# Patient Record
Sex: MALE | Race: WHITE | HISPANIC OR LATINO | Employment: OTHER | URBAN - METROPOLITAN AREA
[De-identification: names, ages, dates, MRNs, and addresses within clinical notes are randomized per-mention and may not be internally consistent; named-entity substitution may affect disease eponyms.]

---

## 2017-01-09 ENCOUNTER — APPOINTMENT (OUTPATIENT)
Dept: PHYSICAL THERAPY | Facility: CLINIC | Age: 72
End: 2017-01-09
Payer: COMMERCIAL

## 2017-01-09 ENCOUNTER — GENERIC CONVERSION - ENCOUNTER (OUTPATIENT)
Dept: OTHER | Facility: OTHER | Age: 72
End: 2017-01-09

## 2017-01-09 DIAGNOSIS — M54.9 DORSALGIA: ICD-10-CM

## 2017-01-09 PROCEDURE — 97110 THERAPEUTIC EXERCISES: CPT

## 2017-01-09 PROCEDURE — 97162 PT EVAL MOD COMPLEX 30 MIN: CPT

## 2017-01-09 PROCEDURE — G8991 OTHER PT/OT GOAL STATUS: HCPCS

## 2017-01-09 PROCEDURE — G8990 OTHER PT/OT CURRENT STATUS: HCPCS

## 2017-01-18 ENCOUNTER — APPOINTMENT (OUTPATIENT)
Dept: PHYSICAL THERAPY | Facility: CLINIC | Age: 72
End: 2017-01-18
Payer: COMMERCIAL

## 2017-01-25 ENCOUNTER — APPOINTMENT (OUTPATIENT)
Dept: PHYSICAL THERAPY | Facility: CLINIC | Age: 72
End: 2017-01-25
Payer: COMMERCIAL

## 2017-01-25 PROCEDURE — 97110 THERAPEUTIC EXERCISES: CPT

## 2017-01-25 PROCEDURE — 97140 MANUAL THERAPY 1/> REGIONS: CPT

## 2017-01-30 ENCOUNTER — GENERIC CONVERSION - ENCOUNTER (OUTPATIENT)
Dept: OTHER | Facility: OTHER | Age: 72
End: 2017-01-30

## 2017-01-30 ENCOUNTER — ALLSCRIPTS OFFICE VISIT (OUTPATIENT)
Dept: OTHER | Facility: OTHER | Age: 72
End: 2017-01-30

## 2017-01-30 DIAGNOSIS — E11.49 TYPE 2 DIABETES MELLITUS WITH OTHER DIABETIC NEUROLOGICAL COMPLICATION (HCC): ICD-10-CM

## 2017-01-30 DIAGNOSIS — Z11.4 ENCOUNTER FOR SCREENING FOR HIV: ICD-10-CM

## 2017-01-30 DIAGNOSIS — Z11.59 ENCOUNTER FOR SCREENING FOR OTHER VIRAL DISEASES: ICD-10-CM

## 2017-01-30 DIAGNOSIS — Z12.5 ENCOUNTER FOR SCREENING FOR MALIGNANT NEOPLASM OF PROSTATE: ICD-10-CM

## 2017-01-30 DIAGNOSIS — H91.93 HEARING LOSS OF BOTH EARS: ICD-10-CM

## 2017-01-30 DIAGNOSIS — Z00.00 ENCOUNTER FOR GENERAL ADULT MEDICAL EXAMINATION WITHOUT ABNORMAL FINDINGS: ICD-10-CM

## 2017-02-24 ENCOUNTER — GENERIC CONVERSION - ENCOUNTER (OUTPATIENT)
Dept: OTHER | Facility: OTHER | Age: 72
End: 2017-02-24

## 2017-03-20 ENCOUNTER — APPOINTMENT (OUTPATIENT)
Dept: LAB | Facility: CLINIC | Age: 72
End: 2017-03-20
Payer: COMMERCIAL

## 2017-03-20 DIAGNOSIS — Z11.4 ENCOUNTER FOR SCREENING FOR HIV: ICD-10-CM

## 2017-03-20 DIAGNOSIS — Z00.00 ENCOUNTER FOR GENERAL ADULT MEDICAL EXAMINATION WITHOUT ABNORMAL FINDINGS: ICD-10-CM

## 2017-03-20 DIAGNOSIS — Z11.59 ENCOUNTER FOR SCREENING FOR OTHER VIRAL DISEASES: ICD-10-CM

## 2017-03-20 DIAGNOSIS — Z12.5 ENCOUNTER FOR SCREENING FOR MALIGNANT NEOPLASM OF PROSTATE: ICD-10-CM

## 2017-03-20 LAB
ANION GAP SERPL CALCULATED.3IONS-SCNC: 7 MMOL/L (ref 4–13)
BUN SERPL-MCNC: 15 MG/DL (ref 5–25)
CALCIUM SERPL-MCNC: 9.3 MG/DL (ref 8.3–10.1)
CHLORIDE SERPL-SCNC: 100 MMOL/L (ref 100–108)
CO2 SERPL-SCNC: 31 MMOL/L (ref 21–32)
CREAT SERPL-MCNC: 0.92 MG/DL (ref 0.6–1.3)
GFR SERPL CREATININE-BSD FRML MDRD: >60 ML/MIN/1.73SQ M
GLUCOSE P FAST SERPL-MCNC: 202 MG/DL (ref 65–99)
MAGNESIUM SERPL-MCNC: 2 MG/DL (ref 1.6–2.6)
POTASSIUM SERPL-SCNC: 3.6 MMOL/L (ref 3.5–5.3)
SODIUM SERPL-SCNC: 138 MMOL/L (ref 136–145)

## 2017-03-20 PROCEDURE — 80048 BASIC METABOLIC PNL TOTAL CA: CPT

## 2017-03-20 PROCEDURE — 87389 HIV-1 AG W/HIV-1&-2 AB AG IA: CPT

## 2017-03-20 PROCEDURE — 84154 ASSAY OF PSA FREE: CPT

## 2017-03-20 PROCEDURE — 36415 COLL VENOUS BLD VENIPUNCTURE: CPT

## 2017-03-20 PROCEDURE — 87340 HEPATITIS B SURFACE AG IA: CPT

## 2017-03-20 PROCEDURE — 86803 HEPATITIS C AB TEST: CPT

## 2017-03-20 PROCEDURE — G0103 PSA SCREENING: HCPCS

## 2017-03-20 PROCEDURE — 86705 HEP B CORE ANTIBODY IGM: CPT

## 2017-03-20 PROCEDURE — 86704 HEP B CORE ANTIBODY TOTAL: CPT

## 2017-03-20 PROCEDURE — 83735 ASSAY OF MAGNESIUM: CPT

## 2017-03-21 LAB
HBV CORE AB SER QL: REACTIVE
HBV CORE IGM SER QL: ABNORMAL
HBV SURFACE AG SER QL: ABNORMAL
HCV AB SER QL: ABNORMAL
HIV 1+2 AB+HIV1 P24 AG SERPL QL IA: NORMAL
PSA FREE MFR SERPL: 18.8 %
PSA FREE SERPL-MCNC: 0.15 NG/ML
PSA SERPL-MCNC: 0.8 NG/ML (ref 0–4)

## 2017-04-05 ENCOUNTER — APPOINTMENT (OUTPATIENT)
Dept: LAB | Facility: HOSPITAL | Age: 72
End: 2017-04-05
Payer: COMMERCIAL

## 2017-04-05 ENCOUNTER — TRANSCRIBE ORDERS (OUTPATIENT)
Dept: RADIOLOGY | Facility: HOSPITAL | Age: 72
End: 2017-04-05

## 2017-04-05 ENCOUNTER — HOSPITAL ENCOUNTER (OUTPATIENT)
Dept: RADIOLOGY | Facility: HOSPITAL | Age: 72
Discharge: HOME | End: 2017-04-05
Payer: COMMERCIAL

## 2017-04-05 ENCOUNTER — TRANSCRIBE ORDERS (OUTPATIENT)
Dept: LAB | Facility: HOSPITAL | Age: 72
End: 2017-04-05

## 2017-04-05 DIAGNOSIS — E11.49 TYPE 2 DIABETES MELLITUS WITH OTHER DIABETIC NEUROLOGICAL COMPLICATION (HCC): ICD-10-CM

## 2017-04-05 DIAGNOSIS — M54.9 DORSALGIA: ICD-10-CM

## 2017-04-05 DIAGNOSIS — B18.2 CHRONIC VIRAL HEPATITIS C (HCC): ICD-10-CM

## 2017-04-05 DIAGNOSIS — M17.10 PRIMARY OSTEOARTHRITIS OF ONE KNEE: ICD-10-CM

## 2017-04-05 DIAGNOSIS — M25.561 PAIN IN RIGHT KNEE: ICD-10-CM

## 2017-04-05 DIAGNOSIS — F17.200 NICOTINE DEPENDENCE, UNCOMPLICATED: ICD-10-CM

## 2017-04-05 LAB
CHOLEST SERPL-MCNC: 179 MG/DL (ref 50–200)
CREAT UR-MCNC: 384 MG/DL
HDLC SERPL-MCNC: 59 MG/DL (ref 40–60)
LDLC SERPL CALC-MCNC: 101 MG/DL (ref 0–100)
MICROALBUMIN UR-MCNC: 107 MG/L (ref 0–20)
MICROALBUMIN/CREAT 24H UR: 28 MG/G CREATININE (ref 0–30)
TRIGL SERPL-MCNC: 96 MG/DL

## 2017-04-05 PROCEDURE — 36415 COLL VENOUS BLD VENIPUNCTURE: CPT

## 2017-04-05 PROCEDURE — 82570 ASSAY OF URINE CREATININE: CPT

## 2017-04-05 PROCEDURE — 80061 LIPID PANEL: CPT

## 2017-04-05 PROCEDURE — 82043 UR ALBUMIN QUANTITATIVE: CPT

## 2017-04-05 PROCEDURE — 72110 X-RAY EXAM L-2 SPINE 4/>VWS: CPT

## 2017-04-13 ENCOUNTER — ALLSCRIPTS OFFICE VISIT (OUTPATIENT)
Dept: OTHER | Facility: OTHER | Age: 72
End: 2017-04-13

## 2017-04-13 ENCOUNTER — GENERIC CONVERSION - ENCOUNTER (OUTPATIENT)
Dept: OTHER | Facility: OTHER | Age: 72
End: 2017-04-13

## 2017-04-13 LAB — HBA1C MFR BLD HPLC: 7.6 %

## 2017-07-20 ENCOUNTER — ALLSCRIPTS OFFICE VISIT (OUTPATIENT)
Dept: OTHER | Facility: OTHER | Age: 72
End: 2017-07-20

## 2017-10-31 ENCOUNTER — APPOINTMENT (OUTPATIENT)
Dept: LAB | Facility: CLINIC | Age: 72
End: 2017-10-31
Payer: COMMERCIAL

## 2017-10-31 ENCOUNTER — ALLSCRIPTS OFFICE VISIT (OUTPATIENT)
Dept: OTHER | Facility: OTHER | Age: 72
End: 2017-10-31

## 2017-10-31 DIAGNOSIS — B18.2 CHRONIC VIRAL HEPATITIS C (HCC): ICD-10-CM

## 2017-10-31 LAB
ALBUMIN SERPL BCP-MCNC: 3.6 G/DL (ref 3.5–5)
ALP SERPL-CCNC: 59 U/L (ref 46–116)
ALT SERPL W P-5'-P-CCNC: 38 U/L (ref 12–78)
ANION GAP SERPL CALCULATED.3IONS-SCNC: 6 MMOL/L (ref 4–13)
AST SERPL W P-5'-P-CCNC: 28 U/L (ref 5–45)
BILIRUB SERPL-MCNC: 0.61 MG/DL (ref 0.2–1)
BUN SERPL-MCNC: 15 MG/DL (ref 5–25)
CALCIUM SERPL-MCNC: 9.7 MG/DL (ref 8.3–10.1)
CHLORIDE SERPL-SCNC: 98 MMOL/L (ref 100–108)
CO2 SERPL-SCNC: 29 MMOL/L (ref 21–32)
CREAT SERPL-MCNC: 1.05 MG/DL (ref 0.6–1.3)
GFR SERPL CREATININE-BSD FRML MDRD: 72 ML/MIN/1.73SQ M
GLUCOSE P FAST SERPL-MCNC: 213 MG/DL (ref 65–99)
POTASSIUM SERPL-SCNC: 4.1 MMOL/L (ref 3.5–5.3)
PROT SERPL-MCNC: 7.7 G/DL (ref 6.4–8.2)
SODIUM SERPL-SCNC: 133 MMOL/L (ref 136–145)

## 2017-10-31 PROCEDURE — 82105 ALPHA-FETOPROTEIN SERUM: CPT

## 2017-10-31 PROCEDURE — 87522 HEPATITIS C REVRS TRNSCRPJ: CPT

## 2017-10-31 PROCEDURE — 87902 NFCT AGT GNTYP ALYS HEP C: CPT

## 2017-10-31 PROCEDURE — 80053 COMPREHEN METABOLIC PANEL: CPT

## 2017-10-31 PROCEDURE — 36415 COLL VENOUS BLD VENIPUNCTURE: CPT

## 2017-11-01 LAB — AFP-TM SERPL-MCNC: 2.7 NG/ML (ref 0–8.3)

## 2017-11-02 LAB
HCV RNA SERPL NAA+PROBE-ACNC: NORMAL IU/ML
HCV RNA SERPL NAA+PROBE-LOG IU: 5.88 LOG10 IU/ML
TEST INFORMATION: NORMAL

## 2017-11-03 ENCOUNTER — HOSPITAL ENCOUNTER (OUTPATIENT)
Dept: RADIOLOGY | Facility: HOSPITAL | Age: 72
Discharge: HOME | End: 2017-11-03
Payer: COMMERCIAL

## 2017-11-03 DIAGNOSIS — F17.200 NICOTINE DEPENDENCE, UNCOMPLICATED: ICD-10-CM

## 2017-11-03 PROCEDURE — 76775 US EXAM ABDO BACK WALL LIM: CPT

## 2017-11-05 LAB
HCV GENTYP SERPL NAA+PROBE: NORMAL
HCV PLEASE NOTE: NORMAL

## 2018-01-09 NOTE — PROGRESS NOTES
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)   10  Diabetic Nephropathy (583 81)   11  Encounter for screening for HIV (V73 89) (Z11 4)   12  Flu vaccine need (V04 81) (Z23)   13  Hyperlipidemia (272 4) (E78 5)   14  Hypertension (401 9) (I10)   15  Hypothyroidism (244 9) (E03 9)   16  Medicare annual wellness visit, initial (V70 0) (Z00 00)   17  Microalbuminuria (791 0) (R80 9)   18  Need for hepatitis C screening test (V73 89) (Z11 59)   19  Need for pneumococcal vaccination (V03 82) (Z23)   20  Need for prophylactic vaccination and inoculation against influenza (V04 81) (Z23)   21  Need for Tdap vaccination (V06 1) (Z23)   22  Nicotine dependence (305 1) (F17 200)   23  Onychomycosis (110 1) (B35 1)   24  Osteoarthritis of knee (715 36) (M17 10)   25  Peripheral neuropathy (356 9) (G62 9)   26  Right knee pain (719 46) (M25 561)   27  Right shoulder pain (719 41) (M25 511)   28  Screening for abdominal aortic aneurysm (V81 2) (Z13 6)   29  Screening PSA (prostate specific antigen) (V76 44) (Z12 5)   30  Thoracic vertebral fracture (805 2) (S22 009A)   31  Tinea pedis (110 4) (B35 3)   32  Type 2 diabetes mellitus (250 00) (E11 9)   33  Vitamin B12 deficiency (266 2) (E53 8)    Current Meds   1  Advair -21 MCG/ACT Inhalation Aerosol; USE 2 PUFFS TWICE A DAY; Therapy: 80NYB6230 to (Evaluate:16Jan2018)  Requested for: 21Yqj8632; Last   Rx:92Bbd5472 Ordered   2   Aspirin 81 MG Oral Tablet Delayed Release; take 1 tablet by mouth once daily; Therapy: 60Pjw4096 to (Evaluate:16Jan2018)  Requested for: 20Def0653; Last   Rx:04Kzq3561 Ordered   3  Clotrimazole 1 % External Cream; APPLY SPARINGLY TO AFFECTED AREA(S) TWICE   DAILY; Therapy: 64GMT9629 to (Last Rx:24Nov2015) Ordered   4  [facility] Vitamin B-12 1000 MCG Oral Tablet; take 1 tablet by mouth daily; Therapy: 38FWV7895 to (Evaluate:57Rce2810)  Requested for: 22Ezc3787; Last   Rx:25Cac6322 Ordered   5  DilTIAZem HCl ER Coated Beads 300 MG Oral Capsule Extended Release 24 Hour;   take 1 capsule by mouth daily; Therapy: 95EUP1704 to (Evaluate:20Jul2018)  Requested for: 57Ksp5442; Last   Rx:12Hhm0086 Ordered   6  Dulcolax 5 MG Oral Tablet Delayed Release; take 4 tabs at once with 8 ounces of water   at 5:00 pm;   Therapy: 95RCU5030 to (Last YT:67QBX1988) Ordered   7  Econazole Nitrate 1 % External Cream; APPLY SPARINGLY AND RUB IN WELL TO   AFFECTED AREA(S) ONCE DAILY; Therapy: 51QAQ4248 to (Last Rx:25Nov2014) Ordered   8  Fluticasone Propionate 50 MCG/ACT Nasal Suspension; instill 2 sprays into each nostril   once daily; Therapy: 19OKG1974 to (Evaluate:18Oct2017)  Requested for: 98Wtc5525; Last   Rx:08Bor4717 Ordered   9  FreeStyle Lancets Miscellaneous; check blood sugar once in morning and once in   evening before meals; Therapy: 20EXL0688 to (Last Rx:91Fis4253) Ordered   10  FreeStyle Lite Device; check blood sugar once in the morning and once inthe evening    before meals; Therapy: 66LCD3999 to (Last Rx:09Isf0349) Ordered   11  FreeStyle Lite Test In Vitro Strip; check blood glucose fasting every morning, may check    twice daily if needed; Therapy: 20VWY0728 to (Last Rx:27Nlr6843) Ordered   12  Glimepiride 1 MG Oral Tablet; take 1 tablet by mouth daily; Therapy: 12CLK4560 to (Evaluate:37Xjw0182)  Requested for: 49Ebp1155; Last    Rx:17Wms0570 Ordered   13  HydroCHLOROthiazide 25 MG Oral Tablet; take 1 tablet by mouth daily;     Therapy: 03VKJ8058 to (Evaluate:78Fni1624)  Requested for: 14Vvx3348; Last    Rx:77Hrm1562 Ordered   14  Levothyroxine Sodium 137 MCG Oral Tablet; TAKE 1 TABLET DAILY; Therapy: 25JYH0791 to (Evaluate:17Jan2018)  Requested for: 05Zoo3064; Last    Rx:48Mdf4075 Ordered   15  Lisinopril 10 MG Oral Tablet; TAKE 1 TABLET DAILY; Therapy: 12IWL3094 to (Evaluate:17Nov2017)  Requested for: 39Vgb9802; Last    Rx:67Tvt4248 Ordered   16  MetFORMIN HCl - 850 MG Oral Tablet; TAKE 1 TABLET BY MOUTH TWICE A DAY WITH    MEALS; Therapy: 53BUS5468 to (Evaluate:13Nlh4451)  Requested for: 43Svv3689; Last    Rx:64Mvf2524 Ordered   17  Montelukast Sodium 10 MG Oral Tablet; take 1 tablet by mouth once daily; Therapy: 45BVF0037 to (Arlean Hunger)  Requested for: 49XYR5617; Last    Rx:30Jan2017 Ordered   18  Polyethylene Glycol 3350 Oral Powder; mix 238 gm in 64 ounces of gatorade; Therapy: 52MJV7316 to (Last AY:54KBQ5740) Ordered   19  Simvastatin 80 MG Oral Tablet; take 1 tablet at bedtime; Therapy: 84KRO7230 to (Evaluate:53Aqm6785)  Requested for: 52DRU4139; Last    Rx:20Wky2256 Ordered   20  Tylenol 325 MG Oral Tablet; take 1 tablet every twelve hours; Therapy: 70Aqj9196 to (Evaluate:61Bok3594)  Requested for: 91Qhq5650; Last    Rx:20Rth2282 Ordered   21  Ventolin  (90 Base) MCG/ACT Inhalation Aerosol Solution; INHALE 2 PUFFS    EVERY 4-6 HOURS AS NEEDED; Therapy: 96EIW4256 to (Evaluate:26Dba8404)  Requested for: 35Agq7980; Last    Rx:02Ytp4668 Ordered    Allergies    1   No Known Drug Allergies    Plan  Need for prophylactic vaccination and inoculation against influenza    · Flulaval Quadrivalent Intramuscular Suspension    Signatures   Electronically signed by : Jaquelin Vazquez, ; Oct 31 2017 12:03PM EST                       (Author)    Electronically signed by : Azul Moulton DO; Oct 31 2017 12:38PM EST                       (Author)    Electronically signed by : Brandie Alfred DO; Oct 31 2017 12:52PM EST (Review)

## 2018-01-12 VITALS
SYSTOLIC BLOOD PRESSURE: 130 MMHG | HEART RATE: 82 BPM | WEIGHT: 197.09 LBS | DIASTOLIC BLOOD PRESSURE: 62 MMHG | TEMPERATURE: 98.4 F | BODY MASS INDEX: 30.93 KG/M2 | HEIGHT: 67 IN

## 2018-01-12 VITALS
BODY MASS INDEX: 30.45 KG/M2 | TEMPERATURE: 98.2 F | WEIGHT: 194 LBS | SYSTOLIC BLOOD PRESSURE: 148 MMHG | HEART RATE: 76 BPM | HEIGHT: 67 IN | DIASTOLIC BLOOD PRESSURE: 80 MMHG

## 2018-01-13 NOTE — PROGRESS NOTES
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)   7  Diabetic Nephropathy (583 81)   8  Flu vaccine need (V04 81) (Z23)   9  Hyperlipidemia (272 4) (E78 5)   10  Hypertension (401 9) (I10)   11  Hypothyroidism (244 9) (E03 9)   12  Microalbuminuria (791 0) (R80 9)   13  Need for prophylactic vaccination and inoculation against influenza (V04 81) (Z23)   14  Onychomycosis (110 1) (B35 1)   15  Peripheral neuropathy (356 9) (G62 9)   16  Right shoulder pain (719 41) (M25 511)   17  Thoracic vertebral fracture (805 2) (S22 009A)   18  Tinea pedis (110 4) (B35 3)   19  Type 2 diabetes mellitus (250 00) (E11 9)   20  Vitamin B12 deficiency (266 2) (E53 8)    Current Meds   1  Advair Diskus 500-50 MCG/DOSE Inhalation Aerosol Powder Breath Activated; Inhale 1   puff twice daily; Therapy: 65QMG7491 to (Evaluate:01Jan2017)  Requested for: 01VAG1862; Last   Rx:07Jan2016 Ordered   2  Advair -21 MCG/ACT Inhalation Aerosol; USE 2 PUFFS TWICE A DAY; Therapy: 45YZF2031 to (Evaluate:66Xgd8751)  Requested for: 00TGW7450; Last   Rx:22Ttn0743 Ordered   3  Aspirin 81 MG TABS; TAKE 1 TABLET DAILY; Therapy: 22CKR7545 to (Evaluate:01Jan2017)  Requested for: 20CSZ3276; Last   Rx:07Jan2016 Ordered   4  Clotrimazole 1 % External Cream; APPLY SPARINGLY TO AFFECTED AREA(S) TWICE   DAILY; Therapy: 21MFT5442 to (Last Rx:24Nov2015) Ordered   5  [facility] Vitamin B-12 1000 MCG Oral Tablet; take 1 tablet by mouth daily; Therapy: 80NZI7903 to (Evaluate:86Kif5829)  Requested for: 51Obz6216; Last   Rx:19Sep2016 Ordered   6   DiltiaZEM HCl ER Coated Beads 300 MG Oral Capsule Extended Release 24 Hour; take   1 capsule by mouth daily; Therapy: 37JTC2588 to (Evaluate:70Mmp7333)  Requested for: 65Nrf8518; Last   Rx:15Bzb5368 Ordered   7  Dulcolax 5 MG Oral Tablet Delayed Release; take 4 tabs at once with 8 ounces of water   at 5:00 pm;   Therapy: 10XJO9398 to (Last KR:52GOX9743) Ordered   8  Econazole Nitrate 1 % External Cream; APPLY SPARINGLY AND RUB IN WELL TO   AFFECTED AREA(S) ONCE DAILY; Therapy: 18MJP4006 to (Last Rx:25Nov2014) Ordered   9  Fluticasone Propionate 50 MCG/ACT Nasal Suspension; instill 2 sprays into each nostril   once daily; Therapy: 21JWB5916 to (Evaluate:35Ing2545)  Requested for: 64Gqp2992; Last   Rx:58Qli4180 Ordered   10  FreeStyle Lancets Miscellaneous; check blood sugar once in morning and once in    evening before meals; Therapy: 34VLR8732 to (Last Rx:30Eai4125) Ordered   11  FreeStyle Lite Device; check blood sugar once in the morning and once inthe evening    before meals; Therapy: 16LRK8832 to (Last Rx:41Zfa2871) Ordered   12  FreeStyle Lite Test In Vitro Strip; check blood glucose fasting every morning, may check    twice daily if needed; Therapy: 04HVC2482 to (Last Rx:18Eun0098) Ordered   13  Gabapentin 400 MG Oral Capsule; TAKE 1 CAPSULE 3 TIMES DAILY; Therapy: 49JYH2298 to (Evaluate:01Jan2017)  Requested for: 92TQK4160; Last    Rx:07Jan2016 Ordered   14  Glimepiride 1 MG Oral Tablet; take 1 tablet by mouth daily; Therapy: 88HRH9404 to (Evaluate:66Tfw2782)  Requested for: 32Yyw8106; Last    Rx:31Kfn9400 Ordered   15  HydroCHLOROthiazide 25 MG Oral Tablet; take 1 tablet by mouth daily; Therapy: 74FER1103 to (Evaluate:85Nxm5513)  Requested for: 24Skm6956; Last    Rx:33Biq6592 Ordered   16  Levothyroxine Sodium 137 MCG Oral Tablet; TAKE 1 TABLET DAILY; Therapy: 39WEN3330 to (Evaluate:14Jan2017)  Requested for: 43NFW4188; Last    Rx:86Ddp0508 Ordered   17   Lisinopril 5 MG Oral Tablet; take 1 tablet by mouth daily as directed; Therapy: 22ILA3520 to (Evaluate:02Jan2017)  Requested for: 13CLC2699; Last    Rx:08Jan2016 Ordered   25  MetFORMIN HCl - 850 MG Oral Tablet; TAKE 1 TABLET BY MOUTH TWICE A DAY WITH    MEALS; Therapy: 82TMA1569 to (Evaluate:16Jan2017)  Requested for: 55Iis4515; Last    Rx:05Jvc7247 Ordered   19  Montelukast Sodium 10 MG Oral Tablet; take 1 tablet by mouth once daily; Therapy: 77SMK5166 to (Evaluate:16Jan2017)  Requested for: 72Fam7085; Last    Rx:98Aet6887 Ordered   20  Polyethylene Glycol 3350 Oral Powder; mix 238 gm in 64 ounces of gatorade; Therapy: 79ZGJ7149 to (Last EM:94MBD5492) Ordered   21  Simvastatin 80 MG Oral Tablet; take 1 tablet at bedtime; Therapy: 89RJL9344 to (Glory Hazard)  Requested for: 82UAV6327; Last    Rx:56Xpn7234 Ordered   22  TraMADol HCl - 50 MG Oral Tablet; TAKE 1 TABLET After meals PRN; Therapy: 26IBP4753 to (Evaluate:14Nov2016); Last Rx:01Xan5915 Ordered   23  Ventolin  (90 Base) MCG/ACT Inhalation Aerosol Solution; INHALE 2 PUFFS    EVERY 4-6 HOURS AS NEEDED; Therapy: 73GAI2393 to (Evaluate:01Jan2017)  Requested for: 04DTR7550; Last    Rx:07Jan2016 Ordered    Allergies    1   No Known Drug Allergies    Plan  Need for prophylactic vaccination and inoculation against influenza    · Flulaval Quadrivalent Intramuscular Suspension    Future Appointments    Date/Time Provider Specialty Site   12/20/2016 09:45 AM Jose Antonio Caba DO Internal Medicine [address] [facility] PCP     Signatures   Electronically signed by : Flora Gillette, ; Oct 27 2016  1:44PM EST                       (Author)    Electronically signed by : Christina Poon DO; Nov  3 2016 10:50AM EST                       (Review)    Electronically signed by : Kari Pedroza DO; Nov  3 2016  6:53PM EST                       (Review)

## 2018-01-18 NOTE — MISCELLANEOUS
Message  Call the patient to speak about Tramadol  No one picked up the phone  Will try again Monday  Melanie Santizo    Update 4/18/17: Spoke to patient's son who help interpret in [place]  Patient needs to go for the Xray and PT at this time  Also, I may send him to Ortho for possible joint injections/more pain meds  Pt understood the plan  [address] [facility] By: Piyush Márquez; Apr 18 2017 8:05 PM EST    Signatures   Electronically signed by : Melanie Santizo DO;  Apr 18 2017  8:06PM EST                       (Author)

## 2018-01-22 VITALS
DIASTOLIC BLOOD PRESSURE: 72 MMHG | SYSTOLIC BLOOD PRESSURE: 144 MMHG | HEIGHT: 66 IN | HEART RATE: 82 BPM | BODY MASS INDEX: 31.25 KG/M2 | WEIGHT: 194.44 LBS

## 2018-01-23 NOTE — PROGRESS NOTES
Assessment    1  Screening PSA (prostate specific antigen) (V76 44) (Z12 5)   2  Encounter for screening for HIV (V73 89) (Z11 4)   3  Need for Tdap vaccination (V06 1) (Z23)   4  Need for hepatitis C screening test (V73 89) (Z11 59)   5  Bilateral hearing loss (389 9) (H91 93)   6  Encounter for preventive health examination (V70 0) (Z00 00)   7  Medicare annual wellness visit, initial (V70 0) (Z00 00)   8  Need for pneumococcal vaccination (V03 82) (Z23)    Plan  Asthma    · Montelukast Sodium 10 MG Oral Tablet (Singulair); take 1 tablet by mouth once  daily  Bilateral hearing loss    · *[facility] - [facility] Physician Referral  Consult  Status: Active  Requested for:  32AZK2372  Care Summary provided  : Yes  Diabetes mellitus with neurological manifestation    · (1) LIPID PANEL FASTING W DIRECT LDL REFLEX; Status:Active; Requested  IRR:[id number];    · (1) MICROALBUMIN CREATININE RATIO, RANDOM URINE; Status:Active; Requested  for:46Wwk3260;   Encounter for screening for HIV    · (1) HIV AG/AB COMBO, 4TH GEN; [Do Not Release]; Status:Active; Requested  ZRK:02MHF0850; Health Maintenance    · (1) BASIC METABOLIC PROFILE; Status:Active; Requested PFY:48IYE5146;    · (1) MAGNESIUM; Status:Active; Requested SHERON:59EGA9231;   Need for hepatitis C screening test    · (1) CHRONIC HEPATITIS PANEL; Status:Active; Requested BHI:42RUC0165;   Need for pneumococcal vaccination    · Prevnar 13 Intramuscular Suspension  Need for Tdap vaccination    · Tdap  Screening PSA (prostate specific antigen)    · (1) PSA FREE & TOTAL; Status:Active; Requested WCS:00MTF6193;     Discussion/Summary    1) Wellness visit: Patient states that he does not want to take gabapentin anymore and wishes to DC it  Please see other discussion below  Already done with Hep B series  Low risk anyways   I will do the following test for screening and current problems: Audiology for hearing issues, fasting lipids, Microalb by the end of Feb 2017, HIV, Chronic Hep panel, BMP, Mg, PSA  Administered Tdap and Pravnar 13 today  Next C-scope due 2019  AAA screening done with CT abd and pelvis in 2014  DXA few years ago was negative  Will get self Optho appt  soon  Impression: Initial Annual Wellness Visit, with preventive exam as well as age and risk appropriate counseling completed  Cardiovascular screening and counseling: the risks and benefits of screening were discussed, screening is current, the patient declines screening, counseling was given on maintaining a healthy diet, counseling was given on maintaining a healthy weight, counseling was given on tobacco cessation, due for a lipid panel, due for cholesterol, due for triglyceride and smokes 1pk for 2 days  Diabetes screening and counseling: the risks and benefits of screening were discussed, screening is current, counseling was given on maintaining a healthy diet, counseling was given on maintaining a healthy weight, counseling was given on ways to improve physical activity and WIll schedule DM retinal exam this week  Colorectal cancer screening and counseling: the risks and benefits of screening were discussed, screening is current, counseling was given on ways to eat a high fiber diet, due for a colonoscopy (low risk), colorectal cancer screening due every 5 year(s) and Next C-scope in 2019  Prostate cancer screening and counseling: the risks and benefits of screening were discussed, due for PSA and Dx - V76 44 Screen PSA  Osteoporosis screening and counseling: the risks and benefits of screening were discussed, screening is current, screening not indicated, counseling was given on obtaining adequate amounts of calcium and vitamin D on a daily basis and counseling was given on the importance of regular weightbearing exercise     Abdominal aortic aneurysm screening and counseling: the risks and benefits of screening were discussed, screening is current and CT scan of the abd and pelvic done in 10/2014 that did not show any AAA  Glaucoma screening and counseling: the risks and benefits of screening were discussed, screening is current, Pt states he has Ophthalmologist of his own and will take the appointment soon and soon get Optometrist appointment as well , ophthalmologist referral and optometrist referral    HIV screening and counseling: the risks and benefits of screening were discussed, counseling was given on ways to prevent HIV infection and counseling was given on using a condom during sexual intercourse  Immunizations: the risks and benefits of influenza vaccination were discussed with the patient, influenza vaccine is up to date this year, influenza vaccination is recommended annually, the risks and benefits of pneumococcal vaccination were discussed with the patient, pneumococcal vaccine due today, Due for PCV 13, hepatitis B prevention counseling was provided, hepatitis B vaccination series is not indicated at this time due to the patient's low risk of alessia the disease, the risks and benefits of the hepatitis vaccination series were discussed with the patient, hepatitis B vaccination series is up to date, the risks and benefits of the Zostavax vaccine were discussed with the patient, Zostavax vaccination status is unknown, the risks and benefits of the Tdap vaccine were discussed with the patient and Tdap vaccine needed today  Tobacco Cessation: an intermediate session is done today  Advance Directive Planning: paperwork and instructions were given to the patient  Advice and education were given regarding alcohol use, fall risk reduction, increasing physical activity, seat belt use, skin self-exam, sunscreen use, weight gain and weight loss  He was referred to audiology  Patient Discussion: plan discussed with the patient, plan discussed with the patient's family, follow-up visit needed in 2  History of Present Illness  Radha Guthrie was in the room for translation     The patient is being seen for the initial annual wellness visit  Medicare Screening and Risk Factors   Hospitalizations: no previous hospitalizations and no hospitalizations in past year  Once per lifetime medicare screening tests: ECG and AAA screening US has not yet been done  Medicare Screening Tests Risk Questions   Abdominal aortic aneurysm risk assessment: tobacco use and over 72years of age  Osteoporosis risk assessment: over 48years of age and tobacco use  HIV risk assessment: none indicated  Drug and Alcohol Use: The patient is a current cigarette smoker and Per pt  1 pack lasts 2 days  He is not ready to quit using tobacco, wants resources to help with quitting and Pt  not ready to stop but would like resources or know what options he has  The patient reports never drinking alcohol  Alcohol concern:   The patient has no concerns about alcohol abuse  He has never used illicit drugs  Diet and Physical Activity: Current diet includes well balanced meals, limited junk food, 5 cups of coffee per day and 0 cans of regular soda per day  He exercises 1-2 times per week  Exercise: walking, Pt  reports doing physical [facility] at [facility] 2 hours per week  Mood Disorder and Cognitive Impairment Screening: Anxiety screening was done using GAD7 and score was 3  He denies feeling down, depressed, or hopeless over the past two weeks  He denies feeling little interest or pleasure in doing things over the past two weeks  Cognitive impairment screening: denies difficulty learning/retaining new information, denies difficulty handling complex tasks, difficulty with reasoning, difficulty with spatial ability and orientation, difficulty with language, difficulty with behavior and Pt  reports forgetting how to get to places and difficulty with having conversations and understanding     Functional Ability/Level of Safety: Hearing is slightly decreased, Pt  reports is talks to soft cannot hear, puts TV on loud so he can hear it and a hearing aid is not used  He reports hearing difficulties  The patient is currently able to do activities of daily living without limitations, able to participate in social activities without limitations and able to drive without limitations  Activities of daily living details: needs help shopping, meal preparation help needed, needs help doing housework, needs help doing laundry, needs help managing medications, needs help managing money and Pt's wife helps him, but does not need help using the phone and no transportation help needed  Fall risk factors:  polypharmacy, mobility impairment, visual impairment, antihypertensive use and previous fall, but The patient fell times in the past 12 months , no alcohol use, no antidepressant use, no deconditioning, no postural hypotension, no sedative use, no urinary incontinence, no cognitive impairment and up and go test was normal   0  Home safety risk factors:  no unfamiliar surroundings, no loose rugs, no poor household lighting, no uneven floors, no household clutter, grab bars in the bathroom and handrails on the stairs  Advance Directives: Advance directives: no living will, no durable power of  for health care directives and no advance directives  Co-Managers and Medical Equipment/Suppliers: See Patient Care Team      Patient Care Team    Care Team Member Role Specialty Office Number   Specialty Clinic, Podiatry Specialist  [phone]   [facility], [address] [place] [address] Resident Internal Medicine [phone]   Holli Jolley   [phone]     Active Problems    1  Adenomatous colon polyp (211 3) (D12 6)   2  Allergic rhinitis (477 9) (J30 9)   3  Asthma (493 90) (J45 909)   4  Back pain (724 5) (M54 9)   5  Collapsed arches (734) (M21 40)   6  Diabetes mellitus with neurological manifestation (250 60) (E11 49)   7  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 (362 04)   8  Diabetic Nephropathy (583 81)   9   Flu vaccine need (V04 81) (Z23)   10  Hyperlipidemia (272 4) (E78 5)   11  Hypertension (401 9) (I10)   12  Hypothyroidism (244 9) (E03 9)   13  Microalbuminuria (791 0) (R80 9)   14  Need for prophylactic vaccination and inoculation against influenza (V04 81) (Z23)   15  Onychomycosis (110 1) (B35 1)   16  Peripheral neuropathy (356 9) (G62 9)   17  Right shoulder pain (719 41) (M25 511)   18  Screening for abdominal aortic aneurysm (V81 2) (Z13 6)   19  Thoracic vertebral fracture (805 2) (S22 009A)   20  Tinea pedis (110 4) (B35 3)   21  Type 2 diabetes mellitus (250 00) (E11 9)   22  Vitamin B12 deficiency (266 2) (E53 8)    Past Medical History    1  History of Community acquired pneumonia (5) (J18 9)    Surgical History    1  History of Complete Colonoscopy For Polyp Removal    Family History  Family History    1  Family history of Acute Myocardial Infarction (V17 3)    Social History    · Current Every Day Smoker (305 1)    Current Meds   1  Advair Diskus 500-50 MCG/DOSE Inhalation Aerosol Powder Breath Activated; Inhale 1   puff twice daily; Therapy: 66IBN8795 to (Evaluate:01Jan2017)  Requested for: 17THF3474; Last   Rx:07Jan2016 Ordered   2  Advair -21 MCG/ACT Inhalation Aerosol; USE 2 PUFFS TWICE A DAY; Therapy: 32DYZ0721 to (Evaluate:18Jun2017)  Requested for: 97Flo7139; Last   Rx:51Mjl5509 Ordered   3  Aspirin 81 MG Oral Tablet Delayed Release; take 1 tablet by mouth once daily; Therapy: 29Vlc6483 to (Evaluate:18Jun2017)  Requested for: 45Npf0812; Last   Rx:36Tam8655 Ordered   4  Clotrimazole 1 % External Cream; APPLY SPARINGLY TO AFFECTED AREA(S) TWICE   DAILY; Therapy: 13HXL3344 to (Last Rx:24Nov2015) Ordered   5  [facility]S Vitamin B-12 1000 MCG Oral Tablet; take 1 tablet by mouth daily; Therapy: 96WSI7161 to (Evaluate:80Uwl3245)  Requested for: 70Xbo4948; Last   Rx:83Hjp6066 Ordered   6  DiltiaZEM HCl ER Coated Beads 300 MG Oral Capsule Extended Release 24 Hour; take   1 capsule by mouth daily;    Therapy: 88SXN7966 to (Evaluate:41Faf5429)  Requested for: 41Huo3719; Last   Rx:42Sow7473 Ordered   7  Dulcolax 5 MG Oral Tablet Delayed Release; take 4 tabs at once with 8 ounces of water   at 5:00 pm;   Therapy: 06VIT7057 to (Last GT:00ODU5463) Ordered   8  Econazole Nitrate 1 % External Cream; APPLY SPARINGLY AND RUB IN WELL TO   AFFECTED AREA(S) ONCE DAILY; Therapy: 38IBK4474 to (Last Rx:25Nov2014) Ordered   9  Fluticasone Propionate 50 MCG/ACT Nasal Suspension; instill 2 sprays into each nostril   once daily; Therapy: 40SRH9442 to (Evaluate:20Mar2017)  Requested for: 56All1833; Last   Rx:60Cap0640 Ordered   10  FreeStyle Lancets Miscellaneous; check blood sugar once in morning and once in    evening before meals; Therapy: 99QMG3373 to (Last Rx:90Knh1992) Ordered   11  FreeStyle Lite Device; check blood sugar once in the morning and once inthe evening    before meals; Therapy: 86ISQ6501 to (Last Rx:80Bsd3841) Ordered   12  FreeStyle Lite Test In Vitro Strip; check blood glucose fasting every morning, may check    twice daily if needed; Therapy: 24LPC7867 to (Last Rx:40Qaq4588) Ordered   13  Glimepiride 1 MG Oral Tablet; take 1 tablet by mouth daily; Therapy: 20CSB4055 to (Evaluate:27Mpa9020)  Requested for: 19Ngn3900; Last    Rx:74Ail4014 Ordered   14  HydroCHLOROthiazide 25 MG Oral Tablet; take 1 tablet by mouth daily; Therapy: 26AYS5835 to (Evaluate:83Par2699)  Requested for: 88Evl8361; Last    Rx:20Akp8050 Ordered   15  Levothyroxine Sodium 137 MCG Oral Tablet; TAKE 1 TABLET DAILY; Therapy: 19UEL2167 to (Evaluate:19Apr2017)  Requested for: 38Pai5119; Last    Rx:94Zog7725 Ordered   16  Lisinopril 10 MG Oral Tablet; TAKE 1 TABLET DAILY; Therapy: 03GJK9283 to (Evaluate:19Apr2017)  Requested for: 97Cvq5789; Last    Rx:82Jmd3131 Ordered   17  MetFORMIN HCl - 850 MG Oral Tablet; TAKE 1 TABLET BY MOUTH TWICE A DAY WITH    MEALS;     Therapy: 21WLL2814 to (Evaluate:47Xrb9638)  Requested for: 06Nhc5626; Last    Rx:26Efv9288 Ordered   18  Montelukast Sodium 10 MG Oral Tablet; take 1 tablet by mouth once daily; Therapy: 77FAM3260 to (Evaluate:2017)  Requested for: 11Cjf1425; Last    Rx:74Qel2130 Ordered   19  Polyethylene Glycol 3350 Oral Powder; mix 238 gm in 64 ounces of gatorade; Therapy: 85FLS9101 to (Last RW:00EXS4187) Ordered   20  Simvastatin 80 MG Oral Tablet; take 1 tablet at bedtime; Therapy: 01VTB1654 to (Evaluate:2017)  Requested for: 36QAD5712; Last    Rx:49Ovx2704 Ordered   21  TraMADol HCl - 50 MG Oral Tablet; take 1 tablet daily for severe pain; Therapy: 01FNY0648 to (Evaluate:2017); Last Rx:25Uye4475 Ordered   22  Tylenol 325 MG Oral Tablet; take 1 tablet every twelve hours; Therapy: 12Vuq9240 to (Evaluate:2017)  Requested for: 26Ztq0954; Last    Rx:25Bgb4476 Ordered   23  Ventolin  (90 Base) MCG/ACT Inhalation Aerosol Solution; INHALE 2 PUFFS    EVERY 4-6 HOURS AS NEEDED; Therapy: 84ZLT4043 to (Evaluate:2017)  Requested for: 95NYM6357; Last    Rx:2016 Ordered    Allergies    1  No Known Drug Allergies    Immunizations  Influenza --- Hakan Carrillo: 2013; Nalini Lim: 31-Oct-2014; Demetra Pierre-Oct-2015; Series4:  27-Oct-2016   PPSV --- Hakan Carrillo: 05-Dec-2014     Vitals  Signs   Recorded: 82BMX5183 01:12PM   Heart Rate: 82  Systolic: 681, RUE, Sitting  Diastolic: 72, RUE, Sitting  Height: 5 ft 6 14 in  Weight: 194 lb 7 04 oz  BMI Calculated: 31 25  BSA Calculated: 1 98    Health Management  Type 2 Diabetes With Ophthalmic Manifestations   *VB - Eye Exam; every 1 year; Last 19TTQ8314; Next Due: 88YWQ7138; Overdue  *VB - Foot Exam; every 1 year; Last 53Hnt7343; Next Due: 98Bll5379; Active    Attending Note  Attending Note [employer]: Attending Note: I discussed the case with the Resident and reviewed the Resident's note, I supervised the Resident and I agree with the Resident management plan as it was presented to me  I agree with the Resident's note  Future Appointments    Date/Time Provider Specialty Site   04/13/2017 12:50 PM Noah Herrera DO Internal Medicine  [address] PCP     Signatures   Electronically signed by : South Uriarte DO; Jan 30 2017  3:15PM EST                       (Author)    Electronically signed by : Yecenia Sierra DO; Jan 30 2017  3:52PM EST                       (Co-author)

## 2018-02-23 ENCOUNTER — TELEPHONE (OUTPATIENT)
Dept: INTERNAL MEDICINE CLINIC | Facility: CLINIC | Age: 73
End: 2018-02-23

## 2018-02-26 DIAGNOSIS — E03.9 HYPOTHYROIDISM: Primary | ICD-10-CM

## 2018-02-26 DIAGNOSIS — E78.5 HLD (HYPERLIPIDEMIA): ICD-10-CM

## 2018-02-26 RX ORDER — SIMVASTATIN 80 MG
80 TABLET ORAL DAILY
Qty: 90 TABLET | Refills: 0 | Status: SHIPPED | OUTPATIENT
Start: 2018-02-26 | End: 2018-06-25 | Stop reason: SDUPTHER

## 2018-02-26 RX ORDER — DILTIAZEM HYDROCHLORIDE 300 MG/1
1 CAPSULE, EXTENDED RELEASE ORAL DAILY
COMMUNITY
Start: 2011-09-22 | End: 2018-04-17 | Stop reason: SDUPTHER

## 2018-02-26 RX ORDER — LEVOTHYROXINE SODIUM 137 UG/1
137 TABLET ORAL DAILY
Qty: 90 TABLET | Refills: 0 | Status: SHIPPED | OUTPATIENT
Start: 2018-02-26 | End: 2018-06-07 | Stop reason: SDUPTHER

## 2018-02-26 RX ORDER — SIMVASTATIN 80 MG
1 TABLET ORAL DAILY
COMMUNITY
Start: 2011-12-15 | End: 2018-02-26 | Stop reason: SDUPTHER

## 2018-02-26 RX ORDER — LISINOPRIL 10 MG/1
1 TABLET ORAL DAILY
COMMUNITY
Start: 2011-12-15 | End: 2018-04-17 | Stop reason: SDUPTHER

## 2018-02-26 RX ORDER — LEVOTHYROXINE SODIUM 137 UG/1
1 TABLET ORAL DAILY
COMMUNITY
Start: 2012-06-07 | End: 2018-02-26 | Stop reason: SDUPTHER

## 2018-02-26 RX ORDER — HYDROCHLOROTHIAZIDE 25 MG/1
1 TABLET ORAL DAILY
COMMUNITY
Start: 2011-12-15 | End: 2018-04-17 | Stop reason: SDUPTHER

## 2018-02-26 NOTE — TELEPHONE ENCOUNTER
I'm refilling the patient's medications and will give him a 90-day supply  However, I see he hasn't been seen by our clinic since last July  Please ask patient if he is willing to come in April for an annual checkup  Thank you!

## 2018-04-16 RX ORDER — ACETAMINOPHEN 325 MG/1
1 TABLET ORAL
COMMUNITY
Start: 2016-12-20 | End: 2020-01-13 | Stop reason: ALTCHOICE

## 2018-04-16 RX ORDER — FLUTICASONE PROPIONATE 50 MCG
2 SPRAY, SUSPENSION (ML) NASAL DAILY
COMMUNITY
Start: 2016-02-24 | End: 2020-08-11 | Stop reason: SDUPTHER

## 2018-04-16 RX ORDER — GLIMEPIRIDE 1 MG/1
1 TABLET ORAL DAILY
COMMUNITY
Start: 2011-12-15 | End: 2018-04-17 | Stop reason: SDUPTHER

## 2018-04-16 RX ORDER — DILTIAZEM HYDROCHLORIDE 300 MG/1
300 CAPSULE, COATED, EXTENDED RELEASE ORAL DAILY
Refills: 3 | COMMUNITY
Start: 2018-01-28 | End: 2018-04-17 | Stop reason: SDUPTHER

## 2018-04-16 RX ORDER — MONTELUKAST SODIUM 10 MG/1
1 TABLET ORAL DAILY
COMMUNITY
Start: 2011-12-15 | End: 2018-06-07 | Stop reason: SDUPTHER

## 2018-04-16 RX ORDER — OMEGA-3/DHA/EPA/FISH OIL 35-113.5MG
1000 TABLET,CHEWABLE ORAL DAILY
Refills: 6 | COMMUNITY
Start: 2018-02-06 | End: 2018-04-17 | Stop reason: SDUPTHER

## 2018-04-16 RX ORDER — LANCETS 28 GAUGE
EACH MISCELLANEOUS
COMMUNITY
Start: 2014-02-14 | End: 2019-03-21 | Stop reason: SDUPTHER

## 2018-04-16 RX ORDER — MEDICAL SUPPLY, MISCELLANEOUS
81 EACH MISCELLANEOUS DAILY
Refills: 5 | COMMUNITY
Start: 2018-01-25 | End: 2018-04-17 | Stop reason: SDUPTHER

## 2018-04-16 RX ORDER — ALBUTEROL SULFATE 90 UG/1
2 AEROSOL, METERED RESPIRATORY (INHALATION)
COMMUNITY
Start: 2014-07-30 | End: 2018-09-21 | Stop reason: SDUPTHER

## 2018-04-16 RX ORDER — CLOTRIMAZOLE 1 %
CREAM (GRAM) TOPICAL 2 TIMES DAILY
COMMUNITY
Start: 2015-11-24 | End: 2020-01-13 | Stop reason: ALTCHOICE

## 2018-04-16 RX ORDER — BLOOD-GLUCOSE METER
KIT MISCELLANEOUS
COMMUNITY
Start: 2014-02-14 | End: 2020-08-20 | Stop reason: SDUPTHER

## 2018-04-17 ENCOUNTER — OFFICE VISIT (OUTPATIENT)
Dept: INTERNAL MEDICINE CLINIC | Facility: CLINIC | Age: 73
End: 2018-04-17
Payer: COMMERCIAL

## 2018-04-17 VITALS
DIASTOLIC BLOOD PRESSURE: 90 MMHG | HEART RATE: 92 BPM | HEIGHT: 68 IN | TEMPERATURE: 97.9 F | SYSTOLIC BLOOD PRESSURE: 164 MMHG | WEIGHT: 192.24 LBS | BODY MASS INDEX: 29.14 KG/M2

## 2018-04-17 DIAGNOSIS — J45.909 ASTHMA: Primary | ICD-10-CM

## 2018-04-17 DIAGNOSIS — E11.49: ICD-10-CM

## 2018-04-17 DIAGNOSIS — I10 HYPERTENSION: ICD-10-CM

## 2018-04-17 DIAGNOSIS — E03.9 HYPOTHYROIDISM: ICD-10-CM

## 2018-04-17 DIAGNOSIS — Z12.11 COLON CANCER SCREENING: ICD-10-CM

## 2018-04-17 DIAGNOSIS — B18.2 CHRONIC HEPATITIS C (HCC): ICD-10-CM

## 2018-04-17 DIAGNOSIS — E78.5 HYPERLIPIDEMIA: ICD-10-CM

## 2018-04-17 PROBLEM — F17.200 NICOTINE DEPENDENCE: Status: ACTIVE | Noted: 2017-04-13

## 2018-04-17 PROBLEM — H91.93 BILATERAL HEARING LOSS: Status: ACTIVE | Noted: 2017-01-30

## 2018-04-17 PROBLEM — R59.0 LYMPHADENOPATHY, ANTERIOR CERVICAL: Status: ACTIVE | Noted: 2018-04-17

## 2018-04-17 PROBLEM — R59.0 LYMPHADENOPATHY, ANTERIOR CERVICAL: Chronic | Status: ACTIVE | Noted: 2018-04-17

## 2018-04-17 PROBLEM — M17.10 OSTEOARTHRITIS OF KNEE: Status: ACTIVE | Noted: 2017-04-13

## 2018-04-17 PROBLEM — M17.9 OSTEOARTHRITIS OF KNEE: Status: ACTIVE | Noted: 2017-04-13

## 2018-04-17 PROCEDURE — 99214 OFFICE O/P EST MOD 30 MIN: CPT | Performed by: INTERNAL MEDICINE

## 2018-04-17 RX ORDER — LISINOPRIL 10 MG/1
10 TABLET ORAL DAILY
Qty: 90 TABLET | Refills: 1 | Status: SHIPPED | OUTPATIENT
Start: 2018-04-17 | End: 2018-09-21 | Stop reason: SDUPTHER

## 2018-04-17 RX ORDER — GLIMEPIRIDE 1 MG/1
1 TABLET ORAL DAILY
Qty: 90 TABLET | Refills: 1 | Status: SHIPPED | OUTPATIENT
Start: 2018-04-17 | End: 2019-03-21

## 2018-04-17 RX ORDER — MEDICAL SUPPLY, MISCELLANEOUS
81 EACH MISCELLANEOUS DAILY
Qty: 90 TABLET | Refills: 2 | Status: SHIPPED | OUTPATIENT
Start: 2018-04-17 | End: 2019-03-02 | Stop reason: SDUPTHER

## 2018-04-17 RX ORDER — HYDROCHLOROTHIAZIDE 25 MG/1
25 TABLET ORAL DAILY
Qty: 90 TABLET | Refills: 1 | Status: SHIPPED | OUTPATIENT
Start: 2018-04-17 | End: 2019-03-02 | Stop reason: SDUPTHER

## 2018-04-17 RX ORDER — DILTIAZEM HYDROCHLORIDE 300 MG/1
300 CAPSULE, COATED, EXTENDED RELEASE ORAL DAILY
Qty: 90 CAPSULE | Refills: 2 | Status: SHIPPED | OUTPATIENT
Start: 2018-04-17 | End: 2019-01-22 | Stop reason: SDUPTHER

## 2018-04-17 RX ORDER — GUAIFENESIN 600 MG
600 TABLET, EXTENDED RELEASE 12 HR ORAL EVERY 12 HOURS SCHEDULED
Qty: 60 TABLET | Refills: 1 | Status: SHIPPED | OUTPATIENT
Start: 2018-04-17 | End: 2020-01-13 | Stop reason: ALTCHOICE

## 2018-04-17 NOTE — ASSESSMENT & PLAN NOTE
· Patient says this is chronic and has been present since he was a child  · Will hold off on imaging for now  · Follow-up CBC  · If changes, will order imaging and possible biopsy

## 2018-04-17 NOTE — ASSESSMENT & PLAN NOTE
· Follow-up in 6 months for A1c  · Continue medications at current regimen  · Follow-up metabolic panel

## 2018-04-17 NOTE — ASSESSMENT & PLAN NOTE
· Start Mucinex b i d   · Continue Ventolin, Advair, and Singulair  · If still needing to use rescue inhaler frequently, will need to likely add additional inhaler therapy

## 2018-04-17 NOTE — PROGRESS NOTES
Assessment/Plan:         Problem List Items Addressed This Visit        Digestive    Chronic hepatitis C ([facility] [address] )     · GI referral ordered  · Follow-up  Recommend establishing close outpatient follow-up for treatment  Relevant Orders    Comprehensive metabolic panel    CBC and differential       Endocrine    Hypothyroidism    Relevant Orders    Comprehensive metabolic panel    CBC and differential    TSH, 3rd generation with T4 reflex    Diabetes mellitus with neurological manifestation (HCC)     · Follow-up in 6 months for A1c  · Continue medications at current regimen  · Follow-up metabolic panel         Relevant Medications    glimepiride (AMARYL) 1 mg tablet       Respiratory    Asthma - Primary     · Start Mucinex b i d   · Continue Ventolin, Advair, and Singulair  · If still needing to use rescue inhaler frequently, will need to likely add additional inhaler therapy         Relevant Medications    montelukast (SINGULAIR) 10 mg tablet    albuterol (VENTOLIN HFA) 90 mcg/act inhaler    guaiFENesin (MUCINEX) 600 mg 12 hr tablet       Cardiovascular and Mediastinum    Hypertension    Relevant Medications    CVS ASPIRIN EC 81 MG EC tablet    diltiazem (CARDIZEM CD) 300 mg 24 hr capsule    hydrochlorothiazide (HYDRODIURIL) 25 mg tablet    lisinopril (ZESTRIL) 10 mg tablet    Other Relevant Orders    Comprehensive metabolic panel       Other    Hyperlipidemia    Relevant Medications    CVS ASPIRIN EC 81 MG EC tablet      Other Visit Diagnoses     Colon cancer screening        Relevant Orders    Ambulatory Referral to GI Endoscopy              Subjective:      Patient ID: Kwame Clark is a 72 y o  male  Pt has complaint of chronic cough with sputum production this winter  This happens sometimes but not all the time  Denies chest pain, sometimes has difficulty breathing but does use his inhaler for it    He is on advair and albuterol, and says he uses his albuterol about 3 times a week to clear his congestion  He believes that his albuterol helps to clear his congestion and this helps him breathe better  Denies fevers and chills, weight loss  He says he has a history of hepatitis C and used to get injections for it, but has not been on treatment recently for it  He also has a history of the lymph node in his neck that he says has been present since he was a child and has never needed treatment for it  Patient says he has cholesterol, DM , thyroid meds and his inhalers  Needs bp meds and aspirin  The following portions of the patient's history were reviewed and updated as appropriate: allergies, current medications, past family history, past medical history, past social history, past surgical history and problem list     Review of Systems   Constitutional: Negative for chills and fever  HENT: Positive for congestion  Eyes: Negative for visual disturbance  Respiratory: Positive for cough and shortness of breath  Cardiovascular: Negative for chest pain  Gastrointestinal: Negative for abdominal pain  Objective:    /90   Pulse 92   Temp 97 9 °F (36 6 °C)   Ht 5' 8" (1 727 m)   Wt 87 2 kg (192 lb 3 9 oz)   BMI 29 23 kg/m²      Physical Exam   Constitutional: He is oriented to person, place, and time  He appears well-developed and well-nourished  No distress  HENT:   Head: Normocephalic and atraumatic  Eyes: EOM are normal  Pupils are equal, round, and reactive to light  Scleral icterus is present  Neck: Neck supple  No thyromegaly present  Cardiovascular: Normal rate, regular rhythm, normal heart sounds and intact distal pulses  Pulmonary/Chest: Effort normal and breath sounds normal  No respiratory distress  He has no wheezes  Abdominal: Soft  Bowel sounds are normal  There is no tenderness  Musculoskeletal: Normal range of motion  Lymphadenopathy:        Head (right side): No submental and no submandibular adenopathy present  Head (left side):  No submental and no submandibular adenopathy present  He has cervical adenopathy  Right cervical: Superficial cervical adenopathy present  Neurological: He is alert and oriented to person, place, and time  Skin: Skin is warm and dry  He is not diaphoretic  Psychiatric: He has a normal mood and affect  Vitals reviewed

## 2018-04-17 NOTE — ASSESSMENT & PLAN NOTE
· GI referral ordered  · Follow-up  Recommend establishing close outpatient follow-up for treatment

## 2018-04-27 PROBLEM — Z12.11 COLON CANCER SCREENING: Status: ACTIVE | Noted: 2018-04-27

## 2018-05-01 ENCOUNTER — TELEPHONE (OUTPATIENT)
Dept: INTERNAL MEDICINE CLINIC | Facility: CLINIC | Age: 73
End: 2018-05-01

## 2018-05-01 DIAGNOSIS — Z12.11 ENCOUNTER FOR SCREENING COLONOSCOPY FOR NON-HIGH-RISK PATIENT: Primary | ICD-10-CM

## 2018-05-01 RX ORDER — POLYETHYLENE GLYCOL 3350 17 G/17G
POWDER, FOR SOLUTION ORAL
Qty: 238 G | Refills: 0 | Status: SHIPPED | OUTPATIENT
Start: 2018-05-01 | End: 2022-05-09

## 2018-05-16 ENCOUNTER — ANESTHESIA EVENT (OUTPATIENT)
Dept: GASTROENTEROLOGY | Facility: HOSPITAL | Age: 73
End: 2018-05-16
Payer: COMMERCIAL

## 2018-05-16 NOTE — ANESTHESIA PREPROCEDURE EVALUATION
Review of Systems/Medical History  Patient summary reviewed  Chart reviewed  No history of anesthetic complications     Cardiovascular  Hyperlipidemia, Hypertension ,    Pulmonary  Smoker (1/2 ppd, smoked this am) cigarette smoker  , Asthma ,        GI/Hepatic    Hepatitis Chronic and C,             Endo/Other  Diabetes (with neuropathy and retinopathy   preop) , History of thyroid disease , hypothyroidism,      GYN  Negative gynecology ROS          Hematology   Musculoskeletal  Osteoarthritis,        Neurology   Psychology   Negative psychology ROS              Physical Exam    Airway    Mallampati score: II  TM Distance: >3 FB       Dental   upper dentures,     Cardiovascular  Rhythm: regular,     Pulmonary  Breath sounds clear to auscultation,     Other Findings        Anesthesia Plan  ASA Score- 3     Anesthesia Type- IV sedation with anesthesia with ASA Monitors  Additional Monitors:   Airway Plan:     Comment: Trinidadian speaker only, Anesthesia translated via nurse,  Plan Factors-    Induction- intravenous  Postoperative Plan-     Informed Consent- Anesthetic plan and risks discussed with patient  I personally reviewed this patient with the CRNA  Discussed and agreed on the Anesthesia Plan with the CRNA  Herson Swain

## 2018-05-17 ENCOUNTER — HOSPITAL ENCOUNTER (OUTPATIENT)
Facility: HOSPITAL | Age: 73
Setting detail: OUTPATIENT SURGERY
Discharge: HOME | End: 2018-05-17
Attending: INTERNAL MEDICINE | Admitting: INTERNAL MEDICINE
Payer: COMMERCIAL

## 2018-05-17 ENCOUNTER — ANESTHESIA (OUTPATIENT)
Dept: GASTROENTEROLOGY | Facility: HOSPITAL | Age: 73
End: 2018-05-17
Payer: COMMERCIAL

## 2018-05-17 VITALS
HEART RATE: 84 BPM | BODY MASS INDEX: 27.4 KG/M2 | HEIGHT: 69 IN | OXYGEN SATURATION: 97 % | RESPIRATION RATE: 16 BRPM | DIASTOLIC BLOOD PRESSURE: 64 MMHG | SYSTOLIC BLOOD PRESSURE: 122 MMHG | WEIGHT: 185 LBS | TEMPERATURE: 98.4 F

## 2018-05-17 DIAGNOSIS — Z12.11 COLON CANCER SCREENING: ICD-10-CM

## 2018-05-17 LAB — GLUCOSE SERPL-MCNC: 169 MG/DL (ref 65–140)

## 2018-05-17 PROCEDURE — 88305 TISSUE EXAM BY PATHOLOGIST: CPT | Performed by: PATHOLOGY

## 2018-05-17 PROCEDURE — 82948 REAGENT STRIP/BLOOD GLUCOSE: CPT

## 2018-05-17 PROCEDURE — 45380 COLONOSCOPY AND BIOPSY: CPT | Performed by: INTERNAL MEDICINE

## 2018-05-17 RX ORDER — PROPOFOL 10 MG/ML
INJECTION, EMULSION INTRAVENOUS AS NEEDED
Status: DISCONTINUED | OUTPATIENT
Start: 2018-05-17 | End: 2018-05-17 | Stop reason: SURG

## 2018-05-17 RX ORDER — PROPOFOL 10 MG/ML
INJECTION, EMULSION INTRAVENOUS CONTINUOUS PRN
Status: DISCONTINUED | OUTPATIENT
Start: 2018-05-17 | End: 2018-05-17 | Stop reason: SURG

## 2018-05-17 RX ORDER — SODIUM CHLORIDE 9 MG/ML
50 INJECTION, SOLUTION INTRAVENOUS CONTINUOUS
Status: DISCONTINUED | OUTPATIENT
Start: 2018-05-17 | End: 2018-05-17 | Stop reason: HOSPADM

## 2018-05-17 RX ORDER — LIDOCAINE HYDROCHLORIDE 10 MG/ML
INJECTION, SOLUTION INFILTRATION; PERINEURAL AS NEEDED
Status: DISCONTINUED | OUTPATIENT
Start: 2018-05-17 | End: 2018-05-17 | Stop reason: SURG

## 2018-05-17 RX ADMIN — PROPOFOL 120 MCG/KG/MIN: 10 INJECTION, EMULSION INTRAVENOUS at 13:16

## 2018-05-17 RX ADMIN — SODIUM CHLORIDE 50 ML/HR: 0.9 INJECTION, SOLUTION INTRAVENOUS at 12:15

## 2018-05-17 RX ADMIN — PROPOFOL 150 MG: 10 INJECTION, EMULSION INTRAVENOUS at 13:16

## 2018-05-17 RX ADMIN — LIDOCAINE HYDROCHLORIDE 50 MG: 10 INJECTION, SOLUTION INFILTRATION; PERINEURAL at 13:16

## 2018-05-17 NOTE — H&P
History and Physical - [facility]  Theodore Medel 72 y o  male MRN: [id number]                  HPI: Theodore Medel is a 72y o  year old male who presents for history of colon polyps  REVIEW OF SYSTEMS: Per the HPI, and otherwise unremarkable      Historical Information   Past Medical History:   Diagnosis Date    Arthritis     Asthma     Colon polyps     Community acquired pneumonia     last assessed: 5/1/2014    Diabetes mellitus ([address] )     Hepatitis C     High cholesterol     Hypertension      Past Surgical History:   Procedure Laterality Date    COLONOSCOPY W/ POLYPECTOMY      LITHOTRIPSY      MULTIPLE TOOTH EXTRACTIONS       Social History   History   Alcohol Use No     History   Drug Use No     History   Smoking Status    Current Every Day Smoker    Packs/day: 0 50    Types: Cigarettes   Smokeless Tobacco    Never Used     Comment: started when was about 24 yrs old     Family History   Problem Relation Age of Onset    Heart attack Family      at age 80       Meds/Allergies     Prescriptions Prior to Admission   Medication    acetaminophen (TYLENOL) 325 mg tablet    albuterol (VENTOLIN HFA) 90 mcg/act inhaler    bisacodyl (DULCOLAX) 5 mg EC tablet    bisacodyl (DULCOLAX) 5 mg EC tablet    clotrimazole (LOTRIMIN) 1 % cream    cyanocobalamin (CVS VITAMIN B-12) 1000 MCG tablet    diltiazem (CARDIZEM CD) 300 mg 24 hr capsule    econazole nitrate 1 % cream    fluticasone (FLONASE) 50 mcg/act nasal spray    fluticasone-salmeterol (ADVAIR HFA) 230-21 MCG/ACT inhaler    glimepiride (AMARYL) 1 mg tablet    hydrochlorothiazide (HYDRODIURIL) 25 mg tablet    levothyroxine 137 mcg tablet    lisinopril (ZESTRIL) 10 mg tablet    metFORMIN (GLUCOPHAGE) 850 mg tablet    polyethylene glycol (GLYCOLAX) powder    simvastatin (ZOCOR) 80 mg tablet    Blood Glucose Monitoring Suppl (FREESTYLE FREEDOM LITE) w/Device KIT    CVS ASPIRIN EC 81 MG EC tablet    glucose blood (FREESTYLE LITE) test strip    guaiFENesin (MUCINEX) 600 mg 12 hr tablet    Lancets (FREESTYLE) lancets    montelukast (SINGULAIR) 10 mg tablet       No Known Allergies    Objective     Blood pressure 133/63, pulse 83, temperature 98 4 °F (36 9 °C), temperature source Oral, resp  rate 18, height 5' 9" (1 753 m), weight 83 9 kg (185 lb), SpO2 98 %  PHYSICAL EXAM    Gen: NAD  CV: RRR  CHEST: Clear  ABD: soft, NT/ND  EXT: no edema      ASSESSMENT/PLAN:  This is a 72y o  year old male here for history of colon polyps  PLAN:   Procedure:  Colonoscopy

## 2018-05-17 NOTE — OP NOTE
**** GI/ENDOSCOPY REPORT ****     PATIENT NAME: Jaime Oglesby ------ VISIT ID:  Patient ID:   [id number] YOB: 1945     INTRODUCTION: Colonoscopy - A 72 male patient presents for an outpatient   Colonoscopy at [facility]  PREVIOUS COLONOSCOPY: Patient's last colonoscopy was 6 years ago  INDICATIONS: Screening for personal history of polyps  CONSENT:  The benefits, risks, and alternatives to the procedure were   discussed and informed consent was obtained from the patient  PREPARATION: EKG, pulse, pulse oximetry and blood pressure were monitored   throughout the procedure  The patient was identified by myself both   verbally and by visual inspection of ID band  Airway Assessment   Classification: Airway class 2 - Visualization of the soft palate, fauces   and uvula  ASA Classification: Class 3 - Patient has severe systemic   disturbance that may or may not be related to the disorder requiring   surgery  MEDICATIONS: Anesthesia-check records     PROCEDURE:  The endoscope was passed without difficulty through the anus   under direct visualization and advanced to the cecum, confirmed by   appendiceal orifice and ileocecal valve  The scope was withdrawn and the   mucosa was carefully examined  The quality of the preparation was good  Retroflexion was performed  Cecal Intubation Time: 1 minute(s) Scope   Withdrawal Time: 8 minutes(s)     RECTAL EXAM: Normal rectal exam      FINDINGS:  A single sessile polyp, measuring less than 5 mm in size, was   found in the rectum  The polyp was removed by cold biopsy polypectomy  There was evidence of mild diverticulosis in the sigmoid colon  Internal   hemorrhoids were found  COMPLICATIONS: There were no complications  IMPRESSIONS: A single sessile polyp found in the rectum; removed by cold   biopsy polypectomy  Mild diverticulosis found in the sigmoid colon  Internal hemorrhoids  RECOMMENDATIONS: Colonoscopy recommended in 5 years or sooner if   clinically indicated  ESTIMATED BLOOD LOSS:     PATHOLOGY SPECIMENS: Removed by cold biopsy polypectomy  PROCEDURE CODES:     ICD-9 Codes: V12 72 Personal history of colonic polyps 211 4 Benign   neoplasm of rectum and anal canal 562 10 Diverticulosis of colon (without   mention of hemorrhage)     ICD-10 Codes: Z86 010 Personal history of colonic polyps K63 5 Polyp of   colon K57 Diverticular disease of intestine K64 9 Unspecified hemorrhoids     PERFORMED BY: KAMILLE Hancock  on 05/17/2018  Version 1, electronically signed by KAMILLE Smith  on 05/17/2018   at 13:45

## 2018-05-17 NOTE — ANESTHESIA POSTPROCEDURE EVALUATION
Post-Op Assessment Note      CV Status:  Stable    Mental Status:  Alert and awake    Hydration Status:  Euvolemic    PONV Controlled:  Controlled    Airway Patency:  Patent    Post Op Vitals Reviewed: Yes          Staff: CRNA           /59 (05/17/18 1330)    Temp      Pulse 91 (05/17/18 1330)   Resp 16 (05/17/18 1330)    SpO2 96 % (05/17/18 1330)

## 2018-06-07 DIAGNOSIS — J45.909 ASTHMA: Primary | ICD-10-CM

## 2018-06-07 DIAGNOSIS — E03.9 HYPOTHYROIDISM: ICD-10-CM

## 2018-06-07 DIAGNOSIS — J44.9 COPD (CHRONIC OBSTRUCTIVE PULMONARY DISEASE) (HCC): ICD-10-CM

## 2018-06-07 RX ORDER — LEVOTHYROXINE SODIUM 137 UG/1
137 TABLET ORAL DAILY
Qty: 90 TABLET | Refills: 1 | Status: SHIPPED | OUTPATIENT
Start: 2018-06-07 | End: 2019-01-07 | Stop reason: SDUPTHER

## 2018-06-07 RX ORDER — MONTELUKAST SODIUM 10 MG/1
10 TABLET ORAL DAILY
Qty: 90 TABLET | Refills: 1 | Status: SHIPPED | OUTPATIENT
Start: 2018-06-07 | End: 2018-09-21 | Stop reason: SDUPTHER

## 2018-06-25 DIAGNOSIS — I10 HYPERTENSION: ICD-10-CM

## 2018-06-25 DIAGNOSIS — E78.5 HYPERLIPIDEMIA: ICD-10-CM

## 2018-06-25 DIAGNOSIS — E11.49: Primary | ICD-10-CM

## 2018-06-25 RX ORDER — SIMVASTATIN 80 MG
80 TABLET ORAL DAILY
Qty: 90 TABLET | Refills: 3 | Status: SHIPPED | OUTPATIENT
Start: 2018-06-25 | End: 2019-06-03

## 2018-07-23 DIAGNOSIS — E11.9 DIABETES MELLITUS (HCC): Primary | ICD-10-CM

## 2018-08-29 DIAGNOSIS — E53.8 VITAMIN B12 DEFICIENCY: Primary | ICD-10-CM

## 2018-08-29 RX ORDER — OMEGA-3/DHA/EPA/FISH OIL 35-113.5MG
TABLET,CHEWABLE ORAL DAILY
Qty: 90 TABLET | Refills: 2 | Status: SHIPPED | OUTPATIENT
Start: 2018-08-29 | End: 2019-07-23 | Stop reason: SDUPTHER

## 2018-09-20 ENCOUNTER — APPOINTMENT (OUTPATIENT)
Dept: LAB | Facility: CLINIC | Age: 73
End: 2018-09-20
Payer: COMMERCIAL

## 2018-09-20 DIAGNOSIS — E03.9 HYPOTHYROIDISM: ICD-10-CM

## 2018-09-20 DIAGNOSIS — I10 HYPERTENSION: ICD-10-CM

## 2018-09-20 DIAGNOSIS — B18.2 CHRONIC HEPATITIS C (HCC): ICD-10-CM

## 2018-09-20 PROBLEM — K57.30 DIVERTICULOSIS OF LARGE INTESTINE: Chronic | Status: ACTIVE | Noted: 2018-09-20

## 2018-09-20 PROBLEM — K64.8 INTERNAL HEMORRHOIDS: Status: ACTIVE | Noted: 2018-09-20

## 2018-09-20 LAB
ALBUMIN SERPL BCP-MCNC: 3.4 G/DL (ref 3.5–5)
ALP SERPL-CCNC: 55 U/L (ref 46–116)
ALT SERPL W P-5'-P-CCNC: 26 U/L (ref 12–78)
ANION GAP SERPL CALCULATED.3IONS-SCNC: 4 MMOL/L (ref 4–13)
AST SERPL W P-5'-P-CCNC: 15 U/L (ref 5–45)
BASOPHILS # BLD AUTO: 0.02 THOUSANDS/ΜL (ref 0–0.1)
BASOPHILS NFR BLD AUTO: 0 % (ref 0–1)
BILIRUB SERPL-MCNC: 0.42 MG/DL (ref 0.2–1)
BUN SERPL-MCNC: 16 MG/DL (ref 5–25)
CALCIUM SERPL-MCNC: 9 MG/DL (ref 8.3–10.1)
CHLORIDE SERPL-SCNC: 104 MMOL/L (ref 100–108)
CO2 SERPL-SCNC: 29 MMOL/L (ref 21–32)
CREAT SERPL-MCNC: 0.83 MG/DL (ref 0.6–1.3)
EOSINOPHIL # BLD AUTO: 0.2 THOUSAND/ΜL (ref 0–0.61)
EOSINOPHIL NFR BLD AUTO: 3 % (ref 0–6)
ERYTHROCYTE [DISTWIDTH] IN BLOOD BY AUTOMATED COUNT: 12.5 % (ref 11.6–15.1)
GFR SERPL CREATININE-BSD FRML MDRD: 89 ML/MIN/1.73SQ M
GLUCOSE P FAST SERPL-MCNC: 161 MG/DL (ref 65–99)
HCT VFR BLD AUTO: 44.1 % (ref 36.5–49.3)
HGB BLD-MCNC: 14.1 G/DL (ref 12–17)
IMM GRANULOCYTES # BLD AUTO: 0.02 THOUSAND/UL (ref 0–0.2)
IMM GRANULOCYTES NFR BLD AUTO: 0 % (ref 0–2)
LYMPHOCYTES # BLD AUTO: 2.04 THOUSANDS/ΜL (ref 0.6–4.47)
LYMPHOCYTES NFR BLD AUTO: 28 % (ref 14–44)
MCH RBC QN AUTO: 31.2 PG (ref 26.8–34.3)
MCHC RBC AUTO-ENTMCNC: 32 G/DL (ref 31.4–37.4)
MCV RBC AUTO: 98 FL (ref 82–98)
MONOCYTES # BLD AUTO: 0.7 THOUSAND/ΜL (ref 0.17–1.22)
MONOCYTES NFR BLD AUTO: 10 % (ref 4–12)
NEUTROPHILS # BLD AUTO: 4.42 THOUSANDS/ΜL (ref 1.85–7.62)
NEUTS SEG NFR BLD AUTO: 59 % (ref 43–75)
NRBC BLD AUTO-RTO: 0 /100 WBCS
PLATELET # BLD AUTO: 258 THOUSANDS/UL (ref 149–390)
PMV BLD AUTO: 10.9 FL (ref 8.9–12.7)
POTASSIUM SERPL-SCNC: 3.8 MMOL/L (ref 3.5–5.3)
PROT SERPL-MCNC: 7.3 G/DL (ref 6.4–8.2)
RBC # BLD AUTO: 4.52 MILLION/UL (ref 3.88–5.62)
SODIUM SERPL-SCNC: 137 MMOL/L (ref 136–145)
TSH SERPL DL<=0.05 MIU/L-ACNC: 2.33 UIU/ML (ref 0.36–3.74)
WBC # BLD AUTO: 7.4 THOUSAND/UL (ref 4.31–10.16)

## 2018-09-20 PROCEDURE — 36415 COLL VENOUS BLD VENIPUNCTURE: CPT

## 2018-09-20 PROCEDURE — 85025 COMPLETE CBC W/AUTO DIFF WBC: CPT

## 2018-09-20 PROCEDURE — 84443 ASSAY THYROID STIM HORMONE: CPT

## 2018-09-20 PROCEDURE — 80053 COMPREHEN METABOLIC PANEL: CPT

## 2018-09-21 ENCOUNTER — OFFICE VISIT (OUTPATIENT)
Dept: INTERNAL MEDICINE CLINIC | Facility: CLINIC | Age: 73
End: 2018-09-21
Payer: COMMERCIAL

## 2018-09-21 VITALS
HEIGHT: 67 IN | SYSTOLIC BLOOD PRESSURE: 170 MMHG | WEIGHT: 190.04 LBS | BODY MASS INDEX: 29.83 KG/M2 | DIASTOLIC BLOOD PRESSURE: 82 MMHG | TEMPERATURE: 98.1 F | HEART RATE: 84 BPM

## 2018-09-21 DIAGNOSIS — B18.2 CHRONIC HEPATITIS C WITHOUT HEPATIC COMA (HCC): ICD-10-CM

## 2018-09-21 DIAGNOSIS — J45.20 MILD INTERMITTENT ASTHMA, UNSPECIFIED WHETHER COMPLICATED: ICD-10-CM

## 2018-09-21 DIAGNOSIS — Z23 NEED FOR INFLUENZA VACCINATION: ICD-10-CM

## 2018-09-21 DIAGNOSIS — I10 ESSENTIAL HYPERTENSION: ICD-10-CM

## 2018-09-21 DIAGNOSIS — E11.40 TYPE 2 DIABETES MELLITUS WITH DIABETIC NEUROPATHY, WITHOUT LONG-TERM CURRENT USE OF INSULIN (HCC): Primary | ICD-10-CM

## 2018-09-21 LAB
LEFT EYE DIABETIC RETINOPATHY: NORMAL
LEFT EYE DIABETIC RETINOPATHY: NORMAL
LEFT EYE IMAGE QUALITY: NORMAL
RIGHT EYE DIABETIC RETINOPATHY: NORMAL
RIGHT EYE DIABETIC RETINOPATHY: NORMAL
RIGHT EYE IMAGE QUALITY: NORMAL
SEVERITY (EYE EXAM): NORMAL
SL AMB POCT HEMOGLOBIN AIC: 7.2

## 2018-09-21 PROCEDURE — 90662 IIV NO PRSV INCREASED AG IM: CPT | Performed by: INTERNAL MEDICINE

## 2018-09-21 PROCEDURE — 3072F LOW RISK FOR RETINOPATHY: CPT | Performed by: INTERNAL MEDICINE

## 2018-09-21 PROCEDURE — 3045F PR MOST RECENT HEMOGLOBIN A1C LEVEL 7.0-9.0%: CPT | Performed by: INTERNAL MEDICINE

## 2018-09-21 PROCEDURE — 92250 FUNDUS PHOTOGRAPHY W/I&R: CPT | Performed by: INTERNAL MEDICINE

## 2018-09-21 PROCEDURE — 99213 OFFICE O/P EST LOW 20 MIN: CPT | Performed by: INTERNAL MEDICINE

## 2018-09-21 PROCEDURE — 3008F BODY MASS INDEX DOCD: CPT | Performed by: INTERNAL MEDICINE

## 2018-09-21 PROCEDURE — 83036 HEMOGLOBIN GLYCOSYLATED A1C: CPT | Performed by: INTERNAL MEDICINE

## 2018-09-21 PROCEDURE — 3725F SCREEN DEPRESSION PERFORMED: CPT | Performed by: INTERNAL MEDICINE

## 2018-09-21 PROCEDURE — G0008 ADMIN INFLUENZA VIRUS VAC: HCPCS | Performed by: INTERNAL MEDICINE

## 2018-09-21 RX ORDER — MONTELUKAST SODIUM 10 MG/1
10 TABLET ORAL DAILY
Qty: 90 TABLET | Refills: 1 | Status: SHIPPED | OUTPATIENT
Start: 2018-09-21 | End: 2019-06-20 | Stop reason: SDUPTHER

## 2018-09-21 RX ORDER — FLUTICASONE PROPIONATE AND SALMETEROL XINAFOATE 230; 21 UG/1; UG/1
2 AEROSOL, METERED RESPIRATORY (INHALATION) 2 TIMES DAILY
Qty: 1 INHALER | Refills: 4 | Status: SHIPPED | OUTPATIENT
Start: 2018-09-21 | End: 2019-10-21 | Stop reason: SDUPTHER

## 2018-09-21 RX ORDER — ALBUTEROL SULFATE 90 UG/1
2 AEROSOL, METERED RESPIRATORY (INHALATION) EVERY 4 HOURS PRN
Qty: 18 G | Refills: 2 | Status: SHIPPED | OUTPATIENT
Start: 2018-09-21 | End: 2022-08-05 | Stop reason: ALTCHOICE

## 2018-09-21 RX ORDER — AMLODIPINE BESYLATE 5 MG/1
5 TABLET ORAL DAILY
Qty: 40 TABLET | Refills: 0 | Status: SHIPPED | OUTPATIENT
Start: 2018-09-21 | End: 2018-10-26 | Stop reason: SDUPTHER

## 2018-09-21 RX ORDER — LISINOPRIL 20 MG/1
20 TABLET ORAL DAILY
Qty: 40 TABLET | Refills: 0 | Status: SHIPPED | OUTPATIENT
Start: 2018-09-21 | End: 2018-10-26 | Stop reason: SDUPTHER

## 2018-09-21 NOTE — ASSESSMENT & PLAN NOTE
· Viral load 751,160 in October 2017  · Recommend follow-up with GI, referral was given  · Obtain ultrasound of the liver  · Will hold off on obtaining viral load for now as patient has already had one last year and is still not on treatment

## 2018-09-21 NOTE — PROGRESS NOTES
Assessment/Plan:       Problem List Items Addressed This Visit        Digestive    Chronic hepatitis C ([facility] [address] ) (Chronic)     · Viral load 751,160 in October 2017  · Recommend follow-up with GI, referral was given  · Obtain ultrasound of the liver  · Will hold off on obtaining viral load for now as patient has already had one last year and is still not on treatment  Relevant Orders    Ambulatory referral to Gastroenterology    US right upper quadrant       Endocrine    Diabetes mellitus with neurological manifestation [facility] ) - Primary     Lab Results   Component Value Date    HGBA1C 7 2 09/21/2018       · Hemoglobin A1c within acceptable limits  · If not taking glimepiride at home, can discontinue this  Patient is up unsure if he is still taking this    · If still taking, can continue glimepiride at current dose  · Continue metformin at current dose  · Repeat A1c in 6 months         Relevant Orders    POCT hemoglobin A1c (Completed)    POCT diabetic eye exam (Completed)       Respiratory    Asthma     · Will continue patient's Advair, albuterol, and Singulair at current dose  · Patient used albuterol roughly 3 times a week  · No change in medication currently  · Recommend calling clinic if experiencing more frequent exacerbations         Relevant Medications    albuterol (VENTOLIN HFA) 90 mcg/act inhaler    ADVAIR -21 MCG/ACT inhaler    montelukast (SINGULAIR) 10 mg tablet       Cardiovascular and Mediastinum    Hypertension     · Blood pressure in office today is 170/82 and has been consistently elevated on past office visits  · Increase lisinopril to 20 mg a day  · Continue Cardizem 300 mg daily  · Start amlodipine 5 mg a day  · Follow-up in 1 month for repeat blood pressure check  · Call clinic if experiencing dizziness or lightheadedness after starting this medication         Relevant Medications    amLODIPine (NORVASC) 5 mg tablet    lisinopril (ZESTRIL) 20 mg tablet      Other Visit Diagnoses Need for influenza vaccination        Relevant Orders    influenza vaccine, 7205-8004, high-dose, PF 0 5 mL, for patients 65 yr+ (FLUZONE HIGH-DOSE) (Completed)            Subjective:      Patient ID: Yonis Francois is a 72 y o  male  Patient is here for a routine follow-up  No sick complaints today  Pt reports taking his metformin, however he is not sure if he is taking glimepiride  Reports comliance with his metformin  Does not check his sugar every day  He does however say that his last value was 82  No problems or issues with his medication  Pt denies shortness of breath and wheezing currently but does report it on occasion, notably when it gets cold  Uses his albuterol about 3 times a week, and uses singulair and advair every day  He reports that his asthma is at his usual baseline  He did however have questions regarding his hepatitis-C  He says yesterday that he had a blood draw for hepatitis, although there is no record of it in the computer at the time of this visit  His last CMP was normal     Had colonoscopy this year which demonstrated a hyperplastic polyp and was told he should have a repeat colonoscopy in 5 years  The following portions of the patient's history were reviewed and updated as appropriate: current medications and problem list     Review of Systems   Constitutional: Negative for fever  Respiratory: Negative for shortness of breath and wheezing  Gastrointestinal: Negative for abdominal pain, nausea and vomiting  Skin: Negative for color change  Neurological: Negative for dizziness  Objective:    /82 (BP Location: Right arm, Patient Position: Sitting, Cuff Size: Adult)   Pulse 84   Temp 98 1 °F (36 7 °C) (Oral)   Ht 5' 6 75" (1 695 m)   Wt 86 2 kg (190 lb 0 6 oz)   BMI 29 99 kg/m²        Physical Exam   Constitutional: He appears well-developed and well-nourished  No distress     Cardiovascular: Normal rate, regular rhythm, normal heart sounds and intact distal pulses  Pulses are no weak pulses  Pulses:       Dorsalis pedis pulses are 2+ on the right side, and 2+ on the left side  Pulmonary/Chest: Effort normal and breath sounds normal    Abdominal: Soft  Normal appearance  He exhibits no distension  There is no tenderness  Feet:   Right Foot:   Skin Integrity: Positive for callus and dry skin  Negative for ulcer, skin breakdown, erythema or warmth  Left Foot:   Skin Integrity: Positive for callus and dry skin  Negative for ulcer, skin breakdown, erythema or warmth  Skin: Skin is warm and dry  He is not diaphoretic  Patient's shoes and socks removed  Right Foot/Ankle   Right Foot Inspection  Skin Exam: skin intact, dry skin, callus and callus no warmth, no erythema, no maceration, no abnormal color, no pre-ulcer and no ulcer                            Sensory       Monofilament testing: intact  Vascular    The right DP pulse is 2+  Left Foot/Ankle  Left Foot Inspection  Skin Exam: skin intact, dry skin and callusno warmth, no erythema, no maceration, normal color, no pre-ulcer and no ulcer                                         Sensory       Monofilament: intact  Vascular    The left DP pulse is 2+  Assign Risk Category:  No deformity present; No loss of protective sensation;  No weak pulses       Risk: 0

## 2018-09-21 NOTE — ASSESSMENT & PLAN NOTE
Lab Results   Component Value Date    HGBA1C 7 2 09/21/2018       · Hemoglobin A1c within acceptable limits  · If not taking glimepiride at home, can discontinue this  Patient is up unsure if he is still taking this    · If still taking, can continue glimepiride at current dose  · Continue metformin at current dose  · Repeat A1c in 6 months

## 2018-09-21 NOTE — PATIENT INSTRUCTIONS
· Increase lisinopril to 20 mg a day  · Start amlodipine once a day  · Follow-up in our clinic in 1 month  · Obtain an ultrasound of your liver and follow up with the gastroenterologist

## 2018-09-21 NOTE — ASSESSMENT & PLAN NOTE
· Will continue patient's Advair, albuterol, and Singulair at current dose  · Patient used albuterol roughly 3 times a week  · No change in medication currently  · Recommend calling clinic if experiencing more frequent exacerbations

## 2018-09-21 NOTE — ASSESSMENT & PLAN NOTE
· Blood pressure in office today is 170/82 and has been consistently elevated on past office visits  · Increase lisinopril to 20 mg a day  · Continue Cardizem 300 mg daily  · Start amlodipine 5 mg a day  · Follow-up in 1 month for repeat blood pressure check  · Call clinic if experiencing dizziness or lightheadedness after starting this medication

## 2018-10-25 RX ORDER — MEDICAL SUPPLY, MISCELLANEOUS
EACH MISCELLANEOUS
Refills: 2 | COMMUNITY
Start: 2018-08-30 | End: 2018-10-26 | Stop reason: SDUPTHER

## 2018-10-26 ENCOUNTER — OFFICE VISIT (OUTPATIENT)
Dept: INTERNAL MEDICINE CLINIC | Facility: CLINIC | Age: 73
End: 2018-10-26
Payer: COMMERCIAL

## 2018-10-26 VITALS
WEIGHT: 191.36 LBS | DIASTOLIC BLOOD PRESSURE: 66 MMHG | BODY MASS INDEX: 30.03 KG/M2 | HEART RATE: 80 BPM | HEIGHT: 67 IN | TEMPERATURE: 98 F | SYSTOLIC BLOOD PRESSURE: 130 MMHG

## 2018-10-26 DIAGNOSIS — I10 ESSENTIAL HYPERTENSION: Primary | ICD-10-CM

## 2018-10-26 PROCEDURE — 3075F SYST BP GE 130 - 139MM HG: CPT | Performed by: INTERNAL MEDICINE

## 2018-10-26 PROCEDURE — 4010F ACE/ARB THERAPY RXD/TAKEN: CPT | Performed by: INTERNAL MEDICINE

## 2018-10-26 PROCEDURE — 3008F BODY MASS INDEX DOCD: CPT | Performed by: INTERNAL MEDICINE

## 2018-10-26 PROCEDURE — 4040F PNEUMOC VAC/ADMIN/RCVD: CPT | Performed by: INTERNAL MEDICINE

## 2018-10-26 PROCEDURE — 99213 OFFICE O/P EST LOW 20 MIN: CPT | Performed by: INTERNAL MEDICINE

## 2018-10-26 PROCEDURE — 3078F DIAST BP <80 MM HG: CPT | Performed by: INTERNAL MEDICINE

## 2018-10-26 PROCEDURE — 1160F RVW MEDS BY RX/DR IN RCRD: CPT | Performed by: INTERNAL MEDICINE

## 2018-10-26 RX ORDER — AMLODIPINE BESYLATE 5 MG/1
5 TABLET ORAL DAILY
Qty: 90 TABLET | Refills: 1 | Status: SHIPPED | OUTPATIENT
Start: 2018-10-26 | End: 2019-06-20 | Stop reason: SDUPTHER

## 2018-10-26 RX ORDER — LISINOPRIL 20 MG/1
20 TABLET ORAL DAILY
Qty: 90 TABLET | Refills: 1 | Status: SHIPPED | OUTPATIENT
Start: 2018-10-26 | End: 2019-07-23 | Stop reason: SDUPTHER

## 2018-10-26 NOTE — PATIENT INSTRUCTIONS
· Continue taking your amlodipine and lisinopril every day  · Call to schedule an appointment with Gastroenterology to discuss your hepatitis-C

## 2019-01-07 DIAGNOSIS — E03.9 HYPOTHYROIDISM: ICD-10-CM

## 2019-01-07 RX ORDER — LEVOTHYROXINE SODIUM 137 UG/1
137 TABLET ORAL DAILY
Qty: 90 TABLET | Refills: 1 | Status: SHIPPED | OUTPATIENT
Start: 2019-01-07 | End: 2019-05-02 | Stop reason: SDUPTHER

## 2019-01-19 DIAGNOSIS — E11.9 DIABETES MELLITUS (HCC): ICD-10-CM

## 2019-01-22 DIAGNOSIS — I10 HYPERTENSION: ICD-10-CM

## 2019-01-23 RX ORDER — DILTIAZEM HYDROCHLORIDE 300 MG/1
CAPSULE, COATED, EXTENDED RELEASE ORAL
Qty: 90 CAPSULE | Refills: 2 | Status: SHIPPED | OUTPATIENT
Start: 2019-01-23 | End: 2019-07-23 | Stop reason: ALTCHOICE

## 2019-03-02 DIAGNOSIS — E78.5 HYPERLIPIDEMIA: ICD-10-CM

## 2019-03-02 DIAGNOSIS — I10 HYPERTENSION: ICD-10-CM

## 2019-03-03 RX ORDER — HYDROCHLOROTHIAZIDE 25 MG/1
TABLET ORAL
Qty: 90 TABLET | Refills: 0 | Status: SHIPPED | OUTPATIENT
Start: 2019-03-03 | End: 2019-06-05 | Stop reason: SDUPTHER

## 2019-03-05 RX ORDER — ASPIRIN 81 MG/1
TABLET ORAL
Qty: 90 TABLET | Refills: 2 | Status: SHIPPED | OUTPATIENT
Start: 2019-03-05 | End: 2020-01-13 | Stop reason: ALTCHOICE

## 2019-03-15 ENCOUNTER — APPOINTMENT (OUTPATIENT)
Dept: LAB | Facility: CLINIC | Age: 74
End: 2019-03-15
Payer: COMMERCIAL

## 2019-03-15 ENCOUNTER — OFFICE VISIT (OUTPATIENT)
Dept: INTERNAL MEDICINE CLINIC | Facility: CLINIC | Age: 74
End: 2019-03-15

## 2019-03-15 VITALS
HEART RATE: 100 BPM | TEMPERATURE: 97.6 F | DIASTOLIC BLOOD PRESSURE: 70 MMHG | BODY MASS INDEX: 28.44 KG/M2 | WEIGHT: 181.22 LBS | HEIGHT: 67 IN | SYSTOLIC BLOOD PRESSURE: 134 MMHG

## 2019-03-15 DIAGNOSIS — E08.40 DIABETES MELLITUS DUE TO UNDERLYING CONDITION WITH DIABETIC NEUROPATHY, WITHOUT LONG-TERM CURRENT USE OF INSULIN (HCC): Primary | ICD-10-CM

## 2019-03-15 DIAGNOSIS — E08.40 DIABETES MELLITUS DUE TO UNDERLYING CONDITION WITH DIABETIC NEUROPATHY, WITHOUT LONG-TERM CURRENT USE OF INSULIN (HCC): ICD-10-CM

## 2019-03-15 DIAGNOSIS — I10 ESSENTIAL HYPERTENSION: ICD-10-CM

## 2019-03-15 LAB
ANION GAP SERPL CALCULATED.3IONS-SCNC: 6 MMOL/L (ref 4–13)
BUN SERPL-MCNC: 18 MG/DL (ref 5–25)
CALCIUM SERPL-MCNC: 9.5 MG/DL (ref 8.3–10.1)
CHLORIDE SERPL-SCNC: 96 MMOL/L (ref 100–108)
CO2 SERPL-SCNC: 30 MMOL/L (ref 21–32)
CREAT SERPL-MCNC: 0.98 MG/DL (ref 0.6–1.3)
GFR SERPL CREATININE-BSD FRML MDRD: 77 ML/MIN/1.73SQ M
GLUCOSE P FAST SERPL-MCNC: 401 MG/DL (ref 65–99)
POTASSIUM SERPL-SCNC: 4.3 MMOL/L (ref 3.5–5.3)
SL AMB POCT GLUCOSE BLD: 421
SODIUM SERPL-SCNC: 132 MMOL/L (ref 136–145)

## 2019-03-15 PROCEDURE — 83036 HEMOGLOBIN GLYCOSYLATED A1C: CPT | Performed by: INTERNAL MEDICINE

## 2019-03-15 PROCEDURE — 36415 COLL VENOUS BLD VENIPUNCTURE: CPT

## 2019-03-15 PROCEDURE — 80048 BASIC METABOLIC PNL TOTAL CA: CPT

## 2019-03-15 PROCEDURE — 99213 OFFICE O/P EST LOW 20 MIN: CPT | Performed by: INTERNAL MEDICINE

## 2019-03-15 PROCEDURE — 82948 REAGENT STRIP/BLOOD GLUCOSE: CPT | Performed by: INTERNAL MEDICINE

## 2019-03-15 RX ORDER — PEN NEEDLE, DIABETIC 30 GX3/16"
NEEDLE, DISPOSABLE MISCELLANEOUS DAILY
Qty: 100 EACH | Refills: 0 | Status: SHIPPED | OUTPATIENT
Start: 2019-03-15 | End: 2019-03-21 | Stop reason: SDUPTHER

## 2019-03-15 NOTE — PATIENT INSTRUCTIONS
· Start taking insulin 20 units in the evening  · Check sugar once a day in the morning  · Return to our clinic in 1 week for checkup  · Please call us if there is a question or concern regarding medication  · Go to emergency room if feeling sick (dizzy, lightheaded, vomiting)    Insulina Glargina (Por inyección)   Se [place] para tratar la diabetes  Marely(s) : Basaglar, Lantus, Lantus SoloStar, Toujeo   Existen muchas otras marcas de [employer]  Christy medicamento no debe ser usado cuando:   Christy medicamento no es adecuado para todas las personas  No lo use si ha sufrido marli reacción alérgica a la insulina glargina  Forma de usar christy medicamento:   Inyectable  · Talley médico colaborará con usted para personalizar talley dosis y tratamiento basándose en cuánta insulina necesita y talley estilo de manuela  A usted le enseñarán cómo aplicarse usted mismo nithin inyecciones  Asegúrese de entender todas las instrucciones  Consulte al médico, enfermera o farmacéutico si tiene alguna pregunta  · Si usted está usando insulina marli vez al día, es mejor usarla a la misma hora cada día  · Siempre revise dos veces la concentración (fuerza) de talley insulina y talley dosis  La concentración y la dosis no son la mismo  La dosis es la cantidad de unidades de insulina que usted va a usar  La concentración le indica cuantas unidades de insulina hay en cada milímetro (ml), judith 100 unidades/ml (U-100), tony esto no significa que usted va a usar 100 unidades al [employer]  · Kari y siga las instrucciones para el paciente que vienen con el medicamento  Hable con talley médico o farmacéutico si tiene alguna pregunta  · La apariencia de christy medicamento debe ser transparente antes de usarlo  No agite el frasco  No mezcle christy medicamento con ninguna otra insulina ni con agua  · [address] de talley cuerpo donde puede aplicarse la inyección  Use un sitio distinto del cuerpo cada vez que se ponga la inyección   Lleve un registro del sitio donde usted se aplica cada inyección, para asegurarse de rotar las áreas de talley cuerpo  · Use marli aguja y [place] nuevas cada vez que inyecte el medicamento  Si [place] marli [place], sólo use el tipo de [place] para las inyecciones de [place]  Algunas insulinas se debe administrar con un tipo específico de [place]  Consulte a talley farmacéutico si no está seguro de cuál usar  · Revise siempre la etiqueta antes de usarla para así asegurarse de tener el tipo correcto de insulina  No cambie la [place], tipo o concentración a menos de que talley médico se lo indique  Si se [place] marli bomba u otro dispositivo, asegúrese de que la insulina está hecho para keven dispositivo  · Medicamento sin abrir: Guarde los frascos, cartuchos y plumas SoloStar® en el refrigerador  Proteja home  No lo congele  · Medicamento abierto:   ¨ Viales: Guarde los frascos o viales en el refrigerador o a temperatura ambiente en un lugar fresco, alejado de los aviva del sol y el calor  Úselo dentro de 28 días  ¨ Cartucho o pluma SoloStar®: Almacene a temperatura ambiente, lejos del calor directo y home  No lo refrigere  Bote cualquier cartucho o pluma de la brianda Lantus® SoloStar® pen después de 28 días  Bote cualquier pluma sin abrir de brianda Toujeo® SoloStar® después que hayan pasado 43 días  · [address] en un recipiente cerrado de material cami, que las agujas no puedan perforar  Guarde beth recipiente fuera del alcance de los niños y de [place]  Medicamentos y [employer] que debe evitar:   Consulte con talely médico o farmacéutico antes de usar cualquier medicamento, incluyendo los que compra sin receta médica, las vitaminas y los productos herbales  · Algunos medicamentos pueden cambiar la cantidad de insulina que usted necesita usar y [employer] dificulta a usted más controlar talley diabetes  Informe a talley médico acerca de todos los demás medicamentos que usted esté [place]    · No consuma alcohol mientras esté [employer] medicamento  Precauciones daksha el uso de christy medicamento:   · Informe a talley médico si usted está embarazada, dando de lactar o tiene enfermedad de riñón, enfermedad del hígado, enfermedad cardíaca o insuficiencia cardíaca  · Christy medicamento puede causar los siguientes problemas:  ¨ Niveles bajos de azúcar en la fernando o niveles bajos de potasio en la fernando  ¨ Retención de líquido o insuficiencia cardíaca (cuando se [place] junto a un medicamento con tiazolidinediona [TZD, por nithin siglas en \[facility] [place] [facility]\""])  · Nunca comparta las plumas, agujas o cartuchos de insulina con otras personas  Compartirlas pueden transmitir el virus de la hepatitis, VIH u otras enfermedades de [facility] persona a otra  · Guarde todos los medicamentos fuera del alcance de los niños  Nunca comparta nithin medicamentos con [employer]  Efectos secundarios que pueden presentarse daksha el uso de christy medicamento:   Consulte inmediatamente con el médico si nota cualquiera de estos efectos secundarios:  · Reacción alérgica: Comezón o ronchas, hinchazón del gino o las aisha, hinchazón u hormigueo en la boca o garganta, opresión en el pecho, dificultad para respirar  · [employer], sed en aumento, calambres musculares, náusea o vómito, latidos cardíacos irregulares  · Aumento de peso rápido, [employer], tobillos o pies, dificultad para respirar, cansancio  · Temblores, agitación, sudor, ritmo cardíaco acelerado o saltado, desmayo, hambre, confusión  Consulte con el médico si nota los siguientes efectos secundarios menos graves:   · Enrojecimiento, dolor, comezón, inflamación, o cualquier cambio en la piel donde se aplicó la inyección  Consulte con el médico si nota otros efectos secundarios que lolis son causados por christy medicamento  Llame a talley médico para consultarle Marylu Gomes puede notificar nithin efectos secundarios al FDA al [id number] © 2017 [address] Information is for End User's use only and may not be sold, redistributed or otherwise used for commercial purposes  Esta información es sólo para uso en educación  Talley intención no es darle un consejo médico sobre enfermedades o tratamientos  Colsulte con talley Kena Ridley farmacéutico antes de seguir cualquier régimen médico para saber si es seguro y efectivo para usted

## 2019-03-15 NOTE — PROGRESS NOTES
Assessment/Plan:       Problem List Items Addressed This Visit        Endocrine    Diabetes mellitus with neurological manifestation [facility]) - Primary     Lab Results   Component Value Date    HGBA1C  03/15/2019      Comment:      tested twice  first time: information code #106 (result is greater than 15 0%)  second time: information code 106     · Glucose in office 421 today  · As patient was in no acute distress and vitals were stable, held off on referring to the emergency department  · Had extensive conversation with the patient today about the need for insulin therapy and the need to obtain follow-up BMP  · Also inform patient that the hyperglycemia is likely the cause of his polyuria and polydipsia  · Patient educated on the proper use of insulin Pen, injection method, glucose log tracking  · Instructed patient to check his blood sugar feels like he is getting sick, (nausea, vomiting, diaphoresis, lightheadedness)  · Patient can snack to bring a blood sugar, such as juice, crackers  · Referral for diabetic education placed  · Start Lantus 20 units subcutaneous daily at bedtime  · Check glucose in the morning  · Can continue metformin 850 b i d   · Follow-up in 1 week           Relevant Medications    insulin glargine (LANTUS SOLOSTAR) 100 units/mL injection pen    Insulin Pen Needle (PEN NEEDLES) 31G X 8 MM MISC    Other Relevant Orders    POCT hemoglobin A1c (Completed)    POCT blood glucose (Completed)    Basic metabolic panel    Ambulatory referral to Diabetic Education       Cardiovascular and Mediastinum    Essential hypertension     · Blood pressure is under better control  Within acceptable limits  · Continue amlodipine, hydrochlorothiazide, lisinopril  · Adjust as needed  In the future, will consider switching hydrochlorothiazide to chlorthalidone                 Subjective:      Patient ID: Rosy Olivo is a 72 y o  male  HPI    Patient is here for follow-up today    Last appointment was in October of 2018  He has a history of chronic hepatitis C with last viral load in October 2017 showing a load of 751 1160  However, never followed up with GI and never had liver ultrasound done  He also has history of type 2 diabetes mellitus  Last A1c in September of 2018 was 7 2  He is on metformin  Glucose 421 today, A1C undetectable in office  Drinks juice, milk, but no soda, but not today  Does also eat carbs such as bread  Says he did not eating this morning, so did not take his medicine  Has been urinating very frequently  However at times if he does not run to the bathroom, he urinates on himself  This has been a problem for the past year, but has been worse lately  No saddle anesthesia or bowel incontinence, but does report difficulty maintaining an erection  No dizziness/lightheadedness  Blood pressure improved today  134/70  He is on amlodipine 5 mg daily and lisinopril 20 mg daily and HCTZ 25 mg daily  The following portions of the patient's history were reviewed and updated as appropriate: allergies, current medications, past family history, past medical history, past social history, past surgical history and problem list     Review of Systems   Constitutional: Negative for chills and fever  Respiratory: Negative for shortness of breath  Endocrine: Positive for polydipsia and polyuria  Genitourinary: Positive for frequency  Neurological: Negative for dizziness, light-headedness and numbness  Objective:    /70   Pulse 100   Temp 97 6 °F (36 4 °C)   Ht 5' 7" (1 702 m)   Wt 82 2 kg (181 lb 3 5 oz)   BMI 28 38 kg/m²        Physical Exam   Constitutional: He is oriented to person, place, and time  He appears well-developed and well-nourished  No distress  Eyes: Pupils are equal, round, and reactive to light  EOM are normal    Cardiovascular: Regular rhythm, normal heart sounds and intact distal pulses  Tachycardia present     Pulmonary/Chest: Effort normal and breath sounds normal    Neurological: He is alert and oriented to person, place, and time  He has normal strength  Skin: He is not diaphoretic  Vitals reviewed

## 2019-03-15 NOTE — ASSESSMENT & PLAN NOTE
· Blood pressure is under better control  Within acceptable limits  · Continue amlodipine, hydrochlorothiazide, lisinopril  · Adjust as needed    In the future, will consider switching hydrochlorothiazide to chlorthalidone

## 2019-03-15 NOTE — ASSESSMENT & PLAN NOTE
Lab Results   Component Value Date    HGBA1C  03/15/2019      Comment:      tested twice  first time: information code #106 (result is greater than 15 0%)  second time: information code 106     · Glucose in office 421 today  · As patient was in no acute distress and vitals were stable, held off on referring to the emergency department  · Had extensive conversation with the patient today about the need for insulin therapy and the need to obtain follow-up BMP    · Also inform patient that the hyperglycemia is likely the cause of his polyuria and polydipsia  · Patient educated on the proper use of insulin Pen, injection method, glucose log tracking  · Instructed patient to check his blood sugar feels like he is getting sick, (nausea, vomiting, diaphoresis, lightheadedness)  · Patient can snack to bring a blood sugar, such as juice, crackers  · Referral for diabetic education placed  · Start Lantus 20 units subcutaneous daily at bedtime  · Check glucose in the morning  · Can continue metformin 850 b i d   · Follow-up in 1 week

## 2019-03-17 ENCOUNTER — HOSPITAL ENCOUNTER (EMERGENCY)
Facility: HOSPITAL | Age: 74
Discharge: HOME | End: 2019-03-17
Attending: EMERGENCY MEDICINE | Admitting: EMERGENCY MEDICINE
Payer: COMMERCIAL

## 2019-03-17 VITALS
OXYGEN SATURATION: 99 % | RESPIRATION RATE: 24 BRPM | SYSTOLIC BLOOD PRESSURE: 124 MMHG | BODY MASS INDEX: 28.44 KG/M2 | TEMPERATURE: 98 F | HEIGHT: 67 IN | WEIGHT: 181.22 LBS | DIASTOLIC BLOOD PRESSURE: 62 MMHG | HEART RATE: 76 BPM

## 2019-03-17 DIAGNOSIS — R73.9 HYPERGLYCEMIA: Primary | ICD-10-CM

## 2019-03-17 LAB
ANION GAP SERPL CALCULATED.3IONS-SCNC: 5 MMOL/L (ref 4–13)
BACTERIA UR QL AUTO: ABNORMAL /HPF
BASOPHILS # BLD AUTO: 0.02 THOUSANDS/ΜL (ref 0–0.1)
BASOPHILS NFR BLD AUTO: 0 % (ref 0–1)
BILIRUB UR QL STRIP: ABNORMAL
BUN SERPL-MCNC: 28 MG/DL (ref 5–25)
CALCIUM SERPL-MCNC: 9.2 MG/DL (ref 8.3–10.1)
CHLORIDE SERPL-SCNC: 100 MMOL/L (ref 100–108)
CLARITY UR: CLEAR
CO2 SERPL-SCNC: 28 MMOL/L (ref 21–32)
COLOR UR: ABNORMAL
COLOR, POC: YELLOW
CREAT SERPL-MCNC: 1.22 MG/DL (ref 0.6–1.3)
EOSINOPHIL # BLD AUTO: 0.14 THOUSAND/ΜL (ref 0–0.61)
EOSINOPHIL NFR BLD AUTO: 2 % (ref 0–6)
ERYTHROCYTE [DISTWIDTH] IN BLOOD BY AUTOMATED COUNT: 12.3 % (ref 11.6–15.1)
GFR SERPL CREATININE-BSD FRML MDRD: 59 ML/MIN/1.73SQ M
GLUCOSE SERPL-MCNC: 296 MG/DL (ref 65–140)
GLUCOSE SERPL-MCNC: 340 MG/DL (ref 65–140)
GLUCOSE UR STRIP-MCNC: NEGATIVE MG/DL
HCT VFR BLD AUTO: 44.3 % (ref 36.5–49.3)
HGB BLD-MCNC: 14.9 G/DL (ref 12–17)
HGB UR QL STRIP.AUTO: NEGATIVE
HYALINE CASTS #/AREA URNS LPF: ABNORMAL /LPF
IMM GRANULOCYTES # BLD AUTO: 0.03 THOUSAND/UL (ref 0–0.2)
IMM GRANULOCYTES NFR BLD AUTO: 0 % (ref 0–2)
KETONES UR STRIP-MCNC: ABNORMAL MG/DL
LEUKOCYTE ESTERASE UR QL STRIP: NEGATIVE
LYMPHOCYTES # BLD AUTO: 2.74 THOUSANDS/ΜL (ref 0.6–4.47)
LYMPHOCYTES NFR BLD AUTO: 30 % (ref 14–44)
MCH RBC QN AUTO: 31.2 PG (ref 26.8–34.3)
MCHC RBC AUTO-ENTMCNC: 33.6 G/DL (ref 31.4–37.4)
MCV RBC AUTO: 93 FL (ref 82–98)
MONOCYTES # BLD AUTO: 0.69 THOUSAND/ΜL (ref 0.17–1.22)
MONOCYTES NFR BLD AUTO: 8 % (ref 4–12)
NEUTROPHILS # BLD AUTO: 5.4 THOUSANDS/ΜL (ref 1.85–7.62)
NEUTS SEG NFR BLD AUTO: 60 % (ref 43–75)
NITRITE UR QL STRIP: NEGATIVE
NON-SQ EPI CELLS URNS QL MICRO: ABNORMAL /HPF
NRBC BLD AUTO-RTO: 0 /100 WBCS
PH UR STRIP.AUTO: 5 [PH] (ref 4.5–8)
PLATELET # BLD AUTO: 235 THOUSANDS/UL (ref 149–390)
PMV BLD AUTO: 11.4 FL (ref 8.9–12.7)
POTASSIUM SERPL-SCNC: 4 MMOL/L (ref 3.5–5.3)
PROT UR STRIP-MCNC: ABNORMAL MG/DL
RBC # BLD AUTO: 4.77 MILLION/UL (ref 3.88–5.62)
RBC #/AREA URNS AUTO: ABNORMAL /HPF
SODIUM SERPL-SCNC: 133 MMOL/L (ref 136–145)
SP GR UR STRIP.AUTO: >=1.03 (ref 1–1.03)
UROBILINOGEN UR QL STRIP.AUTO: 1 E.U./DL
WBC # BLD AUTO: 9.02 THOUSAND/UL (ref 4.31–10.16)
WBC #/AREA URNS AUTO: ABNORMAL /HPF

## 2019-03-17 PROCEDURE — 36415 COLL VENOUS BLD VENIPUNCTURE: CPT | Performed by: EMERGENCY MEDICINE

## 2019-03-17 PROCEDURE — 81001 URINALYSIS AUTO W/SCOPE: CPT

## 2019-03-17 PROCEDURE — 82948 REAGENT STRIP/BLOOD GLUCOSE: CPT

## 2019-03-17 PROCEDURE — 81003 URINALYSIS AUTO W/O SCOPE: CPT

## 2019-03-17 PROCEDURE — 96360 HYDRATION IV INFUSION INIT: CPT

## 2019-03-17 PROCEDURE — 80048 BASIC METABOLIC PNL TOTAL CA: CPT | Performed by: EMERGENCY MEDICINE

## 2019-03-17 PROCEDURE — 87086 URINE CULTURE/COLONY COUNT: CPT

## 2019-03-17 PROCEDURE — 85025 COMPLETE CBC W/AUTO DIFF WBC: CPT | Performed by: EMERGENCY MEDICINE

## 2019-03-17 PROCEDURE — 93005 ELECTROCARDIOGRAM TRACING: CPT

## 2019-03-17 PROCEDURE — 99285 EMERGENCY DEPT VISIT HI MDM: CPT

## 2019-03-17 RX ADMIN — SODIUM CHLORIDE 1000 ML: 0.9 INJECTION, SOLUTION INTRAVENOUS at 18:45

## 2019-03-17 NOTE — ED PROVIDER NOTES
History  Chief Complaint   Patient presents with    Hyperglycemia - Symptomatic     family reports that patient started insulin on Friday  states his blood sugar was 300's today  pt c/o dizziness     71 yo M presents to ED for episode of lightheadedness and concern for hyperglycemia  Pt states that he was newly started on insulin on Friday by his PCP  Pt says his blood sugar at that time was in the 400s  Pt taking 20 units nightly of long acting insulin  Saturday blood sugar was 190  Pt says this morning he checked his blood sugar at 10 AM, and it was read as high  Pt says today he ate cereal, a peanut butter cookie, and apple juice  About an hour ago, pt had sudden onset of fatigue and lightheadedness  He checked his blood sugar at that time, and it was 300  Symptoms are now completely resolved  He says he feels back to normal   Denies chest pain, shortness of breath, heart palpitations, headache, neck pain, fever/chills, abdominal pain, nausea/vomiting  Prior to Admission Medications   Prescriptions Last Dose Informant Patient Reported? Taking?    ADVAIR -21 MCG/ACT inhaler  Self No Yes   Sig: Inhale 2 puffs 2 (two) times a day   Blood Glucose Monitoring Suppl (FREESTYLE FREEDOM LITE) w/Device KIT  Self Yes Yes   Sig: by Does not apply route   [employer] VITAMIN B-12 1000 MCG tablet  Self No Yes   Sig: TAKE 1 TABLET BY MOUTH DAILY   Insulin Pen Needle (PEN NEEDLES) 31G X 8 MM MISC   No Yes   Sig: by Does not apply route daily   Lancets (FREESTYLE) lancets  Self Yes Yes   Sig: by Does not apply route   acetaminophen (TYLENOL) 325 mg tablet  Self Yes Yes   Sig: Take 1 tablet by mouth   albuterol (VENTOLIN HFA) 90 mcg/act inhaler  Self No Yes   Sig: Inhale 2 puffs every 4 (four) hours as needed for wheezing or shortness of breath   amLODIPine (NORVASC) 5 mg tablet   No Yes   Sig: Take 1 tablet (5 mg total) by mouth daily   aspirin (ECOTRIN LOW STRENGTH) 81 mg EC tablet   No Yes   Sig: TAKE 1 TABLET BY MOUTH ONCE DAILY   bisacodyl (DULCOLAX) 5 mg EC tablet  Self Yes Yes   Sig: Take by mouth   clotrimazole (LOTRIMIN) 1 % cream  Self Yes Yes   Sig: Apply topically 2 (two) times a day   diltiazem (CARDIZEM CD) 300 mg 24 hr capsule Not Taking at Unknown time  No No   Sig: TAKE 1 CAPSULE BY MOUTH ONCE DAILY   Patient not taking: Reported on 3/17/2019   econazole nitrate 1 % cream  Self Yes Yes   Sig: Apply topically daily   fluticasone (FLONASE) 50 mcg/act nasal spray  Self Yes Yes   Si sprays into each nostril daily   glimepiride (AMARYL) 1 mg tablet  Self No Yes   Sig: Take 1 tablet (1 mg total) by mouth daily   glucose blood (FREESTYLE LITE) test strip  Self Yes Yes   Sig: by In Vitro route   guaiFENesin (MUCINEX) 600 mg 12 hr tablet  Self No Yes   Sig: Take 1 tablet (600 mg total) by mouth every 12 (twelve) hours   hydrochlorothiazide (HYDRODIURIL) 25 mg tablet   No Yes   Sig: TAKE 1 TABLET BY MOUTH DAILY   insulin glargine (LANTUS SOLOSTAR) 100 units/mL injection pen   No Yes   Sig: Inject 20 Units under the skin daily at bedtime   levothyroxine 137 mcg tablet   No Yes   Sig: Take 1 tablet (137 mcg total) by mouth daily   lisinopril (ZESTRIL) 20 mg tablet   No Yes   Sig: Take 1 tablet (20 mg total) by mouth daily   metFORMIN (GLUCOPHAGE) 850 mg tablet   No Yes   Sig: TAKE 1 TABLET BY MOUTH TWICE A DAY WITH MEALS   montelukast (SINGULAIR) 10 mg tablet  Self No Yes   Sig: Take 1 tablet (10 mg total) by mouth daily   polyethylene glycol (GLYCOLAX) powder  Self No Yes   Sig: Mix entire tub in 64 oz of Gatorade (no red, orange, or purple)   simvastatin (ZOCOR) 80 mg tablet  Self No Yes   Sig: Take 1 tablet (80 mg total) by mouth daily      Facility-Administered Medications: None       Past Medical History:   Diagnosis Date    Arthritis     Asthma     Colon polyps     Community acquired pneumonia     last assessed: 2014    Diabetes mellitus (HCC)     Hepatitis C     High cholesterol     Hypertension Past Surgical History:   Procedure Laterality Date    COLONOSCOPY W/ POLYPECTOMY      LITHOTRIPSY      MULTIPLE TOOTH EXTRACTIONS      [place] COLONOSCOPY FLX DX W/COLLJ SPEC WHEN PFRMD N/A 5/17/2018    Procedure: COLONOSCOPY;  Surgeon: Randell Miller MD;  Location: [facility]; Service: Gastroenterology       Family History   Problem Relation Age of Onset    Heart attack Family         at age 80     I have reviewed and agree with the history as documented  Social History     Tobacco Use    Smoking status: Current Every Day Smoker     Packs/day: 0 50     Types: Cigarettes    Smokeless tobacco: Never Used    Tobacco comment: started when he was about 24 yrs old   Substance Use Topics    Alcohol use: No    Drug use: No        Review of Systems   Constitutional: Positive for fatigue  Negative for chills and fever  HENT: Negative for congestion, rhinorrhea, sore throat and trouble swallowing  Eyes: Negative for pain, discharge and visual disturbance  Respiratory: Negative for cough, chest tightness and shortness of breath  Cardiovascular: Negative for chest pain, palpitations and leg swelling  Gastrointestinal: Negative for abdominal pain, nausea and vomiting  Genitourinary: Negative for decreased urine volume, dysuria, frequency and urgency  Musculoskeletal: Negative for gait problem, neck pain and neck stiffness  Skin: Negative for color change, rash and wound  Neurological: Positive for light-headedness  Negative for dizziness, syncope and headaches         Physical Exam  ED Triage Vitals [03/17/19 1649]   Temperature Pulse Respirations Blood Pressure SpO2   98 °F (36 7 °C) (!) 111 18 158/71 97 %      Temp Source Heart Rate Source Patient Position - Orthostatic VS BP Location FiO2 (%)   Oral Monitor Sitting Left arm --      Pain Score       No Pain           qSOFA     Row Name 03/17/19 2000 03/17/19 1930 03/17/19 1900 03/17/19 1734 03/17/19 1649    Altered mental status GCS < 15  --  -- --  0  --    Respiratory Rate > / =22  1  1  1  --  0    Systolic BP < / =945  0  0  0  --  0    Q Sofa Score  1  1  1  0  0          Orthostatic Vital Signs  Vitals:    03/17/19 1649 03/17/19 1900 03/17/19 1930 03/17/19 2000   BP: 158/71 123/60 113/57 124/62   Pulse: (!) 111 80 78 76   Patient Position - Orthostatic VS: Sitting Lying Lying Lying       Physical Exam   Constitutional: He is oriented to person, place, and time  He appears well-developed and well-nourished  No distress  HENT:   Head: Normocephalic and atraumatic  Mouth/Throat: Oropharynx is clear and moist    Eyes: Conjunctivae and EOM are normal  Right eye exhibits no discharge  Left eye exhibits no discharge  Neck: Normal range of motion  Neck supple  nontender   Cardiovascular: Regular rhythm and intact distal pulses  Mild tachycardia   Pulmonary/Chest: Effort normal and breath sounds normal  No stridor  No respiratory distress  Abdominal: Soft  There is no tenderness  There is no rebound and no guarding  Musculoskeletal: Normal range of motion  He exhibits no edema or tenderness  Neurological: He is alert and oriented to person, place, and time  No sensory deficit  He exhibits normal muscle tone  Skin: Skin is warm and dry  Capillary refill takes less than 2 seconds  Nursing note and vitals reviewed        ED Medications  Medications   sodium chloride 0 9 % bolus 1,000 mL (0 mL Intravenous Stopped 3/17/19 2007)       Diagnostic Studies  Results Reviewed     Procedure Component Value Units Date/Time    Urine culture [[id number]] Collected:  03/17/19 1820    Lab Status:  Final result Specimen:  Urine, Clean Catch Updated:  03/18/19 1712     Urine Culture <10,000 cfu/ml     Urine Microscopic [[id number]]  (Abnormal) Collected:  03/17/19 1820    Lab Status:  Final result Specimen:  Urine, Clean Catch Updated:  03/17/19 1916     RBC, UA 4-10 /hpf      WBC, UA 10-20 /hpf      Epithelial Cells Occasional /hpf      Bacteria, UA Moderate /hpf      Hyaline Casts, UA 10-25 /lpf     Basic metabolic panel [[id number]]  (Abnormal) Collected:  03/17/19 1809    Lab Status:  Final result Specimen:  Blood from Arm, Left Updated:  03/17/19 1858     Sodium 133 mmol/L      Potassium 4 0 mmol/L      Chloride 100 mmol/L      CO2 28 mmol/L      ANION GAP 5 mmol/L      BUN 28 mg/dL      Creatinine 1 22 mg/dL      Glucose 340 mg/dL      Calcium 9 2 mg/dL      eGFR 59 ml/min/1 73sq m     Narrative:       National Kidney Disease Education Program recommendations are as follows:  GFR calculation is accurate only with a steady state creatinine  Chronic Kidney disease less than 60 ml/min/1 73 sq  meters  Kidney failure less than 15 ml/min/1 73 sq  meters      CBC and differential [[id number]] Collected:  03/17/19 1809    Lab Status:  Final result Specimen:  Blood from Arm, Left Updated:  03/17/19 1830     WBC 9 02 Thousand/uL      RBC 4 77 Million/uL      Hemoglobin 14 9 g/dL      Hematocrit 44 3 %      MCV 93 fL      MCH 31 2 pg      MCHC 33 6 g/dL      RDW 12 3 %      MPV 11 4 fL      Platelets 713 Thousands/uL      nRBC 0 /100 WBCs      Neutrophils Relative 60 %      Immat GRANS % 0 %      Lymphocytes Relative 30 %      Monocytes Relative 8 %      Eosinophils Relative 2 %      Basophils Relative 0 %      Neutrophils Absolute 5 40 Thousands/µL      Immature Grans Absolute 0 03 Thousand/uL      Lymphocytes Absolute 2 74 Thousands/µL      Monocytes Absolute 0 69 Thousand/µL      Eosinophils Absolute 0 14 Thousand/µL      Basophils Absolute 0 02 Thousands/µL     POCT urinalysis dipstick [[id number]]  (Normal) Resulted:  03/17/19 1821    Lab Status:  Final result Specimen:  Urine Updated:  03/17/19 1821     Color, UA yellow    ED Urine Macroscopic [[id number]]  (Abnormal) Collected:  03/17/19 1820    Lab Status:  Final result Specimen:  Urine Updated:  03/17/19 1820     Color, UA Leticia     Clarity, UA Clear     pH, UA 5 0     Leukocytes, UA Negative     Nitrite, UA Negative Protein, UA 30 (1+) mg/dl      Glucose, UA Negative mg/dl      Ketones, UA 15 (1+) mg/dl      Urobilinogen, UA 1 0 E U /dl      Bilirubin, UA Interference- unable to analyze     Blood, UA Negative     Specific Gravity, UA >=1 030    Narrative:       CLINITEK RESULT    Fingerstick Glucose (POCT) [[id number]]  (Abnormal) Collected:  03/17/19 1652    Lab Status:  Final result Updated:  03/17/19 1653     POC Glucose 296 mg/dl                  No orders to display         Procedures  Procedures      Phone Consults  ED Phone Contact    ED Course           Identification of Seniors at Risk      Most Recent Value   (ISAR) Identification of Seniors at Risk   Before the illness or injury that brought you to the Emergency, did you need someone to help you on a regular basis? 0 Filed at: 03/17/2019 1651   In the last 24 hours, have you needed more help than usual?  0 Filed at: 03/17/2019 1651   Have you been hospitalized for one or more nights during the past 6 months? 0 Filed at: 03/17/2019 1651   In general, do you see well?  0 Filed at: 03/17/2019 1651   In general, do you have serious problems with your memory? 0 Filed at: 03/17/2019 1651   Do you take more than three different medications every day? 1 Filed at: 03/17/2019 1651   ISAR Score  1 Filed at: 03/17/2019 1651                          MDM  Number of Diagnoses or Management Options  Hyperglycemia:   Diagnosis management comments: Pt now asymptomatic  Benign exam  Normal vital signs  Hyperglycemic without signs of DKA  Labs unremarkable  Continue insulin as prescribed by PCP and call PCP tomorrow for follow up         Disposition  Final diagnoses:   Hyperglycemia     Time reflects when diagnosis was documented in both MDM as applicable and the Disposition within this note     Time User Action Codes Description Comment    3/17/2019  8:08 PM Rebecca Crowe Add [R73 9] Hyperglycemia       ED Disposition     ED Disposition Condition Date/Time Comment    Discharge Stable Nickolas Dash Mar 17, 2019  8:08 PM Susi Henning discharge to home/self care              Follow-up Information     Follow up With Specialties Details Why Contact Info Additional [address] Emergency Department Emergency Medicine  As needed, If symptoms worsen [address]  [phone]  ED, [address] [facility], [place], [place], [address]    your primary care doctor  Schedule an appointment as soon as possible for a visit              Discharge Medication List as of 3/17/2019  8:09 PM      CONTINUE these medications which have NOT CHANGED    Details   acetaminophen (TYLENOL) 325 mg tablet Take 1 tablet by mouth, Starting Tue 12/20/2016, Historical Med      ADVAIR -21 MCG/ACT inhaler Inhale 2 puffs 2 (two) times a day, Starting Fri 9/21/2018, Normal      albuterol (VENTOLIN HFA) 90 mcg/act inhaler Inhale 2 puffs every 4 (four) hours as needed for wheezing or shortness of breath, Starting Fri 9/21/2018, Normal      amLODIPine (NORVASC) 5 mg tablet Take 1 tablet (5 mg total) by mouth daily, Starting Fri 10/26/2018, Normal      aspirin (ECOTRIN LOW STRENGTH) 81 mg EC tablet TAKE 1 TABLET BY MOUTH ONCE DAILY, Normal      bisacodyl (DULCOLAX) 5 mg EC tablet Take by mouth, Starting Thu 5/1/2014, Historical Med      Blood Glucose Monitoring Suppl (FREESTYLE FREEDOM LITE) w/Device KIT by Does not apply route, Starting Fri 2/14/2014, Historical Med      clotrimazole (LOTRIMIN) 1 % cream Apply topically 2 (two) times a day, Starting Tue 11/24/2015, Historical Med      [employer] VITAMIN B-12 1000 MCG tablet TAKE 1 TABLET BY MOUTH DAILY, Starting Wed 8/29/2018, Normal      econazole nitrate 1 % cream Apply topically daily, Starting Tue 11/25/2014, Historical Med      fluticasone (FLONASE) 50 mcg/act nasal spray 2 sprays into each nostril daily, Starting Wed 2/24/2016, Historical Med      glimepiride (AMARYL) 1 mg tablet Take 1 tablet (1 mg total) by mouth daily, Starting Tue 4/17/2018, Normal      glucose blood (FREESTYLE LITE) test strip by In Vitro route, Starting Fri 2/14/2014, Historical Med      guaiFENesin (MUCINEX) 600 mg 12 hr tablet Take 1 tablet (600 mg total) by mouth every 12 (twelve) hours, Starting Tue 4/17/2018, Normal      hydrochlorothiazide (HYDRODIURIL) 25 mg tablet TAKE 1 TABLET BY MOUTH DAILY, Normal      insulin glargine (LANTUS SOLOSTAR) 100 units/mL injection pen Inject 20 Units under the skin daily at bedtime, Starting Fri 3/15/2019, Normal      Insulin Pen Needle (PEN NEEDLES) 31G X 8 MM MISC by Does not apply route daily, Starting Fri 3/15/2019, Normal      Lancets (FREESTYLE) lancets by Does not apply route, Starting Fri 2/14/2014, Historical Med      levothyroxine 137 mcg tablet Take 1 tablet (137 mcg total) by mouth daily, Starting Mon 1/7/2019, Normal      lisinopril (ZESTRIL) 20 mg tablet Take 1 tablet (20 mg total) by mouth daily, Starting Fri 10/26/2018, Normal      metFORMIN (GLUCOPHAGE) 850 mg tablet TAKE 1 TABLET BY MOUTH TWICE A DAY WITH MEALS, Normal      montelukast (SINGULAIR) 10 mg tablet Take 1 tablet (10 mg total) by mouth daily, Starting Fri 9/21/2018, Normal      polyethylene glycol (GLYCOLAX) powder Mix entire tub in 64 oz of Gatorade (no red, orange, or purple), Normal      simvastatin (ZOCOR) 80 mg tablet Take 1 tablet (80 mg total) by mouth daily, Starting Mon 6/25/2018, Normal      diltiazem (CARDIZEM CD) 300 mg 24 hr capsule TAKE 1 CAPSULE BY MOUTH ONCE DAILY, Normal           No discharge procedures on file  ED Provider  Attending physically available and evaluated Xuan Greta NOE managed the patient along with the ED Attending      Electronically Signed by         Sofi Aquino MD  03/21/19 2060

## 2019-03-17 NOTE — ED ATTENDING ATTESTATION
Blessing Jiménez DO, saw and evaluated the patient  I have discussed the patient with the resident/non-physician practitioner and agree with the resident's/non-physician practitioner's findings, Plan of Care, and MDM as documented in the resident's/non-physician practitioner's note, except where noted  All available labs and Radiology studies were reviewed  I was present for key portions of any procedure(s) performed by the resident/non-physician practitioner and I was immediately available to provide assistance  At this point I agree with the current assessment done in the Emergency Department  I have conducted an independent evaluation of this patient a history and physical is as follows:    73 yo male recently dx DM presents for evaluation of hyperglycemia  He was initially c/o dizziness but this has resolved since being in the ED  No other c/o at this time  Denies f/c/n/v, abd pain, CP/SOB, HA, change in vision  Will check BMP for evidence of DKA, reassess        Critical Care Time  Procedures

## 2019-03-18 LAB — BACTERIA UR CULT: NORMAL

## 2019-03-19 LAB
ATRIAL RATE: 90 BPM
P AXIS: 83 DEGREES
PR INTERVAL: 176 MS
QRS AXIS: 79 DEGREES
QRSD INTERVAL: 88 MS
QT INTERVAL: 336 MS
QTC INTERVAL: 411 MS
T WAVE AXIS: 76 DEGREES
VENTRICULAR RATE: 90 BPM

## 2019-03-19 PROCEDURE — 93010 ELECTROCARDIOGRAM REPORT: CPT | Performed by: INTERNAL MEDICINE

## 2019-03-21 ENCOUNTER — OFFICE VISIT (OUTPATIENT)
Dept: INTERNAL MEDICINE CLINIC | Facility: CLINIC | Age: 74
End: 2019-03-21

## 2019-03-21 ENCOUNTER — TELEPHONE (OUTPATIENT)
Dept: GASTROENTEROLOGY | Facility: CLINIC | Age: 74
End: 2019-03-21

## 2019-03-21 VITALS
DIASTOLIC BLOOD PRESSURE: 78 MMHG | HEIGHT: 67 IN | TEMPERATURE: 98 F | SYSTOLIC BLOOD PRESSURE: 124 MMHG | HEART RATE: 88 BPM | BODY MASS INDEX: 28.58 KG/M2 | WEIGHT: 182.1 LBS

## 2019-03-21 DIAGNOSIS — E08.40 DIABETES MELLITUS DUE TO UNDERLYING CONDITION WITH DIABETIC NEUROPATHY, WITHOUT LONG-TERM CURRENT USE OF INSULIN (HCC): Primary | ICD-10-CM

## 2019-03-21 PROCEDURE — 99213 OFFICE O/P EST LOW 20 MIN: CPT | Performed by: HOSPITALIST

## 2019-03-21 RX ORDER — PEN NEEDLE, DIABETIC 30 GX3/16"
NEEDLE, DISPOSABLE MISCELLANEOUS DAILY
Qty: 100 EACH | Refills: 0 | Status: SHIPPED | OUTPATIENT
Start: 2019-03-21 | End: 2020-08-11 | Stop reason: SDUPTHER

## 2019-03-21 RX ORDER — LANCETS 28 GAUGE
EACH MISCELLANEOUS AS NEEDED
Qty: 100 EACH | Refills: 3 | Status: SHIPPED | OUTPATIENT
Start: 2019-03-21

## 2019-03-21 NOTE — PROGRESS NOTES
INTERNAL MEDICINE FOLLOW-UP OFFICE VISIT  [facility]  [address] [facility], [address]    NAME: Saad Sanchez  AGE: 72 y o  SEX: male    DATE OF ENCOUNTER: 3/21/2019    Assessment and Plan       DM2:   -Will continue long acting insulin 20 HS  -Will start meal time insulin 5U with meals  -Will order lab A1C  -Refilled diabetic testing supplies    Health Maintenance:  -Next Colonoscopy 2023  -Counseled good diet and exercise    Hep C:   -Will refer to GI for consideration of treatment  No orders of the defined types were placed in this encounter  Chief Complaint     Chief Complaint   Patient presents with    Follow-up     one week - diabetes       History of Present Illness     HPI     72year old male presents for one week follow up of DM  Patient had his A1C checked 1 wk ago in clinic and the  Results were 15+  Patient initiated on 20U HS Lantus at that time and asked to record his BG  His Post prandial BG have ranged from 140-300  Reports good compliance with his insulin  He was seen in the ED on 3/17 with complaint of fatigue, dizziness, blurry vision  BG at that time was 300-350  Patient initially reports that he had taken his insulin the night before, then changes his story and reports that he did  Denies symptoms of hypoglycemia throughout the past week  Patient has a history of Hep C without known cirrhosis  Does not follow with GI  Reports willingness to see GI to consider treatment options  The following portions of the patient's history were reviewed and updated as appropriate: allergies, current medications, past family history, past medical history, past social history, past surgical history and problem list     Review of Systems     Review of Systems   Constitutional: Negative for activity change, appetite change, chills, diaphoresis, fatigue, fever and unexpected weight change     Respiratory: Negative for apnea, cough, choking, chest tightness, shortness of breath, wheezing and stridor  Cardiovascular: Negative for chest pain, palpitations and leg swelling  Gastrointestinal: Negative for abdominal distention, abdominal pain, anal bleeding, blood in stool, constipation, diarrhea, nausea, rectal pain and vomiting  Skin: Negative for color change, pallor, rash and wound  Active Problem List     Patient Active Problem List   Diagnosis    Asthma    Allergic rhinitis    Bilateral hearing loss    Chronic hepatitis C ([address] )    Diabetes mellitus with neurological manifestation (HCC)    Collapsed arches    Mild nonproliferative diabetic retinopathy (HCC)    Nephritis and nephropathy, not specified as acute or chronic, with other specified pathological lesion in kidney, in diseases classified elsewhere    Hyperlipidemia    Essential hypertension    Hypothyroidism    Nicotine dependence    Osteoarthritis of knee    Vitamin B12 deficiency    Thoracic vertebral fracture (HCC)    Lymphadenopathy, anterior cervical    Adenomatous colon polyp    Diverticulosis of large intestine    Internal hemorrhoids       Objective     /78   Pulse 88   Temp 98 °F (36 7 °C)   Ht 5' 7" (1 702 m)   Wt 82 6 kg (182 lb 1 6 oz)   BMI 28 52 kg/m²     Physical Exam   Constitutional: He appears well-developed and well-nourished  No distress  Cardiovascular: Normal rate, regular rhythm, normal heart sounds and intact distal pulses  Exam reveals no friction rub  No murmur heard  Pulmonary/Chest: Effort normal and breath sounds normal  No stridor  No respiratory distress  He has no wheezes  He has no rales  He exhibits no tenderness  Abdominal: Soft  Bowel sounds are normal  He exhibits no distension and no mass  There is no tenderness  There is no rebound and no guarding  No hernia  Skin: He is not diaphoretic  Vitals reviewed        Pertinent Laboratory/Diagnostic Studies:  CBC:   Lab Results   Component Value Date/Time    WBC 9 02 03/17/2019 06:09 PM    WBC 7 95 10/14/2014 12:00 AM    RBC 4 77 03/17/2019 06:09 PM    RBC 4 53 10/14/2014 12:00 AM    HGB 14 9 03/17/2019 06:09 PM    HGB 14 1 10/14/2014 12:00 AM    HCT 44 3 03/17/2019 06:09 PM    HCT 42 5 10/14/2014 12:00 AM    MCV 93 03/17/2019 06:09 PM    MCV 94 10/14/2014 12:00 AM    MCH 31 2 03/17/2019 06:09 PM    MCH 31 1 10/14/2014 12:00 AM    MCHC 33 6 03/17/2019 06:09 PM    MCHC 33 2 10/14/2014 12:00 AM    RDW 12 3 03/17/2019 06:09 PM    RDW 13 5 10/14/2014 12:00 AM    MPV 11 4 03/17/2019 06:09 PM    MPV 10 5 10/14/2014 12:00 AM     03/17/2019 06:09 PM     10/14/2014 12:00 AM    NRBC 0 03/17/2019 06:09 PM    NEUTOPHILPCT 60 03/17/2019 06:09 PM    NEUTOPHILPCT 55 10/14/2014 12:00 AM    LYMPHOPCT 30 03/17/2019 06:09 PM    LYMPHOPCT 29 10/14/2014 12:00 AM    MONOPCT 8 03/17/2019 06:09 PM    MONOPCT 11 10/14/2014 12:00 AM    EOSPCT 2 03/17/2019 06:09 PM    EOSPCT 5 10/14/2014 12:00 AM    BASOPCT 0 03/17/2019 06:09 PM    NEUTROABS 5 40 03/17/2019 06:09 PM    NEUTROABS 4 37 10/14/2014 12:00 AM    LYMPHSABS 2 74 03/17/2019 06:09 PM    LYMPHSABS 2 31 10/14/2014 12:00 AM    MONOSABS 0 69 03/17/2019 06:09 PM    MONOSABS 0 87 10/14/2014 12:00 AM    EOSABS 0 14 03/17/2019 06:09 PM    EOSABS 0 40 10/14/2014 12:00 AM     Chemistry Profile:   Lab Results   Component Value Date/Time     10/14/2014 12:00 AM    K 4 0 03/17/2019 06:09 PM    K 3 8 10/14/2014 12:00 AM     03/17/2019 06:09 PM     10/14/2014 12:00 AM    CO2 28 03/17/2019 06:09 PM    CO2 30 10/14/2014 12:00 AM    ANIONGAP 6 10/14/2014 12:00 AM    BUN 28 (H) 03/17/2019 06:09 PM    BUN 20 10/14/2014 12:00 AM    CREATININE 1 22 03/17/2019 06:09 PM    CREATININE 1 01 10/14/2014 12:00 AM    GLUC 340 (H) 03/17/2019 06:09 PM    GLUF 401 (H) 03/15/2019 12:09 PM    GLUCOSE 114 10/14/2014 12:00 AM    CALCIUM 9 2 03/17/2019 06:09 PM    CALCIUM 9 6 10/14/2014 12:00 AM    MG 2 0 03/20/2017 11:24 AM    AST 15 09/20/2018 09:40 AM    AST 25 10/14/2014 12:00 AM    ALT 26 09/20/2018 09:40 AM    ALT 37 10/14/2014 12:00 AM    ALKPHOS 55 09/20/2018 09:40 AM    ALKPHOS 77 10/14/2014 12:00 AM    PROT 7 4 10/14/2014 12:00 AM    BILITOT 0 36 10/14/2014 12:00 AM    EGFR 59 03/17/2019 06:09 PM     CBC:   Results from last 6 Months   Lab Units 03/17/19  1809   WBC Thousand/uL 9 02   RBC Million/uL 4 77   HEMOGLOBIN g/dL 14 9   HEMATOCRIT % 44 3   MCV fL 93   MCH pg 31 2   MCHC g/dL 33 6   RDW % 12 3   MPV fL 11 4   PLATELETS Thousands/uL 235   NRBC AUTO /100 WBCs 0   NEUTROS PCT % 60   LYMPHS PCT % 30   MONOS PCT % 8   EOS PCT % 2   BASOS PCT % 0   NEUTROS ABS Thousands/µL 5 40   LYMPHS ABS Thousands/µL 2 74   MONOS ABS Thousand/µL 0 69   EOS ABS Thousand/µL 0 14     Chemistry Profile:   Results from last 6 Months   Lab Units 03/17/19  1809 03/15/19  1209   POTASSIUM mmol/L 4 0 4 3   CHLORIDE mmol/L 100 96*   CO2 mmol/L 28 30   BUN mg/dL 28* 18   CREATININE mg/dL 1 22 0 98   GLUCOSE FASTING mg/dL  --  401*   GLUCOSE RANDOM mg/dL 340*  --    CALCIUM mg/dL 9 2 9 5   EGFR ml/min/1 73sq m 59 77       Current Medications     Current Outpatient Medications:     amLODIPine (NORVASC) 5 mg tablet, Take 1 tablet (5 mg total) by mouth daily, Disp: 90 tablet, Rfl: 1    aspirin (ECOTRIN LOW STRENGTH) 81 mg EC tablet, TAKE 1 TABLET BY MOUTH ONCE DAILY, Disp: 90 tablet, Rfl: 2    Blood Glucose Monitoring Suppl (FREESTYLE FREEDOM LITE) w/Device KIT, by Does not apply route, Disp: , Rfl:     glucose blood (FREESTYLE LITE) test strip, by In Vitro route, Disp: , Rfl:     insulin glargine (LANTUS SOLOSTAR) 100 units/mL injection pen, Inject 20 Units under the skin daily at bedtime, Disp: 5 pen, Rfl: 0    Insulin Pen Needle (PEN NEEDLES) 31G X 8 MM MISC, by Does not apply route daily, Disp: 100 each, Rfl: 0    Lancets (FREESTYLE) lancets, by Does not apply route, Disp: , Rfl:     metFORMIN (GLUCOPHAGE) 850 mg tablet, TAKE 1 TABLET BY MOUTH TWICE A DAY WITH MEALS, Disp: 180 tablet, Rfl: 2    simvastatin (ZOCOR) 80 mg tablet, Take 1 tablet (80 mg total) by mouth daily, Disp: 90 tablet, Rfl: 3    acetaminophen (TYLENOL) 325 mg tablet, Take 1 tablet by mouth, Disp: , Rfl:     ADVAIR -21 MCG/ACT inhaler, Inhale 2 puffs 2 (two) times a day, Disp: 1 Inhaler, Rfl: 4    albuterol (VENTOLIN HFA) 90 mcg/act inhaler, Inhale 2 puffs every 4 (four) hours as needed for wheezing or shortness of breath, Disp: 18 g, Rfl: 2    bisacodyl (DULCOLAX) 5 mg EC tablet, Take by mouth, Disp: , Rfl:     clotrimazole (LOTRIMIN) 1 % cream, Apply topically 2 (two) times a day, Disp: , Rfl:     CVS VITAMIN B-12 1000 MCG tablet, TAKE 1 TABLET BY MOUTH DAILY, Disp: 90 tablet, Rfl: 2    diltiazem (CARDIZEM CD) 300 mg 24 hr capsule, TAKE 1 CAPSULE BY MOUTH ONCE DAILY (Patient not taking: Reported on 3/17/2019), Disp: 90 capsule, Rfl: 2    econazole nitrate 1 % cream, Apply topically daily, Disp: , Rfl:     fluticasone (FLONASE) 50 mcg/act nasal spray, 2 sprays into each nostril daily, Disp: , Rfl:     glimepiride (AMARYL) 1 mg tablet, Take 1 tablet (1 mg total) by mouth daily, Disp: 90 tablet, Rfl: 1    guaiFENesin (MUCINEX) 600 mg 12 hr tablet, Take 1 tablet (600 mg total) by mouth every 12 (twelve) hours, Disp: 60 tablet, Rfl: 1    hydrochlorothiazide (HYDRODIURIL) 25 mg tablet, TAKE 1 TABLET BY MOUTH DAILY, Disp: 90 tablet, Rfl: 0    levothyroxine 137 mcg tablet, Take 1 tablet (137 mcg total) by mouth daily, Disp: 90 tablet, Rfl: 1    lisinopril (ZESTRIL) 20 mg tablet, Take 1 tablet (20 mg total) by mouth daily, Disp: 90 tablet, Rfl: 1    montelukast (SINGULAIR) 10 mg tablet, Take 1 tablet (10 mg total) by mouth daily, Disp: 90 tablet, Rfl: 1    polyethylene glycol (GLYCOLAX) powder, Mix entire tub in 64 oz of Gatorade (no red, orange, or purple), Disp: 238 g, Rfl: 0    Health Maintenance     Health Maintenance   Topic Date Due    Medicare Annual Wellness Visit (AWV) 1945    BMI: Followup Plan  03/31/1965    HEPATITIS B VACCINES (1 of 3 - Risk 3-dose series) 03/31/1966    Fall Risk  03/31/2012    URINE MICROALBUMIN  04/05/2018    HEMOGLOBIN A1C  09/15/2019    Depression Screening PHQ  09/21/2019    Diabetic Foot Exam  09/21/2019    DM Eye Exam  09/21/2019    BMI: Adult  03/17/2020    DTaP,Tdap,and Td Vaccines (2 - Td) 01/30/2027    CRC Screening: Colonoscopy  05/17/2028    INFLUENZA VACCINE  Completed    Pneumococcal PPSV23/PCV13 65+ Years / Low and Medium Risk  Completed    Hepatitis C Screening  Discontinued     Immunization History   Administered Date(s) Administered    H1N1, All Formulations 01/20/2010    INFLUENZA 09/21/2018    Influenza Quadrivalent, 6-35 Months IM 10/27/2016, 10/31/2017    Influenza TIV (IM) 11/08/2013, 10/31/2014, 10/27/2015    Influenza, high dose seasonal 0 5 mL 09/21/2018    Pneumococcal Conjugate 13-Valent 01/30/2017    Pneumococcal Polysaccharide PPV23 12/05/2014    Tdap 01/30/2017       Prerna End  3/21/2019 8:04 AM

## 2019-03-21 NOTE — PATIENT INSTRUCTIONS
La diabetes y talley piel   LO QUE NECESITA SABER:   La diabetes puede afectar cualquier parte de talley cuerpo, incluyendo talley piel  Cuando la diabetes no está bajo control, los vasos sanguíneos y los nervios pueden dañarse  Si se dañan los vasos sanguíneos, es posible que la fernando no circule kimberly a los tejidos y [place]  Cuando la fernando no circula kimberly a la piel, pueden formarse llagas que no se curan con facilidad  Las [facility] se benigno en la piel también se conocen judith Evans  Las personas diabéticas tienden asimismo a tener más infecciones bacteriales que los demás  Si el nivel de azúcar en la fernando está bajo control, es posible prevenir la mayor parte de las condiciones de la piel  Las llagas de la piel pueden tardar en sanar o pueden poner en peligro talley manuela o nithin extremidades si no recibe tratamiento de forma oportuna  INSTRUCCIONES SOBRE [facility]:   Pregúntele a talley [place] Parkers vitaminas y minerales son adecuados para usted  · Usted sufre marli quemadura o herida grave  · Usted tiene la piel enrojecida alrededor de marli llaga, la llaga le duele o está caliente al tacto  · Talley llaga no mejora o parece empeorar  · Usted tiene fiebre  · Talley nivel de azúcar en la fernando continúa siendo más alto que lo recomendado  Evite la formación de llagas en la piel:   · Mantenga el azúcar en la fernando dentro del nivel recomendado  Talley médico le indicará cuál debe ser talley nivel de azúcar en la fernando  Usted corre mayor riesgo de contraer infecciones en la piel y que nithin heridas no sanen si el nivel de azúcar en talley fernando es alto  · Mantenga talley piel limpia  No tome velma ni duchas de [place]  Talley piel podría resecarse  No tome velma de espuma si tiene la piel seca  Use un jabón humectante y póngase crema en la piel después del baño o [place]  No se ponga crema humectante entre los dedos de [employer]       · Evite que talley piel se reseque demasiado,  especialmente cuando hace frío y en climas secos  La piel seca puede abrirse y danna lugar a marli infección cuando usted se rasca para aliviar la comezón  [place]ese con brandon frecuencia cuando timothy frío y póngase marli crema humectante  Use un humidificador de ambiente en pérez hogar  · Mantenga secas las áreas donde roza piel con piel  Use polvo talco en las partes de pérez cuerpo judith las axilas o la mike  También podría necesitarlo debajo de los senos y [place] dedos de los pies  Usted podría contraer marli infección fúngica en estas partes de pérez cuerpo si permanecen húmedas  · Trate las heridas de inmediato  [address] [place] [address] heridas pequeñas con agua y [place]  Cúbralas con marli gasa esterilizada  © 2017 [address] [employer] Information is for End User's use only and may not be sold, redistributed or otherwise used for commercial purposes  All illustrations and images included in CareNotes® are the copyrighted property of A D A M , Inc  or Roge Rausch  Esta información es sólo para uso en educación  Pérez intención no es darle un consejo médico sobre enfermedades o tratamientos  Colsulte con pérez [employer] farm[employer] antes de seguir cualquier régimen médico para saber si es seguro y efectivo para usted

## 2019-04-19 ENCOUNTER — TELEPHONE (OUTPATIENT)
Dept: FAMILY MEDICINE CLINIC | Facility: CLINIC | Age: 74
End: 2019-04-19

## 2019-04-24 ENCOUNTER — TELEPHONE (OUTPATIENT)
Dept: FAMILY MEDICINE CLINIC | Facility: CLINIC | Age: 74
End: 2019-04-24

## 2019-04-24 ENCOUNTER — TELEPHONE (OUTPATIENT)
Dept: MULTI SPECIALTY CLINIC | Facility: CLINIC | Age: 74
End: 2019-04-24

## 2019-04-29 ENCOUNTER — OFFICE VISIT (OUTPATIENT)
Dept: FAMILY MEDICINE CLINIC | Facility: CLINIC | Age: 74
End: 2019-04-29
Payer: COMMERCIAL

## 2019-04-29 DIAGNOSIS — E08.40 DIABETES MELLITUS DUE TO UNDERLYING CONDITION WITH DIABETIC NEUROPATHY, WITHOUT LONG-TERM CURRENT USE OF INSULIN (HCC): ICD-10-CM

## 2019-04-29 DIAGNOSIS — E11.8 TYPE 2 DIABETES MELLITUS WITH COMPLICATION, UNSPECIFIED WHETHER LONG TERM INSULIN USE: Primary | ICD-10-CM

## 2019-04-29 PROCEDURE — G0109 DIAB MANAGE TRN IND/GROUP: HCPCS | Performed by: DIETITIAN, REGISTERED

## 2019-04-30 ENCOUNTER — TELEPHONE (OUTPATIENT)
Dept: INTERNAL MEDICINE CLINIC | Facility: CLINIC | Age: 74
End: 2019-04-30

## 2019-05-01 ENCOUNTER — CONSULT (OUTPATIENT)
Dept: GASTROENTEROLOGY | Facility: CLINIC | Age: 74
End: 2019-05-01
Payer: COMMERCIAL

## 2019-05-01 VITALS
SYSTOLIC BLOOD PRESSURE: 120 MMHG | WEIGHT: 177 LBS | TEMPERATURE: 96.6 F | DIASTOLIC BLOOD PRESSURE: 69 MMHG | HEART RATE: 92 BPM | HEIGHT: 67 IN | BODY MASS INDEX: 27.78 KG/M2

## 2019-05-01 DIAGNOSIS — Z12.11 SCREENING FOR COLON CANCER: ICD-10-CM

## 2019-05-01 DIAGNOSIS — B18.2 CHRONIC HEPATITIS C WITHOUT HEPATIC COMA (HCC): Primary | ICD-10-CM

## 2019-05-01 DIAGNOSIS — E08.40 DIABETES MELLITUS DUE TO UNDERLYING CONDITION WITH DIABETIC NEUROPATHY, WITHOUT LONG-TERM CURRENT USE OF INSULIN (HCC): ICD-10-CM

## 2019-05-01 PROCEDURE — 99214 OFFICE O/P EST MOD 30 MIN: CPT | Performed by: INTERNAL MEDICINE

## 2019-05-02 DIAGNOSIS — E03.9 HYPOTHYROIDISM: ICD-10-CM

## 2019-05-02 RX ORDER — LEVOTHYROXINE SODIUM 137 UG/1
TABLET ORAL
Qty: 90 TABLET | Refills: 1 | Status: SHIPPED | OUTPATIENT
Start: 2019-05-02 | End: 2019-07-23 | Stop reason: SDUPTHER

## 2019-05-03 ENCOUNTER — TELEPHONE (OUTPATIENT)
Dept: SURGERY | Facility: CLINIC | Age: 74
End: 2019-05-03

## 2019-05-06 ENCOUNTER — OFFICE VISIT (OUTPATIENT)
Dept: FAMILY MEDICINE CLINIC | Facility: CLINIC | Age: 74
End: 2019-05-06
Payer: COMMERCIAL

## 2019-05-06 DIAGNOSIS — E11.8 TYPE 2 DIABETES MELLITUS WITH COMPLICATION, UNSPECIFIED WHETHER LONG TERM INSULIN USE: Primary | ICD-10-CM

## 2019-05-06 PROCEDURE — G0109 DIAB MANAGE TRN IND/GROUP: HCPCS | Performed by: DIETITIAN, REGISTERED

## 2019-05-08 ENCOUNTER — APPOINTMENT (OUTPATIENT)
Dept: LAB | Facility: HOSPITAL | Age: 74
End: 2019-05-08
Payer: COMMERCIAL

## 2019-05-08 ENCOUNTER — HOSPITAL ENCOUNTER (OUTPATIENT)
Dept: RADIOLOGY | Facility: HOSPITAL | Age: 74
Discharge: HOME | End: 2019-05-08
Payer: COMMERCIAL

## 2019-05-08 DIAGNOSIS — E08.40 DIABETES MELLITUS DUE TO UNDERLYING CONDITION WITH DIABETIC NEUROPATHY, WITHOUT LONG-TERM CURRENT USE OF INSULIN (HCC): ICD-10-CM

## 2019-05-08 DIAGNOSIS — B18.2 CHRONIC HEPATITIS C WITHOUT HEPATIC COMA (HCC): ICD-10-CM

## 2019-05-08 LAB
ALBUMIN SERPL BCP-MCNC: 3.5 G/DL (ref 3.5–5)
ALP SERPL-CCNC: 51 U/L (ref 46–116)
ALT SERPL W P-5'-P-CCNC: 23 U/L (ref 12–78)
ANION GAP SERPL CALCULATED.3IONS-SCNC: 3 MMOL/L (ref 4–13)
AST SERPL W P-5'-P-CCNC: 13 U/L (ref 5–45)
BILIRUB SERPL-MCNC: 0.42 MG/DL (ref 0.2–1)
BUN SERPL-MCNC: 20 MG/DL (ref 5–25)
CALCIUM SERPL-MCNC: 8.8 MG/DL (ref 8.3–10.1)
CHLORIDE SERPL-SCNC: 106 MMOL/L (ref 100–108)
CO2 SERPL-SCNC: 32 MMOL/L (ref 21–32)
CREAT SERPL-MCNC: 0.76 MG/DL (ref 0.6–1.3)
ERYTHROCYTE [DISTWIDTH] IN BLOOD BY AUTOMATED COUNT: 12.5 % (ref 11.6–15.1)
EST. AVERAGE GLUCOSE BLD GHB EST-MCNC: 189 MG/DL
GFR SERPL CREATININE-BSD FRML MDRD: 91 ML/MIN/1.73SQ M
GLUCOSE P FAST SERPL-MCNC: 91 MG/DL (ref 65–99)
HAV AB SER QL IA: REACTIVE
HAV IGM SER QL: ABNORMAL
HBA1C MFR BLD: 8.2 % (ref 4.2–6.3)
HBV CORE IGM SER QL: ABNORMAL
HBV SURFACE AB SER-ACNC: <3.1 MIU/ML
HBV SURFACE AG SER QL: ABNORMAL
HCT VFR BLD AUTO: 40.3 % (ref 36.5–49.3)
HCV AB SER QL: ABNORMAL
HGB BLD-MCNC: 12.8 G/DL (ref 12–17)
INR PPP: 0.91 (ref 0.86–1.17)
MCH RBC QN AUTO: 30.8 PG (ref 26.8–34.3)
MCHC RBC AUTO-ENTMCNC: 31.8 G/DL (ref 31.4–37.4)
MCV RBC AUTO: 97 FL (ref 82–98)
PLATELET # BLD AUTO: 257 THOUSANDS/UL (ref 149–390)
PMV BLD AUTO: 10.5 FL (ref 8.9–12.7)
POTASSIUM SERPL-SCNC: 3.7 MMOL/L (ref 3.5–5.3)
PROT SERPL-MCNC: 7.2 G/DL (ref 6.4–8.2)
PROTHROMBIN TIME: 12.4 SECONDS (ref 11.8–14.2)
RBC # BLD AUTO: 4.16 MILLION/UL (ref 3.88–5.62)
SODIUM SERPL-SCNC: 141 MMOL/L (ref 136–145)
WBC # BLD AUTO: 6.33 THOUSAND/UL (ref 4.31–10.16)

## 2019-05-08 PROCEDURE — 80053 COMPREHEN METABOLIC PANEL: CPT

## 2019-05-08 PROCEDURE — 82172 ASSAY OF APOLIPOPROTEIN: CPT

## 2019-05-08 PROCEDURE — 83883 ASSAY NEPHELOMETRY NOT SPEC: CPT

## 2019-05-08 PROCEDURE — 80074 ACUTE HEPATITIS PANEL: CPT

## 2019-05-08 PROCEDURE — 82977 ASSAY OF GGT: CPT

## 2019-05-08 PROCEDURE — 76981 USE PARENCHYMA: CPT

## 2019-05-08 PROCEDURE — 83010 ASSAY OF HAPTOGLOBIN QUANT: CPT

## 2019-05-08 PROCEDURE — 87389 HIV-1 AG W/HIV-1&-2 AB AG IA: CPT

## 2019-05-08 PROCEDURE — 36415 COLL VENOUS BLD VENIPUNCTURE: CPT

## 2019-05-08 PROCEDURE — 86708 HEPATITIS A ANTIBODY: CPT

## 2019-05-08 PROCEDURE — 87522 HEPATITIS C REVRS TRNSCRPJ: CPT

## 2019-05-08 PROCEDURE — 80307 DRUG TEST PRSMV CHEM ANLYZR: CPT

## 2019-05-08 PROCEDURE — 82595 ASSAY OF CRYOGLOBULIN: CPT

## 2019-05-08 PROCEDURE — 86706 HEP B SURFACE ANTIBODY: CPT

## 2019-05-08 PROCEDURE — 84460 ALANINE AMINO (ALT) (SGPT): CPT

## 2019-05-08 PROCEDURE — 82247 BILIRUBIN TOTAL: CPT

## 2019-05-08 PROCEDURE — 87902 NFCT AGT GNTYP ALYS HEP C: CPT

## 2019-05-08 PROCEDURE — 83036 HEMOGLOBIN GLYCOSYLATED A1C: CPT

## 2019-05-08 PROCEDURE — 85610 PROTHROMBIN TIME: CPT

## 2019-05-08 PROCEDURE — 85027 COMPLETE CBC AUTOMATED: CPT

## 2019-05-09 LAB — ETHANOL UR-MCNC: NEGATIVE %

## 2019-05-10 LAB
A2 MACROGLOB SERPL-MCNC: 198 MG/DL (ref 110–276)
ALT SERPL W P-5'-P-CCNC: 19 IU/L (ref 0–55)
APO A-I SERPL-MCNC: 128 MG/DL (ref 101–178)
BILIRUB SERPL-MCNC: 0.3 MG/DL (ref 0–1.2)
COMMENT: ABNORMAL
FIBROSIS SCORING:: ABNORMAL
FIBROSIS STAGE SERPL QL: ABNORMAL
GGT SERPL-CCNC: 22 IU/L (ref 0–65)
HAPTOGLOB SERPL-MCNC: 196 MG/DL (ref 34–200)
HCV RNA SERPL NAA+PROBE-ACNC: NORMAL IU/ML
HCV RNA SERPL NAA+PROBE-LOG IU: 5.32 LOG10 IU/ML
HIV 1+2 AB+HIV1 P24 AG SERPL QL IA: NORMAL
INTERPRETATIONS: ABNORMAL
LIVER FIBR SCORE SERPL CALC.FIBROSURE: 0.23 (ref 0–0.21)
NECROINFLAMM ACTIVITY SCORING:: ABNORMAL
NECROINFLAMMATORY ACT GRADE SERPL QL: ABNORMAL
NECROINFLAMMATORY ACT SCORE SERPL: 0.07 (ref 0–0.17)
SERVICE CMNT-IMP: ABNORMAL
TEST INFORMATION: NORMAL

## 2019-05-12 LAB
HCV GENTYP SERPL NAA+PROBE: NORMAL
HCV PLEASE NOTE: NORMAL

## 2019-05-13 LAB
CRYOGLOB SER QL 1D COLD INC: POSITIVE
MISCELLANEOUS LAB TEST RESULT: NORMAL

## 2019-05-15 ENCOUNTER — TELEPHONE (OUTPATIENT)
Dept: GASTROENTEROLOGY | Facility: AMBULARY SURGERY CENTER | Age: 74
End: 2019-05-15

## 2019-05-15 DIAGNOSIS — B18.2 HEP C W/O COMA, CHRONIC (HCC): Primary | ICD-10-CM

## 2019-05-16 DIAGNOSIS — E08.40 DIABETES MELLITUS DUE TO UNDERLYING CONDITION WITH DIABETIC NEUROPATHY, WITHOUT LONG-TERM CURRENT USE OF INSULIN (HCC): ICD-10-CM

## 2019-05-16 RX ORDER — INSULIN GLARGINE 100 [IU]/ML
INJECTION, SOLUTION SUBCUTANEOUS
Qty: 15 PEN | Refills: 0 | Status: SHIPPED | OUTPATIENT
Start: 2019-05-16 | End: 2019-05-16 | Stop reason: SDUPTHER

## 2019-05-20 ENCOUNTER — CLINICAL SUPPORT (OUTPATIENT)
Dept: INTERNAL MEDICINE CLINIC | Facility: CLINIC | Age: 74
End: 2019-05-20

## 2019-05-20 DIAGNOSIS — Z23 NEED FOR HEPATITIS B VACCINATION: Primary | ICD-10-CM

## 2019-05-20 PROCEDURE — G0010 ADMIN HEPATITIS B VACCINE: HCPCS | Performed by: INTERNAL MEDICINE

## 2019-05-20 PROCEDURE — 90746 HEPB VACCINE 3 DOSE ADULT IM: CPT | Performed by: INTERNAL MEDICINE

## 2019-06-03 ENCOUNTER — TELEPHONE (OUTPATIENT)
Dept: GASTROENTEROLOGY | Facility: AMBULARY SURGERY CENTER | Age: 74
End: 2019-06-03

## 2019-06-05 DIAGNOSIS — I10 HYPERTENSION: ICD-10-CM

## 2019-06-07 RX ORDER — HYDROCHLOROTHIAZIDE 25 MG/1
TABLET ORAL
Qty: 90 TABLET | Refills: 0 | Status: SHIPPED | OUTPATIENT
Start: 2019-06-07 | End: 2019-09-12 | Stop reason: SDUPTHER

## 2019-06-20 ENCOUNTER — CLINICAL SUPPORT (OUTPATIENT)
Dept: INTERNAL MEDICINE CLINIC | Facility: CLINIC | Age: 74
End: 2019-06-20

## 2019-06-20 DIAGNOSIS — J45.20 MILD INTERMITTENT ASTHMA, UNSPECIFIED WHETHER COMPLICATED: ICD-10-CM

## 2019-06-20 DIAGNOSIS — I10 ESSENTIAL HYPERTENSION: ICD-10-CM

## 2019-06-20 DIAGNOSIS — Z23 NEED FOR HEPATITIS B VACCINATION: Primary | ICD-10-CM

## 2019-06-20 PROCEDURE — 90746 HEPB VACCINE 3 DOSE ADULT IM: CPT | Performed by: INTERNAL MEDICINE

## 2019-06-20 PROCEDURE — G0010 ADMIN HEPATITIS B VACCINE: HCPCS | Performed by: INTERNAL MEDICINE

## 2019-06-20 RX ORDER — AMLODIPINE BESYLATE 5 MG/1
5 TABLET ORAL DAILY
Qty: 90 TABLET | Refills: 1 | Status: SHIPPED | OUTPATIENT
Start: 2019-06-20 | End: 2019-07-23 | Stop reason: SDUPTHER

## 2019-06-20 RX ORDER — MONTELUKAST SODIUM 10 MG/1
10 TABLET ORAL DAILY
Qty: 90 TABLET | Refills: 1 | Status: SHIPPED | OUTPATIENT
Start: 2019-06-20 | End: 2019-07-23 | Stop reason: SDUPTHER

## 2019-06-25 DIAGNOSIS — B18.2 HEP C W/O COMA, CHRONIC (HCC): Primary | ICD-10-CM

## 2019-06-27 DIAGNOSIS — J45.20 MILD INTERMITTENT ASTHMA, UNSPECIFIED WHETHER COMPLICATED: ICD-10-CM

## 2019-06-27 RX ORDER — MONTELUKAST SODIUM 10 MG/1
10 TABLET ORAL DAILY
Qty: 90 TABLET | Refills: 1 | Status: CANCELLED | OUTPATIENT
Start: 2019-06-27

## 2019-07-01 ENCOUNTER — TELEPHONE (OUTPATIENT)
Dept: GASTROENTEROLOGY | Facility: AMBULARY SURGERY CENTER | Age: 74
End: 2019-07-01

## 2019-07-01 DIAGNOSIS — B18.2 HEP C W/O COMA, CHRONIC (HCC): Primary | ICD-10-CM

## 2019-07-01 NOTE — TELEPHONE ENCOUNTER
Left message for Mitch Soto from [facility] to call me back concerning Nadir Antunez yesterday  7/1      Left message for Mitch Soto to call back   9:00 AM 67/2

## 2019-07-01 NOTE — TELEPHONE ENCOUNTER
Dr Parrish Bunn patient        Frank Benites from [facility] called to follow up on [facility] medication      Call back # [phone]

## 2019-07-08 NOTE — TELEPHONE ENCOUNTER
Returned call to Mitch Soto from [facility] [employer] -left message on voice mail to return call

## 2019-07-09 NOTE — TELEPHONE ENCOUNTER
Via translation line    Patient's wife Jean vazquez states he has lab slips and will complete tomorrow  He is feeling well on medication

## 2019-07-10 ENCOUNTER — APPOINTMENT (OUTPATIENT)
Dept: LAB | Facility: CLINIC | Age: 74
End: 2019-07-10
Payer: COMMERCIAL

## 2019-07-10 DIAGNOSIS — B18.2 HEP C W/O COMA, CHRONIC (HCC): ICD-10-CM

## 2019-07-10 LAB
ALBUMIN SERPL BCP-MCNC: 3.2 G/DL (ref 3.5–5)
ALP SERPL-CCNC: 69 U/L (ref 46–116)
ALT SERPL W P-5'-P-CCNC: 11 U/L (ref 12–78)
ANION GAP SERPL CALCULATED.3IONS-SCNC: 3 MMOL/L (ref 4–13)
AST SERPL W P-5'-P-CCNC: 5 U/L (ref 5–45)
BASOPHILS # BLD AUTO: 0.03 THOUSANDS/ΜL (ref 0–0.1)
BASOPHILS NFR BLD AUTO: 1 % (ref 0–1)
BILIRUB SERPL-MCNC: 0.29 MG/DL (ref 0.2–1)
BUN SERPL-MCNC: 15 MG/DL (ref 5–25)
CALCIUM SERPL-MCNC: 9.1 MG/DL (ref 8.3–10.1)
CHLORIDE SERPL-SCNC: 104 MMOL/L (ref 100–108)
CO2 SERPL-SCNC: 31 MMOL/L (ref 21–32)
CREAT SERPL-MCNC: 0.64 MG/DL (ref 0.6–1.3)
EOSINOPHIL # BLD AUTO: 0.19 THOUSAND/ΜL (ref 0–0.61)
EOSINOPHIL NFR BLD AUTO: 3 % (ref 0–6)
ERYTHROCYTE [DISTWIDTH] IN BLOOD BY AUTOMATED COUNT: 12.6 % (ref 11.6–15.1)
GFR SERPL CREATININE-BSD FRML MDRD: 98 ML/MIN/1.73SQ M
GLUCOSE P FAST SERPL-MCNC: 110 MG/DL (ref 65–99)
HCT VFR BLD AUTO: 42.4 % (ref 36.5–49.3)
HGB BLD-MCNC: 13.4 G/DL (ref 12–17)
IMM GRANULOCYTES # BLD AUTO: 0.02 THOUSAND/UL (ref 0–0.2)
IMM GRANULOCYTES NFR BLD AUTO: 0 % (ref 0–2)
LYMPHOCYTES # BLD AUTO: 2.03 THOUSANDS/ΜL (ref 0.6–4.47)
LYMPHOCYTES NFR BLD AUTO: 32 % (ref 14–44)
MCH RBC QN AUTO: 29.5 PG (ref 26.8–34.3)
MCHC RBC AUTO-ENTMCNC: 31.6 G/DL (ref 31.4–37.4)
MCV RBC AUTO: 93 FL (ref 82–98)
MONOCYTES # BLD AUTO: 0.56 THOUSAND/ΜL (ref 0.17–1.22)
MONOCYTES NFR BLD AUTO: 9 % (ref 4–12)
NEUTROPHILS # BLD AUTO: 3.48 THOUSANDS/ΜL (ref 1.85–7.62)
NEUTS SEG NFR BLD AUTO: 55 % (ref 43–75)
NRBC BLD AUTO-RTO: 0 /100 WBCS
PLATELET # BLD AUTO: 293 THOUSANDS/UL (ref 149–390)
PMV BLD AUTO: 10.5 FL (ref 8.9–12.7)
POTASSIUM SERPL-SCNC: 3.6 MMOL/L (ref 3.5–5.3)
PROT SERPL-MCNC: 7.1 G/DL (ref 6.4–8.2)
RBC # BLD AUTO: 4.55 MILLION/UL (ref 3.88–5.62)
SODIUM SERPL-SCNC: 138 MMOL/L (ref 136–145)
WBC # BLD AUTO: 6.31 THOUSAND/UL (ref 4.31–10.16)

## 2019-07-10 PROCEDURE — 85025 COMPLETE CBC W/AUTO DIFF WBC: CPT

## 2019-07-10 PROCEDURE — 87522 HEPATITIS C REVRS TRNSCRPJ: CPT

## 2019-07-10 PROCEDURE — 36415 COLL VENOUS BLD VENIPUNCTURE: CPT

## 2019-07-10 PROCEDURE — 80053 COMPREHEN METABOLIC PANEL: CPT

## 2019-07-14 LAB
HCV RNA SERPL NAA+PROBE-ACNC: NORMAL IU/ML
TEST INFORMATION: NORMAL

## 2019-07-15 NOTE — TELEPHONE ENCOUNTER
Spoke to [employer]  She is aware patient completed 4 week blood work on 7/2/19 and will complete treatment on 8/5/19  He will schedule office visit when treatment is completed

## 2019-07-15 NOTE — TELEPHONE ENCOUNTER
Via translation line    Patient is aware of 7/10/19 lab results HCV PCR- not detected  He reports feeling better and taking Mavyret everyday  He will continue taking medication daily is aware of 8/5/19 required blood work  Lab slips mailed to patient

## 2019-07-18 ENCOUNTER — HOSPITAL ENCOUNTER (EMERGENCY)
Facility: HOSPITAL | Age: 74
Discharge: HOME | End: 2019-07-19
Attending: EMERGENCY MEDICINE | Admitting: EMERGENCY MEDICINE
Payer: COMMERCIAL

## 2019-07-18 ENCOUNTER — TELEPHONE (OUTPATIENT)
Dept: OTHER | Facility: OTHER | Age: 74
End: 2019-07-18

## 2019-07-18 VITALS
WEIGHT: 180 LBS | SYSTOLIC BLOOD PRESSURE: 151 MMHG | OXYGEN SATURATION: 99 % | TEMPERATURE: 98 F | BODY MASS INDEX: 28.19 KG/M2 | DIASTOLIC BLOOD PRESSURE: 70 MMHG | RESPIRATION RATE: 18 BRPM | HEART RATE: 93 BPM

## 2019-07-18 DIAGNOSIS — T38.3X1A OVERDOSE OF INSULIN, ACCIDENTAL OR UNINTENTIONAL, INITIAL ENCOUNTER: Primary | ICD-10-CM

## 2019-07-18 LAB — GLUCOSE SERPL-MCNC: 92 MG/DL (ref 65–140)

## 2019-07-18 PROCEDURE — 99284 EMERGENCY DEPT VISIT MOD MDM: CPT

## 2019-07-18 PROCEDURE — 82948 REAGENT STRIP/BLOOD GLUCOSE: CPT

## 2019-07-18 PROCEDURE — 99283 EMERGENCY DEPT VISIT LOW MDM: CPT | Performed by: EMERGENCY MEDICINE

## 2019-07-19 LAB
GLUCOSE SERPL-MCNC: 87 MG/DL (ref 65–140)
GLUCOSE SERPL-MCNC: 88 MG/DL (ref 65–140)

## 2019-07-19 PROCEDURE — 82948 REAGENT STRIP/BLOOD GLUCOSE: CPT

## 2019-07-19 NOTE — ED ATTENDING ATTESTATION
Rene Mcleod DO, saw and evaluated the patient  I have discussed the patient with the resident/non-physician practitioner and agree with the resident's/non-physician practitioner's findings, Plan of Care, and MDM as documented in the resident's/non-physician practitioner's note, except where noted  All available labs and Radiology studies were reviewed  At this point I agree with the current assessment done in the Emergency Department  I have conducted an independent evaluation of this patient including a focused history and a physical exam     ED Note - Everardo Anne 69 y o  male MRN: [id number]  Unit/Bed#: ED 03 Encounter: [id number]    History of Present Illness   HPI  Everardo Anne is a 69 y o  male who presents for evaluation of Novolog insulin overdose at approx  2130 hours- supposed to be 4, used 20 Units  Asymptomatic  No complaints  REVIEW OF SYSTEMS  See HPI for further details  12 systems reviewed and otherwise negative except as noted     Historical Information     PAST MEDICAL HISTORY  Past Medical History:   Diagnosis Date    Arthritis     Asthma     Colon polyps     Community acquired pneumonia     last assessed: 5/1/2014    Diabetes mellitus ([facility] [address] )     Hepatitis C     High cholesterol     Hypertension        FAMILY HISTORY  Family History   Problem Relation Age of Onset    Heart attack Family         at age 80       SOCIAL HISTORY  Social History     Socioeconomic History    Marital status: /Civil Union     Spouse name: None    Number of children: None    Years of education: None    Highest education level: None   Occupational History    None   Social Needs    Financial resource strain: None    Food insecurity:     Worry: None     Inability: None    Transportation needs:     Medical: None     Non-medical: None   Tobacco Use    Smoking status: Current Every Day Smoker     Packs/day: 0 50     Types: Cigarettes    Smokeless tobacco: Never Used    Tobacco comment: started when he was about 24 yrs old   Substance and Sexual Activity    Alcohol use: No    Drug use: No    Sexual activity: Never   Lifestyle    Physical activity:     Days per week: None     Minutes per session: None    Stress: None   Relationships    Social connections:     Talks on phone: None     Gets together: None     Attends [employer]ats[employer] service: None     Active member of club or organization: None     Attends meetings of clubs or organizations: None     Relationship status: None    Intimate partner violence:     Fear of current or ex partner: None     Emotionally abused: None     Physically abused: None     Forced sexual activity: None   Other Topics Concern    None   Social History Narrative    None       SURGICAL HISTORY  Past Surgical History:   Procedure Laterality Date    COLONOSCOPY      COLONOSCOPY W/ POLYPECTOMY      LITHOTRIPSY      MULTIPLE TOOTH EXTRACTIONS      VA COLONOSCOPY FLX DX W/COLLJ SPEC WHEN PFRMD N/A 5/17/2018    Procedure: COLONOSCOPY;  Surgeon: Lc Mcrae MD;  Location: [facility]; Service: Gastroenterology     Meds/Allergies     CURRENT MEDICATIONS  No current facility-administered medications for this encounter       Current Outpatient Medications:     acetaminophen (TYLENOL) 325 mg tablet, Take 1 tablet by mouth, Disp: , Rfl:     ADVAIR -21 MCG/ACT inhaler, Inhale 2 puffs 2 (two) times a day, Disp: 1 Inhaler, Rfl: 4    albuterol (VENTOLIN HFA) 90 mcg/act inhaler, Inhale 2 puffs every 4 (four) hours as needed for wheezing or shortness of breath, Disp: 18 g, Rfl: 2    amLODIPine (NORVASC) 5 mg tablet, Take 1 tablet (5 mg total) by mouth daily, Disp: 90 tablet, Rfl: 1    aspirin (ECOTRIN LOW STRENGTH) 81 mg EC tablet, TAKE 1 TABLET BY MOUTH ONCE DAILY, Disp: 90 tablet, Rfl: 2    bisacodyl (DULCOLAX) 5 mg EC tablet, Take by mouth, Disp: , Rfl:     Blood Glucose Monitoring Suppl (FREESTYLE FREEDOM LITE) w/Device KIT, by Does not apply route, Disp: , Rfl:     clotrimazole (LOTRIMIN) 1 % cream, Apply topically 2 (two) times a day, Disp: , Rfl:     CVS VITAMIN B-12 1000 MCG tablet, TAKE 1 TABLET BY MOUTH DAILY, Disp: 90 tablet, Rfl: 2    diltiazem (CARDIZEM CD) 300 mg 24 hr capsule, TAKE 1 CAPSULE BY MOUTH ONCE DAILY, Disp: 90 capsule, Rfl: 2    econazole nitrate 1 % cream, Apply topically daily, Disp: , Rfl:     fluticasone (FLONASE) 50 mcg/act nasal spray, 2 sprays into each nostril daily, Disp: , Rfl:     Glecaprevir-Pibrentasvir 100-40 MG TABS, Take 3 tablets by mouth daily for 56 days, Disp: 168 tablet, Rfl: 0    glucose blood (FREESTYLE LITE) test strip, 1 each by Other route as needed (as needed), Disp: 100 each, Rfl: 3    guaiFENesin (MUCINEX) 600 mg 12 hr tablet, Take 1 tablet (600 mg total) by mouth every 12 (twelve) hours, Disp: 60 tablet, Rfl: 1    hydrochlorothiazide (HYDRODIURIL) 25 mg tablet, TAKE 1 TABLET BY MOUTH DAILY, Disp: 90 tablet, Rfl: 0    insulin aspart (NovoLOG) 100 units/mL injection, Inject 5 Units under the skin 3 (three) times a day before meals, Disp: 10 mL, Rfl: 3    insulin glargine (LANTUS SOLOSTAR) 100 units/mL injection pen, Inject 20 Units under the skin daily at bedtime, Disp: 15 pen, Rfl: 3    Insulin Pen Needle (PEN NEEDLES) 31G X 8 MM MISC, by Does not apply route daily, Disp: 100 each, Rfl: 0    Lancets (FREESTYLE) lancets, by Other route as needed (As needed), Disp: 100 each, Rfl: 3    levothyroxine 137 mcg tablet, TAKE 1 TABLET BY MOUTH EVERY DAY, Disp: 90 tablet, Rfl: 1    lisinopril (ZESTRIL) 20 mg tablet, Take 1 tablet (20 mg total) by mouth daily, Disp: 90 tablet, Rfl: 1    metFORMIN (GLUCOPHAGE) 850 mg tablet, TAKE 1 TABLET BY MOUTH TWICE A DAY WITH MEALS, Disp: 180 tablet, Rfl: 2    montelukast (SINGULAIR) 10 mg tablet, Take 1 tablet (10 mg total) by mouth daily, Disp: 90 tablet, Rfl: 1    polyethylene glycol (GLYCOLAX) powder, Mix entire tub in 64 oz of Gatorade (no red, orange, or purple), Disp: 238 g, Rfl: 0    (Not in a hospital admission)    ALLERGIES  No Known Allergies  Objective     PHYSICAL EXAM    VITAL SIGNS: Blood pressure 151/70, pulse 93, temperature 98 °F (36 7 °C), temperature source Oral, resp  rate 18, weight 81 6 kg (180 lb), SpO2 99 %  Constitutional:  Appears well developed and well nourished, no acute distress, non-toxic appearance   Eyes:  PERRL, EOMI, conjunctivae pink, sclerae non-icteric    HENT:  Normocephalic/Atraumatic, no rhinorrhea, mucous membranes moist   Neck: normal range of motion, no tenderness, supple   Respiratory:  No respiratory distress, normal breath sounds   Cardiovascular:  Normal rate, normal rhythm   GI:  Soft, non-tender, non-distended  :  No CVAT, no flank ecchymosis  Musculoskeletal:  No swelling or edema, no tenderness, no deformities  Integument:  Pink, warm, dry, Well hydrated, no rash, no erythema, no bullae   Lymphatic:  No cervical/ tonsillar/ submandibular lymphadenopathy noted   Neurologic:  Awake, Alert & oriented x 3, CN 2-12 intact, no focal neurological deficits  Psychiatric:  Speech and behavior appropriate       ED COURSE and MDM:    Assessment/Plan   Assessment:  Nadir Antunez is a 69 y o  male presents for evaluation of insulin overdose  Plan:   blood sugar check, symptom management, disposition as appropriate  CRITICAL CARE TIME: 0 minutes      Portions of the record may have been created with voice recognition software  Occasional wrong word or "sound a like" substitutions may have occurred due to the inherent limitations of voice recognition software       ED Provider  Electronically Signed by

## 2019-07-19 NOTE — TELEPHONE ENCOUNTER
Madai Whitaker 1945  CONFIDENTIALTY NOTICE: This fax transmission is intended only for the addressee  It contains information that is legally privileged,  confidential or otherwise protected from use or disclosure  If you are not the intended recipient, you are strictly prohibited from reviewing,  disclosing, copying using or disseminating any of this information or taking any action in reliance on or regarding this information  If you have  received this fax in error, please notify us immediately by telephone so that we can arrange for its return to us  Page:  2  Call Id: [id number]  Health Call  Standard Call Report  Health Call  Patient Name: Madai Whitaker  Gender: Male  : 1945  Age: 69 Y 3 M 25 D  Return Phone  Number: [phone] (Current)  Address: [facility]/Zip: [address] [place] [address]  Practice Name: Thu Browne  Practice Charged:  Physician:  0 [facility] Name:  Relationship To  Patient: Self  Return Phone Number: [phone] (Current)  Presenting Problem: " I took the wrong kind of insulin  and more then I was supposed  to " ( connected to poision control)  Service Type: Triage  Charged Service 1: N/A  Pharmacy Name and  Number:  Nurse Assessment  Nurse: Edvin Ríos Date/Time: 2019 10:22:39 PM  Type of assessment required:  ---General (Adult or Child)  Duration of Current S/S  ---Today  Temperature (F) and route:  ---Denies  Symptom Specific Meds (Dose/Time):  ---None  Other S/S  ---Pt took 20 units of Novolog insulin @ 2030 instead of 4 units  Current BS 91  BS  was 158 thirty minutes ago  Pain Scale on scale of 1-10, 10 being the worst:  ---none  Symptom progression:  ---same  Intake and Output  ---WNl  Last Exam/Treatment:  Madai [facility] 1945  CONFIDENTIALTY NOTICE: This fax transmission is intended only for the addressee   It contains information that is legally privileged,  confidential or otherwise protected from use or disclosure  If you are not the intended recipient, you are strictly prohibited from reviewing,  disclosing, copying using or disseminating any of this information or taking any action in reliance on or regarding this information  If you have  received this fax in error, please notify us immediately by telephone so that we can arrange for its return to us  Page: 2 of 2  Call Id: [id number]  Nurse Assessment  ---Was last seen 3/ 2019  Protocols  Protocol Title Nurse Date/Time  No Guideline Available - Sick Adult Call Johnathan Dixons 7/18/2019 10:48:44 PM  Poisoning Jose Alfredo Fajardo RN, Oscar Smith 7/18/2019 10:49:23 PM  Question Caller Affirmed  Disp  Time Disposition Final User  7/18/2019 10:07:30 PM Send to Follow Up Kandace Chambers RN, Oscar Luke  7/18/2019 10:49:10 PM Go to ED Now (or PCP triage) Jose Alfredo Fajardo RN, Oscar Smith  7/18/2019 10:49:54 PM Call Poison Control Now Yes Jose Alfredo Fajardo RN, [facility] Advice Given Per Protocol  GO TO ED NOW (OR PCP TRIAGE): * IF NO PCP TRIAGE: You need to be seen  Go to the [facility] at _[facility]  within the next hour  Leave as soon as you can  CARE ADVICE per nursing judgment or reference (No Guideline Available - Sick Adult  Call; Adult)  [facility] NOW: You need to call the [facility] now  The phone number is (___) - ___-____  Kelvin Avila [address]45 NUMBER: * Phone number: [phone] * You will be connected automatically to your local poison  center  CARE ADVICE given per Poisoning (Adult) guideline  Caller Understands: Yes  Caller Disagree/Comply: Comply  PreDisposition: Unsure  Comments  User: Zackery Linn RN Date/Time: 7/18/2019 10:06:42 PM  No answer  I left a message that I will call back in 15 minutes  User: Zackery Linn RN Date/Time: 7/18/2019 10:47:37 PM  Pt was connected with poison control  Via  I spoke with the patient and he says that poison control told him to eat  something, recheck his blood sugar @ 2250   Call them back in two hours and do not go to sleep for 5 hours  I spoke with Dr Tamra Fajardo and he said to have him be seen in the ER due to frequent need for monitoring tonight   used to speak  with and he verbalized understanding

## 2019-07-19 NOTE — ED PROVIDER NOTES
History  Chief Complaint   Patient presents with    Overdose - Accidental     pt took 20 units of Novolog insteaD OF 4 UNITS 30 MINUTES AGO, no symptoms at this time, blood sugar is 92 in room     44-year-old male with past medical history of diabetes mellitus on insulin with most recent hemoglobin A1c of 8 2% in May 2019, hypertension, and hypothyroidism who is presenting after unintentional overdose on insulin  Patient reports that his blood sugar was 158 earlier this evening  He took his NovoLog insulin at around 2130  He meant to inject a total 4 units but accidentally injected 20  He called his physician who advised him to come to the ER  Patient had no symptoms of hypoglycemia  His blood glucose was in the low 90s at his home  He was able to tolerate PO without difficulty  At this time, patient states that he feels fine  He states that this was an error and that this has never happened previously  He has no symptoms at this time  In particular, he denies any confusion, lightheadedness, change in mental status, diaphoresis, anxiety, tremors, or any other signs or symptoms of hypoglycemia  He also has no other concerns on review of systems  Prior to Admission Medications   Prescriptions Last Dose Informant Patient Reported? Taking?    ADVAIR -21 MCG/ACT inhaler  Self No No   Sig: Inhale 2 puffs 2 (two) times a day   Blood Glucose Monitoring Suppl (FREESTYLE FREEDOM LITE) w/Device KIT  Self Yes No   Sig: by Does not apply route   [employer] VITAMIN B-12 1000 MCG tablet  Self No No   Sig: TAKE 1 TABLET BY MOUTH DAILY   Glecaprevir-Pibrentasvir 100-40 MG TABS   No No   Sig: Take 3 tablets by mouth daily for 56 days   Insulin Pen Needle (PEN NEEDLES) 31G X 8 MM MISC  Self No No   Sig: by Does not apply route daily   Lancets (FREESTYLE) lancets  Self No No   Sig: by Other route as needed (As needed)   acetaminophen (TYLENOL) 325 mg tablet  Self Yes No   Sig: Take 1 tablet by mouth   albuterol (VENTOLIN HFA) 90 mcg/act inhaler  Self No No   Sig: Inhale 2 puffs every 4 (four) hours as needed for wheezing or shortness of breath   amLODIPine (NORVASC) 5 mg tablet   No No   Sig: Take 1 tablet (5 mg total) by mouth daily   aspirin (ECOTRIN LOW STRENGTH) 81 mg EC tablet  Self No No   Sig: TAKE 1 TABLET BY MOUTH ONCE DAILY   bisacodyl (DULCOLAX) 5 mg EC tablet  Self Yes No   Sig: Take by mouth   clotrimazole (LOTRIMIN) 1 % cream  Self Yes No   Sig: Apply topically 2 (two) times a day   diltiazem (CARDIZEM CD) 300 mg 24 hr capsule  Self No No   Sig: TAKE 1 CAPSULE BY MOUTH ONCE DAILY   econazole nitrate 1 % cream  Self Yes No   Sig: Apply topically daily   fluticasone (FLONASE) 50 mcg/act nasal spray  Self Yes No   Si sprays into each nostril daily   glucose blood (FREESTYLE LITE) test strip  Self No No   Si each by Other route as needed (as needed)   guaiFENesin (MUCINEX) 600 mg 12 hr tablet  Self No No   Sig: Take 1 tablet (600 mg total) by mouth every 12 (twelve) hours   hydrochlorothiazide (HYDRODIURIL) 25 mg tablet   No No   Sig: TAKE 1 TABLET BY MOUTH DAILY   insulin aspart (NovoLOG) 100 units/mL injection  Self No No   Sig: Inject 5 Units under the skin 3 (three) times a day before meals   insulin glargine (LANTUS SOLOSTAR) 100 units/mL injection pen   No No   Sig: Inject 20 Units under the skin daily at bedtime   levothyroxine 137 mcg tablet   No No   Sig: TAKE 1 TABLET BY MOUTH EVERY DAY   lisinopril (ZESTRIL) 20 mg tablet  Self No No   Sig: Take 1 tablet (20 mg total) by mouth daily   metFORMIN (GLUCOPHAGE) 850 mg tablet  Self No No   Sig: TAKE 1 TABLET BY MOUTH TWICE A DAY WITH MEALS   montelukast (SINGULAIR) 10 mg tablet   No No   Sig: Take 1 tablet (10 mg total) by mouth daily   polyethylene glycol (GLYCOLAX) powder  Self No No   Sig: Mix entire tub in 64 oz of Gatorade (no red, orange, or purple)      Facility-Administered Medications: None       Past Medical History:   Diagnosis Date    Arthritis     Asthma     Colon polyps     Community acquired pneumonia     last assessed: 5/1/2014    Diabetes mellitus ([facility] [address] )     Hepatitis C     High cholesterol     Hypertension        Past Surgical History:   Procedure Laterality Date    COLONOSCOPY      COLONOSCOPY W/ POLYPECTOMY      LITHOTRIPSY      MULTIPLE TOOTH EXTRACTIONS      CA COLONOSCOPY FLX DX W/COLLJ SPEC WHEN PFRMD N/A 5/17/2018    Procedure: COLONOSCOPY;  Surgeon: Lc Mcrae MD;  Location: [facility]; Service: Gastroenterology       Family History   Problem Relation Age of Onset    Heart attack Family         at age 80     I have reviewed and agree with the history as documented  Social History     Tobacco Use    Smoking status: Current Every Day Smoker     Packs/day: 0 50     Types: Cigarettes    Smokeless tobacco: Never Used    Tobacco comment: started when he was about 24 yrs old   Substance Use Topics    Alcohol use: No    Drug use: No        Review of Systems   Constitutional: Negative for diaphoresis, fever and unexpected weight change  HENT: Negative for congestion, rhinorrhea and sore throat  Eyes: Negative for pain, discharge and visual disturbance  Respiratory: Negative for cough, shortness of breath and wheezing  Cardiovascular: Negative for chest pain, palpitations and leg swelling  Gastrointestinal: Negative for abdominal pain, blood in stool, constipation, diarrhea, nausea and vomiting  Genitourinary: Negative for dysuria, flank pain and hematuria  Musculoskeletal: Negative for arthralgias and myalgias  Skin: Negative for rash and wound  Allergic/Immunologic: Negative for environmental allergies and food allergies  Neurological: Negative for dizziness, seizures, weakness and numbness  Hematological: Negative for adenopathy  Psychiatric/Behavioral: Negative for confusion and hallucinations         Physical Exam  ED Triage Vitals [07/18/19 2315]   Temperature Pulse Respirations Blood Pressure SpO2   98 °F (36 7 °C) 93 18 151/70 99 %      Temp Source Heart Rate Source Patient Position - Orthostatic VS BP Location FiO2 (%)   Oral Monitor Sitting Left arm --      Pain Score       No Pain             Orthostatic Vital Signs  Vitals:    07/18/19 2315   BP: 151/70   Pulse: 93   Patient Position - Orthostatic VS: Sitting       Physical Exam   Constitutional: He is oriented to person, place, and time  He appears well-developed and well-nourished  HENT:   Head: Normocephalic and atraumatic  Right Ear: External ear normal    Left Ear: External ear normal    Nose: Nose normal    Eyes: Pupils are equal, round, and reactive to light  EOM are normal    Neck: Normal range of motion  Neck supple  Cardiovascular: Normal rate, regular rhythm and normal heart sounds  No murmur heard  Pulmonary/Chest: Effort normal and breath sounds normal  No respiratory distress  He has no wheezes  He has no rales  Abdominal: Soft  Bowel sounds are normal  He exhibits no distension  There is no tenderness  There is no guarding  Musculoskeletal: Normal range of motion  He exhibits no deformity  Neurological: He is alert and oriented to person, place, and time  No gross motor deficits noted  Cranial nerves II-XII are intact  Speech is fluent without dysarthria or aphasia  Skin: Skin is warm and dry  Capillary refill takes less than 2 seconds  He is not diaphoretic  Psychiatric: He has a normal mood and affect  His behavior is normal    Nursing note and vitals reviewed        ED Medications  Medications - No data to display    Diagnostic Studies  Results Reviewed     Procedure Component Value Units Date/Time    Fingerstick Glucose (POCT) [[id number]]  (Normal) Collected:  07/19/19 0110    Lab Status:  Final result Updated:  07/19/19 0111     POC Glucose 88 mg/dl     Fingerstick Glucose (POCT) [[id number]]  (Normal) Collected:  07/19/19 0028    Lab Status:  Final result Updated:  07/19/19 0029     POC Glucose 87 mg/dl     Fingerstick Glucose (POCT) [[id number]]  (Normal) Collected:  07/18/19 2315    Lab Status:  Final result Updated:  07/18/19 2316     POC Glucose 92 mg/dl                  No orders to display         Procedures  Procedures        ED Course  ED Course as of Jul 19 0136   Thu Jul 18, 2019 2337 POC Glucose: 92   Fri Jul 19, 2019   0031 POC Glucose: 87   0031 Will encourage PO intake, recheck in 1 hour  0115 POC Glucose: 88           Identification of Seniors at Risk      Most Recent Value   (ISAR) Identification of Seniors at Risk   Before the illness or injury that brought you to the Emergency, did you need someone to help you on a regular basis? 0 Filed at: 07/18/2019 2317   In the last 24 hours, have you needed more help than usual?  0 Filed at: 07/18/2019 2317   Have you been hospitalized for one or more nights during the past 6 months? 0 Filed at: 07/18/2019 2317   In general, do you see well?  0 Filed at: 07/18/2019 2317   In general, do you have serious problems with your memory? 0 Filed at: 07/18/2019 2317   Do you take more than three different medications every day? 1 Filed at: 07/18/2019 2317   ISAR Score  1 Filed at: 07/18/2019 2317                          MDM  Number of Diagnoses or Management Options  Overdose of insulin, accidental or unintentional, initial encounter: new and does not require workup  Diagnosis management comments:     As above, patient presented after an unintentional overdose of insulin  Patient intended to take 4 units of NovoLog but accidentally took 20 units at 2130 for a blood glucose of 158  On arrival, patient was asymptomatic  He was able to tolerate PO and was given juice and a sandwich  Patient was observed for nearly 3 hours and 2 additional blood glucose checks were done, demonstrating stable blood glucose  Advised patient to continue to closely watch for symptoms of hypoglycemia    Discussed return precautions extensively with the patient and his wife at bedside  Both verbalized understanding  Amount and/or Complexity of Data Reviewed  Clinical lab tests: ordered and reviewed  Decide to obtain previous medical records or to obtain history from someone other than the patient: yes  Review and summarize past medical records: yes    Risk of Complications, Morbidity, and/or Mortality  Presenting problems: moderate  Diagnostic procedures: minimal  Management options: minimal    Patient Progress  Patient progress: improved      Disposition  Final diagnoses:   Overdose of insulin, accidental or unintentional, initial encounter     Time reflects when diagnosis was documented in both MDM as applicable and the Disposition within this note     Time User Action Codes Description Comment    7/19/2019  1:24 AM Yuval Sun Add [[id number]] Overdose of insulin, accidental or unintentional, initial encounter       ED Disposition     ED Disposition Condition Date/Time Comment    Discharge Good Fri Jul 19, 2019  1:23 AM Lucio Wallace discharge to home/self care  Follow-up Information     Follow up With Specialties Details Why [address], DO Internal Medicine Call  [facility] para hacer marli cosme en [place]  [address]  [phone]            Patient's Medications   Discharge Prescriptions    No medications on file     No discharge procedures on file  ED Provider  Attending physically available and evaluated Lucio Wallace I managed the patient along with the ED Attending      Electronically Signed by         Eusebio Ruiz MD  07/19/19 4480

## 2019-07-22 RX ORDER — SIMVASTATIN 80 MG
80 TABLET ORAL
COMMUNITY
End: 2019-09-13 | Stop reason: SDUPTHER

## 2019-07-22 RX ORDER — NAPROXEN 500 MG/1
TABLET ORAL
Refills: 0 | COMMUNITY
Start: 2019-06-19 | End: 2022-05-09

## 2019-07-22 RX ORDER — CEPHALEXIN 500 MG/1
CAPSULE ORAL
Refills: 0 | COMMUNITY
Start: 2019-06-19 | End: 2019-10-04 | Stop reason: ALTCHOICE

## 2019-07-22 RX ORDER — PEN NEEDLE, DIABETIC 31 GX5/16"
NEEDLE, DISPOSABLE MISCELLANEOUS
COMMUNITY
Start: 2019-05-09 | End: 2020-08-20 | Stop reason: ALTCHOICE

## 2019-07-22 RX ORDER — BLOOD SUGAR DIAGNOSTIC
STRIP MISCELLANEOUS
COMMUNITY
Start: 2019-05-09

## 2019-07-23 ENCOUNTER — OFFICE VISIT (OUTPATIENT)
Dept: INTERNAL MEDICINE CLINIC | Facility: CLINIC | Age: 74
End: 2019-07-23

## 2019-07-23 VITALS
HEART RATE: 92 BPM | DIASTOLIC BLOOD PRESSURE: 70 MMHG | BODY MASS INDEX: 27.34 KG/M2 | TEMPERATURE: 98.2 F | SYSTOLIC BLOOD PRESSURE: 126 MMHG | WEIGHT: 174.16 LBS | HEIGHT: 67 IN

## 2019-07-23 DIAGNOSIS — E11.9 DIABETES MELLITUS (HCC): ICD-10-CM

## 2019-07-23 DIAGNOSIS — I10 ESSENTIAL HYPERTENSION: ICD-10-CM

## 2019-07-23 DIAGNOSIS — J45.20 MILD INTERMITTENT ASTHMA, UNSPECIFIED WHETHER COMPLICATED: ICD-10-CM

## 2019-07-23 DIAGNOSIS — E53.8 VITAMIN B12 DEFICIENCY: ICD-10-CM

## 2019-07-23 DIAGNOSIS — E08.40 DIABETES MELLITUS DUE TO UNDERLYING CONDITION WITH DIABETIC NEUROPATHY, WITHOUT LONG-TERM CURRENT USE OF INSULIN (HCC): ICD-10-CM

## 2019-07-23 DIAGNOSIS — E03.9 HYPOTHYROIDISM: ICD-10-CM

## 2019-07-23 PROCEDURE — 99213 OFFICE O/P EST LOW 20 MIN: CPT | Performed by: INTERNAL MEDICINE

## 2019-07-23 PROCEDURE — 3078F DIAST BP <80 MM HG: CPT | Performed by: INTERNAL MEDICINE

## 2019-07-23 PROCEDURE — 3074F SYST BP LT 130 MM HG: CPT | Performed by: INTERNAL MEDICINE

## 2019-07-23 PROCEDURE — 4010F ACE/ARB THERAPY RXD/TAKEN: CPT | Performed by: INTERNAL MEDICINE

## 2019-07-23 RX ORDER — AMLODIPINE BESYLATE 5 MG/1
5 TABLET ORAL DAILY
Qty: 90 TABLET | Refills: 1 | Status: SHIPPED | OUTPATIENT
Start: 2019-07-23

## 2019-07-23 RX ORDER — MONTELUKAST SODIUM 10 MG/1
10 TABLET ORAL DAILY
Qty: 90 TABLET | Refills: 1 | Status: SHIPPED | OUTPATIENT
Start: 2019-07-23 | End: 2020-04-02

## 2019-07-23 RX ORDER — LISINOPRIL 20 MG/1
20 TABLET ORAL DAILY
Qty: 90 TABLET | Refills: 1 | Status: SHIPPED | OUTPATIENT
Start: 2019-07-23 | End: 2020-02-07

## 2019-07-23 RX ORDER — LEVOTHYROXINE SODIUM 137 UG/1
137 TABLET ORAL DAILY
Qty: 90 TABLET | Refills: 1 | Status: SHIPPED | OUTPATIENT
Start: 2019-07-23 | End: 2020-07-15 | Stop reason: SDUPTHER

## 2019-07-23 NOTE — PATIENT INSTRUCTIONS
Diabetes Mellitus Type 2 in Adults, Ambulatory Care   GENERAL INFORMATION:   Diabetes mellitus type 2  is a disease that affects how your body uses glucose (sugar)  Insulin helps move sugar out of the blood so it can be used for energy  Normally, when the blood sugar level increases, the pancreas makes more insulin  Type 2 diabetes develops because either the body cannot make enough insulin, or it cannot use the insulin correctly  After many years, your pancreas may stop making insulin  Common symptoms include the following:   · More hunger or thirst than usual     · Frequent urination     · Weight loss without trying     · Blurred vision  Seek immediate care for the following symptoms:   · Severe abdominal pain, or pain that spreads to your back  You may also be vomiting  · Trouble staying awake or focusing    · Shaking or sweating    · Blurred or double vision    · Breath has a fruity, sweet smell    · Breathing is deep and labored, or rapid and shallow    · Heartbeat is fast and weak  Treatment for diabetes mellitus type 2  includes keeping your blood sugar at a normal level  You must eat the right foods, and exercise regularly  You may also need medicine if you cannot control your blood sugar level with nutrition and exercise  Manage diabetes mellitus type 2:   · Check your blood sugar level  You will be taught how to check a small drop of blood in a glucose monitor  Ask your healthcare provider when and how often to check during the day  Ask your healthcare provider what your blood sugar levels should be when you check them  · Keep track of carbohydrates (sugar and starchy foods)  Your blood sugar level can get too high if you eat too many carbohydrates  Your dietitian will help you plan meals and snacks that have the right amount of carbohydrates  · Eat low-fat foods  Some examples are skinless chicken and low-fat milk  · Eat less sodium (salt)    Some examples of high-sodium foods to limit are soy sauce, potato chips, and soup  Do not add salt to food you cook  Limit your use of table salt  · Eat high-fiber foods  Foods that are a good source of fiber include vegetables, whole grain bread, and beans  · Limit alcohol  Alcohol affects your blood sugar level and can make it harder to manage your diabetes  Women should limit alcohol to 1 drink a day  Men should limit alcohol to 2 drinks a day  A drink of alcohol is 12 ounces of beer, 5 ounces of wine, or 1½ ounces of liquor  · Get regular exercise  Exercise can help keep your blood sugar level steady, decrease your risk of heart disease, and help you lose weight  Exercise for at least 30 minutes, 5 days a week  Include muscle strengthening activities 2 days each week  Work with your healthcare provider to create an exercise plan  · Check your feet each day  for injuries or open sores  Ask your healthcare provider for activities you can do if you have an open sore  · Quit smoking  If you smoke, it is never too late to quit  Smoking can worsen the problems that may occur with diabetes  Ask your healthcare provider for information about how to stop smoking if you are having trouble quitting  · Ask about your weight:  Ask healthcare providers if you need to lose weight, and how much to lose  Ask them to help you with a weight loss program  Even a 10 to 15 pound weight loss can help you manage your blood sugar level  · Carry medical alert identification  Wear medical alert jewelry or carry a card that says you have diabetes  Ask your healthcare provider where to get these items  · Ask about vaccines  Diabetes puts you at risk of serious illness if you get the flu, pneumonia, or hepatitis  Ask your healthcare provider if you should get a flu, pneumonia, or hepatitis B vaccine, and when to get the vaccine    Follow up with your healthcare provider as directed:  Write down your questions so you remember to ask them during your visits  CARE AGREEMENT:   You have the right to help plan your care  Learn about your health condition and how it may be treated  Discuss treatment options with your caregivers to decide what care you want to receive  You always have the right to refuse treatment  The above information is an  only  It is not intended as medical advice for individual conditions or treatments  Talk to your doctor, nurse or pharmacist before following any medical regimen to see if it is safe and effective for you  © 2014 [address] is for End User's use only and may not be sold, redistributed or otherwise used for commercial purposes  All illustrations and images included in CareNotes® are the copyrighted property of [employer]  or [employer]aus[employer]

## 2019-07-23 NOTE — PROGRESS NOTES
ASSESSMENT/PLAN:  Problem List Items Addressed This Visit        Endocrine    Diabetes mellitus with neurological manifestation (HCC)    Relevant Medications    insulin glargine (LANTUS SOLOSTAR) 100 units/mL injection pen    insulin aspart (NovoLOG) 100 units/mL injection    metFORMIN (GLUCOPHAGE) 850 mg tablet    Hypothyroidism    Relevant Medications    levothyroxine 137 mcg tablet       Respiratory    Asthma    Relevant Medications    montelukast (SINGULAIR) 10 mg tablet       Cardiovascular and Mediastinum    Essential hypertension    Relevant Medications    amLODIPine (NORVASC) 5 mg tablet    lisinopril (ZESTRIL) 20 mg tablet       Other    Vitamin B12 deficiency    Relevant Medications    cyanocobalamin ([employer] VITAMIN B-12) 1000 MCG tablet      Other Visit Diagnoses     Diabetes mellitus (HCC)        Relevant Medications    insulin glargine (LANTUS SOLOSTAR) 100 units/mL injection pen    insulin aspart (NovoLOG) 100 units/mL injection    metFORMIN (GLUCOPHAGE) 850 mg tablet        Plan:  Diabetes Mellitus type 2  Last A1c 8 6 in May   Pt has been taking 20 units lantus at night and 4 units with meals  Pt recently went to the ED after accidentally injecting 20 units of mealtime insulin instead of 4 units  Pt reports blood sugar went to 158 to 70 and he was advised to go to the emergency department  Refilled metformin, lisinopril, insulin  Will follow up in 3 months and repeat A1c then  Recommended pt bring blood sugar log to next visit  Pt stated that he keeps it at home  Patient reported that he is holding off on taking his atorvastatin because of his hepatitis-B anti viral medication  HTN  Well controlled  Refilled amlodipine, lisinopril    Hypothyroidism  Refilled Levothyroxine    Health Maintenance:  Next colonoscopy in 2023  Up to date on depression screening, diabetic foot exam, diabetic eye exam  Last A1c was 8 6, will recheck at next visit  Follow-up:   In 3 months for diabetes follow-up    CHIEF COMPLAINT:   Diabetes follow-up    HISTORY OF PRESENT ILLNESS:  81-year-old male with history of hypertension, asthma, diabetes presenting for diabetes follow-up and to establish care with a new resident  Last A1c in May was 8 6, patient reports that his sugars are between 110 and 143 before meals  and that he checks 3 times a day and keeps a blood sugar log at home  Blood sugars were 140, 123 the last two days before eating  Patient reports no symptoms of hyper or hypoglycemia  Patient was sent to the ED on 07/18 because he had accidentally taking mealtime insulin with 20 units instead of 4 units  Patient reported that his blood glucose went from 158 to 70, was reported to be in the 90s in ED  Pt did not have any hypoglycemic symptoms  Pt requesting refills on most of his medications  Patient reports that Cardizem was discontinued by another doctor several months ago  History via  line, Pt's wife present in room  Review of Systems   Constitutional: Negative for chills, fatigue and fever  HENT: Negative for congestion  Eyes: Negative for visual disturbance  Respiratory: Negative for cough, chest tightness, shortness of breath and wheezing  Cardiovascular: Negative for chest pain, palpitations and leg swelling  Gastrointestinal: Negative for constipation, diarrhea, nausea and vomiting  Genitourinary: Negative for hematuria  Musculoskeletal: Negative for back pain  Skin: Negative for color change and wound  Neurological: Negative for dizziness, weakness and light-headedness  Psychiatric/Behavioral: Negative for agitation and behavioral problems         OBJECTIVE:  Vitals:    07/23/19 0817   BP: 126/70   BP Location: Left arm   Patient Position: Sitting   Cuff Size: Adult   Pulse: 92   Temp: 98 2 °F (36 8 °C)   TempSrc: Oral   Weight: 79 kg (174 lb 2 6 oz)   Height: 5' 7" (1 702 m)       Physical Exam   Constitutional: He is oriented to person, place, and time  He appears well-developed and well-nourished  HENT:   Head: Normocephalic and atraumatic  Nose: Nose normal    Mouth/Throat: Oropharynx is clear and moist    Eyes: Right eye exhibits no discharge  Left eye exhibits no discharge  Cardiovascular: Normal rate, regular rhythm and normal heart sounds  Exam reveals no gallop and no friction rub  No murmur heard  Pulmonary/Chest: Effort normal and breath sounds normal  No respiratory distress  He has no wheezes  He has no rales  Abdominal: Soft  Bowel sounds are normal  He exhibits no distension  There is no tenderness  There is no guarding  Neurological: He is alert and oriented to person, place, and time  Skin: Skin is warm and dry     Psychiatric: His speech is normal          Current Outpatient Medications:     ADVAIR -21 MCG/ACT inhaler, Inhale 2 puffs 2 (two) times a day, Disp: 1 Inhaler, Rfl: 4    albuterol (VENTOLIN HFA) 90 mcg/act inhaler, Inhale 2 puffs every 4 (four) hours as needed for wheezing or shortness of breath, Disp: 18 g, Rfl: 2    amLODIPine (NORVASC) 5 mg tablet, Take 1 tablet (5 mg total) by mouth daily, Disp: 90 tablet, Rfl: 1    aspirin (ECOTRIN LOW STRENGTH) 81 mg EC tablet, TAKE 1 TABLET BY MOUTH ONCE DAILY, Disp: 90 tablet, Rfl: 2    Blood Glucose Monitoring Suppl (FREESTYLE FREEDOM LITE) w/Device KIT, by Does not apply route, Disp: , Rfl:     cephalexin (KEFLEX) 500 mg capsule, TAKE 1 CAPSULE BY MOUTH 2 TIMES A DAY FOR 5 DAYS, Disp: , Rfl: 0    clotrimazole (LOTRIMIN) 1 % cream, Apply topically 2 (two) times a day, Disp: , Rfl:     COMFORT EZ INSULIN SYRINGE 31G X 5/16" 1 ML MISC, , Disp: , Rfl:     CVS VITAMIN B-12 1000 MCG tablet, TAKE 1 TABLET BY MOUTH DAILY, Disp: 90 tablet, Rfl: 2    econazole nitrate 1 % cream, Apply topically daily, Disp: , Rfl:     fluticasone (FLONASE) 50 mcg/act nasal spray, 2 sprays into each nostril daily, Disp: , Rfl:     Glecaprevir-Pibrentasvir 100-40 MG TABS, Take 3 tablets by mouth daily for 56 days, Disp: 168 tablet, Rfl: 0    glucose blood (FREESTYLE LITE) test strip, 1 each by Other route as needed (as needed), Disp: 100 each, Rfl: 3    hydrochlorothiazide (HYDRODIURIL) 25 mg tablet, TAKE 1 TABLET BY MOUTH DAILY, Disp: 90 tablet, Rfl: 0    insulin aspart (NovoLOG) 100 units/mL injection, Inject 5 Units under the skin 3 (three) times a day before meals, Disp: 10 mL, Rfl: 3    insulin glargine (LANTUS SOLOSTAR) 100 units/mL injection pen, Inject 20 Units under the skin daily at bedtime, Disp: 15 pen, Rfl: 3    Insulin Pen Needle (PEN NEEDLES) 31G X 8 MM MISC, by Does not apply route daily, Disp: 100 each, Rfl: 0    Lancets (FREESTYLE) lancets, by Other route as needed (As needed), Disp: 100 each, Rfl: 3    levothyroxine 137 mcg tablet, TAKE 1 TABLET BY MOUTH EVERY DAY, Disp: 90 tablet, Rfl: 1    lisinopril (ZESTRIL) 20 mg tablet, Take 1 tablet (20 mg total) by mouth daily, Disp: 90 tablet, Rfl: 1    metFORMIN (GLUCOPHAGE) 850 mg tablet, TAKE 1 TABLET BY MOUTH TWICE A DAY WITH MEALS, Disp: 180 tablet, Rfl: 2    naproxen (NAPROSYN) 500 mg tablet, TAKE 1 TABLET BY MOUTH EVERY 12 HOURS AS NEEDED FOR MILD PAIN   TAKE WITH MEALS , Disp: , Rfl: 0    simvastatin (ZOCOR) 80 mg tablet, Take 80 mg by mouth, Disp: , Rfl:     acetaminophen (TYLENOL) 325 mg tablet, Take 1 tablet by mouth, Disp: , Rfl:     Alcohol Swabs (ALCOHOL PADS) 70 % PADS, , Disp: , Rfl:     bisacodyl (DULCOLAX) 5 mg EC tablet, Take by mouth, Disp: , Rfl:     diltiazem (CARDIZEM CD) 300 mg 24 hr capsule, TAKE 1 CAPSULE BY MOUTH ONCE DAILY (Patient not taking: Reported on 7/23/2019), Disp: 90 capsule, Rfl: 2    guaiFENesin (MUCINEX) 600 mg 12 hr tablet, Take 1 tablet (600 mg total) by mouth every 12 (twelve) hours (Patient not taking: Reported on 7/23/2019), Disp: 60 tablet, Rfl: 1    montelukast (SINGULAIR) 10 mg tablet, Take 1 tablet (10 mg total) by mouth daily (Patient not taking: Reported on 7/23/2019), Disp: 90 tablet, Rfl: 1    polyethylene glycol (GLYCOLAX) powder, Mix entire tub in 64 oz of Gatorade (no red, orange, or purple) (Patient not taking: Reported on 7/23/2019), Disp: 238 g, Rfl: 0    Past Medical History:   Diagnosis Date    Arthritis     Asthma     Colon polyps     Community acquired pneumonia     last assessed: 5/1/2014    Diabetes mellitus ([facility] [address] )     Hepatitis C     High cholesterol     Hypertension      Past Surgical History:   Procedure Laterality Date    COLONOSCOPY      COLONOSCOPY W/ POLYPECTOMY      LITHOTRIPSY      MULTIPLE TOOTH EXTRACTIONS      NC COLONOSCOPY FLX DX W/COLLJ SPEC WHEN PFRMD N/A 5/17/2018    Procedure: COLONOSCOPY;  Surgeon: Karie Mendes MD;  Location: [facility];   Service: Gastroenterology     Social History     Socioeconomic History    Marital status: /Civil Union     Spouse name: Not on file    Number of children: Not on file    Years of education: Not on file    Highest education level: Not on file   Occupational History    Not on file   Social Needs    Financial resource strain: Not on file    Food insecurity:     Worry: Not on file     Inability: Not on file    Transportation needs:     Medical: Not on file     Non-medical: Not on file   Tobacco Use    Smoking status: Current Every Day Smoker     Packs/day: 0 50     Types: Cigarettes    Smokeless tobacco: Never Used    Tobacco comment: started when he was about 24 yrs old   Substance and Sexual Activity    Alcohol use: No    Drug use: No    Sexual activity: Never   Lifestyle    Physical activity:     Days per week: Not on file     Minutes per session: Not on file    Stress: Not on file   Relationships    Social connections:     Talks on phone: Not on file     Gets together: Not on file     Attends Sikh service: Not on file     Active member of club or organization: Not on file     Attends meetings of clubs or organizations: Not on file     Relationship status: Not on file   Trey Jones Intimate partner violence:     Fear of current or ex partner: Not on file     Emotionally abused: Not on file     Physically abused: Not on file     Forced sexual activity: Not on file   Other Topics Concern    Not on file   Social History Narrative    Not on file     Family History   Problem Relation Age of Onset    Heart attack Family         at age 80       [address], [address] Internal Medicine PGY-2    [facility]  [address] [facility], [address] [place] [address]  [phone]

## 2019-08-02 ENCOUNTER — APPOINTMENT (OUTPATIENT)
Dept: LAB | Facility: CLINIC | Age: 74
End: 2019-08-02
Payer: COMMERCIAL

## 2019-08-02 DIAGNOSIS — B18.2 HEP C W/O COMA, CHRONIC (HCC): ICD-10-CM

## 2019-08-02 LAB
ALBUMIN SERPL BCP-MCNC: 2.9 G/DL (ref 3.5–5)
ALP SERPL-CCNC: 77 U/L (ref 46–116)
ALT SERPL W P-5'-P-CCNC: 9 U/L (ref 12–78)
ANION GAP SERPL CALCULATED.3IONS-SCNC: 9 MMOL/L (ref 4–13)
AST SERPL W P-5'-P-CCNC: 6 U/L (ref 5–45)
BASOPHILS # BLD AUTO: 0.04 THOUSANDS/ΜL (ref 0–0.1)
BASOPHILS NFR BLD AUTO: 1 % (ref 0–1)
BILIRUB SERPL-MCNC: 0.37 MG/DL (ref 0.2–1)
BUN SERPL-MCNC: 17 MG/DL (ref 5–25)
CALCIUM SERPL-MCNC: 9.3 MG/DL (ref 8.3–10.1)
CHLORIDE SERPL-SCNC: 105 MMOL/L (ref 100–108)
CO2 SERPL-SCNC: 28 MMOL/L (ref 21–32)
CREAT SERPL-MCNC: 0.7 MG/DL (ref 0.6–1.3)
EOSINOPHIL # BLD AUTO: 0.23 THOUSAND/ΜL (ref 0–0.61)
EOSINOPHIL NFR BLD AUTO: 3 % (ref 0–6)
ERYTHROCYTE [DISTWIDTH] IN BLOOD BY AUTOMATED COUNT: 12.9 % (ref 11.6–15.1)
GFR SERPL CREATININE-BSD FRML MDRD: 94 ML/MIN/1.73SQ M
GLUCOSE P FAST SERPL-MCNC: 94 MG/DL (ref 65–99)
HCT VFR BLD AUTO: 40.8 % (ref 36.5–49.3)
HGB BLD-MCNC: 13 G/DL (ref 12–17)
IMM GRANULOCYTES # BLD AUTO: 0.01 THOUSAND/UL (ref 0–0.2)
IMM GRANULOCYTES NFR BLD AUTO: 0 % (ref 0–2)
LYMPHOCYTES # BLD AUTO: 2.22 THOUSANDS/ΜL (ref 0.6–4.47)
LYMPHOCYTES NFR BLD AUTO: 30 % (ref 14–44)
MCH RBC QN AUTO: 29.3 PG (ref 26.8–34.3)
MCHC RBC AUTO-ENTMCNC: 31.9 G/DL (ref 31.4–37.4)
MCV RBC AUTO: 92 FL (ref 82–98)
MONOCYTES # BLD AUTO: 0.63 THOUSAND/ΜL (ref 0.17–1.22)
MONOCYTES NFR BLD AUTO: 8 % (ref 4–12)
NEUTROPHILS # BLD AUTO: 4.36 THOUSANDS/ΜL (ref 1.85–7.62)
NEUTS SEG NFR BLD AUTO: 58 % (ref 43–75)
NRBC BLD AUTO-RTO: 0 /100 WBCS
PLATELET # BLD AUTO: 312 THOUSANDS/UL (ref 149–390)
PMV BLD AUTO: 10.2 FL (ref 8.9–12.7)
POTASSIUM SERPL-SCNC: 3.6 MMOL/L (ref 3.5–5.3)
PROT SERPL-MCNC: 7 G/DL (ref 6.4–8.2)
RBC # BLD AUTO: 4.44 MILLION/UL (ref 3.88–5.62)
SODIUM SERPL-SCNC: 142 MMOL/L (ref 136–145)
WBC # BLD AUTO: 7.49 THOUSAND/UL (ref 4.31–10.16)

## 2019-08-02 PROCEDURE — 80053 COMPREHEN METABOLIC PANEL: CPT

## 2019-08-02 PROCEDURE — 85025 COMPLETE CBC W/AUTO DIFF WBC: CPT

## 2019-08-02 PROCEDURE — 36415 COLL VENOUS BLD VENIPUNCTURE: CPT

## 2019-08-02 PROCEDURE — 87522 HEPATITIS C REVRS TRNSCRPJ: CPT

## 2019-08-06 LAB
HCV RNA SERPL NAA+PROBE-ACNC: NORMAL IU/ML
TEST INFORMATION: NORMAL

## 2019-08-12 NOTE — TELEPHONE ENCOUNTER
Patient was informed of end of treatment blood work results on 8/8/19 -Jane Mena MA  He was instructed to resume Simvastatin that was on hold during treatment  Final SVR due 10/28/19

## 2019-09-12 DIAGNOSIS — I10 HYPERTENSION: ICD-10-CM

## 2019-09-12 RX ORDER — HYDROCHLOROTHIAZIDE 25 MG/1
TABLET ORAL
Qty: 90 TABLET | Refills: 1 | Status: SHIPPED | OUTPATIENT
Start: 2019-09-12 | End: 2020-04-06

## 2019-09-13 DIAGNOSIS — I10 HYPERTENSION: ICD-10-CM

## 2019-09-13 DIAGNOSIS — E78.5 HYPERLIPIDEMIA: ICD-10-CM

## 2019-09-13 DIAGNOSIS — E11.49: ICD-10-CM

## 2019-09-13 RX ORDER — SIMVASTATIN 80 MG
80 TABLET ORAL
Qty: 90 TABLET | Refills: 3 | Status: SHIPPED | OUTPATIENT
Start: 2019-09-13 | End: 2020-07-20 | Stop reason: SDUPTHER

## 2019-09-13 RX ORDER — SIMVASTATIN 80 MG
TABLET ORAL
Qty: 90 TABLET | Refills: 3 | OUTPATIENT
Start: 2019-09-13

## 2019-09-13 NOTE — TELEPHONE ENCOUNTER
Please review refill request  It appears as though Dr Diamond Dos Santos of GI had discontinued this medication in June 2019

## 2019-09-13 NOTE — TELEPHONE ENCOUNTER
Received refill request for simvastatin, which was to be resumed after Hep C treatment was complete approximately 1 month ago  Refill sent to pharmacy

## 2019-09-28 ENCOUNTER — HOSPITAL ENCOUNTER (EMERGENCY)
Facility: HOSPITAL | Age: 74
Discharge: HOME | End: 2019-09-28
Attending: EMERGENCY MEDICINE
Payer: COMMERCIAL

## 2019-09-28 ENCOUNTER — APPOINTMENT (EMERGENCY)
Dept: RADIOLOGY | Facility: HOSPITAL | Age: 74
End: 2019-09-28
Payer: COMMERCIAL

## 2019-09-28 VITALS
WEIGHT: 178 LBS | OXYGEN SATURATION: 98 % | HEART RATE: 91 BPM | BODY MASS INDEX: 27.88 KG/M2 | SYSTOLIC BLOOD PRESSURE: 162 MMHG | RESPIRATION RATE: 18 BRPM | TEMPERATURE: 97.9 F | DIASTOLIC BLOOD PRESSURE: 73 MMHG

## 2019-09-28 DIAGNOSIS — M70.42 PREPATELLAR BURSITIS OF LEFT KNEE: Primary | ICD-10-CM

## 2019-09-28 PROCEDURE — 99283 EMERGENCY DEPT VISIT LOW MDM: CPT

## 2019-09-28 PROCEDURE — 73560 X-RAY EXAM OF KNEE 1 OR 2: CPT

## 2019-09-28 PROCEDURE — 99284 EMERGENCY DEPT VISIT MOD MDM: CPT | Performed by: EMERGENCY MEDICINE

## 2019-09-28 RX ORDER — IBUPROFEN 600 MG/1
600 TABLET ORAL ONCE
Status: COMPLETED | OUTPATIENT
Start: 2019-09-28 | End: 2019-09-28

## 2019-09-28 RX ORDER — IBUPROFEN 600 MG/1
600 TABLET ORAL EVERY 6 HOURS PRN
Qty: 30 TABLET | Refills: 0 | Status: SHIPPED | OUTPATIENT
Start: 2019-09-28 | End: 2022-05-09

## 2019-09-28 RX ADMIN — IBUPROFEN 600 MG: 600 TABLET, FILM COATED ORAL at 13:30

## 2019-09-28 NOTE — ED PROVIDER NOTES
History  Chief Complaint   Patient presents with    Knee Swelling     Left knee pain and swelling for 1 week     Tristen Rizo is a 69y o  year old Male with PMH of arthritis presenting to the [facility] ED for knee pain  Patient states one week of worsening pain and swelling in left knee  Patient notes he fell 3 days ago, landing on left knee  Patient is able to ambulate but limited secondary to pain  Denies fevers/chills, erythema over left knee  Denies calf pain/swelling  No Hx DVT/PE  Has not taken anything for pain  History provided by:  Patient   used: Yes    Knee Pain   Location:  Knee  Time since incident:  1 week  Knee location:  L knee  Pain details:     Quality:  Throbbing    Radiates to:  Does not radiate    Severity:  Mild    Onset quality:  Gradual    Duration:  1 week  Chronicity:  Recurrent  Dislocation: no    Ineffective treatments:  None tried  Associated symptoms: no back pain, no decreased ROM and no fever        Prior to Admission Medications   Prescriptions Last Dose Informant Patient Reported? Taking?    ADVAIR -21 MCG/ACT inhaler  Self No No   Sig: Inhale 2 puffs 2 (two) times a day   Alcohol Swabs (ALCOHOL PADS) 70 % PADS   Yes No   Blood Glucose Monitoring Suppl (FREESTYLE FREEDOM LITE) w/Device KIT  Self Yes No   Sig: by Does not apply route   COMFORT EZ INSULIN SYRINGE 31G X 5/16" 1 ML MISC   Yes No   Insulin Pen Needle (PEN NEEDLES) 31G X 8 MM MISC  Self No No   Sig: by Does not apply route daily   Lancets (FREESTYLE) lancets  Self No No   Sig: by Other route as needed (As needed)   acetaminophen (TYLENOL) 325 mg tablet  Self Yes No   Sig: Take 1 tablet by mouth   albuterol (VENTOLIN HFA) 90 mcg/act inhaler  Self No No   Sig: Inhale 2 puffs every 4 (four) hours as needed for wheezing or shortness of breath   amLODIPine (NORVASC) 5 mg tablet   No No   Sig: Take 1 tablet (5 mg total) by mouth daily   aspirin (ECOTRIN LOW STRENGTH) 81 mg EC tablet  Self No No   Sig: TAKE 1 TABLET BY MOUTH ONCE DAILY   bisacodyl (DULCOLAX) 5 mg EC tablet  Self Yes No   Sig: Take by mouth   cephalexin (KEFLEX) 500 mg capsule   Yes No   Sig: TAKE 1 CAPSULE BY MOUTH 2 TIMES A DAY FOR 5 DAYS   clotrimazole (LOTRIMIN) 1 % cream  Self Yes No   Sig: Apply topically 2 (two) times a day   cyanocobalamin ([employer] VITAMIN B-12) 1000 MCG tablet   No No   Sig: Take 1 tablet (1,000 mcg total) by mouth daily   econazole nitrate 1 % cream  Self Yes No   Sig: Apply topically daily   fluticasone (FLONASE) 50 mcg/act nasal spray  Self Yes No   Si sprays into each nostril daily   glucose blood (FREESTYLE LITE) test strip  Self No No   Si each by Other route as needed (as needed)   guaiFENesin (MUCINEX) 600 mg 12 hr tablet  Self No No   Sig: Take 1 tablet (600 mg total) by mouth every 12 (twelve) hours   Patient not taking: Reported on 2019   hydrochlorothiazide (HYDRODIURIL) 25 mg tablet   No No   Sig: TAKE 1 TABLET BY MOUTH EVERY DAY   insulin aspart (NovoLOG) 100 units/mL injection   No No   Sig: Inject 5 Units under the skin 3 (three) times a day before meals   insulin glargine (LANTUS SOLOSTAR) 100 units/mL injection pen   No No   Sig: Inject 20 Units under the skin daily at bedtime   levothyroxine 137 mcg tablet   No No   Sig: Take 1 tablet (137 mcg total) by mouth daily   lisinopril (ZESTRIL) 20 mg tablet   No No   Sig: Take 1 tablet (20 mg total) by mouth daily   metFORMIN (GLUCOPHAGE) 850 mg tablet   No No   Sig: Take 1 tablet (850 mg total) by mouth 2 (two) times a day with meals   montelukast (SINGULAIR) 10 mg tablet   No No   Sig: Take 1 tablet (10 mg total) by mouth daily   naproxen (NAPROSYN) 500 mg tablet   Yes No   Sig: TAKE 1 TABLET BY MOUTH EVERY 12 HOURS AS NEEDED FOR MILD PAIN   TAKE WITH MEALS    polyethylene glycol (GLYCOLAX) powder  Self No No   Sig: Mix entire tub in 64 oz of Gatorade (no red, orange, or purple)   Patient not taking: Reported on 2019 simvastatin (ZOCOR) 80 mg tablet   No No   Sig: Take 1 tablet (80 mg total) by mouth daily at bedtime      Facility-Administered Medications: None       Past Medical History:   Diagnosis Date    Arthritis     Asthma     Colon polyps     Community acquired pneumonia     last assessed: 5/1/2014    Diabetes mellitus ([facility] [address] )     Hepatitis C     High cholesterol     Hypertension        Past Surgical History:   Procedure Laterality Date    COLONOSCOPY      COLONOSCOPY W/ POLYPECTOMY      LITHOTRIPSY      MULTIPLE TOOTH EXTRACTIONS      [place] COLONOSCOPY FLX DX W/COLLJ SPEC WHEN PFRMD N/A 5/17/2018    Procedure: COLONOSCOPY;  Surgeon: Sally Severe, MD;  Location: [facility]; Service: Gastroenterology       Family History   Problem Relation Age of Onset    Heart attack Family         at age 80     I have reviewed and agree with the history as documented  Social History     Tobacco Use    Smoking status: Current Every Day Smoker     Packs/day: 0 50     Types: Cigarettes    Smokeless tobacco: Never Used    Tobacco comment: started when he was about 24 yrs old   Substance Use Topics    Alcohol use: No    Drug use: No        Review of Systems   Constitutional: Negative for chills, diaphoresis and fever  HENT: Negative for congestion and rhinorrhea  Eyes: Negative for visual disturbance  Respiratory: Negative for chest tightness, shortness of breath and wheezing  Cardiovascular: Negative for chest pain and leg swelling  Gastrointestinal: Negative for abdominal distention, abdominal pain, blood in stool, diarrhea, nausea and vomiting  Endocrine: Negative for polyuria  Genitourinary: Negative for difficulty urinating, dysuria, flank pain and hematuria  Musculoskeletal: Positive for arthralgias, gait problem and joint swelling  Negative for back pain  Skin: Negative for rash  Neurological: Negative for syncope, light-headedness and headaches     Psychiatric/Behavioral: Negative for behavioral problems and confusion  All other systems reviewed and are negative  Physical Exam  ED Triage Vitals [09/28/19 1243]   Temperature Pulse Respirations Blood Pressure SpO2   97 9 °F (36 6 °C) 91 18 162/73 98 %      Temp Source Heart Rate Source Patient Position - Orthostatic VS BP Location FiO2 (%)   Oral Monitor Sitting Right arm --      Pain Score       7             Orthostatic Vital Signs  Vitals:    09/28/19 1243   BP: 162/73   Pulse: 91   Patient Position - Orthostatic VS: Sitting       Physical Exam   Constitutional: He is oriented to person, place, and time  He appears well-developed and well-nourished  No distress  HENT:   Head: Normocephalic and atraumatic  Neck: Neck supple  Cardiovascular: Normal rate and regular rhythm  No murmur heard  Pulmonary/Chest: Effort normal and breath sounds normal  No respiratory distress  He has no wheezes  He has no rales  Abdominal: Soft  Bowel sounds are normal  There is no tenderness  There is no rebound and no guarding  Musculoskeletal:        Left hip: He exhibits normal range of motion, no tenderness and no bony tenderness  Right knee: He exhibits normal range of motion and no bony tenderness  No tenderness found  Left knee: He exhibits effusion (mild effusion) and bony tenderness (over patella)  He exhibits no deformity, no laceration, no erythema, normal alignment, no LCL laxity and no MCL laxity  No medial joint line and no lateral joint line tenderness noted  Left knee not warm to palpation   Neurological: He is alert and oriented to person, place, and time  Skin: Skin is warm  Capillary refill takes less than 2 seconds  He is not diaphoretic  Psychiatric: He has a normal mood and affect  His behavior is normal    Nursing note and vitals reviewed        ED Medications  Medications   ibuprofen (MOTRIN) tablet 600 mg (600 mg Oral Given 9/28/19 1330)       Diagnostic Studies  Results Reviewed     None                 XR knee 1 or 2 views LEFT   Final Result by Veronika Dhaliwal MD (09/28 1066)      Chondrocalcinosis            Workstation performed: [id number]               Procedures  Procedures        ED Course                               MDM  Number of Diagnoses or Management Options  Prepatellar bursitis of left knee:   Diagnosis management comments: 69 y o  Male presenting for knee pain  Tenderness over left patella consistent with prepatellar bursitis  Xray left knee without acute abnormality  POCUS demonstrates right knee prepatellar bursitis  Instructed to take short course of ibuprofen for symptomatic relief  RTED precautions given including fevers/chills, warm, tender knee  I have discussed with the patient our plan to discharge them from the ED and the patient is in agreement with this plan  I have educated the patient on pertinent lab/imaging results and answered their questions  Return to the ED precautions given  I have also discussed with the patient plans for follow up with PCP  Amount and/or Complexity of Data Reviewed  Tests in the radiology section of CPT®: ordered and reviewed    Patient Progress  Patient progress: stable      Disposition  Final diagnoses:   Prepatellar bursitis of left knee     Time reflects when diagnosis was documented in both MDM as applicable and the Disposition within this note     Time User Action Codes Description Comment    9/28/2019  1:12 PM Rodo Amanda Add [M25 562] Acute pain of left knee     9/28/2019  1:27 PM Rodo Amanda Remove [M25 562] Acute pain of left knee     9/28/2019  1:27 PM Rodo Amanda Add [M70 42] Prepatellar bursitis of left knee       ED Disposition     ED Disposition Condition Date/Time Comment    Discharge Stable Sat Sep 28, 2019  1:12 PM Mikael Klein discharge to home/self care              Follow-up Information     Follow up With Specialties Details Why Contact Info Additional [address] [facility] Schedule an appointment as soon as possible for a visit  To make appointment for reevaluation in 3-5 days   [id number] [facility] [id number] [address] [facility], [address] [place] [address], [id number] [phone]          Discharge Medication List as of 9/28/2019  1:47 PM      START taking these medications    Details   ibuprofen (MOTRIN) 600 mg tablet Take 1 tablet (600 mg total) by mouth every 6 (six) hours as needed for mild pain, Starting Sat 9/28/2019, Print         CONTINUE these medications which have NOT CHANGED    Details   acetaminophen (TYLENOL) 325 mg tablet Take 1 tablet by mouth, Starting Tue 12/20/2016, Historical Med      ADVAIR -21 MCG/ACT inhaler Inhale 2 puffs 2 (two) times a day, Starting Fri 9/21/2018, Normal      albuterol (VENTOLIN HFA) 90 mcg/act inhaler Inhale 2 puffs every 4 (four) hours as needed for wheezing or shortness of breath, Starting Fri 9/21/2018, Normal      Alcohol Swabs (ALCOHOL PADS) 70 % PADS Starting Thu 5/9/2019, Historical Med      amLODIPine (NORVASC) 5 mg tablet Take 1 tablet (5 mg total) by mouth daily, Starting Tue 7/23/2019, Normal      aspirin (ECOTRIN LOW STRENGTH) 81 mg EC tablet TAKE 1 TABLET BY MOUTH ONCE DAILY, Normal      bisacodyl (DULCOLAX) 5 mg EC tablet Take by mouth, Starting Thu 5/1/2014, Historical Med      Blood Glucose Monitoring Suppl (FREESTYLE FREEDOM LITE) w/Device KIT by Does not apply route, Starting Fri 2/14/2014, Historical Med      cephalexin (KEFLEX) 500 mg capsule TAKE 1 CAPSULE BY MOUTH 2 TIMES A DAY FOR 5 DAYS, Historical Med      clotrimazole (LOTRIMIN) 1 % cream Apply topically 2 (two) times a day, Starting Tue 11/24/2015, Historical Med      COMFORT EZ INSULIN SYRINGE 31G X 5/16" 1 ML MISC Starting Thu 5/9/2019, Historical Med      cyanocobalamin ([employer] VITAMIN B-12) 1000 MCG tablet Take 1 tablet (1,000 mcg total) by mouth daily, Starting Tue 7/23/2019, Normal      econazole nitrate 1 % cream Apply topically daily, Starting Tue 11/25/2014, Historical Med      fluticasone (FLONASE) 50 mcg/act nasal spray 2 sprays into each nostril daily, Starting Wed 2/24/2016, Historical Med      glucose blood (FREESTYLE LITE) test strip 1 each by Other route as needed (as needed), Starting Thu 3/21/2019, Normal      guaiFENesin (MUCINEX) 600 mg 12 hr tablet Take 1 tablet (600 mg total) by mouth every 12 (twelve) hours, Starting Tue 4/17/2018, Normal      hydrochlorothiazide (HYDRODIURIL) 25 mg tablet TAKE 1 TABLET BY MOUTH EVERY DAY, Normal      insulin aspart (NovoLOG) 100 units/mL injection Inject 5 Units under the skin 3 (three) times a day before meals, Starting Tue 7/23/2019, Normal      insulin glargine (LANTUS SOLOSTAR) 100 units/mL injection pen Inject 20 Units under the skin daily at bedtime, Starting Tue 7/23/2019, Normal      Insulin Pen Needle (PEN NEEDLES) 31G X 8 MM MISC by Does not apply route daily, Starting Thu 3/21/2019, Normal      Lancets (FREESTYLE) lancets by Other route as needed (As needed), Starting Thu 3/21/2019, Normal      levothyroxine 137 mcg tablet Take 1 tablet (137 mcg total) by mouth daily, Starting Tue 7/23/2019, Normal      lisinopril (ZESTRIL) 20 mg tablet Take 1 tablet (20 mg total) by mouth daily, Starting Tue 7/23/2019, Normal      metFORMIN (GLUCOPHAGE) 850 mg tablet Take 1 tablet (850 mg total) by mouth 2 (two) times a day with meals, Starting Tue 7/23/2019, Normal      montelukast (SINGULAIR) 10 mg tablet Take 1 tablet (10 mg total) by mouth daily, Starting Tue 7/23/2019, Normal      naproxen (NAPROSYN) 500 mg tablet TAKE 1 TABLET BY MOUTH EVERY 12 HOURS AS NEEDED FOR MILD PAIN   TAKE WITH MEALS , Historical Med      polyethylene glycol (GLYCOLAX) powder Mix entire tub in 64 oz of Gatorade (no red, orange, or purple), Normal      simvastatin (ZOCOR) 80 mg tablet Take 1 tablet (80 mg total) by mouth daily at bedtime, Starting Fri 9/13/2019, Normal           No discharge procedures on file  ED Provider  Attending physically available and evaluated Julienne November  I managed the patient along with the ED Attending      Electronically Signed by Perico Kelley, [address] [facility]  09/30/19 0352

## 2019-09-28 NOTE — DISCHARGE INSTRUCTIONS
You have been seen for left knee pain  Please take ibuprofen for pain  Please follow up with your PCP for further management  Return to the ED for fevers/chills, warmth, worsening swelling in left knee

## 2019-09-28 NOTE — ED ATTENDING ATTESTATION
9/28/2019  Yaima Jiménez DO, saw and evaluated the patient  I have discussed the patient with the resident/non-physician practitioner and agree with the resident's/non-physician practitioner's findings, Plan of Care, and MDM as documented in the resident's/non-physician practitioner's note, except where noted  All available labs and Radiology studies were reviewed  I was present for key portions of any procedure(s) performed by the resident/non-physician practitioner and I was immediately available to provide assistance  At this point I agree with the current assessment done in the Emergency Department  I have conducted an independent evaluation of this patient a history and physical is as follows:    70 yo male c/o 1wk hx worsening L knee pain, mild swelling  Pain rated 7/10, able to ambulate  Worse with weight bearing but able to do so  Denies radiation of pain no other a/e factors  Constant since onset  Of note, pt did fall 3 days ago and pain worsened at that point  L knee small effusion  TTP over patella  There is swelling and tenderness of the prepatellar bursa  Knee stable in all directions  NVI distally  Imp: L knee pain likely prepatellar bursitis vs inflammatory arthritis  Consider fx as well plan: L knee films, NSAIDs  ED Course     Bedside ultrasound shows thickened, fluid filled prepatellar bursa  No knee effusion present      Critical Care Time  Procedures

## 2019-10-04 ENCOUNTER — OFFICE VISIT (OUTPATIENT)
Dept: INTERNAL MEDICINE CLINIC | Facility: CLINIC | Age: 74
End: 2019-10-04

## 2019-10-04 VITALS
BODY MASS INDEX: 28.24 KG/M2 | HEART RATE: 88 BPM | WEIGHT: 179.9 LBS | TEMPERATURE: 97.8 F | DIASTOLIC BLOOD PRESSURE: 90 MMHG | SYSTOLIC BLOOD PRESSURE: 146 MMHG | HEIGHT: 67 IN

## 2019-10-04 DIAGNOSIS — E03.9 HYPOTHYROIDISM, UNSPECIFIED TYPE: ICD-10-CM

## 2019-10-04 DIAGNOSIS — E08.40 DIABETES MELLITUS DUE TO UNDERLYING CONDITION WITH DIABETIC NEUROPATHY, WITHOUT LONG-TERM CURRENT USE OF INSULIN (HCC): Primary | ICD-10-CM

## 2019-10-04 DIAGNOSIS — M25.562 LEFT KNEE PAIN, UNSPECIFIED CHRONICITY: ICD-10-CM

## 2019-10-04 DIAGNOSIS — M25.562 PAIN IN LATERAL PORTION OF LEFT KNEE: ICD-10-CM

## 2019-10-04 DIAGNOSIS — Z23 NEED FOR PROPHYLACTIC VACCINATION AND INOCULATION AGAINST INFLUENZA: ICD-10-CM

## 2019-10-04 DIAGNOSIS — M25.519 SHOULDER PAIN, UNSPECIFIED CHRONICITY, UNSPECIFIED LATERALITY: ICD-10-CM

## 2019-10-04 DIAGNOSIS — E11.40 TYPE 2 DIABETES MELLITUS WITH DIABETIC NEUROPATHY, WITHOUT LONG-TERM CURRENT USE OF INSULIN (HCC): ICD-10-CM

## 2019-10-04 LAB — SL AMB POCT HEMOGLOBIN AIC: 6.5 (ref ?–6.5)

## 2019-10-04 PROCEDURE — 3044F HG A1C LEVEL LT 7.0%: CPT | Performed by: INTERNAL MEDICINE

## 2019-10-04 PROCEDURE — G0008 ADMIN INFLUENZA VIRUS VAC: HCPCS | Performed by: INTERNAL MEDICINE

## 2019-10-04 PROCEDURE — 83036 HEMOGLOBIN GLYCOSYLATED A1C: CPT | Performed by: INTERNAL MEDICINE

## 2019-10-04 PROCEDURE — 3008F BODY MASS INDEX DOCD: CPT | Performed by: INTERNAL MEDICINE

## 2019-10-04 PROCEDURE — 90662 IIV NO PRSV INCREASED AG IM: CPT | Performed by: INTERNAL MEDICINE

## 2019-10-04 PROCEDURE — 99214 OFFICE O/P EST MOD 30 MIN: CPT | Performed by: INTERNAL MEDICINE
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
## 2019-10-04 NOTE — PATIENT INSTRUCTIONS
Diabetes en el adulto mayor   LO QUE NECESITA SABER:   Los adultos mayores con diabetes están en riesgo de tener enfermedad cardíaca, derrame cerebral, enfermedad renal, ceguera y daño a los nervios  También podría estar en riesgo de alguna de las siguientes condiciones:  · Nutrición deficiente o bajos niveles de azúcar en la fernando    · Confusión o problemas con la memoria, la atención o para aprender cosas nuevas    · Problemas para controlar la orina o infecciones urinarias frecuentes    · Problemas con la coordinación o el equilibrio    · Caídas y lesiones    · Dolor    · Depresión    · Llagas abiertas en las piernas o los pies  [facility]BRE [facility]:   Llame al 911 en tatyana de presentar lo siguiente:   · Usted tiene alguno de los siguientes signos de derrame cerebral:      ¨ Adormecimiento o caída de un lado de talley huber     ¨ Debilidad en un brazo o marli pierna    ¨ Confusión o debilidad para hablar    ¨ Mareos o dolor de samuel intenso, o pérdida de la visión  · Usted tiene alguno de los siguientes signos de un ataque cardíaco:      ¨ Estornudos, presión, o dolor en talley pecho que dura mas de 5 minutos o regresa  ¨ Malestar o dolor en talley espalda, harpreet, mandíbula, abdomen, o brazo     ¨ Dificultad para respirar    ¨ Náuseas o vómito    ¨ Siente un desvanecimiento o tiene sudores fríos especialmente en el pecho o dificultad para respirar  Regrese a la rogelio de emergencias si:   · Usted tiene dolor abdominal intenso, o el dolor se extiende a talley espalda  Es posible que también esté vomitando  · Usted tiene dificultad para permanecer despierto o concentrarse  · Usted está temblando o sudando  · Usted tiene visión borrosa o doble  · Talley aliento huele a fruta o adelia  · Talley respiración es profunda y [place], o rápida y superficial      · Talley ritmo cardíaco es acelerado y débil  · Sufre marli caída y se lastima    Pregúntele a talley Marvin  vitaminas y minerales son adecuados para usted  · Tiene vómitos o diarrea  · Tiene malestar estomacal y no puede ingerir los alimentos de talley plan de comidas  · Usted se siente débil o más cansado de lo habitual      · Usted tiene [place], salvador de [place] o se irrita con facilidad  · Talley piel está tennille, tibia, seca o inflamada  · Usted tiene marli herida que no cicatriza  · Usted tiene entumecimiento en los brazos o piernas  · Usted tiene problemas para sobrellevar talley enfermedad, o se siente ansioso o deprimido  · Usted tiene problemas con la memoria  · Usted presenta cambios en talley visión  · Usted tiene preguntas o inquietudes acerca de talley condición o cuidado  Medicamentos,  se puede administrar para disminuir la cantidad de azúcar en talley fernando  También puede necesitar medicamentos para bajar talley presión arterial o colesterol, o para prevenir coágulos de fernando  Recuerde controlar los factores de la sigla AP[facility] y prevenga los problemas causados por la diabetes:   · Revise talley nivel de azúcar en la fernando judith se le haya indicado  Talley médico le dirá cuándo y con qué frecuencia lo debe revisar  Talley médico también le indicará cuáles deben ser nithin niveles de azúcar en la fernando antes y después de Zorita Slack comida  Usted puede necesitar revisar talley orina o fernando por la presencia de cetonas, en tatyana que talley nivel esté más alto de lo recomendado  Anote nithin resultados y muéstreselos a talley médico  Puede que éste utilice los resultados para realizar modificaciones en talley medicación y talley alimentación o en los horarios en que usted hace ejercicio  Pídale a talley médico más información acerca de cómo tratar un nivel de azúcar en la fernando alto o bajo  · Siga talley plan de comidas según indicaciones  Un dietista lo ayudará a hacer un plan de comidas para mantener talley nivel de azúcar en la fernando estable y asegurarse marli nutrición adecuada  No se salte ninguna comida   Talley nivel de azúcar en la fernando puede bajar demasiado si usted ha tomado medicamento para la diabetes y no ha comido  Consulte con talley médico acerca de los programas en talley comunidad que pueden ofrecerle la entrega de comidas a domicilio  · Intente estar activo de 30 a 60 minutos jacqueline todos los días de la [place]  La actividad física puede ayudarlo a mantener el nivel de glucosa en la fernando estable, disminuir talley riesgo de insuficiencia cardíaca y [place] a bajar de [place]  También puede ayudarlo a mejorar talley equilibrio y [place] riesgo de caídas  Colabore con talley médico para elaborar un plan de ejercicios  Pídale a un familiar o amigo que timothy ejercicio con usted  Comience lentamente y ejercite de 5 a 10 minutos a la vez  Ejemplos de actividades incluyen caminar o nadar  Incluya ejercicios para fortalecer los músculos de 2 a 3 días a la semana  Incluya entrenamiento del equilibrio de 2 a 3 veces a la semana  Actividades que ayudan a aumentar el equilibrio incluyen yoga y ingrid chi      · Mantenga un peso saludable  Consulte con talley médico cuánto debería pesar  El peso saludable puede ayudarle a controlar talley diabetes y a evitar marli enfermedad cardíaca  Solicite a talley médico que le ayude a crear un plan para perder peso de marli forma [place] si usted tiene sobrepeso  Juntos podrán fijar metas de pérdida de peso alcanzables  · No fume  Pida información a talley médico si usted actualmente fuma y necesita ayuda para dejar de fumar  No use cigarrillos electrónicos o tabaco sin humo en vez de cigarrillos o para tratar de dejar de fumar  Todos estos aún contienen nicotina  · Controle el estrés  El estrés puede aumentar talley nivel de azúcar en la fernando  La respiración profunda, la relajación muscular y la música pueden ayudarlo a relajarse  Solicite a talley médico más información sobre estas prácticas  Otras maneras de controlar talley diabetes:   · Revise nithin pies todos los días para greta si tienen llagas  Observe todo el pie, incluyendo la planta del pie, entre y [employer] dedos  Revise si hay heridas y callos  Use un melody para verse la planta de los pies  La piel de los pies podría estar brillante, tirante, seca o más oscura de lo normal  Deisi pies también podrían estar fríos y pálidos  Pase deisi aisha por encima, por debajo, por los lados y [employer] dedos del pie para sentir la piel  El enrojecimiento, inflamación y calor son signos de problemas con el flujo sanguíneo que pueden conllevar a marli úlcera en el pie  No trate de quitarse los callos usted mismo  · Use identificación de alerta médica  Use un brazalete o collar de alerta médica o lleve consigo marli tarjeta que indique que tiene diabetes  Pregúntele a talley médico dónde conseguir estos artículos  · [place] vacunas  Usted corre un mayor riesgo de presentar enfermedades graves si usted contrae la gripe, neumonía o hepatitis  Pregúntele a talley médico si usted debe ponerse la vacuna contra la gripe, la neumonía o la hepatitis B, y cuándo debe hacerlo  · Asista a todas deisi citas  Necesitará regresar para que le realicen el examen de hemoglobina A1c cada 3 meses  Tendrá que regresar por lo menos marli vez al año para que le revisen los pies  Necesitará de un examen de la vista marli vez al año para revisar si tiene retinopatía  También necesitará exámenes de orina anuales para revisar si hay problemas renales  Es posible que deba realizarse exámenes para detectar enfermedad cardíaca  Anote deisi preguntas para que se acuerde de hacerlas daksha deisi visitas  · Pídale ayuda a deisi familiares y amigos  Puede que necesite ayuda para comprobar talley nivel de azúcar en la fernando, inyectarse la insulina o preparar las comidas  Pídale a deisi familiareas y amigos que lo ayuden con estas tareas  Hable con talley médico si no tiene a nadie que le ayude en talley hogar  Un médico puede venir a talley casa para ayudarlo con estas tareas  Acuda a deisi consultas de control con talley médico según le indicaron    Necesitará regresar para que le realicen el examen de hemoglobina A1c cada 3 meses  Tendrá que regresar por lo menos marli vez al año para que le revisen los pies  Necesitará de un examen de la vista marli vez al año para revisar si tiene retinopatía  También necesitará exámenes de orina anuales para revisar si hay problemas renales  Es posible que deba realizarse exámenes para detectar enfermedad cardíaca  Anote nithin preguntas para que se acuerde de hacerlas daksha nithin visitas  © 2017 [address] Information is for End User's use only and may not be sold, redistributed or otherwise used for commercial purposes  All illustrations and images included in CareNotes® are the copyrighted property of A D A M , Inc  or Roge Rausch  Esta información es sólo para uso en educación  Pérez intención no es darle un consejo médico sobre enfermedades o tratamientos  Colsulte con pérez Triny Fadi farmacéutico antes de seguir cualquier régimen médico para saber si es seguro y efectivo para usted

## 2019-10-09 ENCOUNTER — EVALUATION (OUTPATIENT)
Dept: PHYSICAL THERAPY | Facility: CLINIC | Age: 74
End: 2019-10-09
Payer: COMMERCIAL

## 2019-10-09 DIAGNOSIS — M25.519 SHOULDER PAIN, UNSPECIFIED CHRONICITY, UNSPECIFIED LATERALITY: Primary | ICD-10-CM

## 2019-10-09 PROCEDURE — 97162 PT EVAL MOD COMPLEX 30 MIN: CPT | Performed by: PHYSICAL THERAPIST
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
## 2019-10-15 ENCOUNTER — OFFICE VISIT (OUTPATIENT)
Dept: OBGYN CLINIC | Facility: OTHER | Age: 74
End: 2019-10-15
Payer: COMMERCIAL

## 2019-10-15 VITALS
HEART RATE: 90 BPM | DIASTOLIC BLOOD PRESSURE: 77 MMHG | SYSTOLIC BLOOD PRESSURE: 144 MMHG | BODY MASS INDEX: 27.35 KG/M2 | HEIGHT: 68 IN

## 2019-10-15 DIAGNOSIS — M25.562 LEFT KNEE PAIN, UNSPECIFIED CHRONICITY: ICD-10-CM

## 2019-10-15 DIAGNOSIS — M25.512 LEFT SHOULDER PAIN, UNSPECIFIED CHRONICITY: Primary | ICD-10-CM

## 2019-10-15 DIAGNOSIS — M70.42 HEMORRHAGIC PREPATELLAR BURSITIS, LEFT: ICD-10-CM

## 2019-10-15 DIAGNOSIS — M25.519 SHOULDER PAIN, UNSPECIFIED CHRONICITY, UNSPECIFIED LATERALITY: ICD-10-CM

## 2019-10-15 PROCEDURE — 99204 OFFICE O/P NEW MOD 45 MIN: CPT | Performed by: ORTHOPAEDIC SURGERY

## 2019-10-15 PROCEDURE — 20610 DRAIN/INJ JOINT/BURSA W/O US: CPT | Performed by: ORTHOPAEDIC SURGERY

## 2019-10-15 RX ADMIN — LIDOCAINE HYDROCHLORIDE 2 ML: 10 INJECTION, SOLUTION INFILTRATION; PERINEURAL at 10:05

## 2019-10-15 RX ADMIN — TRIAMCINOLONE ACETONIDE 40 MG: 40 INJECTION, SUSPENSION INTRA-ARTICULAR; INTRAMUSCULAR at 10:05
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
## 2019-10-16 ENCOUNTER — OFFICE VISIT (OUTPATIENT)
Dept: PHYSICAL THERAPY | Facility: CLINIC | Age: 74
End: 2019-10-16
Payer: COMMERCIAL

## 2019-10-16 DIAGNOSIS — M25.519 SHOULDER PAIN, UNSPECIFIED CHRONICITY, UNSPECIFIED LATERALITY: Primary | ICD-10-CM

## 2019-10-16 PROCEDURE — 97140 MANUAL THERAPY 1/> REGIONS: CPT

## 2019-10-16 PROCEDURE — 97112 NEUROMUSCULAR REEDUCATION: CPT

## 2019-10-16 PROCEDURE — 97110 THERAPEUTIC EXERCISES: CPT

## 2019-10-16 NOTE — PROGRESS NOTES
Daily Note     Today's date: 10/16/2019  Patient name: Dionne Isaac  : 1945  MRN: [id number]  Referring provider: Shahzad Peres DO  Dx:   Encounter Diagnosis     ICD-10-CM    1  Shoulder pain, unspecified chronicity, unspecified laterality M25 519                   Subjective: Pt denied pain in shoulder today  Objective: See treatment diary below      Assessment: Tolerated treatment well  Patient demonstrated fatigue post treatment and would benefit from continued PT  He complained of intermittent pain in superior shoulder with table slides due to shoulder elevation causing impingement  Improved tolerance w/ cues to avoid UT activation  No complaints of pain with PROM, Most tightness noted w/ ER and IR  He also reported pain in R LS with scap squeezes - continue to work on correcting form NV  Plan: Continue per plan of care        Precautions: hypothyroidism, DM, asthma, HTn, hyperlipidemia    Dx: L Rotator Cuff Tear      Manual  10/16            L Sh  PROM 10 min                                                                    Exercise Diary  10/16            Pulleys: flex/scap 5"x10 ea            Scap Retraction 5"x10            UT St   20"x3            AAROM Flex/abd c cane 5"x10            Rows c TB RTB 2x10            ER c TB RTB 2x10            IR c TB RTB 2x10            Table Slides flexion/45 deg 2x10                                                                                                                                                                            Modalities

## 2019-10-18 ENCOUNTER — APPOINTMENT (OUTPATIENT)
Dept: PHYSICAL THERAPY | Facility: CLINIC | Age: 74
End: 2019-10-18
Payer: COMMERCIAL

## 2019-10-21 DIAGNOSIS — B18.2 HEP C W/O COMA, CHRONIC (HCC): Primary | ICD-10-CM

## 2019-10-21 DIAGNOSIS — J45.20 MILD INTERMITTENT ASTHMA, UNSPECIFIED WHETHER COMPLICATED: ICD-10-CM

## 2019-10-21 PROBLEM — M70.42: Status: ACTIVE | Noted: 2019-10-21

## 2019-10-21 RX ORDER — FLUTICASONE PROPIONATE AND SALMETEROL XINAFOATE 230; 21 UG/1; UG/1
2 AEROSOL, METERED RESPIRATORY (INHALATION) 2 TIMES DAILY
Qty: 1 INHALER | Refills: 4 | Status: SHIPPED | OUTPATIENT
Start: 2019-10-21 | End: 2020-11-13

## 2019-10-22 ENCOUNTER — OFFICE VISIT (OUTPATIENT)
Dept: PHYSICAL THERAPY | Facility: CLINIC | Age: 74
End: 2019-10-22
Payer: COMMERCIAL

## 2019-10-22 DIAGNOSIS — M25.519 SHOULDER PAIN, UNSPECIFIED CHRONICITY, UNSPECIFIED LATERALITY: Primary | ICD-10-CM

## 2019-10-22 PROCEDURE — 97110 THERAPEUTIC EXERCISES: CPT | Performed by: PHYSICAL THERAPIST

## 2019-10-22 PROCEDURE — 97140 MANUAL THERAPY 1/> REGIONS: CPT | Performed by: PHYSICAL THERAPIST

## 2019-10-22 PROCEDURE — 97112 NEUROMUSCULAR REEDUCATION: CPT | Performed by: PHYSICAL THERAPIST

## 2019-10-22 NOTE — PROGRESS NOTES
Daily Note     Today's date: 10/22/2019  Patient name: Annalee Hernández  : 1945  MRN: [id number]  Referring provider: Joshua Euceda DO  Dx:   No diagnosis found  Subjective: Pt denied any shoulder pain again today  Objective: See treatment diary below      Assessment: Patient still required cueing on limiting UT compensation during exercises  The patient demonstrated pain-free tolerance to exercises and progressions  Fair limitation during IR stretch with manuals  Plan: Continue per plan of care  Add sleeper stretch NV  Focus on correcting posture during exercises        Precautions: hypothyroidism, DM, asthma, HTn, hyperlipidemia    Dx: L Rotator Cuff Tear      Manual  10/16 10/22           L Sh  PROM 10 min 10 min                                                                   Exercise Diary  10/16 10/22           Pulleys: flex/scap 5"x10 ea 5"x10 ea           Scap Retraction 5"x10 5"x15           UT St   20"x3 20"x3           AAROM Flex/abd c cane 5"x10 5"x10           Rows c TB RTB 2x10 RTB 3x10 GTB          ER c TB RTB 2x10 RTB 3x10           IR c TB RTB 2x10 RTB 3x10           Table Slides: 45 deg 2x10 60 deg 2x10           Sleeper St    NV                                                                                                                                                              Modalities

## 2019-10-29 ENCOUNTER — APPOINTMENT (OUTPATIENT)
Dept: PHYSICAL THERAPY | Facility: CLINIC | Age: 74
End: 2019-10-29
Payer: COMMERCIAL

## 2019-11-01 RX ORDER — INSULIN ASPART 100 [IU]/ML
5 INJECTION, SOLUTION INTRAVENOUS; SUBCUTANEOUS
Refills: 3 | COMMUNITY
Start: 2019-10-19 | End: 2020-08-11 | Stop reason: SDUPTHER

## 2019-11-04 ENCOUNTER — OFFICE VISIT (OUTPATIENT)
Dept: OBGYN CLINIC | Facility: OTHER | Age: 74
End: 2019-11-04
Payer: COMMERCIAL

## 2019-11-04 ENCOUNTER — APPOINTMENT (OUTPATIENT)
Dept: RADIOLOGY | Facility: OTHER | Age: 74
End: 2019-11-04
Payer: COMMERCIAL

## 2019-11-04 VITALS
HEART RATE: 79 BPM | HEIGHT: 68 IN | BODY MASS INDEX: 27.35 KG/M2 | SYSTOLIC BLOOD PRESSURE: 144 MMHG | DIASTOLIC BLOOD PRESSURE: 77 MMHG

## 2019-11-04 DIAGNOSIS — M75.102 ROTATOR CUFF SYNDROME OF LEFT SHOULDER: ICD-10-CM

## 2019-11-04 DIAGNOSIS — M25.512 LEFT SHOULDER PAIN, UNSPECIFIED CHRONICITY: Primary | ICD-10-CM

## 2019-11-04 DIAGNOSIS — M25.512 LEFT SHOULDER PAIN, UNSPECIFIED CHRONICITY: ICD-10-CM

## 2019-11-04 PROCEDURE — 73030 X-RAY EXAM OF SHOULDER: CPT

## 2019-11-04 PROCEDURE — 20610 DRAIN/INJ JOINT/BURSA W/O US: CPT | Performed by: ORTHOPAEDIC SURGERY

## 2019-11-04 PROCEDURE — 99214 OFFICE O/P EST MOD 30 MIN: CPT | Performed by: ORTHOPAEDIC SURGERY

## 2019-11-04 RX ADMIN — LIDOCAINE HYDROCHLORIDE 4 ML: 10 INJECTION, SOLUTION INFILTRATION; PERINEURAL at 13:20

## 2019-11-04 RX ADMIN — TRIAMCINOLONE ACETONIDE 40 MG: 40 INJECTION, SUSPENSION INTRA-ARTICULAR; INTRAMUSCULAR at 13:20
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
## 2019-11-05 ENCOUNTER — OFFICE VISIT (OUTPATIENT)
Dept: PHYSICAL THERAPY | Facility: CLINIC | Age: 74
End: 2019-11-05
Payer: COMMERCIAL

## 2019-11-05 DIAGNOSIS — M25.519 SHOULDER PAIN, UNSPECIFIED CHRONICITY, UNSPECIFIED LATERALITY: Primary | ICD-10-CM

## 2019-11-05 PROCEDURE — 97140 MANUAL THERAPY 1/> REGIONS: CPT

## 2019-11-05 PROCEDURE — 97110 THERAPEUTIC EXERCISES: CPT

## 2019-11-05 PROCEDURE — 97112 NEUROMUSCULAR REEDUCATION: CPT

## 2019-11-05 NOTE — PROGRESS NOTES
Daily Note     Today's date: 2019  Patient name: Brenda Blackburn  : 1945  MRN: [id number]  Referring provider: Terra Green DO  Dx:   Encounter Diagnosis     ICD-10-CM    1  Shoulder pain, unspecified chronicity, unspecified laterality M25 519                   Subjective: Pt reports mild left shoulder pain  Objective: See treatment diary below      Assessment: Patient demonstrated moderate-severe UT compensation with AAROM shoulder elevation and required vcs to correct  Pt required supervision to perform correct number of repetitions with TE program       Plan: Continue poc as per PT  Resume rows nv       Precautions: hypothyroidism, DM, asthma, HTn, hyperlipidemia    Dx: L Rotator Cuff Tear      Manual  10/16 10/22 11/5          L Sh  PROM 10 min 10 min 10 min                                                                  Exercise Diary  10/16 10/22 11/5          Pulleys: flex/scap 5"x10 ea 5"x10 ea 5"x10 ea          Scap Retraction 5"x10 5"x15 5"x15          UT St   20"x3 20"x3 20"x3          AAROM Flex/abd c cane 5"x10 5"x10 5"x10          Rows c TB RTB 2x10 RTB 3x10 np GTB         ER c TB RTB 2x10 RTB 3x10 RTB  3x10          IR c TB RTB 2x10 RTB 3x10 RTB  3x10          Table Slides: 45 deg 2x10 60 deg 2x10 60 deg  2x10          Sleeper St    NV 10"x5  standing                                                                                                                                                             Modalities

## 2019-11-07 ENCOUNTER — APPOINTMENT (OUTPATIENT)
Dept: LAB | Facility: CLINIC | Age: 74
End: 2019-11-07
Payer: COMMERCIAL

## 2019-11-07 DIAGNOSIS — B18.2 HEP C W/O COMA, CHRONIC (HCC): ICD-10-CM

## 2019-11-07 DIAGNOSIS — E03.9 HYPOTHYROIDISM, UNSPECIFIED TYPE: ICD-10-CM

## 2019-11-07 LAB — TSH SERPL DL<=0.05 MIU/L-ACNC: 2.09 UIU/ML (ref 0.36–3.74)

## 2019-11-07 PROCEDURE — 36415 COLL VENOUS BLD VENIPUNCTURE: CPT

## 2019-11-07 PROCEDURE — 87522 HEPATITIS C REVRS TRNSCRPJ: CPT

## 2019-11-07 PROCEDURE — 84443 ASSAY THYROID STIM HORMONE: CPT

## 2019-11-11 LAB
HCV RNA SERPL NAA+PROBE-ACNC: NORMAL IU/ML
TEST INFORMATION: NORMAL

## 2019-11-12 ENCOUNTER — OFFICE VISIT (OUTPATIENT)
Dept: PHYSICAL THERAPY | Facility: CLINIC | Age: 74
End: 2019-11-12
Payer: COMMERCIAL

## 2019-11-12 DIAGNOSIS — M25.519 SHOULDER PAIN, UNSPECIFIED CHRONICITY, UNSPECIFIED LATERALITY: Primary | ICD-10-CM

## 2019-11-12 PROCEDURE — 97110 THERAPEUTIC EXERCISES: CPT

## 2019-11-12 PROCEDURE — 97112 NEUROMUSCULAR REEDUCATION: CPT

## 2019-11-12 PROCEDURE — 97140 MANUAL THERAPY 1/> REGIONS: CPT

## 2019-11-12 RX ORDER — TRIAMCINOLONE ACETONIDE 40 MG/ML
40 INJECTION, SUSPENSION INTRA-ARTICULAR; INTRAMUSCULAR
Status: COMPLETED | OUTPATIENT
Start: 2019-11-04 | End: 2019-11-04

## 2019-11-12 RX ORDER — LIDOCAINE HYDROCHLORIDE 10 MG/ML
4 INJECTION, SOLUTION INFILTRATION; PERINEURAL
Status: COMPLETED | OUTPATIENT
Start: 2019-11-04 | End: 2019-11-04

## 2019-11-12 NOTE — PROGRESS NOTES
Daily Note     Today's date: 2019  Patient name: Kendra Howe  : 1945  MRN: [id number]  Referring provider: Rabia Beltran DO  Dx:   Encounter Diagnosis     ICD-10-CM    1  Shoulder pain, unspecified chronicity, unspecified laterality M25 519                   Subjective: Pt offered no complaints of pain  Objective: See treatment diary below      Assessment: Improvement noted today w/ avoiding UT compensation  PROM WFL, except IR remains mod tight  Good tolerance to progression to program        Plan: Continue poc as per PT        Precautions: hypothyroidism, DM, asthma, HTn, hyperlipidemia    Dx: L Rotator Cuff Tear      Manual  10/16 10/22 11/5 11/12         L Sh  PROM 10 min 10 min 10 min 10 min                                                                 Exercise Diary  10/16 10/22 11/5 11/12         Pulleys: flex/scap 5"x10 ea 5"x10 ea 5"x10 ea 5"x10 ea         Scap Retraction 5"x10 5"x15 5"x15 5"x15         UT St   20"x3 20"x3 20"x3 20"x3         AAROM Flex/abd c cane 5"x10 5"x10 5"x10 5"x10         Rows c TB RTB 2x10 RTB 3x10 np GTB 2x10         ER c TB RTB 2x10 RTB 3x10 RTB  3x10 RTB 3x10         IR c TB RTB 2x10 RTB 3x10 RTB  3x10 RTB 3x10         Table Slides: 45 deg 2x10 60 deg 2x10 60 deg  2x10 60 deg 2x10         Sleeper St    NV 10"x5  standing 10"x5 standing                                                                                                                                                            Modalities

## 2019-11-19 ENCOUNTER — OFFICE VISIT (OUTPATIENT)
Dept: PHYSICAL THERAPY | Facility: CLINIC | Age: 74
End: 2019-11-19
Payer: COMMERCIAL

## 2019-11-19 DIAGNOSIS — M25.519 SHOULDER PAIN, UNSPECIFIED CHRONICITY, UNSPECIFIED LATERALITY: Primary | ICD-10-CM

## 2019-11-19 PROCEDURE — 97110 THERAPEUTIC EXERCISES: CPT

## 2019-11-19 PROCEDURE — 97112 NEUROMUSCULAR REEDUCATION: CPT

## 2019-11-19 PROCEDURE — 97140 MANUAL THERAPY 1/> REGIONS: CPT

## 2019-11-19 NOTE — PROGRESS NOTES
Daily Note     Today's date: 2019  Patient name: Aleyda Espinoza  : 1945  MRN: [id number]  Referring provider: Reginald Velazquez DO  Dx:   Encounter Diagnosis     ICD-10-CM    1  Shoulder pain, unspecified chronicity, unspecified laterality M25 519                   Subjective: Pt offered no complaints of pain  Objective: See treatment diary below      Assessment: PROM WFL  No complaints of pain at end range  Mild tightness noted w/ IR  Good shoulder kinematics noted w/ AAROM, no UT compensation noted today  Consider progressing w/ AROM NV  Plan: Continue poc as per PT        Precautions: hypothyroidism, DM, asthma, HTn, hyperlipidemia    Dx: L Rotator Cuff Tear      Manual  10/16 10/22 11/5 11/12 11/19        L Sh  PROM 10 min 10 min 10 min 10 min 10 min                     5"x10                                           Exercise Diary  10/16 10/22 11/5 11/12 11/19        Pulleys: flex/scap 5"x10 ea 5"x10 ea 5"x10 ea 5"x10 ea 5"x10 ea        Scap Retraction 5"x10 5"x15 5"x15 5"x15 5"x15        UT St   20"x3 20"x3 20"x3 20"x3 20"x3        AAROM Flex/abd c cane 5"x10 5"x10 5"x10 5"x10 5"x10        Rows c TB RTB 2x10 RTB 3x10 np GTB 2x10 GTB 2x10        ER c TB RTB 2x10 RTB 3x10 RTB  3x10 RTB 3x10 RTB 3x10        IR c TB RTB 2x10 RTB 3x10 RTB  3x10 RTB 3x10 RTB 3x10        Table Slides: 45 deg 2x10 60 deg 2x10 60 deg  2x10 60 deg 2x10 75 deg 2x10        Sleeper St    NV 10"x5  standing 10"x5 standing 10"x5 standing                                                                                                                                                           Modalities

## 2019-11-25 ENCOUNTER — TELEPHONE (OUTPATIENT)
Dept: INTERNAL MEDICINE CLINIC | Facility: CLINIC | Age: 74
End: 2019-11-25

## 2019-12-24 RX ORDER — TRIAMCINOLONE ACETONIDE 40 MG/ML
40 INJECTION, SUSPENSION INTRA-ARTICULAR; INTRAMUSCULAR
Status: COMPLETED | OUTPATIENT
Start: 2019-10-15 | End: 2019-10-15

## 2019-12-24 RX ORDER — LIDOCAINE HYDROCHLORIDE 10 MG/ML
2 INJECTION, SOLUTION INFILTRATION; PERINEURAL
Status: COMPLETED | OUTPATIENT
Start: 2019-10-15 | End: 2019-10-15

## 2020-01-06 RX ORDER — ALCOHOL ANTISEPTIC PADS
PADS, MEDICATED (EA) TOPICAL
COMMUNITY
Start: 2019-11-14 | End: 2020-08-20 | Stop reason: SDUPTHER

## 2020-01-07 ENCOUNTER — APPOINTMENT (OUTPATIENT)
Dept: RADIOLOGY | Facility: OTHER | Age: 75
End: 2020-01-07
Payer: COMMERCIAL

## 2020-01-07 ENCOUNTER — OFFICE VISIT (OUTPATIENT)
Dept: OBGYN CLINIC | Facility: OTHER | Age: 75
End: 2020-01-07
Payer: COMMERCIAL

## 2020-01-07 VITALS
HEART RATE: 101 BPM | WEIGHT: 183 LBS | BODY MASS INDEX: 27.83 KG/M2 | SYSTOLIC BLOOD PRESSURE: 120 MMHG | DIASTOLIC BLOOD PRESSURE: 67 MMHG

## 2020-01-07 DIAGNOSIS — M43.00 PARS DEFECT: ICD-10-CM

## 2020-01-07 DIAGNOSIS — G89.29 CHRONIC MIDLINE LOW BACK PAIN WITHOUT SCIATICA: ICD-10-CM

## 2020-01-07 DIAGNOSIS — M54.50 CHRONIC MIDLINE LOW BACK PAIN WITHOUT SCIATICA: ICD-10-CM

## 2020-01-07 DIAGNOSIS — M70.42 HEMORRHAGIC PREPATELLAR BURSITIS, LEFT: ICD-10-CM

## 2020-01-07 DIAGNOSIS — M43.00 PARS DEFECT: Primary | ICD-10-CM

## 2020-01-07 DIAGNOSIS — M75.102 ROTATOR CUFF SYNDROME OF LEFT SHOULDER: ICD-10-CM

## 2020-01-07 PROCEDURE — 72114 X-RAY EXAM L-S SPINE BENDING: CPT

## 2020-01-07 PROCEDURE — 1160F RVW MEDS BY RX/DR IN RCRD: CPT | Performed by: FAMILY MEDICINE

## 2020-01-07 PROCEDURE — 99214 OFFICE O/P EST MOD 30 MIN: CPT | Performed by: FAMILY MEDICINE

## 2020-01-07 NOTE — PATIENT INSTRUCTIONS
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  Follow-up to us    Back pain can often be caused by a spectrum of issues ranging from back strain which is a muscle tear in the back and heals with time to spasm associated with chronic back arthritis (degenerative disc disease) to Sciatica which is radiation of pain down the leg with or without numbness and tingling that is caused by a pinch of a nerve in the spine from bony arthritis or herniated disc or pinching of a nerve due to buttock muscle spasm known as piriformis syndrome  Mainstay of treatment is physical therapy coupled with pharmacologic management of pain  Bed rest is no longer recommended as it results in stiffness and delayed recovery  Physical therapy to toleration at first is recommended and proceeded by progression to strengthening  Drugs often used to treat back pain are anti-inflammatories (NSAIDs), Tylenol (acetaminophen), muscle relaxers  In recent years, study have shown that Tylenol does not offer much relief however it is used along with nsaids for increased relief especially when patients cannot take other medicines such as muscle relaxers  Occassionally, for refractory pain, steroid packs such as Medrol are used but studies show that these medications do not offer long term relief  Opiods/pain killers (Percocet, oxycodone, hydrocodone) carry a significant addictive and dependence risk and as such are used only in cases of severe pain and per the CDC limited to a few days of use to avoid dependence      Mris are reserved for patients with red flags including fevers, chills, unintentional weight loss, dropped foot or severe weakness, bowel or bladder incontinence which are signs of medical emergencies including cancer, infection, severely pinched nerve, and a medical complication known as cauda equina syndrome  If you ever have any of these symptoms then please notify physician immediately and present to the ER  If you continue to have symptoms then we will pursue MRI of your back after failing 4 weeks of physical therapy  Unless there are red flags as above, most insurances do not cover MRIs to be performed until after a trial of physical therapy  This is because most back pain resolves within 1-3 months and rarely requires invasive intervention  If back pain fails to improve with conservative measures (therapy, medicine, time) then I will order MRI and if appropriate refer to pain management to consider injections and other invasive intervention  Surgery is rarely warranted early on in back pain unless there is a significant red flag

## 2020-01-11 ENCOUNTER — TELEPHONE (OUTPATIENT)
Dept: OBGYN CLINIC | Facility: MEDICAL CENTER | Age: 75
End: 2020-01-11

## 2020-01-11 NOTE — TELEPHONE ENCOUNTER
Please instruct patient to follow-up with PCP for kidney stones incidentally found on his back xray  This is not an emergency  Usually recommendations are given to stay hydrated with water and sometimes urine tests

## 2020-01-13 ENCOUNTER — OFFICE VISIT (OUTPATIENT)
Dept: INTERNAL MEDICINE CLINIC | Facility: CLINIC | Age: 75
End: 2020-01-13

## 2020-01-13 VITALS
HEIGHT: 68 IN | HEART RATE: 96 BPM | WEIGHT: 181.66 LBS | OXYGEN SATURATION: 97 % | DIASTOLIC BLOOD PRESSURE: 78 MMHG | SYSTOLIC BLOOD PRESSURE: 126 MMHG | TEMPERATURE: 98.1 F | BODY MASS INDEX: 27.53 KG/M2

## 2020-01-13 DIAGNOSIS — M54.2 NECK PAIN: Primary | ICD-10-CM

## 2020-01-13 DIAGNOSIS — N52.9 ERECTILE DYSFUNCTION, UNSPECIFIED ERECTILE DYSFUNCTION TYPE: ICD-10-CM

## 2020-01-13 PROCEDURE — 1160F RVW MEDS BY RX/DR IN RCRD: CPT | Performed by: INTERNAL MEDICINE

## 2020-01-13 PROCEDURE — 3008F BODY MASS INDEX DOCD: CPT | Performed by: INTERNAL MEDICINE

## 2020-01-13 PROCEDURE — 99213 OFFICE O/P EST LOW 20 MIN: CPT | Performed by: INTERNAL MEDICINE

## 2020-01-13 RX ORDER — METHOCARBAMOL 500 MG/1
500 TABLET, FILM COATED ORAL 4 TIMES DAILY
Qty: 30 TABLET | Refills: 0 | Status: SHIPPED | OUTPATIENT
Start: 2020-01-13 | End: 2020-04-28 | Stop reason: SDUPTHER

## 2020-01-13 RX ORDER — SILDENAFIL 25 MG/1
25 TABLET, FILM COATED ORAL DAILY PRN
Qty: 30 TABLET | Refills: 0 | Status: SHIPPED | OUTPATIENT
Start: 2020-01-13 | End: 2022-05-09

## 2020-01-13 NOTE — PATIENT INSTRUCTIONS
Schedule Patient for AWV  Disfunción eréctil   LO QUE USTED DEBE SABER:   La disfunción eréctil o impotencia es la incapacidad de conseguir o mantener marli erección para llevar a cabo marli relación sexual    INSTRUCCIONES:   Medicamentos:   · Le podrían recetar medicamentos para la disfunción eréctil  que ayudan a que usted tenga marli erección  Estos medicamentos se jonnathan antes de [facility] relación sexual  [place] indicaciones de talley proveedor de linh sobre cuándo y cómo se debe ajith estos medicamentos  Usted podría sufrir marli reacción de peligro mortal en tatyana que mezcle estos medicamentos con medicamentos que contengan nitratos  Los medicamentos a base de nitratos incluyen nitroglicerina y otros medicamentos para el corazón  · Le podrían recetar testosterona  para aumentar los niveles de testosterona en talley fernando y mejorar talley disfunción eréctil  Es posible que necesite aplicar marli crema para la piel o usar un parche  · [place] nithin medicamentos judith se le haya indicado  Llame a talley proveedor de linh si usted piensa que talley medicamento no le está ayudando o si tiene efectos secundarios  Infórmele si es alérgico a cualquier medicamento  Mantenga marli lista vigente de los medicamentos, vitaminas, y hierbas que fidel  [address] cantidades, la frecuencia con que los fidel y por qué los fidel  Traiga la lista o los envases de nithin medicamentos a las citas de seguimiento  Mantenga la lista consigo en tatyana de marli emergencia  Bote las listas [place]  Programe talley consulta de control con talley proveedor de linh según le indicaron:  Escriba las preguntas que tenga para que recuerde hacerlas daksha talley citas médicas  Controle nithin factores de riesgo de la disfunción eréctil:   · No fume  Si usted fuma, nunca es tarde para dejar de fumar  Solicite información [employer] formas que existen para dejar de fumar en tatyana que necesite [facility]  · Limite el consumo de alcohol    Los hombres deberían limitar talley consumo de alcohol a 2 tragos por Angelica Calderon trago de alcohol equivale a 12 onzas (355 ml) de cerveza, 5 onzas (150 ml) de vino o 1 onza ½ (45 ml) de licor  · Cuide nithin afecciones médicas  Consuma alimentos saludables y [place] puede ayudar a controlar la presión arterial lisandra, diabetes y obesidad  · Reduzca el estrés  Aprenda técnicas de relajación, judith respiración profunda, meditación y escuchar música  Consulte con pérez proveedor de linh sí:   · Usted tiene cambios en pérez agudeza visual, salvador de samuel o dolor de espalda después de ajith el medicamento para la disfunción eréctil  · Usted tiene marli erección dolorosa  · Usted tiene preguntas o inquietudes relacionadas con pérez condición o tratamiento  Regrese a la rogelio de emergencias sí:   · Usted tiene dolor de pecho, mareos o náuseas después de ajith los medicamentos para tratar la disfunción eréctil o daksha o después de tener relaciones sexuales  · Usted tiene marli erección por más de 4 horas después de utilizar el medicamento para la disfunción eréctil  · Usted nota fernando en pérez orina  © 2014 [address] is for End User's use only and may not be sold, redistributed or otherwise used for commercial purposes  All illustrations and images included in CareNotes® are the copyrighted property of A D A M , Inc  or Roge Rausch  Esta información es sólo para uso en educación  Pérez intención no es darle un consejo médico sobre enfermedades o tratamientos  Colsulte con pérez [employer] antes de seguir cualquier régimen médico para saber si es seguro y efectivo para usted

## 2020-01-13 NOTE — PROGRESS NOTES
ASSESSMENT/PLAN:  Diagnoses and all orders for this visit:    Neck pain  -     XR spine cervical complete 4 or 5 vw non injury; Future  -     Ambulatory referral to Physical Therapy; Future  -     methocarbamol (ROBAXIN) 500 mg tablet; Take 1 tablet (500 mg total) by mouth 4 (four) times a day    Erectile dysfunction, unspecified erectile dysfunction type  -     Testosterone, free, total; Future  -     sildenafil (VIAGRA) 25 MG tablet; Take 1 tablet (25 mg total) by mouth daily as needed for erectile dysfunction    Plan:  Neck pain  Will check XR cervical spine given length of symptoms and pt age  Short course of methacarbamol and referral to physical therapy, suspect musculoskeletal in etiology, hypertonicity of cervical paraspinal muscles  Advised supportive care with heat    DM2  Insulin glargine 20 units bedtime 4 units breakfast and lunch  Metformin 850 BID  last a1c 6 5  Most am values 10am 120-150 in blood sugar log brought to appt  Recommended pt bring log of sugars to all appointments with all values measured  Will discontinue aspirin now, no hx of stroke or MI  HTN   continue amlodipine and lisinopril at current doses  Well controlled    Erectile dysfunction  Present for 4 years, pt feels it is secondary to diabetes  Requesting viagra, has used viagra in the past   Pt reports he was told he could not use sildenafil during hepatitis C treatment, which he states is now complete  Denies any spontaneous or night time erections  Will check testosterone, advised pt to obtain labs in AM between 7-9am     Health Maintenance:  Next colonoscopy should be in 2023 per chart review  DM eye exam due, will need to be addressed at next visit  Td last in 2017  Last a1c 6 4 on 10/4/2019  Flu vacc administered 10/4  Diabetes foot exam 10/4 wnl    Follow-up:   In 3 months    CHIEF COMPLAINT: Chronic medical conditions with complaint of neck pain and requesting viagra    HISTORY OF PRESENT ILLNESS:  70 yo M with PMHx of DM, hypothyroidism, htn, presenting for followup for chronic medical conditions  Reporting pain in neck on posterior right side, worse at night, causing him to keep changing positions present for last 1-1 5 months  Hurts with motion more to the right or left, does not hurt with looking up or down  Has tried ice and motrin with mild relief  Heat from hot shower does not seem to help  Pt describes pain as cramping  Pt takes 20 units insulin glargine at night, 4 units with breakfast and with lunch mealtime insulin  Checks sugars usually around 10am once per day  Pt also requesting viagra, reports using it in the past, states he was told he could be prescribed it after completing hepatitis C treatment  Denies any spontaneous erections or night time erections  Reports problem has been present for 4 years  Review of Systems   Constitutional: Negative for chills, fatigue and fever  HENT: Negative for rhinorrhea and sore throat  Respiratory: Negative for choking, chest tightness, shortness of breath, wheezing and stridor  Cardiovascular: Negative for chest pain, palpitations and leg swelling  Gastrointestinal: Negative for abdominal pain, blood in stool, constipation, diarrhea, nausea and vomiting  Genitourinary: Negative for hematuria  Neurological: Negative for dizziness and light-headedness  OBJECTIVE:  Vitals:    01/13/20 0843   BP: 126/78   BP Location: Right arm   Patient Position: Sitting   Cuff Size: Adult   Pulse: 96   Temp: 98 1 °F (36 7 °C)   TempSrc: Oral   SpO2: 97%   Weight: 82 4 kg (181 lb 10 5 oz)   Height: 5' 8" (1 727 m)       Physical Exam   Constitutional: He is oriented to person, place, and time  He appears well-developed and well-nourished  HENT:   Head: Normocephalic and atraumatic  Nose: Nose normal    Mouth/Throat: Oropharynx is clear and moist    Eyes: Right eye exhibits no discharge  Left eye exhibits no discharge     Neck:       Cardiovascular: Normal rate, regular rhythm and normal heart sounds  Exam reveals no gallop and no friction rub  No murmur heard  Pulmonary/Chest: Effort normal and breath sounds normal  No respiratory distress  He has no wheezes  He has no rales  Abdominal: Soft  Bowel sounds are normal  He exhibits no distension  There is no tenderness  There is no guarding  Neurological: He is alert and oriented to person, place, and time  Skin: Skin is warm and dry     Psychiatric: His speech is normal          Current Outpatient Medications:     ADVAIR -21 MCG/ACT inhaler, Inhale 2 puffs 2 (two) times a day, Disp: 1 Inhaler, Rfl: 4    albuterol (VENTOLIN HFA) 90 mcg/act inhaler, Inhale 2 puffs every 4 (four) hours as needed for wheezing or shortness of breath, Disp: 18 g, Rfl: 2    Alcohol Swabs (ALCOHOL PADS) 70 % PADS, , Disp: , Rfl:     amLODIPine (NORVASC) 5 mg tablet, Take 1 tablet (5 mg total) by mouth daily, Disp: 90 tablet, Rfl: 1    bisacodyl (DULCOLAX) 5 mg EC tablet, Take by mouth, Disp: , Rfl:     Blood Glucose Monitoring Suppl (FREESTYLE FREEDOM LITE) w/Device KIT, by Does not apply route, Disp: , Rfl:     COMFORT EZ INSULIN SYRINGE 31G X 5/16" 1 ML MISC, , Disp: , Rfl:     COMFORT EZ PEN NEEDLES 33G X 6 MM MISC, , Disp: , Rfl:     cyanocobalamin ([employer] VITAMIN B-12) 1000 MCG tablet, Take 1 tablet (1,000 mcg total) by mouth daily, Disp: 90 tablet, Rfl: 2    fluticasone (FLONASE) 50 mcg/act nasal spray, 2 sprays into each nostril daily, Disp: , Rfl:     glucose blood (FREESTYLE LITE) test strip, 1 each by Other route as needed (as needed), Disp: 100 each, Rfl: 3    hydrochlorothiazide (HYDRODIURIL) 25 mg tablet, TAKE 1 TABLET BY MOUTH EVERY DAY, Disp: 90 tablet, Rfl: 1    ibuprofen (MOTRIN) 600 mg tablet, Take 1 tablet (600 mg total) by mouth every 6 (six) hours as needed for mild pain, Disp: 30 tablet, Rfl: 0    insulin aspart (NovoLOG) 100 units/mL injection, Inject 4 Units under the skin 2 (two) times a day before breakfast and lunch, Disp: 10 mL, Rfl: 3    insulin glargine (LANTUS SOLOSTAR) 100 units/mL injection pen, Inject 20 Units under the skin daily at bedtime, Disp: 15 pen, Rfl: 3    Insulin Pen Needle (PEN NEEDLES) 31G X 8 MM MISC, by Does not apply route daily, Disp: 100 each, Rfl: 0    Lancets (FREESTYLE) lancets, by Other route as needed (As needed), Disp: 100 each, Rfl: 3    levothyroxine 137 mcg tablet, Take 1 tablet (137 mcg total) by mouth daily, Disp: 90 tablet, Rfl: 1    lisinopril (ZESTRIL) 20 mg tablet, Take 1 tablet (20 mg total) by mouth daily, Disp: 90 tablet, Rfl: 1    metFORMIN (GLUCOPHAGE) 850 mg tablet, Take 1 tablet (850 mg total) by mouth 2 (two) times a day with meals, Disp: 180 tablet, Rfl: 2    montelukast (SINGULAIR) 10 mg tablet, Take 1 tablet (10 mg total) by mouth daily, Disp: 90 tablet, Rfl: 1    polyethylene glycol (GLYCOLAX) powder, Mix entire tub in 64 oz of Gatorade (no red, orange, or purple), Disp: 238 g, Rfl: 0    simvastatin (ZOCOR) 80 mg tablet, Take 1 tablet (80 mg total) by mouth daily at bedtime, Disp: 90 tablet, Rfl: 3    econazole nitrate 1 % cream, Apply topically daily, Disp: , Rfl:     methocarbamol (ROBAXIN) 500 mg tablet, Take 1 tablet (500 mg total) by mouth 4 (four) times a day, Disp: 30 tablet, Rfl: 0    naproxen (NAPROSYN) 500 mg tablet, TAKE 1 TABLET BY MOUTH EVERY 12 HOURS AS NEEDED FOR MILD PAIN   TAKE WITH MEALS , Disp: , Rfl: 0    NOVOLOG FLEXPEN 100 units/mL injection pen, 5 Units 3 (three) times a day before meals, Disp: , Rfl: 3    sildenafil (VIAGRA) 25 MG tablet, Take 1 tablet (25 mg total) by mouth daily as needed for erectile dysfunction, Disp: 30 tablet, Rfl: 0    Past Medical History:   Diagnosis Date    Arthritis     Asthma     Colon polyps     Community acquired pneumonia     last assessed: 5/1/2014    Diabetes mellitus ([facility] [address] )     Hepatitis C     High cholesterol     Hypertension      Past Surgical History:   Procedure Laterality Date    COLONOSCOPY      COLONOSCOPY W/ POLYPECTOMY      LITHOTRIPSY      MULTIPLE TOOTH EXTRACTIONS      OR COLONOSCOPY FLX DX W/COLLJ SPEC WHEN PFRMD N/A 5/17/2018    Procedure: COLONOSCOPY;  Surgeon: Krystle Tejada MD;  Location: [facility]; Service: Gastroenterology     Social History     Socioeconomic History    Marital status: /Civil Union     Spouse name: Not on file    Number of children: Not on file    Years of education: Not on file    Highest education level: Not on file   Occupational History    Not on file   Social Needs    Financial resource strain: Not on file    Food insecurity:     Worry: Not on file     Inability: Not on file    Transportation needs:     Medical: Not on file     Non-medical: Not on file   Tobacco Use    Smoking status: Current Every Day Smoker     Packs/day: 0 50     Types: Cigarettes    Smokeless tobacco: Never Used    Tobacco comment: started when he was about 24 yrs old   Substance and Sexual Activity    Alcohol use: No    Drug use: No    Sexual activity: Never   Lifestyle    Physical activity:     Days per week: Not on file     Minutes per session: Not on file    Stress: Not on file   Relationships    Social connections:     Talks on phone: Not on file     Gets together: Not on file     Attends Pentec[employer]alism service: Not on file     Active member of club or organization: Not on file     Attends meetings of clubs or organizations: Not on file     Relationship status: Not on file    Intimate partner violence:     Fear of current or ex partner: Not on file     Emotionally abused: Not on file     Physically abused: Not on file     Forced sexual activity: Not on file   Other Topics Concern    Not on file   Social History Narrative    Not on file     Family History   Problem Relation Age of Onset    Heart attack Family         at age 80       [address] [facility] [address] Internal Medicine PGY-2    [facility]  [address] [facility] [address] [id number] [facility] [address] [facility]  [phone]

## 2020-01-15 ENCOUNTER — EVALUATION (OUTPATIENT)
Dept: PHYSICAL THERAPY | Facility: REHABILITATION | Age: 75
End: 2020-01-15
Payer: COMMERCIAL

## 2020-01-15 DIAGNOSIS — M43.00 PARS DEFECT: ICD-10-CM

## 2020-01-15 DIAGNOSIS — G89.29 CHRONIC MIDLINE LOW BACK PAIN WITHOUT SCIATICA: ICD-10-CM

## 2020-01-15 DIAGNOSIS — M54.50 CHRONIC MIDLINE LOW BACK PAIN WITHOUT SCIATICA: ICD-10-CM

## 2020-01-15 DIAGNOSIS — M54.2 NECK PAIN: Primary | ICD-10-CM

## 2020-01-15 PROCEDURE — 97162 PT EVAL MOD COMPLEX 30 MIN: CPT | Performed by: PHYSICAL THERAPIST

## 2020-01-15 PROCEDURE — 97110 THERAPEUTIC EXERCISES: CPT | Performed by: PHYSICAL THERAPIST

## 2020-01-15 NOTE — PROGRESS NOTES
PT Evaluation     Today's date: 1/15/2020  Patient name: Karen Degroot  : 1945  MRN: [id number]  Referring provider: Raz Padilla MD  Dx:   Encounter Diagnosis     ICD-10-CM    1  Neck pain M54 2    2  Pars defect M43 00 Ambulatory referral to Physical Therapy   3  Chronic midline low back pain without sciatica M54 5 Ambulatory referral to Physical Therapy    G89 [id number]                   Assessment  Assessment details: Patient presents complaining of neck pain which has been ongoing for 3 months, he reports he woke up with pain on the right side of his neck  He reports no pain down his arm currently, apart from his recent shoulder pain which is from a previous injury  He is a [place] speaker and a translation service was used to communicate with patient, an inconsistent medical history was provided  He reports he has pain while looking to both sides, as well as when he is driving  He reports no numbness and tingling into his hand, no radicular signs present  He had an x-ray performed which was negative  He also reports no increase in activity or trauma prior to the pain beginning  He is currently not working and is on disability  Patient demonstrates excessive limitations in cervical range of motion secondary to increased pain sensitivity  Patient made aware of condition as well as the proposed treatment plan, including risks, benefits and alternatives  Impairments: abnormal or restricted ROM, activity intolerance, impaired physical strength, lacks appropriate home exercise program and pain with function     Prognosis: good    Goals  ST- demonstrate compliancy with HEP in 1 week     Decrease reported pain to 4/10 at worst and with activity, to improve functional capacity, within 3 weeks   Improve cervical strength to 4+/5 in all motions with decreased complaints of pain, within 3 weeks     LT- Improve FOTO score to specified value to improve patients perceived benefit of therapy, in 8 weeks   Improve cervical range of motion to only minimal limitations with no complaints of pain, in 10 weeks       Plan  Plan details: Physical therapy with focus on there ex and manual therapy to improve ability to complete tasks around the house and complete functional activities, use of modalities as needed     Patient would benefit from: skilled physical therapy  Referral necessary: No  Planned modality interventions: cryotherapy, TENS and thermotherapy: hydrocollator packs  Planned therapy interventions: ADL training, balance, balance/weight bearing training, gait training, manual therapy, joint mobilization, neuromuscular re-education, strengthening, stretching, therapeutic activities and therapeutic exercise  Frequency: 2x week  Duration in weeks: 12  Plan of Care beginning date: 1/15/2020  Plan of Care expiration date: 2020  Treatment plan discussed with: patient        Subjective Evaluation    History of Present Illness  Date of onset: 10/1/2019  Mechanism of injury: Chronic onset   Pain  Current pain ratin  At best pain ratin  At worst pain ratin  Location: R sided neck   Quality: sharp  Relieving factors: ice, relaxation and rest    Treatments  No previous or current treatments  Patient Goals  Patient goals for therapy: decreased pain and independence with ADLs/IADLs          Objective     General Comments:      Lumbar Comments  Ttp along R side of neck at TP's, increased muscle tone noted on R compared to L     rom  Cervical flexion normal  Extension minimally limited secondary to pain  B/L side bend and rotation both moderately limited secondary to pain     mmt  Cervical flexion 4/5*   Cervical extension 4/5   Cervical side bend L=4/5 R=4-/5*   Shoulder shrug 4+/5   Shoulder flexion 3+/5 B/L  Shoulder abduction 3+/5 B/L    Special tests   (-) sharp pursers, alar ligament     Joint play not assessed 2ndary to pain sensitivity              Precautions: DM, asthma, HTN       Manual              Cervical ROM IASTM R sided cervical paraspinals, UT/ LS                                                        Exercise Diary              Pulleys gentle              Self snags              3 finger rotation                                                                                                                                                                                                                                               Modalities

## 2020-01-20 ENCOUNTER — APPOINTMENT (OUTPATIENT)
Dept: PHYSICAL THERAPY | Facility: REHABILITATION | Age: 75
End: 2020-01-20
Payer: COMMERCIAL

## 2020-01-27 ENCOUNTER — APPOINTMENT (OUTPATIENT)
Dept: PHYSICAL THERAPY | Facility: REHABILITATION | Age: 75
End: 2020-01-27
Payer: COMMERCIAL

## 2020-01-28 DIAGNOSIS — I10 ESSENTIAL HYPERTENSION: ICD-10-CM

## 2020-01-28 DIAGNOSIS — I10 HYPERTENSION: ICD-10-CM

## 2020-01-29 ENCOUNTER — APPOINTMENT (OUTPATIENT)
Dept: PHYSICAL THERAPY | Facility: REHABILITATION | Age: 75
End: 2020-01-29
Payer: COMMERCIAL

## 2020-02-06 NOTE — TELEPHONE ENCOUNTER
Patient's wife called requesting medication refill for lisinopril (ZESTRIL) 20 mg tablet  Called pharmacy and confirm, new script needed

## 2020-02-07 RX ORDER — LISINOPRIL 20 MG/1
TABLET ORAL
Qty: 90 TABLET | Refills: 0 | Status: SHIPPED | OUTPATIENT
Start: 2020-02-07 | End: 2020-04-30

## 2020-03-27 DIAGNOSIS — E03.9 HYPOTHYROIDISM: ICD-10-CM

## 2020-03-27 DIAGNOSIS — E11.9 DIABETES MELLITUS (HCC): ICD-10-CM

## 2020-03-27 DIAGNOSIS — I10 HYPERTENSION: ICD-10-CM

## 2020-03-31 DIAGNOSIS — J45.20 MILD INTERMITTENT ASTHMA, UNSPECIFIED WHETHER COMPLICATED: ICD-10-CM

## 2020-03-31 DIAGNOSIS — E53.8 VITAMIN B12 DEFICIENCY: ICD-10-CM

## 2020-04-01 ENCOUNTER — TELEPHONE (OUTPATIENT)
Dept: INTERNAL MEDICINE CLINIC | Facility: CLINIC | Age: 75
End: 2020-04-01

## 2020-04-02 RX ORDER — OMEGA-3/DHA/EPA/FISH OIL 35-113.5MG
TABLET,CHEWABLE ORAL
Qty: 90 TABLET | Refills: 2 | Status: SHIPPED | OUTPATIENT
Start: 2020-04-02 | End: 2020-11-24 | Stop reason: SDUPTHER

## 2020-04-02 RX ORDER — MONTELUKAST SODIUM 10 MG/1
TABLET ORAL
Qty: 90 TABLET | Refills: 1 | Status: SHIPPED | OUTPATIENT
Start: 2020-04-02 | End: 2020-07-20 | Stop reason: SDUPTHER

## 2020-04-03 DIAGNOSIS — I10 HYPERTENSION: ICD-10-CM

## 2020-04-06 RX ORDER — HYDROCHLOROTHIAZIDE 25 MG/1
TABLET ORAL
Qty: 90 TABLET | Refills: 1 | Status: SHIPPED | OUTPATIENT
Start: 2020-04-06 | End: 2020-10-23

## 2020-04-08 ENCOUNTER — TELEPHONE (OUTPATIENT)
Dept: INTERNAL MEDICINE CLINIC | Facility: CLINIC | Age: 75
End: 2020-04-08

## 2020-04-28 ENCOUNTER — TELEMEDICINE (OUTPATIENT)
Dept: INTERNAL MEDICINE CLINIC | Facility: CLINIC | Age: 75
End: 2020-04-28

## 2020-04-28 DIAGNOSIS — G89.29 CHRONIC NECK PAIN: ICD-10-CM

## 2020-04-28 DIAGNOSIS — M54.2 NECK PAIN: ICD-10-CM

## 2020-04-28 DIAGNOSIS — M54.2 CHRONIC NECK PAIN: ICD-10-CM

## 2020-04-28 DIAGNOSIS — I10 ESSENTIAL HYPERTENSION: ICD-10-CM

## 2020-04-28 DIAGNOSIS — E11.40 TYPE 2 DIABETES MELLITUS WITH DIABETIC NEUROPATHY, WITHOUT LONG-TERM CURRENT USE OF INSULIN (HCC): Primary | ICD-10-CM

## 2020-04-28 PROCEDURE — G2012 BRIEF CHECK IN BY MD/QHP: HCPCS | Performed by: INTERNAL MEDICINE

## 2020-04-28 RX ORDER — METHOCARBAMOL 500 MG/1
500 TABLET, FILM COATED ORAL 4 TIMES DAILY PRN
Qty: 30 TABLET | Refills: 1 | Status: SHIPPED | OUTPATIENT
Start: 2020-04-28 | End: 2022-05-09

## 2020-04-30 ENCOUNTER — TELEPHONE (OUTPATIENT)
Dept: FAMILY MEDICINE CLINIC | Facility: CLINIC | Age: 75
End: 2020-04-30

## 2020-04-30 DIAGNOSIS — I10 ESSENTIAL HYPERTENSION: ICD-10-CM

## 2020-04-30 PROCEDURE — 4010F ACE/ARB THERAPY RXD/TAKEN: CPT | Performed by: ORTHOPAEDIC SURGERY

## 2020-04-30 PROCEDURE — 4010F ACE/ARB THERAPY RXD/TAKEN: CPT | Performed by: INTERNAL MEDICINE

## 2020-04-30 RX ORDER — LISINOPRIL 20 MG/1
TABLET ORAL
Qty: 90 TABLET | Refills: 3 | Status: SHIPPED | OUTPATIENT
Start: 2020-04-30 | End: 2021-04-07

## 2020-07-14 DIAGNOSIS — E03.9 HYPOTHYROIDISM: ICD-10-CM

## 2020-07-15 DIAGNOSIS — E03.9 HYPOTHYROIDISM: ICD-10-CM

## 2020-07-15 RX ORDER — LEVOTHYROXINE SODIUM 137 UG/1
137 TABLET ORAL DAILY
Qty: 90 TABLET | Refills: 1 | Status: SHIPPED | OUTPATIENT
Start: 2020-07-15 | End: 2021-01-12

## 2020-07-15 NOTE — TELEPHONE ENCOUNTER
Requested Prescriptions     Pending Prescriptions Disp Refills    levothyroxine 137 mcg tablet 90 tablet 1     Sig: Take 1 tablet (137 mcg total) by mouth daily

## 2020-07-20 DIAGNOSIS — J45.20 MILD INTERMITTENT ASTHMA, UNSPECIFIED WHETHER COMPLICATED: ICD-10-CM

## 2020-07-20 DIAGNOSIS — E78.5 HYPERLIPIDEMIA: ICD-10-CM

## 2020-07-20 DIAGNOSIS — E11.49: ICD-10-CM

## 2020-07-20 RX ORDER — SIMVASTATIN 80 MG
80 TABLET ORAL
Qty: 90 TABLET | Refills: 3 | Status: SHIPPED | OUTPATIENT
Start: 2020-07-20 | End: 2021-09-13

## 2020-07-20 RX ORDER — LEVOTHYROXINE SODIUM 137 UG/1
TABLET ORAL
Qty: 90 TABLET | Refills: 1 | OUTPATIENT
Start: 2020-07-20

## 2020-07-20 RX ORDER — MONTELUKAST SODIUM 10 MG/1
10 TABLET ORAL DAILY
Qty: 90 TABLET | Refills: 3 | Status: SHIPPED | OUTPATIENT
Start: 2020-07-20 | End: 2021-09-13

## 2020-08-11 ENCOUNTER — OFFICE VISIT (OUTPATIENT)
Dept: INTERNAL MEDICINE CLINIC | Facility: CLINIC | Age: 75
End: 2020-08-11

## 2020-08-11 VITALS
DIASTOLIC BLOOD PRESSURE: 64 MMHG | WEIGHT: 186.95 LBS | TEMPERATURE: 97.2 F | HEART RATE: 91 BPM | OXYGEN SATURATION: 97 % | BODY MASS INDEX: 28.43 KG/M2 | SYSTOLIC BLOOD PRESSURE: 138 MMHG

## 2020-08-11 DIAGNOSIS — E11.40 TYPE 2 DIABETES MELLITUS WITH DIABETIC NEUROPATHY, WITHOUT LONG-TERM CURRENT USE OF INSULIN (HCC): Primary | ICD-10-CM

## 2020-08-11 DIAGNOSIS — E08.40 DIABETES MELLITUS DUE TO UNDERLYING CONDITION WITH DIABETIC NEUROPATHY, WITHOUT LONG-TERM CURRENT USE OF INSULIN (HCC): ICD-10-CM

## 2020-08-11 DIAGNOSIS — M25.562 LEFT ANTERIOR KNEE PAIN: ICD-10-CM

## 2020-08-11 DIAGNOSIS — J30.9 ALLERGIC RHINITIS, UNSPECIFIED SEASONALITY, UNSPECIFIED TRIGGER: ICD-10-CM

## 2020-08-11 DIAGNOSIS — E03.9 HYPOTHYROIDISM, UNSPECIFIED TYPE: ICD-10-CM

## 2020-08-11 LAB — SL AMB POCT HEMOGLOBIN AIC: 8.2 (ref ?–6.5)

## 2020-08-11 PROCEDURE — 83036 HEMOGLOBIN GLYCOSYLATED A1C: CPT | Performed by: HOSPITALIST

## 2020-08-11 PROCEDURE — 99213 OFFICE O/P EST LOW 20 MIN: CPT | Performed by: HOSPITALIST

## 2020-08-11 PROCEDURE — 3052F HG A1C>EQUAL 8.0%<EQUAL 9.0%: CPT | Performed by: ORTHOPAEDIC SURGERY

## 2020-08-11 PROCEDURE — 4040F PNEUMOC VAC/ADMIN/RCVD: CPT | Performed by: HOSPITALIST

## 2020-08-11 PROCEDURE — 3052F HG A1C>EQUAL 8.0%<EQUAL 9.0%: CPT | Performed by: INTERNAL MEDICINE

## 2020-08-11 RX ORDER — INSULIN ASPART 100 [IU]/ML
INJECTION, SOLUTION INTRAVENOUS; SUBCUTANEOUS
Qty: 4 PEN | Refills: 3 | Status: SHIPPED | OUTPATIENT
Start: 2020-08-11 | End: 2021-09-24 | Stop reason: SDUPTHER

## 2020-08-11 RX ORDER — PEN NEEDLE, DIABETIC 30 GX3/16"
NEEDLE, DISPOSABLE MISCELLANEOUS DAILY
Qty: 300 EACH | Refills: 3 | Status: SHIPPED | OUTPATIENT
Start: 2020-08-11 | End: 2021-03-03 | Stop reason: SDUPTHER

## 2020-08-11 RX ORDER — FLUTICASONE PROPIONATE 50 MCG
2 SPRAY, SUSPENSION (ML) NASAL DAILY
Qty: 2 BOTTLE | Refills: 2 | Status: SHIPPED | OUTPATIENT
Start: 2020-08-11

## 2020-08-11 RX ORDER — INSULIN GLARGINE 100 [IU]/ML
22 INJECTION, SOLUTION SUBCUTANEOUS
Qty: 15 PEN | Refills: 3 | Status: SHIPPED | OUTPATIENT
Start: 2020-08-11 | End: 2021-05-17

## 2020-08-11 NOTE — PATIENT INSTRUCTIONS
Diabetes en el adulto mayor   LO QUE NECESITA SABER:   Los adultos mayores con diabetes están en riesgo de tener enfermedad cardíaca, derrame cerebral, enfermedad renal, ceguera y daño a los nervios  También podría estar en riesgo de alguna de las siguientes condiciones:  · Nutrición deficiente o bajos niveles de azúcar en la fernando    · Confusión o problemas con la memoria, la atención o para aprender cosas nuevas    · Problemas para controlar la orina o infecciones urinarias frecuentes    · Problemas con la coordinación o el equilibrio    · Caídas y lesiones    · Dolor    · Depresión    · Llagas abiertas en las piernas o los pies  INSTRUCCIONES SOBRE [facility]:   Llame al 911 en tatyana de presentar lo siguiente:   · Usted tiene alguno de los siguientes signos de derrame cerebral:      ¨ Adormecimiento o caída de un lado de talley huber     ¨ Debilidad en un brazo o marli pierna    ¨ Confusión o debilidad para hablar    ¨ Mareos o dolor de samuel intenso, o pérdida de la visión  · Usted tiene alguno de los siguientes signos de un ataque cardíaco:      ¨ Estornudos, presión, o dolor en talley pecho que dura mas de 5 minutos o regresa  ¨ Malestar o dolor en talley espalda, harpreet, mandíbula, abdomen, o brazo     ¨ Dificultad para respirar    ¨ Náuseas o vómito    ¨ Siente un desvanecimiento o tiene sudores fríos especialmente en el pecho o dificultad para respirar  Regrese a la rogelio de emergencias si:   · Usted tiene dolor abdominal intenso, o el dolor se extiende a talley espalda  Es posible que también esté vomitando  · Usted tiene dificultad para permanecer despierto o concentrarse  · Usted está temblando o sudando  · Usted tiene visión borrosa o doble  · Talley aliento huele a fruta o adelia  · Talley respiración es profunda y [place], o rápida y superficial      · Talley ritmo cardíaco es acelerado y débil  · Sufre marli caída y se lastima    Pregúntele a talley Reyne Rave vitaminas y minerales son adecuados para usted  · Tiene vómitos o diarrea  · Tiene malestar estomacal y no puede ingerir los alimentos de talley plan de comidas  · Usted se siente débil o más cansado de lo habitual      · Usted tiene [place], salvador de [place] o se irrita con facilidad  · Talley piel está tennille, tibia, seca o inflamada  · Usted tiene marli herida que no cicatriza  · Usted tiene entumecimiento en los brazos o piernas  · Usted tiene problemas para sobrellevar talley enfermedad, o se siente ansioso o deprimido  · Usted tiene problemas con la memoria  · Usted presenta cambios en talley visión  · Usted tiene preguntas o inquietudes acerca de talley condición o cuidado  Medicamentos,  se puede administrar para disminuir la cantidad de azúcar en talley fernando  También puede necesitar medicamentos para bajar talley presión arterial o colesterol, o para prevenir coágulos de fernando  Recuerde controlar los factores de la sigla [facility] y prevenga los problemas causados por la diabetes:   · Revise talley nivel de azúcar en la fernando judith se le haya indicado  Talley médico le dirá cuándo y con qué frecuencia lo debe revisar  Talley médico también le indicará cuáles deben ser nithin niveles de azúcar en la fernando antes y después de [facility] comida  Usted puede necesitar revisar talley orina o fernando por la presencia de cetonas, en tatyana que talley nivel esté más alto de lo recomendado  Anote nithin resultados y muéstreselos a talley médico  Puede que éste utilice los resultados para realizar modificaciones en talley medicación y talley alimentación o en los horarios en que usted hace ejercicio  Pídale a talley médico más información acerca de cómo tratar un nivel de azúcar en la fernando alto o bajo  · Siga talley plan de comidas según indicaciones  Un dietista lo ayudará a hacer un plan de comidas para mantener talley nivel de azúcar en la fernando estable y asegurarse marli nutrición adecuada  No se salte ninguna comida   Talley nivel de azúcar en la fernando puede bajar demasiado si usted ha tomado medicamento para la diabetes y no ha comido  Consulte con talley médico acerca de los programas en talley comunidad que pueden ofrecerle la entrega de comidas a domicilio  · Intente estar activo de 30 a 60 minutos jacqueline todos los días de la [place]  La actividad física puede ayudarlo a mantener el nivel de glucosa en la fernando estable, disminuir talley riesgo de insuficiencia cardíaca y Sombrillo Dianne a bajar de [place]  También puede ayudarlo a mejorar talley equilibrio y [place] riesgo de caídas  Colabore con talley médico para elaborar un plan de ejercicios  Pídale a un familiar o amigo que timothy ejercicio con usted  Comience lentamente y ejercite de 5 a 10 minutos a la vez  Ejemplos de actividades incluyen caminar o nadar  Incluya ejercicios para fortalecer los músculos de 2 a 3 días a la semana  Incluya entrenamiento del equilibrio de 2 a 3 veces a la semana  Actividades que ayudan a aumentar el equilibrio incluyen yoga y ingrid chi      · Mantenga un peso saludable  Consulte con talley médico cuánto debería pesar  El peso saludable puede ayudarle a controlar talley diabetes y a evitar marli enfermedad cardíaca  Solicite a talley médico que le ayude a crear un plan para perder peso de marli forma mcdowell si usted tiene sobrepeso  Juntos podrán fijar metas de pérdida de peso alcanzables  · No fume  Pida información a talley médico si usted actualmente fuma y necesita ayuda para dejar de fumar  No use cigarrillos electrónicos o tabaco sin humo en vez de cigarrillos o para tratar de dejar de fumar  Todos estos aún contienen nicotina  · Controle el estrés  El estrés puede aumentar talley nivel de azúcar en la fernando  La respiración profunda, la relajación muscular y la música pueden ayudarlo a relajarse  Solicite a talley médico más información sobre estas prácticas  Otras maneras de controlar talley diabetes:   · Revise nithin pies todos los días para greta si tienen llagas  Observe todo el pie, incluyendo la planta del pie, entre y [employer] dedos  Revise si hay heridas y callos  Use un melody para verse la planta de los pies  La piel de los pies podría estar brillante, tirante, seca o más oscura de lo normal  Deisi pies también podrían estar fríos y pálidos  Pase deisi aisha por encima, por debajo, por los lados y [place] dedos del pie para sentir la piel  El enrojecimiento, inflamación y calor son signos de problemas con el flujo sanguíneo que pueden conllevar a marli úlcera en el pie  No trate de quitarse los callos usted mismo  · Use identificación de alerta médica  Use un brazalete o collar de alerta médica o lleve consigo marli tarjeta que indique que tiene diabetes  Pregúntele a talley médico dónde conseguir estos artículos  · [place] vacunas  Usted corre un mayor riesgo de presentar enfermedades graves si usted contrae la gripe, neumonía o hepatitis  Pregúntele a talley médico si usted debe ponerse la vacuna contra la gripe, la neumonía o la hepatitis B, y cuándo debe hacerlo  · Asista a todas deisi citas  Necesitará regresar para que le realicen el examen de hemoglobina A1c cada 3 meses  Tendrá que regresar por lo menos marli vez al año para que le revisen los pies  Necesitará de un examen de la vista marli vez al año para revisar si tiene retinopatía  También necesitará exámenes de orina anuales para revisar si hay problemas renales  Es posible que deba realizarse exámenes para detectar enfermedad cardíaca  Anote deisi preguntas para que se acuerde de hacerlas daksha deisi visitas  · Pídale ayuda a deisi familiares y amigos  Puede que necesite ayuda para comprobar talley nivel de azúcar en la fernando, inyectarse la insulina o preparar las comidas  Pídale a deisi familiareas y amigos que lo ayuden con estas tareas  Hable con talley médico si no tiene a nadie que le ayude en talley hogar  Un médico puede venir a talley casa para ayudarlo con estas tareas  Acuda a deisi consultas de control con talley médico según le indicaron    Necesitará regresar para que le realicen el examen de hemoglobina A1c cada 3 meses  Tendrá que regresar por lo menos marli vez al año para que le revisen los pies  Necesitará de un examen de la vista marli vez al año para revisar si tiene retinopatía  También necesitará exámenes de orina anuales para revisar si hay problemas renales  Es posible que deba realizarse exámenes para detectar enfermedad cardíaca  Anote nithin preguntas para que se acuerde de hacerlas daksha nithin visitas  © 2017 [address] Information is for End User's use only and may not be sold, redistributed or otherwise used for commercial purposes  All illustrations and images included in CareNotes® are the copyrighted property of A D A M , Inc  or Roge Rausch  Esta información es sólo para uso en educación  Pérez intención no es darle un consejo médico sobre enfermedades o tratamientos  Colsulte con pérez Charna Heckler farmacéutico antes de seguir cualquier régimen médico para saber si es seguro y efectivo para usted

## 2020-08-11 NOTE — PROGRESS NOTES
Patient's shoes and socks removed  Right Foot/Ankle   Right Foot Inspection  Skin Exam: skin intact and dry skin                            Sensory       Monofilament testing: absent      Left Foot/Ankle  Left Foot Inspection  Skin Exam: skin intact and dry skin                                         Sensory       Monofilament: intact    Assign Risk Category:  No deformity present; Loss of protective sensation; No weak pulses       Risk: 0      ASSESSMENT/PLAN:    Diabetes mellitus  a1c today 8 2  Last a1c 6 5  Will check lab A1c instead of point-of-care a1c  Check urine microalbumin/Cr ratio, can increase lisinopril if elevated  Increase lantus from 20U at bedtime to 22 U at bedtime  Continue mealtime insulin at 5 units with breakfast and dinner  Pt advised of risks of eating between meals and was advised to have lunch that he can then take insulin for  Pt recommended to bring in blood sugar log with checking 3x per day including fasting in AM to next visit, was recommended 5 week followup for insulin titration and that blood glucoses more recently have not been controlled  Hypothyroidism  On levothyroxine supplementation  Check TSH    L anterior Knee pain  Suspect prepatellar bursitis  Pt requested referral to orthopedic surgery  Referral provided  Allergic rhinitis  Pt requested refill of flonase nasal spray    Health Maintenance: Foot exam performed today    Follow-up: In 5 weeks    CHIEF COMPLAINT: diabetes followup    HISTORY OF PRESENT ILLNESS:  71 yo M with Hx DM with neuropathy presenting for diabetes followup  Reports checking BG today at 9am 173  He has been taking 20 units Lantus every evening and 5 units short acting insulin with breakfast and with dinner  Was checking more frequently 3x per day but stopped in May  Pt does not eat lunch but eats snacks during lunchtime between breakfast and dinner   Pt reports this is what he is accustomed to from [facility]     Pt did bring log of sugars with prior values, however values are from May 2020 and one value this  fasting in the AM   In last two months, denies any episodes of sweating/diaphoresis, dizziness, palpitations, confusion that would have been associated with hypo or hyperglycemia  Pt reports mild anterior L knee pain for 3 weeks that improved previously with orthopedic procedure  Initially translation was provided with  line, when this proved to be extremely cumbersome/time consuming due to  requiring frequent repetition of both [place] and [place]ian even with repositioning of both parties closer to telephone and clearer enunciation, decision was made to use office interpretor for translation instead  Review of Systems   Constitutional: Negative for chills, fatigue and fever  HENT: Negative for rhinorrhea and sore throat  Respiratory: Negative for choking, chest tightness, shortness of breath, wheezing and stridor  Cardiovascular: Negative for chest pain, palpitations and leg swelling  Gastrointestinal: Negative for abdominal pain, blood in stool, constipation, diarrhea, nausea and vomiting  Genitourinary: Negative for hematuria  Neurological: Negative for dizziness and light-headedness  OBJECTIVE:  Vitals:    08/11/20 1103   BP: 138/64   BP Location: Right arm   Patient Position: Sitting   Cuff Size: Standard   Pulse: 91   Temp: (!) 97 2 °F (36 2 °C)   TempSrc: Temporal   SpO2: 97%   Weight: 84 8 kg (186 lb 15 2 oz)       Physical Exam  Constitutional:       Appearance: He is well-developed  HENT:      Head: Normocephalic and atraumatic  Nose: Nose normal    Eyes:      General:         Right eye: No discharge  Left eye: No discharge  Cardiovascular:      Rate and Rhythm: Normal rate and regular rhythm  Pulses: no weak pulses     Heart sounds: Normal heart sounds  No murmur  No friction rub  No gallop      Pulmonary:      Effort: Pulmonary effort is normal  No respiratory distress  Breath sounds: Normal breath sounds  No wheezing or rales  Abdominal:      General: Bowel sounds are normal  There is no distension  Palpations: Abdomen is soft  Tenderness: There is no abdominal tenderness  There is no guarding  Musculoskeletal:        Feet:    Feet:      Right foot:      Skin integrity: Dry skin present  Left foot:      Skin integrity: Dry skin present  Skin:     General: Skin is warm and dry  Neurological:      Mental Status: He is alert and oriented to person, place, and time  Psychiatric:         Speech: Speech normal            Current Outpatient Medications:     ADVAIR -21 MCG/ACT inhaler, Inhale 2 puffs 2 (two) times a day, Disp: 1 Inhaler, Rfl: 4    albuterol (VENTOLIN HFA) 90 mcg/act inhaler, Inhale 2 puffs every 4 (four) hours as needed for wheezing or shortness of breath, Disp: 18 g, Rfl: 2    Alcohol Swabs (ALCOHOL PADS) 70 % PADS, , Disp: , Rfl:     Blood Pressure Monitoring (BLOOD PRESSURE MONITOR/M CUFF) MISC, by Does not apply route daily Check blood pressure once daily and keep a log to bring to appointment  , Disp: 1 each, Rfl: 0    COMFORT EZ INSULIN SYRINGE 31G X 5/16" 1 ML MISC, , Disp: , Rfl:     COMFORT EZ PEN NEEDLES 33G X 6 MM MISC, , Disp: , Rfl:     glucose blood (FREESTYLE LITE) test strip, 1 each by Other route as needed (as needed), Disp: 100 each, Rfl: 3    insulin glargine (Lantus SoloStar) 100 units/mL injection pen, Inject 22 Units under the skin daily at bedtime, Disp: 15 pen, Rfl: 3    Insulin Pen Needle (Pen Needles) 31G X 8 MM MISC, by Does not apply route daily, Disp: 300 each, Rfl: 3    Lancets (FREESTYLE) lancets, by Other route as needed (As needed), Disp: 100 each, Rfl: 3    amLODIPine (NORVASC) 5 mg tablet, Take 1 tablet (5 mg total) by mouth daily, Disp: 90 tablet, Rfl: 1    bisacodyl (DULCOLAX) 5 mg EC tablet, Take by mouth, Disp: , Rfl:     Blood Glucose Monitoring Suppl (FREESTYLE FREEDOM LITE) w/Device KIT, by Does not apply route, Disp: , Rfl:     [employer]S VITAMIN B-12 1000 MCG tablet, TAKE 1 TABLET BY MOUTH EVERY DAY, Disp: 90 tablet, Rfl: 2    econazole nitrate 1 % cream, Apply topically daily, Disp: , Rfl:     fluticasone (FLONASE) 50 mcg/act nasal spray, 2 sprays into each nostril daily, Disp: 2 Bottle, Rfl: 2    hydrochlorothiazide (HYDRODIURIL) 25 mg tablet, TAKE 1 TABLET BY MOUTH EVERY DAY, Disp: 90 tablet, Rfl: 1    ibuprofen (MOTRIN) 600 mg tablet, Take 1 tablet (600 mg total) by mouth every 6 (six) hours as needed for mild pain, Disp: 30 tablet, Rfl: 0    insulin aspart (NovoLOG) 100 units/mL injection, Inject 4 Units under the skin 2 (two) times a day before breakfast and lunch, Disp: 10 mL, Rfl: 3    levothyroxine 137 mcg tablet, Take 1 tablet (137 mcg total) by mouth daily, Disp: 90 tablet, Rfl: 1    lisinopril (ZESTRIL) 20 mg tablet, TAKE 1 TABLET BY MOUTH EVERY DAY, Disp: 90 tablet, Rfl: 3    metFORMIN (GLUCOPHAGE) 850 mg tablet, Take 1 tablet (850 mg total) by mouth 2 (two) times a day with meals, Disp: 180 tablet, Rfl: 2    methocarbamol (ROBAXIN) 500 mg tablet, Take 1 tablet (500 mg total) by mouth 4 (four) times a day as needed for muscle spasms, Disp: 30 tablet, Rfl: 1    montelukast (SINGULAIR) 10 mg tablet, Take 1 tablet (10 mg total) by mouth daily, Disp: 90 tablet, Rfl: 3    naproxen (NAPROSYN) 500 mg tablet, TAKE 1 TABLET BY MOUTH EVERY 12 HOURS AS NEEDED FOR MILD PAIN   TAKE WITH MEALS , Disp: , Rfl: 0    NovoLOG FlexPen 100 units/mL SOPN, Inject 5 units with breakfast and with dinner, Disp: 4 pen, Rfl: 3    polyethylene glycol (GLYCOLAX) powder, Mix entire tub in 64 oz of Gatorade (no red, orange, or purple), Disp: 238 g, Rfl: 0    sildenafil (VIAGRA) 25 MG tablet, Take 1 tablet (25 mg total) by mouth daily as needed for erectile dysfunction, Disp: 30 tablet, Rfl: 0    simvastatin (ZOCOR) 80 mg tablet, Take 1 tablet (80 mg total) by mouth daily at bedtime, Disp: 90 tablet, Rfl: 3    Past Medical History:   Diagnosis Date    Arthritis     Asthma     Colon polyps     Community acquired pneumonia     last assessed: 5/1/2014    Diabetes mellitus ([facility] [address] )     Hepatitis C     High cholesterol     Hypertension      Past Surgical History:   Procedure Laterality Date    COLONOSCOPY      COLONOSCOPY W/ POLYPECTOMY      LITHOTRIPSY      MULTIPLE TOOTH EXTRACTIONS      OH COLONOSCOPY FLX DX W/COLLJ SPEC WHEN PFRMD N/A 5/17/2018    Procedure: COLONOSCOPY;  Surgeon: Ines Couch MD;  Location: [facility];   Service: Gastroenterology     Social History     Socioeconomic History    Marital status: /Civil Union     Spouse name: Not on file    Number of children: Not on file    Years of education: Not on file    Highest education level: Not on file   Occupational History    Not on file   Social Needs    Financial resource strain: Not on file    Food insecurity     Worry: Not on file     Inability: Not on file    Transportation needs     Medical: Not on file     Non-medical: Not on file   Tobacco Use    Smoking status: Current Every Day Smoker     Packs/day: 0 50     Types: Cigarettes    Smokeless tobacco: Never Used    Tobacco comment: started when he was about 24 yrs old   Substance and Sexual Activity    Alcohol use: No    Drug use: No    Sexual activity: Never   Lifestyle    Physical activity     Days per week: Not on file     Minutes per session: Not on file    Stress: Not on file   Relationships    Social connections     Talks on phone: Not on file     Gets together: Not on file     Attends Faith service: Not on file     Active member of club or organization: Not on file     Attends meetings of clubs or organizations: Not on file     Relationship status: Not on file    Intimate partner violence     Fear of current or ex partner: Not on file     Emotionally abused: Not on file     Physically abused: Not on file     Forced sexual activity: Not on file Other Topics Concern    Not on file   Social History Narrative    Not on file     Family History   Problem Relation Age of Onset    Heart attack Family         at age 80       [address] [facility], [address]avier [address] Internal Medicine PGY-3    [facility]  [address] [facility]  [place], [facility]  [phone]

## 2020-08-18 DIAGNOSIS — E11.40 TYPE 2 DIABETES MELLITUS WITH DIABETIC NEUROPATHY, WITHOUT LONG-TERM CURRENT USE OF INSULIN (HCC): Primary | ICD-10-CM

## 2020-08-18 RX ORDER — BLOOD SUGAR DIAGNOSTIC
STRIP MISCELLANEOUS
Qty: 300 EACH | Refills: 7 | Status: SHIPPED | OUTPATIENT
Start: 2020-08-18 | End: 2020-08-20 | Stop reason: SDUPTHER

## 2020-08-18 RX ORDER — BLOOD-GLUCOSE METER
EACH MISCELLANEOUS
Qty: 1 KIT | Refills: 0 | Status: SHIPPED | OUTPATIENT
Start: 2020-08-18 | End: 2020-08-20 | Stop reason: SDUPTHER

## 2020-08-19 ENCOUNTER — TELEPHONE (OUTPATIENT)
Dept: INTERNAL MEDICINE CLINIC | Facility: CLINIC | Age: 75
End: 2020-08-19

## 2020-08-20 DIAGNOSIS — E11.40 TYPE 2 DIABETES MELLITUS WITH DIABETIC NEUROPATHY, WITHOUT LONG-TERM CURRENT USE OF INSULIN (HCC): ICD-10-CM

## 2020-08-20 RX ORDER — BLOOD-GLUCOSE METER
EACH MISCELLANEOUS
Qty: 1 KIT | Refills: 0 | Status: SHIPPED | OUTPATIENT
Start: 2020-08-20

## 2020-08-20 RX ORDER — BLOOD SUGAR DIAGNOSTIC
STRIP MISCELLANEOUS
Qty: 300 EACH | Refills: 7 | Status: SHIPPED | OUTPATIENT
Start: 2020-08-20 | End: 2022-06-23

## 2020-08-26 ENCOUNTER — TELEPHONE (OUTPATIENT)
Dept: OBGYN CLINIC | Facility: HOSPITAL | Age: 75
End: 2020-08-26

## 2020-08-26 ENCOUNTER — OFFICE VISIT (OUTPATIENT)
Dept: OBGYN CLINIC | Facility: HOSPITAL | Age: 75
End: 2020-08-26
Payer: COMMERCIAL

## 2020-08-26 ENCOUNTER — HOSPITAL ENCOUNTER (OUTPATIENT)
Dept: RADIOLOGY | Facility: HOSPITAL | Age: 75
Discharge: HOME | End: 2020-08-26
Attending: ORTHOPAEDIC SURGERY
Payer: COMMERCIAL

## 2020-08-26 VITALS
WEIGHT: 185.8 LBS | SYSTOLIC BLOOD PRESSURE: 123 MMHG | DIASTOLIC BLOOD PRESSURE: 61 MMHG | BODY MASS INDEX: 28.16 KG/M2 | HEART RATE: 94 BPM | HEIGHT: 68 IN

## 2020-08-26 DIAGNOSIS — M79.671 PAIN IN RIGHT FOOT: Primary | ICD-10-CM

## 2020-08-26 DIAGNOSIS — M79.671 PAIN IN RIGHT FOOT: ICD-10-CM

## 2020-08-26 DIAGNOSIS — M70.42 HEMORRHAGIC PREPATELLAR BURSITIS, LEFT: ICD-10-CM

## 2020-08-26 DIAGNOSIS — M25.562 LEFT ANTERIOR KNEE PAIN: ICD-10-CM

## 2020-08-26 PROCEDURE — 73630 X-RAY EXAM OF FOOT: CPT

## 2020-08-26 PROCEDURE — 3008F BODY MASS INDEX DOCD: CPT | Performed by: INTERNAL MEDICINE

## 2020-08-26 PROCEDURE — 4040F PNEUMOC VAC/ADMIN/RCVD: CPT | Performed by: ORTHOPAEDIC SURGERY

## 2020-08-26 PROCEDURE — 1160F RVW MEDS BY RX/DR IN RCRD: CPT | Performed by: ORTHOPAEDIC SURGERY

## 2020-08-26 PROCEDURE — 99213 OFFICE O/P EST LOW 20 MIN: CPT | Performed by: ORTHOPAEDIC SURGERY

## 2020-08-26 PROCEDURE — 3078F DIAST BP <80 MM HG: CPT | Performed by: ORTHOPAEDIC SURGERY

## 2020-08-26 PROCEDURE — 3008F BODY MASS INDEX DOCD: CPT | Performed by: ORTHOPAEDIC SURGERY

## 2020-08-26 PROCEDURE — 3074F SYST BP LT 130 MM HG: CPT | Performed by: ORTHOPAEDIC SURGERY

## 2020-08-26 NOTE — PROGRESS NOTES
Orthopaedics Office Visit - New (to me) Patient Visit    ASSESSMENT/PLAN:    Assessment:   Left prepatellar bursitis, asymptomatic  Right foot pain, consistent with contusion    Plan:   Weightbear as tolerated bilateral lower extremities  Ace wrap provided for left knee  Explained to patient in Hebrew that if he has no pain and no symptoms from his left knee swelling he does not require any aspiration of the fluid from the bursa  Ace wrapping and avoiding kneeling activities may help with this fluid  Follow-up p r n  To Do Next Visit:  PRN    _____________________________________________________  CHIEF COMPLAINT:  Chief Complaint   Patient presents with    Left Knee - Pain    Right Foot - Pain         SUBJECTIVE:  Saad Sanchez is a 70 y o  male who presents with a known history of left knee prepatellar bursitis  Treatment in the past has consisted of aspiration of hemorrhagic fluid from the left knee by Dr Tom Cantor and injection of corticosteroid in the past   Is documented in January of this year that he had resolution of his symptoms  Today he presents with reaccumulation of some of the fluid but denies any pain at the knee  He denies any symptoms of infection or erythema or warmth to the knee  He is wondering if it requires repeat aspiration  Of note, the patient presented an additional problem during the visit where he dropped a heavy weight onto his right foot yesterday and has pain over an area of redness with abrasion to the dorsum of the right midfoot  He denies any motor sensory deficits bilateral lower extremities  Pain is well localized to the right foot and does not radiate proximally or distally  Pain is exacerbated by weight-bearing relieved by rest   He denies any pain in the left knee      PAST MEDICAL HISTORY:  Past Medical History:   Diagnosis Date    Arthritis     Asthma     Colon polyps     Community acquired pneumonia     last assessed: 5/1/2014    Diabetes mellitus ([facility] [address] )     Hepatitis C     High cholesterol     Hypertension        PAST SURGICAL HISTORY:  Past Surgical History:   Procedure Laterality Date    COLONOSCOPY      COLONOSCOPY W/ POLYPECTOMY      LITHOTRIPSY      MULTIPLE TOOTH EXTRACTIONS      GA COLONOSCOPY FLX DX W/COLLJ SPEC WHEN PFRMD N/A 5/17/2018    Procedure: COLONOSCOPY;  Surgeon: Ines Couch MD;  Location: [facility]; Service: Gastroenterology       FAMILY HISTORY:  Family History   Problem Relation Age of Onset    Heart attack Family         at age 80       SOCIAL HISTORY:  Social History     Tobacco Use    Smoking status: Current Every Day Smoker     Packs/day: 0 50     Types: Cigarettes    Smokeless tobacco: Never Used    Tobacco comment: started when he was about 24 yrs old   Substance Use Topics    Alcohol use: No    Drug use: No       MEDICATIONS:    Current Outpatient Medications:     ADVAIR -21 MCG/ACT inhaler, Inhale 2 puffs 2 (two) times a day, Disp: 1 Inhaler, Rfl: 4    albuterol (VENTOLIN HFA) 90 mcg/act inhaler, Inhale 2 puffs every 4 (four) hours as needed for wheezing or shortness of breath, Disp: 18 g, Rfl: 2    Alcohol Swabs (ALCOHOL PADS) 70 % PADS, , Disp: , Rfl:     amLODIPine (NORVASC) 5 mg tablet, Take 1 tablet (5 mg total) by mouth daily, Disp: 90 tablet, Rfl: 1    bisacodyl (DULCOLAX) 5 mg EC tablet, Take by mouth, Disp: , Rfl:     Blood Glucose Monitoring Suppl (OneTouch Verio) w/Device KIT, Check blood glucose three times daily before each meal, Disp: 1 kit, Rfl: 0    Blood Pressure Monitoring (BLOOD PRESSURE MONITOR/M CUFF) MISC, by Does not apply route daily Check blood pressure once daily and keep a log to bring to appointment  , Disp: 1 each, Rfl: 0    CVS VITAMIN B-12 1000 MCG tablet, TAKE 1 TABLET BY MOUTH EVERY DAY, Disp: 90 tablet, Rfl: 2    econazole nitrate 1 % cream, Apply topically daily, Disp: , Rfl:     fluticasone (FLONASE) 50 mcg/act nasal spray, 2 sprays into each nostril daily, Disp: 2 Bottle, Rfl: 2    glucose blood (OneTouch Verio) test strip, Check blood glucose three times daily before meals and bring a log of your measurements to your next appointment  , Disp: 300 each, Rfl: 7    hydrochlorothiazide (HYDRODIURIL) 25 mg tablet, TAKE 1 TABLET BY MOUTH EVERY DAY, Disp: 90 tablet, Rfl: 1    ibuprofen (MOTRIN) 600 mg tablet, Take 1 tablet (600 mg total) by mouth every 6 (six) hours as needed for mild pain, Disp: 30 tablet, Rfl: 0    insulin glargine (Lantus SoloStar) 100 units/mL injection pen, Inject 22 Units under the skin daily at bedtime, Disp: 15 pen, Rfl: 3    Insulin Pen Needle (Pen Needles) 31G X 8 MM MISC, by Does not apply route daily, Disp: 300 each, Rfl: 3    Lancets (FREESTYLE) lancets, by Other route as needed (As needed), Disp: 100 each, Rfl: 3    levothyroxine 137 mcg tablet, Take 1 tablet (137 mcg total) by mouth daily, Disp: 90 tablet, Rfl: 1    lisinopril (ZESTRIL) 20 mg tablet, TAKE 1 TABLET BY MOUTH EVERY DAY, Disp: 90 tablet, Rfl: 3    metFORMIN (GLUCOPHAGE) 850 mg tablet, Take 1 tablet (850 mg total) by mouth 2 (two) times a day with meals, Disp: 180 tablet, Rfl: 2    methocarbamol (ROBAXIN) 500 mg tablet, Take 1 tablet (500 mg total) by mouth 4 (four) times a day as needed for muscle spasms, Disp: 30 tablet, Rfl: 1    montelukast (SINGULAIR) 10 mg tablet, Take 1 tablet (10 mg total) by mouth daily, Disp: 90 tablet, Rfl: 3    naproxen (NAPROSYN) 500 mg tablet, TAKE 1 TABLET BY MOUTH EVERY 12 HOURS AS NEEDED FOR MILD PAIN   TAKE WITH MEALS , Disp: , Rfl: 0    NovoLOG FlexPen 100 units/mL SOPN, Inject 5 units with breakfast and with dinner, Disp: 4 pen, Rfl: 3    polyethylene glycol (GLYCOLAX) powder, Mix entire tub in 64 oz of Gatorade (no red, orange, or purple), Disp: 238 g, Rfl: 0    sildenafil (VIAGRA) 25 MG tablet, Take 1 tablet (25 mg total) by mouth daily as needed for erectile dysfunction, Disp: 30 tablet, Rfl: 0    simvastatin (ZOCOR) 80 mg tablet, Take 1 tablet (80 mg total) by mouth daily at bedtime, Disp: 90 tablet, Rfl: 3    ALLERGIES:  No Known Allergies    REVIEW OF SYSTEMS:  MSK:  Right foot pain, left knee prepatellar swelling  Neuro:  Negative  Pertinent items are otherwise noted in HPI  A comprehensive review of systems was otherwise negative  LABS:  HgA1c:   Lab Results   Component Value Date    HGBA1C 8 2 (A) 08/11/2020     BMP:   Lab Results   Component Value Date    GLUCOSE 114 10/14/2014    CALCIUM 9 3 08/02/2019     10/14/2014    K 3 6 08/02/2019    CO2 28 08/02/2019     08/02/2019    BUN 17 08/02/2019    CREATININE 0 70 08/02/2019     CBC: No components found for: CBC    _____________________________________________________  PHYSICAL EXAMINATION:  Vital signs: /61   Pulse 94   Ht 5' 8" (1 727 m)   Wt 84 3 kg (185 lb 12 8 oz)   BMI 28 25 kg/m²   General: No acute distress, awake and alert  Psychiatric: Mood and affect appear appropriate  HEENT: Trachea Midline, No torticollis, no apparent facial trauma  Cardiovascular: No audible murmurs;  Extremities appear perfused  Pulmonary: No audible wheezing or stridor  Skin: No open lesions; see further details (if any) below    MUSCULOSKELETAL EXAMINATION:  Extremities:   LLE:  Mild prepatellar fluid palpated  No erythema, no evidence of infection  No skin lesions  Minimal medial or lateral joint line tenderness  Active knee range of motion 0-110 degrees without pain    RLE:  Small abrasion to dorsum of foot  Mildly tender in a generalized area about the midfoot with no one area of point tenderness  Extremity warm well perfused  Motor and sensory functions intact      _____________________________________________________  STUDIES REVIEWED:  Plain films of left knee reveal mild degenerative changes  Plain films of right foot reveal no fracture dislocation      PROCEDURES PERFORMED:  None    Henrik Lockhart MD

## 2020-09-14 ENCOUNTER — OFFICE VISIT (OUTPATIENT)
Dept: INTERNAL MEDICINE CLINIC | Facility: CLINIC | Age: 75
End: 2020-09-14

## 2020-09-14 VITALS
WEIGHT: 182.54 LBS | SYSTOLIC BLOOD PRESSURE: 158 MMHG | HEIGHT: 68 IN | BODY MASS INDEX: 27.67 KG/M2 | TEMPERATURE: 97 F | HEART RATE: 97 BPM | DIASTOLIC BLOOD PRESSURE: 78 MMHG | OXYGEN SATURATION: 97 %

## 2020-09-14 DIAGNOSIS — I10 ESSENTIAL HYPERTENSION: ICD-10-CM

## 2020-09-14 DIAGNOSIS — E11.40 TYPE 2 DIABETES MELLITUS WITH DIABETIC NEUROPATHY, WITHOUT LONG-TERM CURRENT USE OF INSULIN (HCC): Primary | ICD-10-CM

## 2020-09-14 DIAGNOSIS — E03.9 HYPOTHYROIDISM, UNSPECIFIED TYPE: ICD-10-CM

## 2020-09-14 LAB
LEFT EYE DIABETIC RETINOPATHY: NORMAL
LEFT EYE IMAGE QUALITY: NORMAL
LEFT EYE MACULAR EDEMA: NORMAL
LEFT EYE OTHER RETINOPATHY: NORMAL
RIGHT EYE DIABETIC RETINOPATHY: NORMAL
RIGHT EYE IMAGE QUALITY: NORMAL
RIGHT EYE MACULAR EDEMA: NORMAL
RIGHT EYE OTHER RETINOPATHY: NORMAL
SEVERITY (EYE EXAM): NORMAL

## 2020-09-14 PROCEDURE — 1160F RVW MEDS BY RX/DR IN RCRD: CPT | Performed by: INTERNAL MEDICINE

## 2020-09-14 PROCEDURE — 99213 OFFICE O/P EST LOW 20 MIN: CPT | Performed by: INTERNAL MEDICINE

## 2020-09-14 PROCEDURE — 2025F 7 FLD RTA PHOTO W/O RTNOPTHY: CPT | Performed by: INTERNAL MEDICINE

## 2020-09-14 PROCEDURE — 3008F BODY MASS INDEX DOCD: CPT | Performed by: INTERNAL MEDICINE

## 2020-09-14 NOTE — PROGRESS NOTES
ASSESSMENT/PLAN:    Plan:  Type 2 Diabetes  On 22 units lantus bedtime and 5 units mealtime insulin with breakfast and dinne, on metformin  Continue current regimen  Continue to keep log but check before each meal even if it is 2x daily  Recommended urine microalbumin/creatinine ratio prior to next visit  Follow up in 2 months  Hypertension  Elevated today  Continue with HCTZ and lisinopril 20mg  If continues to be elevated would consider increase at next visit  Hypothyroidism  TSH ordered at last visit, recommend having it done prior to next appt in 2 mos with lab HA1c at that time as well  On levothyroxine 137 daily  Health Maintenance:  Iris exam performed today with no retinopathy, repeat in 1 year  Pt is due for Shingrix vaccination and flu vaccination, per his insurance requirements he will need to have this done at his pharmacy, this was explained to patient by ALFRED Manzanares up to date in 2017    Follow-up: In 2 months for DM and chronic medical conditions    CHIEF COMPLAINT: diabetes followup    HISTORY OF PRESENT ILLNESS:  69 yo M with PMhx of hypothyroidism, DM, presenting for followup for DM  Pt was last seen about 5 weeks ago  He reports taking 22U Lantus at10pm and 5 units short acting with breakfast and dinner  He denies any symptoms of hypoglycemia and has brought blood sugar log to this visit  However pt reports breakfast around 10am and dinner around 3-4pm with blood sugars checks written in around 8pm at times  [facility] He checks blood sugars 2-3x daily  He is unsure of the timing of his meals on the days he has checked his BG, thinking that he does it prior to eating his meal but is unable to explain 8pm timing  Denies missing insulin doses  He does not recall any episodes of hypoglycemia/symptoms or times his blood glucose was below 70  Reviewing glucose log, pt occasionally runs in 80s in the early morning  Later in the day most values are 120-170 but occasionally in 200s   Pt does not know the circumstances behind these numbers  Regarding pt's patellar knee pain discussed at last visit, pt and wife report it has significantly improved and is no longer a major issue  Pt reports he went to his orthopedic doctor but there was no fluid to be removed at that time  Review of Systems   Constitutional: Negative for chills, fatigue and fever  HENT: Negative for rhinorrhea and sore throat  Respiratory: Negative for choking, chest tightness, shortness of breath, wheezing and stridor  Cardiovascular: Negative for chest pain, palpitations and leg swelling  Gastrointestinal: Negative for abdominal pain, blood in stool, constipation, diarrhea, nausea and vomiting  Genitourinary: Negative for hematuria  Neurological: Negative for dizziness and light-headedness  OBJECTIVE:  Vitals:    09/14/20 1044   BP: 158/78   BP Location: Right arm   Patient Position: Sitting   Cuff Size: Adult   Pulse: 97   Temp: (!) 97 °F (36 1 °C)   TempSrc: Temporal   SpO2: 97%   Weight: 82 8 kg (182 lb 8 7 oz)   Height: 5' 8" (1 727 m)       Physical Exam  Constitutional:       Appearance: He is well-developed  HENT:      Head: Normocephalic and atraumatic  Nose: Nose normal    Eyes:      General:         Right eye: No discharge  Left eye: No discharge  Cardiovascular:      Rate and Rhythm: Normal rate and regular rhythm  Heart sounds: Normal heart sounds  No murmur  No friction rub  No gallop  Pulmonary:      Effort: Pulmonary effort is normal  No respiratory distress  Breath sounds: Normal breath sounds  No wheezing or rales  Abdominal:      General: Bowel sounds are normal  There is no distension  Palpations: Abdomen is soft  Tenderness: There is no abdominal tenderness  There is no guarding  Skin:     General: Skin is warm and dry  Neurological:      Mental Status: He is alert and oriented to person, place, and time     Psychiatric:         Speech: Speech normal  Current Outpatient Medications:     Alcohol Swabs (ALCOHOL PADS) 70 % PADS, , Disp: , Rfl:     CVS VITAMIN B-12 1000 MCG tablet, TAKE 1 TABLET BY MOUTH EVERY DAY, Disp: 90 tablet, Rfl: 2    glucose blood (OneTouch Verio) test strip, Check blood glucose three times daily before meals and bring a log of your measurements to your next appointment  , Disp: 300 each, Rfl: 7    hydrochlorothiazide (HYDRODIURIL) 25 mg tablet, TAKE 1 TABLET BY MOUTH EVERY DAY, Disp: 90 tablet, Rfl: 1    insulin glargine (Lantus SoloStar) 100 units/mL injection pen, Inject 22 Units under the skin daily at bedtime, Disp: 15 pen, Rfl: 3    Insulin Pen Needle (Pen Needles) 31G X 8 MM MISC, by Does not apply route daily, Disp: 300 each, Rfl: 3    Lancets (FREESTYLE) lancets, by Other route as needed (As needed), Disp: 100 each, Rfl: 3    levothyroxine 137 mcg tablet, Take 1 tablet (137 mcg total) by mouth daily, Disp: 90 tablet, Rfl: 1    lisinopril (ZESTRIL) 20 mg tablet, TAKE 1 TABLET BY MOUTH EVERY DAY, Disp: 90 tablet, Rfl: 3    metFORMIN (GLUCOPHAGE) 850 mg tablet, Take 1 tablet (850 mg total) by mouth 2 (two) times a day with meals, Disp: 180 tablet, Rfl: 2    montelukast (SINGULAIR) 10 mg tablet, Take 1 tablet (10 mg total) by mouth daily, Disp: 90 tablet, Rfl: 3    NovoLOG FlexPen 100 units/mL SOPN, Inject 5 units with breakfast and with dinner, Disp: 4 pen, Rfl: 3    simvastatin (ZOCOR) 80 mg tablet, Take 1 tablet (80 mg total) by mouth daily at bedtime, Disp: 90 tablet, Rfl: 3    ADVAIR -21 MCG/ACT inhaler, Inhale 2 puffs 2 (two) times a day (Patient not taking: Reported on 9/14/2020), Disp: 1 Inhaler, Rfl: 4    albuterol (VENTOLIN HFA) 90 mcg/act inhaler, Inhale 2 puffs every 4 (four) hours as needed for wheezing or shortness of breath (Patient not taking: Reported on 9/14/2020), Disp: 18 g, Rfl: 2    amLODIPine (NORVASC) 5 mg tablet, Take 1 tablet (5 mg total) by mouth daily, Disp: 90 tablet, Rfl: 1   bisacodyl (DULCOLAX) 5 mg EC tablet, Take by mouth, Disp: , Rfl:     Blood Glucose Monitoring Suppl (OneTouch Verio) w/Device KIT, Check blood glucose three times daily before each meal, Disp: 1 kit, Rfl: 0    Blood Pressure Monitoring (BLOOD PRESSURE MONITOR/M CUFF) MISC, by Does not apply route daily Check blood pressure once daily and keep a log to bring to appointment  , Disp: 1 each, Rfl: 0    econazole nitrate 1 % cream, Apply topically daily, Disp: , Rfl:     fluticasone (FLONASE) 50 mcg/act nasal spray, 2 sprays into each nostril daily, Disp: 2 Bottle, Rfl: 2    ibuprofen (MOTRIN) 600 mg tablet, Take 1 tablet (600 mg total) by mouth every 6 (six) hours as needed for mild pain, Disp: 30 tablet, Rfl: 0    methocarbamol (ROBAXIN) 500 mg tablet, Take 1 tablet (500 mg total) by mouth 4 (four) times a day as needed for muscle spasms, Disp: 30 tablet, Rfl: 1    naproxen (NAPROSYN) 500 mg tablet, TAKE 1 TABLET BY MOUTH EVERY 12 HOURS AS NEEDED FOR MILD PAIN  TAKE WITH MEALS , Disp: , Rfl: 0    polyethylene glycol (GLYCOLAX) powder, Mix entire tub in 64 oz of Gatorade (no red, orange, or purple), Disp: 238 g, Rfl: 0    sildenafil (VIAGRA) 25 MG tablet, Take 1 tablet (25 mg total) by mouth daily as needed for erectile dysfunction, Disp: 30 tablet, Rfl: 0    Past Medical History:   Diagnosis Date    Arthritis     Asthma     Colon polyps     Community acquired pneumonia     last assessed: 5/1/2014    Diabetes mellitus ([facility] [address]     Hepatitis C     High cholesterol     Hypertension      Past Surgical History:   Procedure Laterality Date    COLONOSCOPY      COLONOSCOPY W/ POLYPECTOMY      LITHOTRIPSY      MULTIPLE TOOTH EXTRACTIONS      MS COLONOSCOPY FLX DX W/COLLJ SPEC WHEN PFRMD N/A 5/17/2018    Procedure: COLONOSCOPY;  Surgeon: Aidan Faria MD;  Location: [facility];   Service: Gastroenterology     Social History     Socioeconomic History    Marital status: /Civil Union     Spouse name: Not on file    Number of children: Not on file    Years of education: Not on file    Highest education level: Not on file   Occupational History    Not on file   Social Needs    Financial resource strain: Not on file    Food insecurity     Worry: Not on file     Inability: Not on file    Transportation needs     Medical: Not on file     Non-medical: Not on file   Tobacco Use    Smoking status: Current Every Day Smoker     Packs/day: 0 50     Types: Cigarettes    Smokeless tobacco: Never Used    Tobacco comment: started when he was about 24 yrs old   Substance and Sexual Activity    Alcohol use: No    Drug use: No    Sexual activity: Never   Lifestyle    Physical activity     Days per week: Not on file     Minutes per session: Not on file    Stress: Not on file   Relationships    Social connections     Talks on phone: Not on file     Gets together: Not on file     Attends Alevism service: Not on file     Active member of club or organization: Not on file     Attends meetings of clubs or organizations: Not on file     Relationship status: Not on file    Intimate partner violence     Fear of current or ex partner: Not on file     Emotionally abused: Not on file     Physically abused: Not on file     Forced sexual activity: Not on file   Other Topics Concern    Not on file   Social History Narrative    Not on file     Family History   Problem Relation Age of Onset    Heart attack Family         at age 80       [address], [address] Internal Medicine PGY-3    [facility]  [address] [facility]  [facility]  [phone]

## 2020-10-22 DIAGNOSIS — I10 HYPERTENSION: ICD-10-CM

## 2020-10-23 RX ORDER — HYDROCHLOROTHIAZIDE 25 MG/1
TABLET ORAL
Qty: 90 TABLET | Refills: 2 | Status: SHIPPED | OUTPATIENT
Start: 2020-10-23 | End: 2021-07-15

## 2020-11-04 DIAGNOSIS — E11.9 DIABETES MELLITUS (HCC): ICD-10-CM

## 2020-11-12 DIAGNOSIS — J45.20 MILD INTERMITTENT ASTHMA, UNSPECIFIED WHETHER COMPLICATED: ICD-10-CM

## 2020-11-13 RX ORDER — FLUTICASONE PROPIONATE AND SALMETEROL XINAFOATE 230; 21 UG/1; UG/1
AEROSOL, METERED RESPIRATORY (INHALATION)
Qty: 12 INHALER | Refills: 4 | Status: SHIPPED | OUTPATIENT
Start: 2020-11-13 | End: 2021-05-05

## 2020-11-24 ENCOUNTER — OFFICE VISIT (OUTPATIENT)
Dept: INTERNAL MEDICINE CLINIC | Facility: CLINIC | Age: 75
End: 2020-11-24

## 2020-11-24 VITALS
BODY MASS INDEX: 28.19 KG/M2 | DIASTOLIC BLOOD PRESSURE: 80 MMHG | HEIGHT: 68 IN | SYSTOLIC BLOOD PRESSURE: 146 MMHG | HEART RATE: 76 BPM | TEMPERATURE: 97.1 F | WEIGHT: 186 LBS | OXYGEN SATURATION: 99 %

## 2020-11-24 DIAGNOSIS — E11.40 TYPE 2 DIABETES MELLITUS WITH DIABETIC NEUROPATHY, WITHOUT LONG-TERM CURRENT USE OF INSULIN (HCC): Primary | ICD-10-CM

## 2020-11-24 DIAGNOSIS — I10 ESSENTIAL HYPERTENSION: ICD-10-CM

## 2020-11-24 DIAGNOSIS — E53.8 VITAMIN B12 DEFICIENCY: ICD-10-CM

## 2020-11-24 DIAGNOSIS — E03.9 HYPOTHYROIDISM, UNSPECIFIED TYPE: ICD-10-CM

## 2020-11-24 LAB — SL AMB POCT HEMOGLOBIN AIC: 6.4 (ref ?–6.5)

## 2020-11-24 PROCEDURE — 99213 OFFICE O/P EST LOW 20 MIN: CPT | Performed by: INTERNAL MEDICINE

## 2020-11-24 PROCEDURE — 83036 HEMOGLOBIN GLYCOSYLATED A1C: CPT | Performed by: INTERNAL MEDICINE

## 2021-01-12 DIAGNOSIS — E03.9 HYPOTHYROIDISM: ICD-10-CM

## 2021-01-12 RX ORDER — LEVOTHYROXINE SODIUM 137 UG/1
TABLET ORAL
Qty: 90 TABLET | Refills: 1 | Status: SHIPPED | OUTPATIENT
Start: 2021-01-12 | End: 2021-07-19

## 2021-02-19 ENCOUNTER — IMMUNIZATIONS (OUTPATIENT)
Dept: FAMILY MEDICINE CLINIC | Facility: HOSPITAL | Age: 76
End: 2021-02-19

## 2021-02-19 DIAGNOSIS — Z23 ENCOUNTER FOR IMMUNIZATION: Primary | ICD-10-CM

## 2021-02-19 PROCEDURE — 91301 SARS-COV-2 / COVID-19 MRNA VACCINE (MODERNA) 100 MCG: CPT

## 2021-02-19 PROCEDURE — 0011A SARS-COV-2 / COVID-19 MRNA VACCINE (MODERNA) 100 MCG: CPT

## 2021-03-03 DIAGNOSIS — E08.40 DIABETES MELLITUS DUE TO UNDERLYING CONDITION WITH DIABETIC NEUROPATHY, WITHOUT LONG-TERM CURRENT USE OF INSULIN (HCC): ICD-10-CM

## 2021-03-03 RX ORDER — PEN NEEDLE, DIABETIC 30 GX3/16"
NEEDLE, DISPOSABLE MISCELLANEOUS DAILY
Qty: 300 EACH | Refills: 3 | Status: SHIPPED | OUTPATIENT
Start: 2021-03-03

## 2021-03-03 NOTE — TELEPHONE ENCOUNTER
Name of medication, dose, quantity and frequency  Requested Prescriptions     Pending Prescriptions Disp Refills    Insulin Pen Needle (Pen Needles) 31G X 8 MM MISC 300 each 3     Sig: Use daily       Number of refills left:    Amount of medication left:    Pharmacy verified and updated-Yes    Additional information: Patient is completely out

## 2021-03-08 ENCOUNTER — OFFICE VISIT (OUTPATIENT)
Dept: INTERNAL MEDICINE CLINIC | Facility: CLINIC | Age: 76
End: 2021-03-08

## 2021-03-08 VITALS
DIASTOLIC BLOOD PRESSURE: 80 MMHG | HEART RATE: 92 BPM | TEMPERATURE: 98.1 F | SYSTOLIC BLOOD PRESSURE: 167 MMHG | WEIGHT: 186 LBS | BODY MASS INDEX: 28.28 KG/M2

## 2021-03-08 DIAGNOSIS — N52.9 ERECTILE DYSFUNCTION, UNSPECIFIED ERECTILE DYSFUNCTION TYPE: ICD-10-CM

## 2021-03-08 DIAGNOSIS — I10 ESSENTIAL HYPERTENSION: ICD-10-CM

## 2021-03-08 DIAGNOSIS — E03.9 HYPOTHYROIDISM, UNSPECIFIED TYPE: ICD-10-CM

## 2021-03-08 DIAGNOSIS — Z79.4 TYPE 2 DIABETES MELLITUS WITH DIABETIC NEUROPATHY, WITH LONG-TERM CURRENT USE OF INSULIN (HCC): Primary | ICD-10-CM

## 2021-03-08 DIAGNOSIS — E11.40 TYPE 2 DIABETES MELLITUS WITH DIABETIC NEUROPATHY, WITH LONG-TERM CURRENT USE OF INSULIN (HCC): Primary | ICD-10-CM

## 2021-03-08 LAB — SL AMB POCT HEMOGLOBIN AIC: 6.6 (ref ?–6.5)

## 2021-03-08 PROCEDURE — 1160F RVW MEDS BY RX/DR IN RCRD: CPT | Performed by: INTERNAL MEDICINE

## 2021-03-08 PROCEDURE — 99213 OFFICE O/P EST LOW 20 MIN: CPT | Performed by: INTERNAL MEDICINE

## 2021-03-08 PROCEDURE — 3077F SYST BP >= 140 MM HG: CPT | Performed by: INTERNAL MEDICINE

## 2021-03-08 PROCEDURE — 3725F SCREEN DEPRESSION PERFORMED: CPT | Performed by: INTERNAL MEDICINE

## 2021-03-08 PROCEDURE — 3044F HG A1C LEVEL LT 7.0%: CPT | Performed by: INTERNAL MEDICINE

## 2021-03-08 PROCEDURE — 3079F DIAST BP 80-89 MM HG: CPT | Performed by: INTERNAL MEDICINE

## 2021-03-08 PROCEDURE — 83036 HEMOGLOBIN GLYCOSYLATED A1C: CPT | Performed by: INTERNAL MEDICINE

## 2021-03-08 RX ORDER — SILDENAFIL 25 MG/1
25 TABLET, FILM COATED ORAL DAILY PRN
Qty: 30 TABLET | Refills: 0 | Status: SHIPPED | OUTPATIENT
Start: 2021-03-08 | End: 2022-01-19

## 2021-03-08 NOTE — PROGRESS NOTES
[facility] Internal Medicine [facility]    ASSESSMENT/PLAN:  Plan:  DM2  a1c today 6 6  On lantus 22U daily and 5U shortacting with dinner and breakfast, continue  Continue metformin 850mg BID  Patient was requested to bring a log of his blood sugars to his next visit  a1c is well controlled but then patient reports some elevated fasting glucoses in the morning  Hypothyroidism  TSH remains pending  TSH 2 090 in 2019  On levothyroxine 137  Pt was reminded today to have labwork done at any [facility] facility    HTN  Continue amlodipine 5mg, lisinopril 20mg, hctz 25  BP elevated today at 167/80, pt forgot to take lisinopril this AM which he normally takes in the morning  Requested pt keep a log of blood pressure measurements and bring to visit tomorrow  Erectile dysfunction  Sildenafil script printed and provided to patient to try at other pharmacies as he reports high cost at [facility]     Health Maintenance:  Patient reports receiving flu vaccine at the start of flu season at [facility]  Patient is due for Medicare annual wellness visit, discuss at next visit  Depression screening due  Follow-up:  Five weeks for diabetes and for hypertension - patient has requested to bring log of his blood glucoses and of his blood pressures to this visit    CHIEF COMPLAINT: chronic conditions followup    HISTORY OF PRESENT ILLNESS:  69 yo M with PMHx of DM2 and hypothyroidism presenting for 3 month followup for DM, HTN  Pt is overall doing well  Pt reports one episode of hyperglycemia recently because patient had eaten cake and then his blood sugar was over 200  Patient reports that he previously had blurry vision during COVID infection but it is now completely resolved for the last few months  Patient reports that his morning glucoses are sometimes 180-200 but then improved during the day  Pt reported that his sildenafil at [facility] is very expensive and asked for cheaper alternative   Pt is [place]ian speaking and HPI and plan discussed with  line  Patient's wife joined exam room half way through visit  Patient reports that he forgot to take his lisinopril this morning because he and his wife for taking care of their young granddaughter and they were in a rush to leave the house  Review of Systems   Constitutional: Negative for chills, fatigue and fever  HENT: Negative for rhinorrhea and sore throat  Eyes: Positive for visual disturbance (now completely resolved)  Respiratory: Negative for choking, chest tightness, shortness of breath, wheezing and stridor  Cardiovascular: Negative for chest pain, palpitations and leg swelling  Gastrointestinal: Negative for abdominal pain, blood in stool, constipation, diarrhea, nausea and vomiting  Genitourinary: Negative for hematuria  Neurological: Negative for dizziness and light-headedness  OBJECTIVE:  Vitals:    03/08/21 1021   BP: 167/80   BP Location: Left arm   Patient Position: Sitting   Cuff Size: Standard   Pulse: 92   Temp: 98 1 °F (36 7 °C)   TempSrc: Temporal   Weight: 84 4 kg (186 lb)       Physical Exam  Constitutional:       Appearance: He is well-developed  HENT:      Head: Normocephalic and atraumatic  Nose: Nose normal    Eyes:      General:         Right eye: No discharge  Left eye: No discharge  Cardiovascular:      Rate and Rhythm: Normal rate and regular rhythm  Heart sounds: Normal heart sounds  No murmur  No friction rub  No gallop  Pulmonary:      Effort: Pulmonary effort is normal  No respiratory distress  Breath sounds: Normal breath sounds  No wheezing or rales  Abdominal:      General: Bowel sounds are normal  There is no distension  Palpations: Abdomen is soft  Tenderness: There is no abdominal tenderness  There is no guarding  Skin:     General: Skin is warm and dry  Neurological:      Mental Status: He is alert and oriented to person, place, and time     Psychiatric:         Speech: Speech normal          Current Outpatient Medications:     Advair -21 MCG/ACT inhaler, TAKE 2 PUFFS BY MOUTH TWICE A DAY, Disp: 12 Inhaler, Rfl: 4    albuterol (VENTOLIN HFA) 90 mcg/act inhaler, Inhale 2 puffs every 4 (four) hours as needed for wheezing or shortness of breath, Disp: 18 g, Rfl: 2    Alcohol Swabs (ALCOHOL PADS) 70 % PADS, , Disp: , Rfl:     amLODIPine (NORVASC) 5 mg tablet, Take 1 tablet (5 mg total) by mouth daily, Disp: 90 tablet, Rfl: 1    bisacodyl (DULCOLAX) 5 mg EC tablet, Take by mouth, Disp: , Rfl:     Blood Glucose Monitoring Suppl (OneTouch Verio) w/Device KIT, Check blood glucose three times daily before each meal, Disp: 1 kit, Rfl: 0    cyanocobalamin ([employer] Vitamin B-12) 1000 MCG tablet, Take 1 tablet (1,000 mcg total) by mouth daily, Disp: 90 tablet, Rfl: 2    econazole nitrate 1 % cream, Apply topically daily, Disp: , Rfl:     fluticasone (FLONASE) 50 mcg/act nasal spray, 2 sprays into each nostril daily, Disp: 2 Bottle, Rfl: 2    glucose blood (OneTouch Verio) test strip, Check blood glucose three times daily before meals and bring a log of your measurements to your next appointment  , Disp: 300 each, Rfl: 7    hydrochlorothiazide (HYDRODIURIL) 25 mg tablet, TAKE 1 TABLET BY MOUTH EVERY DAY, Disp: 90 tablet, Rfl: 2    ibuprofen (MOTRIN) 600 mg tablet, Take 1 tablet (600 mg total) by mouth every 6 (six) hours as needed for mild pain, Disp: 30 tablet, Rfl: 0    insulin glargine (Lantus SoloStar) 100 units/mL injection pen, Inject 22 Units under the skin daily at bedtime, Disp: 15 pen, Rfl: 3    Insulin Pen Needle (Pen Needles) 31G X 8 MM MISC, Use daily, Disp: 300 each, Rfl: 3    Lancets (FREESTYLE) lancets, by Other route as needed (As needed), Disp: 100 each, Rfl: 3    levothyroxine 137 mcg tablet, TAKE 1 TABLET BY MOUTH EVERY DAY, Disp: 90 tablet, Rfl: 1    lisinopril (ZESTRIL) 20 mg tablet, TAKE 1 TABLET BY MOUTH EVERY DAY, Disp: 90 tablet, Rfl: 3    metFORMIN (GLUCOPHAGE) 850 mg tablet, TAKE 1 TABLET (850 MG TOTAL) BY MOUTH 2 (TWO) TIMES A DAY WITH MEALS, Disp: 180 tablet, Rfl: 2    methocarbamol (ROBAXIN) 500 mg tablet, Take 1 tablet (500 mg total) by mouth 4 (four) times a day as needed for muscle spasms, Disp: 30 tablet, Rfl: 1    montelukast (SINGULAIR) 10 mg tablet, Take 1 tablet (10 mg total) by mouth daily, Disp: 90 tablet, Rfl: 3    naproxen (NAPROSYN) 500 mg tablet, TAKE 1 TABLET BY MOUTH EVERY 12 HOURS AS NEEDED FOR MILD PAIN  TAKE WITH MEALS , Disp: , Rfl: 0    NovoLOG FlexPen 100 units/mL SOPN, Inject 5 units with breakfast and with dinner, Disp: 4 pen, Rfl: 3    polyethylene glycol (GLYCOLAX) powder, Mix entire tub in 64 oz of Gatorade (no red, orange, or purple), Disp: 238 g, Rfl: 0    sildenafil (VIAGRA) 25 MG tablet, Take 1 tablet (25 mg total) by mouth daily as needed for erectile dysfunction, Disp: 30 tablet, Rfl: 0    simvastatin (ZOCOR) 80 mg tablet, Take 1 tablet (80 mg total) by mouth daily at bedtime, Disp: 90 tablet, Rfl: 3    Blood Pressure Monitoring (BLOOD PRESSURE MONITOR/M CUFF) MISC, by Does not apply route daily Check blood pressure once daily and keep a log to bring to appointment  (Patient not taking: Reported on 11/24/2020), Disp: 1 each, Rfl: 0    sildenafil (VIAGRA) 25 MG tablet, Take 1 tablet (25 mg total) by mouth daily as needed for erectile dysfunction, Disp: 30 tablet, Rfl: 0    Past Medical History:   Diagnosis Date    Arthritis     Asthma     Colon polyps     Community acquired pneumonia     last assessed: 5/1/2014    Diabetes mellitus ([facility] [address]     Hepatitis C     High cholesterol     Hypertension      Past Surgical History:   Procedure Laterality Date    COLONOSCOPY      COLONOSCOPY W/ POLYPECTOMY      LITHOTRIPSY      MULTIPLE TOOTH EXTRACTIONS      CT COLONOSCOPY FLX DX W/COLLJ SPEC WHEN PFRMD N/A 5/17/2018    Procedure: COLONOSCOPY;  Surgeon: Estrellita Sotelo MD;  Location: [facility];   Service: Gastroenterology     Social History     Socioeconomic History    Marital status: /Civil Union     Spouse name: Not on file    Number of children: Not on file    Years of education: Not on file    Highest education level: Not on file   Occupational History    Occupation: retired    Social Needs    Financial resource strain: Not hard at all   [place] insecurity     Worry: Never true     Inability: Never true   AdmitSee needs     Medical: No     Non-medical: No   Tobacco Use    Smoking status: Current Every Day Smoker     Packs/day: 0 50     Types: Cigarettes    Smokeless tobacco: Never Used    Tobacco comment: started when he was about 24 yrs old   Substance and Sexual Activity    Alcohol use: No    Drug use: No    Sexual activity: Not Currently   Lifestyle    Physical activity     Days per week: Not on file     Minutes per session: Not on file    Stress: Not on file   Relationships    Social connections     Talks on phone: Not on file     Gets together: Not on file     Attends Voodoo service: Not on file     Active member of club or organization: Not on file     Attends meetings of clubs or organizations: Not on file     Relationship status: Not on file    Intimate partner violence     Fear of current or ex partner: Not on file     Emotionally abused: Not on file     Physically abused: Not on file     Forced sexual activity: Not on file   Other Topics Concern    Not on file   Social History Narrative    Not on file     Family History   Problem Relation Age of Onset    Heart attack Family         at age 80   [employer] No Known Problems Mother     No Known Problems Father     Diabetes Sister     Diabetes Brother        SYSCO, DO  [address] Internal Medicine PGY-3    [facility]  [address] [facility] [address]  [place], [address] [facility]  [phone]

## 2021-03-08 NOTE — PATIENT INSTRUCTIONS
10% - bad control"> 10% - bad control,Hemoglobin A1c (HbA1c) greater than 10% indicating poor diabetic control,Haemoglobin A1c greater than 10% indicating poor diabetic control">   Diabetes mellitus tipo 2 en adultos, cuidados ambulatorios   INFORMACIÓN GENERAL:   La diabetes mellitus tipo 2 en adultos  es marli enfermedad que afecta la forma en que el cuerpo utiliza la glucosa (azúcar)  La insulina ayuda a extraer el azúcar de la fernando para que pueda usarse en la producción de energía  Generalmente, cuando el nivel de azúcar Russ, el páncreas produce más insulina  La diabetes tipo 2 se desarrolla ya sea porque el cuerpo no puede producir suficiente insulina, o no la puede usar correctamente  Después de [employer], talley páncreas podría dejar de producir insulina  Síntomas comunes incluyen los siguientes:   · Más hambre o sed de la usual    · Necesidad frecuente de orinar     · Pérdida de peso sin tratar     · Visión borrosa  Busque cuidados inmediatos para los siguientes síntomas:   · Dolor abdominal severo o dolor que se propaga hacia talley espalda  Es probable que usted también vomite  · Dificultad para permanecer despierto o concentrado    · Temblores o sudoración    · Visión borrosa o doble    · Aliento con olor a frutas o adelia    · Respiración profunda y dificultosa o rápida y superficial    · Ritmo cardíaco rápido y débil  El tratamiento para la diabetes mellitus tipo 2  incluye mantener talley nivel de azúcar en el fernando a un rango normal  Usted debe comer los alimentos correctos y ejercitarse con regularidad  Además es probable que necesite medicamentos si no puede controlar talley nivel de azúcar en la fernando con nutrición y ejercicio  Maneje la diabetes mellitus tipo 2:   · Revise talley nivel de azúcar en la fernando  A usted le enseñarán cómo revisar marli pequeña gota de John black en un medidor de glucosa  Pregúntele a talley proveedor de linh cuándo y con cuánta frecuencia es necesario revisar daksha el día   Ravindra Rushing pregúntele a talley proveedor de linh cuáles deberían ser nithin niveles de azúcar en la fernando cuando usted se los revisa  · Mantenga un registro de los carbohidratos (azúcares y almidones)  Talley nivel de azúcar en la fernando puede elevarse demasiado si usted come demasiados carbohidratos  Talley dietista le ayudará a planear comidas y meriendas que tengan la cantidad correcta de carbohidratos  · Consuma alimentos bajos en grasas  Marely Sunny son el briana sin piel y la leche descremada  · Consuma menos sodio (sal)  Algunos ejemplos de alimentos altos en sodio que hay que limitar son la salsa de soya, las mello tostadas y la sopa  No le agregue sal a la comida que usted cocina  Limite talley uso de sal de epps  · Coma alimentos altos en fibra  Alimentos que son buena anderson de fibra incluyen los vegetales, el pan integral y los frijoles  · Limite el alcohol  El alcohol afecta talley nivel de azúcar en la fernando y puede dificultar el [place] de talley diabetes  Las mujeres deben limitar el consumo de alcohol a 1 bebida al día  Los hombres deben limitarlo a 2 debidas al día  Marli bebida de alcohol equivale a 12 onzas de cerveza, 5 onzas de vino o 1½ onzas de licor  · Ejercítese regularmente  El ejercicio puede ayudar a mantener estable talley nivel de azúcar en la fernando, al mismo tiempo que disminuye talley riesgo de enfermedad cardíaca y le ayuda a perder peso  Ejercítese por al menos 30 minutos, 5 días a la semana  Loreatha Merry de fortalecimiento muscular 2 días a la semana  Colabore con talley proveedor de linh para crear un plan de ejercicios  · Revise nithin pies a diario  para greta si tienen heridas o llagas abiertas  Pregúntele a talley proveedor de [employer] que usted puede hacer si tiene marli llaga abierta  · Deje de fumar  Si usted fuma, nunca es tarde para dejar de hacerlo  El fumar puede [employer] problemas que puedan ocurrir con la diabetes   Pregúntele a talley proveedor de [employer] información para aprender a dejar de fumar si usted tiene dificultad para hacerlo  · Pregunte sobre castaneda peso:  Pregúntele a nithin proveedores de linh si usted necesita perder peso y cuánto debe perder  Pídales que le ayuden con un programa de perdida de [place]  Incluso perder unas 10 a 15 libras puede ayudarle a manejar castaneda nivel de azúcar en la fernando  · Tenga a mano castaneda identificación de [employer]  Use un brazalete de alerta médica o lleve consigo marli tarjeta que diga que usted tiene diabetes  Pregúntele a castaneda proveedor de linh dónde conseguir estos artículos  · Pregunte sobre vacunas  La diabetes lo pone a usted en riesgo de enfermedad seria si usted se resfría, tiene neumonía o hepatitis  Pregúntele a castaneda proveedor de linh si usted debe ponerse las vacunas contra la gripe, neumonía o hepatitis B, y cuándo ponérselas  Programe marli cosme con castaneda proveedor de [employer] se le haya indicado: Anote nithin preguntas para que se acuerde de hacerlas daksha nithin visitas  ACUERDOS SOBRE CASTANEDA CUIDADO:   Usted tiene el derecho de participar en la planificación de castaneda cuidado  Aprenda todo lo que pueda sobre castaneda condición y judith darle tratamiento  Discuta con nithin médicos nithin opciones de tratamiento para juntos decidir el cuidado que usted quiere recibir  Usted siempre tiene el derecho a rechazar castaneda tratamiento  Esta información es sólo para uso en educación  Castaneda intención no es darle un consejo médico sobre enfermedades o tratamientos  Colsulte con castaneda Isaac Mad farmacéutico antes de seguir cualquier régimen médico para saber si es seguro y efectivo para usted  © 2014 [address] is for End User's use only and may not be sold, redistributed or otherwise used for commercial purposes  All illustrations and images included in CareNotes® are the copyrighted property of A D A M , Inc  or Roge Rausch

## 2021-03-19 ENCOUNTER — IMMUNIZATIONS (OUTPATIENT)
Dept: FAMILY MEDICINE CLINIC | Facility: HOSPITAL | Age: 76
End: 2021-03-19

## 2021-03-19 DIAGNOSIS — Z23 ENCOUNTER FOR IMMUNIZATION: Primary | ICD-10-CM

## 2021-03-19 PROCEDURE — 0012A SARS-COV-2 / COVID-19 MRNA VACCINE (MODERNA) 100 MCG: CPT

## 2021-03-19 PROCEDURE — 91301 SARS-COV-2 / COVID-19 MRNA VACCINE (MODERNA) 100 MCG: CPT

## 2021-03-24 NOTE — TELEPHONE ENCOUNTER
Attempted to call report on patient to HAI Hsieh  03/24/21 2222 This patient is scheduled on 05/17/2018 for their Colonoscopy procedure  Please send prep to the pharmacy  I spoke with the patient and made him/her aware of their appointment date, I mailed out the instructions      According to the referral order form, this patient needs to hold the following meds: Aspirin 5 days prior

## 2021-04-06 DIAGNOSIS — I10 ESSENTIAL HYPERTENSION: ICD-10-CM

## 2021-04-07 PROCEDURE — 4010F ACE/ARB THERAPY RXD/TAKEN: CPT | Performed by: INTERNAL MEDICINE

## 2021-04-07 RX ORDER — LISINOPRIL 20 MG/1
TABLET ORAL
Qty: 90 TABLET | Refills: 5 | Status: SHIPPED | OUTPATIENT
Start: 2021-04-07 | End: 2022-06-15 | Stop reason: SDUPTHER

## 2021-05-01 DIAGNOSIS — J45.20 MILD INTERMITTENT ASTHMA, UNSPECIFIED WHETHER COMPLICATED: ICD-10-CM

## 2021-05-05 RX ORDER — FLUTICASONE PROPIONATE AND SALMETEROL XINAFOATE 230; 21 UG/1; UG/1
AEROSOL, METERED RESPIRATORY (INHALATION)
Qty: 36 G | Refills: 6 | Status: SHIPPED | OUTPATIENT
Start: 2021-05-05

## 2021-05-12 ENCOUNTER — TELEPHONE (OUTPATIENT)
Dept: INTERNAL MEDICINE CLINIC | Facility: CLINIC | Age: 76
End: 2021-05-12

## 2021-05-17 DIAGNOSIS — E08.40 DIABETES MELLITUS DUE TO UNDERLYING CONDITION WITH DIABETIC NEUROPATHY, WITHOUT LONG-TERM CURRENT USE OF INSULIN (HCC): ICD-10-CM

## 2021-05-17 RX ORDER — INSULIN GLARGINE 100 [IU]/ML
22 INJECTION, SOLUTION SUBCUTANEOUS
Qty: 15 ML | Refills: 3 | Status: SHIPPED | OUTPATIENT
Start: 2021-05-17 | End: 2022-02-21 | Stop reason: SDUPTHER

## 2021-05-26 ENCOUNTER — VBI (OUTPATIENT)
Dept: ADMINISTRATIVE | Facility: OTHER | Age: 76
End: 2021-05-26

## 2021-06-23 ENCOUNTER — VBI (OUTPATIENT)
Dept: ADMINISTRATIVE | Facility: OTHER | Age: 76
End: 2021-06-23

## 2021-07-14 DIAGNOSIS — I10 HYPERTENSION: ICD-10-CM

## 2021-07-15 RX ORDER — HYDROCHLOROTHIAZIDE 25 MG/1
TABLET ORAL
Qty: 90 TABLET | Refills: 2 | Status: SHIPPED | OUTPATIENT
Start: 2021-07-15 | End: 2022-01-19 | Stop reason: SDUPTHER

## 2021-08-03 DIAGNOSIS — E11.9 DIABETES MELLITUS (HCC): ICD-10-CM

## 2021-08-06 DIAGNOSIS — E53.8 VITAMIN B12 DEFICIENCY: ICD-10-CM

## 2021-08-06 RX ORDER — OMEGA-3/DHA/EPA/FISH OIL 35-113.5MG
TABLET,CHEWABLE ORAL
Qty: 90 TABLET | Refills: 2 | Status: SHIPPED | OUTPATIENT
Start: 2021-08-06 | End: 2022-05-16

## 2021-09-24 DIAGNOSIS — E11.40 TYPE 2 DIABETES MELLITUS WITH DIABETIC NEUROPATHY, WITHOUT LONG-TERM CURRENT USE OF INSULIN (HCC): ICD-10-CM

## 2021-09-24 NOTE — TELEPHONE ENCOUNTER
After reviewing last visit notes from last visit to one year ago I couldn't find a statement where instruct patient should be continued with his NovoLOG FlexPen 100 units/mL SOPN, just said to continue using his Lantus  Last A1c was done in March/2021 and was 6 6  There no upcoming appt for follow up and no labs ordered  Please advise  Thank You

## 2021-09-27 RX ORDER — INSULIN ASPART 100 [IU]/ML
INJECTION, SOLUTION INTRAVENOUS; SUBCUTANEOUS
Qty: 12 ML | Refills: 3 | Status: SHIPPED | OUTPATIENT
Start: 2021-09-27 | End: 2022-03-03 | Stop reason: SDUPTHER

## 2021-11-05 ENCOUNTER — VBI (OUTPATIENT)
Dept: ADMINISTRATIVE | Facility: OTHER | Age: 76
End: 2021-11-05

## 2021-11-16 ENCOUNTER — VBI (OUTPATIENT)
Dept: ADMINISTRATIVE | Facility: OTHER | Age: 76
End: 2021-11-16

## 2022-01-19 ENCOUNTER — OFFICE VISIT (OUTPATIENT)
Dept: INTERNAL MEDICINE CLINIC | Facility: CLINIC | Age: 77
End: 2022-01-19

## 2022-01-19 VITALS
TEMPERATURE: 97.8 F | WEIGHT: 185.4 LBS | SYSTOLIC BLOOD PRESSURE: 152 MMHG | DIASTOLIC BLOOD PRESSURE: 68 MMHG | HEART RATE: 96 BPM | BODY MASS INDEX: 28.1 KG/M2 | OXYGEN SATURATION: 98 % | HEIGHT: 68 IN

## 2022-01-19 DIAGNOSIS — M17.10 PRIMARY OSTEOARTHRITIS OF KNEE, UNSPECIFIED LATERALITY: ICD-10-CM

## 2022-01-19 DIAGNOSIS — E03.9 HYPOTHYROIDISM: ICD-10-CM

## 2022-01-19 DIAGNOSIS — E11.40 TYPE 2 DIABETES MELLITUS WITH DIABETIC NEUROPATHY, WITH LONG-TERM CURRENT USE OF INSULIN (HCC): Primary | ICD-10-CM

## 2022-01-19 DIAGNOSIS — I10 ESSENTIAL HYPERTENSION: ICD-10-CM

## 2022-01-19 DIAGNOSIS — I10 HYPERTENSION: ICD-10-CM

## 2022-01-19 DIAGNOSIS — E03.9 HYPOTHYROIDISM, UNSPECIFIED TYPE: ICD-10-CM

## 2022-01-19 DIAGNOSIS — R01.1 SYSTOLIC MURMUR: ICD-10-CM

## 2022-01-19 DIAGNOSIS — B18.2 CHRONIC HEPATITIS C WITHOUT HEPATIC COMA (HCC): Chronic | ICD-10-CM

## 2022-01-19 DIAGNOSIS — J45.20 MILD INTERMITTENT ASTHMA, UNSPECIFIED WHETHER COMPLICATED: ICD-10-CM

## 2022-01-19 DIAGNOSIS — E78.5 HYPERLIPIDEMIA, UNSPECIFIED HYPERLIPIDEMIA TYPE: ICD-10-CM

## 2022-01-19 DIAGNOSIS — E53.8 VITAMIN B12 DEFICIENCY: ICD-10-CM

## 2022-01-19 DIAGNOSIS — Z79.4 TYPE 2 DIABETES MELLITUS WITH DIABETIC NEUROPATHY, WITH LONG-TERM CURRENT USE OF INSULIN (HCC): Primary | ICD-10-CM

## 2022-01-19 PROBLEM — R59.0 LYMPHADENOPATHY, ANTERIOR CERVICAL: Chronic | Status: RESOLVED | Noted: 2018-04-17 | Resolved: 2022-01-19

## 2022-01-19 PROBLEM — M70.42: Status: RESOLVED | Noted: 2019-10-21 | Resolved: 2022-01-19

## 2022-01-19 PROBLEM — Z12.11 SCREENING FOR COLON CANCER: Status: RESOLVED | Noted: 2019-05-01 | Resolved: 2022-01-19

## 2022-01-19 LAB
LEFT EYE DIABETIC RETINOPATHY: NORMAL
LEFT EYE IMAGE QUALITY: NORMAL
LEFT EYE MACULAR EDEMA: NORMAL
LEFT EYE OTHER RETINOPATHY: NORMAL
RIGHT EYE DIABETIC RETINOPATHY: NORMAL
RIGHT EYE IMAGE QUALITY: NORMAL
RIGHT EYE MACULAR EDEMA: NORMAL
RIGHT EYE OTHER RETINOPATHY: NORMAL
SEVERITY (EYE EXAM): NORMAL
SL AMB POCT HEMOGLOBIN AIC: 6.3 (ref ?–6.5)

## 2022-01-19 PROCEDURE — 99213 OFFICE O/P EST LOW 20 MIN: CPT | Performed by: INTERNAL MEDICINE

## 2022-01-19 PROCEDURE — 3077F SYST BP >= 140 MM HG: CPT | Performed by: INTERNAL MEDICINE

## 2022-01-19 PROCEDURE — 3078F DIAST BP <80 MM HG: CPT | Performed by: INTERNAL MEDICINE

## 2022-01-19 PROCEDURE — 83036 HEMOGLOBIN GLYCOSYLATED A1C: CPT | Performed by: INTERNAL MEDICINE

## 2022-01-19 RX ORDER — LEVOTHYROXINE SODIUM 137 UG/1
137 TABLET ORAL DAILY
Qty: 90 TABLET | Refills: 1 | Status: SHIPPED | OUTPATIENT
Start: 2022-01-19 | End: 2022-07-13

## 2022-01-19 RX ORDER — HYDROCHLOROTHIAZIDE 25 MG/1
25 TABLET ORAL DAILY
Qty: 90 TABLET | Refills: 2 | Status: SHIPPED | OUTPATIENT
Start: 2022-01-19

## 2022-01-19 NOTE — PROGRESS NOTES
[address] [facility]  INTERNAL MEDICINE OFFICE VISIT     PATIENT INFORMATION     Mukund Barton   78 y o  male   MRN: [id number]    ASSESSMENT/PLAN     Diagnoses and all orders for this visit:    Type 2 diabetes mellitus with diabetic neuropathy, with long-term current use of insulin (HCC)  A1c 6 3% today in clinic  Reports taking Lantus 22u QHS and Novolog 6u with breakfast and lunch  Also takes Metformin 850mg BID  Does not regularly take blood sugars at home  · Continue above regimen  · Recommend checking blood glucose (fastin/post prandial) once/week  · Diabetic foot and IRIS exam completed in clinic today  · Continue with diet and lifestyle modifications  · Call for refills of medications when needed  -     POCT hemoglobin A1c  -     CBC and differential; Future  -     Comprehensive metabolic panel; Future  -     IRIS Diabetic eye exam    Chronic hepatitis C without hepatic coma ([facility] [address] )  Patient with history of HepC Ab positive - not treated, but infection cleared with negative HCV RNA on 8/2 and 11/7/19  · Will check Hep A and Hep B immunity as patient unsure whether he completed vaccination series for either  · Discussed importance of receiving these vaccines  · Check CMP  -     Chronic Hepatitis Panel; Future    Hypothyroidism  Will refill levothyroxine 137mcg daily  Advised patient to have repeat TSH/T4 done - will adjust dose if needed  -     CBC and differential; Future  -     Comprehensive metabolic panel; Future  -     TSH, 3rd generation with Free T4 reflex; Future  -     levothyroxine 137 mcg tablet; Take 1 tablet (137 mcg total) by mouth daily    Mild intermittent asthma, unspecified whether complicated  Continue advair, albuterol, and singulair    Essential hypertension  Patient hypertensive on evaluation today with original /75 and repeat 152/68  Reports compliance with 2 medications, however, unsure what the names are   Patient advised to call clinic with names of meds and will adjust from there  · Patient called clinic once he returned home and advised he is currently taking Amlodipine 5mg daily and Lisinopril 20mg daily  · He is not currently taking HCTZ  · Will restart HCTZ 25mg daily - recommend patient monitor BP at home and repeat labs a few weeks after starting this medication  · Will ask clinic staff to call and update him on this medication adjustment  · If BP persistently high, will increase amlodipine dosing  -     hydrochlorothiazide (HYDRODIURIL) 25 mg tablet; Take 1 tablet (25 mg total) by mouth daily    Vitamin B12 deficiency  Continue Vitamin B12 supplementation - will recheck level with routine labwork  -     Vitamin B12; Future    Hyperlipidemia, unspecified hyperlipidemia type  Continue pravastatin - repeat lipid panel with routine lab work  -     Lipid Panel with Direct LDL reflex; Future    Systolic murmur  New systolic murmur noted on physical exam today  [place] best at lower left sternal border  · Patient currently asymptomatic  · Will order echocardiogram for evaluation of valvular function  -     Echo complete w/ contrast if indicated; Future    BMI Counseling: Body mass index is 28 19 kg/m²  The BMI is above normal  Nutrition recommendations include reducing portion sizes, decreasing overall calorie intake and 3-5 servings of fruits/vegetables daily  Exercise recommendations include exercising 3-5 times per week  Schedule a follow-up appointment in 3 months      HEALTH MAINTENANCE     Immunization History   Administered Date(s) Administered    COVID-19 MODERNA VACC 0 5 ML IM 02/19/2021, 03/19/2021    H1N1, All Formulations 01/20/2010    Hep B, adult 05/20/2019, 06/20/2019    INFLUENZA 09/21/2018    Influenza Quadrivalent, 6-35 Months IM 10/27/2016, 10/31/2017    Influenza, high dose seasonal 0 7 mL 09/21/2018, 10/04/2019    Influenza, recombinant, quadrivalent,injectable, preservative free 10/05/2020    Influenza, seasonal, injectable 11/08/2013, 10/31/2014, 10/27/2015    Pneumococcal Conjugate 13-Valent 01/30/2017    Pneumococcal Polysaccharide PPV23 12/05/2014    Tdap 01/30/2017     CHIEF COMPLAINT     Chief Complaint   Patient presents with    Follow-up      HISTORY OF PRESENT ILLNESS     79yo male with PMH significant for T2DM, hypothyroidism, hypertension, and B12 deficiency who presents to clinic for annual follow up  Patient currently without acute complaints, and only reports he needs refills on his thyroid medication  Endorses compliance with medications, although he does state he currently only takes 2 medicines for his blood pressure and cannot recall their names at this time  Reports had COVID and Flu vaccines done outside [facility]  Continues to smoke 1/2 PPD  REVIEW OF SYSTEMS     Review of Systems   Constitutional: Negative for chills, fatigue and fever  HENT: Negative for ear pain, rhinorrhea, sneezing, sore throat, tinnitus and trouble swallowing  Eyes: Negative for pain and visual disturbance  Respiratory: Negative for cough and shortness of breath  Cardiovascular: Negative for chest pain, palpitations and leg swelling  Gastrointestinal: Negative for abdominal pain, constipation, diarrhea, nausea and vomiting  Endocrine: Negative for polydipsia and polyuria  Genitourinary: Negative for difficulty urinating and dysuria  Musculoskeletal: Negative for arthralgias and back pain  Skin: Negative for color change and rash  Neurological: Negative for dizziness, weakness, light-headedness and headaches  Psychiatric/Behavioral: Negative for confusion  The patient is not nervous/anxious  OBJECTIVE     Vitals:    01/19/22 1409   BP: 162/75   BP Location: Right arm   Patient Position: Sitting   Cuff Size: Standard   Pulse: 96   Temp: 97 8 °F (36 6 °C)   TempSrc: Temporal   SpO2: 98%   Weight: 84 1 kg (185 lb 6 4 oz)   Height: 5' 8" (1 727 m)     Physical Exam  Vitals reviewed     Constitutional: General: He is awake  He is not in acute distress  Appearance: Normal appearance  He is well-developed  He is not ill-appearing, toxic-appearing or diaphoretic  HENT:      Head: Normocephalic and atraumatic  Right Ear: External ear normal       Left Ear: External ear normal       Nose: Nose normal       Mouth/Throat:      Mouth: Mucous membranes are moist       Pharynx: Oropharynx is clear  Eyes:      General:         Right eye: No discharge  Left eye: No discharge  Conjunctiva/sclera: Conjunctivae normal       Pupils: Pupils are equal, round, and reactive to light  Cardiovascular:      Rate and Rhythm: Normal rate and regular rhythm  Pulses: no weak pulses          Dorsalis pedis pulses are 2+ on the right side and 2+ on the left side  Posterior tibial pulses are 2+ on the right side and 2+ on the left side  Heart sounds: Murmur heard  Pulmonary:      Effort: Pulmonary effort is normal  No respiratory distress  Breath sounds: Normal breath sounds  Abdominal:      General: Bowel sounds are normal  There is no distension  Palpations: Abdomen is soft  Tenderness: There is no abdominal tenderness  Musculoskeletal:         General: No swelling or tenderness  Normal range of motion  Cervical back: Normal range of motion  Right lower leg: No edema  Left lower leg: No edema  Feet:      Right foot:      Skin integrity: No ulcer, skin breakdown, erythema, warmth, callus or dry skin  Left foot:      Skin integrity: No ulcer, skin breakdown, erythema, warmth, callus or dry skin  Skin:     General: Skin is warm and dry  Coloration: Skin is not jaundiced  Findings: No bruising  Neurological:      General: No focal deficit present  Mental Status: He is alert and oriented to person, place, and time  Motor: No weakness     Psychiatric:         Mood and Affect: Mood normal          Behavior: Behavior normal  Behavior is cooperative  Thought Content: Thought content normal      Patient's shoes and socks removed  Right Foot/Ankle   Right Foot Inspection  Skin Exam: skin normal and skin intact  No dry skin, no warmth, no callus, no erythema, no maceration, no abnormal color, no pre-ulcer, no ulcer and no callus  Toe Exam: ROM and strength within normal limits  Sensory   Vibration: intact  Proprioception: intact  Monofilament testing: intact    Vascular  Capillary refills: < 3 seconds  The right DP pulse is 2+  The right PT pulse is 2+  Left Foot/Ankle  Left Foot Inspection  Skin Exam: skin normal and skin intact  No dry skin, no warmth, no erythema, no maceration, normal color, no pre-ulcer, no ulcer and no callus  Toe Exam: ROM and strength within normal limits  Sensory   Vibration: intact  Proprioception: intact  Monofilament testing: intact    Vascular  Capillary refills: < 3 seconds  The left DP pulse is 2+  The left PT pulse is 2+  Assign Risk Category  No deformity present  No loss of protective sensation  No weak pulses  Risk: 0    CURRENT MEDICATIONS     Current Outpatient Medications:     Alcohol Swabs (ALCOHOL PADS) 70 % PADS, , Disp: , Rfl:     Blood Glucose Monitoring Suppl (OneTouch Verio) w/Device KIT, Check blood glucose three times daily before each meal, Disp: 1 kit, Rfl: 0    Blood Pressure Monitoring (BLOOD PRESSURE MONITOR/M CUFF) MISC, by Does not apply route daily Check blood pressure once daily and keep a log to bring to appointment  , Disp: 1 each, Rfl: 0    econazole nitrate 1 % cream, Apply topically daily, Disp: , Rfl:     glucose blood (OneTouch Verio) test strip, Check blood glucose three times daily before meals and bring a log of your measurements to your next appointment  , Disp: 300 each, Rfl: 7    Insulin Pen Needle (Pen Needles) 31G X 8 MM MISC, Use daily, Disp: 300 each, Rfl: 3    Lancets (FREESTYLE) lancets, by Other route as needed (As needed), Disp: 100 each, Rfl: 3    sildenafil (VIAGRA) 25 MG tablet, Take 1 tablet (25 mg total) by mouth daily as needed for erectile dysfunction, Disp: 30 tablet, Rfl: 0    Advair -21 MCG/ACT inhaler, TAKE 2 PUFFS BY MOUTH TWICE A DAY, Disp: 36 g, Rfl: 6    albuterol (VENTOLIN HFA) 90 mcg/act inhaler, Inhale 2 puffs every 4 (four) hours as needed for wheezing or shortness of breath, Disp: 18 g, Rfl: 2    amLODIPine (NORVASC) 5 mg tablet, Take 1 tablet (5 mg total) by mouth daily, Disp: 90 tablet, Rfl: 1    bisacodyl (DULCOLAX) 5 mg EC tablet, Take by mouth, Disp: , Rfl:     [employer] Vitamin B-12 1000 MCG tablet, TAKE 1 TABLET BY MOUTH EVERY DAY, Disp: 90 tablet, Rfl: 2    fluticasone (FLONASE) 50 mcg/act nasal spray, 2 sprays into each nostril daily, Disp: 2 Bottle, Rfl: 2    hydrochlorothiazide (HYDRODIURIL) 25 mg tablet, TAKE 1 TABLET BY MOUTH EVERY DAY, Disp: 90 tablet, Rfl: 2    ibuprofen (MOTRIN) 600 mg tablet, Take 1 tablet (600 mg total) by mouth every 6 (six) hours as needed for mild pain, Disp: 30 tablet, Rfl: 0    Lantus SoloStar 100 units/mL injection pen, INJECT 22 UNITS UNDER THE SKIN DAILY AT BEDTIME, Disp: 15 mL, Rfl: 3    levothyroxine 137 mcg tablet, TAKE 1 TABLET BY MOUTH EVERY DAY, Disp: 90 tablet, Rfl: 1    lisinopril (ZESTRIL) 20 mg tablet, TAKE 1 TABLET BY MOUTH EVERY DAY, Disp: 90 tablet, Rfl: 5    metFORMIN (GLUCOPHAGE) 850 mg tablet, TAKE 1 TABLET (850 MG TOTAL) BY MOUTH 2 (TWO) TIMES A DAY WITH MEALS, Disp: 180 tablet, Rfl: 2    methocarbamol (ROBAXIN) 500 mg tablet, Take 1 tablet (500 mg total) by mouth 4 (four) times a day as needed for muscle spasms, Disp: 30 tablet, Rfl: 1    montelukast (SINGULAIR) 10 mg tablet, TAKE 1 TABLET BY MOUTH EVERY DAY, Disp: 90 tablet, Rfl: 1    naproxen (NAPROSYN) 500 mg tablet, TAKE 1 TABLET BY MOUTH EVERY 12 HOURS AS NEEDED FOR MILD PAIN   TAKE WITH MEALS , Disp: , Rfl: 0    NovoLOG FlexPen 100 units/mL injection pen, Inject 5 units with breakfast and with dinner, Disp: 12 mL, Rfl: 3    polyethylene glycol (GLYCOLAX) powder, Mix entire tub in 64 oz of Gatorade (no red, orange, or purple), Disp: 238 g, Rfl: 0    sildenafil (VIAGRA) 25 MG tablet, Take 1 tablet (25 mg total) by mouth daily as needed for erectile dysfunction (Patient not taking: Reported on 1/19/2022 ), Disp: 30 tablet, Rfl: 0    simvastatin (ZOCOR) 80 mg tablet, TAKE 1 TABLET BY MOUTH DAILY AT BEDTIME, Disp: 90 tablet, Rfl: 3    PAST MEDICAL & SURGICAL HISTORY     Past Medical History:   Diagnosis Date    Arthritis     Asthma     Colon polyps     Community acquired pneumonia     last assessed: 5/1/2014    Diabetes mellitus ([facility] [address] )     Hepatitis C     High cholesterol     Hypertension      Past Surgical History:   Procedure Laterality Date    COLONOSCOPY      COLONOSCOPY W/ POLYPECTOMY      LITHOTRIPSY      MULTIPLE TOOTH EXTRACTIONS      FL COLONOSCOPY FLX DX W/COLLJ SPEC WHEN PFRMD N/A 5/17/2018    Procedure: COLONOSCOPY;  Surgeon: Caleb Millan MD;  Location: [facility];   Service: Gastroenterology     SOCIAL & FAMILY HISTORY     Social History     Socioeconomic History    Marital status: /Civil Union     Spouse name: Not on file    Number of children: Not on file    Years of education: Not on file    Highest education level: Not on file   Occupational History    Occupation: retired    Tobacco Use    Smoking status: Current Every Day Smoker     Packs/day: 0 50     Types: Cigarettes    Smokeless tobacco: Never Used    Tobacco comment: started when he was about 24 yrs old   Vaping Use    Vaping Use: Never used   Substance and Sexual Activity    Alcohol use: No    Drug use: No    Sexual activity: Not Currently   Other Topics Concern    Not on file   Social History Narrative    Not on file     Social Determinants of Health     Financial Resource Strain: Low Risk     Difficulty of Paying Living Expenses: Not hard at all   Food Insecurity: No Food Insecurity    Worried About Running Out of Food in the Last Year: Never true    Ran Out of Food in the Last Year: Never true   Transportation Needs: No Transportation Needs    Lack of Transportation (Medical): No    Lack of Transportation (Non-Medical): No   Physical Activity: Unknown    Days of Exercise per Week: Not on file    Minutes of Exercise per Session: 60 min   Stress: No Stress Concern Present    Feeling of Stress : Not at all   Social Connections: Moderately Isolated    Frequency of Communication with Friends and Family: More than three times a week    Frequency of Social Gatherings with Friends and Family: More than three times a week    Attends Advent Services: Never    Active Member of Clubs or Organizations: No    Attends Club or Organization Meetings: Never    Marital Status:    Intimate Partner Violence: Not on file   Housing Stability: [address] Unable to Pay for Housing in the Last Year: No    Number of [place] in the Last Year: 1    Unstable Housing in the Last Year: No     Social History     Substance and Sexual Activity   Alcohol Use No     Social History     Substance and Sexual Activity   Drug Use No     Social History     Tobacco Use   Smoking Status Current Every Day Smoker    Packs/day: 0 50    Types: Cigarettes   Smokeless Tobacco Never Used   Tobacco Comment    started when he was about 24 yrs old     Family History   Problem Relation Age of Onset    Heart attack Family         at age 80    No Known Problems Mother     No Known Problems Father     Diabetes Sister     Diabetes Brother      ==  [employer], DO  Internal Medicine Resident, PGY-2  [facility] [address] [place] , [address] [address] [facility], [address] [facility]  Office: [phone]  Fax: [phone]

## 2022-01-25 ENCOUNTER — APPOINTMENT (OUTPATIENT)
Dept: LAB | Facility: CLINIC | Age: 77
End: 2022-01-25
Payer: MEDICARE

## 2022-01-25 DIAGNOSIS — M17.10 PRIMARY OSTEOARTHRITIS OF KNEE, UNSPECIFIED LATERALITY: ICD-10-CM

## 2022-01-25 DIAGNOSIS — E53.8 VITAMIN B12 DEFICIENCY: ICD-10-CM

## 2022-01-25 DIAGNOSIS — E78.5 HYPERLIPIDEMIA, UNSPECIFIED HYPERLIPIDEMIA TYPE: ICD-10-CM

## 2022-01-25 DIAGNOSIS — Z79.4 TYPE 2 DIABETES MELLITUS WITH DIABETIC NEUROPATHY, WITH LONG-TERM CURRENT USE OF INSULIN (HCC): ICD-10-CM

## 2022-01-25 DIAGNOSIS — B18.2 CHRONIC HEPATITIS C WITHOUT HEPATIC COMA (HCC): Chronic | ICD-10-CM

## 2022-01-25 DIAGNOSIS — E11.40 TYPE 2 DIABETES MELLITUS WITH DIABETIC NEUROPATHY, WITH LONG-TERM CURRENT USE OF INSULIN (HCC): ICD-10-CM

## 2022-01-25 DIAGNOSIS — E03.9 HYPOTHYROIDISM, UNSPECIFIED TYPE: ICD-10-CM

## 2022-01-25 LAB
25(OH)D3 SERPL-MCNC: 12.7 NG/ML (ref 30–100)
ALBUMIN SERPL BCP-MCNC: 3.8 G/DL (ref 3.5–5)
ALP SERPL-CCNC: 59 U/L (ref 46–116)
ALT SERPL W P-5'-P-CCNC: 15 U/L (ref 12–78)
ANION GAP SERPL CALCULATED.3IONS-SCNC: 2 MMOL/L (ref 4–13)
AST SERPL W P-5'-P-CCNC: 7 U/L (ref 5–45)
BASOPHILS # BLD AUTO: 0.02 THOUSANDS/ΜL (ref 0–0.1)
BASOPHILS NFR BLD AUTO: 0 % (ref 0–1)
BILIRUB SERPL-MCNC: 0.93 MG/DL (ref 0.2–1)
BUN SERPL-MCNC: 17 MG/DL (ref 5–25)
CALCIUM SERPL-MCNC: 9.1 MG/DL (ref 8.3–10.1)
CHLORIDE SERPL-SCNC: 106 MMOL/L (ref 100–108)
CHOLEST SERPL-MCNC: 191 MG/DL
CO2 SERPL-SCNC: 32 MMOL/L (ref 21–32)
CREAT SERPL-MCNC: 0.86 MG/DL (ref 0.6–1.3)
EOSINOPHIL # BLD AUTO: 0.26 THOUSAND/ΜL (ref 0–0.61)
EOSINOPHIL NFR BLD AUTO: 3 % (ref 0–6)
ERYTHROCYTE [DISTWIDTH] IN BLOOD BY AUTOMATED COUNT: 13 % (ref 11.6–15.1)
GFR SERPL CREATININE-BSD FRML MDRD: 85 ML/MIN/1.73SQ M
GLUCOSE P FAST SERPL-MCNC: 45 MG/DL (ref 65–99)
HCT VFR BLD AUTO: 44.3 % (ref 36.5–49.3)
HDLC SERPL-MCNC: 49 MG/DL
HGB BLD-MCNC: 14.4 G/DL (ref 12–17)
IMM GRANULOCYTES # BLD AUTO: 0.02 THOUSAND/UL (ref 0–0.2)
IMM GRANULOCYTES NFR BLD AUTO: 0 % (ref 0–2)
LDLC SERPL CALC-MCNC: 118 MG/DL (ref 0–100)
LYMPHOCYTES # BLD AUTO: 2.38 THOUSANDS/ΜL (ref 0.6–4.47)
LYMPHOCYTES NFR BLD AUTO: 30 % (ref 14–44)
MCH RBC QN AUTO: 29.6 PG (ref 26.8–34.3)
MCHC RBC AUTO-ENTMCNC: 32.5 G/DL (ref 31.4–37.4)
MCV RBC AUTO: 91 FL (ref 82–98)
MONOCYTES # BLD AUTO: 0.71 THOUSAND/ΜL (ref 0.17–1.22)
MONOCYTES NFR BLD AUTO: 9 % (ref 4–12)
NEUTROPHILS # BLD AUTO: 4.68 THOUSANDS/ΜL (ref 1.85–7.62)
NEUTS SEG NFR BLD AUTO: 58 % (ref 43–75)
NRBC BLD AUTO-RTO: 0 /100 WBCS
PLATELET # BLD AUTO: 276 THOUSANDS/UL (ref 149–390)
PMV BLD AUTO: 10.6 FL (ref 8.9–12.7)
POTASSIUM SERPL-SCNC: 3.7 MMOL/L (ref 3.5–5.3)
PROT SERPL-MCNC: 7.8 G/DL (ref 6.4–8.2)
RBC # BLD AUTO: 4.87 MILLION/UL (ref 3.88–5.62)
SODIUM SERPL-SCNC: 140 MMOL/L (ref 136–145)
TRIGL SERPL-MCNC: 119 MG/DL
TSH SERPL DL<=0.05 MIU/L-ACNC: 2.41 UIU/ML (ref 0.36–3.74)
VIT B12 SERPL-MCNC: 636 PG/ML (ref 100–900)
WBC # BLD AUTO: 8.07 THOUSAND/UL (ref 4.31–10.16)

## 2022-01-25 PROCEDURE — 86705 HEP B CORE ANTIBODY IGM: CPT

## 2022-01-25 PROCEDURE — 84443 ASSAY THYROID STIM HORMONE: CPT

## 2022-01-25 PROCEDURE — 36415 COLL VENOUS BLD VENIPUNCTURE: CPT

## 2022-01-25 PROCEDURE — 80053 COMPREHEN METABOLIC PANEL: CPT

## 2022-01-25 PROCEDURE — 86704 HEP B CORE ANTIBODY TOTAL: CPT

## 2022-01-25 PROCEDURE — 87340 HEPATITIS B SURFACE AG IA: CPT

## 2022-01-25 PROCEDURE — 82607 VITAMIN B-12: CPT

## 2022-01-25 PROCEDURE — 86803 HEPATITIS C AB TEST: CPT

## 2022-01-25 PROCEDURE — 80061 LIPID PANEL: CPT

## 2022-01-25 PROCEDURE — 85025 COMPLETE CBC W/AUTO DIFF WBC: CPT

## 2022-01-25 PROCEDURE — 82306 VITAMIN D 25 HYDROXY: CPT

## 2022-01-26 LAB
HBV CORE AB SER QL: REACTIVE
HBV CORE IGM SER QL: ABNORMAL
HBV SURFACE AG SER QL: ABNORMAL
HCV AB SER QL: ABNORMAL

## 2022-02-11 ENCOUNTER — HOSPITAL ENCOUNTER (OUTPATIENT)
Dept: NON INVASIVE DIAGNOSTICS | Facility: HOSPITAL | Age: 77
Discharge: HOME | End: 2022-02-11
Payer: MEDICARE

## 2022-02-11 VITALS
WEIGHT: 185 LBS | BODY MASS INDEX: 28.04 KG/M2 | DIASTOLIC BLOOD PRESSURE: 68 MMHG | HEART RATE: 95 BPM | HEIGHT: 68 IN | SYSTOLIC BLOOD PRESSURE: 152 MMHG

## 2022-02-11 DIAGNOSIS — R01.1 SYSTOLIC MURMUR: ICD-10-CM

## 2022-02-11 LAB
AORTIC ROOT: 3 CM
AORTIC VALVE MEAN VELOCITY: 20.2 M/S
APICAL FOUR CHAMBER EJECTION FRACTION: 53 %
ASCENDING AORTA: 3.2 CM (ref 2.04–3.06)
AV AREA BY CONTINUOUS VTI: 0.7 CM2
AV AREA PEAK VELOCITY: 0.7 CM2
AV LVOT MEAN GRADIENT: 2 MMHG
AV LVOT PEAK GRADIENT: 1 MMHG
AV MEAN GRADIENT: 19 MMHG
AV PEAK GRADIENT: 35 MMHG
AV VALVE AREA: 0.7 CM2
AV VELOCITY RATIO: 0.24
DOP CALC AO PEAK VEL: 2.94 M/S
DOP CALC AO VTI: 65.2 CM
DOP CALC LVOT AREA: 2.83 CM2
DOP CALC LVOT DIAMETER: 1.9 CM
DOP CALC LVOT PEAK VEL VTI: 16.1 CM
DOP CALC LVOT PEAK VEL: 0.7 M/S
DOP CALC LVOT STROKE INDEX: 23.7 ML/M2
DOP CALC LVOT STROKE VOLUME: 45.62 CM3
E WAVE DECELERATION TIME: 135 MS
FRACTIONAL SHORTENING: 26 % (ref 28–44)
INTERVENTRICULAR SEPTUM IN DIASTOLE (PARASTERNAL SHORT AXIS VIEW): 1.3 CM (ref 0.54–1)
INTERVENTRICULAR SEPTUM: 1.3 CM (ref 0.6–1.1)
LEFT ATRIUM AREA SYSTOLE SINGLE PLANE A4C: 23.3 CM2
LEFT ATRIUM SIZE: 4.7 CM
LEFT INTERNAL DIMENSION IN SYSTOLE: 3.7 CM (ref 2.1–4)
LEFT VENTRICULAR INTERNAL DIMENSION IN DIASTOLE: 5 CM (ref 4.93–7.34)
LEFT VENTRICULAR POSTERIOR WALL IN END DIASTOLE: 1 CM (ref 0.52–0.99)
LEFT VENTRICULAR STROKE VOLUME: 58 ML
LVOT STOKE VOLUME INDEX: 23 ML/M2
MITRAL REGURGITATION PEAK VELOCITY: 6.21 M/S
MITRAL VALVE MEAN INFLOW VELOCITY: 4.97 M/S
MITRAL VALVE REGURGITANT PEAK GRADIENT: 154 MMHG
MV E'TISSUE VEL-SEP: 7 CM/S
MV PEAK A VEL: 0.68 M/S
MV PEAK E VEL: 88 CM/S
MV STENOSIS PRESSURE HALF TIME: 0 MS
RA PRESSURE ESTIMATED: 5 MMHG
RIGHT ATRIUM AREA SYSTOLE A4C: 15.8 CM2
RIGHT VENTRICLE ID DIMENSION: 3.2 CM
RV PSP: 27 MMHG
SL CV DOP CALC MV VTI RETROGRADE: 204.2 CM
SL CV MV MEAN GRADIENT RETROGRADE: 106 MMHG
SL CV PED ECHO LEFT VENTRICLE DIASTOLIC VOLUME (MOD BIPLANE) 2D: 116 ML
SL CV PED ECHO LEFT VENTRICLE SYSTOLIC VOLUME (MOD BIPLANE) 2D: 58 ML
TR MAX PG: 22 MMHG
TR PEAK VELOCITY: 2.8 M/S
TRICUSPID VALVE PEAK REGURGITATION VELOCITY: 2.82 M/S
Z-SCORE OF ASCENDING AORTA: 2.53
Z-SCORE OF INTERVENTRICULAR SEPTUM IN END DIASTOLE: 4.44
Z-SCORE OF LEFT VENTRICULAR DIMENSION IN END SYSTOLE: -1.85
Z-SCORE OF LEFT VENTRICULAR POSTERIOR WALL IN END DIASTOLE: 2.04

## 2022-02-11 PROCEDURE — 93306 TTE W/DOPPLER COMPLETE: CPT | Performed by: INTERNAL MEDICINE

## 2022-02-11 PROCEDURE — 93306 TTE W/DOPPLER COMPLETE: CPT

## 2022-02-17 ENCOUNTER — OFFICE VISIT (OUTPATIENT)
Dept: INTERNAL MEDICINE CLINIC | Facility: CLINIC | Age: 77
End: 2022-02-17

## 2022-02-17 VITALS
HEIGHT: 68 IN | SYSTOLIC BLOOD PRESSURE: 127 MMHG | WEIGHT: 187 LBS | TEMPERATURE: 97.9 F | HEART RATE: 80 BPM | BODY MASS INDEX: 28.34 KG/M2 | DIASTOLIC BLOOD PRESSURE: 65 MMHG

## 2022-02-17 DIAGNOSIS — E11.40 TYPE 2 DIABETES MELLITUS WITH DIABETIC NEUROPATHY, WITH LONG-TERM CURRENT USE OF INSULIN (HCC): Primary | ICD-10-CM

## 2022-02-17 DIAGNOSIS — Z79.4 TYPE 2 DIABETES MELLITUS WITH DIABETIC NEUROPATHY, WITH LONG-TERM CURRENT USE OF INSULIN (HCC): Primary | ICD-10-CM

## 2022-02-17 PROCEDURE — 3078F DIAST BP <80 MM HG: CPT | Performed by: INTERNAL MEDICINE

## 2022-02-17 PROCEDURE — 99213 OFFICE O/P EST LOW 20 MIN: CPT | Performed by: INTERNAL MEDICINE

## 2022-02-17 PROCEDURE — 3074F SYST BP LT 130 MM HG: CPT | Performed by: INTERNAL MEDICINE

## 2022-02-17 NOTE — PROGRESS NOTES
Clinic Visit Note  Janet Caban 78 y o  male   MRN: [id number]    Assessment and Plan      Diagnoses and all orders for this visit:    Type 2 diabetes mellitus with diabetic neuropathy, with long-term current use of insulin ([address] )  Patient's most recent A1c of 6 3% in January 2022  Patient's current regimen includes Lantus 22 units every night along with lispro 6 units with his 2 meals during the day  He also takes metformin 850 mg twice daily  The patient is weight for reporting 1 episode of fasting hypoglycemia in the 50s  Patient had some mild somnolence associated, but no other symptoms  The patient brought in blood glucose logs; however, they were extremely disorganized and it was difficult to understand exactly on what day 1 time his readings were taken  Given that this patient had 1 isolated hypoglycemic reading with minimal side effects and his diabetes has been controlled on this regimen, will continue the current regimen and give the patient and his wife glucose locks to keep at home  They were instructed to take his glucose once per day in 4 days cycles:  1 morning before his meal in the morning, 1 afternoon before each meal in the afternoon, 1 afternoon 2 hours after his meal in the afternoon, and 1 morning when he wakes up  The patient and his wife verbalizes understanding  They are also aware if the patient develops signs and symptoms of hypoglycemia, he should be taken directly to the emergency department  He is to follow-up in 2 weeks to make sure his insulin regiment is appropriately titrated        Health Maintenance:  PHQ-2/9 Depression Screening        The 10-year ASCVD risk score (Abdullahi James et al , 2013) is: 48 1%    Values used to calculate the score:      Age: 78 years      Sex: Male      Is Non- : No      Diabetic: Yes      Tobacco smoker: Yes      Systolic Blood Pressure: 655 mmHg      Is BP treated: Yes      HDL Cholesterol: 49 mg/dL      Total Cholesterol: 191 mg/dL  Lab Results   Component Value Date    HGBA1C 6 3 01/19/2022      Lab Results   Component Value Date    HEPCAB High Reactive (A) 01/25/2022      Most Recent Immunizations   Administered Date(s) Administered    COVID-19 MODERNA VACC 0 5 ML IM 03/19/2021    H1N1, All Formulations 01/20/2010    Hep B, adult 06/20/2019    INFLUENZA 09/21/2018    Influenza Quadrivalent, 6-35 Months IM 10/31/2017    Influenza, high dose seasonal 0 7 mL 10/04/2019    Influenza, recombinant, quadrivalent,injectable, preservative free 10/05/2020    Influenza, seasonal, injectable 10/27/2015    Pneumococcal Conjugate 13-Valent 01/30/2017    Pneumococcal Polysaccharide PPV23 12/05/2014    Tdap 01/30/2017 05/17/2018 01/25/2022 No components found for: HIV1X2     Further health maintenance deferred to the acuity of the patient's issue  Schedule a follow-up appointment in 2 weeks  Chief Complaint:  Hypoglycemia  Subjective     History of Present Illness:  Patient is a 68-year-old male with past medical history of hepatitis-C, hypothyroidism, asthma, vitamin B12 deficiency, hyperlipidemia, hypertension, and newly diagnosed aortic stenosis that presents today for 1 episode of hypoglycemia as reported by his wife  The patient currently takes Lantus 22 units at night and lispro 6 units with meals (patient only eats 2 meals per day in late morning and late afternoon)  He and his wife were reporting that 1 morning he had a glucose reading of somewhere in the 50s; however, the patient 8 early lunch that day and did not eat the rested day and took his long-acting insulin  The patient had very poor book keeping of his glucose logs and was very hard to determine exactly when, white days, and what times he was taking his blood glucose  During that episode, the patient had somewhat mild somnolence, but no other issues  On exam today, the patient is on reporting some mild neck pain      Review of Systems Constitutional: Negative for chills and fatigue  HENT: Negative for congestion, nosebleeds, postnasal drip and rhinorrhea  Eyes: Negative for discharge and itching  Respiratory: Negative for cough  Cardiovascular: Negative for chest pain  Gastrointestinal: Negative for abdominal distention and abdominal pain  Genitourinary: Negative for difficulty urinating and dysuria  Musculoskeletal: Positive for neck pain  Skin: Negative for rash  Neurological: Negative for dizziness and headaches  Psychiatric/Behavioral: Negative for agitation and confusion  Current Outpatient Medications:     Advair -21 MCG/ACT inhaler, TAKE 2 PUFFS BY MOUTH TWICE A DAY, Disp: 36 g, Rfl: 6    albuterol (VENTOLIN HFA) 90 mcg/act inhaler, Inhale 2 puffs every 4 (four) hours as needed for wheezing or shortness of breath, Disp: 18 g, Rfl: 2    Alcohol Swabs (ALCOHOL PADS) 70 % PADS, , Disp: , Rfl:     amLODIPine (NORVASC) 5 mg tablet, Take 1 tablet (5 mg total) by mouth daily, Disp: 90 tablet, Rfl: 1    bisacodyl (DULCOLAX) 5 mg EC tablet, Take by mouth, Disp: , Rfl:     Blood Glucose Monitoring Suppl (OneTouch Verio) w/Device KIT, Check blood glucose three times daily before each meal, Disp: 1 kit, Rfl: 0    Blood Pressure Monitoring (BLOOD PRESSURE MONITOR/M CUFF) MISC, by Does not apply route daily Check blood pressure once daily and keep a log to bring to appointment  , Disp: 1 each, Rfl: 0    cholecalciferol (VITAMIN D3) 1,000 units tablet, Take 2 tablets (2,000 Units total) by mouth daily, Disp: 90 tablet, Rfl: 1    CVS Vitamin B-12 1000 MCG tablet, TAKE 1 TABLET BY MOUTH EVERY DAY, Disp: 90 tablet, Rfl: 2    fluticasone (FLONASE) 50 mcg/act nasal spray, 2 sprays into each nostril daily, Disp: 2 Bottle, Rfl: 2    glucose blood (OneTouch Verio) test strip, Check blood glucose three times daily before meals and bring a log of your measurements to your next appointment  , Disp: 300 each, Rfl: 7   hydrochlorothiazide (HYDRODIURIL) 25 mg tablet, Take 1 tablet (25 mg total) by mouth daily, Disp: 90 tablet, Rfl: 2    ibuprofen (MOTRIN) 600 mg tablet, Take 1 tablet (600 mg total) by mouth every 6 (six) hours as needed for mild pain, Disp: 30 tablet, Rfl: 0    Insulin Pen Needle (Pen Needles) 31G X 8 MM MISC, Use daily, Disp: 300 each, Rfl: 3    Lancets (FREESTYLE) lancets, by Other route as needed (As needed), Disp: 100 each, Rfl: 3    Lantus SoloStar 100 units/mL injection pen, INJECT 22 UNITS UNDER THE SKIN DAILY AT BEDTIME, Disp: 15 mL, Rfl: 3    levothyroxine 137 mcg tablet, Take 1 tablet (137 mcg total) by mouth daily, Disp: 90 tablet, Rfl: 1    lisinopril (ZESTRIL) 20 mg tablet, TAKE 1 TABLET BY MOUTH EVERY DAY, Disp: 90 tablet, Rfl: 5    metFORMIN (GLUCOPHAGE) 850 mg tablet, TAKE 1 TABLET (850 MG TOTAL) BY MOUTH 2 (TWO) TIMES A DAY WITH MEALS, Disp: 180 tablet, Rfl: 2    methocarbamol (ROBAXIN) 500 mg tablet, Take 1 tablet (500 mg total) by mouth 4 (four) times a day as needed for muscle spasms, Disp: 30 tablet, Rfl: 1    montelukast (SINGULAIR) 10 mg tablet, TAKE 1 TABLET BY MOUTH EVERY DAY, Disp: 90 tablet, Rfl: 1    naproxen (NAPROSYN) 500 mg tablet, TAKE 1 TABLET BY MOUTH EVERY 12 HOURS AS NEEDED FOR MILD PAIN   TAKE WITH MEALS , Disp: , Rfl: 0    NovoLOG FlexPen 100 units/mL injection pen, Inject 5 units with breakfast and with dinner, Disp: 12 mL, Rfl: 3    polyethylene glycol (GLYCOLAX) powder, Mix entire tub in 64 oz of Gatorade (no red, orange, or purple), Disp: 238 g, Rfl: 0    sildenafil (VIAGRA) 25 MG tablet, Take 1 tablet (25 mg total) by mouth daily as needed for erectile dysfunction, Disp: 30 tablet, Rfl: 0    simvastatin (ZOCOR) 80 mg tablet, TAKE 1 TABLET BY MOUTH DAILY AT BEDTIME, Disp: 90 tablet, Rfl: 3    econazole nitrate 1 % cream, Apply topically daily (Patient not taking: Reported on 2/17/2022 ), Disp: , Rfl:   Past Medical History:   Diagnosis Date    Arthritis     Asthma     Colon polyps     Community acquired pneumonia     last assessed: 5/1/2014    Diabetes mellitus ([facility] )     Hemorrhagic prepatellar bursitis, left 10/21/2019    Hepatitis C     High cholesterol     Hypertension     Lymphadenopathy, anterior cervical 4/17/2018    Screening for colon cancer 5/1/2019    Thoracic vertebral fracture ([facility] ) 6/11/2014     Past Surgical History:   Procedure Laterality Date    COLONOSCOPY      COLONOSCOPY W/ POLYPECTOMY      LITHOTRIPSY      MULTIPLE TOOTH EXTRACTIONS      MN COLONOSCOPY FLX DX W/COLLJ SPEC WHEN PFRMD N/A 5/17/2018    Procedure: COLONOSCOPY;  Surgeon: Chad Rogers MD;  Location: [facility]; Service: Gastroenterology     Social History     Socioeconomic History    Marital status: /Civil Union     Spouse name: Not on file    Number of children: Not on file    Years of education: Not on file    Highest education level: Not on file   Occupational History    Occupation: retired    Tobacco Use    Smoking status: Current Every Day Smoker     Packs/day: 0 50     Types: Cigarettes    Smokeless tobacco: Never Used    Tobacco comment: started when he was about 24 yrs old   Vaping Use    Vaping Use: Never used   Substance and Sexual Activity    Alcohol use: No    Drug use: No    Sexual activity: Not Currently   Other Topics Concern    Not on file   Social History Narrative    Not on file     Social Determinants of Health     Financial Resource Strain: Low Risk     Difficulty of Paying Living Expenses: Not hard at all   Food Insecurity: No Food Insecurity    Worried About [address] in the Last Year: Never true    [address] in the Last Year: Never true   Transportation Needs: No Transportation Needs    Lack of Transportation (Medical): No    Lack of Transportation (Non-Medical):  No   Physical Activity: Unknown    Days of Exercise per Week: Not on file    Minutes of Exercise per Session: 60 min   Stress: No Stress Concern Present    Feeling of Stress : Not at all   Social Connections: Moderately Isolated    Frequency of Communication with Friends and Family: More than three times a week    Frequency of Social Gatherings with Friends and Family: More than three times a week    Attends [employer] Services: Never    Active Member of Clubs or Organizations: No    Attends Club or Organization Meetings: Never    Marital Status:    Intimate Partner Violence: Not on file   Housing Stability: [address] Unable to Pay for Housing in the Last Year: No    Number of [place] in the Last Year: 1    Unstable Housing in the Last Year: No     Family History   Problem Relation Age of Onset    Heart attack Family         at age 80    No Known Problems Mother     No Known Problems Father     Diabetes Sister     Diabetes Brother      No Known Allergies    Objective     Vitals:    02/17/22 1623   BP: 127/65   BP Location: Right arm   Patient Position: Sitting   Cuff Size: Large   Pulse: 80   Temp: 97 9 °F (36 6 °C)   TempSrc: Temporal   Weight: 84 8 kg (187 lb)   Height: 5' 8" (1 727 m)       Physical exam:     GENERAL: NAD  HEENT:  NC/AT, PERRL, EOMI, no scleral icterus  CARDIAC:  RRR, +T2/G1, systolic ejection murmur at the right upper sternal border  PULMONARY:  Decreased breath sounds at the bases  ABDOMEN:  Soft, NT/ND,no rebound/guarding/rigidity  Extremities: No edema, cyanosis, or clubbing  NEUROLOGIC: Grossly intact  SKIN:  No rashes or erythema noted on exposed skin  Psych: Normal affect        ==  PLEASE NOTE:  This encounter was completed utilizing the [employer] Direct Speech Voice Recognition Software  Grammatical errors, random word insertions, pronoun errors and incomplete sentences are occasional consequences of the system due to software limitations, ambient noise and hardware issues  These may be missed by proof reading prior to affixing electronic signature   Any questions or concerns about the content, text or information contained within the body of this dictation should be directly addressed to the physician for clarification  Please do not hesitate to call me directly if you have any any questions or concerns      Yordy Azevedo DO, [address]  PGY-2, Internal Medicine  [facility]

## 2022-02-21 DIAGNOSIS — E08.40 DIABETES MELLITUS DUE TO UNDERLYING CONDITION WITH DIABETIC NEUROPATHY, WITHOUT LONG-TERM CURRENT USE OF INSULIN (HCC): ICD-10-CM

## 2022-02-21 RX ORDER — INSULIN GLARGINE 100 [IU]/ML
22 INJECTION, SOLUTION SUBCUTANEOUS
Qty: 15 ML | Refills: 5 | Status: SHIPPED | OUTPATIENT
Start: 2022-02-21 | End: 2022-03-03

## 2022-03-03 ENCOUNTER — OFFICE VISIT (OUTPATIENT)
Dept: INTERNAL MEDICINE CLINIC | Facility: CLINIC | Age: 77
End: 2022-03-03

## 2022-03-03 VITALS
HEART RATE: 85 BPM | TEMPERATURE: 97.9 F | BODY MASS INDEX: 28.37 KG/M2 | WEIGHT: 186.6 LBS | OXYGEN SATURATION: 98 % | SYSTOLIC BLOOD PRESSURE: 128 MMHG | DIASTOLIC BLOOD PRESSURE: 68 MMHG

## 2022-03-03 DIAGNOSIS — E08.40 DIABETES MELLITUS DUE TO UNDERLYING CONDITION WITH DIABETIC NEUROPATHY, WITHOUT LONG-TERM CURRENT USE OF INSULIN (HCC): ICD-10-CM

## 2022-03-03 DIAGNOSIS — E11.40 TYPE 2 DIABETES MELLITUS WITH DIABETIC NEUROPATHY, WITHOUT LONG-TERM CURRENT USE OF INSULIN (HCC): ICD-10-CM

## 2022-03-03 PROCEDURE — 99213 OFFICE O/P EST LOW 20 MIN: CPT | Performed by: INTERNAL MEDICINE

## 2022-03-03 RX ORDER — INSULIN ASPART 100 [IU]/ML
INJECTION, SOLUTION INTRAVENOUS; SUBCUTANEOUS
Qty: 12 ML | Refills: 3 | Status: SHIPPED | OUTPATIENT
Start: 2022-03-03

## 2022-03-03 RX ORDER — INSULIN GLARGINE 100 [IU]/ML
18 INJECTION, SOLUTION SUBCUTANEOUS
Qty: 15 ML | Refills: 0 | Status: SHIPPED | OUTPATIENT
Start: 2022-03-03

## 2022-03-03 NOTE — PROGRESS NOTES
ASSESSMENT/PLAN:    Case and plan discussed with Dr Gracy Jimenez     Diagnoses and all orders for this visit:    Type 2 diabetes mellitus with diabetic neuropathy, without long-term current use of insulin ([address] )  · Isolated hypoglycemic episode, BS 45  Patient was asymptomatic  · BS range 80-130s  · Last A1c 6 3  · Will decrease Lantus to 18U HS and continue Novolog 5U BID with meals  -     NovoLOG FlexPen 100 units/mL injection pen; Inject 5 units with breakfast and with dinner   -     insulin glargine (Lantus SoloStar) 100 units/mL injection pen; Inject 18 Units under the skin daily at bedtime          Immunization History   Administered Date(s) Administered    COVID-19 MODERNA VACC 0 5 ML IM 02/19/2021, 03/19/2021    H1N1, All Formulations 01/20/2010    Hep B, adult 05/20/2019, 06/20/2019    INFLUENZA 09/21/2018    Influenza Quadrivalent, 6-35 Months IM 10/27/2016, 10/31/2017    Influenza, high dose seasonal 0 7 mL 09/21/2018, 10/04/2019    Influenza, recombinant, quadrivalent,injectable, preservative free 10/05/2020    Influenza, seasonal, injectable 11/08/2013, 10/31/2014, 10/27/2015    Pneumococcal Conjugate 13-Valent 01/30/2017    Pneumococcal Polysaccharide PPV23 12/05/2014    Tdap 01/30/2017         HISTORY OF PRESENT ILLNESS:  31-year-old male with medical history of HTN, type 2 DM, hypothyroid, hyperlipidemia, and tobacco abuse presenting for a diabetes follow up  Patient was last seen 2 weeks ago  At that time he reported one episode of hypoglycemia  Patient was instructed to record his BS levels and come back in 2 weeks  Today patient reports 1 episode of BS reading of 45 with no symptoms  Patient reports eating 2-3 times a day  Sometimes he skips lunch and eats late dinner and a snack later in the night  His last A1c on 1/19/22 was 6 3  Patient has no other complaints today  He is a smoker, currently smokes half a pack a day  Advised him on quitting  Patient will like to think about it      Review of Systems   Constitutional: Negative for chills and fever  HENT: Negative for ear pain and sore throat  Eyes: Negative for pain and visual disturbance  Respiratory: Negative for cough and shortness of breath  Cardiovascular: Negative for chest pain and palpitations  Gastrointestinal: Negative for abdominal pain and vomiting  Genitourinary: Negative for dysuria and hematuria  Musculoskeletal: Negative for arthralgias and back pain  Skin: Negative for color change and rash  Neurological: Negative for seizures and syncope  All other systems reviewed and are negative  OBJECTIVE:  Vitals:    03/03/22 1136   BP: 128/68   BP Location: Left arm   Patient Position: Sitting   Cuff Size: Large   Pulse: 85   Temp: 97 9 °F (36 6 °C)   TempSrc: Temporal   SpO2: 98%   Weight: 84 6 kg (186 lb 9 6 oz)       Physical Exam  Vitals and nursing note reviewed  Constitutional:       Appearance: He is well-developed  HENT:      Head: Normocephalic and atraumatic  Eyes:      Conjunctiva/sclera: Conjunctivae normal    Cardiovascular:      Rate and Rhythm: Normal rate and regular rhythm  Heart sounds: No murmur heard  Pulmonary:      Effort: Pulmonary effort is normal  No respiratory distress  Breath sounds: Normal breath sounds  Abdominal:      Palpations: Abdomen is soft  Tenderness: There is no abdominal tenderness  Musculoskeletal:      Cervical back: Neck supple  Skin:     General: Skin is warm and dry  Neurological:      Mental Status: He is alert     Psychiatric:         Mood and Affect: Mood normal          Behavior: Behavior normal           Current Outpatient Medications:     Advair -21 MCG/ACT inhaler, TAKE 2 PUFFS BY MOUTH TWICE A DAY, Disp: 36 g, Rfl: 6    albuterol (VENTOLIN HFA) 90 mcg/act inhaler, Inhale 2 puffs every 4 (four) hours as needed for wheezing or shortness of breath, Disp: 18 g, Rfl: 2    Alcohol Swabs (ALCOHOL PADS) 70 % PADS, , Disp: , Rfl:    amLODIPine (NORVASC) 5 mg tablet, Take 1 tablet (5 mg total) by mouth daily, Disp: 90 tablet, Rfl: 1    Blood Glucose Monitoring Suppl (OneTouch Verio) w/Device KIT, Check blood glucose three times daily before each meal, Disp: 1 kit, Rfl: 0    cholecalciferol (VITAMIN D3) 1,000 units tablet, Take 2 tablets (2,000 Units total) by mouth daily, Disp: 90 tablet, Rfl: 1    CVS Vitamin B-12 1000 MCG tablet, TAKE 1 TABLET BY MOUTH EVERY DAY, Disp: 90 tablet, Rfl: 2    fluticasone (FLONASE) 50 mcg/act nasal spray, 2 sprays into each nostril daily, Disp: 2 Bottle, Rfl: 2    glucose blood (OneTouch Verio) test strip, Check blood glucose three times daily before meals and bring a log of your measurements to your next appointment  , Disp: 300 each, Rfl: 7    hydrochlorothiazide (HYDRODIURIL) 25 mg tablet, Take 1 tablet (25 mg total) by mouth daily, Disp: 90 tablet, Rfl: 2    ibuprofen (MOTRIN) 600 mg tablet, Take 1 tablet (600 mg total) by mouth every 6 (six) hours as needed for mild pain, Disp: 30 tablet, Rfl: 0    insulin glargine (Lantus SoloStar) 100 units/mL injection pen, Inject 18 Units under the skin daily at bedtime, Disp: 15 mL, Rfl: 0    Insulin Pen Needle (Pen Needles) 31G X 8 MM MISC, Use daily, Disp: 300 each, Rfl: 3    Lancets (FREESTYLE) lancets, by Other route as needed (As needed), Disp: 100 each, Rfl: 3    levothyroxine 137 mcg tablet, Take 1 tablet (137 mcg total) by mouth daily, Disp: 90 tablet, Rfl: 1    lisinopril (ZESTRIL) 20 mg tablet, TAKE 1 TABLET BY MOUTH EVERY DAY, Disp: 90 tablet, Rfl: 5    metFORMIN (GLUCOPHAGE) 850 mg tablet, TAKE 1 TABLET (850 MG TOTAL) BY MOUTH 2 (TWO) TIMES A DAY WITH MEALS, Disp: 180 tablet, Rfl: 2    methocarbamol (ROBAXIN) 500 mg tablet, Take 1 tablet (500 mg total) by mouth 4 (four) times a day as needed for muscle spasms, Disp: 30 tablet, Rfl: 1    montelukast (SINGULAIR) 10 mg tablet, TAKE 1 TABLET BY MOUTH EVERY DAY, Disp: 90 tablet, Rfl: 1    naproxen (NAPROSYN) 500 mg tablet, TAKE 1 TABLET BY MOUTH EVERY 12 HOURS AS NEEDED FOR MILD PAIN  TAKE WITH MEALS , Disp: , Rfl: 0    NovoLOG FlexPen 100 units/mL injection pen, Inject 5 units with breakfast and with dinner, Disp: 12 mL, Rfl: 3    sildenafil (VIAGRA) 25 MG tablet, Take 1 tablet (25 mg total) by mouth daily as needed for erectile dysfunction, Disp: 30 tablet, Rfl: 0    simvastatin (ZOCOR) 80 mg tablet, TAKE 1 TABLET BY MOUTH DAILY AT BEDTIME, Disp: 90 tablet, Rfl: 3    bisacodyl (DULCOLAX) 5 mg EC tablet, Take by mouth (Patient not taking: Reported on 3/3/2022 ), Disp: , Rfl:     Blood Pressure Monitoring (BLOOD PRESSURE MONITOR/M CUFF) MISC, by Does not apply route daily Check blood pressure once daily and keep a log to bring to appointment  (Patient not taking: Reported on 3/3/2022 ), Disp: 1 each, Rfl: 0    econazole nitrate 1 % cream, Apply topically daily (Patient not taking: Reported on 2/17/2022 ), Disp: , Rfl:     polyethylene glycol (GLYCOLAX) powder, Mix entire tub in 64 oz of Gatorade (no red, orange, or purple) (Patient not taking: Reported on 3/3/2022 ), Disp: 238 g, Rfl: 0    Past Medical History:   Diagnosis Date    Arthritis     Asthma     Colon polyps     Community acquired pneumonia     last assessed: 5/1/2014    [facility]     Hemorrhagic prepatellar bursitis, left 10/21/2019    Hepatitis C     High cholesterol     Hypertension     Lymphadenopathy, anterior cervical 4/17/2018    Screening for colon cancer 5/1/2019    Thoracic vertebral fracture ([facility] ) 6/11/2014     Past Surgical History:   Procedure Laterality Date    COLONOSCOPY      COLONOSCOPY W/ POLYPECTOMY      LITHOTRIPSY      MULTIPLE TOOTH EXTRACTIONS      DE COLONOSCOPY FLX DX W/COLLJ SPEC WHEN PFRMD N/A 5/17/2018    Procedure: COLONOSCOPY;  Surgeon: Avtar Willoughby MD;  Location: [facility];   Service: Gastroenterology     Family History   Problem Relation Age of Onset    Heart attack Family at age 80    No Known Problems Mother     No Known Problems Father     Diabetes Sister     Diabetes Brother      Social History     Socioeconomic History    Marital status: /Civil Union     Spouse name: Not on file    Number of children: Not on file    Years of education: Not on file    Highest education level: Not on file   Occupational History    Occupation: retired    Tobacco Use    Smoking status: Current Every Day Smoker     Packs/day: 0 50     Types: Cigarettes    Smokeless tobacco: Never Used    Tobacco comment: started when he was about 24 yrs old   Vaping Use    Vaping Use: Never used   Substance and Sexual Activity    Alcohol use: No    Drug use: No    Sexual activity: Not Currently   Other Topics Concern    Not on file   Social History Narrative    Not on file     Social Determinants of Health     Financial Resource Strain: Low Risk     Difficulty of Paying Living Expenses: Not hard at all   Food Insecurity: No Food Insecurity    Worried About [address] [employer] in the Last Year: Never true    [address]  [email or web address] in the Last Year: Never true   Transportation Needs: No Transportation Needs    Lack of Transportation (Medical): No    Lack of Transportation (Non-Medical): No   Physical Activity: Unknown    Days of Exercise per Week: Not on file    Minutes of Exercise per Session: 60 min   Stress: No Stress Concern Present    Feeling of Stress : Not at all   Social Connections:  Moderately Isolated    Frequency of Communication with Friends and Family: More than three times a week    Frequency of Social Gatherings with Friends and Family: More than three times a week    Attends [employer] Services: Never    Active Member of Clubs or Organizations: No    Attends Club or Organization Meetings: Never    Marital Status:    Intimate Partner Violence: Not on file   Housing Stability: [address] Unable to Pay for Housing in the Last Year: No    Number of [place] in the Last Year: 1    Unstable Housing in the Last Year: No     Social History     Tobacco Use   Smoking Status Current Every Day Smoker    Packs/day: 0 50    Types: Cigarettes   Smokeless Tobacco Never Used   Tobacco Comment    started when he was about 24 yrs old     Social History     Substance and Sexual Activity   Alcohol Use No     Social History     Substance and Sexual Activity   Drug Use No         Raina Kaur MD  Internal Medicine Residency, PGY-1  [address]   [address] [facility]  [address] [facility],[address]  [place], [address] [facility]

## 2022-03-16 ENCOUNTER — OFFICE VISIT (OUTPATIENT)
Dept: CARDIOLOGY CLINIC | Facility: CLINIC | Age: 77
End: 2022-03-16
Payer: MEDICARE

## 2022-03-16 VITALS
BODY MASS INDEX: 27.58 KG/M2 | WEIGHT: 181.4 LBS | HEART RATE: 82 BPM | SYSTOLIC BLOOD PRESSURE: 130 MMHG | DIASTOLIC BLOOD PRESSURE: 62 MMHG

## 2022-03-16 DIAGNOSIS — Z72.0 TOBACCO ABUSE: ICD-10-CM

## 2022-03-16 DIAGNOSIS — I10 ESSENTIAL HYPERTENSION: Primary | ICD-10-CM

## 2022-03-16 DIAGNOSIS — I35.0 SEVERE AORTIC STENOSIS: ICD-10-CM

## 2022-03-16 DIAGNOSIS — E78.2 MIXED HYPERLIPIDEMIA: ICD-10-CM

## 2022-03-16 PROCEDURE — 99204 OFFICE O/P NEW MOD 45 MIN: CPT | Performed by: INTERNAL MEDICINE

## 2022-03-16 PROCEDURE — 93000 ELECTROCARDIOGRAM COMPLETE: CPT | Performed by: INTERNAL MEDICINE

## 2022-03-16 RX ORDER — NICOTINE 21 MG/24HR
1 PATCH, TRANSDERMAL 24 HOURS TRANSDERMAL EVERY 24 HOURS
Qty: 28 PATCH | Refills: 0 | Status: SHIPPED | OUTPATIENT
Start: 2022-03-16 | End: 2022-06-23

## 2022-03-16 RX ORDER — ROSUVASTATIN CALCIUM 40 MG/1
40 TABLET, COATED ORAL DAILY
Qty: 30 TABLET | Refills: 2 | Status: SHIPPED | OUTPATIENT
Start: 2022-03-16 | End: 2022-04-29

## 2022-03-16 NOTE — PROGRESS NOTES
Outpatient Consultation - General Cardiology   Stef Aquino 78 y o  male   MRN: [id number]  Encounter: [id number]      PCP: Sourav Alexander DO    History of Present Illness   Physician Requesting Consult: Consults   Reason for Consult / Principal Problem: aortic stenosis     HPI: Stef Aquino is a 78y o  year old male, Persian speaking, who was referred for evaluation by his primary physician after a systolic murmur was noted on recent exam and subsequent transthoracic echo showed severe aortic stenosis  He has a history of HTN, insulin-dependent diabetes, hyperlipidemia, and hypothyroidism  He reports that he has no concerning symptoms at present and is overall feeling in good health  He continues to smoke 1/2 pack of cigarettes daily  He takes his medications consistently without any concerning side effects  While he does not regularly exercise, his family report that he is active around the house, and is able to climb at least 1 flight of stairs without any shortness of breath or chest pain  He has not experienced lightheadedness, dizziness, or passed out  Review of Systems  Review of system was conducted and was negative except for as stated in the HPI        Historical Information   Past Medical History:   Diagnosis Date    Arthritis     Asthma     Colon polyps     Community acquired pneumonia     last assessed: 5/1/2014    Diabetes mellitus ([facility] [address] )     Hemorrhagic prepatellar bursitis, left 10/21/2019    Hepatitis C     High cholesterol     Hypertension     Lymphadenopathy, anterior cervical 4/17/2018    Screening for colon cancer 5/1/2019    Thoracic vertebral fracture ([facility] [address] ) 6/11/2014     Past Surgical History:   Procedure Laterality Date    COLONOSCOPY      COLONOSCOPY W/ POLYPECTOMY      LITHOTRIPSY      MULTIPLE TOOTH EXTRACTIONS      MS COLONOSCOPY FLX DX W/COLLJ SPEC WHEN PFRMD N/A 5/17/2018    Procedure: COLONOSCOPY;  Surgeon: Herson Wall MD;  Location: [facility]; Service: Gastroenterology     Social History     Substance and Sexual Activity   Alcohol Use No     Social History     Substance and Sexual Activity   Drug Use No     Social History     Tobacco Use   Smoking Status Current Every Day Smoker    Packs/day: 0 50    Types: Cigarettes   Smokeless Tobacco Never Used   Tobacco Comment    started when he was about 24 yrs old     Family History: non-contributory    Meds/Allergies   Home Medications:   Current Outpatient Medications:     Advair -21 MCG/ACT inhaler, TAKE 2 PUFFS BY MOUTH TWICE A DAY, Disp: 36 g, Rfl: 6    albuterol (VENTOLIN HFA) 90 mcg/act inhaler, Inhale 2 puffs every 4 (four) hours as needed for wheezing or shortness of breath, Disp: 18 g, Rfl: 2    Alcohol Swabs (ALCOHOL PADS) 70 % PADS, , Disp: , Rfl:     amLODIPine (NORVASC) 5 mg tablet, Take 1 tablet (5 mg total) by mouth daily, Disp: 90 tablet, Rfl: 1    Blood Glucose Monitoring Suppl (OneTouch Verio) w/Device KIT, Check blood glucose three times daily before each meal, Disp: 1 kit, Rfl: 0    cholecalciferol (VITAMIN D3) 1,000 units tablet, Take 2 tablets (2,000 Units total) by mouth daily, Disp: 90 tablet, Rfl: 1    [employer] Vitamin B-12 1000 MCG tablet, TAKE 1 TABLET BY MOUTH EVERY DAY, Disp: 90 tablet, Rfl: 2    fluticasone (FLONASE) 50 mcg/act nasal spray, 2 sprays into each nostril daily, Disp: 2 Bottle, Rfl: 2    glucose blood (OneTouch Verio) test strip, Check blood glucose three times daily before meals and bring a log of your measurements to your next appointment  , Disp: 300 each, Rfl: 7    hydrochlorothiazide (HYDRODIURIL) 25 mg tablet, Take 1 tablet (25 mg total) by mouth daily, Disp: 90 tablet, Rfl: 2    ibuprofen (MOTRIN) 600 mg tablet, Take 1 tablet (600 mg total) by mouth every 6 (six) hours as needed for mild pain, Disp: 30 tablet, Rfl: 0    insulin glargine (Lantus SoloStar) 100 units/mL injection pen, Inject 18 Units under the skin daily at bedtime, Disp: 15 mL, Rfl: 0    Insulin Pen Needle (Pen Needles) 31G X 8 MM MISC, Use daily, Disp: 300 each, Rfl: 3    Lancets (FREESTYLE) lancets, by Other route as needed (As needed), Disp: 100 each, Rfl: 3    levothyroxine 137 mcg tablet, Take 1 tablet (137 mcg total) by mouth daily, Disp: 90 tablet, Rfl: 1    lisinopril (ZESTRIL) 20 mg tablet, TAKE 1 TABLET BY MOUTH EVERY DAY, Disp: 90 tablet, Rfl: 5    metFORMIN (GLUCOPHAGE) 850 mg tablet, TAKE 1 TABLET (850 MG TOTAL) BY MOUTH 2 (TWO) TIMES A DAY WITH MEALS, Disp: 180 tablet, Rfl: 2    methocarbamol (ROBAXIN) 500 mg tablet, Take 1 tablet (500 mg total) by mouth 4 (four) times a day as needed for muscle spasms, Disp: 30 tablet, Rfl: 1    montelukast (SINGULAIR) 10 mg tablet, TAKE 1 TABLET BY MOUTH EVERY DAY, Disp: 90 tablet, Rfl: 1    naproxen (NAPROSYN) 500 mg tablet, TAKE 1 TABLET BY MOUTH EVERY 12 HOURS AS NEEDED FOR MILD PAIN  TAKE WITH MEALS , Disp: , Rfl: 0    NovoLOG FlexPen 100 units/mL injection pen, Inject 5 units with breakfast and with dinner, Disp: 12 mL, Rfl: 3    sildenafil (VIAGRA) 25 MG tablet, Take 1 tablet (25 mg total) by mouth daily as needed for erectile dysfunction, Disp: 30 tablet, Rfl: 0    simvastatin (ZOCOR) 80 mg tablet, TAKE 1 TABLET BY MOUTH DAILY AT BEDTIME, Disp: 90 tablet, Rfl: 3    bisacodyl (DULCOLAX) 5 mg EC tablet, Take by mouth (Patient not taking: Reported on 3/3/2022 ), Disp: , Rfl:     Blood Pressure Monitoring (BLOOD PRESSURE MONITOR/M CUFF) MISC, by Does not apply route daily Check blood pressure once daily and keep a log to bring to appointment   (Patient not taking: Reported on 3/3/2022 ), Disp: 1 each, Rfl: 0    econazole nitrate 1 % cream, Apply topically daily (Patient not taking: Reported on 2/17/2022 ), Disp: , Rfl:     polyethylene glycol (GLYCOLAX) powder, Mix entire tub in 64 oz of Gatorade (no red, orange, or purple) (Patient not taking: Reported on 3/3/2022 ), Disp: 238 g, Rfl: 0    No Known Allergies      Objective   Vitals: Blood pressure 130/62, weight 82 3 kg (181 lb 6 4 oz)  Physical Exam  GEN: Karey Boo appears well, pleasant and cooperative   HEENT:  Normocephalic, atraumatic  NECK: No JVD  HEART: Regular rhythm, normal rate, grade III crescendo-decrescendo systolic murmur best heard at the right upper sternal border with radiation to the neck  S2 heart sound audible  Pulsus parvus et tardus present    LUNGS: Clear to auscultation bilaterally; no wheezes, rales, or rhonchi; respiration nonlabored   ABDOMEN:  Normoactive bowel sounds, soft, no tenderness, no distention  EXTREMITIES: peripheral pulses palpable, no edema    Lab Results: I have personally reviewed pertinent lab results  Lab Results   Component Value Date    CHOL 171 02/12/2014    TRIG 119 01/25/2022    HDL 49 01/25/2022    LDLCALC 118 (H) 01/25/2022     Lab Results   Component Value Date     10/14/2014    K 3 7 01/25/2022    CO2 32 01/25/2022     01/25/2022    BUN 17 01/25/2022    CREATININE 0 86 01/25/2022    ALT 15 01/25/2022    AST 7 01/25/2022     Lab Results   Component Value Date    WBC 8 07 01/25/2022    HGB 14 4 01/25/2022    HCT 44 3 01/25/2022    MCV 91 01/25/2022     01/25/2022     Lab Results   Component Value Date    INR 0 91 05/08/2019         Imaging: I have personally reviewed pertinent reports  EKG:   Date: 3/16  Interpretation: Normal sinus rhythm, normal ECG    ECHO:  No results found for this or any previous visit  Results for orders placed during the hospital encounter of 02/11/22    Echo complete w/ contrast if indicated    Interpretation Summary    Left Ventricle: Left ventricular cavity size is normal  Wall thickness is mildly increased  Systolic function is normal  Wall motion is normal  Diastolic function is normal for age  There is mild concentric hypertrophy      Right Ventricle: Right ventricular cavity size is normal  Systolic function is normal    Left Atrium: The atrium is mildly dilated    Aortic Valve: The aortic valve is trileaflet  The leaflets are severely thickened  The leaflets are severely calcified  There is severely reduced mobility  There is severe stenosis  The aortic valve peak velocity is 2 94 m/s  The aortic valve mean gradient is 19 mmHg  The aortic valve/velocity ratio is 0 24  The aortic valve area is 0 70 cm2    Mitral Valve: There is mild annular calcification  There is mild to moderate regurgitation  Assessment/Plan     Severe Asymptomatic Aortic Stenosis (Stage C1)     Hypertension    Hyperlipidemia    Active tobacco use    Insulin-dependent Type 2 Diabetes    Hypothyroidism    76M presented for evaluation after an abnormal echo showing severe aortic stenosis, ordered for an audible murmur on routine office visit  The patient reports no symptoms concerning for aortic stenosis at present and his LV ejection fraction is normal    ECG does not show any concerning findings  I personally reviewed his TTE images  Visually, his aortic valve does appear severely stenotic, with calculated valve areas and DVI in the severe range, but with lower-than-expected transvalvular gradients in the setting of a low-flow state (stroke volume index 26mL/min/m2)  As this was his first echocardiogram, the course of his disease is not clear  However he has significant risk factors for atherosclerosis including poorly controlled hyperlipidemia and current smoking  I counseled him and his family on symptoms of AS to watch for which may include dizziness/syncope, exertional chest pain, and exertional dyspnea  They understand to reach out to me if these develop  For now, he has no indication for intervention on his valve given lack of symptoms and normal LVEF   However, given his ambiguous exertional tolerance, I will refer him for an exercise stress echo to assess for hypotensive response or severe exercise intolerance, which would be indications for early replacement  I counseled him on options for transcatheter valve replacement and surgery if he does require intervention in the future, and that he will need coronary angiography in this case  He is willing to quit smoking and I will start him on nicotine replacement therapy  I will also recommend switching to a high intensity statin (Rosuvastatin 40mg) given his very high 10-year ASCVD risk (50%)  His other risk factors of HTN and DM2 appear adequately controlled      Eugene Manning MD  Cardiology Fellow

## 2022-04-13 ENCOUNTER — HOSPITAL ENCOUNTER (OUTPATIENT)
Dept: NON INVASIVE DIAGNOSTICS | Facility: CLINIC | Age: 77
Discharge: HOME | End: 2022-04-13
Payer: MEDICARE

## 2022-04-13 VITALS
SYSTOLIC BLOOD PRESSURE: 140 MMHG | RESPIRATION RATE: 12 BRPM | DIASTOLIC BLOOD PRESSURE: 70 MMHG | HEART RATE: 91 BPM | HEIGHT: 68 IN | WEIGHT: 181 LBS | BODY MASS INDEX: 27.43 KG/M2

## 2022-04-13 DIAGNOSIS — I35.0 SEVERE AORTIC STENOSIS: ICD-10-CM

## 2022-04-13 LAB
AORTIC VALVE MEAN VELOCITY: 22.2 M/S
AV AREA BY CONTINUOUS VTI: 0.6 CM2
AV AREA PEAK VELOCITY: 0.7 CM2
AV LVOT MEAN GRADIENT: 2 MMHG
AV LVOT PEAK GRADIENT: 3 MMHG
AV MEAN GRADIENT: 22 MMHG
AV PEAK GRADIENT: 36 MMHG
AV VALVE AREA: 0.62 CM2
AV VELOCITY RATIO: 0.29
BASELINE ST DEPRESSION: 0 MM
CHEST PAIN STATEMENT: NORMAL
DOP CALC AO PEAK VEL: 3.02 M/S
DOP CALC AO VTI: 65.17 CM
DOP CALC LVOT AREA: 2.27 CM2
DOP CALC LVOT DIAMETER: 1.7 CM
DOP CALC LVOT PEAK VEL VTI: 17.88 CM
DOP CALC LVOT PEAK VEL: 0.87 M/S
DOP CALC LVOT STROKE INDEX: 21.4 ML/M2
DOP CALC LVOT STROKE VOLUME: 40.56 CM3
MAX DIASTOLIC BP: 70 MMHG
MAX HEART RATE: 134 BPM
MAX HR PERCENT: 92 %
MAX HR: 123 BPM
MAX PREDICTED HEART RATE: 145 BPM
MAX. SYSTOLIC BP: 140 MMHG
PROTOCOL NAME: NORMAL
RATE PRESSURE PRODUCT: NORMAL
REASON FOR TERMINATION: NORMAL
SL CV LV EF: 65
SL CV STRESS RECOVERY BP: NORMAL MMHG
SL CV STRESS RECOVERY HR: 98 BPM
SL CV STRESS RECOVERY O2 SAT: 98 %
SL CV STRESS STAGE REACHED: 2
STRESS ANGINA INDEX: 0
STRESS BASELINE BP: NORMAL MMHG
STRESS BASELINE HR: 91 BPM
STRESS DUKE TREADMILL SCORE: 4
STRESS O2 SAT REST: 99 %
STRESS PEAK HR: 129 BPM
STRESS PERCENT HR: 92 %
STRESS POST ESTIMATED WORKLOAD: 7 METS
STRESS POST EXERCISE DUR MIN: 4 MIN
STRESS POST EXERCISE DUR SEC: 0 SEC
STRESS POST O2 SAT PEAK: 97 %
STRESS POST PEAK BP: 138 MMHG
STRESS ST DEPRESSION: 0 MM
STRESS TARGET HR: 134 BPM
TARGET HR FORMULA: NORMAL
TEST INDICATION: NORMAL
TIME IN EXERCISE PHASE: NORMAL

## 2022-04-13 PROCEDURE — 93351 STRESS TTE COMPLETE: CPT | Performed by: INTERNAL MEDICINE

## 2022-04-13 PROCEDURE — 93350 STRESS TTE ONLY: CPT

## 2022-04-14 ENCOUNTER — VBI (OUTPATIENT)
Dept: ADMINISTRATIVE | Facility: OTHER | Age: 77
End: 2022-04-14

## 2022-04-16 DIAGNOSIS — J45.20 MILD INTERMITTENT ASTHMA, UNSPECIFIED WHETHER COMPLICATED: ICD-10-CM

## 2022-04-18 RX ORDER — MONTELUKAST SODIUM 10 MG/1
TABLET ORAL
Qty: 90 TABLET | Refills: 1 | Status: SHIPPED | OUTPATIENT
Start: 2022-04-18

## 2022-04-27 ENCOUNTER — OFFICE VISIT (OUTPATIENT)
Dept: CARDIOLOGY CLINIC | Facility: CLINIC | Age: 77
End: 2022-04-27
Payer: MEDICARE

## 2022-04-27 VITALS
DIASTOLIC BLOOD PRESSURE: 80 MMHG | WEIGHT: 185 LBS | SYSTOLIC BLOOD PRESSURE: 140 MMHG | BODY MASS INDEX: 28.04 KG/M2 | OXYGEN SATURATION: 98 % | HEIGHT: 68 IN | HEART RATE: 90 BPM

## 2022-04-27 DIAGNOSIS — I35.0 SEVERE AORTIC STENOSIS: Primary | ICD-10-CM

## 2022-04-27 PROCEDURE — 99214 OFFICE O/P EST MOD 30 MIN: CPT | Performed by: INTERNAL MEDICINE

## 2022-04-27 NOTE — PROGRESS NOTES
General Cardiology - Outpatient Progress Note   Julienne Austin 78 y o  male   MRN: [id number]  @ Encounter: [id number]    Dennehotso Fearing, DO  Consults      Interval History:   Since last encounter, patient reports no changes in symptoms  Still walking around and able to use stairs without problems  Denies lightheadedness, dizziness, syncope, headache, vision changes, diaphoresis, chest pain, palpitations, shortness of breath, PND, orthopnea, nausea, vomiting, abdominal pain or lower extremity edema  He did undergo exercise echo testing where he was able to exercise for 4 minutes, achieved his target HR without any cardiac symptoms, limited only by knee pain  AV Gradients increased moderately with exercise  Cardiac History Summary:   Julienne Austin is a 78y o  year old male with a history of HTN, HLD, DM2, hypothyroidism, and arthritis after an echocardiographic diagnosis of severe aortic stenosis when his primary provider noted a murmur on exam      Objective:  Review of Systems  Review of system was conducted and was negative except for as stated in the interval history      Physical Exam:  Vitals: Blood pressure 140/80, pulse 90, height 5' 8" (1 727 m), weight 83 9 kg (185 lb), SpO2 98 %  , Body mass index is 28 13 kg/m²  GEN: Julienne Austin appears well, alert and oriented x 3, pleasant and cooperative   HEENT:  Normocephalic, atraumatic, anicteric, moist mucous membranes  Neck:  No JVD  HEART: regular rhythm, normal rate  Grade III early systolic murmur best heard at the right upper sternal border with radiation to carotids   Absent S2    LUNGS: Clear to auscultation bilaterally; no wheezes, rales, or rhonchi; respiration nonlabored   ABDOMEN:  Normoactive bowel sounds, soft, no tenderness, no distention  EXTREMITIES: pulses palpable with pulsus parvus et tardus  SKIN:  Dry, intact, warm to touch    Home Medications: (Not in a hospital admission)      Current Outpatient Medications:     Advair -21 MCG/ACT inhaler, TAKE 2 PUFFS BY MOUTH TWICE A DAY, Disp: 36 g, Rfl: 6    albuterol (VENTOLIN HFA) 90 mcg/act inhaler, Inhale 2 puffs every 4 (four) hours as needed for wheezing or shortness of breath, Disp: 18 g, Rfl: 2    Alcohol Swabs (ALCOHOL PADS) 70 % PADS, , Disp: , Rfl:     amLODIPine (NORVASC) 5 mg tablet, Take 1 tablet (5 mg total) by mouth daily, Disp: 90 tablet, Rfl: 1    Blood Glucose Monitoring Suppl (OneTouch Verio) w/Device KIT, Check blood glucose three times daily before each meal, Disp: 1 kit, Rfl: 0    cholecalciferol (VITAMIN D3) 1,000 units tablet, TAKE 2 TABLETS (2,000 UNITS TOTAL) BY MOUTH DAILY, Disp: 180 tablet, Rfl: 5    [employer] Vitamin B-12 1000 MCG tablet, TAKE 1 TABLET BY MOUTH EVERY DAY, Disp: 90 tablet, Rfl: 2    fluticasone (FLONASE) 50 mcg/act nasal spray, 2 sprays into each nostril daily, Disp: 2 Bottle, Rfl: 2    glucose blood (OneTouch Verio) test strip, Check blood glucose three times daily before meals and bring a log of your measurements to your next appointment  , Disp: 300 each, Rfl: 7    hydrochlorothiazide (HYDRODIURIL) 25 mg tablet, Take 1 tablet (25 mg total) by mouth daily, Disp: 90 tablet, Rfl: 2    ibuprofen (MOTRIN) 600 mg tablet, Take 1 tablet (600 mg total) by mouth every 6 (six) hours as needed for mild pain, Disp: 30 tablet, Rfl: 0    insulin glargine (Lantus SoloStar) 100 units/mL injection pen, Inject 18 Units under the skin daily at bedtime, Disp: 15 mL, Rfl: 0    Insulin Pen Needle (Pen Needles) 31G X 8 MM MISC, Use daily, Disp: 300 each, Rfl: 3    Lancets (FREESTYLE) lancets, by Other route as needed (As needed), Disp: 100 each, Rfl: 3    levothyroxine 137 mcg tablet, Take 1 tablet (137 mcg total) by mouth daily, Disp: 90 tablet, Rfl: 1    lisinopril (ZESTRIL) 20 mg tablet, TAKE 1 TABLET BY MOUTH EVERY DAY, Disp: 90 tablet, Rfl: 5    metFORMIN (GLUCOPHAGE) 850 mg tablet, TAKE 1 TABLET (850 MG TOTAL) BY MOUTH 2 (TWO) TIMES A DAY WITH MEALS, Disp: 180 tablet, Rfl: 2    methocarbamol (ROBAXIN) 500 mg tablet, Take 1 tablet (500 mg total) by mouth 4 (four) times a day as needed for muscle spasms, Disp: 30 tablet, Rfl: 1    montelukast (SINGULAIR) 10 mg tablet, TAKE 1 TABLET BY MOUTH EVERY DAY, Disp: 90 tablet, Rfl: 1    naproxen (NAPROSYN) 500 mg tablet, TAKE 1 TABLET BY MOUTH EVERY 12 HOURS AS NEEDED FOR MILD PAIN  TAKE WITH MEALS , Disp: , Rfl: 0    NovoLOG FlexPen 100 units/mL injection pen, Inject 5 units with breakfast and with dinner, Disp: 12 mL, Rfl: 3    rosuvastatin (CRESTOR) 40 MG tablet, Take 1 tablet (40 mg total) by mouth daily, Disp: 30 tablet, Rfl: 2    sildenafil (VIAGRA) 25 MG tablet, Take 1 tablet (25 mg total) by mouth daily as needed for erectile dysfunction, Disp: 30 tablet, Rfl: 0    bisacodyl (DULCOLAX) 5 mg EC tablet, Take by mouth (Patient not taking: Reported on 3/3/2022 ), Disp: , Rfl:     Blood Pressure Monitoring (BLOOD PRESSURE MONITOR/M CUFF) MISC, by Does not apply route daily Check blood pressure once daily and keep a log to bring to appointment   (Patient not taking: Reported on 3/3/2022 ), Disp: 1 each, Rfl: 0    econazole nitrate 1 % cream, Apply topically daily (Patient not taking: Reported on 2/17/2022 ), Disp: , Rfl:     nicotine (NICODERM CQ) 14 mg/24hr TD 24 hr patch, Place 1 patch on the skin every 24 hours (Patient not taking: Reported on 4/27/2022 ), Disp: 28 patch, Rfl: 0    polyethylene glycol (GLYCOLAX) powder, Mix entire tub in 64 oz of Gatorade (no red, orange, or purple) (Patient not taking: Reported on 3/3/2022 ), Disp: 238 g, Rfl: 0    Labs & Results:  No results found for: TROPONINI  Lab Results   Component Value Date    CHOL 171 02/12/2014    TRIG 119 01/25/2022    TRIG 96 04/05/2017    HDL 49 01/25/2022    HDL 59 04/05/2017    LDLCALC 118 (H) 01/25/2022    LDLCALC 101 (H) 04/05/2017    HGBA1C 6 3 01/19/2022    HGBA1C 6 6 (A) 03/08/2021     Lab Results   Component Value Date     10/14/2014    K 3 7 01/25/2022    CO2 32 01/25/2022     01/25/2022    BUN 17 01/25/2022    CREATININE 0 86 01/25/2022    ALT 15 01/25/2022    AST 7 01/25/2022     Lab Results   Component Value Date    WBC 8 07 01/25/2022    HGB 14 4 01/25/2022    HCT 44 3 01/25/2022    MCV 91 01/25/2022     01/25/2022     Lab Results   Component Value Date    INR 0 91 05/08/2019     ECHO:  No results found for this or any previous visit  Results for orders placed during the hospital encounter of 02/11/22    Echo complete w/ contrast if indicated    Interpretation Summary    Left Ventricle: Left ventricular cavity size is normal  Wall thickness is mildly increased  Systolic function is normal  Wall motion is normal  Diastolic function is normal for age  There is mild concentric hypertrophy    Right Ventricle: Right ventricular cavity size is normal  Systolic function is normal     Left Atrium: The atrium is mildly dilated    Aortic Valve: The aortic valve is trileaflet  The leaflets are severely thickened  The leaflets are severely calcified  There is severely reduced mobility  There is severe stenosis  The aortic valve peak velocity is 2 94 m/s  The aortic valve mean gradient is 19 mmHg  The aortic valve/velocity ratio is 0 24  The aortic valve area is 0 70 cm2    Mitral Valve: There is mild annular calcification  There is mild to moderate regurgitation  Assessment/Plan     Severe Asymptomatic Aortic Stenosis (Stage C1)      Hypertension     Hyperlipidemia     Active tobacco use     Insulin-dependent Type 2 Diabetes     Hypothyroidism    76M with recently diagnosed severe AS on echo after auscultation of a systolic murmur on exam     TTE demonstrated preserved LVEF (65%) with AV area 0 62cm2, peak gradient 36, and mean gradient of 22 with DVI of 0 25  This is consistent with low-flow low-gradient AS       Although reporting no symptoms of chest pain, shortness of breath, or dizziness/syncope, I referred him for exercise testing, which did not provoke symptoms  However, he had a slight drop in BP from  to 138 at peak exercise, which is concerning  At this point, I will refer him to Cardiac surgery to discuss valve replacement  I did discuss basic options for surgical valve replacement and transcatheter replacement today in the office and answered basic questions about the procedure       Lior Naidu MD  Cardiology Fellow

## 2022-04-29 DIAGNOSIS — E78.2 MIXED HYPERLIPIDEMIA: ICD-10-CM

## 2022-04-29 RX ORDER — ROSUVASTATIN CALCIUM 40 MG/1
TABLET, COATED ORAL
Qty: 90 TABLET | Refills: 3 | Status: SHIPPED | OUTPATIENT
Start: 2022-04-29

## 2022-05-05 ENCOUNTER — OFFICE VISIT (OUTPATIENT)
Dept: CARDIAC SURGERY | Facility: CLINIC | Age: 77
End: 2022-05-05
Payer: MEDICARE

## 2022-05-05 VITALS
SYSTOLIC BLOOD PRESSURE: 144 MMHG | OXYGEN SATURATION: 99 % | HEIGHT: 68 IN | HEART RATE: 92 BPM | DIASTOLIC BLOOD PRESSURE: 69 MMHG | RESPIRATION RATE: 18 BRPM | BODY MASS INDEX: 27.89 KG/M2 | WEIGHT: 184 LBS

## 2022-05-05 DIAGNOSIS — I35.0 SEVERE AORTIC STENOSIS: Primary | ICD-10-CM

## 2022-05-05 DIAGNOSIS — Z79.4 TYPE 2 DIABETES MELLITUS WITH DIABETIC NEUROPATHY, WITH LONG-TERM CURRENT USE OF INSULIN (HCC): ICD-10-CM

## 2022-05-05 DIAGNOSIS — Z01.810 PREOP CARDIOVASCULAR EXAM: ICD-10-CM

## 2022-05-05 DIAGNOSIS — E11.40 TYPE 2 DIABETES MELLITUS WITH DIABETIC NEUROPATHY, WITH LONG-TERM CURRENT USE OF INSULIN (HCC): ICD-10-CM

## 2022-05-05 DIAGNOSIS — I10 ESSENTIAL HYPERTENSION: ICD-10-CM

## 2022-05-05 DIAGNOSIS — E78.5 HYPERLIPIDEMIA, UNSPECIFIED HYPERLIPIDEMIA TYPE: ICD-10-CM

## 2022-05-05 PROCEDURE — 99205 OFFICE O/P NEW HI 60 MIN: CPT | Performed by: PHYSICIAN ASSISTANT

## 2022-05-05 NOTE — H&P (VIEW-ONLY)
Consultation - Cardiothoracic Surgery   Catherine Gonzalez 78 y o  male MRN: [id number]    Physician Requesting Consult: Hemanth Cm MD    Reason for Consult / Principal Problem: Aortic stenosis, Non-Rheumatic    History of Present Illness: Catherine Gonzalez is a 78y o  year old male with PMH of AS, HTN, HLD, asthma, IDDM, Hep C, tobacco use, lymphadenopathy, OA (b/l knees), hypothyroid, PNA, adenomatous colon polyps, internal hemorrhoids, b/l hearing loss, diverticulosis, vit  B12 ef  who presents for initial outpatient surgical consultation for severe symptomatic aortic stenosis  Patient underwent echo in February of 2022 after PCP noted a murmur on exam which revealed severe AS  Patient then established care with cardiologist, Dr Gabriella Ybarra, who recommended further evaluation by our service  Upon interview, patient reports BUSTILLOS and fatigue  Denies CP, lightheaded/dizziness, syncope, palpitations, LE edema, orthopnea, or PND  However, patient lives a relatively sedentary lifestyle secondary to arthritis issues in b/l knees, ambulates without assistance  Patient lives an independent lifestyle with his wife who takes care of cooking and household work  Pertinent FH for father who passed from MI at age 76  +tobacco use (1/2 ppd, 27 5 pk/yr hx), denies alcohol, and tobacco use       Dental: Full dentures  Covid: 3/19/2021, 2/19/2021    Past Medical History:  Past Medical History:   Diagnosis Date    Arthritis     Asthma     Colon polyps     Community acquired pneumonia     last assessed: 5/1/2014    Diabetes mellitus [facility] )     Hemorrhagic prepatellar bursitis, left 10/21/2019    Hepatitis C     High cholesterol     Hypertension     Lymphadenopathy, anterior cervical 4/17/2018    Screening for colon cancer 5/1/2019    Thoracic vertebral fracture ([facility] ) 6/11/2014       Past Surgical History:   Past Surgical History:   Procedure Laterality Date    COLONOSCOPY      COLONOSCOPY W/ POLYPECTOMY      LITHOTRIPSY  MULTIPLE TOOTH EXTRACTIONS      MA COLONOSCOPY FLX DX W/COLLJ SPEC WHEN PFRMD N/A 5/17/2018    Procedure: COLONOSCOPY;  Surgeon: Romeo Porter MD;  Location: [facility]; Service: Gastroenterology         Family History:  Family History   Problem Relation Age of Onset    Heart attack Family         at age 80    No Known Problems Mother     No Known Problems Father     Diabetes Sister     Diabetes Brother          Social History:    Social History     Substance and Sexual Activity   Alcohol Use No     Social History     Substance and Sexual Activity   Drug Use No     Social History     Tobacco Use   Smoking Status Current Every Day Smoker    Packs/day: 0 50    Types: Cigarettes   Smokeless Tobacco Never Used   Tobacco Comment    started when he was about 24 yrs old       Home Medications:   Prior to Admission medications    Medication Sig Start Date End Date Taking?  Authorizing Provider   Advair -21 MCG/ACT inhaler TAKE 2 PUFFS BY MOUTH TWICE A DAY 5/5/21  Yes Laurie Blackburn DO   albuterol (VENTOLIN HFA) 90 mcg/act inhaler Inhale 2 puffs every 4 (four) hours as needed for wheezing or shortness of breath 9/21/18  Yes Zakiya Cole,    Alcohol Swabs (ALCOHOL PADS) 70 % PADS  5/9/19  Yes Historical Provider, MD   amLODIPine (NORVASC) 5 mg tablet Take 1 tablet (5 mg total) by mouth daily 7/23/19  Yes Laurie Blackburn DO   bisacodyl (DULCOLAX) 5 mg EC tablet Take by mouth   5/1/14  Yes Historical Provider, MD   Blood Glucose Monitoring Suppl (OneTouch Verio) w/Device KIT Check blood glucose three times daily before each meal 8/20/20  Yes Elise Cm, DO   cholecalciferol (VITAMIN D3) 1,000 units tablet TAKE 2 TABLETS (2,000 UNITS TOTAL) BY MOUTH DAILY 4/25/22  Yes Lavchad Elberta, DO   [facility] Vitamin B-12 1000 MCG tablet TAKE 1 TABLET BY MOUTH EVERY DAY 8/6/21  Yes Lavchad Elberta, DO   fluticasone (FLONASE) 50 mcg/act nasal spray 2 sprays into each nostril daily 8/11/20  Yes Laurie Blackburn DO glucose blood (OneTouch Verio) test strip Check blood glucose three times daily before meals and bring a log of your measurements to your next appointment  8/20/20  Yes Wendy Colon, DO   hydrochlorothiazide (HYDRODIURIL) 25 mg tablet Take 1 tablet (25 mg total) by mouth daily 1/19/22  Yes Margret Aponte DO   ibuprofen (MOTRIN) 600 mg tablet Take 1 tablet (600 mg total) by mouth every 6 (six) hours as needed for mild pain 9/28/19  Yes Amanda Tan, DO   insulin glargine (Lantus SoloStar) 100 units/mL injection pen Inject 18 Units under the skin daily at bedtime 3/3/22  Yes Dontae Kaur MD   Insulin Pen Needle (Pen Needles) 31G X 8 MM MISC Use daily 3/3/21  Yes Laurie Blackburn DO   Lancets (FREESTYLE) lancets by Other route as needed (As needed) 3/21/19  Yes Kosta Perez MD   levothyroxine 137 mcg tablet Take 1 tablet (137 mcg total) by mouth daily 1/19/22  Yes Margret Aponte DO   lisinopril (ZESTRIL) 20 mg tablet TAKE 1 TABLET BY MOUTH EVERY DAY 4/7/21  Yes Laurie Blackburn DO   metFORMIN (GLUCOPHAGE) 850 mg tablet TAKE 1 TABLET (850 MG TOTAL) BY MOUTH 2 (TWO) TIMES A DAY WITH MEALS 8/3/21  Yes Margret Aponte DO   montelukast (SINGULAIR) 10 mg tablet TAKE 1 TABLET BY MOUTH EVERY DAY 4/18/22  Yes Margret Aponte DO   naproxen (NAPROSYN) 500 mg tablet TAKE 1 TABLET BY MOUTH EVERY 12 HOURS AS NEEDED FOR MILD PAIN  TAKE WITH MEALS   6/19/19  Yes Historical Provider, MD   nicotine (NICODERM CQ) 14 mg/24hr TD 24 hr patch Place 1 patch on the skin every 24 hours 3/16/22  Yes Brett Khalil MD   NovoLOG FlexPen 100 units/mL injection pen Inject 5 units with breakfast and with dinner 3/3/22  Yes Dontae Kaur MD   rosuvastatin (CRESTOR) 40 MG tablet TAKE 1 TABLET BY MOUTH EVERY DAY 4/29/22  Yes Xuan Iraheta MD   Blood Pressure Monitoring (BLOOD PRESSURE MONITOR/M CUFF) MISC by Does not apply route daily Check blood pressure once daily and keep a log to bring to appointment  Patient not taking: Reported on 5/5/2022 4/28/20   Laurie Blackburn DO   econazole nitrate 1 % cream Apply topically daily  Patient not taking: Reported on 2/17/2022 11/25/14   Historical Provider, MD   methocarbamol (ROBAXIN) 500 mg tablet Take 1 tablet (500 mg total) by mouth 4 (four) times a day as needed for muscle spasms  Patient not taking: Reported on 5/5/2022 4/28/20   Laurie Blackburn DO   polyethylene glycol (GLYCOLAX) powder Mix entire tub in 64 oz of Gatorade (no red, orange, or purple)  Patient not taking: Reported on 3/3/2022  5/1/18   Ines HemDO   sildenafil (VIAGRA) 25 MG tablet Take 1 tablet (25 mg total) by mouth daily as needed for erectile dysfunction  Patient not taking: Reported on 5/5/2022 1/13/20   Laurie Blackburn DO       Allergies:  No Known Allergies    Review of Systems:     Review of Systems   Constitutional: Positive for activity change and fatigue  Negative for chills and diaphoresis  HENT: Negative for dental problem, nosebleeds and rhinorrhea  Eyes: Negative for pain and visual disturbance  Respiratory: Positive for shortness of breath  Negative for cough and chest tightness  Cardiovascular: Negative for chest pain, palpitations and leg swelling  Gastrointestinal: Negative for blood in stool, nausea and vomiting  Genitourinary: Negative for dysuria, flank pain and hematuria  Musculoskeletal: Positive for arthralgias, gait problem and neck pain  Negative for joint swelling  Skin: Negative for color change, pallor and rash  Neurological: Negative for dizziness, seizures, syncope, weakness, light-headedness and numbness  Psychiatric/Behavioral: Negative for confusion and hallucinations  The patient is not nervous/anxious          Vital Signs:     Vitals:    05/05/22 1437 05/05/22 1439   BP: 146/66 144/69   BP Location: Left arm Right arm   Patient Position: Sitting Sitting   Cuff Size: Standard    Pulse: 92    Resp: 18    SpO2: 99% Weight: 83 5 kg (184 lb)    Height: 5' 8" (1 727 m)        Physical Exam:     Physical Exam  Constitutional:       General: He is not in acute distress  Appearance: Normal appearance  He is not diaphoretic  HENT:      Head: Normocephalic and atraumatic  Right Ear: External ear normal       Left Ear: External ear normal       Nose: Nose normal    Eyes:      General:         Right eye: No discharge  Left eye: No discharge  Neck:      Vascular: No carotid bruit  Cardiovascular:      Rate and Rhythm: Normal rate and regular rhythm  Heart sounds: Murmur heard  No friction rub  Pulmonary:      Effort: Pulmonary effort is normal       Breath sounds: Normal breath sounds  No wheezing, rhonchi or rales  Abdominal:      General: Abdomen is flat  There is no distension  Palpations: Abdomen is soft  Tenderness: There is no abdominal tenderness  Musculoskeletal:         General: No deformity or signs of injury  Cervical back: No rigidity  No muscular tenderness  Right lower leg: No edema  Left lower leg: No edema  Skin:     General: Skin is warm and dry  Capillary Refill: Capillary refill takes less than 2 seconds  Findings: No rash  Neurological:      General: No focal deficit present  Mental Status: He is alert and oriented to person, place, and time  Psychiatric:         Mood and Affect: Mood normal          Behavior: Behavior normal          Thought Content: Thought content normal          Judgment: Judgment normal          Lab Results:               Invalid input(s): LABGLOM      Lab Results   Component Value Date    HGBA1C 6 3 01/19/2022     No results found for: CKTOTAL, CKMB, CKMBINDEX, TROPONINI    Imaging Studies:     Echocardiogram: 2/11/22    Left Ventricle: Left ventricular cavity size is normal  Wall thickness is mildly increased  Systolic function is normal  Wall motion is normal  Diastolic function is normal for age   There is mild concentric hypertrophy    Right Ventricle: Right ventricular cavity size is normal  Systolic function is normal     Left Atrium: The atrium is mildly dilated    Aortic Valve: The aortic valve is trileaflet  The leaflets are severely thickened  The leaflets are severely calcified  There is severely reduced mobility  There is severe stenosis  The aortic valve peak velocity is 2 94 m/s  The aortic valve mean gradient is 19 mmHg  The aortic valve/velocity ratio is 0 24  The aortic valve area is 0 70 cm2    Mitral Valve: There is mild annular calcification  There is mild to moderate regurgitation  I have personally reviewed pertinent films in PACS    TAVR evaluation Assessment:     Ling Mccullough 122: III    STS Risk Score: 2 1 %, mortality risk    5 Meter Walk Test:      Attempt 1: 6   Attempt 2: 7   Attempt 3: 6    KCCQ-12 completed        Assessment:  Patient Active Problem List    Diagnosis Date Noted    Systolic murmur 23/43/4681    Diverticulosis of large intestine 09/20/2018    Internal hemorrhoids 09/20/2018    Chronic hepatitis C ([facility] [address] ) 04/13/2017    Nicotine dependence 04/13/2017    Osteoarthritis of knee 04/13/2017    Bilateral hearing loss 01/30/2017    Allergic rhinitis 02/24/2016    Diabetes mellitus with neurological manifestation ([facility] [facility] ) 11/24/2015    Adenomatous colon polyp 06/11/2014    Collapsed arches 05/27/2014    Hyperlipidemia 09/13/2013    Nephritis and nephropathy, not specified as acute or chronic, with other specified pathological lesion in kidney, in diseases classified elsewhere 03/26/2013    Vitamin B12 deficiency 07/24/2012    Asthma 06/08/2012    Essential hypertension 06/08/2012    Hypothyroidism 06/07/2012     Severe aortic stenosis; Ongoing TAVR workup    Plan:    Degroot Thierry has severe symptomatic aortic stenosis   Based on their STS risk assessment, they will undergo the following testing for transcatheter aortic valve replacement: gated CTA of the chest, abdomen, and pelvis, cardiac catheterization, dental clearance, carotid ultrasound and pulmonary function tests  Once these studies have been completed, Leonel Bryant will follow up in our office to review the results and confirm the suitability of proceeding with transcatheter aortic valve replacment  Shared decision-making encounter occurred during this visit  Additionally, heart team evaluation of suitability for surgical replacement has been completed  Leonel Bryant was comfortable with our recommendations, and their questions were answered to their satisfaction  We will continue to evaluate the patient, with a final surgical recommendation pending the above work up  Thank you for allowing us to participate in the care of this patient  The patient recently had a screening colonoscopy in 2018  Therefore GI referral is not indicated at this time  SIGNATURE: Minnie Vega PA-C  DATE: May 5, 2022  TIME: 3:29 PM    * This note was completed in part utilizing [employer] direct voice recognition software  Grammatical errors, random word insertion, spelling mistakes, and incomplete sentences may be an occasional consequence of the system secondary to software limitations, ambient noise and hardware issues  At the time of dictation, efforts were made to edit, clarify and /or correct errors  Please read the chart carefully and recognize, using context, where substitutions have occurred  If you have any questions or concerns about the context, text or information contained within the body of this dictation, please contact myself, the provider, for further clarification

## 2022-05-05 NOTE — PROGRESS NOTES
Consultation - Cardiothoracic Surgery   Ly Merritt 78 y o  male MRN: [id number]    Physician Requesting Consult: Alma Calles MD    Reason for Consult / Principal Problem: Aortic stenosis, Non-Rheumatic    History of Present Illness: Ly Merritt is a 78y o  year old male with PMH of AS, HTN, HLD, asthma, IDDM, Hep C, tobacco use, lymphadenopathy, OA (b/l knees), hypothyroid, PNA, adenomatous colon polyps, internal hemorrhoids, b/l hearing loss, diverticulosis, vit  B12 ef  who presents for initial outpatient surgical consultation for severe symptomatic aortic stenosis  Patient underwent echo in February of 2022 after PCP noted a murmur on exam which revealed severe AS  Patient then established care with cardiologist, Dr Penelope Gage, who recommended further evaluation by our service  Upon interview, patient reports BUSTILLOS and fatigue  Denies CP, lightheaded/dizziness, syncope, palpitations, LE edema, orthopnea, or PND  However, patient lives a relatively sedentary lifestyle secondary to arthritis issues in b/l knees, ambulates without assistance  Patient lives an independent lifestyle with his wife who takes care of cooking and household work  Pertinent FH for father who passed from MI at age 76  +tobacco use (1/2 ppd, 27 5 pk/yr hx), denies alcohol, and tobacco use       Dental: Full dentures  Covid: 3/19/2021, 2/19/2021    Past Medical History:  Past Medical History:   Diagnosis Date    Arthritis     Asthma     Colon polyps     Community acquired pneumonia     last assessed: 5/1/2014    Diabetes mellitus ([facility] [address] [facility]     Hemorrhagic prepatellar bursitis, left 10/21/2019    Hepatitis C     High cholesterol     Hypertension     Lymphadenopathy, anterior cervical 4/17/2018    Screening for colon cancer 5/1/2019    Thoracic vertebral fracture ([facility] [address] ) 6/11/2014       Past Surgical History:   Past Surgical History:   Procedure Laterality Date    COLONOSCOPY      COLONOSCOPY W/ POLYPECTOMY      LITHOTRIPSY  MULTIPLE TOOTH EXTRACTIONS      MA COLONOSCOPY FLX DX W/COLLJ SPEC WHEN PFRMD N/A 5/17/2018    Procedure: COLONOSCOPY;  Surgeon: Harley Chun MD;  Location: [facility]; Service: Gastroenterology         Family History:  Family History   Problem Relation Age of Onset    Heart attack Family         at age 80    No Known Problems Mother     No Known Problems Father     Diabetes Sister     Diabetes Brother          Social History:    Social History     Substance and Sexual Activity   Alcohol Use No     Social History     Substance and Sexual Activity   Drug Use No     Social History     Tobacco Use   Smoking Status Current Every Day Smoker    Packs/day: 0 50    Types: Cigarettes   Smokeless Tobacco Never Used   Tobacco Comment    started when he was about 24 yrs old       Home Medications:   Prior to Admission medications    Medication Sig Start Date End Date Taking?  Authorizing Provider   Advair -21 MCG/ACT inhaler TAKE 2 PUFFS BY MOUTH TWICE A DAY 5/5/21  Yes Laurie Blackburn DO   albuterol (VENTOLIN HFA) 90 mcg/act inhaler Inhale 2 puffs every 4 (four) hours as needed for wheezing or shortness of breath 9/21/18  Yes Tl Fischer,    Alcohol Swabs (ALCOHOL PADS) 70 % PADS  5/9/19  Yes Historical Provider, MD   amLODIPine (NORVASC) 5 mg tablet Take 1 tablet (5 mg total) by mouth daily 7/23/19  Yes Laurie Blackburn DO   bisacodyl (DULCOLAX) 5 mg EC tablet Take by mouth   5/1/14  Yes Historical Provider, MD   Blood Glucose Monitoring Suppl (OneTouch Verio) w/Device KIT Check blood glucose three times daily before each meal 8/20/20  Yes Andrea Lima DO   cholecalciferol (VITAMIN D3) 1,000 units tablet TAKE 2 TABLETS (2,000 UNITS TOTAL) BY MOUTH DAILY 4/25/22  Yes Shaw Mcfadden DO   [facility] Vitamin B-12 1000 MCG tablet TAKE 1 TABLET BY MOUTH EVERY DAY 8/6/21  Yes Shaw Mcfadden DO   fluticasone (FLONASE) 50 mcg/act nasal spray 2 sprays into each nostril daily 8/11/20  Yes Laurie Blackburn DO glucose blood (OneTouch Verio) test strip Check blood glucose three times daily before meals and bring a log of your measurements to your next appointment  8/20/20  Yes Kailash Cabrera, DO   hydrochlorothiazide (HYDRODIURIL) 25 mg tablet Take 1 tablet (25 mg total) by mouth daily 1/19/22  Yes Stanley Stoner DO   ibuprofen (MOTRIN) 600 mg tablet Take 1 tablet (600 mg total) by mouth every 6 (six) hours as needed for mild pain 9/28/19  Yes Latoya Gutierrez, DO   insulin glargine (Lantus SoloStar) 100 units/mL injection pen Inject 18 Units under the skin daily at bedtime 3/3/22  Yes Julio Cesar Kaur MD   Insulin Pen Needle (Pen Needles) 31G X 8 MM MISC Use daily 3/3/21  Yes Laurie Blackburn DO   Lancets (FREESTYLE) lancets by Other route as needed (As needed) 3/21/19  Yes Rosa Cui MD   levothyroxine 137 mcg tablet Take 1 tablet (137 mcg total) by mouth daily 1/19/22  Yes Stanley Stoner DO   lisinopril (ZESTRIL) 20 mg tablet TAKE 1 TABLET BY MOUTH EVERY DAY 4/7/21  Yes Laurie Blackburn DO   metFORMIN (GLUCOPHAGE) 850 mg tablet TAKE 1 TABLET (850 MG TOTAL) BY MOUTH 2 (TWO) TIMES A DAY WITH MEALS 8/3/21  Yes Stanley Stoner DO   montelukast (SINGULAIR) 10 mg tablet TAKE 1 TABLET BY MOUTH EVERY DAY 4/18/22  Yes Stanley Stoner DO   naproxen (NAPROSYN) 500 mg tablet TAKE 1 TABLET BY MOUTH EVERY 12 HOURS AS NEEDED FOR MILD PAIN  TAKE WITH MEALS   6/19/19  Yes Historical Provider, MD   nicotine (NICODERM CQ) 14 mg/24hr TD 24 hr patch Place 1 patch on the skin every 24 hours 3/16/22  Yes Brett Khalil MD   NovoLOG FlexPen 100 units/mL injection pen Inject 5 units with breakfast and with dinner 3/3/22  Yes Julio Cesar Kaur MD   rosuvastatin (CRESTOR) 40 MG tablet TAKE 1 TABLET BY MOUTH EVERY DAY 4/29/22  Yes Vidya Valenzuela MD   Blood Pressure Monitoring (BLOOD PRESSURE MONITOR/M CUFF) MISC by Does not apply route daily Check blood pressure once daily and keep a log to bring to appointment  Patient not taking: Reported on 5/5/2022 4/28/20   Laurie Blackburn DO   econazole nitrate 1 % cream Apply topically daily  Patient not taking: Reported on 2/17/2022 11/25/14   Historical Provider, MD   methocarbamol (ROBAXIN) 500 mg tablet Take 1 tablet (500 mg total) by mouth 4 (four) times a day as needed for muscle spasms  Patient not taking: Reported on 5/5/2022 4/28/20   Laurie Blackburn DO   polyethylene glycol (GLYCOLAX) powder Mix entire tub in 64 oz of Gatorade (no red, orange, or purple)  Patient not taking: Reported on 3/3/2022  5/1/18   Silvia Divers,    sildenafil (VIAGRA) 25 MG tablet Take 1 tablet (25 mg total) by mouth daily as needed for erectile dysfunction  Patient not taking: Reported on 5/5/2022 1/13/20   Laurie Blackburn DO       Allergies:  No Known Allergies    Review of Systems:     Review of Systems   Constitutional: Positive for activity change and fatigue  Negative for chills and diaphoresis  HENT: Negative for dental problem, nosebleeds and rhinorrhea  Eyes: Negative for pain and visual disturbance  Respiratory: Positive for shortness of breath  Negative for cough and chest tightness  Cardiovascular: Negative for chest pain, palpitations and leg swelling  Gastrointestinal: Negative for blood in stool, nausea and vomiting  Genitourinary: Negative for dysuria, flank pain and hematuria  Musculoskeletal: Positive for arthralgias, gait problem and neck pain  Negative for joint swelling  Skin: Negative for color change, pallor and rash  Neurological: Negative for dizziness, seizures, syncope, weakness, light-headedness and numbness  Psychiatric/Behavioral: Negative for confusion and hallucinations  The patient is not nervous/anxious          Vital Signs:     Vitals:    05/05/22 1437 05/05/22 1439   BP: 146/66 144/69   BP Location: Left arm Right arm   Patient Position: Sitting Sitting   Cuff Size: Standard    Pulse: 92    Resp: 18    SpO2: 99% Weight: 83 5 kg (184 lb)    Height: 5' 8" (1 727 m)        Physical Exam:     Physical Exam  Constitutional:       General: He is not in acute distress  Appearance: Normal appearance  He is not diaphoretic  HENT:      Head: Normocephalic and atraumatic  Right Ear: External ear normal       Left Ear: External ear normal       Nose: Nose normal    Eyes:      General:         Right eye: No discharge  Left eye: No discharge  Neck:      Vascular: No carotid bruit  Cardiovascular:      Rate and Rhythm: Normal rate and regular rhythm  Heart sounds: Murmur heard  No friction rub  Pulmonary:      Effort: Pulmonary effort is normal       Breath sounds: Normal breath sounds  No wheezing, rhonchi or rales  Abdominal:      General: Abdomen is flat  There is no distension  Palpations: Abdomen is soft  Tenderness: There is no abdominal tenderness  Musculoskeletal:         General: No deformity or signs of injury  Cervical back: No rigidity  No muscular tenderness  Right lower leg: No edema  Left lower leg: No edema  Skin:     General: Skin is warm and dry  Capillary Refill: Capillary refill takes less than 2 seconds  Findings: No rash  Neurological:      General: No focal deficit present  Mental Status: He is alert and oriented to person, place, and time  Psychiatric:         Mood and Affect: Mood normal          Behavior: Behavior normal          Thought Content: Thought content normal          Judgment: Judgment normal          Lab Results:               Invalid input(s): LABGLOM      Lab Results   Component Value Date    HGBA1C 6 3 01/19/2022     No results found for: CKTOTAL, CKMB, CKMBINDEX, TROPONINI    Imaging Studies:     Echocardiogram: 2/11/22    Left Ventricle: Left ventricular cavity size is normal  Wall thickness is mildly increased  Systolic function is normal  Wall motion is normal  Diastolic function is normal for age   There is mild concentric hypertrophy    Right Ventricle: Right ventricular cavity size is normal  Systolic function is normal     Left Atrium: The atrium is mildly dilated    Aortic Valve: The aortic valve is trileaflet  The leaflets are severely thickened  The leaflets are severely calcified  There is severely reduced mobility  There is severe stenosis  The aortic valve peak velocity is 2 94 m/s  The aortic valve mean gradient is 19 mmHg  The aortic valve/velocity ratio is 0 24  The aortic valve area is 0 70 cm2    Mitral Valve: There is mild annular calcification  There is mild to moderate regurgitation  I have personally reviewed pertinent films in PACS    TAVR evaluation Assessment:     Ling Mccullough 122: III    STS Risk Score: 2 1 %, mortality risk    5 Meter Walk Test:      Attempt 1: 6   Attempt 2: 7   Attempt 3: 6    KCCQ-12 completed        Assessment:  Patient Active Problem List    Diagnosis Date Noted    Systolic murmur 20/72/7443    Diverticulosis of large intestine 09/20/2018    Internal hemorrhoids 09/20/2018    [facility] hepatitis C ([facility] [address] [facility] 04/13/2017    Nicotine dependence 04/13/2017    Osteoarthritis of knee 04/13/2017    Bilateral hearing loss 01/30/2017    Allergic rhinitis 02/24/2016    Diabetes mellitus with neurological manifestation ([facility] [address] ) 11/24/2015    Adenomatous colon polyp 06/11/2014    Collapsed arches 05/27/2014    Hyperlipidemia 09/13/2013    Nephritis and nephropathy, not specified as acute or chronic, with other specified pathological lesion in kidney, in diseases classified elsewhere 03/26/2013    Vitamin B12 deficiency 07/24/2012    Asthma 06/08/2012    Essential hypertension 06/08/2012    Hypothyroidism 06/07/2012     Severe aortic stenosis; Ongoing TAVR workup    Plan:    Sagar Bose has severe symptomatic aortic stenosis   Based on their STS risk assessment, they will undergo the following testing for transcatheter aortic valve replacement: gated CTA of the chest, abdomen, and pelvis, cardiac catheterization, dental clearance, carotid ultrasound and pulmonary function tests  Once these studies have been completed, Dionne Isaac will follow up in our office to review the results and confirm the suitability of proceeding with transcatheter aortic valve replacment  Shared decision-making encounter occurred during this visit  Additionally, heart team evaluation of suitability for surgical replacement has been completed  Dionne Isaac was comfortable with our recommendations, and their questions were answered to their satisfaction  We will continue to evaluate the patient, with a final surgical recommendation pending the above work up  Thank you for allowing us to participate in the care of this patient  The patient recently had a screening colonoscopy in 2018  Therefore GI referral is not indicated at this time  SIGNATURE: Kai Hodge PA-C  DATE: May 5, 2022  TIME: 3:29 PM    * This note was completed in part utilizing [employer] direct voice recognition software  Grammatical errors, random word insertion, spelling mistakes, and incomplete sentences may be an occasional consequence of the system secondary to software limitations, ambient noise and hardware issues  At the time of dictation, efforts were made to edit, clarify and /or correct errors  Please read the chart carefully and recognize, using context, where substitutions have occurred  If you have any questions or concerns about the context, text or information contained within the body of this dictation, please contact myself, the provider, for further clarification

## 2022-05-05 NOTE — LETTER
May 5, 2022     [facility] [id number]    Patient: Olivia Cruz   YOB: 1945   Date of Visit: 5/5/2022       Dear Dr Qeuntin Ruiz:    Thank you for referring Olivia Cruz to me for evaluation  Below are my notes for this consultation  If you have questions, please do not hesitate to call me  I look forward to following your patient along with you  Sincerely,        Amadou Cloud MD        CC: Paty Dill DO  [place], [place]  5/5/2022  3:33 PM  Attested  Consultation - Cardiothoracic Surgery   Olivia Cruz 78 y o  male MRN: [id number]    Physician Requesting Consult: Mariela Luong MD    Reason for Consult / Principal Problem: Aortic stenosis, Non-Rheumatic    History of Present Illness: Olivia Cruz is a 78y o  year old male with PMH of AS, HTN, HLD, asthma, IDDM, Hep C, tobacco use, lymphadenopathy, OA (b/l knees), hypothyroid, PNA, adenomatous colon polyps, internal hemorrhoids, b/l hearing loss, diverticulosis, vit  B12 ef  who presents for initial outpatient surgical consultation for severe symptomatic aortic stenosis  Patient underwent echo in February of 2022 after PCP noted a murmur on exam which revealed severe AS  Patient then established care with cardiologist, Dr Quentin Ruiz, who recommended further evaluation by our service  Upon interview, patient reports BUSTILLOS and fatigue  Denies CP, lightheaded/dizziness, syncope, palpitations, LE edema, orthopnea, or PND  However, patient lives a relatively sedentary lifestyle secondary to arthritis issues in b/l knees, ambulates without assistance  Patient lives an independent lifestyle with his wife who takes care of cooking and household work  Pertinent FH for father who passed from MI at age 76  +tobacco use (1/2 ppd, 27 5 pk/yr hx), denies alcohol, and tobacco use       Dental: Full dentures  Covid: 3/19/2021, 2/19/2021    Past Medical History:  Past Medical History:   Diagnosis Date    Arthritis     Asthma     Colon polyps     Community acquired pneumonia     last assessed: 5/1/2014    Diabetes mellitus ([facility] )     Hemorrhagic prepatellar bursitis, left 10/21/2019    Hepatitis C     High cholesterol     Hypertension     Lymphadenopathy, anterior cervical 4/17/2018    Screening for colon cancer 5/1/2019    Thoracic vertebral fracture ([facility] ) 6/11/2014       Past Surgical History:   Past Surgical History:   Procedure Laterality Date    COLONOSCOPY      COLONOSCOPY W/ POLYPECTOMY      LITHOTRIPSY      MULTIPLE TOOTH EXTRACTIONS      [place] COLONOSCOPY FLX DX W/COLLJ SPEC WHEN PFRMD N/A 5/17/2018    Procedure: COLONOSCOPY;  Surgeon: Latasha Gillespie MD;  Location:  GI LAB; Service: Gastroenterology         Family History:  Family History   Problem Relation Age of Onset    Heart attack Family         at age 80    No Known Problems Mother     No Known Problems Father     Diabetes Sister     Diabetes Brother          Social History:    Social History     Substance and Sexual Activity   Alcohol Use No     Social History     Substance and Sexual Activity   Drug Use No     Social History     Tobacco Use   Smoking Status Current Every Day Smoker    Packs/day: 0 50    Types: Cigarettes   Smokeless Tobacco Never Used   Tobacco Comment    started when he was about 24 yrs old       Home Medications:   Prior to Admission medications    Medication Sig Start Date End Date Taking?  Authorizing Provider   Advair -21 MCG/ACT inhaler TAKE 2 PUFFS BY MOUTH TWICE A DAY 5/5/21  Yes Laurie Blackburn,    albuterol (VENTOLIN HFA) 90 mcg/act inhaler Inhale 2 puffs every 4 (four) hours as needed for wheezing or shortness of breath 9/21/18  Yes Cheyenne Vidal,    Alcohol Swabs (ALCOHOL PADS) 70 % PADS  5/9/19  Yes Historical Provider, MD   amLODIPine (NORVASC) 5 mg tablet Take 1 tablet (5 mg total) by mouth daily 7/23/19  Yes Laurie Blackburn DO   bisacodyl (DULCOLAX) 5 mg EC tablet Take by mouth 5/1/14  Yes Selwyn Almazan MD   Blood Glucose Monitoring Suppl (OneTouch Verio) w/Device KIT Check blood glucose three times daily before each meal 8/20/20  Yes Macy Latham DO   cholecalciferol (VITAMIN D3) 1,000 units tablet TAKE 2 TABLETS (2,000 UNITS TOTAL) BY MOUTH DAILY 4/25/22  Yes Jay Moreno DO   [facility] Vitamin B-12 1000 MCG tablet TAKE 1 TABLET BY MOUTH EVERY DAY 8/6/21  Yes Jay Moreno DO   fluticasone (FLONASE) 50 mcg/act nasal spray 2 sprays into each nostril daily 8/11/20  Yes Laurie Blackburn DO   glucose blood (OneTouch Verio) test strip Check blood glucose three times daily before meals and bring a log of your measurements to your next appointment  8/20/20  Yes Macy Latham DO   hydrochlorothiazide (HYDRODIURIL) 25 mg tablet Take 1 tablet (25 mg total) by mouth daily 1/19/22  Yes Jay Moreno DO   ibuprofen (MOTRIN) 600 mg tablet Take 1 tablet (600 mg total) by mouth every 6 (six) hours as needed for mild pain 9/28/19  Yes Zak Murillo DO   insulin glargine (Lantus SoloStar) 100 units/mL injection pen Inject 18 Units under the skin daily at bedtime 3/3/22  Yes Julio Kaur MD   Insulin Pen Needle (Pen Needles) 31G X 8 MM MISC Use daily 3/3/21  Yes Laurie Blackburn DO   Lancets (FREESTYLE) lancets by Other route as needed (As needed) 3/21/19  Yes Juanjose Morris MD   levothyroxine 137 mcg tablet Take 1 tablet (137 mcg total) by mouth daily 1/19/22  Yes Jay Moreno DO   lisinopril (ZESTRIL) 20 mg tablet TAKE 1 TABLET BY MOUTH EVERY DAY 4/7/21  Yes Laurie Blackburn DO   metFORMIN (GLUCOPHAGE) 850 mg tablet TAKE 1 TABLET (850 MG TOTAL) BY MOUTH 2 (TWO) TIMES A DAY WITH MEALS 8/3/21  Yes Jay Moreno DO   montelukast (SINGULAIR) 10 mg tablet TAKE 1 TABLET BY MOUTH EVERY DAY 4/18/22  Yes Jay Moreon DO   naproxen (NAPROSYN) 500 mg tablet TAKE 1 TABLET BY MOUTH EVERY 12 HOURS AS NEEDED FOR MILD PAIN  TAKE WITH MEALS   6/19/19  Yes Historical Provider, MD   nicotine (NICODERM CQ) 14 mg/24hr TD 24 hr patch Place 1 patch on the skin every 24 hours 3/16/22  Yes Brett Khalil MD   NovoLOG FlexPen 100 units/mL injection pen Inject 5 units with breakfast and with dinner 3/3/22  Yes Jose Elias Kaur MD   rosuvastatin (CRESTOR) 40 MG tablet TAKE 1 TABLET BY MOUTH EVERY DAY 4/29/22  Yes Brittani Elena MD   Blood Pressure Monitoring (BLOOD PRESSURE MONITOR/M CUFF) MISC by Does not apply route daily Check blood pressure once daily and keep a log to bring to appointment  Patient not taking: Reported on 5/5/2022 4/28/20   Laurie Blackburn DO   econazole nitrate 1 % cream Apply topically daily  Patient not taking: Reported on 2/17/2022 11/25/14   Historical Provider, MD   methocarbamol (ROBAXIN) 500 mg tablet Take 1 tablet (500 mg total) by mouth 4 (four) times a day as needed for muscle spasms  Patient not taking: Reported on 5/5/2022 4/28/20   Laurie Blackburn DO   polyethylene glycol (GLYCOLAX) powder Mix entire tub in 64 oz of Gatorade (no red, orange, or purple)  Patient not taking: Reported on 3/3/2022  5/1/18   Lucio Finley DO   sildenafil (VIAGRA) 25 MG tablet Take 1 tablet (25 mg total) by mouth daily as needed for erectile dysfunction  Patient not taking: Reported on 5/5/2022 1/13/20   Laurie Blackburn DO       Allergies:  No Known Allergies    Review of Systems:     Review of Systems   Constitutional: Positive for activity change and fatigue  Negative for chills and diaphoresis  HENT: Negative for dental problem, nosebleeds and rhinorrhea  Eyes: Negative for pain and visual disturbance  Respiratory: Positive for shortness of breath  Negative for cough and chest tightness  Cardiovascular: Negative for chest pain, palpitations and leg swelling  Gastrointestinal: Negative for blood in stool, nausea and vomiting  Genitourinary: Negative for dysuria, flank pain and hematuria     Musculoskeletal: Positive for arthralgias, gait problem and neck pain  Negative for joint swelling  Skin: Negative for color change, pallor and rash  Neurological: Negative for dizziness, seizures, syncope, weakness, light-headedness and numbness  Psychiatric/Behavioral: Negative for confusion and hallucinations  The patient is not nervous/anxious  Vital Signs:     Vitals:    05/05/22 1437 05/05/22 1439   BP: 146/66 144/69   BP Location: Left arm Right arm   Patient Position: Sitting Sitting   Cuff Size: Standard    Pulse: 92    Resp: 18    SpO2: 99%    Weight: 83 5 kg (184 lb)    Height: 5' 8" (1 727 m)        Physical Exam:     Physical Exam  Constitutional:       General: He is not in acute distress  Appearance: Normal appearance  He is not diaphoretic  HENT:      Head: Normocephalic and atraumatic  Right Ear: External ear normal       Left Ear: External ear normal       Nose: Nose normal    Eyes:      General:         Right eye: No discharge  Left eye: No discharge  Neck:      Vascular: No carotid bruit  Cardiovascular:      Rate and Rhythm: Normal rate and regular rhythm  Heart sounds: Murmur heard  No friction rub  Pulmonary:      Effort: Pulmonary effort is normal       Breath sounds: Normal breath sounds  No wheezing, rhonchi or rales  Abdominal:      General: Abdomen is flat  There is no distension  Palpations: Abdomen is soft  Tenderness: There is no abdominal tenderness  Musculoskeletal:         General: No deformity or signs of injury  Cervical back: No rigidity  No muscular tenderness  Right lower leg: No edema  Left lower leg: No edema  Skin:     General: Skin is warm and dry  Capillary Refill: Capillary refill takes less than 2 seconds  Findings: No rash  Neurological:      General: No focal deficit present  Mental Status: He is alert and oriented to person, place, and time     Psychiatric:         Mood and Affect: Mood normal          Behavior: Behavior normal          Thought Content: Thought content normal          Judgment: Judgment normal          Lab Results:               Invalid input(s): LABGLOM      Lab Results   Component Value Date    HGBA1C 6 3 01/19/2022     No results found for: CKTOTAL, CKMB, CKMBINDEX, TROPONINI    Imaging Studies:     Echocardiogram: 2/11/22    Left Ventricle: Left ventricular cavity size is normal  Wall thickness is mildly increased  Systolic function is normal  Wall motion is normal  Diastolic function is normal for age  There is mild concentric hypertrophy    Right Ventricle: Right ventricular cavity size is normal  Systolic function is normal     Left Atrium: The atrium is mildly dilated    Aortic Valve: The aortic valve is trileaflet  The leaflets are severely thickened  The leaflets are severely calcified  There is severely reduced mobility  There is severe stenosis  The aortic valve peak velocity is 2 94 m/s  The aortic valve mean gradient is 19 mmHg  The aortic valve/velocity ratio is 0 24  The aortic valve area is 0 70 cm2    Mitral Valve: There is mild annular calcification  There is mild to moderate regurgitation        I have personally reviewed pertinent films in PACS    TAVR evaluation Assessment:     Ling Mccullough 122: III    STS Risk Score: 2 1 %, mortality risk    5 Meter Walk Test:      Attempt 1: 6   Attempt 2: 7   Attempt 3: 6    KCCQ-12 completed        Assessment:  Patient Active Problem List    Diagnosis Date Noted    Systolic murmur 18/86/7437    Diverticulosis of large intestine 09/20/2018    Internal hemorrhoids 09/20/2018    Chronic hepatitis C ([address] ) 04/13/2017    Nicotine dependence 04/13/2017    Osteoarthritis of knee 04/13/2017    Bilateral hearing loss 01/30/2017    Allergic rhinitis 02/24/2016    Diabetes mellitus with neurological manifestation ([address] ) 11/24/2015    Adenomatous colon polyp 06/11/2014    Collapsed arches 05/27/2014    Hyperlipidemia 09/13/2013    Nephritis and nephropathy, not specified as acute or chronic, with other specified pathological lesion in kidney, in diseases classified elsewhere 03/26/2013    Vitamin B12 deficiency 07/24/2012    Asthma 06/08/2012    Essential hypertension 06/08/2012    Hypothyroidism 06/07/2012     Severe aortic stenosis; Ongoing TAVR workup    Plan:    Leonel Bryant has severe symptomatic aortic stenosis  Based on their STS risk assessment, they will undergo the following testing for transcatheter aortic valve replacement: gated CTA of the chest, abdomen, and pelvis, cardiac catheterization, dental clearance, carotid ultrasound and pulmonary function tests  Once these studies have been completed, Leonel Bryant will follow up in our office to review the results and confirm the suitability of proceeding with transcatheter aortic valve replacment  Shared decision-making encounter occurred during this visit  Additionally, heart team evaluation of suitability for surgical replacement has been completed  Leonel Bryant was comfortable with our recommendations, and their questions were answered to their satisfaction  We will continue to evaluate the patient, with a final surgical recommendation pending the above work up  Thank you for allowing us to participate in the care of this patient  The patient recently had a screening colonoscopy in 2018  Therefore GI referral is not indicated at this time  SIGNATURE: Minnie Vega PA-C  DATE: May 5, 2022  TIME: 3:29 PM    * This note was completed in part utilizing [employer] direct voice recognition software  Grammatical errors, random word insertion, spelling mistakes, and incomplete sentences may be an occasional consequence of the system secondary to software limitations, ambient noise and hardware issues  At the time of dictation, efforts were made to edit, clarify and /or correct errors   Please read the chart carefully and recognize, using context, where substitutions have occurred  If you have any questions or concerns about the context, text or information contained within the body of this dictation, please contact myself, the provider, for further clarification  Attestation signed by Oralia Telles MD at 5/5/2022  3:47 PM:  Patient seen and evaluated with 10 Mayte Murray / AUGUST  I agree with the above assessment and plan with the following additions  The patient is a 29-year-old man with symptomatic severe aortic stenosis, I explained the diagnosis to the patient and his family and treatment options  I recommend TAVR  The patient understand and agrees to proceed with surgery  He will undergo preoperative testing and will return to schedule an elective operation    This note was completed in part utilizing [employer] direct voice recognition software  Grammatical errors, random word insertion, spelling mistakes, and incomplete sentences may be an occasional consequence of the system secondary to software limitations, ambient noise and hardware issues  At the time of dictation, efforts were made to edit, clarify and /or correct errors  Please read the chart carefully and recognize, using context, where substitutions have occurred  If you have any questions or concerns about the context, text or information contained within the body of this dictation, please contact myself, the provider, for further clarification

## 2022-05-09 ENCOUNTER — OFFICE VISIT (OUTPATIENT)
Dept: INTERNAL MEDICINE CLINIC | Facility: CLINIC | Age: 77
End: 2022-05-09

## 2022-05-09 VITALS
WEIGHT: 184.8 LBS | BODY MASS INDEX: 28.1 KG/M2 | TEMPERATURE: 97.6 F | DIASTOLIC BLOOD PRESSURE: 82 MMHG | OXYGEN SATURATION: 98 % | HEART RATE: 98 BPM | SYSTOLIC BLOOD PRESSURE: 133 MMHG

## 2022-05-09 DIAGNOSIS — I10 ESSENTIAL HYPERTENSION: ICD-10-CM

## 2022-05-09 DIAGNOSIS — I35.0 NONRHEUMATIC AORTIC VALVE STENOSIS: ICD-10-CM

## 2022-05-09 DIAGNOSIS — Z79.4 TYPE 2 DIABETES MELLITUS WITH DIABETIC NEUROPATHY, WITH LONG-TERM CURRENT USE OF INSULIN (HCC): Primary | ICD-10-CM

## 2022-05-09 DIAGNOSIS — E11.40 TYPE 2 DIABETES MELLITUS WITH DIABETIC NEUROPATHY, WITH LONG-TERM CURRENT USE OF INSULIN (HCC): Primary | ICD-10-CM

## 2022-05-09 DIAGNOSIS — Z72.0 TOBACCO USE: ICD-10-CM

## 2022-05-09 DIAGNOSIS — B18.2 CHRONIC HEPATITIS C WITHOUT HEPATIC COMA (HCC): Chronic | ICD-10-CM

## 2022-05-09 DIAGNOSIS — N52.9 ERECTILE DYSFUNCTION, UNSPECIFIED ERECTILE DYSFUNCTION TYPE: ICD-10-CM

## 2022-05-09 DIAGNOSIS — E03.9 HYPOTHYROIDISM, UNSPECIFIED TYPE: ICD-10-CM

## 2022-05-09 LAB — SL AMB POCT HEMOGLOBIN AIC: 6.5 (ref ?–6.5)

## 2022-05-09 PROCEDURE — 83036 HEMOGLOBIN GLYCOSYLATED A1C: CPT | Performed by: INTERNAL MEDICINE

## 2022-05-09 PROCEDURE — 3075F SYST BP GE 130 - 139MM HG: CPT | Performed by: INTERNAL MEDICINE

## 2022-05-09 PROCEDURE — 3079F DIAST BP 80-89 MM HG: CPT | Performed by: INTERNAL MEDICINE

## 2022-05-09 PROCEDURE — 99213 OFFICE O/P EST LOW 20 MIN: CPT | Performed by: INTERNAL MEDICINE

## 2022-05-09 RX ORDER — FLASH GLUCOSE SCANNING READER
1 EACH MISCELLANEOUS CONTINUOUS
Qty: 1 EACH | Refills: 0 | Status: SHIPPED | OUTPATIENT
Start: 2022-05-09 | End: 2022-05-10

## 2022-05-09 RX ORDER — FLASH GLUCOSE SENSOR
1 KIT MISCELLANEOUS
Qty: 6 EACH | Refills: 1 | Status: SHIPPED | OUTPATIENT
Start: 2022-05-09 | End: 2022-05-10

## 2022-05-09 NOTE — PROGRESS NOTES
[facility]  INTERNAL MEDICINE OFFICE VISIT     PATIENT INFORMATION     Tori Wong   78 y o  male   MRN: [id number]    ASSESSMENT/PLAN     Diagnoses and all orders for this visit:    Type 2 diabetes mellitus with diabetic neuropathy, with long-term current use of insulin (HCC)  A1c 6 5 today  Reports no further episodes of hypoglycemia  Currently using Lantus 20 units at night with Novolog 5 units BID  · Continue current insulin regimen and metformin 850mg BID  · Will attempt to get patient continuous blood glucose monitor - prefers monitor that does not need to pair with phone  · Discussed following diabetic diet  -     POCT hemoglobin A1c  -     Continuous Blood Gluc  (L[employer]tera 14 Day Blanca) POWER; Use 1 each continuous  -     Continuous Blood Gluc Sensor (Fluxion BiosciencesStyle Cristofer 14 Day Sensor) MISC; Use 1 each every 14 (fourteen) days    Hypothyroidism, unspecified type  TSH within normal limits on lab work completed 1/2022  · Continue levothyroxine 137mcg daily    Essential hypertension  /82 in clinic today on repeat manual blood pressure  · Continue amlodipine, lisinopril, and hctz   · Discussed may need to D/C hctz in future in attempt to improve ED, but would recommend patient address critical AS first    Chronic hepatitis C without hepatic coma (HCC)  Hep C Ab positive with negative hep C RNA in 2019 x3    Aortic valve stenosis  Diagnosed on Echo 2/2022  Saw cardiology, and although asymptomatic, patient had strss echo with paradoxical blood pressure drop with exertion  · Referred to CT surgery  · Patient following with CT surgery for future TAVR - has preop workup ordered per Dr Deidre Baker office    Erectile dysfunction, unspecified erectile dysfunction type  Patient with less frequent spontaneous erection  Questioning whether treating AS would help   Has trialed Viagra, but requires 75mg to obtain erection  · Discussed that may be 2/2 to underlying arterial/neurvous system dysfunction 2/2 atherosclerosis/diabetes  · Will check testosterone and prolactin, although do not anticipate treatment initiation prior to AS surgery  -     Testosterone, free, total; Future  -     Prolactin; Future    Tobacco use  Currently smoking 5-6 cigarettes daily  · Tobacco cessation education provided  · Advised this is risk factor for worsened vascular disease which could contribute to ED as discussed above    Schedule a follow-up appointment in 3-4 months  HEALTH MAINTENANCE     Immunization History   Administered Date(s) Administered    COVID-19 MODERNA VACC 0 5 ML IM 02/19/2021, 03/19/2021    H1N1, All Formulations 01/20/2010    Hep B, adult 05/20/2019, 06/20/2019    INFLUENZA 09/21/2018    Influenza Quadrivalent, 6-35 Months IM 10/27/2016, 10/31/2017    Influenza, high dose seasonal 0 7 mL 09/21/2018, 10/04/2019    Influenza, recombinant, quadrivalent,injectable, preservative free 10/05/2020    Influenza, seasonal, injectable 11/08/2013, 10/31/2014, 10/27/2015    Pneumococcal Conjugate 13-Valent 01/30/2017    Pneumococcal Polysaccharide PPV23 12/05/2014    Tdap 01/30/2017     CHIEF COMPLAINT     Chief Complaint   Patient presents with    Follow-up      HISTORY OF PRESENT ILLNESS     79yo male with PMH significant for T2DM, hypothyroidism, hypertension, and B12 deficiency who presents to clinic for 3 month follow up  Patient reports he is having surgery done for valve problem  Aware he has multiple tests ordered to have completed prior to surgery  Concerned in regards to erectile dysfunction  Reports spontaneous erections are infrequent  Trialed viagra which worked at 75mg only  Continues to smoke 5-6 cigarettes daily    REVIEW OF SYSTEMS     Review of Systems   Constitutional: Negative for chills, fatigue and fever  HENT: Negative for ear pain, rhinorrhea, sneezing, sore throat, tinnitus and trouble swallowing      Eyes: Negative for pain and visual disturbance  Respiratory: Negative for cough and shortness of breath  Cardiovascular: Negative for chest pain, palpitations and leg swelling  Gastrointestinal: Negative for abdominal pain, constipation, diarrhea, nausea and vomiting  Endocrine: Negative for polydipsia and polyuria  Genitourinary: Negative for difficulty urinating and dysuria  Musculoskeletal: Positive for back pain  Negative for arthralgias  Skin: Negative for color change and rash  Neurological: Negative for dizziness, weakness, light-headedness and headaches  Psychiatric/Behavioral: Negative for confusion  The patient is not nervous/anxious  OBJECTIVE     Vitals:    05/09/22 1019 05/09/22 1045   BP: 154/67 133/82   BP Location: Right arm Right arm   Patient Position: Sitting    Cuff Size: Standard    Pulse: 98    Temp: 97 6 °F (36 4 °C)    TempSrc: Temporal    SpO2: 98%    Weight: 83 8 kg (184 lb 12 8 oz)      Physical Exam  Vitals reviewed  Constitutional:       General: He is awake  He is not in acute distress  Appearance: Normal appearance  He is well-developed  He is not ill-appearing, toxic-appearing or diaphoretic  HENT:      Head: Normocephalic and atraumatic  Right Ear: External ear normal       Left Ear: External ear normal       Nose: Nose normal       Mouth/Throat:      Mouth: Mucous membranes are moist       Pharynx: Oropharynx is clear  Eyes:      General:         Right eye: No discharge  Left eye: No discharge  Conjunctiva/sclera: Conjunctivae normal    Cardiovascular:      Rate and Rhythm: Normal rate and regular rhythm  Heart sounds: Murmur heard  Pulmonary:      Effort: Pulmonary effort is normal  No respiratory distress  Breath sounds: Normal breath sounds  Abdominal:      General: Bowel sounds are normal  There is no distension  Palpations: Abdomen is soft  Tenderness: There is no abdominal tenderness     Musculoskeletal:         General: No swelling or tenderness  Normal range of motion  Cervical back: Normal range of motion  Right lower leg: No edema  Left lower leg: No edema  Skin:     General: Skin is warm and dry  Coloration: Skin is not jaundiced  Findings: No bruising  Neurological:      General: No focal deficit present  Mental Status: He is alert and oriented to person, place, and time  Motor: No weakness  Psychiatric:         Mood and Affect: Mood normal          Behavior: Behavior normal  Behavior is cooperative  Thought Content: Thought content normal        CURRENT MEDICATIONS     Current Outpatient Medications:     Advair -21 MCG/ACT inhaler, TAKE 2 PUFFS BY MOUTH TWICE A DAY, Disp: 36 g, Rfl: 6    albuterol (VENTOLIN HFA) 90 mcg/act inhaler, Inhale 2 puffs every 4 (four) hours as needed for wheezing or shortness of breath, Disp: 18 g, Rfl: 2    Alcohol Swabs (ALCOHOL PADS) 70 % PADS, , Disp: , Rfl:     amLODIPine (NORVASC) 5 mg tablet, Take 1 tablet (5 mg total) by mouth daily, Disp: 90 tablet, Rfl: 1    Blood Glucose Monitoring Suppl (OneTouch Verio) w/Device KIT, Check blood glucose three times daily before each meal, Disp: 1 kit, Rfl: 0    cholecalciferol (VITAMIN D3) 1,000 units tablet, TAKE 2 TABLETS (2,000 UNITS TOTAL) BY MOUTH DAILY, Disp: 180 tablet, Rfl: 5    [employer] Vitamin B-12 1000 MCG tablet, TAKE 1 TABLET BY MOUTH EVERY DAY, Disp: 90 tablet, Rfl: 2    fluticasone (FLONASE) 50 mcg/act nasal spray, 2 sprays into each nostril daily, Disp: 2 Bottle, Rfl: 2    glucose blood (OneTouch Verio) test strip, Check blood glucose three times daily before meals and bring a log of your measurements to your next appointment  , Disp: 300 each, Rfl: 7    hydrochlorothiazide (HYDRODIURIL) 25 mg tablet, Take 1 tablet (25 mg total) by mouth daily, Disp: 90 tablet, Rfl: 2    Insulin Pen Needle (Pen Needles) 31G X 8 MM MISC, Use daily, Disp: 300 each, Rfl: 3    Lancets (FREESTYLE) lancets, by Other route as needed (As needed), Disp: 100 each, Rfl: 3    levothyroxine 137 mcg tablet, Take 1 tablet (137 mcg total) by mouth daily, Disp: 90 tablet, Rfl: 1    lisinopril (ZESTRIL) 20 mg tablet, TAKE 1 TABLET BY MOUTH EVERY DAY, Disp: 90 tablet, Rfl: 5    metFORMIN (GLUCOPHAGE) 850 mg tablet, TAKE 1 TABLET (850 MG TOTAL) BY MOUTH 2 (TWO) TIMES A DAY WITH MEALS, Disp: 180 tablet, Rfl: 2    montelukast (SINGULAIR) 10 mg tablet, TAKE 1 TABLET BY MOUTH EVERY DAY, Disp: 90 tablet, Rfl: 1    nicotine (NICODERM CQ) 14 mg/24hr TD 24 hr patch, Place 1 patch on the skin every 24 hours, Disp: 28 patch, Rfl: 0    rosuvastatin (CRESTOR) 40 MG tablet, TAKE 1 TABLET BY MOUTH EVERY DAY, Disp: 90 tablet, Rfl: 3    insulin glargine (Lantus SoloStar) 100 units/mL injection pen, Inject 18 Units under the skin daily at bedtime, Disp: 15 mL, Rfl: 0    NovoLOG FlexPen 100 units/mL injection pen, Inject 5 units with breakfast and with dinner, Disp: 12 mL, Rfl: 3    PAST MEDICAL & SURGICAL HISTORY     Past Medical History:   Diagnosis Date    Arthritis     Asthma     Colon polyps     Community acquired pneumonia     last assessed: 5/1/2014    Diabetes mellitus (HCC)     Hemorrhagic prepatellar bursitis, left 10/21/2019    Hepatitis C     High cholesterol     Hypertension     Lymphadenopathy, anterior cervical 4/17/2018    Screening for colon cancer 5/1/2019    Thoracic vertebral fracture ([facility] [address] ) 6/11/2014     Past Surgical History:   Procedure Laterality Date    COLONOSCOPY      COLONOSCOPY W/ POLYPECTOMY      LITHOTRIPSY      MULTIPLE TOOTH EXTRACTIONS      OH COLONOSCOPY FLX DX W/COLLJ SPEC WHEN PFRMD N/A 5/17/2018    Procedure: COLONOSCOPY;  Surgeon: Sally Severe, MD;  Location: [facility];   Service: Gastroenterology     SOCIAL & FAMILY HISTORY     Social History     Socioeconomic History    Marital status: /Civil Union     Spouse name: Not on file    Number of children: Not on file    Years of education: Not on file    Highest education level: Not on file   Occupational History    Occupation: retired    Tobacco Use    Smoking status: Current Every Day Smoker     Packs/day: 0 50     Types: Cigarettes    Smokeless tobacco: Never Used    Tobacco comment: started when he was about 24 yrs old   Vaping Use    Vaping Use: Never used   Substance and Sexual Activity    Alcohol use: No    Drug use: No    Sexual activity: Not Currently   Other Topics Concern    Not on file   Social History Narrative    Not on file     Social Determinants of Health     Financial Resource Strain: Low Risk     Difficulty of Paying Living Expenses: Not hard at all   Food Insecurity: No Food Insecurity    Worried About [address] in the Last Year: Never true    [email or web address] in the Last Year: Never true   Transportation Needs: No Transportation Needs    Lack of Transportation (Medical): No    Lack of Transportation (Non-Medical): No   Physical Activity: Unknown    Days of Exercise per Week: Not on file    Minutes of Exercise per Session: 60 min   Stress: No Stress Concern Present    Feeling of Stress : Not at all   Social Connections:  Moderately Isolated    Frequency of Communication with Friends and Family: More than three times a week    Frequency of Social Gatherings with Friends and Family: More than three times a week    Attends Scientologist Services: Never    Active Member of Clubs or Organizations: No    Attends Club or Organization Meetings: Never    Marital Status:    Intimate Partner Violence: Not on file   Housing Stability: [address] Unable to Pay for Housing in the Last Year: No    Number of [place] in the Last Year: 1    Unstable Housing in the Last Year: No     Social History     Substance and Sexual Activity   Alcohol Use No     Social History     Substance and Sexual Activity   Drug Use No     Social History     Tobacco Use   Smoking Status Current Every Day Smoker    Packs/day: 0 50    Types: Cigarettes   Smokeless Tobacco Never Used   Tobacco Comment    started when he was about 24 yrs old     Family History   Problem Relation Age of Onset    Heart attack Family         at age 80    No Known Problems Mother     No Known Problems Father     Diabetes Sister     Diabetes Brother      ==  Joselyn Braxton DO  Internal Medicine Resident, PGY-2  [facility] [address] , [address] [address] [facility] [address] [facility]  Office: [phone]  Fax: [phone]

## 2022-05-10 DIAGNOSIS — Z79.4 TYPE 2 DIABETES MELLITUS WITH DIABETIC NEUROPATHY, WITH LONG-TERM CURRENT USE OF INSULIN (HCC): Primary | ICD-10-CM

## 2022-05-10 DIAGNOSIS — E11.40 TYPE 2 DIABETES MELLITUS WITH DIABETIC NEUROPATHY, WITH LONG-TERM CURRENT USE OF INSULIN (HCC): Primary | ICD-10-CM

## 2022-05-10 RX ORDER — FLASH GLUCOSE SENSOR
1 KIT MISCELLANEOUS
Qty: 6 EACH | Refills: 3 | Status: SHIPPED | OUTPATIENT
Start: 2022-05-10

## 2022-05-10 RX ORDER — FLASH GLUCOSE SCANNING READER
1 EACH MISCELLANEOUS CONTINUOUS
Qty: 1 EACH | Refills: 0 | Status: SHIPPED | OUTPATIENT
Start: 2022-05-10

## 2022-05-11 ENCOUNTER — TELEPHONE (OUTPATIENT)
Dept: CARDIOLOGY CLINIC | Facility: CLINIC | Age: 77
End: 2022-05-11

## 2022-05-11 ENCOUNTER — PREP FOR PROCEDURE (OUTPATIENT)
Dept: CARDIOLOGY CLINIC | Facility: CLINIC | Age: 77
End: 2022-05-11

## 2022-05-11 DIAGNOSIS — I35.0 NONRHEUMATIC AORTIC VALVE STENOSIS: Primary | ICD-10-CM

## 2022-05-11 NOTE — TELEPHONE ENCOUNTER
Patient scheduled for [facility] on 6/3/22 in [facility] with Dr Emory De Leon  Mailing patient instructions  Medication hold Lantus, NovoLog, Metformin, and HCTZ  Can I have auth?

## 2022-05-17 ENCOUNTER — APPOINTMENT (OUTPATIENT)
Dept: LAB | Facility: CLINIC | Age: 77
End: 2022-05-17
Payer: MEDICARE

## 2022-05-17 DIAGNOSIS — Z01.810 PREOP CARDIOVASCULAR EXAM: ICD-10-CM

## 2022-05-17 DIAGNOSIS — I35.0 SEVERE AORTIC STENOSIS: ICD-10-CM

## 2022-05-17 DIAGNOSIS — I10 ESSENTIAL HYPERTENSION: ICD-10-CM

## 2022-05-17 DIAGNOSIS — Z79.4 TYPE 2 DIABETES MELLITUS WITH DIABETIC NEUROPATHY, WITH LONG-TERM CURRENT USE OF INSULIN (HCC): ICD-10-CM

## 2022-05-17 DIAGNOSIS — E11.40 TYPE 2 DIABETES MELLITUS WITH DIABETIC NEUROPATHY, WITH LONG-TERM CURRENT USE OF INSULIN (HCC): ICD-10-CM

## 2022-05-17 DIAGNOSIS — I35.0 NONRHEUMATIC AORTIC VALVE STENOSIS: ICD-10-CM

## 2022-05-17 DIAGNOSIS — N52.9 ERECTILE DYSFUNCTION, UNSPECIFIED ERECTILE DYSFUNCTION TYPE: ICD-10-CM

## 2022-05-17 DIAGNOSIS — E78.5 HYPERLIPIDEMIA, UNSPECIFIED HYPERLIPIDEMIA TYPE: ICD-10-CM

## 2022-05-17 LAB
ALBUMIN SERPL BCP-MCNC: 3.3 G/DL (ref 3.5–5)
ALP SERPL-CCNC: 49 U/L (ref 46–116)
ALT SERPL W P-5'-P-CCNC: 22 U/L (ref 12–78)
ANION GAP SERPL CALCULATED.3IONS-SCNC: 6 MMOL/L (ref 4–13)
AST SERPL W P-5'-P-CCNC: 13 U/L (ref 5–45)
BASOPHILS # BLD AUTO: 0.02 THOUSANDS/ΜL (ref 0–0.1)
BASOPHILS NFR BLD AUTO: 0 % (ref 0–1)
BILIRUB SERPL-MCNC: 0.48 MG/DL (ref 0.2–1)
BUN SERPL-MCNC: 21 MG/DL (ref 5–25)
CALCIUM ALBUM COR SERPL-MCNC: 9.8 MG/DL (ref 8.3–10.1)
CALCIUM SERPL-MCNC: 9.2 MG/DL (ref 8.3–10.1)
CHLORIDE SERPL-SCNC: 106 MMOL/L (ref 100–108)
CO2 SERPL-SCNC: 29 MMOL/L (ref 21–32)
CREAT SERPL-MCNC: 0.89 MG/DL (ref 0.6–1.3)
EOSINOPHIL # BLD AUTO: 0.25 THOUSAND/ΜL (ref 0–0.61)
EOSINOPHIL NFR BLD AUTO: 3 % (ref 0–6)
ERYTHROCYTE [DISTWIDTH] IN BLOOD BY AUTOMATED COUNT: 13.6 % (ref 11.6–15.1)
GFR SERPL CREATININE-BSD FRML MDRD: 83 ML/MIN/1.73SQ M
GLUCOSE P FAST SERPL-MCNC: 108 MG/DL (ref 65–99)
HCT VFR BLD AUTO: 41.6 % (ref 36.5–49.3)
HGB BLD-MCNC: 13.5 G/DL (ref 12–17)
IMM GRANULOCYTES # BLD AUTO: 0.01 THOUSAND/UL (ref 0–0.2)
IMM GRANULOCYTES NFR BLD AUTO: 0 % (ref 0–2)
LYMPHOCYTES # BLD AUTO: 1.91 THOUSANDS/ΜL (ref 0.6–4.47)
LYMPHOCYTES NFR BLD AUTO: 23 % (ref 14–44)
MCH RBC QN AUTO: 30.1 PG (ref 26.8–34.3)
MCHC RBC AUTO-ENTMCNC: 32.5 G/DL (ref 31.4–37.4)
MCV RBC AUTO: 93 FL (ref 82–98)
MONOCYTES # BLD AUTO: 0.64 THOUSAND/ΜL (ref 0.17–1.22)
MONOCYTES NFR BLD AUTO: 8 % (ref 4–12)
NEUTROPHILS # BLD AUTO: 5.45 THOUSANDS/ΜL (ref 1.85–7.62)
NEUTS SEG NFR BLD AUTO: 66 % (ref 43–75)
NRBC BLD AUTO-RTO: 0 /100 WBCS
PLATELET # BLD AUTO: 254 THOUSANDS/UL (ref 149–390)
PMV BLD AUTO: 10.6 FL (ref 8.9–12.7)
POTASSIUM SERPL-SCNC: 3.9 MMOL/L (ref 3.5–5.3)
PROLACTIN SERPL-MCNC: 6.2 NG/ML (ref 2.5–17.4)
PROT SERPL-MCNC: 6.8 G/DL (ref 6.4–8.2)
RBC # BLD AUTO: 4.49 MILLION/UL (ref 3.88–5.62)
SODIUM SERPL-SCNC: 141 MMOL/L (ref 136–145)
WBC # BLD AUTO: 8.28 THOUSAND/UL (ref 4.31–10.16)

## 2022-05-17 PROCEDURE — 84146 ASSAY OF PROLACTIN: CPT

## 2022-05-17 PROCEDURE — 85025 COMPLETE CBC W/AUTO DIFF WBC: CPT

## 2022-05-17 PROCEDURE — 80053 COMPREHEN METABOLIC PANEL: CPT

## 2022-05-17 PROCEDURE — 84402 ASSAY OF FREE TESTOSTERONE: CPT

## 2022-05-17 PROCEDURE — 36415 COLL VENOUS BLD VENIPUNCTURE: CPT

## 2022-05-17 PROCEDURE — 84403 ASSAY OF TOTAL TESTOSTERONE: CPT

## 2022-05-18 LAB
TESTOST FREE SERPL-MCNC: 4.3 PG/ML (ref 6.6–18.1)
TESTOST SERPL-MCNC: 425 NG/DL (ref 264–916)

## 2022-05-19 ENCOUNTER — TELEPHONE (OUTPATIENT)
Dept: RADIOLOGY | Facility: HOSPITAL | Age: 77
End: 2022-05-19

## 2022-05-19 ENCOUNTER — HOSPITAL ENCOUNTER (OUTPATIENT)
Dept: NON INVASIVE DIAGNOSTICS | Facility: HOSPITAL | Age: 77
Discharge: HOME | End: 2022-05-19
Payer: MEDICARE

## 2022-05-19 ENCOUNTER — HOSPITAL ENCOUNTER (OUTPATIENT)
Dept: RADIOLOGY | Facility: HOSPITAL | Age: 77
Discharge: HOME | End: 2022-05-19

## 2022-05-19 ENCOUNTER — HOSPITAL ENCOUNTER (OUTPATIENT)
Dept: PULMONOLOGY | Facility: HOSPITAL | Age: 77
Discharge: HOME | End: 2022-05-19
Payer: MEDICARE

## 2022-05-19 DIAGNOSIS — Z79.4 TYPE 2 DIABETES MELLITUS WITH DIABETIC NEUROPATHY, WITH LONG-TERM CURRENT USE OF INSULIN (HCC): ICD-10-CM

## 2022-05-19 DIAGNOSIS — E78.5 HYPERLIPIDEMIA, UNSPECIFIED HYPERLIPIDEMIA TYPE: ICD-10-CM

## 2022-05-19 DIAGNOSIS — I10 ESSENTIAL HYPERTENSION: ICD-10-CM

## 2022-05-19 DIAGNOSIS — Z01.810 PREOP CARDIOVASCULAR EXAM: ICD-10-CM

## 2022-05-19 DIAGNOSIS — I35.0 SEVERE AORTIC STENOSIS: ICD-10-CM

## 2022-05-19 DIAGNOSIS — E11.40 TYPE 2 DIABETES MELLITUS WITH DIABETIC NEUROPATHY, WITH LONG-TERM CURRENT USE OF INSULIN (HCC): ICD-10-CM

## 2022-05-19 LAB
BASE EXCESS BLDA CALC-SCNC: 5 MMOL/L (ref -2–3)
CA-I BLD-SCNC: 1.33 MMOL/L (ref 1.12–1.32)
FIO2 GAS DIL.REBREATH: 21 L
GLUCOSE SERPL-MCNC: 134 MG/DL (ref 65–140)
HCO3 BLDA-SCNC: 31.1 MMOL/L (ref 22–28)
HCT VFR BLD CALC: 39 % (ref 36.5–49.3)
HGB BLDA-MCNC: 13.3 G/DL (ref 12–17)
PCO2 BLD: 33 MMOL/L (ref 21–32)
PCO2 BLD: 50 MM HG (ref 36–44)
PH BLD: 7.4 [PH] (ref 7.35–7.45)
PO2 BLD: 78 MM HG (ref 75–129)
POTASSIUM BLD-SCNC: 4 MMOL/L (ref 3.5–5.3)
SAO2 % BLD FROM PO2: 95 % (ref 60–85)
SODIUM BLD-SCNC: 138 MMOL/L (ref 136–145)
SPECIMEN SOURCE: ABNORMAL

## 2022-05-19 PROCEDURE — 82947 ASSAY GLUCOSE BLOOD QUANT: CPT

## 2022-05-19 PROCEDURE — 93880 EXTRACRANIAL BILAT STUDY: CPT

## 2022-05-19 PROCEDURE — 85014 HEMATOCRIT: CPT

## 2022-05-19 PROCEDURE — 84295 ASSAY OF SERUM SODIUM: CPT

## 2022-05-19 PROCEDURE — 94729 DIFFUSING CAPACITY: CPT | Performed by: INTERNAL MEDICINE

## 2022-05-19 PROCEDURE — 84132 ASSAY OF SERUM POTASSIUM: CPT

## 2022-05-19 PROCEDURE — 94060 EVALUATION OF WHEEZING: CPT

## 2022-05-19 PROCEDURE — 94729 DIFFUSING CAPACITY: CPT

## 2022-05-19 PROCEDURE — 82330 ASSAY OF CALCIUM: CPT

## 2022-05-19 PROCEDURE — 93880 EXTRACRANIAL BILAT STUDY: CPT | Performed by: SURGERY

## 2022-05-19 PROCEDURE — 94060 EVALUATION OF WHEEZING: CPT | Performed by: INTERNAL MEDICINE

## 2022-05-19 PROCEDURE — 36600 WITHDRAWAL OF ARTERIAL BLOOD: CPT

## 2022-05-19 PROCEDURE — 82803 BLOOD GASES ANY COMBINATION: CPT

## 2022-05-19 RX ORDER — ALBUTEROL SULFATE 2.5 MG/3ML
2.5 SOLUTION RESPIRATORY (INHALATION) ONCE
Status: DISCONTINUED | OUTPATIENT
Start: 2022-05-19 | End: 2022-05-23 | Stop reason: HOSPADM

## 2022-05-19 NOTE — TELEPHONE ENCOUNTER
Spoke with julianna at office about patient stating he had a reaction to IV contrast approximately 10 years ago, Patient will need to be premedicated and rescheduled    Office was made aware if patient needed to be done sooner than appointment scheduling gives them to call department directly

## 2022-05-31 ENCOUNTER — TELEPHONE (OUTPATIENT)
Dept: RADIOLOGY | Facility: HOSPITAL | Age: 77
End: 2022-05-31

## 2022-05-31 DIAGNOSIS — Z91.041 CONTRAST MEDIA ALLERGY: Primary | ICD-10-CM

## 2022-05-31 RX ORDER — DIPHENHYDRAMINE HCL 50 MG
CAPSULE ORAL
Qty: 2 CAPSULE | Refills: 0 | Status: SHIPPED | OUTPATIENT
Start: 2022-05-31 | End: 2022-06-03

## 2022-05-31 RX ORDER — FAMOTIDINE 20 MG/1
TABLET, FILM COATED ORAL
Qty: 2 TABLET | Refills: 0 | Status: SHIPPED | OUTPATIENT
Start: 2022-05-31 | End: 2022-06-03

## 2022-05-31 RX ORDER — PREDNISONE 20 MG/1
TABLET ORAL
Qty: 6 TABLET | Refills: 0 | Status: SHIPPED | OUTPATIENT
Start: 2022-05-31 | End: 2022-06-03

## 2022-05-31 RX ORDER — METHYLPREDNISOLONE 32 MG/1
TABLET ORAL
Qty: 2 TABLET | Refills: 0 | Status: SHIPPED | OUTPATIENT
Start: 2022-05-31 | End: 2022-06-03

## 2022-05-31 RX ORDER — DIPHENHYDRAMINE HCL 50 MG
CAPSULE ORAL
Qty: 1 CAPSULE | Refills: 0 | Status: SHIPPED | OUTPATIENT
Start: 2022-05-31 | End: 2022-06-03

## 2022-05-31 NOTE — TELEPHONE ENCOUNTER
Per Maureen joyce, just wanted to let you know this patient has a contrast allergy  Our team is putting in the prep medications for the cath and will call him to let him know the directions  Just wanted to let you maria del carmen know in case you need to tell anyone  Thanks!

## 2022-06-02 NOTE — NURSING NOTE
Patient is scheduled on  for a CTA  TAVR with IV contrast  Per patients Epic records it states they have an allergy to the IV contrast  Called patient to verify allergy to contrast, in the past he had an episode of shortness of breath post contrast administration  Ordering provider did order the allergy prep and patient was given the time of the scan 10 am and the times to take Medrol (10 pm 6/8/22 and 8 am 6/9/22) and Benadryl (9 am) per policy  Patient was also reminded that he will require a  for the potential sedative effects of benadryl  Patient and wife Bronson Jennings verbalized understanding of all instructions and expressed appreciation for call

## 2022-06-03 ENCOUNTER — HOSPITAL ENCOUNTER (OUTPATIENT)
Facility: HOSPITAL | Age: 77
Setting detail: OUTPATIENT SURGERY
Discharge: HOME | End: 2022-06-03
Attending: INTERNAL MEDICINE | Admitting: INTERNAL MEDICINE
Payer: MEDICARE

## 2022-06-03 VITALS
SYSTOLIC BLOOD PRESSURE: 136 MMHG | RESPIRATION RATE: 18 BRPM | WEIGHT: 185 LBS | DIASTOLIC BLOOD PRESSURE: 67 MMHG | BODY MASS INDEX: 27.4 KG/M2 | HEART RATE: 78 BPM | OXYGEN SATURATION: 98 % | HEIGHT: 69 IN | TEMPERATURE: 97.8 F

## 2022-06-03 DIAGNOSIS — I35.0 NONRHEUMATIC AORTIC VALVE STENOSIS: ICD-10-CM

## 2022-06-03 LAB
ATRIAL RATE: 84 BPM
P AXIS: 82 DEGREES
PR INTERVAL: 162 MS
QRS AXIS: 59 DEGREES
QRSD INTERVAL: 86 MS
QT INTERVAL: 356 MS
QTC INTERVAL: 420 MS
T WAVE AXIS: 42 DEGREES
VENTRICULAR RATE: 84 BPM

## 2022-06-03 PROCEDURE — C1769 GUIDE WIRE: HCPCS | Performed by: INTERNAL MEDICINE

## 2022-06-03 PROCEDURE — 99152 MOD SED SAME PHYS/QHP 5/>YRS: CPT | Performed by: INTERNAL MEDICINE

## 2022-06-03 PROCEDURE — 93454 CORONARY ARTERY ANGIO S&I: CPT | Performed by: INTERNAL MEDICINE

## 2022-06-03 PROCEDURE — 93010 ELECTROCARDIOGRAM REPORT: CPT | Performed by: INTERNAL MEDICINE

## 2022-06-03 PROCEDURE — C1894 INTRO/SHEATH, NON-LASER: HCPCS | Performed by: INTERNAL MEDICINE

## 2022-06-03 PROCEDURE — 93005 ELECTROCARDIOGRAM TRACING: CPT

## 2022-06-03 PROCEDURE — C1887 CATHETER, GUIDING: HCPCS | Performed by: INTERNAL MEDICINE

## 2022-06-03 PROCEDURE — 99153 MOD SED SAME PHYS/QHP EA: CPT | Performed by: INTERNAL MEDICINE

## 2022-06-03 RX ORDER — ACETAMINOPHEN 325 MG/1
650 TABLET ORAL EVERY 4 HOURS PRN
Status: DISCONTINUED | OUTPATIENT
Start: 2022-06-03 | End: 2022-06-03 | Stop reason: HOSPADM

## 2022-06-03 RX ORDER — VERAPAMIL HCL 2.5 MG/ML
AMPUL (ML) INTRAVENOUS AS NEEDED
Status: DISCONTINUED | OUTPATIENT
Start: 2022-06-03 | End: 2022-06-03 | Stop reason: HOSPADM

## 2022-06-03 RX ORDER — HEPARIN SODIUM 1000 [USP'U]/ML
INJECTION, SOLUTION INTRAVENOUS; SUBCUTANEOUS AS NEEDED
Status: DISCONTINUED | OUTPATIENT
Start: 2022-06-03 | End: 2022-06-03 | Stop reason: HOSPADM

## 2022-06-03 RX ORDER — ASPIRIN 81 MG/1
324 TABLET, CHEWABLE ORAL ONCE
Status: COMPLETED | OUTPATIENT
Start: 2022-06-03 | End: 2022-06-03

## 2022-06-03 RX ORDER — MIDAZOLAM HYDROCHLORIDE 2 MG/2ML
INJECTION, SOLUTION INTRAMUSCULAR; INTRAVENOUS AS NEEDED
Status: DISCONTINUED | OUTPATIENT
Start: 2022-06-03 | End: 2022-06-03 | Stop reason: HOSPADM

## 2022-06-03 RX ORDER — SODIUM CHLORIDE 9 MG/ML
125 INJECTION, SOLUTION INTRAVENOUS CONTINUOUS
Status: DISCONTINUED | OUTPATIENT
Start: 2022-06-03 | End: 2022-06-03

## 2022-06-03 RX ORDER — ONDANSETRON 2 MG/ML
4 INJECTION INTRAMUSCULAR; INTRAVENOUS EVERY 6 HOURS PRN
Status: DISCONTINUED | OUTPATIENT
Start: 2022-06-03 | End: 2022-06-03 | Stop reason: HOSPADM

## 2022-06-03 RX ORDER — SODIUM CHLORIDE 9 MG/ML
150 INJECTION, SOLUTION INTRAVENOUS CONTINUOUS
Status: DISCONTINUED | OUTPATIENT
Start: 2022-06-03 | End: 2022-06-03 | Stop reason: HOSPADM

## 2022-06-03 RX ORDER — LIDOCAINE HYDROCHLORIDE 10 MG/ML
INJECTION, SOLUTION EPIDURAL; INFILTRATION; INTRACAUDAL; PERINEURAL AS NEEDED
Status: DISCONTINUED | OUTPATIENT
Start: 2022-06-03 | End: 2022-06-03 | Stop reason: HOSPADM

## 2022-06-03 RX ORDER — NITROGLYCERIN 20 MG/100ML
INJECTION INTRAVENOUS AS NEEDED
Status: DISCONTINUED | OUTPATIENT
Start: 2022-06-03 | End: 2022-06-03 | Stop reason: HOSPADM

## 2022-06-03 RX ORDER — FENTANYL CITRATE 50 UG/ML
INJECTION, SOLUTION INTRAMUSCULAR; INTRAVENOUS AS NEEDED
Status: DISCONTINUED | OUTPATIENT
Start: 2022-06-03 | End: 2022-06-03 | Stop reason: HOSPADM

## 2022-06-03 RX ADMIN — ASPIRIN 81 MG CHEWABLE TABLET 324 MG: 81 TABLET CHEWABLE at 07:13

## 2022-06-03 RX ADMIN — SODIUM CHLORIDE 150 ML/HR: 0.9 INJECTION, SOLUTION INTRAVENOUS at 09:24

## 2022-06-03 RX ADMIN — SODIUM CHLORIDE 125 ML/HR: 0.9 INJECTION, SOLUTION INTRAVENOUS at 07:13

## 2022-06-03 NOTE — INTERVAL H&P NOTE
Prior H&P reviewed  Patient seen and examined  /82 (BP Location: Right arm)   Pulse 98   Temp 97 8 °F (36 6 °C) (Oral)   Resp 18   Ht 5' 9" (1 753 m)   Wt 83 9 kg (185 lb)   SpO2 98%   BMI 27 32 kg/m²      After examining the patient, I find no changes to the H&P since it has been written       Accept for today's history and physical

## 2022-06-08 NOTE — PROGRESS NOTES
My medical assistant will call him with his results  Polyp was hyperplastic  Repeat colonoscopy in 5 years because of prior history of colon polyps 
1

## 2022-06-09 ENCOUNTER — HOSPITAL ENCOUNTER (OUTPATIENT)
Dept: RADIOLOGY | Facility: HOSPITAL | Age: 77
Discharge: HOME | End: 2022-06-09
Payer: MEDICARE

## 2022-06-09 PROCEDURE — G1004 CDSM NDSC: HCPCS

## 2022-06-09 PROCEDURE — 74174 CTA ABD&PLVS W/CONTRAST: CPT

## 2022-06-09 PROCEDURE — 75572 CT HRT W/3D IMAGE: CPT

## 2022-06-09 RX ADMIN — IODIXANOL 120 ML: 320 INJECTION, SOLUTION INTRAVASCULAR at 09:59

## 2022-06-13 ENCOUNTER — OFFICE VISIT (OUTPATIENT)
Dept: CARDIAC SURGERY | Facility: CLINIC | Age: 77
End: 2022-06-13
Payer: MEDICARE

## 2022-06-13 VITALS
BODY MASS INDEX: 26.96 KG/M2 | RESPIRATION RATE: 18 BRPM | HEART RATE: 78 BPM | DIASTOLIC BLOOD PRESSURE: 67 MMHG | OXYGEN SATURATION: 100 % | HEIGHT: 69 IN | WEIGHT: 182 LBS | SYSTOLIC BLOOD PRESSURE: 128 MMHG

## 2022-06-13 DIAGNOSIS — I35.0 NONRHEUMATIC AORTIC VALVE STENOSIS: Primary | ICD-10-CM

## 2022-06-13 DIAGNOSIS — Z91.041 CONTRAST MEDIA ALLERGY: ICD-10-CM

## 2022-06-13 PROCEDURE — 99214 OFFICE O/P EST MOD 30 MIN: CPT | Performed by: PHYSICIAN ASSISTANT

## 2022-06-13 RX ORDER — FAMOTIDINE 20 MG/1
TABLET, FILM COATED ORAL
Qty: 2 TABLET | Refills: 0 | Status: SHIPPED | OUTPATIENT
Start: 2022-06-13 | End: 2022-06-23

## 2022-06-13 RX ORDER — DIPHENHYDRAMINE HCL 50 MG
CAPSULE ORAL
Qty: 2 CAPSULE | Refills: 0 | Status: SHIPPED | OUTPATIENT
Start: 2022-06-13 | End: 2022-06-23

## 2022-06-13 RX ORDER — CHLORHEXIDINE GLUCONATE 0.12 MG/ML
15 RINSE ORAL ONCE
Status: CANCELLED | OUTPATIENT
Start: 2022-06-13 | End: 2022-06-13

## 2022-06-13 RX ORDER — PREDNISONE 20 MG/1
TABLET ORAL
Qty: 6 TABLET | Refills: 0 | Status: SHIPPED | OUTPATIENT
Start: 2022-06-13 | End: 2022-06-23

## 2022-06-13 RX ORDER — CEFAZOLIN SODIUM 2 G/50ML
2000 SOLUTION INTRAVENOUS ONCE
Status: CANCELLED | OUTPATIENT
Start: 2022-06-13 | End: 2022-06-13

## 2022-06-13 NOTE — PROGRESS NOTES
Preop History and Physical - Cardiothoracic Surgery   Mikael Klein 78 y o  male MRN: [id number]    Physician Requesting Consult: No att  providers found    Reason for Consult / Principal Problem: Aortic stenosis, Non-Rheumatic    History of Present Illness: Mikael Klein is a 78y o  year old male who was previously evaluated in our office by KAMILLE Woodall  for transcatheter aortic valve replacement  During this initial consultation, arrangements were made for the following studies to be completed: Gated CTA of the chest/abdomen/pelvis, 3D SAULO, cardiac catheterization, dental clearance, pulmonary function tests with ABG, and carotid artery ultrasound  Mikael Klein now presents to review the results of these tests and obtain a second surgeon to confirm the suitability of proceeding with transcatheter aortic valve replacment  Past Medical History:  Past Medical History:   Diagnosis Date    Arthritis     Asthma     Colon polyps     Community acquired pneumonia     last assessed: 5/1/2014    Diabetes mellitus ([facility] [address] [facility]     Hemorrhagic prepatellar bursitis, left 10/21/2019    Hepatitis C     High cholesterol     Hypertension     Lymphadenopathy, anterior cervical 4/17/2018    Screening for colon cancer 5/1/2019    Thoracic vertebral fracture ([facility] [address] ) 6/11/2014       Past Surgical History:   Past Surgical History:   Procedure Laterality Date    CARDIAC CATHETERIZATION N/A 6/3/2022    Procedure: Cardiac RHC/LHC; Surgeon: Sheridan Medellin MD;  Location: [facility]; Service: Cardiology    COLONOSCOPY      COLONOSCOPY W/ POLYPECTOMY      LITHOTRIPSY      MULTIPLE TOOTH EXTRACTIONS      PA COLONOSCOPY FLX DX W/COLLJ SPEC WHEN PFRMD N/A 5/17/2018    Procedure: COLONOSCOPY;  Surgeon: Krystle Tejada MD;  Location: [facility];   Service: Gastroenterology       Family History:  Family History   Problem Relation Age of Onset    Heart attack Family         at age 80    No Known Problems Mother     No Known Problems Father     Diabetes Sister     Diabetes Brother        Social History:  Social History     Substance and Sexual Activity   Alcohol Use No     Social History     Substance and Sexual Activity   Drug Use No     Social History     Tobacco Use   Smoking Status Former Smoker    Packs/day: 0 50    Types: Cigarettes    Quit date: 2022    Years since quittin 0   Smokeless Tobacco Never Used   Tobacco Comment    started when he was about 24 yrs old       Home Medications:   Prior to Admission medications    Medication Sig Start Date End Date Taking? Authorizing Provider   Advair -21 MCG/ACT inhaler TAKE 2 PUFFS BY MOUTH TWICE A DAY 21   Laurie Blackburn DO   albuterol (VENTOLIN HFA) 90 mcg/act inhaler Inhale 2 puffs every 4 (four) hours as needed for wheezing or shortness of breath 18   Gregoria Ren DO   Alcohol Swabs (ALCOHOL PADS) 70 % PADS  19   Historical Provider, MD   amLODIPine (NORVASC) 5 mg tablet Take 1 tablet (5 mg total) by mouth daily 19   Laurie Blackburn DO   Blood Glucose Monitoring Suppl (OneTouch Verio) w/Device KIT Check blood glucose three times daily before each meal 20   Macy Latham DO   cholecalciferol (VITAMIN D3) 1,000 units tablet TAKE 2 TABLETS (2,000 UNITS TOTAL) BY MOUTH DAILY 22   Jay Moreno DO   Continuous Blood Gluc  (FreeStyle Robles 2 Slater) POWER Use 1 each continuous 5/10/22   Jay Moreno DO   Continuous Blood Gluc Sensor (FreeStyle Cristofer 2 Sensor) MISC Use 1 each every 14 (fourteen) days 5/10/22   Jay Moreno DO   fluticasone (FLONASE) 50 mcg/act nasal spray 2 sprays into each nostril daily 20   Laurie Blackburn DO   glucose blood (OneTouch Verio) test strip Check blood glucose three times daily before meals and bring a log of your measurements to your next appointment   20   Macy Latham DO   hydrochlorothiazide (HYDRODIURIL) 25 mg tablet Take 1 tablet (25 mg total) by mouth daily 1/19/22   Stanley Stoner,    insulin glargine (Lantus SoloStar) 100 units/mL injection pen Inject 18 Units under the skin daily at bedtime 3/3/22   Julio Cesar Kaur MD   Insulin Pen Needle (Pen Needles) 31G X 8 MM MISC Use daily 3/3/21   Laurie Blackburn, DO   Lancets (FREESTYLE) lancets by Other route as needed (As needed) 3/21/19   Rosa Cui MD   levothyroxine 137 mcg tablet Take 1 tablet (137 mcg total) by mouth daily 1/19/22   Stanley Stoner, DO   lisinopril (ZESTRIL) 20 mg tablet TAKE 1 TABLET BY MOUTH EVERY DAY 4/7/21   Laurie Blackburn, DO   metFORMIN (GLUCOPHAGE) 850 mg tablet TAKE 1 TABLET (850 MG TOTAL) BY MOUTH 2 (TWO) TIMES A DAY WITH MEALS 5/16/22   Stanley Stoner, DO   montelukast (SINGULAIR) 10 mg tablet TAKE 1 TABLET BY MOUTH EVERY DAY 4/18/22   Stanley Stoner, DO   nicotine (NICODERM CQ) 14 mg/24hr TD 24 hr patch Place 1 patch on the skin every 24 hours 3/16/22   Brett Khalil MD   NovoLOG FlexPen 100 units/mL injection pen Inject 5 units with breakfast and with dinner 3/3/22   Julio Cesar Kaur MD   rosuvastatin (CRESTOR) 40 MG tablet TAKE 1 TABLET BY MOUTH EVERY DAY 4/29/22   Brett Khalil MD   vitamin B-12 (VITAMIN B-12) 1,000 mcg tablet TAKE 1 TABLET BY MOUTH EVERY DAY 5/16/22   Stanley Stoner, DO       Allergies: Allergies   Allergen Reactions    Iv Contrast [Iodinated Diagnostic Agents] Rash     Patient states he had a severe reaction approximately 10 years ago , states he had a rash, itchy and he remembers a bunch of people pounding on chest and being surrounded by multiple doctors         Review of Systems:  Review of Systems - General ROS: negative  Psychological ROS: negative  Ophthalmic ROS: negative  ENT ROS: negative  Allergy and Immunology ROS: negative  Hematological and Lymphatic ROS: negative  Endocrine ROS: negative  Respiratory ROS: negative  Cardiovascular ROS: negative  Gastrointestinal ROS: negative  Genito-Urinary ROS: negative  Musculoskeletal ROS: negative  Neurological ROS: negative  Dermatological ROS: negative    Vital Signs:     Vitals:    06/13/22 1322 06/13/22 1326   BP: 133/58 128/67   BP Location: Left arm Right arm   Patient Position: Sitting Sitting   Cuff Size: Standard Standard   Pulse: 78    Resp: 18    SpO2: 100%    Weight: 82 6 kg (182 lb)    Height: 5' 9" (1 753 m)        Physical Exam:    General: alert, NAD  HEENT/NECK:  PERRLA  No jugular venous distention  Cardiac:Regular rate and rhythm  Carotids: no bruits   Pulmonary:  Breath sounds clear bilaterally  Abdomen:  Non-tender, Non-distended  Positive bowel sounds  Upper extremities: 2+ radial pulses; brisk capillary refill  Lower extremities: Extremities warm/dry  2+ femoral pulses; PT/DP pulses 2+ bilaterally  No edema B/L  Neuro: Alert and oriented X 3  Sensation is grossly intact  No focal deficits  Musculoskeletal: ROM intact   Skin: Warm/Dry, without rashes or lesions  Lab Results:               Invalid input(s): LABGLOM      Lab Results   Component Value Date    HGBA1C 6 5 05/09/2022     No results found for: CKTOTAL, CKMB, CKMBINDEX, TROPONINI    Imaging Studies:       Gated CTA of the chest/abdomen/pelvis: 6/9/22  FINDINGS:     VASCULAR STRUCTURES:       Annulus: diameter 26 9 x 21 9 mm      area: 475 6 sq mm    Annulus to LCA: 14 3 mm    Annulus to RCA: 12 7 mm    Minimal diameter right iliofemoral segment: 3 7 mm    Minimal diameter left iliofemoral segment: 4 7 mm     The ascending aorta is nonaneurysmal measuring 33 mm with mild atherosclerosis  There is a type I aortic arch with bovine branching anatomy and no stenosis in the visualized great vessels  The aortic arch is nonaneurysmal with mild atherosclerosis  The   descending thoracic aorta is nonaneurysmal with mild atherosclerosis      The abdominal aorta is nonaneurysmal with mild atherosclerosis  Celiac, superior mesenteric, and inferior mesenteric arteries are patent  Atherosclerotic calcifications at the origins of bilateral renal arteries  Moderate stenosis at the right common   iliac artery with mild ectasia distal to the stenosis  Left common iliac, bilateral external iliac and common femoral arteries are nonaneurysmal with mild atherosclerosis      Cardiac findings: There is mild calcification of the aortic valve  The left ventricle is normal   The ventricular septum is normal   No prior valvular surgery  No prior bypass surgery  No pericardial effusion  No cardiac mass or thrombus  Coronary artery calcifications      OTHER FINDINGS:      CHEST     LUNGS:  Calcified granuloma of left lower lobe  Remainder of lungs are clear  There is no tracheal or endobronchial lesion      PLEURA:  Unremarkable      MEDIASTINUM AND SUN:  Calcified left hilar lymph node      CHEST WALL:   Unremarkable      ABDOMEN     LIVER/BILIARY TREE:  Unremarkable      GALLBLADDER:  No calcified gallstones  No pericholecystic inflammatory change      SPLEEN:  Calcified granuloma      PANCREAS:  Unremarkable      ADRENAL GLANDS:  Unremarkable      KIDNEYS/URETERS:  5 mm and 4 mm nonobstructing stones at the inferior pole of left kidney  Subcentimeter hypodense renal lesions likely represent cysts  No hydronephrosis      STOMACH AND BOWEL:  Colonic diverticulosis      APPENDIX:  No findings to suggest appendicitis      ABDOMINOPELVIC CAVITY:  No ascites or free intraperitoneal air  No lymphadenopathy       PELVIS     REPRODUCTIVE ORGANS:  Unremarkable for patient's age      URINARY BLADDER:  Unremarkable      ABDOMINAL WALL/INGUINAL REGIONS:  Unremarkable      OSSEOUS STRUCTURES:  No acute fracture or destructive osseous lesion  8 mm sclerotic lesion in the right bone, previously 7 mm  L5 pars defect      IMPRESSION:  1  Nonobstructing subcentimeters stones in the inferior pole of left kidney  2  TAVR measurements as above      3D SAULO: 2/11/22  Findings    Left Ventricle Left ventricular cavity size is normal  Wall thickness is mildly increased  Systolic function is normal   Wall motion is normal  There is mild concentric hypertrophy  Diastolic function is normal for age  Right Ventricle Right ventricular cavity size is normal  Systolic function is normal    Left Atrium The atrium is mildly dilated  Right Atrium The atrium is normal in size  Aortic Valve The aortic valve is trileaflet  The leaflets are severely thickened  The leaflets are severely calcified  There is severely reduced mobility  There is trace regurgitation  There is severe stenosis  The aortic valve peak velocity is 2 94 m/s  The aortic valve mean gradient is 19 mmHg  The aortic valve/velocity ratio is 0 24  The aortic valve area is 0 70 cm2  Mitral Valve The mitral valve has normal function  There is mild annular calcification  There is mild to moderate regurgitation  There is no evidence of stenosis  The valve has normal function  Tricuspid Valve Tricuspid valve structure is normal  There is trace regurgitation  There is no evidence of stenosis  The right ventricular systolic pressure is normal  The estimated right ventricular systolic pressure is 14 23 mmHg  Pulmonic Valve The pulmonic valve was not well visualized  There is trace regurgitation  There is no evidence of stenosis  Ascending Aorta The aortic root is normal in size  The ascending aorta is normal in size  IVC/SVC The right atrial pressure is estimated at 5 0 mmHg  The inferior vena cava is normal in size  Respirophasic changes were normal    Pericardium There is no pericardial effusion  The pericardium is normal in appearance                 Left Ventricle Measurements    Function/Volumes   A4C EF 53 %         LVOT stroke volume 45 62 cm3         LVOT SI 23 7 ml/m2         Dimensions   LVIDd 5 cm         LVIDS 3 7 cm         IVSd 1 3 cm         LVPWd 1 cm         LVOT area 2 83 cm2         FS 26 %         Diastolic Filling   MV E' Tissue Velocity Septal 7 cm/s E wave deceleration time 135 ms         MV Peak E Adriano 88 cm/s         MV Peak A Adriano 0 68 m/s          Report Measurements   AV LVOT peak gradient 1 mmHg              Interventricular Septum Measurements    Shunt Ratio   LVOT peak VTI 16 1 cm         LVOT peak adriano 0 7 m/s              Right Ventricle Measurements    Dimensions   RVID d 3 2 cm               Left Atrium Measurements    Dimensions   LA size 4 7 cm         SWATI A4C 23 3 cm2               Right Atrium Measurements    Dimensions   RAA A4C 15 8 cm2               Atrial Septum Measurements    Shunt Ratio   LVOT peak VTI 16 1 cm         LVOT peak adriano 0 7 m/s               Aortic Valve Measurements    Stenosis   Ao peak adriano retrograde 2 94 m/s         LVOT peak adriano 0 7 m/s         Ao VTI 65 2 cm         LVOT peak VTI 16 1 cm         AV mean gradient 19 mmHg         LVOT mn grad 2 mmHg         AV peak gradient 35 mmHg         AV LVOT peak gradient 1 mmHg         AV Velocity Ratio 0 24          Area/Dimensions   AV valve area 0 7 cm2         AV area by cont VTI 0 7 cm2         AV area peak adriano 0 7 cm2         SVI 23 mL/m2         LVOT diameter 1 9 cm         LVOT area 2 83 cm2               Mitral Valve Measurements    Stenosis   MV VTI RETROGRADE 204 2 cm         MV stenosis pressure 1/2 time 0 ms         MV mean gradient retrograde 106 mmHg         MR  mmHg               Tricuspid Valve Measurements    RVSP Parameters   TR Peak Adriano 2 8 m/s         Est  RA pres 5 mmHg         Triscuspid Valve Regurgitation Peak Gradient 22 mmHg         Right Ventricular Peak Systolic Pressure 27 mmHg               Aorta Measurements    Aortic Dimensions   Ao root 3 cm         Asc Ao 3 2 cm               IVC/SVC Measurements    IVC/SVC   Est  RA pres 5 mmHg               Cardiac catheterization: 6/3/22  Left Main   The vessel is large  There is mild diffuse disease throughout the vessel  The vessel is mildly calcified     Left Anterior Descending   The vessel is moderate in size  There is mild diffuse disease throughout the vessel  Mid LAD lesion is 50% stenosed  First Diagonal Branch   1st Diag lesion is 50% stenosed  Left Circumflex   The vessel is large and non-dominant  There is mild diffuse disease throughout the vessel  First Obtuse Marginal Branch   1st Mrg lesion is 50% stenosed  Right Coronary Artery   The vessel is moderate in size and dominant  There is mild diffuse disease throughout the vessel  Right Posterior Descending Artery   RPDA-1 lesion is 50% stenosed  RPDA-2 lesion is 50% stenosed  Pulmonary function tests:   Date test performed: 5/19/2022     Date of test interpretation: 5/20/2022     Requesting Physician: Juana Bernardo     Reason for Testing: Severe aortic stenosis     Reference set for interpretation: [id number]     Procedure: The patient was taken to pulmonary function testing laboratory  The patient demonstrated good effort and cooperation  Poorly performed PFT lacking reproducibility  VC fpr DLCO measurements was < 90% of SVC  He was not able to follow the instructions for the TGV/RAW manuver  DLCO was corrected for patient's Hgb      Results:  FVC: 2 70 L       71 % predicted  FEV1: 1 97 L     69 % predicted  FEV1/FVC Ratio: 73 %      After administration of bronchodilator   FVC: 3 06 L, 80 % predicted, +13 % change  FEV1: 2 01 L, 70 % predicted, +2 % change     DLCO corrected for patients hemoglobin level: 54 %     Interpretation:     · Spirometry appears restricted  Recommend checking lung volumes to evaluate further      · Significant response to the administration of bronchodilator per ATS standards      · Moderate decrease in diffusion capacity     · Normal flow-volume loop post bronchodilator   Pre-bronchodilator flow volume loop is abnormal likely due to difficulty with testing          Maged Paredes DO   Pulmonary & Critical Care Medicine Fellow  [facility]    Carotid artery ultrasound: 5/19/22  Impression  RIGHT:  There is 50-69% stenosis noted in the internal carotid artery  Plaque is  heterogenous and irregular  Vertebral artery flow is antegrade  There is no significant subclavian artery  disease  LEFT:  There is 50-69% stenosis noted in the internal carotid artery  Plaque is  heterogenous and irregular  There are elevated velocities and plaque noted throughout the common carotid  artery which may cause ICA/CCA ratio to be falsely underestimated  Vertebral artery flow is antegrade  There is no significant subclavian artery  disease  Compared to previous study on 10/8/2007, there is is now a 50-69% stenosis  bilaterally  Recommend repeat testing in 6 months as per protocol unless otherwise  indicated  I have personally reviewed pertinent reports  TAVR evaluation Assessment:     5 Meter Walk Test:      Attempt 1: 6   Attempt 2: 7   Attempt 3: 6    STS Risk Score: 2 1    Ling Mccullough 122: III    KCCQ-12 completed    Assessment:  Patient Active Problem List    Diagnosis Date Noted    Contrast media allergy 05/31/2022    Aortic stenosis 01/19/2022    Diverticulosis of large intestine 09/20/2018    Internal hemorrhoids 09/20/2018    Chronic hepatitis C ([facility] [address] ) 04/13/2017    Nicotine dependence 04/13/2017    Osteoarthritis of knee 04/13/2017    Bilateral hearing loss 01/30/2017    Allergic rhinitis 02/24/2016    Type 2 diabetes mellitus with neurologic complication, with long-term current use of insulin ([facility] [address] ) 11/24/2015    Adenomatous colon polyp 06/11/2014    Collapsed arches 05/27/2014    Hyperlipidemia 09/13/2013    Nephritis and nephropathy, not specified as acute or chronic, with other specified pathological lesion in kidney, in diseases classified elsewhere 03/26/2013    Vitamin B12 deficiency 07/24/2012    Asthma 06/08/2012    Essential hypertension 06/08/2012    Hypothyroidism 06/07/2012     Severe aortic stenosis;  Ongoing TAVR workup    Plan:    Ronnell Coffey has symptomatic severe aortic stenosis  They have undergone testing for transcatheter aortic valve replacement  The results of these studies have been interpreted by the multidisciplinary TAVR team who have determined the patient to be Low risk for open aortic valve replacement based on other pre-existing comobidities not reflected in the STS risk score  The risks, benefits, and alternatives to TAVR were discussed in detail with the patient today  They understand and wish to proceed with transfemoral transcatheter aortic valve replacement  Informed consent was obtained and a date for the intervention has been set  Julienne Austin was comfortable with our recommendations, and their questions were answered to their satisfaction  The following preoperative instructions were provided at the conclusion of their consultation:     1  You will receive a phone call from the hospital between 2:00 PM and 8:00 PM the day prior to surgery to confirm arrival time and location  For surgery on Mondays, you will receive a call on Friday  2  Do not drink or eat anything after midnight the night before surgery  That includes no water, candy, gum, lozenges, Lifesavers, etc  We recommend you not eat any salty or fatty snack food, consume alcohol or smoke the night before surgery  3  Continue taking aspirin but only 81 mg daily  4  If you take Coumadin and/or Plavix, discontinue it 5 days before surgery  5  If you are diabetic, do not take any of your diabetic pills the morning of surgery  If you take Lantus insulin, you may take it at your regularly scheduled time the day before surgery  Do not take any other insulins the morning of surgery  6  The 2 nights before surgery, take a shower each night using the special antiseptic soap or soap sponges you received from the office or [facility] Stake your hair with regular shampoo and rinse completely before using the antiseptic sponges   Use the sponge to wash from your neck down, with special attention to the armpits and groin area  Do not use any other soap or cleanser on your skin  Do not use lotions, powder, deodorant or perfume of any kind on your skin after you shower  Use clean bed linens and wear clean pajamas after your shower  7  You will be prescribed Mupirocin nasal ointment  Apply to both nostrils twice a day for 5 days prior to surgery  8  Do not take a shower the morning of her surgery; you'll be given a special" bath" at the hospital   9  Notify the CT surgery office if you develop a cold, sore throat, cough, fever or other health issues before your surgery  10  Other medication changes included the following: take 1/2 of long acting insulin prior to surgery, STOP lisinopril and metformin 3 days prior to surgery        SIGNATURE: Jose Wilder PA-C  DATE: June 13, 2022  TIME: 1:53 PM

## 2022-06-13 NOTE — LETTER
June 13, 2022     Kwame Knight DO  [address] [address]    Patient: Kourtney Dickson   YOB: 1945   Date of Visit: 6/13/2022       Dear Dr Leim Ormond: Thank you for referring Kourtney Dickson to me for evaluation  Below are my notes for this consultation  If you have questions, please do not hesitate to call me  I look forward to following your patient along with you  Sincerely,        Emma Ferguson DO        CC: MD Quyen Braga PA-C  6/13/2022  2:13 PM  Attested  Preop History and Physical - Cardiothoracic Surgery   Kourtney Dickson 78 y o  male MRN: [id number]    Physician Requesting Consult: No att  providers found    Reason for Consult / Principal Problem: Aortic stenosis, Non-Rheumatic    History of Present Illness: Kourtney Dickson is a 78y o  year old male who was previously evaluated in our office by KAMILLE Berger  for transcatheter aortic valve replacement  During this initial consultation, arrangements were made for the following studies to be completed: Gated CTA of the chest/abdomen/pelvis, 3D SAULO, cardiac catheterization, dental clearance, pulmonary function tests with ABG, and carotid artery ultrasound  Kourtney Dickson now presents to review the results of these tests and obtain a second surgeon to confirm the suitability of proceeding with transcatheter aortic valve replacment        Past Medical History:  Past Medical History:   Diagnosis Date    Arthritis     Asthma     Colon polyps     Community acquired pneumonia     last assessed: 5/1/2014    Diabetes mellitus ([facility] [address] )     Hemorrhagic prepatellar bursitis, left 10/21/2019    Hepatitis C     High cholesterol     Hypertension     Lymphadenopathy, anterior cervical 4/17/2018    Screening for colon cancer 5/1/2019    Thoracic vertebral fracture ([facility] [address] ) 6/11/2014       Past Surgical History:   Past Surgical History:   Procedure Laterality Date    CARDIAC CATHETERIZATION N/A 6/3/2022    Procedure: Cardiac RHC/LHC; Surgeon: Pratik Zabala MD;  [facility]; Service: Cardiology    COLONOSCOPY      COLONOSCOPY W/ POLYPECTOMY      LITHOTRIPSY      MULTIPLE TOOTH EXTRACTIONS      VA COLONOSCOPY FLX DX W/COLLJ SPEC WHEN PFRMD N/A 2018    Procedure: COLONOSCOPY;  Surgeon: Genoveva Chao MD;  Location: [facility] GI [facility]; Service: Gastroenterology       Family History:  Family History   Problem Relation Age of Onset    Heart attack Family         at age 80    No Known Problems Mother     No Known Problems Father     Diabetes Sister     Diabetes Brother        Social History:  Social History     Substance and Sexual Activity   Alcohol Use No     Social History     Substance and Sexual Activity   Drug Use No     Social History     Tobacco Use   Smoking Status Former Smoker    Packs/day: 0 50    Types: Cigarettes    Quit date: 2022    Years since quittin 0   Smokeless Tobacco Never Used   Tobacco Comment    started when he was about 24 yrs old       Home Medications:   Prior to Admission medications    Medication Sig Start Date End Date Taking?  Authorizing Provider   Advair -21 MCG/ACT inhaler TAKE 2 PUFFS BY MOUTH TWICE A DAY 21   Laurie Blackburn, DO   albuterol (VENTOLIN HFA) 90 mcg/act inhaler Inhale 2 puffs every 4 (four) hours as needed for wheezing or shortness of breath 18   Mary Bar, DO   Alcohol Swabs (ALCOHOL PADS) 70 % PADS  19   Historical Provider, MD   amLODIPine (NORVASC) 5 mg tablet Take 1 tablet (5 mg total) by mouth daily 19   Laurie Blackburn, DO   Blood Glucose Monitoring Suppl (OneTouch Verio) w/Device KIT Check blood glucose three times daily before each meal 20   Primo Snow, DO   cholecalciferol (VITAMIN D3) 1,000 units tablet TAKE 2 TABLETS (2,000 UNITS TOTAL) BY MOUTH DAILY 22   Jose A Lott, DO   Continuous Blood Gluc  (Trupti Robles 2 [place]) POWER Use 1 each continuous 5/10/22   Janneth Goodwin DO   Continuous Blood Gluc Sensor (FreeStyle Cristofer 2 Sensor) MISC Use 1 each every 14 (fourteen) days 5/10/22   Janneth Goodwin DO   fluticasone [facility]) 50 mcg/act nasal spray 2 sprays into each nostril daily 8/11/20   Laurie Blackburn DO   glucose blood (OneTouch Verio) test strip Check blood glucose three times daily before meals and bring a log of your measurements to your next appointment  8/20/20   Elise Cm,    hydrochlorothiazide (HYDRODIURIL) 25 mg tablet Take 1 tablet (25 mg total) by mouth daily 1/19/22   Janneth Goodwin DO   insulin glargine (Lantus SoloStar) 100 units/mL injection pen Inject 18 Units under the skin daily at bedtime 3/3/22   Kit Kanner Ramos-Feliciano, MD   Insulin Pen Needle (Pen Needles) 31G X 8 MM MISC Use daily 3/3/21   Laurie Blackburn DO   Lancets (FREESTYLE) lancets by Other route as needed (As needed) 3/21/19   Myla Valadez MD   levothyroxine 137 mcg tablet Take 1 tablet (137 mcg total) by mouth daily 1/19/22   Janneth Goodwin DO   lisinopril (ZESTRIL) 20 mg tablet TAKE 1 TABLET BY MOUTH EVERY DAY 4/7/21   Laurie Blackburn DO   metFORMIN (GLUCOPHAGE) 850 mg tablet TAKE 1 TABLET (850 MG TOTAL) BY MOUTH 2 (TWO) TIMES A DAY WITH MEALS 5/16/22   Janneth Goodwin DO   montelukast (SINGULAIR) 10 mg tablet TAKE 1 TABLET BY MOUTH EVERY DAY 4/18/22   Janneth Goodwin DO   nicotine (NICODERM CQ) 14 mg/24hr TD 24 hr patch Place 1 patch on the skin every 24 hours 3/16/22   Brett Khalil MD   NovoLOG FlexPen 100 units/mL injection pen Inject 5 units with breakfast and with dinner 3/3/22   Kit Kanner Ramos-Feliciano, MD   rosuvastatin (CRESTOR) 40 MG tablet TAKE 1 TABLET BY MOUTH EVERY DAY 4/29/22   Brett Khalil MD   vitamin B-12 (VITAMIN B-12) 1,000 mcg tablet TAKE 1 TABLET BY MOUTH EVERY DAY 5/16/22   Janneth Goodwin DO       Allergies:   Allergies   Allergen Reactions    Iv Contrast [Iodinated Diagnostic Agents] Rash     Patient states he had a severe reaction approximately 10 years ago , states he had a rash, itchy and he remembers a bunch of people pounding on chest and being surrounded by multiple doctors  Review of Systems:  Review of Systems - General ROS: negative  Psychological ROS: negative  Ophthalmic ROS: negative  ENT ROS: negative  Allergy and Immunology ROS: negative  Hematological and Lymphatic ROS: negative  Endocrine ROS: negative  Respiratory ROS: negative  Cardiovascular ROS: negative  Gastrointestinal ROS: negative  Genito-Urinary ROS: negative  Musculoskeletal ROS: negative  Neurological ROS: negative  Dermatological ROS: negative    Vital Signs:     Vitals:    06/13/22 1322 06/13/22 1326   BP: 133/58 128/67   BP Location: Left arm Right arm   Patient Position: Sitting Sitting   Cuff Size: Standard Standard   Pulse: 78    Resp: 18    SpO2: 100%    Weight: 82 6 kg (182 lb)    Height: 5' 9" (1 753 m)        Physical Exam:    General: alert, NAD  HEENT/NECK:  PERRLA  No jugular venous distention  Cardiac:Regular rate and rhythm  Carotids: no bruits   Pulmonary:  Breath sounds clear bilaterally  Abdomen:  Non-tender, Non-distended  Positive bowel sounds  Upper extremities: 2+ radial pulses; brisk capillary refill  Lower extremities: Extremities warm/dry  2+ femoral pulses; PT/DP pulses 2+ bilaterally  No edema B/L  Neuro: Alert and oriented X 3  Sensation is grossly intact  No focal deficits  Musculoskeletal: ROM intact   Skin: Warm/Dry, without rashes or lesions      Lab Results:               Invalid input(s): LABGLOM      Lab Results   Component Value Date    HGBA1C 6 5 05/09/2022     No results found for: CKTOTAL, CKMB, CKMBINDEX, TROPONINI    Imaging Studies:       Gated CTA of the chest/abdomen/pelvis: 6/9/22  FINDINGS:     VASCULAR STRUCTURES:       Annulus: diameter 26 9 x 21 9 mm      area: 475 6 sq mm    Annulus to LCA: 14 3 mm    Annulus to RCA: 12 7 mm    Minimal diameter right iliofemoral segment: 3 7 mm    Minimal diameter left iliofemoral segment: 4 7 mm     The ascending aorta is nonaneurysmal measuring 33 mm with mild atherosclerosis  There is a type I aortic arch with bovine branching anatomy and no stenosis in the visualized great vessels  The aortic arch is nonaneurysmal with mild atherosclerosis  The   descending thoracic aorta is nonaneurysmal with mild atherosclerosis      The abdominal aorta is nonaneurysmal with mild atherosclerosis  Celiac, superior mesenteric, and inferior mesenteric arteries are patent  Atherosclerotic calcifications at the origins of bilateral renal arteries  Moderate stenosis at the right common   iliac artery with mild ectasia distal to the stenosis  Left common iliac, bilateral external iliac and common femoral arteries are nonaneurysmal with mild atherosclerosis      Cardiac findings: There is mild calcification of the aortic valve  The left ventricle is normal   The ventricular septum is normal   No prior valvular surgery  No prior bypass surgery  No pericardial effusion  No cardiac mass or thrombus  Coronary artery calcifications      OTHER FINDINGS:      CHEST     LUNGS:  Calcified granuloma of left lower lobe  Remainder of lungs are clear  There is no tracheal or endobronchial lesion      PLEURA:  Unremarkable      MEDIASTINUM AND SUN:  Calcified left hilar lymph node      CHEST WALL:   Unremarkable      ABDOMEN     LIVER/BILIARY TREE:  Unremarkable      GALLBLADDER:  No calcified gallstones  No pericholecystic inflammatory change      SPLEEN:  Calcified granuloma      PANCREAS:  Unremarkable      ADRENAL GLANDS:  Unremarkable      KIDNEYS/URETERS:  5 mm and 4 mm nonobstructing stones at the inferior pole of left kidney  Subcentimeter hypodense renal lesions likely represent cysts  No hydronephrosis      STOMACH AND BOWEL:  Colonic diverticulosis      APPENDIX:  No findings to suggest appendicitis      ABDOMINOPELVIC CAVITY:  No ascites or free intraperitoneal air  No lymphadenopathy       PELVIS     REPRODUCTIVE ORGANS:  Unremarkable for patient's age      URINARY BLADDER:  Unremarkable      ABDOMINAL WALL/INGUINAL REGIONS:  Unremarkable      OSSEOUS STRUCTURES:  No acute fracture or destructive osseous lesion  8 mm sclerotic lesion in the right bone, previously 7 mm  L5 pars defect      IMPRESSION:  1  Nonobstructing subcentimeters stones in the inferior pole of left kidney  2  TAVR measurements as above  3D SAULO: 2/11/22  Findings    Left Ventricle Left ventricular cavity size is normal  Wall thickness is mildly increased  Systolic function is normal   Wall motion is normal  There is mild concentric hypertrophy  Diastolic function is normal for age  Right Ventricle Right ventricular cavity size is normal  Systolic function is normal    Left Atrium The atrium is mildly dilated  Right Atrium The atrium is normal in size  Aortic Valve The aortic valve is trileaflet  The leaflets are severely thickened  The leaflets are severely calcified  There is severely reduced mobility  There is trace regurgitation  There is severe stenosis  The aortic valve peak velocity is 2 94 m/s  The aortic valve mean gradient is 19 mmHg  The aortic valve/velocity ratio is 0 24  The aortic valve area is 0 70 cm2  Mitral Valve The mitral valve has normal function  There is mild annular calcification  There is mild to moderate regurgitation  There is no evidence of stenosis  The valve has normal function  Tricuspid Valve Tricuspid valve structure is normal  There is trace regurgitation  There is no evidence of stenosis  The right ventricular systolic pressure is normal  The estimated right ventricular systolic pressure is 43 80 mmHg  Pulmonic Valve The pulmonic valve was not well visualized  There is trace regurgitation  There is no evidence of stenosis  Ascending Aorta The aortic root is normal in size  The ascending aorta is normal in size     IVC/SVC The right atrial pressure is estimated at 5 0 mmHg  The inferior vena cava is normal in size  Respirophasic changes were normal    Pericardium There is no pericardial effusion  The pericardium is normal in appearance                 Left Ventricle Measurements    Function/Volumes   A4C EF 53 %         LVOT stroke volume 45 62 cm3         LVOT SI 23 7 ml/m2         Dimensions   LVIDd 5 cm         LVIDS 3 7 cm         IVSd 1 3 cm         LVPWd 1 cm         LVOT area 2 83 cm2         FS 26 %         Diastolic Filling   MV E' Tissue Velocity Septal 7 cm/s         E wave deceleration time 135 ms         MV Peak E Adriano 88 cm/s         MV Peak A Adriano 0 68 m/s          Report Measurements   AV LVOT peak gradient 1 mmHg              Interventricular Septum Measurements    Shunt Ratio   LVOT peak VTI 16 1 cm         LVOT peak adriano 0 7 m/s              Right Ventricle Measurements    Dimensions   RVID d 3 2 cm               Left Atrium Measurements    Dimensions   LA size 4 7 cm         SWATI A4C 23 3 cm2               Right Atrium Measurements    Dimensions   RAA A4C 15 8 cm2               Atrial Septum Measurements    Shunt Ratio   LVOT peak VTI 16 1 cm         LVOT peak adriano 0 7 m/s               Aortic Valve Measurements    Stenosis   Ao peak adriano retrograde 2 94 m/s         LVOT peak adriano 0 7 m/s         Ao VTI 65 2 cm         LVOT peak VTI 16 1 cm         AV mean gradient 19 mmHg         LVOT mn grad 2 mmHg         AV peak gradient 35 mmHg         AV LVOT peak gradient 1 mmHg         AV Velocity Ratio 0 24          Area/Dimensions   AV valve area 0 7 cm2         AV area by cont VTI 0 7 cm2         AV area peak adriano 0 7 cm2         SVI 23 mL/m2         LVOT diameter 1 9 cm         LVOT area 2 83 cm2               Mitral Valve Measurements    Stenosis   MV VTI RETROGRADE 204 2 cm         MV stenosis pressure 1/2 time 0 ms         MV mean gradient retrograde 106 mmHg         MR  mmHg               Tricuspid Valve Measurements    RVSP Parameters   TR Peak Adriano 2 8 m/s         Est  RA pres 5 mmHg         Triscuspid Valve Regurgitation Peak Gradient 22 mmHg         Right Ventricular Peak Systolic Pressure 27 mmHg               Aorta Measurements    Aortic Dimensions   Ao root 3 cm         Asc Ao 3 2 cm               IVC/SVC Measurements    IVC/SVC   Est  RA pres 5 mmHg               Cardiac catheterization: 6/3/22  Left Main   The vessel is large  There is mild diffuse disease throughout the vessel  The vessel is mildly calcified  Left Anterior Descending   The vessel is moderate in size  There is mild diffuse disease throughout the vessel  Mid LAD lesion is 50% stenosed  First Diagonal Branch   1st Diag lesion is 50% stenosed  Left Circumflex   The vessel is large and non-dominant  There is mild diffuse disease throughout the vessel  First Obtuse Marginal Branch   1st Mrg lesion is 50% stenosed  Right Coronary Artery   The vessel is moderate in size and dominant  There is mild diffuse disease throughout the vessel  Right Posterior Descending Artery   RPDA-1 lesion is 50% stenosed  RPDA-2 lesion is 50% stenosed  Pulmonary function tests:   Date test performed: 5/19/2022     Date of test interpretation: 5/20/2022     Requesting Physician: Dennise Levy     Reason for Testing: Severe aortic stenosis     Reference set for interpretation: [id number]     Procedure: The patient was taken to pulmonary function testing laboratory  The patient demonstrated good effort and cooperation  Poorly performed PFT lacking reproducibility  VC fpr DLCO measurements was < 90% of SVC  He was not able to follow the instructions for the TGV/RAW manuver   DLCO was corrected for patient's Hgb      Results:  FVC: 2 70 L       71 % predicted  FEV1: 1 97 L     69 % predicted  FEV1/FVC Ratio: 73 %      After administration of bronchodilator   FVC: 3 06 L, 80 % predicted, +13 % change  FEV1: 2 01 L, 70 % predicted, +2 % change     DLCO corrected for patients hemoglobin level: 54 %     Interpretation:     · Spirometry appears restricted  Recommend checking lung volumes to evaluate further      · Significant response to the administration of bronchodilator per ATS standards      · Moderate decrease in diffusion capacity     · Normal flow-volume loop post bronchodilator  Pre-bronchodilator flow volume loop is abnormal likely due to difficulty with testing          Odalys Baker DO   Pulmonary & Critical Care Medicine Fellow  [facility]    Carotid artery ultrasound: 5/19/22  Impression  RIGHT:  There is 50-69% stenosis noted in the internal carotid artery  Plaque is  heterogenous and irregular  Vertebral artery flow is antegrade  There is no significant subclavian artery  disease  LEFT:  There is 50-69% stenosis noted in the internal carotid artery  Plaque is  heterogenous and irregular  There are elevated velocities and plaque noted throughout the common carotid  artery which may cause ICA/CCA ratio to be falsely underestimated  Vertebral artery flow is antegrade  There is no significant subclavian artery  disease  Compared to previous study on 10/8/2007, there is is now a 50-69% stenosis  bilaterally  Recommend repeat testing in 6 months as per protocol unless otherwise  indicated  I have personally reviewed pertinent reports        TAVR evaluation Assessment:     5 Meter Walk Test:      Attempt 1: 6   Attempt 2: 7   Attempt 3: 6    STS Risk Score: 2 1    [facility]: III    KCCQ-12 completed    Assessment:  Patient Active Problem List    Diagnosis Date Noted    Contrast media allergy 05/31/2022    Aortic stenosis 01/19/2022    Diverticulosis of large intestine 09/20/2018    Internal hemorrhoids 09/20/2018    Chronic hepatitis C ([facility] [address] ) 04/13/2017    Nicotine dependence 04/13/2017    Osteoarthritis of knee 04/13/2017    Bilateral hearing loss 01/30/2017    Allergic rhinitis 02/24/2016    Type 2 diabetes mellitus with neurologic complication, with long-term current use of insulin ([facility] [address] ) 11/24/2015    Adenomatous colon polyp 06/11/2014    Collapsed arches 05/27/2014    Hyperlipidemia 09/13/2013    Nephritis and nephropathy, not specified as acute or chronic, with other specified pathological lesion in kidney, in diseases classified elsewhere 03/26/2013    Vitamin B12 deficiency 07/24/2012    Asthma 06/08/2012    Essential hypertension 06/08/2012    Hypothyroidism 06/07/2012     Severe aortic stenosis; Ongoing TAVR workup    Plan:    Mikael Klein has symptomatic severe aortic stenosis  They have undergone testing for transcatheter aortic valve replacement  The results of these studies have been interpreted by the multidisciplinary TAVR team who have determined the patient to be Low risk for open aortic valve replacement based on other pre-existing comobidities not reflected in the STS risk score  The risks, benefits, and alternatives to TAVR were discussed in detail with the patient today  They understand and wish to proceed with transfemoral transcatheter aortic valve replacement  Informed consent was obtained and a date for the intervention has been set  Mikael Klein was comfortable with our recommendations, and their questions were answered to their satisfaction  The following preoperative instructions were provided at the conclusion of their consultation:     1  You will receive a phone call from the hospital between 2:00 PM and 8:00 PM the day prior to surgery to confirm arrival time and location  For surgery on Mondays, you will receive a call on Friday  2  Do not drink or eat anything after midnight the night before surgery  That includes no water, candy, gum, lozenges, Lifesavers, etc  We recommend you not eat any salty or fatty snack food, consume alcohol or smoke the night before surgery  3  Continue taking aspirin but only 81 mg daily    4  If you take Coumadin and/or Plavix, discontinue it 5 days before surgery  5  If you are diabetic, do not take any of your diabetic pills the morning of surgery  If you take Lantus insulin, you may take it at your regularly scheduled time the day before surgery  Do not take any other insulins the morning of surgery  6  The 2 nights before surgery, take a shower each night using the special antiseptic soap or soap sponges you received from the office or [facility] your hair with regular shampoo and rinse completely before using the antiseptic sponges  Use the sponge to wash from your neck down, with special attention to the armpits and groin area  Do not use any other soap or cleanser on your skin  Do not use lotions, powder, deodorant or perfume of any kind on your skin after you shower  Use clean bed linens and wear clean pajamas after your shower  7  You will be prescribed Mupirocin nasal ointment  Apply to both nostrils twice a day for 5 days prior to surgery  8  Do not take a shower the morning of her surgery; you'll be given a special" bath" at the hospital   9  Notify the CT surgery office if you develop a cold, sore throat, cough, fever or other health issues before your surgery  10  Other medication changes included the following: take 1/2 of long acting insulin prior to surgery, STOP lisinopril and metformin 3 days prior to surgery  SIGNATURE: Eusebia Dexter PA-C  DATE: June 13, 2022  TIME: 1:53 PM  Attestation signed by Trini Will DO at 6/13/2022  2:18 PM:  Mr Flaquito Beltran is here for follow-up of his TAVR testing  All pre-operative testing was reviewed by me personally and discussed in detail with the patient  He has bilateral 50-69% carotid stenosis that will be followed  He has multipled 50% blockages in his LAD, PDA x2, OM and diagonal that can be managed medically  The risks, benefits and alternatives to TAVR were discussed with the patient in detail  He agrees to proceed and consent was obtained    Plan for a left TF approach TAVR on 6/21/22

## 2022-06-13 NOTE — H&P
Preop History and Physical - Cardiothoracic Surgery   Olivia Cruz 78 y o  male MRN: [id number]    Physician Requesting Consult: No att  providers found    Reason for Consult / Principal Problem: Aortic stenosis, Non-Rheumatic    History of Present Illness: Olivia Cruz is a 78y o  year old male who was previously evaluated in our office by KAMILLE Cummings  for transcatheter aortic valve replacement  During this initial consultation, arrangements were made for the following studies to be completed: Gated CTA of the chest/abdomen/pelvis, 3D SAULO, cardiac catheterization, dental clearance, pulmonary function tests with ABG, and carotid artery ultrasound  Olivia Cruz now presents to review the results of these tests and obtain a second surgeon to confirm the suitability of proceeding with transcatheter aortic valve replacment  Past Medical History:  Past Medical History:   Diagnosis Date    Arthritis     Asthma     Colon polyps     Community acquired pneumonia     last assessed: 5/1/2014    Diabetes mellitus ([facility] [address] )     Hemorrhagic prepatellar bursitis, left 10/21/2019    Hepatitis C     High cholesterol     Hypertension     Lymphadenopathy, anterior cervical 4/17/2018    Screening for colon cancer 5/1/2019    Thoracic vertebral fracture ([facility] [address] ) 6/11/2014       Past Surgical History:   Past Surgical History:   Procedure Laterality Date    CARDIAC CATHETERIZATION N/A 6/3/2022    Procedure: Cardiac RHC/LHC; Surgeon: Parker Duane, MD;  [facility]; Service: Cardiology    COLONOSCOPY      COLONOSCOPY W/ POLYPECTOMY      LITHOTRIPSY      MULTIPLE TOOTH EXTRACTIONS      NV COLONOSCOPY FLX DX W/COLLJ SPEC WHEN PFRMD N/A 5/17/2018    Procedure: COLONOSCOPY;  Surgeon: Isabella Schrader MD;  Location: [facility];   Service: Gastroenterology       Family History:  Family History   Problem Relation Age of Onset    Heart attack Family         at age 80    No Known Problems Mother     No Known Problems Father     Diabetes Sister     Diabetes Brother        Social History:  Social History     Substance and Sexual Activity   Alcohol Use No     Social History     Substance and Sexual Activity   Drug Use No     Social History     Tobacco Use   Smoking Status Former Smoker    Packs/day: 0 50    Types: Cigarettes    Quit date: 2022    Years since quittin 0   Smokeless Tobacco Never Used   Tobacco Comment    started when he was about 24 yrs old       Home Medications:   Prior to Admission medications    Medication Sig Start Date End Date Taking? Authorizing Provider   Advair -21 MCG/ACT inhaler TAKE 2 PUFFS BY MOUTH TWICE A DAY 21   Laurie Blackburn DO   albuterol (VENTOLIN HFA) 90 mcg/act inhaler Inhale 2 puffs every 4 (four) hours as needed for wheezing or shortness of breath 18   Niko Carter, DO   Alcohol Swabs (ALCOHOL PADS) 70 % PADS  19   Historical Provider, MD   amLODIPine (NORVASC) 5 mg tablet Take 1 tablet (5 mg total) by mouth daily 19   Laurie Blackburn DO   Blood Glucose Monitoring Suppl (OneTouch Verio) w/Device KIT Check blood glucose three times daily before each meal 20   Wendy Colon DO   cholecalciferol (VITAMIN D3) 1,000 units tablet TAKE 2 TABLETS (2,000 UNITS TOTAL) BY MOUTH DAILY 22   Margret Aponte DO   Continuous Blood Gluc  (FreeStyle Robles 2 Harrison) POWER Use 1 each continuous 5/10/22   Margret Aponte DO   Continuous Blood Gluc Sensor (FreeStyle Cristofer 2 Sensor) MISC Use 1 each every 14 (fourteen) days 5/10/22   Margret Aponte DO   fluticasone (FLONASE) 50 mcg/act nasal spray 2 sprays into each nostril daily 20   Laurie Blackburn DO   glucose blood (OneTouch Verio) test strip Check blood glucose three times daily before meals and bring a log of your measurements to your next appointment   20   Wendy Colon DO   hydrochlorothiazide (HYDRODIURIL) 25 mg tablet Take 1 tablet (25 mg total) by mouth daily 1/19/22   Rosalino Cardoza, DO   insulin glargine (Lantus SoloStar) 100 units/mL injection pen Inject 18 Units under the skin daily at bedtime 3/3/22   Marlena Kaur MD   Insulin Pen Needle (Pen Needles) 31G X 8 MM MISC Use daily 3/3/21   Laurie Blackburn,    Lancets (FREESTYLE) lancets by Other route as needed (As needed) 3/21/19   Kristal Taveras MD   levothyroxine 137 mcg tablet Take 1 tablet (137 mcg total) by mouth daily 1/19/22   Rosalino Cardoza, DO   lisinopril (ZESTRIL) 20 mg tablet TAKE 1 TABLET BY MOUTH EVERY DAY 4/7/21   Laurie Blackburn DO   metFORMIN (GLUCOPHAGE) 850 mg tablet TAKE 1 TABLET (850 MG TOTAL) BY MOUTH 2 (TWO) TIMES A DAY WITH MEALS 5/16/22   Rosalino Cardoza, DO   montelukast (SINGULAIR) 10 mg tablet TAKE 1 TABLET BY MOUTH EVERY DAY 4/18/22   Rosalino Cardoza, DO   nicotine (NICODERM CQ) 14 mg/24hr TD 24 hr patch Place 1 patch on the skin every 24 hours 3/16/22   Brett Khalil MD   NovoLOG FlexPen 100 units/mL injection pen Inject 5 units with breakfast and with dinner 3/3/22   Marlena Kaur MD   rosuvastatin (CRESTOR) 40 MG tablet TAKE 1 TABLET BY MOUTH EVERY DAY 4/29/22   Brett Khalil MD   vitamin B-12 (VITAMIN B-12) 1,000 mcg tablet TAKE 1 TABLET BY MOUTH EVERY DAY 5/16/22   Rosalino Cardoza, DO       Allergies: Allergies   Allergen Reactions    Iv Contrast [Iodinated Diagnostic Agents] Rash     Patient states he had a severe reaction approximately 10 years ago , states he had a rash, itchy and he remembers a bunch of people pounding on chest and being surrounded by multiple doctors         Review of Systems:  Review of Systems - General ROS: negative  Psychological ROS: negative  Ophthalmic ROS: negative  ENT ROS: negative  Allergy and Immunology ROS: negative  Hematological and Lymphatic ROS: negative  Endocrine ROS: negative  Respiratory ROS: negative  Cardiovascular ROS: negative  Gastrointestinal ROS: negative  Genito-Urinary ROS: negative  Musculoskeletal ROS: negative  Neurological ROS: negative  Dermatological ROS: negative    Vital Signs:     Vitals:    06/13/22 1322 06/13/22 1326   BP: 133/58 128/67   BP Location: Left arm Right arm   Patient Position: Sitting Sitting   Cuff Size: Standard Standard   Pulse: 78    Resp: 18    SpO2: 100%    Weight: 82 6 kg (182 lb)    Height: 5' 9" (1 753 m)        Physical Exam:    General: alert, NAD  HEENT/NECK:  PERRLA  No jugular venous distention  Cardiac:Regular rate and rhythm  Carotids: no bruits   Pulmonary:  Breath sounds clear bilaterally  Abdomen:  Non-tender, Non-distended  Positive bowel sounds  Upper extremities: 2+ radial pulses; brisk capillary refill  Lower extremities: Extremities warm/dry  2+ femoral pulses; PT/DP pulses 2+ bilaterally  No edema B/L  Neuro: Alert and oriented X 3  Sensation is grossly intact  No focal deficits  Musculoskeletal: ROM intact   Skin: Warm/Dry, without rashes or lesions  Lab Results:               Invalid input(s): LABGLOM      Lab Results   Component Value Date    HGBA1C 6 5 05/09/2022     No results found for: CKTOTAL, CKMB, CKMBINDEX, TROPONINI    Imaging Studies:       Gated CTA of the chest/abdomen/pelvis: 6/9/22  FINDINGS:     VASCULAR STRUCTURES:       Annulus: diameter 26 9 x 21 9 mm      area: 475 6 sq mm    Annulus to LCA: 14 3 mm    Annulus to RCA: 12 7 mm    Minimal diameter right iliofemoral segment: 3 7 mm    Minimal diameter left iliofemoral segment: 4 7 mm     The ascending aorta is nonaneurysmal measuring 33 mm with mild atherosclerosis  There is a type I aortic arch with bovine branching anatomy and no stenosis in the visualized great vessels  The aortic arch is nonaneurysmal with mild atherosclerosis  The   descending thoracic aorta is nonaneurysmal with mild atherosclerosis      The abdominal aorta is nonaneurysmal with mild atherosclerosis  Celiac, superior mesenteric, and inferior mesenteric arteries are patent  Atherosclerotic calcifications at the origins of bilateral renal arteries  Moderate stenosis at the right common   iliac artery with mild ectasia distal to the stenosis  Left common iliac, bilateral external iliac and common femoral arteries are nonaneurysmal with mild atherosclerosis      Cardiac findings: There is mild calcification of the aortic valve  The left ventricle is normal   The ventricular septum is normal   No prior valvular surgery  No prior bypass surgery  No pericardial effusion  No cardiac mass or thrombus  Coronary artery calcifications      OTHER FINDINGS:      CHEST     LUNGS:  Calcified granuloma of left lower lobe  Remainder of lungs are clear  There is no tracheal or endobronchial lesion      PLEURA:  Unremarkable      MEDIASTINUM AND SUN:  Calcified left hilar lymph node      CHEST WALL:   Unremarkable      ABDOMEN     LIVER/BILIARY TREE:  Unremarkable      GALLBLADDER:  No calcified gallstones  No pericholecystic inflammatory change      SPLEEN:  Calcified granuloma      PANCREAS:  Unremarkable      ADRENAL GLANDS:  Unremarkable      KIDNEYS/URETERS:  5 mm and 4 mm nonobstructing stones at the inferior pole of left kidney  Subcentimeter hypodense renal lesions likely represent cysts  No hydronephrosis      STOMACH AND BOWEL:  Colonic diverticulosis      APPENDIX:  No findings to suggest appendicitis      ABDOMINOPELVIC CAVITY:  No ascites or free intraperitoneal air  No lymphadenopathy       PELVIS     REPRODUCTIVE ORGANS:  Unremarkable for patient's age      URINARY BLADDER:  Unremarkable      ABDOMINAL WALL/INGUINAL REGIONS:  Unremarkable      OSSEOUS STRUCTURES:  No acute fracture or destructive osseous lesion  8 mm sclerotic lesion in the right bone, previously 7 mm  L5 pars defect      IMPRESSION:  1  Nonobstructing subcentimeters stones in the inferior pole of left kidney  2  TAVR measurements as above      3D SAULO: 2/11/22  Findings    Left Ventricle Left ventricular cavity size is normal  Wall thickness is mildly increased  Systolic function is normal   Wall motion is normal  There is mild concentric hypertrophy  Diastolic function is normal for age  Right Ventricle Right ventricular cavity size is normal  Systolic function is normal    Left Atrium The atrium is mildly dilated  Right Atrium The atrium is normal in size  Aortic Valve The aortic valve is trileaflet  The leaflets are severely thickened  The leaflets are severely calcified  There is severely reduced mobility  There is trace regurgitation  There is severe stenosis  The aortic valve peak velocity is 2 94 m/s  The aortic valve mean gradient is 19 mmHg  The aortic valve/velocity ratio is 0 24  The aortic valve area is 0 70 cm2  Mitral Valve The mitral valve has normal function  There is mild annular calcification  There is mild to moderate regurgitation  There is no evidence of stenosis  The valve has normal function  Tricuspid Valve Tricuspid valve structure is normal  There is trace regurgitation  There is no evidence of stenosis  The right ventricular systolic pressure is normal  The estimated right ventricular systolic pressure is 86 04 mmHg  Pulmonic Valve The pulmonic valve was not well visualized  There is trace regurgitation  There is no evidence of stenosis  Ascending Aorta The aortic root is normal in size  The ascending aorta is normal in size  IVC/SVC The right atrial pressure is estimated at 5 0 mmHg  The inferior vena cava is normal in size  Respirophasic changes were normal    Pericardium There is no pericardial effusion  The pericardium is normal in appearance                 Left Ventricle Measurements    Function/Volumes   A4C EF 53 %         LVOT stroke volume 45 62 cm3         LVOT SI 23 7 ml/m2         Dimensions   LVIDd 5 cm         LVIDS 3 7 cm         IVSd 1 3 cm         LVPWd 1 cm         LVOT area 2 83 cm2         FS 26 %         Diastolic Filling   MV E' Tissue Velocity Septal 7 cm/s E wave deceleration time 135 ms         MV Peak E Adriano 88 cm/s         MV Peak A Adriano 0 68 m/s          Report Measurements   AV LVOT peak gradient 1 mmHg              Interventricular Septum Measurements    Shunt Ratio   LVOT peak VTI 16 1 cm         LVOT peak adriano 0 7 m/s              Right Ventricle Measurements    Dimensions   RVID d 3 2 cm               Left Atrium Measurements    Dimensions   LA size 4 7 cm         SWATI A4C 23 3 cm2               Right Atrium Measurements    Dimensions   RAA A4C 15 8 cm2               Atrial Septum Measurements    Shunt Ratio   LVOT peak VTI 16 1 cm         LVOT peak adriano 0 7 m/s               Aortic Valve Measurements    Stenosis   Ao peak adriano retrograde 2 94 m/s         LVOT peak adriano 0 7 m/s         Ao VTI 65 2 cm         LVOT peak VTI 16 1 cm         AV mean gradient 19 mmHg         LVOT mn grad 2 mmHg         AV peak gradient 35 mmHg         AV LVOT peak gradient 1 mmHg         AV Velocity Ratio 0 24          Area/Dimensions   AV valve area 0 7 cm2         AV area by cont VTI 0 7 cm2         AV area peak adriano 0 7 cm2         SVI 23 mL/m2         LVOT diameter 1 9 cm         LVOT area 2 83 cm2               Mitral Valve Measurements    Stenosis   MV VTI RETROGRADE 204 2 cm         MV stenosis pressure 1/2 time 0 ms         MV mean gradient retrograde 106 mmHg         MR  mmHg               Tricuspid Valve Measurements    RVSP Parameters   TR Peak Adriano 2 8 m/s         Est  RA pres 5 mmHg         Triscuspid Valve Regurgitation Peak Gradient 22 mmHg         Right Ventricular Peak Systolic Pressure 27 mmHg               Aorta Measurements    Aortic Dimensions   Ao root 3 cm         Asc Ao 3 2 cm               IVC/SVC Measurements    IVC/SVC   Est  RA pres 5 mmHg               Cardiac catheterization: 6/3/22  Left Main   The vessel is large  There is mild diffuse disease throughout the vessel  The vessel is mildly calcified     Left Anterior Descending   The vessel is moderate in size  There is mild diffuse disease throughout the vessel  Mid LAD lesion is 50% stenosed  First Diagonal Branch   1st Diag lesion is 50% stenosed  Left Circumflex   The vessel is large and non-dominant  There is mild diffuse disease throughout the vessel  First Obtuse Marginal Branch   1st Mrg lesion is 50% stenosed  Right Coronary Artery   The vessel is moderate in size and dominant  There is mild diffuse disease throughout the vessel  Right Posterior Descending Artery   RPDA-1 lesion is 50% stenosed  RPDA-2 lesion is 50% stenosed  Pulmonary function tests:   Date test performed: 5/19/2022     Date of test interpretation: 5/20/2022     Requesting Physician: Yvrose Goetz     Reason for Testing: Severe aortic stenosis     Reference set for interpretation: [id number]     Procedure: The patient was taken to pulmonary function testing laboratory  The patient demonstrated good effort and cooperation  Poorly performed PFT lacking reproducibility  VC fpr DLCO measurements was < 90% of SVC  He was not able to follow the instructions for the TGV/RAW manuver  DLCO was corrected for patient's Hgb      Results:  FVC: 2 70 L       71 % predicted  FEV1: 1 97 L     69 % predicted  FEV1/FVC Ratio: 73 %      After administration of bronchodilator   FVC: 3 06 L, 80 % predicted, +13 % change  FEV1: 2 01 L, 70 % predicted, +2 % change     DLCO corrected for patients hemoglobin level: 54 %     Interpretation:     · Spirometry appears restricted  Recommend checking lung volumes to evaluate further      · Significant response to the administration of bronchodilator per ATS standards      · Moderate decrease in diffusion capacity     · Normal flow-volume loop post bronchodilator   Pre-bronchodilator flow volume loop is abnormal likely due to difficulty with testing          Cecily Anguiano DO   Pulmonary & Critical Care Medicine Fellow  [facility]    Carotid artery ultrasound: 5/19/22  Impression  RIGHT:  There is 50-69% stenosis noted in the internal carotid artery  Plaque is  heterogenous and irregular  Vertebral artery flow is antegrade  There is no significant subclavian artery  disease  LEFT:  There is 50-69% stenosis noted in the internal carotid artery  Plaque is  heterogenous and irregular  There are elevated velocities and plaque noted throughout the common carotid  artery which may cause ICA/CCA ratio to be falsely underestimated  Vertebral artery flow is antegrade  There is no significant subclavian artery  disease  Compared to previous study on 10/8/2007, there is is now a 50-69% stenosis  bilaterally  Recommend repeat testing in 6 months as per protocol unless otherwise  indicated  I have personally reviewed pertinent reports  TAVR evaluation Assessment:     5 Meter Walk Test:      Attempt 1: 6   Attempt 2: 7   Attempt 3: 6    STS Risk Score: 2 1    [address]: III    KCCQ-12 completed    Assessment:  Patient Active Problem List    Diagnosis Date Noted    Contrast media allergy 05/31/2022    Aortic stenosis 01/19/2022    Diverticulosis of large intestine 09/20/2018    Internal hemorrhoids 09/20/2018    Chronic hepatitis C ([facility] [address] ) 04/13/2017    Nicotine dependence 04/13/2017    Osteoarthritis of knee 04/13/2017    Bilateral hearing loss 01/30/2017    Allergic rhinitis 02/24/2016    Type 2 diabetes mellitus with neurologic complication, with long-term current use of insulin ([facility] [address] ) 11/24/2015    Adenomatous colon polyp 06/11/2014    Collapsed arches 05/27/2014    Hyperlipidemia 09/13/2013    Nephritis and nephropathy, not specified as acute or chronic, with other specified pathological lesion in kidney, in diseases classified elsewhere 03/26/2013    Vitamin B12 deficiency 07/24/2012    Asthma 06/08/2012    Essential hypertension 06/08/2012    Hypothyroidism 06/07/2012     Severe aortic stenosis;  Ongoing TAVR workup    Plan:    Mikayla Guadarrama has symptomatic severe aortic stenosis  They have undergone testing for transcatheter aortic valve replacement  The results of these studies have been interpreted by the multidisciplinary TAVR team who have determined the patient to be Low risk for open aortic valve replacement based on other pre-existing comobidities not reflected in the STS risk score  The risks, benefits, and alternatives to TAVR were discussed in detail with the patient today  They understand and wish to proceed with transfemoral transcatheter aortic valve replacement  Informed consent was obtained and a date for the intervention has been set  Michael Lacey was comfortable with our recommendations, and their questions were answered to their satisfaction  The following preoperative instructions were provided at the conclusion of their consultation:     1  You will receive a phone call from the hospital between 2:00 PM and 8:00 PM the day prior to surgery to confirm arrival time and location  For surgery on Mondays, you will receive a call on Friday  2  Do not drink or eat anything after midnight the night before surgery  That includes no water, candy, gum, lozenges, Lifesavers, etc  We recommend you not eat any salty or fatty snack food, consume alcohol or smoke the night before surgery  3  Continue taking aspirin but only 81 mg daily  4  If you take Coumadin and/or Plavix, discontinue it 5 days before surgery  5  If you are diabetic, do not take any of your diabetic pills the morning of surgery  If you take Lantus insulin, you may take it at your regularly scheduled time the day before surgery  Do not take any other insulins the morning of surgery  6  The 2 nights before surgery, take a shower each night using the special antiseptic soap or soap sponges you received from the office or [facility]  [place] your hair with regular shampoo and rinse completely before using the antiseptic sponges   Use the sponge to wash from your neck down, with special attention to the armpits and groin area  Do not use any other soap or cleanser on your skin  Do not use lotions, powder, deodorant or perfume of any kind on your skin after you shower  Use clean bed linens and wear clean pajamas after your shower  7  You will be prescribed Mupirocin nasal ointment  Apply to both nostrils twice a day for 5 days prior to surgery  8  Do not take a shower the morning of her surgery; you'll be given a special" bath" at the hospital   9  Notify the CT surgery office if you develop a cold, sore throat, cough, fever or other health issues before your surgery  10  Other medication changes included the following: take 1/2 of long acting insulin prior to surgery, STOP lisinopril and metformin 3 days prior to surgery        SIGNATURE: Triny Arreguin PA-C  DATE: June 13, 2022  TIME: 1:53 PM

## 2022-06-13 NOTE — H&P (VIEW-ONLY)
Preop History and Physical - Cardiothoracic Surgery   Ly Merritt 78 y o  male MRN: [id number]    Physician Requesting Consult: No att  providers found    Reason for Consult / Principal Problem: Aortic stenosis, Non-Rheumatic    History of Present Illness: Ly Merritt is a 78y o  year old male who was previously evaluated in our office by KAMILLE Whitley  for transcatheter aortic valve replacement  During this initial consultation, arrangements were made for the following studies to be completed: Gated CTA of the chest/abdomen/pelvis, 3D SAULO, cardiac catheterization, dental clearance, pulmonary function tests with ABG, and carotid artery ultrasound  Ly Merritt now presents to review the results of these tests and obtain a second surgeon to confirm the suitability of proceeding with transcatheter aortic valve replacment  Past Medical History:  Past Medical History:   Diagnosis Date    Arthritis     Asthma     Colon polyps     Community acquired pneumonia     last assessed: 5/1/2014    Diabetes mellitus ([facility] [address] )     Hemorrhagic prepatellar bursitis, left 10/21/2019    Hepatitis C     High cholesterol     Hypertension     Lymphadenopathy, anterior cervical 4/17/2018    Screening for colon cancer 5/1/2019    Thoracic vertebral fracture ([facility] [address] ) 6/11/2014       Past Surgical History:   Past Surgical History:   Procedure Laterality Date    CARDIAC CATHETERIZATION N/A 6/3/2022    Procedure: Cardiac RHC/LHC; Surgeon: James Deal MD;  Location: [facility]; Service: Cardiology    COLONOSCOPY      COLONOSCOPY W/ POLYPECTOMY      LITHOTRIPSY      MULTIPLE TOOTH EXTRACTIONS      DE COLONOSCOPY FLX DX W/COLLJ SPEC WHEN PFRMD N/A 5/17/2018    Procedure: COLONOSCOPY;  Surgeon: Ema Jane MD;  Location: [facility];   Service: Gastroenterology       Family History:  Family History   Problem Relation Age of Onset    Heart attack Family         at age 80    No Known Problems Mother     No Known Problems Father     Diabetes Sister     Diabetes Brother        Social History:  Social History     Substance and Sexual Activity   Alcohol Use No     Social History     Substance and Sexual Activity   Drug Use No     Social History     Tobacco Use   Smoking Status Former Smoker    Packs/day: 0 50    Types: Cigarettes    Quit date: 2022    Years since quittin 0   Smokeless Tobacco Never Used   Tobacco Comment    started when he was about 24 yrs old       Home Medications:   Prior to Admission medications    Medication Sig Start Date End Date Taking? Authorizing Provider   Advair -21 MCG/ACT inhaler TAKE 2 PUFFS BY MOUTH TWICE A DAY 21   Laurie Blackburn DO   albuterol (VENTOLIN HFA) 90 mcg/act inhaler Inhale 2 puffs every 4 (four) hours as needed for wheezing or shortness of breath 18   Swathi Whitney, DO   Alcohol Swabs (ALCOHOL PADS) 70 % PADS  19   Historical Provider, MD   amLODIPine (NORVASC) 5 mg tablet Take 1 tablet (5 mg total) by mouth daily 19   Laurie Blackburn DO   Blood Glucose Monitoring Suppl (OneTouch Verio) w/Device KIT Check blood glucose three times daily before each meal 20   Mirtha Spring, DO   cholecalciferol (VITAMIN D3) 1,000 units tablet TAKE 2 TABLETS (2,000 UNITS TOTAL) BY MOUTH DAILY 22   Torie Schwab DO   Continuous Blood Gluc  (FreeStyle Robles 2 Drake) POWER Use 1 each continuous 5/10/22   Torie Schwab DO   Continuous Blood Gluc Sensor (FreeStyle Cristofer 2 Sensor) MISC Use 1 each every 14 (fourteen) days 5/10/22   Torie Schwab DO   fluticasone (FLONASE) 50 mcg/act nasal spray 2 sprays into each nostril daily 20   Laurie Blackburn DO   glucose blood (OneTouch Verio) test strip Check blood glucose three times daily before meals and bring a log of your measurements to your next appointment   20marie Spring, DO   hydrochlorothiazide (HYDRODIURIL) 25 mg tablet Take 1 tablet (25 mg total) by mouth daily 1/19/22   Stanley Stoner,    insulin glargine (Lantus SoloStar) 100 units/mL injection pen Inject 18 Units under the skin daily at bedtime 3/3/22   Julio Cesar Kaur MD   Insulin Pen Needle (Pen Needles) 31G X 8 MM MISC Use daily 3/3/21   Laurie Blackburn, DO   Lancets (FREESTYLE) lancets by Other route as needed (As needed) 3/21/19   Rosa Cui MD   levothyroxine 137 mcg tablet Take 1 tablet (137 mcg total) by mouth daily 1/19/22   Stanley Stoner, DO   lisinopril (ZESTRIL) 20 mg tablet TAKE 1 TABLET BY MOUTH EVERY DAY 4/7/21   Laurie Blackburn, DO   metFORMIN (GLUCOPHAGE) 850 mg tablet TAKE 1 TABLET (850 MG TOTAL) BY MOUTH 2 (TWO) TIMES A DAY WITH MEALS 5/16/22   Stanley Stoner, DO   montelukast (SINGULAIR) 10 mg tablet TAKE 1 TABLET BY MOUTH EVERY DAY 4/18/22   Stanley Stoner, DO   nicotine (NICODERM CQ) 14 mg/24hr TD 24 hr patch Place 1 patch on the skin every 24 hours 3/16/22   Brett Khalil MD   NovoLOG FlexPen 100 units/mL injection pen Inject 5 units with breakfast and with dinner 3/3/22   Julio Cesar Kaur MD   rosuvastatin (CRESTOR) 40 MG tablet TAKE 1 TABLET BY MOUTH EVERY DAY 4/29/22   Brett Khalil MD   vitamin B-12 (VITAMIN B-12) 1,000 mcg tablet TAKE 1 TABLET BY MOUTH EVERY DAY 5/16/22   Stanley Stoner, DO       Allergies: Allergies   Allergen Reactions    Iv Contrast [Iodinated Diagnostic Agents] Rash     Patient states he had a severe reaction approximately 10 years ago , states he had a rash, itchy and he remembers a bunch of people pounding on chest and being surrounded by multiple doctors         Review of Systems:  Review of Systems - General ROS: negative  Psychological ROS: negative  Ophthalmic ROS: negative  ENT ROS: negative  Allergy and Immunology ROS: negative  Hematological and Lymphatic ROS: negative  Endocrine ROS: negative  Respiratory ROS: negative  Cardiovascular ROS: negative  Gastrointestinal ROS: negative  Genito-Urinary ROS: negative  Musculoskeletal ROS: negative  Neurological ROS: negative  Dermatological ROS: negative    Vital Signs:     Vitals:    06/13/22 1322 06/13/22 1326   BP: 133/58 128/67   BP Location: Left arm Right arm   Patient Position: Sitting Sitting   Cuff Size: Standard Standard   Pulse: 78    Resp: 18    SpO2: 100%    Weight: 82 6 kg (182 lb)    Height: 5' 9" (1 753 m)        Physical Exam:    General: alert, NAD  HEENT/NECK:  PERRLA  No jugular venous distention  Cardiac:Regular rate and rhythm  Carotids: no bruits   Pulmonary:  Breath sounds clear bilaterally  Abdomen:  Non-tender, Non-distended  Positive bowel sounds  Upper extremities: 2+ radial pulses; brisk capillary refill  Lower extremities: Extremities warm/dry  2+ femoral pulses; PT/DP pulses 2+ bilaterally  No edema B/L  Neuro: Alert and oriented X 3  Sensation is grossly intact  No focal deficits  Musculoskeletal: ROM intact   Skin: Warm/Dry, without rashes or lesions  Lab Results:               Invalid input(s): LABGLOM      Lab Results   Component Value Date    HGBA1C 6 5 05/09/2022     No results found for: CKTOTAL, CKMB, CKMBINDEX, TROPONINI    Imaging Studies:       Gated CTA of the chest/abdomen/pelvis: 6/9/22  FINDINGS:     VASCULAR STRUCTURES:       Annulus: diameter 26 9 x 21 9 mm      area: 475 6 sq mm    Annulus to LCA: 14 3 mm    Annulus to RCA: 12 7 mm    Minimal diameter right iliofemoral segment: 3 7 mm    Minimal diameter left iliofemoral segment: 4 7 mm     The ascending aorta is nonaneurysmal measuring 33 mm with mild atherosclerosis  There is a type I aortic arch with bovine branching anatomy and no stenosis in the visualized great vessels  The aortic arch is nonaneurysmal with mild atherosclerosis  The   descending thoracic aorta is nonaneurysmal with mild atherosclerosis      The abdominal aorta is nonaneurysmal with mild atherosclerosis  Celiac, superior mesenteric, and inferior mesenteric arteries are patent  Atherosclerotic calcifications at the origins of bilateral renal arteries  Moderate stenosis at the right common   iliac artery with mild ectasia distal to the stenosis  Left common iliac, bilateral external iliac and common femoral arteries are nonaneurysmal with mild atherosclerosis      Cardiac findings: There is mild calcification of the aortic valve  The left ventricle is normal   The ventricular septum is normal   No prior valvular surgery  No prior bypass surgery  No pericardial effusion  No cardiac mass or thrombus  Coronary artery calcifications      OTHER FINDINGS:      CHEST     LUNGS:  Calcified granuloma of left lower lobe  Remainder of lungs are clear  There is no tracheal or endobronchial lesion      PLEURA:  Unremarkable      MEDIASTINUM AND SUN:  Calcified left hilar lymph node      CHEST WALL:   Unremarkable      ABDOMEN     LIVER/BILIARY TREE:  Unremarkable      GALLBLADDER:  No calcified gallstones  No pericholecystic inflammatory change      SPLEEN:  Calcified granuloma      PANCREAS:  Unremarkable      ADRENAL GLANDS:  Unremarkable      KIDNEYS/URETERS:  5 mm and 4 mm nonobstructing stones at the inferior pole of left kidney  Subcentimeter hypodense renal lesions likely represent cysts  No hydronephrosis      STOMACH AND BOWEL:  Colonic diverticulosis      APPENDIX:  No findings to suggest appendicitis      ABDOMINOPELVIC CAVITY:  No ascites or free intraperitoneal air  No lymphadenopathy       PELVIS     REPRODUCTIVE ORGANS:  Unremarkable for patient's age      URINARY BLADDER:  Unremarkable      ABDOMINAL WALL/INGUINAL REGIONS:  Unremarkable      OSSEOUS STRUCTURES:  No acute fracture or destructive osseous lesion  8 mm sclerotic lesion in the right bone, previously 7 mm  L5 pars defect      IMPRESSION:  1  Nonobstructing subcentimeters stones in the inferior pole of left kidney  2  TAVR measurements as above      3D SAULO: 2/11/22  Findings    Left Ventricle Left ventricular cavity size is normal  Wall thickness is mildly increased  Systolic function is normal   Wall motion is normal  There is mild concentric hypertrophy  Diastolic function is normal for age  Right Ventricle Right ventricular cavity size is normal  Systolic function is normal    Left Atrium The atrium is mildly dilated  Right Atrium The atrium is normal in size  Aortic Valve The aortic valve is trileaflet  The leaflets are severely thickened  The leaflets are severely calcified  There is severely reduced mobility  There is trace regurgitation  There is severe stenosis  The aortic valve peak velocity is 2 94 m/s  The aortic valve mean gradient is 19 mmHg  The aortic valve/velocity ratio is 0 24  The aortic valve area is 0 70 cm2  Mitral Valve The mitral valve has normal function  There is mild annular calcification  There is mild to moderate regurgitation  There is no evidence of stenosis  The valve has normal function  Tricuspid Valve Tricuspid valve structure is normal  There is trace regurgitation  There is no evidence of stenosis  The right ventricular systolic pressure is normal  The estimated right ventricular systolic pressure is 06 23 mmHg  Pulmonic Valve The pulmonic valve was not well visualized  There is trace regurgitation  There is no evidence of stenosis  Ascending Aorta The aortic root is normal in size  The ascending aorta is normal in size  IVC/SVC The right atrial pressure is estimated at 5 0 mmHg  The inferior vena cava is normal in size  Respirophasic changes were normal    Pericardium There is no pericardial effusion  The pericardium is normal in appearance                 Left Ventricle Measurements    Function/Volumes   A4C EF 53 %         LVOT stroke volume 45 62 cm3         LVOT SI 23 7 ml/m2         Dimensions   LVIDd 5 cm         LVIDS 3 7 cm         IVSd 1 3 cm         LVPWd 1 cm         LVOT area 2 83 cm2         FS 26 %         Diastolic Filling   MV E' Tissue Velocity Septal 7 cm/s E wave deceleration time 135 ms         MV Peak E Adriano 88 cm/s         MV Peak A Adriano 0 68 m/s          Report Measurements   AV LVOT peak gradient 1 mmHg              Interventricular Septum Measurements    Shunt Ratio   LVOT peak VTI 16 1 cm         LVOT peak adriano 0 7 m/s              Right Ventricle Measurements    Dimensions   RVID d 3 2 cm               Left Atrium Measurements    Dimensions   LA size 4 7 cm         SWATI A4C 23 3 cm2               Right Atrium Measurements    Dimensions   RAA A4C 15 8 cm2               Atrial Septum Measurements    Shunt Ratio   LVOT peak VTI 16 1 cm         LVOT peak adriano 0 7 m/s               Aortic Valve Measurements    Stenosis   Ao peak adriano retrograde 2 94 m/s         LVOT peak adriano 0 7 m/s         Ao VTI 65 2 cm         LVOT peak VTI 16 1 cm         AV mean gradient 19 mmHg         LVOT mn grad 2 mmHg         AV peak gradient 35 mmHg         AV LVOT peak gradient 1 mmHg         AV Velocity Ratio 0 24          Area/Dimensions   AV valve area 0 7 cm2         AV area by cont VTI 0 7 cm2         AV area peak adriano 0 7 cm2         SVI 23 mL/m2         LVOT diameter 1 9 cm         LVOT area 2 83 cm2               Mitral Valve Measurements    Stenosis   MV VTI RETROGRADE 204 2 cm         MV stenosis pressure 1/2 time 0 ms         MV mean gradient retrograde 106 mmHg         MR  mmHg               Tricuspid Valve Measurements    RVSP Parameters   TR Peak Adriano 2 8 m/s         Est  RA pres 5 mmHg         Triscuspid Valve Regurgitation Peak Gradient 22 mmHg         Right Ventricular Peak Systolic Pressure 27 mmHg               Aorta Measurements    Aortic Dimensions   Ao root 3 cm         Asc Ao 3 2 cm               IVC/SVC Measurements    IVC/SVC   Est  RA pres 5 mmHg               Cardiac catheterization: 6/3/22  Left Main   The vessel is large  There is mild diffuse disease throughout the vessel  The vessel is mildly calcified     Left Anterior Descending   The vessel is moderate in size  There is mild diffuse disease throughout the vessel  Mid LAD lesion is 50% stenosed  First Diagonal Branch   1st Diag lesion is 50% stenosed  Left Circumflex   The vessel is large and non-dominant  There is mild diffuse disease throughout the vessel  First Obtuse Marginal Branch   1st Mrg lesion is 50% stenosed  Right Coronary Artery   The vessel is moderate in size and dominant  There is mild diffuse disease throughout the vessel  Right Posterior Descending Artery   RPDA-1 lesion is 50% stenosed  RPDA-2 lesion is 50% stenosed  Pulmonary function tests:   Date test performed: 5/19/2022     Date of test interpretation: 5/20/2022     Requesting Physician: Alton Ochoa     Reason for Testing: Severe aortic stenosis     Reference set for interpretation: [id number]     Procedure: The patient was taken to pulmonary function testing laboratory  The patient demonstrated good effort and cooperation  Poorly performed PFT lacking reproducibility  VC fpr DLCO measurements was < 90% of SVC  He was not able to follow the instructions for the TGV/RAW manuver  DLCO was corrected for patient's Hgb      Results:  FVC: 2 70 L       71 % predicted  FEV1: 1 97 L     69 % predicted  FEV1/FVC Ratio: 73 %      After administration of bronchodilator   FVC: 3 06 L, 80 % predicted, +13 % change  FEV1: 2 01 L, 70 % predicted, +2 % change     DLCO corrected for patients hemoglobin level: 54 %     Interpretation:     · Spirometry appears restricted  Recommend checking lung volumes to evaluate further      · Significant response to the administration of bronchodilator per ATS standards      · Moderate decrease in diffusion capacity     · Normal flow-volume loop post bronchodilator   Pre-bronchodilator flow volume loop is abnormal likely due to difficulty with testing          Teodoro Gomez DO   Pulmonary & Critical Care Medicine Fellow  [facility]    Carotid artery ultrasound: 5/19/22  Impression  RIGHT:  There is 50-69% stenosis noted in the internal carotid artery  Plaque is  heterogenous and irregular  Vertebral artery flow is antegrade  There is no significant subclavian artery  disease  LEFT:  There is 50-69% stenosis noted in the internal carotid artery  Plaque is  heterogenous and irregular  There are elevated velocities and plaque noted throughout the common carotid  artery which may cause ICA/CCA ratio to be falsely underestimated  Vertebral artery flow is antegrade  There is no significant subclavian artery  disease  Compared to previous study on 10/8/2007, there is is now a 50-69% stenosis  bilaterally  Recommend repeat testing in 6 months as per protocol unless otherwise  indicated  I have personally reviewed pertinent reports  TAVR evaluation Assessment:     5 Meter Walk Test:      Attempt 1: 6   Attempt 2: 7   Attempt 3: 6    STS Risk Score: 2 1    [address]: III    KCCQ-12 completed    Assessment:  Patient Active Problem List    Diagnosis Date Noted    Contrast media allergy 05/31/2022    Aortic stenosis 01/19/2022    Diverticulosis of large intestine 09/20/2018    Internal hemorrhoids 09/20/2018    Chronic hepatitis C ([facility] [address] ) 04/13/2017    Nicotine dependence 04/13/2017    Osteoarthritis of knee 04/13/2017    Bilateral hearing loss 01/30/2017    Allergic rhinitis 02/24/2016    Type 2 diabetes mellitus with neurologic complication, with long-term current use of insulin ([facility] [address] ) 11/24/2015    Adenomatous colon polyp 06/11/2014    Collapsed arches 05/27/2014    Hyperlipidemia 09/13/2013    Nephritis and nephropathy, not specified as acute or chronic, with other specified pathological lesion in kidney, in diseases classified elsewhere 03/26/2013    Vitamin B12 deficiency 07/24/2012    Asthma 06/08/2012    Essential hypertension 06/08/2012    Hypothyroidism 06/07/2012     Severe aortic stenosis;  Ongoing TAVR workup    Plan:    Bhavna Gilliam has symptomatic severe aortic stenosis  They have undergone testing for transcatheter aortic valve replacement  The results of these studies have been interpreted by the multidisciplinary TAVR team who have determined the patient to be Low risk for open aortic valve replacement based on other pre-existing comobidities not reflected in the STS risk score  The risks, benefits, and alternatives to TAVR were discussed in detail with the patient today  They understand and wish to proceed with transfemoral transcatheter aortic valve replacement  Informed consent was obtained and a date for the intervention has been set  Nima Upton was comfortable with our recommendations, and their questions were answered to their satisfaction  The following preoperative instructions were provided at the conclusion of their consultation:     1  You will receive a phone call from the hospital between 2:00 PM and 8:00 PM the day prior to surgery to confirm arrival time and location  For surgery on Mondays, you will receive a call on Friday  2  Do not drink or eat anything after midnight the night before surgery  That includes no water, candy, gum, lozenges, Lifesavers, etc  We recommend you not eat any salty or fatty snack food, consume alcohol or smoke the night before surgery  3  Continue taking aspirin but only 81 mg daily  4  If you take Coumadin and/or Plavix, discontinue it 5 days before surgery  5  If you are diabetic, do not take any of your diabetic pills the morning of surgery  If you take Lantus insulin, you may take it at your regularly scheduled time the day before surgery  Do not take any other insulins the morning of surgery  6  The 2 nights before surgery, take a shower each night using the special antiseptic soap or soap sponges you received from the office or hospital  Milena Moose your hair with regular shampoo and rinse completely before using the antiseptic sponges   Use the sponge to wash from your neck down, with special attention to the armpits and groin area  Do not use any other soap or cleanser on your skin  Do not use lotions, powder, deodorant or perfume of any kind on your skin after you shower  Use clean bed linens and wear clean pajamas after your shower  7  You will be prescribed Mupirocin nasal ointment  Apply to both nostrils twice a day for 5 days prior to surgery  8  Do not take a shower the morning of her surgery; you'll be given a special" bath" at the hospital   9  Notify the CT surgery office if you develop a cold, sore throat, cough, fever or other health issues before your surgery  10  Other medication changes included the following: take 1/2 of long acting insulin prior to surgery, STOP lisinopril and metformin 3 days prior to surgery        SIGNATURE: Beckie Fontanez PA-C  DATE: June 13, 2022  TIME: 1:53 PM

## 2022-06-14 ENCOUNTER — APPOINTMENT (OUTPATIENT)
Dept: LAB | Facility: HOSPITAL | Age: 77
End: 2022-06-14
Payer: MEDICARE

## 2022-06-14 DIAGNOSIS — I35.0 NONRHEUMATIC AORTIC VALVE STENOSIS: ICD-10-CM

## 2022-06-14 LAB
ABO GROUP BLD: NORMAL
BLD GP AB SCN SERPL QL: NEGATIVE
RH BLD: NEGATIVE
SPECIMEN EXPIRATION DATE: NORMAL

## 2022-06-14 PROCEDURE — 86900 BLOOD TYPING SEROLOGIC ABO: CPT

## 2022-06-14 PROCEDURE — 36415 COLL VENOUS BLD VENIPUNCTURE: CPT

## 2022-06-14 PROCEDURE — 86850 RBC ANTIBODY SCREEN: CPT

## 2022-06-14 PROCEDURE — 87081 CULTURE SCREEN ONLY: CPT

## 2022-06-14 PROCEDURE — 86901 BLOOD TYPING SEROLOGIC RH(D): CPT

## 2022-06-15 DIAGNOSIS — I10 ESSENTIAL HYPERTENSION: ICD-10-CM

## 2022-06-15 LAB — MRSA NOSE QL CULT: NORMAL

## 2022-06-15 RX ORDER — LISINOPRIL 20 MG/1
20 TABLET ORAL DAILY
Qty: 90 TABLET | Refills: 3 | Status: ON HOLD | OUTPATIENT
Start: 2022-06-15 | End: 2022-06-23 | Stop reason: SDUPTHER

## 2022-06-17 ENCOUNTER — ANESTHESIA EVENT (OUTPATIENT)
Dept: PERIOP | Facility: HOSPITAL | Age: 77
DRG: 267 | End: 2022-06-17
Payer: MEDICARE

## 2022-06-20 RX ORDER — HEPARIN SODIUM 1000 [USP'U]/ML
400 INJECTION, SOLUTION INTRAVENOUS; SUBCUTANEOUS ONCE
Status: CANCELLED | OUTPATIENT
Start: 2022-06-21

## 2022-06-21 ENCOUNTER — ANESTHESIA (OUTPATIENT)
Dept: PERIOP | Facility: HOSPITAL | Age: 77
DRG: 267 | End: 2022-06-21
Payer: MEDICARE

## 2022-06-21 ENCOUNTER — APPOINTMENT (OUTPATIENT)
Dept: NON INVASIVE DIAGNOSTICS | Facility: HOSPITAL | Age: 77
DRG: 267 | End: 2022-06-21
Attending: THORACIC SURGERY (CARDIOTHORACIC VASCULAR SURGERY)
Payer: MEDICARE

## 2022-06-21 ENCOUNTER — APPOINTMENT (INPATIENT)
Dept: NON INVASIVE DIAGNOSTICS | Facility: HOSPITAL | Age: 77
DRG: 267 | End: 2022-06-21
Payer: MEDICARE

## 2022-06-21 ENCOUNTER — HOSPITAL ENCOUNTER (INPATIENT)
Facility: HOSPITAL | Age: 77
LOS: 2 days | Discharge: HOME | DRG: 267 | End: 2022-06-23
Attending: THORACIC SURGERY (CARDIOTHORACIC VASCULAR SURGERY) | Admitting: THORACIC SURGERY (CARDIOTHORACIC VASCULAR SURGERY)
Payer: MEDICARE

## 2022-06-21 ENCOUNTER — APPOINTMENT (INPATIENT)
Dept: RADIOLOGY | Facility: HOSPITAL | Age: 77
DRG: 267 | End: 2022-06-21
Payer: MEDICARE

## 2022-06-21 DIAGNOSIS — I10 ESSENTIAL HYPERTENSION: ICD-10-CM

## 2022-06-21 DIAGNOSIS — Z95.2 S/P TAVR (TRANSCATHETER AORTIC VALVE REPLACEMENT): Primary | ICD-10-CM

## 2022-06-21 DIAGNOSIS — I35.9 NONRHEUMATIC AORTIC VALVE DISORDER: ICD-10-CM

## 2022-06-21 DIAGNOSIS — I35.0 NONRHEUMATIC AORTIC VALVE STENOSIS: ICD-10-CM

## 2022-06-21 DIAGNOSIS — I44.7 LBBB (LEFT BUNDLE BRANCH BLOCK): ICD-10-CM

## 2022-06-21 PROBLEM — I25.10 CORONARY ARTERY DISEASE: Status: ACTIVE | Noted: 2022-06-21

## 2022-06-21 LAB
ABO GROUP BLD: NORMAL
ANION GAP SERPL CALCULATED.3IONS-SCNC: 4 MMOL/L (ref 4–13)
AORTIC VALVE ANNULUS: 2.2 CM
AORTIC VALVE MEAN VELOCITY: 7 M/S
ATRIAL RATE: 95 BPM
AV AREA BY CONTINUOUS VTI: 2.3 CM2
AV AREA PEAK VELOCITY: 2.6 CM2
AV LVOT MEAN GRADIENT: 1 MMHG
AV LVOT PEAK GRADIENT: 2 MMHG
AV MEAN GRADIENT: 2 MMHG
AV PEAK GRADIENT: 4 MMHG
BASE EXCESS BLDA CALC-SCNC: 0 MMOL/L (ref -2–3)
BASE EXCESS BLDA CALC-SCNC: 1 MMOL/L (ref -2–3)
BASE EXCESS BLDA CALC-SCNC: 3 MMOL/L (ref -2–3)
BUN SERPL-MCNC: 17 MG/DL (ref 5–25)
CA-I BLD-SCNC: 1.2 MMOL/L (ref 1.12–1.32)
CA-I BLD-SCNC: 1.22 MMOL/L (ref 1.12–1.32)
CA-I BLD-SCNC: 1.27 MMOL/L (ref 1.12–1.32)
CALCIUM SERPL-MCNC: 8.6 MG/DL (ref 8.3–10.1)
CHLORIDE SERPL-SCNC: 108 MMOL/L (ref 100–108)
CO2 SERPL-SCNC: 28 MMOL/L (ref 21–32)
CREAT SERPL-MCNC: 0.86 MG/DL (ref 0.6–1.3)
DOP CALC AO VTI: 24.9 CM
DOP CALC LVOT AREA: 3.5 CM2
DOP CALC LVOT DIAMETER: 2.1 CM
DOP CALC LVOT PEAK VEL VTI: 16.6 CM
DOP CALC LVOT PEAK VEL: 0.75 M/S
DOP CALC LVOT STROKE INDEX: 29.1 ML/M2
DOP CALC LVOT STROKE VOLUME: 57 CM3
GFR SERPL CREATININE-BSD FRML MDRD: 84 ML/MIN/1.73SQ M
GLUCOSE SERPL-MCNC: 162 MG/DL (ref 65–140)
GLUCOSE SERPL-MCNC: 196 MG/DL (ref 65–140)
GLUCOSE SERPL-MCNC: 200 MG/DL (ref 65–140)
GLUCOSE SERPL-MCNC: 203 MG/DL (ref 65–140)
GLUCOSE SERPL-MCNC: 204 MG/DL (ref 65–140)
GLUCOSE SERPL-MCNC: 214 MG/DL (ref 65–140)
GLUCOSE SERPL-MCNC: 226 MG/DL (ref 65–140)
GLUCOSE SERPL-MCNC: 249 MG/DL (ref 65–140)
HCO3 BLDA-SCNC: 25.8 MMOL/L (ref 22–28)
HCO3 BLDA-SCNC: 27.8 MMOL/L (ref 22–28)
HCO3 BLDA-SCNC: 29.6 MMOL/L (ref 22–28)
HCT VFR BLD AUTO: 36.5 % (ref 36.5–49.3)
HCT VFR BLD CALC: 30 % (ref 36.5–49.3)
HCT VFR BLD CALC: 32 % (ref 36.5–49.3)
HCT VFR BLD CALC: 33 % (ref 36.5–49.3)
HGB BLD-MCNC: 11.7 G/DL (ref 12–17)
HGB BLDA-MCNC: 10.2 G/DL (ref 12–17)
HGB BLDA-MCNC: 10.9 G/DL (ref 12–17)
HGB BLDA-MCNC: 11.2 G/DL (ref 12–17)
KCT BLD-ACNC: 116 SEC (ref 89–137)
KCT BLD-ACNC: 134 SEC (ref 89–137)
KCT BLD-ACNC: 263 SEC (ref 89–137)
P AXIS: 91 DEGREES
PCO2 BLD: 27 MMOL/L (ref 21–32)
PCO2 BLD: 29 MMOL/L (ref 21–32)
PCO2 BLD: 31 MMOL/L (ref 21–32)
PCO2 BLD: 48.5 MM HG (ref 36–44)
PCO2 BLD: 52.7 MM HG (ref 36–44)
PCO2 BLD: 55.3 MM HG (ref 36–44)
PH BLD: 7.33 [PH] (ref 7.35–7.45)
PH BLD: 7.33 [PH] (ref 7.35–7.45)
PH BLD: 7.34 [PH] (ref 7.35–7.45)
PLATELET # BLD AUTO: 186 THOUSANDS/UL (ref 149–390)
PMV BLD AUTO: 10.6 FL (ref 8.9–12.7)
PO2 BLD: 253 MM HG (ref 75–129)
PO2 BLD: 273 MM HG (ref 75–129)
PO2 BLD: 341 MM HG (ref 75–129)
POTASSIUM BLD-SCNC: 3.1 MMOL/L (ref 3.5–5.3)
POTASSIUM BLD-SCNC: 3.4 MMOL/L (ref 3.5–5.3)
POTASSIUM BLD-SCNC: 3.8 MMOL/L (ref 3.5–5.3)
POTASSIUM SERPL-SCNC: 3.7 MMOL/L (ref 3.5–5.3)
PR INTERVAL: 188 MS
QRS AXIS: -45 DEGREES
QRSD INTERVAL: 133 MS
QT INTERVAL: 442 MS
QTC INTERVAL: 556 MS
RH BLD: NEGATIVE
SAO2 % BLD FROM PO2: 100 % (ref 60–85)
SODIUM BLD-SCNC: 140 MMOL/L (ref 136–145)
SODIUM BLD-SCNC: 141 MMOL/L (ref 136–145)
SODIUM BLD-SCNC: 142 MMOL/L (ref 136–145)
SODIUM SERPL-SCNC: 140 MMOL/L (ref 136–145)
SPECIMEN SOURCE: ABNORMAL
SPECIMEN SOURCE: NORMAL
SPECIMEN SOURCE: NORMAL
T WAVE AXIS: 96 DEGREES
VENTRICULAR RATE: 95 BPM

## 2022-06-21 PROCEDURE — 02RF38Z REPLACEMENT OF AORTIC VALVE WITH ZOOPLASTIC TISSUE, PERCUTANEOUS APPROACH: ICD-10-PCS | Performed by: THORACIC SURGERY (CARDIOTHORACIC VASCULAR SURGERY)

## 2022-06-21 PROCEDURE — 82330 ASSAY OF CALCIUM: CPT

## 2022-06-21 PROCEDURE — 02HV33Z INSERTION OF INFUSION DEVICE INTO SUPERIOR VENA CAVA, PERCUTANEOUS APPROACH: ICD-10-PCS | Performed by: STUDENT IN AN ORGANIZED HEALTH CARE EDUCATION/TRAINING PROGRAM

## 2022-06-21 PROCEDURE — 33361 REPLACE AORTIC VALVE PERQ: CPT | Performed by: THORACIC SURGERY (CARDIOTHORACIC VASCULAR SURGERY)

## 2022-06-21 PROCEDURE — 82803 BLOOD GASES ANY COMBINATION: CPT

## 2022-06-21 PROCEDURE — 86901 BLOOD TYPING SEROLOGIC RH(D): CPT | Performed by: THORACIC SURGERY (CARDIOTHORACIC VASCULAR SURGERY)

## 2022-06-21 PROCEDURE — 80048 BASIC METABOLIC PNL TOTAL CA: CPT | Performed by: PHYSICIAN ASSISTANT

## 2022-06-21 PROCEDURE — 93306 TTE W/DOPPLER COMPLETE: CPT

## 2022-06-21 PROCEDURE — 71045 X-RAY EXAM CHEST 1 VIEW: CPT

## 2022-06-21 PROCEDURE — 85018 HEMOGLOBIN: CPT | Performed by: PHYSICIAN ASSISTANT

## 2022-06-21 PROCEDURE — C1769 GUIDE WIRE: HCPCS | Performed by: THORACIC SURGERY (CARDIOTHORACIC VASCULAR SURGERY)

## 2022-06-21 PROCEDURE — 76377 3D RENDER W/INTRP POSTPROCES: CPT

## 2022-06-21 PROCEDURE — C1751 CATH, INF, PER/CENT/MIDLINE: HCPCS | Performed by: THORACIC SURGERY (CARDIOTHORACIC VASCULAR SURGERY)

## 2022-06-21 PROCEDURE — C1760 CLOSURE DEV, VASC: HCPCS | Performed by: THORACIC SURGERY (CARDIOTHORACIC VASCULAR SURGERY)

## 2022-06-21 PROCEDURE — 84295 ASSAY OF SERUM SODIUM: CPT

## 2022-06-21 PROCEDURE — 85014 HEMATOCRIT: CPT | Performed by: PHYSICIAN ASSISTANT

## 2022-06-21 PROCEDURE — 93005 ELECTROCARDIOGRAM TRACING: CPT

## 2022-06-21 PROCEDURE — NC001 PR NO CHARGE: Performed by: PHYSICIAN ASSISTANT

## 2022-06-21 PROCEDURE — C1894 INTRO/SHEATH, NON-LASER: HCPCS | Performed by: THORACIC SURGERY (CARDIOTHORACIC VASCULAR SURGERY)

## 2022-06-21 PROCEDURE — 86900 BLOOD TYPING SEROLOGIC ABO: CPT | Performed by: THORACIC SURGERY (CARDIOTHORACIC VASCULAR SURGERY)

## 2022-06-21 PROCEDURE — 85049 AUTOMATED PLATELET COUNT: CPT | Performed by: PHYSICIAN ASSISTANT

## 2022-06-21 PROCEDURE — 85014 HEMATOCRIT: CPT

## 2022-06-21 PROCEDURE — 33361 REPLACE AORTIC VALVE PERQ: CPT | Performed by: INTERNAL MEDICINE

## 2022-06-21 PROCEDURE — 93355 ECHO TRANSESOPHAGEAL (TEE): CPT

## 2022-06-21 PROCEDURE — 84132 ASSAY OF SERUM POTASSIUM: CPT

## 2022-06-21 PROCEDURE — 93010 ELECTROCARDIOGRAM REPORT: CPT | Performed by: INTERNAL MEDICINE

## 2022-06-21 PROCEDURE — 82948 REAGENT STRIP/BLOOD GLUCOSE: CPT

## 2022-06-21 PROCEDURE — 85347 COAGULATION TIME ACTIVATED: CPT

## 2022-06-21 PROCEDURE — 82947 ASSAY GLUCOSE BLOOD QUANT: CPT

## 2022-06-21 PROCEDURE — 86920 COMPATIBILITY TEST SPIN: CPT

## 2022-06-21 DEVICE — ANGIO-SEAL VIP VASCULAR CLOSURE DEVICE
Type: IMPLANTABLE DEVICE | Site: GROIN | Status: FUNCTIONAL
Brand: ANGIO-SEAL

## 2022-06-21 DEVICE — VALVE TAVR SAPIEN 3 ULTRA W/ CMNDR DLV SYS 26MM: Type: IMPLANTABLE DEVICE | Site: HEART | Status: FUNCTIONAL

## 2022-06-21 DEVICE — PERCLOSE™ PROSTYLE™ SUTURE-MEDIATED CLOSURE AND REPAIR SYSTEM
Type: IMPLANTABLE DEVICE | Site: GROIN | Status: FUNCTIONAL
Brand: PERCLOSE™ PROSTYLE™

## 2022-06-21 RX ORDER — FLUTICASONE PROPIONATE 50 MCG
2 SPRAY, SUSPENSION (ML) NASAL DAILY
Status: DISCONTINUED | OUTPATIENT
Start: 2022-06-21 | End: 2022-06-23 | Stop reason: HOSPADM

## 2022-06-21 RX ORDER — PANTOPRAZOLE SODIUM 40 MG/1
40 TABLET, DELAYED RELEASE ORAL DAILY
Status: DISCONTINUED | OUTPATIENT
Start: 2022-06-21 | End: 2022-06-23 | Stop reason: HOSPADM

## 2022-06-21 RX ORDER — PROPOFOL 10 MG/ML
INJECTION, EMULSION INTRAVENOUS AS NEEDED
Status: DISCONTINUED | OUTPATIENT
Start: 2022-06-21 | End: 2022-06-21

## 2022-06-21 RX ORDER — ESMOLOL HYDROCHLORIDE 10 MG/ML
INJECTION INTRAVENOUS AS NEEDED
Status: DISCONTINUED | OUTPATIENT
Start: 2022-06-21 | End: 2022-06-21

## 2022-06-21 RX ORDER — ALBUTEROL SULFATE 90 UG/1
2 AEROSOL, METERED RESPIRATORY (INHALATION) EVERY 4 HOURS PRN
Status: DISCONTINUED | OUTPATIENT
Start: 2022-06-21 | End: 2022-06-23 | Stop reason: HOSPADM

## 2022-06-21 RX ORDER — INSULIN LISPRO 100 [IU]/ML
1-6 INJECTION, SOLUTION INTRAVENOUS; SUBCUTANEOUS
Status: DISCONTINUED | OUTPATIENT
Start: 2022-06-21 | End: 2022-06-23 | Stop reason: HOSPADM

## 2022-06-21 RX ORDER — HYDRALAZINE HYDROCHLORIDE 20 MG/ML
5 INJECTION INTRAMUSCULAR; INTRAVENOUS EVERY 6 HOURS PRN
Status: DISCONTINUED | OUTPATIENT
Start: 2022-06-21 | End: 2022-06-23 | Stop reason: HOSPADM

## 2022-06-21 RX ORDER — CHLORHEXIDINE GLUCONATE 0.12 MG/ML
15 RINSE ORAL ONCE
Status: COMPLETED | OUTPATIENT
Start: 2022-06-21 | End: 2022-06-21

## 2022-06-21 RX ORDER — MONTELUKAST SODIUM 10 MG/1
10 TABLET ORAL DAILY
Status: DISCONTINUED | OUTPATIENT
Start: 2022-06-21 | End: 2022-06-23 | Stop reason: HOSPADM

## 2022-06-21 RX ORDER — ONDANSETRON 2 MG/ML
4 INJECTION INTRAMUSCULAR; INTRAVENOUS ONCE AS NEEDED
Status: DISCONTINUED | OUTPATIENT
Start: 2022-06-21 | End: 2022-06-21 | Stop reason: HOSPADM

## 2022-06-21 RX ORDER — HYDROCHLOROTHIAZIDE 25 MG/1
25 TABLET ORAL DAILY
Status: DISCONTINUED | OUTPATIENT
Start: 2022-06-21 | End: 2022-06-23 | Stop reason: HOSPADM

## 2022-06-21 RX ORDER — BISACODYL 10 MG
10 SUPPOSITORY, RECTAL RECTAL DAILY PRN
Status: DISCONTINUED | OUTPATIENT
Start: 2022-06-21 | End: 2022-06-23 | Stop reason: HOSPADM

## 2022-06-21 RX ORDER — ROCURONIUM BROMIDE 10 MG/ML
INJECTION, SOLUTION INTRAVENOUS AS NEEDED
Status: DISCONTINUED | OUTPATIENT
Start: 2022-06-21 | End: 2022-06-21

## 2022-06-21 RX ORDER — LISINOPRIL 20 MG/1
20 TABLET ORAL DAILY
Status: DISCONTINUED | OUTPATIENT
Start: 2022-06-21 | End: 2022-06-21

## 2022-06-21 RX ORDER — FENTANYL CITRATE 50 UG/ML
INJECTION, SOLUTION INTRAMUSCULAR; INTRAVENOUS AS NEEDED
Status: DISCONTINUED | OUTPATIENT
Start: 2022-06-21 | End: 2022-06-21

## 2022-06-21 RX ORDER — LANOLIN ALCOHOL/MO/W.PET/CERES
6 CREAM (GRAM) TOPICAL
Status: DISCONTINUED | OUTPATIENT
Start: 2022-06-21 | End: 2022-06-23 | Stop reason: HOSPADM

## 2022-06-21 RX ORDER — MELATONIN
2000 DAILY
Status: DISCONTINUED | OUTPATIENT
Start: 2022-06-21 | End: 2022-06-23 | Stop reason: HOSPADM

## 2022-06-21 RX ORDER — INSULIN LISPRO 100 [IU]/ML
5 INJECTION, SOLUTION INTRAVENOUS; SUBCUTANEOUS
Status: DISCONTINUED | OUTPATIENT
Start: 2022-06-21 | End: 2022-06-23 | Stop reason: HOSPADM

## 2022-06-21 RX ORDER — ONDANSETRON 2 MG/ML
INJECTION INTRAMUSCULAR; INTRAVENOUS AS NEEDED
Status: DISCONTINUED | OUTPATIENT
Start: 2022-06-21 | End: 2022-06-21

## 2022-06-21 RX ORDER — FLUTICASONE PROPIONATE AND SALMETEROL 250; 50 UG/1; UG/1
2 POWDER RESPIRATORY (INHALATION) EVERY 12 HOURS SCHEDULED
Status: DISCONTINUED | OUTPATIENT
Start: 2022-06-21 | End: 2022-06-21

## 2022-06-21 RX ORDER — HEPARIN SODIUM 5000 [USP'U]/ML
5000 INJECTION, SOLUTION INTRAVENOUS; SUBCUTANEOUS EVERY 8 HOURS SCHEDULED
Status: DISCONTINUED | OUTPATIENT
Start: 2022-06-22 | End: 2022-06-23 | Stop reason: HOSPADM

## 2022-06-21 RX ORDER — AMLODIPINE BESYLATE 5 MG/1
5 TABLET ORAL DAILY
Status: DISCONTINUED | OUTPATIENT
Start: 2022-06-21 | End: 2022-06-23

## 2022-06-21 RX ORDER — POTASSIUM CHLORIDE 20 MEQ/1
40 TABLET, EXTENDED RELEASE ORAL ONCE
Status: COMPLETED | OUTPATIENT
Start: 2022-06-21 | End: 2022-06-21

## 2022-06-21 RX ORDER — POLYETHYLENE GLYCOL 3350 17 G/17G
17 POWDER, FOR SOLUTION ORAL DAILY
Status: DISCONTINUED | OUTPATIENT
Start: 2022-06-21 | End: 2022-06-23 | Stop reason: HOSPADM

## 2022-06-21 RX ORDER — LISINOPRIL 20 MG/1
20 TABLET ORAL DAILY
Status: DISCONTINUED | OUTPATIENT
Start: 2022-06-21 | End: 2022-06-23

## 2022-06-21 RX ORDER — SODIUM CHLORIDE, SODIUM LACTATE, POTASSIUM CHLORIDE, CALCIUM CHLORIDE 600; 310; 30; 20 MG/100ML; MG/100ML; MG/100ML; MG/100ML
125 INJECTION, SOLUTION INTRAVENOUS CONTINUOUS
Status: DISCONTINUED | OUTPATIENT
Start: 2022-06-21 | End: 2022-06-21

## 2022-06-21 RX ORDER — ACETAMINOPHEN 325 MG/1
650 TABLET ORAL EVERY 4 HOURS PRN
Status: DISCONTINUED | OUTPATIENT
Start: 2022-06-21 | End: 2022-06-23 | Stop reason: HOSPADM

## 2022-06-21 RX ORDER — HEPARIN SODIUM 1000 [USP'U]/ML
INJECTION, SOLUTION INTRAVENOUS; SUBCUTANEOUS AS NEEDED
Status: DISCONTINUED | OUTPATIENT
Start: 2022-06-21 | End: 2022-06-21

## 2022-06-21 RX ORDER — CEFAZOLIN SODIUM 2 G/50ML
2000 SOLUTION INTRAVENOUS ONCE
Status: COMPLETED | OUTPATIENT
Start: 2022-06-21 | End: 2022-06-21

## 2022-06-21 RX ORDER — PROTAMINE SULFATE 10 MG/ML
INJECTION, SOLUTION INTRAVENOUS AS NEEDED
Status: DISCONTINUED | OUTPATIENT
Start: 2022-06-21 | End: 2022-06-21

## 2022-06-21 RX ORDER — FLUTICASONE FUROATE AND VILANTEROL 200; 25 UG/1; UG/1
1 POWDER RESPIRATORY (INHALATION)
Status: DISCONTINUED | OUTPATIENT
Start: 2022-06-21 | End: 2022-06-23 | Stop reason: HOSPADM

## 2022-06-21 RX ORDER — INSULIN LISPRO 100 [IU]/ML
5 INJECTION, SOLUTION INTRAVENOUS; SUBCUTANEOUS
Status: DISCONTINUED | OUTPATIENT
Start: 2022-06-22 | End: 2022-06-23 | Stop reason: HOSPADM

## 2022-06-21 RX ORDER — INSULIN GLARGINE 100 [IU]/ML
18 INJECTION, SOLUTION SUBCUTANEOUS
Status: DISCONTINUED | OUTPATIENT
Start: 2022-06-21 | End: 2022-06-23 | Stop reason: HOSPADM

## 2022-06-21 RX ORDER — ATORVASTATIN CALCIUM 40 MG/1
40 TABLET, FILM COATED ORAL
Status: DISCONTINUED | OUTPATIENT
Start: 2022-06-21 | End: 2022-06-23 | Stop reason: HOSPADM

## 2022-06-21 RX ORDER — CLOPIDOGREL BISULFATE 75 MG/1
75 TABLET ORAL DAILY
Status: DISCONTINUED | OUTPATIENT
Start: 2022-06-21 | End: 2022-06-23 | Stop reason: HOSPADM

## 2022-06-21 RX ORDER — ONDANSETRON 2 MG/ML
4 INJECTION INTRAMUSCULAR; INTRAVENOUS EVERY 6 HOURS PRN
Status: DISCONTINUED | OUTPATIENT
Start: 2022-06-21 | End: 2022-06-23 | Stop reason: HOSPADM

## 2022-06-21 RX ORDER — SODIUM CHLORIDE, SODIUM GLUCONATE, SODIUM ACETATE, POTASSIUM CHLORIDE, MAGNESIUM CHLORIDE, SODIUM PHOSPHATE, DIBASIC, AND POTASSIUM PHOSPHATE .53; .5; .37; .037; .03; .012; .00082 G/100ML; G/100ML; G/100ML; G/100ML; G/100ML; G/100ML; G/100ML
50 INJECTION, SOLUTION INTRAVENOUS CONTINUOUS
Status: DISCONTINUED | OUTPATIENT
Start: 2022-06-21 | End: 2022-06-22

## 2022-06-21 RX ORDER — CHLORHEXIDINE GLUCONATE 0.12 MG/ML
15 RINSE ORAL 2 TIMES DAILY
Status: DISCONTINUED | OUTPATIENT
Start: 2022-06-21 | End: 2022-06-23 | Stop reason: HOSPADM

## 2022-06-21 RX ORDER — FENTANYL CITRATE/PF 50 MCG/ML
50 SYRINGE (ML) INJECTION
Status: DISCONTINUED | OUTPATIENT
Start: 2022-06-21 | End: 2022-06-21 | Stop reason: HOSPADM

## 2022-06-21 RX ORDER — LIDOCAINE HYDROCHLORIDE 10 MG/ML
INJECTION, SOLUTION EPIDURAL; INFILTRATION; INTRACAUDAL; PERINEURAL AS NEEDED
Status: DISCONTINUED | OUTPATIENT
Start: 2022-06-21 | End: 2022-06-21

## 2022-06-21 RX ORDER — ASPIRIN 81 MG/1
81 TABLET, CHEWABLE ORAL DAILY
Status: DISCONTINUED | OUTPATIENT
Start: 2022-06-21 | End: 2022-06-23 | Stop reason: HOSPADM

## 2022-06-21 RX ADMIN — CLOPIDOGREL BISULFATE 75 MG: 75 TABLET ORAL at 15:43

## 2022-06-21 RX ADMIN — MUPIROCIN 1 APPLICATION: 20 OINTMENT TOPICAL at 22:13

## 2022-06-21 RX ADMIN — INSULIN GLARGINE 18 UNITS: 100 INJECTION, SOLUTION SUBCUTANEOUS at 22:20

## 2022-06-21 RX ADMIN — LIDOCAINE HYDROCHLORIDE 50 MG: 10 INJECTION, SOLUTION EPIDURAL; INFILTRATION; INTRACAUDAL; PERINEURAL at 12:26

## 2022-06-21 RX ADMIN — ATORVASTATIN CALCIUM 40 MG: 40 TABLET, FILM COATED ORAL at 17:54

## 2022-06-21 RX ADMIN — NICARDIPINE HYDROCHLORIDE 10 MG/HR: 25 INJECTION, SOLUTION INTRAVENOUS at 16:12

## 2022-06-21 RX ADMIN — SODIUM CHLORIDE, POTASSIUM CHLORIDE, SODIUM LACTATE AND CALCIUM CHLORIDE 125 ML/HR: 600; 310; 30; 20 INJECTION, SOLUTION INTRAVENOUS at 11:04

## 2022-06-21 RX ADMIN — Medication 50 MCG: at 14:05

## 2022-06-21 RX ADMIN — MUPIROCIN 1 APPLICATION: 20 OINTMENT TOPICAL at 15:48

## 2022-06-21 RX ADMIN — ACETAMINOPHEN 650 MG: 325 TABLET, FILM COATED ORAL at 15:44

## 2022-06-21 RX ADMIN — ONDANSETRON 2 MG: 2 INJECTION INTRAMUSCULAR; INTRAVENOUS at 13:29

## 2022-06-21 RX ADMIN — METFORMIN HYDROCHLORIDE 850 MG: 850 TABLET, FILM COATED ORAL at 17:55

## 2022-06-21 RX ADMIN — ESMOLOL HYDROCHLORIDE 30 MG: 100 INJECTION, SOLUTION INTRAVENOUS at 12:31

## 2022-06-21 RX ADMIN — INSULIN LISPRO 2 UNITS: 100 INJECTION, SOLUTION INTRAVENOUS; SUBCUTANEOUS at 18:00

## 2022-06-21 RX ADMIN — ROCURONIUM BROMIDE 50 MG: 10 INJECTION INTRAVENOUS at 12:27

## 2022-06-21 RX ADMIN — HYDROCHLOROTHIAZIDE 25 MG: 25 TABLET ORAL at 15:45

## 2022-06-21 RX ADMIN — POTASSIUM CHLORIDE 40 MEQ: 1500 TABLET, EXTENDED RELEASE ORAL at 17:54

## 2022-06-21 RX ADMIN — Medication 6 MG: at 22:14

## 2022-06-21 RX ADMIN — LEVOTHYROXINE SODIUM 137 MCG: 25 TABLET ORAL at 15:44

## 2022-06-21 RX ADMIN — Medication 2000 UNITS: at 15:45

## 2022-06-21 RX ADMIN — CHLORHEXIDINE GLUCONATE 15 ML: 1.2 SOLUTION ORAL at 07:43

## 2022-06-21 RX ADMIN — NICARDIPINE HYDROCHLORIDE 5 MG/HR: 2.5 INJECTION, SOLUTION INTRAVENOUS at 13:21

## 2022-06-21 RX ADMIN — INSULIN LISPRO 5 UNITS: 100 INJECTION, SOLUTION INTRAVENOUS; SUBCUTANEOUS at 18:00

## 2022-06-21 RX ADMIN — PROPOFOL 20 MG: 10 INJECTION, EMULSION INTRAVENOUS at 12:27

## 2022-06-21 RX ADMIN — ASPIRIN 81 MG CHEWABLE TABLET 81 MG: 81 TABLET CHEWABLE at 15:44

## 2022-06-21 RX ADMIN — AMLODIPINE BESYLATE 5 MG: 5 TABLET ORAL at 15:45

## 2022-06-21 RX ADMIN — FENTANYL CITRATE 50 MCG: 50 INJECTION INTRAMUSCULAR; INTRAVENOUS at 13:09

## 2022-06-21 RX ADMIN — CHLORHEXIDINE GLUCONATE 15 ML: 1.2 SOLUTION ORAL at 17:59

## 2022-06-21 RX ADMIN — FLUTICASONE PROPIONATE 2 SPRAY: 50 SPRAY, METERED NASAL at 17:58

## 2022-06-21 RX ADMIN — MUPIROCIN 1 APPLICATION: 20 OINTMENT TOPICAL at 07:43

## 2022-06-21 RX ADMIN — PANTOPRAZOLE SODIUM 40 MG: 40 TABLET, DELAYED RELEASE ORAL at 15:42

## 2022-06-21 RX ADMIN — PROPOFOL 50 MG: 10 INJECTION, EMULSION INTRAVENOUS at 12:26

## 2022-06-21 RX ADMIN — MONTELUKAST 10 MG: 10 TABLET, FILM COATED ORAL at 17:54

## 2022-06-21 RX ADMIN — PROTAMINE SULFATE 50 MG: 10 INJECTION, SOLUTION INTRAVENOUS at 13:24

## 2022-06-21 RX ADMIN — LISINOPRIL 20 MG: 20 TABLET ORAL at 17:55

## 2022-06-21 RX ADMIN — FENTANYL CITRATE 50 MCG: 50 INJECTION INTRAMUSCULAR; INTRAVENOUS at 12:26

## 2022-06-21 RX ADMIN — SODIUM CHLORIDE, SODIUM GLUCONATE, SODIUM ACETATE, POTASSIUM CHLORIDE, MAGNESIUM CHLORIDE, SODIUM PHOSPHATE, DIBASIC, AND POTASSIUM PHOSPHATE 50 ML/HR: .53; .5; .37; .037; .03; .012; .00082 INJECTION, SOLUTION INTRAVENOUS at 14:04

## 2022-06-21 RX ADMIN — INSULIN LISPRO 1 UNITS: 100 INJECTION, SOLUTION INTRAVENOUS; SUBCUTANEOUS at 22:19

## 2022-06-21 RX ADMIN — CEFAZOLIN SODIUM 2000 MG: 2 SOLUTION INTRAVENOUS at 12:34

## 2022-06-21 RX ADMIN — SUGAMMADEX 161 MG: 100 INJECTION, SOLUTION INTRAVENOUS at 13:35

## 2022-06-21 RX ADMIN — CYANOCOBALAMIN TAB 500 MCG 1000 MCG: 500 TAB at 15:43

## 2022-06-21 RX ADMIN — HEPARIN SODIUM 11000 UNITS: 1000 INJECTION INTRAVENOUS; SUBCUTANEOUS at 13:13

## 2022-06-21 NOTE — QUICK NOTE
Electrophysiology was consulted given post have our with new left bundle branch block  3 hour EKG afterwards still shows wide QRS  At this time, would just continue to monitor overnight  Does not appear to need to be NPO after midnight  Formal EP consult to follow tomorrow

## 2022-06-21 NOTE — OP NOTE
OPERATIVE REPORT  PATIENT NAME: Mikayla Guadarrama    :  1945  MRN: [id number]  Pt Location: [facility] OR ROOM 02    SURGERY DATE: 2022    SURGEON: [facility] MICHAEL Geller MD    CO-SURGEON: Ana Luisa Flaherty MD    ASSISTANT: Haley Krishnamurthy PA-C    ADDITIONAL ASSISTANT: N/A    PREOPERATIVE DIAGNOSIS: Symptomatic severe aortic stenosis  POSTOPERATIVE DIAGNOSIS: Symptomatic severe aortic stenosis  NYHA Class: 3  CCS Class: 1    PROCEDURE:   Transcatheter aortic valve replacement with a 26 mm Emery RONNI 3 bioprosthetic valve via left transfemoral approach  CARDIOPULMONARY BYPASS TIME: 0 minutes  ANESTHESIA Dr Conrad Almnote, general endotracheal anesthesia with transesophageal echocardiogram guidance  INDICATIONS:  The patient is a 78y o  year-old male with clinical and echocardiographic findings consistent with severe aortic stenosis  The patient was evaluated in our heart valve center, we recommended the procedure described previously  FINDINGS:  1  Intraoperative transesophageal echocardiogram revealed severe aortic stenosis  2  Final transesophageal echocardiogram demonstrated prosthetic valve with normal function no perivalvular leak  OPERATIVE TECHNIQUE:    The patient was taken to the operating room and placed supine on the operating table  Following the satisfactory induction of general anesthesia and placement of monitoring lines, the patient was prepped and draped in the usual sterile fashion  A time-out procedure was performed  The left common femoral vein was accessed percutaneously using Seldinger technique and fluoroscopy, a balloon-tip temporary pacing catheter was inserted and advanced into the right ventricle and its capture was confirmed  The left common femoral artery was accessed percutaneously using Seldinger technique and fluoroscopy  Two (2) Perclose sutures were deployed in the standard fashion  The patient was systemically heparinized   A pigtail catheter was advanced to the right coronary cusp, an aortogram was performed to determine proper angle and orientation for valve deployment  A Lunderquist extra-stiff wire was positioned in the ascending aorta over an exchange catheter  The sheath for the delivery system was inserted  The stiff wire was removed over a catheter, the aortic valve was crossed with a 0 035 straight-tip wire, this was then exchanged for a curved tip extra stiff wire  A 26 mm RONNI 3 valve delivery system was advanced through the delivery sheath into the aorta, the delivery system was configured for deployment  The valve on the delivery system was then advanced and the aortic valve was crossed  At this point, the catheter was desheathed in the standard fashion  The valve was positioned appropriately using a combination of fluoroscopy and transesophageal echocardiogram guidance  During an episode of rapid pacing, balloon deployment of the valve was performed  Following deployment, the position of the valve was confirmed by fluoroscopy and echocardiography and its position appeared appropriate with no perivalvular leak  The valve delivery system was subsequently removed followed by removal of the sheath while the Perclose sutures were secured and direct pressure was held  Protamine was administered with normalization of the ACT  Pressure was released, without evidence of active bleeding  The left common femoral vein sheath was removed and pressure was held  COMPLICATIONS: None    PACKS/TUBES/DRAINS: None  EBL: 50 cc  TRANSFUSION: None  SPECIMENS: None      As the attending surgeon, I was present and scrubbed for all critical portions of this procedure  There was no qualified surgical resident available  Sponge, needle and instrument counts were reported as correct by the nursing staff   A cardiology co-surgeon was required as is a CMS requirement      SIGNATURE: Delores Miller MD  DATE: June 21, 2022  TIME: 1:37 PM

## 2022-06-21 NOTE — ANESTHESIA PROCEDURE NOTES
Arterial Line Insertion  Performed by: Hermelinda Webster CRNA  Authorized by: Medardo Sheffield MD   Consent: Verbal consent obtained  Written consent obtained  Risks and benefits: risks, benefits and alternatives were discussed  Consent given by: patient  Patient understanding: patient states understanding of the procedure being performed  Patient consent: the patient's understanding of the procedure matches consent given  Procedure consent: procedure consent matches procedure scheduled  Required items: required blood products, implants, devices, and special equipment available  Patient identity confirmed: verbally with patient, arm band and provided demographic data  Preparation: Patient was prepped and draped in the usual sterile fashion    Indications: hemodynamic monitoring  Orientation:  Left  Location: radial artery  Procedure Details:  Needle gauge: 20  Seldinger technique: Seldinger technique used  Number of attempts: 1    Post-procedure:  Post-procedure: dressing applied  Waveform: good waveform  Post-procedure CNS: normal and unchanged  Patient tolerance: Patient tolerated the procedure well with no immediate complications

## 2022-06-21 NOTE — ANESTHESIA PREPROCEDURE EVALUATION
Procedure:  REPLACEMENT AORTIC VALVE TRANSCATHETER (TAVR) TRANSFEMORAL W/ 26MM QUINN RONNI S3 ULTRA VALVE(ACCESS ON LEFT) SAULO (N/A Chest)  CARDIAC TAVR (N/A Chest)    Relevant Problems   CARDIO   (+) Aortic stenosis   (+) Coronary artery disease   (+) Essential hypertension   (+) Hyperlipidemia   (+) S/P TAVR (transcatheter aortic valve replacement)      ENDO   (+) Hypothyroidism   (+) Type 2 diabetes mellitus with neurologic complication, with long-term current use of insulin (HCC)      GI/HEPATIC   (+) Chronic hepatitis C (HCC)      /RENAL   (+) Nephritis and nephropathy, not specified as acute or chronic, with other specified pathological lesion in kidney, in diseases classified elsewhere      MUSCULOSKELETAL   (+) Osteoarthritis of knee      PULMONARY   (+) Asthma        Physical Exam    Airway    Mallampati score: II  TM Distance: >3 FB  Neck ROM: full     Dental   upper dentures and lower dentures,     Cardiovascular      Pulmonary      Other Findings        Anesthesia Plan  ASA Score- 4     Anesthesia Type- general with ASA Monitors  Additional Monitors: arterial line and central venous line  Airway Plan: ETT  Comment: SAULO - daughter translated anesthesia consent          Plan Factors-Exercise tolerance (METS): <4 METS  Chart reviewed  EKG reviewed  Imaging results reviewed  Existing labs reviewed  Patient summary reviewed  Patient is not a current smoker  Obstructive sleep apnea risk education given perioperatively  Induction- intravenous  Postoperative Plan- Plan for postoperative opioid use  Planned trial extubation    Informed Consent- Anesthetic plan and risks discussed with patient  I personally reviewed this patient with the CRNA  Discussed and agreed on the Anesthesia Plan with the ANGELO Landon         Risks/benefits and alternatives discussed with Mely Hernandez including possible PONV, sore throat, damage to teeth/lips/gums, and possibility of rare anesthetic and surgical emergencies including but not limited to heart attack, stroke, and/or death  Patient has no history of dysphagia, diverticula, esophageal dysmotility, strictures, stents, or varices  Additional risks associated with SAULO probe placement discussed including but not limited to soft tissue injury to oropharynx, dental injury, thermal injury to esophagus, and/or perforation of pharynx or esophagus  Additional risks of central line placement discussed including but not limited to infection, bleeding, inadvertent arterial cannulation, and/or pneumothorax  Preinduction ABP; post-induction TLC and PAC; SAULO and ICU post-operatively

## 2022-06-21 NOTE — ANESTHESIA PROCEDURE NOTES
Procedure Performed: SAULO Anesthesia  Start Time:  6/21/2022 12:40 PM        Preanesthesia Checklist    Patient identified, IV assessed, risks and benefits discussed, monitors and equipment assessed, procedure being performed at surgeon's request and anesthesia consent obtained  Procedure    Diagnostic Indications for SAULO:  hemodynamic monitoring  Type of SAULO: interventional SAULO with real time guidance of intracardiac procedure  Images Saved: ultrasound permanent image saved  Physician Requesting Echo: Alla Hamilton MD   Location performed: OR  Intubated  Bite block not placed  Heart visualized  Insertion of SAULO Probe:  Atraumatic  Probe Type:  Multiplane  Modalities:  2D only, color flow mapping, continuous wave Doppler, pulse wave Doppler and 3D  Echocardiographic and Doppler Measurements    PREPROCEDURE    LEFT VENTRICLE:  Systolic Function: normal  Ejection Fraction: 55%  Cavity size: normal        RIGHT VENTRICLE:  Systolic Function: normal    Hypertrophy present  AORTIC VALVE:  Leaflets: trileaflet  Leaflet motions restricted  Stenosis: moderate and Under GA mean of 8mmHg  Discussion with surgeon regarding necessity for TAVR  Surgeon and cardiologist decided to proceed    Mean Gradient: 8 mmHg  Area: 1 1 cm²  Regurgitation: trace  MITRAL VALVE:  Leaflets: normal  Leaflet Motions: normal  Regurgitation: mild  Stenosis: none  TRICUSPID VALVE:  Leaflets: normal  Leaflet Motions: normal   Regurgitation: trace  PULMONIC VALVE:  Leaflets: normal           ASCENDING AORTA:  Size:  normal   Dissection not present  AORTIC ARCH:  Size:  normal   dissection not present  Grade 3: atheroma protruding < 0 5 cm into lumen  DESCENDING AORTA:  Size: normal   Dissection not present  Grade 3: atheroma protruding < 0 5 cm into lumen          RIGHT ATRIUM:  Size:  normal  No spontaneous echo contrast     LEFT ATRIUM:  Size: normal  No spontaneous echo contrast     LEFT ATRIAL APPENDAGE:  Size: normal  No spontaneous echo contrast         ATRIAL SEPTUM:  Intra-atrial septal morphology: normal           VENTRICULAR SEPTUM:  Intra-ventricular septum morphology: normal              OTHER FINDINGS:  Pericardium:  normal  Pleural Effusion:  none  POSTPROCEDURE    LEFT VENTRICLE: Unchanged   Ejection Fraction: 55 %  RIGHT VENTRICLE: Unchanged   AORTIC VALVE:   Leaflets: bioprosthetic  Stenosis: none  Mean Gradient: 2 mmHg  Regurgitation: none  Valve Size: 26 mm  MITRAL VALVE: Unchanged   TRICUSPID VALVE: Unchanged   PULMONIC VALVE: Unchanged             ATRIA: Unchanged   AORTA: Unchanged   Dissection: Dissection not present  REMOVAL:  Probe Removal: atraumatic  ECHOCARDIOGRAM COMMENTS:  Post-TAVR: KYRA (VTI) 2 5 cm^2, mean gradient 2 mmHg, no PVL  Lajean Cassette

## 2022-06-21 NOTE — QUICK NOTE
Quick Note - Cardiothoracic Surgery   Shen Yepez 78 y o  male MRN: [id number]  Unit/Bed#: OR [place] Encounter: [id number]      Shen Yepez underwent transcatheter aortic valve replacement at [place] [address]  today  Following surgery, 12 lead ECG was completed and LBBB was identified  Electrophysiology was notified via tiger text to confirm this finding  A repeat ECG was ordered, to be completed in three hours  The EP PA on call will follow up the results and formally complete a consultation of this finding has not resolved      Laura Stephen PA-C  06/21/22  1:54 PM

## 2022-06-21 NOTE — ANESTHESIA POSTPROCEDURE EVALUATION
Post-Op Assessment Note    CV Status:  Stable  Pain Score: 0    Pain management: adequate     Mental Status:  Alert and awake   Hydration Status:  Euvolemic   PONV Controlled:  Controlled   Airway Patency:  Patent      Post Op Vitals Reviewed: Yes      Staff: CRNA         No complications documented      BP   118/64   Temp  96 8   Pulse  96   Resp   16   SpO2   100

## 2022-06-21 NOTE — INTERVAL H&P NOTE
H&P reviewed  After examining the patient I find no changes in the patients condition since the H&P had been written  Vitals:    06/21/22 0724   BP: 161/78   Pulse: 80   Resp: 19   Temp: (!) 97 2 °F (36 2 °C)   SpO2: 99%     Anticipated Length of Stay:  Patient will be admitted on an Inpatient basis with an anticipated length of stay of  greater than 2 midnights  Justification for Hospital Stay:  Post surgical recovery following open heart surgery

## 2022-06-21 NOTE — ANESTHESIA PROCEDURE NOTES
Central Line Insertion  Performed by: Jayro Stafford CRNA  Authorized by: Anup Amato MD     Date/Time: 6/21/2022 12:37 PM  Catheter Type:  triple lumen  Consent: Verbal consent obtained  Written consent obtained    Risks and benefits: risks, benefits and alternatives were discussed  Consent given by: patient  Patient understanding: patient states understanding of the procedure being performed  Patient consent: the patient's understanding of the procedure matches consent given  Procedure consent: procedure consent matches procedure scheduled  Required items: required blood products, implants, devices, and special equipment available  Patient identity confirmed: verbally with patient, arm band and provided demographic data  Indications: vascular access  Location details: right internal jugular  Catheter size: 7 Fr  Patient position: Trendelenburg  Assessment: blood return through all ports and free fluid flow  Preparation: skin prepped with 2% chlorhexidine  Skin prep agent dried: skin prep agent completely dried prior to procedure  Sterile barriers: all five maximum sterile barriers used - cap, mask, sterile gown, sterile gloves, and large sterile sheet  Hand hygiene: hand hygiene performed prior to central venous catheter insertion  Ultrasound guidance: yes  sterile gel and probe cover used in ultrasound-guided central venous catheter insertionultrasound permanent image saved  Pre-procedure: landmarks identified  Number of attempts: 1  Successful placement: yes  Post-procedure: line sutured and dressing applied  Patient tolerance: Patient tolerated the procedure well with no immediate complications

## 2022-06-21 NOTE — RESPIRATORY THERAPY NOTE
RT Protocol Note  Ly Merritt 78 y o  male MRN: [id number]  Unit/Bed#: [facility] 421-01 Encounter: [id number]    Assessment    Principal Problem: Aortic stenosis  Active Problems:    Asthma    Chronic hepatitis C (HCC)    Type 2 diabetes mellitus with neurologic complication, with long-term current use of insulin (HCC)    Hyperlipidemia    Essential hypertension    Hypothyroidism    Contrast media allergy    S/P TAVR (transcatheter aortic valve replacement)    Coronary artery disease      Home Pulmonary Medications:  Albuterol MDI q4 PRN  Breo Ellipta daily         Past Medical History:   Diagnosis Date    Arthritis     Asthma     Colon polyps     Community acquired pneumonia     last assessed: 2014    Diabetes mellitus ([facility] )     Hemorrhagic prepatellar bursitis, left 10/21/2019    Hepatitis C     High cholesterol     Hypertension     Lymphadenopathy, anterior cervical 2018    Screening for colon cancer 2019    Thoracic vertebral fracture ([facility] ) 2014     Social History     Socioeconomic History    Marital status: /Civil Union     Spouse name: None    Number of children: None    Years of education: None    Highest education level: None   Occupational History    Occupation: retired    Tobacco Use    Smoking status: Former Smoker     Packs/day: 0 50     Types: Cigarettes     Quit date: 2022     Years since quittin 0    Smokeless tobacco: Never Used    Tobacco comment: started when he was about 24 yrs old; stopped smoking 3 wks ago   Vaping Use    Vaping Use: Never used   Substance and Sexual Activity    Alcohol use: No    Drug use: No    Sexual activity: Not Currently   Other Topics Concern    None   Social History Narrative    None     Social Determinants of Health     Financial Resource Strain: Low Risk     Difficulty of Paying Living Expenses: Not hard at all   Food Insecurity: No Food Insecurity    Worried About Running Out of Food in the Last Year: Never true    Ran Out of Food in the Last Year: Never true   Transportation Needs: No Transportation Needs    Lack of Transportation (Medical): No    Lack of Transportation (Non-Medical): No   Physical Activity: Unknown    Days of Exercise per Week: Not on file    Minutes of Exercise per Session: 60 min   Stress: No Stress Concern Present    Feeling of Stress : Not at all   Social Connections: Moderately Isolated    Frequency of Communication with Friends and Family: More than three times a week    Frequency of Social Gatherings with Friends and Family: More than three times a week    Attends Oriental orthodox Services: Never    Active Member of Clubs or Organizations: No    Attends Club or Organization Meetings: Never    Marital Status:    Intimate Partner Violence: Not on file   Housing Stability: [address] to Pay for Housing in the Last Year: No    Number of [place] in the Last Year: 1    Unstable Housing in the Last Year: No       Subjective         Objective    Physical Exam:   Assessment Type: (P) Assess only  General Appearance: (P) Alert, Awake  Respiratory Pattern: (P) Normal  Bilateral Breath Sounds: (P) Clear  Cough: (P) None    Vitals:  Blood pressure 129/62, pulse 91, temperature (!) 96 4 °F (35 8 °C), resp  rate 16, height 5' 9" (1 753 m), weight 80 3 kg (177 lb), SpO2 99 %  Imaging and other studies: I have personally reviewed pertinent reports  Plan    Respiratory Plan: (P) Home Bronchodilator Patient pathway  Airway Clearance Plan: (P) Incentive Spirometer     Resp Comments: (P) Pt admitted with Aortic stenosis  Pt has hx Asthma  Pt assessed per resp  and airway clearance protocol  Will continue with home inhalers  Pt instructed and given IS  Done well  Will continue to monitor pt

## 2022-06-22 ENCOUNTER — APPOINTMENT (INPATIENT)
Dept: RADIOLOGY | Facility: HOSPITAL | Age: 77
DRG: 267 | End: 2022-06-22
Payer: MEDICARE

## 2022-06-22 ENCOUNTER — TELEPHONE (OUTPATIENT)
Dept: CARDIOLOGY CLINIC | Facility: CLINIC | Age: 77
End: 2022-06-22

## 2022-06-22 DIAGNOSIS — Z95.2 S/P TAVR (TRANSCATHETER AORTIC VALVE REPLACEMENT): ICD-10-CM

## 2022-06-22 DIAGNOSIS — I35.0 NONRHEUMATIC AORTIC VALVE STENOSIS: Primary | ICD-10-CM

## 2022-06-22 DIAGNOSIS — I44.7 NEW ONSET LEFT BUNDLE BRANCH BLOCK (LBBB): Primary | ICD-10-CM

## 2022-06-22 LAB
ABO GROUP BLD BPU: NORMAL
ANION GAP SERPL CALCULATED.3IONS-SCNC: 4 MMOL/L (ref 4–13)
ATRIAL RATE: 68 BPM
ATRIAL RATE: 76 BPM
ATRIAL RATE: 89 BPM
BPU ID: NORMAL
BUN SERPL-MCNC: 19 MG/DL (ref 5–25)
CALCIUM SERPL-MCNC: 8.5 MG/DL (ref 8.3–10.1)
CHLORIDE SERPL-SCNC: 108 MMOL/L (ref 100–108)
CO2 SERPL-SCNC: 29 MMOL/L (ref 21–32)
CREAT SERPL-MCNC: 0.8 MG/DL (ref 0.6–1.3)
CROSSMATCH: NORMAL
ERYTHROCYTE [DISTWIDTH] IN BLOOD BY AUTOMATED COUNT: 13.6 % (ref 11.6–15.1)
GFR SERPL CREATININE-BSD FRML MDRD: 87 ML/MIN/1.73SQ M
GLUCOSE SERPL-MCNC: 106 MG/DL (ref 65–140)
GLUCOSE SERPL-MCNC: 109 MG/DL (ref 65–140)
GLUCOSE SERPL-MCNC: 111 MG/DL (ref 65–140)
GLUCOSE SERPL-MCNC: 160 MG/DL (ref 65–140)
GLUCOSE SERPL-MCNC: 178 MG/DL (ref 65–140)
HCT VFR BLD AUTO: 33.8 % (ref 36.5–49.3)
HGB BLD-MCNC: 10.9 G/DL (ref 12–17)
MAGNESIUM SERPL-MCNC: 2.1 MG/DL (ref 1.6–2.6)
MCH RBC QN AUTO: 30.4 PG (ref 26.8–34.3)
MCHC RBC AUTO-ENTMCNC: 32.2 G/DL (ref 31.4–37.4)
MCV RBC AUTO: 94 FL (ref 82–98)
P AXIS: 79 DEGREES
P AXIS: 82 DEGREES
P AXIS: 93 DEGREES
PLATELET # BLD AUTO: 191 THOUSANDS/UL (ref 149–390)
PMV BLD AUTO: 11.1 FL (ref 8.9–12.7)
POTASSIUM SERPL-SCNC: 3.6 MMOL/L (ref 3.5–5.3)
PR INTERVAL: 166 MS
PR INTERVAL: 186 MS
PR INTERVAL: 200 MS
QRS AXIS: -2 DEGREES
QRS AXIS: 87 DEGREES
QRS AXIS: 87 DEGREES
QRSD INTERVAL: 132 MS
QRSD INTERVAL: 138 MS
QRSD INTERVAL: 92 MS
QT INTERVAL: 420 MS
QT INTERVAL: 428 MS
QT INTERVAL: 444 MS
QTC INTERVAL: 446 MS
QTC INTERVAL: 499 MS
QTC INTERVAL: 520 MS
RBC # BLD AUTO: 3.59 MILLION/UL (ref 3.88–5.62)
SODIUM SERPL-SCNC: 141 MMOL/L (ref 136–145)
T WAVE AXIS: 50 DEGREES
T WAVE AXIS: 55 DEGREES
T WAVE AXIS: 90 DEGREES
UNIT DISPENSE STATUS: NORMAL
UNIT PRODUCT CODE: NORMAL
UNIT PRODUCT VOLUME: 250 ML
UNIT PRODUCT VOLUME: 350 ML
UNIT RH: NORMAL
VENTRICULAR RATE: 68 BPM
VENTRICULAR RATE: 76 BPM
VENTRICULAR RATE: 89 BPM
WBC # BLD AUTO: 11.68 THOUSAND/UL (ref 4.31–10.16)

## 2022-06-22 PROCEDURE — 93005 ELECTROCARDIOGRAM TRACING: CPT

## 2022-06-22 PROCEDURE — 97162 PT EVAL MOD COMPLEX 30 MIN: CPT

## 2022-06-22 PROCEDURE — 99233 SBSQ HOSP IP/OBS HIGH 50: CPT | Performed by: PHYSICIAN ASSISTANT

## 2022-06-22 PROCEDURE — NC001 PR NO CHARGE: Performed by: PHYSICIAN ASSISTANT

## 2022-06-22 PROCEDURE — 97166 OT EVAL MOD COMPLEX 45 MIN: CPT

## 2022-06-22 PROCEDURE — 85027 COMPLETE CBC AUTOMATED: CPT | Performed by: THORACIC SURGERY (CARDIOTHORACIC VASCULAR SURGERY)

## 2022-06-22 PROCEDURE — 71045 X-RAY EXAM CHEST 1 VIEW: CPT

## 2022-06-22 PROCEDURE — 93010 ELECTROCARDIOGRAM REPORT: CPT | Performed by: INTERNAL MEDICINE

## 2022-06-22 PROCEDURE — 80048 BASIC METABOLIC PNL TOTAL CA: CPT | Performed by: THORACIC SURGERY (CARDIOTHORACIC VASCULAR SURGERY)

## 2022-06-22 PROCEDURE — 99223 1ST HOSP IP/OBS HIGH 75: CPT | Performed by: INTERNAL MEDICINE

## 2022-06-22 PROCEDURE — 82948 REAGENT STRIP/BLOOD GLUCOSE: CPT

## 2022-06-22 PROCEDURE — 83735 ASSAY OF MAGNESIUM: CPT | Performed by: THORACIC SURGERY (CARDIOTHORACIC VASCULAR SURGERY)

## 2022-06-22 PROCEDURE — 99222 1ST HOSP IP/OBS MODERATE 55: CPT | Performed by: NURSE PRACTITIONER

## 2022-06-22 RX ORDER — CLOPIDOGREL BISULFATE 75 MG/1
75 TABLET ORAL DAILY
Qty: 90 TABLET | Refills: 0 | Status: SHIPPED | OUTPATIENT
Start: 2022-06-23 | End: 2022-09-21

## 2022-06-22 RX ORDER — ASPIRIN 81 MG/1
81 TABLET, CHEWABLE ORAL DAILY
Qty: 30 TABLET | Refills: 2 | Status: SHIPPED | OUTPATIENT
Start: 2022-06-23

## 2022-06-22 RX ORDER — TORSEMIDE 10 MG/1
10 TABLET ORAL DAILY
Qty: 5 TABLET | Refills: 0 | Status: SHIPPED | OUTPATIENT
Start: 2022-06-22 | End: 2022-07-01 | Stop reason: ALTCHOICE

## 2022-06-22 RX ORDER — DOCUSATE SODIUM 100 MG/1
100 CAPSULE, LIQUID FILLED ORAL 2 TIMES DAILY
Status: DISCONTINUED | OUTPATIENT
Start: 2022-06-22 | End: 2022-06-23 | Stop reason: HOSPADM

## 2022-06-22 RX ORDER — POTASSIUM CHLORIDE 20 MEQ/1
40 TABLET, EXTENDED RELEASE ORAL ONCE
Status: COMPLETED | OUTPATIENT
Start: 2022-06-22 | End: 2022-06-22

## 2022-06-22 RX ORDER — HEPARIN SODIUM 5000 [USP'U]/ML
5000 INJECTION, SOLUTION INTRAVENOUS; SUBCUTANEOUS EVERY 8 HOURS SCHEDULED
Status: DISCONTINUED | OUTPATIENT
Start: 2022-06-22 | End: 2022-06-22 | Stop reason: SDUPTHER

## 2022-06-22 RX ORDER — POTASSIUM CHLORIDE 750 MG/1
10 TABLET, EXTENDED RELEASE ORAL DAILY
Qty: 5 TABLET | Refills: 0 | Status: SHIPPED | OUTPATIENT
Start: 2022-06-22 | End: 2022-07-01 | Stop reason: ALTCHOICE

## 2022-06-22 RX ORDER — FUROSEMIDE 10 MG/ML
40 INJECTION INTRAMUSCULAR; INTRAVENOUS ONCE
Status: COMPLETED | OUTPATIENT
Start: 2022-06-22 | End: 2022-06-22

## 2022-06-22 RX ADMIN — PANTOPRAZOLE SODIUM 40 MG: 40 TABLET, DELAYED RELEASE ORAL at 08:53

## 2022-06-22 RX ADMIN — DOCUSATE SODIUM 100 MG: 100 CAPSULE, LIQUID FILLED ORAL at 17:17

## 2022-06-22 RX ADMIN — Medication 6 MG: at 21:17

## 2022-06-22 RX ADMIN — FLUTICASONE PROPIONATE 2 SPRAY: 50 SPRAY, METERED NASAL at 08:55

## 2022-06-22 RX ADMIN — POLYETHYLENE GLYCOL 3350 17 G: 17 POWDER, FOR SOLUTION ORAL at 08:55

## 2022-06-22 RX ADMIN — METFORMIN HYDROCHLORIDE 850 MG: 850 TABLET, FILM COATED ORAL at 08:54

## 2022-06-22 RX ADMIN — MUPIROCIN 1 APPLICATION: 20 OINTMENT TOPICAL at 21:21

## 2022-06-22 RX ADMIN — ATORVASTATIN CALCIUM 40 MG: 40 TABLET, FILM COATED ORAL at 17:17

## 2022-06-22 RX ADMIN — MUPIROCIN 1 APPLICATION: 20 OINTMENT TOPICAL at 08:55

## 2022-06-22 RX ADMIN — LEVOTHYROXINE SODIUM 137 MCG: 25 TABLET ORAL at 08:52

## 2022-06-22 RX ADMIN — CYANOCOBALAMIN TAB 500 MCG 1000 MCG: 500 TAB at 08:54

## 2022-06-22 RX ADMIN — INSULIN GLARGINE 18 UNITS: 100 INJECTION, SOLUTION SUBCUTANEOUS at 21:18

## 2022-06-22 RX ADMIN — FUROSEMIDE 40 MG: 10 INJECTION, SOLUTION INTRAVENOUS at 08:55

## 2022-06-22 RX ADMIN — HEPARIN SODIUM 5000 UNITS: 5000 INJECTION INTRAVENOUS; SUBCUTANEOUS at 06:44

## 2022-06-22 RX ADMIN — HYDROCHLOROTHIAZIDE 25 MG: 25 TABLET ORAL at 08:53

## 2022-06-22 RX ADMIN — FLUTICASONE FUROATE AND VILANTEROL TRIFENATATE 1 PUFF: 200; 25 POWDER RESPIRATORY (INHALATION) at 08:55

## 2022-06-22 RX ADMIN — CLOPIDOGREL BISULFATE 75 MG: 75 TABLET ORAL at 08:53

## 2022-06-22 RX ADMIN — ASPIRIN 81 MG CHEWABLE TABLET 81 MG: 81 TABLET CHEWABLE at 08:50

## 2022-06-22 RX ADMIN — INSULIN LISPRO 5 UNITS: 100 INJECTION, SOLUTION INTRAVENOUS; SUBCUTANEOUS at 08:54

## 2022-06-22 RX ADMIN — CHLORHEXIDINE GLUCONATE 15 ML: 1.2 SOLUTION ORAL at 08:55

## 2022-06-22 RX ADMIN — HEPARIN SODIUM 5000 UNITS: 5000 INJECTION INTRAVENOUS; SUBCUTANEOUS at 15:31

## 2022-06-22 RX ADMIN — POTASSIUM CHLORIDE 40 MEQ: 1500 TABLET, EXTENDED RELEASE ORAL at 08:54

## 2022-06-22 RX ADMIN — INSULIN LISPRO 5 UNITS: 100 INJECTION, SOLUTION INTRAVENOUS; SUBCUTANEOUS at 17:17

## 2022-06-22 RX ADMIN — HEPARIN SODIUM 5000 UNITS: 5000 INJECTION INTRAVENOUS; SUBCUTANEOUS at 21:18

## 2022-06-22 RX ADMIN — AMLODIPINE BESYLATE 5 MG: 5 TABLET ORAL at 08:51

## 2022-06-22 RX ADMIN — INSULIN LISPRO 1 UNITS: 100 INJECTION, SOLUTION INTRAVENOUS; SUBCUTANEOUS at 11:56

## 2022-06-22 RX ADMIN — MONTELUKAST 10 MG: 10 TABLET, FILM COATED ORAL at 08:52

## 2022-06-22 RX ADMIN — LISINOPRIL 20 MG: 20 TABLET ORAL at 08:53

## 2022-06-22 RX ADMIN — Medication 2000 UNITS: at 08:54

## 2022-06-22 RX ADMIN — METFORMIN HYDROCHLORIDE 850 MG: 850 TABLET, FILM COATED ORAL at 17:17

## 2022-06-22 RX ADMIN — ACETAMINOPHEN 650 MG: 325 TABLET, FILM COATED ORAL at 21:24

## 2022-06-22 RX ADMIN — INSULIN LISPRO 1 UNITS: 100 INJECTION, SOLUTION INTRAVENOUS; SUBCUTANEOUS at 17:18

## 2022-06-22 RX ADMIN — CHLORHEXIDINE GLUCONATE 15 ML: 1.2 SOLUTION ORAL at 17:17

## 2022-06-22 NOTE — UTILIZATION REVIEW
Initial Clinical Review    Elective IP surgical procedure  Age/Sex: 78 y o  male  Surgery Date: 6/21/2022  Procedure:  Transcatheter aortic valve replacement with a 26 mm Emery RONNI 3 bioprosthetic valve via left transfemoral approach  Anesthesia:  general endotracheal anesthesia with transesophageal echocardiogram guidance     Operative Findings:   1  Intraoperative transesophageal echocardiogram revealed severe aortic stenosis  2  Final transesophageal echocardiogram demonstrated prosthetic valve with normal function no perivalvular leak  POD#1 Progress Note:  Doing well no complaints  NSR on tele  Continue po meds  Good room air sat, continue incentive spirometry/Coughing/Deep breathing exercises  Tylenol prn  TLC 2 3 gm sodium diet, 1800 ml ackerman fluid restriction  Bowel regimen, GI ppx  Continue pre-op regimen with additional sliding scale coverage  Tx to tele bed today    Admission Orders: Date/Time/Statement:   Admission Orders (From admission, onward)     Ordered        06/21/22 1148  Inpatient Admission  Once                      Orders Placed This Encounter   Procedures    Inpatient Admission     Standing Status:   Standing     Number of Occurrences:   1     Order Specific Question:   Level of Care     Answer:   Level 1 Stepdown [13]     Order Specific Question:   Estimated length of stay     Answer:   More than 2 Midnights     Order Specific Question:   Certification     Answer:   I certify that inpatient services are medically necessary for this patient for a duration of greater than two midnights  See H&P and MD Progress Notes for additional information about the patient's course of treatment       Vital Signs:   Date/Time Temp Pulse Resp BP MAP (mmHg) Arterial Line BP MAP SpO2 Calculated FIO2 (%) - Nasal Cannula O2 Flow Rate (L/min) Nasal Cannula O2 Flow Rate (L/min) O2 Device Patient Position - Orthostatic VS   06/22/22 0629 98 5 °F (36 9 °C) 79 20 147/68 98 -- -- 98 % -- -- -- None (Room air) --   06/22/22 0000 -- 75 18 117/60 83 112/39 63 mmHg Abnormal  99 % 28 -- 2 L/min Nasal cannula --   06/21/22 2300 97 6 °F (36 4 °C) 77 17 123/58 83 -- -- 96 % -- -- -- None (Room air) Lying   06/21/22 1600 -- -- -- 128/63 90 154/60 86 mmHg -- 28 -- 2 L/min Nasal cannula Lying   06/21/22 1400 -- 88 16 113/56 75 114/44 68 mmHg 91 % -- -- -- None (Room air) --   06/21/22 1344 96 4 °F (35 8 °C) Abnormal  98 18 131/64 -- 132/51 -- 98 % -- 6 L/min -- Simple mask --   06/21/22 0724 97 2 °F (36 2 °C) Abnormal  80 19 161/78 -- -- -- 99 % -- -- -- None (Room air) --       Pertinent Labs/Diagnostic Test Results:   XR chest portable ICU   Final Result by Angie Frazier MD (06/21 5964)      No acute cardiopulmonary disease  Post TAVR        XR chest portable    (Results Pending)         Results from last 7 days   Lab Units 06/22/22  0446 06/21/22  1358 06/21/22  1332 06/21/22  1321 06/21/22  1246   WBC Thousand/uL 11 68*  --   --   --   --    HEMOGLOBIN g/dL 10 9* 11 7*  --   --   --    I STAT HEMOGLOBIN g/dl  --   --  10 2* 11 2* 10 9*   HEMATOCRIT % 33 8* 36 5  --   --   --    HEMATOCRIT, ISTAT %  --   --  30* 33* 32*   PLATELETS Thousands/uL 191 186  --   --   --      Results from last 7 days   Lab Units 06/22/22  0446 06/21/22  1358 06/21/22  1332 06/21/22  1321 06/21/22  1246   SODIUM mmol/L 141 140  --   --   --    POTASSIUM mmol/L 3 6 3 7  --   --   --    CHLORIDE mmol/L 108 108  --   --   --    CO2 mmol/L 29 28  --   --   --    CO2, I-STAT mmol/L  --   --  29 31 27   ANION GAP mmol/L 4 4  --   --   --    BUN mg/dL 19 17  --   --   --    CREATININE mg/dL 0 80 0 86  --   --   --    EGFR ml/min/1 73sq m 87 84  --   --   --    CALCIUM mg/dL 8 5 8 6  --   --   --    CALCIUM, IONIZED, ISTAT mmol/L  --   --  1 22 1 27 1 20   MAGNESIUM mg/dL 2 1  --   --   --   --      Results from last 7 days   Lab Units 06/22/22  0547 06/21/22  2109 06/21/22  1549 06/21/22  1346 06/21/22  0738   POC GLUCOSE mg/dl 111 162* 226* 203* 249*     Results from last 7 days   Lab Units 06/22/22  0446 06/21/22  1358   GLUCOSE RANDOM mg/dL 106 200*     Results from last 7 days   Lab Units 06/21/22  1332 06/21/22  1321 06/21/22  1246   I STAT BASE EXC mmol/L 1 3 0   I STAT O2 SAT % 100* 100* 100*   ISTAT PH ART  7 330* 7 337* 7 333*   I STAT ART PCO2 mm HG 52 7* 55 3* 48 5*   I STAT ART PO2 mm  0* 341 0* 253 0*   I STAT ART HCO3 mmol/L 27 8 29 6* 25 8     Scheduled Medications:  amLODIPine, 5 mg, Oral, Daily  aspirin, 81 mg, Oral, Daily  atorvastatin, 40 mg, Oral, Daily With Dinner  chlorhexidine, 15 mL, Mouth/Throat, BID  cholecalciferol, 2,000 Units, Oral, Daily  clopidogrel, 75 mg, Oral, Daily  vitamin B-12, 1,000 mcg, Oral, Daily  fluticasone, 2 spray, Nasal, Daily  fluticasone-vilanterol, 1 puff, Inhalation, Daily  furosemide, 40 mg, Intravenous, Once  heparin (porcine), 5,000 Units, Subcutaneous, Q8H DESTINEE  hydrochlorothiazide, 25 mg, Oral, Daily  insulin glargine, 18 Units, Subcutaneous, HS  insulin lispro, 1-6 Units, Subcutaneous, TID AC  insulin lispro, 1-6 Units, Subcutaneous, HS  insulin lispro, 5 Units, Subcutaneous, Daily With Breakfast  insulin lispro, 5 Units, Subcutaneous, Daily With Dinner  levothyroxine, 137 mcg, Oral, Daily  lisinopril, 20 mg, Oral, Daily  melatonin, 6 mg, Oral, HS  metFORMIN, 850 mg, Oral, BID With Meals  montelukast, 10 mg, Oral, Daily  mupirocin, 1 application, Nasal, A35Z DESTINEE  pantoprazole, 40 mg, Oral, Daily  polyethylene glycol, 17 g, Oral, Daily  potassium chloride, 40 mEq, Oral, Once    Continuous IV Infusions:  niCARdipine, 1-15 mg/hr, Intravenous, Titrated    PRN Meds:  acetaminophen, 650 mg, Rectal, Q4H PRN  acetaminophen, 650 mg, Oral, Q4H PRN 6/21 x1  albuterol, 2 puff, Inhalation, Q4H PRN  bisacodyl, 10 mg, Rectal, Daily PRN  hydrALAZINE, 5 mg, Intravenous, Q6H PRN  ondansetron, 4 mg, Intravenous, Q6H PRN        Network Utilization Review Department  ATTENTION: Please call with any questions or concerns to [phone] and carefully listen to the prompts so that you are directed to the right person  All voicemails are confidential   Nanette Castillo all requests for admission clinical reviews, approved or denied determinations and any other requests to dedicated fax number below belonging to the campus where the patient is receiving treatment   List of dedicated fax numbers for the Facilities:  [address] DENIALS (Administrative/Medical Necessity) [phone]   [address] (Maternity/NICU/Pediatrics) [phone]   [address] [place] [address]  [address] [facility] [place] [address] [id number] [address] Ma[address] [place] [address] [phone]

## 2022-06-22 NOTE — PHYSICAL THERAPY NOTE
Physical Therapy Evaluation    Patient's Name: Julienne Austin    Admitting Diagnosis  Nonrheumatic aortic valve stenosis [I35 0]    Problem List  Patient Active Problem List   Diagnosis    Asthma    Allergic rhinitis    Bilateral hearing loss    Chronic hepatitis C ([facility] )    Type 2 diabetes mellitus with neurologic complication, with long-term current use of insulin ([facility] )    Collapsed arches    Nephritis and nephropathy, not specified as acute or chronic, with other specified pathological lesion in kidney, in diseases classified elsewhere    Hyperlipidemia    Essential hypertension    Hypothyroidism    Nicotine dependence    Osteoarthritis of knee    Vitamin B12 deficiency    Adenomatous colon polyp    Diverticulosis of large intestine    Internal hemorrhoids    Aortic stenosis    Contrast media allergy    S/P TAVR (transcatheter aortic valve replacement)    Coronary artery disease       Past Medical History  Past Medical History:   Diagnosis Date    Arthritis     Asthma     Colon polyps     Community acquired pneumonia     last assessed: 5/1/2014    Diabetes mellitus ([facility] )     Hemorrhagic prepatellar bursitis, left 10/21/2019    Hepatitis C     High cholesterol     Hypertension     Lymphadenopathy, anterior cervical 4/17/2018    Screening for colon cancer 5/1/2019    Thoracic vertebral fracture ([facility] ) 6/11/2014       Past Surgical History  Past Surgical History:   Procedure Laterality Date    CARDIAC CATHETERIZATION N/A 6/3/2022    Procedure: Cardiac RHC/LHC; Surgeon: Paige Garg MD;  [facility];   Service: Cardiology    CARDIAC CATHETERIZATION N/A 6/21/2022    Procedure: CARDIAC TAVR;  Surgeon: Mundo Wilson MD;  Location: [facility] MAIN OR;  Service: Cardiology    COLONOSCOPY      COLONOSCOPY W/ POLYPECTOMY      LITHOTRIPSY      MULTIPLE TOOTH EXTRACTIONS      AK COLONOSCOPY FLX DX W/COLLJ SPEC WHEN PFRMD N/A 5/17/2018    Procedure: COLONOSCOPY;  Surgeon: Cony Lehman MD;  Location: [facility] GI LAB; Service: Gastroenterology    SC REPLACE AORTIC VALVE OPENFEMORAL ARTERY APPROACH N/A 6/21/2022    Procedure: REPLACEMENT AORTIC VALVE TRANSCATHETER (TAVR) TRANSFEMORAL W/ 26MM QUINN RONNI S3 ULTRA VALVE(ACCESS ON LEFT) SAULO;  Surgeon: Coralee Ganser, MD;  Location: [facility] MAIN OR;  Service: Cardiac Surgery        06/22/22 0942   PT Last Visit   PT Visit Date 06/22/22   Note Type   Note type Evaluation   Pain Assessment   Pain Assessment Tool 0-10   Pain Score No Pain   Restrictions/Precautions   Other Precautions Multiple lines;Telemetry   Home Living   Type of [address] Two level   Home Equipment   (none)   Additional Comments Pt lives in a [facility]  Ambulates without any AD at baseline   Prior Function   Level of [place] Independent with ADLs and functional mobility   Lives With Spouse   Receives Help From Family   ADL Assistance Independent   IADLs Independent   Cognition   Overall Cognitive Status WFL   Attention Within functional limits   Orientation Level Oriented X4   Memory Within functional limits   Following Commands Follows one step commands without difficulty   Comments pt is primarily Egyptian speaking, but able to communicate in simple english   RLE Assessment   RLE Assessment WFL   LLE Assessment   LLE Assessment WFL   Bed Mobility   Additional Comments pt seated OOB upon arrival   Transfers   Sit to Stand 5  Supervision   Stand to Sit 5  Supervision   Additional Comments transfers without any AD   Ambulation/Elevation   Gait pattern Excessively slow; Step through pattern   Gait Assistance 5  Supervision   Assistive Device None   Distance 200ft   Stair Management Assistance 5  Supervision   Stair Management Technique One rail R;Alternating pattern; Foreward   Number of Stairs 7   Balance   Static Sitting Good   Dynamic Sitting Fair +   Static Standing Fair +   Dynamic Standing Fair   Ambulatory Fair   Activity Tolerance   Activity Tolerance Patient tolerated treatment well   Medical Staff Made Aware Seen with OT 2* pt's medical complexity and multiple comorbidities  PT focus on functional transfers, gait, and stairs   Nurse Made Aware ok to see per RN   Assessment   Prognosis Good   Assessment Pt is a 78 y o  male seen for PT evaluation s/p admit to [facility] on 6/21/2022  Pt was admitted with a primary dx of: aortic stenosis  Pt is POD#1 s/p TAVR  PT now consulted for assessment of mobility and d/c needs  Pt with Up with assistance, Ambulate, PT evaluation orders  Pts current comorbidities effecting treatment include: arthritis, asthma, DM, hepatitis C, HTN, thoracic vertebral fracture (2014), hemorrhagic prepatellar bursitis  Pts current clinical presentation is Evolving (medium complexity) due to Ongoing medical management for primary dx, Ongoing telemetry monitoring, Trending lab values, s/p surgical intervention  Prior to admission, pt was independent with ADLs and ambulating without any AD  Pt lives with his wife and son in a [facility]  Upon evaluation, pt currently is requiring supervision for transfers; supervision for ambulation 200 ft w/ no AD; and supervision for navigating 7 steps up/down with R HR  Pt presents at PT eval functioning at/near baseline mobility level  No further acute PT needs identified, PT signing off  Pt would benefit from continued ambulation with nursing and restorative staff as able  At conclusion of PT session pt returned back in chair with phone and call bell within reach  Pt denies any further questions at this time  The patient's AM-PAC Basic Mobility Inpatient Short Form Raw Score is 22  A Raw score of greater than 16 suggests the patient may benefit from discharge to home  Please also refer to the recommendation of the Physical Therapist for safe discharge planning  Recommend home with no PT needs upon hospital D/C     Goals   Patient Goals to go home   Plan   PT Frequency Other (Comment)  (eval only, D/C PT)   Recommendation   PT Discharge Recommendation No rehabilitation needs   AM-PAC Basic Mobility Inpatient   Turning in Bed Without Bedrails 4   Lying on Back to Sitting on Edge of Flat Bed 4   Moving Bed to Chair 4   Standing Up From Chair 4   Walk in Room 3   Climb 3-5 Stairs 3   Basic Mobility Inpatient Raw Score 22   Basic Mobility Standardized Score 47 4   Highest Level Of Mobility   JH-HLM Goal 7: Walk 25 feet or more   JH-HLM Achieved 7: Walk 25 feet or more   Modified Amairani Scale   Modified Amairani Scale 3   End of Consult   Patient Position at End of Consult Bedside chair; All needs within reach       Berl Distance, PT, DPT

## 2022-06-22 NOTE — OCCUPATIONAL THERAPY NOTE
Occupational Therapy Evaluation     Patient Name: Shreyas AVALOS Date: 6/22/2022  Problem List  Principal Problem: Aortic stenosis  Active Problems:    Asthma    [facility] ([facility] )    Type 2 diabetes mellitus with neurologic complication, with long-term current use of insulin (HCC)    Hyperlipidemia    Essential hypertension    Hypothyroidism    Contrast media allergy    S/P TAVR (transcatheter aortic valve replacement)    Coronary artery disease    Past Medical History  Past Medical History:   Diagnosis Date    Arthritis     Asthma     Colon polyps     Community acquired pneumonia     last assessed: 5/1/2014    Diabetes mellitus ([facility] )     Hemorrhagic prepatellar bursitis, left 10/21/2019    Hepatitis C     High cholesterol     Hypertension     Lymphadenopathy, anterior cervical 4/17/2018    Screening for colon cancer 5/1/2019    Thoracic vertebral fracture ([facility] ) 6/11/2014     Past Surgical History  Past Surgical History:   Procedure Laterality Date    CARDIAC CATHETERIZATION N/A 6/3/2022    Procedure: Cardiac RHC/LHC; Surgeon: Hayley Archer MD;  [facility]: [facility] CARDI[facility]; Service: Cardiology    CARDIAC CATHETERIZATION N/A 6/21/2022    Procedure: CARDIAC TAVR;  Surgeon: Porsche Sparks MD;  Location: [facility] MAIN OR;  Service: Cardiology    COLONOSCOPY      COLONOSCOPY W/ POLYPECTOMY      LITHOTRIPSY      MULTIPLE TOOTH EXTRACTIONS      DE COLONOSCOPY FLX DX W/COLLJ SPEC WHEN PFRMD N/A 5/17/2018    Procedure: COLONOSCOPY;  Surgeon: Robin Antony MD;  Location: [facility] GI LAB;   Service: Gastroenterology    DE REPLACE AORTIC VALVE OPENFEMORAL ARTERY APPROACH N/A 6/21/2022    Procedure: REPLACEMENT AORTIC VALVE TRANSCATHETER (TAVR) TRANSFEMORAL W/ 26MM QUINN RONNI S3 ULTRA VALVE(ACCESS ON LEFT) SAULO;  Surgeon: Nano Moore MD;  Location: [facility];  Service: Cardiac Surgery           06/22/22 0943   OT Last Visit   OT Visit Date 06/22/22   Note Type   Note type Evaluation Restrictions/Precautions   Other Precautions Telemetry;Multiple lines   Pain Assessment   Pain Assessment Tool 0-10   Pain Score No Pain   Home Living   Type of Home House   Home Layout Two level   Prior Function   Level of [place] Independent with ADLs and functional mobility   Lives With Spouse   Receives Help From Family   ADL Assistance Independent   IADLs Independent   Lifestyle   Autonomy Pt reports being I with ADLS, IADLS and mobility without device PTA  Reciprocal Relationships Pt lives with his wife who is able to assist upon d/c   Service to Others Retired   Intrinsic Gratification Active PTA   Subjective   Subjective Pt is primarily [address] speaking but able to communicate in simple english  ADL   Where Assessed Chair   Eating Assistance 7  Independent   Grooming Assistance 5  Supervision/Setup   UB Bathing Assistance 5  Supervision/Setup   LB Bathing Assistance 5  Supervision/Setup   UB Dressing Assistance 5  Supervision/Setup   LB Dressing Assistance 5  Supervision/Setup   Toileting Assistance  5  Supervision/Setup   Transfers   Sit to Stand 5  Supervision   Stand to Sit 5  Supervision   Functional Mobility   Functional Mobility 5  Supervision   Additional Comments Pt demonstrated household mobility with RW progressing to no device  Balance   Static Sitting Good   Dynamic Sitting Fair +   Static Standing Fair +   Dynamic Standing Fair   Ambulatory Fair   Activity Tolerance   Activity Tolerance Patient tolerated treatment well   Medical Staff Made Aware Seen with PT 2* medical complexity/ instability   Nurse Made Aware RN confirmed okay to see pt   Cognition   Overall Cognitive Status [facility]   Arousal/Participation Alert; Cooperative   Attention Within functional limits   Orientation Level Oriented X4   Memory Decreased recall of precautions   Following Commands Follows one step commands without difficulty   Comments Pt is Macanese speaking but able to communicate in simple english   Pt was given [place] cardiac packet  Assessment   Assessment Pt is a 78 y o  male admitted to [facility] on 6/21/2022 w/ aortic stenosis s/p TAVR on 6/21/22  Pt  has a past medical history of Arthritis, Asthma, Colon polyps, Community acquired pneumonia, Diabetes mellitus ([facility] ), Hemorrhagic prepatellar bursitis, left (10/21/2019), Hepatitis C, High cholesterol, Hypertension, Lymphadenopathy, anterior cervical (4/17/2018), Screening for colon cancer (5/1/2019), and Thoracic vertebral fracture ([facility] ) (6/11/2014)  Pt with active OT orders and ambulate  orders  Pt resides in a 2 story home with his wife who is able to assist as needed upon d/c  Pt was I w/  ADLS and IADLS, & required no use of DME PTA  Currently pt is supervision for functional transfers and functional mobility and supervision for ADLS overall  Based on the aforementioned OT evaluation, functional performance deficits, and assessments, pt has been identified as a moderate complexity evaluation  From OT standpoint, anticipate d/c home with family support  Recommend continued participation in [facility] and functional mobility with staff  No further acute OT needs, d/c OT     Goals   Patient Goals To return home   Recommendation   OT Discharge Recommendation No rehabilitation needs  (Home with increased social support)   OT - OK to Discharge Yes  (When medically appropriate)   AM-PAC Daily Activity Inpatient   Lower Body Dressing 4   Bathing 4   Toileting 4   Upper Body Dressing 4   Grooming 4   Eating 4   Daily Activity Raw Score 24   Daily Activity Standardized Score (Calc for Raw Score >=11) 57 54   AM-PAC Applied Cognition Inpatient   Following a Speech/Presentation 3   Understanding Ordinary Conversation 4   Taking Medications 4   Remembering Where Things Are Placed or Put Away 4   Remembering List of 4-5 Errands 4   Taking Care of Complicated Tasks 3   Applied Cognition Raw Score 22   Applied Cognition Standardized Score 47 83   Modified Amairani Scale   Modified Amairani Scale 3       Elo Valdez, MOT, OTR/L

## 2022-06-22 NOTE — CONSULTS
Consultation - Electrophysiology - Cardiology  Julienne Austni 78 y o  male MRN: [id number]  Unit/Bed#: [facility] 421-01 Encounter: [id number]      Inpatient consult to Electrophysiology  Consult performed by: Shama Houser PA-C  Consult ordered by: Shila Land PA-C          History of Present Illness   Physician Requesting Consult: Aníbal Yanez MD  Reason for Consult / Principal Problem: New LBBB s/p TAVR    Assessment/Plan   1  New LBBB   a  Pre TAVR EKG - QRS 86 ms,  ms   b  POD #0 TAVR EKG -  ms,  ms  c  POD #1 TAVR EKG -  ms,  ms  2  Severe aortic stenosis s/p TF TAVR   a  26 mm Emery Hernán 3   b  Maintained on ASA/Plavix  3  CAD  a  Cardiac cath 6/2022 shows 50% stenosis in LAD, OM1, Diag, RPDA  4  HTN  a  Maintained on Norvasc 5 mg QD, HCTZ 25 mg QD, lisinopril 20 mg QD  5  HLD  a  Maintained on Lipitor 40 mg QD  6  DM type 2  7  Chronic hepatitis C  8  Hypothyroid      Patient has new LBBB s/p TAVR  Continue to monitor on telemetry for another 24 hours  If conduction remains the same or improves, patient will likely be discharged with Live Zio at discharge  If conduction worsens, patient will likely need pacemaker prior to discharge  Will make NPO at midnight tonight in case patient requires pacemaker placement tomorrow  Repeat EKG tomorrow morning  HPI: Julienne Austin is a 78y o  year old male with PMH as stated above, who presented to [facility] to undergo elective TF TAVR for severe aortic stenosis  Procedure was completed without complication  See procedure note from Dr Clarisa Dao for further details  EKG performed immediately postop in the PACU revealed new left bundle branch block  On POD#1, the morning EKG showed LBBB had resolved and patient was prepped for discharge  By noon, the LBBB had returned and discharge was cancelled  EP consulted for further heart rhythm management  Upon evaluation, patient is resting comfortably in his chair  He offers no complaints  Patient denies chest pain/heaviness/tightness/pressure, palpitations, shortness of breath, orthopnea, lightheadedness, presyncope, syncope or N/V  Telemetry review shows NSR with LBBB  Review of Systems   All other systems reviewed and are negative  Historical Information   Past Medical History:   Diagnosis Date    Arthritis     Asthma     Colon polyps     Community acquired pneumonia     last assessed: 2014    Diabetes mellitus ([facility] )     Hemorrhagic prepatellar bursitis, left 10/21/2019    Hepatitis C     High cholesterol     Hypertension     Lymphadenopathy, anterior cervical 2018    Screening for colon cancer 2019    Thoracic vertebral fracture ([facility] ) 2014       Past Surgical History:   Procedure Laterality Date    CARDIAC CATHETERIZATION N/A 6/3/2022    Procedure: Cardiac RHC/LHC; Surgeon: Raymon Rondon MD;  [facility]: [facility] CARD[facility]; Service: Cardiology    CARDIAC CATHETERIZATION N/A 2022    Procedure: CARDIAC TAVR;  Surgeon: Kelsey Kramer MD;  Location: [facility] MAIN OR;  Service: Cardiology    COLONOSCOPY      COLONOSCOPY W/ POLYPECTOMY      LITHOTRIPSY      MULTIPLE TOOTH EXTRACTIONS      ID COLONOSCOPY FLX DX W/COLLJ [address] PFRMD N/A 2018    Procedure: COLONOSCOPY;  Surgeon: Jessee Mo MD;  Location: [facility] GI LAB;   Service: Gastroenterology    ID REPLACE AORTIC VALVE OPENFEMORAL ARTERY APPROACH N/A 2022    Procedure: REPLACEMENT AORTIC VALVE TRANSCATHETER (TAVR) TRANSFEMORAL W/ 26MM QUINN RONNI S3 ULTRA VALVE(ACCESS ON LEFT) SAULO;  Surgeon: Sidney Aguiar MD;  Location: [facility];  Service: Cardiac Surgery       Social History     Substance and Sexual Activity   Alcohol Use No     Social History     Substance and Sexual Activity   Drug Use No     Social History     Tobacco Use   Smoking Status Former Smoker    Packs/day: 0 50    Types: Cigarettes    Quit date: 2022    Years since quittin 0 Smokeless Tobacco Never Used   Tobacco Comment    started when he was about 24 yrs old; stopped smoking 3 wks ago       Family History   Problem Relation Age of Onset    Heart attack Family         at age 80    No Known Problems Mother     No Known Problems Father     Diabetes Sister     Diabetes Brother          Meds/Allergies   Hospital Medications:   Current Facility-Administered Medications   Medication Dose Route Frequency    acetaminophen (TYLENOL) rectal suppository 650 mg  650 mg Rectal Q4H PRN    acetaminophen (TYLENOL) tablet 650 mg  650 mg Oral Q4H PRN    albuterol (PROVENTIL HFA,VENTOLIN HFA) inhaler 2 puff  2 puff Inhalation Q4H PRN    amLODIPine (NORVASC) tablet 5 mg  5 mg Oral Daily    aspirin chewable tablet 81 mg  81 mg Oral Daily    atorvastatin (LIPITOR) tablet 40 mg  40 mg Oral Daily With Dinner    bisacodyl (DULCOLAX) rectal suppository 10 mg  10 mg Rectal Daily PRN    chlorhexidine (PERIDEX) 0 12 % oral rinse 15 mL  15 mL Mouth/Throat BID    cholecalciferol (VITAMIN D3) tablet 2,000 Units  2,000 Units Oral Daily    clopidogrel (PLAVIX) tablet 75 mg  75 mg Oral Daily    cyanocobalamin (VITAMIN B-12) tablet 1,000 mcg  1,000 mcg Oral Daily    fluticasone (FLONASE) 50 mcg/act nasal spray 2 spray  2 spray Nasal Daily    fluticasone-vilanterol (BREO ELLIPTA) 200-25 MCG/INH inhaler 1 puff  1 puff Inhalation Daily    heparin (porcine) subcutaneous injection 5,000 Units  5,000 Units Subcutaneous Q8H [facility]    hydrALAZINE (APRESOLINE) injection 5 mg  5 mg Intravenous Q6H PRN    hydrochlorothiazide (HYDRODIURIL) tablet 25 mg  25 mg Oral Daily    insulin glargine (LANTUS) subcutaneous injection 18 Units 0 18 mL  18 Units Subcutaneous HS    insulin lispro (HumaLOG) 100 units/mL subcutaneous injection 1-6 Units  1-6 Units Subcutaneous TID AC    insulin lispro (HumaLOG) 100 units/mL subcutaneous injection 1-6 Units  1-6 Units Subcutaneous HS    insulin lispro (HumaLOG) 100 units/mL subcutaneous injection 5 Units  5 Units Subcutaneous Daily With Breakfast    insulin lispro (HumaLOG) 100 units/mL subcutaneous injection 5 Units  5 Units Subcutaneous Daily With Dinner    levothyroxine tablet 137 mcg  137 mcg Oral Daily    lisinopril (ZESTRIL) tablet 20 mg  20 mg Oral Daily    melatonin tablet 6 mg  6 mg Oral HS    metFORMIN (GLUCOPHAGE) tablet 850 mg  850 mg Oral BID With Meals    montelukast (SINGULAIR) tablet 10 mg  10 mg Oral Daily    mupirocin (BACTROBAN) 2 % nasal ointment 1 application  1 application Nasal K62Q [facility]    niCARdipine (CARDENE) 25 mg (STANDARD CONCENTRATION) in sodium chloride 0 9% 250 mL  1-15 mg/hr Intravenous Titrated    ondansetron (ZOFRAN) injection 4 mg  4 mg Intravenous Q6H PRN    pantoprazole (PROTONIX) EC tablet 40 mg  40 mg Oral Daily    polyethylene glycol (MIRALAX) packet 17 g  17 g Oral Daily       Home Medications:   Medications Prior to Admission   Medication    Alcohol Swabs (ALCOHOL PADS) 70 % PADS    Blood Glucose Monitoring Suppl (OneTouch Verio) w/Device KIT    diphenhydrAMINE (BENADRYL) 50 mg capsule    famotidine (PEPCID) 20 mg tablet    fluticasone (FLONASE) 50 mcg/act nasal spray    glucose blood (OneTouch Verio) test strip    insulin glargine (Lantus SoloStar) 100 units/mL injection pen    Insulin Pen Needle (Pen Needles) 31G X 8 MM MISC    Lancets (FREESTYLE) lancets    levothyroxine 137 mcg tablet    montelukast (SINGULAIR) 10 mg tablet    mupirocin (BACTROBAN) 2 % nasal ointment    nicotine (NICODERM CQ) 14 mg/24hr TD 24 hr patch    NovoLOG FlexPen 100 units/mL injection pen    predniSONE 20 mg tablet    rosuvastatin (CRESTOR) 40 MG tablet    vitamin B-12 (VITAMIN B-12) 1,000 mcg tablet    Advair -21 MCG/ACT inhaler    albuterol (VENTOLIN HFA) 90 mcg/act inhaler    amLODIPine (NORVASC) 5 mg tablet    cholecalciferol (VITAMIN D3) 1,000 units tablet    Continuous Blood Gluc  (FreeStyle Cristofer 2 Leo) POWER  Continuous Blood Gluc Sensor (FreeStyle Cristofer 2 Sensor) MISC    hydrochlorothiazide (HYDRODIURIL) 25 mg tablet    lisinopril (ZESTRIL) 20 mg tablet    metFORMIN (GLUCOPHAGE) 850 mg tablet         Allergies   Allergen Reactions    Iv Contrast [Iodinated Diagnostic Agents] Rash     Patient states he had a severe reaction approximately 10 years ago , states he had a rash, itchy and he remembers a bunch of people pounding on chest and being surrounded by multiple doctors  Objective   Vitals: Blood pressure 105/55, pulse 65, temperature 98 5 °F (36 9 °C), temperature source Oral, resp  rate 20, height 5' 9" (1 753 m), weight 81 7 kg (180 lb 1 9 oz), SpO2 98 %  Orthostatic Blood Pressures    Flowsheet Row Most Recent Value   Blood Pressure 105/55 filed at 06/22/2022 1111   Patient Position - Orthostatic VS Lying filed at 06/21/2022 2300            Intake/Output Summary (Last 24 hours) at 6/22/2022 1228  Last data filed at 6/22/2022 1157  Gross per 24 hour   Intake 2288 42 ml   Output 1815 ml   Net 473 42 ml       Invasive Devices  Report    Central Venous Catheter Line  Duration           CVC Central Lines 06/21/22 Triple <1 day          Peripheral Intravenous Line  Duration           Peripheral IV 06/21/22 Dorsal (posterior); Right Forearm 1 day          Arterial Line  Duration           Arterial Line 06/21/22 Left Radial 1 day                  Physical Exam  Vitals reviewed  Constitutional:       General: He is not in acute distress  Appearance: He is not ill-appearing or diaphoretic  HENT:      Head: Normocephalic and atraumatic  Right Ear: External ear normal       Left Ear: External ear normal       Nose: Nose normal    Eyes:      General:         Right eye: No discharge  Left eye: No discharge  Cardiovascular:      Rate and Rhythm: Normal rate and regular rhythm  Heart sounds: No murmur heard  No friction rub     Pulmonary:      Effort: Pulmonary effort is normal  Breath sounds: Normal breath sounds  No wheezing, rhonchi or rales  Abdominal:      General: There is no distension  Palpations: Abdomen is soft  Tenderness: There is no abdominal tenderness  Musculoskeletal:         General: No deformity or signs of injury  Cervical back: No rigidity  No muscular tenderness  Right lower leg: No edema  Left lower leg: No edema  Skin:     General: Skin is warm and dry  Capillary Refill: Capillary refill takes less than 2 seconds  Coloration: Skin is not jaundiced or pale  Neurological:      General: No focal deficit present  Mental Status: He is alert and oriented to person, place, and time  Mental status is at baseline  Psychiatric:         Mood and Affect: Mood normal          Behavior: Behavior normal          Thought Content: Thought content normal                Lab Results: I have personally reviewed pertinent lab results  Results from last 7 days   Lab Units 06/22/22  0446 06/21/22  1358 06/21/22  1332   WBC Thousand/uL 11 68*  --   --    HEMOGLOBIN g/dL 10 9* 11 7*  --    I STAT HEMOGLOBIN g/dl  --   --  10 2*   HEMATOCRIT % 33 8* 36 5  --    HEMATOCRIT, ISTAT %  --   --  30*   PLATELETS Thousands/uL 191 186  --      Results from last 7 days   Lab Units 06/22/22 0446 06/21/22  1358 06/21/22  1332   POTASSIUM mmol/L 3 6 3 7  --    CHLORIDE mmol/L 108 108  --    CO2 mmol/L 29 28  --    CO2, I-STAT mmol/L  --   --  29   BUN mg/dL 19 17  --    CREATININE mg/dL 0 80 0 86  --    GLUCOSE, ISTAT mg/dl  --   --  196*   CALCIUM mg/dL 8 5 8 6  --          Results from last 7 days   Lab Units 06/22/22  0446   MAGNESIUM mg/dL 2 1         Imaging: I have personally reviewed pertinent films in PACS    Cardiac testing:  ECHO:  4/13/22    Left Ventricle: Left ventricular cavity size is normal  Wall thickness is mildly increased  There is mild concentric hypertrophy  The left ventricular ejection fraction is 65%   Systolic function is normal  Wall motion is normal     Aortic Valve: The aortic valve is trileaflet  The leaflets are moderately thickened  The leaflets are moderately calcified  There is severely reduced mobility  There is severe stenosis  The aortic valve peak velocity is 3 02 m/s  The aortic valve peak gradient is 36 mmHg  The aortic valve mean gradient is 22 mmHg  The aortic valve area is 0 62 cm2  The aortic valve velocity is increased due to stenosis    Stress ECG: No ST deviation is noted  The ECG was negative for ischemia  The stress ECG is negative for ischemia after maximal exercise, without reproduction of symptoms    Peak Stress Echo: Left ventricle cavity has normal reduction in size at peak stress  The left ventricle systolic function is hyperdynamic at peak stress  The peak stress echo showed normal wall motion  No aortic valve regurgitation during peak stress  Severe aortic valve stenosis during peak stress  Systolic flow velocity across the Aortic valve is increased with exercise      No evidence of ischemia by ECG or Echocardiographic criteria  Systolic flow velocity across the Aortic valve is increased with exercise          CATH: 6/3/22  · 1st Mrg lesion is 50% stenosed  · Mid LAD lesion is 50% stenosed  · RPDA-1 lesion is 50% stenosed  · RPDA-2 lesion is 50% stenosed  · 1st Diag lesion is 50% stenosed          VTE Prophylaxis: Heparin

## 2022-06-22 NOTE — QUICK NOTE
Telemetry monitored showed return of LBBB  EKG 12 lead completed with LBBB rate of 70  EP consulted for review  Discharge Order removed

## 2022-06-22 NOTE — RESTORATIVE TECHNICIAN NOTE
Restorative Technician Note      Patient Name: Karen Degroot     Restorative Tech Visit Date: 6/22/2022  Note Type: Mobility  Patient Position Upon Consult: Bedside chair  Activity Performed: Ambulated  Assistive Device: Roller walker  Patient Position at End of Consult: All needs within reach;  Bedside chair

## 2022-06-22 NOTE — DISCHARGE INSTRUCTIONS
Please test blood sugars three times daily before meals & once daily before bedtime  Record in a log & bring to follow-up appointment with endocrinologist or primary care physician  Use [employer] for coupon

## 2022-06-22 NOTE — UTILIZATION REVIEW
Inpatient Admission Authorization Request   NOTIFICATION OF INPATIENT ADMISSION/INPATIENT AUTHORIZATION REQUEST   SERVICING FACILITY:   [facility]  Address: [address] [place] [address], [address] [place] [address]  Tax ID: [id number]  NPI: [id number]  Place of Service: Inpatient [address] [facility] Code: 24     ATTENDING PROVIDER:  Attending Name and NPI#: Amadou Cloud Md [[id number]]  Address: [address] [place] [address], [address] [place] [address]  Phone: [phone]     UTILIZATION REVIEW CONTACT:  [employer] Utilization   Network Utilization Review Department  Phone: [phone]  Fax: [phone]  Email: [email or web address]     PHYSICIAN ADVISORY SERVICES:  FOR [id number] REVIEW - MEDICAL NECESSITY DENIAL  Phone: [phone]  Fax: [phone]  Email: [email or web address]     TYPE OF REQUEST:  Inpatient Status     ADMISSION INFORMATION:  ADMISSION DATE/TIME: 6/21/22 11:49 AM  PATIENT DIAGNOSIS CODE/DESCRIPTION:  Nonrheumatic aortic valve stenosis [I35 0]  DISCHARGE DATE/TIME: No discharge date for patient encounter  IMPORTANT INFORMATION:  Please contact the [employer] directly with any questions or concerns regarding this request  Department voicemails are confidential     Send requests for admission clinical reviews, concurrent reviews, approvals, and administrative denials due to lack of clinical to fax [phone]

## 2022-06-22 NOTE — CONSULTS
Consultation - Geriatrics   Ly Merritt 78 y o  male MRN: [id number]  Unit/Bed#: [facility] 421-01 Encounter: [id number]      Assessment/Plan  1  Ambulatory dysfunction   Baseline ambulation no use of assistive device   Fall type none   PT OT following  Rec home with increased supports   Fall precautions  2  Deconditioning/frailty  · Baseline function independent with adl/iadls, lives with wife  ·  Now increased risk for deconditioning/frailty d/t hospitalization, recent surgery  · Optimize diet, hydration, mobility for healing  · GFR 87- keep hydrated  · Alb 3 3 - consider nutrition eval, monitor po intake and supplement as needed  · Monitor ss infection, dehydration, dvt, skin breakdown  · Encourage cough and deep breathing exercises, IS   3   Forgetfulness  · Baseline mentation:  A&Ox3, mild ST memory loss  · CTH:  No cth noted in epic  · Gerilabs:  Tsh, b12 wnl, consider folate check if needed  · Recommend OT check MOCa when medically clear and f/u with outpt geriatrics if concerns  · Engage in regular social, physical, cognitive activity  Optimize acute and chronic conditions  · Frequent reminders and close monitoring for safety  4  Delirium risk  · At risk for delirium d/t hospitalization, recent surgergy  · [place] delirium precautions  First line treatment is reorient, redirect, reassure  Include family as able  Avoid restraints and sedating medications  · Identify and treat reversible causes of confusion including infection, dehydration, electrolyte imbalance, anemia, hypoxia, urinary retention, constipation, pain, sleep disturbance  · Avoid deliriogenic medications such as tramadol, high dose narcotics, benzodiazepines, anticholinergics  · Engage in regular activity  Monitor sleep wake cycle  Monitor depression  · Ensure adequate hydration and nutrition  Mobilize early and often according to poc  · Qtc [phone] - avoid antipsychotics    5   Pain  · Chronic back pain - uses motrin  · Recommend care with motrin, would f/u with pcp to discuss alternatives  · Cont prn tylenol, cont nonpharm methods of pain control  6  Anxiety/depression   Baseline mood:  Stable    Encourage relaxation techniques, emotional support  7  Vision/hearing impairment   Baseline vision/hearing:  No hearing or vision impairment   Cont supportive environment:  adequate lighting, quiet/calm space  Speak clearly and toward patient when communicating  Avoid outside noise distractions  Assist as needed for adls  8   Elimination issues  · Bladder:  No concerns  Monitor for retention  Avoid constipation  · Bowel:  No concerns  Encourage dietary fiber  Miralax conts  9   Severe AS  · Sx include asymptomatic, noted on echo:  bp drop with exertion  · Now s/p TAVR 6/21/2022  Cardiac surgery following  · Reviewed postop complications with patient to monitor for  10  Home medication review   PDMP review:  Error response from [facility] medications reviewed with [phone]  Vit d 2000iu qd  Vit b12 1000 mcg qd  hctz 25mg qd  lantus 18 units qhs  novolog 5 units w/breakfast and supper  Levothyroxine 137mcg qd  Lisinopril 20mg qd  Metformin 850mg bid  singulair 10mg qd  Rosuvastatin 40mg qd  No recent fills for:  Albuterol/advair, amlodipine    History of Present Illness   Physician Requesting Consult: Shanda Saravia MD  Reason for Consult / Principal Problem: s/p tavr  Hx and PE limited by: n/a  HPI: Eleuterio Hartman is a 78y o  year old male who presents with severe AS, s/p TAVR 6/21/2022  Comorbidities include dm2, htn, CAD, hep c  Patient seen for geriatrics eval   He is A&Ox3, oob in chair  He currently denies pain  There is some language barrier, but pt able to communication effectively  Pt denies cp/sob /cough  He denies gi/gu gi distress  He reports fatigue and denson prior to surgery which is somewhat improved at this time  Patient resides at home with his wife    He is independent with adl/iadls  He does not use ambulatory device, no recent falls  Pt reports good appetite, no elimination issues  He denies vision/hearing/dentition issues  No swallow issues  He reports chronic back pain that he uses motrin for  He reports a little short term memory loss, no confusions/hallucinations  HE denies anxiety/depression  He denies sleep disturbance  Inpatient consult to Gerontology  Consult performed by: KAREN Gutierrez  Consult ordered by: Taqueria Dove PA-C          Review of Systems   Constitutional: Negative for activity change, appetite change, chills and fatigue  HENT: Negative for congestion, hearing loss and trouble swallowing  Eyes: Positive for visual disturbance (not here)  Respiratory: Negative for cough and shortness of breath  Cardiovascular: Negative for chest pain  Gastrointestinal: Negative for abdominal pain, constipation, diarrhea, nausea and vomiting  Genitourinary: Negative for difficulty urinating  Musculoskeletal: Positive for back pain (chronic, uses motrin) and gait problem  Negative for arthralgias  Neurological: Negative for dizziness and light-headedness  Psychiatric/Behavioral: Positive for decreased concentration (mild memory loss)  Negative for dysphoric mood, hallucinations and sleep disturbance  The patient is not nervous/anxious  Historical Information   Past Medical History:   Diagnosis Date    Arthritis     Asthma     Colon polyps     Community acquired pneumonia     last assessed: 5/1/2014    Diabetes mellitus ([facility] )     Hemorrhagic prepatellar bursitis, left 10/21/2019    Hepatitis C     High cholesterol     Hypertension     Lymphadenopathy, anterior cervical 4/17/2018    Screening for colon cancer 5/1/2019    Thoracic vertebral fracture ([facility] [address] ) 6/11/2014     Past Surgical History:   Procedure Laterality Date    CARDIAC CATHETERIZATION N/A 6/3/2022    Procedure: Cardiac RHC/LHC;   Surgeon: Geoff Figueroa MD;  [facility]; Service: Cardiology    CARDIAC CATHETERIZATION N/A 2022    Procedure: CARDIAC TAVR;  Surgeon: Aracelis Woodard MD;  Location: [facility];  Service: Cardiology    COLONOSCOPY      COLONOSCOPY W/ POLYPECTOMY      LITHOTRIPSY      MULTIPLE TOOTH EXTRACTIONS      MI COLONOSCOPY FLX DX W/COLLJ [id number] PFRMD N/A 2018    Procedure: COLONOSCOPY;  Surgeon: Margaret Monique MD;  Location: [facility] GI [facility];   Service: Gastroenterology    MI REPLACE AORTIC VALVE OPENFEMORAL ARTERY APPROACH N/A 2022    Procedure: REPLACEMENT AORTIC VALVE TRANSCATHETER (TAVR) TRANSFEMORAL W/ 26MM QUINN RONNI S3 ULTRA VALVE(ACCESS ON LEFT) SAULO;  Surgeon: Staci Nolasco MD;  Location: [facility];  Service: Cardiac Surgery     Social History   Social History     Substance and Sexual Activity   Alcohol Use No     Social History     Substance and Sexual Activity   Drug Use No     Social History     Tobacco Use   Smoking Status Former Smoker    Packs/day: 0 50    Types: Cigarettes    Quit date: 2022    Years since quittin 0   Smokeless Tobacco Never Used   Tobacco Comment    started when he was about 24 yrs old; stopped smoking 3 wks ago         Family History:   Family History   Problem Relation Age of Onset    Heart attack Family         at age 80   Yolanda Phipps No Known Problems Mother     No Known Problems Father     Diabetes Sister     Diabetes Brother        Meds/Allergies   Current meds:   Current Facility-Administered Medications   Medication Dose Route Frequency    acetaminophen (TYLENOL) rectal suppository 650 mg  650 mg Rectal Q4H PRN    acetaminophen (TYLENOL) tablet 650 mg  650 mg Oral Q4H PRN    albuterol (PROVENTIL HFA,VENTOLIN HFA) inhaler 2 puff  2 puff Inhalation Q4H PRN    amLODIPine (NORVASC) tablet 5 mg  5 mg Oral Daily    aspirin chewable tablet 81 mg  81 mg Oral Daily    atorvastatin (LIPITOR) tablet 40 mg  40 mg Oral Daily With Dinner    bisacodyl (DULCOLAX) rectal suppository 10 mg  10 mg Rectal Daily PRN    chlorhexidine (PERIDEX) 0 12 % oral rinse 15 mL  15 mL Mouth/Throat BID    cholecalciferol (VITAMIN D3) tablet 2,000 Units  2,000 Units Oral Daily    clopidogrel (PLAVIX) tablet 75 mg  75 mg Oral Daily    cyanocobalamin (VITAMIN B-12) tablet 1,000 mcg  1,000 mcg Oral Daily    fluticasone (FLONASE) 50 mcg/act nasal spray 2 spray  2 spray Nasal Daily    fluticasone-vilanterol (BREO ELLIPTA) 200-25 MCG/INH inhaler 1 puff  1 puff Inhalation Daily    heparin (porcine) subcutaneous injection 5,000 Units  5,000 Units Subcutaneous Q8H [address]    hydrALAZINE (APRESOLINE) injection 5 mg  5 mg Intravenous Q6H PRN    hydrochlorothiazide (HYDRODIURIL) tablet 25 mg  25 mg Oral Daily    insulin glargine (LANTUS) subcutaneous injection 18 Units 0 18 mL  18 Units Subcutaneous HS    insulin lispro (HumaLOG) 100 units/mL subcutaneous injection 1-6 Units  1-6 Units Subcutaneous TID AC    insulin lispro (HumaLOG) 100 units/mL subcutaneous injection 1-6 Units  1-6 Units Subcutaneous HS    insulin lispro (HumaLOG) 100 units/mL subcutaneous injection 5 Units  5 Units Subcutaneous Daily With Breakfast    insulin lispro (HumaLOG) 100 units/mL subcutaneous injection 5 Units  5 Units Subcutaneous Daily With Dinner    levothyroxine tablet 137 mcg  137 mcg Oral Daily    lisinopril (ZESTRIL) tablet 20 mg  20 mg Oral Daily    melatonin tablet 6 mg  6 mg Oral HS    metFORMIN (GLUCOPHAGE) tablet 850 mg  850 mg Oral BID With Meals    montelukast (SINGULAIR) tablet 10 mg  10 mg Oral Daily    mupirocin (BACTROBAN) 2 % nasal ointment 1 application  1 application Nasal [id number] DESTINEE    niCARdipine (CARDENE) 25 mg (STANDARD CONCENTRATION) in sodium chloride 0 9% 250 mL  1-15 mg/hr Intravenous Titrated    ondansetron (ZOFRAN) injection 4 mg  4 mg Intravenous Q6H PRN    pantoprazole (PROTONIX) EC tablet 40 mg  40 mg Oral Daily    polyethylene glycol (MIRALAX) packet 17 g  17 g Oral Daily      Current PTA meds:  Medications Prior to Admission   Medication    Alcohol Swabs (ALCOHOL PADS) 70 % PADS    Blood Glucose Monitoring Suppl (OneTouch Verio) w/Device KIT    diphenhydrAMINE (BENADRYL) 50 mg capsule    famotidine (PEPCID) 20 mg tablet    fluticasone (FLONASE) 50 mcg/act nasal spray    glucose blood (OneTouch Verio) test strip    insulin glargine (Lantus SoloStar) 100 units/mL injection pen    Insulin Pen Needle (Pen Needles) 31G X 8 MM MISC    Lancets (FREESTYLE) lancets    levothyroxine 137 mcg tablet    montelukast (SINGULAIR) 10 mg tablet    mupirocin (BACTROBAN) 2 % nasal ointment    nicotine (NICODERM CQ) 14 mg/24hr TD 24 hr patch    NovoLOG FlexPen 100 units/mL injection pen    predniSONE 20 mg tablet    rosuvastatin (CRESTOR) 40 MG tablet    vitamin B-12 (VITAMIN B-12) 1,000 mcg tablet    Advair -21 MCG/ACT inhaler    albuterol (VENTOLIN HFA) 90 mcg/act inhaler    amLODIPine (NORVASC) 5 mg tablet    cholecalciferol (VITAMIN D3) 1,000 units tablet    Continuous Blood Gluc  (FreeStyle Cristofer 2 [place]) POWER    Continuous Blood Gluc Sensor (FreeStyle Cristofer 2 Sensor) MISC    hydrochlorothiazide (HYDRODIURIL) 25 mg tablet    lisinopril (ZESTRIL) 20 mg tablet    metFORMIN (GLUCOPHAGE) 850 mg tablet        Allergies   Allergen Reactions    Iv Contrast [Iodinated Diagnostic Agents] Rash     Patient states he had a severe reaction approximately 10 years ago , states he had a rash, itchy and he remembers a bunch of people pounding on chest and being surrounded by multiple doctors  Objective   Vitals: Blood pressure 105/55, pulse 65, temperature 98 5 °F (36 9 °C), temperature source Oral, resp  rate 20, height 5' 9" (1 753 m), weight 81 7 kg (180 lb 1 9 oz), SpO2 98 %  ,Body mass index is 26 6 kg/m²  Physical Exam  Vitals and nursing note reviewed  Constitutional:       General: He is not in acute distress  Appearance: Normal appearance  He is well-developed   He is not diaphoretic  HENT:      Head: Normocephalic  Cardiovascular:      Rate and Rhythm: Normal rate  Heart sounds: No friction rub  No gallop  Pulmonary:      Effort: Pulmonary effort is normal  No respiratory distress  Breath sounds: Normal breath sounds  No wheezing or rales  Abdominal:      General: Bowel sounds are normal  There is no distension  Palpations: Abdomen is soft  Tenderness: There is no abdominal tenderness  Musculoskeletal:         General: Normal range of motion  Cervical back: Normal range of motion  Skin:     General: Skin is warm and dry  Comments: Left groin - guaze -cd&i   Neurological:      General: No focal deficit present  Mental Status: He is alert and oriented to person, place, and time  Mental status is at baseline  Psychiatric:         Mood and Affect: Mood normal          Behavior: Behavior normal          Lab Results:   Results from last 7 days   Lab Units 06/22/22  0446   WBC Thousand/uL 11 68*   HEMOGLOBIN g/dL 10 9*   HEMATOCRIT % 33 8*   PLATELETS Thousands/uL 191        Results from last 7 days   Lab Units 06/22/22  0446 06/21/22  1358 06/21/22  1332   POTASSIUM mmol/L 3 6   < >  --    CHLORIDE mmol/L 108   < >  --    CO2 mmol/L 29   < >  --    CO2, I-STAT mmol/L  --   --  29   BUN mg/dL 19   < >  --    CREATININE mg/dL 0 80   < >  --    CALCIUM mg/dL 8 5   < >  --    GLUCOSE, ISTAT mg/dl  --   --  196*    < > = values in this interval not displayed  Imaging Studies: I have personally reviewed pertinent reports  EKG, Pathology, and Other Studies: I have personally reviewed pertinent reports      VTE Prophylaxis: Heparin    Code Status: Level 1 - Full Code

## 2022-06-22 NOTE — PROGRESS NOTES
Progress Note - Cardiothoracic Surgery   Catherine Gonzalez 78 y o  male MRN: [id number]  Unit/Bed#: [facility] 421-01 Encounter: [id number]    Aortic stenosis, Non-Rheumatic   S/P transfemoral transcatheter aortic valve replacement; POD # 1    24 Hour Events: Doing well no complaints    Medications:   Scheduled Meds:  Current Facility-Administered Medications   Medication Dose Route Frequency Provider Last Rate    acetaminophen  650 mg Rectal Q4H PRN [facility], PA-VASQUEZ      acetaminophen  650 mg Oral Q4H PRN [facility], PA-VASQUEZ      albuterol  2 puff Inhalation Q4H PRN [facility], PASHILPI      amLODIPine  5 mg Oral Daily [facility], PA-VASQUEZ      aspirin  81 mg Oral Daily [facility], [place]      atorvastatin  40 mg Oral Daily With [employer], AUGUST      bisacodyl  10 mg Rectal Daily PRN [facility], PA-VASQUEZ      chlorhexidine  15 mL Mouth/Throat BID [facility], AUGUST      cholecalciferol  2,000 Units Oral Daily [facility], [place]      clopidogrel  75 mg Oral Daily [facility], [place]      vitamin B-12  1,000 mcg Oral Daily [facility], AUGUST      fluticasone  2 spray Nasal Daily [facility], AUGUST      fluticasone-vilanterol  1 puff Inhalation Daily Manda Rodriguez MD      heparin (porcine)  5,000 Units Subcutaneous [place], [place]      hydrALAZINE  5 mg Intravenous Q6H PRN Skyla Gatica PA-C      hydrochlorothiazide  25 mg Oral Daily [facility], AUGUST      insulin glargine  18 Units Subcutaneous HS [facility], AUGUST      insulin lispro  1-6 Units Subcutaneous TID AC [facility], A[place]UST      insulin lispro  1-6 Units Subcutaneous HS [facility], AUGUST      insulin lispro  5 Units Subcutaneous Daily With Breakfast [facility], AUGUST      insulin lispro  5 Units Subcutaneous Daily With [facility]  [address] [address] [place] [address]      levothyroxine  137 mcg Oral Daily Deloris Lino, AUGUST      lisinopril  20 mg Oral Daily Skyla Gatica, AUGUST      melatonin  6 mg Oral HS Skyla Gatica PA-C      metFORMIN  850 mg Oral BID With Meals Yan RosenbergAUGUST      montelukast  10 mg Oral Daily Yan RosenbergAUGUST      mupirocin  1 application Nasal [id number] [place], [place]      niCARdipine  1-15 mg/hr Intravenous Titrated Yan Rosenberg PA-C Stopped (06/22/22 0000)    ondansetron  4 mg Intravenous Q6H PRN Yan RosenbergAUGUST      pantoprazole  40 mg Oral Daily Yan RosenbergAUGUST      polyethylene glycol  17 g Oral Daily Yan RosenbergAUGUST       Continuous Infusions:niCARdipine, 1-15 mg/hr, Last Rate: Stopped (06/22/22 0000)      PRN Meds:   acetaminophen    acetaminophen    albuterol    bisacodyl    hydrALAZINE    ondansetron    Vitals:   Vitals:    06/22/22 0300 06/22/22 0400 06/22/22 0500 06/22/22 0629   BP: 137/63 124/60 131/61 147/68   Pulse: 74 70 68 79   Resp: 15 18 18 20   Temp:    98 5 °F (36 9 °C)   TempSrc:    Oral   SpO2: 98% 98% 97% 98%   Weight:    81 7 kg (180 lb 1 9 oz)   Height:           Telemetry: NSR; Heart Rate: 75 LBBB    Hemodynamics:       Respiratory:   SpO2: SpO2: 98 %;  Room Air    Intake/Output:     Intake/Output Summary (Last 24 hours) at 6/22/2022 0801  Last data filed at 6/22/2022 0601  Gross per 24 hour   Intake 1988 42 ml   Output 1255 ml   Net 733 42 ml        Weights:   Weight (last 2 days)     Date/Time Weight    06/22/22 0629 81 7 (180 12)    06/21/22 1640 80 3 (177)    06/21/22 0734 80 3 (177)        Admit weight: 80 3    Results:   Results from last 7 days   Lab Units 06/22/22  0446 06/21/22  1358 06/21/22  1332   WBC Thousand/uL 11 68*  --   --    HEMOGLOBIN g/dL 10 9* 11 7*  --    I STAT HEMOGLOBIN g/dl  --   --  10 2*   HEMATOCRIT % 33 8* 36 5  --    HEMATOCRIT, ISTAT %  --   --  30*   PLATELETS Thousands/uL 191 186  --      Results from last 7 days   Lab Units 06/22/22  0446 06/21/22  1358 06/21/22  1332   POTASSIUM mmol/L 3 6 3 7  --    CHLORIDE mmol/L 108 108  --    CO2 mmol/L 29 28  --    CO2, I-STAT mmol/L  --   --  29   BUN mg/dL 19 17  --    CREATININE mg/dL 0 80 0 86  --    GLUCOSE, ISTAT mg/dl  --   --  196*   CALCIUM mg/dL 8 5 8 6  --          Point of care glucose: 11 - 226    Studies:    CXR:       EKG: NSR 75 LBBB    Echo 6/22:  Report pending     I have personally reviewed pertinent reports  and I have personally reviewed pertinent films in PACS    Invasive Lines/Tubes:  Invasive Devices  Report    Central Venous Catheter Line  Duration           CVC Central Lines 06/21/22 Triple <1 day          Peripheral Intravenous Line  Duration           Peripheral IV 06/21/22 Dorsal (posterior); Right Forearm <1 day          Arterial Line  Duration           Arterial Line 06/21/22 Left Radial <1 day                Physical Exam:    HEENT/NECK:  Normocephalic  Atraumatic  No jugular venous distention  Cardiac: Regular rate and rhythm and No murmurs/rubs/gallops  Pulmonary:  Breath sounds clear bilaterally and No rales/rhonchi/wheezes  Abdomen:  Non-tender, Non-distended and Normal bowel sounds  Incisions: Groin puncture sites clean, dry, and intact without hematoma  Extremities: Extremities warm/dry and Trace edema B/L  Neuro: Alert and oriented X 3, Sensation is grossly intact and No focal deficits  Skin: Warm/Dry, without rashes or lesions  Assessment:  Principal Problem: Aortic stenosis  Active Problems:    Asthma    Chronic hepatitis C (HCC)    Type 2 diabetes mellitus with neurologic complication, with long-term current use of insulin (HCC)    Hyperlipidemia    Essential hypertension    Hypothyroidism    Contrast media allergy    S/P TAVR (transcatheter aortic valve replacement)    Coronary artery disease       Aortic stenosis, Non-Rheumatic  S/P transfemoral transcatheter aortic valve replacement; POD # 1    Plan:    1   Cardiac:   NSR LBBB resolved, EP c/s cancelled; HR/BP well-controlled  Continue  Norvasc 5mg po QD, HCTZ 25mg po QD, Lisinopril 20mg po QD, PRN hydralazine  Central IV access no longer required; Remove central venous catheter today  ASA/Plavix  Consult cardiology for postoperative medical management  Follow up transthoracic echocardiogram today  Continue Statin therapy  Continue DVT prophylaxis    2  Pulmonary:   Extubated  CXR findings: No effusion or PTX, lungs clear, lines in place  Good Room air oxygen saturation; Continue incentive spirometry/Coughing/Deep breathing exercises   H/o COPD continue home inahlers    3  Renal:   Intake/Output net: +733 mL/24 hours  Post op Creatinine stable; Follow up labs prn    4  Neuro:  Neurologically intact; No active issues  Incisional pain well-controlled; Continue prn Tylenol    5  GI:  Tolerating TLC 2 3 gm sodium diet  Maintain 1800 mL daily fluid restriction   Continue daily stool softeners and prn suppository  Continue GI prophylaxis    6  Endo:   History of diabetes; Continue pre-op regimen with additional sliding scale coverage    7  Hematology:    Post-operative acute blood loss anemia; Hemoglobin 10 9; trend prn    8     Disposition:   Gerontology consultation ordered for routine assessment of TAVR patient over 72years old  Transfer from ICU to telemetry level of care today - likely d/c home today    VTE Pharmacologic Prophylaxis: Heparin  VTE Mechanical Prophylaxis: sequential compression device    Bedside rounds completed with supervising physician  Plan of care discussed with bedside nurse    SIGNATURE: Peter Sweet PA-C  DATE: June 22, 2022  TIME: 8:01 AM

## 2022-06-22 NOTE — DISCHARGE SUMMARY
Discharge Summary - Cardiothoracic Surgery   Shen Yepez 78 y o  male MRN: [id number]  Unit/Bed#: [facility] 421-01 Encounter: [id number]    Admission Date: 6/21/2022     Discharge Date: 06/22/22    Admitting Diagnosis: Nonrheumatic aortic valve stenosis [I35 0]    Primary Discharge Diagnosis:   Aortic stenosis, Non-Rheumatic  S/P transfemoral transcatheter aortic valve replacement;    Secondary Discharge Diagnosis:   CAD, HTN, HLD, asthma, IDDM, Hep C, tobacco use, lymphadenopathy, OA (b/l knees), hypothyroid, PNA, adenomatous colon polyps, internal hemorrhoids, b/l hearing loss, diverticulosis, vit  B12 def  , lymphadenopathy        Attending: KAMILLE Grullon  Consulting Physician(s):   Cardiology  Medical/Critical Care    Procedures Performed:   Procedure(s):  REPLACEMENT AORTIC VALVE TRANSCATHETER (TAVR) TRANSFEMORAL W/ 26MM QUINN RONNI S3 ULTRA VALVE(ACCESS ON LEFT) SAULO  CARDIAC TAVR     Hospital Course: The patient was seen in consultation prior to this admission for evaluation of Aortic stenosis, Non-Rheumatic  Risks and benefits of transfemoral transcatheter aortic valve replacement were discussed in detail, and patient was agreeable  Routine preoperative evaluation was completed and informed consent was obtained prior to admission  6/21: Elective admission for TF TAVR # 26mm via L approach  Extubated and transferred to PACU supported with cardene at 8  DP pulses 2+ b/l  Restarted on home lisinopril 20 mg QD, norvasc 5 mg QD, hydrochlorothiazide 25 mg QD, insulin, metformin, levothyroxine, inhalers  ECG shows NSR with new LBBB, EP consulted  Gerontology and cardiology consulted  PM: Melatonin 6mg scheduled QHS for insomnia concerns from patient  Persistent LBBB on repeat EKG, EP to see tomorrow, no need for NPO at midnight per EP though  On cardene 8, did not get home Lisinopril yet - give now, given home Hydralazine/Norvasc, PRN Hydralazine ordered      6/22: No events  Remains in NSR   ECG without conduction delay  Fit for discharge to home            Condition at Discharge:   good     Discharge Physical Exam:    Please see the documented physical exam from this morning's progress note for details  Discharge Data:  Results from last 7 days   Lab Units 06/22/22  0446 06/21/22  1358 06/21/22  1332   WBC Thousand/uL 11 68*  --   --    HEMOGLOBIN g/dL 10 9* 11 7*  --    I STAT HEMOGLOBIN g/dl  --   --  10 2*   HEMATOCRIT % 33 8* 36 5  --    HEMATOCRIT, ISTAT %  --   --  30*   PLATELETS Thousands/uL 191 186  --      Results from last 7 days   Lab Units 06/22/22  0446 06/21/22  1358 06/21/22  1332   POTASSIUM mmol/L 3 6 3 7  --    CHLORIDE mmol/L 108 108  --    CO2 mmol/L 29 28  --    CO2, I-STAT mmol/L  --   --  29   BUN mg/dL 19 17  --    CREATININE mg/dL 0 80 0 86  --    GLUCOSE, ISTAT mg/dl  --   --  196*   CALCIUM mg/dL 8 5 8 6  --            Discharge instructions/Information to patient and family:   See after visit summary for information provided to patient and family  Bhavna Gilliam was educated on restrictions regarding driving and lifting, and techniques of proper incisional care  They were specifically counselled on signs and symptoms of an incisional infection, and advised to contact our service immediately should they develop fevers, sweats, chill, redness or drainage at the site of any incisions  Provisions for Follow-Up Care:  See after visit summary for information related to follow-up care and any pertinent home health orders  Disposition:  Home    Planned Readmission:   No    Discharge Medications:  See after visit summary for reconciled discharge medications provided to patient and family  Bhavna Gilliam was provided contact information and scheduled a follow up appointment with KAMILLE Chaves  Additionally, follow up appointments have been scheduled for their primary care physician and primary cardiologist   Contact information was provided      Kendra King Dillon Guadarrama was counseled on the importance of avoiding tobacco products  As with all patients whom have undergone open heart surgery, tobacco cessation medication was contraindicated at the time of discharge  ACE/ARB was Prescribed at discharge    Beta Blocker was Contrainidcated secondary to transient bundle branch block, following tAVR deployment  Aspirin was Prescribed at discharge    Statin was Prescribed at discharge      The patient was discharged on ongoing diuretic therapy with Torsemide 10 mg, PO QD and Potassium Chloride 10 mEq, PO QD  They were advised to continue these medications for 5 days, unless otherwise directed  The patient was informed that following their postoperative surgical evaluation, they will be referred to outpatient cardiac rehabilitation  They were counseled that this program is run by specialists who will help them safely strengthen their heart and prevent more heart disease  Cardiac rehabilitation will include exercise, relaxation, stress management, and heart-healthy nutrition  Caregivers will also check to make sure their medication regimen is working  During this admission, the patient was questioned on their use of tobacco, alcohol, and illicit/non-prescription drug use in the  previous 24 months  During this time frame they admit to using tobacco  As such they have been counseled on the importance of cessation and abstinence  I spent 20 minutes discharging the patient  This time was spent on the day of discharge  I had direct contact with the patient on the day of discharge  Additional documentation is required if more than 30 minutes were spent on discharge       SIGNATURE: Leila Jett PA-C  DATE: June 22, 2022  TIME: 11:41 AM

## 2022-06-23 ENCOUNTER — CLINICAL SUPPORT (OUTPATIENT)
Dept: CARDIOLOGY CLINIC | Facility: CLINIC | Age: 77
End: 2022-06-23
Payer: MEDICARE

## 2022-06-23 VITALS
SYSTOLIC BLOOD PRESSURE: 143 MMHG | HEART RATE: 68 BPM | RESPIRATION RATE: 20 BRPM | WEIGHT: 177 LBS | OXYGEN SATURATION: 97 % | HEIGHT: 69 IN | BODY MASS INDEX: 26.22 KG/M2 | DIASTOLIC BLOOD PRESSURE: 63 MMHG | TEMPERATURE: 98 F

## 2022-06-23 DIAGNOSIS — Z95.2 S/P TAVR (TRANSCATHETER AORTIC VALVE REPLACEMENT): Primary | ICD-10-CM

## 2022-06-23 LAB
ANION GAP SERPL CALCULATED.3IONS-SCNC: 6 MMOL/L (ref 4–13)
AORTIC ROOT: 2.9 CM
AORTIC VALVE MEAN VELOCITY: 14 M/S
APICAL FOUR CHAMBER EJECTION FRACTION: 56 %
ASCENDING AORTA: 3 CM
ATRIAL RATE: 71 BPM
AV AREA BY CONTINUOUS VTI: 1.3 CM2
AV AREA PEAK VELOCITY: 1.3 CM2
AV LVOT MEAN GRADIENT: 2 MMHG
AV LVOT PEAK GRADIENT: 3 MMHG
AV MEAN GRADIENT: 9 MMHG
AV PEAK GRADIENT: 17 MMHG
AV VALVE AREA: 1.27 CM2
AV VELOCITY RATIO: 0.46
BUN SERPL-MCNC: 24 MG/DL (ref 5–25)
CALCIUM SERPL-MCNC: 9.3 MG/DL (ref 8.3–10.1)
CHLORIDE SERPL-SCNC: 105 MMOL/L (ref 100–108)
CO2 SERPL-SCNC: 30 MMOL/L (ref 21–32)
CREAT SERPL-MCNC: 0.85 MG/DL (ref 0.6–1.3)
DOP CALC AO PEAK VEL: 2.02 M/S
DOP CALC AO VTI: 39.66 CM
DOP CALC LVOT AREA: 2.83 CM2
DOP CALC LVOT DIAMETER: 1.9 CM
DOP CALC LVOT PEAK VEL VTI: 17.82 CM
DOP CALC LVOT PEAK VEL: 0.93 M/S
DOP CALC LVOT STROKE INDEX: 26.5 ML/M2
DOP CALC LVOT STROKE VOLUME: 50.5 CM3
E WAVE DECELERATION TIME: 181 MS
ERYTHROCYTE [DISTWIDTH] IN BLOOD BY AUTOMATED COUNT: 13.5 % (ref 11.6–15.1)
FRACTIONAL SHORTENING: 28 % (ref 28–44)
GFR SERPL CREATININE-BSD FRML MDRD: 85 ML/MIN/1.73SQ M
GLUCOSE SERPL-MCNC: 82 MG/DL (ref 65–140)
GLUCOSE SERPL-MCNC: 87 MG/DL (ref 65–140)
GLUCOSE SERPL-MCNC: 90 MG/DL (ref 65–140)
HCT VFR BLD AUTO: 36.4 % (ref 36.5–49.3)
HGB BLD-MCNC: 11.6 G/DL (ref 12–17)
INTERVENTRICULAR SEPTUM IN DIASTOLE (PARASTERNAL SHORT AXIS VIEW): 1.3 CM
INTERVENTRICULAR SEPTUM: 1.3 CM (ref 0.6–1.1)
LAAS-AP2: 18.7 CM2
LAAS-AP4: 22.6 CM2
LEFT ATRIUM SIZE: 4.3 CM
LEFT INTERNAL DIMENSION IN SYSTOLE: 3.3 CM (ref 2.1–4)
LEFT VENTRICULAR INTERNAL DIMENSION IN DIASTOLE: 4.6 CM (ref 3.5–6)
LEFT VENTRICULAR POSTERIOR WALL IN END DIASTOLE: 1.2 CM
LEFT VENTRICULAR STROKE VOLUME: 54 ML
LVSV (TEICH): 54 ML
MCH RBC QN AUTO: 30.3 PG (ref 26.8–34.3)
MCHC RBC AUTO-ENTMCNC: 31.9 G/DL (ref 31.4–37.4)
MCV RBC AUTO: 95 FL (ref 82–98)
MV E'TISSUE VEL-SEP: 6 CM/S
MV PEAK A VEL: 0.38 M/S
MV PEAK E VEL: 97 CM/S
MV STENOSIS PRESSURE HALF TIME: 52 MS
MV VALVE AREA P 1/2 METHOD: 4.23 CM2
P AXIS: 89 DEGREES
PLATELET # BLD AUTO: 178 THOUSANDS/UL (ref 149–390)
PMV BLD AUTO: 11 FL (ref 8.9–12.7)
POTASSIUM SERPL-SCNC: 3.7 MMOL/L (ref 3.5–5.3)
PR INTERVAL: 186 MS
QRS AXIS: -10 DEGREES
QRSD INTERVAL: 140 MS
QT INTERVAL: 442 MS
QTC INTERVAL: 480 MS
RA PRESSURE ESTIMATED: 10 MMHG
RBC # BLD AUTO: 3.83 MILLION/UL (ref 3.88–5.62)
RIGHT ATRIUM AREA SYSTOLE A4C: 13 CM2
RIGHT VENTRICLE ID DIMENSION: 3.3 CM
RV PSP: 52 MMHG
SL CV LEFT ATRIUM LENGTH A2C: 5.5 CM
SL CV LV EF: 60
SL CV PED ECHO LEFT VENTRICLE DIASTOLIC VOLUME (MOD BIPLANE) 2D: 98 ML
SL CV PED ECHO LEFT VENTRICLE SYSTOLIC VOLUME (MOD BIPLANE) 2D: 44 ML
SODIUM SERPL-SCNC: 141 MMOL/L (ref 136–145)
T WAVE AXIS: 81 DEGREES
TR MAX PG: 42 MMHG
TR PEAK VELOCITY: 3.2 M/S
TRICUSPID VALVE PEAK REGURGITATION VELOCITY: 3.24 M/S
VENTRICULAR RATE: 71 BPM
WBC # BLD AUTO: 8.98 THOUSAND/UL (ref 4.31–10.16)

## 2022-06-23 PROCEDURE — 93306 TTE W/DOPPLER COMPLETE: CPT | Performed by: INTERNAL MEDICINE

## 2022-06-23 PROCEDURE — 99233 SBSQ HOSP IP/OBS HIGH 50: CPT | Performed by: INTERNAL MEDICINE

## 2022-06-23 PROCEDURE — 93010 ELECTROCARDIOGRAM REPORT: CPT | Performed by: INTERNAL MEDICINE

## 2022-06-23 PROCEDURE — 80048 BASIC METABOLIC PNL TOTAL CA: CPT | Performed by: PHYSICIAN ASSISTANT

## 2022-06-23 PROCEDURE — 82948 REAGENT STRIP/BLOOD GLUCOSE: CPT

## 2022-06-23 PROCEDURE — 93005 ELECTROCARDIOGRAM TRACING: CPT

## 2022-06-23 PROCEDURE — 99211 OFF/OP EST MAY X REQ PHY/QHP: CPT | Performed by: INTERNAL MEDICINE

## 2022-06-23 PROCEDURE — 85027 COMPLETE CBC AUTOMATED: CPT | Performed by: PHYSICIAN ASSISTANT

## 2022-06-23 PROCEDURE — NC001 PR NO CHARGE: Performed by: PHYSICIAN ASSISTANT

## 2022-06-23 PROCEDURE — 99232 SBSQ HOSP IP/OBS MODERATE 35: CPT | Performed by: INTERNAL MEDICINE

## 2022-06-23 PROCEDURE — 99238 HOSP IP/OBS DSCHRG MGMT 30/<: CPT | Performed by: THORACIC SURGERY (CARDIOTHORACIC VASCULAR SURGERY)

## 2022-06-23 RX ORDER — LISINOPRIL 20 MG/1
40 TABLET ORAL DAILY
Qty: 60 TABLET | Refills: 2 | Status: SHIPPED | OUTPATIENT
Start: 2022-06-23

## 2022-06-23 RX ORDER — DOCUSATE SODIUM 100 MG/1
100 CAPSULE, LIQUID FILLED ORAL 2 TIMES DAILY PRN
Qty: 60 CAPSULE | Refills: 0 | COMMUNITY
Start: 2022-06-23 | End: 2022-07-01 | Stop reason: ALTCHOICE

## 2022-06-23 RX ORDER — AMLODIPINE BESYLATE 5 MG/1
5 TABLET ORAL DAILY
Status: DISCONTINUED | OUTPATIENT
Start: 2022-06-23 | End: 2022-06-23

## 2022-06-23 RX ORDER — AMLODIPINE BESYLATE 10 MG/1
10 TABLET ORAL DAILY
Status: DISCONTINUED | OUTPATIENT
Start: 2022-06-24 | End: 2022-06-23

## 2022-06-23 RX ORDER — LISINOPRIL 20 MG/1
40 TABLET ORAL DAILY
Status: DISCONTINUED | OUTPATIENT
Start: 2022-06-23 | End: 2022-06-23 | Stop reason: HOSPADM

## 2022-06-23 RX ORDER — AMLODIPINE BESYLATE 5 MG/1
5 TABLET ORAL DAILY
Status: DISCONTINUED | OUTPATIENT
Start: 2022-06-23 | End: 2022-06-23 | Stop reason: HOSPADM

## 2022-06-23 RX ORDER — ACETAMINOPHEN 325 MG/1
TABLET ORAL
Refills: 0 | COMMUNITY
Start: 2022-06-23 | End: 2022-08-05 | Stop reason: ALTCHOICE

## 2022-06-23 RX ORDER — POLYETHYLENE GLYCOL 3350 17 G/17G
17 POWDER, FOR SOLUTION ORAL DAILY PRN
Qty: 30 EACH | Refills: 0 | COMMUNITY
Start: 2022-06-23 | End: 2022-07-01 | Stop reason: ALTCHOICE

## 2022-06-23 RX ADMIN — PANTOPRAZOLE SODIUM 40 MG: 40 TABLET, DELAYED RELEASE ORAL at 09:05

## 2022-06-23 RX ADMIN — HYDROCHLOROTHIAZIDE 25 MG: 25 TABLET ORAL at 09:05

## 2022-06-23 RX ADMIN — FLUTICASONE PROPIONATE 2 SPRAY: 50 SPRAY, METERED NASAL at 09:13

## 2022-06-23 RX ADMIN — CLOPIDOGREL BISULFATE 75 MG: 75 TABLET ORAL at 09:04

## 2022-06-23 RX ADMIN — HEPARIN SODIUM 5000 UNITS: 5000 INJECTION INTRAVENOUS; SUBCUTANEOUS at 06:12

## 2022-06-23 RX ADMIN — LEVOTHYROXINE SODIUM 137 MCG: 25 TABLET ORAL at 09:04

## 2022-06-23 RX ADMIN — AMLODIPINE BESYLATE 5 MG: 5 TABLET ORAL at 09:08

## 2022-06-23 RX ADMIN — ASPIRIN 81 MG CHEWABLE TABLET 81 MG: 81 TABLET CHEWABLE at 09:04

## 2022-06-23 RX ADMIN — MONTELUKAST 10 MG: 10 TABLET, FILM COATED ORAL at 09:04

## 2022-06-23 RX ADMIN — CYANOCOBALAMIN TAB 500 MCG 1000 MCG: 500 TAB at 09:11

## 2022-06-23 RX ADMIN — CHLORHEXIDINE GLUCONATE 15 ML: 1.2 SOLUTION ORAL at 09:05

## 2022-06-23 RX ADMIN — Medication 2000 UNITS: at 09:11

## 2022-06-23 RX ADMIN — LISINOPRIL 40 MG: 20 TABLET ORAL at 09:11

## 2022-06-23 RX ADMIN — FLUTICASONE FUROATE AND VILANTEROL TRIFENATATE 1 PUFF: 200; 25 POWDER RESPIRATORY (INHALATION) at 09:13

## 2022-06-23 RX ADMIN — MUPIROCIN 1 APPLICATION: 20 OINTMENT TOPICAL at 09:05

## 2022-06-23 NOTE — PROGRESS NOTES
Progress Note - Electrophysiology-Cardiology (EP)   Olivia Cruz 78 y o  male MRN: [id number]  Unit/Bed#: [facility] 421-01 Encounter: [id number]      Assessment/Plan:  1  New LBBB   a  Pre TAVR EKG - QRS 86 ms,  ms   b  POD #0 TAVR EKG -  ms,  ms  c  POD #1 TAVR EKG -  ms,  ms  2  Severe aortic stenosis s/p TF TAVR   a  26 mm Emery Hernán 3   b  Maintained on ASA/Plavix  3  CAD  a  Cardiac cath 6/2022 shows 50% stenosis in LAD, OM1, Diag, RPDA  4  HTN  a  Maintained on Norvasc 5 mg QD, HCTZ 25 mg QD, lisinopril 20 mg QD  5  HLD  a  Maintained on Lipitor 40 mg QD  6  DM type 2  7  Chronic hepatitis C  8  Hypothyroid      Patient maintaining NSR with episodes of frequent PACs  No episodes of bradycardia or pauses overnight  LBBB remains stable  Recommend 2 week live heart monitor to be applied directly after discharge  Hold beta blockers  Instructions provided to the patient in [place]e  Patient states he understands and is agreeable  Subjective/Objective     Subjective: Upon evaluation, patient is resting comfortably in bed  He offers no complaints  Patient denies chest pain/heaviness/tightness/pressure, palpitations, shortness of breath, orthopnea, lightheadedness, presyncope, syncope or N/V  Telemetry review shows NSR with episodes of frequent PACs  This is noted to be present before LBBB occurred         Objective:     Vitals: /72 (BP Location: Right arm)   Pulse 73   Temp (!) 97 4 °F (36 3 °C) (Oral)   Resp 16   Ht 5' 9" (1 753 m)   Wt 80 3 kg (177 lb)   SpO2 95%   BMI 26 14 kg/m²   Vitals:    06/22/22 0629 06/23/22 0600   Weight: 81 7 kg (180 lb 1 9 oz) 80 3 kg (177 lb)     Orthostatic Blood Pressures    Flowsheet Row Most Recent Value   Blood Pressure 162/72 filed at 06/23/2022 3069   Patient Position - Orthostatic VS Lying filed at 06/23/2022 1339            Intake/Output Summary (Last 24 hours) at 6/23/2022 0851  Last data filed at 6/23/2022 0600  Gross per 24 hour   Intake 120 ml   Output 1335 ml   Net -1215 ml       Invasive Devices  Report    Central Venous Catheter Line  Duration           CVC Central Lines 06/21/22 Triple 1 day          Peripheral Intravenous Line  Duration           Peripheral IV 06/21/22 Dorsal (posterior); Right Forearm 1 day          Arterial Line  Duration           Arterial Line 06/21/22 Left Radial 1 day                            Scheduled Meds:  Current Facility-Administered Medications   Medication Dose Route Frequency Provider Last Rate    acetaminophen  650 mg Rectal Q4H PRN Erven Crispin Canada PA-C      acetaminophen  650 mg Oral Q4H PRN Erven Crispin Nguyen PA-C      albuterol  2 puff Inhalation Q4H PRN Erven Crispin [place] PA-C      amLODIPine  5 mg Oral Daily Erven Crispin Nguyen PA-C      aspirin  81 mg Oral Daily Erven Crispin Nguyen PA-C      atorvastatin  40 mg Oral Daily With Brenda Canada PA-C      bisacodyl  10 mg Rectal Daily PRN Erven Crispin Canada PA-C      chlorhexidine  15 mL Mouth/Throat BID Erven Bygianni Nguyen, [place]      cholecalciferol  2,000 Units Oral Daily Erven Crispin Nguyen PA-C      clopidogrel  75 mg Oral Daily Erven Crispin Nguyen PA-C      vitamin B-12  1,000 mcg Oral Daily Erven Crispin [place] PA-C      docusate sodium  100 mg Oral BID Erven Crispin Nguyen PA-C      fluticasone  2 spray Nasal Daily Erven Crispin Nguyen PA-C      fluticasone-vilanterol  1 puff Inhalation Daily Erven Crispin Nguyen PA-C      heparin (porcine)  5,000 Units Subcutaneous [facility] Erven Crispin Nguyen PA-C      hydrALAZINE  5 mg Intravenous Q6H PRN Erven Crispin Canada PA-C      hydrochlorothiazide  25 mg Oral Daily Erven Crispin Nguyen PA-C      insulin glargine  18 Units Subcutaneous HS Erven Crispin Nguyen PA-C      insulin lispro  1-6 Units Subcutaneous TID KYM Nguyen PA-C      insulin lispro  1-6 Units Subcutaneous HS Erven Crispin [place] PA-C      insulin lispro  5 Units Subcutaneous Daily With Breakfast Genna Butt PA-C      insulin lispro  5 Units Subcutaneous Daily With [employer] [place], [place]      levothyroxine  137 mcg Oral Daily [facility]      lisinopril  20 mg Oral Daily [place], [place]      melatonin  6 mg Oral HS [place], [place]      metFORMIN  850 mg Oral BID With Meals Genna Butt PA-C      montelukast  10 mg Oral Daily Genna Butt PA-C      mupirocin  1 application Nasal L12F [address] [facility] AUGUST Nguyen      ondansetron  4 mg Intravenous Q6H PRN Genna Khan PA-C      pantoprazole  40 mg Oral Daily Genna Khan PA-C      polyethylene glycol  17 g Oral Daily Genna Khan PA-C       Continuous Infusions:   PRN Meds:   acetaminophen    acetaminophen    albuterol    bisacodyl    hydrALAZINE    ondansetron    Review of Systems:  ROS as noted above, otherwise 12 point review of systems was performed and is negative  Physical Exam:   Physical Exam  Vitals reviewed  Constitutional:       General: He is not in acute distress  Appearance: He is not ill-appearing or diaphoretic  HENT:      Head: Normocephalic and atraumatic  Right Ear: External ear normal       Left Ear: External ear normal       Nose: Nose normal    Eyes:      General:         Right eye: No discharge  Left eye: No discharge  Cardiovascular:      Rate and Rhythm: Normal rate and regular rhythm  Heart sounds: No murmur heard  No friction rub  Pulmonary:      Effort: Pulmonary effort is normal       Breath sounds: Normal breath sounds  No wheezing, rhonchi or rales  Abdominal:      General: There is no distension  Palpations: Abdomen is soft  Tenderness: There is no abdominal tenderness  Musculoskeletal:         General: No deformity or signs of injury  Cervical back: No rigidity  No muscular tenderness  Right lower leg: No edema  Left lower leg: No edema     Skin: General: Skin is warm and dry  Capillary Refill: Capillary refill takes less than 2 seconds  Coloration: Skin is not jaundiced or pale  Neurological:      General: No focal deficit present  Mental Status: He is alert and oriented to person, place, and time  Mental status is at baseline  Psychiatric:         Mood and Affect: Mood normal          Behavior: Behavior normal          Thought Content: Thought content normal                      Lab Results: I have personally reviewed pertinent lab results  Results from last 7 days   Lab Units 06/23/22  0525 06/22/22  0446 06/21/22  1358   WBC Thousand/uL 8 98 11 68*  --    HEMOGLOBIN g/dL 11 6* 10 9* 11 7*   HEMATOCRIT % 36 4* 33 8* 36 5   PLATELETS Thousands/uL 178 191 186     Results from last 7 days   Lab Units 06/23/22  0525 06/22/22 0446 06/21/22  1358 06/21/22  1332   POTASSIUM mmol/L 3 7 3 6 3 7  --    CHLORIDE mmol/L 105 108 108  --    CO2 mmol/L 30 29 28  --    CO2, I-STAT mmol/L  --   --   --  29   BUN mg/dL 24 19 17  --    CREATININE mg/dL 0 85 0 80 0 86  --    GLUCOSE, ISTAT mg/dl  --   --   --  196*   CALCIUM mg/dL 9 3 8 5 8 6  --          Results from last 7 days   Lab Units 06/22/22  0446   MAGNESIUM mg/dL 2 1         Imaging: I have personally reviewed pertinent films in PACS  ECHO:  4/13/22    Left Ventricle: Left ventricular cavity size is normal  Wall thickness is mildly increased  There is mild concentric hypertrophy  The left ventricular ejection fraction is 65%  Systolic function is normal  Wall motion is normal     Aortic Valve: The aortic valve is trileaflet  The leaflets are moderately thickened  The leaflets are moderately calcified  There is severely reduced mobility  There is severe stenosis  The aortic valve peak velocity is 3 02 m/s  The aortic valve peak gradient is 36 mmHg  The aortic valve mean gradient is 22 mmHg  The aortic valve area is 0 62 cm2  The aortic valve velocity is increased due to stenosis      Stress ECG: No ST deviation is noted  The ECG was negative for ischemia  The stress ECG is negative for ischemia after maximal exercise, without reproduction of symptoms    Peak Stress Echo: Left ventricle cavity has normal reduction in size at peak stress  The left ventricle systolic function is hyperdynamic at peak stress  The peak stress echo showed normal wall motion  No aortic valve regurgitation during peak stress  Severe aortic valve stenosis during peak stress   Systolic flow velocity across the Aortic valve is increased with exercise      No evidence of ischemia by ECG or Echocardiographic criteria   Systolic flow velocity across the Aortic valve is increased with exercise            CATH: 6/3/22  · 1st Mrg lesion is 50% stenosed  · Mid LAD lesion is 50% stenosed  · RPDA-1 lesion is 50% stenosed  · RPDA-2 lesion is 50% stenosed    · 1st Diag lesion is 50% stenosed        VTE Pharmacologic Prophylaxis: Heparin  VTE Mechanical Prophylaxis: sequential compression device

## 2022-06-23 NOTE — PROGRESS NOTES
Progress Note - Cardiothoracic Surgery   Tez Brito 78 y o  male MRN: [id number]  Unit/Bed#: [facility] 421-01 Encounter: [id number]    Aortic stenosis, Non-Rheumatic  S/P transfemoral transcatheter aortic valve replacement; POD # 2    24 Hour Events: was for d/c home yesterday but LBBB which had resolved came back   Likely home today with Zio patch    Medications:   Scheduled Meds:  Current Facility-Administered Medications   Medication Dose Route Frequency Provider Last Rate    acetaminophen  650 mg Rectal Q4H PRN Barbara [facility] Nancie, AUGUST      acetaminophen  650 mg Oral Q4H PRN [facility], PA-VASQUEZ      albuterol  2 puff Inhalation Q4H PRN Barbara [facility] AUGUST [place]      amLODIPine  5 mg Oral Daily [facility], PA-VASQUEZ      aspirin  81 mg Oral Daily [facility], PA-VASQUEZ      atorvastatin  40 mg Oral Daily With Sun Dayami Canada PA-C      bisacodyl  10 mg Rectal Daily PRN [facility] AUGUST [place]      chlorhexidine  15 mL Mouth/Throat BID [place], [place]      cholecalciferol  2,000 Units Oral Daily [facility], PA-VASQUEZ      clopidogrel  75 mg Oral Daily [facility]      vitamin B-12  1,000 mcg Oral Daily [facility] stewart-VAS[facility]Z      docusate sodium  100 mg Oral BID [facility]      fluticasone  2 spray Nasal Daily [facility], PA-VASQUEZ      fluticasone-vilanterol  1 puff Inhalation Daily [facility] adrian      heparin (porcine)  5,000 Units Subcutaneous [facility] adrian      hydrALAZINE  5 mg Intravenous Q6H PRN Barbara Alonso AUGUST [place]      hydrochlorothiazide  25 mg Oral Daily [facility] AUGUST Nguyen      insulin glargine  18 Units Subcutaneous HS [facility] HARDY Nguyen-VASQUEZ      insulin lispro  1-6 Units Subcutaneous TID AC HARDY Jerome-VASQUEZ      insulin lispro  1-6 Units Subcutaneous HS [facility] AUGUST Nguyen      insulin lispro  5 Units Subcutaneous Daily With Breakfast Barbara Nguyen PA-C      insulin lispro  5 Units Subcutaneous Daily With [employer] [place]      levothyroxine  137 mcg Oral Daily Giuseppe Nguyen PA-C      lisinopril  20 mg Oral Daily Giuseppe Bill [place]      melatonin  6 mg Oral HS Giuseppe Nguyen PA-C      metFORMIN  850 mg Oral BID With Meals Giuseppe Nguyen PA-C      montelukast  10 mg Oral Daily Giuseppe Nguyen PA-C      mupirocin  1 application Nasal N17P [address] [place], PA-C      ondansetron  4 mg Intravenous Q6H PRN Giuseppe Canada PA-C      pantoprazole  40 mg Oral Daily Giuseppe Nguyen PA-C      polyethylene glycol  17 g Oral Daily Giuseppe Canada PA-C       Continuous Infusions:   PRN Meds:   acetaminophen    acetaminophen    albuterol    bisacodyl    hydrALAZINE    ondansetron    Vitals:   Vitals:    06/22/22 2300 06/23/22 0300 06/23/22 0600 06/23/22 0641   BP: 146/71 145/63  162/72   BP Location:    Right arm   Pulse: 81 82  73   Resp:  18  16   Temp: 98 1 °F (36 7 °C) 98 3 °F (36 8 °C)  (!) 97 4 °F (36 3 °C)   TempSrc:  Oral  Oral   SpO2: 97% 98%  95%   Weight:   80 3 kg (177 lb)    Height:           Telemetry: NSR; Heart Rate: 77 LBBB, PVCs    Hemodynamics:       Respiratory:   SpO2: SpO2: 95 %;  Room Air    Intake/Output:     Intake/Output Summary (Last 24 hours) at 6/23/2022 0855  Last data filed at 6/23/2022 0600  Gross per 24 hour   Intake 120 ml   Output 1335 ml   Net -1215 ml        Weights:   Weight (last 2 days)     Date/Time Weight    06/23/22 0600 80 3 (177)    06/22/22 0629 81 7 (180 12)    06/21/22 1640 80 3 (177)    06/21/22 0734 80 3 (177)        Admit weight: 80 3    Results:   Results from last 7 days   Lab Units 06/23/22  0525 06/22/22  0446 06/21/22  1358   WBC Thousand/uL 8 98 11 68*  --    HEMOGLOBIN g/dL 11 6* 10 9* 11 7*   HEMATOCRIT % 36 4* 33 8* 36 5   PLATELETS Thousands/uL 178 191 186     Results from last 7 days   Lab Units 06/23/22  0525 06/22/22  0446 06/21/22  1101 06/21/22  1332   POTASSIUM mmol/L 3 7 3 6 3 7  --    CHLORIDE mmol/L 105 108 108  --    CO2 mmol/L 30 29 28  --    CO2, I-STAT mmol/L  --   --   --  29   BUN mg/dL 24 19 17  --    CREATININE mg/dL 0 85 0 80 0 86  --    GLUCOSE, ISTAT mg/dl  --   --   --  196*   CALCIUM mg/dL 9 3 8 5 8 6  --          Point of care glucose: 11 - 226    Studies:    EKG: NSR 77 LBBB    Echo 6/22:  Report pending     I have personally reviewed pertinent reports  and I have personally reviewed pertinent films in PACS    Invasive Lines/Tubes:  Invasive Devices  Report    Central Venous Catheter Line  Duration           CVC Central Lines 06/21/22 Triple 1 day          Peripheral Intravenous Line  Duration           Peripheral IV 06/21/22 Dorsal (posterior); Right Forearm 1 day          Arterial Line  Duration           Arterial Line 06/21/22 Left Radial 1 day                Physical Exam:    HEENT/NECK:  Normocephalic  Atraumatic  No jugular venous distention  Cardiac: Regular rate and rhythm and No murmurs/rubs/gallops  Pulmonary:  Breath sounds clear bilaterally and No rales/rhonchi/wheezes  Abdomen:  Non-tender, Non-distended and Normal bowel sounds  Incisions: Groin puncture sites clean, dry, and intact without hematoma  Extremities: Extremities warm/dry and Trace edema B/L  Neuro: Alert and oriented X 3, Sensation is grossly intact and No focal deficits  Skin: Warm/Dry, without rashes or lesions       Assessment:  Principal Problem: Aortic stenosis  Active Problems:    Asthma    Chronic hepatitis C (HCC)    Type 2 diabetes mellitus with neurologic complication, with long-term current use of insulin (HCC)    Hyperlipidemia    Essential hypertension    Hypothyroidism    Contrast media allergy    S/P TAVR (transcatheter aortic valve replacement)    Coronary artery disease       Aortic stenosis, Non-Rheumatic  S/P transfemoral transcatheter aortic valve replacement; POD # 2    Plan:    1   Cardiac:   NSR LBBB reoccurred yesterday, EP c/s, likely home with Zio patch today; HR/BP well-controlled  Continue  Norvasc 5mg po QD, HCTZ 25mg po QD, increase Lisinopril to 40mg po QD, PRN hydralazine  Central IV access no longer required; Remove central venous catheter today  ASA/Plavix  Follow up transthoracic echocardiogram today  Continue Statin therapy  Continue DVT prophylaxis    2  Pulmonary:   Good Room air oxygen saturation; Continue incentive spirometry/Coughing/Deep breathing exercises   H/o COPD continue home inahlers    3  Renal:   Intake/Output net: -1035 mL/24 hours  Cont HCTZ  Post op Creatinine stable; Follow up labs prn    4  Neuro:  Neurologically intact; No active issues  Incisional pain well-controlled; Continue prn Tylenol    5  GI:  Tolerating TLC 2 3 gm sodium diet  Maintain 1800 mL daily fluid restriction   Continue daily stool softeners and prn suppository  Continue GI prophylaxis    6  Endo:   History of diabetes; Continue pre-op regimen with additional sliding scale coverage    7  Hematology:    Post-operative acute blood loss anemia; Hemoglobin 10 9; trend prn    8     Disposition:   Gerontology consultation already completed for routine assessment of TAVR patient over 72years old  D/c home today with Zio patch per EP    VTE Pharmacologic Prophylaxis: Heparin  VTE Mechanical Prophylaxis: sequential compression device    Bedside rounds completed with supervising physician  Plan of care discussed with bedside nurse    SIGNATURE: Garima Cruz PA-C  DATE: June 23, 2022  TIME: 8:55 AM

## 2022-06-23 NOTE — PROGRESS NOTES
Dionne Isaac is here today under the direction of Dr Rae Patch applied and all instructions for use given to patient in office   Patient will come in on 7/7/22 for his second patchl

## 2022-06-23 NOTE — UTILIZATION REVIEW
Continued Stay Review    Date: 6/23/22                        Current Patient Class: Inpatient  Current Level of Care: Med Surg    HPI:77 y o  male initially admitted on 6/21    Assessment/Plan:     Vital Signs:     Date/Time Temp Pulse Resp BP MAP (mmHg) Arterial Line BP MAP SpO2 Calculated FIO2 (%) - Nasal Cannula O2 Flow Rate (L/min) Nasal Cannula O2 Flow Rate (L/min) O2 Device   06/23/22 0800 -- -- -- -- -- -- -- -- -- -- -- None (Room air)   06/23/22 0641 97 4 °F (36 3 °C) Abnormal  73 16 162/72 103 -- -- 95 % -- -- -- None (Room air)   06/23/22 0300 98 3 °F (36 8 °C) 82 18 145/63 91 -- -- 98 % 28 -- 2 L/min Nasal cannula   06/22/22 2300 98 1 °F (36 7 °C) 81 -- 146/71 99 -- -- 97 % -- -- -- --   06/22/22 1900 98 3 °F (36 8 °C) 68 18 128/58 83 -- -- 96 % -- -- -- None (Room air)   06/22/22 1536 97 9 °F (36 6 °C) 68 20 122/58 98 -- -- -- -- -- -- --         Pertinent Labs/Diagnostic Results:       Results from last 7 days   Lab Units 06/23/22  0525 06/22/22  0446 06/21/22  1358 06/21/22  1332 06/21/22  1321   WBC Thousand/uL 8 98 11 68*  --   --   --    HEMOGLOBIN g/dL 11 6* 10 9* 11 7*  --   --    I STAT HEMOGLOBIN g/dl  --   --   --  10 2* 11 2*   HEMATOCRIT % 36 4* 33 8* 36 5  --   --    HEMATOCRIT, ISTAT %  --   --   --  30* 33*   PLATELETS Thousands/uL 178 191 186  --   --          Results from last 7 days   Lab Units 06/23/22  0525 06/22/22  0446 06/21/22  1358 06/21/22  1332 06/21/22  1321 06/21/22  1246   SODIUM mmol/L 141 141 140  --   --   --    POTASSIUM mmol/L 3 7 3 6 3 7  --   --   --    CHLORIDE mmol/L 105 108 108  --   --   --    CO2 mmol/L 30 29 28  --   --   --    CO2, I-STAT mmol/L  --   --   --  29 31 27   ANION GAP mmol/L 6 4 4  --   --   --    BUN mg/dL 24 19 17  --   --   --    CREATININE mg/dL 0 85 0 80 0 86  --   --   --    EGFR ml/min/1 73sq m 85 87 84  --   --   --    CALCIUM mg/dL 9 3 8 5 8 6  --   --   --    CALCIUM, IONIZED, ISTAT mmol/L  --   --   --  1 22 1 27 1 20   MAGNESIUM mg/dL --  2 1  --   --   --   --          Results from last 7 days   Lab Units 06/23/22  0605 06/22/22  2030 06/22/22  1635 06/22/22  1046 06/22/22  0547 06/21/22  2109 06/21/22  1549 06/21/22  1346 06/21/22  0738   POC GLUCOSE mg/dl 90 109 160* 178* 111 162* 226* 203* 249*     Results from last 7 days   Lab Units 06/23/22  0525 06/22/22  0446 06/21/22  1358   GLUCOSE RANDOM mg/dL 82 106 200*         Results from last 7 days   Lab Units 06/21/22  1332 06/21/22  1321 06/21/22  1246   I STAT BASE EXC mmol/L 1 3 0   I STAT O2 SAT % 100* 100* 100*   ISTAT PH ART  7 330* 7 337* 7 333*   I STAT ART PCO2 mm HG 52 7* 55 3* 48 5*   I STAT ART PO2 mm  0* 341 0* 253 0*   I STAT ART HCO3 mmol/L 27 8 29 6* 25 8           Medications:   Scheduled Medications:  amLODIPine, 5 mg, Oral, Daily  aspirin, 81 mg, Oral, Daily  atorvastatin, 40 mg, Oral, Daily With Dinner  chlorhexidine, 15 mL, Mouth/Throat, BID  cholecalciferol, 2,000 Units, Oral, Daily  clopidogrel, 75 mg, Oral, Daily  vitamin B-12, 1,000 mcg, Oral, Daily  docusate sodium, 100 mg, Oral, BID  fluticasone, 2 spray, Nasal, Daily  fluticasone-vilanterol, 1 puff, Inhalation, Daily  heparin (porcine), 5,000 Units, Subcutaneous, Q8H DESTINEE  hydrochlorothiazide, 25 mg, Oral, Daily  insulin glargine, 18 Units, Subcutaneous, HS  insulin lispro, 1-6 Units, Subcutaneous, TID AC  insulin lispro, 1-6 Units, Subcutaneous, HS  insulin lispro, 5 Units, Subcutaneous, Daily With Breakfast  insulin lispro, 5 Units, Subcutaneous, Daily With Dinner  levothyroxine, 137 mcg, Oral, Daily  lisinopril, 40 mg, Oral, Daily  melatonin, 6 mg, Oral, HS  metFORMIN, 850 mg, Oral, BID With Meals  montelukast, 10 mg, Oral, Daily  mupirocin, 1 application, Nasal, U95G DESTINEE  pantoprazole, 40 mg, Oral, Daily  polyethylene glycol, 17 g, Oral, Daily      Continuous IV Infusions:     PRN Meds:  acetaminophen, 650 mg, Rectal, Q4H PRN  acetaminophen, 650 mg, Oral, Q4H PRN  albuterol, 2 puff, Inhalation, Q4H PRN  bisacodyl, 10 mg, Rectal, Daily PRN  hydrALAZINE, 5 mg, Intravenous, Q6H PRN  ondansetron, 4 mg, Intravenous, Q6H PRN        Discharge Plan: [facility]    Network Utilization Review Department  ATTENTION: Please call with any questions or concerns to [phone] and carefully listen to the prompts so that you are directed to the right person  All voicemails are confidential   Giovana Muss all requests for admission clinical reviews, approved or denied determinations and any other requests to dedicated fax number below belonging to the campus where the patient is receiving treatment   List of dedicated fax numbers for the Facilities:  [address] DENIALS (Administrative/Medical Necessity) [phone]   [address] (Maternity/NICU/Pediatrics) [phone]   [address]  [id number] [place] [address] Yasmeen [address] [facility] [place] [address] [phone]

## 2022-06-23 NOTE — PROGRESS NOTES
Progress Note - Nima Upton 78 y o  male MRN: [id number]    Unit/Bed#: [facility] 421-01 Encounter: [id number]      Assessment/Plan:  1  Ambulatory dysfunction  · Stable  Cont monitoring  Cont fall precautions  2  Deconditioning/frailty  · Cont to optimize diet, hydration, mobility for healing  · GFR 85- keep hydrated  · Monitor po intake, supplements as needed  · Monitor ss infection, dehydration, dvt, skin breakdown  · Encourage cough and deep breathing exercises, IS  3  Forgetfulness/delirium risk  · Stable at present  Mild forgetfulness today  · Continue frequent reminders, close monitor for safety, delirium precautions as previously written  · Would f/u with pcp/OT for MOCA screening when stable if any concerns  4  Pain  ·  stable at present  Continue p r n  Tylenol  Avoid ibuprofen / Motrin  Continue nonpharmacologic methods of pain control  5  Severe AS  · Stable without complaint CP/fatigue/BUSTILLOS  ·  Cardiac surgery, Cardiology, and EP cardiology following  Patient for probable heart monitor at discharge    Subjective:   Patient seen for geriatrics follow up  He is resting comfortably oob  He is oriented to pps, mostly time (states Tuesday, not Thursday)  He denies pain  Denies cp/sob/cough  Denies gi/gu distress  Ambulating ok, no dizziness  Appetite and sleep good  Objective:     Vitals: Blood pressure 143/63, pulse 68, temperature 98 °F (36 7 °C), temperature source Oral, resp  rate 20, height 5' 9" (1 753 m), weight 80 3 kg (177 lb), SpO2 97 %  ,Body mass index is 26 14 kg/m²  Intake/Output Summary (Last 24 hours) at 6/23/2022 1551  Last data filed at 6/23/2022 1200  Gross per 24 hour   Intake 0 ml   Output 1100 ml   Net -1100 ml       Physical Exam:   General:  Alert, oriented x pps, mostly t, no distress  Cards:  RRR, no murmur/gallop/rub noted  Pulm:  Norm effort/no distress, cta, no w/r/r      Abd:  Soft, nt, nd, +bs  MS:  Norm ROM, no focal weakness  Ext:  W/d, no edema Invasive Devices  Report    None                 Lab, Imaging and other studies: I have personally reviewed pertinent reports      VTE Pharmacologic Prophylaxis: Heparin

## 2022-06-23 NOTE — DISCHARGE SUMMARY
Discharge Summary - Cardiothoracic Surgery   Alonzo Michel 78 y o  male MRN: [id number]  Unit/Bed#: [facility] 421-01 Encounter: [id number]    Admission Date: 6/21/2022     Discharge Date: 06/23/22    Admitting Diagnosis: Nonrheumatic aortic valve stenosis [I35 0]    Primary Discharge Diagnosis:   Aortic stenosis, Non-Rheumatic  S/P transfemoral transcatheter aortic valve replacement;    Secondary Discharge Diagnosis:   1  CAD  2  HTN  3  HLD  4  Asthma  5  IDDM2  6  Hep C  7  Tobacco use  8  Lymphadenopathy  9  OA (b/l knees)  10  Hypothyroid  11  H/o PNA  12  Adenomatous colon polyps  13  Internal hemorrhoids  14  B/l hearing loss  15  Diverticulosis  16  Vit  B12 def  17  Lymphadenopathy    Attending: KAMILLE Calloway  Consulting Physician(s):   Cardiology  Occupational Therapy  Physical Therapy  Electrophysiology  Gerontology    Procedures Performed:   Procedure(s):  REPLACEMENT AORTIC VALVE TRANSCATHETER (TAVR) TRANSFEMORAL W/ 26MM QUINN RONNI S3 ULTRA VALVE(ACCESS ON LEFT) SAULO  CARDIAC TAVR     Hospital Course: The patient was seen in consultation prior to this admission for evaluation of Aortic stenosis, Non-Rheumatic  Risks and benefits of transfemoral transcatheter aortic valve replacement were discussed in detail, and patient was agreeable  Routine preoperative evaluation was completed and informed consent was obtained prior to admission  6/21: Elective admission for TF TAVR # 26mm via L approach  Extubated and transferred to PACU supported with cardene at 8  DP pulses 2+ b/l  Restarted on home lisinopril 20 mg QD, norvasc 5 mg QD, hydrochlorothiazide 25 mg QD, insulin, metformin, levothyroxine, inhalers  ECG shows NSR with new LBBB, EP consulted  Gerontology and cardiology consulted  PM: Melatonin 6mg scheduled QHS for insomnia concerns from patient  Persistent LBBB on repeat EKG, EP to see tomorrow, no need for NPO at midnight per EP though   On cardene 8, did not get home Lisinopril yet - give now, given home Hydralazine/Norvasc, PRN Hydralazine ordered  6/22: No events  Remains in NSR  ECG initially without conduction delay  Later in the day he had recurrent bundle branch block  EP consulted  6/23: NSR with LBBB  VSS  Labs stable  Per EP, d/c home with Zio patch     Condition at Discharge:   good     Discharge Physical Exam:    Please see the documented physical exam from this morning's progress note for details  Discharge Data:  Results from last 7 days   Lab Units 06/23/22  0525 06/22/22  0446 06/21/22  1358   WBC Thousand/uL 8 98 11 68*  --    HEMOGLOBIN g/dL 11 6* 10 9* 11 7*   HEMATOCRIT % 36 4* 33 8* 36 5   PLATELETS Thousands/uL 178 191 186     Results from last 7 days   Lab Units 06/23/22  0525 06/22/22  0446 06/21/22  1358 06/21/22  1332   POTASSIUM mmol/L 3 7 3 6 3 7  --    CHLORIDE mmol/L 105 108 108  --    CO2 mmol/L 30 29 28  --    CO2, I-STAT mmol/L  --   --   --  29   BUN mg/dL 24 19 17  --    CREATININE mg/dL 0 85 0 80 0 86  --    GLUCOSE, ISTAT mg/dl  --   --   --  196*   CALCIUM mg/dL 9 3 8 5 8 6  --            Discharge instructions/Information to patient and family:   See after visit summary for information provided to patient and family  Michael Lacey was educated on restrictions regarding driving and lifting, and techniques of proper incisional care  They were specifically counselled on signs and symptoms of an incisional infection, and advised to contact our service immediately should they develop fevers, sweats, chill, redness or drainage at the site of any incisions  Provisions for Follow-Up Care:  See after visit summary for information related to follow-up care and any pertinent home health orders  Disposition:  Home    Planned Readmission:   No    Discharge Medications:  See after visit summary for reconciled discharge medications provided to patient and family        Brendonlilliana Lacey was provided contact information and scheduled a follow up appointment with KAMILLE Morgan  Additionally, follow up appointments have been scheduled for their primary care physician and primary cardiologist   Contact information was provided  Shreyas Ambrose was counseled on the importance of avoiding tobacco products  As with all patients whom have undergone open heart surgery, tobacco cessation medication was contraindicated at the time of discharge  ACE/ARB was Prescribed at discharge  Beta Blocker was Contraindicated secondary to bradycardia/bundle branch block    Aspirin was Prescribed at discharge    Statin was Prescribed at discharge    ASA/Plavix for antiplatelet w/ TAVR  Zio patch per EP  No refills on PTA medications per patient  The patient was discharged on ongoing diuretic therapy with Torsemide 10 mg, PO QD and Potassium Chloride 10 mEq, PO QD  They were advised to continue these medications for 5 days, unless otherwise directed  Tylenol PRN for pain control  The patient was informed that following their postoperative surgical evaluation, they will be referred to outpatient cardiac rehabilitation  They were counseled that this program is run by specialists who will help them safely strengthen their heart and prevent more heart disease  Cardiac rehabilitation will include exercise, relaxation, stress management, and heart-healthy nutrition  Caregivers will also check to make sure their medication regimen is working  During this admission, the patient was questioned on their use of tobacco, alcohol, and illicit/non-prescription drug use in the  previous 24 months  During this time frame they admit to using tobacco  As such they have been counseled on the importance of cessation and abstinence  I spent 30 minutes discharging the patient  This time was spent on the day of discharge  I had direct contact with the patient on the day of discharge   Additional documentation is required if more than 30 minutes were spent on discharge       SIGNATUREChaz Burns PA-C  DATE: June 23, 2022  TIME: 12:00 PM

## 2022-06-24 NOTE — UTILIZATION REVIEW
Notification of Discharge   This is a Notification of Discharge from our facility [address]  Please be advised that this patient has been discharge from our facility  Below you will find the admission and discharge date and time including the patients disposition  UTILIZATION REVIEW CONTACT:  Madi Lopez  Utilization   Network Utilization Review Department  Phone: [phone] x carefully listen to the prompts  All voicemails are confidential   Email: [email or web address]     PHYSICIAN ADVISORY SERVICES:  FOR [id number] REVIEW - MEDICAL NECESSITY DENIAL  Phone: [phone]  Fax: [phone]  Email: [email or web address]     PRESENTATION DATE: 6/21/2022  7:05 AM  OBERVATION ADMISSION DATE:   INPATIENT ADMISSION DATE: 6/21/22 11:49 AM   DISCHARGE DATE: 6/23/2022  2:36 PM  DISPOSITION: Home/Self Care Home/Self Care      IMPORTANT INFORMATION:  Send all requests for admission clinical reviews, approved or denied determinations and any other requests to dedicated fax number below belonging to the campus where the patient is receiving treatment   List of dedicated fax numbers:  [address] DENIALS (Administrative/Medical Necessity) [phone]   [address] (Maternity/NICU/Pediatrics) [phone]   Terence Nam [phone]   Deanna Promise [phone]   [address] [place] [address] [phone]   [address] [phone]   [facility]  [phone]   [address] [place], [place] [address] [facility] 1000 [facility] [phone]

## 2022-06-27 ENCOUNTER — TELEPHONE (OUTPATIENT)
Dept: CARDIAC SURGERY | Facility: CLINIC | Age: 77
End: 2022-06-27

## 2022-06-27 ENCOUNTER — TRANSITIONAL CARE MANAGEMENT (OUTPATIENT)
Dept: INTERNAL MEDICINE CLINIC | Facility: CLINIC | Age: 77
End: 2022-06-27

## 2022-06-27 NOTE — TELEPHONE ENCOUNTER
Called and spoke with patient and his son  King Mohamud had TAVR on 6/21 and was discharged home on 6/23  Overall he is doing well, ambulating at home  No c/o fevers/chills/chest pain/SOB  Does have some lower extremity swelling, but reports weight is down from discharge (177-->174 lbs)  No issues with groin site  Reviewed upcoming appointments  Will see Dr Yaya Coon on 7/28  Encouraged to call our office with any questions or concerns

## 2022-07-01 ENCOUNTER — OFFICE VISIT (OUTPATIENT)
Dept: INTERNAL MEDICINE CLINIC | Facility: CLINIC | Age: 77
End: 2022-07-01

## 2022-07-01 VITALS
TEMPERATURE: 97.4 F | SYSTOLIC BLOOD PRESSURE: 146 MMHG | BODY MASS INDEX: 26.66 KG/M2 | HEART RATE: 80 BPM | HEIGHT: 69 IN | WEIGHT: 180 LBS | DIASTOLIC BLOOD PRESSURE: 69 MMHG

## 2022-07-01 DIAGNOSIS — I35.0 AORTIC VALVE STENOSIS, ETIOLOGY OF CARDIAC VALVE DISEASE UNSPECIFIED: ICD-10-CM

## 2022-07-01 DIAGNOSIS — E11.40 TYPE 2 DIABETES MELLITUS WITH DIABETIC NEUROPATHY, WITH LONG-TERM CURRENT USE OF INSULIN (HCC): ICD-10-CM

## 2022-07-01 DIAGNOSIS — I10 ESSENTIAL HYPERTENSION: ICD-10-CM

## 2022-07-01 DIAGNOSIS — Z95.2 S/P TAVR (TRANSCATHETER AORTIC VALVE REPLACEMENT): Primary | ICD-10-CM

## 2022-07-01 DIAGNOSIS — Z79.4 TYPE 2 DIABETES MELLITUS WITH DIABETIC NEUROPATHY, WITH LONG-TERM CURRENT USE OF INSULIN (HCC): ICD-10-CM

## 2022-07-01 DIAGNOSIS — B18.2 CHRONIC HEPATITIS C WITHOUT HEPATIC COMA (HCC): Chronic | ICD-10-CM

## 2022-07-01 PROCEDURE — 99495 TRANSJ CARE MGMT MOD F2F 14D: CPT | Performed by: INTERNAL MEDICINE

## 2022-07-01 NOTE — PROGRESS NOTES
[facility]  INTERNAL MEDICINE TCM VISIT     PATIENT INFORMATION     Saad Sanchez   78 y o  male   MRN: [id number]    ASSESSMENT/PLAN     Diagnoses and all orders for this visit:    S/P TAVR (transcatheter aortic valve replacement)  Discharged 6/23 s/p elective TAVR for severe AS  Hospital course c/b new onset LBBB s/p procedure  Patient sent home with 2 week ZIO patch and DAPT therapy  Patient unsure how long he should be on DAPT  No symptomatic complaints since discharge  Continue aspirin, Plavix  Follow-up with cardiology scheduled on 07/07 and follow-up with CT surgery on 07/28  Patient should discuss with specialist providers about duration of DAPT therapy and findings of ZIO patch  Encouraged exercise    Essential hypertension  /74, 146/69  Current regimen amlodipine 5 mg daily, HCTZ 25 mg daily, lisinopril 40 mg daily  Suspect that patient's blood pressure will improve in the coming weeks s/p TAVR  Continue current regimen as above  Asked patient to check BP every few days and return with a log in 1 month  Encourage exercise      Chronic hepatitis C without hepatic coma ([facility] [address] )  Per chart review, untreated  HCV PCR not detected in 2019  Offered Hep A vaccine at this visit  Patient reports he has gotten all of his needed Hepatitis vaccines  Patient will look for documentation and bring in next time so we have documentation in the chart    Type 2 diabetes mellitus with diabetic neuropathy, with long-term current use of insulin (HCC)  Last A1c in May 6 5  Current regimen Lantus 18 units qhs and Novolog 5 units with breakfast and dinner, and metformin 850 mg BID  Patient does not check his sugar at home  Will continue current antidiabetic regimen  Asked patient to check his sugars daily and bring log to his next appointment in 1 month    Aortic valve stenosis, etiology of cardiac valve disease unspecified  As above in "s/p TAVR"      HISTORY OF PRESENT ILLNESS Reason for recent hospitalization: AS s/p TAVR    TCM Call (since 5/31/2022)     Hospital care reviewed  Records reviewed    Patient was hospitial[facility] at  [facility]    Date of Admission  06/21/22    Date of discharge  06/23/22    Diagnosis  AORTIC STENOSIS - R01 1    Disposition  Home      TCM Call (since 5/31/2022)     Post hospital issues  Reduced activity; Poor ADL (Activities of Daily Living) performance    Scheduled for follow up? Yes  7/1/22 WITH DR Fabiola Solorzano    Patients specialists  Other (comment)    Other specialists names  [address]  [facility]    Other specialists contcat #  [phone]    I have advised the patient to call PCP with any new or worsening symptoms  Xu De La Cruz LPN    Are you recieving any outpatient services  Yes    What type of services  CARDIAC REHAB          Janet Caban is a 77 yo M with PMH of CAD, AS s/p TAVR, insulin-dependent type 2 diabetes, HTN, hypothyroidism, asthma, untreated hep C who presents today for TCM  Patient discharged on 06/23 after elective TAVR for aortic stenosis  Hospital course c/b new LBBB  Patient discharged with ZIO patch for 2 weeks and started on DAPT s/p TAVR  Patient denies any chest pain, shortness of breath, palpitations, headaches, lightheadedness, dizziness since his discharge  He does report a 3 lb weight loss since discharge which he was surprised about  He also endorses some soreness in his left groin from catheterization  He does not check his BP or BG at home  He does have the devices to do so  He reports history of Hep C but that he was "negative" a few years ago and was told he did not need treatment  Hep A vaccine recommended at this time but patient said he has already received it  He will look for documentation of this and bring it to his next appointment  REVIEW OF SYSTEMS     Review of Systems   Constitutional: Positive for unexpected weight change  Negative for chills, fatigue and fever     HENT: Negative for sore throat  Eyes: Negative for visual disturbance  Respiratory: Negative for cough and shortness of breath  Cardiovascular: Negative for chest pain, palpitations and leg swelling  Gastrointestinal: Negative for abdominal pain and nausea  Genitourinary: Negative for dysuria  Skin: Negative for rash  Neurological: Negative for dizziness, weakness, light-headedness and headaches  OBJECTIVE     Vitals:    07/01/22 0839   BP: 159/74   BP Location: Left arm   Patient Position: Sitting   Cuff Size: Large   Pulse: 83   Temp: (!) 97 4 °F (36 3 °C)   TempSrc: Temporal   Weight: 81 6 kg (180 lb)   Height: 5' 9" (1 753 m)     Physical Exam  Constitutional:       General: He is not in acute distress  HENT:      Mouth/Throat:      Mouth: Mucous membranes are moist    Eyes:      Extraocular Movements: Extraocular movements intact  Conjunctiva/sclera: Conjunctivae normal    Cardiovascular:      Rate and Rhythm: Normal rate and regular rhythm  Comments: Flopping systolic murmur  Pulmonary:      Effort: Pulmonary effort is normal       Breath sounds: Examination of the left-lower field reveals wheezing  Wheezing present  Abdominal:      General: Bowel sounds are normal  There is no distension  Palpations: Abdomen is soft  Tenderness: There is no abdominal tenderness  Musculoskeletal:      Cervical back: Neck supple  Right lower leg: No edema  Left lower leg: No edema  Skin:     General: Skin is warm and dry  Neurological:      General: No focal deficit present  Mental Status: He is alert and oriented to person, place, and time  Cranial Nerves: Cranial nerves are intact  Psychiatric:         Mood and Affect: Mood normal          Behavior: Behavior normal  Behavior is cooperative  CURRENT MEDICATIONS     Current Outpatient Medications:     acetaminophen (TYLENOL) 325 mg tablet, Take 1-2 tablets Q4-6 hours PRN pain   Do not take more than 4 grams in 24 hours  , Disp: , Rfl: 0    Advair -21 MCG/ACT inhaler, TAKE 2 PUFFS BY MOUTH TWICE A DAY, Disp: 36 g, Rfl: 6    albuterol (VENTOLIN HFA) 90 mcg/act inhaler, Inhale 2 puffs every 4 (four) hours as needed for wheezing or shortness of breath, Disp: 18 g, Rfl: 2    Alcohol Swabs (ALCOHOL PADS) 70 % PADS, , Disp: , Rfl:     amLODIPine (NORVASC) 5 mg tablet, Take 1 tablet (5 mg total) by mouth daily, Disp: 90 tablet, Rfl: 1    aspirin 81 mg chewable tablet, Chew 1 tablet (81 mg total) daily, Disp: 30 tablet, Rfl: 2    Blood Glucose Monitoring Suppl (OneTouch Verio) w/Device KIT, Check blood glucose three times daily before each meal, Disp: 1 kit, Rfl: 0    cholecalciferol (VITAMIN D3) 1,000 units tablet, TAKE 2 TABLETS (2,000 UNITS TOTAL) BY MOUTH DAILY, Disp: 180 tablet, Rfl: 5    clopidogrel (PLAVIX) 75 mg tablet, Take 1 tablet (75 mg total) by mouth daily, Disp: 90 tablet, Rfl: 0    Continuous Blood Gluc  (FreeStyle Cristofer 2 [place]) POWER, Use 1 each continuous, Disp: 1 each, Rfl: 0    Continuous Blood Gluc Sensor (FreeStyle Cristofer 2 Sensor) MISC, Use 1 each every 14 (fourteen) days, Disp: 6 each, Rfl: 3    docusate sodium (COLACE) 100 mg capsule, Take 1 capsule (100 mg total) by mouth 2 (two) times a day as needed for constipation Hold for soft stools  , Disp: 60 capsule, Rfl: 0    fluticasone (FLONASE) 50 mcg/act nasal spray, 2 sprays into each nostril daily, Disp: 2 Bottle, Rfl: 2    hydrochlorothiazide (HYDRODIURIL) 25 mg tablet, Take 1 tablet (25 mg total) by mouth daily, Disp: 90 tablet, Rfl: 2    insulin glargine (Lantus SoloStar) 100 units/mL injection pen, Inject 18 Units under the skin daily at bedtime, Disp: 15 mL, Rfl: 0    Insulin Pen Needle (Pen Needles) 31G X 8 MM MISC, Use daily, Disp: 300 each, Rfl: 3    Lancets (FREESTYLE) lancets, by Other route as needed (As needed), Disp: 100 each, Rfl: 3    levothyroxine 137 mcg tablet, Take 1 tablet (137 mcg total) by mouth daily, Disp: 90 tablet, Rfl: 1    lisinopril (ZESTRIL) 20 mg tablet, Take 2 tablets (40 mg total) by mouth daily, Disp: 60 tablet, Rfl: 2    metFORMIN (GLUCOPHAGE) 850 mg tablet, TAKE 1 TABLET (850 MG TOTAL) BY MOUTH 2 (TWO) TIMES A DAY WITH MEALS, Disp: 180 tablet, Rfl: 3    montelukast (SINGULAIR) 10 mg tablet, TAKE 1 TABLET BY MOUTH EVERY DAY, Disp: 90 tablet, Rfl: 1    NovoLOG FlexPen 100 units/mL injection pen, Inject 5 units with breakfast and with dinner, Disp: 12 mL, Rfl: 3    polyethylene glycol (MIRALAX) 17 g packet, Take 17 g by mouth daily as needed (constipation) Hold for soft stools  , Disp: 30 each, Rfl: 0    potassium chloride (K-DUR,KLOR-CON) 10 mEq tablet, Take 1 tablet (10 mEq total) by mouth daily for 5 days, Disp: 5 tablet, Rfl: 0    rosuvastatin (CRESTOR) 40 MG tablet, TAKE 1 TABLET BY MOUTH EVERY DAY, Disp: 90 tablet, Rfl: 3    torsemide (DEMADEX) 10 mg tablet, Take 1 tablet (10 mg total) by mouth daily for 5 days, Disp: 5 tablet, Rfl: 0    vitamin B-12 (VITAMIN B-12) 1,000 mcg tablet, TAKE 1 TABLET BY MOUTH EVERY DAY, Disp: 90 tablet, Rfl: 3    HISTORICAL INFORMATION     Past Medical History:   Diagnosis Date    Arthritis     Asthma     Colon polyps     Community acquired pneumonia     last assessed: 5/1/2014    [facility]     Hemorrhagic prepatellar bursitis, left 10/21/2019    Hepatitis C     High cholesterol     Hypertension     Lymphadenopathy, anterior cervical 4/17/2018    Screening for colon cancer 5/1/2019    Thoracic vertebral fracture ([facility] ) 6/11/2014     Past Surgical History:   Procedure Laterality Date    CARDIAC CATHETERIZATION N/A 6/3/2022    Procedure: Cardiac RHC/LHC; Surgeon: Geovani Zayas MD;  [facility];   Service: Cardiology    CARDIAC CATHETERIZATION N/A 6/21/2022    Procedure: CARDIAC TAVR;  Surgeon: Maryla Bence, MD;  Location: [facility] MAIN [facility];  Service: Cardiology    COLONOSCOPY      COLONOSCOPY W/ POLYPECTOMY      LITHOTRIPSY      MULTIPLE TOOTH EXTRACTIONS      MO COLONOSCOPY FLX DX W/COLLJ SPEC WHEN PFRMD N/A 2018    Procedure: COLONOSCOPY;  Surgeon: Olga Lidia Finley MD;  Location: [facility] GI LAB; Service: Gastroenterology    MO REPLACE AORTIC VALVE OPENFEMORAL ARTERY APPROACH N/A 2022    Procedure: REPLACEMENT AORTIC VALVE TRANSCATHETER (TAVR) TRANSFEMORAL W/ 26MM QUINN RONNI S3 ULTRA VALVE(ACCESS ON LEFT) SAULO;  Surgeon: John Ardon MD;  Location[facility];  Service: Cardiac Surgery     Social History     Socioeconomic History    Marital status: /Civil Union     Spouse name: Not on file    Number of children: Not on file    Years of education: Not on file    Highest education level: Not on file   Occupational History    Occupation: retired    Tobacco Use    Smoking status: Former Smoker     Packs/day: 0 50     Types: Cigarettes     Quit date: 2022     Years since quittin 0    Smokeless tobacco: Never Used    Tobacco comment: started when he was about 24 yrs old; stopped smoking 3 wks ago   Vaping Use    Vaping Use: Never used   Substance and Sexual Activity    Alcohol use: No    Drug use: No    Sexual activity: Not Currently   Other Topics Concern    Not on file   Social History Narrative    Not on file     Social Determinants of Health     Financial Resource Strain: Low Risk     Difficulty of Paying Living Expenses: Not hard at all   Food Insecurity: No Food Insecurity    Worried About [address] in the Last Year: Never true    [facility] in the Last Year: Never true   Transportation Needs: No Transportation Needs    Lack of Transportation (Medical): No    Lack of Transportation (Non-Medical): No   Physical Activity: Unknown    Days of Exercise per Week: Not on file    Minutes of Exercise per Session: 60 min   Stress: No Stress Concern Present    Feeling of Stress : Not at all   Social Connections:  Moderately Isolated    Frequency of Communication with Friends and Family: More than three times a week    Frequency of Social Gatherings with Friends and Family: More than three times a week    Attends Mandaen Services: Never    Active Member of Clubs or Organizations: No    Attends Club or Organization Meetings: Never    Marital Status:    Intimate Partner Violence: Not on file   Housing Stability: [address] Unable to Pay for Housing in the Last Year: No    Number of [place] in the Last Year: 1    Unstable Housing in the Last Year: No     Family History   Problem Relation Age of Onset    Heart attack Family         at age 80    No Known Problems Mother     No Known Problems Father     Diabetes Sister     Diabetes Brother              ==  Stanislav Starr MD PGY2  [address] , [id number] [place] [address] [address] [facility]  Office: [phone]  Fax: [phone]

## 2022-07-01 NOTE — PATIENT INSTRUCTIONS
Please check your blood pressure every few days and your blood sugar every day  Please bring these numbers to your next appointment in 1 month

## 2022-07-03 ENCOUNTER — TELEPHONE (OUTPATIENT)
Dept: OTHER | Facility: OTHER | Age: 77
End: 2022-07-03

## 2022-07-06 NOTE — PROGRESS NOTES
Cardiology Follow Up    Aditya Ybarra  1945  [id number]  [facility] [place]  [address]  [address]  [phone]  [phone]    1  S/P TAVR (transcatheter aortic valve replacement)  VAS lower limb venous duplex study, complete bilateral    CANCELED: VAS lower limb arterial duplex, complete bilateral   2  LBBB (left bundle branch block)     3  Bilateral leg pain     4  Coronary artery disease involving native coronary artery of native heart without angina pectoris     5  Primary hypertension     6  Mixed hyperlipidemia     7  Type 2 diabetes mellitus with diabetic neuropathy, with long-term current use of insulin ([address] )     8  Tobacco abuse         Interval History:   Mr Aditya Ybarra was admitted to [facility] on 6/21 - 6/23/22 with   Nonrheumatic aortic valve stenosis  On 6/21/22 Soheila Decker was electively admitted and underwent a TF TAVR #26mm Emery Hernán S3 Ultra valve via left femoral approach by Dr Zheng Proper  He was extubated and transferred to the PACU  DP pulses 2+ bilaterally  Home medications restarted  EKG showed new left bundle branch block  EP consulted  Gerontology and Cardiology consulted  On postop day 1  He remained in normal sinus rhythm later in the day had recurrent left bundle branch block  Per EP DC home with ZIO patch  He was discharged on postop day 2  Mr Aditya Ybarra presents to our office for a recent hospitalization follow up visit  He is accompanied by his wife who is interpreting during the office visit  Soheila Decker admits to dyspnea with exertion which is his baseline and relieved with inhalers  His chief complaint is yumiko LE pain with ambulating a block  He does not have his own dentin        Medical History   Severe AS KYRA 0 70cm2  CAD  Hypertension  Hyperlipidemia 1/25/22 , , HDL 49,    DM2 HgbA1C 6 5 on 5/09/22  Asthma  Hepatitis C  Lymphadenopathy  Tobacco abuse       22 RHC/ LHC: First marginal lesion 50% stenosis, mid LAD 50% stenosis, RPDA 50% stenosis, RPDA to 50% stenosis, 1st diagonal 50% stenosis    Patient Active Problem List   Diagnosis    Asthma    Allergic rhinitis    Bilateral hearing loss    Chronic hepatitis C ([facility] )    Type 2 diabetes mellitus with neurologic complication, with long-term current use of insulin (HCC)    Collapsed arches    Nephritis and nephropathy, not specified as acute or chronic, with other specified pathological lesion in kidney, in diseases classified elsewhere    Hyperlipidemia    Essential hypertension    Hypothyroidism    Nicotine dependence    Osteoarthritis of knee    Vitamin B12 deficiency    Adenomatous colon polyp    Diverticulosis of large intestine    Internal hemorrhoids    Aortic stenosis    Contrast media allergy    S/P TAVR (transcatheter aortic valve replacement)    Coronary artery disease     Past Medical History:   Diagnosis Date    Arthritis     Asthma     Colon polyps     Community acquired pneumonia     last assessed: 2014    Diabetes mellitus ([facility] )     Hemorrhagic prepatellar bursitis, left 10/21/2019    Hepatitis C     High cholesterol     Hypertension     Lymphadenopathy, anterior cervical 2018    Screening for colon cancer 2019    Thoracic vertebral fracture ([facility] ) 2014     Social History     Socioeconomic History    Marital status: /Civil Union     Spouse name: Not on file    Number of children: Not on file    Years of education: Not on file    Highest education level: Not on file   Occupational History    Occupation: retired    Tobacco Use    Smoking status: Former Smoker     Packs/day: 0 50     Types: Cigarettes     Quit date: 2022     Years since quittin 1    Smokeless tobacco: Never Used    Tobacco comment: started when he was about 24 yrs old; stopped smoking 3 wks ago   Vaping Use    Vaping Use: Never used   Substance and Sexual Activity    Alcohol use: No    Drug use: No    Sexual activity: Not Currently   Other Topics Concern    Not on file   Social History Narrative    Not on file     Social Determinants of Health     Financial Resource Strain: Low Risk     Difficulty of Paying Living Expenses: Not hard at all   Food Insecurity: No Food Insecurity    Worried About Running Out of Food in the Last Year: Never true    Kim of Food in the Last Year: Never true   Transportation Needs: No Transportation Needs    Lack of Transportation (Medical): No    Lack of Transportation (Non-Medical): No   Physical Activity: Unknown    Days of Exercise per Week: Not on file    Minutes of Exercise per Session: 60 min   Stress: No Stress Concern Present    Feeling of Stress : Not at all   Social Connections: Moderately Isolated    Frequency of Communication with Friends and Family: More than three times a week    Frequency of Social Gatherings with Friends and Family: More than three times a week    Attends Jain Services: Never    Active Member of Clubs or Organizations: No    Attends Club or Organization Meetings: Never    Marital Status:    Intimate Partner Violence: Not on file   Housing Stability: [address] Unable to Pay for Housing in the Last Year: No    Number of [place] in the Last Year: 1    Unstable Housing in the Last Year: No      Family History   Problem Relation Age of Onset    Heart attack Family         at age 80    No Known Problems Mother     No Known Problems Father     Diabetes Sister     Diabetes Brother      Past Surgical History:   Procedure Laterality Date    CARDIAC CATHETERIZATION N/A 6/3/2022    Procedure: Cardiac RHC/LHC; Surgeon: Thony Donaldson MD;  [facility];   Service: Cardiology    CARDIAC CATHETERIZATION N/A 6/21/2022    Procedure: CARDIAC TAVR;  Surgeon: Ulysses Shaggy, MD;  Location: [facility] MAIN [facility];  Service: Cardiology    COLONOSCOPY      COLONOSCOPY W/ POLYPECTOMY      LITHOTRIPSY      MULTIPLE TOOTH EXTRACTIONS      MT COLONOSCOPY FLX DX W/COLLJ SPEC WHEN PFRMD N/A 5/17/2018    Procedure: COLONOSCOPY;  Surgeon: Yayo Manning MD;  Location: [facility] GI LAB; Service: Gastroenterology    MT REPLACE AORTIC VALVE OPENFEMORAL ARTERY APPROACH N/A 6/21/2022    Procedure: REPLACEMENT AORTIC VALVE TRANSCATHETER (TAVR) TRANSFEMORAL W/ 26MM QUINN RONNI S3 ULTRA VALVE(ACCESS ON LEFT) SAULO;  Surgeon: France Ramirez MD;  Location: [facility] MAIN [facility];  Service: Cardiac Surgery       Current Outpatient Medications:     acetaminophen (TYLENOL) 325 mg tablet, Take 1-2 tablets Q4-6 hours PRN pain  Do not take more than 4 grams in 24 hours  , Disp: , Rfl: 0    Advair -21 MCG/ACT inhaler, TAKE 2 PUFFS BY MOUTH TWICE A DAY, Disp: 36 g, Rfl: 6    albuterol (VENTOLIN HFA) 90 mcg/act inhaler, Inhale 2 puffs every 4 (four) hours as needed for wheezing or shortness of breath, Disp: 18 g, Rfl: 2    Alcohol Swabs (ALCOHOL PADS) 70 % PADS, , Disp: , Rfl:     amLODIPine (NORVASC) 5 mg tablet, Take 1 tablet (5 mg total) by mouth daily, Disp: 90 tablet, Rfl: 1    aspirin 81 mg chewable tablet, Chew 1 tablet (81 mg total) daily, Disp: 30 tablet, Rfl: 2    Blood Glucose Monitoring Suppl (OneTouch Verio) w/Device KIT, Check blood glucose three times daily before each meal, Disp: 1 kit, Rfl: 0    cholecalciferol (VITAMIN D3) 1,000 units tablet, TAKE 2 TABLETS (2,000 UNITS TOTAL) BY MOUTH DAILY, Disp: 180 tablet, Rfl: 5    clopidogrel (PLAVIX) 75 mg tablet, Take 1 tablet (75 mg total) by mouth daily, Disp: 90 tablet, Rfl: 0    Continuous Blood Gluc  (FreeStyle Cristofer 2 [place]) POWER, Use 1 each continuous, Disp: 1 each, Rfl: 0    Continuous Blood Gluc Sensor (FreeStyle Cristofer 2 Sensor) MISC, Use 1 each every 14 (fourteen) days, Disp: 6 each, Rfl: 3    fluticasone (FLONASE) 50 mcg/act nasal spray, 2 sprays into each nostril daily, Disp: 2 Bottle, Rfl: 2    hydrochlorothiazide (HYDRODIURIL) 25 mg tablet, Take 1 tablet (25 mg total) by mouth daily, Disp: 90 tablet, Rfl: 2    insulin glargine (Lantus SoloStar) 100 units/mL injection pen, Inject 18 Units under the skin daily at bedtime, Disp: 15 mL, Rfl: 0    Insulin Pen Needle (Pen Needles) 31G X 8 MM MISC, Use daily, Disp: 300 each, Rfl: 3    Lancets (FREESTYLE) lancets, by Other route as needed (As needed), Disp: 100 each, Rfl: 3    levothyroxine 137 mcg tablet, Take 1 tablet (137 mcg total) by mouth daily, Disp: 90 tablet, Rfl: 1    lisinopril (ZESTRIL) 20 mg tablet, Take 2 tablets (40 mg total) by mouth daily, Disp: 60 tablet, Rfl: 2    metFORMIN (GLUCOPHAGE) 850 mg tablet, TAKE 1 TABLET (850 MG TOTAL) BY MOUTH 2 (TWO) TIMES A DAY WITH MEALS, Disp: 180 tablet, Rfl: 3    montelukast (SINGULAIR) 10 mg tablet, TAKE 1 TABLET BY MOUTH EVERY DAY, Disp: 90 tablet, Rfl: 1    NovoLOG FlexPen 100 units/mL injection pen, Inject 5 units with breakfast and with dinner, Disp: 12 mL, Rfl: 3    rosuvastatin (CRESTOR) 40 MG tablet, TAKE 1 TABLET BY MOUTH EVERY DAY, Disp: 90 tablet, Rfl: 3    vitamin B-12 (VITAMIN B-12) 1,000 mcg tablet, TAKE 1 TABLET BY MOUTH EVERY DAY, Disp: 90 tablet, Rfl: 3  Allergies   Allergen Reactions    Iv Contrast [Iodinated Diagnostic Agents] Rash     Patient states he had a severe reaction approximately 10 years ago , states he had a rash, itchy and he remembers a bunch of people pounding on chest and being surrounded by multiple doctors  Labs:  No results displayed because visit has over 200 results        Appointment on 06/14/2022   Component Date Value    MRSA Culture Only 06/14/2022 No Methicillin Resistant Staphlyococcus aureus (MRSA) isolated     ABO Grouping 06/14/2022 O     Rh Factor 06/14/2022 Negative     Antibody Screen 06/14/2022 Negative     Specimen Expiration Date 06/14/2022 [id number]    Admission on 06/03/2022, Discharged on 06/03/2022   Component Date Value    Ventricular Rate 06/03/2022 84     Atrial Rate 06/03/2022 84     MT Interval 06/03/2022 162     QRSD Interval 06/03/2022 86     QT Interval 06/03/2022 356     QTC Interval 06/03/2022 420     P Axis 06/03/2022 82     QRS Axis 06/03/2022 59     T Wave Barnum 06/03/2022 42    Hospital Outpatient Visit on 05/19/2022   Component Date Value    pH, Art i-STAT 05/19/2022 7  402     pCO2, Art i-STAT 05/19/2022 50 0 (A)    pO2, ART i-STAT 05/19/2022 78 0     BE, i-STAT 05/19/2022 5 (A)    HCO3, Art i-STAT 05/19/2022 31 1 (A)    CO2, i-STAT 05/19/2022 33 (A)    O2 Sat, i-STAT 05/19/2022 95 (A)    SODIUM, I-STAT 05/19/2022 138     Potassium, i-STAT 05/19/2022 4 0     Calcium, Ionized i-STAT 05/19/2022 1 33 (A)    Hct, i-STAT 05/19/2022 39     Hgb, i-STAT 05/19/2022 13 3     Glucose, i-STAT 05/19/2022 134     POC FIO2 05/19/2022 21     Specimen Type 05/19/2022 ARTERIAL    Appointment on 05/17/2022   Component Date Value    Sodium 05/17/2022 141     Potassium 05/17/2022 3 9     Chloride 05/17/2022 106     CO2 05/17/2022 29     ANION GAP 05/17/2022 6     BUN 05/17/2022 21     Creatinine 05/17/2022 0 89     Glucose, Fasting 05/17/2022 108 (A)    Calcium 05/17/2022 9 2     Corrected Calcium 05/17/2022 9 8     AST 05/17/2022 13     ALT 05/17/2022 22     Alkaline Phosphatase 05/17/2022 49     Total Protein 05/17/2022 6 8     Albumin 05/17/2022 3 3 (A)    Total Bilirubin 05/17/2022 0 48     eGFR 05/17/2022 83     Prolactin 05/17/2022 6 2     WBC 05/17/2022 8 28     RBC 05/17/2022 4 49     Hemoglobin 05/17/2022 13 5     Hematocrit 05/17/2022 41 6     MCV 05/17/2022 93     MCH 05/17/2022 30 1     MCHC 05/17/2022 32 5     RDW 05/17/2022 13 6     MPV 05/17/2022 10 6     Platelets 58/81/9593 254     nRBC 05/17/2022 0     Neutrophils Relative 05/17/2022 66     Immat GRANS % 05/17/2022 0     Lymphocytes Relative 05/17/2022 23     Monocytes Relative 05/17/2022 8     Eosinophils Relative 05/17/2022 3     Basophils Relative 05/17/2022 0     Neutrophils Absolute 05/17/2022 5 45     Immature Grans Absolute 05/17/2022 0 01     Lymphocytes Absolute 05/17/2022 1 91     Monocytes Absolute 05/17/2022 0 64     Eosinophils Absolute 05/17/2022 0 25     Basophils Absolute 05/17/2022 0 02     Testosterone, Free 05/17/2022 4 3 (A)    TESTOSTERONE TOTAL 05/17/2022 425    Office Visit on 05/09/2022   Component Date Value    Hemoglobin A1C 05/09/2022 6 5      Imaging: XR chest portable    Result Date: 6/22/2022  Narrative: CHEST INDICATION:   Post Open Heart Surgey  COMPARISON:  6/21/2022 EXAM PERFORMED/VIEWS:  XR CHEST PORTABLE FINDINGS:  Right IJ catheter with catheter tip projecting over the mid SVC  Status post TAVR  Cardiomediastinal silhouette appears unremarkable  The lungs are clear  No pneumothorax or pleural effusion  Osseous structures appear within normal limits for patient age  Impression: No acute cardiopulmonary disease  Workstation performed: [id number]     Cardiac catheterization    Result Date: 6/21/2022  Narrative: Surgeon: Abram Dudley MD Co-surgeon: Anais Reyes MD FINDINGS: 1  Intraoperative transesophageal echocardiogram revealed severe aortic stenosis  2  Final transesophageal echocardiogram demonstrated prosthetic valve with normal function and no paravalvular leak  Operative Technique The patient was taken to the operating room and placed supine on the operating table  Following the satisfactory induction of general anesthesia and placement of monitoring lines, the patient was prepped and draped in the usual sterile fashion  A time-out procedure was performed  The left common femoral artery and left common femoral vein were accessed percutaneously by the modified Seldinger technique and fluoroscopy  Through the left common femoral vein sheath, a balloon-tipped temporary pacing catheter was inserted and advanced into the right ventricle  Capture was confirmed   Two Perclose sutures were deployed in the left femoral artery  A 7 Fr sheath was placed in the left common femoral artery  A pigtail catheter was inserted and advanced to the right coronary cusp  An aortogram was performed to determine proper angle and orientation for valve deployment  A [employer] extra-stiff wire was positioned in the ascending aorta over an exchange catheter, and the sheath for the delivery system was inserted through the left common femoral artery and advanced into the aorta  The patient was systemically heparinized  A 5 Fr JR4 coronary catheter was advanced through the delivery sheath to the aortic valve  The aortic valve was crossed with a 0 035 straight-tip wire, the JR4 catheter was advanced into the left ventricular cavity and was exchanged for a curved tip Amplatzer extra stiff wire  A 26 mm RONNI 3 Ultra valve delivery system was advanced through the delivery sheath into the aorta, the delivery system was configured for deployment  The valve on the delivery system was then advanced and the aortic valve was crossed  The catheter was desheathed in the standard fashion  The valve was positioned using a combination of fluoroscopy and transesophageal echocardiogram guidance  During an episode of rapid pacing, balloon deployment of the valve was performed  Following deployment, the position of the valve was confirmed by fluoroscopy and echocardiography and its position appeared appropriate with no paravalvular leak  The valve delivery system was removed, followed by removal of the sheath from the left common femoral artery  The Perclose sutures were secured and direct pressure was held  Protamine was administered with normalization of the ACT  Pressure was released, without evidence of active bleeding  The left common femoral vein sheath was removed and pressure was held  I was present and scrubbed for all critical portions of this procedure   Sponge, needle and instrument counts were reported as correct by the nursing staff  SAULO Anesthesia    Result Date: 6/21/2022  Narrative: Chava Garcia MD     6/21/2022  1:32 PM Procedure Performed: SAULO Anesthesia Start Time:  6/21/2022 12:40 PM Preanesthesia Checklist Patient identified, IV assessed, risks and benefits discussed, monitors and equipment assessed, procedure being performed at surgeon's request and anesthesia consent obtained  Procedure Diagnostic Indications for SAULO:  hemodynamic monitoring  Type of SAULO: interventional SAULO with real time guidance of intracardiac procedure  Images Saved: ultrasound permanent image saved  Physician Requesting Echo: Davian Alves MD   Location performed: OR  Intubated  Bite block not placed  Heart visualized  Insertion of SAULO Probe:  Atraumatic  Probe Type:  Multiplane  Modalities:  2D only, color flow mapping, continuous wave Doppler, pulse wave Doppler and 3D  Echocardiographic and Doppler Measurements PREPROCEDURE LEFT VENTRICLE: Systolic Function: normal  Ejection Fraction: 55%  Cavity size: normal    RIGHT VENTRICLE: Systolic Function: normal    Hypertrophy present  AORTIC VALVE: Leaflets: trileaflet  Leaflet motions restricted  Stenosis: moderate and Under GA mean of 8mmHg  Discussion with surgeon regarding necessity for TAVR  Surgeon and cardiologist decided to proceed    Mean Gradient: 8 mmHg  Area: 1 1 cm²  Regurgitation: trace  MITRAL VALVE: Leaflets: normal  Leaflet Motions: normal  Regurgitation: mild  Stenosis: none  TRICUSPID VALVE: Leaflets: normal  Leaflet Motions: normal   Regurgitation: trace  PULMONIC VALVE: Leaflets: normal   ASCENDING AORTA: Size:  normal   Dissection not present  AORTIC ARCH: Size:  normal   dissection not present  Grade 3: atheroma protruding < 0 5 cm into lumen  DESCENDING AORTA: Size: normal   Dissection not present  Grade 3: atheroma protruding < 0 5 cm into lumen   RIGHT ATRIUM: Size:  normal  No spontaneous echo contrast  LEFT ATRIUM: Size: normal  No spontaneous echo contrast  LEFT ATRIAL APPENDAGE: Size: normal  No spontaneous echo contrast ATRIAL SEPTUM: Intra-atrial septal morphology: normal   VENTRICULAR SEPTUM: Intra-ventricular septum morphology: normal  OTHER FINDINGS: Pericardium:  normal  Pleural Effusion:  none  POSTPROCEDURE LEFT VENTRICLE: Unchanged   Ejection Fraction: 55 %  RIGHT VENTRICLE: Unchanged   AORTIC VALVE: Leaflets: bioprosthetic  Stenosis: none  Mean Gradient: 2 mmHg  Regurgitation: none  Valve Size: 26 mm  MITRAL VALVE: Unchanged   TRICUSPID VALVE: Unchanged   PULMONIC VALVE: Unchanged   ATRIA: Unchanged   AORTA: Unchanged   Dissection: Dissection not present  REMOVAL: Probe Removal: atraumatic  ECHOCARDIOGRAM COMMENTS: Post-TAVR: KYRA (VTI) 2 5 cm^2, mean gradient 2 mmHg, no PVL  [place] Simple XR chest portable ICU    Result Date: 6/21/2022  Narrative: CHEST INDICATION:   Post TAVR  COMPARISON:  Chest radiograph from 6/5/2014, chest CT from 6/9/2022  EXAM PERFORMED/VIEWS:  AP PORTABLE  FINDINGS: Right jugular catheter in SVC  Cardiomediastinal silhouette appears unremarkable  Post TAVR  Lungs clear  No effusion or pneumothorax  Osseous structures within normal limits for age  Impression: No acute cardiopulmonary disease  Post TAVR  Workstation performed: [id number]     CTA chest abdomen pelvis w wo contrast TAVR    Result Date: 6/9/2022  Narrative: CT ANGIOGRAM OF THE CHEST, ABDOMEN AND PELVIS WITH AND WITHOUT IV CONTRAST INDICATION:  Preoperative evaluation for TAVR COMPARISON: CT abdomen pelvis 10/14/2014, CT chest 6/10/2010 TECHNIQUE:  CT angiogram examination of the chest, abdomen and pelvis was performed according to standard protocol with cardiac gating  Axial, sagittal, and coronal reformatted images were submitted for interpretation  3D reconstructions were performed an independent workstation, and are supplied for review  Radiation dose length product (DLP) for this visit:  2404 62 mGy-cm     This examination, like all CT scans performed in the [facility], was performed utilizing techniques to minimize radiation dose exposure, including the use of iterative reconstruction and automated exposure control  IV Contrast:  120 mL of iodixanol (VISIPAQUE)    Enteric Contrast: Enteric contrast was not administered  FINDINGS: VASCULAR STRUCTURES:     Annulus: diameter 26 9 x 21 9 mm     area: 475 6 sq mm   Annulus to LCA: 14 3 mm   Annulus to RCA: 12 7 mm   Minimal diameter right iliofemoral segment: 3 7 mm   Minimal diameter left iliofemoral segment: 4 7 mm The ascending aorta is nonaneurysmal measuring 33 mm with mild atherosclerosis  There is a type I aortic arch with bovine branching anatomy and no stenosis in the visualized great vessels  The aortic arch is nonaneurysmal with mild atherosclerosis  The descending thoracic aorta is nonaneurysmal with mild atherosclerosis  The abdominal aorta is nonaneurysmal with mild atherosclerosis  Celiac, superior mesenteric, and inferior mesenteric arteries are patent  Atherosclerotic calcifications at the origins of bilateral renal arteries  Moderate stenosis at the right common iliac artery with mild ectasia distal to the stenosis  Left common iliac, bilateral external iliac and common femoral arteries are nonaneurysmal with mild atherosclerosis  Cardiac findings: There is mild calcification of the aortic valve  The left ventricle is normal   The ventricular septum is normal   No prior valvular surgery  No prior bypass surgery  No pericardial effusion  No cardiac mass or thrombus  Coronary artery calcifications  OTHER FINDINGS: CHEST LUNGS:  Calcified granuloma of left lower lobe  Remainder of lungs are clear  There is no tracheal or endobronchial lesion  PLEURA:  Unremarkable  MEDIASTINUM AND SUN:  Calcified left hilar lymph node  CHEST WALL:   Unremarkable  ABDOMEN LIVER/BILIARY TREE:  Unremarkable  GALLBLADDER:  No calcified gallstones   No pericholecystic inflammatory change  SPLEEN:  Calcified granuloma  PANCREAS:  Unremarkable  ADRENAL GLANDS:  Unremarkable  KIDNEYS/URETERS:  5 mm and 4 mm nonobstructing stones at the inferior pole of left kidney  Subcentimeter hypodense renal lesions likely represent cysts  No hydronephrosis  STOMACH AND BOWEL:  Colonic diverticulosis  APPENDIX:  No findings to suggest appendicitis  ABDOMINOPELVIC CAVITY:  No ascites or free intraperitoneal air  No lymphadenopathy  PELVIS REPRODUCTIVE ORGANS:  Unremarkable for patient's age  URINARY BLADDER:  Unremarkable  ABDOMINAL WALL/INGUINAL REGIONS:  Unremarkable  OSSEOUS STRUCTURES:  No acute fracture or destructive osseous lesion  8 mm sclerotic lesion in the right bone, previously 7 mm  L5 pars defect  Impression: 1  Nonobstructing subcentimeters stones in the inferior pole of left kidney  2  TAVR measurements as above  Workstation performed: [id number]     SAULO intraop interventional w/realtime guidance of cardiac procedures    Result Date: 6/21/2022  Narrative: This order contains the linked images for the SAULO that was performed by the Anesthesiologist   Please see the  CARDIAC SAULO ANESTHESIA procedure for results  Echo complete w/ contrast if indicated    Result Date: 6/23/2022  Narrative: [facility]  Left Ventricle: Left ventricular cavity size is normal  Wall thickness is mildly increased  There is mild concentric hypertrophy  The left ventricular ejection fraction is 60%  Systolic function is normal  Wall motion is normal    Left Atrium: The atrium is mildly dilated    Aortic Valve: There is an Emery RONNI 3 Ultra 26 mm TAVR bioprosthetic valve  The aortic valve has no significant stenosis  The aortic valve mean gradient is 9 mmHg  The DVI is 0 46  The aortic valve area is 1 27 cm2    Mitral Valve: There is mild regurgitation    Tricuspid Valve: There is mild to moderate regurgitation  The right ventricular systolic pressure is moderately elevated   The estimated right ventricular systolic pressure is 56 39 mmHg  Review of Systems:  Review of Systems   Respiratory: Positive for shortness of breath  All other systems reviewed and are negative  Physical Exam:  Physical Exam  Vitals reviewed  Constitutional:       Appearance: Normal appearance  HENT:      Head: Normocephalic  Cardiovascular:      Rate and Rhythm: Normal rate and regular rhythm  Pulses: Normal pulses  Heart sounds: Normal heart sounds  Pulmonary:      Effort: Pulmonary effort is normal       Breath sounds: Normal breath sounds  Abdominal:      General: Bowel sounds are normal       Palpations: Abdomen is soft  Musculoskeletal:         General: Normal range of motion  Cervical back: Normal range of motion and neck supple  Right lower leg: No edema  Left lower leg: No edema  Skin:     General: Skin is warm and dry  Capillary Refill: Capillary refill takes less than 2 seconds  Comments: Left femoral site appears clean dry intact without ecchymosis or erythema   Neurological:      General: No focal deficit present  Mental Status: He is alert and oriented to person, place, and time  Psychiatric:         Mood and Affect: Mood normal          Behavior: Behavior normal          Discussion/Summary:  1 621/22 sp TF TAVR #26mm Emery Hernán S3 Ultra valve via left femoral approach by Dr Darleen Lundberg  Continue on aspirin 81 mg daily, Plavix9  Beta-blocker on hold due to new LBBB, wearing Zio patch  He is aware on lab studies and TTE prior to his office visit with Dr Darleen Lundberg  2  New LBBB, Wearing a Zio patch will have second Zio placed today  Zio results followed by EP  BB continue on Hold   3  Cuong LE Pain with walking R/O DVT vs claudication  Cuong LE venous duplex     4  CAD 6/03/22  LHC: First marginal lesion 50% stenosis, mid LAD 50% stenosis, RPDA 50% stenosis, RPDA to 50% stenosis, 1st diagonal 50% stenosis    Continue on aspirin 81 mg daily, Plavix 75 mg daily Crestor 40 mg daily amlodipine 5 mg daily, lisinopril 40 mg daily  5  Hypertension /60 continue on  Amlodipine 5 mg daily, lisinopril 40 mg daily, DASH diet   6  Hyperlipidemia 1/25/22 , , HDL 49,  - continue on Crestor 40mg daily, DASH Diet   7  DM2 HgbA1C 6 5 on 5/09/22  Controlled on NovoLog 5 units at breakfast and dinner, Lantus 18 units daily at bedtime, metformin 850 mg b i d  follow-up with PCP  8   Tobacco abuse continues with smoking cessation

## 2022-07-07 ENCOUNTER — OFFICE VISIT (OUTPATIENT)
Dept: CARDIOLOGY CLINIC | Facility: CLINIC | Age: 77
End: 2022-07-07
Payer: MEDICARE

## 2022-07-07 ENCOUNTER — CLINICAL SUPPORT (OUTPATIENT)
Dept: CARDIOLOGY CLINIC | Facility: CLINIC | Age: 77
End: 2022-07-07
Payer: MEDICARE

## 2022-07-07 VITALS
HEIGHT: 69 IN | HEART RATE: 89 BPM | SYSTOLIC BLOOD PRESSURE: 146 MMHG | DIASTOLIC BLOOD PRESSURE: 60 MMHG | BODY MASS INDEX: 27.05 KG/M2 | WEIGHT: 182.6 LBS | OXYGEN SATURATION: 99 %

## 2022-07-07 DIAGNOSIS — Z79.4 TYPE 2 DIABETES MELLITUS WITH DIABETIC NEUROPATHY, WITH LONG-TERM CURRENT USE OF INSULIN (HCC): ICD-10-CM

## 2022-07-07 DIAGNOSIS — I25.10 CORONARY ARTERY DISEASE INVOLVING NATIVE CORONARY ARTERY OF NATIVE HEART WITHOUT ANGINA PECTORIS: ICD-10-CM

## 2022-07-07 DIAGNOSIS — M79.604 BILATERAL LEG PAIN: ICD-10-CM

## 2022-07-07 DIAGNOSIS — M79.605 BILATERAL LEG PAIN: ICD-10-CM

## 2022-07-07 DIAGNOSIS — I44.7 LBBB (LEFT BUNDLE BRANCH BLOCK): ICD-10-CM

## 2022-07-07 DIAGNOSIS — Z95.2 S/P TAVR (TRANSCATHETER AORTIC VALVE REPLACEMENT): Primary | ICD-10-CM

## 2022-07-07 DIAGNOSIS — I10 PRIMARY HYPERTENSION: ICD-10-CM

## 2022-07-07 DIAGNOSIS — Z95.2 S/P TAVR (TRANSCATHETER AORTIC VALVE REPLACEMENT): ICD-10-CM

## 2022-07-07 DIAGNOSIS — I44.7 LBBB (LEFT BUNDLE BRANCH BLOCK): Primary | ICD-10-CM

## 2022-07-07 DIAGNOSIS — Z72.0 TOBACCO ABUSE: ICD-10-CM

## 2022-07-07 DIAGNOSIS — E11.40 TYPE 2 DIABETES MELLITUS WITH DIABETIC NEUROPATHY, WITH LONG-TERM CURRENT USE OF INSULIN (HCC): ICD-10-CM

## 2022-07-07 DIAGNOSIS — E78.2 MIXED HYPERLIPIDEMIA: ICD-10-CM

## 2022-07-07 PROCEDURE — 99211 OFF/OP EST MAY X REQ PHY/QHP: CPT | Performed by: INTERNAL MEDICINE

## 2022-07-07 PROCEDURE — 99215 OFFICE O/P EST HI 40 MIN: CPT | Performed by: NURSE PRACTITIONER

## 2022-07-07 NOTE — PROGRESS NOTES
Iliana Felix is here today under the direction of Danita Hardin PA-C for 2nd two week Zio AT  Patch applied and all instructions for use given to patient in office

## 2022-07-19 ENCOUNTER — CLINICAL SUPPORT (OUTPATIENT)
Dept: CARDIOLOGY CLINIC | Facility: CLINIC | Age: 77
End: 2022-07-19
Payer: MEDICARE

## 2022-07-19 DIAGNOSIS — I44.7 NEW ONSET LEFT BUNDLE BRANCH BLOCK (LBBB): ICD-10-CM

## 2022-07-19 PROCEDURE — 93228 REMOTE 30 DAY ECG REV/REPORT: CPT | Performed by: INTERNAL MEDICINE

## 2022-07-21 ENCOUNTER — CLINICAL SUPPORT (OUTPATIENT)
Dept: CARDIAC REHAB | Age: 77
End: 2022-07-21
Payer: MEDICARE

## 2022-07-21 DIAGNOSIS — Z95.2 S/P TAVR (TRANSCATHETER AORTIC VALVE REPLACEMENT): ICD-10-CM

## 2022-07-21 PROCEDURE — 93797 PHYS/QHP OP CAR RHAB WO ECG: CPT

## 2022-07-21 NOTE — PROGRESS NOTES
Cardiac Rehabilitation Plan of Care   Initial Care Plan          Today's date: 2022   # of Exercise Sessions Completed: Initial Evaluation Today  Patient name: Juanita Moreno      : 1945  Age: 78 y o  MRN: [id number]  Referring Physician: Arianna Ahmadi PA-C  Cardiologist: Eugene Manning MD (fellow)  Provider: Flor  Clinician: Diana Rogers MS, CEP, CCRP      Dx:   Encounter Diagnosis   Name Primary?  S/P TAVR (transcatheter aortic valve replacement)      Date of onset: 22      SUMMARY OF PROGRESS:  (Lao speaking - wife accompanied him to the evaluation appointment for translation)    Today is Juan David's  initial evaluation to begin Cardiac Rehab post TAVR  The patient does not currently follow a formal exercise program at home  He has resumed light ADLs without fatigue and tolerating them well  Depression screening using the PHQ-9 interprets the patient's score 1-4 = Minimal Depression  TASHA-7 screening tool for anxiety suggests 5-9 = Mild anxiety  When addressed, the patient denies having depression/anxiety  Patient reports excellent social/emotional support  Information to begin using [employer] was provided as well as contact information for counseling through [facility]  PHQ-9 score will be reassessed in 30 days  The patient is a recent user, quit May 30, 2022, and is doing well abstaining  Patient admits to 100% medication compliance  Patient reports the following physical limitations: R lower leg pain with walking  States he needs to stop after walking one block  The patient completed an initial 6MWT  The patient walked  [phone] 66 feet/2  2METs  Resting  /60 dropping to 142/62 with exercise  Patient denied symptoms during exercise  Telemetry revealed NSR, LBBB, freq  PACs    Patient was counseled on exercise guidelines to achieve a minimum of 150 mins/wk of moderate intensity (RPE 4-6) exercise and encouraged to add 1-2 days of exercise on opposite days of cardiac rehab as tolerated  We discussed current dietary habits and goals of heart healthy eating for lipid management and diabetes management  The patient has T2D, Patient reported fasting , on Insulin   Patient's goals include: abstain from smoking, increased strength/stamina, reduced leg pain with walking  The patient's CAD risk factors include:  inactivity, obesity/overweight, hypertension, hyperlipidemia, diabetes and smoking  He was provided Kazakh education packet focused on lifestyle modification/education specific to His needs including heart healthy eating, reading food labels, stress management, risk factor reduction, understanding heart disease and common heart medications and smoking and heart disease  Patient will attend 35 monitored exercise sessions, 3x/wk for 12-18 weeks beginning July 29, 2022  CT surgical f/u appt is scheduled for July 28  Medication compliance: Yes   Comments: Pt reports to be compliant with medications  Fall Risk: Low   Comments: Ambulates with a steady gait with no assist device and Denies a fall in the past 6 months    EKG Interpretation: NSR, LBBB, freq  PACs      EXERCISE ASSESSMENT and PLAN    Current Exercise Program in Rehab:       Frequency: 3 days/week Supplement with home exercise 2+ days/wk as tolerated       Minutes: 30-40         METS: 1 8 - 3 0            HR: RHR+30   RPE: 4-5         Modalities: Treadmill, UBE, NuStep and Recumbent bike      Exercise Progression 30 Day Goals :    Frequency: 3 days/week of cardiac rehab     Supplement with home exercise 2+ days/wk as tolerated    Minutes: 40                            >150 mins/wk of moderate intensity exercise   METS: 2 0-3 8   HR: RHR+30    RPE: 4-5   Modalities: Treadmill, UBE, NuStep and Recumbent bike    Strength training:   Will be added following 2-3 weeks of monitored exercise sessions   Modalities: Leg Press, Chest Press, Lateral Raise and Seated Row    Home Exercise: none    Goals: Reduced dyspnea with physical activity  0-1/10, improved DASI score by 10%, Increase in exercise capacity by 40% - based on peak METs tolerated in cardiac rehab exercise session, Exercise 5 days/wk, >150mins/wk of moderate intensity exercise, Improved 6MWT distance by 10%, Resume ADLs with increased strength, Decrease sitting time and Start a walking program    Progression Toward Goals:  Reviewed Pt goals and determined plan of care, Will continue to educate and progress as tolerated  Education: benefit of exercise for CAD risk factors, AHA guidelines to achieve >150 mins/wk of moderate exercise and RPE scale   Plan:education on home exercise guidelines, home exercise 30+ mins 2 days opposite CR and Education class: Risk Factors for Heart Disease  Readiness to change: Preparation:  (Getting ready to change)       NUTRITION ASSESSMENT AND PLAN    Weight control:    Starting weight: 184   Current weight:     Waist circumference:    Startin 25   Current:      Diabetes: T2D  A1c: 6 5    last measured: 22    Lipid management: Discussed diet and lipid management and Last lipid profile 22  Chol 191    HDL 49      Goals:reduced BMI to < 25, LDL <100, fasting BG , Improved Rate Your Plate score  >08, 4 6-6%  wt loss, choose lean meat (93-95%), eliminate processed meats, use low fat dairy, reduce cheese intake or use reduced-fat, eat 3 or more servings of whole grains a day, Eat 4-5 cups of fruits and vegetables daily, choose healthy snacks: light popcorn, plain pretzels, Increase intake of nuts and seeds, eliminate or choose low-fat sweets and daily saturated fat intake <7%/13g    Progression Toward Goals: Reviewed Pt goals and determined plan of care, Will continue to educate and progress as tolerated      Education: heart healthy eating  low sodium diet  hydration  nutrition for  lipid management  nutrition for Improved BG control  exercise and diabetes management Education handout: Reading Food Labels  Education handout: Heart Healthy Eating  Plan:   switch to low fat cheeses, replace refined grain bread with whole grain bread, replace unhealthy snacks with fruits & vegs, reduce red meat 1x/wk, switch to skim or 1% milk, eat fewer desserts and sweets, avoid processed foods, monitor home blood glucose, drink more water, replace sugar with stevia or truvia and keep added daily sugar <25g/day  Readiness to change: Preparation:  (Getting ready to change)       PSYCHOSOCIAL ASSESSMENT AND PLAN    Emotional:  Depression assessment:  PHQ-9 = 2   1-4 = Minimal Depression            Anxiety measure:  TASHA-7 = 6   5-9 = Mild anxiety  Self-reported stress level:  1  Social support: Excellent and Patient reports excellent emotional/social support from family    Goals: Increased interest in doing things, increased energy, stop worrying, take time to relax and Feel less anxious    Progression Toward Goals: Reviewed Pt goals and determined plan of care, Will continue to educate and progress as tolerated      Education: benefits of a positive support system and stress management techniques, Education handout: Stress and Your Health,   Relaxation,  Plan:  Exercise, Spend time outdoors, Enjoy family and Return to previous social activity  Readiness to change: Preparation:  (Getting ready to change)       OTHER CORE COMPONENTS     Tobacco:   Social History     Tobacco Use   Smoking Status Former Smoker    Packs/day: 0 50    Types: Cigarettes    Quit date: 2022    Years since quittin 1   Smokeless Tobacco Never Used   Tobacco Comment    started when he was about 24 yrs old; stopped smoking 3 wks ago       Tobacco Use Intervention:   Pt quit May 30, 2022   and has abstained    Anginal Symptoms:  None   NTG use: No prescription    Blood pressure:    Restin/60   Exercise: 142/62    Goals: consistent BP < 130/80, reduced dietary sodium <2300mg, moderate intensity exercise >150 mins/wk, medication compliance and Abstain from smoking    Progression Toward Goals: Reviewed Pt goals and determined plan of care, Will continue to educate and progress as tolerated      Education:  low sodium diet and HTN and smoking and heart disease, Education handouts: Understanding Heart Disease  Plan: , Complete abstention from smoking, avoid places with second hand smoke, Avoid Processed foods, engage in regular exercise and check labels for sodium content  Readiness to change: Action:  (Changing behavior)

## 2022-07-21 NOTE — PROGRESS NOTES
CARDIAC REHAB ASSESSMENT    Today's date: 2022  Patient name: Howard Hinds     : 1945       MRN: [id number]  PCP: Darrell Guerra DO  Referring Physician: Elson Osler, PA-C Lorelee Mires, MD)  Cardiologist: Lola Colmenares MD  (fellow)   Surgeon:   Alla Hamilton MD  Dx:   Encounter Diagnosis   Name Primary?  S/P TAVR (transcatheter aortic valve replacement)        Date of onset: 22  Cultural needs: Somali speaking - needs  (wife interpreted evaluation appointment)    Weight    Wt Readings from Last 1 Encounters:   22 82 8 kg (182 lb 9 6 oz)      Height:   Ht Readings from Last 1 Encounters:   22 5' 9" (1 753 m)     Medical History:   Past Medical History:   Diagnosis Date    Arthritis     Asthma     Colon polyps     Community acquired pneumonia     last assessed: 2014    Diabetes mellitus ([facility] [address] )     Hemorrhagic prepatellar bursitis, left 10/21/2019    Hepatitis C     High cholesterol     Hypertension     Lymphadenopathy, anterior cervical 2018    Screening for colon cancer 2019    Thoracic vertebral fracture ([facility] [address] ) 2014         Physical Limitations: R shin pain - 10 years, cervical pain with movement - 2 years    Fall Risk: Low   Comments: Ambulates with a steady gait with no assist device    Anginal Equivalent: None/denies angina   NTG use: No prescription    Risk Factors   Cholesterol: Yes  Smoking: Recent user, quit in last 6 months, doing well abstaining - quit May 30, 2022 - smoked 40 years 1-2ppd  HTN: Yes  DM: Type 2   average   insulin  Obesity: No   Inactivity: Yes  Stress:  perceived  stress: 1/10   Stressors:none   Goals for Stress Management:TV    Family History:  Family History   Problem Relation Age of Onset    Heart attack Family         at age 80    No Known Problems Mother     No Known Problems Father     Diabetes Sister     Diabetes Brother        Allergies:  Iv contrast [iodinated diagnostic agents]  ETOH:   Social History     Substance and Sexual Activity   Alcohol Use No         Current Medications:   Current Outpatient Medications   Medication Sig Dispense Refill    acetaminophen (TYLENOL) 325 mg tablet Take 1-2 tablets Q4-6 hours PRN pain  Do not take more than 4 grams in 24 hours    0    Advair -21 MCG/ACT inhaler TAKE 2 PUFFS BY MOUTH TWICE A DAY 36 g 6    albuterol (VENTOLIN HFA) 90 mcg/act inhaler Inhale 2 puffs every 4 (four) hours as needed for wheezing or shortness of breath 18 g 2    Alcohol Swabs (ALCOHOL PADS) 70 % PADS       amLODIPine (NORVASC) 5 mg tablet Take 1 tablet (5 mg total) by mouth daily 90 tablet 1    aspirin 81 mg chewable tablet Chew 1 tablet (81 mg total) daily 30 tablet 2    Blood Glucose Monitoring Suppl (OneTouch Verio) w/Device KIT Check blood glucose three times daily before each meal 1 kit 0    cholecalciferol (VITAMIN D3) 1,000 units tablet TAKE 2 TABLETS (2,000 UNITS TOTAL) BY MOUTH DAILY 180 tablet 5    clopidogrel (PLAVIX) 75 mg tablet Take 1 tablet (75 mg total) by mouth daily 90 tablet 0    Continuous Blood Gluc  (FreeStyle Cristofer 2 [place]) POWER Use 1 each continuous 1 each 0    Continuous Blood Gluc Sensor (FreeStyle Cristofer 2 Sensor) MISC Use 1 each every 14 (fourteen) days 6 each 3    fluticasone (FLONASE) 50 mcg/act nasal spray 2 sprays into each nostril daily 2 Bottle 2    hydrochlorothiazide (HYDRODIURIL) 25 mg tablet Take 1 tablet (25 mg total) by mouth daily 90 tablet 2    insulin glargine (Lantus SoloStar) 100 units/mL injection pen Inject 18 Units under the skin daily at bedtime 15 mL 0    Insulin Pen Needle (Pen Needles) 31G X 8 MM MISC Use daily 300 each 3    Lancets (FREESTYLE) lancets by Other route as needed (As needed) 100 each 3    levothyroxine 137 mcg tablet TAKE 1 TABLET BY MOUTH EVERY DAY 90 tablet 3    lisinopril (ZESTRIL) 20 mg tablet Take 2 tablets (40 mg total) by mouth daily 60 tablet 2    metFORMIN (GLUCOPHAGE) 850 mg tablet TAKE 1 TABLET (850 MG TOTAL) BY MOUTH 2 (TWO) TIMES A DAY WITH MEALS 180 tablet 3    montelukast (SINGULAIR) 10 mg tablet TAKE 1 TABLET BY MOUTH EVERY DAY 90 tablet 1    NovoLOG FlexPen 100 units/mL injection pen Inject 5 units with breakfast and with dinner 12 mL 3    rosuvastatin (CRESTOR) 40 MG tablet TAKE 1 TABLET BY MOUTH EVERY DAY 90 tablet 3    vitamin B-12 (VITAMIN B-12) 1,000 mcg tablet TAKE 1 TABLET BY MOUTH EVERY DAY 90 tablet 3     No current facility-administered medications for this visit  Functional Status Prior to Diagnosis for Treatment   Occupation: unemployed  Recreation: sedentary   ADLs: limited by Dyspnea  [place]: No limitations  Exercise: none - leg pain with walking - can walk one block  Other:     Current Functional Status  Occupation: unemployed, disabled  Recreation: tv  ADLs:Capable of performing light ADLs only  [place]: No limitations  Exercise: none other then walking around the house  Other:     Patient Specific Goals: Increased strength, walk further before onset of leg pain, abstain from smoking    Short Term Program Goals: dietary modifications increased strength improved energy/stamina with ADLs exercise 120-150 mins/wk improved BG control    Long Term Goals: Improved [facility] Activity Status score  Improved functional capacity  Improved Quality of Life - [facility] score reduced  Improved lipid profile  Reduced waist circumference  Abstain from smoking  Improved A1c  Improved fasting glucose  improved Rate Your Plate Score    Ability to reach goals/rehabilitation potential:  Very Good     Projected return to function: 12 weeks  Objective tests: 6 MWT      Nutritional   Reviewed details of Rate your Plate  Discussed key elements of heart healthy eating  Reviewed patient goals for dietary modifications and their clinical implications  Reviewed most recent lipid profile       Goals for dietary modification: choose lean cuts of meat  poultry without the skin  low fat ground meat and poultry  eliminate processed meats  reduce portions of meat to 3 oz  increase fish intake  more meatless meals  low fat dairy   reduced fat cheese  increase whole grains  increase fruits and vegetables  eliminate butter  low sodium  improved snack choices  more nuts/seeds  reduce sweets/frozen desserts  heathier choices while dining out      Emotional/Social  Patient reports he/she is coping well with good social support and denies depression or anxiety  Reports sufficient emotional support    Marital status:   2 daughters, 2 sons - 13 grandchildren    Domestic Violence Screening: No    Comments:

## 2022-07-25 ENCOUNTER — TELEPHONE (OUTPATIENT)
Dept: CARDIOLOGY CLINIC | Facility: CLINIC | Age: 77
End: 2022-07-25

## 2022-07-25 ENCOUNTER — HOSPITAL ENCOUNTER (OUTPATIENT)
Dept: NON INVASIVE DIAGNOSTICS | Facility: CLINIC | Age: 77
Discharge: HOME | End: 2022-07-25
Payer: MEDICARE

## 2022-07-25 DIAGNOSIS — Z95.2 S/P TAVR (TRANSCATHETER AORTIC VALVE REPLACEMENT): ICD-10-CM

## 2022-07-25 PROCEDURE — 93970 EXTREMITY STUDY: CPT | Performed by: SURGERY

## 2022-07-25 PROCEDURE — 93970 EXTREMITY STUDY: CPT

## 2022-07-25 NOTE — TELEPHONE ENCOUNTER
----- Message from Luisa Johnson, [address] sent at 7/25/2022 12:08 PM EDT -----  Please call mr Mahin Charles and inform him Doppler study of LE is normal   Thank you     Called pt and lft msg re: normal Doppler study

## 2022-07-28 ENCOUNTER — OFFICE VISIT (OUTPATIENT)
Dept: CARDIAC SURGERY | Facility: CLINIC | Age: 77
End: 2022-07-28

## 2022-07-28 ENCOUNTER — HOSPITAL ENCOUNTER (OUTPATIENT)
Dept: NON INVASIVE DIAGNOSTICS | Facility: HOSPITAL | Age: 77
Discharge: HOME | End: 2022-07-28
Payer: MEDICARE

## 2022-07-28 ENCOUNTER — APPOINTMENT (OUTPATIENT)
Dept: LAB | Facility: HOSPITAL | Age: 77
End: 2022-07-28
Payer: MEDICARE

## 2022-07-28 VITALS
DIASTOLIC BLOOD PRESSURE: 66 MMHG | HEIGHT: 69 IN | BODY MASS INDEX: 26.96 KG/M2 | RESPIRATION RATE: 20 BRPM | TEMPERATURE: 97.1 F | OXYGEN SATURATION: 100 % | SYSTOLIC BLOOD PRESSURE: 146 MMHG | WEIGHT: 182 LBS | HEART RATE: 75 BPM

## 2022-07-28 VITALS
HEART RATE: 81 BPM | HEIGHT: 69 IN | SYSTOLIC BLOOD PRESSURE: 146 MMHG | WEIGHT: 182 LBS | DIASTOLIC BLOOD PRESSURE: 60 MMHG | BODY MASS INDEX: 26.96 KG/M2

## 2022-07-28 DIAGNOSIS — Z95.2 S/P TAVR (TRANSCATHETER AORTIC VALVE REPLACEMENT): ICD-10-CM

## 2022-07-28 DIAGNOSIS — I35.0 NONRHEUMATIC AORTIC VALVE STENOSIS: ICD-10-CM

## 2022-07-28 DIAGNOSIS — Z48.89 ENCOUNTER FOR POST SURGICAL WOUND CHECK: Primary | ICD-10-CM

## 2022-07-28 LAB
ANION GAP SERPL CALCULATED.3IONS-SCNC: 1 MMOL/L (ref 4–13)
AORTIC ROOT: 2.5 CM
AORTIC VALVE MEAN VELOCITY: 16.7 M/S
APICAL FOUR CHAMBER EJECTION FRACTION: 57 %
ASCENDING AORTA: 2.9 CM
ATRIAL RATE: 88 BPM
AV AREA BY CONTINUOUS VTI: 1.1 CM2
AV AREA PEAK VELOCITY: 1.1 CM2
AV LVOT MEAN GRADIENT: 2 MMHG
AV LVOT PEAK GRADIENT: 3 MMHG
AV MEAN GRADIENT: 12 MMHG
AV PEAK GRADIENT: 22 MMHG
AV VALVE AREA: 1.23 CM2
AV VELOCITY RATIO: 0.38
BASOPHILS # BLD AUTO: 0.03 THOUSANDS/ΜL (ref 0–0.1)
BASOPHILS NFR BLD AUTO: 1 % (ref 0–1)
BUN SERPL-MCNC: 29 MG/DL (ref 5–25)
CALCIUM SERPL-MCNC: 9.8 MG/DL (ref 8.3–10.1)
CHLORIDE SERPL-SCNC: 106 MMOL/L (ref 96–108)
CO2 SERPL-SCNC: 32 MMOL/L (ref 21–32)
CREAT SERPL-MCNC: 1.06 MG/DL (ref 0.6–1.3)
DOP CALC AO PEAK VEL: 2.33 M/S
DOP CALC AO VTI: 48.66 CM
DOP CALC LVOT AREA: 3.14 CM2
DOP CALC LVOT DIAMETER: 2 CM
DOP CALC LVOT PEAK VEL VTI: 19.01 CM
DOP CALC LVOT PEAK VEL: 0.89 M/S
DOP CALC LVOT STROKE INDEX: 26.1 ML/M2
DOP CALC LVOT STROKE VOLUME: 59.69 CM3
E WAVE DECELERATION TIME: 133 MS
EOSINOPHIL # BLD AUTO: 0.13 THOUSAND/ΜL (ref 0–0.61)
EOSINOPHIL NFR BLD AUTO: 2 % (ref 0–6)
ERYTHROCYTE [DISTWIDTH] IN BLOOD BY AUTOMATED COUNT: 14 % (ref 11.6–15.1)
FRACTIONAL SHORTENING: 32 % (ref 28–44)
GFR SERPL CREATININE-BSD FRML MDRD: 68 ML/MIN/1.73SQ M
GLUCOSE SERPL-MCNC: 122 MG/DL (ref 65–140)
HCT VFR BLD AUTO: 40.2 % (ref 36.5–49.3)
HGB BLD-MCNC: 12.5 G/DL (ref 12–17)
IMM GRANULOCYTES # BLD AUTO: 0.02 THOUSAND/UL (ref 0–0.2)
IMM GRANULOCYTES NFR BLD AUTO: 0 % (ref 0–2)
INTERVENTRICULAR SEPTUM IN DIASTOLE (PARASTERNAL SHORT AXIS VIEW): 1.1 CM
INTERVENTRICULAR SEPTUM: 1.1 CM (ref 0.6–1.1)
IVC: 1.4 MM
LAAS-AP2: 28.2 CM2
LAAS-AP4: 24 CM2
LEFT ATRIUM SIZE: 4.4 CM
LEFT INTERNAL DIMENSION IN SYSTOLE: 3.4 CM (ref 2.1–4)
LEFT VENTRICULAR INTERNAL DIMENSION IN DIASTOLE: 5 CM (ref 3.5–6)
LEFT VENTRICULAR POSTERIOR WALL IN END DIASTOLE: 0.9 CM
LEFT VENTRICULAR STROKE VOLUME: 71 ML
LVSV (TEICH): 71 ML
LYMPHOCYTES # BLD AUTO: 1.34 THOUSANDS/ΜL (ref 0.6–4.47)
LYMPHOCYTES NFR BLD AUTO: 23 % (ref 14–44)
MCH RBC QN AUTO: 29.6 PG (ref 26.8–34.3)
MCHC RBC AUTO-ENTMCNC: 31.1 G/DL (ref 31.4–37.4)
MCV RBC AUTO: 95 FL (ref 82–98)
MONOCYTES # BLD AUTO: 0.65 THOUSAND/ΜL (ref 0.17–1.22)
MONOCYTES NFR BLD AUTO: 11 % (ref 4–12)
MV E'TISSUE VEL-SEP: 10 CM/S
MV PEAK A VEL: 0.67 M/S
MV PEAK E VEL: 115 CM/S
MV STENOSIS PRESSURE HALF TIME: 39 MS
MV VALVE AREA P 1/2 METHOD: 5.64 CM2
NEUTROPHILS # BLD AUTO: 3.73 THOUSANDS/ΜL (ref 1.85–7.62)
NEUTS SEG NFR BLD AUTO: 63 % (ref 43–75)
NRBC BLD AUTO-RTO: 0 /100 WBCS
P AXIS: 82 DEGREES
PLATELET # BLD AUTO: 238 THOUSANDS/UL (ref 149–390)
PMV BLD AUTO: 10.6 FL (ref 8.9–12.7)
POTASSIUM SERPL-SCNC: 4.1 MMOL/L (ref 3.5–5.3)
PR INTERVAL: 198 MS
QRS AXIS: 10 DEGREES
QRSD INTERVAL: 140 MS
QT INTERVAL: 416 MS
QTC INTERVAL: 503 MS
RA PRESSURE ESTIMATED: 10 MMHG
RBC # BLD AUTO: 4.22 MILLION/UL (ref 3.88–5.62)
RIGHT ATRIUM AREA SYSTOLE A4C: 15.8 CM2
RIGHT VENTRICLE ID DIMENSION: 4.2 CM
RV PSP: 54 MMHG
SL CV LEFT ATRIUM LENGTH A2C: 6.3 CM
SL CV LV EF: 55
SL CV PED ECHO LEFT VENTRICLE DIASTOLIC VOLUME (MOD BIPLANE) 2D: 118 ML
SL CV PED ECHO LEFT VENTRICLE SYSTOLIC VOLUME (MOD BIPLANE) 2D: 47 ML
SODIUM SERPL-SCNC: 139 MMOL/L (ref 135–147)
T WAVE AXIS: 88 DEGREES
TR MAX PG: 44 MMHG
TR PEAK VELOCITY: 3.3 M/S
TRICUSPID ANNULAR PLANE SYSTOLIC EXCURSION: 2.5 CM
TRICUSPID VALVE PEAK REGURGITATION VELOCITY: 3.33 M/S
VENTRICULAR RATE: 88 BPM
WBC # BLD AUTO: 5.9 THOUSAND/UL (ref 4.31–10.16)

## 2022-07-28 PROCEDURE — 93010 ELECTROCARDIOGRAM REPORT: CPT | Performed by: INTERNAL MEDICINE

## 2022-07-28 PROCEDURE — 80048 BASIC METABOLIC PNL TOTAL CA: CPT

## 2022-07-28 PROCEDURE — 93306 TTE W/DOPPLER COMPLETE: CPT

## 2022-07-28 PROCEDURE — 93005 ELECTROCARDIOGRAM TRACING: CPT

## 2022-07-28 PROCEDURE — 36415 COLL VENOUS BLD VENIPUNCTURE: CPT

## 2022-07-28 PROCEDURE — 99024 POSTOP FOLLOW-UP VISIT: CPT | Performed by: PHYSICIAN ASSISTANT

## 2022-07-28 PROCEDURE — 85025 COMPLETE CBC W/AUTO DIFF WBC: CPT

## 2022-07-28 PROCEDURE — 93306 TTE W/DOPPLER COMPLETE: CPT | Performed by: INTERNAL MEDICINE

## 2022-07-28 NOTE — PROGRESS NOTES
Procedure: S/P transfemoral transcatheter aortic valve replacement #26mm Hernán S3, performed on 6/21/22    History: Terry Encarnacion is a 78y o  year old male who presents to our office today for routine follow up care following transcatheter aortic valve replacement  His postoperative course was complicated by a LBBB  EP was consulted and a Zio patch was prescribed postoperatively  He was discharged home on POD # 2  Since he has been home, Mr Kim Hebert reports he has been doing well  He denies chest pain, SOB, PND, orthopnea, LE edema, dizziness, palpitations or syncope  He reports no issues with his left groin puncture site  He reports his appetite is good and has been sleeping and ambulating well  Vital Signs:   Vitals:    07/28/22 1449   BP: 146/66   BP Location: Left arm   Patient Position: Sitting   Cuff Size: Standard   Pulse: 75   Resp: 20   Temp: (!) 97 1 °F (36 2 °C)   TempSrc: Tympanic   SpO2: 100%   Weight: 82 6 kg (182 lb)   Height: 5' 9" (1 753 m)       Home Medications:   Prior to Admission medications    Medication Sig Start Date End Date Taking? Authorizing Provider   acetaminophen (TYLENOL) 325 mg tablet Take 1-2 tablets Q4-6 hours PRN pain  Do not take more than 4 grams in 24 hours   6/23/22  Yes Skyla Gatica PA-C   Advair -21 MCG/ACT inhaler TAKE 2 PUFFS BY MOUTH TWICE A DAY 5/5/21  Yes Laurie Blackburn,    albuterol (VENTOLIN HFA) 90 mcg/act inhaler Inhale 2 puffs every 4 (four) hours as needed for wheezing or shortness of breath 9/21/18  Yes Gonzalo Calles, DO   Alcohol Swabs (ALCOHOL PADS) 70 % PADS  5/9/19  Yes Historical Provider, MD   amLODIPine (NORVASC) 5 mg tablet Take 1 tablet (5 mg total) by mouth daily 7/23/19  Yes Laurie Blackburn, DO   aspirin 81 mg chewable tablet Chew 1 tablet (81 mg total) daily 6/23/22  Yes Seth Cross PA-C   Blood Glucose Monitoring Suppl (OneTouch Verio) w/Device KIT Check blood glucose three times daily before each meal 8/20/20  Yes Avni Whitaker, DO   cholecalciferol (VITAMIN D3) 1,000 units tablet TAKE 2 TABLETS (2,000 UNITS TOTAL) BY MOUTH DAILY 4/25/22  Yes Daysi Arvizu DO   clopidogrel (PLAVIX) 75 mg tablet Take 1 tablet (75 mg total) by mouth daily 6/23/22 9/21/22 Yes Tiffanie Ruby PA-C   Continuous Blood Gluc  (FreeStyle Robles 2 [place]) POWER Use 1 each continuous 5/10/22  Yes Daysi Arvizu DO   Continuous Blood Gluc Sensor (FreeStyle Cristofer 2 Sensor) MISC Use 1 each every 14 (fourteen) days 5/10/22  Yes Daysi Arvizu DO   fluticasone (FLONASE) 50 mcg/act nasal spray 2 sprays into each nostril daily 8/11/20  Yes Laurie Blackburn DO   hydrochlorothiazide (HYDRODIURIL) 25 mg tablet Take 1 tablet (25 mg total) by mouth daily 1/19/22  Yes Daysi Arvizu DO   insulin glargine (Lantus SoloStar) 100 units/mL injection pen Inject 18 Units under the skin daily at bedtime 3/3/22  Yes Juanita Kaur MD   Insulin Pen Needle (Pen Needles) 31G X 8 MM MISC Use daily 3/3/21  Yes Laurie Blackburn DO   Lancets (FREESTYLE) lancets by Other route as needed (As needed) 3/21/19  Yes Lorelei Cai MD   levothyroxine 137 mcg tablet TAKE 1 TABLET BY MOUTH EVERY DAY 7/13/22  Yes Daysi Arvizu DO   lisinopril (ZESTRIL) 20 mg tablet Take 2 tablets (40 mg total) by mouth daily 6/23/22  Yes Skyla Gatica PA-C   metFORMIN (GLUCOPHAGE) 850 mg tablet TAKE 1 TABLET (850 MG TOTAL) BY MOUTH 2 (TWO) TIMES A DAY WITH MEALS 5/16/22  Yes Daysi Arvizu DO   montelukast (SINGULAIR) 10 mg tablet TAKE 1 TABLET BY MOUTH EVERY DAY 4/18/22  Yes Daysi Arvizu DO   NovoLOG FlexPen 100 units/mL injection pen Inject 5 units with breakfast and with dinner 3/3/22  Yes Juanita Kaur MD   rosuvastatin (CRESTOR) 40 MG tablet TAKE 1 TABLET BY MOUTH EVERY DAY 4/29/22  Yes Brett Khalil MD   vitamin B-12 (VITAMIN B-12) 1,000 mcg tablet TAKE 1 TABLET BY MOUTH EVERY DAY 5/16/22  Yes Daysi Arvizu, DO       Physical Exam:    HEENT/NECK:  Normocephalic  Atraumatic  No jugular venous distention  Cardiac: Regular rate and rhythm and No murmurs/rubs/gallops  Pulmonary:  Breath sounds clear bilaterally and No rales/rhonchi/wheezes  Abdomen:  Non-tender, Non-distended and Normal bowel sounds  Incisions: Groin puncture sites clean, dry, and intact without hematoma  Extremities: Extremities warm/dry and No edema B/L  Neuro: Alert and oriented X 3, Sensation is grossly intact and No focal deficits  Skin: Warm/Dry, without rashes or lesions  Lab Results:     Results from last 7 days   Lab Units 07/28/22  1347   WBC Thousand/uL 5 90   HEMOGLOBIN g/dL 12 5   HEMATOCRIT % 40 2   PLATELETS Thousands/uL 238           Invalid input(s): LABGLOM    Imaging Studies:     Transthoracic Echocardiogram:   Left Ventricle Left ventricular cavity size is normal  Wall thickness is normal  The left ventricular ejection fraction is 55%  Systolic function is normal   Diastolic function is normal    Wall Scoring Baseline     The following segments are hypokinetic: basal anterior, basal anteroseptal, mid anteroseptal and mid inferoseptal   All other segments are normal             Right Ventricle Right ventricular cavity size is normal  Systolic function is normal  Normal tricuspid annular plane systolic excursion (TAPSE) > 1 7 cm  Left Atrium The atrium is moderately dilated  Right Atrium The atrium is normal in size  Aortic Valve There is an Emery RONNI 3 Ultra 26 mm TAVR bioprosthetic valve  There is no evidence of regurgitation  The aortic valve has no significant stenosis  The aortic valve mean gradient is 12 mmHg  The DVI is 0 38  The aortic valve area is 1 11 cm2  Mitral Valve The leaflets are not thickened  The leaflets are not calcified  The leaflets exhibit normal mobility  There is moderate regurgitation  There is no evidence of stenosis  The mitral valve has normal structure     Tricuspid Valve Tricuspid valve structure is normal  There is mild regurgitation  There is no evidence of stenosis  The estimated right ventricular systolic pressure is 83 79 mmHg  Pulmonic Valve Pulmonic valve structure is normal  There is trace regurgitation  There is no evidence of stenosis  Ascending Aorta The aortic root is normal in size  The ascending aorta is normal in size  IVC/SVC The inferior vena cava size is 1 4 mm  The right atrial pressure is estimated at 3 0 mmHg  The inferior vena cava is normal in size  Respirophasic changes were normal    Pericardium There is no pericardial effusion  The pericardium is normal in appearance                 Left Ventricle Measurements    Function/Volumes   A4C EF 57 %         LVOT stroke volume 53 87 cm3         LVOT SI 26 1 ml/m2         Dimensions   LVIDd 5 cm         LVIDS 3 4 cm         IVSd 1 1 cm         LVPWd 0 9 cm         LVOT area 2 83 cm2         FS 32 %         Diastolic Filling   MV E' Tissue Velocity Septal 10 cm/s         E wave deceleration time 133 ms         MV Peak E Adriano 115 cm/s         MV Peak A Adriano 0 67 m/s          Report Measurements   AV LVOT peak gradient 3 mmHg              Interventricular Septum Measurements    Shunt Ratio   LVOT peak VTI 19 01 cm         LVOT peak adriano 0 89 m/s              Right Ventricle Measurements    Dimensions   RVID d 4 2 cm         Tricuspid annular plane systolic excursion 2 5 cm               Left Atrium Measurements    Dimensions   LA size 4 4 cm         LA length (A2C) 6 3 cm               Right Atrium Measurements    Dimensions   RAA A4C 15 8 cm2               Atrial Septum Measurements    Shunt Ratio   LVOT peak VTI 19 01 cm         LVOT peak adriano 0 89 m/s               Aortic Valve Measurements    Stenosis   Ao peak adriano retrograde 2 33 m/s         LVOT peak adriano 0 89 m/s         Ao VTI 48 66 cm         LVOT peak VTI 19 01 cm         AV mean gradient 12 mmHg         LVOT mn grad 2 mmHg         AV peak gradient 22 mmHg         AV LVOT peak gradient 3 mmHg AV Velocity Ratio 0 38          Area/Dimensions   AV valve area 1 11 cm2         AV area by cont VTI 1 1 cm2         AV area peak adriano 1 1 cm2         LVOT diameter 1 9 cm         LVOT area 2 83 cm2               Mitral Valve Measurements    Stenosis   MV stenosis pressure 1/2 time 39 ms         MV valve area p 1/2 method 5 64 cm2               Tricuspid Valve Measurements    RVSP Parameters   TR Peak Adriano 3 3 m/s         Est  RA pres 3 mmHg         Triscuspid Valve Regurgitation Peak Gradient 44 mmHg         Right Ventricular Peak Systolic Pressure 47 mmHg               Aorta Measurements    Aortic Dimensions   Ao root 2 5 cm         Asc Ao 2 9 cm               IVC/SVC Measurements    IVC/SVC   IVC 1 4 mm         Est  RA pres 3 mmHg               I have personally reviewed pertinent reports  and I have personally reviewed pertinent films in PACS    TAVR evaluation Assessment:     [facility]: I    Assessment: Aortic stenosis, Non-Rheumatic  S/P transfemoral transcatheter aortic valve replacement;    Plan:     Monster Perez continues to recover well following transfemoral transcatheter aortic valve replacement  To date they have made progressive improvements with physical rehabilitation  At this point I have cleared them to begin outpatient cardiac rehabilitation and have encouraged them to to do so  Monster Perez has also been cleared to resume driving at this point  I have asked that they do so in small, progressive increments  Monster Perez has already been evaluated by their primary care physician and their cardiologist for ongoing medical care  Plavix will be continued for an additional 8 weeks, for a total of 3 months of therapy for TAVR prophylaxis  Arrangements will be made for one year surveillance follow up with repeat ECG and echocardiogram   I have advised them to notify their PCP with any new concerns that may arise in the interim   Monster Perez was comfortable with our recommendations and their questions were answered to their satisfaction  Hiram Aguilar  7/28/22  3:14 PM    * This note was completed in part utilizing m-modal fluency direct voice recognition software  Grammatical errors, random word insertion, spelling mistakes, and incomplete sentences may be an occasional consequence of the system secondary to software limitations, ambient noise and hardware issues  At the time of dictation, efforts were made to edit, clarify and /or correct errors  Please read the chart carefully and recognize, using context, where substitutions have occurred  If you have any questions or concerns about the context, text or information contained within the body of this dictation, please contact myself, the provider, for further clarification

## 2022-07-28 NOTE — LETTER
July 28, 2022     [facility] [address]    Patient: Howard Hinds   YOB: 1945   Date of Visit: 7/28/2022       Dear Dr Jenny Joseph:    Thank you for referring Howard Hinds to me for evaluation  Below are my notes for this consultation  If you have questions, please do not hesitate to call me  I look forward to following your patient along with you  Sincerely,        Alla Hamilton MD        CC: Darrell Guerra DO  [place], [place]  7/28/2022  3:20 PM  Attested  Procedure: S/P transfemoral transcatheter aortic valve replacement #26mm Hernán S3, performed on 6/21/22    History: Howard Hinds is a 78y o  year old male who presents to our office today for routine follow up care following transcatheter aortic valve replacement  His postoperative course was complicated by a LBBB  EP was consulted and a Zio patch was prescribed postoperatively  He was discharged home on POD # 2  Since he has been home, Mr Emani Thomas reports he has been doing well  He denies chest pain, SOB, PND, orthopnea, LE edema, dizziness, palpitations or syncope  He reports no issues with his left groin puncture site  He reports his appetite is good and has been sleeping and ambulating well  Vital Signs:   Vitals:    07/28/22 1449   BP: 146/66   BP Location: Left arm   Patient Position: Sitting   Cuff Size: Standard   Pulse: 75   Resp: 20   Temp: (!) 97 1 °F (36 2 °C)   TempSrc: Tympanic   SpO2: 100%   Weight: 82 6 kg (182 lb)   Height: 5' 9" (1 753 m)       Home Medications:   Prior to Admission medications    Medication Sig Start Date End Date Taking? Authorizing Provider   acetaminophen (TYLENOL) 325 mg tablet Take 1-2 tablets Q4-6 hours PRN pain  Do not take more than 4 grams in 24 hours   6/23/22  Yes Skyla Gatica PA-C   Advair -21 MCG/ACT inhaler TAKE 2 PUFFS BY MOUTH TWICE A DAY 5/5/21  Yes Laurie Blackburn DO   albuterol (VENTOLIN HFA) 90 mcg/act inhaler Inhale 2 puffs every 4 (four) hours as needed for wheezing or shortness of breath 9/21/18  Yes Jessica Mohan, DO   Alcohol Swabs (ALCOHOL PADS) 70 % PADS  5/9/19  Yes Selwyn Almazan MD   amLODIPine (NORVASC) 5 mg tablet Take 1 tablet (5 mg total) by mouth daily 7/23/19  Yes Laurie Blackburn DO   aspirin 81 mg chewable tablet Chew 1 tablet (81 mg total) daily 6/23/22  Yes Norma Ventura PA-C   Blood Glucose Monitoring Suppl (OneTouch Verio) w/Device KIT Check blood glucose three times daily before each meal 8/20/20  Yes Eugene Machado,    cholecalciferol (VITAMIN D3) 1,000 units tablet TAKE 2 TABLETS (2,000 UNITS TOTAL) BY MOUTH DAILY 4/25/22  Yes Misty Guzman DO   clopidogrel (PLAVIX) 75 mg tablet Take 1 tablet (75 mg total) by mouth daily 6/23/22 9/21/22 Yes Norma Ventura PA-C   Continuous Blood Gluc  (FreeStyle Robles 2 [place]) POWER Use 1 each continuous 5/10/22  Yes Misty Guzman DO   Continuous Blood Gluc Sensor (FreeStyle Cristofer 2 Sensor) MISC Use 1 each every 14 (fourteen) days 5/10/22  Yes Misty Guzman DO   fluticasone (FLONASE) 50 mcg/act nasal spray 2 sprays into each nostril daily 8/11/20  Yes Laurie Blackburn DO   hydrochlorothiazide (HYDRODIURIL) 25 mg tablet Take 1 tablet (25 mg total) by mouth daily 1/19/22  Yes Misty Guzman DO   insulin glargine (Lantus SoloStar) 100 units/mL injection pen Inject 18 Units under the skin daily at bedtime 3/3/22  Yes Сергей Kaur MD   Insulin Pen Needle (Pen Needles) 31G X 8 MM MISC Use daily 3/3/21  Yes Laurie Blackburn DO   Lancets (FREESTYLE) lancets by Other route as needed (As needed) 3/21/19  Yes Brandy Goff MD   levothyroxine 137 mcg tablet TAKE 1 TABLET BY MOUTH EVERY DAY 7/13/22  Yes Misty Guzman DO   lisinopril (ZESTRIL) 20 mg tablet Take 2 tablets (40 mg total) by mouth daily 6/23/22  Yes Skyla Gatica PA-C   metFORMIN (GLUCOPHAGE) 850 mg tablet TAKE 1 TABLET (850 MG TOTAL) BY MOUTH 2 (TWO) TIMES A DAY WITH MEALS 5/16/22  Yes Christiano Mccain DO   montelukast (SINGULAIR) 10 mg tablet TAKE 1 TABLET BY MOUTH EVERY DAY 4/18/22  Yes Christiano Mccain DO   NovoLOG FlexPen 100 units/mL injection pen Inject 5 units with breakfast and with dinner 3/3/22  Yes Elise Kaur MD   rosuvastatin (CRESTOR) 40 MG tablet TAKE 1 TABLET BY MOUTH EVERY DAY 4/29/22  Yes Brett Khalil MD   vitamin B-12 (VITAMIN B-12) 1,000 mcg tablet TAKE 1 TABLET BY MOUTH EVERY DAY 5/16/22  Yes Christiano Mccain DO       Physical Exam:    HEENT/NECK:  Normocephalic  Atraumatic  No jugular venous distention  Cardiac: Regular rate and rhythm and No murmurs/rubs/gallops  Pulmonary:  Breath sounds clear bilaterally and No rales/rhonchi/wheezes  Abdomen:  Non-tender, Non-distended and Normal bowel sounds  Incisions: Groin puncture sites clean, dry, and intact without hematoma  Extremities: Extremities warm/dry and No edema B/L  Neuro: Alert and oriented X 3, Sensation is grossly intact and No focal deficits  Skin: Warm/Dry, without rashes or lesions  Lab Results:     Results from last 7 days   Lab Units 07/28/22  1347   WBC Thousand/uL 5 90   HEMOGLOBIN g/dL 12 5   HEMATOCRIT % 40 2   PLATELETS Thousands/uL 238           Invalid input(s): LABGLOM    Imaging Studies:     Transthoracic Echocardiogram:   Left Ventricle Left ventricular cavity size is normal  Wall thickness is normal  The left ventricular ejection fraction is 55%  Systolic function is normal   Diastolic function is normal    Wall Scoring Baseline     The following segments are hypokinetic: basal anterior, basal anteroseptal, mid anteroseptal and mid inferoseptal   All other segments are normal             Right Ventricle Right ventricular cavity size is normal  Systolic function is normal  Normal tricuspid annular plane systolic excursion (TAPSE) > 1 7 cm  Left Atrium The atrium is moderately dilated  Right Atrium The atrium is normal in size     Aortic Valve There is an Emery RONNI 3 Ultra 26 mm TAVR bioprosthetic valve  There is no evidence of regurgitation  The aortic valve has no significant stenosis  The aortic valve mean gradient is 12 mmHg  The DVI is 0 38  The aortic valve area is 1 11 cm2  Mitral Valve The leaflets are not thickened  The leaflets are not calcified  The leaflets exhibit normal mobility  There is moderate regurgitation  There is no evidence of stenosis  The mitral valve has normal structure  Tricuspid Valve Tricuspid valve structure is normal  There is mild regurgitation  There is no evidence of stenosis  The estimated right ventricular systolic pressure is 68 17 mmHg  Pulmonic Valve Pulmonic valve structure is normal  There is trace regurgitation  There is no evidence of stenosis  Ascending Aorta The aortic root is normal in size  The ascending aorta is normal in size  IVC/SVC The inferior vena cava size is 1 4 mm  The right atrial pressure is estimated at 3 0 mmHg  The inferior vena cava is normal in size  Respirophasic changes were normal    Pericardium There is no pericardial effusion  The pericardium is normal in appearance                 Left Ventricle Measurements    Function/Volumes   A4C EF 57 %         LVOT stroke volume 53 87 cm3         LVOT SI 26 1 ml/m2         Dimensions   LVIDd 5 cm         LVIDS 3 4 cm         IVSd 1 1 cm         LVPWd 0 9 cm         LVOT area 2 83 cm2         FS 32 %         Diastolic Filling   MV E' Tissue Velocity Septal 10 cm/s         E wave deceleration time 133 ms         MV Peak E Adriano 115 cm/s         MV Peak A Adriano 0 67 m/s          Report Measurements   AV LVOT peak gradient 3 mmHg              Interventricular Septum Measurements    Shunt Ratio   LVOT peak VTI 19 01 cm         LVOT peak adriano 0 89 m/s              Right Ventricle Measurements    Dimensions   RVID d 4 2 cm         Tricuspid annular plane systolic excursion 2 5 cm               Left Atrium Measurements    Dimensions   LA size 4 4 cm         LA length (A2C) 6 3 cm               Right Atrium Measurements    Dimensions   RAA A4C 15 8 cm2               Atrial Septum Measurements    Shunt Ratio   LVOT peak VTI 19 01 cm         LVOT peak adriano 0 89 m/s               Aortic Valve Measurements    Stenosis   Ao peak adriano retrograde 2 33 m/s         LVOT peak adriano 0 89 m/s         Ao VTI 48 66 cm         LVOT peak VTI 19 01 cm         AV mean gradient 12 mmHg         LVOT mn grad 2 mmHg         AV peak gradient 22 mmHg         AV LVOT peak gradient 3 mmHg         AV Velocity Ratio 0 38          Area/Dimensions   AV valve area 1 11 cm2         AV area by cont VTI 1 1 cm2         AV area peak adriano 1 1 cm2         LVOT diameter 1 9 cm         LVOT area 2 83 cm2               Mitral Valve Measurements    Stenosis   MV stenosis pressure 1/2 time 39 ms         MV valve area p 1/2 method 5 64 cm2               Tricuspid Valve Measurements    RVSP Parameters   TR Peak Adriano 3 3 m/s         Est  RA pres 3 mmHg         Triscuspid Valve Regurgitation Peak Gradient 44 mmHg         Right Ventricular Peak Systolic Pressure 47 mmHg               Aorta Measurements    Aortic Dimensions   Ao root 2 5 cm         Asc Ao 2 9 cm               IVC/SVC Measurements    IVC/SVC   IVC 1 4 mm         Est  RA pres 3 mmHg               I have personally reviewed pertinent reports  and I have personally reviewed pertinent films in PACS    TAVR evaluation Assessment:     [facility]: I    Assessment: Aortic stenosis, Non-Rheumatic  S/P transfemoral transcatheter aortic valve replacement;    Plan:     Jackson Redd continues to recover well following transfemoral transcatheter aortic valve replacement  To date they have made progressive improvements with physical rehabilitation  At this point I have cleared them to begin outpatient cardiac rehabilitation and have encouraged them to to do so  Jackson Redd has also been cleared to resume driving at this point   I have asked that they do so in small, progressive increments  Aditya Ybarra has already been evaluated by their primary care physician and their cardiologist for ongoing medical care  Plavix will be continued for an additional 8 weeks, for a total of 3 months of therapy for TAVR prophylaxis  Arrangements will be made for one year surveillance follow up with repeat ECG and echocardiogram   I have advised them to notify their PCP with any new concerns that may arise in the interim  Aditya Ybarra was comfortable with our recommendations and their questions were answered to their satisfaction  Hiram Steven  7/28/22  3:14 PM    * This note was completed in part utilizing m-modal fluency direct voice recognition software  Grammatical errors, random word insertion, spelling mistakes, and incomplete sentences may be an occasional consequence of the system secondary to software limitations, ambient noise and hardware issues  At the time of dictation, efforts were made to edit, clarify and /or correct errors  Please read the chart carefully and recognize, using context, where substitutions have occurred  If you have any questions or concerns about the context, text or information contained within the body of this dictation, please contact myself, the provider, for further clarification  Attestation signed by Juliet Estrella MD at 7/28/2022  3:40 PM:  Patient seen and evaluated with 10 Mayte Murray / AUGUST  I agree with the above assessment and plan with the following additions  The patient is a 44-year-old man 1 month status post TF TAVR, he is recovering well without complications  TTE shows prosthetic valve with normal function, no PVL  Plan:  Follow up with cardiology and PCP  Follow in valve center in 1 year  Cardiac rehab    This note was completed in part utilizing [employer] direct voice recognition software     Grammatical errors, random word insertion, spelling mistakes, and incomplete sentences may be an occasional consequence of the system secondary to software limitations, ambient noise and hardware issues  At the time of dictation, efforts were made to edit, clarify and /or correct errors  Please read the chart carefully and recognize, using context, where substitutions have occurred  If you have any questions or concerns about the context, text or information contained within the body of this dictation, please contact myself, the provider, for further clarification

## 2022-07-29 ENCOUNTER — APPOINTMENT (OUTPATIENT)
Dept: CARDIAC REHAB | Age: 77
End: 2022-07-29
Payer: MEDICARE

## 2022-08-02 ENCOUNTER — CLINICAL SUPPORT (OUTPATIENT)
Dept: CARDIAC REHAB | Age: 77
End: 2022-08-02
Payer: MEDICARE

## 2022-08-02 DIAGNOSIS — Z95.2 S/P TAVR (TRANSCATHETER AORTIC VALVE REPLACEMENT): Primary | ICD-10-CM

## 2022-08-02 PROCEDURE — 93798 PHYS/QHP OP CAR RHAB W/ECG: CPT

## 2022-08-04 ENCOUNTER — CLINICAL SUPPORT (OUTPATIENT)
Dept: CARDIAC REHAB | Age: 77
End: 2022-08-04
Payer: MEDICARE

## 2022-08-04 ENCOUNTER — CLINICAL SUPPORT (OUTPATIENT)
Dept: CARDIOLOGY CLINIC | Facility: CLINIC | Age: 77
End: 2022-08-04
Payer: MEDICARE

## 2022-08-04 DIAGNOSIS — Z95.2 S/P TAVR (TRANSCATHETER AORTIC VALVE REPLACEMENT): Primary | ICD-10-CM

## 2022-08-04 PROCEDURE — 93798 PHYS/QHP OP CAR RHAB W/ECG: CPT

## 2022-08-04 PROCEDURE — 99211 OFF/OP EST MAY X REQ PHY/QHP: CPT

## 2022-08-05 ENCOUNTER — OFFICE VISIT (OUTPATIENT)
Dept: INTERNAL MEDICINE CLINIC | Facility: CLINIC | Age: 77
End: 2022-08-05

## 2022-08-05 ENCOUNTER — TELEPHONE (OUTPATIENT)
Dept: INTERNAL MEDICINE CLINIC | Facility: CLINIC | Age: 77
End: 2022-08-05

## 2022-08-05 ENCOUNTER — CLINICAL SUPPORT (OUTPATIENT)
Dept: INTERNAL MEDICINE CLINIC | Facility: CLINIC | Age: 77
End: 2022-08-05

## 2022-08-05 VITALS
HEART RATE: 92 BPM | SYSTOLIC BLOOD PRESSURE: 152 MMHG | BODY MASS INDEX: 27.41 KG/M2 | OXYGEN SATURATION: 98 % | WEIGHT: 185.6 LBS | DIASTOLIC BLOOD PRESSURE: 65 MMHG | TEMPERATURE: 98 F

## 2022-08-05 DIAGNOSIS — Z79.4 TYPE 2 DIABETES MELLITUS WITH DIABETIC NEUROPATHY, WITH LONG-TERM CURRENT USE OF INSULIN (HCC): Primary | ICD-10-CM

## 2022-08-05 DIAGNOSIS — E11.40 TYPE 2 DIABETES MELLITUS WITH DIABETIC NEUROPATHY, WITH LONG-TERM CURRENT USE OF INSULIN (HCC): Primary | ICD-10-CM

## 2022-08-05 DIAGNOSIS — E11.40 TYPE 2 DIABETES MELLITUS WITH DIABETIC NEUROPATHY, WITH LONG-TERM CURRENT USE OF INSULIN (HCC): ICD-10-CM

## 2022-08-05 DIAGNOSIS — I10 ESSENTIAL HYPERTENSION: Chronic | ICD-10-CM

## 2022-08-05 DIAGNOSIS — Z79.4 TYPE 2 DIABETES MELLITUS WITH DIABETIC NEUROPATHY, WITH LONG-TERM CURRENT USE OF INSULIN (HCC): ICD-10-CM

## 2022-08-05 DIAGNOSIS — J45.20 MILD INTERMITTENT ASTHMA WITHOUT COMPLICATION: Primary | ICD-10-CM

## 2022-08-05 PROCEDURE — 3078F DIAST BP <80 MM HG: CPT | Performed by: INTERNAL MEDICINE

## 2022-08-05 PROCEDURE — 3077F SYST BP >= 140 MM HG: CPT | Performed by: INTERNAL MEDICINE

## 2022-08-05 PROCEDURE — 1124F ACP DISCUSS-NO DSCNMKR DOCD: CPT | Performed by: INTERNAL MEDICINE

## 2022-08-05 PROCEDURE — 99214 OFFICE O/P EST MOD 30 MIN: CPT | Performed by: INTERNAL MEDICINE

## 2022-08-05 RX ORDER — ACETYLCYSTEINE 100 MG/ML
4 SOLUTION ORAL; RESPIRATORY (INHALATION) 3 TIMES DAILY
Qty: 360 ML | Refills: 2 | Status: SHIPPED | OUTPATIENT
Start: 2022-08-05 | End: 2022-08-16 | Stop reason: SDUPTHER

## 2022-08-05 RX ORDER — ALBUTEROL SULFATE 90 UG/1
2 AEROSOL, METERED RESPIRATORY (INHALATION) EVERY 4 HOURS PRN
Qty: 18 G | Refills: 2 | Status: SHIPPED | OUTPATIENT
Start: 2022-08-05

## 2022-08-05 NOTE — PROGRESS NOTES
Patient was here today for a follow up appt with Dr Ravin Casanova  Patient was prescribed freestyle paty 2 sensor and reader a couple months ago, and never got it placed or educated on how to use it  Instructed patient to come back with his sensor and reader to place it and show him how to use it  Patient came back as a nurse visit with his sensor but not the   Per patient he never received the   Called the pharmacy, per pharmacist patient never came back to pick it up, but will be ready for him to  today with no co pay  I placed sensor today and patient will go to pharmacy and will have daughter set up the   Patient aware he is to come back every 14 days as he did not want to do it at home  Educated patient on how to use sensor and   Patient aware to give our office a call with any questions or concerns

## 2022-08-05 NOTE — TELEPHONE ENCOUNTER
Patient's wife called   --- patient seen 8/5/22 by Dr Mayra Radford states that the patient does not have the nebulizer to do the treatment    And called to see whether or not Dr Kaelyn Prado put in medication release phlegm      Please check into this and call the patient to advise medication sent to the pharmacy    thanks

## 2022-08-05 NOTE — TELEPHONE ENCOUNTER
Patient made aware via [employer]  [id number] that I sent the script over for a nebulizer machine to [facility] at [phone] but I do not have an attending here to cosign so it may not go through   I wanted to send anyway just in case they will accept it

## 2022-08-05 NOTE — PROGRESS NOTES
ASSESSMENT/PLAN:  Diagnoses and all orders for this visit:    Mild intermittent asthma without complication  Comments:  Patient has quit smoking recently and complains of a lot of phlegm and slight breathing difficulty  Orders:  -     acetylcysteine (MUCOMYST) 10 % nebulizer solution; Take 4 mL by nebulization 3 (three) times a day  -     Discussed proper inhaler and spacer use    Essential hypertension  Comments:  Patient brought blood pressure readings  Seems to be adequate control considering s/p TAVR    Type 2 diabetes mellitus with diabetic neuropathy, with long-term current use of insulin (HCC)  Comments:  Fasting sugar today as per patient is 107  Hba1c a month ago :- 6 5%  Seems to be in good control  Health Maintenance:  - Up to date on vaccines  No proof of Hepatitis vaccines, but antibody titers for Hep A,B,C all positive in past   - Following colonoscopy, diabetic foot and eye exam recommendations      CHIEF COMPLAINT: Patient complains of a lot of phlegm and slight difficulty breathing    HISTORY OF PRESENT ILLNESS:  Taylor Dalal is a 77/M with a P/H/O asthma, DM, HTN, severe aortic stenosis s/p TAVR (bioprosthetic)  Patient doing well since the TAVR procedure last month  Patient quit smoking a few months ago and complains of phlegm and slight difficulty breathing since then  Review of Systems   Constitutional: Negative for activity change, appetite change, chills and fatigue  HENT: Negative for trouble swallowing and voice change  Eyes: Negative for pain and redness  Respiratory: Positive for shortness of breath  Negative for cough, chest tightness and wheezing  Cardiovascular: Negative for chest pain, palpitations and leg swelling  Gastrointestinal: Negative for abdominal distention and abdominal pain  Endocrine: Negative for polydipsia, polyphagia and polyuria  Genitourinary: Negative for difficulty urinating and enuresis     Musculoskeletal: Negative for joint swelling and myalgias  Skin: Negative for color change  Allergic/Immunologic: Negative for immunocompromised state  Neurological: Negative for dizziness, syncope and light-headedness  Hematological: Does not bruise/bleed easily  Psychiatric/Behavioral: Negative for agitation, behavioral problems and confusion  OBJECTIVE:  Vitals:    08/05/22 0947   BP: 152/65   BP Location: Right arm   Patient Position: Sitting   Cuff Size: Standard   Pulse: 92   Temp: 98 °F (36 7 °C)   TempSrc: Temporal   SpO2: 98%   Weight: 84 2 kg (185 lb 9 6 oz)     Physical Exam  Constitutional:       General: He is awake  Appearance: Normal appearance  He is well-developed and overweight  HENT:      Head: Normocephalic  Eyes:      General: Lids are normal    Cardiovascular:      Rate and Rhythm: Normal rate and regular rhythm  Pulses: Normal pulses  Heart sounds: Normal heart sounds  Pulmonary:      Effort: Pulmonary effort is normal       Breath sounds: Normal breath sounds and air entry  Abdominal:      General: Abdomen is flat  Bowel sounds are normal  There is no distension or abdominal bruit  Palpations: Abdomen is soft  Musculoskeletal:      Cervical back: Full passive range of motion without pain and normal range of motion  Right lower leg: No edema  Left lower leg: No edema  Skin:     General: Skin is warm  Capillary Refill: Capillary refill takes less than 2 seconds  Coloration: Skin is not jaundiced or pale  Neurological:      Mental Status: He is alert and oriented to person, place, and time  Psychiatric:         Attention and Perception: Attention normal          Mood and Affect: Mood normal          Speech: Speech normal          Behavior: Behavior is cooperative             Current Outpatient Medications:     acetylcysteine (MUCOMYST) 10 % nebulizer solution, Take 4 mL by nebulization 3 (three) times a day, Disp: 360 mL, Rfl: 2    Advair -21 MCG/ACT inhaler, TAKE 2 PUFFS BY MOUTH TWICE A DAY, Disp: 36 g, Rfl: 6    albuterol (Ventolin HFA) 90 mcg/act inhaler, Inhale 2 puffs every 4 (four) hours as needed for wheezing or shortness of breath, Disp: 18 g, Rfl: 2    Alcohol Swabs (ALCOHOL PADS) 70 % PADS, , Disp: , Rfl:     amLODIPine (NORVASC) 5 mg tablet, Take 1 tablet (5 mg total) by mouth daily, Disp: 90 tablet, Rfl: 1    aspirin 81 mg chewable tablet, Chew 1 tablet (81 mg total) daily, Disp: 30 tablet, Rfl: 2    Blood Glucose Monitoring Suppl (OneTouch Verio) w/Device KIT, Check blood glucose three times daily before each meal, Disp: 1 kit, Rfl: 0    cholecalciferol (VITAMIN D3) 1,000 units tablet, TAKE 2 TABLETS (2,000 UNITS TOTAL) BY MOUTH DAILY, Disp: 180 tablet, Rfl: 5    clopidogrel (PLAVIX) 75 mg tablet, Take 1 tablet (75 mg total) by mouth daily, Disp: 90 tablet, Rfl: 0    fluticasone (FLONASE) 50 mcg/act nasal spray, 2 sprays into each nostril daily, Disp: 2 Bottle, Rfl: 2    hydrochlorothiazide (HYDRODIURIL) 25 mg tablet, Take 1 tablet (25 mg total) by mouth daily, Disp: 90 tablet, Rfl: 2    insulin glargine (Lantus SoloStar) 100 units/mL injection pen, Inject 18 Units under the skin daily at bedtime, Disp: 15 mL, Rfl: 0    Insulin Pen Needle (Pen Needles) 31G X 8 MM MISC, Use daily, Disp: 300 each, Rfl: 3    Lancets (FREESTYLE) lancets, by Other route as needed (As needed), Disp: 100 each, Rfl: 3    levothyroxine 137 mcg tablet, TAKE 1 TABLET BY MOUTH EVERY DAY, Disp: 90 tablet, Rfl: 3    lisinopril (ZESTRIL) 20 mg tablet, Take 2 tablets (40 mg total) by mouth daily, Disp: 60 tablet, Rfl: 2    metFORMIN (GLUCOPHAGE) 850 mg tablet, TAKE 1 TABLET (850 MG TOTAL) BY MOUTH 2 (TWO) TIMES A DAY WITH MEALS, Disp: 180 tablet, Rfl: 3    montelukast (SINGULAIR) 10 mg tablet, TAKE 1 TABLET BY MOUTH EVERY DAY, Disp: 90 tablet, Rfl: 1    NovoLOG FlexPen 100 units/mL injection pen, Inject 5 units with breakfast and with dinner, Disp: 12 mL, Rfl: 3   rosuvastatin (CRESTOR) 40 MG tablet, TAKE 1 TABLET BY MOUTH EVERY DAY, Disp: 90 tablet, Rfl: 3    Continuous Blood Gluc  (FreeStyle [place] 2 [place]) POWER, Use 1 each continuous (Patient not taking: Reported on 8/5/2022), Disp: 1 each, Rfl: 0    Continuous Blood Gluc Sensor (FreeStyle Cristofer 2 Sensor) MISC, Use 1 each every 14 (fourteen) days (Patient not taking: Reported on 8/5/2022), Disp: 6 each, Rfl: 3    Past Medical History:   Diagnosis Date    Arthritis     Asthma     Colon polyps     Community acquired pneumonia     last assessed: 5/1/2014    Diabetes mellitus ([facility] )     Hemorrhagic prepatellar bursitis, left 10/21/2019    Hepatitis C     High cholesterol     History of colonoscopy 2017    Hypertension     Lymphadenopathy, anterior cervical 04/17/2018    Screening for colon cancer 05/01/2019    Thoracic vertebral fracture ([facility] ) 06/11/2014     Past Surgical History:   Procedure Laterality Date    CARDIAC CATHETERIZATION N/A 6/3/2022    Procedure: Cardiac RHC/LHC; Surgeon: Leonarda Diaz MD;  [facility]; Service: Cardiology    CARDIAC CATHETERIZATION N/A 6/21/2022    Procedure: CARDIAC TAVR;  Surgeon: Lesley uKo MD;  Location: [facility] MAIN [facility];  Service: Cardiology    COLONOSCOPY      COLONOSCOPY W/ POLYPECTOMY      LITHOTRIPSY      MULTIPLE TOOTH EXTRACTIONS      DE COLONOSCOPY FLX DX W/COLLJ [address] PFRMD N/A 5/17/2018    Procedure: COLONOSCOPY;  Surgeon: Olga Lidia Finley MD;  Location: [facility] GI [facility];   Service: Gastroenterology    DE REPLACE AORTIC VALVE OPENFEMORAL ARTERY APPROACH N/A 6/21/2022    Procedure: REPLACEMENT AORTIC VALVE TRANSCATHETER (TAVR) TRANSFEMORAL W/ 26MM QUINN RONNI S3 ULTRA VALVE(ACCESS ON LEFT) SAULO;  Surgeon: John Ardon MD;  Location[facility];  Service: Cardiac Surgery     Social History     Socioeconomic History    Marital status: /Civil Union     Spouse name: Not on file    Number of children: Not on file    Years of education: Not on file    Highest education level: Not on file   Occupational History    Occupation: retired    Tobacco Use    Smoking status: Former Smoker     Packs/day: 0 50     Types: Cigarettes     Quit date: 2022     Years since quittin 1    Smokeless tobacco: Never Used    Tobacco comment: started when he was about 24 yrs old; stopped smoking 3 wks ago   Vaping Use    Vaping Use: Never used   Substance and Sexual Activity    Alcohol use: No    Drug use: No    Sexual activity: Not Currently   Other Topics Concern    Not on file   Social History Narrative    Not on file     Social Determinants of Health     Financial Resource Strain: Low Risk     Difficulty of Paying Living Expenses: Not hard at all   Food Insecurity: No Food Insecurity    Worried About [employer] in the Last Year: Never true    Kim of Food in the Last Year: Never true   Transportation Needs: No Transportation Needs    Lack of Transportation (Medical): No    Lack of Transportation (Non-Medical): No   Physical Activity: Unknown    Days of Exercise per Week: Not on file    Minutes of Exercise per Session: 60 min   Stress: No Stress Concern Present    Feeling of Stress : Not at all   Social Connections:  Moderately Isolated    Frequency of Communication with Friends and Family: More than three times a week    Frequency of Social Gatherings with Friends and Family: More than three times a week    Attends Religion Services: Never    Active Member of Clubs or Organizations: No    Attends Club or Organization Meetings: Never    Marital Status:    Intimate Partner Violence: Not on file   Housing Stability: [address] Unable to Pay for Housing in the Last Year: No    Number of [place] in the Last Year: 1    Unstable Housing in the Last Year: No     Family History   Problem Relation Age of Onset    Heart attack Family         at age 80    No Known Problems Mother     No Known Problems Father     Diabetes Sister     Diabetes Brother        Cherly Schilder, MD  PGY-1, Internal Medicine  [facility]  [address]  [address] [facility]  [address]  [place], [address] [facility]

## 2022-08-08 ENCOUNTER — CLINICAL SUPPORT (OUTPATIENT)
Dept: CARDIAC REHAB | Age: 77
End: 2022-08-08
Payer: MEDICARE

## 2022-08-08 ENCOUNTER — VBI (OUTPATIENT)
Dept: ADMINISTRATIVE | Facility: OTHER | Age: 77
End: 2022-08-08

## 2022-08-08 DIAGNOSIS — Z95.2 S/P TAVR (TRANSCATHETER AORTIC VALVE REPLACEMENT): ICD-10-CM

## 2022-08-08 PROCEDURE — 93798 PHYS/QHP OP CAR RHAB W/ECG: CPT

## 2022-08-08 NOTE — TELEPHONE ENCOUNTER
Gia Parra from [address] [place] [address] [facility] called regarding the script for the patient Nebulizer  Gia Parra states [facility] are no longer accepting scripts via [employer]  There requesting all scripts to be sent through [address] [place] [address]  Please send script for the Nebulizer to [facility]  Any questions please call Gia Parra at [phone] if any assistance needed for parachute she will be glad to help       She also wants to get order filled soon as possible for patient

## 2022-08-11 LAB
DME PARACHUTE DELIVERY DATE ACTUAL: NORMAL
DME PARACHUTE DELIVERY DATE REQUESTED: NORMAL
DME PARACHUTE ITEM DESCRIPTION: NORMAL
DME PARACHUTE ORDER STATUS: NORMAL
DME PARACHUTE SUPPLIER NAME: NORMAL
DME PARACHUTE SUPPLIER PHONE: NORMAL

## 2022-08-12 ENCOUNTER — CLINICAL SUPPORT (OUTPATIENT)
Dept: CARDIAC REHAB | Age: 77
End: 2022-08-12
Payer: MEDICARE

## 2022-08-12 DIAGNOSIS — Z95.2 S/P TAVR (TRANSCATHETER AORTIC VALVE REPLACEMENT): ICD-10-CM

## 2022-08-12 PROCEDURE — 93798 PHYS/QHP OP CAR RHAB W/ECG: CPT

## 2022-08-15 ENCOUNTER — CLINICAL SUPPORT (OUTPATIENT)
Dept: CARDIAC REHAB | Age: 77
End: 2022-08-15
Payer: MEDICARE

## 2022-08-15 DIAGNOSIS — Z95.2 S/P TAVR (TRANSCATHETER AORTIC VALVE REPLACEMENT): ICD-10-CM

## 2022-08-15 PROCEDURE — 93798 PHYS/QHP OP CAR RHAB W/ECG: CPT

## 2022-08-15 NOTE — PROGRESS NOTES
Cardiac Rehabilitation Plan of Care   30 Day Reassessment          Today's date: 8/15/2022   # of Exercise Sessions Completed: 6  Patient name: Karen Beltran      : 1945  Age: 78 y o  MRN: [id number]  Referring Physician: Suleiman Sanches PA-C  Cardiologist: Genesis Friend MD (fellow)  Provider: [facility]  Clinician: Gordon Sanchez, MS, CEP, CCRP      Dx:   Encounter Diagnosis   Name Primary?  S/P TAVR (transcatheter aortic valve replacement)      Date of onset: 22      SUMMARY OF PROGRESS:  (Sinhala speaking - wife translates)  Kofi Eller attends cardiac rehab 2x/wk  He tolerates 30 - 33 mins at 2 1 - 2 9 METs  He is tolerating progression of duration to 40 mins  Workloads will be progressed to maintain RPE 4-6 once 40  mins is completed comfortably  Resting BP is typically elevated  140/70 - 150/60 with appropriate response to exercise reaching 154/60- 188/72  NSR, LBBB on tele with [employer]  PACs, occ PVCs observed  On telemetry today, Juan David's rhythm switched to an apparent RBBB  RHR 75 - 85 ExHR 100 - 120  He has added home exercise 2-3 days/wk which includes walking one block  Kofi Eller reports Left ankle pain with walking  Reports the need to stop and shake out his leg throughout his walk  He was introduced to the TM today which he tolerated well - no ankle pain  No cardiac complaints  He is progressing toward wt loss goals with a loss of 2 pounds  Patient has been working on  dietary modifications with the goal of rare red/processed meats, low fat dairy, reduced added sugars and refined flours  He abstains from sweets and red/processed meats  The patient has T2D  He is using a CGM but reports that he lost his sensor  Follow up appointment tomorrow for a new sensor  The patient is a recent user, quit 2022, and is doing well abstaining  Kofi Eller patient denies   depression/anxiety  Patient reports excellent social/emotional support     Patient attends group educational classes on cardiac risk factor modification  His exercise program will be progressed as tolerated to maintain RPE 4-6  The patient has the following personal goals he hopes to achieved by discharge: abstain from smoking, increased strength/stamina, reduced leg pain with walking      Pt will continue to be educated on lifestyle modifications and encouraged to supplement with a home exercise program to reach the following goals in the next 30 days: add more consistent home walking adding increased duration as tolerated, consistent home BG monitoring  July 21, 2022 -  Initial Care Plan: Today is Juan David's  initial evaluation to begin Cardiac Rehab post TAVR  The patient does not currently follow a formal exercise program at home  He has resumed light ADLs without fatigue and tolerating them well  Depression screening using the PHQ-9 interprets the patient's score 1-4 = Minimal Depression  TASHA-7 screening tool for anxiety suggests 5-9 = Mild anxiety  When addressed, the patient denies having depression/anxiety  Patient reports excellent social/emotional support  Information to begin using [employer] was provided as well as contact information for counseling through [facility]  PHQ-9 score will be reassessed in 30 days  The patient is a recent user, quit May 30, 2022, and is doing well abstaining  Patient admits to 100% medication compliance  Patient reports the following physical limitations: R lower leg pain with walking  States he needs to stop after walking one block  The patient completed an initial 6MWT  The patient walked  [phone] [id number] 66 feet/2  2METs  Resting  /60 dropping to 142/62 with exercise  Patient denied symptoms during exercise  Telemetry revealed NSR, LBBB, freq  PACs  Patient was counseled on exercise guidelines to achieve a minimum of 150 mins/wk of moderate intensity (RPE 4-6) exercise and encouraged to add 1-2 days of exercise on opposite days of cardiac rehab as tolerated   We discussed current dietary habits and goals of heart healthy eating for lipid management and diabetes management  The patient has T2D, Patient reported fasting , on Insulin   Patient's goals include: abstain from smoking, increased strength/stamina, reduced leg pain with walking  The patient's CAD risk factors include:  inactivity, obesity/overweight, hypertension, hyperlipidemia, diabetes and smoking  He was provided [place] education packet focused on lifestyle modification/education specific to His needs including heart healthy eating, reading food labels, stress management, risk factor reduction, understanding heart disease and common heart medications and smoking and heart disease  Patient will attend 35 monitored exercise sessions, 3x/wk for 12-18 weeks beginning July 29, 2022  CT surgical f/u appt is scheduled for July 28  Medication compliance: Yes   Comments: Pt reports to be compliant with medications  Fall Risk: Low   Comments: Ambulates with a steady gait with no assist device and Denies a fall in the past 6 months    EKG Interpretation: NSR, LBBB, freq  PACs, occ PVCs      EXERCISE ASSESSMENT and PLAN    Current Exercise Program in Rehab:       Frequency: 3 days/week Supplement with home exercise 2+ days/wk as tolerated       Minutes: 30-40         METS: 1 8 - 3 0            HR: RHR+30   RPE: 4-5         Modalities: Treadmill, UBE, NuStep and Recumbent bike      Exercise Progression 30 Day Goals :    Frequency: 3 days/week of cardiac rehab     Supplement with home exercise 2+ days/wk as tolerated    Minutes: 40                            >150 mins/wk of moderate intensity exercise   METS: 2 0-3 8   HR: RHR+30    RPE: 4-5   Modalities: Treadmill, UBE, NuStep and Recumbent bike    Strength training:   Will be added following 2-3 weeks of monitored exercise sessions   Modalities: Leg Press, Chest Press, Lateral Raise and Seated Row    Home Exercise: walking one block per day    Goals: Reduced dyspnea with physical activity  0-110, improved DASI score by 10%, Increase in exercise capacity by 40% - based on peak METs tolerated in cardiac rehab exercise session, Exercise 5 days/wk, >150mins/wk of moderate intensity exercise, Improved 6MWT distance by 10%, Resume ADLs with increased strength, Decrease sitting time and Start a walking program    Progression Toward Goals:  Pt is progressing and showing improvement  toward the following goals:  Reduced dyspnea with physical activity  0-10, improved DASI score by 10%, Increase in exercise capacity by 40% - based on peak METs tolerated in cardiac rehab exercise session, Exercise 5 days/wk, >150mins/wk of moderate intensity exercise, Improved 6MWT distance by 10%, Resume ADLs with increased strength, Decrease sitting time and Start a walking program   , Will continue to educate and progress as tolerated  Pt will continue to add home walking to supplement cardiac rehab with increasing duration as tolerated      Education: benefit of exercise for CAD risk factors, home exercise guidelines, AHA guidelines to achieve >150 mins/wk of moderate exercise, RPE scale and Education hand out: Risk Factors for Heart Disease   Plan:education on home exercise guidelines and home exercise 30+ mins 2 days opposite CR  Readiness to change: Action:  (Changing behavior)      NUTRITION ASSESSMENT AND PLAN    Weight control:    Starting weight: 184   Current weight:   182  Waist circumference:    Startin 25   Current:      Diabetes: T2D  A1c: 6 5    last measured: 22    Lipid management: Discussed diet and lipid management and Last lipid profile 22  Chol 191    HDL 49      Goals:reduced BMI to < 25, LDL <100, fasting BG , Improved Rate Your Plate score  >97, 1 3-3%  wt loss, choose lean meat (93-95%), eliminate processed meats, use low fat dairy, reduce cheese intake or use reduced-fat, eat 3 or more servings of whole grains a day, Eat 4-5 cups of fruits and vegetables daily, choose healthy snacks: light popcorn, plain pretzels, Increase intake of nuts and seeds, eliminate or choose low-fat sweets and daily saturated fat intake <7%/13g,    Progression Toward Goals: Pt is progressing and showing improvement  toward the following goals:  reduced BMI to < 25, LDL <100, fasting BG , Improved Rate Your Plate score  >02, 8 3-1%  wt loss, choose lean meat (93-95%), eliminate processed meats, use low fat dairy, reduce cheese intake or use reduced-fat, eat 3 or more servings of whole grains a day, Eat 4-5 cups of fruits and vegetables daily, choose healthy snacks: light popcorn, plain pretzels, Increase intake of nuts and seeds, eliminate or choose low-fat sweets and daily saturated fat intake <7%/13g   , Will continue to educate and progress as tolerated  Fransisca Simpson will focus on increased hydration  Education: heart healthy eating  low sodium diet  hydration  nutrition for  lipid management  nutrition for Improved BG control  exercise and diabetes management    Education handout: Reading Food Labels  Education handout: Heart Healthy Eating  Plan:   switch to low fat cheeses, replace refined grain bread with whole grain bread, replace unhealthy snacks with fruits & vegs, reduce red meat 1x/wk, switch to skim or 1% milk, eat fewer desserts and sweets, avoid processed foods, monitor home blood glucose, drink more water, replace sugar with stevia or truvia and keep added daily sugar <25g/day  Readiness to change: Action:  (Changing behavior)      PSYCHOSOCIAL ASSESSMENT AND PLAN    Emotional:  Depression assessment:  PHQ-9 = 2   1-4 = Minimal Depression            Anxiety measure:  TASHA-7 = 6   5-9 = Mild anxiety  Self-reported stress level:  1  Social support: Excellent and Patient reports excellent emotional/social support from family    Goals:   Increased interest in doing things, increased energy, stop worrying, take time to relax and Feel less anxious    Progression Toward Goals: Pt is progressing and showing improvement  toward the following goals: Increased interest in doing things, increased energy, stop worrying, take time to relax and Feel less anxious  , Will continue to educate and progress as tolerated  Education: benefits of a positive support system and stress management techniques, Education handout: Stress and Your Health,   Relaxation,  Plan:  Exercise, Spend time outdoors, Enjoy family and Return to previous social activity  Readiness to change: Preparation:  (Getting ready to change)       OTHER CORE COMPONENTS     Tobacco:   Social History     Tobacco Use   Smoking Status Former Smoker    Packs/day: 0 50    Types: Cigarettes    Quit date: 2022    Years since quittin 2   Smokeless Tobacco Never Used   Tobacco Comment    started when he was about 24 yrs old; stopped smoking 3 wks ago       Tobacco Use Intervention:   Pt quit May 30, 2022   and has abstained,  8/15/22 - Pt continues to abstain    Anginal Symptoms:  None   NTG use: No prescription    Blood pressure:    Restin/70 - 150/60   Exercise:  154/60- 188/72    Goals: consistent BP < 130/80, reduced dietary sodium <2300mg, moderate intensity exercise >150 mins/wk, medication compliance and Abstain from smoking    Progression Toward Goals: Pt is progressing and showing improvement  toward the following goals:  consistent BP < 130/80, reduced dietary sodium <2300mg, moderate intensity exercise >150 mins/wk, medication compliance and Abstain from smoking   , Will continue to educate and progress as tolerated      Education:  low sodium diet and HTN and smoking and heart disease, Education handouts: Understanding Heart Disease  Plan: , Complete abstention from smoking, avoid places with second hand smoke, Avoid Processed foods, engage in regular exercise and check labels for sodium content  Readiness to change: Action:  (Changing behavior)

## 2022-08-16 DIAGNOSIS — J45.20 MILD INTERMITTENT ASTHMA WITHOUT COMPLICATION: ICD-10-CM

## 2022-08-16 RX ORDER — ACETYLCYSTEINE 100 MG/ML
4 SOLUTION ORAL; RESPIRATORY (INHALATION) 3 TIMES DAILY
Qty: 360 ML | Refills: 2 | Status: SHIPPED | OUTPATIENT
Start: 2022-08-16 | End: 2022-09-23 | Stop reason: SDUPTHER

## 2022-08-19 ENCOUNTER — CLINICAL SUPPORT (OUTPATIENT)
Dept: CARDIAC REHAB | Age: 77
End: 2022-08-19
Payer: MEDICARE

## 2022-08-19 DIAGNOSIS — Z95.2 S/P TAVR (TRANSCATHETER AORTIC VALVE REPLACEMENT): Primary | ICD-10-CM

## 2022-08-19 PROCEDURE — 93798 PHYS/QHP OP CAR RHAB W/ECG: CPT

## 2022-08-22 ENCOUNTER — CLINICAL SUPPORT (OUTPATIENT)
Dept: CARDIAC REHAB | Age: 77
End: 2022-08-22
Payer: MEDICARE

## 2022-08-22 DIAGNOSIS — Z95.2 S/P TAVR (TRANSCATHETER AORTIC VALVE REPLACEMENT): ICD-10-CM

## 2022-08-22 PROCEDURE — 93798 PHYS/QHP OP CAR RHAB W/ECG: CPT

## 2022-08-24 ENCOUNTER — CONSULT (OUTPATIENT)
Dept: CARDIOLOGY CLINIC | Facility: CLINIC | Age: 77
End: 2022-08-24
Payer: MEDICARE

## 2022-08-24 VITALS
HEIGHT: 68 IN | HEART RATE: 77 BPM | WEIGHT: 184.9 LBS | BODY MASS INDEX: 28.02 KG/M2 | DIASTOLIC BLOOD PRESSURE: 72 MMHG | SYSTOLIC BLOOD PRESSURE: 138 MMHG

## 2022-08-24 DIAGNOSIS — Z95.2 S/P TAVR (TRANSCATHETER AORTIC VALVE REPLACEMENT): Primary | ICD-10-CM

## 2022-08-24 PROCEDURE — 99204 OFFICE O/P NEW MOD 45 MIN: CPT | Performed by: PHYSICIAN ASSISTANT

## 2022-08-24 PROCEDURE — 93000 ELECTROCARDIOGRAM COMPLETE: CPT | Performed by: PHYSICIAN ASSISTANT

## 2022-08-24 PROCEDURE — 99214 OFFICE O/P EST MOD 30 MIN: CPT | Performed by: PHYSICIAN ASSISTANT

## 2022-08-24 NOTE — PROGRESS NOTES
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    * patient presents to [facility] EP office to discuss recent zio findings    * ECG today showing NSR w/ LBBB QRS 135ms    * post TAVR had new LBBB  Underwent 30 day zio monitoring  On first monitor had 3 6 second sinus pause as well as a period of what appears to be 2 to 1 or 3 to 1 AVB while awake  Thankfully not symptomatic  On second monitor is having a lot of SVT/AT, unable to use a BB due to his bradycardia  * patient asymptomatic during monitoring periods  However, given his significant bradycardia, new post TAVR LBBB and essentially TBS he qualifies for a dual chamber pacemaker    * had a long discussion with him, his wife and daughter Ms Nevin Moore on the phone today  Discussed findings of zio, post TAVR LBBB and evidence showing 6% of post TAVR patients do need PPM     * explained left sided PPM in detail including risks vs benefits  Given his bradycardia on the zio id like to do PPM sooner then later  dont want patient to drive until device is placed  * will have procedure schedulers call in AM     * of note has normal EF  Secondary diagnosis:   1  Severe AS s/p TAVR (6-2022)  2  Post TAVR LBBB   3  T2DM on insulin   4  Moderate non obstructive CAD via cath 2022   5  Moderate b/l JAI   6  HLD   7  HTN               Rhythm History:   Atrial fibrillation:     Atrial flutter:     SVT:     VT/VF/PVC:     Device history:   Pacemaker:    Defibrillator:    BIV PPM:    BIV ICD:    ILR:      Cardiac Testing:     ECHO: No results found for this or any previous visit          History of Present Illness     HPI/INTERVAL HISTORY: Julia Dover is a 78 y o  male with history as above who presents to [facility] EP office today for new consult to discuss pacemaker  Patient underwent TAVR resulting in post op LBBB  Sent home with zio patch showing periods of bradycardia and tachycardia  Patient asymptomatic  Review of Systems  ROS as noted above, otherwise 12 point review of systems was performed and is negative  Historical Information   Social History     Socioeconomic History    Marital status: /Civil Union     Spouse name: Not on file    Number of children: Not on file    Years of education: Not on file    Highest education level: Not on file   Occupational History    Occupation: retired    Tobacco Use    Smoking status: Former Smoker     Packs/day: 0 50     Types: Cigarettes     Quit date: 2022     Years since quittin 2    Smokeless tobacco: Never Used    Tobacco comment: started when he was about 24 yrs old; stopped smoking 3 wks ago   Vaping Use    Vaping Use: Never used   Substance and Sexual Activity    Alcohol use: No    Drug use: No    Sexual activity: Not Currently   Other Topics Concern    Not on file   Social History Narrative    Not on file     Social Determinants of Health     Financial Resource Strain: Low Risk     Difficulty of Paying Living Expenses: Not hard at all   Food Insecurity: No Food Insecurity    Worried About [address] in the Last Year: Never true    [address] in the Last Year: Never true   Transportation Needs: No Transportation Needs    Lack of Transportation (Medical): No    Lack of Transportation (Non-Medical): No   Physical Activity: Unknown    Days of Exercise per Week: Not on file    Minutes of Exercise per Session: 60 min   Stress: No Stress Concern Present    Feeling of Stress : Not at all   Social Connections:  Moderately Isolated    Frequency of Communication with Friends and Family: More than three times a week    Frequency of Social Gatherings with Friends and Family: More than three times a week    Attends Synagogue Services: Never    Active Member of Clubs or Organizations: No    Attends Club or Organization Meetings: Never    Marital Status:    Intimate Partner Violence: Not on file   Housing Stability: [address] Unable to Pay for Housing in the Last Year: No    Number of [place] in the Last Year: 1    Unstable Housing in the Last Year: No     Past Medical History:   Diagnosis Date    Arthritis     Asthma     Colon polyps     Community acquired pneumonia     last assessed: 5/1/2014    [facility]     Hemorrhagic prepatellar bursitis, left 10/21/2019    Hepatitis C     High cholesterol     History of colonoscopy 2017    Hypertension     Lymphadenopathy, anterior cervical 04/17/2018    Screening for colon cancer 05/01/2019    Thoracic vertebral fracture ([facility] ) 06/11/2014     Past Surgical History:   Procedure Laterality Date    CARDIAC CATHETERIZATION N/A 6/3/2022    Procedure: Cardiac RHC/LHC; Surgeon: Noreen Peace MD;  [facility]; Service: Cardiology    CARDIAC CATHETERIZATION N/A 6/21/2022    Procedure: CARDIAC TAVR;  Surgeon: Rose Omer MD;  Location[facility];  Service: Cardiology    COLONOSCOPY      COLONOSCOPY W/ POLYPECTOMY      LITHOTRIPSY      MULTIPLE TOOTH EXTRACTIONS      HI COLONOSCOPY FLX DX W/COLLJ [address] PFRMD N/A 5/17/2018    Procedure: COLONOSCOPY;  Surgeon: Barbara Adams MD;  Location: [facility];   Service: Gastroenterology    HI REPLACE AORTIC VALVE OPENFEMORAL ARTERY APPROACH N/A 6/21/2022    Procedure: REPLACEMENT AORTIC VALVE TRANSCATHETER (TAVR) TRANSFEMORAL W/ 26MM QUINN RONNI S3 ULTRA VALVE(ACCESS ON LEFT) SAULO;  Surgeon: Juanito Sawant MD;  Location[facility];  Service: Cardiac Surgery     Social History     Substance and Sexual Activity   Alcohol Use No     Social History     Substance and Sexual Activity   Drug Use No     Social History     Tobacco Use   Smoking Status Former Smoker    Packs/day: 0 50    Types: Cigarettes    Quit date: 5/30/2022  Years since quittin 2   Smokeless Tobacco Never Used   Tobacco Comment    started when he was about 24 yrs old; stopped smoking 3 wks ago     Family History   Problem Relation Age of Onset    Heart attack Family         at age 80    No Known Problems Mother     No Known Problems Father     Diabetes Sister     Diabetes Brother        Meds/Allergies       Current Outpatient Medications:     acetylcysteine (MUCOMYST) 10 % nebulizer solution, Take 4 mL by nebulization 3 (three) times a day, Disp: 360 mL, Rfl: 2    Advair -21 MCG/ACT inhaler, TAKE 2 PUFFS BY MOUTH TWICE A DAY, Disp: 36 g, Rfl: 6    albuterol (Ventolin HFA) 90 mcg/act inhaler, Inhale 2 puffs every 4 (four) hours as needed for wheezing or shortness of breath, Disp: 18 g, Rfl: 2    Alcohol Swabs (ALCOHOL PADS) 70 % PADS, , Disp: , Rfl:     amLODIPine (NORVASC) 5 mg tablet, Take 1 tablet (5 mg total) by mouth daily, Disp: 90 tablet, Rfl: 1    aspirin 81 mg chewable tablet, Chew 1 tablet (81 mg total) daily, Disp: 30 tablet, Rfl: 2    cholecalciferol (VITAMIN D3) 1,000 units tablet, TAKE 2 TABLETS (2,000 UNITS TOTAL) BY MOUTH DAILY, Disp: 180 tablet, Rfl: 5    clopidogrel (PLAVIX) 75 mg tablet, Take 1 tablet (75 mg total) by mouth daily, Disp: 90 tablet, Rfl: 0    fluticasone (FLONASE) 50 mcg/act nasal spray, 2 sprays into each nostril daily, Disp: 2 Bottle, Rfl: 2    hydrochlorothiazide (HYDRODIURIL) 25 mg tablet, Take 1 tablet (25 mg total) by mouth daily, Disp: 90 tablet, Rfl: 2    insulin glargine (Lantus SoloStar) 100 units/mL injection pen, Inject 18 Units under the skin daily at bedtime, Disp: 15 mL, Rfl: 0    Insulin Pen Needle (Pen Needles) 31G X 8 MM MISC, Use daily, Disp: 300 each, Rfl: 3    Lancets (FREESTYLE) lancets, by Other route as needed (As needed), Disp: 100 each, Rfl: 3    levothyroxine 137 mcg tablet, TAKE 1 TABLET BY MOUTH EVERY DAY, Disp: 90 tablet, Rfl: 3    lisinopril (ZESTRIL) 20 mg tablet, Take 2 tablets (40 mg total) by mouth daily, Disp: 60 tablet, Rfl: 2    metFORMIN (GLUCOPHAGE) 850 mg tablet, TAKE 1 TABLET (850 MG TOTAL) BY MOUTH 2 (TWO) TIMES A DAY WITH MEALS, Disp: 180 tablet, Rfl: 3    montelukast (SINGULAIR) 10 mg tablet, TAKE 1 TABLET BY MOUTH EVERY DAY, Disp: 90 tablet, Rfl: 1    NovoLOG FlexPen 100 units/mL injection pen, Inject 5 units with breakfast and with dinner, Disp: 12 mL, Rfl: 3    rosuvastatin (CRESTOR) 40 MG tablet, TAKE 1 TABLET BY MOUTH EVERY DAY, Disp: 90 tablet, Rfl: 3    Blood Glucose Monitoring Suppl (OneTouch Verio) w/Device KIT, Check blood glucose three times daily before each meal, Disp: 1 kit, Rfl: 0    Continuous Blood Gluc  (FreeStyle Cristofer 2 [place]) POWER, Use 1 each continuous (Patient not taking: No sig reported), Disp: 1 each, Rfl: 0    Continuous Blood Gluc Sensor (FreeStyle Cristofer 2 Sensor) MISC, Use 1 each every 14 (fourteen) days (Patient not taking: No sig reported), Disp: 6 each, Rfl: 3    Allergies   Allergen Reactions    Iv Contrast [Iodinated Diagnostic Agents] Rash     Patient states he had a severe reaction approximately 10 years ago , states he had a rash, itchy and he remembers a bunch of people pounding on chest and being surrounded by multiple doctors  Objective   Vitals: Blood pressure 138/72, pulse 77, height 5' 8" (1 727 m), weight 83 9 kg (184 lb 14 4 oz)  Physical Exam  Constitutional:       Appearance: He is well-developed  HENT:      Head: Normocephalic and atraumatic  Eyes:      Pupils: Pupils are equal, round, and reactive to light  Cardiovascular:      Rate and Rhythm: Normal rate and regular rhythm  Pulmonary:      Effort: Pulmonary effort is normal       Breath sounds: Normal breath sounds  Abdominal:      General: Bowel sounds are normal       Palpations: Abdomen is soft  Musculoskeletal:         General: Normal range of motion  Cervical back: Normal range of motion and neck supple     Skin: General: Skin is warm and dry  Neurological:      Mental Status: He is alert and oriented to person, place, and time             Labs:  Office Visit on 08/05/2022   Component Date Value    Supplier Name 08/08/2022 [employer]     Supplier Phone Number 08/08/2022 [phone]     Order Status 08/08/2022 Delivery Successful     Delivery Request Date 08/08/2022 08/08/2022     Date Delivered  08/08/2022 08/11/2022     Item Description 08/08/2022 Nebulizer Compressor for Reusable Package with Mask     Item Description 08/08/2022 Nebulizer Set, Reusable     Item Description 08/08/2022 Adult Nebulizer Mask     Item Description 08/08/2022 Disposable Nebulizer Compressor Filter    Appointment on 07/28/2022   Component Date Value    Sodium 07/28/2022 139     Potassium 07/28/2022 4 1     Chloride 07/28/2022 106     CO2 07/28/2022 32     ANION GAP 07/28/2022 1 (A)    BUN 07/28/2022 29 (A)    Creatinine 07/28/2022 1 06     Glucose 07/28/2022 122     Calcium 07/28/2022 9 8     eGFR 07/28/2022 68     WBC 07/28/2022 5 90     RBC 07/28/2022 4 22     Hemoglobin 07/28/2022 12 5     Hematocrit 07/28/2022 40 2     MCV 07/28/2022 95     MCH 07/28/2022 29 6     MCHC 07/28/2022 31 1 (A)    RDW 07/28/2022 14 0     MPV 07/28/2022 10 6     Platelets 92/07/6254 238     nRBC 07/28/2022 0     Neutrophils Relative 07/28/2022 63     Immat GRANS % 07/28/2022 0     Lymphocytes Relative 07/28/2022 23     Monocytes Relative 07/28/2022 11     Eosinophils Relative 07/28/2022 2     Basophils Relative 07/28/2022 1     Neutrophils Absolute 07/28/2022 3 73     Immature Grans Absolute 07/28/2022 0 02     Lymphocytes Absolute 07/28/2022 1 34     Monocytes Absolute 07/28/2022 0 65     Eosinophils Absolute 07/28/2022 0 13     Basophils Absolute 07/28/2022 0 03    Hospital Outpatient Visit on 07/28/2022   Component Date Value    AV area peak nichol 07/28/2022 1 1     LA size 07/28/2022 4 4     Aortic valve mean veloci* 07/28/2022 16 70     Triscuspid Valve Regurgi* 07/28/2022 44 0     Tricuspid valve peak reg* 07/28/2022 3 33     LVPWd 07/28/2022 0 90     Left Atrium Area-systoli* 07/28/2022 28 2     Left Atrium Area-systoli* 07/28/2022 24     MV E' Tissue Velocity Se* 07/28/2022 10     TR Peak Adriano 07/28/2022 3 3     IVSd 07/28/2022 9 61     LV DIASTOLIC VOLUME (MOD* 60/72/5933 118     LEFT VENTRICLE SYSTOLIC * 71/73/4491 47     Left ventricular stroke * 07/28/2022 71 00     A4C EF 07/28/2022 57     LA length (A2C) 07/28/2022 6 30     LVIDd 07/28/2022 5 00     IVS 07/28/2022 1 1     LVIDS 07/28/2022 3 40     FS 07/28/2022 32     Asc Ao 07/28/2022 2 9     Ao root 07/28/2022 2 50     RVID d 07/28/2022 4 2     LVOT mn grad 07/28/2022 2 0     AV area by cont VTI 07/28/2022 1 1     AV mean gradient 07/28/2022 12     AV LVOT peak gradient 07/28/2022 3     MV valve area p 1/2 meth* 07/28/2022 5 64     E wave deceleration time 07/28/2022 133     LVOT diameter 07/28/2022 2 0     LVOT peak adriano 07/28/2022 0 89     LVOT peak VTI 07/28/2022 19 01     Aortic valve peak veloci* 07/28/2022 2 33     Ao VTI 07/28/2022 48 66     LVOT stroke volume 07/28/2022 59 69     AV peak gradient 07/28/2022 22     MV Peak E Adriano 07/28/2022 115     MV Peak A Adriano 07/28/2022 0 67     RAA A4C 07/28/2022 15 8     MV stenosis pressure 1/2* 07/28/2022 39     LVOT stroke volume index 07/28/2022 26 10     LVSV, 2D 07/28/2022 71     LVOT area 07/28/2022 3 14     Dimensionless velociy in* 07/28/2022 0 38     AV valve area 07/28/2022 1 23     LV EF 07/28/2022 55     Tricuspid annular plane * 07/28/2022 2 50     Est  RA pres 07/28/2022 10 0     Right Ventricular Peak S* 07/28/2022 54 00     IVC 07/28/2022 1 4    Hospital Outpatient Visit on 07/25/2022   Component Date Value    Ventricular Rate 07/28/2022 88     Atrial Rate 07/28/2022 88     VT Interval 07/28/2022 198     QRSD Interval 07/28/2022 140  QT Interval 07/28/2022 416     QTC Interval 07/28/2022 503     P Axis 07/28/2022 82     QRS Axis 07/28/2022 10     T Wave Axis 07/28/2022 88        Imaging: I have personally reviewed pertinent reports

## 2022-08-24 NOTE — H&P (VIEW-ONLY)
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    * patient presents to [facility] EP office to discuss recent zio findings    * ECG today showing NSR w/ LBBB QRS 135ms    * post TAVR had new LBBB  Underwent 30 day zio monitoring  On first monitor had 3 6 second sinus pause as well as a period of what appears to be 2 to 1 or 3 to 1 AVB while awake  Thankfully not symptomatic  On second monitor is having a lot of SVT/AT, unable to use a BB due to his bradycardia  * patient asymptomatic during monitoring periods  However, given his significant bradycardia, new post TAVR LBBB and essentially TBS he qualifies for a dual chamber pacemaker    * had a long discussion with him, his wife and daughter Ms Kia Marin on the phone today  Discussed findings of zio, post TAVR LBBB and evidence showing 6% of post TAVR patients do need PPM     * explained left sided PPM in detail including risks vs benefits  Given his bradycardia on the zio id like to do PPM sooner then later  dont want patient to drive until device is placed  * will have procedure schedulers call in AM     * of note has normal EF  Secondary diagnosis:   1  Severe AS s/p TAVR (6-2022)  2  Post TAVR LBBB   3  T2DM on insulin   4  Moderate non obstructive CAD via cath 2022   5  Moderate b/l JAI   6  HLD   7  HTN               Rhythm History:   Atrial fibrillation:     Atrial flutter:     SVT:     VT/VF/PVC:     Device history:   Pacemaker:    Defibrillator:    BIV PPM:    BIV ICD:    ILR:      Cardiac Testing:     ECHO: No results found for this or any previous visit          History of Present Illness     HPI/INTERVAL HISTORY: Barbara Vera is a 78 y o  male with history as above who presents to [facility] EP office today for new consult to discuss pacemaker  Patient underwent TAVR resulting in post op LBBB  Sent home with zio patch showing periods of bradycardia and tachycardia  Patient asymptomatic  Review of Systems  ROS as noted above, otherwise 12 point review of systems was performed and is negative  Historical Information   Social History     Socioeconomic History    Marital status: /Civil Union     Spouse name: Not on file    Number of children: Not on file    Years of education: Not on file    Highest education level: Not on file   Occupational History    Occupation: retired    Tobacco Use    Smoking status: Former Smoker     Packs/day: 0 50     Types: Cigarettes     Quit date: 2022     Years since quittin 2    Smokeless tobacco: Never Used    Tobacco comment: started when he was about 24 yrs old; stopped smoking 3 wks ago   Vaping Use    Vaping Use: Never used   Substance and Sexual Activity    Alcohol use: No    Drug use: No    Sexual activity: Not Currently   Other Topics Concern    Not on file   Social History Narrative    Not on file     Social Determinants of Health     Financial Resource Strain: Low Risk     Difficulty of Paying Living Expenses: Not hard at all   Food Insecurity: No Food Insecurity    Worried About [address] in the Last Year: Never true    [facility] in the Last Year: Never true   Transportation Needs: No Transportation Needs    Lack of Transportation (Medical): No    Lack of Transportation (Non-Medical): No   Physical Activity: Unknown    Days of Exercise per Week: Not on file    Minutes of Exercise per Session: 60 min   Stress: No Stress Concern Present    Feeling of Stress : Not at all   Social Connections:  Moderately Isolated    Frequency of Communication with Friends and Family: More than three times a week    Frequency of Social Gatherings with Friends and Family: More than three times a week    Attends Mormonism Services: Never    Active Member of Clubs or Organizations: No    Attends Club or Organization Meetings: Never    Marital Status:    Intimate Partner Violence: Not on file   Housing Stability: [address] Unable to Pay for Housing in the Last Year: No    Number of [place] in the Last Year: 1    Unstable Housing in the Last Year: No     Past Medical History:   Diagnosis Date    Arthritis     Asthma     Colon polyps     Community acquired pneumonia     last assessed: 5/1/2014    Diabetes mellitus ([facility] )     Hemorrhagic prepatellar bursitis, left 10/21/2019    Hepatitis C     High cholesterol     History of colonoscopy 2017    Hypertension     Lymphadenopathy, anterior cervical 04/17/2018    Screening for colon cancer 05/01/2019    Thoracic vertebral fracture ([facility] [address] ) 06/11/2014     Past Surgical History:   Procedure Laterality Date    CARDIAC CATHETERIZATION N/A 6/3/2022    Procedure: Cardiac RHC/LHC; Surgeon: Sherry Zurita MD;  Location: [facility]; Service: Cardiology    CARDIAC CATHETERIZATION N/A 6/21/2022    Procedure: CARDIAC TAVR;  Surgeon: Urban De Souza MD;  Location: [facility];  Service: Cardiology    COLONOSCOPY      COLONOSCOPY W/ POLYPECTOMY      LITHOTRIPSY      MULTIPLE TOOTH EXTRACTIONS      PA COLONOSCOPY FLX DX W/COLLJ [address] PFRMD N/A 5/17/2018    Procedure: COLONOSCOPY;  Surgeon: Africa Lagos MD;  Location: [facility] GI [facility];   Service: Gastroenterology    PA REPLACE AORTIC VALVE OPENFEMORAL ARTERY APPROACH N/A 6/21/2022    Procedure: REPLACEMENT AORTIC VALVE TRANSCATHETER (TAVR) TRANSFEMORAL W/ 26MM QUINN RONNI S3 ULTRA VALVE(ACCESS ON LEFT) SAULO;  Surgeon: Jurgen Vail MD;  Location[facility];  Service: Cardiac Surgery     Social History     Substance and Sexual Activity   Alcohol Use No     Social History     Substance and Sexual Activity   Drug Use No     Social History     Tobacco Use   Smoking Status Former Smoker    Packs/day: 0 50    Types: Cigarettes    Quit date: 5/30/2022  Years since quittin 2   Smokeless Tobacco Never Used   Tobacco Comment    started when he was about 24 yrs old; stopped smoking 3 wks ago     Family History   Problem Relation Age of Onset    Heart attack Family         at age 80    No Known Problems Mother     No Known Problems Father     Diabetes Sister     Diabetes Brother        Meds/Allergies       Current Outpatient Medications:     acetylcysteine (MUCOMYST) 10 % nebulizer solution, Take 4 mL by nebulization 3 (three) times a day, Disp: 360 mL, Rfl: 2    Advair -21 MCG/ACT inhaler, TAKE 2 PUFFS BY MOUTH TWICE A DAY, Disp: 36 g, Rfl: 6    albuterol (Ventolin HFA) 90 mcg/act inhaler, Inhale 2 puffs every 4 (four) hours as needed for wheezing or shortness of breath, Disp: 18 g, Rfl: 2    Alcohol Swabs (ALCOHOL PADS) 70 % PADS, , Disp: , Rfl:     amLODIPine (NORVASC) 5 mg tablet, Take 1 tablet (5 mg total) by mouth daily, Disp: 90 tablet, Rfl: 1    aspirin 81 mg chewable tablet, Chew 1 tablet (81 mg total) daily, Disp: 30 tablet, Rfl: 2    cholecalciferol (VITAMIN D3) 1,000 units tablet, TAKE 2 TABLETS (2,000 UNITS TOTAL) BY MOUTH DAILY, Disp: 180 tablet, Rfl: 5    clopidogrel (PLAVIX) 75 mg tablet, Take 1 tablet (75 mg total) by mouth daily, Disp: 90 tablet, Rfl: 0    fluticasone (FLONASE) 50 mcg/act nasal spray, 2 sprays into each nostril daily, Disp: 2 Bottle, Rfl: 2    hydrochlorothiazide (HYDRODIURIL) 25 mg tablet, Take 1 tablet (25 mg total) by mouth daily, Disp: 90 tablet, Rfl: 2    insulin glargine (Lantus SoloStar) 100 units/mL injection pen, Inject 18 Units under the skin daily at bedtime, Disp: 15 mL, Rfl: 0    Insulin Pen Needle (Pen Needles) 31G X 8 MM MISC, Use daily, Disp: 300 each, Rfl: 3    Lancets (FREESTYLE) lancets, by Other route as needed (As needed), Disp: 100 each, Rfl: 3    levothyroxine 137 mcg tablet, TAKE 1 TABLET BY MOUTH EVERY DAY, Disp: 90 tablet, Rfl: 3    lisinopril (ZESTRIL) 20 mg tablet, Take 2 tablets (40 mg total) by mouth daily, Disp: 60 tablet, Rfl: 2    metFORMIN (GLUCOPHAGE) 850 mg tablet, TAKE 1 TABLET (850 MG TOTAL) BY MOUTH 2 (TWO) TIMES A DAY WITH MEALS, Disp: 180 tablet, Rfl: 3    montelukast (SINGULAIR) 10 mg tablet, TAKE 1 TABLET BY MOUTH EVERY DAY, Disp: 90 tablet, Rfl: 1    NovoLOG FlexPen 100 units/mL injection pen, Inject 5 units with breakfast and with dinner, Disp: 12 mL, Rfl: 3    rosuvastatin (CRESTOR) 40 MG tablet, TAKE 1 TABLET BY MOUTH EVERY DAY, Disp: 90 tablet, Rfl: 3    Blood Glucose Monitoring Suppl (OneTouch Verio) w/Device KIT, Check blood glucose three times daily before each meal, Disp: 1 kit, Rfl: 0    Continuous Blood Gluc  (FreeStyle Cristofer 2 [place]) POWER, Use 1 each continuous (Patient not taking: No sig reported), Disp: 1 each, Rfl: 0    Continuous Blood Gluc Sensor (FreeStyle Cristofer 2 Sensor) MISC, Use 1 each every 14 (fourteen) days (Patient not taking: No sig reported), Disp: 6 each, Rfl: 3    Allergies   Allergen Reactions    Iv Contrast [Iodinated Diagnostic Agents] Rash     Patient states he had a severe reaction approximately 10 years ago , states he had a rash, itchy and he remembers a bunch of people pounding on chest and being surrounded by multiple doctors  Objective   Vitals: Blood pressure 138/72, pulse 77, height 5' 8" (1 727 m), weight 83 9 kg (184 lb 14 4 oz)  Physical Exam  Constitutional:       Appearance: He is well-developed  HENT:      Head: Normocephalic and atraumatic  Eyes:      Pupils: Pupils are equal, round, and reactive to light  Cardiovascular:      Rate and Rhythm: Normal rate and regular rhythm  Pulmonary:      Effort: Pulmonary effort is normal       Breath sounds: Normal breath sounds  Abdominal:      General: Bowel sounds are normal       Palpations: Abdomen is soft  Musculoskeletal:         General: Normal range of motion  Cervical back: Normal range of motion and neck supple     Skin: General: Skin is warm and dry  Neurological:      Mental Status: He is alert and oriented to person, place, and time             Labs:  Office Visit on 08/05/2022   Component Date Value    Supplier Name 08/08/2022 [employer]     Supplier Phone Number 08/08/2022 [phone]     Order Status 08/08/2022 Delivery Successful     Delivery Request Date 08/08/2022 08/08/2022     Date Delivered  08/08/2022 08/11/2022     Item Description 08/08/2022 Nebulizer Compressor for Reusable Package with Mask     Item Description 08/08/2022 Nebulizer Set, Reusable     Item Description 08/08/2022 Adult Nebulizer Mask     Item Description 08/08/2022 Disposable Nebulizer Compressor Filter    Appointment on 07/28/2022   Component Date Value    Sodium 07/28/2022 139     Potassium 07/28/2022 4 1     Chloride 07/28/2022 106     CO2 07/28/2022 32     ANION GAP 07/28/2022 1 (A)    BUN 07/28/2022 29 (A)    Creatinine 07/28/2022 1 06     Glucose 07/28/2022 122     Calcium 07/28/2022 9 8     eGFR 07/28/2022 68     WBC 07/28/2022 5 90     RBC 07/28/2022 4 22     Hemoglobin 07/28/2022 12 5     Hematocrit 07/28/2022 40 2     MCV 07/28/2022 95     MCH 07/28/2022 29 6     MCHC 07/28/2022 31 1 (A)    RDW 07/28/2022 14 0     MPV 07/28/2022 10 6     Platelets 03/21/5722 238     nRBC 07/28/2022 0     Neutrophils Relative 07/28/2022 63     Immat GRANS % 07/28/2022 0     Lymphocytes Relative 07/28/2022 23     Monocytes Relative 07/28/2022 11     Eosinophils Relative 07/28/2022 2     Basophils Relative 07/28/2022 1     Neutrophils Absolute 07/28/2022 3 73     Immature Grans Absolute 07/28/2022 0 02     Lymphocytes Absolute 07/28/2022 1 34     Monocytes Absolute 07/28/2022 0 65     Eosinophils Absolute 07/28/2022 0 13     Basophils Absolute 07/28/2022 0 03    Hospital Outpatient Visit on 07/28/2022   Component Date Value    AV area peak nichol 07/28/2022 1 1     LA size 07/28/2022 4 4     Aortic valve mean veloci* 07/28/2022 16 70     Triscuspid Valve Regurgi* 07/28/2022 44 0     Tricuspid valve peak reg* 07/28/2022 3 33     LVPWd 07/28/2022 0 90     Left Atrium Area-systoli* 07/28/2022 28 2     Left Atrium Area-systoli* 07/28/2022 24     MV E' Tissue Velocity Se* 07/28/2022 10     TR Peak Adriano 07/28/2022 3 3     IVSd 07/28/2022 0 52     LV DIASTOLIC VOLUME (MOD* 00/79/0787 118     LEFT VENTRICLE SYSTOLIC * 63/75/3172 47     Left ventricular stroke * 07/28/2022 71 00     A4C EF 07/28/2022 57     LA length (A2C) 07/28/2022 6 30     LVIDd 07/28/2022 5 00     IVS 07/28/2022 1 1     LVIDS 07/28/2022 3 40     FS 07/28/2022 32     Asc Ao 07/28/2022 2 9     Ao root 07/28/2022 2 50     RVID d 07/28/2022 4 2     LVOT mn grad 07/28/2022 2 0     AV area by cont VTI 07/28/2022 1 1     AV mean gradient 07/28/2022 12     AV LVOT peak gradient 07/28/2022 3     MV valve area p 1/2 meth* 07/28/2022 5 64     E wave deceleration time 07/28/2022 133     LVOT diameter 07/28/2022 2 0     LVOT peak adriano 07/28/2022 0 89     LVOT peak VTI 07/28/2022 19 01     Aortic valve peak veloci* 07/28/2022 2 33     Ao VTI 07/28/2022 48 66     LVOT stroke volume 07/28/2022 59 69     AV peak gradient 07/28/2022 22     MV Peak E Adriano 07/28/2022 115     MV Peak A Adriano 07/28/2022 0 67     RAA A4C 07/28/2022 15 8     MV stenosis pressure 1/2* 07/28/2022 39     LVOT stroke volume index 07/28/2022 26 10     LVSV, 2D 07/28/2022 71     LVOT area 07/28/2022 3 14     Dimensionless velociy in* 07/28/2022 0 38     AV valve area 07/28/2022 1 23     LV EF 07/28/2022 55     Tricuspid annular plane * 07/28/2022 2 50     Est  RA pres 07/28/2022 10 0     Right Ventricular Peak S* 07/28/2022 54 00     IVC 07/28/2022 1 4    Hospital Outpatient Visit on 07/25/2022   Component Date Value    Ventricular Rate 07/28/2022 88     Atrial Rate 07/28/2022 88     DE Interval 07/28/2022 198     QRSD Interval 07/28/2022 140  QT Interval 07/28/2022 416     QTC Interval 07/28/2022 503     P Axis 07/28/2022 82     QRS Axis 07/28/2022 10     T Wave Axis 07/28/2022 88        Imaging: I have personally reviewed pertinent reports

## 2022-08-25 ENCOUNTER — TELEPHONE (OUTPATIENT)
Dept: CARDIOLOGY CLINIC | Facility: CLINIC | Age: 77
End: 2022-08-25

## 2022-08-25 DIAGNOSIS — I44.7 NEW ONSET LEFT BUNDLE BRANCH BLOCK (LBBB): Primary | ICD-10-CM

## 2022-08-25 NOTE — TELEPHONE ENCOUNTER
Patient scheduled for Dual chamber Pacer at [facility] on 9/1/22 with Dr Vernon Escobar  Patient daughter aware of general instructions and labs requires  meds holds:    METFORMIN; DO NOT take it the night prior or the morning of the procedure    NOVOLOG: DO NOT apply any amount the morning of the procedure    LANTUS: ONLY apply ½ doses the night prior to the procedure    HYDROCHLOROTHIZIADE: DO NOT take it the morning of the procedure      Patient present DYE allergy, premedications call in to his retail pharmacy  Can we please check insurance for service

## 2022-08-26 ENCOUNTER — APPOINTMENT (OUTPATIENT)
Dept: CARDIAC REHAB | Age: 77
End: 2022-08-26
Payer: MEDICARE

## 2022-08-29 ENCOUNTER — CLINICAL SUPPORT (OUTPATIENT)
Dept: CARDIAC REHAB | Age: 77
End: 2022-08-29
Payer: MEDICARE

## 2022-08-29 DIAGNOSIS — Z95.2 S/P TAVR (TRANSCATHETER AORTIC VALVE REPLACEMENT): ICD-10-CM

## 2022-08-29 PROCEDURE — 93798 PHYS/QHP OP CAR RHAB W/ECG: CPT

## 2022-08-30 ENCOUNTER — APPOINTMENT (OUTPATIENT)
Dept: LAB | Facility: CLINIC | Age: 77
End: 2022-08-30
Payer: MEDICARE

## 2022-08-30 LAB
ALBUMIN SERPL BCP-MCNC: 3.4 G/DL (ref 3.5–5)
ALP SERPL-CCNC: 51 U/L (ref 46–116)
ALT SERPL W P-5'-P-CCNC: 22 U/L (ref 12–78)
ANION GAP SERPL CALCULATED.3IONS-SCNC: 2 MMOL/L (ref 4–13)
AST SERPL W P-5'-P-CCNC: 15 U/L (ref 5–45)
BASOPHILS # BLD AUTO: 0.02 THOUSANDS/ΜL (ref 0–0.1)
BASOPHILS NFR BLD AUTO: 0 % (ref 0–1)
BILIRUB SERPL-MCNC: 0.76 MG/DL (ref 0.2–1)
BUN SERPL-MCNC: 13 MG/DL (ref 5–25)
CALCIUM ALBUM COR SERPL-MCNC: 9.6 MG/DL (ref 8.3–10.1)
CALCIUM SERPL-MCNC: 9.1 MG/DL (ref 8.3–10.1)
CHLORIDE SERPL-SCNC: 104 MMOL/L (ref 96–108)
CO2 SERPL-SCNC: 30 MMOL/L (ref 21–32)
CREAT SERPL-MCNC: 0.95 MG/DL (ref 0.6–1.3)
EOSINOPHIL # BLD AUTO: 0.23 THOUSAND/ΜL (ref 0–0.61)
EOSINOPHIL NFR BLD AUTO: 4 % (ref 0–6)
ERYTHROCYTE [DISTWIDTH] IN BLOOD BY AUTOMATED COUNT: 12.9 % (ref 11.6–15.1)
GFR SERPL CREATININE-BSD FRML MDRD: 77 ML/MIN/1.73SQ M
GLUCOSE P FAST SERPL-MCNC: 126 MG/DL (ref 65–99)
HCT VFR BLD AUTO: 41.6 % (ref 36.5–49.3)
HGB BLD-MCNC: 13.1 G/DL (ref 12–17)
IMM GRANULOCYTES # BLD AUTO: 0.01 THOUSAND/UL (ref 0–0.2)
IMM GRANULOCYTES NFR BLD AUTO: 0 % (ref 0–2)
LYMPHOCYTES # BLD AUTO: 1.29 THOUSANDS/ΜL (ref 0.6–4.47)
LYMPHOCYTES NFR BLD AUTO: 22 % (ref 14–44)
MCH RBC QN AUTO: 29.5 PG (ref 26.8–34.3)
MCHC RBC AUTO-ENTMCNC: 31.5 G/DL (ref 31.4–37.4)
MCV RBC AUTO: 94 FL (ref 82–98)
MONOCYTES # BLD AUTO: 0.54 THOUSAND/ΜL (ref 0.17–1.22)
MONOCYTES NFR BLD AUTO: 9 % (ref 4–12)
NEUTROPHILS # BLD AUTO: 3.82 THOUSANDS/ΜL (ref 1.85–7.62)
NEUTS SEG NFR BLD AUTO: 65 % (ref 43–75)
NRBC BLD AUTO-RTO: 0 /100 WBCS
PLATELET # BLD AUTO: 243 THOUSANDS/UL (ref 149–390)
PMV BLD AUTO: 10.4 FL (ref 8.9–12.7)
POTASSIUM SERPL-SCNC: 4.1 MMOL/L (ref 3.5–5.3)
PROT SERPL-MCNC: 6.9 G/DL (ref 6.4–8.4)
RBC # BLD AUTO: 4.44 MILLION/UL (ref 3.88–5.62)
SODIUM SERPL-SCNC: 136 MMOL/L (ref 135–147)
WBC # BLD AUTO: 5.91 THOUSAND/UL (ref 4.31–10.16)

## 2022-08-30 PROCEDURE — 80053 COMPREHEN METABOLIC PANEL: CPT

## 2022-08-30 PROCEDURE — 36415 COLL VENOUS BLD VENIPUNCTURE: CPT

## 2022-08-30 PROCEDURE — 85025 COMPLETE CBC W/AUTO DIFF WBC: CPT

## 2022-08-31 ENCOUNTER — ANESTHESIA EVENT (OUTPATIENT)
Dept: NON INVASIVE DIAGNOSTICS | Facility: HOSPITAL | Age: 77
End: 2022-08-31
Payer: MEDICARE

## 2022-08-31 DIAGNOSIS — E08.40 DIABETES MELLITUS DUE TO UNDERLYING CONDITION WITH DIABETIC NEUROPATHY, WITHOUT LONG-TERM CURRENT USE OF INSULIN (HCC): ICD-10-CM

## 2022-08-31 RX ORDER — INSULIN GLARGINE 100 [IU]/ML
18 INJECTION, SOLUTION SUBCUTANEOUS
Qty: 15 ML | Refills: 0 | Status: SHIPPED | OUTPATIENT
Start: 2022-08-31

## 2022-08-31 NOTE — TELEPHONE ENCOUNTER
Patient's wife Yair Salomon called to request refill of the Lantus    States patient is having heart surgery 9/1/22 and needs to take his insulin    Does not have any

## 2022-09-01 ENCOUNTER — HOSPITAL ENCOUNTER (OUTPATIENT)
Facility: HOSPITAL | Age: 77
Setting detail: OUTPATIENT SURGERY
Discharge: HOME | End: 2022-09-01
Attending: INTERNAL MEDICINE | Admitting: INTERNAL MEDICINE
Payer: MEDICARE

## 2022-09-01 ENCOUNTER — ANESTHESIA (OUTPATIENT)
Dept: NON INVASIVE DIAGNOSTICS | Facility: HOSPITAL | Age: 77
End: 2022-09-01
Payer: MEDICARE

## 2022-09-01 ENCOUNTER — APPOINTMENT (OUTPATIENT)
Dept: RADIOLOGY | Facility: HOSPITAL | Age: 77
End: 2022-09-01
Payer: MEDICARE

## 2022-09-01 VITALS
OXYGEN SATURATION: 94 % | SYSTOLIC BLOOD PRESSURE: 126 MMHG | HEART RATE: 99 BPM | HEIGHT: 69 IN | RESPIRATION RATE: 16 BRPM | TEMPERATURE: 97.9 F | BODY MASS INDEX: 26.66 KG/M2 | DIASTOLIC BLOOD PRESSURE: 57 MMHG | WEIGHT: 180 LBS

## 2022-09-01 DIAGNOSIS — I49.5 TACHYCARDIA-BRADYCARDIA (HCC): ICD-10-CM

## 2022-09-01 LAB
ANION GAP SERPL CALCULATED.3IONS-SCNC: 5 MMOL/L (ref 4–13)
BASOPHILS # BLD AUTO: 0 THOUSANDS/ΜL (ref 0–0.1)
BASOPHILS NFR BLD AUTO: 0 % (ref 0–1)
BUN SERPL-MCNC: 22 MG/DL (ref 5–25)
CALCIUM SERPL-MCNC: 8.9 MG/DL (ref 8.3–10.1)
CHLORIDE SERPL-SCNC: 103 MMOL/L (ref 96–108)
CO2 SERPL-SCNC: 27 MMOL/L (ref 21–32)
CREAT SERPL-MCNC: 1.03 MG/DL (ref 0.6–1.3)
EOSINOPHIL # BLD AUTO: 0 THOUSAND/ΜL (ref 0–0.61)
EOSINOPHIL NFR BLD AUTO: 0 % (ref 0–6)
ERYTHROCYTE [DISTWIDTH] IN BLOOD BY AUTOMATED COUNT: 12.8 % (ref 11.6–15.1)
GFR SERPL CREATININE-BSD FRML MDRD: 70 ML/MIN/1.73SQ M
GLUCOSE P FAST SERPL-MCNC: 268 MG/DL (ref 65–99)
GLUCOSE SERPL-MCNC: 268 MG/DL (ref 65–140)
HCT VFR BLD AUTO: 40.3 % (ref 36.5–49.3)
HGB BLD-MCNC: 13.3 G/DL (ref 12–17)
IMM GRANULOCYTES # BLD AUTO: 0.01 THOUSAND/UL (ref 0–0.2)
IMM GRANULOCYTES NFR BLD AUTO: 0 % (ref 0–2)
INR PPP: 0.93 (ref 0.84–1.19)
LYMPHOCYTES # BLD AUTO: 0.69 THOUSANDS/ΜL (ref 0.6–4.47)
LYMPHOCYTES NFR BLD AUTO: 14 % (ref 14–44)
MCH RBC QN AUTO: 29.9 PG (ref 26.8–34.3)
MCHC RBC AUTO-ENTMCNC: 33 G/DL (ref 31.4–37.4)
MCV RBC AUTO: 91 FL (ref 82–98)
MONOCYTES # BLD AUTO: 0.1 THOUSAND/ΜL (ref 0.17–1.22)
MONOCYTES NFR BLD AUTO: 2 % (ref 4–12)
NEUTROPHILS # BLD AUTO: 4.19 THOUSANDS/ΜL (ref 1.85–7.62)
NEUTS SEG NFR BLD AUTO: 84 % (ref 43–75)
NRBC BLD AUTO-RTO: 0 /100 WBCS
PLATELET # BLD AUTO: 249 THOUSANDS/UL (ref 149–390)
PMV BLD AUTO: 10.2 FL (ref 8.9–12.7)
POTASSIUM SERPL-SCNC: 4.9 MMOL/L (ref 3.5–5.3)
PROTHROMBIN TIME: 12.6 SECONDS (ref 11.6–14.5)
RBC # BLD AUTO: 4.45 MILLION/UL (ref 3.88–5.62)
SODIUM SERPL-SCNC: 135 MMOL/L (ref 135–147)
WBC # BLD AUTO: 4.99 THOUSAND/UL (ref 4.31–10.16)

## 2022-09-01 PROCEDURE — 71045 X-RAY EXAM CHEST 1 VIEW: CPT

## 2022-09-01 PROCEDURE — 33208 INSRT HEART PM ATRIAL & VENT: CPT | Performed by: INTERNAL MEDICINE

## 2022-09-01 PROCEDURE — C1892 INTRO/SHEATH,FIXED,PEEL-AWAY: HCPCS | Performed by: INTERNAL MEDICINE

## 2022-09-01 PROCEDURE — 93005 ELECTROCARDIOGRAM TRACING: CPT

## 2022-09-01 PROCEDURE — 80048 BASIC METABOLIC PNL TOTAL CA: CPT | Performed by: PHYSICIAN ASSISTANT

## 2022-09-01 PROCEDURE — 85025 COMPLETE CBC W/AUTO DIFF WBC: CPT | Performed by: PHYSICIAN ASSISTANT

## 2022-09-01 PROCEDURE — C1785 PMKR, DUAL, RATE-RESP: HCPCS | Performed by: INTERNAL MEDICINE

## 2022-09-01 PROCEDURE — C1898 LEAD, PMKR, OTHER THAN TRANS: HCPCS | Performed by: INTERNAL MEDICINE

## 2022-09-01 PROCEDURE — C1769 GUIDE WIRE: HCPCS | Performed by: INTERNAL MEDICINE

## 2022-09-01 PROCEDURE — 85610 PROTHROMBIN TIME: CPT | Performed by: PHYSICIAN ASSISTANT

## 2022-09-01 DEVICE — ENVELOPE CMRM6122 ABSORB MED MR
Type: IMPLANTABLE DEVICE | Site: CHEST  WALL | Status: FUNCTIONAL
Brand: TYRX™

## 2022-09-01 DEVICE — IPG W1DR01 AZURE XT DR MRI USA
Type: IMPLANTABLE DEVICE | Site: CHEST  WALL | Status: FUNCTIONAL
Brand: AZURE™ XT DR MRI SURESCAN™

## 2022-09-01 DEVICE — LEAD 5076-52 MRI US RCMCRD
Type: IMPLANTABLE DEVICE | Site: HEART | Status: FUNCTIONAL
Brand: CAPSUREFIX NOVUS MRI™ SURESCAN®

## 2022-09-01 DEVICE — LEAD 383069 MRI US
Type: IMPLANTABLE DEVICE | Site: HEART | Status: FUNCTIONAL
Brand: SELECTSECURE™ MRI SURESCAN™

## 2022-09-01 RX ORDER — PROPOFOL 10 MG/ML
INJECTION, EMULSION INTRAVENOUS AS NEEDED
Status: DISCONTINUED | OUTPATIENT
Start: 2022-09-01 | End: 2022-09-01

## 2022-09-01 RX ORDER — PROPOFOL 10 MG/ML
INJECTION, EMULSION INTRAVENOUS CONTINUOUS PRN
Status: DISCONTINUED | OUTPATIENT
Start: 2022-09-01 | End: 2022-09-01

## 2022-09-01 RX ORDER — ONDANSETRON 2 MG/ML
INJECTION INTRAMUSCULAR; INTRAVENOUS AS NEEDED
Status: DISCONTINUED | OUTPATIENT
Start: 2022-09-01 | End: 2022-09-01

## 2022-09-01 RX ORDER — LIDOCAINE HYDROCHLORIDE 10 MG/ML
INJECTION, SOLUTION EPIDURAL; INFILTRATION; INTRACAUDAL; PERINEURAL AS NEEDED
Status: DISCONTINUED | OUTPATIENT
Start: 2022-09-01 | End: 2022-09-01 | Stop reason: HOSPADM

## 2022-09-01 RX ORDER — DIPHENHYDRAMINE HYDROCHLORIDE 50 MG/ML
25 INJECTION INTRAMUSCULAR; INTRAVENOUS ONCE
Status: COMPLETED | OUTPATIENT
Start: 2022-09-01 | End: 2022-09-01

## 2022-09-01 RX ORDER — SODIUM CHLORIDE 9 MG/ML
INJECTION, SOLUTION INTRAVENOUS CONTINUOUS PRN
Status: DISCONTINUED | OUTPATIENT
Start: 2022-09-01 | End: 2022-09-01

## 2022-09-01 RX ORDER — ACETAMINOPHEN 325 MG/1
650 TABLET ORAL EVERY 4 HOURS PRN
Status: DISCONTINUED | OUTPATIENT
Start: 2022-09-01 | End: 2022-09-01 | Stop reason: HOSPADM

## 2022-09-01 RX ORDER — CEFAZOLIN SODIUM 1 G/3ML
INJECTION, POWDER, FOR SOLUTION INTRAMUSCULAR; INTRAVENOUS AS NEEDED
Status: DISCONTINUED | OUTPATIENT
Start: 2022-09-01 | End: 2022-09-01

## 2022-09-01 RX ORDER — FAMOTIDINE 10 MG/ML
20 INJECTION, SOLUTION INTRAVENOUS ONCE
Status: COMPLETED | OUTPATIENT
Start: 2022-09-01 | End: 2022-09-01

## 2022-09-01 RX ORDER — CEFAZOLIN SODIUM 2 G/50ML
2000 SOLUTION INTRAVENOUS ONCE
Status: COMPLETED | OUTPATIENT
Start: 2022-09-01 | End: 2022-09-01

## 2022-09-01 RX ORDER — LIDOCAINE HYDROCHLORIDE 10 MG/ML
INJECTION, SOLUTION EPIDURAL; INFILTRATION; INTRACAUDAL; PERINEURAL AS NEEDED
Status: DISCONTINUED | OUTPATIENT
Start: 2022-09-01 | End: 2022-09-01

## 2022-09-01 RX ORDER — OXYCODONE HYDROCHLORIDE AND ACETAMINOPHEN 5; 325 MG/1; MG/1
1 TABLET ORAL EVERY 6 HOURS PRN
Qty: 10 TABLET | Refills: 0 | Status: SHIPPED | OUTPATIENT
Start: 2022-09-01 | End: 2022-09-11

## 2022-09-01 RX ORDER — FENTANYL CITRATE 50 UG/ML
INJECTION, SOLUTION INTRAMUSCULAR; INTRAVENOUS AS NEEDED
Status: DISCONTINUED | OUTPATIENT
Start: 2022-09-01 | End: 2022-09-01

## 2022-09-01 RX ORDER — GENTAMICIN SULFATE 40 MG/ML
INJECTION, SOLUTION INTRAMUSCULAR; INTRAVENOUS AS NEEDED
Status: DISCONTINUED | OUTPATIENT
Start: 2022-09-01 | End: 2022-09-01 | Stop reason: HOSPADM

## 2022-09-01 RX ADMIN — CEFAZOLIN SODIUM 2000 MG: 2 SOLUTION INTRAVENOUS at 08:26

## 2022-09-01 RX ADMIN — FENTANYL CITRATE 25 MCG: 50 INJECTION INTRAMUSCULAR; INTRAVENOUS at 08:34

## 2022-09-01 RX ADMIN — PHENYLEPHRINE HYDROCHLORIDE 20 MCG/MIN: 10 INJECTION INTRAVENOUS at 09:02

## 2022-09-01 RX ADMIN — LIDOCAINE HYDROCHLORIDE 50 MG: 10 INJECTION, SOLUTION EPIDURAL; INFILTRATION; INTRACAUDAL; PERINEURAL at 08:34

## 2022-09-01 RX ADMIN — PROPOFOL 60 MCG/KG/MIN: 10 INJECTION, EMULSION INTRAVENOUS at 08:34

## 2022-09-01 RX ADMIN — HYDROCORTISONE SODIUM SUCCINATE 100 MG: 100 INJECTION, POWDER, FOR SOLUTION INTRAMUSCULAR; INTRAVENOUS at 08:10

## 2022-09-01 RX ADMIN — ONDANSETRON 4 MG: 2 INJECTION INTRAMUSCULAR; INTRAVENOUS at 08:34

## 2022-09-01 RX ADMIN — FENTANYL CITRATE 25 MCG: 50 INJECTION INTRAMUSCULAR; INTRAVENOUS at 08:58

## 2022-09-01 RX ADMIN — CEFAZOLIN 2000 MG: 1 INJECTION, POWDER, FOR SOLUTION INTRAMUSCULAR; INTRAVENOUS at 08:35

## 2022-09-01 RX ADMIN — PROPOFOL 30 MG: 10 INJECTION, EMULSION INTRAVENOUS at 08:34

## 2022-09-01 RX ADMIN — SODIUM CHLORIDE: 0.9 INJECTION, SOLUTION INTRAVENOUS at 08:29

## 2022-09-01 RX ADMIN — DIPHENHYDRAMINE HYDROCHLORIDE 25 MG: 50 INJECTION INTRAMUSCULAR; INTRAVENOUS at 08:10

## 2022-09-01 RX ADMIN — PROPOFOL 30 MG: 10 INJECTION, EMULSION INTRAVENOUS at 09:00

## 2022-09-01 RX ADMIN — FAMOTIDINE 20 MG: 10 INJECTION, SOLUTION INTRAVENOUS at 08:10

## 2022-09-01 NOTE — ANESTHESIA POSTPROCEDURE EVALUATION
Post-Op Assessment Note    CV Status:  Stable  Pain Score: 0    Pain management: adequate     Mental Status:  Alert and awake   Hydration Status:  Euvolemic   PONV Controlled:  Controlled   Airway Patency:  Patent      Post Op Vitals Reviewed: Yes      Staff: CRNA         There were no known complications for this encounter      BP   110/64   Temp      Pulse  75   Resp   15   SpO2   95%

## 2022-09-01 NOTE — Clinical Note
The Prerna Guan device was inserted  The leads were placed into the connector and visually verified to be in correct position  Injury current obtained

## 2022-09-01 NOTE — INTERVAL H&P NOTE
Please see recent office visit with Rodrigo Bain PA-C for full details  Briefly this patient is a pleasant 44-year-old male with severe aortic stenosis status post TAVR 06/2022, preserved LV systolic function per echo 07/2022, nonobstructive CAD, moderate bilateral carotid artery stenosis, hypertension, hyperlipidemia, and diabetes  He underwent successful TAVR 06/2022, and was noted to have left bundle-branch block in the postoperative setting  He was monitored closely in the hospital, and then discharged with a 1 month live event monitor  This showed tachy-lino syndrome, with several pauses, bradycardia, as well as runs of SVT/PAT  Given these findings, he was seen by EP at which time pacemaker implantation was recommended  He presents today to undergo that procedure  No significant changes since he was recently seen, physical exam unchanged      Vitals:    09/01/22 0728   BP: 162/77   Pulse: 87   Resp: 16   Temp: 97 9 °F (36 6 °C)   SpO2: 97%

## 2022-09-01 NOTE — ANESTHESIA PREPROCEDURE EVALUATION
Procedure:  Cardiac pacer implant ; DC PPM (N/A Chest)    Relevant Problems   CARDIO   (+) Aortic stenosis   (+) Coronary artery disease   (+) Essential hypertension   (+) Hyperlipidemia   (+) S/P TAVR (transcatheter aortic valve replacement)      ENDO   (+) Hypothyroidism   (+) Type 2 diabetes mellitus with neurologic complication, with long-term current use of insulin (HCC)      GI/HEPATIC   (+) Chronic hepatitis C (HCC)      /RENAL   (+) Nephritis and nephropathy, not specified as acute or chronic, with other specified pathological lesion in kidney, in diseases classified elsewhere      MUSCULOSKELETAL   (+) Osteoarthritis of knee      PULMONARY   (+) Asthma        Physical Exam    Airway    Mallampati score: II  TM Distance: >3 FB  Neck ROM: full     Dental       Cardiovascular  Cardiovascular exam normal    Pulmonary  Pulmonary exam normal     Other Findings        Anesthesia Plan  ASA Score- 4     Anesthesia Type- IV sedation with anesthesia with ASA Monitors  Additional Monitors:   Airway Plan:           Plan Factors-Exercise tolerance (METS): >4 METS  Chart reviewed  EKG reviewed  Imaging results reviewed  Existing labs reviewed  Patient summary reviewed  Induction- intravenous  Postoperative Plan- Plan for postoperative opioid use  Planned trial extubation    Informed Consent- Anesthetic plan and risks discussed with patient  I personally reviewed this patient with the CRNA  Discussed and agreed on the Anesthesia Plan with the CRNA  Justen Meza

## 2022-09-02 ENCOUNTER — APPOINTMENT (OUTPATIENT)
Dept: CARDIAC REHAB | Age: 77
End: 2022-09-02
Payer: MEDICARE

## 2022-09-02 LAB
ATRIAL RATE: 87 BPM
ATRIAL RATE: 89 BPM
P AXIS: 82 DEGREES
P AXIS: 86 DEGREES
PR INTERVAL: 192 MS
PR INTERVAL: 202 MS
QRS AXIS: -14 DEGREES
QRS AXIS: -18 DEGREES
QRSD INTERVAL: 138 MS
QRSD INTERVAL: 140 MS
QT INTERVAL: 400 MS
QT INTERVAL: 416 MS
QTC INTERVAL: 486 MS
QTC INTERVAL: 500 MS
T WAVE AXIS: 67 DEGREES
T WAVE AXIS: 78 DEGREES
VENTRICULAR RATE: 87 BPM
VENTRICULAR RATE: 89 BPM

## 2022-09-02 PROCEDURE — 93010 ELECTROCARDIOGRAM REPORT: CPT | Performed by: INTERNAL MEDICINE

## 2022-09-04 ENCOUNTER — HOSPITAL ENCOUNTER (EMERGENCY)
Facility: HOSPITAL | Age: 77
Discharge: HOME | End: 2022-09-04
Attending: EMERGENCY MEDICINE
Payer: MEDICARE

## 2022-09-04 VITALS
WEIGHT: 182 LBS | SYSTOLIC BLOOD PRESSURE: 165 MMHG | HEIGHT: 69 IN | BODY MASS INDEX: 26.96 KG/M2 | TEMPERATURE: 98.5 F | DIASTOLIC BLOOD PRESSURE: 77 MMHG | OXYGEN SATURATION: 100 % | RESPIRATION RATE: 18 BRPM | HEART RATE: 77 BPM

## 2022-09-04 DIAGNOSIS — Z48.00 DRESSING CHANGE: Primary | ICD-10-CM

## 2022-09-04 PROCEDURE — 99283 EMERGENCY DEPT VISIT LOW MDM: CPT

## 2022-09-04 PROCEDURE — 99282 EMERGENCY DEPT VISIT SF MDM: CPT | Performed by: EMERGENCY MEDICINE

## 2022-09-04 NOTE — ED NOTES
Pt resting in stretcher  Family at bedside  Awaiting consult  Denies needs at this time  Call bell within reach        Clair Antunez RN  09/04/22 [id number]

## 2022-09-04 NOTE — ED NOTES
Pt ambulated to restroom  Steady, independent gait  Denies dizziness        Vince Wing RN  09/04/22 [id number]

## 2022-09-04 NOTE — ED PROVIDER NOTES
History  Chief Complaint   Patient presents with    Dressing Change     Patient got pacemaker place 3 days ago and got bandage wet in the shower  Was recommended to come to ED for dressing change  HPI  Khanh Stevens is a 78 y o  male with PMH significant for TAVR, recent pacer placement coming in today with complaint of dressing change  He reports he has noticed the dressing placed to cover his pacer site has been wet and having blood coming from underneath  No event or injury  He may have showered with it on Denies any fevers, chills, chest pain, palpitations  He had this placed 3 days ago  No alleviating factors  No systemic symptoms  No other complaints at this time       - No language barrier  -PFH/PSH reviewed  - History obtained from patient and chart    Prior to Admission Medications   Prescriptions Last Dose Informant Patient Reported? Taking?    Advair -21 MCG/ACT inhaler  Spouse/Significant Other No No   Sig: TAKE 2 PUFFS BY MOUTH TWICE A DAY   Alcohol Swabs (ALCOHOL PADS) 70 % PADS  Spouse/Significant Other Yes No   Blood Glucose Monitoring Suppl (OneTouch Verio) w/Device KIT  Spouse/Significant Other No No   Sig: Check blood glucose three times daily before each meal   Continuous Blood Gluc  (FreeStyle Cristofer 2 Dawn) POWER   No No   Sig: Use 1 each continuous   Continuous Blood Gluc Sensor (FreeStyle Cristofer 2 Sensor) [employer] [facility]   No No   Sig: Use 1 each every 14 (fourteen) days   Insulin Glargine Solostar (Lantus SoloStar) 100 UNIT/ML SOPN   No No   Sig: Inject 0 18 mL (18 Units total) under the skin daily at bedtime   Insulin Pen Needle (Pen Needles) 31G X 8 MM MISC  Spouse/Significant Other No No   Sig: Use daily   Lancets (FREESTYLE) lancets  Spouse/Significant Other No No   Sig: by Other route as needed (As needed)   NovoLOG FlexPen 100 units/mL injection pen  Spouse/Significant Other No No   Sig: Inject 5 units with breakfast and with dinner   acetylcysteine (MUCOMYST) 10 % nebulizer solution   No No   Sig: Take 4 mL by nebulization 3 (three) times a day   albuterol (Ventolin HFA) 90 mcg/act inhaler   No No   Sig: Inhale 2 puffs every 4 (four) hours as needed for wheezing or shortness of breath   amLODIPine (NORVASC) 5 mg tablet  Spouse/Significant Other No No   Sig: Take 1 tablet (5 mg total) by mouth daily   aspirin 81 mg chewable tablet  Spouse/Significant Other No No   Sig: Chew 1 tablet (81 mg total) daily   cholecalciferol (VITAMIN D3) 1,000 units tablet  Spouse/Significant Other No No   Sig: TAKE 2 TABLETS (2,000 UNITS TOTAL) BY MOUTH DAILY   clopidogrel (PLAVIX) 75 mg tablet  Spouse/Significant Other No No   Sig: Take 1 tablet (75 mg total) by mouth daily   fluticasone (FLONASE) 50 mcg/act nasal spray  Spouse/Significant Other No No   Si sprays into each nostril daily   hydrochlorothiazide (HYDRODIURIL) 25 mg tablet  Spouse/Significant Other No No   Sig: Take 1 tablet (25 mg total) by mouth daily   levothyroxine 137 mcg tablet  Spouse/Significant Other No No   Sig: TAKE 1 TABLET BY MOUTH EVERY DAY   lisinopril (ZESTRIL) 20 mg tablet  Spouse/Significant Other No No   Sig: Take 2 tablets (40 mg total) by mouth daily   metFORMIN (GLUCOPHAGE) 850 mg tablet  Spouse/Significant Other No No   Sig: TAKE 1 TABLET (850 MG TOTAL) BY MOUTH 2 (TWO) TIMES A DAY WITH MEALS   montelukast (SINGULAIR) 10 mg tablet  Spouse/Significant Other No No   Sig: TAKE 1 TABLET BY MOUTH EVERY DAY   oxyCODONE-acetaminophen (Percocet) 5-325 mg per tablet   No No   Sig: Take 1 tablet by mouth every 6 (six) hours as needed for moderate pain for up to 10 days Max Daily Amount: 4 tablets   rosuvastatin (CRESTOR) 40 MG tablet  Spouse/Significant Other No No   Sig: TAKE 1 TABLET BY MOUTH EVERY DAY      Facility-Administered Medications: None       Past Medical History:   Diagnosis Date    Arthritis     Asthma     Colon polyps     Community acquired pneumonia     last assessed: 2014    [facility]     Hemorrhagic prepatellar bursitis, left 10/21/2019    Hepatitis C     High cholesterol     History of colonoscopy 2017    Hypertension     Lymphadenopathy, anterior cervical 04/17/2018    Screening for colon cancer 05/01/2019    [facility] 06/11/2014       Past Surgical History:   Procedure Laterality Date    CARDIAC CATHETERIZATION N/A 6/3/2022    Procedure: Cardiac RHC/LHC; Surgeon: Karen Bella MD;  [facility]; Service: Cardiology    CARDIAC CATHETERIZATION N/A 6/21/2022    Procedure: CARDIAC TAVR;  Surgeon: Randi Hernandez MD;  Location[facility];  Service: Cardiology    CARDIAC ELECTROPHYSIOLOGY PROCEDURE N/A 9/1/2022    Procedure: Cardiac pacer implant ; DC PPM;  Surgeon: Kevin Sun DO;  [facility]; Service: Cardiology    COLONOSCOPY      COLONOSCOPY W/ POLYPECTOMY      LITHOTRIPSY      MULTIPLE TOOTH EXTRACTIONS      RI COLONOSCOPY FLX DX W/COLLJ SPEC WHEN PFRMD N/A 5/17/2018    Procedure: COLONOSCOPY;  Surgeon: Herson Wall MD;  Location: [facility] GI LAB; Service: Gastroenterology    RI REPLACE AORTIC VALVE OPENFEMORAL ARTERY APPROACH N/A 6/21/2022    Procedure: REPLACEMENT AORTIC VALVE TRANSCATHETER (TAVR) TRANSFEMORAL W/ 26MM QUINN RONNI S3 ULTRA VALVE(ACCESS ON LEFT) SAULO;  Surgeon: Farhana Miller MD;  Location[facility];  Service: Cardiac Surgery       Family History   Problem Relation Age of Onset    Heart attack Family         at age 80    No Known Problems Mother     No Known Problems Father     Diabetes Sister     Diabetes Brother      I have reviewed and agree with the history as documented      E-Cigarette/Vaping    E-Cigarette Use Never User      E-Cigarette/Vaping Substances    Nicotine No     THC No     CBD No     Flavoring No     Other No     Unknown No      Social History     Tobacco Use    Smoking status: Former Smoker     Packs/day: 0 50     Types: Cigarettes     Quit date: 5/30/2022 Years since quittin 2    Smokeless tobacco: Never Used    Tobacco comment: started when he was about 24 yrs old; stopped smoking 3 wks ago   Vaping Use    Vaping Use: Never used   Substance Use Topics    Alcohol use: No    Drug use: No        Review of Systems   Constitutional: Negative for chills and fever  HENT: Negative for sore throat  Eyes: Negative for pain and visual disturbance  Respiratory: Negative for cough and shortness of breath  Cardiovascular: Negative for chest pain and palpitations  Gastrointestinal: Negative for abdominal pain and vomiting  Genitourinary: Negative for dysuria  Musculoskeletal: Negative for back pain  Skin: Positive for wound  Neurological: Negative for syncope  All other systems reviewed and are negative  Physical Exam  ED Triage Vitals [22 1031]   Temperature Pulse Respirations Blood Pressure SpO2   98 5 °F (36 9 °C) 80 18 166/78 100 %      Temp Source Heart Rate Source Patient Position - Orthostatic VS BP Location FiO2 (%)   Oral Monitor Sitting Right arm --      Pain Score       No Pain             Orthostatic Vital Signs  Vitals:    22 1031 22 1436   BP: 166/78 165/77   Pulse: 80 77   Patient Position - Orthostatic VS: Sitting Sitting       Physical Exam  Vitals and nursing note reviewed  Constitutional:       Appearance: He is well-developed  HENT:      Head: Normocephalic and atraumatic  Eyes:      Conjunctiva/sclera: Conjunctivae normal    Cardiovascular:      Rate and Rhythm: Normal rate and regular rhythm  Heart sounds: No murmur heard  Comments: Pacer site noted, pressure dressing saturated with blood, no evidence of erythema, hematoma, crepitus  Pulmonary:      Effort: Pulmonary effort is normal  No respiratory distress  Breath sounds: Normal breath sounds  Abdominal:      Palpations: Abdomen is soft  Tenderness: There is no abdominal tenderness     Musculoskeletal:      Cervical back: Neck supple  Skin:     General: Skin is warm and dry  Neurological:      Mental Status: He is alert  ED Medications  Medications - No data to display    Diagnostic Studies  Results Reviewed     None                 No orders to display         Procedures  Procedures      ED Course               Identification of Seniors at Risk    Flowsheet Row Most Recent Value   (ISAR) Identification of Seniors at Risk    Before the illness or injury that brought you to the Emergency, did you need someone to help you on a regular basis? 0 Filed at: 09/04/2022 1033   In the last 24 hours, have you needed more help than usual? 0 Filed at: 09/04/2022 1033   Have you been hospitalized for one or more nights during the past 6 months? 1 Filed at: 09/04/2022 1033   In general, do you see well? 0 Filed at: 09/04/2022 1033   In general, do you have serious problems with your memory? 0 Filed at: 09/04/2022 1033   Do you take more than three different medications every day? 1 Filed at: 09/04/2022 1033   ISAR Score 2 Filed at: 09/04/2022 1033                              MDM  Number of Diagnoses or Management Options  Dressing change  Diagnosis management comments: Alexsander Rizo is a 78 y o  who presents with complaints of dressing change    Vital signs are stable, physical exam shows pressure dressing as per above    Plan: EP consultation for dressing change, DC home with close follow up        Risk of Complications, Morbidity, and/or Mortality  Presenting problems: low  Diagnostic procedures: minimal  Management options: minimal    Patient Progress  Patient progress: stable      Disposition  Final diagnoses:   Dressing change     Time reflects when diagnosis was documented in both MDM as applicable and the Disposition within this note     Time User Action Codes Description Comment    9/4/2022 11:14 AM Maria Luisa Melo Add [Z48 00] Dressing change       ED Disposition     ED Disposition   Discharge    Condition   Stable    Date/Time [facility] Sep 4, 2022  2:39 PM    Comment   Janet Caban discharge to home/self care                 Follow-up Information     Follow up With Specialties Details Why [address]  Emergency Department Emergency Medicine  If symptoms worsen [address] Emergency [address], [address], [place], [place], [address]          Discharge Medication List as of 9/4/2022  2:41 PM      CONTINUE these medications which have NOT CHANGED    Details   acetylcysteine (MUCOMYST) 10 % nebulizer solution Take 4 mL by nebulization 3 (three) times a day, Starting Tue 8/16/2022, Until Mon 11/14/2022, Normal      Advair -21 MCG/ACT inhaler TAKE 2 PUFFS BY MOUTH TWICE A DAY, Normal      albuterol (Ventolin HFA) 90 mcg/act inhaler Inhale 2 puffs every 4 (four) hours as needed for wheezing or shortness of breath, Starting Fri 8/5/2022, Normal      Alcohol Swabs (ALCOHOL PADS) 70 % PADS Starting Thu 5/9/2019, Historical Med      amLODIPine (NORVASC) 5 mg tablet Take 1 tablet (5 mg total) by mouth daily, Starting Tue 7/23/2019, Normal      aspirin 81 mg chewable tablet Chew 1 tablet (81 mg total) daily, Starting Thu 6/23/2022, Normal      Blood Glucose Monitoring Suppl (OneTouch Verio) w/Device KIT Check blood glucose three times daily before each meal, Normal      cholecalciferol (VITAMIN D3) 1,000 units tablet TAKE 2 TABLETS (2,000 UNITS TOTAL) BY MOUTH DAILY, Starting Mon 4/25/2022, Normal      clopidogrel (PLAVIX) 75 mg tablet Take 1 tablet (75 mg total) by mouth daily, Starting Thu 6/23/2022, Until Wed 9/21/2022, Normal      Continuous Blood Gluc  (FreeStyle Robles 2 Gilbert) POWER Use 1 each continuous, Starting Tue 5/10/2022, Normal      Continuous Blood Gluc Sensor (FreeStyle Cristofer 2 Sensor) MISC Use 1 each every 14 (fourteen) days, Starting Tue 5/10/2022, Normal fluticasone (FLONASE) 50 mcg/act nasal spray 2 sprays into each nostril daily, Starting Tue 8/11/2020, Normal      hydrochlorothiazide (HYDRODIURIL) 25 mg tablet Take 1 tablet (25 mg total) by mouth daily, Starting Wed 1/19/2022, Normal      Insulin Glargine Solostar (Lantus SoloStar) 100 UNIT/ML SOPN Inject 0 18 mL (18 Units total) under the skin daily at bedtime, Starting Wed 8/31/2022, Normal      Insulin Pen Needle (Pen Needles) 31G X 8 MM MISC Use daily, Starting Wed 3/3/2021, Normal      Lancets (FREESTYLE) lancets by Other route as needed (As needed), Starting Thu 3/21/2019, Normal      levothyroxine 137 mcg tablet TAKE 1 TABLET BY MOUTH EVERY DAY, Normal      lisinopril (ZESTRIL) 20 mg tablet Take 2 tablets (40 mg total) by mouth daily, Starting Thu 6/23/2022, Normal      metFORMIN (GLUCOPHAGE) 850 mg tablet TAKE 1 TABLET (850 MG TOTAL) BY MOUTH 2 (TWO) TIMES A DAY WITH MEALS, Starting Mon 5/16/2022, Normal      montelukast (SINGULAIR) 10 mg tablet TAKE 1 TABLET BY MOUTH EVERY DAY, Normal      NovoLOG FlexPen 100 units/mL injection pen Inject 5 units with breakfast and with dinner, Normal      oxyCODONE-acetaminophen (Percocet) 5-325 mg per tablet Take 1 tablet by mouth every 6 (six) hours as needed for moderate pain for up to 10 days Max Daily Amount: 4 tablets, Starting Thu 9/1/2022, Until Sun 9/11/2022 at 2359, Normal      rosuvastatin (CRESTOR) 40 MG tablet TAKE 1 TABLET BY MOUTH EVERY DAY, Normal           No discharge procedures on file  PDMP Review       Value Time User    PDMP Reviewed  Yes 9/1/2022  1:26 PM Dar Hare PA-C           ED Provider  Attending physically available and evaluated Karey Boo  [facility] managed the patient along with the ED Attending      Electronically Signed by         Shannon Cagle MD  09/04/22 0441

## 2022-09-04 NOTE — ED ATTENDING ATTESTATION
Final Diagnosis:  1  Dressing change           I, Jacqueline Morgan MD, saw and evaluated the patient  All available labs and X-rays were ordered by me or the resident and have been reviewed by myself  I discussed the patient with the resident / non-physician and agree with the resident's / non-physician practitioner's findings and plan as documented in the resident's / non-physician practicitioner's note, except where noted  At this point, I agree with the current assessment done in the ED  I was present during key portions of all procedures performed unless otherwise stated  Chief Complaint   Patient presents with    Dressing Change     Patient got pacemaker place 3 days ago and got bandage wet in the shower  Was recommended to come to ED for dressing change  This is a 78 y o  male presenting for evaluation of wet dressing  She had a pacemaker placed 3 days ago  It is bleeding  So came in  No f/ch/n/v/cp/sob  PMH:   has a past medical history of Arthritis, Asthma, Colon polyps, Community acquired pneumonia, Diabetes mellitus ([facility] [address] ), Hemorrhagic prepatellar bursitis, left (10/21/2019), Hepatitis C, High cholesterol, History of colonoscopy (2017), Hypertension, Lymphadenopathy, anterior cervical (04/17/2018), Screening for colon cancer (05/01/2019), and Thoracic vertebral fracture ([facility] [address] ) (06/11/2014)  PSH:   has a past surgical history that includes Colonoscopy w/ polypectomy; Lithotripsy; Multiple tooth extractions; pr colonoscopy flx dx w/collj spec when pfrmd (N/A, 5/17/2018); Colonoscopy; Cardiac catheterization (N/A, 6/3/2022); pr replace aortic valve openfemoral artery approach (N/A, 6/21/2022); Cardiac catheterization (N/A, 6/21/2022); and Cardiac electrophysiology procedure (N/A, 9/1/2022)      Social:  Social History     Substance and Sexual Activity   Alcohol Use No     Social History     Tobacco Use   Smoking Status Former Smoker    Packs/day: 0 50    Types: Cigarettes    Quit date: 2022    Years since quittin 2   Smokeless Tobacco Never Used   Tobacco Comment    started when he was about 24 yrs old; stopped smoking 3 wks ago     Social History     Substance and Sexual Activity   Drug Use No     PE:  Vitals:    22 1031 22 1436   BP: 166/78 165/77   BP Location: Right arm Right arm   Pulse: 80 77   Resp: 18 18   Temp: 98 5 °F (36 9 °C)    TempSrc: Oral    SpO2: 100% 100%   Weight: 82 6 kg (182 lb)    Height: 5' 9" (1 753 m)    General: VS reviewed  Appears in NAD  awake, alert  Well-nourished, well-developed  Appears stated age  Speaking normally in full sentences  Head: Normocephalic, atraumatic  Eyes: EOM-I  No diplopia  No hyphema  No subconjunctival hemorrhages  Symmetrical lids  ENT: Atraumatic external nose and ears  MMM  No malocclusion  No stridor  Normal phonation  No drooling  Normal swallowing  Neck: No JVD  CV: No pallor noted  Lungs:   No tachypnea  No respiratory distress  MSK:   FROM spontaneously  Skin: Dry  LEFT chest:  Dressing is wet with blood  Neuro: Awake, alert, GCS15, CN II-XII grossly intact  Motor grossly intact  Psychiatric/Behavioral: Appropriate mood and affect   Exam: deferred  A:  - Abnormal dressing  P:  - Talk to EP     - 13 point ROS was performed and all are normal unless stated in the history above  - Nursing note reviewed  Vitals reviewed  - Orders placed by myself and/or advanced practitioner / resident     - Previous chart was reviewed  - No language barrier    - History obtained from patient  - There are no limitations to the history obtained  - Critical care time: Not applicable for this patient  Code Status: Prior  Advance Directive and Living Will:      Power of :    POLST:      Medications - No data to display  No orders to display     No orders of the defined types were placed in this encounter      Labs Reviewed - No data to display  Time reflects when diagnosis was documented in both MDM as applicable and the Disposition within this note       Time User Action Codes Description Comment    9/4/2022 11:14 AM Julia Dennys Add [Z48 [id number]] Dressing change           ED Disposition       ED Disposition   Discharge    Condition   Stable    Date/Time   Sun Sep 4, 2022  2:39 PM    Comment   Isrrael Dean discharge to home/self care                     Follow-up Information       Follow up With Specialties Details Why [address]  Emergency Department Emergency Medicine  If symptoms worsen [address] Emergency Department, [address], [address] [facility]          Discharge Medication List as of 9/4/2022  2:41 PM        CONTINUE these medications which have NOT CHANGED    Details   acetylcysteine (MUCOMYST) 10 % nebulizer solution Take 4 mL by nebulization 3 (three) times a day, Starting Tue 8/16/2022, Until Mon 11/14/2022, Normal      Advair -21 MCG/ACT inhaler TAKE 2 PUFFS BY MOUTH TWICE A DAY, Normal      albuterol (Ventolin HFA) 90 mcg/act inhaler Inhale 2 puffs every 4 (four) hours as needed for wheezing or shortness of breath, Starting Fri 8/5/2022, Normal      Alcohol Swabs (ALCOHOL PADS) 70 % PADS Starting Thu 5/9/2019, Historical Med      amLODIPine (NORVASC) 5 mg tablet Take 1 tablet (5 mg total) by mouth daily, Starting Tue 7/23/2019, Normal      aspirin 81 mg chewable tablet Chew 1 tablet (81 mg total) daily, Starting Thu 6/23/2022, Normal      Blood Glucose Monitoring Suppl (OneTouch Verio) w/Device KIT Check blood glucose three times daily before each meal, Normal      cholecalciferol (VITAMIN D3) 1,000 units tablet TAKE 2 TABLETS (2,000 UNITS TOTAL) BY MOUTH DAILY, Starting Mon 4/25/2022, Normal      clopidogrel (PLAVIX) 75 mg tablet Take 1 tablet (75 mg total) by mouth daily, Starting Thu 6/23/2022, Until Wed 9/21/2022, Normal      Continuous Blood Gluc  (FreeStyle [place] 2 [place]) POWER Use 1 each continuous, Starting Tue 5/10/2022, Normal      Continuous Blood Gluc Sensor (FreeStyle Cristofer 2 Sensor) MISC Use 1 each every 14 (fourteen) days, Starting Tue 5/10/2022, Normal      fluticasone (FLONASE) 50 mcg/act nasal spray 2 sprays into each nostril daily, Starting Tue 8/11/2020, Normal      hydrochlorothiazide (HYDRODIURIL) 25 mg tablet Take 1 tablet (25 mg total) by mouth daily, Starting Wed 1/19/2022, Normal      Insulin Glargine Solostar (Lantus SoloStar) 100 UNIT/ML SOPN Inject 0 18 mL (18 Units total) under the skin daily at bedtime, Starting Wed 8/31/2022, Normal      Insulin Pen Needle (Pen Needles) 31G X 8 MM MISC Use daily, Starting Wed 3/3/2021, Normal      Lancets (FREESTYLE) lancets by Other route as needed (As needed), Starting Thu 3/21/2019, Normal      levothyroxine 137 mcg tablet TAKE 1 TABLET BY MOUTH EVERY DAY, Normal      lisinopril (ZESTRIL) 20 mg tablet Take 2 tablets (40 mg total) by mouth daily, Starting Thu 6/23/2022, Normal      metFORMIN (GLUCOPHAGE) 850 mg tablet TAKE 1 TABLET (850 MG TOTAL) BY MOUTH 2 (TWO) TIMES A DAY WITH MEALS, Starting Mon 5/16/2022, Normal      montelukast (SINGULAIR) 10 mg tablet TAKE 1 TABLET BY MOUTH EVERY DAY, Normal      NovoLOG FlexPen 100 units/mL injection pen Inject 5 units with breakfast and with dinner, Normal      oxyCODONE-acetaminophen (Percocet) 5-325 mg per tablet Take 1 tablet by mouth every 6 (six) hours as needed for moderate pain for up to 10 days Max Daily Amount: 4 tablets, Starting Thu 9/1/2022, Until Sun 9/11/2022 at 2359, Normal      rosuvastatin (CRESTOR) 40 MG tablet TAKE 1 TABLET BY MOUTH EVERY DAY, Normal           No discharge procedures on file  Prior to Admission Medications   Prescriptions Last Dose Informant Patient Reported? Taking?    Advair -21 MCG/ACT inhaler  Spouse/Significant Other No No   Sig: TAKE 2 PUFFS BY MOUTH TWICE A DAY   Alcohol Swabs (ALCOHOL PADS) 70 % PADS  Spouse/Significant Other Yes No   Blood Glucose Monitoring Suppl (OneTouch Verio) w/Device KIT  Spouse/Significant Other No No   Sig: Check blood glucose three times daily before each meal   Continuous Blood Gluc  (FreeStyle Cristofer 2 [place]) POWER   No No   Sig: Use 1 each continuous   Continuous Blood Gluc Sensor (FreeStyle Cristofer 2 Sensor) [facility]   No No   Sig: Use 1 each every 14 (fourteen) days   Insulin Glargine Solostar (Lantus SoloStar) 100 UNIT/ML SOPN   No No   Sig: Inject 0 18 mL (18 Units total) under the skin daily at bedtime   Insulin Pen Needle (Pen Needles) 31G X 8 MM MISC  Spouse/Significant Other No No   Sig: Use daily   Lancets (FREESTYLE) lancets  Spouse/Significant Other No No   Sig: by Other route as needed (As needed)   NovoLOG FlexPen 100 units/mL injection pen  Spouse/Significant Other No No   Sig: Inject 5 units with breakfast and with dinner   acetylcysteine (MUCOMYST) 10 % nebulizer solution   No No   Sig: Take 4 mL by nebulization 3 (three) times a day   albuterol (Ventolin HFA) 90 mcg/act inhaler   No No   Sig: Inhale 2 puffs every 4 (four) hours as needed for wheezing or shortness of breath   amLODIPine (NORVASC) 5 mg tablet  Spouse/Significant Other No No   Sig: Take 1 tablet (5 mg total) by mouth daily   aspirin 81 mg chewable tablet  Spouse/Significant Other No No   Sig: Chew 1 tablet (81 mg total) daily   cholecalciferol (VITAMIN D3) 1,000 units tablet  Spouse/Significant Other No No   Sig: TAKE 2 TABLETS (2,000 UNITS TOTAL) BY MOUTH DAILY   clopidogrel (PLAVIX) 75 mg tablet  Spouse/Significant Other No No   Sig: Take 1 tablet (75 mg total) by mouth daily   fluticasone (FLONASE) 50 mcg/act nasal spray  Spouse/Significant Other No No   Si sprays into each nostril daily   hydrochlorothiazide (HYDRODIURIL) 25 mg tablet  Spouse/Significant Other No No   Sig: Take 1 tablet (25 mg total) by mouth daily   levothyroxine 137 mcg tablet  Spouse/Significant Other No No   Sig: TAKE 1 TABLET BY MOUTH EVERY DAY   lisinopril (ZESTRIL) 20 mg tablet  Spouse/Significant Other No No   Sig: Take 2 tablets (40 mg total) by mouth daily   metFORMIN (GLUCOPHAGE) 850 mg tablet  Spouse/Significant Other No No   Sig: TAKE 1 TABLET (850 MG TOTAL) BY MOUTH 2 (TWO) TIMES A DAY WITH MEALS   montelukast (SINGULAIR) 10 mg tablet  Spouse/Significant Other No No   Sig: TAKE 1 TABLET BY MOUTH EVERY DAY   oxyCODONE-acetaminophen (Percocet) 5-325 mg per tablet   No No   Sig: Take 1 tablet by mouth every 6 (six) hours as needed for moderate pain for up to 10 days Max Daily Amount: 4 tablets   rosuvastatin (CRESTOR) 40 MG tablet  Spouse/Significant Other No No   Sig: TAKE 1 TABLET BY MOUTH EVERY DAY      Facility-Administered Medications: None       Portions of the record may have been created with voice recognition software  Occasional wrong word or "sound a like" substitutions may have occurred due to the inherent limitations of voice recognition software  Read the chart carefully and recognize, using context, where substitutions have occurred      Electronically signed by:  Jacqueline Morgan

## 2022-09-04 NOTE — DISCHARGE INSTRUCTIONS
Your workup here was not concerning for anything dangerous  Therefore there is no need for you to stay at the hospital for further testing  We feel safe to send you home  You should follow up with your cardiologist to assess for resolution of your symptoms and to determine if there is further evaluation that needs to be performed      Return to the emergency department if you have any symptoms of worsening pain

## 2022-09-09 ENCOUNTER — APPOINTMENT (OUTPATIENT)
Dept: CARDIAC REHAB | Age: 77
End: 2022-09-09
Payer: MEDICARE

## 2022-09-12 ENCOUNTER — APPOINTMENT (OUTPATIENT)
Dept: CARDIAC REHAB | Age: 77
End: 2022-09-12
Payer: MEDICARE

## 2022-09-12 NOTE — PROGRESS NOTES
Cardiac Rehabilitation Plan of Care   60 Day Reassessment          Today's date: 2022   # of Exercise Sessions Completed: 9  Patient name: Nellie Galvan      : 1945  Age: 78 y o  MRN: [id number]  Referring Physician: Queenie Wakefield PA-C  Cardiologist: Babs Saleh MD (fellow)  Provider: Yaa Pulido  Clinician: Jason Muro, MS, CEP, CCRP      Dx:   Encounter Diagnosis   Name Primary?  S/P TAVR (transcatheter aortic valve replacement)      Date of onset: 22      SUMMARY OF PROGRESS:  (Malay speaking - wife translates)   Tiara Pavon attended only 3 sessions since his last reassessment done on   He had a pacemaker implant performed on  and will be out until medically cleared - possibly        He has progressed his duration and now tolerates 35-39 mins at 2 6-  3 7 METs  Progression will continue to achieve duration of 40 mins  Workloads will be progressed to maintain RPE 4-6 once 40  mins is completed comfortably  Resting BP is typically elevated but has been improved since last note  134/64 - 142/70 with appropriate response to exercise reaching 160/62  NSR, LBBB on tele with [employer]  PACs, occ PVCs observed  On telemetry today, Juan David's rhythm switched to an apparent RBBB  RHR 75 - 85 ExHR 100 - 120  He has added home exercise 2-3 days/wk which includes walking one block  Tiara Starch reports Left ankle pain with walking  Reports the need to stop and shake out his leg throughout his walk  No cardiac complaints  He is progressing toward wt loss goals with a loss of 2 pounds  Patient has been working on  dietary modifications with the goal of rare red/processed meats, low fat dairy, reduced added sugars and refined flours  He abstains from sweets and red/processed meats  The patient has T2D  He is using a CGM  The patient is a recent user, quit 2022, and is doing well abstaining  Tiara Starch patient denies   depression/anxiety   Patient reports excellent social/emotional support  Patient attends group educational classes on cardiac risk factor modification  His exercise program will be progressed as tolerated to maintain RPE 4-6  The patient has the following personal goals he hopes to achieved by discharge: abstain from smoking, increased strength/stamina, reduced leg pain with walking      Pt will continue to be educated on lifestyle modifications and encouraged to supplement with a home exercise program to reach the following goals in the next 30 days: add more consistent home walking adding increased duration as tolerated, consistent home BG monitoring  August 16, 2022; (Bengali speaking - wife translates)  Ashlee Newell attends cardiac rehab 2x/wk  He tolerates 30 - 33 mins at 2 1 - 2 9 METs  He is tolerating progression of duration to 40 mins  Workloads will be progressed to maintain RPE 4-6 once 40  mins is completed comfortably  Resting BP is typically elevated  140/70 - 150/60 with appropriate response to exercise reaching 154/60- 188/72  NSR, LBBB on tele with freq  PACs, occ PVCs observed  On telemetry today, Juan David's rhythm switched to an apparent RBBB  RHR 75 - 85 ExHR 100 - 120  He has added home exercise 2-3 days/wk which includes walking one block  Ashlee Newell reports Left ankle pain with walking  Reports the need to stop and shake out his leg throughout his walk  He was introduced to the TM today which he tolerated well - no ankle pain  No cardiac complaints  He is progressing toward wt loss goals with a loss of 2 pounds  Patient has been working on  dietary modifications with the goal of rare red/processed meats, low fat dairy, reduced added sugars and refined flours  He abstains from sweets and red/processed meats  The patient has T2D  He is using a CGM but reports that he lost his sensor  Follow up appointment tomorrow for a new sensor  The patient is a recent user, quit June 2022, and is doing well abstaining   Ashlee Newell patient denies   depression/anxiety  Patient reports excellent social/emotional support  Patient attends group educational classes on cardiac risk factor modification  His exercise program will be progressed as tolerated to maintain RPE 4-6  The patient has the following personal goals he hopes to achieved by discharge: abstain from smoking, increased strength/stamina, reduced leg pain with walking      Pt will continue to be educated on lifestyle modifications and encouraged to supplement with a home exercise program to reach the following goals in the next 30 days: add more consistent home walking adding increased duration as tolerated, consistent home BG monitoring  July 21, 2022 -  Initial Care Plan: Today is Juan David's  initial evaluation to begin Cardiac Rehab post TAVR  The patient does not currently follow a formal exercise program at home  He has resumed light ADLs without fatigue and tolerating them well  Depression screening using the PHQ-9 interprets the patient's score 1-4 = Minimal Depression  TASHA-7 screening tool for anxiety suggests 5-9 = Mild anxiety  When addressed, the patient denies having depression/anxiety  Patient reports excellent social/emotional support  Information to begin using [employer] was provided as well as contact information for counseling through [facility]  PHQ-9 score will be reassessed in 30 days  The patient is a recent user, quit May 30, 2022, and is doing well abstaining  Patient admits to 100% medication compliance  Patient reports the following physical limitations: R lower leg pain with walking  States he needs to stop after walking one block  The patient completed an initial 6MWT  The patient walked  [phone] 66 feet/2  2METs  Resting  /60 dropping to 142/62 with exercise  Patient denied symptoms during exercise  Telemetry revealed NSR, LBBB, freq  PACs    Patient was counseled on exercise guidelines to achieve a minimum of 150 mins/wk of moderate intensity (RPE 4-6) exercise and encouraged to add 1-2 days of exercise on opposite days of cardiac rehab as tolerated  We discussed current dietary habits and goals of heart healthy eating for lipid management and diabetes management  The patient has T2D, Patient reported fasting , on Insulin   Patient's goals include: abstain from smoking, increased strength/stamina, reduced leg pain with walking  The patient's CAD risk factors include:  inactivity, obesity/overweight, hypertension, hyperlipidemia, diabetes and smoking  He was provided Urdu education packet focused on lifestyle modification/education specific to His needs including heart healthy eating, reading food labels, stress management, risk factor reduction, understanding heart disease and common heart medications and smoking and heart disease  Patient will attend 35 monitored exercise sessions, 3x/wk for 12-18 weeks beginning July 29, 2022  CT surgical f/u appt is scheduled for July 28  Medication compliance: Yes   Comments: Pt reports to be compliant with medications  Fall Risk: Low   Comments: Ambulates with a steady gait with no assist device and Denies a fall in the past 6 months    EKG Interpretation: NSR, LBBB, freq  PACs, occ PVCs      EXERCISE ASSESSMENT and PLAN    Current Exercise Program in Rehab:       Frequency: 3 days/week Supplement with home exercise 2+ days/wk as tolerated       Minutes: 30-40         METS: 2 6 - 3 7            HR: 100 - 120   RPE: 4-5         Modalities: Treadmill, UBE, NuStep and Recumbent bike      Exercise Progression 30 Day Goals :    Frequency: 3 days/week of cardiac rehab     Supplement with home exercise 2+ days/wk as tolerated    Minutes: 40                            >150 mins/wk of moderate intensity exercise   METS: 2 6 - 4 0   HR: RHR+30    RPE: 4-5   Modalities: Treadmill, UBE, NuStep and Recumbent bike    Strength training:   Will be added following 6 weeks post PPM implant   Modalities: Leg Press, Chest Press, Lateral Raise and Seated Row    Home Exercise: walking one block per day    Goals: Reduced dyspnea with physical activity  0-10, improved DASI score by 10%, Increase in exercise capacity by 40% - based on peak METs tolerated in cardiac rehab exercise session, Exercise 5 days/wk, >150mins/wk of moderate intensity exercise, Improved 6MWT distance by 10%, Resume ADLs with increased strength, Decrease sitting time and Start a walking program    Progression Toward Goals:  Pt is progressing and showing improvement  toward the following goals:  Reduced dyspnea with physical activity  0-10, improved DASI score by 10%, Increase in exercise capacity by 40% - based on peak METs tolerated in cardiac rehab exercise session, Exercise 5 days/wk, >150mins/wk of moderate intensity exercise, Improved 6MWT distance by 10%, Resume ADLs with increased strength, Decrease sitting time and Start a walking program   , Will continue to educate and progress as tolerated  Pt will continue to add home walking to supplement cardiac rehab with increasing duration as tolerated      Education: benefit of exercise for CAD risk factors, home exercise guidelines, AHA guidelines to achieve >150 mins/wk of moderate exercise, RPE scale and Education hand out: Risk Factors for Heart Disease   Plan:education on home exercise guidelines and home exercise 30+ mins 2 days opposite CR  Readiness to change: Action:  (Changing behavior)      NUTRITION ASSESSMENT AND PLAN    Weight control:    Starting weight: 184   Current weight:   182  Waist circumference:    Startin 25   Current:      Diabetes: T2D  A1c: 6 5    last measured: 22    Lipid management: Discussed diet and lipid management and Last lipid profile 22  Chol 191    HDL 49      Goals:reduced BMI to < 25, LDL <100, fasting BG , Improved Rate Your Plate score  >00, 4 8-1%  wt loss, choose lean meat (93-95%), eliminate processed meats, use low fat dairy, reduce cheese intake or use reduced-fat, eat 3 or more servings of whole grains a day, Eat 4-5 cups of fruits and vegetables daily, choose healthy snacks: light popcorn, plain pretzels, Increase intake of nuts and seeds, eliminate or choose low-fat sweets and daily saturated fat intake <7%/13g,    Progression Toward Goals: Pt is progressing and showing improvement  toward the following goals:  reduced BMI to < 25, LDL <100, fasting BG , Improved Rate Your Plate score  >47, 5 1-6%  wt loss, choose lean meat (93-95%), eliminate processed meats, use low fat dairy, reduce cheese intake or use reduced-fat, eat 3 or more servings of whole grains a day, Eat 4-5 cups of fruits and vegetables daily, choose healthy snacks: light popcorn, plain pretzels, Increase intake of nuts and seeds, eliminate or choose low-fat sweets and daily saturated fat intake <7%/13g   , Will continue to educate and progress as tolerated  Misty Mccollum will focus on increased hydration      Education: heart healthy eating  low sodium diet  hydration  nutrition for  lipid management  nutrition for Improved BG control  exercise and diabetes management    Education handout: Reading Food Labels  Education handout: Heart Healthy Eating  Plan:   switch to low fat cheeses, replace refined grain bread with whole grain bread, replace unhealthy snacks with fruits & vegs, reduce red meat 1x/wk, switch to skim or 1% milk, eat fewer desserts and sweets, avoid processed foods, monitor home blood glucose, drink more water, replace sugar with stevia or truvia and keep added daily sugar <25g/day  Readiness to change: Action:  (Changing behavior)      PSYCHOSOCIAL ASSESSMENT AND PLAN    Emotional:  Depression assessment:  PHQ-9 = 2   1-4 = Minimal Depression            Anxiety measure:  TASHA-7 = 6   5-9 = Mild anxiety  Self-reported stress level:  1  Social support: Excellent and Patient reports excellent emotional/social support from family    Goals: Increased interest in doing things, increased energy, stop worrying, take time to relax and Feel less anxious    Progression Toward Goals: Pt is progressing and showing improvement  toward the following goals: Increased interest in doing things, increased energy, stop worrying, take time to relax and Feel less anxious  , Will continue to educate and progress as tolerated  Education: benefits of a positive support system and stress management techniques, Education handout: Stress and Your Health,   Relaxation,  Plan:  Exercise, Spend time outdoors, Enjoy family and Return to previous social activity  Readiness to change: Preparation:  (Getting ready to change)       OTHER CORE COMPONENTS     Tobacco:   Social History     Tobacco Use   Smoking Status Former Smoker    Packs/day: 0 50    Types: Cigarettes    Quit date: 2022    Years since quittin 2   Smokeless Tobacco Never Used   Tobacco Comment    started when he was about 24 yrs old; stopped smoking 3 wks ago       Tobacco Use Intervention:   Pt quit May 30, 2022   and has abstained,  8/15/22 - Pt continues to abstain    Anginal Symptoms:  None   NTG use: No prescription    Blood pressure:    Restin/64 - 142/70   Exercise:  160/62    Goals: consistent BP < 130/80, reduced dietary sodium <2300mg, moderate intensity exercise >150 mins/wk, medication compliance and Abstain from smoking    Progression Toward Goals: Pt is progressing and showing improvement  toward the following goals:  consistent BP < 130/80, reduced dietary sodium <2300mg, moderate intensity exercise >150 mins/wk, medication compliance and Abstain from smoking   , Will continue to educate and progress as tolerated      Education:  low sodium diet and HTN and smoking and heart disease, Education handouts: Understanding Heart Disease  Plan: , Complete abstention from smoking, avoid places with second hand smoke, Avoid Processed foods, engage in regular exercise and check labels for sodium content  Readiness to change: Action:  (Changing behavior)

## 2022-09-16 ENCOUNTER — IN-CLINIC DEVICE VISIT (OUTPATIENT)
Dept: CARDIOLOGY CLINIC | Facility: CLINIC | Age: 77
End: 2022-09-16

## 2022-09-16 ENCOUNTER — APPOINTMENT (OUTPATIENT)
Dept: CARDIAC REHAB | Age: 77
End: 2022-09-16
Payer: MEDICARE

## 2022-09-16 DIAGNOSIS — Z95.0 CARDIAC PACEMAKER IN SITU: Primary | ICD-10-CM

## 2022-09-16 PROCEDURE — 99024 POSTOP FOLLOW-UP VISIT: CPT | Performed by: INTERNAL MEDICINE

## 2022-09-16 NOTE — PROGRESS NOTES
Results for orders placed or performed in visit on 09/16/22   Cardiac EP device report    Narrative    MDT DC PM/MRI CONDITIONAL  DEVICE INTERROGATED IN THE [place] OFFICE: [address]  AP 9%  0%  ALL AVAILABLE LEAD PARAMETERS WITHIN NORMAL LIMITS  NO SIGNIFICANT HIGH RATE EPISODES  NO PROGRAMMING CHANGES MADE TO DEVICE PARAMETERS  NORMAL DEVICE FUNCTION  WOUND CHECK: INCISION CLEAN AND DRY WITH EDGES APPROXIMATED; STAPLES REMOVED; WOUND CARE AND RESTRICTIONS REVIEWED WITH PATIENT   NC

## 2022-09-19 ENCOUNTER — APPOINTMENT (OUTPATIENT)
Dept: CARDIAC REHAB | Age: 77
End: 2022-09-19
Payer: MEDICARE

## 2022-09-23 ENCOUNTER — TELEPHONE (OUTPATIENT)
Dept: INTERNAL MEDICINE CLINIC | Facility: CLINIC | Age: 77
End: 2022-09-23

## 2022-09-23 ENCOUNTER — APPOINTMENT (OUTPATIENT)
Dept: CARDIAC REHAB | Age: 77
End: 2022-09-23
Payer: MEDICARE

## 2022-09-23 DIAGNOSIS — J45.20 MILD INTERMITTENT ASTHMA WITHOUT COMPLICATION: ICD-10-CM

## 2022-09-23 RX ORDER — ACETYLCYSTEINE 100 MG/ML
4 SOLUTION ORAL; RESPIRATORY (INHALATION) 3 TIMES DAILY
Qty: 360 ML | Refills: 2 | Status: SHIPPED | OUTPATIENT
Start: 2022-09-23 | End: 2022-12-22

## 2022-09-23 NOTE — TELEPHONE ENCOUNTER
Patient is requesting nebulizer solution  Patient states it has been more then 3 weeks since he has requested it  Patient demands we send it today before the weekend comes  He states he cannot sleep properly without his treatment  Please send to confirmed pharmacy asap

## 2022-09-26 ENCOUNTER — CLINICAL SUPPORT (OUTPATIENT)
Dept: CARDIAC REHAB | Age: 77
End: 2022-09-26
Payer: MEDICARE

## 2022-09-26 DIAGNOSIS — Z95.2 S/P TAVR (TRANSCATHETER AORTIC VALVE REPLACEMENT): ICD-10-CM

## 2022-09-26 PROCEDURE — 93798 PHYS/QHP OP CAR RHAB W/ECG: CPT

## 2022-09-30 ENCOUNTER — CLINICAL SUPPORT (OUTPATIENT)
Dept: CARDIAC REHAB | Age: 77
End: 2022-09-30
Payer: MEDICARE

## 2022-09-30 DIAGNOSIS — Z95.2 S/P TAVR (TRANSCATHETER AORTIC VALVE REPLACEMENT): Primary | ICD-10-CM

## 2022-09-30 PROCEDURE — 93798 PHYS/QHP OP CAR RHAB W/ECG: CPT

## 2022-10-03 ENCOUNTER — CLINICAL SUPPORT (OUTPATIENT)
Dept: CARDIAC REHAB | Age: 77
End: 2022-10-03
Payer: MEDICARE

## 2022-10-03 DIAGNOSIS — Z95.2 S/P TAVR (TRANSCATHETER AORTIC VALVE REPLACEMENT): ICD-10-CM

## 2022-10-03 PROCEDURE — 93798 PHYS/QHP OP CAR RHAB W/ECG: CPT

## 2022-10-07 ENCOUNTER — CLINICAL SUPPORT (OUTPATIENT)
Dept: CARDIAC REHAB | Age: 77
End: 2022-10-07
Payer: MEDICARE

## 2022-10-07 DIAGNOSIS — Z95.2 S/P TAVR (TRANSCATHETER AORTIC VALVE REPLACEMENT): ICD-10-CM

## 2022-10-07 PROCEDURE — 93798 PHYS/QHP OP CAR RHAB W/ECG: CPT

## 2022-10-07 NOTE — PROGRESS NOTES
Cardiac Rehabilitation Plan of Care   90 Day Reassessment          Today's date: 10/7/2022   # of Exercise Sessions Completed: 13  Patient name: Mukund Barton      : 1945  Age: 78 y o  MRN: [id number]  Referring Physician: Freeman Alfaro PA-C  Cardiologist: Naz Kennedy MD (fellow)  Provider: Flor  Clinician: Simon Hyde MS, CEP      Dx:   Encounter Diagnosis   Name Primary?  S/P TAVR (transcatheter aortic valve replacement)      Date of onset: 22      SUMMARY OF PROGRESS:   10/7/22: (Ukrainian speaking - wife translates)  He has progressed his duration and now tolerates 40 mins at 2 5- 3 1 METs  Progression will continue to achieve duration of 40 mins  Workloads will be progressed to maintain RPE 4-6 once 40 mins is completed comfortably  Resting //64 with appropriate response to exercise reaching 156//58  NSR, LBBB on tele with occ  PACs, occ PVCs observed  RHR 80-83 ExHR 111-127  He has added home exercise 2-3 days/wk which includes walking 2 blocks, however he states that he has not been completing it recently  Deb Free reports Left ankle pain with walking  Reports the need to stop and shake out his leg throughout his walk  Recent c/o of L knee discomfort  No cardiac complaints  No medication changes  States 100% medication compliance  He gained 2 lbs in last 30 days, weighing 182  He states he has been consuming more candy  Encouraged him to reduce his consumption  Patient has been working on dietary modifications with the goal of rare red/processed meats, low fat dairy, reduced added sugars and refined flours  He abstains from red/processed meats  The patient has T2D  He is using a CGM  However he did not have his CGM on today  The patient is a recent user, quit 2022, and is doing well abstaining  Deb Free patient denies depression/anxiety  Patient reports excellent social/emotional support   Patient attends group educational classes on cardiac risk factor modification  His exercise program will be progressed as tolerated to maintain RPE 4-6  The patient has the following personal goals he hopes to achieved by discharge: abstain from smoking, increased strength/stamina, reduced leg pain with walking  Pt will continue to be educated on lifestyle modifications and encouraged to supplement with a home exercise program to reach the following goals in the next 30 days: add more consistent home walking and riding his bicycle, increased duration as tolerated, consistent home BG monitoring  9/12/22: (Ivorian speaking - wife translates)   Kofi Eller attended only 3 sessions since his last reassessment done on August 16  He had a pacemaker implant performed on Sept 1 and will be out until medically cleared - possibly Sept 19     He has progressed his duration and now tolerates 35-39 mins at 2 6-  3 7 METs  Progression will continue to achieve duration of 40 mins  Workloads will be progressed to maintain RPE 4-6 once 40  mins is completed comfortably  Resting BP is typically elevated but has been improved since last note  134/64 - 142/70 with appropriate response to exercise reaching 160/62  NSR, LBBB on tele with [place]  PACs, occ PVCs observed  On telemetry today, Juan David's rhythm switched to an apparent RBBB  RHR 75 - 85 ExHR 100 - 120  He has added home exercise 2-3 days/wk which includes walking one block  Kofi Eller reports Left ankle pain with walking  Reports the need to stop and shake out his leg throughout his walk  No cardiac complaints  He is progressing toward wt loss goals with a loss of 2 pounds  Patient has been working on  dietary modifications with the goal of rare red/processed meats, low fat dairy, reduced added sugars and refined flours  He abstains from sweets and red/processed meats  The patient has T2D  He is using a CGM  The patient is a recent user, quit June 2022, and is doing well abstaining  Kofi Eller patient denies   depression/anxiety  Patient reports excellent social/emotional support  Patient attends group educational classes on cardiac risk factor modification  His exercise program will be progressed as tolerated to maintain RPE 4-6  The patient has the following personal goals he hopes to achieved by discharge: abstain from smoking, increased strength/stamina, reduced leg pain with walking      Pt will continue to be educated on lifestyle modifications and encouraged to supplement with a home exercise program to reach the following goals in the next 30 days: add more consistent home walking adding increased duration as tolerated, consistent home BG monitoring  August 16, 2022; (Turkish speaking - wife translates)  Ashu Brown attends cardiac rehab 2x/wk  He tolerates 30 - 33 mins at 2 1 - 2 9 METs  He is tolerating progression of duration to 40 mins  Workloads will be progressed to maintain RPE 4-6 once 40  mins is completed comfortably  Resting BP is typically elevated  140/70 - 150/60 with appropriate response to exercise reaching 154/60- 188/72  NSR, LBBB on tele with freq  PACs, occ PVCs observed  On telemetry today, Juan David's rhythm switched to an apparent RBBB  RHR 75 - 85 ExHR 100 - 120  He has added home exercise 2-3 days/wk which includes walking one block  Ashu Brown reports Left ankle pain with walking  Reports the need to stop and shake out his leg throughout his walk  He was introduced to the TM today which he tolerated well - no ankle pain  No cardiac complaints  He is progressing toward wt loss goals with a loss of 2 pounds  Patient has been working on  dietary modifications with the goal of rare red/processed meats, low fat dairy, reduced added sugars and refined flours  He abstains from sweets and red/processed meats  The patient has T2D  He is using a CGM but reports that he lost his sensor  Follow up appointment tomorrow for a new sensor     The patient is a recent user, quit June 2022, and is doing well abstaining  Navin Howell patient denies   depression/anxiety  Patient reports excellent social/emotional support  Patient attends group educational classes on cardiac risk factor modification  His exercise program will be progressed as tolerated to maintain RPE 4-6  The patient has the following personal goals he hopes to achieved by discharge: abstain from smoking, increased strength/stamina, reduced leg pain with walking      Pt will continue to be educated on lifestyle modifications and encouraged to supplement with a home exercise program to reach the following goals in the next 30 days: add more consistent home walking adding increased duration as tolerated, consistent home BG monitoring  July 21, 2022 -  Initial Care Plan: Today is Juan David's  initial evaluation to begin Cardiac Rehab post TAVR  The patient does not currently follow a formal exercise program at home  He has resumed light ADLs without fatigue and tolerating them well  Depression screening using the PHQ-9 interprets the patient's score 1-4 = Minimal Depression  TASHA-7 screening tool for anxiety suggests 5-9 = Mild anxiety  When addressed, the patient denies having depression/anxiety  Patient reports excellent social/emotional support  Information to begin using [employer] was provided as well as contact information for counseling through [facility]  PHQ-9 score will be reassessed in 30 days  The patient is a recent user, quit May 30, 2022, and is doing well abstaining  Patient admits to 100% medication compliance  Patient reports the following physical limitations: R lower leg pain with walking  States he needs to stop after walking one block  The patient completed an initial 6MWT  The patient walked  [phone] 66 feet/2  2METs  Resting  /60 dropping to 142/62 with exercise  Patient denied symptoms during exercise  Telemetry revealed NSR, LBBB, freq  PACs    Patient was counseled on exercise guidelines to achieve a minimum of 150 mins/wk of moderate intensity (RPE 4-6) exercise and encouraged to add 1-2 days of exercise on opposite days of cardiac rehab as tolerated  We discussed current dietary habits and goals of heart healthy eating for lipid management and diabetes management  The patient has T2D, Patient reported fasting , on Insulin   Patient's goals include: abstain from smoking, increased strength/stamina, reduced leg pain with walking  The patient's CAD risk factors include:  inactivity, obesity/overweight, hypertension, hyperlipidemia, diabetes and smoking  He was provided [place] education packet focused on lifestyle modification/education specific to His needs including heart healthy eating, reading food labels, stress management, risk factor reduction, understanding heart disease and common heart medications and smoking and heart disease  Patient will attend 35 monitored exercise sessions, 3x/wk for 12-18 weeks beginning July 29, 2022  CT surgical f/u appt is scheduled for July 28  Medication compliance: Yes   Comments: Pt reports to be compliant with medications  Fall Risk: Low   Comments: Ambulates with a steady gait with no assist device and Denies a fall in the past 6 months    EKG Interpretation: NSR, LBBB, freq  PACs, occ PVCs      EXERCISE ASSESSMENT and PLAN    Current Exercise Program in Rehab:       Frequency: 3 days/week Supplement with home exercise 2+ days/wk as tolerated       Minutes: 40        METS: 2 5 - 3 1            HR: 100 - 127   RPE: 4-5         Modalities: Treadmill, UBE, NuStep and Recumbent bike      Exercise Progression 30 Day Goals :    Frequency: 3 days/week of cardiac rehab     Supplement with home exercise 2+ days/wk as tolerated    Minutes: 40                            >150 mins/wk of moderate intensity exercise   METS: 2 6 - 4 0   HR: RHR+30    RPE: 4-5   Modalities: Treadmill, UBE, NuStep and Recumbent bike    Strength training:   Will be added following 6 weeks post PPM implant   Modalities: Leg Press, Chest Press, Lateral Raise and Seated Row    Home Exercise: walking 2 blocks per day    Goals: Reduced dyspnea with physical activity  0-10, improved DASI score by 10%, Increase in exercise capacity by 40% - based on peak METs tolerated in cardiac rehab exercise session, Exercise 5 days/wk, >150mins/wk of moderate intensity exercise, Improved 6MWT distance by 10%, Resume ADLs with increased strength, Decrease sitting time and Start a walking program    Progression Toward Goals:  Pt is progressing and showing improvement  toward the following goals:  Reduced dyspnea with physical activity  0-10, improved DASI score by 10%, Increase in exercise capacity by 40% - based on peak METs tolerated in cardiac rehab exercise session, Exercise 5 days/wk, >150mins/wk of moderate intensity exercise, Improved 6MWT distance by 10%, Resume ADLs with increased strength, Decrease sitting time and Start a walking program   , Will continue to educate and progress as tolerated  Pt will continue to add home walking/riding his bike to supplement cardiac rehab with increasing duration as tolerated      Education: benefit of exercise for CAD risk factors, home exercise guidelines, AHA guidelines to achieve >150 mins/wk of moderate exercise, RPE scale and Education hand out: Risk Factors for Heart Disease   Plan:education on home exercise guidelines and home exercise 30+ mins 2 days opposite CR  Readiness to change: Action:  (Changing behavior)      NUTRITION ASSESSMENT AND PLAN    Weight control:    Starting weight: 184   Current weight:   182  Waist circumference:    Startin 25   Current:      Diabetes: T2D  A1c: 6 5    last measured: 22    Lipid management: Discussed diet and lipid management and Last lipid profile 22  Chol 191    HDL 49      Goals:reduced BMI to < 25, LDL <100, fasting BG , Improved Rate Your Plate score  >10, 5 9-8%  wt loss, choose lean meat (93-95%), eliminate processed meats, use low fat dairy, reduce cheese intake or use reduced-fat, eat 3 or more servings of whole grains a day, Eat 4-5 cups of fruits and vegetables daily, choose healthy snacks: light popcorn, plain pretzels, Increase intake of nuts and seeds, eliminate or choose low-fat sweets and daily saturated fat intake <7%/13g,    Progression Toward Goals: Pt is progressing and showing improvement  toward the following goals:  reduced BMI to < 25, LDL <100, fasting BG , Improved Rate Your Plate score  >89, 5 6-3%  wt loss, choose lean meat (93-95%), eliminate processed meats, use low fat dairy, reduce cheese intake or use reduced-fat, eat 3 or more servings of whole grains a day, Eat 4-5 cups of fruits and vegetables daily, choose healthy snacks: light popcorn, plain pretzels, Increase intake of nuts and seeds, eliminate or choose low-fat sweets and daily saturated fat intake <7%/13g   , Will continue to educate and progress as tolerated  Steve Alvarenga will focus on increased hydration      Education: heart healthy eating  low sodium diet  hydration  nutrition for  lipid management  nutrition for Improved BG control  exercise and diabetes management    Education handout: Reading Food Labels  Education handout: Heart Healthy Eating  Plan:   switch to low fat cheeses, replace refined grain bread with whole grain bread, replace unhealthy snacks with fruits & vegs, reduce red meat 1x/wk, switch to skim or 1% milk, eat fewer desserts and sweets, avoid processed foods, monitor home blood glucose, drink more water, replace sugar with stevia or truvia and keep added daily sugar <25g/day  Readiness to change: Action:  (Changing behavior)      PSYCHOSOCIAL ASSESSMENT AND PLAN    Emotional:  Depression assessment:  PHQ-9 = 2   1-4 = Minimal Depression            Anxiety measure:  TASHA-7 = 6   5-9 = Mild anxiety  Self-reported stress level:  1  Social support: Excellent and Patient reports excellent emotional/social support from family    Goals: Increased interest in doing things, increased energy, stop worrying, take time to relax and Feel less anxious    Progression Toward Goals: Pt is progressing and showing improvement  toward the following goals: Increased interest in doing things, increased energy, stop worrying, take time to relax and Feel less anxious  , Will continue to educate and progress as tolerated  Education: benefits of a positive support system and stress management techniques, Education handout: Stress and Your Health,   Relaxation,  Plan:  Exercise, Spend time outdoors, Enjoy family and Return to previous social activity  Readiness to change: Preparation:  (Getting ready to change)       OTHER CORE COMPONENTS     Tobacco:   Social History     Tobacco Use   Smoking Status Former Smoker    Packs/day: 0 50    Types: Cigarettes    Quit date: 2022    Years since quittin 3   Smokeless Tobacco Never Used   Tobacco Comment    started when he was about 24 yrs old; stopped smoking 3 wks ago       Tobacco Use Intervention:   Pt quit May 30, 2022   and has abstained,  8/15/22 - Pt continues to abstain    Anginal Symptoms:  None   NTG use: No prescription    Blood pressure:    Restin//64   Exercise:  156/62    Goals: consistent BP < 130/80, reduced dietary sodium <2300mg, moderate intensity exercise >150 mins/wk, medication compliance and Abstain from smoking    Progression Toward Goals: Pt is progressing and showing improvement  toward the following goals:  consistent BP < 130/80, reduced dietary sodium <2300mg, moderate intensity exercise >150 mins/wk, medication compliance and Abstain from smoking   , Will continue to educate and progress as tolerated      Education:  low sodium diet and HTN and smoking and heart disease, Education handouts: Understanding Heart Disease  Plan: , Complete abstention from smoking, avoid places with second hand smoke, Avoid Processed foods, engage in regular exercise and check labels for sodium content  Readiness to change: Action:  (Changing behavior)

## 2022-10-10 ENCOUNTER — CLINICAL SUPPORT (OUTPATIENT)
Dept: CARDIAC REHAB | Age: 77
End: 2022-10-10
Payer: MEDICARE

## 2022-10-10 DIAGNOSIS — Z95.2 S/P TAVR (TRANSCATHETER AORTIC VALVE REPLACEMENT): ICD-10-CM

## 2022-10-10 PROCEDURE — 93798 PHYS/QHP OP CAR RHAB W/ECG: CPT

## 2022-10-11 RX ORDER — CLOPIDOGREL BISULFATE 75 MG/1
75 TABLET ORAL DAILY
Qty: 90 TABLET | Refills: 3 | Status: SHIPPED | OUTPATIENT
Start: 2022-10-11 | End: 2023-01-09

## 2022-10-11 RX ORDER — ASPIRIN 81 MG/1
81 TABLET, CHEWABLE ORAL DAILY
Qty: 90 TABLET | Refills: 2 | Status: SHIPPED | OUTPATIENT
Start: 2022-10-11

## 2022-10-14 ENCOUNTER — CLINICAL SUPPORT (OUTPATIENT)
Dept: CARDIAC REHAB | Age: 77
End: 2022-10-14
Payer: MEDICARE

## 2022-10-14 DIAGNOSIS — Z95.2 S/P TAVR (TRANSCATHETER AORTIC VALVE REPLACEMENT): ICD-10-CM

## 2022-10-14 PROCEDURE — 93798 PHYS/QHP OP CAR RHAB W/ECG: CPT

## 2022-10-17 ENCOUNTER — CLINICAL SUPPORT (OUTPATIENT)
Dept: CARDIAC REHAB | Age: 77
End: 2022-10-17
Payer: MEDICARE

## 2022-10-17 DIAGNOSIS — Z95.2 S/P TAVR (TRANSCATHETER AORTIC VALVE REPLACEMENT): ICD-10-CM

## 2022-10-17 PROCEDURE — 93798 PHYS/QHP OP CAR RHAB W/ECG: CPT

## 2022-10-21 ENCOUNTER — APPOINTMENT (OUTPATIENT)
Dept: CARDIAC REHAB | Age: 77
End: 2022-10-21

## 2022-10-24 ENCOUNTER — CLINICAL SUPPORT (OUTPATIENT)
Dept: CARDIAC REHAB | Age: 77
End: 2022-10-24
Payer: MEDICARE

## 2022-10-24 DIAGNOSIS — Z95.2 S/P TAVR (TRANSCATHETER AORTIC VALVE REPLACEMENT): ICD-10-CM

## 2022-10-24 PROCEDURE — 93798 PHYS/QHP OP CAR RHAB W/ECG: CPT

## 2022-10-28 ENCOUNTER — CLINICAL SUPPORT (OUTPATIENT)
Dept: CARDIAC REHAB | Age: 77
End: 2022-10-28
Payer: MEDICARE

## 2022-10-28 DIAGNOSIS — Z95.2 S/P TAVR (TRANSCATHETER AORTIC VALVE REPLACEMENT): ICD-10-CM

## 2022-10-28 PROCEDURE — 93798 PHYS/QHP OP CAR RHAB W/ECG: CPT

## 2022-10-31 ENCOUNTER — CLINICAL SUPPORT (OUTPATIENT)
Dept: CARDIAC REHAB | Age: 77
End: 2022-10-31

## 2022-10-31 DIAGNOSIS — Z95.2 S/P TAVR (TRANSCATHETER AORTIC VALVE REPLACEMENT): ICD-10-CM

## 2022-11-04 ENCOUNTER — CLINICAL SUPPORT (OUTPATIENT)
Dept: CARDIAC REHAB | Age: 77
End: 2022-11-04

## 2022-11-04 DIAGNOSIS — Z95.2 S/P TAVR (TRANSCATHETER AORTIC VALVE REPLACEMENT): ICD-10-CM

## 2022-11-04 NOTE — PROGRESS NOTES
Cardiac Rehabilitation Plan of Care   120 Day           Today's date: 2022   # of Exercise Sessions Completed: 20  Patient name: Jo-Ann Quezada      : 1945  Age: 78 y o  MRN: [id number]  Referring Physician: Zahida Block PA-C  Cardiologist: Radha Gonzalez MD (fellow)  Provider: Dean  Clinician: Claudean Dinning, MS, CEP      Dx:   Encounter Diagnosis   Name Primary? • S/P TAVR (transcatheter aortic valve replacement)      Date of onset: 22      SUMMARY OF PROGRESS:   22: Pt wife was unavailable for translation  He has progressed his duration and now tolerates 45 mins at 2 8-3 1 METs  Workloads will be progressed to maintain RPE 4-6 once 40 mins is completed comfortably  Resting //74 with appropriate response to exercise reaching 156//62  NSR, LBBB on tele with occ  PACs, occ PVCs, occ NSVT observed  Runs of NSVT were noted on past zio patch  RHR 77-90 ExHR 110-120  He has added home exercise 2-3 days/wk which includes walking 1-2 blocks, however he states that he has not been completing it recently  Recent c/o of R knee discomfort  He states he has had fluid removed from that knee 3x in the past   No cardiac complaints  Recent c/o of phlegm, he reports he has had to increase use of his inhaler  Patient has been working on dietary modifications with the goal of rare red/processed meats, low fat dairy, reduced added sugars and refined flours  He abstains from red/processed meats  The patient has T2D  He is using a CGM  BS today was 140 The patient is a recent user, quit 2022, and is doing well abstaining  Breonna Cobos patient denies depression/anxiety  Patient reports excellent social/emotional support  Patient attends group educational classes on cardiac risk factor modification  His exercise program will be progressed as tolerated to maintain RPE 4-6   The patient has the following personal goals he hopes to achieved by discharge: abstain from smoking, increased strength/stamina, reduced leg pain with walking  Pt will continue to be educated on lifestyle modifications and encouraged to supplement with a home exercise program to reach the following goals in the next 30 days: add more consistent home walking and riding his bicycle, increased duration as tolerated, consistent home BG monitoring  Will continue to monitor  10/7/22: (Greenlandic speaking - wife translates)  He has progressed his duration and now tolerates 40 mins at 2 5- 3 1 METs  Progression will continue to achieve duration of 40 mins  Workloads will be progressed to maintain RPE 4-6 once 40 mins is completed comfortably  Resting //64 with appropriate response to exercise reaching 156//58  NSR, LBBB on tele with occ  PACs, occ PVCs observed  RHR 80-83 ExHR 111-127  He has added home exercise 2-3 days/wk which includes walking 2 blocks, however he states that he has not been completing it recently  Dinorah Duggan reports Left ankle pain with walking  Reports the need to stop and shake out his leg throughout his walk  Recent c/o of L knee discomfort  No cardiac complaints  No medication changes  States 100% medication compliance  He gained 2 lbs in last 30 days, weighing 182  He states he has been consuming more candy  Encouraged him to reduce his consumption  Patient has been working on dietary modifications with the goal of rare red/processed meats, low fat dairy, reduced added sugars and refined flours  He abstains from red/processed meats  The patient has T2D  He is using a CGM  However he did not have his CGM on today  The patient is a recent user, quit June 2022, and is doing well abstaining  Dinorah Duggan patient denies depression/anxiety  Patient reports excellent social/emotional support  Patient attends group educational classes on cardiac risk factor modification  His exercise program will be progressed as tolerated to maintain RPE 4-6   The patient has the following personal goals he hopes to achieved by discharge: abstain from smoking, increased strength/stamina, reduced leg pain with walking  Pt will continue to be educated on lifestyle modifications and encouraged to supplement with a home exercise program to reach the following goals in the next 30 days: add more consistent home walking and riding his bicycle, increased duration as tolerated, consistent home BG monitoring  9/12/22: (English speaking - wife translates)   Kristina Hurtado attended only 3 sessions since his last reassessment done on August 16  He had a pacemaker implant performed on Sept 1 and will be out until medically cleared - possibly Sept 19     He has progressed his duration and now tolerates 35-39 mins at 2 6-  3 7 METs  Progression will continue to achieve duration of 40 mins  Workloads will be progressed to maintain RPE 4-6 once 40  mins is completed comfortably  Resting BP is typically elevated but has been improved since last note  134/64 - 142/70 with appropriate response to exercise reaching 160/62  NSR, LBBB on tele with Isaiq  PACs, occ PVCs observed  On telemetry today, Juan David's rhythm switched to an apparent RBBB  RHR 75 - 85 ExHR 100 - 120  He has added home exercise 2-3 days/wk which includes walking one block  Kristina Hurtado reports Left ankle pain with walking  Reports the need to stop and shake out his leg throughout his walk  No cardiac complaints  He is progressing toward wt loss goals with a loss of 2 pounds  Patient has been working on  dietary modifications with the goal of rare red/processed meats, low fat dairy, reduced added sugars and refined flours  He abstains from sweets and red/processed meats  The patient has T2D  He is using a CGM  The patient is a recent user, quit June 2022, and is doing well abstaining  Kristina Bryant patient denies   depression/anxiety  Patient reports excellent social/emotional support  Patient attends group educational classes on cardiac risk factor modification  His exercise program will be progressed as tolerated to maintain RPE 4-6  The patient has the following personal goals he hopes to achieved by discharge: abstain from smoking, increased strength/stamina, reduced leg pain with walking      Pt will continue to be educated on lifestyle modifications and encouraged to supplement with a home exercise program to reach the following goals in the next 30 days: add more consistent home walking adding increased duration as tolerated, consistent home BG monitoring  August 16, 2022; (Nepali speaking - wife translates)  Ivan Biswas attends cardiac rehab 2x/wk  He tolerates 30 - 33 mins at 2 1 - 2 9 METs  He is tolerating progression of duration to 40 mins  Workloads will be progressed to maintain RPE 4-6 once 40  mins is completed comfortably  Resting BP is typically elevated  140/70 - 150/60 with appropriate response to exercise reaching 154/60- 188/72  NSR, LBBB on tele with freq  PACs, occ PVCs observed  On telemetry today, Juan David's rhythm switched to an apparent RBBB  RHR 75 - 85 ExHR 100 - 120  He has added home exercise 2-3 days/wk which includes walking one block  Ivan Biswas reports Left ankle pain with walking  Reports the need to stop and shake out his leg throughout his walk  He was introduced to the TM today which he tolerated well - no ankle pain  No cardiac complaints  He is progressing toward wt loss goals with a loss of 2 pounds  Patient has been working on  dietary modifications with the goal of rare red/processed meats, low fat dairy, reduced added sugars and refined flours  He abstains from sweets and red/processed meats  The patient has T2D  He is using a CGM but reports that he lost his sensor  Follow up appointment tomorrow for a new sensor  The patient is a recent user, quit June 2022, and is doing well abstaining  Ivan Biswas patient denies   depression/anxiety  Patient reports excellent social/emotional support     Patient attends group educational classes on cardiac risk factor modification  His exercise program will be progressed as tolerated to maintain RPE 4-6  The patient has the following personal goals he hopes to achieved by discharge: abstain from smoking, increased strength/stamina, reduced leg pain with walking      Pt will continue to be educated on lifestyle modifications and encouraged to supplement with a home exercise program to reach the following goals in the next 30 days: add more consistent home walking adding increased duration as tolerated, consistent home BG monitoring  July 21, 2022 -  Initial Care Plan: Today is Juan David's  initial evaluation to begin Cardiac Rehab post TAVR  The patient does not currently follow a formal exercise program at home  He has resumed light ADLs without fatigue and tolerating them well  Depression screening using the PHQ-9 interprets the patient's score 1-4 = Minimal Depression  TASHA-7 screening tool for anxiety suggests 5-9 = Mild anxiety  When addressed, the patient denies having depression/anxiety  Patient reports excellent social/emotional support  Information to begin using [email or web address] was provided as well as contact information for counseling through [facility]  PHQ-9 score will be reassessed in 30 days  The patient is a recent user, quit May 30, 2022, and is doing well abstaining  Patient admits to 100% medication compliance  Patient reports the following physical limitations: R lower leg pain with walking  States he needs to stop after walking one block  The patient completed an initial 6MWT  The patient walked  [phone] 66 feet/2  2METs  Resting  /60 dropping to 142/62 with exercise  Patient denied symptoms during exercise  Telemetry revealed NSR, LBBB, freq  PACs    Patient was counseled on exercise guidelines to achieve a minimum of 150 mins/wk of moderate intensity (RPE 4-6) exercise and encouraged to add 1-2 days of exercise on opposite days of cardiac rehab as tolerated  We discussed current dietary habits and goals of heart healthy eating for lipid management and diabetes management  The patient has T2D, Patient reported fasting , on Insulin   Patient's goals include: abstain from smoking, increased strength/stamina, reduced leg pain with walking  The patient's CAD risk factors include:  inactivity, obesity/overweight, hypertension, hyperlipidemia, diabetes and smoking  He was provided German education packet focused on lifestyle modification/education specific to His needs including heart healthy eating, reading food labels, stress management, risk factor reduction, understanding heart disease and common heart medications and smoking and heart disease  Patient will attend 35 monitored exercise sessions, 3x/wk for 12-18 weeks beginning July 29, 2022  CT surgical f/u appt is scheduled for July 28  Medication compliance: Yes   Comments: Pt reports to be compliant with medications  Fall Risk: Low   Comments: Ambulates with a steady gait with no assist device and Denies a fall in the past 6 months    EKG Interpretation: NSR, LBBB, freq  PACs, occ PVCs      EXERCISE ASSESSMENT and PLAN    Current Exercise Program in Rehab:       Frequency: 3 days/week Supplement with home exercise 2+ days/wk as tolerated       Minutes: 45        METS: 2 8-3 1            HR: 100 - 127   RPE: 4-5         Modalities: Treadmill, UBE, NuStep and Recumbent bike      Exercise Progression 30 Day Goals :    Frequency: 3 days/week of cardiac rehab     Supplement with home exercise 2+ days/wk as tolerated    Minutes: 45                          >150 mins/wk of moderate intensity exercise   METS: 2 9-3 5   HR: RHR+30    RPE: 4-5   Modalities: Treadmill, UBE, NuStep and Recumbent bike    Strength training:   Will be added following 6 weeks post PPM implant   Modalities: Leg Press, Chest Press, Lateral Raise and Seated Row    Home Exercise: walking 2 blocks per day    Goals: Reduced dyspnea with physical activity  0-110, improved DASI score by 10%, Increase in exercise capacity by 40% - based on peak METs tolerated in cardiac rehab exercise session, Exercise 5 days/wk, >150mins/wk of moderate intensity exercise, Improved 6MWT distance by 10%, Resume ADLs with increased strength, Decrease sitting time and Start a walking program    Progression Toward Goals:  Pt is progressing and showing improvement  toward the following goals:  Reduced dyspnea with physical activity  0-10, improved DASI score by 10%, Increase in exercise capacity by 40% - based on peak METs tolerated in cardiac rehab exercise session, Exercise 5 days/wk, >150mins/wk of moderate intensity exercise, Improved 6MWT distance by 10%, Resume ADLs with increased strength, Decrease sitting time and Start a walking program   , Will continue to educate and progress as tolerated  Pt will continue to add home walking/riding his bike to supplement cardiac rehab with increasing duration as tolerated      Education: benefit of exercise for CAD risk factors, home exercise guidelines, AHA guidelines to achieve >150 mins/wk of moderate exercise, RPE scale and Education hand out: Risk Factors for Heart Disease   Plan:education on home exercise guidelines and home exercise 30+ mins 2 days opposite CR  Readiness to change: Action:  (Changing behavior)      NUTRITION ASSESSMENT AND PLAN    Weight control:    Starting weight: 184   Current weight:   192  Waist circumference:    Startin 25   Current:      Diabetes: T2D  A1c: 6 5    last measured: 22    Lipid management: Discussed diet and lipid management and Last lipid profile 22  Chol 191    HDL 49      Goals:reduced BMI to < 25, LDL <100, fasting BG , Improved Rate Your Plate score  >01, 4 7-1%  wt loss, choose lean meat (93-95%), eliminate processed meats, use low fat dairy, reduce cheese intake or use reduced-fat, eat 3 or more servings of whole grains a day, Eat 4-5 cups of fruits and vegetables daily, choose healthy snacks: light popcorn, plain pretzels, Increase intake of nuts and seeds, eliminate or choose low-fat sweets and daily saturated fat intake <7%/13g,    Progression Toward Goals: Pt is progressing and showing improvement  toward the following goals:  reduced BMI to < 25, LDL <100, fasting BG , Improved Rate Your Plate score  >58, 6 3-8%  wt loss, choose lean meat (93-95%), eliminate processed meats, use low fat dairy, reduce cheese intake or use reduced-fat, eat 3 or more servings of whole grains a day, Eat 4-5 cups of fruits and vegetables daily, choose healthy snacks: light popcorn, plain pretzels, Increase intake of nuts and seeds, eliminate or choose low-fat sweets and daily saturated fat intake <7%/13g   , Will continue to educate and progress as tolerated  Ania Shah will focus on increased hydration  Education: heart healthy eating  low sodium diet  hydration  nutrition for  lipid management  nutrition for Improved BG control  exercise and diabetes management    Education handout: Reading Food Labels  Education handout: Heart Healthy Eating  Plan:   switch to low fat cheeses, replace refined grain bread with whole grain bread, replace unhealthy snacks with fruits & vegs, reduce red meat 1x/wk, switch to skim or 1% milk, eat fewer desserts and sweets, avoid processed foods, monitor home blood glucose, drink more water, replace sugar with stevia or truvia and keep added daily sugar <25g/day  Readiness to change: Action:  (Changing behavior)      PSYCHOSOCIAL ASSESSMENT AND PLAN    Emotional:  Depression assessment:  PHQ-9 = 2   1-4 = Minimal Depression            Anxiety measure:  TASHA-7 = 6   5-9 = Mild anxiety  Self-reported stress level:  1  Social support: Excellent and Patient reports excellent emotional/social support from family    Goals:   Increased interest in doing things, increased energy, stop worrying, take time to relax and Feel less anxious    Progression Toward Goals: Pt is progressing and showing improvement  toward the following goals: Increased interest in doing things, increased energy, stop worrying, take time to relax and Feel less anxious  , Will continue to educate and progress as tolerated  Education: benefits of a positive support system and stress management techniques, Education handout: Stress and Your Health,   Relaxation,  Plan:  Exercise, Spend time outdoors, Enjoy family and Return to previous social activity  Readiness to change: Preparation:  (Getting ready to change)       OTHER CORE COMPONENTS     Tobacco:   Social History     Tobacco Use   Smoking Status Former Smoker   • Packs/day: 0 50   • Types: Cigarettes   • Quit date: 2022   • Years since quittin 4   Smokeless Tobacco Never Used   Tobacco Comment    started when he was about 24 yrs old; stopped smoking 3 wks ago       Tobacco Use Intervention:   Pt quit May 30, 2022   and has abstained,  8/15/22 - Pt continues to abstain    Anginal Symptoms:  None   NTG use: No prescription    Blood pressure:    Restin//74   Exercise:  156//62    Goals: consistent BP < 130/80, reduced dietary sodium <2300mg, moderate intensity exercise >150 mins/wk, medication compliance and Abstain from smoking    Progression Toward Goals: Pt is progressing and showing improvement  toward the following goals:  consistent BP < 130/80, reduced dietary sodium <2300mg, moderate intensity exercise >150 mins/wk, medication compliance and Abstain from smoking   , Will continue to educate and progress as tolerated      Education:  low sodium diet and HTN and smoking and heart disease, Education handouts: Understanding Heart Disease  Plan: , Complete abstention from smoking, avoid places with second hand smoke, Avoid Processed foods, engage in regular exercise and check labels for sodium content  Readiness to change: Action:  (Changing behavior)

## 2022-11-07 ENCOUNTER — CLINICAL SUPPORT (OUTPATIENT)
Dept: CARDIAC REHAB | Age: 77
End: 2022-11-07

## 2022-11-07 DIAGNOSIS — Z95.2 S/P TAVR (TRANSCATHETER AORTIC VALVE REPLACEMENT): Primary | ICD-10-CM

## 2022-11-08 ENCOUNTER — OFFICE VISIT (OUTPATIENT)
Dept: INTERNAL MEDICINE CLINIC | Facility: CLINIC | Age: 77
End: 2022-11-08

## 2022-11-08 ENCOUNTER — CLINICAL SUPPORT (OUTPATIENT)
Dept: CARDIAC REHAB | Age: 77
End: 2022-11-08

## 2022-11-08 VITALS
TEMPERATURE: 97.7 F | BODY MASS INDEX: 27.25 KG/M2 | WEIGHT: 184 LBS | OXYGEN SATURATION: 97 % | DIASTOLIC BLOOD PRESSURE: 68 MMHG | SYSTOLIC BLOOD PRESSURE: 136 MMHG | HEART RATE: 90 BPM | HEIGHT: 69 IN

## 2022-11-08 DIAGNOSIS — E03.8 OTHER SPECIFIED HYPOTHYROIDISM: ICD-10-CM

## 2022-11-08 DIAGNOSIS — R25.2 LEG CRAMPING: ICD-10-CM

## 2022-11-08 DIAGNOSIS — J45.20 MILD INTERMITTENT ASTHMA WITHOUT COMPLICATION: ICD-10-CM

## 2022-11-08 DIAGNOSIS — E78.2 MIXED HYPERLIPIDEMIA: ICD-10-CM

## 2022-11-08 DIAGNOSIS — I10 ESSENTIAL HYPERTENSION: ICD-10-CM

## 2022-11-08 DIAGNOSIS — Z23 NEED FOR INFLUENZA VACCINATION: ICD-10-CM

## 2022-11-08 DIAGNOSIS — E11.40 TYPE 2 DIABETES MELLITUS WITH DIABETIC NEUROPATHY, WITH LONG-TERM CURRENT USE OF INSULIN (HCC): Primary | ICD-10-CM

## 2022-11-08 DIAGNOSIS — Z95.2 S/P TAVR (TRANSCATHETER AORTIC VALVE REPLACEMENT): Primary | ICD-10-CM

## 2022-11-08 DIAGNOSIS — Z79.4 TYPE 2 DIABETES MELLITUS WITH DIABETIC NEUROPATHY, WITH LONG-TERM CURRENT USE OF INSULIN (HCC): Primary | ICD-10-CM

## 2022-11-08 LAB — SL AMB POCT HEMOGLOBIN AIC: 7.6 (ref ?–6.5)

## 2022-11-08 RX ORDER — ACETYLCYSTEINE 100 MG/ML
4 SOLUTION ORAL; RESPIRATORY (INHALATION) 3 TIMES DAILY
Qty: 360 ML | Refills: 2 | Status: SHIPPED | OUTPATIENT
Start: 2022-11-08 | End: 2023-02-06

## 2022-11-08 RX ORDER — METHOCARBAMOL 750 MG/1
750 TABLET, FILM COATED ORAL EVERY 6 HOURS PRN
Qty: 60 TABLET | Refills: 0 | Status: SHIPPED | OUTPATIENT
Start: 2022-11-08

## 2022-11-08 RX ORDER — METHOCARBAMOL 750 MG/1
750 TABLET, FILM COATED ORAL EVERY 6 HOURS PRN
Qty: 60 TABLET | Refills: 0 | Status: SHIPPED | OUTPATIENT
Start: 2022-11-08 | End: 2022-11-08

## 2022-11-08 NOTE — PROGRESS NOTES
Name: Michael Lacey      : 1945      MRN: [id number]  Encounter Provider: Denisse Chahal PA-C  Encounter Date: 2022   Encounter department: [address]  [facility]    Assessment & Plan     1  Type 2 diabetes mellitus with diabetic neuropathy, with long-term current use of insulin (HCC)  Assessment & Plan:    Lab Results   Component Value Date    HGBA1C 7 6 (A) 2022     Slight increase in a1c, from 6 5  Patient admits to a poor diet recently  He has been eating cake daily  Reviewed nutrition and exercise recommendations  See BMI counseling  Will avoid making medication changes to avoid hypoglycemia as patient is higher risk  Reinforced lifestyle recommendations  Patient was agreeable to plan  Continue metformin 850 mg twice daily  Continue Lantus 18 units daily  Continue Novolog 5 units twice daily with lunch and dinner  Continue checking sugar 2-3 times daily  Keep log and bring to next visit  Up to date on diabetic foot and eye exam  Recheck 3 months  Orders:  -     POCT hemoglobin A1c    2  Essential hypertension  Assessment & Plan:  Controlled  Continue amlodipine 5 mg daily, HCTZ 25 mg, and lisinopril 20 mg daily  Limit sodium and salt intake  Avoid alcohol and caffeine  3  Mixed hyperlipidemia  Assessment & Plan:  Lab Results   Component Value Date    CHOLESTEROL 191 2022    CHOLESTEROL 179 2017     Lab Results   Component Value Date    HDL 49 2022    HDL 59 2017    HDL 54 2014     Lab Results   Component Value Date    TRIG 119 2022    TRIG 96 2017    TRIG 111 2014     No results found for: Vanessa    Lab Results   Component Value Date    LDLCALC 118 (H) 2022     Previously not at goal  Will recheck lipid panel  Continue rosuvastatin 40 mg daily  Orders:  -     Lipid Panel with Direct LDL reflex; Future    4   Other specified hypothyroidism  Assessment & Plan:  Lab Results   Component Value Date [id number] 2 410 01/25/2022     Levels have been in range for years and patient has been maintained on the same dose  Continue levothyroxine 137 mcg daily  Will recheck TSH and T4  Orders:  -     TSH, 3rd generation; Future  -     T4, free; Future    5  Leg cramping  Assessment & Plan:  Neurovascularly intact  With patient's previous history of cardiovascular disease, discussed need to rule out peripheral arterial disease  Arterial duplex ordered  Labs ordered to evaluate further  Discussed supportive treatment  Warm/cool compresses, stretch daily  Robaxin as needed  No NSAIDs  ER precautions given  Orders:  -     Basic metabolic panel; Future  -     Magnesium; Future  -     CK (with reflex to MB); Future  -     C-reactive protein; Future  -     Sedimentation rate, automated; Future  -     methocarbamol (Robaxin-750) 750 mg tablet; Take 1 tablet (750 mg total) by mouth every 6 (six) hours as needed for muscle spasms  -     VAS lower limb arterial duplex, complete bilateral; Future; Expected date: 11/09/2022    6  Mild intermittent asthma without complication  Assessment & Plan:  Controlled  Needs refills of acetylcysteine  Continue Advair twice daily  Rinse mouth after use  Orders:  -     acetylcysteine (MUCOMYST) 10 % nebulizer solution; Take 4 mL by nebulization 3 (three) times a day    7  Need for influenza vaccination  Assessment & Plan:  Received influenza vaccination today  Patient tolerated well  Orders:  -     influenza vaccine, high-dose, PF 0 7 mL (FLUZONE HIGH-DOSE)    BMI Counseling: Body mass index is 27 17 kg/m²   The BMI is above normal  Nutrition recommendations include decreasing portion sizes, encouraging healthy choices of fruits and vegetables, decreasing fast food intake, consuming healthier snacks, limiting drinks that contain sugar, moderation in carbohydrate intake, increasing intake of lean protein, reducing intake of saturated and trans fat and reducing intake of cholesterol  Exercise recommendations include moderate physical activity 150 minutes/week, exercising 3-5 times per week and strength training exercises  No pharmacotherapy was ordered  Rationale for BMI follow-up plan is due to patient being overweight or obese  Subjective      Patient is a 78year old female with a PMH of chronic hepatitis, hypothyroidism, T2DM, asthma, CAD, HTN, and HLD presenting today for follow up on chronic conditions  States overall he is feeling well  He checks his sugar daily  He did not bring a log with him  Can not recall average numbers  Denies any hypoglycemic events that he is aware of  Admits to poor diet  Denies increased hunger  Denies increased thirst or polyuria  Denies visual changes  Denies dizziness or feeling lightheaded  Denies chest pain or palpitations  Denies edema  States he has been compliant with his medications  Denies any medication side effects  States he has been getting leg cramping and pain  Mostly the right leg, sometimes the left  He feels it started before his cardiac rehab, but has gotten much worse since he is being more active  States it always occurs when he is active  Sometimes at night, when he tries to get up out of bed  Denies redness, swelling, or warmth that he is aware of  No numbness, tingling, or weakness  States it lasts minutes, to an hour  Use of  phone was required during visit  Review of Systems   Constitutional: Negative for appetite change, chills, diaphoresis, fatigue, fever and unexpected weight change  Eyes: Negative for visual disturbance  Respiratory: Negative for cough, chest tightness, shortness of breath and wheezing  Cardiovascular: Negative for chest pain, palpitations and leg swelling  Gastrointestinal: Negative for abdominal pain, blood in stool, constipation, diarrhea, nausea and vomiting  Endocrine: Negative for cold intolerance, heat intolerance, polydipsia, polyphagia and polyuria  Genitourinary: Negative for decreased urine volume, difficulty urinating, dysuria, frequency, hematuria and urgency  Musculoskeletal: Positive for arthralgias and myalgias  Negative for joint swelling  Skin: Negative for rash  Neurological: Negative for dizziness, weakness, light-headedness, numbness and headaches  Hematological: Negative for adenopathy  Current Outpatient Medications on File Prior to Visit   Medication Sig   • Advair -21 MCG/ACT inhaler TAKE 2 PUFFS BY MOUTH TWICE A DAY   • albuterol (Ventolin HFA) 90 mcg/act inhaler Inhale 2 puffs every 4 (four) hours as needed for wheezing or shortness of breath   • Alcohol Swabs (ALCOHOL PADS) 70 % PADS    • amLODIPine (NORVASC) 5 mg tablet Take 1 tablet (5 mg total) by mouth daily   • aspirin 81 mg chewable tablet Chew 1 tablet (81 mg total) daily   • Blood Glucose Monitoring Suppl (OneTouch Verio) w/Device KIT Check blood glucose three times daily before each meal   • cholecalciferol (VITAMIN D3) 1,000 units tablet TAKE 2 TABLETS (2,000 UNITS TOTAL) BY MOUTH DAILY   • clopidogrel (PLAVIX) 75 mg tablet Take 1 tablet (75 mg total) by mouth daily   • Continuous Blood Gluc  (FreeStyle [place] 2 [place]) POWER Use 1 each continuous   • Continuous Blood Gluc Sensor (FreeStyle Cristofer 2 Sensor) MISC Use 1 each every 14 (fourteen) days   • fluticasone (FLONASE) 50 mcg/act nasal spray 2 sprays into each nostril daily   • hydrochlorothiazide (HYDRODIURIL) 25 mg tablet TAKE 1 TABLET (25 MG TOTAL) BY MOUTH DAILY     • Insulin Glargine Solostar (Lantus SoloStar) 100 UNIT/ML SOPN Inject 0 18 mL (18 Units total) under the skin daily at bedtime   • Insulin Pen Needle (Pen Needles) 31G X 8 MM MISC Use daily   • Lancets (FREESTYLE) lancets by Other route as needed (As needed)   • levothyroxine 137 mcg tablet TAKE 1 TABLET BY MOUTH EVERY DAY   • lisinopril (ZESTRIL) 20 mg tablet Take 2 tablets (40 mg total) by mouth daily   • metFORMIN (GLUCOPHAGE) 850 mg tablet TAKE 1 TABLET (850 MG TOTAL) BY MOUTH 2 (TWO) TIMES A DAY WITH MEALS   • montelukast (SINGULAIR) 10 mg tablet TAKE 1 TABLET BY MOUTH EVERY DAY   • NovoLOG FlexPen 100 units/mL injection pen Inject 5 units with breakfast and with dinner   • rosuvastatin (CRESTOR) 40 MG tablet TAKE 1 TABLET BY MOUTH EVERY DAY       Objective     /68   Pulse 90   Temp 97 7 °F (36 5 °C) (Temporal)   Ht 5' 9" (1 753 m)   Wt 83 5 kg (184 lb)   SpO2 97%   BMI 27 17 kg/m²     Physical Exam  Vitals and nursing note reviewed  Constitutional:       General: He is awake  He is not in acute distress  Appearance: Normal appearance  He is well-developed, well-groomed and overweight  He is not ill-appearing  HENT:      Head: Normocephalic and atraumatic  Eyes:      General: No scleral icterus  Conjunctiva/sclera: Conjunctivae normal    Cardiovascular:      Rate and Rhythm: Normal rate and regular rhythm  Pulses: Normal pulses  Dorsalis pedis pulses are 2+ on the right side and 2+ on the left side  Heart sounds: Normal heart sounds  No murmur heard  Pulmonary:      Effort: Pulmonary effort is normal  No respiratory distress  Breath sounds: Normal breath sounds and air entry  No decreased air movement  No decreased breath sounds, wheezing, rhonchi or rales  Abdominal:      General: Abdomen is flat  Bowel sounds are normal  There is no distension  Palpations: Abdomen is soft  There is no mass  Tenderness: There is no abdominal tenderness  There is no right CVA tenderness, left CVA tenderness, guarding or rebound  Hernia: No hernia is present  Musculoskeletal:         General: Normal range of motion  Cervical back: Neck supple  Right lower leg: No edema  Left lower leg: No edema  Lymphadenopathy:      Cervical: No cervical adenopathy  Skin:     General: Skin is warm  Capillary Refill: Capillary refill takes less than 2 seconds        Coloration: Skin is not jaundiced  Findings: No rash  Neurological:      General: No focal deficit present  Mental Status: He is alert and oriented to person, place, and time  Mental status is at baseline  Sensory: Sensation is intact  Motor: Motor function is intact  Coordination: Coordination is intact  Gait: Gait is intact  Psychiatric:         Attention and Perception: Attention and perception normal          Mood and Affect: Mood and affect normal          Speech: Speech normal          Behavior: Behavior normal  Behavior is cooperative  Thought Content:  Thought content normal        Nikki Ferris PA-C

## 2022-11-10 ENCOUNTER — APPOINTMENT (OUTPATIENT)
Dept: LAB | Facility: CLINIC | Age: 77
End: 2022-11-10

## 2022-11-10 DIAGNOSIS — E03.8 OTHER SPECIFIED HYPOTHYROIDISM: ICD-10-CM

## 2022-11-10 DIAGNOSIS — R25.2 LEG CRAMPING: ICD-10-CM

## 2022-11-10 DIAGNOSIS — E78.2 MIXED HYPERLIPIDEMIA: ICD-10-CM

## 2022-11-10 PROBLEM — Z23 NEED FOR INFLUENZA VACCINATION: Status: ACTIVE | Noted: 2022-11-10

## 2022-11-10 LAB
ANION GAP SERPL CALCULATED.3IONS-SCNC: 6 MMOL/L (ref 4–13)
BUN SERPL-MCNC: 16 MG/DL (ref 5–25)
CALCIUM SERPL-MCNC: 9.2 MG/DL (ref 8.3–10.1)
CHLORIDE SERPL-SCNC: 106 MMOL/L (ref 96–108)
CHOLEST SERPL-MCNC: 142 MG/DL
CK MB SERPL-MCNC: 2.1 % (ref 0–2.5)
CK MB SERPL-MCNC: 3.6 NG/ML (ref 0–5)
CK SERPL-CCNC: 170 U/L (ref 39–308)
CO2 SERPL-SCNC: 27 MMOL/L (ref 21–32)
CREAT SERPL-MCNC: 0.91 MG/DL (ref 0.6–1.3)
CRP SERPL QL: <3 MG/L
ERYTHROCYTE [SEDIMENTATION RATE] IN BLOOD: 15 MM/HOUR (ref 0–19)
GFR SERPL CREATININE-BSD FRML MDRD: 82 ML/MIN/1.73SQ M
GLUCOSE P FAST SERPL-MCNC: 133 MG/DL (ref 65–99)
HDLC SERPL-MCNC: 65 MG/DL
LDLC SERPL CALC-MCNC: 62 MG/DL (ref 0–100)
MAGNESIUM SERPL-MCNC: 2 MG/DL (ref 1.6–2.6)
POTASSIUM SERPL-SCNC: 4.2 MMOL/L (ref 3.5–5.3)
SODIUM SERPL-SCNC: 139 MMOL/L (ref 135–147)
T4 FREE SERPL-MCNC: 1.17 NG/DL (ref 0.76–1.46)
TRIGL SERPL-MCNC: 75 MG/DL
TSH SERPL DL<=0.05 MIU/L-ACNC: 4.76 UIU/ML (ref 0.45–4.5)

## 2022-11-10 NOTE — ASSESSMENT & PLAN NOTE
Lab Results   Component Value Date    [id number] 2 410 01/25/2022     Levels have been in range for years and patient has been maintained on the same dose  Continue levothyroxine 137 mcg daily  Will recheck TSH and T4

## 2022-11-10 NOTE — ASSESSMENT & PLAN NOTE
Lab Results   Component Value Date    HGBA1C 7 6 (A) 11/08/2022     Slight increase in a1c, from 6 5  Patient admits to a poor diet recently  He has been eating cake daily  Reviewed nutrition and exercise recommendations  See BMI counseling  Will avoid making medication changes to avoid hypoglycemia as patient is higher risk  Reinforced lifestyle recommendations  Patient was agreeable to plan  Continue metformin 850 mg twice daily  Continue Lantus 18 units daily  Continue Novolog 5 units twice daily with lunch and dinner  Continue checking sugar 2-3 times daily  Keep log and bring to next visit  Up to date on diabetic foot and eye exam  Recheck 3 months

## 2022-11-10 NOTE — ASSESSMENT & PLAN NOTE
Controlled  Continue amlodipine 5 mg daily, HCTZ 25 mg, and lisinopril 20 mg daily  Limit sodium and salt intake  Avoid alcohol and caffeine

## 2022-11-10 NOTE — ASSESSMENT & PLAN NOTE
Neurovascularly intact  With patient's previous history of cardiovascular disease, discussed need to rule out peripheral arterial disease  Arterial duplex ordered  Labs ordered to evaluate further  Discussed supportive treatment  Warm/cool compresses, stretch daily  Robaxin as needed  No NSAIDs  ER precautions given

## 2022-11-10 NOTE — ASSESSMENT & PLAN NOTE
Lab Results   Component Value Date    CHOLESTEROL 191 01/25/2022    CHOLESTEROL 179 04/05/2017     Lab Results   Component Value Date    HDL 49 01/25/2022    HDL 59 04/05/2017    HDL 54 02/12/2014     Lab Results   Component Value Date    TRIG 119 01/25/2022    TRIG 96 04/05/2017    TRIG 111 02/12/2014     No results found for: Vanessa    Lab Results   Component Value Date    LDLCALC 118 (H) 01/25/2022     Previously not at goal  Will recheck lipid panel  Continue rosuvastatin 40 mg daily

## 2022-11-11 ENCOUNTER — VBI (OUTPATIENT)
Dept: ADMINISTRATIVE | Facility: OTHER | Age: 77
End: 2022-11-11

## 2022-11-11 ENCOUNTER — APPOINTMENT (OUTPATIENT)
Dept: CARDIAC REHAB | Age: 77
End: 2022-11-11

## 2022-11-14 ENCOUNTER — CLINICAL SUPPORT (OUTPATIENT)
Dept: CARDIAC REHAB | Age: 77
End: 2022-11-14

## 2022-11-14 DIAGNOSIS — Z95.2 S/P TAVR (TRANSCATHETER AORTIC VALVE REPLACEMENT): ICD-10-CM

## 2022-11-15 ENCOUNTER — TELEPHONE (OUTPATIENT)
Dept: INTERNAL MEDICINE CLINIC | Facility: CLINIC | Age: 77
End: 2022-11-15

## 2022-11-15 NOTE — TELEPHONE ENCOUNTER
----- Message from [address] [place] [address],[address] sent at 11/9/2022  4:33 PM EST -----  Called patient - cannot calls at this time  ----- Message -----  From: Arlet Bella PA-C  Sent: 11/9/2022   3:48 PM EST  To: [address]    Please call patient and remind him to have his blood work done   Thank you

## 2022-11-15 NOTE — TELEPHONE ENCOUNTER
Attempted to contact patient to remind him to get his labs done  We are not able to reach patient by phone  I did mail him a letter to call our office

## 2022-11-18 ENCOUNTER — CLINICAL SUPPORT (OUTPATIENT)
Dept: CARDIAC REHAB | Age: 77
End: 2022-11-18

## 2022-11-18 DIAGNOSIS — Z95.2 S/P TAVR (TRANSCATHETER AORTIC VALVE REPLACEMENT): ICD-10-CM

## 2022-11-21 ENCOUNTER — CLINICAL SUPPORT (OUTPATIENT)
Dept: CARDIAC REHAB | Age: 77
End: 2022-11-21

## 2022-11-21 DIAGNOSIS — Z95.2 S/P TAVR (TRANSCATHETER AORTIC VALVE REPLACEMENT): ICD-10-CM

## 2022-11-21 DIAGNOSIS — E55.9 VITAMIN D DEFICIENCY: ICD-10-CM

## 2022-11-21 RX ORDER — MELATONIN
2000 DAILY
Qty: 180 TABLET | Refills: 5 | Status: SHIPPED | OUTPATIENT
Start: 2022-11-21

## 2022-11-23 ENCOUNTER — TELEPHONE (OUTPATIENT)
Dept: INTERNAL MEDICINE CLINIC | Facility: CLINIC | Age: 77
End: 2022-11-23

## 2022-11-23 NOTE — TELEPHONE ENCOUNTER
Patient called in response to a letter received that we have been trying to contact him  Advised that Yahaira ordered VAS lower limb arterial duplex, complete bilateral    Updated phone number provided    Genoveva offered to call Central Scheduling  Spoke to [place] who helped schedule the vascular test for 12/15/22 12:30pm arrival time 12:15pm to be done at [address], [facility]  [address]        Called patient whose wife took down the information

## 2022-11-25 ENCOUNTER — APPOINTMENT (OUTPATIENT)
Dept: CARDIAC REHAB | Age: 77
End: 2022-11-25

## 2022-11-28 ENCOUNTER — CLINICAL SUPPORT (OUTPATIENT)
Dept: CARDIAC REHAB | Age: 77
End: 2022-11-28

## 2022-11-28 DIAGNOSIS — Z95.2 S/P TAVR (TRANSCATHETER AORTIC VALVE REPLACEMENT): ICD-10-CM

## 2022-11-28 NOTE — PROGRESS NOTES
Cardiac Rehabilitation Plan of Care   150 Day Reassessment          Today's date: 2022   # of Exercise Sessions Completed: 26  Patient name: Bran Garcia      : 1945  Age: 78 y o  MRN: [id number]  Referring Physician: Ida Mcclelland PA-C  Cardiologist: Poonam Velazquez MD (fellow)  Provider: Gustavo Bowman  Clinician: Nakita Pereira, MS, CEP, CCRP        Dx:   Encounter Diagnosis   Name Primary? • S/P TAVR (transcatheter aortic valve replacement)      Date of onset: 22      SUMMARY OF PROGRESS: [employer]  used for reassessment/education today  Koby Young attends CR 2x/wk  he tolerates 30-45 mins at 2 8-3 8 METs  Wt training was introduced  Limited by knee pain, no cardiac complaints  Workloads will be progressed to maintain RPE 4-6  Resting /60 - 148/68 with appropriate response to exercise reaching 160/72 - 172/20  NSR, LBBB on tele with occ  PACs, occ PVCs, occ NSVT observed  He has added home exercise 2-3 days/wk which includes walking 1-2 blocks - about 6 mins again limited by his knee pain  Patient has been working on dietary modifications with the goal of rare red/processed meats, low fat dairy, reduced added sugars and refined flours  He abstains from red/processed meats and eats mostly chicken and fish  The patient has T2D  He has not been using his CGM  Reports he has an appointment in 2 weeks to learn how to use it  Reports FBG avg 170  Koby Young quit smoking 2022, and is doing well abstaining  Koby Yonug patient denies depression/anxiety  Patient reports excellent social/emotional support  He was provided written education on  cardiac risk factor modification and heart healthy eating  His exercise program will be progressed as tolerated to maintain RPE 4-6  The patient has the following personal goals he hopes to achieved by discharge: abstain from smoking, increased strength/stamina, reduced leg pain with walking   Pt will continue to be educated on lifestyle modifications and encouraged to supplement with a home exercise program to reach the following goals in the next 30 days: add more consistent home walking and riding his bicycle, increased duration as tolerated, consistent home BG monitoring  Will continue to monitor  11/4/22: Pt wife was unavailable for translation  He has progressed his duration and now tolerates 45 mins at 2 8-3 1 METs  Workloads will be progressed to maintain RPE 4-6 once 40 mins is completed comfortably  Resting //74 with appropriate response to exercise reaching 156//62  NSR, LBBB on tele with occ  PACs, occ PVCs, occ NSVT observed  Runs of NSVT were noted on past zio patch  RHR 77-90 ExHR 110-120  He has added home exercise 2-3 days/wk which includes walking 1-2 blocks, however he states that he has not been completing it recently  Recent c/o of R knee discomfort  He states he has had fluid removed from that knee 3x in the past   No cardiac complaints  Recent c/o of phlegm, he reports he has had to increase use of his inhaler  Patient has been working on dietary modifications with the goal of rare red/processed meats, low fat dairy, reduced added sugars and refined flours  He abstains from red/processed meats  The patient has T2D  He is using a CGM  BS today was 140 The patient is a recent user, quit June 2022, and is doing well abstaining  Vernette Mall patient denies depression/anxiety  Patient reports excellent social/emotional support  Patient attends group educational classes on cardiac risk factor modification  His exercise program will be progressed as tolerated to maintain RPE 4-6  The patient has the following personal goals he hopes to achieved by discharge: abstain from smoking, increased strength/stamina, reduced leg pain with walking   Pt will continue to be educated on lifestyle modifications and encouraged to supplement with a home exercise program to reach the following goals in the next 30 days: add more consistent home walking, increased duration as tolerated, consistent home BG monitoring  Will continue to monitor  The upright bike will be discontinued in CR d/t knee pain  10/7/22: (Yakut speaking - wife translates)  He has progressed his duration and now tolerates 40 mins at 2 5- 3 1 METs  Progression will continue to achieve duration of 40 mins  Workloads will be progressed to maintain RPE 4-6 once 40 mins is completed comfortably  Resting //64 with appropriate response to exercise reaching 156//58  NSR, LBBB on tele with occ  PACs, occ PVCs observed  RHR 80-83 ExHR 111-127  He has added home exercise 2-3 days/wk which includes walking 2 blocks, however he states that he has not been completing it recently  Bernadette Young reports Left ankle pain with walking  Reports the need to stop and shake out his leg throughout his walk  Recent c/o of L knee discomfort  No cardiac complaints  No medication changes  States 100% medication compliance  He gained 2 lbs in last 30 days, weighing 182  He states he has been consuming more candy  Encouraged him to reduce his consumption  Patient has been working on dietary modifications with the goal of rare red/processed meats, low fat dairy, reduced added sugars and refined flours  He abstains from red/processed meats  The patient has T2D  He is using a CGM  However he did not have his CGM on today  The patient is a recent user, quit June 2022, and is doing well abstaining  Bernadette Young patient denies depression/anxiety  Patient reports excellent social/emotional support  Patient attends group educational classes on cardiac risk factor modification  His exercise program will be progressed as tolerated to maintain RPE 4-6  The patient has the following personal goals he hopes to achieved by discharge: abstain from smoking, increased strength/stamina, reduced leg pain with walking   Pt will continue to be educated on lifestyle modifications and encouraged to supplement with a home exercise program to reach the following goals in the next 30 days: add more consistent home walking and riding his bicycle, increased duration as tolerated, consistent home BG monitoring  9/12/22: (Persian speaking - wife translates)   Kim Leblanc attended only 3 sessions since his last reassessment done on August 16  He had a pacemaker implant performed on Sept 1 and will be out until medically cleared - possibly Sept 19     He has progressed his duration and now tolerates 35-39 mins at 2 6-  3 7 METs  Progression will continue to achieve duration of 40 mins  Workloads will be progressed to maintain RPE 4-6 once 40  mins is completed comfortably  Resting BP is typically elevated but has been improved since last note  134/64 - 142/70 with appropriate response to exercise reaching 160/62  NSR, LBBB on tele with [employer]  PACs, occ PVCs observed  On telemetry today, Juan David's rhythm switched to an apparent RBBB  RHR 75 - 85 ExHR 100 - 120  He has added home exercise 2-3 days/wk which includes walking one block  Kim Leblanc reports Left ankle pain with walking  Reports the need to stop and shake out his leg throughout his walk  No cardiac complaints  He is progressing toward wt loss goals with a loss of 2 pounds  Patient has been working on  dietary modifications with the goal of rare red/processed meats, low fat dairy, reduced added sugars and refined flours  He abstains from sweets and red/processed meats  The patient has T2D  He is using a CGM  The patient is a recent user, quit June 2022, and is doing well abstaining  Kim Leblanc patient denies   depression/anxiety  Patient reports excellent social/emotional support  Patient attends group educational classes on cardiac risk factor modification  His exercise program will be progressed as tolerated to maintain RPE 4-6   The patient has the following personal goals he hopes to achieved by discharge: abstain from smoking, increased strength/stamina, reduced leg pain with walking      Pt will continue to be educated on lifestyle modifications and encouraged to supplement with a home exercise program to reach the following goals in the next 30 days: add more consistent home walking adding increased duration as tolerated, consistent home BG monitoring  August 16, 2022; (Sinhala speaking - wife translates)  Ayo Ashton attends cardiac rehab 2x/wk  He tolerates 30 - 33 mins at 2 1 - 2 9 METs  He is tolerating progression of duration to 40 mins  Workloads will be progressed to maintain RPE 4-6 once 40  mins is completed comfortably  Resting BP is typically elevated  140/70 - 150/60 with appropriate response to exercise reaching 154/60- 188/72  NSR, LBBB on tele with freq  PACs, occ PVCs observed  On telemetry today, Juan David's rhythm switched to an apparent RBBB  RHR 75 - 85 ExHR 100 - 120  He has added home exercise 2-3 days/wk which includes walking one block  Lyndairis Ashton reports Left ankle pain with walking  Reports the need to stop and shake out his leg throughout his walk  He was introduced to the TM today which he tolerated well - no ankle pain  No cardiac complaints  He is progressing toward wt loss goals with a loss of 2 pounds  Patient has been working on  dietary modifications with the goal of rare red/processed meats, low fat dairy, reduced added sugars and refined flours  He abstains from sweets and red/processed meats  The patient has T2D  He is using a CGM but reports that he lost his sensor  Follow up appointment tomorrow for a new sensor  The patient is a recent user, quit June 2022, and is doing well abstaining  Ayo Ashton patient denies   depression/anxiety  Patient reports excellent social/emotional support  Patient attends group educational classes on cardiac risk factor modification  His exercise program will be progressed as tolerated to maintain RPE 4-6   The patient has the following personal goals he hopes to achieved by discharge: abstain from smoking, increased strength/stamina, reduced leg pain with walking      Pt will continue to be educated on lifestyle modifications and encouraged to supplement with a home exercise program to reach the following goals in the next 30 days: add more consistent home walking adding increased duration as tolerated, consistent home BG monitoring  July 21, 2022 -  Initial Care Plan: Today is Juan David's  initial evaluation to begin Cardiac Rehab post TAVR  The patient does not currently follow a formal exercise program at home  He has resumed light ADLs without fatigue and tolerating them well  Depression screening using the PHQ-9 interprets the patient's score 1-4 = Minimal Depression  TASHA-7 screening tool for anxiety suggests 5-9 = Mild anxiety  When addressed, the patient denies having depression/anxiety  Patient reports excellent social/emotional support  Information to begin using [employer] was provided as well as contact information for counseling through [email or web address]  PHQ-9 score will be reassessed in 30 days  The patient is a recent user, quit May 30, 2022, and is doing well abstaining  Patient admits to 100% medication compliance  Patient reports the following physical limitations: R lower leg pain with walking  States he needs to stop after walking one block  The patient completed an initial 6MWT  The patient walked  [phone] 66 feet/2  2METs  Resting  /60 dropping to 142/62 with exercise  Patient denied symptoms during exercise  Telemetry revealed NSR, LBBB, freq  PACs  Patient was counseled on exercise guidelines to achieve a minimum of 150 mins/wk of moderate intensity (RPE 4-6) exercise and encouraged to add 1-2 days of exercise on opposite days of cardiac rehab as tolerated  We discussed current dietary habits and goals of heart healthy eating for lipid management and diabetes management   The patient has T2D, Patient reported fasting , on Insulin   Patient's goals include: abstain from smoking, increased strength/stamina, reduced leg pain with walking  The patient's CAD risk factors include:  inactivity, obesity/overweight, hypertension, hyperlipidemia, diabetes and smoking  He was provided Thai education packet focused on lifestyle modification/education specific to His needs including heart healthy eating, reading food labels, stress management, risk factor reduction, understanding heart disease and common heart medications and smoking and heart disease  Patient will attend 35 monitored exercise sessions, 3x/wk for 12-18 weeks beginning 2022  CT surgical f/u appt is scheduled for   Medication compliance: Yes   Comments: Pt reports to be compliant with medications  Fall Risk: Low   Comments: Ambulates with a steady gait with no assist device and Denies a fall in the past 6 months    EKG Interpretation: NSR, LBBB, freq   PACs, occ PVCs      EXERCISE ASSESSMENT and PLAN    Current Exercise Program in Rehab:       Frequency: 3 days/week Supplement with home exercise 2+ days/wk as tolerated       Minutes: 30-40        METS: 2 8 - 3 8           HR: 100 - 127   RPE: 4-5         Modalities: Treadmill, UBE, NuStep and Recumbent bike      Exercise Progression 30 Day Goals :    Frequency: 3 days/week of cardiac rehab     Supplement with home exercise 2+ days/wk as tolerated    Minutes: 45                          >150 mins/wk of moderate intensity exercise   METS: 2 9-4 0   HR: 100-130   RPE: 4-5   Modalities: Treadmill, UBE, NuStep and Recumbent bike    Strength trainin set, 15 reps   Modalities: Leg Press, Chest Press, Lateral Raise and Seated Row    Home Exercise: walking 2 blocks per day    Goals: Reduced dyspnea with physical activity  0-1/10, improved DASI score by 10%, Increase in exercise capacity by 40% - based on peak METs tolerated in cardiac rehab exercise session, Exercise 5 days/wk, >150mins/wk of moderate intensity exercise, Improved 6MWT distance by 10%, Resume ADLs with increased strength, Decrease sitting time and Start a walking program    Progression Toward Goals:  Pt is progressing and showing improvement  toward the following goals:  Reduced dyspnea with physical activity  0-/10, improved DASI score by 10%, Increase in exercise capacity by 40% - based on peak METs tolerated in cardiac rehab exercise session, Exercise 5 days/wk, >150mins/wk of moderate intensity exercise, Improved 6MWT distance by 10%, Resume ADLs with increased strength, Decrease sitting time and Start a walking program   , Will continue to educate and progress as tolerated  Pt will continue to add home walking/riding his bike to supplement cardiac rehab with increasing duration as tolerated      Education: benefit of exercise for CAD risk factors, home exercise guidelines, AHA guidelines to achieve >150 mins/wk of moderate exercise, RPE scale and Education hand out: Risk Factors for Heart Disease   Plan:education on home exercise guidelines and home exercise 30+ mins 2 days opposite CR  Readiness to change: Action:  (Changing behavior)      NUTRITION ASSESSMENT AND PLAN    Weight control:    Starting weight: 184   Current weight:   191  Waist circumference:    Startin 25   Current:      Diabetes: T2D  A1c: 6 5    last measured: 22    Lipid management: Discussed diet and lipid management and Last lipid profile 22  Chol 191    HDL 49      Goals:reduced BMI to < 25, LDL <100, fasting BG , Improved Rate Your Plate score  >12, 1 6-6%  wt loss, choose lean meat (93-95%), eliminate processed meats, use low fat dairy, reduce cheese intake or use reduced-fat, eat 3 or more servings of whole grains a day, Eat 4-5 cups of fruits and vegetables daily, choose healthy snacks: light popcorn, plain pretzels, Increase intake of nuts and seeds, eliminate or choose low-fat sweets and daily saturated fat intake <7%/13g,    Progression Toward Goals: Pt is progressing and showing improvement  toward the following goals:  reduced BMI to < 25, LDL <100, fasting BG , Improved Rate Your Plate score  >32, 5 1-2%  wt loss, choose lean meat (93-95%), eliminate processed meats, use low fat dairy, reduce cheese intake or use reduced-fat, eat 3 or more servings of whole grains a day, Eat 4-5 cups of fruits and vegetables daily, choose healthy snacks: light popcorn, plain pretzels, Increase intake of nuts and seeds, eliminate or choose low-fat sweets and daily saturated fat intake <7%/13g   , Will continue to educate and progress as tolerated  Janeen Michelle will focus on increased hydration  Janeen Michelle will learn how to use his CGM    Education: heart healthy eating  low sodium diet  hydration  nutrition for  lipid management  nutrition for Improved BG control  exercise and diabetes management    Education handout: Reading Food Labels  Education handout: Heart Healthy Eating  Plan:   switch to low fat cheeses, replace refined grain bread with whole grain bread, replace unhealthy snacks with fruits & vegs, reduce red meat 1x/wk, switch to skim or 1% milk, eat fewer desserts and sweets, avoid processed foods, monitor home blood glucose, drink more water, replace sugar with stevia or truvia and keep added daily sugar <25g/day  Readiness to change: Action:  (Changing behavior)      PSYCHOSOCIAL ASSESSMENT AND PLAN    Emotional:  Depression assessment:  PHQ-9 = 2   1-4 = Minimal Depression            Anxiety measure:  TASHA-7 = 6   5-9 = Mild anxiety  Self-reported stress level:  1  Social support: Excellent and Patient reports excellent emotional/social support from family    Goals: Increased interest in doing things, increased energy, stop worrying, take time to relax and Feel less anxious    Progression Toward Goals: Pt is progressing and showing improvement  toward the following goals:   Increased interest in doing things, increased energy, stop worrying, take time to relax and Feel less anxious  , Will continue to educate and progress as tolerated  Education: benefits of a positive support system and stress management techniques, Education handout: Stress and Your Health,   Relaxation,  Plan:  Exercise, Spend time outdoors, Enjoy family and Return to previous social activity  Readiness to change: Preparation:  (Getting ready to change)       OTHER CORE COMPONENTS     Tobacco:   Social History     Tobacco Use   Smoking Status Former   • Packs/day: 0 50   • Types: Cigarettes   • Quit date: 2022   • Years since quittin 4   Smokeless Tobacco Never   Tobacco Comments    started when he was about 24 yrs old; stopped smoking 3 wks ago       Tobacco Use Intervention:   Pt quit May 30, 2022   and has abstained,  8/15/22 - Pt continues to abstain  22 - Pt continues to abstain    Anginal Symptoms:  None   NTG use: No prescription    Blood pressure:    Restin/60 - 148/68    Exercise:  160/72 - 172/20    Goals: consistent BP < 130/80, reduced dietary sodium <2300mg, moderate intensity exercise >150 mins/wk, medication compliance and Abstain from smoking    Progression Toward Goals: Pt is progressing and showing improvement  toward the following goals:  consistent BP < 130/80, reduced dietary sodium <2300mg, moderate intensity exercise >150 mins/wk, medication compliance and Abstain from smoking   , Will continue to educate and progress as tolerated      Education:  low sodium diet and HTN and smoking and heart disease, Education handouts: Understanding Heart Disease  Plan: , Complete abstention from smoking, avoid places with second hand smoke, Avoid Processed foods, engage in regular exercise and check labels for sodium content  Readiness to change: Action:  (Changing behavior)

## 2022-11-29 DIAGNOSIS — Z95.2 S/P TAVR (TRANSCATHETER AORTIC VALVE REPLACEMENT): ICD-10-CM

## 2022-11-29 RX ORDER — ASPIRIN 81 MG
TABLET,CHEWABLE ORAL
Qty: 90 TABLET | Refills: 3 | Status: SHIPPED | OUTPATIENT
Start: 2022-11-29

## 2022-12-02 ENCOUNTER — CLINICAL SUPPORT (OUTPATIENT)
Dept: CARDIAC REHAB | Age: 77
End: 2022-12-02

## 2022-12-02 DIAGNOSIS — Z95.2 S/P TAVR (TRANSCATHETER AORTIC VALVE REPLACEMENT): ICD-10-CM

## 2022-12-05 ENCOUNTER — CLINICAL SUPPORT (OUTPATIENT)
Dept: CARDIAC REHAB | Age: 77
End: 2022-12-05

## 2022-12-05 DIAGNOSIS — Z95.2 S/P TAVR (TRANSCATHETER AORTIC VALVE REPLACEMENT): ICD-10-CM

## 2022-12-08 ENCOUNTER — CLINICAL SUPPORT (OUTPATIENT)
Dept: CARDIAC REHAB | Age: 77
End: 2022-12-08

## 2022-12-08 DIAGNOSIS — Z95.2 S/P TAVR (TRANSCATHETER AORTIC VALVE REPLACEMENT): Primary | ICD-10-CM

## 2022-12-09 ENCOUNTER — VBI (OUTPATIENT)
Dept: ADMINISTRATIVE | Facility: OTHER | Age: 77
End: 2022-12-09

## 2022-12-09 ENCOUNTER — APPOINTMENT (OUTPATIENT)
Dept: CARDIAC REHAB | Age: 77
End: 2022-12-09

## 2022-12-12 ENCOUNTER — CLINICAL SUPPORT (OUTPATIENT)
Dept: CARDIAC REHAB | Age: 77
End: 2022-12-12

## 2022-12-12 DIAGNOSIS — Z95.2 S/P TAVR (TRANSCATHETER AORTIC VALVE REPLACEMENT): Primary | ICD-10-CM

## 2022-12-15 ENCOUNTER — HOSPITAL ENCOUNTER (OUTPATIENT)
Dept: NON INVASIVE DIAGNOSTICS | Facility: CLINIC | Age: 77
Discharge: HOME | End: 2022-12-15

## 2022-12-15 DIAGNOSIS — R25.2 LEG CRAMPING: ICD-10-CM

## 2022-12-15 DIAGNOSIS — I73.9 PERIPHERAL ARTERY DISEASE (HCC): Primary | ICD-10-CM

## 2022-12-16 ENCOUNTER — CLINICAL SUPPORT (OUTPATIENT)
Dept: CARDIAC REHAB | Age: 77
End: 2022-12-16

## 2022-12-16 DIAGNOSIS — Z95.2 S/P TAVR (TRANSCATHETER AORTIC VALVE REPLACEMENT): Primary | ICD-10-CM

## 2022-12-20 ENCOUNTER — CLINICAL SUPPORT (OUTPATIENT)
Dept: CARDIAC REHAB | Age: 77
End: 2022-12-20

## 2022-12-20 DIAGNOSIS — Z95.2 S/P TAVR (TRANSCATHETER AORTIC VALVE REPLACEMENT): Primary | ICD-10-CM

## 2022-12-21 ENCOUNTER — CLINICAL SUPPORT (OUTPATIENT)
Dept: CARDIAC REHAB | Age: 77
End: 2022-12-21

## 2022-12-21 DIAGNOSIS — Z95.2 S/P TAVR (TRANSCATHETER AORTIC VALVE REPLACEMENT): ICD-10-CM

## 2022-12-22 ENCOUNTER — CLINICAL SUPPORT (OUTPATIENT)
Dept: CARDIAC REHAB | Age: 77
End: 2022-12-22

## 2022-12-22 DIAGNOSIS — Z95.2 S/P TAVR (TRANSCATHETER AORTIC VALVE REPLACEMENT): Primary | ICD-10-CM

## 2022-12-22 NOTE — PROGRESS NOTES
Cardiac Rehabilitation Plan of Care   180 Day Reassessment          Today's date: 2022   # of Exercise Sessions Completed: 34  Patient name: Paco Henning      : 1945  Age: 78 y o  MRN: [id number]  Referring Physician: Madi Lee PA-C  Cardiologist: Guera Ahmadi MD (fellow)  Provider: [facility]  Clinician: Lorraine Triana MS, CEP        Dx:   Encounter Diagnosis   Name Primary? • S/P TAVR (transcatheter aortic valve replacement) Yes     Date of onset: 22      SUMMARY OF PROGRESS:   : Shin Eason attends CR 2x/wk  He tolerates 40-45 mins at 2 9-3 6 METs plus weight training  Limited by knee pain and leg caludication, no cardiac complaints  He has been referred to vascular surgery  He also has OV scheduled for tomorrow  for a device check  Workloads will be progressed to maintain RPE 4-6  Resting //80 with appropriate response to exercise reaching 152//84  NSR, LBBB on tele with occ  PACs, occ PVCs, occ couplets, occ NSVT and rare pacing observed  He has added home exercise 2-3 days/wk which includes walking 1 blocks - limited by his knee pain  Patient has been working on dietary modifications with the goal of rare red/processed meats, low fat dairy, reduced added sugars and refined flours  He abstains from red/processed meats and eats mostly chicken and fish  The patient has T2D  He has been using his CGM  Shin Eason quit smoking 2022, and is doing well abstaining  Shin Pérezhand patient denies depression/anxiety  Patient reports excellent social/emotional support  He was provided written education on  cardiac risk factor modification and heart healthy eating  His exercise program will be progressed as tolerated to maintain RPE 4-6  The patient has the following personal goals he hopes to achieved by discharge: abstain from smoking, increased strength/stamina, reduced leg pain with walking   Pt will continue to be educated on lifestyle modifications and encouraged to supplement with a home exercise program to reach the following goals in the next 30 days: check into [employer] through insurance company  He plans to continue his exercise by walking outdoors and riding his bike, when weather permits  11/28: [employer]  used for reassessment/education today  Connor Gracia attends CR 2x/wk  he tolerates 30-45 mins at 2 8-3 8 METs  Wt training was introduced  Limited by knee pain, no cardiac complaints  Workloads will be progressed to maintain RPE 4-6  Resting /60 - 148/68 with appropriate response to exercise reaching 160/72 - 172/20  NSR, LBBB on tele with occ  PACs, occ PVCs, occ NSVT observed  He has added home exercise 2-3 days/wk which includes walking 1-2 blocks - about 6 mins again limited by his knee pain  Patient has been working on dietary modifications with the goal of rare red/processed meats, low fat dairy, reduced added sugars and refined flours  He abstains from red/processed meats and eats mostly chicken and fish  The patient has T2D  He has not been using his CGM  Reports he has an appointment in 2 weeks to learn how to use it  Reports FBG avg 170  Connor Gracia quit smoking June 2022, and is doing well abstaining  Connor Gracia patient denies depression/anxiety  Patient reports excellent social/emotional support  He was provided written education on  cardiac risk factor modification and heart healthy eating  His exercise program will be progressed as tolerated to maintain RPE 4-6  The patient has the following personal goals he hopes to achieved by discharge: abstain from smoking, increased strength/stamina, reduced leg pain with walking  Pt will continue to be educated on lifestyle modifications and encouraged to supplement with a home exercise program to reach the following goals in the next 30 days: add more consistent home walking and riding his bicycle, increased duration as tolerated, consistent home BG monitoring  Will continue to monitor  11/4/22: Pt wife was unavailable for translation  He has progressed his duration and now tolerates 45 mins at 2 8-3 1 METs  Workloads will be progressed to maintain RPE 4-6 once 40 mins is completed comfortably  Resting //74 with appropriate response to exercise reaching 156//62  NSR, LBBB on tele with occ  PACs, occ PVCs, occ NSVT observed  Runs of NSVT were noted on past zio patch  RHR 77-90 ExHR 110-120  He has added home exercise 2-3 days/wk which includes walking 1-2 blocks, however he states that he has not been completing it recently  Recent c/o of R knee discomfort  He states he has had fluid removed from that knee 3x in the past   No cardiac complaints  Recent c/o of phlegm, he reports he has had to increase use of his inhaler  Patient has been working on dietary modifications with the goal of rare red/processed meats, low fat dairy, reduced added sugars and refined flours  He abstains from red/processed meats  The patient has T2D  He is using a CGM  BS today was 140 The patient is a recent user, quit June 2022, and is doing well abstaining  Denise Fridge patient denies depression/anxiety  Patient reports excellent social/emotional support  Patient attends group educational classes on cardiac risk factor modification  His exercise program will be progressed as tolerated to maintain RPE 4-6  The patient has the following personal goals he hopes to achieved by discharge: abstain from smoking, increased strength/stamina, reduced leg pain with walking  Pt will continue to be educated on lifestyle modifications and encouraged to supplement with a home exercise program to reach the following goals in the next 30 days: add more consistent home walking, increased duration as tolerated, consistent home BG monitoring  Will continue to monitor  The upright bike will be discontinued in CR d/t knee pain      10/7/22: (Estonian speaking - wife translates)  He has progressed his duration and now tolerates 40 mins at 2 5- 3 1 METs  Progression will continue to achieve duration of 40 mins  Workloads will be progressed to maintain RPE 4-6 once 40 mins is completed comfortably  Resting //64 with appropriate response to exercise reaching 156//58  NSR, LBBB on tele with occ  PACs, occ PVCs observed  RHR 80-83 ExHR 111-127  He has added home exercise 2-3 days/wk which includes walking 2 blocks, however he states that he has not been completing it recently  Darleen Chaudhary reports Left ankle pain with walking  Reports the need to stop and shake out his leg throughout his walk  Recent c/o of L knee discomfort  No cardiac complaints  No medication changes  States 100% medication compliance  He gained 2 lbs in last 30 days, weighing 182  He states he has been consuming more candy  Encouraged him to reduce his consumption  Patient has been working on dietary modifications with the goal of rare red/processed meats, low fat dairy, reduced added sugars and refined flours  He abstains from red/processed meats  The patient has T2D  He is using a CGM  However he did not have his CGM on today  The patient is a recent user, quit June 2022, and is doing well abstaining  Darleen Chaudhary patient denies depression/anxiety  Patient reports excellent social/emotional support  Patient attends group educational classes on cardiac risk factor modification  His exercise program will be progressed as tolerated to maintain RPE 4-6  The patient has the following personal goals he hopes to achieved by discharge: abstain from smoking, increased strength/stamina, reduced leg pain with walking  Pt will continue to be educated on lifestyle modifications and encouraged to supplement with a home exercise program to reach the following goals in the next 30 days: add more consistent home walking and riding his bicycle, increased duration as tolerated, consistent home BG monitoring      9/12/22: (Setswana speaking - wife translates)   Darleen Chaudhary attended only 3 sessions since his last reassessment done on August 16  He had a pacemaker implant performed on Sept 1 and will be out until medically cleared - possibly Sept 19     He has progressed his duration and now tolerates 35-39 mins at 2 6-  3 7 METs  Progression will continue to achieve duration of 40 mins  Workloads will be progressed to maintain RPE 4-6 once 40  mins is completed comfortably  Resting BP is typically elevated but has been improved since last note  134/64 - 142/70 with appropriate response to exercise reaching 160/62  NSR, LBBB on tele with freq  PACs, occ PVCs observed  On telemetry today, Juan David's rhythm switched to an apparent RBBB  RHR 75 - 85 ExHR 100 - 120  He has added home exercise 2-3 days/wk which includes walking one block  Jaden Willoughby reports Left ankle pain with walking  Reports the need to stop and shake out his leg throughout his walk  No cardiac complaints  He is progressing toward wt loss goals with a loss of 2 pounds  Patient has been working on  dietary modifications with the goal of rare red/processed meats, low fat dairy, reduced added sugars and refined flours  He abstains from sweets and red/processed meats  The patient has T2D  He is using a CGM  The patient is a recent user, quit June 2022, and is doing well abstaining  Jaden Willoughby patient denies   depression/anxiety  Patient reports excellent social/emotional support  Patient attends group educational classes on cardiac risk factor modification  His exercise program will be progressed as tolerated to maintain RPE 4-6  The patient has the following personal goals he hopes to achieved by discharge: abstain from smoking, increased strength/stamina, reduced leg pain with walking       Pt will continue to be educated on lifestyle modifications and encouraged to supplement with a home exercise program to reach the following goals in the next 30 days: add more consistent home walking adding increased duration as tolerated, consistent home BG monitoring  August 16, 2022; (Turkmen speaking - wife translates)  Tristen Holland attends cardiac rehab 2x/wk  He tolerates 30 - 33 mins at 2 1 - 2 9 METs  He is tolerating progression of duration to 40 mins  Workloads will be progressed to maintain RPE 4-6 once 40  mins is completed comfortably  Resting BP is typically elevated  140/70 - 150/60 with appropriate response to exercise reaching 154/60- 188/72  NSR, LBBB on tele with freq  PACs, occ PVCs observed  On telemetry today, Juan David's rhythm switched to an apparent RBBB  RHR 75 - 85 ExHR 100 - 120  He has added home exercise 2-3 days/wk which includes walking one block  Tristen Holland reports Left ankle pain with walking  Reports the need to stop and shake out his leg throughout his walk  He was introduced to the TM today which he tolerated well - no ankle pain  No cardiac complaints  He is progressing toward wt loss goals with a loss of 2 pounds  Patient has been working on  dietary modifications with the goal of rare red/processed meats, low fat dairy, reduced added sugars and refined flours  He abstains from sweets and red/processed meats  The patient has T2D  He is using a CGM but reports that he lost his sensor  Follow up appointment tomorrow for a new sensor  The patient is a recent user, quit June 2022, and is doing well abstaining  Tristen Holland patient denies   depression/anxiety  Patient reports excellent social/emotional support  Patient attends group educational classes on cardiac risk factor modification  His exercise program will be progressed as tolerated to maintain RPE 4-6  The patient has the following personal goals he hopes to achieved by discharge: abstain from smoking, increased strength/stamina, reduced leg pain with walking       Pt will continue to be educated on lifestyle modifications and encouraged to supplement with a home exercise program to reach the following goals in the next 30 days: add more consistent home walking adding increased duration as tolerated, consistent home BG monitoring  July 21, 2022 -  Initial Care Plan: Today is Juan David's  initial evaluation to begin Cardiac Rehab post TAVR  The patient does not currently follow a formal exercise program at home  He has resumed light ADLs without fatigue and tolerating them well  Depression screening using the PHQ-9 interprets the patient's score 1-4 = Minimal Depression  TASHA-7 screening tool for anxiety suggests 5-9 = Mild anxiety  When addressed, the patient denies having depression/anxiety  Patient reports excellent social/emotional support  Information to begin using [employer] was provided as well as contact information for counseling through [facility]  PHQ-9 score will be reassessed in 30 days  The patient is a recent user, quit May 30, 2022, and is doing well abstaining  Patient admits to 100% medication compliance  Patient reports the following physical limitations: R lower leg pain with walking  States he needs to stop after walking one block  The patient completed an initial 6MWT  The patient walked  [phone] 66 feet/2  2METs  Resting  /60 dropping to 142/62 with exercise  Patient denied symptoms during exercise  Telemetry revealed NSR, LBBB, freq  PACs  Patient was counseled on exercise guidelines to achieve a minimum of 150 mins/wk of moderate intensity (RPE 4-6) exercise and encouraged to add 1-2 days of exercise on opposite days of cardiac rehab as tolerated  We discussed current dietary habits and goals of heart healthy eating for lipid management and diabetes management  The patient has T2D, Patient reported fasting , on Insulin   Patient's goals include: abstain from smoking, increased strength/stamina, reduced leg pain with walking  The patient's CAD risk factors include:  inactivity, obesity/overweight, hypertension, hyperlipidemia, diabetes and smoking    He was provided Swedish education packet focused on lifestyle modification/education specific to His needs including heart healthy eating, reading food labels, stress management, risk factor reduction, understanding heart disease and common heart medications and smoking and heart disease  Patient will attend 35 monitored exercise sessions, 3x/wk for 12-18 weeks beginning 2022  CT surgical f/u appt is scheduled for   Medication compliance: Yes   Comments: Pt reports to be compliant with medications  Fall Risk: Low   Comments: Ambulates with a steady gait with no assist device and Denies a fall in the past 6 months    EKG Interpretation: NSR, LBBB, freq   PACs, occ PVCs      EXERCISE ASSESSMENT and PLAN    Current Exercise Program in Rehab:       Frequency: 3 days/week Supplement with home exercise 2+ days/wk as tolerated       Minutes: 40-45       METS: 2 8 - 3 8           HR: 100 - 127   RPE: 4-5         Modalities: Treadmill, UBE, NuStep and Recumbent bike      Exercise Progression 30 Day Goals :    Frequency: 3 days/week of cardiac rehab     Supplement with home exercise 2+ days/wk as tolerated    Minutes: 45                          >150 mins/wk of moderate intensity exercise   METS: 2 9-4 0   HR: 100-130   RPE: 4-5   Modalities: Treadmill, UBE, NuStep and Recumbent bike    Strength trainin set, 15 reps   Modalities: Leg Press, Chest Press, Lateral Raise and Seated Row    Home Exercise: walking 2 blocks per day    Goals: Reduced dyspnea with physical activity  0-1/10, improved DASI score by 10%, Increase in exercise capacity by 40% - based on peak METs tolerated in cardiac rehab exercise session, Exercise 5 days/wk, >150mins/wk of moderate intensity exercise, Improved 6MWT distance by 10%, Resume ADLs with increased strength, Decrease sitting time and Start a walking program    Progression Toward Goals:  Pt is progressing and showing improvement  toward the following goals:  Reduced dyspnea with physical activity  0-1/10, improved DASI score by 10%, Increase in exercise capacity by 40% - based on peak METs tolerated in cardiac rehab exercise session, Exercise 5 days/wk, >150mins/wk of moderate intensity exercise, Improved 6MWT distance by 10%, Resume ADLs with increased strength, Decrease sitting time and Start a walking program   , Will continue to educate and progress as tolerated  Pt will continue to add home walking/riding his bike to supplement cardiac rehab with increasing duration as tolerated      Education: benefit of exercise for CAD risk factors, home exercise guidelines, AHA guidelines to achieve >150 mins/wk of moderate exercise, RPE scale and Education hand out: Risk Factors for Heart Disease   Plan:education on home exercise guidelines and home exercise 30+ mins 2 days opposite CR  Readiness to change: Action:  (Changing behavior)      NUTRITION ASSESSMENT AND PLAN    Weight control:    Starting weight: 184   Current weight:   191  Waist circumference:    Startin 25   Current:      Diabetes: T2D  A1c: 6 5    last measured: 22    Lipid management: Discussed diet and lipid management and Last lipid profile 22  Chol 191    HDL 49      Goals:reduced BMI to < 25, LDL <100, fasting BG , Improved Rate Your Plate score  >20, 8 1-2%  wt loss, choose lean meat (93-95%), eliminate processed meats, use low fat dairy, reduce cheese intake or use reduced-fat, eat 3 or more servings of whole grains a day, Eat 4-5 cups of fruits and vegetables daily, choose healthy snacks: light popcorn, plain pretzels, Increase intake of nuts and seeds, eliminate or choose low-fat sweets and daily saturated fat intake <7%/13g,    Progression Toward Goals: Pt is progressing and showing improvement  toward the following goals:  reduced BMI to < 25, LDL <100, fasting BG , Improved Rate Your Plate score  >24, 4 5-3%  wt loss, choose lean meat (93-95%), eliminate processed meats, use low fat dairy, reduce cheese intake or use reduced-fat, eat 3 or more servings of whole grains a day, Eat 4-5 cups of fruits and vegetables daily, choose healthy snacks: light popcorn, plain pretzels, Increase intake of nuts and seeds, eliminate or choose low-fat sweets and daily saturated fat intake <7%/13g   , Will continue to educate and progress as tolerated  Shin Eason will focus on increased hydration  Shin Eason will learn how to use his CGM    Education: heart healthy eating  low sodium diet  hydration  nutrition for  lipid management  nutrition for Improved BG control  exercise and diabetes management    Education handout: Reading Food Labels  Education handout: Heart Healthy Eating  Plan:   switch to low fat cheeses, replace refined grain bread with whole grain bread, replace unhealthy snacks with fruits & vegs, reduce red meat 1x/wk, switch to skim or 1% milk, eat fewer desserts and sweets, avoid processed foods, monitor home blood glucose, drink more water, replace sugar with stevia or truvia and keep added daily sugar <25g/day  Readiness to change: Action:  (Changing behavior)      PSYCHOSOCIAL ASSESSMENT AND PLAN    Emotional:  Depression assessment:  PHQ-9 = 2   1-4 = Minimal Depression            Anxiety measure:  TASHA-7 = 6   5-9 = Mild anxiety  Self-reported stress level:  1  Social support: Excellent and Patient reports excellent emotional/social support from family    Goals: Increased interest in doing things, increased energy, stop worrying, take time to relax and Feel less anxious    Progression Toward Goals: Pt is progressing and showing improvement  toward the following goals: Increased interest in doing things, increased energy, stop worrying, take time to relax and Feel less anxious  , Will continue to educate and progress as tolerated      Education: benefits of a positive support system and stress management techniques, Education handout: Stress and Your Health,   Relaxation,  Plan:  Exercise, Spend time outdoors, Enjoy family and Return to previous social activity  Readiness to change: Preparation:  (Getting ready to change)       OTHER CORE COMPONENTS     Tobacco:   Social History     Tobacco Use   Smoking Status Former   • Packs/day: 0 50   • Types: Cigarettes   • Quit date: 2022   • Years since quittin 5   Smokeless Tobacco Never   Tobacco Comments    started when he was about 24 yrs old; stopped smoking 3 wks ago       Tobacco Use Intervention:   Pt quit May 30, 2022   and has abstained,  8/15/22 - Pt continues to abstain  22 - Pt continues to abstain    Anginal Symptoms:  None   NTG use: No prescription    Blood pressure:    Restin/60 - 148/68    Exercise:  160/72 - 172/20    Goals: consistent BP < 130/80, reduced dietary sodium <2300mg, moderate intensity exercise >150 mins/wk, medication compliance and Abstain from smoking    Progression Toward Goals: Pt is progressing and showing improvement  toward the following goals:  consistent BP < 130/80, reduced dietary sodium <2300mg, moderate intensity exercise >150 mins/wk, medication compliance and Abstain from smoking   , Will continue to educate and progress as tolerated      Education:  low sodium diet and HTN and smoking and heart disease, Education handouts: Understanding Heart Disease  Plan: , Complete abstention from smoking, avoid places with second hand smoke, Avoid Processed foods, engage in regular exercise and check labels for sodium content  Readiness to change: Action:  (Changing behavior)

## 2022-12-23 ENCOUNTER — IN-CLINIC DEVICE VISIT (OUTPATIENT)
Dept: CARDIOLOGY CLINIC | Facility: CLINIC | Age: 77
End: 2022-12-23

## 2022-12-23 ENCOUNTER — TELEPHONE (OUTPATIENT)
Dept: CARDIOLOGY CLINIC | Facility: CLINIC | Age: 77
End: 2022-12-23

## 2022-12-23 DIAGNOSIS — Z95.0 CARDIAC PACEMAKER IN SITU: Primary | ICD-10-CM

## 2022-12-23 DIAGNOSIS — R07.89 CHEST DISCOMFORT: Primary | ICD-10-CM

## 2022-12-23 NOTE — TELEPHONE ENCOUNTER
Per Rah John PA-C, patient to start metoprolol 25mg bid for NSVT on device  Patient is coming to office today for instructions on how to use his monitor  Mamie Oliva will discuss metoprolol and I will send to his pharmacy

## 2022-12-23 NOTE — PROGRESS NOTES
Results for orders placed or performed in visit on 12/23/22   Cardiac EP device report    Narrative    MDT DC PM/MRI CONDITIONAL  DEVICE INTERROGATED IN THE [place] OFFICE: BATTERY VOLTAGE ADEQUATE-15 [address]  AP 3%  0%  ALL AVAILABLE LEAD PARAMETERS & TRENDS WITHIN NORMAL LIMITS  15 VHRS NOTED; LONGEST APPROX 7 SECS 26 BEATS @ 200 BPM  OTHER AVAIL EGRAMS SHOWING NSVT W/RATES 154-207 BPM  1 FAST A/V NOTED; 15 SECS LONG EGRAM PRESENTS AS SVT VS NSVT  PT STATES HE GETS CHEST DISCOMFORT IN MIDDLE OF CHEST & AT TIMES SOB WHICH HE FEELS IS WORSE THAN BEFORE DEVICE  DENIES DIZZINESS OR PASSING OUT  PT ON NORVASC, ASA, & PLAVIX  EF 55% (2022 ECHO)  KEVIN & NATALYA MADE AWARE  NO PROGRAMMING CHANGES MADE TO DEVICE PARAMETERS  NORMAL DEVICE FUNCTION   NC

## 2022-12-28 NOTE — PROGRESS NOTES
Cardiology Follow Up    Paco Henning  1945  [id number]  [address] [id number] [phone]    1  NSVT (nonsustained ventricular tachycardia)  metoprolol tartrate (LOPRESSOR) 50 mg tablet      2  Pacemaker        3  Shortness of breath  CBC and differential    Basic metabolic panel    Magnesium      4  S/P TAVR (transcatheter aortic valve replacement)        5  Coronary artery disease involving native coronary artery of native heart without angina pectoris        6  Hypertension, unspecified type  lisinopril (ZESTRIL) 20 mg tablet      7  Mixed hyperlipidemia        8  Type 2 diabetes mellitus with diabetic neuropathy, with long-term current use of insulin [facility])            Interval History:  Mr Paco Henning presents to our office today with complaints of palpitations and device interrogation  Shin Eason is accompanied by his wife  He is primarily [place] speaking  The office visit is conducted via a [facility] [place]   On 12/23/22 device interrogation showed AP 3%,  0%, 15 VHR noted, longest approx 7 sec, 26 beats at 200 BPM, NSVT 154-207 BPM,  1 A/V noted 15 seconds long  SVT versus NSVT  Patient admitted to chest discomfort and shortness of breath  Metoprolol tartrate 25mg Q12 hours started  Shin Eason  denies palpitations for the past week since starting Metoprolol Tartrate  He admits to shortness of breath which started one week after PPM placed  Shin Jenaro admits to worsening shortness of breath after exercising for 10 minutes at Cardiac rehabilitation with the need to rest   He denies lightheadedness or dizziness  Medical History   Primary Cardiologist Dr Germain Riojas   Severe AS KYRA 0 70cm2   6/21/22 sp TF TAVR #26mm Emery Hernán S3 Ultra valve via left femoral approach by Dr Reji Angel  New LBBB post TAVR  9/01/22 dual chamber PPM placed      CAD  Hypertension  Hyperlipidemia 11/10/22 , TG 75, HDL 65, LDL 62   DM2 HgbA1C 6 5 on 5/09/22  Asthma  Hepatitis C  Lymphadenopathy  Tobacco abuse denies smoking for the past 5 months           6/03/22 RHC/ LHC: First marginal lesion 50% stenosis, mid LAD 50% stenosis, RPDA 50% stenosis, RPDA to 50% stenosis, 1st diagonal 50% stenosis        Patient Active Problem List   Diagnosis   • Asthma   • Allergic rhinitis   • Bilateral hearing loss   • Chronic hepatitis C ([facility] )   • Type 2 diabetes mellitus with neurologic complication, with long-term current use of insulin ([place] [address] )   • Hyperlipidemia   • Essential hypertension   • Hypothyroidism   • Nicotine dependence   • Osteoarthritis of knee   • Vitamin B12 deficiency   • Adenomatous colon polyp   • Diverticulosis of large intestine   • Internal hemorrhoids   • Aortic stenosis   • Contrast media allergy   • S/P TAVR (transcatheter aortic valve replacement)   • Coronary artery disease   • Tachycardia-bradycardia ([facility] )   • Need for influenza vaccination   • Leg cramping     Past Medical History:   Diagnosis Date   • Arthritis    • Asthma    • Colon polyps    • Community acquired pneumonia     last assessed: 5/1/2014   • Diabetes mellitus ([place] [address] )    • Hemorrhagic prepatellar bursitis, left 10/21/2019   • Hepatitis C    • High cholesterol    • History of colonoscopy 2017   • Hypertension    • Lymphadenopathy, anterior cervical 04/17/2018   • Nephritis and nephropathy, not specified as acute or chronic, with other specified pathological lesion in kidney, in diseases classified elsewhere 3/26/2013   • Screening for colon cancer 05/01/2019   • Thoracic vertebral fracture ([facility] ) 06/11/2014     Social History     Socioeconomic History   • Marital status: /Civil Union     Spouse name: Not on file   • Number of children: Not on file   • Years of education: Not on file   • Highest education level: Not on file   Occupational History   • Occupation: retired    Tobacco Use   • Smoking status: Former     Packs/day: 0 50 Types: Cigarettes     Quit date: 2022     Years since quittin 5   • Smokeless tobacco: Never   • Tobacco comments:     started when he was about 24 yrs old; stopped smoking 3 wks ago   Vaping Use   • Vaping Use: Never used   Substance and Sexual Activity   • Alcohol use: No   • Drug use: No   • Sexual activity: Not Currently   Other Topics Concern   • Not on file   Social History Narrative   • Not on file     Social Determinants of Health     Financial Resource Strain: Low Risk    • Difficulty of Paying Living Expenses: Not hard at all   Food Insecurity: No Food Insecurity   • Worried About 3085 amSTATZ in the Last Year: Never true   • Ran Out of Food in the Last Year: Never true   Transportation Needs: No Transportation Needs   • Lack of Transportation (Medical): No   • Lack of Transportation (Non-Medical): No   Physical Activity: Unknown   • Days of Exercise per Week: Not on file   • Minutes of Exercise per Session: 60 min   Stress: No Stress Concern Present   • Feeling of Stress : Not at all   Social Connections:  Moderately Isolated   • Frequency of Communication with Friends and Family: More than three times a week   • Frequency of Social Gatherings with Friends and Family: More than three times a week   • Attends Yazdanism Services: Never   • Active Member of Clubs or Organizations: No   • Attends Club or Organization Meetings: Never   • Marital Status:    Intimate Partner Violence: Not on file   Housing Stability: Low Risk    • Unable to Pay for Housing in the Last Year: No   • Number of Places Lived in the Last Year: 1   • Unstable Housing in the Last Year: No      Family History   Problem Relation Age of Onset   • Heart attack Family         at age 80   • No Known Problems Mother    • No Known Problems Father    • Diabetes Sister    • Diabetes Brother      Past Surgical History:   Procedure Laterality Date   • CARDIAC CATHETERIZATION N/A 6/3/2022    Procedure: Cardiac RHC/LHC; Surgeon: Blanche Fraire MD;  [facility]: [facility]; Service: Cardiology   • CARDIAC CATHETERIZATION N/A 6/21/2022    Procedure: CARDIAC TAVR;  Surgeon: Leo Macdonald MD;  Location: [facility] MA[facility];  Service: Cardiology   • CARDIAC ELECTROPHYSIOLOGY PROCEDURE N/A 9/1/2022    Procedure: Cardiac pacer implant ; DC PPM;  Surgeon: Mikie Gomez DO;  Location: [facility]; Service: Cardiology   • COLONOSCOPY     • COLONOSCOPY W/ POLYPECTOMY     • LITHOTRIPSY     • MULTIPLE TOOTH EXTRACTIONS     • ME COLONOSCOPY FLX DX W/COLLJ SPEC WHEN PFRMD N/A 5/17/2018    Procedure: COLONOSCOPY;  Surgeon: Junior Lewis MD;  Location: [facility] GI LAB;   Service: Gastroenterology   • ME REPLACE AORTIC VALVE OPENFEMORAL ARTERY APPROACH N/A 6/21/2022    Procedure: REPLACEMENT AORTIC VALVE TRANSCATHETER (TAVR) TRANSFEMORAL W/ 26MM QUINN RONNI S3 ULTRA VALVE(ACCESS ON LEFT) SAULO;  Surgeon: Jessica Lea MD;  Location: [facility];  Service: Cardiac Surgery       Current Outpatient Medications:   •  acetylcysteine (MUCOMYST) 10 % nebulizer solution, Take 4 mL by nebulization 3 (three) times a day, Disp: 360 mL, Rfl: 2  •  Advair -21 MCG/ACT inhaler, TAKE 2 PUFFS BY MOUTH TWICE A DAY, Disp: 36 g, Rfl: 6  •  albuterol (Ventolin HFA) 90 mcg/act inhaler, Inhale 2 puffs every 4 (four) hours as needed for wheezing or shortness of breath, Disp: 18 g, Rfl: 2  •  Alcohol Swabs (ALCOHOL PADS) 70 % PADS, , Disp: , Rfl:   •  amLODIPine (NORVASC) 5 mg tablet, Take 1 tablet (5 mg total) by mouth daily, Disp: 90 tablet, Rfl: 1  •  Aspirin Low Dose 81 MG chewable tablet, CHEW 1 TABLET BY MOUTH DAILY, Disp: 90 tablet, Rfl: 3  •  Blood Glucose Monitoring Suppl (OneTouch Verio) w/Device KIT, Check blood glucose three times daily before each meal, Disp: 1 kit, Rfl: 0  •  cholecalciferol (VITAMIN D3) 1,000 units tablet, Take 2 tablets (2,000 Units total) by mouth daily, Disp: 180 tablet, Rfl: 5  •  clopidogrel (PLAVIX) 75 mg tablet, Take 1 tablet (75 mg total) by mouth daily, Disp: 90 tablet, Rfl: 3  •  Continuous Blood Gluc  (FreeStyle Cristofer 2 [place]) POWER, Use 1 each continuous, Disp: 1 each, Rfl: 0  •  Continuous Blood Gluc Sensor (FreeStyle Cristofer 2 Sensor) MISC, Use 1 each every 14 (fourteen) days, Disp: 6 each, Rfl: 3  •  fluticasone (FLONASE) 50 mcg/act nasal spray, 2 sprays into each nostril daily, Disp: 2 Bottle, Rfl: 2  •  hydrochlorothiazide (HYDRODIURIL) 25 mg tablet, TAKE 1 TABLET (25 MG TOTAL) BY MOUTH DAILY  , Disp: 90 tablet, Rfl: 3  •  Insulin Pen Needle (Pen Needles) 31G X 8 MM MISC, Use daily, Disp: 300 each, Rfl: 3  •  Lancets (FREESTYLE) lancets, by Other route as needed (As needed), Disp: 100 each, Rfl: 3  •  Lantus SoloStar 100 units/mL SOPN, INJECT 0 18 ML (18 UNITS TOTAL) UNDER THE SKIN DAILY AT BEDTIME, Disp: 15 mL, Rfl: 3  •  levothyroxine 137 mcg tablet, TAKE 1 TABLET BY MOUTH EVERY DAY, Disp: 90 tablet, Rfl: 3  •  lisinopril (ZESTRIL) 20 mg tablet, Take 2 tablets (40 mg total) by mouth daily, Disp: 60 tablet, Rfl: 2  •  metFORMIN (GLUCOPHAGE) 850 mg tablet, TAKE 1 TABLET (850 MG TOTAL) BY MOUTH 2 (TWO) TIMES A DAY WITH MEALS, Disp: 180 tablet, Rfl: 3  •  methocarbamol (Robaxin-750) 750 mg tablet, Take 1 tablet (750 mg total) by mouth every 6 (six) hours as needed for muscle spasms, Disp: 60 tablet, Rfl: 0  •  metoprolol tartrate (LOPRESSOR) 25 mg tablet, Take 1 tablet (25 mg total) by mouth every 12 (twelve) hours, Disp: 60 tablet, Rfl: 11  •  montelukast (SINGULAIR) 10 mg tablet, TAKE 1 TABLET BY MOUTH EVERY DAY, Disp: 90 tablet, Rfl: 3  •  NovoLOG FlexPen 100 units/mL injection pen, Inject 5 units with breakfast and with dinner, Disp: 12 mL, Rfl: 3  •  rosuvastatin (CRESTOR) 40 MG tablet, TAKE 1 TABLET BY MOUTH EVERY DAY, Disp: 90 tablet, Rfl: 3  Allergies   Allergen Reactions   • Iv Contrast [Iodinated Contrast Media] Rash     Patient states he had a severe reaction approximately 10 years ago , states he had a rash, itchy and he remembers a bunch of people pounding on chest and being surrounded by multiple doctors  Labs:  Appointment on 11/10/2022   Component Date Value   • Sodium 11/10/2022 139    • Potassium 11/10/2022 4 2    • Chloride 11/10/2022 106    • CO2 11/10/2022 27    • ANION GAP 11/10/2022 6    • BUN 11/10/2022 16    • Creatinine 11/10/2022 0 91    • Glucose, Fasting 11/10/2022 133 (H)    • Calcium 11/10/2022 9 2    • eGFR 11/10/2022 82    • Magnesium 11/10/2022 2 0    • Total CK 11/10/2022 170    • CRP 11/10/2022 <3 0    • Sed Rate 11/10/2022 15    • TSH 3RD GENERATON 11/10/2022 4 760 (H)    • Free T4 11/10/2022 1 17    • Cholesterol 11/10/2022 142    • Triglycerides 11/10/2022 75    • HDL, Direct 11/10/2022 65    • LDL Calculated 11/10/2022 62    • CK-MB Index 11/10/2022 2 1    • CK-MB 11/10/2022 3 6    Office Visit on 11/08/2022   Component Date Value   • Hemoglobin A1C 11/08/2022 7 6 (A)      Imaging: VAS lower limb arterial duplex, complete bilateral    Result Date: 12/15/2022  Narrative:  [facility] REPORT CLINICAL: Indications: Patient presents with pain in the right lower extremity after walking 3 blocks  Operative History: 2022-06-21 TAVR 2022-06-03 RHC/ catheterization Risk Factors The patient has history of HTN, Diabetes (IDDM), Hyperlipidemia and previous smoking (quit <1yr ago)  Clinical Right Pressure:  124/ mm Hg, Left Pressure:  121/ mm Hg    FINDINGS:  Segment                Right       Left                                          PSV (cm/s)  Impression  PSV (cm/s)  Common Femoral Artery         118  50-75%             415  Prox Profunda                  63                      66  Prox SFA                       86                      74  Mid SFA                        84                      61  Dist SFA                       97                      35  Proximal Pop                  138                      54  Distal Pop                     64                      51 Tibioperoneal                  57                      45  Dist Post Tibial               38                      37  Dist  Ant  Tibial              48                      50     CONCLUSION: Impression:  RIGHT LOWER LIMB: No evidence of significant lower extremity arterial occlusive disease  Ankle/Brachial index:  1 15 which is within normal limits  PVR/ PPG tracings are dampened  Metatarsal pressure of 126 mmHg  Great toe pressure of 128 mmHg, which is within the healing range  LEFT LOWER LIMB: Diffuse disease noted throughout the femoral-popliteal arteries with a 50-75% stenosis in the common femoral artery  Ankle/Brachial index: 0 92 which is within normal limits  PVR/ PPG tracings are  dampened  Metatarsal pressure of 141 mmHg  Great toe pressure of 76 mmHg, which is within the healing range  No previous study to compare  SIGNATURE: Electronically Signed by: Frederic Keating on 2022-12-15 12:05:14 PM    Cardiac EP device report    Result Date: 12/23/2022  Narrative: MDT DC PM/MRI CONDITIONAL DEVICE INTERROGATED IN THE [place] OFFICE: BATTERY VOLTAGE ADEQUATE-15 YRS  AP 3%  0%  ALL AVAILABLE LEAD PARAMETERS & TRENDS WITHIN NORMAL LIMITS  15 VHRS NOTED; LONGEST APPROX 7 SECS 26 BEATS @ 200 BPM  OTHER AVAIL EGRAMS SHOWING NSVT W/RATES 154-207 BPM  1 FAST A/V NOTED; 15 SECS LONG EGRAM PRESENTS AS SVT VS NSVT  PT STATES HE GETS CHEST DISCOMFORT IN MIDDLE OF CHEST & AT TIMES SOB WHICH HE FEELS IS WORSE THAN BEFORE DEVICE  DENIES DIZZINESS OR PASSING OUT  PT ON NORVASC, ASA, & PLAVIX  EF 55% (2022 ECHO)  KEVIN & NATALYA MADE AWARE  NO PROGRAMMING CHANGES MADE TO DEVICE PARAMETERS  NORMAL DEVICE FUNCTION  NC       Review of Systems:  Review of Systems   Respiratory: Positive for shortness of breath  All other systems reviewed and are negative  Physical Exam:  Physical Exam  Vitals reviewed  Constitutional:       Appearance: He is well-developed  HENT:      Head: Normocephalic     Neck:      Vascular: No JVD    Cardiovascular:      Rate and Rhythm: Tachycardia present  Pulmonary:      Effort: Pulmonary effort is normal       Breath sounds: Normal breath sounds  Abdominal:      General: Bowel sounds are normal       Palpations: Abdomen is soft  Musculoskeletal:         General: Normal range of motion  Cervical back: Normal range of motion  Right lower leg: No edema  Left lower leg: No edema  Skin:     General: Skin is warm and dry  Capillary Refill: Capillary refill takes less than 2 seconds  Neurological:      General: No focal deficit present  Mental Status: He is alert and oriented to person, place, and time  Psychiatric:         Mood and Affect: Mood normal          Behavior: Behavior normal          Discussion/Summary:  1  NSVT vs SVT on device interrogation  2/23/22 device interrogation showed AP 3%,  0%, 15 VHR noted, longest approx 7 sec, 26 beats at 200 BPM, NSVT 154-207 BPM,  1 A/V noted 15 seconds long  SVT versus NSVT  Metoprolol tartrate 25mg Q12 hours started  HR continues elevated in the office today  Crease metoprolol titrate to 50 mg every 12 hours  Decrease lisinopril from 40 mg daily to 20 mg daily  BMP CBC magnesium to be done in the near future  He will follow-up with Dr Criss Parkinson next week  2  9/01/22 dual chamber PPM placed  2/23/22 device interrogation showed AP 3%,  0%, 15 VHR noted, longest approx 7 sec, 26 beats at 200 BPM, NSVT 154-207 BPM,  1 A/V noted 15 seconds long  SVT versus NSVT  Continue metoprolol tartrate increased from 25 mg every 12 hours to 50 mg every 12 hours  3  Shortness of breath with exertion possibly multifactorial on PE eu volemic, HR elevated increase metoprolol from 25 mg Q12 hours to 50 mg every 12 hours  CBC BMP and mag to be done in the near future  Consider follow up limited TTE with next office visit    Pre schedule  Follow-up with Dr Trina Boone on 1/09/22,   4  6/21/22 sp TF TAVR #26mm Emery Hernán S3 Ultra valve via left femoral approach by Dr Santana Soto  Continue on amlodipine 5 mg daily, hydrochlorothiazide 25 mg daily, metoprolol tartrate 50 mg every 12 hours, 5 mg daily, aspirin 81 mg daily, Crestor 40 mg daily, decrease lisinopril from 40 mg daily to 20 mg daily  5  CAD 6/03/22 RHC/ LHC: First marginal lesion 50% stenosis, mid LAD 50% stenosis, RPDA 50% stenosis, RPDA to 50% stenosis, 1st diagonal 50% stenosis  Continue on amlodipine 5 mg daily, hydrochlorothiazide 25 mg daily, metoprolol tartrate 50 mg every 12 hours, amlodipine 5 mg daily, aspirin 81 mg daily, Crestor 40 mg daily, decrease lisinopril from 40 mg daily to 20 mg daily  6  Hypertension RUE sitting 130/60 Continue on amlodipine 5 mg daily, hydrochlorothiazide 25 mg daily, metoprolol tartrate 50 mg every 12 hours, amlodipine 5 mg daily, decrease lisinopril from 40 mg daily to 20 mg daily 2gm sodium diet   7  Hyperlipidemia 11/10/22 , TG 75, HDL 65, LDL 62   8   DM2 HgbA1C 6 5 on 5/09/22

## 2022-12-29 ENCOUNTER — OFFICE VISIT (OUTPATIENT)
Dept: CARDIOLOGY CLINIC | Facility: CLINIC | Age: 77
End: 2022-12-29

## 2022-12-29 VITALS
BODY MASS INDEX: 28.29 KG/M2 | SYSTOLIC BLOOD PRESSURE: 142 MMHG | HEIGHT: 69 IN | HEART RATE: 102 BPM | WEIGHT: 191 LBS | DIASTOLIC BLOOD PRESSURE: 78 MMHG | OXYGEN SATURATION: 97 %

## 2022-12-29 DIAGNOSIS — I10 HYPERTENSION, UNSPECIFIED TYPE: ICD-10-CM

## 2022-12-29 DIAGNOSIS — E78.2 MIXED HYPERLIPIDEMIA: ICD-10-CM

## 2022-12-29 DIAGNOSIS — Z95.0 PACEMAKER: ICD-10-CM

## 2022-12-29 DIAGNOSIS — I47.29 NSVT (NONSUSTAINED VENTRICULAR TACHYCARDIA): Primary | ICD-10-CM

## 2022-12-29 DIAGNOSIS — E11.40 TYPE 2 DIABETES MELLITUS WITH DIABETIC NEUROPATHY, WITH LONG-TERM CURRENT USE OF INSULIN (HCC): ICD-10-CM

## 2022-12-29 DIAGNOSIS — R06.02 SHORTNESS OF BREATH: ICD-10-CM

## 2022-12-29 DIAGNOSIS — Z95.2 S/P TAVR (TRANSCATHETER AORTIC VALVE REPLACEMENT): ICD-10-CM

## 2022-12-29 DIAGNOSIS — Z79.4 TYPE 2 DIABETES MELLITUS WITH DIABETIC NEUROPATHY, WITH LONG-TERM CURRENT USE OF INSULIN (HCC): ICD-10-CM

## 2022-12-29 DIAGNOSIS — I25.10 CORONARY ARTERY DISEASE INVOLVING NATIVE CORONARY ARTERY OF NATIVE HEART WITHOUT ANGINA PECTORIS: ICD-10-CM

## 2022-12-29 RX ORDER — LISINOPRIL 20 MG/1
20 TABLET ORAL DAILY
Qty: 90 TABLET | Refills: 3 | Status: SHIPPED | OUTPATIENT
Start: 2022-12-29

## 2022-12-29 RX ORDER — METOPROLOL TARTRATE 50 MG/1
50 TABLET, FILM COATED ORAL EVERY 12 HOURS SCHEDULED
Qty: 180 TABLET | Refills: 3 | Status: SHIPPED | OUTPATIENT
Start: 2022-12-29

## 2023-01-03 ENCOUNTER — APPOINTMENT (OUTPATIENT)
Dept: CARDIAC REHAB | Age: 78
End: 2023-01-03

## 2023-01-09 ENCOUNTER — CLINICAL SUPPORT (OUTPATIENT)
Dept: CARDIAC REHAB | Age: 78
End: 2023-01-09

## 2023-01-09 DIAGNOSIS — Z95.2 S/P TAVR (TRANSCATHETER AORTIC VALVE REPLACEMENT): ICD-10-CM

## 2023-01-10 ENCOUNTER — CLINICAL SUPPORT (OUTPATIENT)
Dept: CARDIAC REHAB | Age: 78
End: 2023-01-10

## 2023-01-10 DIAGNOSIS — Z95.2 S/P TAVR (TRANSCATHETER AORTIC VALVE REPLACEMENT): Primary | ICD-10-CM

## 2023-01-11 NOTE — PROGRESS NOTES
Cardiac Rehabilitation Plan of Care   Discharge          Today's date: 2023   # of Exercise Sessions Completed: 36  Patient name: Mack Arriola      : 1945  Age: 78 y o  MRN: [id number]  Referring Physician: Abimael Pappas PA-C  Cardiologist: Bianca Gayle MD (fellow)  Provider: Mary Jones  Clinician: Maria Ramirez MS, CEP        Dx:   Encounter Diagnosis   Name Primary? • S/P TAVR (transcatheter aortic valve replacement) Yes     Date of onset: 22      SUMMARY OF PROGRESS:   : [email or web address] interpretor was utilized for discharge  Discharge note for Mack Arriola  He had 18 2% improvement in functional capacity increasing His max METs in the 6MWT from 2 2 to 2 6  6 MWT distance by 25 3% walking 1090ft  His exercise tolerance (max METs in tolerated in cardiac rehab) increased by 44 4%  He had a 25 8% improvement in the [employer] activity estimated MET level with ADLs and physical activity  His [facility] QOL decreased by 64 7%  PHQ-9 score increased from 2 to 12  TASAH-7 increased from 6 to 16  His weight increased by 5 pounds  Waist circumference decreased by  5 inches  Rate Your Plate score slightly decreased from 59 to 52  Vickie Ricny has been supplementing CR sessions with home exercise which includes outdoor walking, weather permitting  He reports increased stamina, strength and reduced SOB with activity  Juan David tolerates 40-45 mins at 2 9 - 3 6 METs plus wt training  NSR, occ pacing and occ PVCs on telemetry  All group education classes on cardiac risk factor modification were attended by the patient  Discharge plans include walking and riding bicycle outdoors, progressing his duration up to 150 mins/wk as tolerated  Encouraged Pt to continue exercise  Frequency: 4-6 days/wk, Intensity: RPE 4-5, Time: 40-50 mins daily, 150-200 mins/wk  Pt was encouraged to continue eating heart healthy    Pt was encouraged to remain compliant with medications and f/u with cardiologist with any cardiac symptoms, medication management and updated lipid profile  12/22: Claudeen Poplar attends CR 2x/wk  He tolerates 40-45 mins at 2 9-3 6 METs plus weight training  Limited by knee pain and leg caludication, no cardiac complaints  He has been referred to vascular surgery  He also has OV scheduled for tomorrow 12/23 for a device check  Workloads will be progressed to maintain RPE 4-6  Resting //80 with appropriate response to exercise reaching 152//84  NSR, LBBB on tele with occ  PACs, occ PVCs, occ couplets, occ NSVT and rare pacing observed  He has added home exercise 2-3 days/wk which includes walking 1 blocks - limited by his knee pain  Patient has been working on dietary modifications with the goal of rare red/processed meats, low fat dairy, reduced added sugars and refined flours  He abstains from red/processed meats and eats mostly chicken and fish  The patient has T2D  He has been using his CGM  Claudeen Poplar quit smoking June 2022, and is doing well abstaining  Claudsoni Rogers patient denies depression/anxiety  Patient reports excellent social/emotional support  He was provided written education on  cardiac risk factor modification and heart healthy eating  His exercise program will be progressed as tolerated to maintain RPE 4-6  The patient has the following personal goals he hopes to achieved by discharge: abstain from smoking, increased strength/stamina, reduced leg pain with walking  Pt will continue to be educated on lifestyle modifications and encouraged to supplement with a home exercise program to reach the following goals in the next 30 days: check into [employer] through insurance company  He plans to continue his exercise by walking outdoors and riding his bike, when weather permits  11/28: [employer]  used for reassessment/education today  Claudeen Poplar attends CR 2x/wk  he tolerates 30-45 mins at 2 8-3 8 METs  Wt training was introduced  Limited by knee pain, no cardiac complaints  Workloads will be progressed to maintain RPE 4-6  Resting /60 - 148/68 with appropriate response to exercise reaching 160/72 - 172/20  NSR, LBBB on tele with occ  PACs, occ PVCs, occ NSVT observed  He has added home exercise 2-3 days/wk which includes walking 1-2 blocks - about 6 mins again limited by his knee pain  Patient has been working on dietary modifications with the goal of rare red/processed meats, low fat dairy, reduced added sugars and refined flours  He abstains from red/processed meats and eats mostly chicken and fish  The patient has T2D  He has not been using his CGM  Reports he has an appointment in 2 weeks to learn how to use it  Reports FBG avg 170  Vickie Connell quit smoking June 2022, and is doing well abstaining  Vickie Connell patient denies depression/anxiety  Patient reports excellent social/emotional support  He was provided written education on  cardiac risk factor modification and heart healthy eating  His exercise program will be progressed as tolerated to maintain RPE 4-6  The patient has the following personal goals he hopes to achieved by discharge: abstain from smoking, increased strength/stamina, reduced leg pain with walking  Pt will continue to be educated on lifestyle modifications and encouraged to supplement with a home exercise program to reach the following goals in the next 30 days: add more consistent home walking and riding his bicycle, increased duration as tolerated, consistent home BG monitoring  Will continue to monitor  11/4/22: Pt wife was unavailable for translation  He has progressed his duration and now tolerates 45 mins at 2 8-3 1 METs  Workloads will be progressed to maintain RPE 4-6 once 40 mins is completed comfortably  Resting //74 with appropriate response to exercise reaching 156//62  NSR, LBBB on tele with occ  PACs, occ PVCs, occ NSVT observed  Runs of NSVT were noted on past zio patch  RHR 77-90 ExHR 110-120   He has added home exercise 2-3 days/wk which includes walking 1-2 blocks, however he states that he has not been completing it recently  Recent c/o of R knee discomfort  He states he has had fluid removed from that knee 3x in the past   No cardiac complaints  Recent c/o of phlegm, he reports he has had to increase use of his inhaler  Patient has been working on dietary modifications with the goal of rare red/processed meats, low fat dairy, reduced added sugars and refined flours  He abstains from red/processed meats  The patient has T2D  He is using a CGM  BS today was 140 The patient is a recent user, quit June 2022, and is doing well abstaining  P[employer]ure patient denies depression/anxiety  Patient reports excellent social/emotional support  Patient attends group educational classes on cardiac risk factor modification  His exercise program will be progressed as tolerated to maintain RPE 4-6  The patient has the following personal goals he hopes to achieved by discharge: abstain from smoking, increased strength/stamina, reduced leg pain with walking  Pt will continue to be educated on lifestyle modifications and encouraged to supplement with a home exercise program to reach the following goals in the next 30 days: add more consistent home walking, increased duration as tolerated, consistent home BG monitoring  Will continue to monitor  The upright bike will be discontinued in CR d/t knee pain  10/7/22: (Cape Verdean speaking - wife translates)  He has progressed his duration and now tolerates 40 mins at 2 5- 3 1 METs  Progression will continue to achieve duration of 40 mins  Workloads will be progressed to maintain RPE 4-6 once 40 mins is completed comfortably  Resting //64 with appropriate response to exercise reaching 156//58  NSR, LBBB on tele with occ  PACs, occ PVCs observed  RHR 80-83 ExHR 111-127   He has added home exercise 2-3 days/wk which includes walking 2 blocks, however he states that he has not been completing it recently  Elyse Hope reports Left ankle pain with walking  Reports the need to stop and shake out his leg throughout his walk  Recent c/o of L knee discomfort  No cardiac complaints  No medication changes  States 100% medication compliance  He gained 2 lbs in last 30 days, weighing 182  He states he has been consuming more candy  Encouraged him to reduce his consumption  Patient has been working on dietary modifications with the goal of rare red/processed meats, low fat dairy, reduced added sugars and refined flours  He abstains from red/processed meats  The patient has T2D  He is using a CGM  However he did not have his CGM on today  The patient is a recent user, quit June 2022, and is doing well abstaining  Elyse Hope patient denies depression/anxiety  Patient reports excellent social/emotional support  Patient attends group educational classes on cardiac risk factor modification  His exercise program will be progressed as tolerated to maintain RPE 4-6  The patient has the following personal goals he hopes to achieved by discharge: abstain from smoking, increased strength/stamina, reduced leg pain with walking  Pt will continue to be educated on lifestyle modifications and encouraged to supplement with a home exercise program to reach the following goals in the next 30 days: add more consistent home walking and riding his bicycle, increased duration as tolerated, consistent home BG monitoring  9/12/22: (Maldivian speaking - wife translates)   Elyse Hope attended only 3 sessions since his last reassessment done on August 16  He had a pacemaker implant performed on Sept 1 and will be out until medically cleared - possibly Sept 19     He has progressed his duration and now tolerates 35-39 mins at 2 6-  3 7 METs  Progression will continue to achieve duration of 40 mins  Workloads will be progressed to maintain RPE 4-6 once 40  mins is completed comfortably     Resting BP is typically elevated but has been improved since last note  134/64 - 142/70 with appropriate response to exercise reaching 160/62  [facility]  PACs, occ PVCs observed  On telemetry today, Juan David's rhythm switched to an apparent RBBB  RHR 75 - 85 ExHR 100 - 120  He has added home exercise 2-3 days/wk which includes walking one block  Tl Mederos reports Left ankle pain with walking  Reports the need to stop and shake out his leg throughout his walk  No cardiac complaints  He is progressing toward wt loss goals with a loss of 2 pounds  Patient has been working on  dietary modifications with the goal of rare red/processed meats, low fat dairy, reduced added sugars and refined flours  He abstains from sweets and red/processed meats  The patient has T2D  He is using a CGM  The patient is a recent user, quit June 2022, and is doing well abstaining  Tl Mederos patient denies   depression/anxiety  Patient reports excellent social/emotional support  Patient attends group educational classes on cardiac risk factor modification  His exercise program will be progressed as tolerated to maintain RPE 4-6  The patient has the following personal goals he hopes to achieved by discharge: abstain from smoking, increased strength/stamina, reduced leg pain with walking      Pt will continue to be educated on lifestyle modifications and encouraged to supplement with a home exercise program to reach the following goals in the next 30 days: add more consistent home walking adding increased duration as tolerated, consistent home BG monitoring  August 16, 2022; (Malawian speaking - wife translates)  Tl Mederos attends cardiac rehab 2x/wk  He tolerates 30 - 33 mins at 2 1 - 2 9 METs  He is tolerating progression of duration to 40 mins  Workloads will be progressed to maintain RPE 4-6 once 40  mins is completed comfortably  Resting BP is typically elevated  140/70 - 150/60 with appropriate response to exercise reaching 154/60- 188/72    NSR, LBBB on tele with [facility]  PACs, occ PVCs observed  On telemetry today, Juan David's rhythm switched to an apparent RBBB  RHR 75 - 85 ExHR 100 - 120  He has added home exercise 2-3 days/wk which includes walking one block  Tiara Starch reports Left ankle pain with walking  Reports the need to stop and shake out his leg throughout his walk  He was introduced to the TM today which he tolerated well - no ankle pain  No cardiac complaints  He is progressing toward wt loss goals with a loss of 2 pounds  Patient has been working on  dietary modifications with the goal of rare red/processed meats, low fat dairy, reduced added sugars and refined flours  He abstains from sweets and red/processed meats  The patient has T2D  He is using a CGM but reports that he lost his sensor  Follow up appointment tomorrow for a new sensor  The patient is a recent user, quit June 2022, and is doing well abstaining  Tiara Starch patient denies   depression/anxiety  Patient reports excellent social/emotional support  Patient attends group educational classes on cardiac risk factor modification  His exercise program will be progressed as tolerated to maintain RPE 4-6  The patient has the following personal goals he hopes to achieved by discharge: abstain from smoking, increased strength/stamina, reduced leg pain with walking      Pt will continue to be educated on lifestyle modifications and encouraged to supplement with a home exercise program to reach the following goals in the next 30 days: add more consistent home walking adding increased duration as tolerated, consistent home BG monitoring  July 21, 2022 -  Initial Care Plan: Today is Juan David's  initial evaluation to begin Cardiac Rehab post TAVR  The patient does not currently follow a formal exercise program at home  He has resumed light ADLs without fatigue and tolerating them well  Depression screening using the PHQ-9 interprets the patient's score 1-4 = Minimal Depression   TASHA-7 screening tool for anxiety suggests 5-9 = Mild anxiety  When addressed, the patient denies having depression/anxiety  Patient reports excellent social/emotional support  Information to begin using Sade Thomson was provided as well as contact information for counseling through [employer]  PHQ-9 score will be reassessed in 30 days  The patient is a recent user, quit May 30, 2022, and is doing well abstaining  Patient admits to 100% medication compliance  Patient reports the following physical limitations: R lower leg pain with walking  States he needs to stop after walking one block  The patient completed an initial 6MWT  The patient walked  [phone] 66 feet/2  2METs  Resting  /60 dropping to 142/62 with exercise  Patient denied symptoms during exercise  Telemetry revealed NSR, LBBB, freq  PACs  Patient was counseled on exercise guidelines to achieve a minimum of 150 mins/wk of moderate intensity (RPE 4-6) exercise and encouraged to add 1-2 days of exercise on opposite days of cardiac rehab as tolerated  We discussed current dietary habits and goals of heart healthy eating for lipid management and diabetes management  The patient has T2D, Patient reported fasting , on Insulin   Patient's goals include: abstain from smoking, increased strength/stamina, reduced leg pain with walking  The patient's CAD risk factors include:  inactivity, obesity/overweight, hypertension, hyperlipidemia, diabetes and smoking  He was provided [place]otho education packet focused on lifestyle modification/education specific to His needs including heart healthy eating, reading food labels, stress management, risk factor reduction, understanding heart disease and common heart medications and smoking and heart disease  Patient will attend 35 monitored exercise sessions, 3x/wk for 12-18 weeks beginning July 29, 2022  CT surgical f/u appt is scheduled for July 28          Medication compliance: Yes   Comments: Pt reports to be compliant with medications  Fall Risk: Low   Comments: Ambulates with a steady gait with no assist device and Denies a fall in the past 6 months    EKG Interpretation: NSR, LBBB, freq  PACs, occ PVCs      EXERCISE ASSESSMENT and PLAN    Current Exercise Program in Rehab:       Frequency: 3 days/week Supplement with home exercise 2+ days/wk as tolerated       Minutes: 40-45       METS: 2 8 - 3 8           HR: 100 - 127   RPE: 4-5         Modalities: Treadmill, UBE, NuStep and Recumbent bike        Strength trainin set, 15 reps   Modalities: Leg Press, Chest Press, Lateral Raise and Seated Row    Home Exercise: walking 2 blocks per day    Goals: Reduced dyspnea with physical activity  0-110, improved DASI score by 10%, Increase in exercise capacity by 40% - based on peak METs tolerated in cardiac rehab exercise session, Exercise 5 days/wk, >150mins/wk of moderate intensity exercise, Improved 6MWT distance by 10%, Resume ADLs with increased strength, Decrease sitting time and Start a walking program    Progression Toward Goals:  Goals met: Reduced dyspnea with physical activity  0-110, improved DASI score by 10%, Increase in exercise capacity by 40% - based on peak METs tolerated in cardiac rehab exercise session, Exercise 5 days/wk, >150mins/wk of moderate intensity exercise, Improved 6MWT distance by 10%, Resume ADLs with increased strength, Decrease sitting time and Start a walking program , Patient will be encouraged to focus on lifestyle modifications following discharge  Pt will continue to add home walking/riding his bike to supplement cardiac rehab with increasing duration as tolerated      Education: benefit of exercise for CAD risk factors, home exercise guidelines, AHA guidelines to achieve >150 mins/wk of moderate exercise, RPE scale and Education hand out: Risk Factors for Heart Disease   Plan:education on home exercise guidelines and home exercise 30+ mins 2 days opposite CR  Readiness to change: Maintenance: (Maintaining the behavior change)      NUTRITION ASSESSMENT AND PLAN    Weight control:    Starting weight: 184   Current weight:   188  9  Waist circumference:    Startin 25   Current:      Diabetes: T2D  A1c: 6 5    last measured: 22    Lipid management: Discussed diet and lipid management and Last lipid profile 22  Chol 191    HDL 49      Goals:reduced BMI to < 25, LDL <100, fasting BG , Improved Rate Your Plate score  >07, 3 7-0%  wt loss, choose lean meat (93-95%), eliminate processed meats, use low fat dairy, reduce cheese intake or use reduced-fat, eat 3 or more servings of whole grains a day, Eat 4-5 cups of fruits and vegetables daily, choose healthy snacks: light popcorn, plain pretzels, Increase intake of nuts and seeds, eliminate or choose low-fat sweets and daily saturated fat intake <7%/13g,    Progression Toward Goals: Goals not met: RYP score slightly decreased from 59 to 52  Weight increased 4 #'s  He has made some progress with healthy chocies but needs to focus on more strict nutrition related to diabetes management  Pt also recently quit smokijng and has abstained     , Patient will be encouraged to focus on lifestyle modifications following discharge  Misty Mccollum will focus on increased hydration   Misty Mccollum will learn how to use his CGM    Education: heart healthy eating  low sodium diet  hydration  nutrition for  lipid management  nutrition for Improved BG control  exercise and diabetes management   education class: Heart Healthy Eating  education class:  Label Reading   Education handout: Reading Food Labels  Education handout: Heart Healthy Eating  Plan:   switch to low fat cheeses, replace refined grain bread with whole grain bread, replace unhealthy snacks with fruits & vegs, reduce red meat 1x/wk, switch to skim or 1% milk, eat fewer desserts and sweets, avoid processed foods, monitor home blood glucose, drink more water, replace sugar with stevia or truvia and keep added daily sugar <25g/day  Readiness to change: Relapse: (Returning to older behaviors and abandoning the new changes)       PSYCHOSOCIAL ASSESSMENT AND PLAN    Emotional:  Depression assessment:  PHQ-9 = 12   10-14 = Moderate Depression            Anxiety measure:  TASHA-7 = 16  15-21 = Severe anxiety  Self-reported stress level:  1  Social support: Excellent and Patient reports excellent emotional/social support from family    Goals: Increased interest in doing things, increased energy, stop worrying, take time to relax and Feel less anxious    Progression Toward Goals: Goals met: Increased interest in doing things, increased energy, stop worrying, take time to relax and Feel less anxious  , Patient will be encouraged to focus on lifestyle modifications following discharge      Education: benefits of a positive support system, stress management techniques, class:  Stress and Your Health  and class:  Relaxation, Education handout: Stress and Your Health,   Relaxation,  Plan:  Exercise, Spend time outdoors, Enjoy family and Return to previous social activity  Readiness to change: Action:  (Changing behavior)      OTHER CORE COMPONENTS     Tobacco:   Social History     Tobacco Use   Smoking Status Former   • Packs/day: 0 50   • Types: Cigarettes   • Quit date: 2022   • Years since quittin 6   Smokeless Tobacco Never   Tobacco Comments    started when he was about 24 yrs old; stopped smoking 3 wks ago       Tobacco Use Intervention:   Pt quit May 30, 2022   and has abstained,  8/15/22 - Pt continues to abstain  22 - Pt continues to abstain    Anginal Symptoms:  None   NTG use: No prescription    Blood pressure:    Restin/60 - 148/68    Exercise:  160/72 - 172/20    Goals: consistent BP < 130/80, reduced dietary sodium <2300mg, moderate intensity exercise >150 mins/wk, medication compliance and Abstain from smoking    Progression Toward Goals: Goals met: abstinainging from smoking , Patient will be encouraged to focus on lifestyle modifications following discharge      Education:  low sodium diet and HTN and smoking and heart disease, Education handouts: Understanding Heart Disease  Plan: , Complete abstention from smoking, avoid places with second hand smoke, Avoid Processed foods, engage in regular exercise and check labels for sodium content  Readiness to change: Maintenance: (Maintaining the behavior change)

## 2023-01-11 NOTE — PROGRESS NOTES
[place]        Dear Dr Weaver Public,    Emotional well-being and depression is addressed in the cardiac  rehab evaluation  To assess the severity of depression, patients are given the PHQ-9 Depression Questionnaire  This is to inform you that your patient Cynthia Luna scored a 12 which is interpreted as 10-14 = Moderate Depression  Your patient was provided contact information for [facility] and has been encouraged to enroll in [employer]  Thank you for your support of cardiopulmonary rehab      Sincerely,      Georgina Urbano MS, CEP

## 2023-01-18 ENCOUNTER — CONSULT (OUTPATIENT)
Dept: MULTI SPECIALTY CLINIC | Facility: CLINIC | Age: 78
End: 2023-01-18

## 2023-01-18 VITALS
BODY MASS INDEX: 29.03 KG/M2 | HEART RATE: 84 BPM | DIASTOLIC BLOOD PRESSURE: 70 MMHG | SYSTOLIC BLOOD PRESSURE: 130 MMHG | TEMPERATURE: 97.8 F | WEIGHT: 196 LBS | HEIGHT: 69 IN

## 2023-01-18 DIAGNOSIS — I73.9 PERIPHERAL ARTERY DISEASE (HCC): Primary | ICD-10-CM

## 2023-01-18 NOTE — ASSESSMENT & PLAN NOTE
Assessment:  77yo M with chronic pain of his BLE, likely PAD  LEADs and CTA without evidence of occlusion, has palpable distal pulses  Will need further evaluation with exercise JARROD before offering intervention at this time  12/15/2022 LEADs  RLE: no evidence arterial occlusive disease, 1 15/126/128  LLE: 50-75% stenosis CFA, diffuse disease throughout fem-pop, 0 92/141/76    6/9/2022 CTA:  Moderate stenosis at the right common iliac artery with mild ectasia distal to the stenosis  Left common iliac, bilateral external iliac and common femoral arteries are nonaneurysmal with mild atherosclerosis      Plan:  - cont medical management with ASA/plavix/crestor  - exercise JARROD study  - discussed importance of abstaining from tobacco use  - discussed close follow up with PCP for T2DM control  - recommending continued exercise  - follow up after JARROD study in 3-6 months

## 2023-01-18 NOTE — PROGRESS NOTES
Assessment/Plan:     Problem List Items Addressed This Visit        Cardiovascular and Mediastinum    Peripheral artery disease ([address] ) - Primary     Assessment:  79yo M with chronic pain of his BLE, likely PAD  LEADs and CTA without evidence of occlusion, has palpable distal pulses  Will need further evaluation with exercise JARROD before offering intervention at this time  12/15/2022 LEADs  RLE: no evidence arterial occlusive disease, 1 15/126/128  LLE: 50-75% stenosis CFA, diffuse disease throughout fem-pop, 0 92/141/76    6/9/2022 CTA:  Moderate stenosis at the right common iliac artery with mild ectasia distal to the stenosis  Left common iliac, bilateral external iliac and common femoral arteries are nonaneurysmal with mild atherosclerosis  Plan:  - cont medical management with ASA/plavix/crestor  - exercise JARROD study  - discussed importance of abstaining from tobacco use  - discussed close follow up with PCP for T2DM control  - recommending continued exercise  - follow up after JARROD study in 3-6 months           Relevant Orders    VAS JARROD with exercise study         Subjective:      Patient ID: Jonnathan Rendon is a 78 y o  male  Mr  Daria Najjar is a 79yo M who was referred to vascular surgery for evaluation of his chronic bilateral lower extremity pain  This pain has been present for about 10-15 years  Patient describes it as a sharp, stabbing pain that is mainly in the calves but sometimes extends down to the ankles  The pain happens when he walks a max of about one block, lasts for about 2-3 minutes, and goes away when he rests  Denies rest pain  The patient was a smoker for most of his life, but quit smoking around June 2022 for cardiac surgery  He decided to come in for evaluation when the pain became severe while walking on the treadmill during a physical therapy exercise session prescribed by his cardiac surgeon  Has been taking ASA/plavix/crestor   Denies loss of motor function and sensation, denies rest pain  PMHx: IDDM, PAD, CAD, Aortic stenosis s/p TAVR 6/2022, htn, HLD, asthma    The following portions of the patient's history were reviewed and updated as appropriate:   He  has a past medical history of Arthritis, Asthma, Colon polyps, Community acquired pneumonia, Diabetes mellitus ([facility] ), Hemorrhagic prepatellar bursitis, left (10/21/2019), Hepatitis C, High cholesterol, History of colonoscopy (2017), Hypertension, Lymphadenopathy, anterior cervical (04/17/2018), Nephritis and nephropathy, not specified as acute or chronic, with other specified pathological lesion in kidney, in diseases classified elsewhere (3/26/2013), Screening for colon cancer (05/01/2019), and Thoracic vertebral fracture ([place] [address] ) (06/11/2014)  He   Patient Active Problem List    Diagnosis Date Noted   • Peripheral artery disease ([place] [address] ) 01/18/2023   • Need for influenza vaccination 11/10/2022   • Leg cramping 11/10/2022   • Tachycardia-bradycardia ([place] [address] ) 09/01/2022   • S/P TAVR (transcatheter aortic valve replacement) 06/21/2022   • Coronary artery disease 06/21/2022   • Contrast media allergy 05/31/2022   • Aortic stenosis 01/19/2022   • Diverticulosis of large intestine 09/20/2018   • Internal hemorrhoids 09/20/2018   • Chronic hepatitis C ([place] [address] ) 04/13/2017   • Nicotine dependence 04/13/2017   • Osteoarthritis of knee 04/13/2017   • Bilateral hearing loss 01/30/2017   • Allergic rhinitis 02/24/2016   • Type 2 diabetes mellitus with neurologic complication, with long-term current use of insulin ([place] [address] ) 11/24/2015   • Adenomatous colon polyp 06/11/2014   • Hyperlipidemia 09/13/2013   • Vitamin B12 deficiency 07/24/2012   • Asthma 06/08/2012   • Essential hypertension 06/08/2012   • Hypothyroidism 06/07/2012     He  has a past surgical history that includes Colonoscopy w/ polypectomy; Lithotripsy; Multiple tooth extractions; pr colonoscopy flx dx w/collj spec when pfrmd (N/A, 5/17/2018);  Colonoscopy; Cardiac catheterization (N/A, 6/3/2022); pr replace aortic valve openfemoral artery approach (N/A, 6/21/2022); Cardiac catheterization (N/A, 6/21/2022); and Cardiac electrophysiology procedure (N/A, 9/1/2022)  His family history includes Diabetes in his brother and sister; Heart attack in his family; No Known Problems in his father and mother  He  reports that he quit smoking about 7 months ago  His smoking use included cigarettes  He smoked an average of  5 packs per day  He has never used smokeless tobacco  He reports that he does not drink alcohol and does not use drugs  Current Outpatient Medications   Medication Sig Dispense Refill   • acetylcysteine (MUCOMYST) 10 % nebulizer solution Take 4 mL by nebulization 3 (three) times a day 360 mL 2   • Advair -21 MCG/ACT inhaler TAKE 2 PUFFS BY MOUTH TWICE A DAY 36 g 6   • albuterol (Ventolin HFA) 90 mcg/act inhaler Inhale 2 puffs every 4 (four) hours as needed for wheezing or shortness of breath 18 g 2   • Alcohol Swabs (ALCOHOL PADS) 70 % PADS      • amLODIPine (NORVASC) 5 mg tablet Take 1 tablet (5 mg total) by mouth daily 90 tablet 1   • Aspirin Low Dose 81 MG chewable tablet CHEW 1 TABLET BY MOUTH DAILY 90 tablet 3   • Blood Glucose Monitoring Suppl (OneTouch Verio) w/Device KIT Check blood glucose three times daily before each meal 1 kit 0   • cholecalciferol (VITAMIN D3) 1,000 units tablet Take 2 tablets (2,000 Units total) by mouth daily 180 tablet 5   • clopidogrel (PLAVIX) 75 mg tablet Take 1 tablet (75 mg total) by mouth daily 90 tablet 3   • fluticasone (FLONASE) 50 mcg/act nasal spray 2 sprays into each nostril daily 2 Bottle 2   • hydrochlorothiazide (HYDRODIURIL) 25 mg tablet TAKE 1 TABLET (25 MG TOTAL) BY MOUTH DAILY   90 tablet 3   • Insulin Pen Needle (Pen Needles) 31G X 8 MM MISC Use daily 300 each 3   • Lancets (FREESTYLE) lancets by Other route as needed (As needed) 100 each 3   • Lantus SoloStar 100 units/mL SOPN INJECT 0 18 ML (18 UNITS TOTAL) UNDER THE SKIN DAILY AT BEDTIME 15 mL 3   • levothyroxine 137 mcg tablet TAKE 1 TABLET BY MOUTH EVERY DAY 90 tablet 3   • lisinopril (ZESTRIL) 20 mg tablet Take 1 tablet (20 mg total) by mouth daily 90 tablet 3   • metFORMIN (GLUCOPHAGE) 850 mg tablet TAKE 1 TABLET (850 MG TOTAL) BY MOUTH 2 (TWO) TIMES A DAY WITH MEALS 180 tablet 3   • methocarbamol (Robaxin-750) 750 mg tablet Take 1 tablet (750 mg total) by mouth every 6 (six) hours as needed for muscle spasms 60 tablet 0   • metoprolol tartrate (LOPRESSOR) 50 mg tablet Take 1 tablet (50 mg total) by mouth every 12 (twelve) hours 180 tablet 3   • montelukast (SINGULAIR) 10 mg tablet TAKE 1 TABLET BY MOUTH EVERY DAY 90 tablet 3   • NovoLOG FlexPen 100 units/mL injection pen Inject 5 units with breakfast and with dinner 12 mL 3   • rosuvastatin (CRESTOR) 40 MG tablet TAKE 1 TABLET BY MOUTH EVERY DAY 90 tablet 3   • Continuous Blood Gluc  (FreeStyle [place] 2 [place]) POWER Use 1 each continuous (Patient not taking: Reported on 1/18/2023) 1 each 0   • Continuous Blood Gluc Sensor (FreeStyle Cristofer 2 Sensor) MISC Use 1 each every 14 (fourteen) days (Patient not taking: Reported on 1/18/2023) 6 each 3     No current facility-administered medications for this visit       Current Outpatient Medications on File Prior to Visit   Medication Sig   • acetylcysteine (MUCOMYST) 10 % nebulizer solution Take 4 mL by nebulization 3 (three) times a day   • Advair -21 MCG/ACT inhaler TAKE 2 PUFFS BY MOUTH TWICE A DAY   • albuterol (Ventolin HFA) 90 mcg/act inhaler Inhale 2 puffs every 4 (four) hours as needed for wheezing or shortness of breath   • Alcohol Swabs (ALCOHOL PADS) 70 % PADS    • amLODIPine (NORVASC) 5 mg tablet Take 1 tablet (5 mg total) by mouth daily   • Aspirin Low Dose 81 MG chewable tablet CHEW 1 TABLET BY MOUTH DAILY   • Blood Glucose Monitoring Suppl (OneTouch Verio) w/Device KIT Check blood glucose three times daily before each meal   • cholecalciferol (VITAMIN D3) 1,000 units tablet Take 2 tablets (2,000 Units total) by mouth daily   • clopidogrel (PLAVIX) 75 mg tablet Take 1 tablet (75 mg total) by mouth daily   • fluticasone (FLONASE) 50 mcg/act nasal spray 2 sprays into each nostril daily   • hydrochlorothiazide (HYDRODIURIL) 25 mg tablet TAKE 1 TABLET (25 MG TOTAL) BY MOUTH DAILY  • Insulin Pen Needle (Pen Needles) 31G X 8 MM MISC Use daily   • Lancets (FREESTYLE) lancets by Other route as needed (As needed)   • Lantus SoloStar 100 units/mL SOPN INJECT 0 18 ML (18 UNITS TOTAL) UNDER THE SKIN DAILY AT BEDTIME   • levothyroxine 137 mcg tablet TAKE 1 TABLET BY MOUTH EVERY DAY   • lisinopril (ZESTRIL) 20 mg tablet Take 1 tablet (20 mg total) by mouth daily   • metFORMIN (GLUCOPHAGE) 850 mg tablet TAKE 1 TABLET (850 MG TOTAL) BY MOUTH 2 (TWO) TIMES A DAY WITH MEALS   • methocarbamol (Robaxin-750) 750 mg tablet Take 1 tablet (750 mg total) by mouth every 6 (six) hours as needed for muscle spasms   • metoprolol tartrate (LOPRESSOR) 50 mg tablet Take 1 tablet (50 mg total) by mouth every 12 (twelve) hours   • montelukast (SINGULAIR) 10 mg tablet TAKE 1 TABLET BY MOUTH EVERY DAY   • NovoLOG FlexPen 100 units/mL injection pen Inject 5 units with breakfast and with dinner   • rosuvastatin (CRESTOR) 40 MG tablet TAKE 1 TABLET BY MOUTH EVERY DAY   • Continuous Blood Gluc  (FreeStyle [place] [place] [place]) POWER Use 1 each continuous (Patient not taking: Reported on 1/18/2023)   • Continuous Blood Gluc Sensor (FreeStyle Cristofer 2 Sensor) MISC Use 1 each every 14 (fourteen) days (Patient not taking: Reported on 1/18/2023)     No current facility-administered medications on file prior to visit  He is allergic to iv contrast [iodinated contrast media]       Review of Systems   Constitutional: Negative  HENT: Negative  Eyes: Negative  Respiratory: Negative  Cardiovascular: Negative  Gastrointestinal: Negative  Genitourinary: Negative  Musculoskeletal: Positive for myalgias   Negative for gait problem  Skin: Negative  Neurological: Negative  Psychiatric/Behavioral: Negative  Objective:      /70 (BP Location: Right arm, Patient Position: Sitting, Cuff Size: Large)   Pulse 84   Temp 97 8 °F (36 6 °C) (Temporal)   Ht 5' 9" (1 753 m)   Wt 88 9 kg (196 lb)   BMI 28 94 kg/m²          Physical Exam  Vitals reviewed  Constitutional:       General: He is not in acute distress  Appearance: He is normal weight  HENT:      Head: Normocephalic and atraumatic  Right Ear: External ear normal       Left Ear: External ear normal       Nose: Nose normal       Mouth/Throat:      Mouth: Mucous membranes are moist    Eyes:      Pupils: Pupils are equal, round, and reactive to light  Cardiovascular:      Rate and Rhythm: Normal rate and regular rhythm  Pulses: Normal pulses  Comments: Palpable DP/PT pulses of BLE, bilateral feet pink and warm, sensory and motor intact  5/5 strength bilaterally  No tenderness  Pulmonary:      Effort: Pulmonary effort is normal    Abdominal:      Palpations: Abdomen is soft  Musculoskeletal:      Cervical back: Normal range of motion  Skin:     General: Skin is warm  Neurological:      General: No focal deficit present  Mental Status: He is alert and oriented to person, place, and time     Psychiatric:         Mood and Affect: Mood normal

## 2023-01-25 ENCOUNTER — REMOTE DEVICE CLINIC VISIT (OUTPATIENT)
Dept: CARDIOLOGY CLINIC | Facility: CLINIC | Age: 78
End: 2023-01-25

## 2023-01-25 DIAGNOSIS — Z95.0 CARDIAC PACEMAKER IN SITU: Primary | ICD-10-CM

## 2023-01-25 NOTE — PROGRESS NOTES
Results for orders placed or performed in visit on 01/25/23   Cardiac EP device report    Narrative    MDT DC PM/MRI CONDITIONAL  CARELINK TRANSMISSION: BATTERY STATUS "15 YRS " AP 8%  0%  ALL AVAILABLE LEAD PARAMETERS WITHIN NORMAL LIMITS  4 VHRS NOTED; #18 PRESENTS AS 11 BEATS @ 185 BPM; #20 PRESENTS 9 BEATS @ 162 BPM  OTHER EGRAMS PRESENT AS PAT  PT ON ASA & METO TART  EF 55% (ECHO 2022)  NORMAL DEVICE FUNCTION   NC

## 2023-01-30 ENCOUNTER — OFFICE VISIT (OUTPATIENT)
Dept: CARDIOLOGY CLINIC | Facility: CLINIC | Age: 78
End: 2023-01-30

## 2023-01-30 VITALS
BODY MASS INDEX: 28.51 KG/M2 | DIASTOLIC BLOOD PRESSURE: 58 MMHG | SYSTOLIC BLOOD PRESSURE: 110 MMHG | HEART RATE: 69 BPM | HEIGHT: 69 IN | WEIGHT: 192.5 LBS

## 2023-01-30 DIAGNOSIS — R09.89 BRUIT OF LEFT CAROTID ARTERY: ICD-10-CM

## 2023-01-30 DIAGNOSIS — I73.9 PERIPHERAL ARTERY DISEASE (HCC): ICD-10-CM

## 2023-01-30 DIAGNOSIS — Z95.0 PRESENCE OF CARDIAC PACEMAKER: ICD-10-CM

## 2023-01-30 DIAGNOSIS — Z95.2 S/P TAVR (TRANSCATHETER AORTIC VALVE REPLACEMENT): ICD-10-CM

## 2023-01-30 DIAGNOSIS — E78.2 MIXED HYPERLIPIDEMIA: ICD-10-CM

## 2023-01-30 DIAGNOSIS — I10 ESSENTIAL HYPERTENSION: Primary | ICD-10-CM

## 2023-01-30 DIAGNOSIS — I25.10 CORONARY ARTERY DISEASE INVOLVING NATIVE CORONARY ARTERY OF NATIVE HEART WITHOUT ANGINA PECTORIS: ICD-10-CM

## 2023-01-30 DIAGNOSIS — I47.29 NSVT (NONSUSTAINED VENTRICULAR TACHYCARDIA): ICD-10-CM

## 2023-01-30 DIAGNOSIS — F17.210 CIGARETTE NICOTINE DEPENDENCE WITHOUT COMPLICATION: ICD-10-CM

## 2023-01-30 DIAGNOSIS — I35.0 NONRHEUMATIC AORTIC VALVE STENOSIS: ICD-10-CM

## 2023-01-30 DIAGNOSIS — R06.09 DYSPNEA ON EXERTION: ICD-10-CM

## 2023-01-30 NOTE — PROGRESS NOTES
Cardiology Follow Up    Jareth Mcduffie  1945  [id number]  [address] [facility] [id number] [phone]    1  Essential hypertension        2  Nonrheumatic aortic valve stenosis        3  Coronary artery disease involving native coronary artery of native heart without angina pectoris        4  Peripheral artery disease ([address] )        5  Mixed hyperlipidemia        6  Cigarette nicotine dependence without complication        7  S/P TAVR (transcatheter aortic valve replacement)        8  Presence of cardiac pacemaker        9  NSVT (nonsustained ventricular tachycardia)            Interval History: Cardiology follow-up  Patient was has not been seen previously by myself, I did perform his catheterization previously     Multiple records reviewed, imaging was reviewed as well  53-year-old male who has valvular heart disease, CVA or stenosis  As well as coronary artery disease  Patient underwent TAVR on 6/08, 26 valve complicated with new left bundle branch block     Outpatient monitor revealed evidence of intermittent 2 1 block and prolonged pauses, he went on to have a dual-chamber pacemaker  On 9/22  Patient's most recent interrogation revealed adequate function, minimal pacing he has had several episodes of high ventricular rate events, 15, longest one, 26 beats an average rate of 200 bpm   This was asymptomatic  Patient was recently started on beta-blocker previously noted on  because of his bradycardia  His catheterization revealed moderate three-vessel coronary artery disease 50% marginal   50% LAD 50% RPDA and 50% diagonal   He also has peripheral vascular disease, lower extremity duplex last month revealed moderate disease in the left femoral-popliteal system  Patient is on high intensity statin therapy, most recently BP fructose was 142, HDL 62 LDL 62  Unfortunately patient was a smoker    Patient states he started when he was 23years old  He did quit 6 months ago prior to his heart procedure  I congratulated for his efforts  He has close to [id number] history  There is no history of lung disease  The patient currently complains of exertional dyspnea to minimal efforts  He states the symptoms have been present ever since his TAVR  As stated above, he was recently put on beta-blocker because of his ventricular arrhythmia  He denies any wheezing, ambulation is limited because of and also because of left leg discomfort, possibly claudication  Patient is scheduled to see vascular surgery      Patient Active Problem List   Diagnosis   • Asthma   • Allergic rhinitis   • Bilateral hearing loss   • Chronic hepatitis C ([facility] [address] )   • Type 2 diabetes mellitus with neurologic complication, with long-term current use of insulin ([facility] [address] )   • Hyperlipidemia   • Essential hypertension   • Hypothyroidism   • Nicotine dependence   • Osteoarthritis of knee   • Vitamin B12 deficiency   • Adenomatous colon polyp   • Diverticulosis of large intestine   • Internal hemorrhoids   • Aortic stenosis   • Contrast media allergy   • S/P TAVR (transcatheter aortic valve replacement)   • Coronary artery disease   • Tachycardia-bradycardia ([facility] [address] )   • Need for influenza vaccination   • Leg cramping   • Peripheral artery disease ([facility] [address] )   • Presence of cardiac pacemaker   • NSVT (nonsustained ventricular tachycardia)     Past Medical History:   Diagnosis Date   • Arthritis    • Asthma    • Colon polyps    • Community acquired pneumonia     last assessed: 5/1/2014   • Diabetes mellitus ([facility] [address] )    • Hemorrhagic prepatellar bursitis, left 10/21/2019   • Hepatitis C    • High cholesterol    • History of colonoscopy 2017   • Hypertension    • Lymphadenopathy, anterior cervical 04/17/2018   • Nephritis and nephropathy, not specified as acute or chronic, with other specified pathological lesion in kidney, in diseases classified elsewhere 3/26/2013   • Screening for colon cancer 2019   • Thoracic vertebral fracture ([facility] [address] ) 2014     Social History     Socioeconomic History   • Marital status: /Civil Union     Spouse name: Not on file   • Number of children: Not on file   • Years of education: Not on file   • Highest education level: Not on file   Occupational History   • Occupation: retired    Tobacco Use   • Smoking status: Former     Packs/day: 0 50     Types: Cigarettes     Quit date: 2022     Years since quittin 6   • Smokeless tobacco: Never   • Tobacco comments:     started when he was about 24 yrs old; stopped smoking 3 wks ago   Vaping Use   • Vaping Use: Never used   Substance and Sexual Activity   • Alcohol use: No   • Drug use: No   • Sexual activity: Not Currently   Other Topics Concern   • Not on file   Social History Narrative   • Not on file     Social Determinants of Health     Financial Resource Strain: Low Risk    • Difficulty of Paying Living Expenses: Not hard at all   Food Insecurity: No Food Insecurity   • Worried About Running Out of Food in the Last Year: Never true   • Ran Out of Food in the Last Year: Never true   Transportation Needs: No Transportation Needs   • Lack of Transportation (Medical): No   • Lack of Transportation (Non-Medical): No   Physical Activity: Not on file   Stress: Not on file   Social Connections: Not on file   Intimate Partner Violence: Not on file   Housing Stability: Low Risk    • Unable to Pay for Housing in the Last Year: No   • Number of Places Lived in the Last Year: 1   • Unstable Housing in the Last Year: No      Family History   Problem Relation Age of Onset   • Heart attack Family         at age 80   • No Known Problems Mother    • No Known Problems Father    • Diabetes Sister    • Diabetes Brother      Past Surgical History:   Procedure Laterality Date   • CARDIAC CATHETERIZATION N/A 6/3/2022    Procedure: Cardiac RHC/LHC;   Surgeon: Gonzalo Weinberg MD;  Location: [facility];  Service: Cardiology   • CARDIAC CATHETERIZATION N/A 6/21/2022    Procedure: CARDIAC TAVR;  Surgeon: Xiomy Del Real MD;  Location: [facility];  Service: Cardiology   • CARDIAC ELECTROPHYSIOLOGY PROCEDURE N/A 9/1/2022    Procedure: Cardiac pacer implant ; DC PPM;  Surgeon: Brenden Solorzano DO;  Location: [facility]; Service: Cardiology   • COLONOSCOPY     • COLONOSCOPY W/ POLYPECTOMY     • LITHOTRIPSY     • MULTIPLE TOOTH EXTRACTIONS     • LA COLONOSCOPY FLX DX W/COLLJ SPEC WHEN PFRMD N/A 5/17/2018    Procedure: COLONOSCOPY;  Surgeon: Teo Mckeon MD;  Location: [facility] GI [facility];   Service: Gastroenterology   • LA REPLACE AORTIC VALVE OPENFEMORAL ARTERY APPROACH N/A 6/21/2022    Procedure: REPLACEMENT AORTIC VALVE TRANSCATHETER (TAVR) TRANSFEMORAL W/ 26MM QUINN RONNI S3 ULTRA VALVE(ACCESS ON LEFT) SAULO;  Surgeon: Hal Ferrara MD;  Location: [facility];  Service: Cardiac Surgery       Current Outpatient Medications:   •  acetylcysteine (MUCOMYST) 10 % nebulizer solution, Take 4 mL by nebulization 3 (three) times a day, Disp: 360 mL, Rfl: 2  •  Advair -21 MCG/ACT inhaler, TAKE 2 PUFFS BY MOUTH TWICE A DAY, Disp: 36 g, Rfl: 6  •  albuterol (Ventolin HFA) 90 mcg/act inhaler, Inhale 2 puffs every 4 (four) hours as needed for wheezing or shortness of breath, Disp: 18 g, Rfl: 2  •  Alcohol Swabs (ALCOHOL PADS) 70 % PADS, , Disp: , Rfl:   •  amLODIPine (NORVASC) 5 mg tablet, Take 1 tablet (5 mg total) by mouth daily, Disp: 90 tablet, Rfl: 1  •  Aspirin Low Dose 81 MG chewable tablet, CHEW 1 TABLET BY MOUTH DAILY, Disp: 90 tablet, Rfl: 3  •  Blood Glucose Monitoring Suppl (OneTouch Verio) w/Device KIT, Check blood glucose three times daily before each meal, Disp: 1 kit, Rfl: 0  •  cholecalciferol (VITAMIN D3) 1,000 units tablet, Take 2 tablets (2,000 Units total) by mouth daily, Disp: 180 tablet, Rfl: 5  •  clopidogrel (PLAVIX) 75 mg tablet, Take 1 tablet (75 mg total) by mouth daily, Disp: 90 tablet, Rfl: 3  • Continuous Blood Gluc  (FreeStyle Robles 2 Cade) POWER, Use 1 each continuous (Patient not taking: Reported on 1/18/2023), Disp: 1 each, Rfl: 0  •  Continuous Blood Gluc Sensor (FreeStyle Cristofer 2 Sensor) MISC, Use 1 each every 14 (fourteen) days (Patient not taking: Reported on 1/18/2023), Disp: 6 each, Rfl: 3  •  fluticasone (FLONASE) 50 mcg/act nasal spray, 2 sprays into each nostril daily, Disp: 2 Bottle, Rfl: 2  •  hydrochlorothiazide (HYDRODIURIL) 25 mg tablet, TAKE 1 TABLET (25 MG TOTAL) BY MOUTH DAILY  , Disp: 90 tablet, Rfl: 3  •  Insulin Pen Needle (Pen Needles) 31G X 8 MM MISC, Use daily, Disp: 300 each, Rfl: 3  •  Lancets (FREESTYLE) lancets, by Other route as needed (As needed), Disp: 100 each, Rfl: 3  •  Lantus SoloStar 100 units/mL SOPN, INJECT 0 18 ML (18 UNITS TOTAL) UNDER THE SKIN DAILY AT BEDTIME, Disp: 15 mL, Rfl: 3  •  levothyroxine 137 mcg tablet, TAKE 1 TABLET BY MOUTH EVERY DAY, Disp: 90 tablet, Rfl: 3  •  lisinopril (ZESTRIL) 20 mg tablet, Take 1 tablet (20 mg total) by mouth daily, Disp: 90 tablet, Rfl: 3  •  metFORMIN (GLUCOPHAGE) 850 mg tablet, TAKE 1 TABLET (850 MG TOTAL) BY MOUTH 2 (TWO) TIMES A DAY WITH MEALS, Disp: 180 tablet, Rfl: 3  •  methocarbamol (Robaxin-750) 750 mg tablet, Take 1 tablet (750 mg total) by mouth every 6 (six) hours as needed for muscle spasms, Disp: 60 tablet, Rfl: 0  •  metoprolol tartrate (LOPRESSOR) 50 mg tablet, Take 1 tablet (50 mg total) by mouth every 12 (twelve) hours, Disp: 180 tablet, Rfl: 3  •  montelukast (SINGULAIR) 10 mg tablet, TAKE 1 TABLET BY MOUTH EVERY DAY, Disp: 90 tablet, Rfl: 3  •  NovoLOG FlexPen 100 units/mL injection pen, Inject 5 units with breakfast and with dinner, Disp: 12 mL, Rfl: 3  •  rosuvastatin (CRESTOR) 40 MG tablet, TAKE 1 TABLET BY MOUTH EVERY DAY, Disp: 90 tablet, Rfl: 3  Allergies   Allergen Reactions   • Iv Contrast [Iodinated Contrast Media] Rash     Patient states he had a severe reaction approximately 10 years ago , states he had a rash, itchy and he remembers a bunch of people pounding on chest and being surrounded by multiple doctors         Labs:  Appointment on 11/10/2022   Component Date Value   • Sodium 11/10/2022 139    • Potassium 11/10/2022 4 2    • Chloride 11/10/2022 106    • CO2 11/10/2022 27    • ANION GAP 11/10/2022 6    • BUN 11/10/2022 16    • Creatinine 11/10/2022 0 91    • Glucose, Fasting 11/10/2022 133 (H)    • Calcium 11/10/2022 9 2    • eGFR 11/10/2022 82    • Magnesium 11/10/2022 2 0    • Total CK 11/10/2022 170    • CRP 11/10/2022 <3 0    • Sed Rate 11/10/2022 15    • TSH 3RD GENERATON 11/10/2022 4 760 (H)    • Free T4 11/10/2022 1 17    • Cholesterol 11/10/2022 142    • Triglycerides 11/10/2022 75    • HDL, Direct 11/10/2022 65    • LDL Calculated 11/10/2022 62    • CK-MB Index 11/10/2022 2 1    • CK-MB 11/10/2022 3 6    Office Visit on 11/08/2022   Component Date Value   • Hemoglobin A1C 11/08/2022 7 6 (A)    Admission on 09/01/2022, Discharged on 09/01/2022   Component Date Value   • Sodium 09/01/2022 135    • Potassium 09/01/2022 4 9    • Chloride 09/01/2022 103    • CO2 09/01/2022 27    • ANION GAP 09/01/2022 5    • BUN 09/01/2022 22    • Creatinine 09/01/2022 1 03    • Glucose 09/01/2022 268 (H)    • Glucose, Fasting 09/01/2022 268 (H)    • Calcium 09/01/2022 8 9    • eGFR 09/01/2022 70    • WBC 09/01/2022 4 99    • RBC 09/01/2022 4 45    • Hemoglobin 09/01/2022 13 3    • Hematocrit 09/01/2022 40 3    • MCV 09/01/2022 91    • MCH 09/01/2022 29 9    • MCHC 09/01/2022 33 0    • RDW 09/01/2022 12 8    • MPV 09/01/2022 10 2    • Platelets 07/76/3342 249    • nRBC 09/01/2022 0    • Neutrophils Relative 09/01/2022 84 (H)    • Immat GRANS % 09/01/2022 0    • Lymphocytes Relative 09/01/2022 14    • Monocytes Relative 09/01/2022 2 (L)    • Eosinophils Relative 09/01/2022 0    • Basophils Relative 09/01/2022 0    • Neutrophils Absolute 09/01/2022 4 19    • Immature Grans Absolute 09/01/2022 0 01    • Lymphocytes Absolute 09/01/2022 0 69    • Monocytes Absolute 09/01/2022 0 10 (L)    • Eosinophils Absolute 09/01/2022 0 00    • Basophils Absolute 09/01/2022 0 00    • Protime 09/01/2022 12 6    • INR 09/01/2022 0 93    • Ventricular Rate 09/01/2022 87    • Atrial Rate 09/01/2022 87    • AK Interval 09/01/2022 202    • QRSD Interval 09/01/2022 140    • QT Interval 09/01/2022 416    • QTC Interval 09/01/2022 500    • P Axis 09/01/2022 86    • QRS Axis 09/01/2022 -18    • T Wave Axis 09/01/2022 67    • Ventricular Rate 09/01/2022 89    • Atrial Rate 09/01/2022 89    • AK Interval 09/01/2022 192    • QRSD Interval 09/01/2022 138    • QT Interval 09/01/2022 400    • QTC Interval 09/01/2022 486    • P Axis 09/01/2022 82    • QRS Axis 09/01/2022 -14    • T Wave Axis 09/01/2022 78    Telephone on 08/25/2022   Component Date Value   • WBC 08/30/2022 5 91    • RBC 08/30/2022 4 44    • Hemoglobin 08/30/2022 13 1    • Hematocrit 08/30/2022 41 6    • MCV 08/30/2022 94    • MCH 08/30/2022 29 5    • MCHC 08/30/2022 31 5    • RDW 08/30/2022 12 9    • MPV 08/30/2022 10 4    • Platelets 80/73/2347 243    • nRBC 08/30/2022 0    • Neutrophils Relative 08/30/2022 65    • Immat GRANS % 08/30/2022 0    • Lymphocytes Relative 08/30/2022 22    • Monocytes Relative 08/30/2022 9    • Eosinophils Relative 08/30/2022 4    • Basophils Relative 08/30/2022 0    • Neutrophils Absolute 08/30/2022 3 82    • Immature Grans Absolute 08/30/2022 0 01    • Lymphocytes Absolute 08/30/2022 1 29    • Monocytes Absolute 08/30/2022 0 54    • Eosinophils Absolute 08/30/2022 0 23    • Basophils Absolute 08/30/2022 0 02    • Sodium 08/30/2022 136    • Potassium 08/30/2022 4 1    • Chloride 08/30/2022 104    • CO2 08/30/2022 30    • ANION GAP 08/30/2022 2 (L)    • BUN 08/30/2022 13    • Creatinine 08/30/2022 0 95    • Glucose, Fasting 08/30/2022 126 (H)    • Calcium 08/30/2022 9 1    • Corrected Calcium 08/30/2022 9 6    • AST 08/30/2022 15    • ALT 08/30/2022 22 • Alkaline Phosphatase 08/30/2022 51    • Total Protein 08/30/2022 6 9    • Albumin 08/30/2022 3 4 (L)    • Total Bilirubin 08/30/2022 0 76    • eGFR 08/30/2022 77    Office Visit on 08/05/2022   Component Date Value   • Supplier Name 08/08/2022 [employer]    • Supplier Phone Number 08/08/2022 [phone]    • Order Status 08/08/2022 Delivery Successful    • Delivery Request Date 08/08/2022 08/08/2022    • Date Delivered  08/08/2022 08/11/2022    • Item Description 08/08/2022 Nebulizer Compressor for Reusable Package with Mask    • Item Description 08/08/2022 Nebulizer Set, Reusable    • Item Description 08/08/2022 Adult Nebulizer Mask    • Item Description 08/08/2022 Disposable Nebulizer Compressor Filter      Imaging: Cardiac EP device report    Result Date: 1/25/2023  Narrative: MDT DC PM/MRI CONDITIONAL CARELINK TRANSMISSION: BATTERY STATUS "15 YRS " AP 8%  0%  ALL AVAILABLE LEAD PARAMETERS WITHIN NORMAL LIMITS  4 VHRS NOTED; #18 PRESENTS AS 11 BEATS @ 185 BPM; #20 PRESENTS 9 BEATS @ 162 BPM  OTHER EGRAMS PRESENT AS PAT  PT ON ASA & METO TART  EF 55% (ECHO 2022)  NORMAL DEVICE FUNCTION  NC       Review of Systems:  Review of Systems   Constitutional: Positive for activity change, fatigue and fever  HENT: Negative for nosebleeds  Respiratory: Positive for shortness of breath  Negative for apnea, cough, choking, chest tightness, wheezing and stridor  Cardiovascular: Negative for chest pain, palpitations and leg swelling  Gastrointestinal: Negative for abdominal pain, anal bleeding and blood in stool  Endocrine: Negative for cold intolerance  Genitourinary: Negative for hematuria  Musculoskeletal: Positive for gait problem  Negative for arthralgias and myalgias  Skin: Negative for pallor, rash and wound  Allergic/Immunologic: Negative for immunocompromised state  Neurological: Negative for syncope  Hematological: Does not bruise/bleed easily  Psychiatric/Behavioral: Negative for sleep disturbance  Physical Exam:  Physical Exam  Vitals reviewed  Constitutional:       General: He is not in acute distress  Appearance: He is well-developed and normal weight  He is not ill-appearing, toxic-appearing or diaphoretic  Cardiovascular:      Rate and Rhythm: Normal rate and regular rhythm  Pulses:           Carotid pulses are on the left side with bruit  Heart sounds: Murmur (soft systolic ejection murmur at the base) heard  No friction rub  No gallop  Pulmonary:      Effort: Pulmonary effort is normal  No tachypnea, bradypnea, accessory muscle usage or respiratory distress  Breath sounds: Normal breath sounds  No decreased breath sounds, wheezing, rhonchi or rales  Chest:      Chest wall: No tenderness  Musculoskeletal:      Right lower leg: No edema  Left lower leg: No edema  Skin:     General: Skin is warm and dry  Capillary Refill: Capillary refill takes less than 2 seconds  Coloration: Skin is not cyanotic  Findings: No erythema  Nails: There is no clubbing  Neurological:      Mental Status: He is alert  Psychiatric:         Mood and Affect: Mood normal          Discussion/Summary: Coronary artery disease, moderate in severity on catheterization last year  Recent events of ventricular tachycardia nonsustained  We will continue the beta-blocker, we will proceed with a stress test to assess ongoing ischemia promoting for worsening his cardiac arrhythmia  Postoperative echocardiogram revealed normal ventricular systolic function inferior wall was described as hypokinetic, interestingly so  There was dyssynergy with elevated enlargement prosthetic aortic valve well-seated mean gradient of 12 mmHg and there was mild to moderate mitral sufficiency and mild tricuspid insufficiency with suggestive moderate pulmonary hypertension estimated pulmonary artery systolic pressures in the 50s    TR velocity of 3 3 m/s, IVC was normal, probably slight overestimation  Given his longstanding smoking history, will proceed with PFTs and a chest x-ray, he might need a right heart catheterization  As well  Incidental carotid bruit we will proceed with a duplex, and he is scheduled to be seen by vascular surgery  We will continue to antiplatelet therapy for the time being  This note was completed in part utilizing [email or web address] direct voice recognition software  Grammatical errors, random word insertion, spelling mistakes, and incomplete sentences may be an occasional consequence of the system secondary to software limitations, ambient noise and hardware issues  At the time of dictation, efforts were made to edit, clarify and /or correct errors  Please read the chart carefully and recognize, using context, where substitutions have occurred  If you have any questions or concerns about the context, text or information contained within the body of this dictation, please contact myself, the provider, for further clarification

## 2023-02-08 DIAGNOSIS — J45.20 MILD INTERMITTENT ASTHMA WITHOUT COMPLICATION: ICD-10-CM

## 2023-02-08 RX ORDER — ALBUTEROL SULFATE 90 UG/1
AEROSOL, METERED RESPIRATORY (INHALATION)
Qty: 18 G | Refills: 2 | Status: SHIPPED | OUTPATIENT
Start: 2023-02-08

## 2023-02-09 ENCOUNTER — HOSPITAL ENCOUNTER (OUTPATIENT)
Dept: PULMONOLOGY | Facility: HOSPITAL | Age: 78
Discharge: HOME | End: 2023-02-09
Attending: INTERNAL MEDICINE

## 2023-02-09 ENCOUNTER — HOSPITAL ENCOUNTER (INPATIENT)
Facility: HOSPITAL | Age: 78
LOS: 2 days | Discharge: HOME | End: 2023-02-12
Attending: EMERGENCY MEDICINE | Admitting: INTERNAL MEDICINE

## 2023-02-09 ENCOUNTER — HOSPITAL ENCOUNTER (OUTPATIENT)
Dept: NON INVASIVE DIAGNOSTICS | Facility: HOSPITAL | Age: 78
Discharge: HOME | End: 2023-02-09
Attending: INTERNAL MEDICINE

## 2023-02-09 ENCOUNTER — HOSPITAL ENCOUNTER (OUTPATIENT)
Dept: RADIOLOGY | Facility: HOSPITAL | Age: 78
Discharge: HOME | End: 2023-02-09
Attending: INTERNAL MEDICINE

## 2023-02-09 DIAGNOSIS — F17.210 CIGARETTE NICOTINE DEPENDENCE WITHOUT COMPLICATION: ICD-10-CM

## 2023-02-09 DIAGNOSIS — R06.09 DYSPNEA ON EXERTION: ICD-10-CM

## 2023-02-09 DIAGNOSIS — R07.9 CHEST PAIN: ICD-10-CM

## 2023-02-09 DIAGNOSIS — I21.4 NSTEMI (NON-ST ELEVATED MYOCARDIAL INFARCTION) (HCC): ICD-10-CM

## 2023-02-09 DIAGNOSIS — I25.10 CORONARY ARTERY DISEASE INVOLVING NATIVE CORONARY ARTERY OF NATIVE HEART WITHOUT ANGINA PECTORIS: ICD-10-CM

## 2023-02-09 DIAGNOSIS — I10 ESSENTIAL HYPERTENSION: ICD-10-CM

## 2023-02-09 DIAGNOSIS — R09.89 BRUIT OF LEFT CAROTID ARTERY: ICD-10-CM

## 2023-02-09 DIAGNOSIS — Z95.5 STATUS POST INSERTION OF DRUG ELUTING CORONARY ARTERY STENT: Primary | ICD-10-CM

## 2023-02-09 DIAGNOSIS — R77.8 ELEVATED TROPONIN I LEVEL: ICD-10-CM

## 2023-02-09 DIAGNOSIS — J45.901 ASTHMA EXACERBATION: ICD-10-CM

## 2023-02-09 RX ORDER — ALBUTEROL SULFATE 2.5 MG/3ML
2.5 SOLUTION RESPIRATORY (INHALATION) ONCE
Status: COMPLETED | OUTPATIENT
Start: 2023-02-09 | End: 2023-02-09

## 2023-02-09 RX ORDER — SODIUM CHLORIDE FOR INHALATION 0.9 %
3 VIAL, NEBULIZER (ML) INHALATION ONCE
Status: COMPLETED | OUTPATIENT
Start: 2023-02-09 | End: 2023-02-10

## 2023-02-09 RX ORDER — PREDNISONE 20 MG/1
40 TABLET ORAL ONCE
Status: COMPLETED | OUTPATIENT
Start: 2023-02-09 | End: 2023-02-10

## 2023-02-09 RX ADMIN — ALBUTEROL SULFATE 2.5 MG: 2.5 SOLUTION RESPIRATORY (INHALATION) at 15:32

## 2023-02-10 ENCOUNTER — APPOINTMENT (INPATIENT)
Dept: NON INVASIVE DIAGNOSTICS | Facility: HOSPITAL | Age: 78
End: 2023-02-10

## 2023-02-10 ENCOUNTER — APPOINTMENT (EMERGENCY)
Dept: RADIOLOGY | Facility: HOSPITAL | Age: 78
End: 2023-02-10

## 2023-02-10 PROBLEM — Z95.5 STATUS POST INSERTION OF DRUG ELUTING CORONARY ARTERY STENT: Status: ACTIVE | Noted: 2023-02-10

## 2023-02-10 PROBLEM — I48.91 NEW ONSET A-FIB (HCC): Status: ACTIVE | Noted: 2023-02-10

## 2023-02-10 PROBLEM — I21.4 NSTEMI (NON-ST ELEVATED MYOCARDIAL INFARCTION) (HCC): Status: ACTIVE | Noted: 2023-01-30

## 2023-02-10 LAB
2HR DELTA HS TROPONIN: -192 NG/L
4HR DELTA HS TROPONIN: 3 NG/L
ALBUMIN SERPL BCP-MCNC: 3.3 G/DL (ref 3.5–5)
ALP SERPL-CCNC: 47 U/L (ref 46–116)
ALT SERPL W P-5'-P-CCNC: 20 U/L (ref 12–78)
ANION GAP SERPL CALCULATED.3IONS-SCNC: 1 MMOL/L (ref 4–13)
ANION GAP SERPL CALCULATED.3IONS-SCNC: 5 MMOL/L (ref 4–13)
AORTIC ROOT: 3 CM
AORTIC VALVE MEAN VELOCITY: 12.5 M/S
APICAL FOUR CHAMBER EJECTION FRACTION: 32 %
APTT PPP: 109 SECONDS (ref 23–37)
APTT PPP: 37 SECONDS (ref 23–37)
ASCENDING AORTA: 3.3 CM
AST SERPL W P-5'-P-CCNC: 27 U/L (ref 5–45)
AV AREA BY CONTINUOUS VTI: 1.5 CM2
AV AREA PEAK VELOCITY: 1.3 CM2
AV LVOT MEAN GRADIENT: 1 MMHG
AV LVOT PEAK GRADIENT: 3 MMHG
AV MEAN GRADIENT: 7 MMHG
AV PEAK GRADIENT: 14 MMHG
AV VALVE AREA: 1.52 CM2
AV VELOCITY RATIO: 0.42
BASOPHILS # BLD AUTO: 0.01 THOUSANDS/ÂΜL (ref 0–0.1)
BASOPHILS # BLD AUTO: 0.02 THOUSANDS/ÂΜL (ref 0–0.1)
BASOPHILS NFR BLD AUTO: 0 % (ref 0–1)
BASOPHILS NFR BLD AUTO: 0 % (ref 0–1)
BILIRUB SERPL-MCNC: 0.57 MG/DL (ref 0.2–1)
BUN SERPL-MCNC: 22 MG/DL (ref 5–25)
BUN SERPL-MCNC: 25 MG/DL (ref 5–25)
CALCIUM ALBUM COR SERPL-MCNC: 10.5 MG/DL (ref 8.3–10.1)
CALCIUM SERPL-MCNC: 9.7 MG/DL (ref 8.3–10.1)
CALCIUM SERPL-MCNC: 9.9 MG/DL (ref 8.3–10.1)
CARDIAC TROPONIN I PNL SERPL HS: 1930 NG/L
CARDIAC TROPONIN I PNL SERPL HS: 2122 NG/L
CARDIAC TROPONIN I PNL SERPL HS: 2125 NG/L
CHLORIDE SERPL-SCNC: 106 MMOL/L (ref 96–108)
CHLORIDE SERPL-SCNC: 107 MMOL/L (ref 96–108)
CHOLEST SERPL-MCNC: 137 MG/DL
CO2 SERPL-SCNC: 25 MMOL/L (ref 21–32)
CO2 SERPL-SCNC: 30 MMOL/L (ref 21–32)
CREAT SERPL-MCNC: 1.1 MG/DL (ref 0.6–1.3)
CREAT SERPL-MCNC: 1.15 MG/DL (ref 0.6–1.3)
DOP CALC AO PEAK VEL: 1.89 M/S
DOP CALC AO VTI: 27.62 CM
DOP CALC LVOT AREA: 3.14 CM2
DOP CALC LVOT DIAMETER: 2 CM
DOP CALC LVOT PEAK VEL VTI: 13.37 CM
DOP CALC LVOT PEAK VEL: 0.8 M/S
DOP CALC LVOT STROKE INDEX: 20.7 ML/M2
DOP CALC LVOT STROKE VOLUME: 41.98
E WAVE DECELERATION TIME: 122 MS
EOSINOPHIL # BLD AUTO: 0 THOUSAND/ÂΜL (ref 0–0.61)
EOSINOPHIL # BLD AUTO: 0.15 THOUSAND/ÂΜL (ref 0–0.61)
EOSINOPHIL NFR BLD AUTO: 0 % (ref 0–6)
EOSINOPHIL NFR BLD AUTO: 2 % (ref 0–6)
ERYTHROCYTE [DISTWIDTH] IN BLOOD BY AUTOMATED COUNT: 14.9 % (ref 11.6–15.1)
ERYTHROCYTE [DISTWIDTH] IN BLOOD BY AUTOMATED COUNT: 15 % (ref 11.6–15.1)
FRACTIONAL SHORTENING: 18 (ref 28–44)
GFR SERPL CREATININE-BSD FRML MDRD: 61 ML/MIN/1.73SQ M
GFR SERPL CREATININE-BSD FRML MDRD: 65 ML/MIN/1.73SQ M
GLUCOSE SERPL-MCNC: 179 MG/DL (ref 65–140)
GLUCOSE SERPL-MCNC: 185 MG/DL (ref 65–140)
GLUCOSE SERPL-MCNC: 213 MG/DL (ref 65–140)
GLUCOSE SERPL-MCNC: 214 MG/DL (ref 65–140)
GLUCOSE SERPL-MCNC: 218 MG/DL (ref 65–140)
GLUCOSE SERPL-MCNC: 238 MG/DL (ref 65–140)
HCT VFR BLD AUTO: 36.5 % (ref 36.5–49.3)
HCT VFR BLD AUTO: 38 % (ref 36.5–49.3)
HDLC SERPL-MCNC: 53 MG/DL
HGB BLD-MCNC: 11.7 G/DL (ref 12–17)
HGB BLD-MCNC: 12.2 G/DL (ref 12–17)
IMM GRANULOCYTES # BLD AUTO: 0.02 THOUSAND/UL (ref 0–0.2)
IMM GRANULOCYTES # BLD AUTO: 0.03 THOUSAND/UL (ref 0–0.2)
IMM GRANULOCYTES NFR BLD AUTO: 0 % (ref 0–2)
IMM GRANULOCYTES NFR BLD AUTO: 0 % (ref 0–2)
INR PPP: 1.11 (ref 0.84–1.19)
INTERVENTRICULAR SEPTUM IN DIASTOLE (PARASTERNAL SHORT AXIS VIEW): 1.3 CM
INTERVENTRICULAR SEPTUM: 1.3 CM (ref 0.6–1.1)
KCT BLD-ACNC: 313 SEC (ref 89–137)
LAAS-AP2: 28.6 CM2
LAAS-AP4: 23.7 CM2
LDLC SERPL CALC-MCNC: 72 MG/DL (ref 0–100)
LDLC SERPL DIRECT ASSAY-MCNC: 76 MG/DL (ref 0–100)
LEFT ATRIUM SIZE: 5.2 CM
LEFT INTERNAL DIMENSION IN SYSTOLE: 4.2 CM (ref 2.1–4)
LEFT VENTRICLE DIASTOLIC VOLUME (MOD BIPLANE): 161 ML
LEFT VENTRICLE SYSTOLIC VOLUME (MOD BIPLANE): 106 ML
LEFT VENTRICULAR INTERNAL DIMENSION IN DIASTOLE: 5.1 CM (ref 3.5–6)
LEFT VENTRICULAR POSTERIOR WALL IN END DIASTOLE: 1.3 CM
LEFT VENTRICULAR STROKE VOLUME: 49 ML
LV EF: 34 %
LVSV (TEICH): 49 ML
LYMPHOCYTES # BLD AUTO: 0.84 THOUSANDS/ÂΜL (ref 0.6–4.47)
LYMPHOCYTES # BLD AUTO: 1.13 THOUSANDS/ÂΜL (ref 0.6–4.47)
LYMPHOCYTES NFR BLD AUTO: 10 % (ref 14–44)
LYMPHOCYTES NFR BLD AUTO: 15 % (ref 14–44)
MCH RBC QN AUTO: 28.3 PG (ref 26.8–34.3)
MCH RBC QN AUTO: 28.3 PG (ref 26.8–34.3)
MCHC RBC AUTO-ENTMCNC: 32.1 G/DL (ref 31.4–37.4)
MCHC RBC AUTO-ENTMCNC: 32.1 G/DL (ref 31.4–37.4)
MCV RBC AUTO: 88 FL (ref 82–98)
MCV RBC AUTO: 88 FL (ref 82–98)
MONOCYTES # BLD AUTO: 0.3 THOUSAND/ÂΜL (ref 0.17–1.22)
MONOCYTES # BLD AUTO: 0.65 THOUSAND/ÂΜL (ref 0.17–1.22)
MONOCYTES NFR BLD AUTO: 4 % (ref 4–12)
MONOCYTES NFR BLD AUTO: 9 % (ref 4–12)
MV PEAK E VEL: 129 CM/S
MV STENOSIS PRESSURE HALF TIME: 35 MS
MV VALVE AREA P 1/2 METHOD: 6.29
MV VENA CONTRACTA: 0.7 CM
NEUTROPHILS # BLD AUTO: 5.68 THOUSANDS/ÂΜL (ref 1.85–7.62)
NEUTROPHILS # BLD AUTO: 7.35 THOUSANDS/ÂΜL (ref 1.85–7.62)
NEUTS SEG NFR BLD AUTO: 74 % (ref 43–75)
NEUTS SEG NFR BLD AUTO: 86 % (ref 43–75)
NONHDLC SERPL-MCNC: 84 MG/DL
NRBC BLD AUTO-RTO: 0 /100 WBCS
NRBC BLD AUTO-RTO: 0 /100 WBCS
NT-PROBNP SERPL-MCNC: 2323 PG/ML
PISA RADIUS: 0.7 CM
PLATELET # BLD AUTO: 257 THOUSANDS/UL (ref 149–390)
PLATELET # BLD AUTO: 272 THOUSANDS/UL (ref 149–390)
PMV BLD AUTO: 10.7 FL (ref 8.9–12.7)
PMV BLD AUTO: 11.2 FL (ref 8.9–12.7)
POTASSIUM SERPL-SCNC: 4.1 MMOL/L (ref 3.5–5.3)
POTASSIUM SERPL-SCNC: 4.5 MMOL/L (ref 3.5–5.3)
PROT SERPL-MCNC: 6.8 G/DL (ref 6.4–8.4)
PROTHROMBIN TIME: 14.6 SECONDS (ref 11.6–14.5)
RA PRESSURE ESTIMATED: 8 MMHG
RBC # BLD AUTO: 4.13 MILLION/UL (ref 3.88–5.62)
RBC # BLD AUTO: 4.31 MILLION/UL (ref 3.88–5.62)
RIGHT ATRIUM AREA SYSTOLE A4C: 20 CM2
RIGHT VENTRICLE ID DIMENSION: 3.7 CM
RV PSP: 70 MMHG
SL CV LEFT ATRIUM LENGTH A2C: 6.1 CM
SL CV LV EF: 40
SL CV PED ECHO LEFT VENTRICLE DIASTOLIC VOLUME (MOD BIPLANE) 2D: 125 ML
SL CV PED ECHO LEFT VENTRICLE SYSTOLIC VOLUME (MOD BIPLANE) 2D: 77 ML
SODIUM SERPL-SCNC: 136 MMOL/L (ref 135–147)
SODIUM SERPL-SCNC: 138 MMOL/L (ref 135–147)
SPECIMEN SOURCE: ABNORMAL
TR MAX PG: 62 MMHG
TR PEAK VELOCITY: 4 M/S
TRICUSPID ANNULAR PLANE SYSTOLIC EXCURSION: 2.1 CM
TRICUSPID VALVE PEAK REGURGITATION VELOCITY: 3.95 M/S
TRIGL SERPL-MCNC: 61 MG/DL
TSH SERPL DL<=0.05 MIU/L-ACNC: 0.86 UIU/ML (ref 0.45–4.5)
WBC # BLD AUTO: 7.65 THOUSAND/UL (ref 4.31–10.16)
WBC # BLD AUTO: 8.53 THOUSAND/UL (ref 4.31–10.16)

## 2023-02-10 PROCEDURE — 0270346 DILATION OF CORONARY ARTERY, ONE ARTERY, BIFURCATION, WITH DRUG-ELUTING INTRALUMINAL DEVICE, PERCUTANEOUS APPROACH: ICD-10-PCS | Performed by: INTERNAL MEDICINE

## 2023-02-10 PROCEDURE — B2111ZZ FLUOROSCOPY OF MULTIPLE CORONARY ARTERIES USING LOW OSMOLAR CONTRAST: ICD-10-PCS | Performed by: INTERNAL MEDICINE

## 2023-02-10 PROCEDURE — 027034Z DILATION OF CORONARY ARTERY, ONE ARTERY WITH DRUG-ELUTING INTRALUMINAL DEVICE, PERCUTANEOUS APPROACH: ICD-10-PCS | Performed by: INTERNAL MEDICINE

## 2023-02-10 DEVICE — EVEROLIMUS-ELUTING PLATINUM CHROMIUM CORONARY STENT SYSTEM
Type: IMPLANTABLE DEVICE | Site: CORONARY | Status: FUNCTIONAL
Brand: SYNERGY™ XD

## 2023-02-10 RX ORDER — LIDOCAINE HYDROCHLORIDE 10 MG/ML
INJECTION, SOLUTION EPIDURAL; INFILTRATION; INTRACAUDAL; PERINEURAL CODE/TRAUMA/SEDATION MEDICATION
Status: DISCONTINUED | OUTPATIENT
Start: 2023-02-10 | End: 2023-02-10 | Stop reason: HOSPADM

## 2023-02-10 RX ORDER — FLUTICASONE PROPIONATE AND SALMETEROL 250; 50 UG/1; UG/1
1 POWDER RESPIRATORY (INHALATION) EVERY 12 HOURS SCHEDULED
Status: DISCONTINUED | OUTPATIENT
Start: 2023-02-10 | End: 2023-02-10 | Stop reason: CLARIF

## 2023-02-10 RX ORDER — ALBUTEROL SULFATE 90 UG/1
1 AEROSOL, METERED RESPIRATORY (INHALATION) EVERY 4 HOURS PRN
Status: DISCONTINUED | OUTPATIENT
Start: 2023-02-10 | End: 2023-02-12 | Stop reason: HOSPADM

## 2023-02-10 RX ORDER — ASPIRIN 325 MG
325 TABLET ORAL ONCE
Status: COMPLETED | OUTPATIENT
Start: 2023-02-10 | End: 2023-02-10

## 2023-02-10 RX ORDER — HYDROCHLOROTHIAZIDE 25 MG/1
25 TABLET ORAL DAILY
Status: DISCONTINUED | OUTPATIENT
Start: 2023-02-10 | End: 2023-02-12

## 2023-02-10 RX ORDER — FENTANYL CITRATE 50 UG/ML
INJECTION, SOLUTION INTRAMUSCULAR; INTRAVENOUS CODE/TRAUMA/SEDATION MEDICATION
Status: DISCONTINUED | OUTPATIENT
Start: 2023-02-10 | End: 2023-02-10 | Stop reason: HOSPADM

## 2023-02-10 RX ORDER — METOPROLOL TARTRATE 50 MG/1
50 TABLET, FILM COATED ORAL EVERY 12 HOURS SCHEDULED
Status: DISCONTINUED | OUTPATIENT
Start: 2023-02-10 | End: 2023-02-11

## 2023-02-10 RX ORDER — CLOPIDOGREL BISULFATE 75 MG/1
75 TABLET ORAL DAILY
Status: DISCONTINUED | OUTPATIENT
Start: 2023-02-10 | End: 2023-02-12 | Stop reason: HOSPADM

## 2023-02-10 RX ORDER — SODIUM CHLORIDE 9 MG/ML
INJECTION, SOLUTION INTRAVENOUS
Status: COMPLETED | OUTPATIENT
Start: 2023-02-10 | End: 2023-02-10

## 2023-02-10 RX ORDER — FUROSEMIDE 10 MG/ML
40 INJECTION INTRAMUSCULAR; INTRAVENOUS ONCE
Status: COMPLETED | OUTPATIENT
Start: 2023-02-10 | End: 2023-02-10

## 2023-02-10 RX ORDER — VERAPAMIL HYDROCHLORIDE 2.5 MG/ML
INJECTION, SOLUTION INTRAVENOUS CODE/TRAUMA/SEDATION MEDICATION
Status: DISCONTINUED | OUTPATIENT
Start: 2023-02-10 | End: 2023-02-10 | Stop reason: HOSPADM

## 2023-02-10 RX ORDER — HEPARIN SODIUM 1000 [USP'U]/ML
4000 INJECTION, SOLUTION INTRAVENOUS; SUBCUTANEOUS ONCE
Status: COMPLETED | OUTPATIENT
Start: 2023-02-10 | End: 2023-02-10

## 2023-02-10 RX ORDER — HEPARIN SODIUM 1000 [USP'U]/ML
INJECTION, SOLUTION INTRAVENOUS; SUBCUTANEOUS CODE/TRAUMA/SEDATION MEDICATION
Status: DISCONTINUED | OUTPATIENT
Start: 2023-02-10 | End: 2023-02-10 | Stop reason: HOSPADM

## 2023-02-10 RX ORDER — MONTELUKAST SODIUM 10 MG/1
10 TABLET ORAL DAILY
Status: DISCONTINUED | OUTPATIENT
Start: 2023-02-10 | End: 2023-02-12 | Stop reason: HOSPADM

## 2023-02-10 RX ORDER — HEPARIN SODIUM 10000 [USP'U]/100ML
3-20 INJECTION, SOLUTION INTRAVENOUS
Status: DISCONTINUED | OUTPATIENT
Start: 2023-02-10 | End: 2023-02-10

## 2023-02-10 RX ORDER — SODIUM CHLORIDE 9 MG/ML
75 INJECTION, SOLUTION INTRAVENOUS CONTINUOUS
Status: DISPENSED | OUTPATIENT
Start: 2023-02-10 | End: 2023-02-10

## 2023-02-10 RX ORDER — MIDAZOLAM HYDROCHLORIDE 2 MG/2ML
INJECTION, SOLUTION INTRAMUSCULAR; INTRAVENOUS CODE/TRAUMA/SEDATION MEDICATION
Status: DISCONTINUED | OUTPATIENT
Start: 2023-02-10 | End: 2023-02-10 | Stop reason: HOSPADM

## 2023-02-10 RX ORDER — FLUTICASONE FUROATE AND VILANTEROL 200; 25 UG/1; UG/1
1 POWDER RESPIRATORY (INHALATION) DAILY
Status: DISCONTINUED | OUTPATIENT
Start: 2023-02-10 | End: 2023-02-12 | Stop reason: HOSPADM

## 2023-02-10 RX ORDER — LISINOPRIL 20 MG/1
20 TABLET ORAL DAILY
Status: DISCONTINUED | OUTPATIENT
Start: 2023-02-10 | End: 2023-02-12 | Stop reason: HOSPADM

## 2023-02-10 RX ORDER — NITROGLYCERIN 20 MG/100ML
INJECTION INTRAVENOUS CODE/TRAUMA/SEDATION MEDICATION
Status: DISCONTINUED | OUTPATIENT
Start: 2023-02-10 | End: 2023-02-10 | Stop reason: HOSPADM

## 2023-02-10 RX ORDER — INSULIN LISPRO 100 [IU]/ML
1-6 INJECTION, SOLUTION INTRAVENOUS; SUBCUTANEOUS
Status: DISCONTINUED | OUTPATIENT
Start: 2023-02-10 | End: 2023-02-12 | Stop reason: HOSPADM

## 2023-02-10 RX ORDER — ATORVASTATIN CALCIUM 80 MG/1
80 TABLET, FILM COATED ORAL
Status: DISCONTINUED | OUTPATIENT
Start: 2023-02-10 | End: 2023-02-12 | Stop reason: HOSPADM

## 2023-02-10 RX ORDER — MELATONIN
2000 DAILY
Status: DISCONTINUED | OUTPATIENT
Start: 2023-02-10 | End: 2023-02-12 | Stop reason: HOSPADM

## 2023-02-10 RX ORDER — INSULIN GLARGINE 100 [IU]/ML
18 INJECTION, SOLUTION SUBCUTANEOUS
Status: DISCONTINUED | OUTPATIENT
Start: 2023-02-10 | End: 2023-02-12 | Stop reason: HOSPADM

## 2023-02-10 RX ORDER — AMLODIPINE BESYLATE 5 MG/1
5 TABLET ORAL DAILY
Status: DISCONTINUED | OUTPATIENT
Start: 2023-02-10 | End: 2023-02-12 | Stop reason: HOSPADM

## 2023-02-10 RX ORDER — ASPIRIN 81 MG/1
81 TABLET, CHEWABLE ORAL DAILY
Status: DISCONTINUED | OUTPATIENT
Start: 2023-02-10 | End: 2023-02-12 | Stop reason: HOSPADM

## 2023-02-10 RX ORDER — NITROGLYCERIN 0.4 MG/1
0.4 TABLET SUBLINGUAL
Status: DISCONTINUED | OUTPATIENT
Start: 2023-02-10 | End: 2023-02-12 | Stop reason: HOSPADM

## 2023-02-10 RX ORDER — DIPHENHYDRAMINE HYDROCHLORIDE 50 MG/ML
50 INJECTION INTRAMUSCULAR; INTRAVENOUS ONCE
Status: COMPLETED | OUTPATIENT
Start: 2023-02-10 | End: 2023-02-10

## 2023-02-10 RX ADMIN — CHOLECALCIFEROL TAB 25 MCG (1000 UNIT) 2000 UNITS: 25 TAB at 08:16

## 2023-02-10 RX ADMIN — ALBUTEROL SULFATE 10 MG: 2.5 SOLUTION RESPIRATORY (INHALATION) at 00:26

## 2023-02-10 RX ADMIN — PREDNISONE 40 MG: 20 TABLET ORAL at 00:26

## 2023-02-10 RX ADMIN — HEPARIN SODIUM 4000 UNITS: 1000 INJECTION INTRAVENOUS; SUBCUTANEOUS at 02:45

## 2023-02-10 RX ADMIN — ATORVASTATIN CALCIUM 80 MG: 80 TABLET, FILM COATED ORAL at 16:38

## 2023-02-10 RX ADMIN — METOPROLOL TARTRATE 50 MG: 50 TABLET, FILM COATED ORAL at 08:16

## 2023-02-10 RX ADMIN — PERFLUTREN 0.6 ML/MIN: 6.52 INJECTION, SUSPENSION INTRAVENOUS at 09:40

## 2023-02-10 RX ADMIN — HEPARIN SODIUM 11.8 UNITS/KG/HR: 10000 INJECTION, SOLUTION INTRAVENOUS at 02:47

## 2023-02-10 RX ADMIN — ASPIRIN 325 MG ORAL TABLET 325 MG: 325 PILL ORAL at 02:11

## 2023-02-10 RX ADMIN — MONTELUKAST 10 MG: 10 TABLET, FILM COATED ORAL at 08:16

## 2023-02-10 RX ADMIN — ISODIUM CHLORIDE 3 ML: 0.03 SOLUTION RESPIRATORY (INHALATION) at 00:26

## 2023-02-10 RX ADMIN — CLOPIDOGREL BISULFATE 75 MG: 75 TABLET ORAL at 08:16

## 2023-02-10 RX ADMIN — INSULIN LISPRO 3 UNITS: 100 INJECTION, SOLUTION INTRAVENOUS; SUBCUTANEOUS at 12:30

## 2023-02-10 RX ADMIN — METOPROLOL TARTRATE 50 MG: 50 TABLET, FILM COATED ORAL at 21:37

## 2023-02-10 RX ADMIN — AMLODIPINE BESYLATE 5 MG: 5 TABLET ORAL at 08:16

## 2023-02-10 RX ADMIN — IPRATROPIUM BROMIDE 1 MG: 0.5 SOLUTION RESPIRATORY (INHALATION) at 00:26

## 2023-02-10 RX ADMIN — SODIUM CHLORIDE 75 ML/HR: 0.9 INJECTION, SOLUTION INTRAVENOUS at 15:17

## 2023-02-10 RX ADMIN — INSULIN LISPRO 1 UNITS: 100 INJECTION, SOLUTION INTRAVENOUS; SUBCUTANEOUS at 16:38

## 2023-02-10 RX ADMIN — DIPHENHYDRAMINE HYDROCHLORIDE 50 MG: 50 INJECTION, SOLUTION INTRAMUSCULAR; INTRAVENOUS at 13:37

## 2023-02-10 RX ADMIN — ASPIRIN 81 MG: 81 TABLET, CHEWABLE ORAL at 08:16

## 2023-02-10 RX ADMIN — LISINOPRIL 20 MG: 20 TABLET ORAL at 08:16

## 2023-02-10 RX ADMIN — LEVOTHYROXINE SODIUM 137 MCG: 25 TABLET ORAL at 06:10

## 2023-02-10 RX ADMIN — INSULIN GLARGINE 18 UNITS: 100 INJECTION, SOLUTION SUBCUTANEOUS at 21:38

## 2023-02-10 RX ADMIN — FUROSEMIDE 40 MG: 10 INJECTION, SOLUTION INTRAVENOUS at 16:38

## 2023-02-10 RX ADMIN — HYDROCORTISONE SODIUM SUCCINATE 200 MG: 100 INJECTION, POWDER, FOR SOLUTION INTRAMUSCULAR; INTRAVENOUS at 12:04

## 2023-02-10 RX ADMIN — HYDROCHLOROTHIAZIDE 25 MG: 25 TABLET ORAL at 08:16

## 2023-02-10 NOTE — ASSESSMENT & PLAN NOTE
TAKE: Toprol XL 50 mg q12h, lisinopril 20 mg, spironolactone 25 mg qd, jardiance 10 mg    STOP taking HCTZ 25 mg  STOP taking metoprolol tartrate 50 mg q12h

## 2023-02-10 NOTE — ED PROCEDURE NOTE
PROCEDURE  CriticalCare Time  Performed by: Merlinda Cleaver, DO  Authorized by: Merlinda Cleaver, DO     Critical care provider statement:     Critical care time (minutes):  80    Critical care time was exclusive of:  Separately billable procedures and treating other patients and teaching time    Critical care was necessary to treat or prevent imminent or life-threatening deterioration of the following conditions:  Cardiac failure and circulatory failure    Critical care was time spent personally by me on the following activities:  Blood draw for specimens, obtaining history from patient or surrogate, development of treatment plan with patient or surrogate, evaluation of patient's response to treatment, examination of patient, ordering and performing treatments and interventions, ordering and review of laboratory studies, ordering and review of radiographic studies, re-evaluation of patient's condition and review of old charts  Comments:      Upon my evaluation, this patient has a high probability of imminent or life-threatening deterioration due to elevated troponin and NSTEMI requiring emergent consultation with cardiology which required my direct attention, intervention, and personal management      I have personally provided 80 minutes of critical care time, exclusive of procedures, teaching, and any prior time recorded by providers other than myself  Time includes review of laboratory data, radiology results, discussion with consultants, and monitoring for potential decompensation                Merlinda Cleaver, DO  02/10/23 0614

## 2023-02-10 NOTE — ASSESSMENT & PLAN NOTE
a1c 7 6%    Resume home regimen: Metformin 850 mg BID, Lantus 18 units OD, Lispro 5 units with lunch and dinner  F/u with PCP 1-2 weeks  Encouraged to bring in blood glucose logs

## 2023-02-10 NOTE — ASSESSMENT & PLAN NOTE
S/p LHC on 2/11 showing multi vessel disease, s/p 3 stents placed this admission (2x in LAD and 1x in right posterior descending)    02/11 Echo   Left Ventricle: Left ventricular cavity size is normal  Wall thickness is mildly increased  The left ventricular ejection fraction is 35-40%  Systolic function is moderately reduced  Severe hypokinesis of the inferoseptal, inferior and anteroseptal walls including the apical septal and apical inferior segments  •There is abnormal septal motion consistent with left bundle branch block  moderate to severe regurgitation of mitral valve   •  Aortic Valve: There is an Emery RONNI 3 Ultra 26 mm TAVR bioprosthetic valve  There is trace paravalvular regurgitation  The gradient recorded across the prosthetic aortic valve is within the expected range  The aortic valve peak velocity is 1 89 m/s  The aortic valve mean gradient is 7 mmHg        Plan  · Continue Clopidogrel, aspirin,  Toprol-XL, rosuvastatin  · outpt cardiology f/u

## 2023-02-10 NOTE — ASSESSMENT & PLAN NOTE
Cont Rosuvastatin 40 mg OD     Latest Reference Range & Units 11/10/22 10:09   Cholesterol See Comment mg/dL 142   Triglycerides See Comment mg/dL 75   HDL >=40 mg/dL 65   LDL Calculated 0 - 100 mg/dL 62     ·

## 2023-02-10 NOTE — ED PROVIDER NOTES
History  Chief Complaint   Patient presents with   • Shortness of Breath     Pt c/o feeling sob and pain at the site of his pacer x 2 days  Pt has an inhaler but sx's did not resolve  68-year-old male with history of asthma, diabetes, CAD and dual-chamber pacemaker presents with dyspnea x7 days  Patient also reports self-limited episode of left-sided chest pain yesterday  Event lasted approximately 30 minutes and was described as radiating to the left upper back, moderate to severe  Multiple at home nebulizer treatments with no improvement in symptoms  Denies cough, fever, abdominal pain, or lower extremity edema  Prior to Admission Medications   Prescriptions Last Dose Informant Patient Reported? Taking?    Advair -21 MCG/ACT inhaler   No No   Sig: TAKE 2 PUFFS BY MOUTH TWICE A DAY   Alcohol Swabs (ALCOHOL PADS) 70 % PADS   Yes No   Aspirin Low Dose 81 MG chewable tablet   No No   Sig: CHEW 1 TABLET BY MOUTH DAILY   Blood Glucose Monitoring Suppl (OneTouch Verio) w/Device KIT   No No   Sig: Check blood glucose three times daily before each meal   Continuous Blood Gluc  (FreeStyle Cristofer 2 [place]) POWER   No No   Sig: Use 1 each continuous   Patient not taking: Reported on 1/30/2023   Continuous Blood Gluc Sensor (FreeStyle Cristofer 2 Sensor) [facility]   No No   Sig: Use 1 each every 14 (fourteen) days   Patient not taking: Reported on 1/30/2023   Insulin Pen Needle (Pen Needles) 31G X 8 MM MISC   No No   Sig: Use daily   Lancets (FREESTYLE) lancets   No No   Sig: by Other route as needed (As needed)   Lantus SoloStar 100 units/mL SOPN   No No   Sig: INJECT 0 18 ML (18 UNITS TOTAL) UNDER THE SKIN DAILY AT BEDTIME   NovoLOG FlexPen 100 units/mL injection pen   No No   Sig: Inject 5 units with breakfast and with dinner   albuterol (PROVENTIL HFA,VENTOLIN HFA) 90 mcg/act inhaler   No No   Sig: INHALE 2 PUFFS EVERY 4 HOURS AS NEEDED FOR WHEEZING OR SHORTNESS OF BREATH   amLODIPine (NORVASC) 5 mg tablet   No No   Sig: Take 1 tablet (5 mg total) by mouth daily   cholecalciferol (VITAMIN D3) 1,000 units tablet   No No   Sig: Take 2 tablets (2,000 Units total) by mouth daily   clopidogrel (PLAVIX) 75 mg tablet   No No   Sig: Take 1 tablet (75 mg total) by mouth daily   fluticasone (FLONASE) 50 mcg/act nasal spray   No No   Si sprays into each nostril daily   hydrochlorothiazide (HYDRODIURIL) 25 mg tablet   No No   Sig: TAKE 1 TABLET (25 MG TOTAL) BY MOUTH DAILY     levothyroxine 137 mcg tablet   No No   Sig: TAKE 1 TABLET BY MOUTH EVERY DAY   lisinopril (ZESTRIL) 20 mg tablet   No No   Sig: Take 1 tablet (20 mg total) by mouth daily   metFORMIN (GLUCOPHAGE) 850 mg tablet   No No   Sig: TAKE 1 TABLET (850 MG TOTAL) BY MOUTH 2 (TWO) TIMES A DAY WITH MEALS   methocarbamol (Robaxin-750) 750 mg tablet   No No   Sig: Take 1 tablet (750 mg total) by mouth every 6 (six) hours as needed for muscle spasms   metoprolol tartrate (LOPRESSOR) 50 mg tablet   No No   Sig: Take 1 tablet (50 mg total) by mouth every 12 (twelve) hours   montelukast (SINGULAIR) 10 mg tablet   No No   Sig: TAKE 1 TABLET BY MOUTH EVERY DAY   rosuvastatin (CRESTOR) 40 MG tablet   No No   Sig: TAKE 1 TABLET BY MOUTH EVERY DAY      Facility-Administered Medications: None       Past Medical History:   Diagnosis Date   • Arthritis    • Asthma    • Colon polyps    • Community acquired pneumonia     last assessed: 2014   • Diabetes mellitus ([facility] )    • Hemorrhagic prepatellar bursitis, left 10/21/2019   • Hepatitis C    • High cholesterol    • History of colonoscopy    • Hypertension    • Lymphadenopathy, anterior cervical 2018   • Nephritis and nephropathy, not specified as acute or chronic, with other specified pathological lesion in kidney, in diseases classified elsewhere 3/26/2013   • Screening for colon cancer 2019   • Thoracic vertebral fracture ([facility] ) 2014       Past Surgical History:   Procedure Laterality Date   • CARDIAC CATHETERIZATION N/A 6/3/2022    Procedure: Cardiac RHC/LHC; Surgeon: Mayte Garzon MD;  [facility]: [facility] C[facility]; Service: Cardiology   • CARDIAC CATHETERIZATION N/A 2022    Procedure: CARDIAC TAVR;  Surgeon: Maria R Aguilar MD;  Location: [facility] MAIN [facility];  Service: Cardiology   • CARDIAC ELECTROPHYSIOLOGY PROCEDURE N/A 2022    Procedure: Cardiac pacer implant ; DC PPM;  Surgeon: Maco Louis DO;  Location: [facility] CARDIAC C[facility]; Service: Cardiology   • COLONOSCOPY     • COLONOSCOPY W/ POLYPECTOMY     • LITHOTRIPSY     • MULTIPLE TOOTH EXTRACTIONS     • MI COLONOSCOPY FLX DX W/COLLJ SPEC WHEN PFRMD N/A 2018    Procedure: COLONOSCOPY;  Surgeon: Lexy Cotto MD;  Location: [facility] GI LAB; Service: Gastroenterology   • MI REPLACE AORTIC VALVE OPENFEMORAL ARTERY APPROACH N/A 2022    Procedure: REPLACEMENT AORTIC VALVE TRANSCATHETER (TAVR) TRANSFEMORAL W/ 26MM QUINN RONNI S3 ULTRA VALVE(ACCESS ON LEFT) SAULO;  Surgeon: Kelsey Mclain MD;  Location: [facility] MAIN [facility];  Service: Cardiac Surgery       Family History   Problem Relation Age of Onset   • Heart attack Family         at age 80   • No Known Problems Mother    • No Known Problems Father    • Diabetes Sister    • Diabetes Brother      I have reviewed and agree with the history as documented  E-Cigarette/Vaping   • E-Cigarette Use Never User      E-Cigarette/Vaping Substances   • Nicotine No    • THC No    • CBD No    • Flavoring No    • Other No    • Unknown No      Social History     Tobacco Use   • Smoking status: Former     Packs/day: 0 50     Types: Cigarettes     Quit date: 2022     Years since quittin 7   • Smokeless tobacco: Never   • Tobacco comments:     started when he was about 24 yrs old; stopped smoking 3 wks ago   Vaping Use   • Vaping Use: Never used   Substance Use Topics   • Alcohol use: No   • Drug use: No        Review of Systems   Constitutional: Negative for chills and fever     HENT: Negative for ear pain and sore throat  Eyes: Negative for pain and visual disturbance  Respiratory: Positive for shortness of breath  Negative for cough  Cardiovascular: Positive for chest pain  Negative for palpitations  Gastrointestinal: Negative for abdominal pain and vomiting  Genitourinary: Negative for dysuria and hematuria  Musculoskeletal: Negative for arthralgias and back pain  Skin: Negative for color change and rash  Neurological: Negative for seizures and syncope  All other systems reviewed and are negative  Physical Exam  ED Triage Vitals [02/09/23 2233]   Temperature Pulse Respirations Blood Pressure SpO2   98 7 °F (37 1 °C) (!) 114 18 140/82 93 %      Temp Source Heart Rate Source Patient Position - Orthostatic VS BP Location FiO2 (%)   Oral Monitor Sitting Right arm --      Pain Score       --             Orthostatic Vital Signs  Vitals:    02/09/23 2233 02/10/23 0230 02/10/23 0321   BP: 140/82  141/77   Pulse: (!) 114 88 95   Patient Position - Orthostatic VS: Sitting  Lying       Physical Exam  Vitals and nursing note reviewed  Constitutional:       General: He is not in acute distress  Appearance: He is well-developed and normal weight  He is not ill-appearing, toxic-appearing or diaphoretic  HENT:      Head: Normocephalic and atraumatic  Eyes:      Conjunctiva/sclera: Conjunctivae normal    Neck:      Vascular: No JVD  Cardiovascular:      Rate and Rhythm: Normal rate and regular rhythm  Pulses: Normal pulses  Heart sounds: Murmur heard  Pulmonary:      Effort: Pulmonary effort is normal  No tachypnea or respiratory distress  Breath sounds: Decreased breath sounds and wheezing present  No rhonchi or rales  Chest:      Chest wall: No tenderness or edema  Abdominal:      Palpations: Abdomen is soft  Tenderness: There is no abdominal tenderness  Musculoskeletal:         General: No swelling  Normal range of motion        Cervical back: Normal range of motion and neck supple  Right lower leg: No tenderness  No edema  Left lower leg: No tenderness  No edema  Skin:     General: Skin is warm and dry  Capillary Refill: Capillary refill takes less than 2 seconds  Findings: No erythema  Neurological:      Mental Status: He is alert and oriented to person, place, and time     Psychiatric:         Mood and Affect: Mood normal          Behavior: Behavior normal          ED Medications  Medications   heparin (porcine) 25,000 units in 0 45% NaCl 250 mL infusion (premix) (11 8 Units/kg/hr × 85 kg (Order-Specific) Intravenous New Bag 2/10/23 0247)   albuterol (PROVENTIL HFA,VENTOLIN HFA) inhaler 1 puff (has no administration in time range)   amLODIPine (NORVASC) tablet 5 mg (has no administration in time range)   aspirin chewable tablet 81 mg (has no administration in time range)   cholecalciferol (VITAMIN D3) tablet 2,000 Units (has no administration in time range)   clopidogrel (PLAVIX) tablet 75 mg (has no administration in time range)   hydrochlorothiazide (HYDRODIURIL) tablet 25 mg (has no administration in time range)   insulin glargine (LANTUS) subcutaneous injection 18 Units 0 18 mL (has no administration in time range)   levothyroxine tablet 137 mcg (has no administration in time range)   lisinopril (ZESTRIL) tablet 20 mg (has no administration in time range)   metoprolol tartrate (LOPRESSOR) tablet 50 mg (has no administration in time range)   montelukast (SINGULAIR) tablet 10 mg (has no administration in time range)   atorvastatin (LIPITOR) tablet 80 mg (has no administration in time range)   insulin lispro (HumaLOG) 100 units/mL subcutaneous injection 1-6 Units (has no administration in time range)   fluticasone-vilanterol 200-25 mcg/actuation 1 puff (has no administration in time range)   nitroglycerin (NITROSTAT) SL tablet 0 4 mg (has no administration in time range)   albuterol inhalation solution 10 mg (10 mg Nebulization Given 2/10/23 0026)     And ipratropium (ATROVENT) 0 02 % inhalation solution 1 mg (1 mg Nebulization Given 2/10/23 0026)     And   sodium chloride 0 9 % inhalation solution 3 mL (3 mL Nebulization Given 2/10/23 0026)   predniSONE tablet 40 mg (40 mg Oral Given 2/10/23 0026)   aspirin tablet 325 mg (325 mg Oral Given 2/10/23 0211)   heparin (porcine) injection 4,000 Units (4,000 Units Intravenous Given 2/10/23 0245)       Diagnostic Studies  Results Reviewed     Procedure Component Value Units Date/Time    HS Troponin I 2hr [[id number]]  (Abnormal) Collected: 02/10/23 0227    Lab Status: Final result Specimen: Blood from Arm, Right Updated: 02/10/23 0328     hs TnI 2hr 1,930 ng/L      Delta 2hr hsTnI -192 ng/L     APTT [[id number]] Collected: 02/10/23 0315    Lab Status: In process Specimen: Blood from Arm, Left Updated: 02/10/23 0320    Protime-INR [[id number]] Collected: 02/10/23 0315    Lab Status:  In process Specimen: Blood from Arm, Left Updated: 02/10/23 0320    HS Troponin I 4hr [[id number]]     Lab Status: No result Specimen: Blood     HS Troponin 0hr (reflex protocol) [[id number]]  (Abnormal) Collected: 02/10/23 0025    Lab Status: Final result Specimen: Blood from Arm, Right Updated: 02/10/23 0117     hs TnI 0hr 2,122 ng/L     Comprehensive metabolic panel [[id number]]  (Abnormal) Collected: 02/10/23 0025    Lab Status: Final result Specimen: Blood from Arm, Right Updated: 02/10/23 0117     Sodium 138 mmol/L      Potassium 4 1 mmol/L      Chloride 107 mmol/L      CO2 30 mmol/L      ANION GAP 1 mmol/L      BUN 22 mg/dL      Creatinine 1 15 mg/dL      Glucose 185 mg/dL      Calcium 9 9 mg/dL      Corrected Calcium 10 5 mg/dL      AST 27 U/L      ALT 20 U/L      Alkaline Phosphatase 47 U/L      Total Protein 6 8 g/dL      Albumin 3 3 g/dL      Total Bilirubin 0 57 mg/dL      eGFR 61 ml/min/1 73sq m     Narrative:      [place] guidelines for Chronic Kidney Disease (CKD):   •  Stage 1 with normal or high GFR (GFR > 90 mL/min/1 73 square meters)  •  Stage 2 Mild CKD (GFR = 60-89 mL/min/1 73 square meters)  •  Stage 3A Moderate CKD (GFR = 45-59 mL/min/1 73 square meters)  •  Stage 3B Moderate CKD (GFR = 30-44 mL/min/1 73 square meters)  •  Stage 4 Severe CKD (GFR = 15-29 mL/min/1 73 square meters)  •  Stage 5 End Stage CKD (GFR <15 mL/min/1 73 square meters)  Note: GFR calculation is accurate only with a steady state creatinine    CBC and differential [[id number]]  (Abnormal) Collected: 02/10/23 0025    Lab Status: Final result Specimen: Blood from Arm, Right Updated: 02/10/23 0048     WBC 7 65 Thousand/uL      RBC 4 13 Million/uL      Hemoglobin 11 7 g/dL      Hematocrit 36 5 %      MCV 88 fL      MCH 28 3 pg      MCHC 32 1 g/dL      RDW 15 0 %      MPV 10 7 fL      Platelets 345 Thousands/uL      nRBC 0 /100 WBCs      Neutrophils Relative 74 %      Immat GRANS % 0 %      Lymphocytes Relative 15 %      Monocytes Relative 9 %      Eosinophils Relative 2 %      Basophils Relative 0 %      Neutrophils Absolute 5 68 Thousands/µL      Immature Grans Absolute 0 02 Thousand/uL      Lymphocytes Absolute 1 13 Thousands/µL      Monocytes Absolute 0 65 Thousand/µL      Eosinophils Absolute 0 15 Thousand/µL      Basophils Absolute 0 02 Thousands/µL                  XR chest 1 view portable   ED Interpretation by Kwesi Ann MD (02/10 7211)   The CXR was interpreted by me independently  On my read, it appears without acute abnormalities:  - The  cardiomediastinal  silhouette  is  enlarged, similar to prior     - The  lungs  are  clear  - No  pleural  effusions   - No  pneumothorax    - The  pulmonary  vasculature  is  within  normal  limits     - The  trachea  is  midline     - Bony  thorax  is  unremarkable       - Similar to previous CXR             Procedures  ECG 12 Lead Documentation Only    Date/Time: 2/10/2023 3:55 AM  Performed by: Kwesi Ann MD  Authorized by: Kwesi Ann MD     Indications / Diagnosis: Chest pain  ECG reviewed by me, the ED Provider: yes    Patient location:  ED  Previous ECG:     Previous ECG:  Compared to current    Similarity:  No change    Comparison to cardiac monitor: Yes    Interpretation:     Interpretation: abnormal    Rate:     ECG rate:  87    ECG rate assessment: normal    Rhythm:     Rhythm: sinus rhythm    Ectopy:     Ectopy: none    QRS:     QRS axis:  Normal    QRS intervals: Wide  Conduction:     Conduction: abnormal      Abnormal conduction: incomplete LBBB    ST segments:     ST segments:  Normal  T waves:     T waves: normal            ED Course  ED Course as of 02/10/23 0358   Fri Feb 10, 2023   0139 hs TnI 0hr(!): 2,122   mg               Identification of Seniors at [facility] Most Recent Value   (ISAR) Identification of Seniors at Risk    Before the illness or injury that brought you to the Emergency, did you need someone to help you on a regular basis? 0 Filed at: 02/09/2023 2236   In the last 24 hours, have you needed more help than usual? 0 Filed at: 02/09/2023 2236   Have you been hospitalized for one or more nights during the past 6 months? 0 Filed at: 02/09/2023 2236   In general, do you see well? 0 Filed at: 02/09/2023 2236   In general, do you have serious problems with your memory? 0 Filed at: 02/09/2023 2236   Do you take more than three different medications every day? 1 Filed at: 02/09/2023 2236   ISAR Score 1 Filed at: 02/09/2023 2236                              Medical Decision Making  37-year-old male with history of moderate CAD and asthma presents with asthma exacerbation  1 episode of chest pain that resolved yesterday  No signs of fluid overload to suggest CHF exacerbation  No fever or cough to suggest acute pneumonia  Differential for chest pain includes ACS, musculoskeletal pain  No current chest pain making aortic dissection unlikely       Asthma exacerbation: acute illness or injury  Chest pain: self-limited or minor problem  Elevated troponin I level: acute illness or injury  NSTEMI (non-ST elevated myocardial infarction) [facility]): acute illness or injury  Amount and/or Complexity of Data Reviewed  Labs: ordered  Decision-making details documented in ED Course  Radiology: ordered and independent interpretation performed  ECG/medicine tests: ordered and independent interpretation performed  Risk  OTC drugs  Prescription drug management  Decision regarding hospitalization  Critical Care  Total time providing critical care: < 30 minutes        Disposition  Final diagnoses:   Elevated troponin I level   Chest pain   NSTEMI (non-ST elevated myocardial infarction) ([place] [address] )   Asthma exacerbation     Time reflects when diagnosis was documented in both MDM as applicable and the Disposition within this note     Time User Action Codes Description Comment    2/10/2023  1:40 AM Shelly Merritts Add [R77 8] Elevated troponin I level     2/10/2023  2:46 AM Shelly Merritts Add [R07 9] Chest pain     2/10/2023  2:46 AM Shelly Merritts Add [I21 4] NSTEMI (non-ST elevated myocardial infarction) ([facility] )     2/10/2023  2:46 AM Shelly Merritts Modify [R77 8] Elevated troponin I level     2/10/2023  2:46 AM Sehlly Merritts Modify [I21 4] NSTEMI (non-ST elevated myocardial infarction) ([facility] )     2/10/2023  2:46 AM Shelly Merritts Add [R65 245] Asthma exacerbation     2/10/2023  2:46 AM Shelly Merritts Modify [I21 4] NSTEMI (non-ST elevated myocardial infarction) ([place] [address] )     2/10/2023  2:46 AM Shelly Merritts Modify [W97 221] Asthma exacerbation       ED Disposition     ED Disposition   Admit    Condition   Stable    Date/Time   Fri Feb 10, 2023  2:47 AM    Comment   Case was discussed with SOD resident and the patient's admission status was agreed to be Admission Status: inpatient status to the service of Dr Brandon Cooper              Follow-up Information    None         Patient's Medications   Discharge Prescriptions    No medications on file     No discharge procedures on file     PDMP Review       Value Time User    PDMP Reviewed  Yes 9/1/2022  1:26 PM Rose WilsonerAUGUST           ED Provider  Attending physically available and evaluated Drew Cesar I managed the patient along with the ED Attending      Electronically Signed by         Kary Cannon MD  02/10/23 4604

## 2023-02-10 NOTE — CONSULTS
Consultation - General Cardiology Team 2  Christine Michelle 78 y o  male MRN: [id number]  Unit/Bed#: [facility] 420-01 Encounter: [id number]      Inpatient consult to Cardiology  Consult performed by: Chasity Ornelas  Consult ordered by: Elvia Gonzales DO        PCP: Alejandra Kelsey DO     History of Present Illness   Physician Requesting Consult: Raad Lind DO  Reason for Consult / Principal Problem: NSTEMI, elevated Troponin    HPI: Christine Michelle is a 78y o  year old male with a PMHx of severe aortic stenosis s/p TAVR in June 2022, dual chamber pacemaker placed for intermittent complete heart block in August 2022, CAD, HLD, HTN, T2DM, asthma, and hypothyroidism who presented with 1 week of increased shortness of breath despite regular use of his inhaler, and found it worse when lying in bed  He also began noticed chest pain at the site of his pacemaker  He came into the ED and was given nebs and prednisone for bilaterally decreased breath sounds  He had some improvement  In the ED, EKG was performed and showed sinus rhythm with PACs and a LBBB (per my read)  He had a HS troponin of 2122 (0hr), 1930 (2hr), and 2125 (4hr)  He was loaded with ASA and started on heparin drip  Cardiology was consulted for possible NSTEMI and elevated troponin  Patient with left heart catheterization in June 2022 showing mild diffuse disease throughout the left main, LAD, LCx, and right coronary arteries  50% stenosis was also seen in the mid LAD, first diagonal lesino, first obtuse marginal, and RPDA1, and RPDA2  His last TTE in July of 2022 showed a hypokinetic basal inferoseptal and basal inferior wall with moderate regurgitation and LVEF of 55%  Review of Systems   Constitutional: Negative for chills and fever  HENT: Negative for congestion  Eyes: Negative  Respiratory: Positive for shortness of breath (improved)  Negative for cough  Cardiovascular: Negative for chest pain and palpitations  Gastrointestinal: Negative for abdominal pain, constipation, diarrhea, nausea and vomiting  Genitourinary: Negative for difficulty urinating and dysuria  Musculoskeletal: Negative  Skin: Negative  Review of system was conducted and was negative except for as stated in the HPI  Historical Information   Past Medical History:   Diagnosis Date   • Arthritis    • Asthma    • Colon polyps    • Community acquired pneumonia     last assessed: 5/1/2014   • Diabetes mellitus ([facility] [address] )    • Hemorrhagic prepatellar bursitis, left 10/21/2019   • Hepatitis C    • High cholesterol    • History of colonoscopy 2017   • Hypertension    • Lymphadenopathy, anterior cervical 04/17/2018   • Nephritis and nephropathy, not specified as acute or chronic, with other specified pathological lesion in kidney, in diseases classified elsewhere 3/26/2013   • Screening for colon cancer 05/01/2019   • Thoracic vertebral fracture ([facility] ) 06/11/2014     Past Surgical History:   Procedure Laterality Date   • CARDIAC CATHETERIZATION N/A 6/3/2022    Procedure: Cardiac RHC/LHC; Surgeon: Alla Laura MD;  Location: [facility] CARDIAC CATH LAB; Service: Cardiology   • CARDIAC CATHETERIZATION N/A 6/21/2022    Procedure: CARDIAC TAVR;  Surgeon: Enmanuel Albert MD;  Location: [facility] MAIN OR;  Service: Cardiology   • CARDIAC ELECTROPHYSIOLOGY PROCEDURE N/A 9/1/2022    Procedure: Cardiac pacer implant ; DC PPM;  Surgeon: Cristy Knight DO;  Location: [facility] CARDIAC CATH LAB; Service: Cardiology   • COLONOSCOPY     • COLONOSCOPY W/ POLYPECTOMY     • LITHOTRIPSY     • MULTIPLE TOOTH EXTRACTIONS     • IN COLONOSCOPY FLX DX W/COLLJ SPEC WHEN PFRMD N/A 5/17/2018    Procedure: COLONOSCOPY;  Surgeon: Steve Cooper MD;  Location: [facility] GI LAB;   Service: Gastroenterology   • IN REPLACE AORTIC VALVE OPENFEMORAL ARTERY APPROACH N/A 6/21/2022    Procedure: REPLACEMENT AORTIC VALVE TRANSCATHETER (TAVR) TRANSFEMORAL W/ 26MM QUINN RONNI S3 ULTRA VALVE(ACCESS ON LEFT) SAULO; Surgeon: Teresa Alva MD;  Location: [facility] MAIN OR;  Service: Cardiac Surgery     Social History     Substance and Sexual Activity   Alcohol Use No     Social History     Substance and Sexual Activity   Drug Use No     Social History     Tobacco Use   Smoking Status Former   • Packs/day: 0 50   • Types: Cigarettes   • Quit date: 2022   • Years since quittin 7   Smokeless Tobacco Never   Tobacco Comments    started when he was about 24 yrs old; stopped smoking 3 wks ago     Family History:   Family History   Problem Relation Age of Onset   • Heart attack Family         at age 80   • No Known Problems Mother    • No Known Problems Father    • Diabetes Sister    • Diabetes Brother        Meds/Allergies   Hospital Medications:   Current Facility-Administered Medications   Medication Dose Route Frequency   • albuterol (PROVENTIL HFA,VENTOLIN HFA) inhaler 1 puff  1 puff Inhalation Q4H PRN   • amLODIPine (NORVASC) tablet 5 mg  5 mg Oral Daily   • aspirin chewable tablet 81 mg  81 mg Oral Daily   • atorvastatin (LIPITOR) tablet 80 mg  80 mg Oral Daily With Dinner   • cholecalciferol (VITAMIN D3) tablet 2,000 Units  2,000 Units Oral Daily   • clopidogrel (PLAVIX) tablet 75 mg  75 mg Oral Daily   • diphenhydrAMINE (BENADRYL) injection 50 mg  50 mg Intravenous Once   • fluticasone-vilanterol 200-25 mcg/actuation 1 puff  1 puff Inhalation Daily   • heparin (porcine) 25,000 units in 0 45% NaCl 250 mL infusion (premix)  3-20 Units/kg/hr (Order-Specific) Intravenous Titrated   • hydrochlorothiazide (HYDRODIURIL) tablet 25 mg  25 mg Oral Daily   • insulin glargine (LANTUS) subcutaneous injection 18 Units 0 18 mL  18 Units Subcutaneous HS   • insulin lispro (HumaLOG) 100 units/mL subcutaneous injection 1-6 Units  1-6 Units Subcutaneous TID AC   • levothyroxine tablet 137 mcg  137 mcg Oral Early Morning   • lisinopril (ZESTRIL) tablet 20 mg  20 mg Oral Daily   • metoprolol tartrate (LOPRESSOR) tablet 50 mg  50 mg Oral Q12H [address]   • montelukast (SINGULAIR) tablet 10 mg  10 mg Oral Daily   • nitroglycerin (NITROSTAT) SL tablet 0 4 mg  0 4 mg Sublingual Q5 Min PRN     Home Medications:   Medications Prior to Admission   Medication   • Advair -21 MCG/ACT inhaler   • albuterol (PROVENTIL HFA,VENTOLIN HFA) 90 mcg/act inhaler   • Alcohol Swabs (ALCOHOL PADS) 70 % PADS   • amLODIPine (NORVASC) 5 mg tablet   • Aspirin Low Dose 81 MG chewable tablet   • Blood Glucose Monitoring Suppl (OneTouch Verio) w/Device KIT   • cholecalciferol (VITAMIN D3) 1,000 units tablet   • clopidogrel (PLAVIX) 75 mg tablet   • Continuous Blood Gluc  (FreeStyle Cristofer 2 Holden) POWER   • Continuous Blood Gluc Sensor (FreeStyle Cristofer 2 Sensor) MISC   • fluticasone (FLONASE) 50 mcg/act nasal spray   • hydrochlorothiazide (HYDRODIURIL) 25 mg tablet   • Insulin Pen Needle (Pen Needles) 31G X 8 MM MISC   • Lancets (FREESTYLE) lancets   • Lantus SoloStar 100 units/mL SOPN   • levothyroxine 137 mcg tablet   • lisinopril (ZESTRIL) 20 mg tablet   • metFORMIN (GLUCOPHAGE) 850 mg tablet   • methocarbamol (Robaxin-750) 750 mg tablet   • metoprolol tartrate (LOPRESSOR) 50 mg tablet   • montelukast (SINGULAIR) 10 mg tablet   • NovoLOG FlexPen 100 units/mL injection pen   • rosuvastatin (CRESTOR) 40 MG tablet       Allergies   Allergen Reactions   • Iv Contrast [Iodinated Contrast Media] Rash     Patient states he had a severe reaction approximately 10 years ago , states he had a rash, itchy and he remembers a bunch of people pounding on chest and being surrounded by multiple doctors  Objective   Vitals: Blood pressure 137/82, pulse 88, temperature 97 7 °F (36 5 °C), resp  rate 16, height 5' 9" (1 753 m), weight 85 6 kg (188 lb 11 4 oz), SpO2 92 %    Orthostatic Blood Pressures    Flowsheet Row Most Recent Value   Blood Pressure 137/82 filed at 02/10/2023 1109   Patient Position - Orthostatic VS Lying filed at 02/10/2023 0502            Invasive Devices Peripheral Intravenous Line  Duration           Peripheral IV 02/10/23 Left Wrist <1 day    Peripheral IV 02/10/23 Right Antecubital <1 day                Physical Exam  Vitals reviewed  Constitutional:       Appearance: Normal appearance  HENT:      Head: Normocephalic and atraumatic  Right Ear: External ear normal       Left Ear: External ear normal       Nose: Nose normal    Eyes:      Extraocular Movements: Extraocular movements intact  Conjunctiva/sclera: Conjunctivae normal    Cardiovascular:      Rate and Rhythm: Normal rate and regular rhythm  Pulses: Normal pulses  Heart sounds: Murmur (over mitral valve region) heard  Pulmonary:      Effort: Pulmonary effort is normal       Breath sounds: Normal breath sounds  Abdominal:      General: There is no distension  Palpations: Abdomen is soft  Tenderness: There is no abdominal tenderness  Musculoskeletal:      Right lower leg: No edema  Left lower leg: No edema  Skin:     General: Skin is warm and dry  Neurological:      Mental Status: He is alert  Lab Results: I have personally reviewed pertinent lab results  Results from last 7 days   Lab Units 02/10/23  0429 02/10/23  0227 02/10/23  0025   HS TNI 0HR ng/L  --   --  2,122*   HS TNI 2HR ng/L  --  1,930*  --    HS TNI 4HR ng/L 2,125*  --   --          Results from last 7 days   Lab Units 02/10/23  0025   POTASSIUM mmol/L 4 1   CO2 mmol/L 30   CHLORIDE mmol/L 107   BUN mg/dL 22   CREATININE mg/dL 1 15     Results from last 7 days   Lab Units 02/10/23  1156 02/10/23  0025   HEMOGLOBIN g/dL 12 2 11 7*   HEMATOCRIT % 38 0 36 5   PLATELETS Thousands/uL 272 257             Imaging: I have personally reviewed pertinent reports  Previous STRESS TEST:  No results found for this or any previous visit  No results found for this or any previous visit  No results found for this or any previous visit        Previous Cath/PCI:  No results found for this or any previous visit  No results found for this or any previous visit  No results found for this or any previous visit  ECHO:  No results found for this or any previous visit  Results for orders placed during the hospital encounter of 07/28/22    Echo complete w/ contrast if indicated    Interpretation Summary  •  Left Ventricle: Left ventricular cavity size is normal  Wall thickness is normal  There is no concentric hypertrophy  The left ventricular ejection fraction is 55%  Systolic function is normal  Diastolic function is normal   •  The following segments are hypokinetic: basal inferoseptal and basal inferior  •  All other segments are normal   •  IVS: There is abnormal septal motion consistent with left bundle branch block  •  Left Atrium: The atrium is mildly dilated  •  Aortic Valve: There is an Emery RONNI 3 Ultra 26 mm TAVR bioprosthetic valve  The aortic valve has no significant stenosis  The aortic valve mean gradient is 12 mmHg  The DVI is 0 38  The aortic valve area is 1 23 cm2  •  Mitral Valve: There is moderate regurgitation with a posteriorly directed jet  •  Tricuspid Valve: There is mild regurgitation  The right ventricular systolic pressure is moderately elevated  The estimated right ventricular systolic pressure is 32 57 mmHg  SAULO:  No results found for this or any previous visit  No results found for this or any previous visit  CMR:  No results found for this or any previous visit  No results found for this or any previous visit  No results found for this or any previous visit  HOLTER  No results found for this or any previous visit  No results found for this or any previous visit  VTE Prophylaxis: Heparin       Assessment/Plan     Assessment/Plan:    1  NSTEMI  Patient presented with shortness of breath x1 week and chest pain at pacemaker site  Troponin trend 2,122 -> 1,930 -> 2,125   EKG showing sinus rhythm with PAC and LBBB (per my read)  Not meeting ST elevation criteria  TTE today showing wall motion abnormality  Left heart cath in 2022 showing mild diffuse disease throughout left main, LAD, LCx, and right coronary with 50% stenosis of mid LAD, 1st diagonal lesion, 1st obtuse marginal, RPDA1, and RPDA2  · Plan for cath today  · Continue Heparin gtt  · On ASA and Plavix  · Continue home Lipitor  · Continue home Lopressor 50mg and Lisinopril 20mg        Case discussed and reviewed with Dr Bridger Negron who agrees with my assessment and plan  Thank you for involving us in the care of your patient        Nargis England  MS-4

## 2023-02-10 NOTE — PROGRESS NOTES
INTERNAL MEDICINE RESIDENCY SENIOR ADMISSION NOTE     Name: Marie Jay   Age & Sex: 78 y o  male   MRN: [id number]  Unit/Bed#: ED 27   Encounter: [id number]  Primary Care Provider: Katty Plaza DO    Admit to team: SOD Team A    Patient seen and examined  Reviewed H&P per Dr Ramirez Care   Agree with the assessment and plan with any exception/addition as noted below:    Mr Marie Jay is a 37-year-old male with past medical history of chronic otitis, hypothyroidism, type 2 diabetes mellitus, asthma, CAD, hypertension, and hyperlipidemia  Patient underwent TAVR on 62/80, complicated with new left bundle branch block  Patient went on to have dual-chamber pacemaker placed in 09/22  Patient recently started on beta-blocker, previously held due to bradycardia  Catheterization showed moderate three-vessel coronary artery disease  Patient also has peripheral vascular disease  Patient also with history of tobacco abuse though quit smoking approximately 7 months ago  [facility] with complaint of dyspnea over the past week  Patient also complains of left-sided chest pain over the past 2 days over site of pacemaker  However, patient was reporting no chest pain at time of presentation  Patient was initially treated for asthma exacerbation with significant improvement in his symptoms  Vital signs were normal and EKG showed a left bundle branch block that was unchanged from priors  However, patient's initial troponin was found to be elevated at 2000  ASA and heparin GTT were started  Cardiology was consulted for NSTEMI recommended that patient be admitted and kept n p o  for possible catheterization tomorrow  At time of admission patient is hemodynamically stable, afebrile, pulse 88, respirate 18, blood pressure 140/82, 93% on room air    Initial labs in ED showed hemoglobin of 11 7, and initial troponin elevated at 2122 as mentioned before  At time of admission, patient reports great improvement in shortness of breath  Patient also states that he is not experiencing any chest pain at this time  Patient will be admitted to the [address] [place] [address] service       Principal Problem:    NSTEMI (non-ST elevated myocardial infarction) ([facility] )  Active Problems:    Mild intermittent asthma without complication    Type 2 diabetes mellitus with circulatory disorder, with long-term current use of insulin (HCC)    Hyperlipidemia    Essential hypertension    Hypothyroidism    Coronary artery disease    New onset a-fib ([facility] )    * NSTEMI (non-ST elevated myocardial infarction) [facility])  Assessment & Plan  Patient presented with complains of increased shortness of breath since past week  He mentioned that he has been using his inhalers as prescribed  Of note he mentioned chest pain and itching over the site of pacemaker since last 2 days; although no chest pain reported today    In the ED, patient was afebrile, slightly tachycardic, /82, saturating well on room    On exam, patient had bilaterally decreased breath sounds and expiratory wheezes  Patient received albuterol and ipratropium nebs and PO Prednisone 40 mg with significant improvement in his symptoms    Labs : CBC, CMP unremarkable but HS Troponin elevated to 2122  EKG showed A fib and LBBB unchanged from prior    Patient was loaded with aspirin and started on Heparin drip    Plan  · Start aspirin, plavix and heparin drip  · Continue Metoprolol tartarate 50 mg BID  · Continue Atorvastatin 80 mg OD  · PRN Nitrostat for chest pain  · Cardiology consult  · NPO for possible LHC tomorrow            New onset a-fib [facility])  Assessment & Plan  Patient presented with shortness of breath and chest pain  No associated palpitations    Irregular pulse on exam; BP stable  EKG shows atrial fibrillation (rate controlled)    No history of A fibrillation in the chart  Patient had a 30 day ZIO patch in July-August 2022 after TAVR : no atrial fibrillation     [id number] score : 5; qualifies for long term anti-coagulation    Plan  · Continue Metoprolol tartrate 50 mg BID  · Contnue Heparin drip as per ACS protocol  · Cardiology consult; might need anti-coagulation on discharge      Coronary artery disease  Assessment & Plan  Last LHC in 06/2022  • 1st Mrg lesion is 50% stenosed  • Mid LAD lesion is 50% stenosed  • RPDA-1 lesion is 50% stenosed  • RPDA-2 lesion is 50% stenosed  • 1st Diag lesion is 50% stenosed  Last ECHO in 06/2022   · The left ventricular ejection fraction is 60%  Systolic function is normal  Wall motion is normal   · There is an Emery RONNI 3 Ultra 26 mm TAVR bioprosthetic valve   The aortic valve has no significant stenosis  · Mild MR, TR, atrial enlargement    On Clopidogrel 75 mg OD, Rosuvastatin 40 mg OD, Metoprolol tartarate 50 mg BID    Plan  · Continue Clopidogrel and Metoprolol  · Replace Rosuvastatin with Atorvastatin 80 mg OD as per formulary      Hypothyroidism  Assessment & Plan  On Levothyroxine 137 mcg OD     Latest Reference Range & Units 11/10/22 10:09   TSH 3RD GENERATON 0 450 - 4 500 uIU/mL 4 760 (H)   (H): Data is abnormally high    Plan  · Continue Levothyroxine 137 mcg OD  · Will need outpatient PCP follow up for dose adjustment as deemed necessary      Essential hypertension  Assessment & Plan  On Amlodipine 5 mg OD, Hydrochlorothiazide 25 mg OD, Lisinopril 20 mg OD    On admission :   Vitals:    02/09/23 2233 02/10/23 0230   BP: 140/82    BP Location: Right arm    Pulse: (!) 114 88   Resp: 18    Temp: 98 7 °F (37 1 °C)    TempSrc: Oral    SpO2: 93%        Plan  · Monitor vitals  · Continue home regimen of anti-hypertensives as above    Hyperlipidemia  Assessment & Plan  On Rosuvastatin 40 mg OD     Latest Reference Range & Units 11/10/22 10:09   Cholesterol See Comment mg/dL 142   Triglycerides See Comment mg/dL 75   HDL >=40 mg/dL 65   LDL Calculated 0 - 100 mg/dL 62     Plan  · LDL at goal : Replace rosuvastatin with Atorvastatin 80 mg as per formulary    Type 2 diabetes mellitus with circulatory disorder, with long-term current use of insulin ([facility] [address] )  Assessment & Plan  Lab Results   Component Value Date    HGBA1C 7 6 (A) 11/08/2022       No results for input(s): POCGLU in the last 72 hours      Blood Sugar Average: Last 72 hrs:    Home regimen : Metformin 850 mg BID, Lantus 18 units OD, Lispro 5 units with lunch and dinner    Plan  · Hold Metformin  · Continue Lantus at 18 units   · Sliding scale coverage instead of fixed dose Lispro 5 units in view of NPO    Mild intermittent asthma without complication  Assessment & Plan  On Advair, Flonase, as needed albuterol, Mucomyst and Montelukast    Patient came in with increased SOB since 1 week despite taking inhalers as prescribed  In ED, Bilateral decreased breath sounds and moderate wheezes   Significant improvement in symptoms after nebs and prednisone PO 40 mg once    On reassessment : bilateral decreased breath sounds with occassional expiratory wheezes    Plan   · Continue home regimen as above with alternatives on our formulary      Code Status: Level 1 - Full Code  Admission Status: INPATIENT   Disposition: Patient requires Med/Surg with Telemetry  Expected Length of Stay: >2 midnights

## 2023-02-10 NOTE — ASSESSMENT & PLAN NOTE
Patient presented with complains of increased shortness of breath since past week  patient was afebrile, slightly tachycardic, /82, saturating well on room  ECG with afib and known LBBB  HS Troponin elevated to 2122  Patient was loaded with aspirin and started on Heparin drip    Plan  -2/2 Type 1 NSTEMI in setting of multi-vessel CAD  S/p 3x stents this admission (feb 2023)   2x stents to mid-LAD, 1x to ostial right posterior descending  -cont DAPT (ASA and plavix)  -metoprolol-succinate 50 mg q12 hr  -rosuvastatin 40 mg HS  -Jardiance 10 mg started on discharge  -lisinopril 20 mg qd  -aldactone 25 mg qd started  -PRN NTG for chest pain  -START cardiac rehab outpatient  -f/u with cards outpatient  -repeat echo in 3 months to reassess EF  -BMP in 1  Week to assess electrolytes and renal function in setting of medication changes  -in outpt setting as BP allows, will ultimately be transitioned to [facility]   -please note patient is still on amolidpine 5 mg qd as well  -continue with close PCP f/u to ensure other comorbidities (HTN, DM) well controlled  -HCTZ WAS DISCONTINUED THIS ADMISSION BY CARDIOLOGY, monitor clinically  -patient encouraged to weigh himself daily and contact HF team if his weights go up by >3 lbs per day

## 2023-02-10 NOTE — DISCHARGE INSTR - AVS FIRST PAGE
1  Please see the post angioplasty discharge instructions  No heavy lifting, greater than 10 lbs  or strenuous activity for 5 days  Follow angioplasty discharge instructions  2 Remove band aid tomorrow  Shower and wash area- wrist gently with soap and water- beginning tomorrow  Rinse and pat dry  Apply new water seal band aid  Repeat this process for 5 days  No powders, creams lotions or antibiotic ointments  for 5 days  No tub baths, hot tubs or swimming for 5 days  3  Call Kathy  Cardiology Office ([phone]) if you develop a fever, redness or drainage at your wrist access site  4  No driving for 2 days    5  Do not stop aspirin or Plavix (clopiogrel) any reason without a cardiologist’s consent, or the stent could block up and cause a heart attack  6  Stent card and book  -It is very important that you take all of your medications as prescribed to you on discharge  Your heart medications that you are to be taking are as follow: aspirin 81 mg daily, plavix 75 mg daily, jardiance 10 mg daily, lisinopril 20 mg daily, rosuvastatin 40 mg daily, spironolactone 25 mg daily, Toprol-XL 50 mg daily  PLEASE NOTE THAT YOU ARE NO LONGER TO BE TAKING HYDROCHLOROTHIAZIDE 25 MG OR YOUR OLD LOPRESSOR (METOPROLOL TARTRATE) 50 MG EVERY 12 HOURS  YOU ARE NOW ON A NEW METOPROLOL SUCCINATE CALLED TOPROL-XL  -PLEASE CONTINUE TAKING ALL OTHER MEDICATIONS AS PRESCRIBED  -Please call the cardiologist to schedule a follow-up appointment  -you need to see your primary care doctor within 1-2 weeks, so please be sure you schedule an appointment  -you will need to go to the lab in one week to give blood to check your electrolytes  -it is important that you start participating in cardiac rehabilitation as soon as possible (5 days after your heart stent was placed)  -in 3 months you will need to have a repeat echocardiogram (ultrasound of your heart) performed

## 2023-02-10 NOTE — ASSESSMENT & PLAN NOTE
On Levothyroxine 137 mcg OD     Latest Reference Range & Units 11/10/22 10:09   TSH 3RD GENERATON 0 450 - 4 500 uIU/mL 4 760 (H)   (H): Data is abnormally high    Plan  · Continue Levothyroxine 137 mcg OD  · Will need outpatient PCP follow up for dose adjustment as deemed necessary

## 2023-02-10 NOTE — ASSESSMENT & PLAN NOTE
On Advair, Flonase, as needed albuterol, Mucomyst and Montelukast    Patient came in with increased SOB since 1 week despite taking inhalers as prescribed  In ED, Bilateral decreased breath sounds and moderate wheezes   Significant improvement in symptoms after nebs and prednisone PO 40 mg once    On reassessment : bilateral decreased breath sounds with occassional expiratory wheezes    Plan   · Continue home regimen as above with alternatives on our formulary

## 2023-02-10 NOTE — ED ATTENDING ATTESTATION
2/9/2023  I, Chidi De La Cruz DO, saw and evaluated the patient  I have discussed the patient with the resident/non-physician practitioner and agree with the resident's/non-physician practitioner's findings, Plan of Care, and MDM as documented in the resident's/non-physician practitioner's note, except where noted  All available labs and Radiology studies were reviewed  I was present for key portions of any procedure(s) performed by the resident/non-physician practitioner and I was immediately available to provide assistance  At this point I agree with the current assessment done in the Emergency Department  I have conducted an independent evaluation of this patient a history and physical is as follows:  Medical Decision Making  78 y o  male patient presents with chief complaint of dyspnea, hx of pacemaker placement and asthma well controlled  Review of old medical records and outpatient visits noted histories of all those complaints at this point in time  Differential includes but is not limited to: atypical acs, chf, pneumonia, bronchitis, uri, pneumothorax, pleural effusion, less likely neoplasm, PE    Plan: cbc, metabolic panel, ekg, troponin, , chest xray      Evaluation in the emergency department the blood work shows significant elevation in troponins  Concern for possible worsening cardiac ischemia with possible event yesterday  The EKG is unchanged from previous on my interpretation with noted history of left bundle branch block  And currently paced  Patient is hemodynamically stable with blood pressure stable however given concern with elevation in troponin as well as discussion with cardiology will plan for possible n p o  overnight and cath in the morning  Aspirin ordered for noted elevation in troponin and NSTEMI  Family and wife updated at the bedside    Will admit for further management    Asthma exacerbation: acute illness or injury  Chest pain: acute illness or injury  Elevated troponin I level: acute illness or injury  NSTEMI (non-ST elevated myocardial infarction) [facility]): acute illness or injury  Amount and/or Complexity of Data Reviewed  Independent Historian: heber  External Data Reviewed: labs and radiology  Labs: ordered  Radiology: ordered and independent interpretation performed  Risk  OTC drugs  Prescription drug management  Decision regarding hospitalization  ED Course as of 02/10/23 0314   Fri Feb 10, 2023   0129 Significantly elevated troponin at this point in time no ST segment changes noted left bundle amy block which is comparatively compared with old  No events noted on the pacemaker  0274 Discussed with cardiology for management  Made recommendations will admit`             Lab Results: Lab Results: I have personally reviewed and independent interpretation by me of pertinent lab results  Imaging: I have personally reviewed pertinent reports  EKG, Pathology, and Other Studies: I have personally reviewed and independent interpretation by me of pertinent films in PACS    Clinical Impression:    Final diagnoses:   Elevated troponin I level   Chest pain   NSTEMI (non-ST elevated myocardial infarction) [facility])   Asthma exacerbation       ED Course  ED Course as of 02/10/23 0314   Fri Feb 10, 2023   0129 Significantly elevated troponin at this point in time no ST segment changes noted left bundle amy block which is comparatively compared with old  No events noted on the pacemaker  4349 Discussed with cardiology for management    Made recommendations will admit`         Critical Care Time  Procedures

## 2023-02-10 NOTE — H&P
INTERNAL MEDICINE RESIDENCY ADMISSION H&P     Name: Drew Cesar   Age & Sex: 78 y o  male   MRN: [id number]  Unit/Bed#: ED 27   Encounter: [id number]  Primary Care Provider: Briseida Ruelas DO    Code Status: Prior  Admission Status: INPATIENT   Disposition: Patient requires Med/Surg with Telemetry    Admit to team: SOD Team A    ASSESSMENT/PLAN     Principal Problem:    NSVT (nonsustained ventricular tachycardia)  Active Problems:    Mild intermittent asthma without complication    Type 2 diabetes mellitus with circulatory disorder, with long-term current use of insulin ([address] )    Hyperlipidemia    Essential hypertension    Hypothyroidism    Coronary artery disease      * NSTEMI (non-ST elevated myocardial infarction) [facility])  Assessment & Plan  Patient presented with complains of increased shortness of breath since past week  He mentioned that he has been using his inhalers as prescribed  Of note he mentioned chest pain and itching over the site of pacemaker since last 2 days; although no chest pain reported today    In the ED, patient was afebrile, slightly tachycardic, /82, saturating well on room    On exam, patient had bilaterally decreased breath sounds and expiratory wheezes  Patient received albuterol and ipratropium nebs and PO Prednisone 40 mg with significant improvement in his symptoms    Labs : CBC, CMP unremarkable but HS Troponin elevated to 2122  EKG showed A fib and LBBB unchanged from prior    Patient was loaded with aspirin and started on Heparin drip    Plan  · Start aspirin, plavix and heparin drip  · Continue Metoprolol tartarate 50 mg BID  · Continue Atorvastatin 80 mg OD  · PRN Nitrostat for chest pain  · Cardiology consult  · NPO for possible LHC tomorrow            New onset a-fib [facility])  Assessment & Plan  Patient presented with shortness of breath and chest pain  No associated palpitations    Irregular pulse on exam; BP stable  EKG shows atrial fibrillation (rate controlled)    No history of A fibrillation in the chart  Patient had a 30 day ZIO patch in July-August 2022 after TAVR : no atrial fibrillation     [id number] score : 5; qualifies for long term anti-coagulation    Plan  · Continue Metoprolol tartrate 50 mg BID  · Contnue Heparin drip as per ACS protocol  · Cardiology consult; might need anti-coagulation on discharge      Coronary artery disease  Assessment & Plan  Last LHC in 06/2022  • 1st Mrg lesion is 50% stenosed  • Mid LAD lesion is 50% stenosed  • RPDA-1 lesion is 50% stenosed  • RPDA-2 lesion is 50% stenosed  • 1st Diag lesion is 50% stenosed  Last ECHO in 06/2022   · The left ventricular ejection fraction is 60%  Systolic function is normal  Wall motion is normal   · There is an Emery RONNI 3 Ultra 26 mm TAVR bioprosthetic valve   The aortic valve has no significant stenosis  · Mild MR, TR, atrial enlargement    On Clopidogrel 75 mg OD, Rosuvastatin 40 mg OD, Metoprolol tartarate 50 mg BID    Plan  · Continue Clopidogrel and Metoprolol  · Replace Rosuvastatin with Atorvastatin 80 mg OD as per formulary      Hypothyroidism  Assessment & Plan  On Levothyroxine 137 mcg OD     Latest Reference Range & Units 11/10/22 10:09   TSH 3RD GENERATON 0 450 - 4 500 uIU/mL 4 760 (H)   (H): Data is abnormally high    Plan  · Continue Levothyroxine 137 mcg OD  · Will need outpatient PCP follow up for dose adjustment as deemed necessary      Essential hypertension  Assessment & Plan  On Amlodipine 5 mg OD, Hydrochlorothiazide 25 mg OD, Lisinopril 20 mg OD    On admission :   Vitals:    02/09/23 2233 02/10/23 0230   BP: 140/82    BP Location: Right arm    Pulse: (!) 114 88   Resp: 18    Temp: 98 7 °F (37 1 °C)    TempSrc: Oral    SpO2: 93%        Plan  · Monitor vitals  · Continue home regimen of anti-hypertensives as above    Hyperlipidemia  Assessment & Plan  On Rosuvastatin 40 mg OD     Latest Reference Range & Units 11/10/22 10:09   Cholesterol See Comment mg/dL 142   Triglycerides See Comment mg/dL 75   HDL >=40 mg/dL 65   LDL Calculated 0 - 100 mg/dL 62     Plan  · LDL at goal : Replace rosuvastatin with Atorvastatin 80 mg as per formulary    Type 2 diabetes mellitus with circulatory disorder, with long-term current use of insulin (HCC)  Assessment & Plan  Lab Results   Component Value Date    HGBA1C 7 6 (A) 11/08/2022       No results for input(s): POCGLU in the last 72 hours  Blood Sugar Average: Last 72 hrs:    Home regimen : Metformin 850 mg BID, Lantus 18 units OD, Lispro 5 units with lunch and dinner    Plan  · Hold Metformin  · Continue Lantus at 18 units   · Sliding scale coverage instead of fixed dose Lispro 5 units in view of NPO    Mild intermittent asthma without complication  Assessment & Plan  On Advair, Flonase, as needed albuterol, Mucomyst and Montelukast    Patient came in with increased SOB since 1 week despite taking inhalers as prescribed  In ED, Bilateral decreased breath sounds and moderate wheezes   Significant improvement in symptoms after nebs and prednisone PO 40 mg once    On reassessment : bilateral decreased breath sounds with occassional expiratory wheezes    Plan   · Continue home regimen as above with alternatives on our formulary        VTE Pharmacologic Prophylaxis: Heparin  VTE Mechanical Prophylaxis: sequential compression device    CHIEF COMPLAINT     Chief Complaint   Patient presents with   • Shortness of Breath     Pt c/o feeling sob and pain at the site of his pacer x 2 days  Pt has an inhaler but sx's did not resolve  HISTORY OF PRESENT ILLNESS     TRIP Dove and Patient's wife assisted in Macedonian interpretation    Екатерина Acosta is a 77/M with PMH of asthma, hypertension, hyperlipidemia, diabetes, CAD, severe AS s/p TAVR, V tach s/p ICD, hypothyroidism  Patient was apparently well until 1 week ago when he started to experience increased shortness of breath despite using his inhalers regularly   He also mentions increased shortness of breath while lying in bed to sleep causing sleep disruption  He also mentions occasional chest pain with itching over the pacemaker site over the past 2 days  No associated fever, chills, cough, palpitations, nausea, vomiting, lightheadedness, syncope  In the ED, patient was afebrile, slightly tachycardic, /82, saturating well on room  On exam, patient had bilaterally decreased breath sounds and expiratory wheezes  Patient received albuterol and ipratropium nebs and PO Prednisone 40 mg with significant improvement in his symptoms  Labs showed CBC, CMP unremarkable but HS Troponin elevated to 2122  EKG showed new A fibrillation and LBBB unchanged from prior  Patient was loaded with aspirin and started on Heparin drip  Patient was admitted to [facility] for further evaluation and management  REVIEW OF SYSTEMS     Review of Systems   Constitutional: Negative for activity change, chills, fever and unexpected weight change  HENT: Negative for congestion, sinus pain and sore throat  Respiratory: Positive for shortness of breath  Negative for cough and wheezing  Cardiovascular: Negative for chest pain, palpitations and leg swelling  Gastrointestinal: Negative for abdominal distention and abdominal pain  Endocrine: Negative for cold intolerance, heat intolerance, polydipsia, polyphagia and polyuria  Genitourinary: Negative  Musculoskeletal: Negative for arthralgias, back pain, neck pain and neck stiffness  Skin: Negative for color change and pallor  Neurological: Negative for dizziness, weakness and light-headedness  Hematological: Negative for adenopathy  Does not bruise/bleed easily  Psychiatric/Behavioral: Negative for agitation and behavioral problems       OBJECTIVE     Vitals:    02/09/23 2233 02/10/23 0230   BP: 140/82    BP Location: Right arm    Pulse: (!) 114 88   Resp: 18    Temp: 98 7 °F (37 1 °C)    TempSrc: Oral    SpO2: 93%       Temperature:   Temp (24hrs), Av 7 °F (37 1 °C), Min:98 7 °F (37 1 °C), Max:98 7 °F (37 1 °C)    Temperature: 98 7 °F (37 1 °C)  Intake & Output:  I/O     None        Weights: There is no height or weight on file to calculate BMI  Weight (last 2 days)     None        Physical Exam  Vitals reviewed  Constitutional:       General: He is not in acute distress  Appearance: Normal appearance  He is obese  He is not toxic-appearing or diaphoretic  HENT:      Mouth/Throat:      Mouth: Mucous membranes are moist       Pharynx: Oropharynx is clear  Eyes:      Conjunctiva/sclera: Conjunctivae normal       Pupils: Pupils are equal, round, and reactive to light  Cardiovascular:      Rate and Rhythm: Normal rate  Rhythm irregular  Pulses: Normal pulses  Heart sounds: Murmur (grade 3 systolic most prominent over mitral area) heard  Pulmonary:      Effort: Pulmonary effort is normal  No respiratory distress  Breath sounds: Wheezing present  No rales  Abdominal:      General: Abdomen is flat  Bowel sounds are normal       Palpations: Abdomen is soft  Musculoskeletal:      Right lower leg: No edema  Left lower leg: No edema  Skin:     General: Skin is warm  Capillary Refill: Capillary refill takes less than 2 seconds  Coloration: Skin is not jaundiced or pale  Neurological:      General: No focal deficit present  Mental Status: He is alert and oriented to person, place, and time     Psychiatric:         Mood and Affect: Mood normal          Behavior: Behavior normal        PAST MEDICAL HISTORY     Past Medical History:   Diagnosis Date   • Arthritis    • Asthma    • Colon polyps    • Community acquired pneumonia     last assessed: 2014   • Diabetes mellitus ([facility] [address] )    • Hemorrhagic prepatellar bursitis, left 10/21/2019   • Hepatitis C    • High cholesterol    • History of colonoscopy    • Hypertension    • Lymphadenopathy, anterior cervical 2018   • Nephritis and nephropathy, not specified as acute or chronic, with other specified pathological lesion in kidney, in diseases classified elsewhere 3/26/2013   • Screening for colon cancer 2019   • Thoracic vertebral fracture ([address] ) 2014     PAST SURGICAL HISTORY     Past Surgical History:   Procedure Laterality Date   • CARDIAC CATHETERIZATION N/A 6/3/2022    Procedure: Cardiac RHC/LHC; Surgeon: Blanche Fraire MD;  [facility]; Service: Cardiology   • CARDIAC CATHETERIZATION N/A 2022    Procedure: CARDIAC TAVR;  Surgeon: Leo Macdonald MD;  Location: [facility] MAIN [facility];  Service: Cardiology   • CARDIAC ELECTROPHYSIOLOGY PROCEDURE N/A 2022    Procedure: Cardiac pacer implant ; DC PPM;  Surgeon: Mikie Gomez DO;  Location: [facility] CARD[facility]AC [facility]; Service: Cardiology   • COLONOSCOPY     • COLONOSCOPY W/ POLYPECTOMY     • LITHOTRIPSY     • MULTIPLE TOOTH EXTRACTIONS     • WA COLONOSCOPY FLX DX W/COLLJ SPEC WHEN PFRMD N/A 2018    Procedure: COLONOSCOPY;  Surgeon: Junior Lewis MD;  Location: [facility] GI LAB;   Service: Gastroenterology   • WA REPLACE AORTIC VALVE OPENFEMORAL ARTERY APPROACH N/A 2022    Procedure: REPLACEMENT AORTIC VALVE TRANSCATHETER (TAVR) TRANSFEMORAL W/ 26MM QUINN RONNI S3 ULTRA VALVE(ACCESS ON LEFT) SAULO;  Surgeon: Jessica Lea MD;  Location: [facility] MAIN [facility];  Service: Cardiac Surgery     SOCIAL & FAMILY HISTORY     Social History     Substance and Sexual Activity   Alcohol Use No     Substance and Sexual Activity   Alcohol Use No        Substance and Sexual Activity   Drug Use No     Social History     Tobacco Use   Smoking Status Former   • Packs/day: 0 50   • Types: Cigarettes   • Quit date: 2022   • Years since quittin 7   Smokeless Tobacco Never   Tobacco Comments    started when he was about 24 yrs old; stopped smoking 3 wks ago     Family History   Problem Relation Age of Onset   • Heart attack Family         at age 80   • No Known Problems Mother    • No Known Problems Father    • Diabetes Sister    • Diabetes Brother      LABORATORY DATA     Labs: I have personally reviewed pertinent reports  Results from last 7 days   Lab Units 02/10/23  0025   WBC Thousand/uL 7 65   HEMOGLOBIN g/dL 11 7*   HEMATOCRIT % 36 5   PLATELETS Thousands/uL 257   NEUTROS PCT % 74   MONOS PCT % 9      Results from last 7 days   Lab Units 02/10/23  0025   POTASSIUM mmol/L 4 1   CHLORIDE mmol/L 107   CO2 mmol/L 30   BUN mg/dL 22   CREATININE mg/dL 1 15   CALCIUM mg/dL 9 9   ALK PHOS U/L 47   ALT U/L 20   AST U/L 27                          Micro:  Lab Results   Component Value Date    URINECX <10,000 cfu/ml 03/17/2019     IMAGING & DIAGNOSTIC TESTS     Imaging: I have personally reviewed pertinent reports  No results found  EKG, Pathology, and Other Studies: I have personally reviewed pertinent reports  ALLERGIES     Allergies   Allergen Reactions   • Iv Contrast [Iodinated Contrast Media] Rash     Patient states he had a severe reaction approximately 10 years ago , states he had a rash, itchy and he remembers a bunch of people pounding on chest and being surrounded by multiple doctors  MEDICATIONS PRIOR TO ARRIVAL     Prior to Admission medications    Medication Sig Start Date End Date Taking?  Authorizing Provider   Advair -21 MCG/ACT inhaler TAKE 2 PUFFS BY MOUTH TWICE A DAY 5/5/21   Laurie Blackburn DO   albuterol (PROVENTIL HFA,VENTOLIN HFA) 90 mcg/act inhaler INHALE 2 PUFFS EVERY 4 HOURS AS NEEDED FOR WHEEZING OR SHORTNESS OF BREATH 2/8/23   Brittany Lose, DO   Alcohol Swabs (ALCOHOL PADS) 70 % PADS  5/9/19   Historical Provider, MD   amLODIPine (NORVASC) 5 mg tablet Take 1 tablet (5 mg total) by mouth daily 7/23/19   Laurie Blackburn DO   Aspirin Low Dose 81 MG chewable tablet CHEW 1 TABLET BY MOUTH DAILY 11/29/22   Gloria Quinn MD   Blood Glucose Monitoring Suppl (OneTouch Verio) w/Device KIT Check blood glucose three times daily before each meal 8/20/20   Ronald Jimenez Leyla Espinoza,    cholecalciferol (VITAMIN D3) 1,000 units tablet Take 2 tablets (2,000 Units total) by mouth daily 11/21/22   Mike Webb DO   clopidogrel (PLAVIX) 75 mg tablet Take 1 tablet (75 mg total) by mouth daily 10/11/22 1/18/23  Ynes Collier MD   Continuous Blood Gluc  (FreeStyle Robles 2 [place]) POWER Use 1 each continuous  Patient not taking: Reported on 1/30/2023 5/10/22   Mike Webb DO   Continuous Blood Gluc Sensor (FreeStyle Cristofer 2 Sensor) MISC Use 1 each every 14 (fourteen) days  Patient not taking: Reported on 1/30/2023 5/10/22   Mike Webb DO   fluticasone [facility]) 50 mcg/act nasal spray 2 sprays into each nostril daily 8/11/20   Laurie Blackburn DO   hydrochlorothiazide (HYDRODIURIL) 25 mg tablet TAKE 1 TABLET (25 MG TOTAL) BY MOUTH DAILY   10/17/22   Mike Webb DO   Insulin Pen Needle (Pen Needles) 31G X 8 MM MISC Use daily 3/3/21   Laurie Blackburn, DO   Lancets (FREESTYLE) lancets by Other route as needed (As needed) 3/21/19   Cristina Flores MD   Lantus SoloStar 100 units/mL SOPN INJECT 0 18 ML (18 UNITS TOTAL) UNDER THE SKIN DAILY AT BEDTIME 11/23/22   Mike Webb DO   levothyroxine 137 mcg tablet TAKE 1 TABLET BY MOUTH EVERY DAY 7/13/22   Mike Webb, DO   lisinopril (ZESTRIL) 20 mg tablet Take 1 tablet (20 mg total) by mouth daily 12/29/22   KAREN Pires   metFORMIN (GLUCOPHAGE) 850 mg tablet TAKE 1 TABLET (850 MG TOTAL) BY MOUTH 2 (TWO) TIMES A DAY WITH MEALS 5/16/22   Mike Webb, DO   methocarbamol (Robaxin-750) 750 mg tablet Take 1 tablet (750 mg total) by mouth every 6 (six) hours as needed for muscle spasms 11/8/22   Kathia Bardales PA-C   metoprolol tartrate (LOPRESSOR) 50 mg tablet Take 1 tablet (50 mg total) by mouth every 12 (twelve) hours 12/29/22   KAREN Pires   montelukast (SINGULAIR) 10 mg tablet TAKE 1 TABLET BY MOUTH EVERY DAY 10/17/22   Mike Webb DO   NovoLOG FlexPen 100 units/mL injection pen Inject 5 units with breakfast and with dinner 3/3/22   Giovanny Kaur MD   rosuvastatin (CRESTOR) 40 MG tablet TAKE 1 TABLET BY MOUTH EVERY DAY 4/29/22   Ynes Collier MD     MEDICATIONS ADMINISTERED IN LAST 24 HOURS     Medication Administration - last 24 hours from 02/09/2023 0304 to 02/10/2023 0304       Date/Time Order Dose Route Action Action by     02/10/2023 0026 EST albuterol inhalation solution 10 mg 10 mg Nebulization Given Juan Adler RN     02/10/2023 0026 EST ipratropium (ATROVENT) 0 02 % inhalation solution 1 mg 1 mg Nebulization Given Juan Adler RN     02/10/2023 0026 EST sodium chloride 0 9 % inhalation solution 3 mL 3 mL Nebulization Given Juan Adler RN     02/10/2023 0026 EST predniSONE tablet 40 mg 40 mg Oral Given Juan Adler RN     02/10/2023 0211 EST aspirin tablet 325 mg 325 mg Oral Given Juan Adler RN     02/10/2023 0232 EST heparin (ACS LOW) -- Intravenous Not Given Juan Adler RN        CURRENT MEDICATIONS     Current Facility-Administered Medications   Medication Dose Route Frequency Provider Last Rate   • heparin (porcine)  3-20 Units/kg/hr (Order-Specific) Intravenous Titrated Marquis Carbajal MD     • heparin (porcine)  4,000 Units Intravenous Once Marquis Carbajal MD       heparin (porcine), 3-20 Units/kg/hr (Order-Specific)           Admission Time  I spent 45 minutes admitting the patient  This involved direct patient contact where I performed a full history and physical, reviewing previous records, and reviewing laboratory and other diagnostic studies  Portions of the record may have been created with voice recognition software  Occasional wrong word or "sound a like" substitutions may have occurred due to the inherent limitations of voice recognition software    Read the chart carefully and recognize, using context, where substitutions have occurred     ==  Syd Vinson MD  [facility]  Internal Medicine Residency PGY-1

## 2023-02-10 NOTE — ED NOTES
Tiger Text sent to [facility] 420 notifying of patient's arrival       Juan Adler RN  02/10/23 7698

## 2023-02-11 PROBLEM — I48.91 NEW ONSET A-FIB (HCC): Status: RESOLVED | Noted: 2023-02-10 | Resolved: 2023-02-11

## 2023-02-11 LAB
ANION GAP SERPL CALCULATED.3IONS-SCNC: 4 MMOL/L (ref 4–13)
ATRIAL RATE: 91 BPM
ATRIAL RATE: 95 BPM
ATRIAL RATE: 95 BPM
BASOPHILS # BLD AUTO: 0.02 THOUSANDS/ÂΜL (ref 0–0.1)
BASOPHILS NFR BLD AUTO: 0 % (ref 0–1)
BUN SERPL-MCNC: 32 MG/DL (ref 5–25)
CALCIUM SERPL-MCNC: 9.3 MG/DL (ref 8.3–10.1)
CHLORIDE SERPL-SCNC: 106 MMOL/L (ref 96–108)
CO2 SERPL-SCNC: 30 MMOL/L (ref 21–32)
CREAT SERPL-MCNC: 1.07 MG/DL (ref 0.6–1.3)
EOSINOPHIL # BLD AUTO: 0.03 THOUSAND/ÂΜL (ref 0–0.61)
EOSINOPHIL NFR BLD AUTO: 0 % (ref 0–6)
ERYTHROCYTE [DISTWIDTH] IN BLOOD BY AUTOMATED COUNT: 15 % (ref 11.6–15.1)
EST. AVERAGE GLUCOSE BLD GHB EST-MCNC: 171 MG/DL
GFR SERPL CREATININE-BSD FRML MDRD: 67 ML/MIN/1.73SQ M
GLUCOSE SERPL-MCNC: 106 MG/DL (ref 65–140)
GLUCOSE SERPL-MCNC: 136 MG/DL (ref 65–140)
GLUCOSE SERPL-MCNC: 142 MG/DL (ref 65–140)
GLUCOSE SERPL-MCNC: 150 MG/DL (ref 65–140)
GLUCOSE SERPL-MCNC: 174 MG/DL (ref 65–140)
HBA1C MFR BLD: 7.6 %
HCT VFR BLD AUTO: 37.4 % (ref 36.5–49.3)
HGB BLD-MCNC: 11.5 G/DL (ref 12–17)
IMM GRANULOCYTES # BLD AUTO: 0.03 THOUSAND/UL (ref 0–0.2)
IMM GRANULOCYTES NFR BLD AUTO: 0 % (ref 0–2)
LYMPHOCYTES # BLD AUTO: 1.93 THOUSANDS/ÂΜL (ref 0.6–4.47)
LYMPHOCYTES NFR BLD AUTO: 23 % (ref 14–44)
MCH RBC QN AUTO: 27.7 PG (ref 26.8–34.3)
MCHC RBC AUTO-ENTMCNC: 30.7 G/DL (ref 31.4–37.4)
MCV RBC AUTO: 90 FL (ref 82–98)
MONOCYTES # BLD AUTO: 0.87 THOUSAND/ÂΜL (ref 0.17–1.22)
MONOCYTES NFR BLD AUTO: 10 % (ref 4–12)
NEUTROPHILS # BLD AUTO: 5.46 THOUSANDS/ÂΜL (ref 1.85–7.62)
NEUTS SEG NFR BLD AUTO: 67 % (ref 43–75)
NRBC BLD AUTO-RTO: 0 /100 WBCS
P AXIS: 80 DEGREES
P AXIS: 87 DEGREES
PLATELET # BLD AUTO: 273 THOUSANDS/UL (ref 149–390)
PMV BLD AUTO: 10.8 FL (ref 8.9–12.7)
POTASSIUM SERPL-SCNC: 3.5 MMOL/L (ref 3.5–5.3)
PR INTERVAL: 184 MS
PR INTERVAL: 194 MS
QRS AXIS: 187 DEGREES
QRS AXIS: 194 DEGREES
QRS AXIS: 36 DEGREES
QRSD INTERVAL: 134 MS
QRSD INTERVAL: 140 MS
QRSD INTERVAL: 140 MS
QT INTERVAL: 378 MS
QT INTERVAL: 386 MS
QT INTERVAL: 400 MS
QTC INTERVAL: 474 MS
QTC INTERVAL: 475 MS
QTC INTERVAL: 502 MS
RBC # BLD AUTO: 4.15 MILLION/UL (ref 3.88–5.62)
SODIUM SERPL-SCNC: 140 MMOL/L (ref 135–147)
T WAVE AXIS: 31 DEGREES
T WAVE AXIS: 49 DEGREES
T WAVE AXIS: 71 DEGREES
VENTRICULAR RATE: 91 BPM
VENTRICULAR RATE: 95 BPM
VENTRICULAR RATE: 95 BPM
WBC # BLD AUTO: 8.34 THOUSAND/UL (ref 4.31–10.16)

## 2023-02-11 RX ORDER — METOPROLOL SUCCINATE 50 MG/1
50 TABLET, EXTENDED RELEASE ORAL 2 TIMES DAILY
Status: DISCONTINUED | OUTPATIENT
Start: 2023-02-11 | End: 2023-02-12 | Stop reason: HOSPADM

## 2023-02-11 RX ADMIN — HYDROCHLOROTHIAZIDE 25 MG: 25 TABLET ORAL at 08:04

## 2023-02-11 RX ADMIN — METOPROLOL SUCCINATE 50 MG: 50 TABLET, EXTENDED RELEASE ORAL at 20:59

## 2023-02-11 RX ADMIN — AMLODIPINE BESYLATE 5 MG: 5 TABLET ORAL at 08:04

## 2023-02-11 RX ADMIN — INSULIN GLARGINE 18 UNITS: 100 INJECTION, SOLUTION SUBCUTANEOUS at 21:00

## 2023-02-11 RX ADMIN — INSULIN LISPRO 1 UNITS: 100 INJECTION, SOLUTION INTRAVENOUS; SUBCUTANEOUS at 06:32

## 2023-02-11 RX ADMIN — ATORVASTATIN CALCIUM 80 MG: 80 TABLET, FILM COATED ORAL at 16:05

## 2023-02-11 RX ADMIN — ASPIRIN 81 MG: 81 TABLET, CHEWABLE ORAL at 08:04

## 2023-02-11 RX ADMIN — LEVOTHYROXINE SODIUM 137 MCG: 25 TABLET ORAL at 06:31

## 2023-02-11 RX ADMIN — CHOLECALCIFEROL TAB 25 MCG (1000 UNIT) 2000 UNITS: 25 TAB at 08:04

## 2023-02-11 RX ADMIN — CLOPIDOGREL BISULFATE 75 MG: 75 TABLET ORAL at 08:04

## 2023-02-11 RX ADMIN — FLUTICASONE FUROATE AND VILANTEROL TRIFENATATE 1 PUFF: 200; 25 POWDER RESPIRATORY (INHALATION) at 08:07

## 2023-02-11 RX ADMIN — MONTELUKAST 10 MG: 10 TABLET, FILM COATED ORAL at 08:04

## 2023-02-11 RX ADMIN — INSULIN LISPRO 1 UNITS: 100 INJECTION, SOLUTION INTRAVENOUS; SUBCUTANEOUS at 16:06

## 2023-02-11 RX ADMIN — LISINOPRIL 20 MG: 20 TABLET ORAL at 08:04

## 2023-02-11 RX ADMIN — METOPROLOL TARTRATE 50 MG: 50 TABLET, FILM COATED ORAL at 08:04

## 2023-02-11 NOTE — ASSESSMENT & PLAN NOTE
Patient is presented with dyspnea with exertion  Echo this admission showed acute HF exacerbation with worsening EF from prior 60% to new 40%  Etiology- multivessel CAD and NSTEMI type 1  Patient euvolemic at time of discharge  Recommended per cardiology that home HCTZ be DISCONTINUED   Patient now on GDMT   Will need close outpatient f/u with PCP and cards

## 2023-02-11 NOTE — PROGRESS NOTES
Progress Note - Cardiology   Leslie Causey 78 y o  male MRN: [id number]  Unit/Bed#: [facility] 420-01 Encounter: [id number]    Assessment:  #NSTEMI Type I  #Acute HFrEF (EF 40%)  #Ischemic cardiomyopathy  #Severe AS s/p TAVR 6/22  #CHB s/p PPM  #HTN  #HLD  #T2DM  I saw and evaluated the patient, and utilized the [employer] iPad interpretor service for my encounter  This is a 79-year-old male patient of Dr Jared Garibay with a known past medical history of severe AS status post TAVR 6/22, complete heart block status post PPM, nonocclusive CAD, hypertension, hyperlipidemia, type 2 diabetes, who is presently admitted for increased work of breathing at home with intermittent chest pain  He was noted to have elevated troponins with an admission EKG demonstrating sinus rhythm with left bundle branch block unchanged compared to prior  Transthoracic echocardiogram was obtained, demonstrating a new wall motion abnormality with moderate to severe mitral regurgitation  In this setting, the patient was referred for urgent coronary angiography which revealed three-vessel CAD with 2 identifiable culprit lesions in the ostial right posterior descending artery and mid LAD  The mid LAD lesion was stented x2 with good result, and the ostial RPDA lesion received 1 stent also with good result  He has newly reduced LV systolic function as of this admission, with an EF of 40% on my read  Clinically euvolemic  Medical management is ongoing with Toprol-XL and lisinopril as ordered  Plan:  - We will continue aspirin and Plavix  Repeat transthoracic echocardiogram in 90 days to reevaluate LV systolic function  No role for LifeVest at this time given estimated LVEF of 40% and absence of ventricular arrhythmias on telemetry  - Continue Toprol-XL and lisinopril as GDMT for heart failure    Will need to price check Jardiance 10 mg once daily   -I anticipate an additional 24 hours of inpatient monitoring, with likely tentative discharge tomorrow  Subjective/Objective     Subjective: Patient seen and examined  Doing well overall, denies chest pain, shortness of breath, diaphoresis, dizziness, palpitations, orthopnea, edema  Objective:     Vitals: /60   Pulse 76   Temp 97 7 °F (36 5 °C)   Resp 16   Ht 5' 9" (1 753 m)   Wt 85 6 kg (188 lb 11 4 oz)   SpO2 96%   BMI 27 87 kg/m²   Vitals:    02/10/23 0900 02/10/23 1203   Weight: 87 3 kg (192 lb 7 4 oz) 85 6 kg (188 lb 11 4 oz)     Orthostatic Blood Pressures    Flowsheet Row Most Recent Value   Blood Pressure 109/60 filed at 02/11/2023 1103   Patient Position - Orthostatic VS Lying filed at 02/11/2023 7190            Intake/Output Summary (Last 24 hours) at 2/11/2023 1336  Last data filed at 2/11/2023 0900  Gross per 24 hour   Intake 180 ml   Output 1625 ml   Net -1445 ml       Invasive Devices     Peripheral Intravenous Line  Duration           Peripheral IV 02/10/23 Left Wrist 1 day    Peripheral IV 02/10/23 Right Antecubital 1 day                Physical Examination:  Gen: A&Ox3  HEENT: no JVD  CVS: RRR, S1S2 audible and w/o m/r/g  Right radial cath access site is C/D/I  No hematoma, non tender  Resp: Clear to ascultation bilaterally, no wheeze, rales, ronchi  Chest wall non tender  Abd: Soft, non-tender, non-distended  MSK: No edema  Neuro: Normal strength and sensory exam     Lab Results: I have personally reviewed pertinent lab results  Imaging: I have personally reviewed pertinent reports           Alessia Molina MD

## 2023-02-11 NOTE — UTILIZATION REVIEW
Initial Clinical Review    Admission: Date/Time/Statement:   Admission Orders (From admission, onward)     Ordered        02/10/23 0248  INPATIENT ADMISSION  Once                      Orders Placed This Encounter   Procedures   • INPATIENT ADMISSION     Standing Status:   Standing     Number of Occurrences:   1     Order Specific Question:   Level of Care     Answer:   Med Surg [16]     Order Specific Question:   Estimated length of stay     Answer:   More than 2 Midnights     Order Specific Question:   Certification     Answer:   I certify that inpatient services are medically necessary for this patient for a duration of greater than two midnights  See H&P and MD Progress Notes for additional information about the patient's course of treatment  ED Arrival Information     Expected   -    Arrival   2/9/2023 22:18    Acuity   Emergent            Means of arrival   Walk-In    Escorted by   [address] [place] [address]    Admission type   Emergency            Arrival complaint   Pacemaker problem           Chief Complaint   Patient presents with   • Shortness of Breath     Pt c/o feeling sob and pain at the site of his pacer x 2 days  Pt has an inhaler but sx's did not resolve  Initial Presentation: 78 y o  male , presented to the ED @ [facility], from home via walk in  Admitted as Inpatient due to NSTEMI / New Onset A Fib       PMH of asthma, hypertension, hyperlipidemia, diabetes, CAD, severe AS s/p TAVR, V tach s/p ICD, hypothyroidism  Date: 02/10/2023  Patient was apparently well until 1 week ago when he started to experience increased shortness of breath despite using his inhalers regularly  He also mentions increased shortness of breath while lying in bed to sleep causing sleep disruption  He also mentions occasional chest pain with itching over the pacemaker site over the past 2 days  In the ED, patient was afebrile, slightly tachycardic, /82, saturating well on room   On exam, patient had bilaterally decreased breath sounds and expiratory wheezes  Patient received albuterol and ipratropium nebs and PO Prednisone 40 mg with significant improvement in his symptoms  Labs showed CBC, CMP unremarkable but HS Troponin elevated to 2122  EKG showed new A fibrillation and LBBB unchanged from prior  Patient was loaded with aspirin and started on Heparin drip  Continue BB & statin  Monitor on telemetry  Consult Cardio  NPO for possible cath        02/10/2023  Consult Cardio:  NSTEMI  Patient presented with shortness of breath x1 week and chest pain at pacemaker site  Troponin trend 2,122 -> 1,930 -> 2,125  EKG showing sinus rhythm with PAC and LBBB (per my read)  Not meeting ST elevation criteria  TTE today showing wall motion abnormality  Left heart cath in 2022 showing mild diffuse disease throughout left main, LAD, LCx, and right coronary with 50% stenosis of mid LAD, 1st diagonal lesion, 1st obtuse marginal, RPDA1, and RPDA2  Plan for cath today  Continue Heparin gtt  On ASA and Plavix  Continue home Lipitor  Continue home Lopressor 50mg and Lisinopril 20mg  VTE Pharmacologic Prophylaxis: Heparin  VTE Mechanical Prophylaxis: sequential compression device    Day 2: 02/11/2023   Continue ASA, Plavix  BB, Statin  Holding Heparin gtt  Patient had cardiac catheterization yesterday with3-vessel CAD with two identifiable culprits for NSTEMI in ost RPDA & mid LAD, Successful PCI w/ ELDER x2 to mid LAD & ELDER x1 ost RPDA  Continue GDMT for Heart Failure        ED Triage Vitals   Temperature Pulse Respirations Blood Pressure SpO2   02/09/23 2233 02/09/23 2233 02/09/23 2233 02/09/23 2233 02/09/23 2233   98 7 °F (37 1 °C) (!) 114 18 140/82 93 %      Temp Source Heart Rate Source Patient Position - Orthostatic VS BP Location FiO2 (%)   02/09/23 2233 02/09/23 2233 02/09/23 2233 02/09/23 2233 --   Oral Monitor Sitting Right arm       Pain Score       02/10/23 0504       No Pain          Wt Readings from Last 1 Encounters:   02/10/23 85 6 kg (188 lb 11 4 oz)     Additional Vital Signs:   Date/Time Temp Pulse Resp BP MAP (mmHg) SpO2 Calculated FIO2 (%) - Nasal Cannula Nasal Cannula O2 Flow Rate (L/min) O2 Device Patient Position - Orthostatic VS   02/11/23 11:03:38 97 7 °F (36 5 °C) 76 -- 109/60 76 96 % -- -- -- --   02/11/23 07:22:34 97 6 °F (36 4 °C) 75 16 119/64 82 96 % -- -- -- Lying   02/11/23 03:59:59 98 1 °F (36 7 °C) 86 16 145/79 101 93 % -- -- -- --   02/10/23 20:17:15 -- 102 -- 137/77 97 93 % -- -- -- --   02/10/23 18:56:16 97 8 °F (36 6 °C) 88 -- 132/61 85 89 % Abnormal  -- -- -- --   02/10/23 1800 -- 93 -- 127/69 88 95 % -- -- None (Room air) Lying   02/10/23 1730 -- 101 -- -- -- -- -- -- -- --   02/10/23 1700 -- 93 -- 132/82 99 96 % 28 2 L/min Nasal cannula Lying   02/10/23 1630 -- 93 -- 139/81 100 97 % 28 2 L/min Nasal cannula Lying   02/10/23 1600 -- 86 -- 134/78 97 92 % 28 2 L/min Nasal cannula Lying   02/10/23 1545 -- 90 -- 134/78 97 95 % 28 2 L/min Nasal cannula Lying   02/10/23 1530 -- 91 -- 134/78 97 91 % 28 2 L/min Nasal cannula Lying   02/10/23 15:13:21 -- 88 -- 135/80 98 88 % Abnormal  -- -- -- --   02/10/23 14:06:38 -- -- -- -- -- 98 % 28 2 L/min Nasal cannula --   02/10/23 11:09:34 97 7 °F (36 5 °C) 88 16 137/82 100 92 % -- -- -- --   02/10/23 0900 -- 101 -- 139/84 -- 94 % -- -- -- --   02/10/23 07:06:58 98 4 °F (36 9 °C) 99 16 139/84 102 93 % -- -- -- --   02/10/23 05:04:58 97 7 °F (36 5 °C) 91 -- 150/87 108 91 % -- -- None (Room air) Lying   02/10/23 0321 -- 95 20 141/77 -- 94 % -- -- None (Room air) Lying     Date and Time Eye Opening Best Verbal Response Best Motor Response [place] Coma Scale Score   02/11/23 0752 4 5 6 15   02/10/23 2000 4 5 6 15   02/10/23 0900 4 5 6 15   02/10/23 0515 4 5 6 15         Pertinent Labs/Diagnostic Test Results:   XR chest 1 view portable   ED Interpretation by Latoya Diaz MD (02/10 6513)   The CXR was interpreted by me independently  On my read, it appears without acute abnormalities:  - The  cardiomediastinal  silhouette  is  enlarged, similar to prior     - The  lungs  are  clear  - No  pleural  effusions   - No  pneumothorax  - The  pulmonary  vasculature  is  within  normal  limits     - The  trachea  is  midline     - Bony  thorax  is  unremarkable       - Similar to previous CXR       Final Result by Hemalatha Cobos MD (02/10 5071)      No acute cardiopulmonary disease  Left chest pacer with intact leads  No pneumothorax          Results from last 7 days   Lab Units 02/11/23 0445 02/10/23  1156 02/10/23  0025   WBC Thousand/uL 8 34 8 53 7 65   HEMOGLOBIN g/dL 11 5* 12 2 11 7*   HEMATOCRIT % 37 4 38 0 36 5   PLATELETS Thousands/uL 273 272 257   NEUTROS ABS Thousands/µL 5 46 7 35 5 68     Results from last 7 days   Lab Units 02/11/23 0445 02/10/23  1156 02/10/23  0025   SODIUM mmol/L 140 136 138   POTASSIUM mmol/L 3 5 4 5 4 1   CHLORIDE mmol/L 106 106 107   CO2 mmol/L 30 25 30   ANION GAP mmol/L 4 5 1*   BUN mg/dL 32* 25 22   CREATININE mg/dL 1 07 1 10 1 15   EGFR ml/min/1 73sq m 67 65 61   CALCIUM mg/dL 9 3 9 7 9 9     Results from last 7 days   Lab Units 02/10/23  0025   AST U/L 27   ALT U/L 20   ALK PHOS U/L 47   TOTAL PROTEIN g/dL 6 8   ALBUMIN g/dL 3 3*   TOTAL BILIRUBIN mg/dL 0 57     Results from last 7 days   Lab Units 02/11/23  1134 02/11/23  0554 02/10/23  2131 02/10/23  1628 02/10/23  1038 02/10/23  0612   POC GLUCOSE mg/dl 106 150* 213* 179* 238* 214*     Results from last 7 days   Lab Units 02/11/23  0445 02/10/23  1156 02/10/23  0025   GLUCOSE RANDOM mg/dL 136 218* 185*     Results from last 7 days   Lab Units 02/10/23  1156   HEMOGLOBIN A1C % 7 6*   EAG mg/dl 171     Results from last 7 days   Lab Units 02/10/23  0429 02/10/23  0227 02/10/23  0025   HS TNI 0HR ng/L  --   --  2,122*   HS TNI 2HR ng/L  --  1,930*  --    HSTNI D2 ng/L  --  -192  --    HS TNI 4HR ng/L 2,125*  --   --    HSTNI D4 ng/L 3 --   --      Results from last 7 days   Lab Units 02/10/23  0959 02/10/23  0315   PROTIME seconds  --  14 6*   INR   --  1 11   PTT seconds 37 109*     Results from last 7 days   Lab Units 02/10/23  1156   TSH 3RD GENERATON uIU/mL 0 861     Results from last 7 days   Lab Units 02/10/23  1156   NT-PRO BNP pg/mL 2,323*     ED Treatment:   Medication Administration from 02/09/2023 2218 to 02/10/2023 0455       Date/Time Order Dose Route Action     02/10/2023 0026 EST albuterol inhalation solution 10 mg 10 mg Nebulization Given     02/10/2023 0026 EST ipratropium (ATROVENT) 0 02 % inhalation solution 1 mg 1 mg Nebulization Given     02/10/2023 0026 EST sodium chloride 0 9 % inhalation solution 3 mL 3 mL Nebulization Given     02/10/2023 0026 EST predniSONE tablet 40 mg 40 mg Oral Given     02/10/2023 0211 EST aspirin tablet 325 mg 325 mg Oral Given     02/10/2023 0245 EST heparin (porcine) injection 4,000 Units 4,000 Units Intravenous Given     02/10/2023 0247 EST heparin (porcine) 25,000 units in 0 45% NaCl 250 mL infusion (premix) 11 8 Units/kg/hr Intravenous New Bag        Past Medical History:   Diagnosis Date   • Arthritis    • Asthma    • Colon polyps    • Community acquired pneumonia     last assessed: 5/1/2014   • Diabetes mellitus ([email or web address] )    • Hemorrhagic prepatellar bursitis, left 10/21/2019   • Hepatitis C    • High cholesterol    • History of colonoscopy 2017   • Hypertension    • Lymphadenopathy, anterior cervical 04/17/2018   • Nephritis and nephropathy, not specified as acute or chronic, with other specified pathological lesion in kidney, in diseases classified elsewhere 3/26/2013   • Screening for colon cancer 05/01/2019   • Thoracic vertebral fracture ([facility] ) 06/11/2014     Present on Admission:  • Mild intermittent asthma without complication  • Coronary artery disease  • Essential hypertension  • Hyperlipidemia  • Hypothyroidism  • NSTEMI (non-ST elevated myocardial infarction) ([email or web address] )  • Dyspnea on exertion      Admitting Diagnosis: Shortness of breath [R06 02]  Chest pain [R07 9]  NSTEMI (non-ST elevated myocardial infarction) ([facility] ) [I21 4]  Asthma exacerbation [J45 901]  Elevated troponin I level [R77 8]  Age/Sex: 78 y o  male  Admission Orders:  Telemetry  CV diet  Up & OOB as tolerated  I&O  Cuong SCDs      Scheduled Medications:  amLODIPine, 5 mg, Oral, Daily  aspirin, 81 mg, Oral, Daily  atorvastatin, 80 mg, Oral, Daily With Dinner  cholecalciferol, 2,000 Units, Oral, Daily  clopidogrel, 75 mg, Oral, Daily  fluticasone-vilanterol, 1 puff, Inhalation, Daily  hydrochlorothiazide, 25 mg, Oral, Daily  insulin glargine, 18 Units, Subcutaneous, HS  insulin lispro, 1-6 Units, Subcutaneous, TID AC  levothyroxine, 137 mcg, Oral, Early Morning  lisinopril, 20 mg, Oral, Daily  metoprolol succinate, 50 mg, Oral, BID  montelukast, 10 mg, Oral, Daily      Continuous IV Infusions:     PRN Meds:  albuterol, 1 puff, Inhalation, Q4H PRN  nitroglycerin, 0 4 mg, Sublingual, Q5 Min PRN        IP CONSULT TO CARDIOLOGY  IP CONSULT TO CASE MANAGEMENT    Network Utilization Review Department  ATTENTION: Please call with any questions or concerns to [phone] and carefully listen to the prompts so that you are directed to the right person  All voicemails are confidential   Elin Buerger all requests for admission clinical reviews, approved or denied determinations and any other requests to dedicated fax number below belonging to the campus where the patient is receiving treatment   List of dedicated fax numbers for the Facilities:  [address] DENIALS (Administrative/Medical Necessity) [phone]   [address] [place] [address] (Maternity/NICU/Pediatrics) [phone]   [address] [place] [address] [facility] [address] [facility] 134 495 [facility] [phone]

## 2023-02-11 NOTE — PROGRESS NOTES
INTERNAL MEDICINE RESIDENCY PROGRESS NOTE     Name: Paco Henning   Age & Sex: 78 y o  male   MRN: [id number]  Unit/Bed#: [facility] 420-01   Encounter: [id number]  Team: SOD Team A    PATIENT INFORMATION     Name: Paco Henning   Age & Sex: 78 y o  male   MRN: [id number]  Hospital Stay Days: 1    ASSESSMENT/PLAN     Principal Problem:    NSTEMI (non-ST elevated myocardial infarction) [facility])  Active Problems:    Coronary artery disease    Mild intermittent asthma without complication    Type 2 diabetes mellitus with circulatory disorder, with long-term current use of insulin ([address] )    Hyperlipidemia    Essential hypertension    Hypothyroidism    Dyspnea on exertion    Status post insertion of drug eluting coronary artery stent      * NSTEMI (non-ST elevated myocardial infarction) [facility])  Assessment & Plan  Patient presented with complains of increased shortness of breath since past week  He mentioned that he has been using his inhalers as prescribed  Of note he mentioned chest pain and itching over the site of pacemaker since last 2 days; although no chest pain reported today    In the ED, patient was afebrile, slightly tachycardic, /82, saturating well on room    On exam, patient had bilaterally decreased breath sounds and expiratory wheezes  Patient received albuterol and ipratropium nebs and PO Prednisone 40 mg with significant improvement in his symptoms    Labs : CBC, CMP unremarkable but HS Troponin elevated to 2122  EKG showed A fib and LBBB unchanged from prior    Patient was loaded with aspirin and started on Heparin drip    Plan  · Continue aspirin and Plavix  · Continue Metoprolol tartarate 50 mg BID  · Continue Atorvastatin 80 mg OD  · PRN Nitrostat for chest pain  · Holding heparin drip    · Cardiology consult  · Patient had cardiac catheterization yesterday with3-vessel CAD with two identifiable culprits for NSTEMI in ost RPDA & mid LAD, Successful PCI w/ ELDER x2 to mid LAD & ELDER x1 ost RPDA; residual moderate disease unchanged from 6-2022 study          Coronary artery disease  Assessment & Plan  Last LHC in 06/2022  • 1st Mrg lesion is 50% stenosed  • Mid LAD lesion is 50% stenosed  • RPDA-1 lesion is 50% stenosed  • RPDA-2 lesion is 50% stenosed  • 1st Diag lesion is 50% stenosed  Last ECHO in 06/2022   · The left ventricular ejection fraction is 60%  Systolic function is normal  Wall motion is normal   · There is an Emery RONNI 3 Ultra 26 mm TAVR bioprosthetic valve  The aortic valve has no significant stenosis  · Mild MR, TR, atrial enlargement    02/11 Echo   Left Ventricle: Left ventricular cavity size is normal  Wall thickness is mildly increased  The left ventricular ejection fraction is 35-40%  Systolic function is moderately reduced  Severe hypokinesis of the inferoseptal, inferior and anteroseptal walls including the apical septal and apical inferior segments  •  IVS: There is abnormal septal motion consistent with left bundle branch block  There is sigmoid appearance of the septum  •  Mitral Valve: There is moderately reduced mobility  There is moderate to severe regurgitation with a posteriorly directed jet  •  Tricuspid Valve: There is mild to moderate regurgitation  The tricuspid valve regurgitation jet is central  The right ventricular systolic pressure is elevated  The estimated right ventricular systolic pressure is 20 58 mmHg  •  Left Atrium: The atrium is moderately dilated  •  Right Atrium: The atrium is mildly dilated  •  Aortic Valve: There is an Emery RONNI 3 Ultra 26 mm TAVR bioprosthetic valve  There is trace paravalvular regurgitation  The gradient recorded across the prosthetic aortic valve is within the expected range  The aortic valve peak velocity is 1 89 m/s  The aortic valve mean gradient is 7 mmHg  •  Pericardium: There is a trivial pericardial effusion along the right atrial free wall      On Clopidogrel 75 mg OD, Rosuvastatin 40 mg OD, Metoprolol tartarate 50 mg BID    Plan  · Continue Clopidogrel and Metoprolol  · Replace Rosuvastatin with Atorvastatin 80 mg OD as per formulary  · Cardiology consulted appreciate recommendations      Dyspnea on exertion  Assessment & Plan  Patient is presented with dyspnea with exertion  Patient recent echo showed reduced ejection fraction from 60% to 40%  Possible in the presence of an NSTEMI  IV Lasix 40 mg was given  Consult cardiology appreciate recommendations    Hypothyroidism  Assessment & Plan  On Levothyroxine 137 mcg OD     Latest Reference Range & Units 11/10/22 10:09   TSH 3RD GENERATON 0 450 - 4 500 uIU/mL 4 760 (H)   (H): Data is abnormally high    Plan  · Continue Levothyroxine 137 mcg OD  · Will need outpatient PCP follow up for dose adjustment as deemed necessary      Essential hypertension  Assessment & Plan  On Amlodipine 5 mg OD, Hydrochlorothiazide 25 mg OD, Lisinopril 20 mg OD    On admission :   Vitals:    02/09/23 2233 02/10/23 0230   BP: 140/82    BP Location: Right arm    Pulse: (!) 114 88   Resp: 18    Temp: 98 7 °F (37 1 °C)    TempSrc: Oral    SpO2: 93%        Plan  · Monitor vitals  · Continue home regimen of anti-hypertensives as above    Hyperlipidemia  Assessment & Plan  On Rosuvastatin 40 mg OD     Latest Reference Range & Units 11/10/22 10:09   Cholesterol See Comment mg/dL 142   Triglycerides See Comment mg/dL 75   HDL >=40 mg/dL 65   LDL Calculated 0 - 100 mg/dL 62     Plan  · LDL at goal : Replace rosuvastatin with Atorvastatin 80 mg as per formulary    Type 2 diabetes mellitus with circulatory disorder, with long-term current use of insulin ([facility] [address] [address] )  Assessment & Plan  Lab Results   Component Value Date    HGBA1C 7 6 (A) 11/08/2022       No results for input(s): POCGLU in the last 72 hours      Blood Sugar Average: Last 72 hrs:    Home regimen : Metformin 850 mg BID, Lantus 18 units OD, Lispro 5 units with lunch and dinner    Plan  · Hold Metformin  · Continue Lantus at 18 units   · Sliding scale coverage instead of fixed dose Lispro 5 units in view of NPO    Mild intermittent asthma without complication  Assessment & Plan  On Advair, Flonase, as needed albuterol, Mucomyst and Montelukast    Patient came in with increased SOB since 1 week despite taking inhalers as prescribed  In ED, Bilateral decreased breath sounds and moderate wheezes   Significant improvement in symptoms after nebs and prednisone PO 40 mg once    On reassessment : bilateral decreased breath sounds with occassional expiratory wheezes    Plan   · Continue home regimen as above with alternatives on our formulary      Disposition: Patient is currently under treatment and pending clearance per cardiology    SUBJECTIVE     Patient seen and examined  No acute events overnight  Patient was seen sitting in his chair comfortable and pleasant  Patient denied chest pain, abdominal pain, nausea, vomiting, constipation, diarrhea, lightheadedness  Patient reported improvement of his shortness of breath  Patient is pending clearance by the cardiology  OBJECTIVE     Vitals:    02/10/23 2017 23 0359 23 0722 23 1103   BP: 137/77 145/79 119/64 109/60   BP Location:   Left arm    Pulse: 102 86 75 76   Resp:  16 16    Temp:  98 1 °F (36 7 °C) 97 6 °F (36 4 °C) 97 7 °F (36 5 °C)   TempSrc:   Oral    SpO2: 93% 93% 96% 96%   Weight:       Height:          Temperature:   Temp (24hrs), Av 8 °F (36 6 °C), Min:97 6 °F (36 4 °C), Max:98 1 °F (36 7 °C)    Temperature: 97 7 °F (36 5 °C)  Intake & Output:  I/O       02/09 0701  02/10 0700 02/10 0701  02/11 0700 02/11 0701  02/12 07    I V  (mL/kg) 32 2      Total Intake(mL/kg) 32 2      Urine (mL/kg/hr) 100 1625 (0 8)     Total Output 100 1625     Net -67 8 -1625                Weights:   IBW (Ideal Body Weight): 70 7 kg    Body mass index is 27 87 kg/m²    Weight (last 2 days)     Date/Time Weight    02/10/23 1203 85 6 (188 71)    02/10/23 0900 87 3 (192 46)        Physical Exam  Constitutional:       General: He is not in acute distress  Appearance: Normal appearance  He is not ill-appearing, toxic-appearing or diaphoretic  HENT:      Head: Normocephalic and atraumatic  Nose: Nose normal  No congestion or rhinorrhea  Mouth/Throat:      Mouth: Mucous membranes are moist       Pharynx: No oropharyngeal exudate or posterior oropharyngeal erythema  Neck:      Vascular: No carotid bruit  Cardiovascular:      Rate and Rhythm: Normal rate and regular rhythm  Pulses: Normal pulses  Heart sounds: Murmur (Holosystolic murmur over the mitral area) heard  No friction rub  No gallop  Comments: JVD distention  Pulmonary:      Effort: Pulmonary effort is normal  No respiratory distress  Breath sounds: Normal breath sounds  No stridor  No wheezing, rhonchi or rales  Comments: Patient is moderately short of breath  Chest:      Chest wall: No tenderness  Abdominal:      General: Abdomen is flat  There is no distension  Palpations: Abdomen is soft  There is no mass  Tenderness: There is no abdominal tenderness  There is no right CVA tenderness, left CVA tenderness, guarding or rebound  Hernia: No hernia is present  Musculoskeletal:         General: No swelling, tenderness, deformity or signs of injury  Normal range of motion  Cervical back: Normal range of motion and neck supple  No rigidity or tenderness  Right lower leg: No edema  Left lower leg: No edema  Lymphadenopathy:      Cervical: No cervical adenopathy  Skin:     General: Skin is warm and dry  Coloration: Skin is not jaundiced or pale  Findings: No bruising, erythema, lesion or rash  Neurological:      General: No focal deficit present  Mental Status: He is alert and oriented to person, place, and time  Sensory: No sensory deficit  Motor: No weakness     Psychiatric:         Mood and Affect: Mood normal          Behavior: Behavior normal          Thought Content: Thought content normal          Judgment: Judgment normal        LABORATORY DATA     Labs: I have personally reviewed pertinent reports  Results from last 7 days   Lab Units 02/11/23 0445 02/10/23  1156 02/10/23  0025   WBC Thousand/uL 8 34 8 53 7 65   HEMOGLOBIN g/dL 11 5* 12 2 11 7*   HEMATOCRIT % 37 4 38 0 36 5   PLATELETS Thousands/uL 273 272 257   NEUTROS PCT % 67 86* 74   MONOS PCT % 10 4 9      Results from last 7 days   Lab Units 02/11/23 0445 02/10/23  1156 02/10/23  0025   POTASSIUM mmol/L 3 5 4 5 4 1   CHLORIDE mmol/L 106 106 107   CO2 mmol/L 30 25 30   BUN mg/dL 32* 25 22   CREATININE mg/dL 1 07 1 10 1 15   CALCIUM mg/dL 9 3 9 7 9 9   ALK PHOS U/L  --   --  47   ALT U/L  --   --  20   AST U/L  --   --  27              Results from last 7 days   Lab Units 02/10/23  0959 02/10/23  0315   INR   --  1 11   PTT seconds 37 109*               IMAGING & DIAGNOSTIC TESTING     Radiology Results: I have personally reviewed pertinent reports  XR chest 1 view portable    Result Date: 2/10/2023  Impression: No acute cardiopulmonary disease  Left chest pacer with intact leads  No pneumothorax  Workstation performed: [id number]     XR chest pa & lateral    Result Date: 2/10/2023  Impression: Mild pulmonary vascular congestive change  Trace right costophrenic angle effusion  No pneumothorax  Workstation performed: [id number]     Other Diagnostic Testing: I have personally reviewed pertinent reports      ACTIVE MEDICATIONS     Current Facility-Administered Medications   Medication Dose Route Frequency   • albuterol (PROVENTIL HFA,VENTOLIN HFA) inhaler 1 puff  1 puff Inhalation Q4H PRN   • amLODIPine (NORVASC) tablet 5 mg  5 mg Oral Daily   • aspirin chewable tablet 81 mg  81 mg Oral Daily   • atorvastatin (LIPITOR) tablet 80 mg  80 mg Oral Daily With Dinner   • cholecalciferol (VITAMIN D3) tablet 2,000 Units  2,000 Units Oral Daily   • clopidogrel (PLAVIX) tablet 75 mg  75 mg Oral Daily   • fluticasone-vilanterol 200-25 mcg/actuation 1 puff  1 puff Inhalation Daily   • hydrochlorothiazide (HYDRODIURIL) tablet 25 mg  25 mg Oral Daily   • insulin glargine (LANTUS) subcutaneous injection 18 Units 0 18 mL  18 Units Subcutaneous HS   • insulin lispro (HumaLOG) 100 units/mL subcutaneous injection 1-6 Units  1-6 Units Subcutaneous TID AC   • levothyroxine tablet 137 mcg  137 mcg Oral Early Morning   • lisinopril (ZESTRIL) tablet 20 mg  20 mg Oral Daily   • metoprolol succinate (TOPROL-XL) 24 hr tablet 50 mg  50 mg Oral BID   • montelukast (SINGULAIR) tablet 10 mg  10 mg Oral Daily   • nitroglycerin (NITROSTAT) SL tablet 0 4 mg  0 4 mg Sublingual Q5 Min PRN       VTE Pharmacologic Prophylaxis: Sequential compression device (Venodyne)   VTE Mechanical Prophylaxis: sequential compression device    Portions of the record may have been created with voice recognition software  Occasional wrong word or "sound a like" substitutions may have occurred due to the inherent limitations of voice recognition software    Read the chart carefully and recognize, using context, where substitutions have occurred   ==  Vaughn Jimenes, [facility]  Internal Medicine Residency PGY-1

## 2023-02-12 VITALS
HEIGHT: 69 IN | WEIGHT: 188.71 LBS | OXYGEN SATURATION: 95 % | SYSTOLIC BLOOD PRESSURE: 123 MMHG | TEMPERATURE: 98.3 F | RESPIRATION RATE: 16 BRPM | HEART RATE: 72 BPM | BODY MASS INDEX: 27.95 KG/M2 | DIASTOLIC BLOOD PRESSURE: 79 MMHG

## 2023-02-12 LAB
GLUCOSE SERPL-MCNC: 108 MG/DL (ref 65–140)
GLUCOSE SERPL-MCNC: 123 MG/DL (ref 65–140)

## 2023-02-12 RX ORDER — NITROGLYCERIN 0.4 MG/1
0.4 TABLET SUBLINGUAL
Qty: 30 TABLET | Refills: 1 | Status: SHIPPED | OUTPATIENT
Start: 2023-02-12

## 2023-02-12 RX ORDER — METOPROLOL SUCCINATE 50 MG/1
50 TABLET, EXTENDED RELEASE ORAL 2 TIMES DAILY
Qty: 90 TABLET | Refills: 3 | Status: SHIPPED | OUTPATIENT
Start: 2023-02-12

## 2023-02-12 RX ORDER — SPIRONOLACTONE 25 MG/1
25 TABLET ORAL DAILY
Qty: 90 TABLET | Refills: 3 | Status: SHIPPED | OUTPATIENT
Start: 2023-02-13

## 2023-02-12 RX ORDER — SPIRONOLACTONE 25 MG/1
25 TABLET ORAL DAILY
Status: DISCONTINUED | OUTPATIENT
Start: 2023-02-12 | End: 2023-02-12 | Stop reason: HOSPADM

## 2023-02-12 RX ADMIN — HYDROCHLOROTHIAZIDE 25 MG: 25 TABLET ORAL at 08:18

## 2023-02-12 RX ADMIN — CHOLECALCIFEROL TAB 25 MCG (1000 UNIT) 2000 UNITS: 25 TAB at 08:18

## 2023-02-12 RX ADMIN — CLOPIDOGREL BISULFATE 75 MG: 75 TABLET ORAL at 08:18

## 2023-02-12 RX ADMIN — AMLODIPINE BESYLATE 5 MG: 5 TABLET ORAL at 08:18

## 2023-02-12 RX ADMIN — LISINOPRIL 20 MG: 20 TABLET ORAL at 08:18

## 2023-02-12 RX ADMIN — LEVOTHYROXINE SODIUM 137 MCG: 25 TABLET ORAL at 05:57

## 2023-02-12 RX ADMIN — ASPIRIN 81 MG: 81 TABLET, CHEWABLE ORAL at 08:18

## 2023-02-12 RX ADMIN — MONTELUKAST 10 MG: 10 TABLET, FILM COATED ORAL at 08:18

## 2023-02-12 RX ADMIN — METOPROLOL SUCCINATE 50 MG: 50 TABLET, EXTENDED RELEASE ORAL at 08:18

## 2023-02-12 NOTE — ASSESSMENT & PLAN NOTE
S/p ELDER x3 placed Feb 2023  Will need to continue aspirin and plavix  Remainder of GDMT as per primary problem above

## 2023-02-12 NOTE — UTILIZATION REVIEW
NOTIFICATION OF INPATIENT ADMISSION   AUTHORIZATION REQUEST   SERVICING FACILITY:   [facility]  Address: [address] [place] [address], [address] [place] [address]  Tax ID: [id number]  NPI: [id number] ATTENDING PROVIDER:  Attending Name and NPI#: Neto Gonzalo [[id number]]  Address: [address] [place] [address], [address] [place] [address]  Phone: [phone]   ADMISSION INFORMATION:  Place of Service: Inpatient [address] [facility]  [address]  Place of Service Code: 21  Inpatient Admission Date/Time: 2/10/23  2:48 AM  Discharge Date/Time: No discharge date for patient encounter  Admitting Diagnosis Code/Description:  Shortness of breath [R06 02]  Chest pain [R07 9]  NSTEMI (non-ST elevated myocardial infarction) ([facility] ) [I21 4]  Asthma exacerbation [J45 901]  Elevated troponin I level [R77 8]     UTILIZATION REVIEW CONTACT:  Kayli Wynne Utilization   Network Utilization Review Department  Phone: [phone]  Fax: [phone]  Email: [email or web address]  Contact for approvals/pending authorizations, clinical reviews, and discharge  PHYSICIAN ADVISORY SERVICES:  Medical Necessity Denial & Maut-bv-Pmvi Review  Phone: [phone]  Fax: [phone]  Email: [email or web address]

## 2023-02-12 NOTE — PROGRESS NOTES
Progress Note - Cardiology   Tom Reyes 78 y o  male MRN: [id number]  Unit/Bed#: [facility] 420-01 Encounter: [id number]    Assessment:  #NSTEMI Type I  #Acute HFrEF (EF 40%)  #Ischemic cardiomyopathy  #Severe AS s/p TAVR 6/22  #CHB s/p PPM  #HTN  #HLD  #T2DM  - S/P coronary angiography which revealed three-vessel CAD with 2 identifiable culprit lesions in the ostial right posterior descending artery and mid LAD  The mid LAD lesion was stented x2 with good result, and the ostial RPDA lesion received 1 stent also with good result  - He has newly reduced LV systolic function as of this admission, with an EF of 40% on my read  Clinically euvolemic  Medical management is ongoing with Toprol-XL and lisinopril as ordered  Can add Jardiance 10mg QD pending price check  Hold on transition to [facility] due to limited blood pressure room at this time  May start Spironolactone 25mg QD  Plan:  - Continue aspirin and Plavix  Repeat transthoracic echocardiogram in 90 days to reevaluate LV systolic function  No role for LifeVest at this time given estimated LVEF of 40% and absence of ventricular arrhythmias on telemetry  - Continue Toprol-XL and lisinopril as GDMT for heart failure  Start Spironolactone 25mg QD, D/C HCTZ  Will need to price check Jardiance 10 mg once daily   - No role for a maintenance diuretic at this time, patient counseled on close monitor of weight at home    - BMP in 1 week to ensure stable renal function and electrolytes  - Cardiac rehab upon discharge  - Stable for discharge from a cardiovascular standpoint    - We will sign off  Subjective/Objective     Subjective: Patient seen and examined  Doing well overall, denies chest pain, shortness of breath, diaphoresis, dizziness, palpitations, orthopnea, edema      Objective:     Vitals: /80 (BP Location: Left arm)   Pulse 90   Temp 99 2 °F (37 3 °C) (Oral)   Resp 16   Ht 5' 9" (1 753 m)   Wt 85 6 kg (188 lb 11 4 oz)   SpO2 96% BMI 27 87 kg/m²   Vitals:    02/10/23 0900 02/10/23 1203   Weight: 87 3 kg (192 lb 7 4 oz) 85 6 kg (188 lb 11 4 oz)     Orthostatic Blood Pressures    Flowsheet Row Most Recent Value   Blood Pressure 126/80 filed at 02/12/2023 5905   Patient Position - Orthostatic VS Sitting filed at 02/12/2023 7784            Intake/Output Summary (Last 24 hours) at 2/12/2023 0912  Last data filed at 2/12/2023 0800  Gross per 24 hour   Intake 660 ml   Output 975 ml   Net -315 ml       Invasive Devices     Peripheral Intravenous Line  Duration           Peripheral IV 02/10/23 Left Wrist 2 days                Physical Examination:  Gen: A&Ox3  HEENT: no JVD  CVS: RRR, S1S2 audible and w/o m/r/g  Right radial cath access site is C/D/I  No hematoma, non tender  Resp: Clear to ascultation bilaterally, no wheeze, rales, ronchi  Chest wall non tender  Abd: Soft, non-tender, non-distended  MSK: No edema  Neuro: Normal strength and sensory exam     Lab Results: I have personally reviewed pertinent lab results  Imaging: I have personally reviewed pertinent reports  Telemetry: Sinus rhythm, LBBB  No arrhythmias       Anderson Dudley MD

## 2023-02-12 NOTE — DISCHARGE SUMMARY
INTERNAL MEDICINE RESIDENCY DISCHARGE SUMMARY     Arie Fitzpatrick   78 y o  male  MRN: [id number]  Room/Bed: [facility] 420/[facility] [address] [place] [address]    Encounter: [id number]    Principal Problem:    NSTEMI (non-ST elevated myocardial infarction) [facility])  Active Problems:    Coronary artery disease    Status post insertion of drug eluting coronary artery stent    Type 2 diabetes mellitus with circulatory disorder, with long-term current use of insulin (HCC)    Hyperlipidemia    Essential hypertension    Dyspnea on exertion    Mild intermittent asthma without complication    Hypothyroidism      * NSTEMI (non-ST elevated myocardial infarction) [facility])  Assessment & Plan  Patient presented with complains of increased shortness of breath since past week  patient was afebrile, slightly tachycardic, /82, saturating well on room  ECG with afib and known LBBB  HS Troponin elevated to 2122  Patient was loaded with aspirin and started on Heparin drip    Plan  -2/2 Type 1 NSTEMI in setting of multi-vessel CAD  S/p 3x stents this admission (feb 2023)   2x stents to mid-LAD, 1x to ostial right posterior descending  -cont DAPT (ASA and plavix)  -metoprolol-succinate 50 mg q12 hr  -rosuvastatin 40 mg HS  -Jardiance 10 mg started on discharge  -lisinopril 20 mg qd  -aldactone 25 mg qd started  -PRN NTG for chest pain  -START cardiac rehab outpatient  -f/u with cards outpatient  -repeat echo in 3 months to reassess EF  -BMP in 1  Week to assess electrolytes and renal function in setting of medication changes  -in outpt setting as BP allows, will ultimately be transitioned to [facility]   -please note patient is still on amolidpine 5 mg qd as well  -continue with close PCP f/u to ensure other comorbidities (HTN, DM) well controlled  -HCTZ WAS DISCONTINUED THIS ADMISSION BY CARDIOLOGY, monitor clinically  -patient encouraged to weigh himself daily and contact HF team if his weights go up by >3 lbs per day        Status post insertion of drug eluting coronary artery stent  Assessment & Plan  S/p ELDER x3 placed Feb 2023  Will need to continue aspirin and plavix  Remainder of GDMT as per primary problem above    Coronary artery disease  Assessment & Plan  S/p [facility] on 2/11 showing multi vessel disease, s/p 3 stents placed this admission (2x in LAD and 1x in right posterior descending)    02/11 Echo   Left Ventricle: Left ventricular cavity size is normal  Wall thickness is mildly increased  The left ventricular ejection fraction is 35-40%  Systolic function is moderately reduced  Severe hypokinesis of the inferoseptal, inferior and anteroseptal walls including the apical septal and apical inferior segments  •There is abnormal septal motion consistent with left bundle branch block  moderate to severe regurgitation of mitral valve   •  Aortic Valve: There is an Emery RONNI 3 Ultra 26 mm TAVR bioprosthetic valve  There is trace paravalvular regurgitation  The gradient recorded across the prosthetic aortic valve is within the expected range  The aortic valve peak velocity is 1 89 m/s  The aortic valve mean gradient is 7 mmHg  Plan  · Continue Clopidogrel, aspirin,  Toprol-XL, rosuvastatin  · outpt cardiology f/u      Dyspnea on exertion  Assessment & Plan  Patient is presented with dyspnea with exertion  Echo this admission showed acute HF exacerbation with worsening EF from prior 60% to new 40%  Etiology- multivessel CAD and NSTEMI type 1  Patient euvolemic at time of discharge  Recommended per cardiology that home HCTZ be DISCONTINUED   Patient now on GDMT   Will need close outpatient f/u with PCP and cards    Essential hypertension  Assessment & Plan  TAKE: Toprol XL 50 mg q12h, lisinopril 20 mg, spironolactone 25 mg qd, jardiance 10 mg    STOP taking HCTZ 25 mg  STOP taking metoprolol tartrate 50 mg q12h          Hyperlipidemia  Assessment & Plan  Cont Rosuvastatin 40 mg OD     Latest Reference Range & Units 11/10/22 10:09   Cholesterol See Comment mg/dL 142   Triglycerides See Comment mg/dL 75   HDL >=40 mg/dL 65   LDL Calculated 0 - 100 mg/dL 62     ·     Type 2 diabetes mellitus with circulatory disorder, with long-term current use of insulin (HCC)  Assessment & Plan  a1c 7 6%    Resume home regimen: Metformin 850 mg BID, Lantus 18 units OD, Lispro 5 units with lunch and dinner  F/u with PCP 1-2 weeks  Encouraged to bring in blood glucose logs    Mild intermittent asthma without complication  Assessment & Plan  On Advair, Flonase, as needed albuterol, Mucomyst and Montelukast    Patient came in with increased SOB since 1 week despite taking inhalers as prescribed  In ED, Bilateral decreased breath sounds and moderate wheezes   Significant improvement in symptoms after nebs and prednisone PO 40 mg once    On reassessment : bilateral decreased breath sounds with occassional expiratory wheezes    Plan   · Continue home regimen as above with alternatives on our formulary    Hypothyroidism  Assessment & Plan  On Levothyroxine 137 mcg OD     Latest Reference Range & Units 11/10/22 10:09   TSH 3RD GENERATON 0 450 - 4 500 uIU/mL 4 760 (H)   (H): Data is abnormally high    Plan  · Continue Levothyroxine 137 mcg OD  · Will need outpatient PCP follow up for dose adjustment as deemed necessary        [address]     Per H&P by Dr Barrios Other: "Екатерина Acosta is a 77/M with PMH of asthma, hypertension, hyperlipidemia, diabetes, CAD, severe AS s/p TAVR, V tach s/p ICD, hypothyroidism  Patient was apparently well until 1 week ago when he started to experience increased shortness of breath despite using his inhalers regularly  He also mentions increased shortness of breath while lying in bed to sleep causing sleep disruption  He also mentions occasional chest pain with itching over the pacemaker site over the past 2 days   No associated fever, chills, cough, palpitations, nausea, vomiting, lightheadedness, syncope       In the ED, patient was afebrile, slightly tachycardic, /82, saturating well on room  On exam, patient had bilaterally decreased breath sounds and expiratory wheezes  Patient received albuterol and ipratropium nebs and PO Prednisone 40 mg with significant improvement in his symptoms  Labs showed CBC, CMP unremarkable but HS Troponin elevated to 2122  EKG showed new A fibrillation and LBBB unchanged from prior  Patient was loaded with aspirin and started on Heparin drip  Patient was admitted to [facility] for further evaluation and management "    Ultimately patient was admitted for NSTEMI Type 1 with new wall motion abnormalities, newly reduced EF 40%, as well as moderate to severe mitral regurgitation  He was urgently taken for left heart cath, which revealed 3 vessel CAD and two vessels as culprit lesions (ostial right posterior descending, and mid LAD)  The mid LAD was stented with 2 stents, and the ostial RPDA had one stent placed  Patient was monitored overnight and discharged unremarkably back to home  He was clinically euvolemic, on room air, and comfortable appearing on exam  Patient was endorsing no complaints of BUSTILLOS, orthopnea, cough, fever/chills, CP, palpitations, n/v/d, abdominal pain at time of discharge  Patient was discharged on the following cardiac medications:  -aspirin 81 mg qd  -plavix 75 mg qd  -Toprol-XL 50 mg q12h (new med, supplemented for the prior tartrate)  -lisinopril 20 mg qd  -Jardiance 10 mg qd (new med)  -spironolactone 25 mg qd  -rosuvastatin 40 mg HS  -In outpatient setting, as blood pressure allows, ultimate goal will be to transition to [facility]   -please note, patient's amlodipine 5 mg qd was continued throughout the admission   If necessary could discontinue this   -for future A1c benefits can increase Jardiance to 25 mg qd as tolerated in outpatient setting    Medications that were discontinued: lasix, metoprolol tartrate    -Patient will need to f/u with PCP in 1-2 weeks for TCM and review of his BMP (which he is to get in 1 week) to monitor   -Patient will need to follow-up with cardiology- appointment not yet scheduled   -Patient will need repeat echo in 3 months (around 5/12), which was ordered  Physical Exam  Constitutional:       General: He is not in acute distress  Appearance: He is not ill-appearing or toxic-appearing  Neck:      Comments: No JVD  Cardiovascular:      Rate and Rhythm: Normal rate and regular rhythm  Pulmonary:      Effort: Pulmonary effort is normal  No respiratory distress  Breath sounds: Normal breath sounds  No wheezing or rales  Abdominal:      General: There is no distension  Palpations: Abdomen is soft  Tenderness: There is no abdominal tenderness  There is no guarding  Musculoskeletal:      Right lower leg: No edema  Left lower leg: No edema  Skin:     General: Skin is warm  Neurological:      Mental Status: He is alert and oriented to person, place, and time  Psychiatric:         Mood and Affect: Mood normal          Behavior: Behavior normal          Thought Content: Thought content normal          Judgment: Judgment normal          DISCHARGE INFORMATION     PCP at Discharge: Nickolas Saravia DO    Admitting Provider: Camelia Caro DO  Admission Date: 2/9/2023    Discharge Provider: No att  providers found  Discharge Date: 2/12/23    Discharge Disposition: Home/Self Care  Discharge Condition: stable  Discharge with Lines: no    Discharge Diet: cardiac diet  Activity Restrictions: for 5 days post stent placement patient is not to lift more than a few pounds or physically exert himself   He will then participate in cardiac rehab  Test Results Pending at Discharge: will need repeat echo in 3 months and BMP in 1 week    Discharge Diagnoses:  Principal Problem:    NSTEMI (non-ST elevated myocardial infarction) [facility])  Active Problems:    Coronary artery disease    Status post insertion of drug eluting coronary artery stent    Type 2 diabetes mellitus with circulatory disorder, with long-term current use of insulin (HCC)    Hyperlipidemia    Essential hypertension    Dyspnea on exertion    Mild intermittent asthma without complication    Hypothyroidism  Resolved Problems:    * No resolved hospital problems  *      Consulting Providers:      Diagnostic & Therapeutic Procedures Performed:  XR chest 1 view portable    Result Date: 2/10/2023  Impression: No acute cardiopulmonary disease  Left chest pacer with intact leads  No pneumothorax  Workstation performed: [id number]     XR chest pa & lateral    Result Date: 2/10/2023  Impression: Mild pulmonary vascular congestive change  Trace right costophrenic angle effusion  No pneumothorax  Workstation performed: [id number]       Code Status: Prior  Advance Directive & Living Will: <no information>  Power of :    POLST:      Medications:  Current Discharge Medication List      STOP taking these medications       hydrochlorothiazide (HYDRODIURIL) 25 mg tablet Comments:   Reason for Stopping:         metoprolol tartrate (LOPRESSOR) 50 mg tablet Comments:   Reason for Stopping:             Current Discharge Medication List      START taking these medications    Details   Empagliflozin (JARDIANCE) 10 MG TABS tablet Take 1 tablet (10 mg total) by mouth every morning  Qty: 90 tablet, Refills: 3    Associated Diagnoses: NSTEMI (non-ST elevated myocardial infarction) ([facility] ); Status post insertion of drug eluting coronary artery stent; Coronary artery disease involving native coronary artery of native heart without angina pectoris      metoprolol succinate (TOPROL-XL) 50 mg 24 hr tablet Take 1 tablet (50 mg total) by mouth 2 (two) times a day  Qty: 90 tablet, Refills: 3    Associated Diagnoses: NSTEMI (non-ST elevated myocardial infarction) ([facility] );  Status post insertion of drug eluting coronary artery stent; Coronary artery disease involving native coronary artery of native heart without angina pectoris      nitroglycerin (NITROSTAT) 0 4 mg SL tablet Place 1 tablet (0 4 mg total) under the tongue every 5 (five) minutes as needed for chest pain  Qty: 30 tablet, Refills: 1    Associated Diagnoses: NSTEMI (non-ST elevated myocardial infarction) ([facility] ); Status post insertion of drug eluting coronary artery stent; Coronary artery disease involving native coronary artery of native heart without angina pectoris      spironolactone (ALDACTONE) 25 mg tablet Take 1 tablet (25 mg total) by mouth daily Do not start before February 13, 2023  Qty: 90 tablet, Refills: 3    Associated Diagnoses: NSTEMI (non-ST elevated myocardial infarction) ([facility] ); Status post insertion of drug eluting coronary artery stent; Coronary artery disease involving native coronary artery of native heart without angina pectoris           Current Discharge Medication List      CONTINUE these medications which have NOT CHANGED    Details   Advair -21 MCG/ACT inhaler TAKE 2 PUFFS BY MOUTH TWICE A DAY  Qty: 36 g, Refills: 6    Associated Diagnoses: Mild intermittent asthma, unspecified whether complicated      albuterol (PROVENTIL HFA,VENTOLIN HFA) 90 mcg/act inhaler INHALE 2 PUFFS EVERY 4 HOURS AS NEEDED FOR WHEEZING OR SHORTNESS OF BREATH  Qty: 18 g, Refills: 2    Comments: DX Code Needed      Associated Diagnoses: Mild intermittent asthma without complication      Alcohol Swabs (ALCOHOL PADS) 70 % PADS       amLODIPine (NORVASC) 5 mg tablet Take 1 tablet (5 mg total) by mouth daily  Qty: 90 tablet, Refills: 1    Associated Diagnoses: Essential hypertension      Aspirin Low Dose 81 MG chewable tablet CHEW 1 TABLET BY MOUTH DAILY  Qty: 90 tablet, Refills: 3    Associated Diagnoses: S/P TAVR (transcatheter aortic valve replacement)      Blood Glucose Monitoring Suppl (OneTouch Verio) w/Device KIT Check blood glucose three times daily before each meal  Qty: 1 kit, Refills: 0    Associated Diagnoses: Type 2 diabetes mellitus with diabetic neuropathy, without long-term current use of insulin ([facility])      cholecalciferol (VITAMIN D3) 1,000 units tablet Take 2 tablets (2,000 Units total) by mouth daily  Qty: 180 tablet, Refills: 5    Associated Diagnoses: Vitamin D deficiency      clopidogrel (PLAVIX) 75 mg tablet Take 1 tablet (75 mg total) by mouth daily  Qty: 90 tablet, Refills: 3    Associated Diagnoses: S/P TAVR (transcatheter aortic valve replacement)      Continuous Blood Gluc  (FreeStyle Cristofer 2 [place]) POWER Use 1 each continuous  Qty: 1 each, Refills: 0    Associated Diagnoses: Type 2 diabetes mellitus with diabetic neuropathy, with long-term current use of insulin ([facility])      Continuous Blood Gluc Sensor (FreeStyle Cristofer 2 Sensor) MISC Use 1 each every 14 (fourteen) days  Qty: 6 each, Refills: 3    Associated Diagnoses: Type 2 diabetes mellitus with diabetic neuropathy, with long-term current use of insulin ([facility])      fluticasone (FLONASE) 50 mcg/act nasal spray 2 sprays into each nostril daily  Qty: 2 Bottle, Refills: 2    Associated Diagnoses: Allergic rhinitis, unspecified seasonality, unspecified trigger      Insulin Pen Needle (Pen Needles) 31G X 8 MM MISC Use daily  Qty: 300 each, Refills: 3    Associated Diagnoses: Diabetes mellitus due to underlying condition with diabetic neuropathy, without long-term current use of insulin ([facility])      Lancets (FREESTYLE) lancets by Other route as needed (As needed)  Qty: 100 each, Refills: 3    Associated Diagnoses: Diabetes mellitus due to underlying condition with diabetic neuropathy, without long-term current use of insulin ([facility])      Lantus SoloStar 100 units/mL SOPN INJECT 0 18 ML (18 UNITS TOTAL) UNDER THE SKIN DAILY AT BEDTIME  Qty: 15 mL, Refills: 3    Comments: DX Code Needed      Associated Diagnoses: Diabetes mellitus due to underlying condition with diabetic neuropathy, without long-term current use of insulin ([facility])      levothyroxine 137 mcg tablet TAKE 1 TABLET BY MOUTH EVERY DAY  Qty: 90 tablet, Refills: 3    Associated Diagnoses: Hypothyroidism      lisinopril (ZESTRIL) 20 mg tablet Take 1 tablet (20 mg total) by mouth daily  Qty: 90 tablet, Refills: 3    Associated Diagnoses: Hypertension, unspecified type      metFORMIN (GLUCOPHAGE) 850 mg tablet TAKE 1 TABLET (850 MG TOTAL) BY MOUTH 2 (TWO) TIMES A DAY WITH MEALS  Qty: 180 tablet, Refills: 3    Associated Diagnoses: Diabetes mellitus (HCC)      methocarbamol (Robaxin-750) 750 mg tablet Take 1 tablet (750 mg total) by mouth every 6 (six) hours as needed for muscle spasms  Qty: 60 tablet, Refills: 0    Associated Diagnoses: Leg cramping      montelukast (SINGULAIR) 10 mg tablet TAKE 1 TABLET BY MOUTH EVERY DAY  Qty: 90 tablet, Refills: 3    Associated Diagnoses: Mild intermittent asthma, unspecified whether complicated      NovoLOG FlexPen 100 units/mL injection pen Inject 5 units with breakfast and with dinner  Qty: 12 mL, Refills: 3    Associated Diagnoses: Type 2 diabetes mellitus with diabetic neuropathy, without long-term current use of insulin (HCC)      rosuvastatin (CRESTOR) 40 MG tablet TAKE 1 TABLET BY MOUTH EVERY DAY  Qty: 90 tablet, Refills: 3    Comments: DX Code Needed    Associated Diagnoses: Mixed hyperlipidemia             Allergies: Allergies   Allergen Reactions   • Iv Contrast [Iodinated Contrast Media] Rash     Patient states he had a severe reaction approximately 10 years ago , states he had a rash, itchy and he remembers a bunch of people pounding on chest and being surrounded by multiple doctors  FOLLOW-UP     PCP Outpatient Follow-up:  See PCP in 1-2 weeks    Consulting Providers Follow-up:  See     Active Issues Requiring Follow-up:   CAD s/p stent- will need repeat echo in 3 months  F/u with PCP and cardiologist  BMP in 1 week given initiation of GDMT     Discharge Statement:   I spent 45 minutes minutes discharging the patient  This time was spent on the day of discharge   I had direct contact with the patient on the day of discharge  Additional documentation is required if more than 30 minutes were spent on discharge  Portions of the record may have been created with voice recognition software  Occasional wrong word or "sound a like" substitutions may have occurred due to the inherent limitations of voice recognition software    Read the chart carefully and recognize, using context, where substitutions have occurred     ==  Lynsey Soto, [facility]  Internal Medicine Resident PGY-3

## 2023-02-13 ENCOUNTER — TRANSITIONAL CARE MANAGEMENT (OUTPATIENT)
Dept: INTERNAL MEDICINE CLINIC | Facility: CLINIC | Age: 78
End: 2023-02-13

## 2023-02-20 NOTE — PROGRESS NOTES
Cardiology Follow Up    Rosio Boo  1945  [id number]  [address] [facility] [address] [phone]    1  Status post insertion of drug eluting coronary artery stent  Ambulatory referral to Cardiology    Echo follow up/limited w/ contrast if indicated      2  Ischemic cardiomyopathy  Ambulatory referral to Cardiology    Echo follow up/limited w/ contrast if indicated      3  Chronic HFrEF (heart failure with reduced ejection fraction) ([facility] [address] )  Ambulatory referral to Cardiology    Echo follow up/limited w/ contrast if indicated      4  Mixed hyperlipidemia        5  Type 2 diabetes mellitus with diabetic neuropathy, with long-term current use of insulin [facility])            Interval History:   Mr Rosio Boo was admitted to [facility] on 2/09 - 2/12/23 with a NSTEMI  [facility] is experiencing shortness of breath with minimal exertion needing to rest   He also noted occasional chest pain and itching over the pacemaker site 2 days prior to admission  In the Emergency room he was slightly tachycardic blood pressure 140/82  He was treated with albuterol and a pro treat him nebs as well as p o  prednisone with improvement of symptoms  At bedtime troponin 2122  He was loaded with aspirin and started on IV heparin  TTE showed left ventricle cavity size normal wall thickness mildly increased LVEF 35 to 61% systolic function mildly reduced  Severe hypokinesis of the inferior septal and inferior anterior septal walls including the apical septal and apical inferior segments  Moderate to severe MR with posterior directed jet  Mild to moderate TR  Central   Left atrium moderately dilated  Right atrium mildly dilated  Trivial pericardial effusion along the right atrial free wall    The valve jet ultimately he was admitted for non-STEMI he was urgently taken for left heart cardiac catheterization which showed mid LAD 80% lesion status post Synergy XD MR  drug-eluting stent  X 2, 0% residual stenosis,  RPDA 1 lesion 90% stenosis sp angioplasty and Synergy XD MR drug-eluting stent, 0 % residual stenosis  First marginal lesion 50% stenosis RPDA 2 50% stenosis  HCTZ discontinued on admission  Lab studies to be done in one week  Transition to [facility] if labs stable and BP will allow  He was discharged on Plavix, ASA, Jardiance, Aldactone Metoprolol succinate, Lisinopril and Crestor  Follow up TTE in 3 months  HF education provided  Mr Tianna Vázquez presents to our office for a recent hospitalization follow up visit  He is accompanied by his wife who is interpreting for him today  Claudeen Poplar is feeling well without symptoms of CP, shortness of breath lightheadedness or dizziness  Reviewed the results of his cardiac catheterization  Medical History   Primary Cardiologist Dr Jv Elliott   Severe AS KYRA 0 70cm2   6/21/22 sp TF TAVR #26mm Emery Hernán S3 Ultra valve via left femoral approach by Dr Génesis Gibbons  New LBBB post TAVR  9/01/22 dual chamber PPM placed  CAD  Hypertension  Hyperlipidemia 11/10/22 , TG 75, HDL 65, LDL 62   DM2 HgbA1C 6 5 on 5/09/22  Asthma  Hepatitis C  Lymphadenopathy  Tobacco abuse denies smoking for the past 5 months           6/03/22 RHC/ LHC: First marginal lesion 50% stenosis, mid LAD 50% stenosis, RPDA 50% stenosis, RPDA to 50% stenosis, 1st diagonal 50% stenosis          Patient Active Problem List   Diagnosis   • Mild intermittent asthma without complication   • Allergic rhinitis   • Bilateral hearing loss   • Type 2 diabetes mellitus with circulatory disorder, with long-term current use of insulin (HCC)   • Hyperlipidemia   • Essential hypertension   • Hypothyroidism   • Nicotine dependence   • Osteoarthritis of knee   • Vitamin B12 deficiency   • Adenomatous colon polyp   • Diverticulosis of large intestine   • Internal hemorrhoids   • Aortic stenosis   • Contrast media allergy   • S/P TAVR (transcatheter aortic valve replacement)   • Coronary artery disease   • Tachycardia-bradycardia [facility])   • Need for influenza vaccination   • Leg cramping   • Peripheral artery disease (HCC)   • Presence of cardiac pacemaker   • NSTEMI (non-ST elevated myocardial infarction) ([facility])   • Bruit of left carotid artery   • Dyspnea on exertion   • Status post insertion of drug eluting coronary artery stent     Past Medical History:   Diagnosis Date   • Arthritis    • Asthma    • Colon polyps    • Community acquired pneumonia     last assessed: 2014   • Diabetes mellitus ([facility] )    • Hemorrhagic prepatellar bursitis, left 10/21/2019   • Hepatitis C    • High cholesterol    • History of colonoscopy    • Hypertension    • Lymphadenopathy, anterior cervical 2018   • Nephritis and nephropathy, not specified as acute or chronic, with other specified pathological lesion in kidney, in diseases classified elsewhere 3/26/2013   • Screening for colon cancer 2019   • Thoracic vertebral fracture ([facility] ) 2014     Social History     Socioeconomic History   • Marital status: /Civil Union     Spouse name: Not on file   • Number of children: Not on file   • Years of education: Not on file   • Highest education level: Not on file   Occupational History   • Occupation: retired    Tobacco Use   • Smoking status: Former     Packs/day: 0 50     Types: Cigarettes     Quit date: 2022     Years since quittin 7   • Smokeless tobacco: Never   • Tobacco comments:     started when he was about 24 yrs old; stopped smoking 3 wks ago   Vaping Use   • Vaping Use: Never used   Substance and Sexual Activity   • Alcohol use: No   • Drug use: No   • Sexual activity: Not Currently   Other Topics Concern   • Not on file   Social History Narrative   • Not on file     Social Determinants of Health     Financial Resource Strain: Low Risk    • Difficulty of Paying Living Expenses: Not hard at all Food Insecurity: No Food Insecurity   • Worried About Running Out of Food in the Last Year: Never true   • Ran Out of Food in the Last Year: Never true   Transportation Needs: No Transportation Needs   • Lack of Transportation (Medical): No   • Lack of Transportation (Non-Medical): No   Physical Activity: Not on file   Stress: Not on file   Social Connections: Not on file   Intimate Partner Violence: Not on file   Housing Stability: Not on file      Family History   Problem Relation Age of Onset   • Heart attack Family         at age 80   • No Known Problems Mother    • No Known Problems Father    • Diabetes Sister    • Diabetes Brother      Past Surgical History:   Procedure Laterality Date   • CARDIAC CATHETERIZATION N/A 6/3/2022    Procedure: Cardiac RHC/LHC; Surgeon: Destiny Prescott MD;  [facility]: [facility] CARDIAC CATH [facility]; Service: Cardiology   • CARDIAC CATHETERIZATION N/A 6/21/2022    Procedure: CARDIAC TAVR;  Surgeon: Lissett Jimenez MD;  Location: [facility] MAIN OR;  Service: Cardiology   • CARDIAC CATHETERIZATION N/A 2/10/2023    Procedure: Cardiac Coronary Angiogram;  Surgeon: Destiny Prescott MD;  Location: [facility] CARDIAC CATH [facility]; Service: Cardiology   • CARDIAC CATHETERIZATION N/A 2/10/2023    Procedure: Cardiac pci;  Surgeon: Destiny Prescott MD;  Location: [facility] CARDIAC CATH [facility]; Service: Cardiology   • CARDIAC ELECTROPHYSIOLOGY PROCEDURE N/A 9/1/2022    Procedure: Cardiac pacer implant ; DC PPM;  Surgeon: Yolanda Mejía DO;  Location: [facility] CARDIAC C[facility]; Service: Cardiology   • COLONOSCOPY     • COLONOSCOPY W/ POLYPECTOMY     • LITHOTRIPSY     • MULTIPLE TOOTH EXTRACTIONS     • WI COLONOSCOPY FLX DX W/COLLJ SPEC WHEN PFRMD N/A 5/17/2018    Procedure: COLONOSCOPY;  Surgeon: Geetha Wilder MD;  Location: [facility] GI LAB;   Service: Gastroenterology   • WI REPLACE AORTIC VALVE OPENFEMORAL ARTERY APPROACH N/A 6/21/2022    Procedure: REPLACEMENT AORTIC VALVE TRANSCATHETER (TAVR) TRANSFEMORAL W/ 26MM QUINN RONNI S3 ULTRA VALVE(ACCESS ON LEFT) SAULO;  Surgeon: Kevin Rodgers MD;  Location: [facility] MAIN OR;  Service: Cardiac Surgery       Current Outpatient Medications:   •  Advair -21 MCG/ACT inhaler, TAKE 2 PUFFS BY MOUTH TWICE A DAY, Disp: 36 g, Rfl: 6  •  albuterol (PROVENTIL HFA,VENTOLIN HFA) 90 mcg/act inhaler, INHALE 2 PUFFS EVERY 4 HOURS AS NEEDED FOR WHEEZING OR SHORTNESS OF BREATH, Disp: 18 g, Rfl: 2  •  Alcohol Swabs (ALCOHOL PADS) 70 % PADS, , Disp: , Rfl:   •  amLODIPine (NORVASC) 5 mg tablet, Take 1 tablet (5 mg total) by mouth daily, Disp: 90 tablet, Rfl: 1  •  Aspirin Low Dose 81 MG chewable tablet, CHEW 1 TABLET BY MOUTH DAILY, Disp: 90 tablet, Rfl: 3  •  Blood Glucose Monitoring Suppl (OneTouch Verio) w/Device KIT, Check blood glucose three times daily before each meal, Disp: 1 kit, Rfl: 0  •  cholecalciferol (VITAMIN D3) 1,000 units tablet, Take 2 tablets (2,000 Units total) by mouth daily, Disp: 180 tablet, Rfl: 5  •  clopidogrel (PLAVIX) 75 mg tablet, Take 1 tablet (75 mg total) by mouth daily, Disp: 90 tablet, Rfl: 3  •  Continuous Blood Gluc  (FreeStyle Cristofer 2 [place]) POWER, Use 1 each continuous (Patient not taking: Reported on 1/30/2023), Disp: 1 each, Rfl: 0  •  Continuous Blood Gluc Sensor (FreeStyle Cristofer 2 Sensor) MISC, Use 1 each every 14 (fourteen) days (Patient not taking: Reported on 1/30/2023), Disp: 6 each, Rfl: 3  •  Empagliflozin (JARDIANCE) 10 MG TABS tablet, Take 1 tablet (10 mg total) by mouth every morning, Disp: 90 tablet, Rfl: 3  •  fluticasone (FLONASE) 50 mcg/act nasal spray, 2 sprays into each nostril daily, Disp: 2 Bottle, Rfl: 2  •  Insulin Pen Needle (Pen Needles) 31G X 8 MM MISC, Use daily, Disp: 300 each, Rfl: 3  •  Lancets (FREESTYLE) lancets, by Other route as needed (As needed), Disp: 100 each, Rfl: 3  •  Lantus SoloStar 100 units/mL SOPN, INJECT 0 18 ML (18 UNITS TOTAL) UNDER THE SKIN DAILY AT BEDTIME, Disp: 15 mL, Rfl: 3  •  levothyroxine 137 mcg tablet, TAKE 1 TABLET BY MOUTH EVERY DAY, Disp: 90 tablet, Rfl: 3  •  lisinopril (ZESTRIL) 20 mg tablet, Take 1 tablet (20 mg total) by mouth daily, Disp: 90 tablet, Rfl: 3  •  metFORMIN (GLUCOPHAGE) 850 mg tablet, TAKE 1 TABLET (850 MG TOTAL) BY MOUTH 2 (TWO) TIMES A DAY WITH MEALS, Disp: 180 tablet, Rfl: 3  •  methocarbamol (Robaxin-750) 750 mg tablet, Take 1 tablet (750 mg total) by mouth every 6 (six) hours as needed for muscle spasms, Disp: 60 tablet, Rfl: 0  •  metoprolol succinate (TOPROL-XL) 50 mg 24 hr tablet, Take 1 tablet (50 mg total) by mouth 2 (two) times a day, Disp: 90 tablet, Rfl: 3  •  montelukast (SINGULAIR) 10 mg tablet, TAKE 1 TABLET BY MOUTH EVERY DAY, Disp: 90 tablet, Rfl: 3  •  nitroglycerin (NITROSTAT) 0 4 mg SL tablet, Place 1 tablet (0 4 mg total) under the tongue every 5 (five) minutes as needed for chest pain, Disp: 30 tablet, Rfl: 1  •  NovoLOG FlexPen 100 units/mL injection pen, Inject 5 units with breakfast and with dinner, Disp: 12 mL, Rfl: 3  •  rosuvastatin (CRESTOR) 40 MG tablet, TAKE 1 TABLET BY MOUTH EVERY DAY, Disp: 90 tablet, Rfl: 3  •  spironolactone (ALDACTONE) 25 mg tablet, Take 1 tablet (25 mg total) by mouth daily Do not start before February 13, 2023 , Disp: 90 tablet, Rfl: 3  Allergies   Allergen Reactions   • Iv Contrast [Iodinated Contrast Media] Rash     Patient states he had a severe reaction approximately 10 years ago , states he had a rash, itchy and he remembers a bunch of people pounding on chest and being surrounded by multiple doctors  Labs:  No results displayed because visit has over 200 results  Imaging: XR chest 1 view portable    Result Date: 2/10/2023  Narrative: CHEST INDICATION:   dyspnea, evaluate for signs of heart failure  COMPARISON:  February 9, 2023 EXAM PERFORMED/VIEWS:  XR CHEST PORTABLE FINDINGS:  Left-sided chest wall intracardiac device is identified  Leads are intact  Transcatheter aortic valve repair   Heart shadow is enlarged but unchanged from prior exam  Calcified left inferior hilar nodes are identified  The lungs are clear  No pneumothorax or pleural effusion  Osseous structures appear within normal limits for patient age  Impression: No acute cardiopulmonary disease  Left chest pacer with intact leads  No pneumothorax  Workstation performed: [id number]     XR chest pa & lateral    Result Date: 2/10/2023  Narrative: CHEST INDICATION:   I10: Essential (primary) hypertension R06 09: Other forms of dyspnea  COMPARISON:  September 1, 2022 EXAM PERFORMED/VIEWS:  XR CHEST PA & LATERAL FINDINGS:  Intact left chest dual-lead pacemaker  Intact pacer wires  Transcatheter aortic valve repair  Cardiomediastinal silhouette appears unremarkable  Reidentified calcified left inferior hilar node  Mild pulmonary vascular congestive change  Trace right costophrenic angle effusion  No pneumothorax  Osseous structures appear within normal limits for patient age  Impression: Mild pulmonary vascular congestive change  Trace right costophrenic angle effusion  No pneumothorax  Workstation performed: [id number]     Cardiac catheterization    Result Date: 2/10/2023  Narrative: •  3-vessel CAD with two identifiable culprits for NSTEMI in ost RPDA & mid LAD •  Successful PCI w/ ELDER x2 to mid LAD & ELDER x1 ost RPDA; residual moderate disease unchanged from 6-2022 study     VAS carotid complete study    Result Date: 2/10/2023  Narrative:  [facility] REPORT CLINICAL: Indications:  Bilateral Bruit [R09 89]  6 month surveillance of carotid artery disease  Patient is asymptomatic at this time  Operative History: 2022-06-21 TAVR 2022-06-03 RHC/LH catheterization Risk Factors: HTN, Diabetes (IDDM), Hyperlipidemia and previous smoking (quit <1yr ago)  Clinical Right Pressure:  124/66 mm Hg, Left Pressure:  120/60 mm Hg  FINDINGS:  Right        Impression  PSV  EDV (cm/s)  Direction of Flow  Ratio  Dist  ICA                 73          21                      1 27  Mid  ICA                  98          23                      1 72  Prox  ICA    1 - 49%      87          23                      1 53  Dist CCA                  47           8                            Mid CCA                   57          12                      0 82  Prox CCA                  70          12                            Ext Carotid              118          16                      2 06  Prox Vert                 52          11  Antegrade                 Subclavian               167           0                             Left         Impression  PSV  EDV (cm/s)  Direction of Flow  Ratio  Dist  ICA                 26           5                      0 26  Mid  ICA                  59          21                      0 58  Prox  ICA    1 - 49%      82          28                      0 81  Dist CCA                  98          14                            Mid CCA                  100          14                      0 92  Prox CCA                 110          26                            Ext Carotid              131           0                      1 30  Prox Vert                 84          21  Antegrade                 Subclavian               156           0                               CONCLUSION:  Impression RIGHT: There is <50% stenosis noted in the internal carotid artery  Plaque is heterogenous and irregular  Vertebral artery flow is antegrade  There is no significant subclavian artery disease  LEFT: There is <50% stenosis noted in the internal carotid artery  Plaque is heterogenous and irregular  Vertebral artery flow is antegrade  There is no significant subclavian artery disease  Compared to previous study on 05/19/22, there is no significant change  SIGNATURE: Electronically Signed by: Misty Hays on 2023-02-10 12:47:30 PM    Cardiac EP device report    Result Date: 1/25/2023  Narrative: ALEX DC PM/MRI CONDITIONAL CARELINK TRANSMISSION: BATTERY STATUS "15 YRS " AP 8%  0%   ALL AVAILABLE LEAD PARAMETERS WITHIN NORMAL LIMITS  4 VHRS NOTED; #18 PRESENTS AS 11 BEATS @ 185 BPM; #20 PRESENTS 9 BEATS @ 162 BPM  OTHER EGRAMS PRESENT AS PAT  PT ON ASA & METO TART  EF 55% (ECHO 2022)  NORMAL DEVICE FUNCTION  NC     Complete PFT with post bronchodilator    Result Date: 2/10/2023  Narrative: Pulmonary Function Test Report Karen Beltran 78 y o  male MRN: [id number] Date test performed: 2/9/2023 Date of test interpretation: 2/10/2023 Requesting Physician: Sabrina Winchester MD Reason for Testing: Cigarette nicotine dependence Respiratory technician Comments: Poorly performed PFT testing despite the use of language line interpreters [id number] and [id number]  The results of this test are questionable due to the patient's inability to perform the maneuvers according to the ATS standards  DLCO was corrected for patients Hgb measured via Hemocue on day of testing  The IVC for the DLCO manuver was < 90% of VC  Nebulizer given with 2 5 mg Albuterol (0 083%) he was not able to perform the TGV/RAW manuver  O2 sat 99, HR78 on room air at rest  Reference set for interpretation: [id number] Procedure: The patient was taken to pulmonary function testing laboratory  The patient demonstrated difficulty with testing  The results of this test do not meet ATS standards for acceptability and repeatability  The IVC for the DLCO manuver was < 90% of VC  He was not able to perform the TGV/RAW manuver Post bronchodilator testing performed after the administration of 2 5mg albuterol in 3cc normal saline administered via nebulizer per bronchodilator protocol   DLCO was corrected for patient's Hb Results: FEV1/FVC Ratio: 61 % Forced Vital Capacity: 2 07 L 52 % predicted FEV1: 1 27 L 43 % predicted After administration of bronchodilator FVC: 2 18 L, 55 % predicted, +4 % change FEV1: 1 33 L, 45 % predicted, +4 % change DLCO corrected for patients hemoglobin level: 83 % Interpretation: Results are unreliable due to patient being unable to perform maneuvers as per ATS standards  If clinical concern exists would recommend repeating PFTs  Best interpretation made based on available data  · Severe obstructive airflow defect on spirometry · No significant improvement in airflow or forced vital capacity in response to the administration to bronchodilator per ATS standards  · Lung volumes unable to be obtained due to patient inability to perform maneuvers · Normal diffusion capacity · Flow-volume loop consistent with obstruction Colonel Cecil Inman DO, MS Pulmonary & Critical Care Fellow, PGY-V [address] _____________________________________________________ Attending Co-signature: I have reviewed the pulmonary function test with the pulmonary fellow  I agree with the study interpretation as outlined above Patrice Redd MD     Echo complete w/ contrast if indicated    Result Date: 2/10/2023  Narrative: •  Left Ventricle: Left ventricular cavity size is normal  Wall thickness is mildly increased  The left ventricular ejection fraction is 35-40%  Systolic function is moderately reduced  Severe hypokinesis of the inferoseptal, inferior and anteroseptal walls including the apical septal and apical inferior segments  •  IVS: There is abnormal septal motion consistent with left bundle branch block  There is sigmoid appearance of the septum  •  Mitral Valve: There is moderately reduced mobility  There is moderate to severe regurgitation with a posteriorly directed jet  •  Tricuspid Valve: There is mild to moderate regurgitation  The tricuspid valve regurgitation jet is central  The right ventricular systolic pressure is elevated  The estimated right ventricular systolic pressure is 82 79 mmHg  •  Left Atrium: The atrium is moderately dilated  •  Right Atrium: The atrium is mildly dilated  •  Aortic Valve: There is an Emery RONNI 3 Ultra 26 mm TAVR bioprosthetic valve   There is trace paravalvular regurgitation  The gradient recorded across the prosthetic aortic valve is within the expected range  The aortic valve peak velocity is 1 89 m/s  The aortic valve mean gradient is 7 mmHg  •  Pericardium: There is a trivial pericardial effusion along the right atrial free wall  Review of Systems:  Review of Systems    Physical Exam:  Physical Exam    Discussion/Summary:  1  LAD 80% lesion status post Synergy XD MR  drug-eluting stent  X 2, 0% residual stenosis,  RPDA 1 lesion 90% stenosis sp angioplasty and Synergy XD MR drug-eluting stent, 0 % residual stenosis  Continue on aspirin 81 mg daily, Plavix 75 mg daily, prolonged succinate 50 mg every 12 hours, lisinopril 20 mg daily, Crestor 40 mg daily and he was instructed not to stop aspirin or Plavix for any reason  Cardiac rehabilitation in the near future  He underwent preordered lab studies today  Cardiac rehabilitation in the near future, DASH diet  2  ICM LVEF 35-40% follow-up limited TTE in 2 to 3 months continue on guideline directed medical therapy metoprolol succinate 50 mg every 12 hours, lisinopril 20 mg daily, Aldactone 25 mg daily  Review Lab studies when available,  if stable stop lisinopril for 2 days and start Entresto 24-26 mg twice daily  3  Chronic HFrEF LVEF 35-40% NYHA class II stage C-weight in the office 191 pounds on physical exam euvolemic and compensated  Heart rate and blood pressure controlled  Continue on guideline directed medical therapy metoprolol succinate 50 mg every 12 hours, lisinopril 20 mg daily, Aldactone 25 mg daily, amlodipine 5 mg daily  follow-up lab studies if stable stop Lisinopril for 2 days and start Entresto 24-26mg BID  [place] heart failure booklet given to Perez today  Continue on 2 g sodium diet 1800 cc to 2 L fluid restriction and daily weight possibly start  4  Hyperlipidemia 2/10/23 , TG 61, HDL 53, LDL 72  Continue Crestor 40 mg daily, DASH diet  5   DM2 HgbA1C 7 6 on 2/10/23 continue Jardiance 10 mg daily metformin 850 mg twice daily, Solostar 18 units daily at bedtime

## 2023-02-21 ENCOUNTER — APPOINTMENT (OUTPATIENT)
Dept: LAB | Facility: CLINIC | Age: 78
End: 2023-02-21

## 2023-02-21 ENCOUNTER — OFFICE VISIT (OUTPATIENT)
Dept: CARDIOLOGY CLINIC | Facility: CLINIC | Age: 78
End: 2023-02-21

## 2023-02-21 ENCOUNTER — OFFICE VISIT (OUTPATIENT)
Dept: INTERNAL MEDICINE CLINIC | Facility: CLINIC | Age: 78
End: 2023-02-21

## 2023-02-21 VITALS
HEIGHT: 69 IN | BODY MASS INDEX: 28.29 KG/M2 | HEART RATE: 73 BPM | OXYGEN SATURATION: 99 % | SYSTOLIC BLOOD PRESSURE: 126 MMHG | DIASTOLIC BLOOD PRESSURE: 64 MMHG | WEIGHT: 191 LBS

## 2023-02-21 VITALS
WEIGHT: 192 LBS | HEIGHT: 69 IN | DIASTOLIC BLOOD PRESSURE: 70 MMHG | SYSTOLIC BLOOD PRESSURE: 134 MMHG | TEMPERATURE: 97 F | BODY MASS INDEX: 28.44 KG/M2

## 2023-02-21 DIAGNOSIS — I21.4 NSTEMI (NON-ST ELEVATED MYOCARDIAL INFARCTION) (HCC): ICD-10-CM

## 2023-02-21 DIAGNOSIS — E78.2 MIXED HYPERLIPIDEMIA: ICD-10-CM

## 2023-02-21 DIAGNOSIS — I21.4 NSTEMI (NON-ST ELEVATED MYOCARDIAL INFARCTION) (HCC): Primary | ICD-10-CM

## 2023-02-21 DIAGNOSIS — Z79.4 TYPE 2 DIABETES MELLITUS WITH DIABETIC NEUROPATHY, WITH LONG-TERM CURRENT USE OF INSULIN (HCC): ICD-10-CM

## 2023-02-21 DIAGNOSIS — Z95.5 STATUS POST INSERTION OF DRUG ELUTING CORONARY ARTERY STENT: ICD-10-CM

## 2023-02-21 DIAGNOSIS — Z95.5 STATUS POST INSERTION OF DRUG ELUTING CORONARY ARTERY STENT: Primary | ICD-10-CM

## 2023-02-21 DIAGNOSIS — E11.40 TYPE 2 DIABETES MELLITUS WITH DIABETIC NEUROPATHY, WITH LONG-TERM CURRENT USE OF INSULIN (HCC): ICD-10-CM

## 2023-02-21 DIAGNOSIS — I25.10 CORONARY ARTERY DISEASE INVOLVING NATIVE CORONARY ARTERY OF NATIVE HEART WITHOUT ANGINA PECTORIS: ICD-10-CM

## 2023-02-21 DIAGNOSIS — J45.20 MILD INTERMITTENT ASTHMA WITHOUT COMPLICATION: ICD-10-CM

## 2023-02-21 DIAGNOSIS — I25.5 ISCHEMIC CARDIOMYOPATHY: ICD-10-CM

## 2023-02-21 DIAGNOSIS — I50.22 CHRONIC HFREF (HEART FAILURE WITH REDUCED EJECTION FRACTION) (HCC): ICD-10-CM

## 2023-02-21 LAB
ANION GAP SERPL CALCULATED.3IONS-SCNC: 4 MMOL/L (ref 4–13)
BUN SERPL-MCNC: 16 MG/DL (ref 5–25)
CALCIUM SERPL-MCNC: 9.6 MG/DL (ref 8.3–10.1)
CHLORIDE SERPL-SCNC: 108 MMOL/L (ref 96–108)
CO2 SERPL-SCNC: 28 MMOL/L (ref 21–32)
CREAT SERPL-MCNC: 1.1 MG/DL (ref 0.6–1.3)
GFR SERPL CREATININE-BSD FRML MDRD: 65 ML/MIN/1.73SQ M
GLUCOSE P FAST SERPL-MCNC: 164 MG/DL (ref 65–99)
POTASSIUM SERPL-SCNC: 4.5 MMOL/L (ref 3.5–5.3)
SODIUM SERPL-SCNC: 140 MMOL/L (ref 135–147)

## 2023-02-21 RX ORDER — ALBUTEROL SULFATE 90 UG/1
AEROSOL, METERED RESPIRATORY (INHALATION)
Qty: 18 G | Refills: 2 | Status: SHIPPED | OUTPATIENT
Start: 2023-02-21

## 2023-02-21 NOTE — PATIENT INSTRUCTIONS
Maintain a 2 gram daily sodium diet and 1500 ml daily fluid restriction  Check daily weights  If you gain 3 pounds in one day, 5 pounds in one week, or experience worsening shortness of breath or increasing lower leg swelling  Please call the heart failure office at [phone]    Please bring a  list of your current medications and daily weights to the office visit     Cardiac rehabilitation  2 D echocardiogram in 2 months

## 2023-02-21 NOTE — PROGRESS NOTES
Assessment & Plan     1  NSTEMI (non-ST elevated myocardial infarction) ([facility] [address] )    2  Mild intermittent asthma without complication  Comments:  Patient has quit smoking recently and complains of a lot of phlegm and slight breathing difficulty  Orders:  -     albuterol (PROVENTIL HFA,VENTOLIN HFA) 90 mcg/act inhaler; Inhale 2 puffs every 4 hours as needed for wheezing or shortness of breath  NSTEMI  -TCM for previous hospital admission discharged 2/12, S/P 3x stent placement   -On Aspirin 81, Amlodipine 5, Lisinopril 20, Spironolactone 25, Rosuvastatin 40 mg   -Continue medications as prescribed- patient is taking medications   -Encouraged healthy dietary habits   -Describes 2 day history of left chest/abdomen pain after lying down on left side  Not pleuritic, not similar to previous symptoms   -Likely MSK pain, instructed patient to use stretching exercises and follow up if pain does not improve/worsens   -Follow up with cardiology as scheduled  -Follow up in 3 months for medicare annual wellness visit  Asthma  -Albuterol inhaler refill requested  BMI Counseling: Body mass index is 28 77 kg/m²  The BMI is above normal  Nutrition recommendations include decreasing portion sizes, encouraging healthy choices of fruits and vegetables and reducing intake of saturated and trans fat  Exercise recommendations include vigorous physical activity 75 minutes/week and exercising 3-5 times per week  Rationale for BMI follow-up plan is due to patient being overweight or obese  Subjective     Transitional Care Management Review:   Joey Aviles is a 78 y o  male here for TCM follow up       During the TCM phone call patient stated:  TCM Call     Date and time call was made  2/13/2023 11:24 AM    Hospital care reviewed  Records reviewed    Patient was hospitialized at  [facility]    Date of Admission  02/09/23    Date of discharge  02/12/23    Diagnosis  NSTEMI (non-ST elevated myocardial infarction) [facility]) [id number]    Disposition  Home    Were the patients medications reviewed and updated  Yes    Current Symptoms  None      TCM Call     Post hospital issues  Reduced activity; Poor ADL (Activities of Daily Living) performance    Should patient be enrolled in anticoag monitoring? Yes    Scheduled for follow up? Yes    Patients specialists  Other (comment)    Other specialists names  Tono Epstein    Other specialists contcat #  [phone]    Did you obtain your prescribed medications  Yes    Do you need help managing your prescriptions or medications  No    Is transportation to your appointment needed  No    I have advised the patient to call PCP with any new or worsening symptoms  Sonya Chen MA    Living Arrangements  Spouse or Significiant other    Are you recieving any outpatient services  No    What type of services  CARDIAC REHAB    Are you recieving home care services  No    Are you using any community resources  No    Current waiver services  No    Have you fallen in the last 12 months  No    Interperter language line needed  No    Counseling  Patient    Counseling topics  instructions for management; Importance of RX compliance        Patient is a 79 y/o male with PMH including asthma, T2DM, hypothyroidism, HTN, aortic stenosis s/p TAVR, PAD, HLD presenting for TCM, discharged 2/12, after hospitalization for NSTEMI with 3x stent placement  Patient is Moldovan speaking, history obtained using   Patient's only complaint as this time is a 2 day history of left-sided chest/abdomen pain, describes as dull around his lower ribs, not intense per patient  Per patient he was lying down on his side on the couch for a long time 2 days ago, directly prior to when symptoms started  Not similar to previous symptoms  No pleuritic pain  Says it gets worse when turning to the right  Has not tried medications for symptoms   No other complaints or concerns at this time, says he feels well     Patient says he is taking all his medications as prescribed  Requested albuterol inhaler refill  He was prescribed a blood glucose monitor which he has not yet picked up but plans to  Discussed patient's diet, encouraged healthy diet, exercise  Patient has a 25 year smoking history, he quit 6 months ago  Denies alcohol use, recreational drug use  No further concerns  Instructed patient to have his previously ordered BMP done and follow up with cardiology as scheduled  Review of Systems   Constitutional: Negative for chills, fatigue, fever and unexpected weight change  HENT: Negative for hearing loss and sore throat  Eyes: Negative for visual disturbance  Respiratory: Negative for cough and shortness of breath  Cardiovascular: Negative for chest pain, palpitations and leg swelling  Gastrointestinal: Negative for abdominal distention, abdominal pain, blood in stool, constipation, diarrhea, nausea and vomiting  Endocrine: Negative for polyuria  Genitourinary: Negative for hematuria  Musculoskeletal: Negative for arthralgias and back pain  Left-sided chest/abdomen pain as per HPI   Skin: Negative for rash and wound  Neurological: Negative for dizziness, tremors, seizures, weakness, light-headedness and headaches  Psychiatric/Behavioral: Negative for dysphoric mood  The patient is not nervous/anxious  Objective     /70 (BP Location: Right arm)   Temp (!) 97 °F (36 1 °C) (Temporal)   Ht 5' 8 5" (1 74 m)   Wt 87 1 kg (192 lb)   BMI 28 77 kg/m²      Physical Exam  Vitals reviewed  Constitutional:       Appearance: Normal appearance  HENT:      Head: Normocephalic and atraumatic  Right Ear: Tympanic membrane, ear canal and external ear normal       Left Ear: Tympanic membrane, ear canal and external ear normal       Nose: Nose normal       Mouth/Throat:      Mouth: Mucous membranes are moist    Eyes:      Extraocular Movements: Extraocular movements intact  Pupils: Pupils are equal, round, and reactive to light  Cardiovascular:      Rate and Rhythm: Normal rate and regular rhythm  Pulses: Normal pulses  Heart sounds: Normal heart sounds  Pulmonary:      Effort: Pulmonary effort is normal       Breath sounds: Normal breath sounds  Abdominal:      General: Abdomen is flat  Bowel sounds are normal  There is no distension  Palpations: Abdomen is soft  Tenderness: There is no abdominal tenderness  There is no right CVA tenderness or left CVA tenderness  Musculoskeletal:         General: No swelling  Normal range of motion  Cervical back: Normal range of motion  Right lower leg: No edema  Left lower leg: No edema  Comments: No tenderness over side of reported left chest/abdomen pain  Skin:     General: Skin is warm  Capillary Refill: Capillary refill takes less than 2 seconds  Neurological:      General: No focal deficit present  Mental Status: He is alert and oriented to person, place, and time  Mental status is at baseline  Cranial Nerves: No cranial nerve deficit  Motor: No weakness         Medications have been reviewed by provider in current encounter    Fany Romero MD

## 2023-02-21 NOTE — PATIENT INSTRUCTIONS
Continue taking medications as prescribed  Follow up with cardiology as scheduled  You have a BMP ordered from your previous admission- please have it done at a lab when possible  Follow up in 3 months for medicare annual wellness

## 2023-03-18 ENCOUNTER — HOSPITAL ENCOUNTER (INPATIENT)
Facility: HOSPITAL | Age: 78
LOS: 3 days | Discharge: HOME | End: 2023-03-21
Attending: EMERGENCY MEDICINE | Admitting: INTERNAL MEDICINE

## 2023-03-18 ENCOUNTER — APPOINTMENT (EMERGENCY)
Dept: RADIOLOGY | Facility: HOSPITAL | Age: 78
End: 2023-03-18

## 2023-03-18 DIAGNOSIS — D50.9 IRON DEFICIENCY ANEMIA, UNSPECIFIED IRON DEFICIENCY ANEMIA TYPE: ICD-10-CM

## 2023-03-18 DIAGNOSIS — I50.9 CHF (CONGESTIVE HEART FAILURE) (HCC): Primary | ICD-10-CM

## 2023-03-18 DIAGNOSIS — R06.02 SOB (SHORTNESS OF BREATH): ICD-10-CM

## 2023-03-18 DIAGNOSIS — Z95.5 STATUS POST INSERTION OF DRUG ELUTING CORONARY ARTERY STENT: ICD-10-CM

## 2023-03-18 DIAGNOSIS — Z95.0 PRESENCE OF CARDIAC PACEMAKER: ICD-10-CM

## 2023-03-18 LAB
2HR DELTA HS TROPONIN: 9 NG/L
ALBUMIN SERPL BCP-MCNC: 3.2 G/DL (ref 3.5–5)
ALP SERPL-CCNC: 47 U/L (ref 46–116)
ALT SERPL W P-5'-P-CCNC: 18 U/L (ref 12–78)
ANION GAP SERPL CALCULATED.3IONS-SCNC: 6 MMOL/L (ref 4–13)
AST SERPL W P-5'-P-CCNC: 16 U/L (ref 5–45)
BASOPHILS # BLD AUTO: 0.03 THOUSANDS/ÂΜL (ref 0–0.1)
BASOPHILS NFR BLD AUTO: 0 % (ref 0–1)
BILIRUB SERPL-MCNC: 0.93 MG/DL (ref 0.2–1)
BUN SERPL-MCNC: 20 MG/DL (ref 5–25)
CALCIUM ALBUM COR SERPL-MCNC: 10.1 MG/DL (ref 8.3–10.1)
CALCIUM SERPL-MCNC: 9.5 MG/DL (ref 8.3–10.1)
CARDIAC TROPONIN I PNL SERPL HS: 25 NG/L
CARDIAC TROPONIN I PNL SERPL HS: 34 NG/L
CHLORIDE SERPL-SCNC: 109 MMOL/L (ref 96–108)
CO2 SERPL-SCNC: 22 MMOL/L (ref 21–32)
CREAT SERPL-MCNC: 0.94 MG/DL (ref 0.6–1.3)
EOSINOPHIL # BLD AUTO: 0.06 THOUSAND/ÂΜL (ref 0–0.61)
EOSINOPHIL NFR BLD AUTO: 1 % (ref 0–6)
ERYTHROCYTE [DISTWIDTH] IN BLOOD BY AUTOMATED COUNT: 15.7 % (ref 11.6–15.1)
FLUAV RNA RESP QL NAA+PROBE: NEGATIVE
FLUBV RNA RESP QL NAA+PROBE: NEGATIVE
GFR SERPL CREATININE-BSD FRML MDRD: 78 ML/MIN/1.73SQ M
GLUCOSE SERPL-MCNC: 110 MG/DL (ref 65–140)
HCT VFR BLD AUTO: 37.6 % (ref 36.5–49.3)
HGB BLD-MCNC: 11.2 G/DL (ref 12–17)
IMM GRANULOCYTES # BLD AUTO: 0.03 THOUSAND/UL (ref 0–0.2)
IMM GRANULOCYTES NFR BLD AUTO: 0 % (ref 0–2)
LYMPHOCYTES # BLD AUTO: 1.21 THOUSANDS/ÂΜL (ref 0.6–4.47)
LYMPHOCYTES NFR BLD AUTO: 10 % (ref 14–44)
MCH RBC QN AUTO: 26.5 PG (ref 26.8–34.3)
MCHC RBC AUTO-ENTMCNC: 29.8 G/DL (ref 31.4–37.4)
MCV RBC AUTO: 89 FL (ref 82–98)
MONOCYTES # BLD AUTO: 0.84 THOUSAND/ÂΜL (ref 0.17–1.22)
MONOCYTES NFR BLD AUTO: 7 % (ref 4–12)
NEUTROPHILS # BLD AUTO: 9.44 THOUSANDS/ÂΜL (ref 1.85–7.62)
NEUTS SEG NFR BLD AUTO: 82 % (ref 43–75)
NRBC BLD AUTO-RTO: 0 /100 WBCS
NT-PROBNP SERPL-MCNC: 3136 PG/ML
PLATELET # BLD AUTO: 281 THOUSANDS/UL (ref 149–390)
PMV BLD AUTO: 10.1 FL (ref 8.9–12.7)
POTASSIUM SERPL-SCNC: 4 MMOL/L (ref 3.5–5.3)
PROT SERPL-MCNC: 6.9 G/DL (ref 6.4–8.4)
RBC # BLD AUTO: 4.23 MILLION/UL (ref 3.88–5.62)
RSV RNA RESP QL NAA+PROBE: NEGATIVE
SARS-COV-2 RNA RESP QL NAA+PROBE: NEGATIVE
SODIUM SERPL-SCNC: 137 MMOL/L (ref 135–147)
WBC # BLD AUTO: 11.61 THOUSAND/UL (ref 4.31–10.16)

## 2023-03-18 RX ORDER — ATORVASTATIN CALCIUM 80 MG/1
80 TABLET, FILM COATED ORAL
Status: DISCONTINUED | OUTPATIENT
Start: 2023-03-19 | End: 2023-03-21 | Stop reason: HOSPADM

## 2023-03-18 RX ORDER — ASPIRIN 81 MG/1
81 TABLET, CHEWABLE ORAL DAILY
Status: DISCONTINUED | OUTPATIENT
Start: 2023-03-19 | End: 2023-03-21 | Stop reason: HOSPADM

## 2023-03-18 RX ORDER — MELATONIN
2000 DAILY
Status: DISCONTINUED | OUTPATIENT
Start: 2023-03-19 | End: 2023-03-21 | Stop reason: HOSPADM

## 2023-03-18 RX ORDER — FLUTICASONE PROPIONATE 50 MCG
2 SPRAY, SUSPENSION (ML) NASAL DAILY
Status: DISCONTINUED | OUTPATIENT
Start: 2023-03-19 | End: 2023-03-21 | Stop reason: HOSPADM

## 2023-03-18 RX ORDER — CLOPIDOGREL BISULFATE 75 MG/1
75 TABLET ORAL DAILY
Status: DISCONTINUED | OUTPATIENT
Start: 2023-03-19 | End: 2023-03-21 | Stop reason: HOSPADM

## 2023-03-18 RX ORDER — SPIRONOLACTONE 25 MG/1
25 TABLET ORAL DAILY
Status: DISCONTINUED | OUTPATIENT
Start: 2023-03-19 | End: 2023-03-21 | Stop reason: HOSPADM

## 2023-03-18 RX ORDER — LISINOPRIL 20 MG/1
20 TABLET ORAL DAILY
Status: DISCONTINUED | OUTPATIENT
Start: 2023-03-19 | End: 2023-03-21 | Stop reason: HOSPADM

## 2023-03-18 RX ORDER — MONTELUKAST SODIUM 10 MG/1
10 TABLET ORAL DAILY
Status: DISCONTINUED | OUTPATIENT
Start: 2023-03-19 | End: 2023-03-21 | Stop reason: HOSPADM

## 2023-03-18 RX ORDER — SODIUM CHLORIDE FOR INHALATION 0.9 %
3 VIAL, NEBULIZER (ML) INHALATION ONCE
Status: COMPLETED | OUTPATIENT
Start: 2023-03-18 | End: 2023-03-18

## 2023-03-18 RX ORDER — METOPROLOL SUCCINATE 50 MG/1
50 TABLET, EXTENDED RELEASE ORAL 2 TIMES DAILY
Status: DISCONTINUED | OUTPATIENT
Start: 2023-03-18 | End: 2023-03-21 | Stop reason: HOSPADM

## 2023-03-18 RX ORDER — AMLODIPINE BESYLATE 5 MG/1
5 TABLET ORAL DAILY
Status: DISCONTINUED | OUTPATIENT
Start: 2023-03-19 | End: 2023-03-21 | Stop reason: HOSPADM

## 2023-03-18 RX ORDER — HEPARIN SODIUM 5000 [USP'U]/ML
5000 INJECTION, SOLUTION INTRAVENOUS; SUBCUTANEOUS EVERY 8 HOURS SCHEDULED
Status: DISCONTINUED | OUTPATIENT
Start: 2023-03-18 | End: 2023-03-21 | Stop reason: HOSPADM

## 2023-03-18 RX ADMIN — IPRATROPIUM BROMIDE 1 MG: 0.5 SOLUTION RESPIRATORY (INHALATION) at 19:28

## 2023-03-18 RX ADMIN — ISODIUM CHLORIDE 3 ML: 0.03 SOLUTION RESPIRATORY (INHALATION) at 19:28

## 2023-03-18 RX ADMIN — ALBUTEROL SULFATE 10 MG: 2.5 SOLUTION RESPIRATORY (INHALATION) at 19:28

## 2023-03-18 NOTE — ED PROVIDER NOTES
"History  Chief Complaint   Patient presents with   • Shortness of Breath     Per pts family he had an MI and stents placed 3wks ago  Pt c/o abd pain and sob  Pt ran out of nebulizer trx  Pt reports pacemaker placement 6 months ago  Demnies chest pain     24-year-old male Kazakh speaking male past medical history of diabetes, hypertension, COPD, hyperlipidemia presents ED complaining of shortness of breath  Patient apparently developed shortness of breath yesterday however he did not want to take his home nebulizer treatment because he says they \"smelled bad  \"  Patient states that shortness of breath had worsened throughout the night yesterday and into today  Today he also tells me that he felt as though his pacemaker had shocked him 3 separate times throughout the day  He tells me that this is not usual and he has not been shocked by his pacemaker since it was placed over 6 months ago  He denies any chest pain, no cough no fevers or chills  He denies any nausea vomiting no back or abdominal pain  No peripheral edema  No diarrhea constipation or urinary complaints  Patient received DuoNeb treatment per EMS with significant relief of symptoms  Prior to Admission Medications   Prescriptions Last Dose Informant Patient Reported? Taking?    Advair -21 MCG/ACT inhaler 3/18/2023  No Yes   Sig: TAKE 2 PUFFS BY MOUTH TWICE A DAY   Alcohol Swabs (ALCOHOL PADS) 70 % PADS   Yes No   Aspirin Low Dose 81 MG chewable tablet 3/18/2023  No Yes   Sig: CHEW 1 TABLET BY MOUTH DAILY   Blood Glucose Monitoring Suppl (OneTouch Verio) w/Device KIT   No No   Sig: Check blood glucose three times daily before each meal   Continuous Blood Gluc  (FreeStyle Cristofer 2 [place]) POWER   No No   Sig: Use 1 each continuous   Patient not taking: Reported on 1/30/2023   Continuous Blood Gluc Sensor (FreeStyle Cristofer 2 Sensor) [facility]   No No   Sig: Use 1 each every 14 (fourteen) days   Patient not taking: Reported on 1/30/2023 " Empagliflozin (JARDIANCE) 10 MG TABS tablet 3/18/2023  No Yes   Sig: Take 1 tablet (10 mg total) by mouth every morning   Insulin Pen Needle (Pen Needles) 31G X 8 MM MISC   No No   Sig: Use daily   Lancets (FREESTYLE) lancets   No No   Sig: by Other route as needed (As needed)   Lantus SoloStar 100 units/mL SOPN   No No   Sig: INJECT 0 18 ML (18 UNITS TOTAL) UNDER THE SKIN DAILY AT BEDTIME   NovoLOG FlexPen 100 units/mL injection pen   No No   Sig: Inject 5 units with breakfast and with dinner   albuterol (PROVENTIL HFA,VENTOLIN HFA) 90 mcg/act inhaler 3/18/2023  No Yes   Sig: Inhale 2 puffs every 4 hours as needed for wheezing or shortness of breath     amLODIPine (NORVASC) 5 mg tablet 3/18/2023  No Yes   Sig: Take 1 tablet (5 mg total) by mouth daily   cholecalciferol (VITAMIN D3) 1,000 units tablet 3/18/2023  No Yes   Sig: Take 2 tablets (2,000 Units total) by mouth daily   clopidogrel (PLAVIX) 75 mg tablet   No No   Sig: Take 1 tablet (75 mg total) by mouth daily   fluticasone (FLONASE) 50 mcg/act nasal spray 3/18/2023  No Yes   Si sprays into each nostril daily   levothyroxine 137 mcg tablet 3/18/2023  No Yes   Sig: TAKE 1 TABLET BY MOUTH EVERY DAY   lisinopril (ZESTRIL) 20 mg tablet 3/18/2023  No Yes   Sig: Take 1 tablet (20 mg total) by mouth daily   metFORMIN (GLUCOPHAGE) 850 mg tablet 3/18/2023  No Yes   Sig: TAKE 1 TABLET (850 MG TOTAL) BY MOUTH 2 (TWO) TIMES A DAY WITH MEALS   methocarbamol (Robaxin-750) 750 mg tablet Not Taking  No No   Sig: Take 1 tablet (750 mg total) by mouth every 6 (six) hours as needed for muscle spasms   Patient not taking: Reported on 3/18/2023   metoprolol succinate (TOPROL-XL) 50 mg 24 hr tablet 3/18/2023 at 0900  No Yes   Sig: Take 1 tablet (50 mg total) by mouth 2 (two) times a day   montelukast (SINGULAIR) 10 mg tablet 3/18/2023  No Yes   Sig: TAKE 1 TABLET BY MOUTH EVERY DAY   nitroglycerin (NITROSTAT) 0 4 mg SL tablet   No No   Sig: Place 1 tablet (0 4 mg total) under the tongue every 5 (five) minutes as needed for chest pain   rosuvastatin (CRESTOR) 40 MG tablet 3/17/2023  No Yes   Sig: TAKE 1 TABLET BY MOUTH EVERY DAY   spironolactone (ALDACTONE) 25 mg tablet 3/18/2023  No Yes   Sig: Take 1 tablet (25 mg total) by mouth daily Do not start before February 13, 2023  Facility-Administered Medications: None       Past Medical History:   Diagnosis Date   • Arthritis    • Asthma    • Colon polyps    • Community acquired pneumonia     last assessed: 5/1/2014   • Diabetes mellitus ([facility] [address] )    • Hemorrhagic prepatellar bursitis, left 10/21/2019   • Hepatitis C    • High cholesterol    • History of colonoscopy 2017   • Hypertension    • Lymphadenopathy, anterior cervical 04/17/2018   • Nephritis and nephropathy, not specified as acute or chronic, with other specified pathological lesion in kidney, in diseases classified elsewhere 3/26/2013   • Screening for colon cancer 05/01/2019   • [facility] 06/11/2014       Past Surgical History:   Procedure Laterality Date   • CARDIAC CATHETERIZATION N/A 6/3/2022    Procedure: Cardiac RHC/LHC; Surgeon: Cheyenne Braga MD;  [facility] CARD[facility]AC [facility]; Service: Cardiology   • CARDIAC CATHETERIZATION N/A 6/21/2022    Procedure: CARDIAC TAVR;  Surgeon: Romayne Cocker, MD;  Location: [facility] MAIN OR;  Service: Cardiology   • CARDIAC CATHETERIZATION N/A 2/10/2023    Procedure: Cardiac Coronary Angiogram;  Surgeon: Cheyenne Braga MD;  [facility] CARD[facility]; Service: Cardiology   • CARDIAC CATHETERIZATION N/A 2/10/2023    Procedure: Cardiac pci;  Surgeon: Cheyenne Braga MD;  [facility]: [facility] CARD[facility]; Service: Cardiology   • CARDIAC ELECTROPHYSIOLOGY PROCEDURE N/A 9/1/2022    Procedure: Cardiac pacer implant ; DC PPM;  Surgeon: Maria C Gil DO;  Location[facility] CARD[facility]AC C[facility];   Service: Cardiology   • COLONOSCOPY     • COLONOSCOPY W/ POLYPECTOMY     • LITHOTRIPSY     • MULTIPLE TOOTH EXTRACTIONS     • VA COLONOSCOPY FLX DX W/COLLJ [facility] WHEN PFRMD N/A 2018    Procedure: COLONOSCOPY;  Surgeon: Chetna Muñoz MD;  Location: [facility] GI LAB; Service: Gastroenterology   • IL REPLACE AORTIC VALVE OPENFEMORAL ARTERY APPROACH N/A 2022    Procedure: REPLACEMENT AORTIC VALVE TRANSCATHETER (TAVR) TRANSFEMORAL W/ 26MM QUINN RONNI S3 ULTRA VALVE(ACCESS ON LEFT) SAULO;  Surgeon: El Clemens MD;  Location: [facility] MAIN OR;  Service: Cardiac Surgery       Family History   Problem Relation Age of Onset   • Heart attack Family         at age 80   • No Known Problems Mother    • No Known Problems Father    • Diabetes Sister    • Diabetes Brother      I have reviewed and agree with the history as documented  E-Cigarette/Vaping   • E-Cigarette Use Never User      E-Cigarette/Vaping Substances   • Nicotine No    • THC No    • CBD No    • Flavoring No    • Other No    • Unknown No      Social History     Tobacco Use   • Smoking status: Former     Packs/day: 0 50     Types: Cigarettes     Quit date: 2022     Years since quittin 8   • Smokeless tobacco: Never   • Tobacco comments:     started when he was about 24 yrs old; stopped smoking 3 wks ago   Vaping Use   • Vaping Use: Never used   Substance Use Topics   • Alcohol use: No   • Drug use: No        Review of Systems   Constitutional: Negative for chills and fever  HENT: Negative for ear pain and sore throat  Eyes: Negative for pain and visual disturbance  Respiratory: Positive for shortness of breath and wheezing  Negative for cough  Cardiovascular: Positive for palpitations  Negative for chest pain  Gastrointestinal: Negative for abdominal pain and vomiting  Genitourinary: Negative for dysuria and hematuria  Musculoskeletal: Negative for arthralgias and back pain  Skin: Negative for color change and rash  Neurological: Negative for seizures and syncope  All other systems reviewed and are negative        Physical Exam  ED Triage Vitals   Temperature Pulse Respirations Blood Pressure SpO2   03/18/23 1900 03/18/23 1854 03/18/23 1854 03/18/23 1854 03/18/23 1854   99 6 °F (37 6 °C) (!) 120 22 169/95 93 %      Temp Source Heart Rate Source Patient Position - Orthostatic VS BP Location FiO2 (%)   03/18/23 1900 03/18/23 1854 03/19/23 0100 03/19/23 0100 --   Oral Monitor Lying Right arm       Pain Score       03/18/23 1855       7             Orthostatic Vital Signs  Vitals:    03/19/23 0900 03/19/23 1000 03/19/23 1200 03/19/23 1503   BP: 123/67 133/64 128/57 129/80   Pulse: 88 80 80 89   Patient Position - Orthostatic VS: Lying Lying Lying        Physical Exam  Vitals and nursing note reviewed  Constitutional:       General: He is not in acute distress  Appearance: He is well-developed  He is ill-appearing  HENT:      Head: Normocephalic and atraumatic  Eyes:      Conjunctiva/sclera: Conjunctivae normal    Cardiovascular:      Rate and Rhythm: Normal rate and regular rhythm  Heart sounds: No murmur heard  Pulmonary:      Effort: Pulmonary effort is normal  No respiratory distress  Breath sounds: Examination of the right-middle field reveals rhonchi  Examination of the left-middle field reveals rhonchi  Examination of the right-lower field reveals rhonchi  Examination of the left-lower field reveals rhonchi  Rhonchi present  Abdominal:      Palpations: Abdomen is soft  Tenderness: There is no abdominal tenderness  Musculoskeletal:         General: No swelling  Cervical back: Neck supple  Skin:     General: Skin is warm and dry  Capillary Refill: Capillary refill takes less than 2 seconds  Neurological:      Mental Status: He is alert     Psychiatric:         Mood and Affect: Mood normal          ED Medications  Medications   heparin (porcine) subcutaneous injection 5,000 Units (5,000 Units Subcutaneous Refused 3/19/23 1720)   amLODIPine (NORVASC) tablet 5 mg (5 mg Oral Given 3/19/23 0833)   aspirin chewable tablet 81 mg (81 mg Oral Given 3/19/23 0832)   cholecalciferol (VITAMIN D3) tablet 2,000 Units (2,000 Units Oral Given 3/19/23 0832)   clopidogrel (PLAVIX) tablet 75 mg (75 mg Oral Given 3/19/23 0832)   Empagliflozin (JARDIANCE) tablet 10 mg (10 mg Oral Given 3/19/23 0832)   fluticasone (FLONASE) 50 mcg/act nasal spray 2 spray (2 sprays Nasal Given 3/19/23 0840)   levothyroxine tablet 137 mcg (137 mcg Oral Given 3/19/23 0559)   lisinopril (ZESTRIL) tablet 20 mg (20 mg Oral Given 3/19/23 0833)   metoprolol succinate (TOPROL-XL) 24 hr tablet 50 mg (50 mg Oral Not Given 3/19/23 0844)   montelukast (SINGULAIR) tablet 10 mg (10 mg Oral Given 3/19/23 0840)   atorvastatin (LIPITOR) tablet 80 mg (80 mg Oral Given 3/19/23 1723)   spironolactone (ALDACTONE) tablet 25 mg (25 mg Oral Given 3/19/23 0839)   insulin lispro (HumaLOG) 100 units/mL subcutaneous injection 1-6 Units (1 Units Subcutaneous Not Given 3/19/23 1720)   insulin lispro (HumaLOG) 100 units/mL subcutaneous injection 1-6 Units (4 Units Subcutaneous Given 3/19/23 0301)   insulin lispro (HumaLOG) 100 units/mL subcutaneous injection 4 Units (4 Units Subcutaneous Given 3/19/23 0835)   insulin lispro (HumaLOG) 100 units/mL subcutaneous injection 4 Units (has no administration in time range)   insulin glargine (LANTUS) subcutaneous injection 14 Units 0 14 mL (14 Units Subcutaneous Given 3/19/23 0231)   ipratropium (ATROVENT) 0 02 % inhalation solution 0 5 mg (0 5 mg Nebulization Given 3/19/23 1407)   levalbuterol (XOPENEX) inhalation solution 1 25 mg (1 25 mg Nebulization Given 3/19/23 1407)   albuterol (PROVENTIL HFA,VENTOLIN HFA) inhaler 2 puff (has no administration in time range)   furosemide (LASIX) tablet 40 mg (has no administration in time range)   albuterol inhalation solution 10 mg (10 mg Nebulization Given 3/18/23 1928)     And   ipratropium (ATROVENT) 0 02 % inhalation solution 1 mg (1 mg Nebulization Given 3/18/23 1928)     And   sodium chloride 0 9 % inhalation solution 3 mL (3 mL Nebulization Given 3/18/23 1928)   furosemide (LASIX) injection 40 mg (40 mg Intravenous Given 3/19/23 1723)       Diagnostic Studies  Results Reviewed     Procedure Component Value Units Date/Time    D-dimer, quantitative [[id number]]  (Abnormal) Collected: 03/19/23 1115    Lab Status: Final result Specimen: Blood from Arm, Left Updated: 03/19/23 1149     D-Dimer, Quant 1 00 ug/ml FEU     Narrative: In the evaluation for possible pulmonary embolism, in the appropriate (Well's Score of 4 or less) patient, the age adjusted d-dimer cutoff for this patient can be calculated as:    Age x 0 01 (in ug/mL) for Age-adjusted D-dimer exclusion threshold for a patient over 50 years      Fingerstick Glucose (POCT) [[id number]]  (Normal) Collected: 03/19/23 1142    Lab Status: Final result Updated: 03/19/23 1144     POC Glucose 137 mg/dl     Fingerstick Glucose (POCT) [[id number]]  (Normal) Collected: 03/19/23 1114    Lab Status: Final result Updated: 03/19/23 1144     POC Glucose 69 mg/dl     High Sensitivity Troponin I Random [[id number]]  (Abnormal) Collected: 03/19/23 0810    Lab Status: Final result Specimen: Blood from Arm, Right Updated: 03/19/23 0854     HS TnI random 30 ng/L     Iron Saturation % [[id number]]  (Abnormal) Collected: 03/19/23 0604    Lab Status: Final result Specimen: Blood from Arm, Left Updated: 03/19/23 0719     Iron Saturation 11 %      TIBC 398 ug/dL      Iron 42 ug/dL     Ferritin [[id number]]  (Normal) Collected: 03/19/23 0604    Lab Status: Final result Specimen: Blood from Arm, Left Updated: 03/19/23 0719     Ferritin 58 ng/mL     Magnesium [[id number]]  (Normal) Collected: 03/19/23 0605    Lab Status: Final result Specimen: Blood from Arm, Left Updated: 03/19/23 0706     Magnesium 2 2 mg/dL     Phosphorus [[id number]]  (Normal) Collected: 03/19/23 0605    Lab Status: Final result Specimen: Blood from Arm, Left Updated: 03/19/23 0706     Phosphorus 3 9 mg/dL     Basic metabolic panel [[id number]] (Abnormal) Collected: 03/19/23 0604    Lab Status: Final result Specimen: Blood from Arm, Left Updated: 03/19/23 0634     Sodium 141 mmol/L      Potassium 4 0 mmol/L      Chloride 109 mmol/L      CO2 28 mmol/L      ANION GAP 4 mmol/L      BUN 20 mg/dL      Creatinine 1 06 mg/dL      Glucose 112 mg/dL      Calcium 9 6 mg/dL      eGFR 68 ml/min/1 73sq m     Narrative:      [place] guidelines for Chronic Kidney Disease (CKD):   •  Stage 1 with normal or high GFR (GFR > 90 mL/min/1 73 square meters)  •  Stage 2 Mild CKD (GFR = 60-89 mL/min/1 73 square meters)  •  Stage 3A Moderate CKD (GFR = 45-59 mL/min/1 73 square meters)  •  Stage 3B Moderate CKD (GFR = 30-44 mL/min/1 73 square meters)  •  Stage 4 Severe CKD (GFR = 15-29 mL/min/1 73 square meters)  •  Stage 5 End Stage CKD (GFR <15 mL/min/1 73 square meters)  Note: GFR calculation is accurate only with a steady state creatinine    CBC and differential [[id number]]  (Abnormal) Collected: 03/19/23 0604    Lab Status: Final result Specimen: Blood from Arm, Left Updated: 03/19/23 0612     WBC 5 84 Thousand/uL      RBC 4 04 Million/uL      Hemoglobin 11 2 g/dL      Hematocrit 35 7 %      MCV 88 fL      MCH 27 7 pg      MCHC 31 4 g/dL      RDW 15 7 %      MPV 10 3 fL      Platelets 876 Thousands/uL      nRBC 0 /100 WBCs      Neutrophils Relative 69 %      Immat GRANS % 0 %      Lymphocytes Relative 17 %      Monocytes Relative 13 %      Eosinophils Relative 1 %      Basophils Relative 0 %      Neutrophils Absolute 4 00 Thousands/µL      Immature Grans Absolute 0 02 Thousand/uL      Lymphocytes Absolute 0 98 Thousands/µL      Monocytes Absolute 0 78 Thousand/µL      Eosinophils Absolute 0 04 Thousand/µL      Basophils Absolute 0 02 Thousands/µL     Fingerstick Glucose (POCT) [[id number]]  (Normal) Collected: 03/19/23 0603    Lab Status: Final result Updated: 03/19/23 0605     POC Glucose 103 mg/dl     Fingerstick Glucose (POCT) [[id number]] (Abnormal) Collected: 03/19/23 0233    Lab Status: Final result Updated: 03/19/23 0234     POC Glucose 274 mg/dl     Magnesium [[id number]]  (Normal) Collected: 03/18/23 1923    Lab Status: Final result Specimen: Blood from Arm, Left Updated: 03/19/23 0125     Magnesium 2 1 mg/dL     HS Troponin I 4hr [[id number]]  (Normal) Collected: 03/18/23 2336    Lab Status: Final result Specimen: Blood from Arm, Left Updated: 03/19/23 0007     hs TnI 4hr 39 ng/L      Delta 4hr hsTnI 14 ng/L     HS Troponin I 2hr [[id number]]  (Normal) Collected: 03/18/23 2132    Lab Status: Final result Specimen: Blood from Arm, Left Updated: 03/18/23 2208     hs TnI 2hr 34 ng/L      Delta 2hr hsTnI 9 ng/L     FLU/RSV/COVID - if FLU/RSV clinically relevant [[id number]]  (Normal) Collected: 03/18/23 1923    Lab Status: Final result Specimen: Nares from Nose Updated: 03/18/23 2049     SARS-CoV-2 Negative     INFLUENZA A PCR Negative     INFLUENZA B PCR Negative     RSV PCR Negative    Narrative:      FOR PEDIATRIC PATIENTS - copy/paste COVID Guidelines URL to browser: [email or web address]    SARS-CoV-2 assay is a Nucleic Acid Amplification assay intended for the  qualitative detection of nucleic acid from SARS-CoV-2 in nasopharyngeal  swabs  Results are for the presumptive identification of SARS-CoV-2 RNA  Positive results are indicative of infection with SARS-CoV-2, the virus  causing COVID-19, but do not rule out bacterial infection or co-infection  with other viruses  Laboratories within the [place] and its  territories are required to report all positive results to the appropriate  public health authorities  Negative results do not preclude SARS-CoV-2  infection and should not be used as the sole basis for treatment or other  patient management decisions  Negative results must be combined with  clinical observations, patient history, and epidemiological information    This test has not been FDA cleared or approved  This test has been authorized by FDA under an Emergency Use Authorization  (EUA)  This test is only authorized for the duration of time the  declaration that circumstances exist justifying the authorization of the  emergency use of an in vitro diagnostic tests for detection of SARS-CoV-2  virus and/or diagnosis of COVID-19 infection under section 564(b)(1) of  the Act, 21 U  S C  105NIA-3(Z)(0), unless the authorization is terminated  or revoked sooner  The test has been validated but independent review by FDA  and CLIA is pending  Test performed using [employer] GeneXpert: This RT-PCR assay targets N2,  a region unique to SARS-CoV-2  A conserved region in the E-gene was chosen  for pan-Sarbecovirus detection which includes SARS-CoV-2  According to CMS-2020-01-R, this platform meets the definition of high-throughput technology      HS Troponin 0hr (reflex protocol) [[id number]]  (Normal) Collected: 03/18/23 1923    Lab Status: Final result Specimen: Blood from Arm, Left Updated: 03/18/23 2001     hs TnI 0hr 25 ng/L     NT-BNP [facility] only [[id number]]  (Abnormal) Collected: 03/18/23 1923    Lab Status: Final result Specimen: Blood from Arm, Left Updated: 03/18/23 1956     NT-proBNP 3,136 pg/mL     Comprehensive metabolic panel [[id number]]  (Abnormal) Collected: 03/18/23 1923    Lab Status: Final result Specimen: Blood from Arm, Left Updated: 03/18/23 1955     Sodium 137 mmol/L      Potassium 4 0 mmol/L      Chloride 109 mmol/L      CO2 22 mmol/L      ANION GAP 6 mmol/L      BUN 20 mg/dL      Creatinine 0 94 mg/dL      Glucose 110 mg/dL      Calcium 9 5 mg/dL      Corrected Calcium 10 1 mg/dL      AST 16 U/L      ALT 18 U/L      Alkaline Phosphatase 47 U/L      Total Protein 6 9 g/dL      Albumin 3 2 g/dL      Total Bilirubin 0 93 mg/dL      eGFR 78 ml/min/1 73sq m     Narrative:      [place] guidelines for Chronic Kidney Disease (CKD):   •  Stage 1 with normal or high GFR (GFR > 90 mL/min/1 73 square meters)  •  Stage 2 Mild CKD (GFR = 60-89 mL/min/1 73 square meters)  •  Stage 3A Moderate CKD (GFR = 45-59 mL/min/1 73 square meters)  •  Stage 3B Moderate CKD (GFR = 30-44 mL/min/1 73 square meters)  •  Stage 4 Severe CKD (GFR = 15-29 mL/min/1 73 square meters)  •  Stage 5 End Stage CKD (GFR <15 mL/min/1 73 square meters)  Note: GFR calculation is accurate only with a steady state creatinine    CBC and differential [[id number]]  (Abnormal) Collected: 03/18/23 1923    Lab Status: Final result Specimen: Blood from Arm, Left Updated: 03/18/23 1935     WBC 11 61 Thousand/uL      RBC 4 23 Million/uL      Hemoglobin 11 2 g/dL      Hematocrit 37 6 %      MCV 89 fL      MCH 26 5 pg      MCHC 29 8 g/dL      RDW 15 7 %      MPV 10 1 fL      Platelets 976 Thousands/uL      nRBC 0 /100 WBCs      Neutrophils Relative 82 %      Immat GRANS % 0 %      Lymphocytes Relative 10 %      Monocytes Relative 7 %      Eosinophils Relative 1 %      Basophils Relative 0 %      Neutrophils Absolute 9 44 Thousands/µL      Immature Grans Absolute 0 03 Thousand/uL      Lymphocytes Absolute 1 21 Thousands/µL      Monocytes Absolute 0 84 Thousand/µL      Eosinophils Absolute 0 06 Thousand/µL      Basophils Absolute 0 03 Thousands/µL                  XR chest 2 views   Final Result by Aretha Sultana MD (03/19 4366)      Moderate pulmonary edema with small effusions  Workstation performed: [id number]         NM inpatient order    (Results Pending)         Procedures  ECG 12 Lead Documentation Only    Date/Time: 3/18/2023 7:15 PM  Performed by: Myriam Hogan MD  Authorized by:  Myriam Hogan MD     ECG reviewed by me, the ED Provider: yes    Patient location:  ED  Previous ECG:     Previous ECG:  Compared to current    Similarity:  Changes noted    Comparison to cardiac monitor: Yes    Interpretation:     Interpretation: abnormal    Rate:     ECG rate:  125    ECG rate assessment: tachycardic    Rhythm:     Rhythm: sinus rhythm    Ectopy:     Ectopy: PVCs    QRS:     QRS intervals: Wide  Conduction:     Conduction: abnormal      Abnormal conduction: complete LBBB    ST segments:     ST segments:  Normal  T waves:     T waves: normal      POC Cardiac US    Date/Time: 3/18/2023 7:23 PM  Performed by: Eliana Holbrook MD  Authorized by: Eliana Holbrook MD     Patient location:  ED  Other Assisting Provider: Yes (comment) (Dr Gregorio December)    Procedure details:     Exam Type:  Diagnostic    Indications: respiratory distress      Assessment / Evaluation for: cardiac function      Exam Type: initial exam      Image quality: limited diagnostic      Image availability:  Images available in PACS  Patient Details:     Cardiac Rhythm:  Regular    Mechanical ventilation: No    Cardiac findings:     Echo technique: limited 2D and M-mode      Views obtained: parasternal long axis, parasternal short axis, subcostal and apical      Pericardial effusion: absent      Tamponade physiology: absent      Wall motion: normal      LV systolic function: depressed      RV dilation: none    Pulmonary findings:     B-lines: more than 3    IVC findings:     IVC Inspiratory Collapse: partial    POC Lung US    Date/Time: 3/18/2023 7:24 PM  Performed by: Eliana Holbrook MD  Authorized by:  Eliana Holbrook MD     Patient location:  ED  Other Assisting Provider: Yes (comment) (Dr Gregorio December)    Procedure details:     Exam Type:  Diagnostic    Indications: dyspnea      Assessment / Evaluation for:  Hemothorax, pleural effusion and pneumothorax    Structures Visualized: pleural line      Exam Type: initial exam      Image quality: diagnostic      Image availability:  Images available in PACS  Left Hemithorax Findings:     Left pleura visualized:  Visualized    Left Hemithorax Findings: B-line pattern    Right Lung Findings:     Right pleural visualized:  Visualized    Right hemithorax findings: B-line pattern            ED Course  ED Course as of 03/19/23 1725   Sat Mar 18, 2023   2040 Contacted SOD for admission                              SBIRT 20yo+    Flowsheet Row Most Recent Value   SBIRT (25 yo +)    In order to provide better care to our patients, we are screening all of our patients for alcohol and drug use  Would it be okay to ask you these screening questions? Unable to answer at this time Filed at: 03/18/2023 [address]  53-year-old male Micronesian speaking male past medical history of diabetes, hypertension, COPD, hyperlipidemia presents ED complaining of shortness of breath  DDx; the OPD exacerbation versus CHF exacerbation, ACS, viral illness  Labs significant for elevated BNP, CBC and CMP unremarkable  Patient placed on heart neb with moderate relief of shortness of breath  Point-of-care ultrasound performed, visible numerous B-lines in both lung fields  Cardiac point-of-care ultrasound showing for mitral valve excursion kidding likely EF less than 50%  Likely fluid overload secondary to CHF exacerbation  Case discussed with medicine, patient admitted to medical service for evaluation management  Amount and/or Complexity of Data Reviewed  Labs: ordered  Radiology: ordered  Risk  Prescription drug management  Decision regarding hospitalization              Disposition  Final diagnoses:   CHF (congestive heart failure) (HCC)   SOB (shortness of breath)     Time reflects when diagnosis was documented in both MDM as applicable and the Disposition within this note     Time User Action Codes Description Comment    3/18/2023  9:36 PM David Brisk Add [J44 1] COPD exacerbation ([facility] )     3/18/2023  9:36 PM David Brisk Remove [J44 1] COPD exacerbation ([place] [address] )     3/18/2023  9:36 PM David Brisk Add [I50 9] CHF (congestive heart failure) ([facility] )     3/18/2023  9:36 PM David Brisk Add [R06 02] SOB (shortness of breath)     3/18/2023 10:00 PM Emmie Jaime Add [Z95 5] Status post insertion of drug eluting coronary artery stent     3/18/2023 10:00 PM Caesar Jaimecock [Z95 5] Status post insertion of drug eluting coronary artery stent     3/18/2023 10:00 PM Kathy Jaime Add [Z95 0] Presence of cardiac pacemaker     3/18/2023 10:00 PM Kathy Jaime Add [Z95 5] Status post insertion of drug eluting coronary artery stent       ED Disposition     ED Disposition   Admit    Condition   Stable    Date/Time   Sat Mar 18, 2023  9:36 PM    Comment   Case was discussed with Dr Yaya Herrera and the patient's admission status was agreed to be Admission Status: inpatient status to the service of Dr Yaya Herrera  Follow-up Information    None         Current Discharge Medication List      CONTINUE these medications which have NOT CHANGED    Details   Advair -21 MCG/ACT inhaler TAKE 2 PUFFS BY MOUTH TWICE A DAY  Qty: 36 g, Refills: 6    Associated Diagnoses: Mild intermittent asthma, unspecified whether complicated      albuterol (PROVENTIL HFA,VENTOLIN HFA) 90 mcg/act inhaler Inhale 2 puffs every 4 hours as needed for wheezing or shortness of breath  Qty: 18 g, Refills: 2    Comments: DX Code Needed    Associated Diagnoses: Mild intermittent asthma without complication      amLODIPine (NORVASC) 5 mg tablet Take 1 tablet (5 mg total) by mouth daily  Qty: 90 tablet, Refills: 1    Associated Diagnoses: Essential hypertension      Aspirin Low Dose 81 MG chewable tablet CHEW 1 TABLET BY MOUTH DAILY  Qty: 90 tablet, Refills: 3    Associated Diagnoses: S/P TAVR (transcatheter aortic valve replacement)      cholecalciferol (VITAMIN D3) 1,000 units tablet Take 2 tablets (2,000 Units total) by mouth daily  Qty: 180 tablet, Refills: 5    Associated Diagnoses: Vitamin D deficiency      Empagliflozin (JARDIANCE) 10 MG TABS tablet Take 1 tablet (10 mg total) by mouth every morning  Qty: 90 tablet, Refills: 3    Associated Diagnoses: NSTEMI (non-ST elevated myocardial infarction) ([facility] );  Status post insertion of drug eluting coronary artery stent; Coronary artery disease involving native coronary artery of native heart without angina pectoris      fluticasone (FLONASE) 50 mcg/act nasal spray 2 sprays into each nostril daily  Qty: 2 Bottle, Refills: 2    Associated Diagnoses: Allergic rhinitis, unspecified seasonality, unspecified trigger      levothyroxine 137 mcg tablet TAKE 1 TABLET BY MOUTH EVERY DAY  Qty: 90 tablet, Refills: 3    Associated Diagnoses: Hypothyroidism      lisinopril (ZESTRIL) 20 mg tablet Take 1 tablet (20 mg total) by mouth daily  Qty: 90 tablet, Refills: 3    Associated Diagnoses: Hypertension, unspecified type      metFORMIN (GLUCOPHAGE) 850 mg tablet TAKE 1 TABLET (850 MG TOTAL) BY MOUTH 2 (TWO) TIMES A DAY WITH MEALS  Qty: 180 tablet, Refills: 3    Associated Diagnoses: Diabetes mellitus (HCC)      metoprolol succinate (TOPROL-XL) 50 mg 24 hr tablet Take 1 tablet (50 mg total) by mouth 2 (two) times a day  Qty: 90 tablet, Refills: 3    Associated Diagnoses: NSTEMI (non-ST elevated myocardial infarction) ([facility] ); Status post insertion of drug eluting coronary artery stent; Coronary artery disease involving native coronary artery of native heart without angina pectoris      montelukast (SINGULAIR) 10 mg tablet TAKE 1 TABLET BY MOUTH EVERY DAY  Qty: 90 tablet, Refills: 3    Associated Diagnoses: Mild intermittent asthma, unspecified whether complicated      rosuvastatin (CRESTOR) 40 MG tablet TAKE 1 TABLET BY MOUTH EVERY DAY  Qty: 90 tablet, Refills: 3    Comments: DX Code Needed    Associated Diagnoses: Mixed hyperlipidemia      spironolactone (ALDACTONE) 25 mg tablet Take 1 tablet (25 mg total) by mouth daily Do not start before February 13, 2023  Qty: 90 tablet, Refills: 3    Associated Diagnoses: NSTEMI (non-ST elevated myocardial infarction) ([facility] );  Status post insertion of drug eluting coronary artery stent; Coronary artery disease involving native coronary artery of native heart without angina pectoris      Alcohol Swabs (ALCOHOL PADS) 70 % PADS       Blood Glucose Monitoring Suppl (OneTouch Verio) w/Device KIT Check blood glucose three times daily before each meal  Qty: 1 kit, Refills: 0    Associated Diagnoses: Type 2 diabetes mellitus with diabetic neuropathy, without long-term current use of insulin (HCC)      clopidogrel (PLAVIX) 75 mg tablet Take 1 tablet (75 mg total) by mouth daily  Qty: 90 tablet, Refills: 3    Associated Diagnoses: S/P TAVR (transcatheter aortic valve replacement)      Continuous Blood Gluc  (FreeStyle Cristofer 2 [place]) POWER Use 1 each continuous  Qty: 1 each, Refills: 0    Associated Diagnoses: Type 2 diabetes mellitus with diabetic neuropathy, with long-term current use of insulin ([facility])      Continuous Blood Gluc Sensor (FreeStyle Cristofer 2 Sensor) MISC Use 1 each every 14 (fourteen) days  Qty: 6 each, Refills: 3    Associated Diagnoses: Type 2 diabetes mellitus with diabetic neuropathy, with long-term current use of insulin ([facility])      Insulin Pen Needle (Pen Needles) 31G X 8 MM MISC Use daily  Qty: 300 each, Refills: 3    Associated Diagnoses: Diabetes mellitus due to underlying condition with diabetic neuropathy, without long-term current use of insulin ([facility])      Lancets (FREESTYLE) lancets by Other route as needed (As needed)  Qty: 100 each, Refills: 3    Associated Diagnoses: Diabetes mellitus due to underlying condition with diabetic neuropathy, without long-term current use of insulin ([facility])      Lantus SoloStar 100 units/mL SOPN INJECT 0 18 ML (18 UNITS TOTAL) UNDER THE SKIN DAILY AT BEDTIME  Qty: 15 mL, Refills: 3    Comments: DX Code Needed      Associated Diagnoses: Diabetes mellitus due to underlying condition with diabetic neuropathy, without long-term current use of insulin ([facility])      methocarbamol (Robaxin-750) 750 mg tablet Take 1 tablet (750 mg total) by mouth every 6 (six) hours as needed for muscle spasms  Qty: 60 tablet, Refills: 0    Associated Diagnoses: Leg cramping      nitroglycerin (NITROSTAT) 0 4 mg SL tablet Place 1 tablet (0 4 mg total) under the tongue every 5 (five) minutes as needed for chest pain  Qty: 30 tablet, Refills: 1    Associated Diagnoses: NSTEMI (non-ST elevated myocardial infarction) ([facility] ); Status post insertion of drug eluting coronary artery stent; Coronary artery disease involving native coronary artery of native heart without angina pectoris      NovoLOG FlexPen 100 units/mL injection pen Inject 5 units with breakfast and with dinner  Qty: 12 mL, Refills: 3    Associated Diagnoses: Type 2 diabetes mellitus with diabetic neuropathy, without long-term current use of insulin ([facility] )           No discharge procedures on file  PDMP Review       Value Time User    PDMP Reviewed  Yes 9/1/2022  1:26 PM Cristal Hodge PA-C           ED Provider  Attending physically available and evaluated Jay Tamayo I managed the patient along with the ED Attending      Electronically Signed by         Janae Alberto MD  03/19/23 [id number]

## 2023-03-18 NOTE — ED ATTENDING ATTESTATION
Final Diagnoses:     1  CHF (congestive heart failure) ([address] )    2  SOB (shortness of breath)    3  Presence of cardiac pacemaker    4  Status post insertion of drug eluting coronary artery stent      ED Course as of 03/20/23 1322   Sat Mar 18, 2023   1915 POCUS Cardiac + Lung + IVC:  - transthoracic echocardiogram was performed at bedside by myself and resident  - Images collected were inadequate quality  This was a technically difficult study due to lung interference  - Apical, parasternal, subcostal views were obtained/attempted  - The main purpose of this study was to r/o obvious pericardial tamponade  - Most views were obtained for educational reasons    - Findings: There was no obvious pericardial effusion:   There was no obvious pleural effusion  LV function / EF reduced     EPSS 1 3cm (abnormal; >7mm sensitivity 100%, specificity 52% for EF < 30%)    MAPSE reduced     E/A Ratio 2 52     E/E' Ratio 14 4    E' 9     Grade 2 Diastolic dysfunction Mitral Inflow: E/A > 8 Tissue Doppler: E/e': 8-15   RV function grossly normal     TAPSE  (>17 normal; <17 5mm 87% sensitive and 91% specific for RV dysfunction)    IVC was IVC < 2 1cm; >50% compression w/ sniff likely RAP 3 mmHg     IVC Max: 1 8%     CI: 100%   Lungs:    B-lines were present anteriorly bilaterally at upper BLUE-point (3+ B-lines in 2+ fields w/ concern for HF/Cardiogenic pulmonary edema sensitivity 85-94%, specificity 92% LR+ 7 4-12; LR- 0 06-0 16)    Bilateral anterior lung sliding bilaterally present at upper BLUE-point (no pneumothorax, sen 91%; spec 98%)    Summary: systolic dysfunction present  B-lines present supporting cardiogenic pulmonary edema  Very small IVC though  1952 WBC(!): 11 61   1954 Hemoglobin(!): 11 2   1954 Platelet Count: 755   2003 Ehsan Andrews MD, saw and evaluated the patient  All available labs and X-rays were ordered by me or the resident and have been reviewed by myself   I discussed the patient with the resident / non-physician and agree with the resident's / non-physician practitioner's findings and plan as documented in the resident's / non-physician practicitioner's note, except where noted  At this point, I agree with the current assessment done in the ED  I was present during key portions of all procedures performed unless otherwise stated  Nursing Triage:     Chief Complaint   Patient presents with   • Shortness of Breath     Per pts family he had an MI and stents placed 3wks ago  Pt c/o abd pain and sob  Pt ran out of nebulizer trx  Pt reports pacemaker placement 6 months ago  Demnies chest pain       HPI:   This is a 78 y o  male presenting for evaluation of suspected CHF? The patient is a very poor historian even with Indonesian interpretor  From what I understand, he had an MI and stents placed recently  Also had a recent PM   Today, he felt maybe 3 shocks? No f/ch/n/v  +SOB that maybe is worse today? No LE edema  ASSESSMENT + PLAN:   - Given patient's concerns, will do a cardiac workup  - Will do an EKG for arrythmia, strain; troponin for same as per protocol for evaluation of ACS  - CBC for anemia; CMP for kidney function and electrolytes  - Will check CXR for pneumonia, PTX, fluid overload  - Will do POCUS Cardiac to evaluate for pericardial effusion    - Disposition per workup    - COPD exacerbation? I hear wheezing  Could be cardiac wheeze ? HEART neb, re-eval  HEART score:  History 2=Highly suspicious   ECG 1=Nonspecific repolarization disturbance   Age 2= > 65 years   Risk Factors 2= > 3 risk factors, or history of atherosclerotic disease   Troponin 1= Between 13-35 ng/L   Total 8     Physical:   Pertinent: appears dyspneic  Obvious wheezing  General: VS reviewed  Appears in NAD  awake, alert  Well-nourished, well-developed  Appears stated age  Speaking normally in full sentences  Head: Normocephalic, atraumatic  Eyes: EOM-I  No diplopia     No hyphema  No subconjunctival hemorrhages  Symmetrical lids  ENT: Atraumatic external nose and ears  MMM  No malocclusion  No stridor  Normal phonation  No drooling  Normal swallowing  Neck: No JVD  CV: No pallor noted  Lungs:   No tachypnea  No respiratory distress  Abd: soft nt nd   no rebound/guarding  MSK:   FROM spontaneously  Trace LE edema  Skin: Dry, intact  Neuro: Awake, alert, GCS15, CN II-XII grossly intact  Motor grossly intact  Psychiatric/Behavioral: interacting normally; appropriate mood/affect   Exam: deferred    Vitals:    03/19/23 2138 03/20/23 0533 03/20/23 0718 03/20/23 0802   BP: 129/76  90/72 118/78   BP Location:   Right arm    Pulse:   94 99   Resp:   18    Temp:   98 3 °F (36 8 °C)    TempSrc:   Oral    SpO2:   92% 95%   Weight:  83 2 kg (183 lb 6 4 oz)       - There are no obvious limitations to social determinants of care  - Nursing note reviewed  - Vitals reviewed  - Orders placed by myself and/or advanced practitioner / resident     - Previous chart was reviewed  - No language barrier    - History obtained from patient  - There are no limitations to the history obtained:     Critical Care Time:   - Critical care time: 35 minutes  - Critical care time was exclusive of seperately bilable procedures and treating other patients as well as teaching time     - Critical care was necessary to treat or prevent imminent or life-threatening deterioration of the following condition: dyspnea  - Critical care time was spent personally by me on the following activities as well as the above as per the ED course and rest of chart: blood draw for specimens, obtaining history from patient / surrogate, developement of a treatment plan, discussions with consultants, evaluation of patient's response to the treatment, examination of the patient, ordering/performing treatements and interventions, re-evaluation of the patient's condition, review of old charts, ordering/reviewing laboratory studies and ordering/reviewing of radiographic studies    Past Medical:    has a past medical history of Arthritis, Asthma, Colon polyps, Community acquired pneumonia, Diabetes mellitus ([facility] [address] ), Hemorrhagic prepatellar bursitis, left (10/21/2019), Hepatitis C, High cholesterol, History of colonoscopy (), Hypertension, Lymphadenopathy, anterior cervical (2018), Nephritis and nephropathy, not specified as acute or chronic, with other specified pathological lesion in kidney, in diseases classified elsewhere (3/26/2013), Screening for colon cancer (2019), and Thoracic vertebral fracture ([facility] [address] ) (2014)  Past Surgical:    has a past surgical history that includes Colonoscopy w/ polypectomy; Lithotripsy; Multiple tooth extractions; pr colonoscopy flx dx w/collj spec when pfrmd (N/A, 2018); Colonoscopy; Cardiac catheterization (N/A, 6/3/2022); pr replace aortic valve openfemoral artery approach (N/A, 2022); Cardiac catheterization (N/A, 2022); Cardiac electrophysiology procedure (N/A, 2022); Cardiac catheterization (N/A, 2/10/2023); and Cardiac catheterization (N/A, 2/10/2023)  Social:     Social History     Substance and Sexual Activity   Alcohol Use No     Social History     Tobacco Use   Smoking Status Former   • Packs/day: 0 50   • Types: Cigarettes   • Quit date: 2022   • Years since quittin 8   Smokeless Tobacco Never   Tobacco Comments    started when he was about 24 yrs old; stopped smoking 3 wks ago     Social History     Substance and Sexual Activity   Drug Use No       Echo:   No results found for this or any previous visit  No results found for this or any previous visit  Cath:    No results found for this or any previous visit        Code Status: Level 1 - Full Code  Advance Directive and Living Will:      Power of :    POLST:    Medications   heparin (porcine) subcutaneous injection 5,000 Units (5,000 Units Subcutaneous Given 3/20/23 9242)   amLODIPine (NORVASC) tablet 5 mg (5 mg Oral Given 3/20/23 0804)   aspirin chewable tablet 81 mg (81 mg Oral Given 3/20/23 0803)   cholecalciferol (VITAMIN D3) tablet 2,000 Units (2,000 Units Oral Given 3/20/23 0803)   clopidogrel (PLAVIX) tablet 75 mg (75 mg Oral Given 3/20/23 0804)   Empagliflozin (JARDIANCE) tablet 10 mg (10 mg Oral Given 3/20/23 0805)   fluticasone (FLONASE) 50 mcg/act nasal spray 2 spray (0 sprays Nasal Hold 3/20/23 1013)   levothyroxine tablet 137 mcg (137 mcg Oral Given 3/20/23 0446)   lisinopril (ZESTRIL) tablet 20 mg (20 mg Oral Given 3/20/23 0803)   metoprolol succinate (TOPROL-XL) 24 hr tablet 50 mg (50 mg Oral Given 3/20/23 0803)   montelukast (SINGULAIR) tablet 10 mg (10 mg Oral Given 3/20/23 0804)   atorvastatin (LIPITOR) tablet 80 mg (80 mg Oral Given 3/19/23 1723)   spironolactone (ALDACTONE) tablet 25 mg (25 mg Oral Given 3/20/23 0804)   insulin lispro (HumaLOG) 100 units/mL subcutaneous injection 1-6 Units (1 Units Subcutaneous Not Given 3/20/23 1145)   insulin lispro (HumaLOG) 100 units/mL subcutaneous injection 1-6 Units (1 Units Subcutaneous Not Given 3/19/23 2133)   ipratropium (ATROVENT) 0 02 % inhalation solution 0 5 mg (0 5 mg Nebulization Given 3/20/23 0828)   levalbuterol (XOPENEX) inhalation solution 1 25 mg (1 25 mg Nebulization Given 3/20/23 0828)   albuterol (PROVENTIL HFA,VENTOLIN HFA) inhaler 2 puff (has no administration in time range)   furosemide (LASIX) tablet 40 mg (40 mg Oral Given 3/20/23 0803)   acetaminophen (TYLENOL) tablet 650 mg (650 mg Oral Given 3/19/23 2138)   insulin glargine (LANTUS) subcutaneous injection 10 Units 0 1 mL (has no administration in time range)   ferrous sulfate tablet 325 mg (has no administration in time range)   albuterol inhalation solution 10 mg (10 mg Nebulization Given 3/18/23 1928)     And   ipratropium (ATROVENT) 0 02 % inhalation solution 1 mg (1 mg Nebulization Given 3/18/23 1928)     And   sodium chloride 0 9 % inhalation solution 3 mL (3 mL Nebulization Given 3/18/23 1928)   furosemide (LASIX) injection 40 mg (40 mg Intravenous Given 3/19/23 1723)   potassium chloride (K-DUR,KLOR-CON) CR tablet 20 mEq (20 mEq Oral Given 3/20/23 1008)     XR chest 2 views   Final Result      Moderate pulmonary edema with small effusions  Workstation performed: [id number]         NM lung ventilation / perfusion    (Results Pending)     Orders Placed This Encounter   Procedures   • ED ECG Documentation Only   • POC Cardiac US   • POC Lung US   • FLU/RSV/COVID - if FLU/RSV clinically relevant   • XR chest 2 views   • NM lung ventilation / perfusion   • CBC and differential   • Comprehensive metabolic panel   • HS Troponin 0hr (reflex protocol)   • NT-BNP [facility] only   • HS Troponin I 2hr   • HS Troponin I 4hr   • Basic metabolic panel   • CBC and differential   • Magnesium   • Magnesium   • Phosphorus   • Iron Saturation %   • Ferritin   • High Sensitivity Troponin I Random   • D-dimer, quantitative   • Basic metabolic panel   • CBC and differential   • Magnesium   • Basic metabolic panel   • CBC   • Diet Amari/CHO Controlled; Consistent Carbohydrate Diet Level 2 (5 carb servings/75 grams CHO/meal); Sodium 2 GM, Fluid Restriction 2000 ML   • Please arrange for icd/pacemaker interrogation   • Nursing communication Continue IV as ordered     • Notify admitting physician   • Notify admitting physician on arrival   • Vital Signs per unit routine   • Daily weights - Use standing scale to weigh patient   • I/O   • Insert peripheral IV   • Maintain IV access   • Apply SCD or Foot pumps   • Insulin Subcutaneous Notify Physician   • Insulin Subcutaneous Instruction   • Hypoglycemia Protocol   • Fingerstick Glucose (POCT)   • Aqua K   • Level 1-Full Code: all life saving measures are indicated   • Inpatient consult to Case Management   • Inpatient consult to Cardiology   • OT eval and treat   • PT eval and treat   • Respiratory Protocol   • ECG 12 lead   • Echo complete w/ contrast if indicated   • INPATIENT ADMISSION     Labs Reviewed   CBC AND DIFFERENTIAL - Abnormal       Result Value Ref Range Status    WBC 11 61 (*) 4 31 - 10 16 Thousand/uL Final    RBC 4 23  3 88 - 5 62 Million/uL Final    Hemoglobin 11 2 (*) 12 0 - 17 0 g/dL Final    Hematocrit 37 6  36 5 - 49 3 % Final    MCV 89  82 - 98 fL Final    MCH 26 5 (*) 26 8 - 34 3 pg Final    MCHC 29 8 (*) 31 4 - 37 4 g/dL Final    RDW 15 7 (*) 11 6 - 15 1 % Final    MPV 10 1  8 9 - 12 7 fL Final    Platelets 047  714 - 390 Thousands/uL Final    nRBC 0  /100 WBCs Final    Neutrophils Relative 82 (*) 43 - 75 % Final    Immat GRANS % 0  0 - 2 % Final    Lymphocytes Relative 10 (*) 14 - 44 % Final    Monocytes Relative 7  4 - 12 % Final    Eosinophils Relative 1  0 - 6 % Final    Basophils Relative 0  0 - 1 % Final    Neutrophils Absolute 9 44 (*) 1 85 - 7 62 Thousands/µL Final    Immature Grans Absolute 0 03  0 00 - 0 20 Thousand/uL Final    Lymphocytes Absolute 1 21  0 60 - 4 47 Thousands/µL Final    Monocytes Absolute 0 84  0 17 - 1 22 Thousand/µL Final    Eosinophils Absolute 0 06  0 00 - 0 61 Thousand/µL Final    Basophils Absolute 0 03  0 00 - 0 10 Thousands/µL Final   COMPREHENSIVE METABOLIC PANEL - Abnormal    Sodium 137  135 - 147 mmol/L Final    Potassium 4 0  3 5 - 5 3 mmol/L Final    Chloride 109 (*) 96 - 108 mmol/L Final    CO2 22  21 - 32 mmol/L Final    ANION GAP 6  4 - 13 mmol/L Final    BUN 20  5 - 25 mg/dL Final    Creatinine 0 94  0 60 - 1 30 mg/dL Final    Comment: Standardized to IDMS reference method    Glucose 110  65 - 140 mg/dL Final    Comment: If the patient is fasting, the ADA then defines impaired fasting glucose as > 100 mg/dL and diabetes as > or equal to 123 mg/dL  Specimen collection should occur prior to Sulfasalazine administration due to the potential for falsely depressed results   Specimen collection should occur prior to Sulfapyridine administration due to the potential for falsely elevated results  Calcium 9 5  8 3 - 10 1 mg/dL Final    Corrected Calcium 10 1  8 3 - 10 1 mg/dL Final    AST 16  5 - 45 U/L Final    Comment: Specimen collection should occur prior to Sulfasalazine administration due to the potential for falsely depressed results  ALT 18  12 - 78 U/L Final    Comment: Specimen collection should occur prior to Sulfasalazine and/or Sulfapyridine administration due to the potential for falsely depressed results  Alkaline Phosphatase 47  46 - 116 U/L Final    Total Protein 6 9  6 4 - 8 4 g/dL Final    Albumin 3 2 (*) 3 5 - 5 0 g/dL Final    Total Bilirubin 0 93  0 20 - 1 00 mg/dL Final    Comment: Use of this assay is not recommended for patients undergoing treatment with eltrombopag due to the potential for falsely elevated results  eGFR 78  ml/min/1 73sq m Final    Narrative:     [place] guidelines for Chronic Kidney Disease (CKD):   •  Stage 1 with normal or high GFR (GFR > 90 mL/min/1 73 square meters)  •  Stage 2 Mild CKD (GFR = 60-89 mL/min/1 73 square meters)  •  Stage 3A Moderate CKD (GFR = 45-59 mL/min/1 73 square meters)  •  Stage 3B Moderate CKD (GFR = 30-44 mL/min/1 73 square meters)  •  Stage 4 Severe CKD (GFR = 15-29 mL/min/1 73 square meters)  •  Stage 5 End Stage CKD (GFR <15 mL/min/1 73 square meters)  Note: GFR calculation is accurate only with a steady state creatinine   [facility] ONLY - Abnormal    NT-proBNP 3,136 (*) <450 pg/mL Final   BASIC METABOLIC PANEL - Abnormal    Sodium 141  135 - 147 mmol/L Final    Potassium 4 0  3 5 - 5 3 mmol/L Final    Comment: Slightly Hemolyzed;  Results May be Affected    Chloride 109 (*) 96 - 108 mmol/L Final    CO2 28  21 - 32 mmol/L Final    ANION GAP 4  4 - 13 mmol/L Final    BUN 20  5 - 25 mg/dL Final    Creatinine 1 06  0 60 - 1 30 mg/dL Final    Comment: Standardized to IDMS reference method    Glucose 112  65 - 140 mg/dL Final    Comment: If the patient is fasting, the ADA then defines impaired fasting glucose as > 100 mg/dL and diabetes as > or equal to 123 mg/dL  Specimen collection should occur prior to Sulfasalazine administration due to the potential for falsely depressed results  Specimen collection should occur prior to Sulfapyridine administration due to the potential for falsely elevated results      Calcium 9 6  8 3 - 10 1 mg/dL Final    eGFR 68  ml/min/1 73sq m Final    Narrative:     [place] guidelines for Chronic Kidney Disease (CKD):   •  Stage 1 with normal or high GFR (GFR > 90 mL/min/1 73 square meters)  •  Stage 2 Mild CKD (GFR = 60-89 mL/min/1 73 square meters)  •  Stage 3A Moderate CKD (GFR = 45-59 mL/min/1 73 square meters)  •  Stage 3B Moderate CKD (GFR = 30-44 mL/min/1 73 square meters)  •  Stage 4 Severe CKD (GFR = 15-29 mL/min/1 73 square meters)  •  Stage 5 End Stage CKD (GFR <15 mL/min/1 73 square meters)  Note: GFR calculation is accurate only with a steady state creatinine   CBC AND DIFFERENTIAL - Abnormal    WBC 5 84  4 31 - 10 16 Thousand/uL Final    RBC 4 04  3 88 - 5 62 Million/uL Final    Hemoglobin 11 2 (*) 12 0 - 17 0 g/dL Final    Hematocrit 35 7 (*) 36 5 - 49 3 % Final    MCV 88  82 - 98 fL Final    MCH 27 7  26 8 - 34 3 pg Final    MCHC 31 4  31 4 - 37 4 g/dL Final    RDW 15 7 (*) 11 6 - 15 1 % Final    MPV 10 3  8 9 - 12 7 fL Final    Platelets 786  797 - 390 Thousands/uL Final    nRBC 0  /100 WBCs Final    Neutrophils Relative 69  43 - 75 % Final    Immat GRANS % 0  0 - 2 % Final    Lymphocytes Relative 17  14 - 44 % Final    Monocytes Relative 13 (*) 4 - 12 % Final    Eosinophils Relative 1  0 - 6 % Final    Basophils Relative 0  0 - 1 % Final    Neutrophils Absolute 4 00  1 85 - 7 62 Thousands/µL Final    Immature Grans Absolute 0 02  0 00 - 0 20 Thousand/uL Final    Lymphocytes Absolute 0 98  0 60 - 4 47 Thousands/µL Final    Monocytes Absolute 0 78  0 17 - 1 22 Thousand/µL Final Eosinophils Absolute 0 04  0 00 - 0 61 Thousand/µL Final    Basophils Absolute 0 02  0 00 - 0 10 Thousands/µL Final   IRON SATURATION - Abnormal    Iron Saturation 11 (*) 20 - 50 % Final    TIBC 398  250 - 450 ug/dL Final    Iron 42 (*) 65 - 175 ug/dL Final    Comment: Patients treated with metal-binding drugs (ie  Deferoxamine) may have depressed iron values  HIGH SENSITIVITY TROPONIN I RANDOM - Abnormal    HS TnI random 30 (*) 8 - 18 ng/L Final    Comment:                                              Initial (time 0) result  If >=50 ng/L, Myocardial injury suggested ;  Type of myocardial injury and treatment strategy  to be determined  If 5-49 ng/L, a delta result at 2 hours and or 4 hours will be needed to further evaluate  If <4 ng/L, and chest pain has been >3 hours since onset, patient may qualify for discharge based on the HEART score in the ED  If <5 ng/L and <3hours since onset of chest pain, a delta result at 2 hours will be needed to further evaluate  HS Troponin 99th Percentile URL of a Health Population=12 ng/L with a 95% Confidence Interval of 8-18 ng/L  Second Troponin (time 2 hours)  If calculated delta >= 20 ng/L,  Myocardial injury suggested ; Type of myocardial injury and treatment strategy to be determined  If 5-49 ng/L and the calculated delta is 5-19 ng/L, consult medical service for evaluation  Continue evaluation for ischemia on ecg and other possible etiology and repeat hs troponin at 4 hours  If delta is <5 ng/L at 2 hours, consider discharge based on risk stratification via the HEART score (if in ED), or NICA risk score in IP/Observation  HS Troponin 99th Percentile URL of a Health Population=12 ng/L with a 95% Confidence Interval of 8-18 ng/L  D-DIMER, QUANTITATIVE - Abnormal    D-Dimer, Quant 1 00 (*) <0 50 ug/ml FEU Final    Comment: Reference and upper limits to exclude DVT and PE are the same  Do not use to exclude if clinical symptoms are present      Narrative: In the evaluation for possible pulmonary embolism, in the appropriate (Well's Score of 4 or less) patient, the age adjusted d-dimer cutoff for this patient can be calculated as:    Age x 0 01 (in ug/mL) for Age-adjusted D-dimer exclusion threshold for a patient over 50 years  POCT GLUCOSE - Abnormal    POC Glucose 274 (*) 65 - 140 mg/dl Final   COVID19, INFLUENZA A/B, RSV PCR, [facility] - Normal    SARS-CoV-2 Negative  Negative Final    Comment:      INFLUENZA A PCR Negative  Negative Final    Comment:      INFLUENZA B PCR Negative  Negative Final    Comment:      RSV PCR Negative  Negative Final    Comment:      Narrative:     FOR PEDIATRIC PATIENTS - copy/paste COVID Guidelines URL to browser: [email or web address]  [employer]    SARS-CoV-2 assay is a Nucleic Acid Amplification assay intended for the  qualitative detection of nucleic acid from SARS-CoV-2 in nasopharyngeal  swabs  Results are for the presumptive identification of SARS-CoV-2 RNA  Positive results are indicative of infection with SARS-CoV-2, the virus  causing COVID-19, but do not rule out bacterial infection or co-infection  with other viruses  Laboratories within the [place] and its  territories are required to report all positive results to the appropriate  public health authorities  Negative results do not preclude SARS-CoV-2  infection and should not be used as the sole basis for treatment or other  patient management decisions  Negative results must be combined with  clinical observations, patient history, and epidemiological information  This test has not been FDA cleared or approved  This test has been authorized by FDA under an Emergency Use Authorization  (EUA)   This test is only authorized for the duration of time the  declaration that circumstances exist justifying the authorization of the  emergency use of an in vitro diagnostic tests for detection of SARS-CoV-2  virus and/or "diagnosis of COVID-19 infection under section 564(b)(1) of  the Act, 21 U  S C  585CVJ-5(U)(1), unless the authorization is terminated  or revoked sooner  The test has been validated but independent review by FDA  and CLIA is pending  Test performed using [employer] GeneXpert: This RT-PCR assay targets N2,  a region unique to SARS-CoV-2  A conserved region in the E-gene was chosen  for pan-Sarbecovirus detection which includes SARS-CoV-2  According to CMS-2020-01-R, this platform meets the definition of high-throughput technology  HS TROPONIN I 0HR - Normal    hs TnI 0hr 25  \"Refer to ACS Flowchart\"- see link ng/L Final    Comment:                                              Initial (time 0) result  If >=50 ng/L, Myocardial injury suggested ;  Type of myocardial injury and treatment strategy  to be determined  If 5-49 ng/L, a delta result at 2 hours and or 4 hours will be needed to further evaluate  If <4 ng/L, and chest pain has been >3 hours since onset, patient may qualify for discharge based on the HEART score in the ED  If <5 ng/L and <3hours since onset of chest pain, a delta result at 2 hours will be needed to further evaluate  HS Troponin 99th Percentile URL of a Health Population=12 ng/L with a 95% Confidence Interval of 8-18 ng/L  Second Troponin (time 2 hours)  If calculated delta >= 20 ng/L,  Myocardial injury suggested ; Type of myocardial injury and treatment strategy to be determined  If 5-49 ng/L and the calculated delta is 5-19 ng/L, consult medical service for evaluation  Continue evaluation for ischemia on ecg and other possible etiology and repeat hs troponin at 4 hours  If delta is <5 ng/L at 2 hours, consider discharge based on risk stratification via the HEART score (if in ED), or NICA risk score in IP/Observation      HS Troponin 99th Percentile URL of a Health Population=12 ng/L with a 95% Confidence Interval of 8-18 ng/L    HS TROPONIN I 2HR - Normal    hs TnI 2hr 34  \"Refer " "to ACS Flowchart\"- see link ng/L Final    Comment:                                              Initial (time 0) result  If >=50 ng/L, Myocardial injury suggested ;  Type of myocardial injury and treatment strategy  to be determined  If 5-49 ng/L, a delta result at 2 hours and or 4 hours will be needed to further evaluate  If <4 ng/L, and chest pain has been >3 hours since onset, patient may qualify for discharge based on the HEART score in the ED  If <5 ng/L and <3hours since onset of chest pain, a delta result at 2 hours will be needed to further evaluate  HS Troponin 99th Percentile URL of a Health Population=12 ng/L with a 95% Confidence Interval of 8-18 ng/L  Second Troponin (time 2 hours)  If calculated delta >= 20 ng/L,  Myocardial injury suggested ; Type of myocardial injury and treatment strategy to be determined  If 5-49 ng/L and the calculated delta is 5-19 ng/L, consult medical service for evaluation  Continue evaluation for ischemia on ecg and other possible etiology and repeat hs troponin at 4 hours  If delta is <5 ng/L at 2 hours, consider discharge based on risk stratification via the HEART score (if in ED), or NICA risk score in IP/Observation  HS Troponin 99th Percentile URL of a Health Population=12 ng/L with a 95% Confidence Interval of 8-18 ng/L  Delta 2hr hsTnI 9  <20 ng/L Final   HS TROPONIN I 4HR - Normal    hs TnI 4hr 39  \"Refer to ACS Flowchart\"- see link ng/L Final    Comment:                                              Initial (time 0) result  If >=50 ng/L, Myocardial injury suggested ;  Type of myocardial injury and treatment strategy  to be determined  If 5-49 ng/L, a delta result at 2 hours and or 4 hours will be needed to further evaluate  If <4 ng/L, and chest pain has been >3 hours since onset, patient may qualify for discharge based on the HEART score in the ED    If <5 ng/L and <3hours since onset of chest pain, a delta result at 2 hours will be needed to " further evaluate  HS Troponin 99th Percentile URL of a Health Population=12 ng/L with a 95% Confidence Interval of 8-18 ng/L  Second Troponin (time 2 hours)  If calculated delta >= 20 ng/L,  Myocardial injury suggested ; Type of myocardial injury and treatment strategy to be determined  If 5-49 ng/L and the calculated delta is 5-19 ng/L, consult medical service for evaluation  Continue evaluation for ischemia on ecg and other possible etiology and repeat hs troponin at 4 hours  If delta is <5 ng/L at 2 hours, consider discharge based on risk stratification via the HEART score (if in ED), or NICA risk score in IP/Observation  HS Troponin 99th Percentile URL of a Health Population=12 ng/L with a 95% Confidence Interval of 8-18 ng/L  Delta 4hr hsTnI 14  <20 ng/L Final   MAGNESIUM - Normal    Magnesium 2 1  1 6 - 2 6 mg/dL Final   MAGNESIUM - Normal    Magnesium 2 2  1 6 - 2 6 mg/dL Final   PHOSPHORUS - Normal    Phosphorus 3 9  2 3 - 4 1 mg/dL Final   FERRITIN - Normal    Ferritin 58  8 - 388 ng/mL Final   POCT GLUCOSE - Normal    POC Glucose 103  65 - 140 mg/dl Final   POCT GLUCOSE - Normal    POC Glucose 69  65 - 140 mg/dl Final   POCT GLUCOSE - Normal    POC Glucose 137  65 - 140 mg/dl Final   IRON PANEL (INCLUDES FERRITIN,IRON SAT%, IRON, AND TIBC)    Narrative: The following orders were created for panel order Iron Panel (Includes Ferritin, Iron Sat%, Iron, and TIBC)  Procedure                               Abnormality         Status                     ---------                               -----------         ------                     Iron Saturation %[[id number]]            Abnormal            Final result               Ferritin[[id number]]                     Normal              Final result                 Please view results for these tests on the individual orders       Time reflects when diagnosis was documented in both MDM as applicable and the Disposition within this note     Time User Action Codes Description Comment    3/18/2023  9:36 PM Green Idol Add [J44 1] COPD exacerbation ([facility] )     3/18/2023  9:36 PM Green Idol Remove [J44 1] COPD exacerbation ([facility] )     3/18/2023  9:36 PM Green Idol Add [I50 9] CHF (congestive heart failure) ([place] [address] )     3/18/2023  9:36 PM Green Idol Add [R06 02] SOB (shortness of breath)     3/18/2023 10:00 PM Kaylynn Jaime Add [Z95 5] Status post insertion of drug eluting coronary artery stent     3/18/2023 10:00 PM MinisterioxLaurie Lime [Z95 5] Status post insertion of drug eluting coronary artery stent     3/18/2023 10:00 PM Kaylynn Jaime Add [Z95 0] Presence of cardiac pacemaker     3/18/2023 10:00 PM Kaylynn Jaime Add [Z95 5] Status post insertion of drug eluting coronary artery stent       ED Disposition     ED Disposition   Admit    Condition   Stable    Date/Time   Sat Mar 18, 2023  9:36 PM    Comment   Case was discussed with Dr Deb Ren and the patient's admission status was agreed to be Admission Status: inpatient status to the service of Dr Deb Ren  Follow-up Information    None       Current Discharge Medication List      CONTINUE these medications which have NOT CHANGED    Details   Advair -21 MCG/ACT inhaler TAKE 2 PUFFS BY MOUTH TWICE A DAY  Qty: 36 g, Refills: 6    Associated Diagnoses: Mild intermittent asthma, unspecified whether complicated      albuterol (PROVENTIL HFA,VENTOLIN HFA) 90 mcg/act inhaler Inhale 2 puffs every 4 hours as needed for wheezing or shortness of breath  Qty: 18 g, Refills: 2    Comments: DX Code Needed      Associated Diagnoses: Mild intermittent asthma without complication      amLODIPine (NORVASC) 5 mg tablet Take 1 tablet (5 mg total) by mouth daily  Qty: 90 tablet, Refills: 1    Associated Diagnoses: Essential hypertension      Aspirin Low Dose 81 MG chewable tablet CHEW 1 TABLET BY MOUTH DAILY  Qty: 90 tablet, Refills: 3    Associated Diagnoses: S/P TAVR (transcatheter aortic valve replacement) cholecalciferol (VITAMIN D3) 1,000 units tablet Take 2 tablets (2,000 Units total) by mouth daily  Qty: 180 tablet, Refills: 5    Associated Diagnoses: Vitamin D deficiency      Empagliflozin (JARDIANCE) 10 MG TABS tablet Take 1 tablet (10 mg total) by mouth every morning  Qty: 90 tablet, Refills: 3    Associated Diagnoses: NSTEMI (non-ST elevated myocardial infarction) ([facility] ); Status post insertion of drug eluting coronary artery stent; Coronary artery disease involving native coronary artery of native heart without angina pectoris      fluticasone (FLONASE) 50 mcg/act nasal spray 2 sprays into each nostril daily  Qty: 2 Bottle, Refills: 2    Associated Diagnoses: Allergic rhinitis, unspecified seasonality, unspecified trigger      levothyroxine 137 mcg tablet TAKE 1 TABLET BY MOUTH EVERY DAY  Qty: 90 tablet, Refills: 3    Associated Diagnoses: Hypothyroidism      lisinopril (ZESTRIL) 20 mg tablet Take 1 tablet (20 mg total) by mouth daily  Qty: 90 tablet, Refills: 3    Associated Diagnoses: Hypertension, unspecified type      metFORMIN (GLUCOPHAGE) 850 mg tablet TAKE 1 TABLET (850 MG TOTAL) BY MOUTH 2 (TWO) TIMES A DAY WITH MEALS  Qty: 180 tablet, Refills: 3    Associated Diagnoses: Diabetes mellitus (HCC)      metoprolol succinate (TOPROL-XL) 50 mg 24 hr tablet Take 1 tablet (50 mg total) by mouth 2 (two) times a day  Qty: 90 tablet, Refills: 3    Associated Diagnoses: NSTEMI (non-ST elevated myocardial infarction) ([facility] 75 ); Status post insertion of drug eluting coronary artery stent; Coronary artery disease involving native coronary artery of native heart without angina pectoris      montelukast (SINGULAIR) 10 mg tablet TAKE 1 TABLET BY MOUTH EVERY DAY  Qty: 90 tablet, Refills: 3    Associated Diagnoses: Mild intermittent asthma, unspecified whether complicated      rosuvastatin (CRESTOR) 40 MG tablet TAKE 1 TABLET BY MOUTH EVERY DAY  Qty: 90 tablet, Refills: 3    Comments: DX Code Needed      Associated Diagnoses: Mixed hyperlipidemia      spironolactone (ALDACTONE) 25 mg tablet Take 1 tablet (25 mg total) by mouth daily Do not start before February 13, 2023  Qty: 90 tablet, Refills: 3    Associated Diagnoses: NSTEMI (non-ST elevated myocardial infarction) ([facility] );  Status post insertion of drug eluting coronary artery stent; Coronary artery disease involving native coronary artery of native heart without angina pectoris      Alcohol Swabs (ALCOHOL PADS) 70 % PADS       Blood Glucose Monitoring Suppl (OneTouch Verio) w/Device KIT Check blood glucose three times daily before each meal  Qty: 1 kit, Refills: 0    Associated Diagnoses: Type 2 diabetes mellitus with diabetic neuropathy, without long-term current use of insulin ([facility])      clopidogrel (PLAVIX) 75 mg tablet Take 1 tablet (75 mg total) by mouth daily  Qty: 90 tablet, Refills: 3    Associated Diagnoses: S/P TAVR (transcatheter aortic valve replacement)      Continuous Blood Gluc  (FreeStyle Cristofer 2 [place]) POWER Use 1 each continuous  Qty: 1 each, Refills: 0    Associated Diagnoses: Type 2 diabetes mellitus with diabetic neuropathy, with long-term current use of insulin ([facility])      Continuous Blood Gluc Sensor (FreeStyle Cristofer 2 Sensor) MISC Use 1 each every 14 (fourteen) days  Qty: 6 each, Refills: 3    Associated Diagnoses: Type 2 diabetes mellitus with diabetic neuropathy, with long-term current use of insulin ([facility])      Insulin Pen Needle (Pen Needles) 31G X 8 MM MISC Use daily  Qty: 300 each, Refills: 3    Associated Diagnoses: Diabetes mellitus due to underlying condition with diabetic neuropathy, without long-term current use of insulin ([facility])      Lancets (FREESTYLE) lancets by Other route as needed (As needed)  Qty: 100 each, Refills: 3    Associated Diagnoses: Diabetes mellitus due to underlying condition with diabetic neuropathy, without long-term current use of insulin ([facility])      Lantus SoloStar 100 units/mL SOPN INJECT 0 18 ML (18 UNITS TOTAL) UNDER THE SKIN DAILY AT BEDTIME  Qty: 15 mL, Refills: 3    Comments: DX Code Needed    Associated Diagnoses: Diabetes mellitus due to underlying condition with diabetic neuropathy, without long-term current use of insulin ([facility])      methocarbamol (Robaxin-750) 750 mg tablet Take 1 tablet (750 mg total) by mouth every 6 (six) hours as needed for muscle spasms  Qty: 60 tablet, Refills: 0    Associated Diagnoses: Leg cramping      nitroglycerin (NITROSTAT) 0 4 mg SL tablet Place 1 tablet (0 4 mg total) under the tongue every 5 (five) minutes as needed for chest pain  Qty: 30 tablet, Refills: 1    Associated Diagnoses: NSTEMI (non-ST elevated myocardial infarction) ([facility] ); Status post insertion of drug eluting coronary artery stent; Coronary artery disease involving native coronary artery of native heart without angina pectoris      NovoLOG FlexPen 100 units/mL injection pen Inject 5 units with breakfast and with dinner  Qty: 12 mL, Refills: 3    Associated Diagnoses: Type 2 diabetes mellitus with diabetic neuropathy, without long-term current use of insulin ([place] [address] )           No discharge procedures on file  Prior to Admission Medications   Prescriptions Last Dose Informant Patient Reported? Taking?    Advair -21 MCG/ACT inhaler 3/18/2023  No Yes   Sig: TAKE 2 PUFFS BY MOUTH TWICE A DAY   Alcohol Swabs (ALCOHOL PADS) 70 % PADS   Yes No   Aspirin Low Dose 81 MG chewable tablet 3/18/2023  No Yes   Sig: CHEW 1 TABLET BY MOUTH DAILY   Blood Glucose Monitoring Suppl (OneTouch Verio) w/Device KIT   No No   Sig: Check blood glucose three times daily before each meal   Continuous Blood Gluc  (FreeStyle Cristofer 2 [place]) POWER   No No   Sig: Use 1 each continuous   Patient not taking: Reported on 1/30/2023   Continuous Blood Gluc Sensor (FreeStyle Cristofer 2 Sensor) [facility]   No No   Sig: Use 1 each every 14 (fourteen) days   Patient not taking: Reported on 1/30/2023   Empagliflozin (JARDIANCE) 10 MG TABS tablet 3/18/2023  No Yes Sig: Take 1 tablet (10 mg total) by mouth every morning   Insulin Pen Needle (Pen Needles) 31G X 8 MM MISC   No No   Sig: Use daily   Lancets (FREESTYLE) lancets   No No   Sig: by Other route as needed (As needed)   Lantus SoloStar 100 units/mL SOPN   No No   Sig: INJECT 0 18 ML (18 UNITS TOTAL) UNDER THE SKIN DAILY AT BEDTIME   NovoLOG FlexPen 100 units/mL injection pen   No No   Sig: Inject 5 units with breakfast and with dinner   albuterol (PROVENTIL HFA,VENTOLIN HFA) 90 mcg/act inhaler 3/18/2023  No Yes   Sig: Inhale 2 puffs every 4 hours as needed for wheezing or shortness of breath     amLODIPine (NORVASC) 5 mg tablet 3/18/2023  No Yes   Sig: Take 1 tablet (5 mg total) by mouth daily   cholecalciferol (VITAMIN D3) 1,000 units tablet 3/18/2023  No Yes   Sig: Take 2 tablets (2,000 Units total) by mouth daily   clopidogrel (PLAVIX) 75 mg tablet   No No   Sig: Take 1 tablet (75 mg total) by mouth daily   fluticasone (FLONASE) 50 mcg/act nasal spray 3/18/2023  No Yes   Si sprays into each nostril daily   levothyroxine 137 mcg tablet 3/18/2023  No Yes   Sig: TAKE 1 TABLET BY MOUTH EVERY DAY   lisinopril (ZESTRIL) 20 mg tablet 3/18/2023  No Yes   Sig: Take 1 tablet (20 mg total) by mouth daily   metFORMIN (GLUCOPHAGE) 850 mg tablet 3/18/2023  No Yes   Sig: TAKE 1 TABLET (850 MG TOTAL) BY MOUTH 2 (TWO) TIMES A DAY WITH MEALS   methocarbamol (Robaxin-750) 750 mg tablet Not Taking  No No   Sig: Take 1 tablet (750 mg total) by mouth every 6 (six) hours as needed for muscle spasms   Patient not taking: Reported on 3/18/2023   metoprolol succinate (TOPROL-XL) 50 mg 24 hr tablet 3/18/2023 at 0900  No Yes   Sig: Take 1 tablet (50 mg total) by mouth 2 (two) times a day   montelukast (SINGULAIR) 10 mg tablet 3/18/2023  No Yes   Sig: TAKE 1 TABLET BY MOUTH EVERY DAY   nitroglycerin (NITROSTAT) 0 4 mg SL tablet   No No   Sig: Place 1 tablet (0 4 mg total) under the tongue every 5 (five) minutes as needed for chest pain "  rosuvastatin (CRESTOR) 40 MG tablet 3/17/2023  No Yes   Sig: TAKE 1 TABLET BY MOUTH EVERY DAY   spironolactone (ALDACTONE) 25 mg tablet 3/18/2023  No Yes   Sig: Take 1 tablet (25 mg total) by mouth daily Do not start before February 13, 2023  Facility-Administered Medications: None                        Portions of the record may have been created with voice recognition software  Occasional wrong word or \"sound a like\" substitutions may have occurred due to the inherent limitations of voice recognition software  Read the chart carefully and recognize, using context, where substitutions have occurred      Electronically signed by:  Moon Alberts    "

## 2023-03-19 PROBLEM — R65.10 SIRS (SYSTEMIC INFLAMMATORY RESPONSE SYNDROME) (HCC): Status: ACTIVE | Noted: 2023-03-19

## 2023-03-19 PROBLEM — R06.00 DYSPNEA: Status: ACTIVE | Noted: 2023-03-19

## 2023-03-19 PROBLEM — D64.9 ANEMIA: Status: ACTIVE | Noted: 2023-03-19

## 2023-03-19 LAB
4HR DELTA HS TROPONIN: 14 NG/L
ANION GAP SERPL CALCULATED.3IONS-SCNC: 4 MMOL/L (ref 4–13)
ATRIAL RATE: 125 BPM
BASOPHILS # BLD AUTO: 0.02 THOUSANDS/ÂΜL (ref 0–0.1)
BASOPHILS NFR BLD AUTO: 0 % (ref 0–1)
BUN SERPL-MCNC: 20 MG/DL (ref 5–25)
CALCIUM SERPL-MCNC: 9.6 MG/DL (ref 8.3–10.1)
CARDIAC TROPONIN I PNL SERPL HS: 30 NG/L (ref 8–18)
CARDIAC TROPONIN I PNL SERPL HS: 39 NG/L
CHLORIDE SERPL-SCNC: 109 MMOL/L (ref 96–108)
CO2 SERPL-SCNC: 28 MMOL/L (ref 21–32)
CREAT SERPL-MCNC: 1.06 MG/DL (ref 0.6–1.3)
D DIMER PPP FEU-MCNC: 1 UG/ML FEU
EOSINOPHIL # BLD AUTO: 0.04 THOUSAND/ÂΜL (ref 0–0.61)
EOSINOPHIL NFR BLD AUTO: 1 % (ref 0–6)
ERYTHROCYTE [DISTWIDTH] IN BLOOD BY AUTOMATED COUNT: 15.7 % (ref 11.6–15.1)
FERRITIN SERPL-MCNC: 58 NG/ML (ref 8–388)
GFR SERPL CREATININE-BSD FRML MDRD: 68 ML/MIN/1.73SQ M
GLUCOSE SERPL-MCNC: 103 MG/DL (ref 65–140)
GLUCOSE SERPL-MCNC: 112 MG/DL (ref 65–140)
GLUCOSE SERPL-MCNC: 112 MG/DL (ref 65–140)
GLUCOSE SERPL-MCNC: 120 MG/DL (ref 65–140)
GLUCOSE SERPL-MCNC: 137 MG/DL (ref 65–140)
GLUCOSE SERPL-MCNC: 274 MG/DL (ref 65–140)
GLUCOSE SERPL-MCNC: 69 MG/DL (ref 65–140)
HCT VFR BLD AUTO: 35.7 % (ref 36.5–49.3)
HGB BLD-MCNC: 11.2 G/DL (ref 12–17)
IMM GRANULOCYTES # BLD AUTO: 0.02 THOUSAND/UL (ref 0–0.2)
IMM GRANULOCYTES NFR BLD AUTO: 0 % (ref 0–2)
IRON SATN MFR SERPL: 11 % (ref 20–50)
IRON SERPL-MCNC: 42 UG/DL (ref 65–175)
LYMPHOCYTES # BLD AUTO: 0.98 THOUSANDS/ÂΜL (ref 0.6–4.47)
LYMPHOCYTES NFR BLD AUTO: 17 % (ref 14–44)
MAGNESIUM SERPL-MCNC: 2.1 MG/DL (ref 1.6–2.6)
MAGNESIUM SERPL-MCNC: 2.2 MG/DL (ref 1.6–2.6)
MCH RBC QN AUTO: 27.7 PG (ref 26.8–34.3)
MCHC RBC AUTO-ENTMCNC: 31.4 G/DL (ref 31.4–37.4)
MCV RBC AUTO: 88 FL (ref 82–98)
MONOCYTES # BLD AUTO: 0.78 THOUSAND/ÂΜL (ref 0.17–1.22)
MONOCYTES NFR BLD AUTO: 13 % (ref 4–12)
NEUTROPHILS # BLD AUTO: 4 THOUSANDS/ÂΜL (ref 1.85–7.62)
NEUTS SEG NFR BLD AUTO: 69 % (ref 43–75)
NRBC BLD AUTO-RTO: 0 /100 WBCS
P AXIS: 93 DEGREES
PHOSPHATE SERPL-MCNC: 3.9 MG/DL (ref 2.3–4.1)
PLATELET # BLD AUTO: 286 THOUSANDS/UL (ref 149–390)
PMV BLD AUTO: 10.3 FL (ref 8.9–12.7)
POTASSIUM SERPL-SCNC: 4 MMOL/L (ref 3.5–5.3)
PR INTERVAL: 96 MS
QRS AXIS: 111 DEGREES
QRSD INTERVAL: 122 MS
QT INTERVAL: 322 MS
QTC INTERVAL: 464 MS
RBC # BLD AUTO: 4.04 MILLION/UL (ref 3.88–5.62)
SODIUM SERPL-SCNC: 141 MMOL/L (ref 135–147)
T WAVE AXIS: 21 DEGREES
TIBC SERPL-MCNC: 398 UG/DL (ref 250–450)
VENTRICULAR RATE: 125 BPM
WBC # BLD AUTO: 5.84 THOUSAND/UL (ref 4.31–10.16)

## 2023-03-19 RX ORDER — ALBUTEROL SULFATE 90 UG/1
2 AEROSOL, METERED RESPIRATORY (INHALATION) EVERY 4 HOURS PRN
Status: DISCONTINUED | OUTPATIENT
Start: 2023-03-19 | End: 2023-03-21 | Stop reason: HOSPADM

## 2023-03-19 RX ORDER — LEVALBUTEROL INHALATION SOLUTION 0.63 MG/3ML
0.63 SOLUTION RESPIRATORY (INHALATION) EVERY 6 HOURS PRN
Status: DISCONTINUED | OUTPATIENT
Start: 2023-03-19 | End: 2023-03-19

## 2023-03-19 RX ORDER — LEVALBUTEROL 1.25 MG/.5ML
1.25 SOLUTION, CONCENTRATE RESPIRATORY (INHALATION)
Status: DISCONTINUED | OUTPATIENT
Start: 2023-03-19 | End: 2023-03-21 | Stop reason: HOSPADM

## 2023-03-19 RX ORDER — FUROSEMIDE 40 MG/1
40 TABLET ORAL DAILY
Status: DISCONTINUED | OUTPATIENT
Start: 2023-03-20 | End: 2023-03-21 | Stop reason: HOSPADM

## 2023-03-19 RX ORDER — INSULIN LISPRO 100 [IU]/ML
4 INJECTION, SOLUTION INTRAVENOUS; SUBCUTANEOUS
Status: DISCONTINUED | OUTPATIENT
Start: 2023-03-19 | End: 2023-03-20

## 2023-03-19 RX ORDER — INSULIN GLARGINE 100 [IU]/ML
14 INJECTION, SOLUTION SUBCUTANEOUS
Status: DISCONTINUED | OUTPATIENT
Start: 2023-03-19 | End: 2023-03-20

## 2023-03-19 RX ORDER — FUROSEMIDE 10 MG/ML
40 INJECTION INTRAMUSCULAR; INTRAVENOUS DAILY
Status: DISCONTINUED | OUTPATIENT
Start: 2023-03-19 | End: 2023-03-19

## 2023-03-19 RX ORDER — ACETAMINOPHEN 325 MG/1
650 TABLET ORAL EVERY 6 HOURS PRN
Status: DISCONTINUED | OUTPATIENT
Start: 2023-03-19 | End: 2023-03-21 | Stop reason: HOSPADM

## 2023-03-19 RX ORDER — INSULIN LISPRO 100 [IU]/ML
1-6 INJECTION, SOLUTION INTRAVENOUS; SUBCUTANEOUS
Status: DISCONTINUED | OUTPATIENT
Start: 2023-03-19 | End: 2023-03-21 | Stop reason: HOSPADM

## 2023-03-19 RX ORDER — INSULIN GLARGINE 100 [IU]/ML
16 INJECTION, SOLUTION SUBCUTANEOUS
Status: DISCONTINUED | OUTPATIENT
Start: 2023-03-19 | End: 2023-03-19

## 2023-03-19 RX ORDER — FUROSEMIDE 40 MG/1
40 TABLET ORAL DAILY
Status: DISCONTINUED | OUTPATIENT
Start: 2023-03-19 | End: 2023-03-19

## 2023-03-19 RX ORDER — FUROSEMIDE 10 MG/ML
40 INJECTION INTRAMUSCULAR; INTRAVENOUS ONCE
Status: COMPLETED | OUTPATIENT
Start: 2023-03-19 | End: 2023-03-19

## 2023-03-19 RX ADMIN — HEPARIN SODIUM 5000 UNITS: 5000 INJECTION INTRAVENOUS; SUBCUTANEOUS at 08:34

## 2023-03-19 RX ADMIN — METOPROLOL SUCCINATE 50 MG: 50 TABLET, EXTENDED RELEASE ORAL at 01:30

## 2023-03-19 RX ADMIN — ATORVASTATIN CALCIUM 80 MG: 80 TABLET, FILM COATED ORAL at 17:23

## 2023-03-19 RX ADMIN — IPRATROPIUM BROMIDE 0.5 MG: 0.5 SOLUTION RESPIRATORY (INHALATION) at 21:05

## 2023-03-19 RX ADMIN — SPIRONOLACTONE 25 MG: 25 TABLET ORAL at 08:39

## 2023-03-19 RX ADMIN — AMLODIPINE BESYLATE 5 MG: 5 TABLET ORAL at 08:33

## 2023-03-19 RX ADMIN — EMPAGLIFLOZIN 10 MG: 10 TABLET, FILM COATED ORAL at 08:32

## 2023-03-19 RX ADMIN — MONTELUKAST 10 MG: 10 TABLET, FILM COATED ORAL at 08:40

## 2023-03-19 RX ADMIN — INSULIN GLARGINE 14 UNITS: 100 INJECTION, SOLUTION SUBCUTANEOUS at 02:31

## 2023-03-19 RX ADMIN — LISINOPRIL 20 MG: 20 TABLET ORAL at 08:33

## 2023-03-19 RX ADMIN — LEVALBUTEROL HYDROCHLORIDE 1.25 MG: 1.25 SOLUTION, CONCENTRATE RESPIRATORY (INHALATION) at 14:07

## 2023-03-19 RX ADMIN — LEVALBUTEROL HYDROCHLORIDE 1.25 MG: 1.25 SOLUTION, CONCENTRATE RESPIRATORY (INHALATION) at 21:05

## 2023-03-19 RX ADMIN — INSULIN GLARGINE 14 UNITS: 100 INJECTION, SOLUTION SUBCUTANEOUS at 21:38

## 2023-03-19 RX ADMIN — ACETAMINOPHEN 650 MG: 325 TABLET ORAL at 21:38

## 2023-03-19 RX ADMIN — ASPIRIN 81 MG: 81 TABLET, CHEWABLE ORAL at 08:32

## 2023-03-19 RX ADMIN — CHOLECALCIFEROL TAB 25 MCG (1000 UNIT) 2000 UNITS: 25 TAB at 08:32

## 2023-03-19 RX ADMIN — METOPROLOL SUCCINATE 50 MG: 50 TABLET, EXTENDED RELEASE ORAL at 21:38

## 2023-03-19 RX ADMIN — INSULIN LISPRO 4 UNITS: 100 INJECTION, SOLUTION INTRAVENOUS; SUBCUTANEOUS at 08:35

## 2023-03-19 RX ADMIN — LEVALBUTEROL HYDROCHLORIDE 1.25 MG: 1.25 SOLUTION, CONCENTRATE RESPIRATORY (INHALATION) at 08:25

## 2023-03-19 RX ADMIN — CLOPIDOGREL BISULFATE 75 MG: 75 TABLET ORAL at 08:32

## 2023-03-19 RX ADMIN — IPRATROPIUM BROMIDE 0.5 MG: 0.5 SOLUTION RESPIRATORY (INHALATION) at 14:07

## 2023-03-19 RX ADMIN — FUROSEMIDE 40 MG: 10 INJECTION, SOLUTION INTRAMUSCULAR; INTRAVENOUS at 02:15

## 2023-03-19 RX ADMIN — HEPARIN SODIUM 5000 UNITS: 5000 INJECTION INTRAVENOUS; SUBCUTANEOUS at 21:38

## 2023-03-19 RX ADMIN — IPRATROPIUM BROMIDE 0.5 MG: 0.5 SOLUTION RESPIRATORY (INHALATION) at 08:25

## 2023-03-19 RX ADMIN — INSULIN LISPRO 4 UNITS: 100 INJECTION, SOLUTION INTRAVENOUS; SUBCUTANEOUS at 03:01

## 2023-03-19 RX ADMIN — HEPARIN SODIUM 5000 UNITS: 5000 INJECTION INTRAVENOUS; SUBCUTANEOUS at 01:30

## 2023-03-19 RX ADMIN — INSULIN LISPRO 4 UNITS: 100 INJECTION, SOLUTION INTRAVENOUS; SUBCUTANEOUS at 17:34

## 2023-03-19 RX ADMIN — FLUTICASONE PROPIONATE 2 SPRAY: 50 SPRAY, METERED NASAL at 08:40

## 2023-03-19 RX ADMIN — FUROSEMIDE 40 MG: 10 INJECTION, SOLUTION INTRAMUSCULAR; INTRAVENOUS at 17:23

## 2023-03-19 RX ADMIN — LEVOTHYROXINE SODIUM 137 MCG: 25 TABLET ORAL at 05:59

## 2023-03-19 NOTE — H&P
INTERNAL MEDICINE RESIDENCY ADMISSION H&P     Name: Ramírez Bynum   Age & Sex: 78 y o  male   MRN: [id number]  Unit/Bed#: ED 27   Encounter: [id number]  Primary Care Provider: Lara Thomas DO    Code Status: Level 1 - Full Code  Admission Status: OBSERVATION  Disposition: Patient requires Med/Surg with Telemetry    Admit to team: SOD Team B     ASSESSMENT/PLAN     Principal Problem:    Dyspnea  Active Problems:    Mild intermittent asthma without complication    Coronary artery disease    Ischemic heart failure ([facility] [address] )    Anemia    Type 2 diabetes mellitus with circulatory disorder, with long-term current use of insulin (HCC)    Hyperlipidemia    Essential hypertension    Hypothyroidism    S/P TAVR (transcatheter aortic valve replacement)    Presence of cardiac pacemaker    SIRS (systemic inflammatory response syndrome) ([facility] [address] )      * Dyspnea  Assessment & Plan  -Pt with hx of asthma, HTN, HLD, DM 2, CAD s/p PCI w/ ELDER, severe AS s/p TAVR, V tach s/p ICD, AV block s/p PPM and hypothyroidism presenting with dyspnea which is present at rest and with exertion  Has been worsening since last hospitalization for NSTEMI during which he underwent PCI  Endorses 3 lb weight gain, orthopnea, and PND  Adherent with diet and meds  Inhalers ineffective  Also had recent shocks from pacemaker and interrogation revealed NSVT episodes  On vitals tachycardic requiring 0-2 L O2  Labs revealed Hgb 11 6 and BNP 3000  Trops negative  EKG stable from prior  CXR revealing b/l venous congestion   Presentation could represent CHF exacerbation vs COPD exacerbation vs PPM malfunction vs less likely anemia  -IV Lasix 40 mg daily  -Xoponex and Atrovent Nebs  -Continue home Metoprolol, Jardiance, Lisinopril, and Aldactone  -Cardiology consult, appreciate recommendations  -Monitor on Telemetry  -Respiratory protocol  -Strict I/O, Daily weights, 2L fluid restriction, 2g Na restriction  -Monitor Electrolytes         Mild intermittent asthma without complication  Assessment & Plan  -Hx of mild intermittent asthma  Was on Montelukast 10 mg, Fluticasone-Salmeterol 2 puffs BID, and Albuterol PRN  -Most recent PFTs (2/10/2023) showed FEV1 43%, FVC 52%, FEV1/FVC 61% w/ minimal change with administration of bronchodilaters  Given 50 pack year hx of smoking likely represents more likely to represent severe COPD rather than asthma  -Patient now presenting with dyspnea initially requiring 2LO2 with pulmonary exam revealing end-expiratory wheezes and CXR showing bilateral venous congestion which could represent CHF exacerbation vs COPD exacerbation  -Xoponex nebs TID and Atrovent Nebs TID  -Continue home Montelukast and Fluticasone-Salmeterol   -Respiratory and Airway clearance protocol  -Maintain SpO2 88-92%    Coronary artery disease  Assessment & Plan  -Pt with recent hospitalization in 02/2023 for type 1 NSTEMI complicated by ICM (EF 67%)  Found to have three vessel disease on [facility] and is now s/p ELDER x2 in mid LAD and ELDER x1 in ostial right posterior descending  Was discharged on Aspirin 81 and Plavix 75 mg    -Now presenting with increasing dyspnea as well as reported ICD shocks with interrogation of pacemaker showing multiple episodes of NSVT  EKG stable from prior  Trop negative  CXR showing volume overload  Concern for CHF exacerbation  Low suspicion for ACS at this time  -Given episodes of NSVT, will consult Cardiology  Appreciate recommendations  -Continue aspirin and plavix  -Monitor on Telemetry  -Monitor electrolytes     Ischemic heart failure (HCC)  Assessment & Plan  Wt Readings from Last 3 Encounters:   02/21/23 86 6 kg (191 lb)   02/21/23 87 1 kg (192 lb)   02/10/23 85 6 kg (188 lb 11 4 oz)     -Pt with hx of ischemic cardiomyopathy in setting recent type 1 NSTEMI 2/2 CAD now s/p PCI  -Most recent Echo (02/2023): EF 07-68% w/ systolic function is moderately reduced   Severe hypokinesis of the inferoseptal, inferior and anteroseptal walls including the apical septal and apical inferior segments   -At home was on GDMT with Jardiance 10 mg daily, Lisinopril 20 mg daily, Metoprolol Succinate 40 mg BID, and Spironolactone 25 mg daily  -Pt now presenting today with increased dyspnea at rest and with exertion since NSTEMI  Endorses Orthopnea and PND  3lb weight gain  Adherent to diet and meds  Does not seem frankly volume overloaded on exam BNP 3100  Negative trops  EKG stable from prior  CXR shows biltareal venous congestion  BNP 3100  Concern for CHF exacerbation  -IV lasix 40 mg daily  -Continue home Jardiance, Lisinopril, Metoprolol, and Spironolactone  -Cardiology consult, appreciate recommendations  -Strict I/O  -Daily weights  -2L fluid restriction and 2g Na restriction  -Electrolytes: K>4, Mag>2, Phos>3          Anemia  Assessment & Plan  -Pt presenting with Hgb 11 6  -Appears that Hgb has been down-trending from previous baseline 13-14 to 11s since TAVR in 06/2022  -Likely etiologies include Anemia of chronic disease vs SNOW vs Anemia 2/2 TAVR  -Will obtain iron panel  -Transfuse Hgb<7    SIRS (systemic inflammatory response syndrome) ([facility] [address] )  Assessment & Plan  -Patient presenting with tachycardia and tachypnea  No recent infectious sx or sick contacts  -Likely in setting of underlying respiratory distress  Low suspicion for infection at this time    Presence of cardiac pacemaker  Assessment & Plan  -Pt with hx of 2: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 now s/p dual chamber pacemaker placement    -Patient reports feeling multiple shocks while at home  EKG stable on presentation, however interrogation of pacemaker showed multiple episodes of NSVT of 4 beats or less  -Cardiology consulted, appreciate recommendations    S/P TAVR (transcatheter aortic valve replacement)  Assessment & Plan  -Patient with hx of severe aortic stenosis s/p TAVR w/ bioprosthetic valve in 06/2022   Maintained on Aspirin 81 mg and Plavix 75 mg    -Most recent Echo (02/2023) showed trace paravalvular regurgitation  The gradient recorded across the prosthetic aortic valve was within the expected range  The aortic valve peak velocity was 1 89 m/s  The aortic valve mean gradient was 7 mmHg   -Continue Aspirin and Plavix      Hypothyroidism  Assessment & Plan  -Patient is on Levothyroxine 137 mcg at home  Most recent TSH(02/10/23): 0 861   -Continue home Levothyroxine    Essential hypertension  Assessment & Plan  -At home, patient is on Lisinopril 20 mg daily and Amlodipine 5 mg daily  -BP on admission, 169/95  -Continue home Lisinopril and Amlodipine     Hyperlipidemia  Assessment & Plan  -Rosuvastatin 40 mg daily at home  -Will continue on Lipitor 80 mg daily while inpatient    Type 2 diabetes mellitus with circulatory disorder, with long-term current use of insulin ([facility])  Assessment & Plan  Lab Results   Component Value Date    HGBA1C 7 6 (H) 02/10/2023       No results for input(s): POCGLU in the last 72 hours  Blood Sugar Average: Last 72 hrs:       Home regimen: Lantus 18 units at bedtime, NovoLog 5 units with breakfast and dinner, metformin 850 mg, Jardiance 10 mg daily    · Continue Jardiance 10 mg for cardiac benefit  · Hold Metformin  · Lantus 14 U, lispro 4 U with breakfast and dinner  · SSI algorithm 3  · BG checks before meals and HS  · Adjust regimen as necessary to maintain -180  · Avoid hypoglycemia      VTE Pharmacologic Prophylaxis: Heparin  VTE Mechanical Prophylaxis: sequential compression device    CHIEF COMPLAINT     Chief Complaint   Patient presents with   • Shortness of Breath     Per pts family he had an MI and stents placed 3wks ago  Pt c/o abd pain and sob  Pt ran out of nebulizer trx  Pt reports pacemaker placement 6 months ago   Demnies chest pain      HISTORY OF PRESENT ILLNESS     78year old male with a history of CAD s/p ELDER x2 to LAD and ELDER x1 to PDA in mid February, bioprosthetic aortic valve, asthma, diabetes on insulin, who presented with shortness of breath  Patient states he has shortness of breath for past 2 weeks that occurs at rest and with walking  Patient walks 1 block before becoming dyspneic  Has been using his albuterol inhaler frequently, (sometimes up to 4 times a day), but without relief  Also endorses intermittent bilateral leg swelling and intermittent productive yellow cough since discharge in February  Noticed 3 pound weight gain in the last few weeks  Sleeps with 1 pillow and notes orthopnea  This morning, he was standing by a window at home and felt his pacer shocked him 3 times  He denied chest pain at this point but felt short of breath  Shortness of breath became gradually worse and by 5 PM he felt out of breath and called EMS  Endorses compliance with low-salt diet and medications  Wife at bedside  Smoked for 50 years, quit 6 months ago  Denies alcohol use  Per ED resident, pacemaker interrogation today showed 4 episodes of VT greater than 4 bpm   POCUS showed B-lines in bilateral lungs  Vitals on admission: 99 6F, /84, pulse 110, R18, 95% on 2  Labs significant for WBC 11 6, hemoglobin 11 2, BNP 3136  BMP unremarkable  Troponin 25, 34, 39  EKG shows tach with known LBBB  COVID swab/RSV negative  CXR showed vascular congestion  Received albuterol and Atrovent nebs  REVIEW OF SYSTEMS     Review of Systems   Constitutional: Positive for unexpected weight change  Negative for appetite change, chills, diaphoresis and fever  HENT: Negative for congestion, ear pain, rhinorrhea and sore throat  Eyes: Negative for visual disturbance  Respiratory: Positive for shortness of breath  Negative for cough  Cardiovascular: Positive for leg swelling  Negative for chest pain and palpitations  Gastrointestinal: Negative for abdominal pain, constipation, diarrhea, nausea and vomiting  Genitourinary: Negative for difficulty urinating, dysuria and hematuria  Musculoskeletal: Negative for back pain     Skin: Negative for rash  Neurological: Negative for dizziness, weakness, light-headedness, numbness and headaches  OBJECTIVE     Vitals:    23 0300 23 0330 23 0400 23 0500   BP: 136/76  137/74 133/74   BP Location:       Pulse: 98 84 94 96   Resp: 18   18   Temp:       TempSrc:       SpO2: 94% 93% 96% 93%      Temperature:   Temp (24hrs), Av 6 °F (37 6 °C), Min:99 6 °F (37 6 °C), Max:99 6 °F (37 6 °C)    Temperature: 99 6 °F (37 6 °C)  Intake & Output:  I/O     None        Weights: There is no height or weight on file to calculate BMI  Weight (last 2 days)     None        Physical Exam  Vitals reviewed  Constitutional:       General: He is not in acute distress  HENT:      Head: Normocephalic and atraumatic  Nose: No rhinorrhea  Eyes:      General: No scleral icterus  Right eye: No discharge  Left eye: No discharge  Cardiovascular:      Rate and Rhythm: Regular rhythm  Tachycardia present  Pulses: Normal pulses  Heart sounds: Normal heart sounds  No murmur heard  No friction rub  No gallop  Pulmonary:      Effort: Pulmonary effort is normal  No respiratory distress  Breath sounds: Wheezing present  No rhonchi or rales  Abdominal:      General: Bowel sounds are normal  There is no distension  Palpations: Abdomen is soft  Tenderness: There is no abdominal tenderness  There is no guarding  Musculoskeletal:      Right lower leg: No edema  Left lower leg: No edema  Skin:     General: Skin is warm and dry  Capillary Refill: Capillary refill takes less than 2 seconds  Coloration: Skin is not jaundiced  Neurological:      Mental Status: He is alert  Cranial Nerves: No dysarthria        Comments: CN 2-12 grossly intact, moves all 4 extremities   Psychiatric:         Mood and Affect: Mood normal          Behavior: Behavior normal        PAST MEDICAL HISTORY     Past Medical History:   Diagnosis Date   • Arthritis    • Asthma    • Colon polyps    • Community acquired pneumonia     last assessed: 5/1/2014   • Diabetes mellitus [facility] )    • Hemorrhagic prepatellar bursitis, left 10/21/2019   • Hepatitis C    • High cholesterol    • History of colonoscopy 2017   • Hypertension    • Lymphadenopathy, anterior cervical 04/17/2018   • Nephritis and nephropathy, not specified as acute or chronic, with other specified pathological lesion in kidney, in diseases classified elsewhere 3/26/2013   • Screening for colon cancer 05/01/2019   • Thoracic vertebral fracture ([facility] ) 06/11/2014     PAST SURGICAL HISTORY     Past Surgical History:   Procedure Laterality Date   • CARDIAC CATHETERIZATION N/A 6/3/2022    Procedure: Cardiac RHC/LHC; Surgeon: Renan Hodges MD;  Location: [facility] CARD[facility]; Service: Cardiology   • CARDIAC CATHETERIZATION N/A 6/21/2022    Procedure: CARDIAC TAVR;  Surgeon: Crystal Fowler MD;  Location: [facility] MAIN OR;  Service: Cardiology   • CARDIAC CATHETERIZATION N/A 2/10/2023    Procedure: Cardiac Coronary Angiogram;  Surgeon: Renan Hodges MD;  [facility]: [facility] CARD[facility]; Service: Cardiology   • CARDIAC CATHETERIZATION N/A 2/10/2023    Procedure: Cardiac pci;  Surgeon: Renan Hodges MD;  Location: [facility] CARDIAC [facility]; Service: Cardiology   • CARDIAC ELECTROPHYSIOLOGY PROCEDURE N/A 9/1/2022    Procedure: Cardiac pacer implant ; DC PPM;  Surgeon: Afua Flores DO;  Location: [facility] CARDIAC [facility]; Service: Cardiology   • COLONOSCOPY     • COLONOSCOPY W/ POLYPECTOMY     • LITHOTRIPSY     • MULTIPLE TOOTH EXTRACTIONS     • LA COLONOSCOPY FLX DX W/COLLJ SPEC WHEN PFRMD N/A 5/17/2018    Procedure: COLONOSCOPY;  Surgeon: Toby Grimaldo MD;  Location: [facility] GI LAB;   Service: Gastroenterology   • LA REPLACE AORTIC VALVE OPENFEMORAL ARTERY APPROACH N/A 6/21/2022    Procedure: REPLACEMENT AORTIC VALVE TRANSCATHETER (TAVR) TRANSFEMORAL W/ 26MM QUINN RONNI S3 ULTRA VALVE(ACCESS ON LEFT) SAULO;  Surgeon: Nina Urbina MD;  Location: [facility] MAIN OR;  Service: Cardiac Surgery     SOCIAL & FAMILY HISTORY     Social History     Substance and Sexual Activity   Alcohol Use No       Social History     Substance and Sexual Activity   Drug Use No     Social History     Tobacco Use   Smoking Status Former   • Packs/day: 0 50   • Types: Cigarettes   • Quit date: 2022   • Years since quittin 8   Smokeless Tobacco Never   Tobacco Comments    started when he was about 24 yrs old; stopped smoking 3 wks ago     Family History   Problem Relation Age of Onset   • Heart attack Family         at age 80   • No Known Problems Mother    • No Known Problems Father    • Diabetes Sister    • Diabetes Brother      LABORATORY DATA     Labs: I have personally reviewed pertinent reports  Results from last 7 days   Lab Units 23  1923   WBC Thousand/uL 11 61*   HEMOGLOBIN g/dL 11 2*   HEMATOCRIT % 37 6   PLATELETS Thousands/uL 281   NEUTROS PCT % 82*   MONOS PCT % 7      Results from last 7 days   Lab Units 23  1923   POTASSIUM mmol/L 4 0   CHLORIDE mmol/L 109*   CO2 mmol/L 22   BUN mg/dL 20   CREATININE mg/dL 0 94   CALCIUM mg/dL 9 5   ALK PHOS U/L 47   ALT U/L 18   AST U/L 16     Results from last 7 days   Lab Units 23  1923   MAGNESIUM mg/dL 2 1                      Micro:  Lab Results   Component Value Date    URINECX <10,000 cfu/ml 2019     IMAGING & DIAGNOSTIC TESTS     Imaging: I have personally reviewed pertinent reports  No results found  EKG, Pathology, and Other Studies: I have personally reviewed pertinent reports  ALLERGIES     Allergies   Allergen Reactions   • Iv Contrast [Iodinated Contrast Media] Rash     Patient states he had a severe reaction approximately 10 years ago , states he had a rash, itchy and he remembers a bunch of people pounding on chest and being surrounded by multiple doctors  MEDICATIONS PRIOR TO ARRIVAL     Prior to Admission medications    Medication Sig Start Date End Date Taking? Authorizing Provider   Advair -21 MCG/ACT inhaler TAKE 2 PUFFS BY MOUTH TWICE A DAY 5/5/21  Yes Laurie Blackburn, DO   albuterol (PROVENTIL HFA,VENTOLIN HFA) 90 mcg/act inhaler Inhale 2 puffs every 4 hours as needed for wheezing or shortness of breath  2/21/23  Yes Terrie Cm MD   amLODIPine (NORVASC) 5 mg tablet Take 1 tablet (5 mg total) by mouth daily 7/23/19  Yes Laurie Blackburn, DO   Aspirin Low Dose 81 MG chewable tablet CHEW 1 TABLET BY MOUTH DAILY 11/29/22  Yes Brett Khalil MD   cholecalciferol (VITAMIN D3) 1,000 units tablet Take 2 tablets (2,000 Units total) by mouth daily 11/21/22  Yes Bernardino Galvan, DO   Empagliflozin (JARDIANCE) 10 MG TABS tablet Take 1 tablet (10 mg total) by mouth every morning 2/12/23  Yes Capricgianni Hopkins, DO   fluticasone (FLONASE) 50 mcg/act nasal spray 2 sprays into each nostril daily 8/11/20  Yes Laurie Blackburn, DO   levothyroxine 137 mcg tablet TAKE 1 TABLET BY MOUTH EVERY DAY 7/13/22  Yes Bernardino Galvan, DO   lisinopril (ZESTRIL) 20 mg tablet Take 1 tablet (20 mg total) by mouth daily 12/29/22  Yes KAREN Mcginnis   metFORMIN (GLUCOPHAGE) 850 mg tablet TAKE 1 TABLET (850 MG TOTAL) BY MOUTH 2 (TWO) TIMES A DAY WITH MEALS 5/16/22  Yes Bernardino Galvan, DO   metoprolol succinate (TOPROL-XL) 50 mg 24 hr tablet Take 1 tablet (50 mg total) by mouth 2 (two) times a day 2/12/23  Yes Helena Lott, DO   montelukast (SINGULAIR) 10 mg tablet TAKE 1 TABLET BY MOUTH EVERY DAY 10/17/22  Yes Bernardino Galvan, DO   rosuvastatin (CRESTOR) 40 MG tablet TAKE 1 TABLET BY MOUTH EVERY DAY 4/29/22  Yes Brett Higginbotham MD   spironolactone (ALDACTONE) 25 mg tablet Take 1 tablet (25 mg total) by mouth daily Do not start before February 13, 2023 2/13/23  Yes Caprice Delmi Sandeep, DO   Alcohol Swabs (ALCOHOL PADS) 70 % PADS  5/9/19   Historical Provider, MD   Blood Glucose Monitoring Suppl (OneTouch Verio) w/Device KIT Check blood glucose three times daily before each meal 8/20/20   Jennifer Jimenes, DO   clopidogrel (PLAVIX) 75 mg tablet Take 1 tablet (75 mg total) by mouth daily 10/11/22 2/21/23  Mack Aguila MD   Continuous Blood Gluc  (FreeStyle Robles 2 [place]) POWER Use 1 each continuous  Patient not taking: Reported on 1/30/2023 5/10/22   Dannie Suazo DO   Continuous Blood Gluc Sensor (FreeStyle Robles 2 Sensor) MISC Use 1 each every 14 (fourteen) days  Patient not taking: Reported on 1/30/2023 5/10/22   Dannie Suazo DO   Insulin Pen Needle (Pen Needles) 31G X 8 MM MISC Use daily 3/3/21   Laurie Blackburn, DO   Lancets (FREESTYLE) lancets by Other route as needed (As needed) 3/21/19   Garrett Corbett MD   Lanfadyus SoloStar 100 units/mL SOPN INJECT 0 18 ML (18 UNITS TOTAL) UNDER THE SKIN DAILY AT BEDTIME 11/23/22   Dannie Suazo DO   methocarbamol (Robaxin-750) 750 mg tablet Take 1 tablet (750 mg total) by mouth every 6 (six) hours as needed for muscle spasms  Patient not taking: Reported on 3/18/2023 11/8/22   Chiquis Ramos, PA-VASQUEZ   nitroglycerin (NITROSTAT) 0 4 mg SL tablet Place 1 tablet (0 4 mg total) under the tongue every 5 (five) minutes as needed for chest pain 2/12/23   Helena Tan, DO   NovoLOG FlexPen 100 units/mL injection pen Inject 5 units with breakfast and with dinner 3/3/22   Naz Kaur MD     MEDICATIONS ADMINISTERED IN LAST 24 HOURS     Medication Administration - last 24 hours from 03/18/2023 0541 to 03/19/2023 0541       Date/Time Order Dose Route Action Action by     03/18/2023 1928 EDT albuterol inhalation solution 10 mg 10 mg Nebulization Given Ryanne Morrison RN     03/18/2023 1928 EDT ipratropium (ATROVENT) 0 02 % inhalation solution 1 mg 1 mg Nebulization Given Ryanne Morrison RN     03/18/2023 1928 EDT sodium chloride 0 9 % inhalation solution 3 mL 3 mL Nebulization Given Ryanne Morrison, RN     03/19/2023 0130 EDT heparin (porcine) subcutaneous injection 5,000 Units 5,000 Units Subcutaneous Given Spike Sloan JASMIN Morrison RN     03/19/2023 0130 EDT metoprolol succinate (TOPROL-XL) 24 hr tablet 50 mg 50 mg Oral Given Ryanne Morrison RN     03/19/2023 0215 EDT furosemide (LASIX) injection 40 mg 40 mg Intravenous Given Rochelle Ochoa RN     03/19/2023 0301 EDT insulin lispro (HumaLOG) 100 units/mL subcutaneous injection 1-6 Units 4 Units Subcutaneous Given Rochelle Ochoa RN     03/19/2023 0231 EDT insulin glargine (LANTUS) subcutaneous injection 14 Units 0 14 mL 14 Units Subcutaneous Given Rochelle Ochoa RN        CURRENT MEDICATIONS     Current Facility-Administered Medications   Medication Dose Route Frequency Provider Last Rate   • albuterol  2 puff Inhalation Q4H PRN Mitch Irene MD     • amLODIPine  5 mg Oral Daily [facility] Ministeriox, DO     • aspirin  81 mg Oral Daily [facility] Ministeriox, DO     • atorvastatin  80 mg Oral Daily With [facility] Addison, DO     • cholecalciferol  2,000 Units Oral Daily Randi Leff Ministeriox, DO     • clopidogrel  75 mg Oral Daily [facility] Ministeriox, DO     • Empagliflozin  10 mg Oral QAM [facility] Ministeriox, DO     • fluticasone  2 spray Nasal Daily [facility] Ministeriox, DO     • furosemide  40 mg Intravenous Daily [facility] Ministeriox, DO     • heparin (porcine)  5,000 Units Subcutaneous [facility] Minister[facility], DO     • insulin glargine  14 Units Subcutaneous HS [facility] Ministeriox, DO     • insulin lispro  1-6 Units Subcutaneous TID AC [facility] Ministeriox, DO     • insulin lispro  1-6 Units Subcutaneous HS [facility] Ministeriox, DO     • insulin lispro  4 Units Subcutaneous Daily With Breakfast [facility] Ministeriox, DO     • insulin lispro  4 Units Subcutaneous Daily With Dinner [facility] Ministeriox, DO     • ipratropium  0 5 mg Nebulization TID Mitch Irene MD     • levalbuterol  1 25 mg Nebulization TID Mitch Irene MD     • levothyroxine  137 mcg Oral Early Morning [facility] Ministeriox, DO     • lisinopril  20 mg Oral Daily [facility] Addison, DO     • metoprolol succinate  50 mg Oral BID Stormy Jaime DO     • montelukast  10 mg Oral Daily Randi Vann "DO Addison     • spironolactone  25 mg Oral Daily Stormy HERNANDEZ DO [place]          albuterol, 2 puff, Q4H PRN        Admission Time  I spent 45 minutes admitting the patient  This involved direct patient contact where I performed a full history and physical, reviewing previous records, and reviewing laboratory and other diagnostic studies  Portions of the record may have been created with voice recognition software  Occasional wrong word or \"sound a like\" substitutions may have occurred due to the inherent limitations of voice recognition software    Read the chart carefully and recognize, using context, where substitutions have occurred     ==  Zay Reyes DO  [facility]  Internal Medicine Residency PGY-2    "

## 2023-03-19 NOTE — ASSESSMENT & PLAN NOTE
-Patient is on Levothyroxine 137 mcg at home   Most recent TSH(02/10/23): 0 861   -Continue home Levothyroxine

## 2023-03-19 NOTE — ASSESSMENT & PLAN NOTE
-Pt with hx of asthma, HTN, HLD, DM 2, CAD s/p PCI w/ ELDER, severe AS s/p TAVR, V tach s/p ICD, AV block s/p PPM and hypothyroidism presenting with dyspnea which is present at rest and with exertion  Has been worsening since last hospitalization for NSTEMI during which he underwent PCI  Endorses 3 lb weight gain, orthopnea, and PND  Adherent with diet and meds  Inhalers ineffective  Also had recent shocks from pacemaker and interrogation revealed NSVT episodes  On vitals tachycardic requiring 0-2 L O2  Labs revealed Hgb 11 6 and BNP 3000  Trops negative  EKG stable from prior  CXR revealing b/l venous congestion  He also reported an episode of hemoptysis   Presentation could represent CHF exacerbation vs COPD exacerbation vs PPM malfunction vs less likely anemia  -IV Lasix 40 mg daily transitioned to PO Lasix 3/20 -> Continue outpatient  -Cardiology following  -Xoponex and Atrovent Nebs  -Continue home Metoprolol, Jardiance, Lisinopril, and Aldactone  -Monitor on Telemetry  -Respiratory protocol  -Strict I/O, Daily weights, 2L fluid restriction, 2g Na restriction  -Monitor Electrolytes   -Follow-up echocardiogram  -VQ scan with low probability for PE

## 2023-03-19 NOTE — CONSULTS
Consultation - Cardiology  Jese Burgos 78 y o  male MRN: [id number]  Unit/Bed#: ED 27 Encounter: [id number]      Inpatient consult to Cardiology  Consult performed by: Sarah De Souza MD  Consult ordered by: Tyrese Odom DO          History of Present Illness   Physician Requesting Consult: Malena Easton MD  Reason for Consult / Principal Problem: shortness of breath    Assessment/Plan   Assessment/Plan:  Jese Burgos is a 78y o  year old male with PMH of CAD s/p ELDER   LAD and PDA, HFrEF (LVEF 35-40%), severe MR, TAVR, asthma, IDDM, prior smoker, who has had 2 weeks of shortness of breath found to have evidence of CHF  #Acute on chronic systolic HF  #Severe MR  #ischemic cardiomyopathy  Patient with known HFrEF (LVEF 35-40%) and severe MR presenting with shortness of breath, pulm edema on cxray, bnp of 3000s  Does not appear excessively overloaded today but was already diuresed    -give IV lasix 40mg on 3/18 and 3/19  Will evaluate tomorrow for further IV diuresis vs  PO     -will likely need lasix on dc (was not on oral diuretic)  -GDMT: continue home metoprolol succinate 50 mg BI, lisinopril 20 mg daily, Aldactone 25 mg,  jardiance 10mg  Could also consider transitioning to entresto  -repeat TTE  given worsening symptoms    -as outpt if symptomatic despite maximal GDMT consider [place]-clip  #Pacemaker for tachybrady syndrome  Dual chamber PPM for tachy/lino syndrome and pauses noted after moniotring for LBBB and TAVR  #HTN: normotensive  Hx of HTN  See GDMT meds above    #CAD s/p stents to LAD and PDA for NSTEMI (2/2023)  No evidence of ischemia  Troponin mildly elevated from CHF exacerbation (non ischemic troponin elevation)  -continue aspirin and plavix   -continue atorvastatin    HPI: Jese Burgos is a 78y o  year old male with PMH of CAD s/p ELDER   LAD and PDA, HFrEF (LVEF 35-40%), severe MR, TAVR, asthma, IDDM, prior smoker, who has had 2 weeks of shortness of breath  The patients main complaint is that he felt like he couldn't breath  He has had two weeks of shortness of breath that improved with his inhalers  The day of presentation he felt worsening shortness of breath  He felt like he may have been shocked from his pacemaker but of note he does not have a defibrillator  He has also had a cough and this morning has had mild amount of blood tinged sputum  Vitals on admission: 99 6F, /84, pulse 110, R18, 95% on 2  Labs significant for WBC 11 6, hemoglobin 11 2, BNP 3136 (2323 last admission)  BMP unremarkable  Troponin 25, 34, 39     cxray moderate pulmonary edema with small effusions  EKG shows sinus tachycardia with known LBBB  TTE 2/10/23  •  Left Ventricle: Left ventricular cavity size is normal  Wall thickness is mildly increased  The left ventricular ejection fraction is 35-40%  Systolic function is moderately reduced  Severe hypokinesis of the inferoseptal, inferior and anteroseptal walls including the apical septal and apical inferior segments  •  IVS: There is abnormal septal motion consistent with left bundle branch block  There is sigmoid appearance of the septum  •  Mitral Valve: There is moderately reduced mobility  There is moderate to severe regurgitation with a posteriorly directed jet  •  Tricuspid Valve: There is mild to moderate regurgitation  The tricuspid valve regurgitation jet is central  The right ventricular systolic pressure is elevated  The estimated right ventricular systolic pressure is 67 70 mmHg  •  Left Atrium: The atrium is moderately dilated  •  Right Atrium: The atrium is mildly dilated  •  Aortic Valve: There is an Emery RONNI 3 Ultra 26 mm TAVR bioprosthetic valve  There is trace paravalvular regurgitation  The gradient recorded across the prosthetic aortic valve is within the expected range  The aortic valve peak velocity is 1 89 m/s  The aortic valve mean gradient is 7 mmHg    •  Pericardium: There is a trivial pericardial effusion along the right atrial free wall      Historical Information   Past Medical History:   Diagnosis Date   • Arthritis    • Asthma    • Colon polyps    • Community acquired pneumonia     last assessed: 5/1/2014   • Diabetes mellitus ([facility] )    • Hemorrhagic prepatellar bursitis, left 10/21/2019   • Hepatitis C    • High cholesterol    • History of colonoscopy 2017   • Hypertension    • Lymphadenopathy, anterior cervical 04/17/2018   • Nephritis and nephropathy, not specified as acute or chronic, with other specified pathological lesion in kidney, in diseases classified elsewhere 3/26/2013   • Screening for colon cancer 05/01/2019   • Thoracic vertebral fracture ([facility] ) 06/11/2014     Past Surgical History:   Procedure Laterality Date   • CARDIAC CATHETERIZATION N/A 6/3/2022    Procedure: Cardiac RHC/LHC; Surgeon: Roni Nava MD;  [facility]: [facility] CARD[facility]; Service: Cardiology   • CARDIAC CATHETERIZATION N/A 6/21/2022    Procedure: CARDIAC TAVR;  Surgeon: Sailaja William MD;  Location: [facility] MAIN OR;  Service: Cardiology   • CARDIAC CATHETERIZATION N/A 2/10/2023    Procedure: Cardiac Coronary Angiogram;  Surgeon: Roni Nava MD;  [facility]: [facility]; Service: Cardiology   • CARDIAC CATHETERIZATION N/A 2/10/2023    Procedure: Cardiac pci;  Surgeon: Roni Nava MD;  [facility]: [facility] CARD[facility]; Service: Cardiology   • CARDIAC ELECTROPHYSIOLOGY PROCEDURE N/A 9/1/2022    Procedure: Cardiac pacer implant ; DC PPM;  Surgeon: Miya aShni DO;  [facility]; Service: Cardiology   • COLONOSCOPY     • COLONOSCOPY W/ POLYPECTOMY     • LITHOTRIPSY     • MULTIPLE TOOTH EXTRACTIONS     • CO COLONOSCOPY FLX DX W/COLLJ SPEC WHEN PFRMD N/A 5/17/2018    Procedure: COLONOSCOPY;  Surgeon: Rohini Loomis MD;  Location: [facility] GI LAB;   Service: Gastroenterology   • CO REPLACE AORTIC VALVE OPENFEMORAL ARTERY APPROACH N/A 6/21/2022    Procedure: REPLACEMENT AORTIC VALVE TRANSCATHETER (TAVR) TRANSFEMORAL W/ 26MM QUINN RONNI S3 ULTRA VALVE(ACCESS ON LEFT) SAULO;  Surgeon: Nina Urbina MD;  Location: [facility] MAIN [facility];  Service: Cardiac Surgery     Social History     Substance and Sexual Activity   Alcohol Use No     Social History     Substance and Sexual Activity   Drug Use No     Social History     Tobacco Use   Smoking Status Former   • Packs/day: 0 50   • Types: Cigarettes   • Quit date: 2022   • Years since quittin 8   Smokeless Tobacco Never   Tobacco Comments    started when he was about 24 yrs old; stopped smoking 3 wks ago     Family History: non-contributory    Meds/Allergies   Hospital Medications:   Current Facility-Administered Medications   Medication Dose Route Frequency   • albuterol (PROVENTIL HFA,VENTOLIN HFA) inhaler 2 puff  2 puff Inhalation Q4H PRN   • amLODIPine (NORVASC) tablet 5 mg  5 mg Oral Daily   • aspirin chewable tablet 81 mg  81 mg Oral Daily   • atorvastatin (LIPITOR) tablet 80 mg  80 mg Oral Daily With Dinner   • cholecalciferol (VITAMIN D3) tablet 2,000 Units  2,000 Units Oral Daily   • clopidogrel (PLAVIX) tablet 75 mg  75 mg Oral Daily   • Empagliflozin (JARDIANCE) tablet 10 mg  10 mg Oral QAM   • fluticasone (FLONASE) 50 mcg/act nasal spray 2 spray  2 spray Nasal Daily   • furosemide (LASIX) injection 40 mg  40 mg Intravenous Daily   • heparin (porcine) subcutaneous injection 5,000 Units  5,000 Units Subcutaneous Q8H [address]   • insulin glargine (LANTUS) subcutaneous injection 14 Units 0 14 mL  14 Units Subcutaneous HS   • insulin lispro (HumaLOG) 100 units/mL subcutaneous injection 1-6 Units  1-6 Units Subcutaneous TID AC   • insulin lispro (HumaLOG) 100 units/mL subcutaneous injection 1-6 Units  1-6 Units Subcutaneous HS   • insulin lispro (HumaLOG) 100 units/mL subcutaneous injection 4 Units  4 Units Subcutaneous Daily With Breakfast   • insulin lispro (HumaLOG) 100 units/mL subcutaneous injection 4 Units  4 Units Subcutaneous Daily With Dinner   • ipratropium (ATROVENT) 0 02 % inhalation solution 0 5 mg  0 5 mg Nebulization TID   • levalbuterol (XOPENEX) inhalation solution 1 25 mg  1 25 mg Nebulization TID   • levothyroxine tablet 137 mcg  137 mcg Oral Early Morning   • lisinopril (ZESTRIL) tablet 20 mg  20 mg Oral Daily   • metoprolol succinate (TOPROL-XL) 24 hr tablet 50 mg  50 mg Oral BID   • montelukast (SINGULAIR) tablet 10 mg  10 mg Oral Daily   • spironolactone (ALDACTONE) tablet 25 mg  25 mg Oral Daily     Home Medications: (Not in a hospital admission)      Allergies   Allergen Reactions   • Iv Contrast [Iodinated Contrast Media] Rash     Patient states he had a severe reaction approximately 10 years ago , states he had a rash, itchy and he remembers a bunch of people pounding on chest and being surrounded by multiple doctors  Objective   Vitals: Blood pressure 126/72, pulse 86, temperature 99 6 °F (37 6 °C), temperature source Oral, resp  rate 18, SpO2 94 %  Orthostatic Blood Pressures    Flowsheet Row Most Recent Value   Blood Pressure 126/72 filed at 03/19/2023 0914   Patient Position - Orthostatic VS Lying filed at 03/19/2023 0700            Intake/Output Summary (Last 24 hours) at 3/19/2023 0856  Last data filed at 3/19/2023 0814  Gross per 24 hour   Intake 240 ml   Output 2400 ml   Net -2160 ml       Invasive Devices     Peripheral Intravenous Line  Duration           Peripheral IV 03/18/23 Left Antecubital 1 day                Review of Systems:  ROS  ROS as noted above, otherwise 12 point review of systems was performed and is negative  Physical Exam:   Physical Exam  Constitutional:       General: He is not in acute distress  HENT:      Head: Normocephalic  Cardiovascular:      Rate and Rhythm: Normal rate and regular rhythm  Pulses: Normal pulses  Heart sounds: Murmur heard  Pulmonary:      Effort: Pulmonary effort is normal       Comments: Scattered basilar crackles  Abdominal:      General: Abdomen is flat   There is no distension  Tenderness: There is no abdominal tenderness  Musculoskeletal:         General: No swelling  Skin:     General: Skin is warm and dry  Neurological:      General: No focal deficit present  Mental Status: He is alert  Psychiatric:         Mood and Affect: Mood normal          Lab Results: I have personally reviewed pertinent lab results      Results from last 7 days   Lab Units 03/19/23  0604 03/18/23 1923   WBC Thousand/uL 5 84 11 61*   HEMOGLOBIN g/dL 11 2* 11 2*   HEMATOCRIT % 35 7* 37 6   PLATELETS Thousands/uL 286 281     Results from last 7 days   Lab Units 03/19/23  0604 03/18/23  1923   POTASSIUM mmol/L 4 0 4 0   CHLORIDE mmol/L 109* 109*   CO2 mmol/L 28 22   BUN mg/dL 20 20   CREATININE mg/dL 1 06 0 94   CALCIUM mg/dL 9 6 9 5         Results from last 7 days   Lab Units 03/19/23  0605 03/18/23  1923   MAGNESIUM mg/dL 2 2 2 1

## 2023-03-19 NOTE — ED NOTES
Patient notified RN that he coughed up blood this morning  RN noted small amount of bright red blood in napkin  Dr Leon Mckeon notified and aware   Currently at patient bedside for bernardino Rodriguez RN  03/19/23 4703

## 2023-03-19 NOTE — ASSESSMENT & PLAN NOTE
-At home, patient is on Lisinopril 20 mg daily and Amlodipine 5 mg daily  -Monitor BP  -Continue home Lisinopril and Amlodipine

## 2023-03-19 NOTE — ASSESSMENT & PLAN NOTE
Lab Results   Component Value Date    HGBA1C 7 6 (H) 02/10/2023       Recent Labs     03/20/23  1133 03/20/23  1729 03/20/23 2025 03/21/23  0626   POCGLU 92 99 142* 76       Blood Sugar Average: Last 72 hrs:  (P) 118 [id number]     Home regimen: Lantus 18 units at bedtime, NovoLog 5 units with breakfast and dinner, metformin 850 mg, Jardiance 10 mg daily    · Continue Jardiance 10 mg for cardiac benefit  · Hold Metformin  · Lantus 14 U, lispro 4 U with breakfast and dinner -> Decreased to Lantus 10 U and discontinued Lispro due to low blood sugars  · SSI algorithm 3  · BG checks before meals and HS  · Adjust regimen as necessary to maintain -180  · Avoid hypoglycemia

## 2023-03-19 NOTE — ASSESSMENT & PLAN NOTE
-Pt presenting with Hgb 11 6  -Appears that Hgb has been down-trending from previous baseline 13-14 to 11s since TAVR in 06/2022  -Likely etiologies include Anemia of chronic disease vs SNOW vs Anemia 2/2 TAVR  -Iron panel showing low iron -> will start Fe supplimentation  -Transfuse Hgb<7

## 2023-03-19 NOTE — ASSESSMENT & PLAN NOTE
-Patient with hx of severe aortic stenosis s/p TAVR w/ bioprosthetic valve in 06/2022  Maintained on Aspirin 81 mg and Plavix 75 mg    -Most recent Echo (02/2023) showed trace paravalvular regurgitation  The gradient recorded across the prosthetic aortic valve was within the expected range  The aortic valve peak velocity was 1 89 m/s   The aortic valve mean gradient was 7 mmHg   -Continue Aspirin and Plavix

## 2023-03-19 NOTE — ASSESSMENT & PLAN NOTE
-Hx of mild intermittent asthma  Was on Montelukast 10 mg, Fluticasone-Salmeterol 2 puffs BID, and Albuterol PRN  -Most recent PFTs (2/10/2023) showed FEV1 43%, FVC 52%, FEV1/FVC 61% w/ minimal change with administration of bronchodilaters   Given 50 pack year hx of smoking likely represents more likely to represent severe COPD rather than asthma  -Patient now presenting with dyspnea initially requiring 2LO2 with pulmonary exam revealing end-expiratory wheezes and CXR showing bilateral venous congestion which could represent CHF exacerbation vs COPD exacerbation  -Xoponex nebs TID and Atrovent Nebs TID  -Continue home Montelukast and Fluticasone-Salmeterol   -Respiratory and Airway clearance protocol  -Maintain SpO2 88-92%

## 2023-03-19 NOTE — ED NOTES
Patient transported to [address] [place] [address], [address] [place] [address]  03/18/23 2011

## 2023-03-19 NOTE — ASSESSMENT & PLAN NOTE
-Pt with hx of 2: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 now s/p dual chamber pacemaker placement    -Patient reports feeling multiple shocks while at home  EKG stable on presentation, however interrogation of pacemaker showed multiple episodes of NSVT of 4 beats or less     -Cardiology following

## 2023-03-19 NOTE — ASSESSMENT & PLAN NOTE
-Pt with recent hospitalization in 02/2023 for type 1 NSTEMI complicated by ICM (EF 32%)  Found to have three vessel disease on [facility] and is now s/p ELDER x2 in mid LAD and ELDER x1 in ostial right posterior descending  Was discharged on Aspirin 81 and Plavix 75 mg    -Now presenting with increasing dyspnea as well as reported ICD shocks with interrogation of pacemaker showing multiple episodes of NSVT  EKG stable from prior  Trop negative  CXR showing volume overload  Concern for CHF exacerbation   Low suspicion for ACS at this time  -Cardiology following given episodes of NSVT  -Continue aspirin and plavix  -Monitor on Telemetry  -Monitor electrolytes

## 2023-03-19 NOTE — ASSESSMENT & PLAN NOTE
Wt Readings from Last 3 Encounters:   03/20/23 83 2 kg (183 lb 6 4 oz)   02/21/23 86 6 kg (191 lb)   02/21/23 87 1 kg (192 lb)     -Pt with hx of ischemic cardiomyopathy in setting recent type 1 NSTEMI 2/2 CAD now s/p PCI  -Most recent Echo (02/2023): EF 74-93% w/ systolic function is moderately reduced  Severe hypokinesis of the inferoseptal, inferior and anteroseptal walls including the apical septal and apical inferior segments   -At home was on GDMT with Jardiance 10 mg daily, Lisinopril 20 mg daily, Metoprolol Succinate 40 mg BID, and Spironolactone 25 mg daily  -Pt now presenting today with increased dyspnea at rest and with exertion since NSTEMI  Endorses Orthopnea and PND  3lb weight gain  Adherent to diet and meds  Does not seem frankly volume overloaded on exam BNP 3100  Negative trops  EKG stable from prior  CXR shows biltareal venous congestion  BNP 3100   Concern for CHF exacerbation  -IV lasix 40 mg daily transitioned to PO on 3/20  -Continue home Jardiance, Lisinopril, Metoprolol, and Spironolactone  -Cardiology following  -Strict I/O  -Daily weights  -2L fluid restriction and 2g Na restriction  -Electrolytes: K>4, Mag>2, Phos>3

## 2023-03-19 NOTE — ASSESSMENT & PLAN NOTE
-Patient presenting with tachycardia and tachypnea  No recent infectious sx or sick contacts  -Likely in setting of underlying respiratory distress   Low suspicion for infection at this time

## 2023-03-20 ENCOUNTER — APPOINTMENT (INPATIENT)
Dept: NON INVASIVE DIAGNOSTICS | Facility: HOSPITAL | Age: 78
End: 2023-03-20

## 2023-03-20 ENCOUNTER — APPOINTMENT (OUTPATIENT)
Dept: RADIOLOGY | Facility: HOSPITAL | Age: 78
End: 2023-03-20

## 2023-03-20 LAB
ANION GAP SERPL CALCULATED.3IONS-SCNC: 4 MMOL/L (ref 4–13)
BASOPHILS # BLD AUTO: 0.02 THOUSANDS/ÂΜL (ref 0–0.1)
BASOPHILS NFR BLD AUTO: 0 % (ref 0–1)
BUN SERPL-MCNC: 22 MG/DL (ref 5–25)
CALCIUM SERPL-MCNC: 9.5 MG/DL (ref 8.3–10.1)
CHLORIDE SERPL-SCNC: 105 MMOL/L (ref 96–108)
CO2 SERPL-SCNC: 30 MMOL/L (ref 21–32)
CREAT SERPL-MCNC: 0.9 MG/DL (ref 0.6–1.3)
EOSINOPHIL # BLD AUTO: 0.22 THOUSAND/ÂΜL (ref 0–0.61)
EOSINOPHIL NFR BLD AUTO: 3 % (ref 0–6)
ERYTHROCYTE [DISTWIDTH] IN BLOOD BY AUTOMATED COUNT: 15.5 % (ref 11.6–15.1)
GFR SERPL CREATININE-BSD FRML MDRD: 83 ML/MIN/1.73SQ M
GLUCOSE SERPL-MCNC: 142 MG/DL (ref 65–140)
GLUCOSE SERPL-MCNC: 64 MG/DL (ref 65–140)
GLUCOSE SERPL-MCNC: 84 MG/DL (ref 65–140)
GLUCOSE SERPL-MCNC: 92 MG/DL (ref 65–140)
GLUCOSE SERPL-MCNC: 99 MG/DL (ref 65–140)
HCT VFR BLD AUTO: 36 % (ref 36.5–49.3)
HGB BLD-MCNC: 11 G/DL (ref 12–17)
IMM GRANULOCYTES # BLD AUTO: 0.01 THOUSAND/UL (ref 0–0.2)
IMM GRANULOCYTES NFR BLD AUTO: 0 % (ref 0–2)
LYMPHOCYTES # BLD AUTO: 1.37 THOUSANDS/ÂΜL (ref 0.6–4.47)
LYMPHOCYTES NFR BLD AUTO: 20 % (ref 14–44)
MAGNESIUM SERPL-MCNC: 2.4 MG/DL (ref 1.6–2.6)
MCH RBC QN AUTO: 26.6 PG (ref 26.8–34.3)
MCHC RBC AUTO-ENTMCNC: 30.6 G/DL (ref 31.4–37.4)
MCV RBC AUTO: 87 FL (ref 82–98)
MONOCYTES # BLD AUTO: 0.74 THOUSAND/ÂΜL (ref 0.17–1.22)
MONOCYTES NFR BLD AUTO: 11 % (ref 4–12)
NEUTROPHILS # BLD AUTO: 4.4 THOUSANDS/ÂΜL (ref 1.85–7.62)
NEUTS SEG NFR BLD AUTO: 66 % (ref 43–75)
NRBC BLD AUTO-RTO: 0 /100 WBCS
PLATELET # BLD AUTO: 296 THOUSANDS/UL (ref 149–390)
PMV BLD AUTO: 10.5 FL (ref 8.9–12.7)
POTASSIUM SERPL-SCNC: 3.5 MMOL/L (ref 3.5–5.3)
RBC # BLD AUTO: 4.13 MILLION/UL (ref 3.88–5.62)
SODIUM SERPL-SCNC: 139 MMOL/L (ref 135–147)
WBC # BLD AUTO: 6.76 THOUSAND/UL (ref 4.31–10.16)

## 2023-03-20 RX ORDER — POTASSIUM CHLORIDE 20 MEQ/1
20 TABLET, EXTENDED RELEASE ORAL ONCE
Status: COMPLETED | OUTPATIENT
Start: 2023-03-20 | End: 2023-03-20

## 2023-03-20 RX ORDER — FERROUS SULFATE 325(65) MG
325 TABLET ORAL
Status: DISCONTINUED | OUTPATIENT
Start: 2023-03-21 | End: 2023-03-21 | Stop reason: HOSPADM

## 2023-03-20 RX ORDER — INSULIN GLARGINE 100 [IU]/ML
10 INJECTION, SOLUTION SUBCUTANEOUS
Status: DISCONTINUED | OUTPATIENT
Start: 2023-03-20 | End: 2023-03-21

## 2023-03-20 RX ADMIN — LEVALBUTEROL HYDROCHLORIDE 1.25 MG: 1.25 SOLUTION, CONCENTRATE RESPIRATORY (INHALATION) at 20:13

## 2023-03-20 RX ADMIN — ATORVASTATIN CALCIUM 80 MG: 80 TABLET, FILM COATED ORAL at 17:32

## 2023-03-20 RX ADMIN — FUROSEMIDE 40 MG: 40 TABLET ORAL at 08:03

## 2023-03-20 RX ADMIN — IPRATROPIUM BROMIDE 0.5 MG: 0.5 SOLUTION RESPIRATORY (INHALATION) at 08:28

## 2023-03-20 RX ADMIN — METOPROLOL SUCCINATE 50 MG: 50 TABLET, EXTENDED RELEASE ORAL at 22:16

## 2023-03-20 RX ADMIN — ASPIRIN 81 MG: 81 TABLET, CHEWABLE ORAL at 08:03

## 2023-03-20 RX ADMIN — AMLODIPINE BESYLATE 5 MG: 5 TABLET ORAL at 08:04

## 2023-03-20 RX ADMIN — POTASSIUM CHLORIDE 20 MEQ: 1500 TABLET, EXTENDED RELEASE ORAL at 10:08

## 2023-03-20 RX ADMIN — LEVOTHYROXINE SODIUM 137 MCG: 25 TABLET ORAL at 04:46

## 2023-03-20 RX ADMIN — HEPARIN SODIUM 5000 UNITS: 5000 INJECTION INTRAVENOUS; SUBCUTANEOUS at 08:06

## 2023-03-20 RX ADMIN — SPIRONOLACTONE 25 MG: 25 TABLET ORAL at 08:04

## 2023-03-20 RX ADMIN — METOPROLOL SUCCINATE 50 MG: 50 TABLET, EXTENDED RELEASE ORAL at 08:03

## 2023-03-20 RX ADMIN — CLOPIDOGREL BISULFATE 75 MG: 75 TABLET ORAL at 08:04

## 2023-03-20 RX ADMIN — HEPARIN SODIUM 5000 UNITS: 5000 INJECTION INTRAVENOUS; SUBCUTANEOUS at 17:32

## 2023-03-20 RX ADMIN — EMPAGLIFLOZIN 10 MG: 10 TABLET, FILM COATED ORAL at 08:05

## 2023-03-20 RX ADMIN — LISINOPRIL 20 MG: 20 TABLET ORAL at 08:03

## 2023-03-20 RX ADMIN — LEVALBUTEROL HYDROCHLORIDE 1.25 MG: 1.25 SOLUTION, CONCENTRATE RESPIRATORY (INHALATION) at 08:28

## 2023-03-20 RX ADMIN — CHOLECALCIFEROL TAB 25 MCG (1000 UNIT) 2000 UNITS: 25 TAB at 08:03

## 2023-03-20 RX ADMIN — IPRATROPIUM BROMIDE 0.5 MG: 0.5 SOLUTION RESPIRATORY (INHALATION) at 20:13

## 2023-03-20 RX ADMIN — INSULIN GLARGINE 10 UNITS: 100 INJECTION, SOLUTION SUBCUTANEOUS at 22:16

## 2023-03-20 RX ADMIN — MONTELUKAST 10 MG: 10 TABLET, FILM COATED ORAL at 08:04

## 2023-03-20 NOTE — DISCHARGE SUMMARY
INTERNAL MEDICINE RESIDENCY DISCHARGE SUMMARY     Ranjith Point   78 y o  male  MRN: [id number]  Room/Bed: /MS [address]    Encounter: [id number]    Principal Problem:    Dyspnea  Active Problems:    Mild intermittent asthma without complication    Type 2 diabetes mellitus with circulatory disorder, with long-term current use of insulin (HCC)    Hyperlipidemia    Essential hypertension    Hypothyroidism    S/P TAVR (transcatheter aortic valve replacement)    Coronary artery disease    Presence of cardiac pacemaker    Ischemic heart failure (HCC)    SIRS (systemic inflammatory response syndrome) ([facility])    Anemia      Anemia  Assessment & Plan  -Pt presenting with Hgb 11 6  -Appears that Hgb has been down-trending from previous baseline 13-14 to 11s since TAVR in 06/2022  -Likely etiologies include Anemia of chronic disease vs SNOW vs Anemia 2/2 TAVR  -Iron panel showing low iron -> will start Fe supplimentation  -Transfuse Hgb<7    SIRS (systemic inflammatory response syndrome) ([facility] [address] )  Assessment & Plan  -Patient presenting with tachycardia and tachypnea  No recent infectious sx or sick contacts  -Likely in setting of underlying respiratory distress  Low suspicion for infection at this time    Ischemic heart failure [facility])  Assessment & Plan  Wt Readings from Last 3 Encounters:   03/20/23 83 2 kg (183 lb 6 4 oz)   02/21/23 86 6 kg (191 lb)   02/21/23 87 1 kg (192 lb)     -Pt with hx of ischemic cardiomyopathy in setting recent type 1 NSTEMI 2/2 CAD now s/p PCI  -Most recent Echo (02/2023): EF 52-85% w/ systolic function is moderately reduced   Severe hypokinesis of the inferoseptal, inferior and anteroseptal walls including the apical septal and apical inferior segments   -At home was on GDMT with Jardiance 10 mg daily, Lisinopril 20 mg daily, Metoprolol Succinate 40 mg BID, and Spironolactone 25 mg daily  -Pt now presenting today with increased dyspnea at rest and with exertion since NSTEMI  Endorses Orthopnea and PND  3lb weight gain  Adherent to diet and meds  Does not seem frankly volume overloaded on exam BNP 3100  Negative trops  EKG stable from prior  CXR shows biltareal venous congestion  BNP 3100  Concern for CHF exacerbation  -IV lasix 40 mg daily transitioned to PO on 3/20  -Continue home Jardiance, Lisinopril, Metoprolol, and Spironolactone  -Cardiology following  -Strict I/O  -Daily weights  -2L fluid restriction and 2g Na restriction  -Electrolytes: K>4, Mag>2, Phos>3          Presence of cardiac pacemaker  Assessment & Plan  -Pt with hx of 2: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 now s/p dual chamber pacemaker placement    -Patient reports feeling multiple shocks while at home  EKG stable on presentation, however interrogation of pacemaker showed multiple episodes of NSVT of 4 beats or less  -Cardiology following    Coronary artery disease  Assessment & Plan  -Pt with recent hospitalization in 02/2023 for type 1 NSTEMI complicated by ICM (EF 48%)  Found to have three vessel disease on [facility] and is now s/p ELDER x2 in mid LAD and ELDER x1 in ostial right posterior descending  Was discharged on Aspirin 81 and Plavix 75 mg    -Now presenting with increasing dyspnea as well as reported ICD shocks with interrogation of pacemaker showing multiple episodes of NSVT  EKG stable from prior  Trop negative  CXR showing volume overload  Concern for CHF exacerbation  Low suspicion for ACS at this time  -Cardiology following given episodes of NSVT  -Continue aspirin and plavix  -Monitor on Telemetry  -Monitor electrolytes     S/P TAVR (transcatheter aortic valve replacement)  Assessment & Plan  -Patient with hx of severe aortic stenosis s/p TAVR w/ bioprosthetic valve in 06/2022  Maintained on Aspirin 81 mg and Plavix 75 mg    -Most recent Echo (02/2023) showed trace paravalvular regurgitation   The gradient recorded across the prosthetic aortic valve was within the expected range  The aortic valve peak velocity was 1 89 m/s  The aortic valve mean gradient was 7 mmHg   -Continue Aspirin and Plavix      Hypothyroidism  Assessment & Plan  -Patient is on Levothyroxine 137 mcg at home  Most recent TSH(02/10/23): 0 861   -Continue home Levothyroxine    Essential hypertension  Assessment & Plan  -At home, patient is on Lisinopril 20 mg daily and Amlodipine 5 mg daily  -Monitor BP  -Continue home Lisinopril and Amlodipine     Hyperlipidemia  Assessment & Plan  -Rosuvastatin 40 mg daily at home  -Will continue on Lipitor 80 mg daily while inpatient    Type 2 diabetes mellitus with circulatory disorder, with long-term current use of insulin [facility])  Assessment & Plan  Lab Results   Component Value Date    HGBA1C 7 6 (H) 02/10/2023       Recent Labs     03/20/23  1133 03/20/23  1729 03/20/23 2025 03/21/23  0626   POCGLU 92 99 142* 76       Blood Sugar Average: Last 72 hrs:  (P) 118 [id number]     Home regimen: Lantus 18 units at bedtime, NovoLog 5 units with breakfast and dinner, metformin 850 mg, Jardiance 10 mg daily    · Continue Jardiance 10 mg for cardiac benefit  · Hold Metformin  · Lantus 14 U, lispro 4 U with breakfast and dinner -> Decreased to Lantus 10 U and discontinued Lispro due to low blood sugars  · SSI algorithm 3  · BG checks before meals and HS  · Adjust regimen as necessary to maintain -180  · Avoid hypoglycemia    Mild intermittent asthma without complication  Assessment & Plan  -Hx of mild intermittent asthma  Was on Montelukast 10 mg, Fluticasone-Salmeterol 2 puffs BID, and Albuterol PRN  -Most recent PFTs (2/10/2023) showed FEV1 43%, FVC 52%, FEV1/FVC 61% w/ minimal change with administration of bronchodilaters   Given 50 pack year hx of smoking likely represents more likely to represent severe COPD rather than asthma  -Patient now presenting with dyspnea initially requiring 2LO2 with pulmonary exam revealing end-expiratory wheezes and CXR showing "bilateral venous congestion which could represent CHF exacerbation vs COPD exacerbation  -Xoponex nebs TID and Atrovent Nebs TID  -Continue home Montelukast and Fluticasone-Salmeterol   -Respiratory and Airway clearance protocol  -Maintain SpO2 88-92%    * Dyspnea  Assessment & Plan  -Pt with hx of asthma, HTN, HLD, DM 2, CAD s/p PCI w/ ELDER, severe AS s/p TAVR, V tach s/p ICD, AV block s/p PPM and hypothyroidism presenting with dyspnea which is present at rest and with exertion  Has been worsening since last hospitalization for NSTEMI during which he underwent PCI  Endorses 3 lb weight gain, orthopnea, and PND  Adherent with diet and meds  Inhalers ineffective  Also had recent shocks from pacemaker and interrogation revealed NSVT episodes  On vitals tachycardic requiring 0-2 L O2  Labs revealed Hgb 11 6 and BNP 3000  Trops negative  EKG stable from prior  CXR revealing b/l venous congestion  He also reported an episode of hemoptysis  Presentation could represent CHF exacerbation vs COPD exacerbation vs PPM malfunction vs less likely anemia  -IV Lasix 40 mg daily transitioned to PO Lasix 3/20 -> Continue outpatient  -Cardiology following  -Xoponex and Atrovent Nebs  -Continue home Metoprolol, Jardiance, Lisinopril, and Aldactone  -Monitor on Telemetry  -Respiratory protocol  -Strict I/O, Daily weights, 2L fluid restriction, 2g Na restriction  -Monitor Electrolytes   -Follow-up echocardiogram  -VQ scan with low probability for PE           DETAILS OF HOSPITAL COURSE     HPI per Dr Melissa Martinez \"67 year old male with a history of CAD s/p ELDER x2 to LAD and ELDER x1 to PDA in mid February, bioprosthetic aortic valve, asthma, diabetes on insulin, who presented with shortness of breath  Patient states he has shortness of breath for past 2 weeks that occurs at rest and with walking  Patient walks 1 block before becoming dyspneic    Has been using his albuterol inhaler frequently, (sometimes up to 4 times a day), but without " "relief  Also endorses intermittent bilateral leg swelling and intermittent productive yellow cough since discharge in February  Noticed 3 pound weight gain in the last few weeks  Sleeps with 1 pillow and notes orthopnea  This morning, he was standing by a window at home and felt his pacer shocked him 3 times  He denied chest pain at this point but felt short of breath  Shortness of breath became gradually worse and by 5 PM he felt out of breath and called EMS  Endorses compliance with low-salt diet and medications        Wife at bedside  Smoked for 50 years, quit 6 months ago  Denies alcohol use      Per ED resident, pacemaker interrogation today showed 4 episodes of VT greater than 4 bpm   POCUS showed B-lines in bilateral lungs      Vitals on admission: 99 6F, /84, pulse 110, R18, 95% on 2  Labs significant for WBC 11 6, hemoglobin 11 2, BNP 3136  BMP unremarkable  Troponin 25, 34, 39  EKG shows tach with known LBBB  COVID swab/RSV negative  CXR showed vascular congestion  Received albuterol and Atrovent nebs  \"    Patient reportedly experienced an episode of hemoptysis in the emergency department, thought to be related to pulmonary edema vs PE  No evidence of DVT, patient has an IV contrast allergy, lung VQ scan was ordered  Cardiology was consulted and the patient was started on IV lasix as well as albuterol and montelukast nebs, TTE was ordered  Patient symptomatically improved with decreased shortness of breath and improvement in cough  Patient noted to be slightly anemic with Hgb stable at 11, iron panel revealed likely iron deficiency and patient was started on iron supplementation  His presenting symptoms were likely secondary to an acute on chronic heart failure exacerbation    On the morning of 3/20 patient reports significant improvement in dyspnea and cough, states he is able to ambulate with no lightheadedness/dyspnea   He reports a mild episode of paroxysmal nocturnal dyspnea and " orthopnea overnight, otherwise he has been asymptomatic  He appears euvolemic on exam and vitals/labs have remained stable, on room air  Per cardiology, patient transitioned to PO lasix and will continue outpatient in addition to his prior home regimen  Lung V/Q scan was performed with low probability of PE  Echo was completed 3/21 which showed an EF of 30-35%  He will follow outpatient with Dr Noah Colón as well as his PCP  Later on 3/21 the patient was deemed medically stable for discharge  Physical Exam  Constitutional:       General: He is not in acute distress  Appearance: He is not ill-appearing  HENT:      Mouth/Throat:      Mouth: Mucous membranes are moist       Pharynx: Oropharynx is clear  Eyes:      General: No scleral icterus  Extraocular Movements: Extraocular movements intact  Pupils: Pupils are equal, round, and reactive to light  Cardiovascular:      Rate and Rhythm: Normal rate and regular rhythm  Pulses: Normal pulses  Heart sounds: No murmur heard  Pulmonary:      Effort: Pulmonary effort is normal  No respiratory distress  Breath sounds: Normal breath sounds  Abdominal:      General: There is no distension  Palpations: Abdomen is soft  Tenderness: There is no abdominal tenderness  There is no guarding  Musculoskeletal:         General: No swelling or deformity  Right lower leg: No edema  Left lower leg: No edema  Skin:     General: Skin is warm and dry  Coloration: Skin is not jaundiced  Neurological:      General: No focal deficit present  Mental Status: He is alert and oriented to person, place, and time  Cranial Nerves: No cranial nerve deficit  Psychiatric:         Mood and Affect: Mood normal          Behavior: Behavior normal          Thought Content:  Thought content normal          Judgment: Judgment normal          DISCHARGE INFORMATION     PCP at Discharge: Dr Bernardino Galvan    Admitting Provider: Vane Tobias MD  Admission Date: 3/18/2023    Discharge Provider: Eusebio Zarate MD  Discharge Date: 3/21/23    Discharge Disposition: Home/Self Care  Discharge Condition: stable  Discharge with Lines: no    Discharge Diet: cardiac diet  Activity Restrictions: none  Test Results Pending at Discharge: none    Discharge Diagnoses:  Principal Problem:    Dyspnea  Active Problems:    Mild intermittent asthma without complication    Type 2 diabetes mellitus with circulatory disorder, with long-term current use of insulin (HCC)    Hyperlipidemia    Essential hypertension    Hypothyroidism    S/P TAVR (transcatheter aortic valve replacement)    Coronary artery disease    Presence of cardiac pacemaker    Ischemic heart failure (HCC)    SIRS (systemic inflammatory response syndrome) ([facility] [address] )    Anemia  Resolved Problems:    * No resolved hospital problems  *      Consulting Providers:      Diagnostic & Therapeutic Procedures Performed:  XR chest 2 views    Result Date: 3/19/2023  Impression: Moderate pulmonary edema with small effusions  Workstation performed: [id number]       Code Status: Level 1 - Full Code  Advance Directive & Living Will: <no information>  Power of :    POLST:      Medications:  Current Discharge Medication List        Current Discharge Medication List        Current Discharge Medication List      CONTINUE these medications which have NOT CHANGED    Details   Advair -21 MCG/ACT inhaler TAKE 2 PUFFS BY MOUTH TWICE A DAY  Qty: 36 g, Refills: 6    Associated Diagnoses: Mild intermittent asthma, unspecified whether complicated      albuterol (PROVENTIL HFA,VENTOLIN HFA) 90 mcg/act inhaler Inhale 2 puffs every 4 hours as needed for wheezing or shortness of breath  Qty: 18 g, Refills: 2    Comments: DX Code Needed      Associated Diagnoses: Mild intermittent asthma without complication      amLODIPine (NORVASC) 5 mg tablet Take 1 tablet (5 mg total) by mouth daily  Qty: 90 tablet, Refills: 1 Associated Diagnoses: Essential hypertension      Aspirin Low Dose 81 MG chewable tablet CHEW 1 TABLET BY MOUTH DAILY  Qty: 90 tablet, Refills: 3    Associated Diagnoses: S/P TAVR (transcatheter aortic valve replacement)      cholecalciferol (VITAMIN D3) 1,000 units tablet Take 2 tablets (2,000 Units total) by mouth daily  Qty: 180 tablet, Refills: 5    Associated Diagnoses: Vitamin D deficiency      Empagliflozin (JARDIANCE) 10 MG TABS tablet Take 1 tablet (10 mg total) by mouth every morning  Qty: 90 tablet, Refills: 3    Associated Diagnoses: NSTEMI (non-ST elevated myocardial infarction) ([facility] ); Status post insertion of drug eluting coronary artery stent; Coronary artery disease involving native coronary artery of native heart without angina pectoris      fluticasone (FLONASE) 50 mcg/act nasal spray 2 sprays into each nostril daily  Qty: 2 Bottle, Refills: 2    Associated Diagnoses: Allergic rhinitis, unspecified seasonality, unspecified trigger      levothyroxine 137 mcg tablet TAKE 1 TABLET BY MOUTH EVERY DAY  Qty: 90 tablet, Refills: 3    Associated Diagnoses: Hypothyroidism      lisinopril (ZESTRIL) 20 mg tablet Take 1 tablet (20 mg total) by mouth daily  Qty: 90 tablet, Refills: 3    Associated Diagnoses: Hypertension, unspecified type      metFORMIN (GLUCOPHAGE) 850 mg tablet TAKE 1 TABLET (850 MG TOTAL) BY MOUTH 2 (TWO) TIMES A DAY WITH MEALS  Qty: 180 tablet, Refills: 3    Associated Diagnoses: Diabetes mellitus (HCC)      metoprolol succinate (TOPROL-XL) 50 mg 24 hr tablet Take 1 tablet (50 mg total) by mouth 2 (two) times a day  Qty: 90 tablet, Refills: 3    Associated Diagnoses: NSTEMI (non-ST elevated myocardial infarction) ([facility] [place] [facility] );  Status post insertion of drug eluting coronary artery stent; Coronary artery disease involving native coronary artery of native heart without angina pectoris      montelukast (SINGULAIR) 10 mg tablet TAKE 1 TABLET BY MOUTH EVERY DAY  Qty: 90 tablet, Refills: 3    Associated Diagnoses: Mild intermittent asthma, unspecified whether complicated      rosuvastatin (CRESTOR) 40 MG tablet TAKE 1 TABLET BY MOUTH EVERY DAY  Qty: 90 tablet, Refills: 3    Comments: DX Code Needed    Associated Diagnoses: Mixed hyperlipidemia      spironolactone (ALDACTONE) 25 mg tablet Take 1 tablet (25 mg total) by mouth daily Do not start before February 13, 2023  Qty: 90 tablet, Refills: 3    Associated Diagnoses: NSTEMI (non-ST elevated myocardial infarction) ([facility] [address] );  Status post insertion of drug eluting coronary artery stent; Coronary artery disease involving native coronary artery of native heart without angina pectoris      Alcohol Swabs (ALCOHOL PADS) 70 % PADS       Blood Glucose Monitoring Suppl (OneTouch Verio) w/Device KIT Check blood glucose three times daily before each meal  Qty: 1 kit, Refills: 0    Associated Diagnoses: Type 2 diabetes mellitus with diabetic neuropathy, without long-term current use of insulin ([facility])      clopidogrel (PLAVIX) 75 mg tablet Take 1 tablet (75 mg total) by mouth daily  Qty: 90 tablet, Refills: 3    Associated Diagnoses: S/P TAVR (transcatheter aortic valve replacement)      Continuous Blood Gluc  (FreeStyle Cristofer 2 Willard) POWER Use 1 each continuous  Qty: 1 each, Refills: 0    Associated Diagnoses: Type 2 diabetes mellitus with diabetic neuropathy, with long-term current use of insulin ([facility])      Continuous Blood Gluc Sensor (FreeStyle Cristofer 2 Sensor) MISC Use 1 each every 14 (fourteen) days  Qty: 6 each, Refills: 3    Associated Diagnoses: Type 2 diabetes mellitus with diabetic neuropathy, with long-term current use of insulin ([facility])      Insulin Pen Needle (Pen Needles) 31G X 8 MM MISC Use daily  Qty: 300 each, Refills: 3    Associated Diagnoses: Diabetes mellitus due to underlying condition with diabetic neuropathy, without long-term current use of insulin ([facility])      Lancets (FREESTYLE) lancets by Other route as needed (As needed)  Qty: 100 each, Refills: 3 Associated Diagnoses: Diabetes mellitus due to underlying condition with diabetic neuropathy, without long-term current use of insulin ([facility])      Lantus SoloStar 100 units/mL SOPN INJECT 0 18 ML (18 UNITS TOTAL) UNDER THE SKIN DAILY AT BEDTIME  Qty: 15 mL, Refills: 3    Comments: DX Code Needed    Associated Diagnoses: Diabetes mellitus due to underlying condition with diabetic neuropathy, without long-term current use of insulin ([facility])      methocarbamol (Robaxin-750) 750 mg tablet Take 1 tablet (750 mg total) by mouth every 6 (six) hours as needed for muscle spasms  Qty: 60 tablet, Refills: 0    Associated Diagnoses: Leg cramping      nitroglycerin (NITROSTAT) 0 4 mg SL tablet Place 1 tablet (0 4 mg total) under the tongue every 5 (five) minutes as needed for chest pain  Qty: 30 tablet, Refills: 1    Associated Diagnoses: NSTEMI (non-ST elevated myocardial infarction) ([facility] [address] ); Status post insertion of drug eluting coronary artery stent; Coronary artery disease involving native coronary artery of native heart without angina pectoris      NovoLOG FlexPen 100 units/mL injection pen Inject 5 units with breakfast and with dinner  Qty: 12 mL, Refills: 3    Associated Diagnoses: Type 2 diabetes mellitus with diabetic neuropathy, without long-term current use of insulin ([facility])             Allergies: Allergies   Allergen Reactions   • Iv Contrast [Iodinated Contrast Media] Rash     Patient states he had a severe reaction approximately 10 years ago , states he had a rash, itchy and he remembers a bunch of people pounding on chest and being surrounded by multiple doctors       Vitals:    03/21/23 0917   BP: 117/90   Pulse:    Resp:    Temp:    SpO2:          FOLLOW-UP     PCP Outpatient Follow-up:  Yes the patient was instructed to follow-up with his PCP in the next 1 to 2 weeks    Consulting Providers Follow-up:  Patient will follow up with Dr Ari Fish (cardiology)     Active Issues Requiring Follow-up:   none    Discharge "Statement:   I spent 1 hour minutes discharging the patient  This time was spent on the day of discharge  I had direct contact with the patient on the day of discharge  Additional documentation is required if more than 30 minutes were spent on discharge  Portions of the record may have been created with voice recognition software  Occasional wrong word or \"sound a like\" substitutions may have occurred due to the inherent limitations of voice recognition software    Read the chart carefully and recognize, using context, where substitutions have occurred     ==  Milagros Manuel MD  [facility]  Internal Medicine Resident PGY-3      "

## 2023-03-20 NOTE — UTILIZATION REVIEW
"NOTIFICATION OF INPATIENT ADMISSION   AUTHORIZATION REQUEST   SERVICING FACILITY:   [facility]  Address: [address] [place] [address]  Tax ID: [id number]  NPI: [id number] ATTENDING PROVIDER:  Attending Name and NPI#: Torrie Hardwick Md [[id number]]  Address: [address] [place] [address]  Phone: [phone]   ADMISSION INFORMATION:  Place of Service: [place] [address]  Place of Service Code: 21  Inpatient Admission Date/Time: 3/18/23  9:37 PM  Discharge Date/Time: No discharge date for patient encounter  Admitting Diagnosis Code/Description:  Shortness of breath [R06 02]  CHF (congestive heart failure) (HCC) [I50 9]  SOB (shortness of breath) [R06 02]  Presence of cardiac pacemaker [Z95 0]  Status post insertion of drug eluting coronary artery stent [Z95 5]     UTILIZATION REVIEW CONTACT:  Moira Minor" Eleanore Gitelman, Utilization   Network Utilization Review Department  Phone: [phone]  Fax: [phone]  Email: [email or web address]  Contact for approvals/pending authorizations, clinical reviews, and discharge  PHYSICIAN ADVISORY SERVICES:  Medical Necessity Denial & [facility] Review  Phone: [phone]  Fax: [phone]  Email: [email or web address]         "

## 2023-03-20 NOTE — UTILIZATION REVIEW
Initial Clinical Review    Admission: Date/Time/Statement:   Admission Orders (From admission, onward)     Ordered        03/18/23 2136  INPATIENT ADMISSION  Once                      Orders Placed This Encounter   Procedures   • INPATIENT ADMISSION     Standing Status:   Standing     Number of Occurrences:   1     Order Specific Question:   Level of Care     Answer:   Med Surg [16]     Order Specific Question:   Estimated length of stay     Answer:   More than 2 Midnights     Order Specific Question:   Certification     Answer:   I certify that inpatient services are medically necessary for this patient for a duration of greater than two midnights  See H&P and MD Progress Notes for additional information about the patient's course of treatment  ED Arrival Information     Expected   3/18/2023     Arrival   3/18/2023 18:46    Acuity   Urgent            Means of arrival   Ambulance    Escorted by   [facility]    Service   [facility]    Admission type   Emergency            Arrival complaint   Shortness of Breath           Chief Complaint   Patient presents with   • Shortness of Breath     Per pts family he had an MI and stents placed 3wks ago  Pt c/o abd pain and sob  Pt ran out of nebulizer trx  Pt reports pacemaker placement 6 months ago  Demnies chest pain       Initial Presentation: 78 y o  male to ED presents for shortness of breath  Per pt, he has shortness of breath for past 2 weeks that occurs at rest and with walking  Pt walks 1 block before becoming dyspneic  Has been using his albuterol inhaler frequently, (sometimes up to 4 times a day), but without relief  Also endorses intermittent bilateral leg swelling and intermittent productive yellow cough since discharge in February  Noticed 3 pound weight gain in the last few weeks  Sleeps with 1 pillow and notes orthopnea  This morning, he was standing by a window at home and felt his pacer shocked him 3 times   Shortness of breath became gradually worse and by 5 PM he felt out of breath and called EMS  Compliance with low-salt diet and medications  Per ED,  pacemaker interrogation today showed 4 episodes of VT greater than 4 bpm   POCUS showed B-lines in bilateral lungs  EKG shows tach with known LBBB  CXR showed vascular congestion  Given albuterol and Atrovent nebs  PMH for CAD s/p ELDER x2 to LAD and ELDER x1 to PDA in mid February, bioprosthetic aortic valve, asthma, diabetes on insulin  Smoked for 50 yrs, quit 6 months ago  Admit Inpatient level of care for Dyspnea, Mild intermittent asthma, Anemia and SIRS  Initially requiring 2L O2  Maintain SpO2 88-92%  Iv Lasix 40 mg daily  Xoponex and Atrovent Nebs  Tele monitoring  Cardiology consult  Continue home meds  Iron panel  On exam; end-expiratory wheezes  Tachycardia  Date: 3/19   Day 2:    Cardiology cons; Acute on chronic systolic HF, Severe MR, Ischemic cardiomyopathy  Known HFrEF (LVEF 35-40%) and severe MR presenting with shortness of breath, pulm edema on cxray, bnp of 3000s  Does not appear excessively overloaded today but was already diuresed    -give IV lasix 40mg on 3/18 and 3/19  Will evaluate tomorrow for further IV diuresis vs  PO  Repeat TTE given worsening symptoms  Per Nursing; Pt notified RN that he coughed up blood this morning  RN noted small amount of bright red blood in napkin  Physician notified and at bedside  Progress notes; Iv Lasix 3/19  Continues on Tele monitoring   Iron studies    ED Triage Vitals   Temperature Pulse Respirations Blood Pressure SpO2   03/18/23 1900 03/18/23 1854 03/18/23 1854 03/18/23 1854 03/18/23 1854   99 6 °F (37 6 °C) (!) 120 22 169/95 93 %      Temp Source Heart Rate Source Patient Position - Orthostatic VS BP Location FiO2 (%)   03/18/23 1900 03/18/23 1854 03/19/23 0100 03/19/23 0100 --   Oral Monitor Lying Right arm       Pain Score       03/18/23 1855       7          Wt Readings from Last 1 Encounters:   03/20/23 83 2 kg (183 lb 6 4 oz)     Additional Vital Signs:   03/19/23 15:03:34 98 1 °F (36 7 °C) 89 -- 129/80 96 91 % -- -- -- --   03/19/23 1408 -- -- -- -- -- 95 % -- -- -- --   03/19/23 1200 -- 80 20 128/57 82 95 % -- -- None (Room air) Lying   03/19/23 1000 -- 80 20 133/64 91 92 % -- -- None (Room air)      03/18/23 2345 -- 98 -- -- -- 96 % -- -- -- --   03/18/23 2336 -- -- -- -- -- 95 % 24 1 L/min Nasal cannula --   03/18/23 2300 -- 110   Abnormal  18 144/84 108 95 % 28 2 L/min Nasal cannula --   03/18/23 2200 -- 108   Abnormal  20 136/60 87 95 % 28 2 L/min Nasal cannula      Pertinent Labs/Diagnostic Test Results:   XR chest 2 views   Final Result by Danuta Montana MD (03/19 6170)      Moderate pulmonary edema with small effusions             Results from last 7 days   Lab Units 03/18/23 1923   SARS-COV-2  Negative     Results from last 7 days   Lab Units 03/19/23  0604 03/18/23 1923   WBC Thousand/uL 5 84 11 61*   HEMOGLOBIN g/dL 11 2* 11 2*   HEMATOCRIT % 35 7* 37 6   PLATELETS Thousands/uL 286 281   NEUTROS ABS Thousands/µL 4 00 9 44*         Results from last 7 days   Lab Units 03/19/23  0605 03/19/23  0604 03/18/23 1923   SODIUM mmol/L  --  141 137   POTASSIUM mmol/L  --  4 0 4 0   CHLORIDE mmol/L  --  109* 109*   CO2 mmol/L  --  28 22   ANION GAP mmol/L  --  4 6   BUN mg/dL  --  20 20   CREATININE mg/dL  --  1 06 0 94   EGFR ml/min/1 73sq m  --  68 78   CALCIUM mg/dL  --  9 6 9 5   MAGNESIUM mg/dL 2 2  --  2 1   PHOSPHORUS mg/dL 3 9  --   --      Results from last 7 days   Lab Units 03/18/23 1923   AST U/L 16   ALT U/L 18   ALK PHOS U/L 47   TOTAL PROTEIN g/dL 6 9   ALBUMIN g/dL 3 2*   TOTAL BILIRUBIN mg/dL 0 93     Results from last 7 days   Lab Units 03/19/23  2035 03/19/23  1642 03/19/23  1142 03/19/23  1114 03/19/23  0603 03/19/23  0233   POC GLUCOSE mg/dl 112 120 137 69 103 274*     Results from last 7 days   Lab Units 03/19/23  0604 03/18/23  1923   GLUCOSE RANDOM mg/dL 112 110         Results from last 7 days   Lab Units 03/18/23  2336 03/18/23 2132 03/18/23 1923   HS TNI 0HR ng/L  --   --  25   HS TNI 2HR ng/L  --  34  --    HSTNI D2 ng/L  --  9  --    HS TNI 4HR ng/L 39  --   --    HSTNI D4 ng/L 14  --   --      Results from last 7 days   Lab Units 03/19/23  1115   D-DIMER QUANTITATIVE ug/ml FEU 1 00*         Results from last 7 days   Lab Units 03/18/23 1923   NT-PRO BNP pg/mL 3,136*     Results from last 7 days   Lab Units 03/19/23  0604   FERRITIN ng/mL 58       Results from last 7 days   Lab Units 03/18/23 1923   INFLUENZA A PCR  Negative   INFLUENZA B PCR  Negative   RSV PCR  Negative         ED Treatment:   Medication Administration from 03/18/2023 1846 to 03/19/2023 1449       Date/Time Order Dose Route Action     03/18/2023 1928 EDT albuterol inhalation solution 10 mg 10 mg Nebulization Given     03/18/2023 1928 EDT ipratropium (ATROVENT) 0 02 % inhalation solution 1 mg 1 mg Nebulization Given     03/18/2023 1928 EDT sodium chloride 0 9 % inhalation solution 3 mL 3 mL Nebulization Given     03/19/2023 0834 EDT heparin (porcine) subcutaneous injection 5,000 Units 5,000 Units Subcutaneous Given     03/19/2023 0130 EDT heparin (porcine) subcutaneous injection 5,000 Units 5,000 Units Subcutaneous Given     03/19/2023 0833 EDT amLODIPine (NORVASC) tablet 5 mg 5 mg Oral Given     03/19/2023 3205 EDT aspirin chewable tablet 81 mg 81 mg Oral Given     03/19/2023 0832 EDT cholecalciferol (VITAMIN D3) tablet 2,000 Units 2,000 Units Oral Given     03/19/2023 8079 EDT clopidogrel (PLAVIX) tablet 75 mg 75 mg Oral Given     03/19/2023 0832 EDT Empagliflozin (JARDIANCE) tablet 10 mg 10 mg Oral Given     03/19/2023 0840 EDT fluticasone (FLONASE) 50 mcg/act nasal spray 2 spray 2 spray Nasal Given     03/19/2023 0559 EDT levothyroxine tablet 137 mcg 137 mcg Oral Given     03/19/2023 0833 EDT lisinopril (ZESTRIL) tablet 20 mg 20 mg Oral Given     03/19/2023 0130 EDT metoprolol succinate (TOPROL-XL) 24 hr tablet 50 mg 50 mg Oral Given     03/19/2023 0840 EDT montelukast (SINGULAIR) tablet 10 mg 10 mg Oral Given     03/19/2023 9178 EDT spironolactone (ALDACTONE) tablet 25 mg 25 mg Oral Given     03/19/2023 0215 EDT furosemide (LASIX) injection 40 mg 40 mg Intravenous Given     03/19/2023 0301 EDT insulin lispro (HumaLOG) 100 units/mL subcutaneous injection 1-6 Units 4 Units Subcutaneous Given     03/19/2023 0835 EDT insulin lispro (HumaLOG) 100 units/mL subcutaneous injection 4 Units 4 Units Subcutaneous Given     03/19/2023 0231 EDT insulin glargine (LANTUS) subcutaneous injection 14 Units 0 14 mL 14 Units Subcutaneous Given     03/19/2023 1407 EDT ipratropium (ATROVENT) 0 02 % inhalation solution 0 5 mg 0 5 mg Nebulization Given     03/19/2023 0825 EDT ipratropium (ATROVENT) 0 02 % inhalation solution 0 5 mg 0 5 mg Nebulization Given     03/19/2023 1407 EDT levalbuterol (XOPENEX) inhalation solution 1 25 mg 1 25 mg Nebulization Given     03/19/2023 0825 EDT levalbuterol (Prentis Gammons) inhalation solution 1 25 mg 1 25 mg Nebulization Given        Past Medical History:   Diagnosis Date   • Arthritis    • Asthma    • Colon polyps    • Community acquired pneumonia     last assessed: 5/1/2014   • [facility] mellitus ([facility] )    • Hemorrhagic prepatellar bursitis, left 10/21/2019   • Hepatitis C    • High cholesterol    • History of colonoscopy 2017   • Hypertension    • Lymphadenopathy, anterior cervical 04/17/2018   • Nephritis and nephropathy, not specified as acute or chronic, with other specified pathological lesion in kidney, in diseases classified elsewhere 3/26/2013   • Screening for colon cancer 05/01/2019   • Thoracic vertebral fracture ([facility] ) 06/11/2014     Present on Admission:  • Mild intermittent asthma without complication  • Hyperlipidemia  • Essential hypertension  • Hypothyroidism  • Presence of cardiac pacemaker  • Coronary artery disease      Admitting Diagnosis: Shortness of breath [R06 02]  CHF (congestive heart failure) ([facility] ) [I50 9]  SOB (shortness of breath) [R06 02]  Presence of cardiac pacemaker [Z95 0]  Status post insertion of drug eluting coronary artery stent [Z95 5]  Age/Sex: 78 y o  male     Admission Orders:  Scheduled Medications:  amLODIPine, 5 mg, Oral, Daily  aspirin, 81 mg, Oral, Daily  atorvastatin, 80 mg, Oral, Daily With Dinner  cholecalciferol, 2,000 Units, Oral, Daily  clopidogrel, 75 mg, Oral, Daily  Empagliflozin, 10 mg, Oral, QAM  [START ON 3/21/2023] ferrous sulfate, 325 mg, Oral, Daily With Breakfast  fluticasone, 2 spray, Nasal, Daily  furosemide, 40 mg, Oral, Daily  heparin (porcine), 5,000 Units, Subcutaneous, Q8H DESTINEE  insulin glargine, 10 Units, Subcutaneous, HS  insulin lispro, 1-6 Units, Subcutaneous, TID AC  insulin lispro, 1-6 Units, Subcutaneous, HS  ipratropium, 0 5 mg, Nebulization, TID  levalbuterol, 1 25 mg, Nebulization, TID  levothyroxine, 137 mcg, Oral, Early Morning  lisinopril, 20 mg, Oral, Daily  metoprolol succinate, 50 mg, Oral, BID  montelukast, 10 mg, Oral, Daily  spironolactone, 25 mg, Oral, Daily    furosemide (LASIX) injection 40 mg  Dose: 40 mg  Freq: Once Route: IV  Start: 03/19/23 1400 End: 03/19/23 1723    Continuous IV Infusions: Ang     PRN Meds:  acetaminophen, 650 mg, Oral, Q6H PRN  albuterol, 2 puff, Inhalation, Q4H PRN      Echo  IP CONSULT TO CASE MANAGEMENT  IP CONSULT TO CARDIOLOGY    Network Utilization Review Department  ATTENTION: Please call with any questions or concerns to [phone] and carefully listen to the prompts so that you are directed to the right person  All voicemails are confidential   Lenin Rubin all requests for admission clinical reviews, approved or denied determinations and any other requests to dedicated fax number below belonging to the campus where the patient is receiving treatment   List of dedicated fax numbers for the Facilities:  [address] [place] [address] DENIALS (Administrative/Medical Necessity) [phone]   [address] [place] [address] (Maternity/NICU/Pediatrics) Seda Hansen [address] Nalini Molina  [phone]   [address] [place] [address] [facility] [address] [facility] [address] av [facility] 134 815 [facility] [phone]

## 2023-03-20 NOTE — CASE MANAGEMENT
Case Management Assessment & Discharge Planning Note    Patient name Diana Chamber  Location [id number] MRN [id number]  : 1945 Date 3/20/2023       Current Admission Date: 3/18/2023  Current Admission Diagnosis:Dyspnea   Patient Active Problem List    Diagnosis Date Noted   • SIRS (systemic inflammatory response syndrome) ([facility] ) 2023   • Dyspnea 2023   • Anemia 2023   • Ischemic heart failure ([facility] ) 2023   • Status post insertion of drug eluting coronary artery stent 02/10/2023   • Presence of cardiac pacemaker 2023   • NSTEMI (non-ST elevated myocardial infarction) ([facility] ) 2023   • Bruit of left carotid artery 2023   • Dyspnea on exertion 2023   • Peripheral artery disease ([facility]  ) 2023   • Need for influenza vaccination 11/10/2022   • Leg cramping 11/10/2022   • Tachycardia-bradycardia ([facility] ) 2022   • S/P TAVR (transcatheter aortic valve replacement) 2022   • Coronary artery disease 2022   • Contrast media allergy 2022   • Aortic stenosis 2022   • Diverticulosis of large intestine 2018   • Internal hemorrhoids 2018   • Nicotine dependence 2017   • Osteoarthritis of knee 2017   • Bilateral hearing loss 2017   • Allergic rhinitis 2016   • Type 2 diabetes mellitus with circulatory disorder, with long-term current use of insulin ([facility] ) 2015   • Adenomatous colon polyp 2014   • Hyperlipidemia 2013   • Vitamin B12 deficiency 2012   • Mild intermittent asthma without complication    • Essential hypertension 2012   • Hypothyroidism 2012      LOS (days): 2  Geometric Mean LOS (GMLOS) (days):   Days to GMLOS:     OBJECTIVE:    Risk of Unplanned Readmission Score: 20 15         Current admission status: Inpatient       Preferred Pharmacy:   [facility]/pharmacy [address], [place] - [address]  [address] [facility]  Phone: [phone] Fax: Asad Frazier [address] [place] [address] [facility] [address] [place] [address] [address] [facility] [address] [address]  Phone: [phone] Fax: [phone]    Primary Care Provider: Kyra Gill DO    Primary Insurance: Kadi Sullivan MEDICARE [address] REP  Secondary Insurance: [id number]:  [address] Proxies    There are no active Health Care Proxies on file  Advance Directives  Does patient have a [address] [place] [address] Avenue?: No  Does patient have Advance Directives?: No              Patient Information  Admitted from[de-identified] Home  Mental Status: Alert  Assessment information provided by[de-identified] Daughter  Primary Caregiver: Self  Support Systems: Daughter, Son, Spouse/significant other  Home entry access options   Select all that apply : Stairs  Number of steps to enter home : 3  Type of Current Residence: 2 story home  Upon entering residence, is there a bedroom on the main floor (no further steps)?: No  A bedroom is located on the following floor levels of residence (select all that apply):: 2nd Floor  Upon entering residence, is there a bathroom on the main floor (no further steps)?: No  Indicate which floors of current residence have a bathroom (select all the apply):: 2nd Floor  Number of steps to 2nd floor from main floor: One Flight  In the last 12 months, was there a time when you were not able to pay the mortgage or rent on time?: No  In the last 12 months, how many places have you lived?: 1  In the last 12 months, was there a time when you did not have a steady place to sleep or slept in a shelter (including now)?: No  Living Arrangements: Lives w/ Spouse/significant other, Lives w/ Son    Activities of Daily Living Prior to Admission  Functional Status: Independent  Completes ADLs independently?: Yes  Ambulates independently?: Yes  Does patient use assisted devices?: No  Does patient currently own DME?: No  Does patient have a history of Outpatient Therapy (PT/OT)?: No  Does the patient have a history of Short-Term Rehab?: No  Does patient have a history of HHC?: No         Patient Information Continued  Income Source: Pension/USP  Does patient have prescription coverage?: Yes  Within the past 12 months, you worried that your food would run out before you got the money to buy more : Never true  Within the past 12 months, the food you bought just didn't last and you didn't have money to get more : Never true  Does patient receive dialysis treatments?: No  Does patient have a history of substance abuse?: No  Does patient have a history of Mental Health Diagnosis?: No         Means of Transportation  Means of Transport to Appts[de-identified] Drives Self  In the past 12 months, has lack of transportation kept you from medical appointments or from getting medications?: No  In the past 12 months, has lack of transportation kept you from meetings, work, or from getting things needed for daily living?: No        DISCHARGE DETAILS:    Discharge planning discussed with[de-identified] porfirio  Freedom of Choice: Yes                   Contacts  Patient Contacts: porfirio Clinton  Relationship to Patient[de-identified] Family  Contact Method: Phone  Phone Number: [phone]  Reason/Outcome: Continuity of Care, Emergency Contact, Discharge Planning                   Would you like to participate in our [address] service program?  : No - Declined     CM reviewed d/c planning process including the following: identifying help at home, patient preference for d/c planning needs, Discharge Lounge, [facility] Meds to Bed program, availability of treatment team to discuss questions or concerns patient and/or family may have regarding understanding medications and recognizing signs and symptoms once discharged  CM also encouraged patient to follow up with all recommended appointments after discharge   Patient advised of importance for patient and family to participate in managing patient’s medical well being  Patient/caregiver received discharge checklist   Content reviewed  Patient/caregiver encouraged to participate in discharge plan of care prior to discharge home

## 2023-03-20 NOTE — PROGRESS NOTES
Progress Note - Cardiology   Holly Marion 78 y o  male MRN: [id number]  Unit/Bed#: -01 Encounter: [id number]    Assessment:  Principal Problem:    Dyspnea  Active Problems:    Mild intermittent asthma without complication    Type 2 diabetes mellitus with circulatory disorder, with long-term current use of insulin (HCC)    Hyperlipidemia    Essential hypertension    Hypothyroidism    S/P TAVR (transcatheter aortic valve replacement)    Coronary artery disease    Presence of cardiac pacemaker    Ischemic heart failure (HCC)    SIRS (systemic inflammatory response syndrome) (HCC)    Anemia    57-year-old man with a history of coronary artery disease, most recent PCI in February 2023 with ELDER to the LAD and RPDA  Aortic stenosis status post TAVR  Moderate to severe mitral regurgitation likely with abnormal geometry from ischemic disease  Combined CHF ejection fraction in the range of 35 to 40% on last echo  Presented with symptoms of shortness of breath, acute on chronic combined CHF  Plan:    Transitioned to oral diuretics today  Will need ongoing p o  Lasix as an outpatient  Otherwise, from a cardiac standpoint, he is on lisinopril 20, Toprol-XL 50 twice daily, spironolactone 25 mg daily, and Jardiance 10 mg daily was added  On dual antiplatelet therapy with aspirin and Plavix given most recent PCI in February  Intensity atorvastatin 80 mg with good control of his last lipid panel  Aside from the above regimen, he is on 5 mg of amlodipine for hypertension  Creatinine remains stable  Not on any supplemental oxygen  Reported hemoptysis, V/Q scan to be done today  Could have some hemoptysis with pulmonary edema, also  Patient will follow-up with Dr Monalisa Chao    Subjective/Objective     Subjective:  Patient reports a little bit of blood-tinged phlegm this morning  He says when he coughs up the phlegm, however his breathing is improved      Weight 183 pounds this morning on a standing scale, noted to be 192 in the office on January 30  Objective:    Vitals: /78   Pulse 99   Temp 98 3 °F (36 8 °C) (Oral)   Resp 18   Wt 83 2 kg (183 lb 6 4 oz)   SpO2 95%   BMI 27 48 kg/m²   Vitals:    03/20/23 0533   Weight: 83 2 kg (183 lb 6 4 oz)     Orthostatic Blood Pressures    Flowsheet Row Most Recent Value   Blood Pressure 118/78 filed at 03/20/2023 0802   Patient Position - Orthostatic VS Sitting filed at 03/20/2023 0802            Intake/Output Summary (Last 24 hours) at 3/20/2023 0933  Last data filed at 3/20/2023 0831  Gross per 24 hour   Intake 460 ml   Output 600 ml   Net -140 ml     Physical Exam: \  General appearance: alert and in no acute distress  Head: Normocephalic, without obvious abnormality, atraumatic  Neck: no carotid bruit, no JVD and supple, symmetrical, trachea midline  Lungs: mild rales at base  Heart: S1, S2 regular  Bio AVr  + 2/6 HSM  Abdomen: soft, non-tender; bowel sounds normal; no masses,  no organomegaly  Extremities: extremities normal, atraumatic, no cyanosis or edema  Pulses: 2+ and symmetric bilaterally  Skin: Skin color, texture, turgor normal  No rashes or lesions  Neurologic: Grossly normal  Alert and oriented      Medications:    Current Facility-Administered Medications:   •  acetaminophen (TYLENOL) tablet 650 mg, 650 mg, Oral, Q6H PRN, Elinaseema Rani Minnix, DO, 650 mg at 03/19/23 2138  •  albuterol (PROVENTIL HFA,VENTOLIN HFA) inhaler 2 puff, 2 puff, Inhalation, Q4H PRN, Luli Heart MD  •  amLODIPine (NORVASC) tablet 5 mg, 5 mg, Oral, Daily, Eligha Rani Minnix, DO, 5 mg at 03/20/23 6133  •  aspirin chewable tablet 81 mg, 81 mg, Oral, Daily, Eligha Rani Minnix, DO, 81 mg at 03/20/23 9746  •  atorvastatin (LIPITOR) tablet 80 mg, 80 mg, Oral, Daily With Meng Morales Minnix, DO, 80 mg at 03/19/23 1723  •  cholecalciferol (VITAMIN D3) tablet 2,000 Units, 2,000 Units, Oral, Daily, Cristina Jaime DO, 2,000 Units at 03/20/23 8683  •  clopidogrel (PLAVIX) tablet 75 mg, 75 mg, Oral, Daily, Charlet Calos Minnix, DO, 75 mg at 03/20/23 0806  •  Empagliflozin (JARDIANCE) tablet 10 mg, 10 mg, Oral, QAM, Stormy HERNANDEZ Minnix, DO, 10 mg at 03/20/23 8640  •  fluticasone (FLONASE) 50 mcg/act nasal spray 2 spray, 2 spray, Nasal, Daily, Charlet Calos Minnix, DO, 2 spray at 03/19/23 0840  •  furosemide (LASIX) tablet 40 mg, 40 mg, Oral, Daily, Doyal Crate, DO, 40 mg at 03/20/23 3542  •  heparin (porcine) subcutaneous injection 5,000 Units, 5,000 Units, Subcutaneous, Q8H [address], Charlet Calos Minnix, DO, 5,000 Units at 03/20/23 6432  •  insulin glargine (LANTUS) subcutaneous injection 10 Units 0 1 mL, 10 Units, Subcutaneous, HS, Griselda Meline, MD  •  insulin lispro (HumaLOG) 100 units/mL subcutaneous injection 1-6 Units, 1-6 Units, Subcutaneous, TID AC **AND** Fingerstick Glucose (POCT), , , 4x Daily AC and at bedtime, Stormy HERNANDEZ Minnix, DO  •  insulin lispro (HumaLOG) 100 units/mL subcutaneous injection 1-6 Units, 1-6 Units, Subcutaneous, HS, Brindalet Calos Minnix, DO, 4 Units at 03/19/23 0301  •  ipratropium (ATROVENT) 0 02 % inhalation solution 0 5 mg, 0 5 mg, Nebulization, TID, Tristan Pak MD, 0 5 mg at 03/20/23 3978  •  levalbuterol (XOPENEX) inhalation solution 1 25 mg, 1 25 mg, Nebulization, TID, Tristan Pak MD, 1 25 mg at 03/20/23 1226  •  levothyroxine tablet 137 mcg, 137 mcg, Oral, Early Morning, Charlet Calos Minnix, DO, 137 mcg at 03/20/23 0446  •  lisinopril (ZESTRIL) tablet 20 mg, 20 mg, Oral, Daily, Charlet Calos Minnix, DO, 20 mg at 03/20/23 0803  •  metoprolol succinate (TOPROL-XL) 24 hr tablet 50 mg, 50 mg, Oral, BID, Stormy DAVID Minnix, DO, 50 mg at 03/20/23 0803  •  montelukast (SINGULAIR) tablet 10 mg, 10 mg, Oral, Daily, Charlet Calos Minnix, DO, 10 mg at 03/20/23 3015  •  spironolactone (ALDACTONE) tablet 25 mg, 25 mg, Oral, Daily, Charlet Calos Minnix, DO, 25 mg at 03/20/23 3080    Lab Results:      Results from last 7 days   Lab Units 03/20/23  0443 03/19/23  0604 03/18/23  1923   WBC Thousand/uL 6 76 5 84 11 61* HEMOGLOBIN g/dL 11 0* 11 2* 11 2*   HEMATOCRIT % 36 0* 35 7* 37 6   PLATELETS Thousands/uL 296 286 281         Results from last 7 days   Lab Units 03/20/23 0446 03/19/23  0604 03/18/23  1923   SODIUM mmol/L 139 141 137   POTASSIUM mmol/L 3 5 4 0 4 0   CHLORIDE mmol/L 105 109* 109*   CO2 mmol/L 30 28 22   BUN mg/dL 22 20 20   CREATININE mg/dL 0 90 1 06 0 94   CALCIUM mg/dL 9 5 9 6 9 5   ALK PHOS U/L  --   --  47   ALT U/L  --   --  18   AST U/L  --   --  16         Results from last 7 days   Lab Units 03/20/23 0446 03/19/23  0605 03/18/23 1923   MAGNESIUM mg/dL 2 4 2 2 2 1     Echo 2/10/23  Left Ventricle: Left ventricular cavity size is normal  Wall thickness is mildly increased  The left ventricular ejection fraction is 35-40%  Systolic function is moderately reduced  Severe hypokinesis of the inferoseptal, inferior and anteroseptal walls including the apical septal and apical inferior segments  •  IVS: There i abnormal septal motion consistent with left bundle branch block  There is sigmoid appearance of the septum  •  Mitral Valve: There is moderately reduced mobility  There is moderate to severe regurgitation with a posteriorly directed jet  •  Tricuspid Valve: There is mild to moderate regurgitation  The tricuspid valve regurgitation jet is central  The right ventricular systolic pressure is elevated  The estimated right ventricular systolic pressure is 23 31 mmHg  •  Left Atrium: The atrium is moderately dilated  •  Right Atrium: The atrium is mildly dilated  •  Aortic Valve: There is an Emery RONNI 3 Ultra 26 mm TAVR bioprosthetic valve  There is trace paravalvular regurgitation  The gradient recorded across the prosthetic aortic valve is within the expected range  The saortic valve peak velocity is 1 89 m/s  The aortic valve mean gradient is 7 mmHg  •  Pericardium: There is a trivial pericardial effusion along the right atrial free wall

## 2023-03-20 NOTE — OCCUPATIONAL THERAPY NOTE
Occupational Therapy Evaluation     Patient Name: Hughie Boxer  Today's Date: 3/20/2023  Problem List  Principal Problem:    Dyspnea  Active Problems:    Mild intermittent asthma without complication    Type 2 diabetes mellitus with circulatory disorder, with long-term current use of insulin (HCC)    Hyperlipidemia    Essential hypertension    Hypothyroidism    S/P TAVR (transcatheter aortic valve replacement)    Coronary artery disease    Presence of cardiac pacemaker    Ischemic heart failure (HCC)    SIRS (systemic inflammatory response syndrome) ([facility] [address] )    Anemia    Past Medical History  Past Medical History:   Diagnosis Date    Arthritis     Asthma     Colon polyps     Community acquired pneumonia     last assessed: 5/1/2014    Diabetes mellitus ([facility] [address] )     Hemorrhagic prepatellar bursitis, left 10/21/2019    Hepatitis C     High cholesterol     History of colonoscopy 2017    Hypertension     Lymphadenopathy, anterior cervical 04/17/2018    Nephritis and nephropathy, not specified as acute or chronic, with other specified pathological lesion in kidney, in diseases classified elsewhere 3/26/2013    Screening for colon cancer 05/01/2019    [facility] 06/11/2014     Past Surgical History  Past Surgical History:   Procedure Laterality Date    CARDIAC CATHETERIZATION N/A 6/3/2022    Procedure: Cardiac RHC/LHC; Surgeon: Reshma Yadav MD;  [facility] CARD[facility]; Service: Cardiology    CARDIAC CATHETERIZATION N/A 6/21/2022    Procedure: CARDIAC TAVR;  Surgeon: Hayley Mc MD;  Location: [facility] MAIN OR;  Service: Cardiology    CARDIAC CATHETERIZATION N/A 2/10/2023    Procedure: Cardiac Coronary Angiogram;  Surgeon: Reshma Yadav MD;  [facility] CARDIAC C[facility] LAB; Service: Cardiology    CARDIAC CATHETERIZATION N/A 2/10/2023    Procedure: Cardiac pci;  Surgeon: Reshma Yadav MD;  [facility]: [facility] CARDIAC C[facility] LAB;   Service: Cardiology    CARDIAC ELECTROPHYSIOLOGY PROCEDURE N/A 9/1/2022 "   Procedure: Cardiac pacer implant ; DC PPM;  Surgeon: Adam Rodriguez DO;  [facility] CARDIAC CATH LAB; Service: Cardiology    COLONOSCOPY      COLONOSCOPY W/ POLYPECTOMY      LITHOTRIPSY      MULTIPLE TOOTH EXTRACTIONS      NJ COLONOSCOPY FLX DX W/COLLJ SPEC WHEN PFRMD N/A 5/17/2018    Procedure: COLONOSCOPY;  Surgeon: Liam Grayson MD;  Location: [facility] GI LAB; Service: Gastroenterology    NJ REPLACE AORTIC VALVE OPENFEMORAL ARTERY APPROACH N/A 6/21/2022    Procedure: REPLACEMENT AORTIC VALVE TRANSCATHETER (TAVR) TRANSFEMORAL W/ 26MM QUINN RONNI S3 ULTRA VALVE(ACCESS ON LEFT) SAULO;  Surgeon: Wale Garcia MD;  Location: [facility] MAIN OR;  Service: Cardiac Surgery           03/20/23 1145   OT Last Visit   OT Visit Date 03/20/23   Note Type   Note type Evaluation   Pain Assessment   Pain Assessment Tool 0-10   Pain Score No Pain   Restrictions/Precautions   Weight Bearing Precautions Per Order No   Other Precautions Fall Risk   Home Living   Type of [address] Two level;Bed/bath upstairs  (3 JOSE)   Bathroom Shower/Tub Tub/shower unit   Bathroom Toilet Standard   Home Equipment   (none)   Prior Function   Level of [place] Independent with ADLs; Independent with functional mobility; Independent with IADLS   Lives With Spouse; Son   [place] Help From Family  (spouse and son)   IADLs Independent with driving; Independent with meal prep; Independent with medication management   Comments pt primarily Belarusian speaking, social hx obtained from chart     Lifestyle   Autonomy PTA, pt living in [facility] spouse, (I) with ADLs, (I) with IADLs, (+) driving,   Reciprocal Relationships supportive wife and son   Subjective   Subjective \"No pain\"   ADL   Eating Assistance 7  Independent   Grooming Assistance 7  Independent   UB Bathing Assistance 7  Independent   LB Bathing Assistance 7  Independent   UB Dressing Assistance 7  Independent   LB Dressing Assistance 7  [phone]  [facility] " Mobility   Additional Comments pt OOB and in chair upon arrival   Transfers   Sit to Stand 7  Independent   Stand to Sit 7  Independent   Additional Comments no AD   Functional Mobility   Functional Mobility 7  Independent   Additional Comments functional household distances   Additional items   (no AD)   Balance   Static Sitting Good   Static Standing Fair +   Ambulatory Fair +   Activity Tolerance   Activity Tolerance Patient tolerated treatment well   Medical Staff Made Aware PT [place]   Nurse Made Aware clearance per RN   RUE Assessment   RUE Assessment WFL   LUE Assessment   LUE Assessment WFL   Hand Function   Gross Motor Coordination Functional   Fine Motor Coordination Functional   Cognition   Overall Cognitive Status WFL   Arousal/Participation Alert; Cooperative   Attention Within functional limits   Orientation Level Oriented X4   Following Commands Follows one step commands without difficulty   Comments pt pleasant and cooperative throughout session   Assessment   Assessment Pt is a 78 y o  male seen for OT evaluation s/p admission to [facility] on 3/18/2023 due to SOB and abdomen pain  Pt diagnosed with Dyspnea  Pt has a significant PMH impacting occupational performance including: CAD, asthma, diabetes, hypothyroidism, and HTN  Pt is motivated to return to home  Personal and environmental factors supporting pt at time of IE include age and social support  During evaluation pt performed as is outlined above in flowsheet  Standardized assessments used to assist in identifying performance deficits include AMPAC 6-Clicks  No further acute OT needs identified at this time due to pt at functional baseline and (A) from wife as needed upon d/c  Recommend continued active ADL participation and mobilization with hospital staff while in the hospital to increase pt’s endurance and strength upon D/C  Upon discharge from acute care setting recommend d/c to Home with family support     Plan   OT Frequency Eval only   Recommendation   OT Discharge Recommendation No rehabilitation needs   AM-PAC Daily Activity Inpatient   Lower Body Dressing 4   Bathing 4   Toileting 4   Upper Body Dressing 4   Grooming 4   Eating 4   Daily Activity Raw Score 24   Daily Activity Standardized Score (Calc for Raw Score >=11) 57 54   AM-PAC Applied Cognition Inpatient   Following a Speech/Presentation 4   Understanding Ordinary Conversation 4   Taking Medications 4   Remembering Where Things Are Placed or Put Away 4   Remembering List of 4-5 Errands 4   Taking Care of Complicated Tasks 4   Applied Cognition Raw Score 24   Applied Cognition Standardized Score 62 21   End of Consult   Patient Position at End of Consult Bedside chair; All needs within reach   Nurse Communication Nurse aware of consult     The patient's raw score on the AM-PAC Daily Activity Inpatient Short Form is 24  A raw score of greater than or equal to 19 suggests the patient may benefit from discharge to home  Please refer to the recommendation of the Occupational Therapist for safe discharge planning  This session, pt required and most appropriately benefited from skilled OT/PT co-eval due to decreased activity tolerance and unpredictable medical and/or functional status  OT and PT goals were addressed separately as seen in documentation       LIANE Harrington

## 2023-03-20 NOTE — PHYSICAL THERAPY NOTE
Physical Therapy Evaluation     Patient's Name: Antonio Morris    Admitting Diagnosis  Shortness of breath [R06 02]  [facility] [I50 9]  SOB (shortness of breath) [R06 02]  Presence of cardiac pacemaker [Z95 0]  Status post insertion of drug eluting coronary artery stent [Z95 5]    Problem List  Patient Active Problem List   Diagnosis    Mild intermittent asthma without complication    Allergic rhinitis    Bilateral hearing loss    Type 2 diabetes mellitus with circulatory disorder, with long-term current use of insulin ([facility] )    Hyperlipidemia    Essential hypertension    Hypothyroidism    Nicotine dependence    Osteoarthritis of knee    Vitamin B12 deficiency    Adenomatous colon polyp    Diverticulosis of large intestine    Internal hemorrhoids    Aortic stenosis    Contrast media allergy    S/P TAVR (transcatheter aortic valve replacement)    Coronary artery disease    Tachycardia-bradycardia ([facility] )    Need for influenza vaccination    Leg cramping    Peripheral artery disease ([facility] )    Presence of cardiac pacemaker    NSTEMI (non-ST elevated myocardial infarction) ([facility] )    Bruit of left carotid artery    Dyspnea on exertion    Status post insertion of drug eluting coronary artery stent    Ischemic heart failure (HCC)    SIRS (systemic inflammatory response syndrome) ([facility] )    Dyspnea    Anemia       Past Medical History  Past Medical History:   Diagnosis Date    Arthritis     Asthma     Colon polyps     Community acquired pneumonia     last assessed: 5/1/2014    Diabetes mellitus ([facility] )     Hemorrhagic prepatellar bursitis, left 10/21/2019    Hepatitis C     High cholesterol     History of colonoscopy 2017    Hypertension     Lymphadenopathy, anterior cervical 04/17/2018    Nephritis and nephropathy, not specified as acute or chronic, with other specified pathological lesion in kidney, in diseases classified elsewhere 3/26/2013    Screening for colon cancer 05/01/2019    Thoracic vertebral fracture ([facility] [address] ) 06/11/2014       Past Surgical History  Past Surgical History:   Procedure Laterality Date    CARDIAC CATHETERIZATION N/A 6/3/2022    Procedure: Cardiac RHC/LHC; Surgeon: Roni Nava MD;  [facility] CARD[facility]; Service: Cardiology    CARDIAC CATHETERIZATION N/A 6/21/2022    Procedure: CARDIAC TAVR;  Surgeon: Sailaja William MD;  Location: [facility] MAIN [facility];  Service: Cardiology    CARDIAC CATHETERIZATION N/A 2/10/2023    Procedure: Cardiac Coronary Angiogram;  Surgeon: Roni Nava MD;  [facility] CARDI[facility] C[facility]; Service: Cardiology    CARDIAC CATHETERIZATION N/A 2/10/2023    Procedure: Cardiac pci;  Surgeon: Roni Nava MD;  [facility] CARDIAC [facility]; Service: Cardiology    CARDIAC ELECTROPHYSIOLOGY PROCEDURE N/A 9/1/2022    Procedure: Cardiac pacer implant ; DC PPM;  Surgeon: Miya Sahni DO;  [facility]: [facility] CARD[facility]; Service: Cardiology    COLONOSCOPY      COLONOSCOPY W/ POLYPECTOMY      LITHOTRIPSY      MULTIPLE TOOTH EXTRACTIONS      TX COLONOSCOPY FLX DX W/COLLJ SPEC WHEN PFRMD N/A 5/17/2018    Procedure: COLONOSCOPY;  Surgeon: Rohini Loomis MD;  Location: [facility] GI LAB;   Service: Gastroenterology    [place] REPLACE AORTIC VALVE OPENFEMORAL ARTERY APPROACH N/A 6/21/2022    Procedure: REPLACEMENT AORTIC VALVE TRANSCATHETER (TAVR) TRANSFEMORAL W/ 26MM QUINN RONNI S3 ULTRA VALVE(ACCESS ON LEFT) SAULO;  Surgeon: Gordon Eid MD;  Location[facility] MA[facility];  Service: Cardiac Surgery        03/20/23 1146   PT Last Visit   PT Visit Date 03/20/23   Note Type   Note type Evaluation   Pain Assessment   Pain Assessment Tool 0-10   Pain Score No Pain   Restrictions/Precautions   Weight Bearing Precautions Per Order No   Other Precautions Fall Risk   Home Living   Type of [address] Two level;Bed/bath upstairs   Bathroom Shower/Tub Tub/shower unit   Bathroom Toilet Standard   Prior Function   Level of [place] Independent with functional mobility   Lives With Spouse; Son   [place] Help From Family   Comments Pt is 1* Syriac speaking   Cognition   Orientation Level Oriented X4   Subjective   Subjective Pt willing and agreeable to PT session   RLE Assessment   RLE Assessment WFL   LLE Assessment   LLE Assessment WFL   Coordination   Movements are Fluid and Coordinated 0   Coordination and Movement Description slow and guarded compared to baseline   Bed Mobility   Additional Comments Pt found sitting in chair, left as requested sitting in chair, call bell in reach   Transfers   Sit to Stand 7  Independent   Stand to Sit 7  Independent   Ambulation/Elevation   Gait pattern Decreased foot clearance;Shuffling;Excessively slow   Gait Assistance 5  Supervision   Additional items Assist x 1   Assistive Device None   Distance 80+80   Stair Management Assistance 5  Supervision   Additional items Assist x 1   Stair Management Technique Step to pattern; One rail R   Number of Stairs 8   Balance   Static Sitting Good   Static Standing Fair +   Ambulatory Fair +   Endurance Deficit   Endurance Deficit Yes   Endurance Deficit Description limited by fatigue compared to baseline   Activity Tolerance   Activity Tolerance Patient tolerated treatment well   Medical Staff Made Aware OT   Nurse Made Aware yes, nsg gave clearance to work with pt   Assessment   Prognosis Good   Problem List Decreased mobility; Decreased safety awareness;Decreased endurance   Assessment Pt is 78 y o  male seen for PT evaluation s/p admit to [facility] on 3/18/2023 w/ Dyspnea  PT consulted to assess pt's functional mobility and d/c needs  Order placed for PT eval and tx, w/ up w/ A order   Comorbidities affecting pt's physical performance at time of assessment include:  has a past medical history of Arthritis, Asthma, Colon polyps, Community acquired pneumonia, Diabetes mellitus ([facility] [address] ), Hemorrhagic prepatellar bursitis, left, Hepatitis C, High cholesterol, History of colonoscopy, Hypertension, Lymphadenopathy, anterior cervical, Nephritis and nephropathy, not specified as acute or chronic, with other specified pathological lesion in kidney, in diseases classified elsewhere, Screening for colon cancer, and Thoracic vertebral fracture ([facility] )  PTA, pt was ambulates community distances and elevations and lives in multi-level home  Personal factors affecting pt at time of IE include: stairs to enter home, unable to perform physical activity and inability to perform IADLs  Please find objective findings from PT assessment regarding body systems outlined above with impairments and limitations including decreased endurance and decreased activity tolerance  Pt demonstrated ability to complete all mobility with S or better level of A  The following objective measures performed on IE also reveal limitations: The patient's AM-PAC Basic Mobility Inpatient Short Form Raw Score is 22, Standardized Score is 47 4  A standardized score greater than 42 9 suggests the patient may benefit from discharge to home  Please also refer to the recommendation of the Physical Therapist for safe discharge planning  Pt's clinical presentation is currently unstable/unpredictable seen in pt's presentation of ongoing medical workup  Pt to benefit from continued mobility with staff to maintain level of functional independent mobility and consistency  From PT/mobility standpoint, recommendation at time of d/c would be no rehabilitation needs pending progress in order to facilitate return to PLOF  Barriers to Discharge Inaccessible home environment;Decreased caregiver support   Goals   Patient Goals To go home   Plan   Treatment/Interventions OT; Spoke to case management;Spoke to nursing;Gait training;Bed mobility; Patient/family training; Endurance training;LE strengthening/ROM; Functional transfer training   PT Frequency   (D/C inpt skilled PT)   Recommendation   PT Discharge Recommendation No rehabilitation needs   AM-PAC Basic Mobility Inpatient   Turning in Flat Bed Without Bedrails 4   Lying on Back to Sitting on Edge of Flat Bed Without Bedrails 4   Moving Bed to Chair 4   Standing Up From Chair Using Arms 4   Walk in Room 3   Climb 3-5 Stairs With Railing 3   Basic Mobility Inpatient Raw Score 22   Basic Mobility Standardized Score 47 4   Highest Level Of Mobility   JH-HLM Goal 7: Walk 25 feet or more   JH-HLM Achieved 7: Walk 25 feet or more             Lutricia Neighbours, PT

## 2023-03-20 NOTE — PROGRESS NOTES
INTERNAL MEDICINE RESIDENCY PROGRESS NOTE     Name: Ramírez Bynum   Age & Sex: 78 y o  male   MRN: [id number]  Unit/Bed#: -01   Encounter: [id number]  Team: SOD Team B     PATIENT INFORMATION     Name: Ramírez Bynum   Age & Sex: 78 y o  male   MRN: [id number]  Hospital Stay Days: 2    ASSESSMENT/PLAN     Principal Problem:    Dyspnea  Active Problems:    Mild intermittent asthma without complication    Type 2 diabetes mellitus with circulatory disorder, with long-term current use of insulin ([facility] )    Hyperlipidemia    Essential hypertension    Hypothyroidism    S/P TAVR (transcatheter aortic valve replacement)    Coronary artery disease    Presence of cardiac pacemaker    Ischemic heart failure (HCC)    SIRS (systemic inflammatory response syndrome) ([facility])    Anemia      Anemia  Assessment & Plan  -Pt presenting with Hgb 11 6  -Appears that Hgb has been down-trending from previous baseline 13-14 to 11s since TAVR in 06/2022  -Likely etiologies include Anemia of chronic disease vs SNOW vs Anemia 2/2 TAVR  -Iron panel showing low iron -> will start Fe supplimentation  -Transfuse Hgb<7    SIRS (systemic inflammatory response syndrome) ([facility] )  Assessment & Plan  -Patient presenting with tachycardia and tachypnea  No recent infectious sx or sick contacts  -Likely in setting of underlying respiratory distress  Low suspicion for infection at this time    Ischemic heart failure [facility])  Assessment & Plan  Wt Readings from Last 3 Encounters:   03/20/23 83 2 kg (183 lb 6 4 oz)   02/21/23 86 6 kg (191 lb)   02/21/23 87 1 kg (192 lb)     -Pt with hx of ischemic cardiomyopathy in setting recent type 1 NSTEMI 2/2 CAD now s/p PCI  -Most recent Echo (02/2023): EF 84-84% w/ systolic function is moderately reduced   Severe hypokinesis of the inferoseptal, inferior and anteroseptal walls including the apical septal and apical inferior segments   -At home was on GDMT with Jardiance 10 mg daily, Lisinopril 20 mg daily, Metoprolol Succinate 40 mg BID, and Spironolactone 25 mg daily  -Pt now presenting today with increased dyspnea at rest and with exertion since NSTEMI  Endorses Orthopnea and PND  3lb weight gain  Adherent to diet and meds  Does not seem frankly volume overloaded on exam BNP 3100  Negative trops  EKG stable from prior  CXR shows biltareal venous congestion  BNP 3100  Concern for CHF exacerbation  -IV lasix 40 mg daily transitioned to PO on 3/20  -Continue home Jardiance, Lisinopril, Metoprolol, and Spironolactone  -Cardiology following  -Strict I/O  -Daily weights  -2L fluid restriction and 2g Na restriction  -Electrolytes: K>4, Mag>2, Phos>3          Presence of cardiac pacemaker  Assessment & Plan  -Pt with 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 and is now s/p ELDER x2 in mid LAD and ELDER x1 in ostial right posterior descending  Was discharged on Aspirin 81 and Plavix 75 mg    -Now presenting with increasing dyspnea as well as reported ICD shocks with interrogation of pacemaker showing multiple episodes of NSVT  EKG stable from prior  Trop negative  CXR showing volume overload  Concern for CHF exacerbation  Low suspicion for ACS at this time  -Cardiology following given episodes of NSVT  -Continue aspirin and plavix  -Monitor on Telemetry  -Monitor electrolytes     S/P TAVR (transcatheter aortic valve replacement)  Assessment & Plan  -Patient with hx of severe aortic stenosis s/p TAVR w/ bioprosthetic valve in 06/2022   Maintained on Aspirin 81 mg and Plavix 75 mg    -Most recent Echo (02/2023) showed trace paravalvular regurgitation  The gradient recorded across the prosthetic aortic valve was within the expected range  The aortic valve peak velocity was 1 89 m/s  The aortic valve mean gradient was 7 mmHg   -Continue Aspirin and Plavix      Hypothyroidism  Assessment & Plan  -Patient is on Levothyroxine 137 mcg at home  Most recent TSH(02/10/23): 0 861   -Continue home Levothyroxine    Essential hypertension  Assessment & Plan  -At home, patient is on Lisinopril 20 mg daily and Amlodipine 5 mg daily  -Monitor BP  -Continue home Lisinopril and Amlodipine     Hyperlipidemia  Assessment & Plan  -Rosuvastatin 40 mg daily at home  -Will continue on Lipitor 80 mg daily while inpatient    Type 2 diabetes mellitus with circulatory disorder, with long-term current use of insulin [facility])  Assessment & Plan  Lab Results   Component Value Date    HGBA1C 7 6 (H) 02/10/2023       Recent Labs     03/19/23  1142 03/19/23  1642 03/19/23  2035 03/20/23  0555   POCGLU 137 120 112 84       Blood Sugar Average: Last 72 hrs:  (P) 128 [id number]     Home regimen: Lantus 18 units at bedtime, NovoLog 5 units with breakfast and dinner, metformin 850 mg, Jardiance 10 mg daily    · Continue Jardiance 10 mg for cardiac benefit  · Hold Metformin  · Lantus 14 U, lispro 4 U with breakfast and dinner -> Decreased to Lantus 10 U and discontinued Lispro due to low blood sugars  · SSI algorithm 3  · BG checks before meals and HS  · Adjust regimen as necessary to maintain -180  · Avoid hypoglycemia    Mild intermittent asthma without complication  Assessment & Plan  -Hx of mild intermittent asthma  Was on Montelukast 10 mg, Fluticasone-Salmeterol 2 puffs BID, and Albuterol PRN  -Most recent PFTs (2/10/2023) showed FEV1 43%, FVC 52%, FEV1/FVC 61% w/ minimal change with administration of bronchodilaters   Given 50 pack year hx of smoking likely represents more likely to represent severe COPD rather than asthma  -Patient now presenting with dyspnea initially requiring 2LO2 with pulmonary exam revealing end-expiratory wheezes and CXR showing bilateral venous congestion which could represent CHF exacerbation vs COPD exacerbation  -Xoponex nebs TID and Atrovent Nebs TID  -Continue home Montelukast and Fluticasone-Salmeterol   -Respiratory and Airway clearance protocol  -Maintain SpO2 88-92%    * Dyspnea  Assessment & Plan  -Pt with hx of asthma, HTN, HLD, DM 2, CAD s/p PCI w/ ELDER, severe AS s/p TAVR, V tach s/p ICD, AV block s/p PPM and hypothyroidism presenting with dyspnea which is present at rest and with exertion  Has been worsening since last hospitalization for NSTEMI during which he underwent PCI  Endorses 3 lb weight gain, orthopnea, and PND  Adherent with diet and meds  Inhalers ineffective  Also had recent shocks from pacemaker and interrogation revealed NSVT episodes  On vitals tachycardic requiring 0-2 L O2  Labs revealed Hgb 11 6 and BNP 3000  Trops negative  EKG stable from prior  CXR revealing b/l venous congestion  He also reported an episode of hemoptysis  Presentation could represent CHF exacerbation vs COPD exacerbation vs PPM malfunction vs less likely anemia  -IV Lasix 40 mg daily transitioned to PO Lasix 3/20 -> Continue outpatient  -Cardiology following  -Xoponex and Atrovent Nebs  -Continue home Metoprolol, Jardiance, Lisinopril, and Aldactone  -Monitor on Telemetry  -Respiratory protocol  -Strict I/O, Daily weights, 2L fluid restriction, 2g Na restriction  -Monitor Electrolytes   -Follow-up echocardiogram and V/Q scan          Disposition: Inpatient     SUBJECTIVE     Patient seen and examined  No acute events overnight  Today overall the patient was feeling well and had no acute complaints       OBJECTIVE     Vitals:    03/19/23 2138 03/20/23 0533 03/20/23 0718 03/20/23 0802   BP: 129/76  90/72 118/78   BP Location:   Right arm    Pulse:   94 99   Resp:   18    Temp:   98 3 °F (36 8 °C)    TempSrc:   Oral    SpO2:   92% 95%   Weight: 83 2 kg (183 lb 6 4 oz)        Temperature:   Temp (24hrs), Av 2 °F (36 8 °C), Min:98 1 °F (36 7 °C), Max:98 3 °F (36 8 °C)    Temperature: 98 3 °F (36 8 °C)  Intake & Output:  I/O        0701   0700  0701   0700  0701   0700    P  O   400 780    Total Intake(mL/kg)  400 (4 8) 780 (9 4)    Urine (mL/kg/hr)  575 (0 3) 375 (1 1)    Total Output  575 375    Net - -175 +405               Weights:        Body mass index is 27 48 kg/m²  Weight (last 2 days)     Date/Time Weight    23 0533 83 2 (183 4)        Physical Exam  Constitutional:       Appearance: He is well-developed  HENT:      Head: Normocephalic and atraumatic  Nose: Nose normal    Eyes:      General:         Right eye: No discharge  Left eye: No discharge  Conjunctiva/sclera: Conjunctivae normal       Pupils: Pupils are equal, round, and reactive to light  Neck:      Thyroid: No thyromegaly  Cardiovascular:      Rate and Rhythm: Normal rate and regular rhythm  Heart sounds: Normal heart sounds  No murmur heard  No friction rub  No gallop  Pulmonary:      Effort: Pulmonary effort is normal  No respiratory distress  Breath sounds: Normal breath sounds  No stridor  No wheezing or rales  Chest:      Chest wall: No tenderness  Abdominal:      General: Bowel sounds are normal  There is no distension  Palpations: Abdomen is soft  There is no mass  Tenderness: There is no abdominal tenderness  There is no guarding or rebound  Musculoskeletal:         General: No tenderness or deformity  Right lower leg: No edema  Left lower leg: No edema  Lymphadenopathy:      Cervical: No cervical adenopathy  Skin:     General: Skin is warm and dry  Neurological:      Mental Status: He is alert and oriented to person, place, and time  Psychiatric:         Mood and Affect: Mood normal          Behavior: Behavior normal          Thought Content:  Thought content normal          Judgment: Judgment normal        LABORATORY DATA     Labs: I have personally reviewed pertinent reports  Results from last 7 days   Lab Units 03/20/23  0443 03/19/23  0604 03/18/23  1923   WBC Thousand/uL 6 76 5 84 11 61*   HEMOGLOBIN g/dL 11 0* 11 2* 11 2*   HEMATOCRIT % 36 0* 35 7* 37 6   PLATELETS Thousands/uL 296 286 281   NEUTROS PCT % 66 69 82*   MONOS PCT % 11 13* 7      Results from last 7 days   Lab Units 03/20/23  0446 03/19/23  0604 03/18/23  1923   POTASSIUM mmol/L 3 5 4 0 4 0   CHLORIDE mmol/L 105 109* 109*   CO2 mmol/L 30 28 22   BUN mg/dL 22 20 20   CREATININE mg/dL 0 90 1 06 0 94   CALCIUM mg/dL 9 5 9 6 9 5   ALK PHOS U/L  --   --  47   ALT U/L  --   --  18   AST U/L  --   --  16     Results from last 7 days   Lab Units 03/20/23 0446 03/19/23  0605 03/18/23  1923   MAGNESIUM mg/dL 2 4 2 2 2 1     Results from last 7 days   Lab Units 03/19/23  0605   PHOSPHORUS mg/dL 3 9                    IMAGING & DIAGNOSTIC TESTING     Radiology Results: I have personally reviewed pertinent reports  XR chest 2 views    Result Date: 3/19/2023  Impression: Moderate pulmonary edema with small effusions  Workstation performed: [id number]     Other Diagnostic Testing: I have personally reviewed pertinent reports      ACTIVE MEDICATIONS     Current Facility-Administered Medications   Medication Dose Route Frequency   • acetaminophen (TYLENOL) tablet 650 mg  650 mg Oral Q6H PRN   • albuterol (PROVENTIL HFA,VENTOLIN HFA) inhaler 2 puff  2 puff Inhalation Q4H PRN   • amLODIPine (NORVASC) tablet 5 mg  5 mg Oral Daily   • aspirin chewable tablet 81 mg  81 mg Oral Daily   • atorvastatin (LIPITOR) tablet 80 mg  80 mg Oral Daily With Dinner   • cholecalciferol (VITAMIN D3) tablet 2,000 Units  2,000 Units Oral Daily   • clopidogrel (PLAVIX) tablet 75 mg  75 mg Oral Daily   • Empagliflozin (JARDIANCE) tablet 10 mg  10 mg Oral QAM   • [START ON 3/21/2023] ferrous sulfate tablet 325 mg  325 mg Oral Daily With "Breakfast   • fluticasone (FLONASE) 50 mcg/act nasal spray 2 spray  2 spray Nasal Daily   • furosemide (LASIX) tablet 40 mg  40 mg Oral Daily   • heparin (porcine) subcutaneous injection 5,000 Units  5,000 Units Subcutaneous Q8H [address]   • insulin glargine (LANTUS) subcutaneous injection 10 Units 0 1 mL  10 Units Subcutaneous HS   • insulin lispro (HumaLOG) 100 units/mL subcutaneous injection 1-6 Units  1-6 Units Subcutaneous TID AC   • insulin lispro (HumaLOG) 100 units/mL subcutaneous injection 1-6 Units  1-6 Units Subcutaneous HS   • ipratropium (ATROVENT) 0 02 % inhalation solution 0 5 mg  0 5 mg Nebulization TID   • levalbuterol (XOPENEX) inhalation solution 1 25 mg  1 25 mg Nebulization TID   • levothyroxine tablet 137 mcg  137 mcg Oral Early Morning   • lisinopril (ZESTRIL) tablet 20 mg  20 mg Oral Daily   • metoprolol succinate (TOPROL-XL) 24 hr tablet 50 mg  50 mg Oral BID   • montelukast (SINGULAIR) tablet 10 mg  10 mg Oral Daily   • spironolactone (ALDACTONE) tablet 25 mg  25 mg Oral Daily       VTE Pharmacologic Prophylaxis: Heparin  VTE Mechanical Prophylaxis: sequential compression device    Portions of the record may have been created with voice recognition software  Occasional wrong word or \"sound a like\" substitutions may have occurred due to the inherent limitations of voice recognition software    Read the chart carefully and recognize, using context, where substitutions have occurred   ==  Dorinda Escobar MD  [address]  Internal Medicine Residency PGY-3     "

## 2023-03-20 NOTE — PLAN OF CARE
Problem: Potential for Falls  Goal: Patient will remain free of falls  Description: INTERVENTIONS:  - Educate patient/family on patient safety including physical limitations  - Instruct patient to call for assistance with activity   - Consult OT/PT to assist with strengthening/mobility   - Keep Call bell within reach  - Keep bed low and locked with side rails adjusted as appropriate  - Keep care items and personal belongings within reach  - Initiate and maintain comfort rounds  - Make Fall Risk Sign visible to staff  - Apply yellow socks and bracelet for high fall risk patients  - Consider moving patient to room near nurses station  Outcome: Progressing     Problem: CARDIOVASCULAR - ADULT  Goal: Maintains optimal cardiac output and hemodynamic stability  Description: INTERVENTIONS:  - Monitor I/O, vital signs and rhythm  - Monitor for S/S and trends of decreased cardiac output  - Administer and titrate ordered vasoactive medications to optimize hemodynamic stability  - Assess quality of pulses, skin color and temperature  - Assess for signs of decreased coronary artery perfusion  - Instruct patient to report change in severity of symptoms  Outcome: Progressing  Goal: Absence of cardiac dysrhythmias or at baseline rhythm  Description: INTERVENTIONS:  - Continuous cardiac monitoring, vital signs, obtain 12 lead EKG if ordered  - Administer antiarrhythmic and heart rate control medications as ordered  - Monitor electrolytes and administer replacement therapy as ordered  Outcome: Progressing     Problem: RESPIRATORY - ADULT  Goal: Achieves optimal ventilation and oxygenation  Description: INTERVENTIONS:  - Assess for changes in respiratory status  - Assess for changes in mentation and behavior  - Position to facilitate oxygenation and minimize respiratory effort  - Oxygen administered by appropriate delivery if ordered  - Initiate smoking cessation education as indicated  - Encourage broncho-pulmonary hygiene including cough, deep breathe, Incentive Spirometry  - Assess the need for suctioning and aspirate as needed  - Assess and instruct to report SOB or any respiratory difficulty  - Respiratory Therapy support as indicated  Outcome: Progressing     Problem: GENITOURINARY - ADULT  Goal: Maintains or returns to baseline urinary function  Description: INTERVENTIONS:  - Assess urinary function  - Encourage oral fluids to ensure adequate hydration if ordered  - Administer IV fluids as ordered to ensure adequate hydration  - Administer ordered medications as needed  - Offer frequent toileting  - Follow urinary retention protocol if ordered  Outcome: Progressing  Goal: Absence of urinary retention  Description: INTERVENTIONS:  - Assess patient’s ability to void and empty bladder  - Monitor I/O  - Bladder scan as needed  - Discuss with physician/AP medications to alleviate retention as needed  - Discuss catheterization for long term situations as appropriate  Outcome: Progressing  Goal: Urinary catheter remains patent  Description: INTERVENTIONS:  - Assess patency of urinary catheter  - If patient has a chronic randle, consider changing catheter if non-functioning  - Follow guidelines for intermittent irrigation of non-functioning urinary catheter  Outcome: Progressing     Problem: METABOLIC, FLUID AND ELECTROLYTES - ADULT  Goal: Electrolytes maintained within normal limits  Description: INTERVENTIONS:  - Monitor labs and assess patient for signs and symptoms of electrolyte imbalances  - Administer electrolyte replacement as ordered  - Monitor response to electrolyte replacements, including repeat lab results as appropriate  - Instruct patient on fluid and nutrition as appropriate  Outcome: Progressing  Goal: Fluid balance maintained  Description: INTERVENTIONS:  - Monitor labs   - Monitor I/O and WT  - Instruct patient on fluid and nutrition as appropriate  - Assess for signs & symptoms of volume excess or deficit  Outcome: Progressing  Goal: Glucose maintained within target range  Description: INTERVENTIONS:  - Monitor Blood Glucose as ordered  - Assess for signs and symptoms of hyperglycemia and hypoglycemia  - Administer ordered medications to maintain glucose within target range  - Assess nutritional intake and initiate nutrition service referral as needed  Outcome: Progressing

## 2023-03-21 ENCOUNTER — APPOINTMENT (INPATIENT)
Dept: NON INVASIVE DIAGNOSTICS | Facility: HOSPITAL | Age: 78
End: 2023-03-21

## 2023-03-21 VITALS
OXYGEN SATURATION: 94 % | DIASTOLIC BLOOD PRESSURE: 90 MMHG | HEIGHT: 68 IN | RESPIRATION RATE: 17 BRPM | HEART RATE: 76 BPM | SYSTOLIC BLOOD PRESSURE: 117 MMHG | TEMPERATURE: 98.2 F | WEIGHT: 179 LBS | BODY MASS INDEX: 27.13 KG/M2

## 2023-03-21 LAB
ANION GAP SERPL CALCULATED.3IONS-SCNC: 1 MMOL/L (ref 4–13)
AORTIC VALVE MEAN VELOCITY: 14.7 M/S
APICAL FOUR CHAMBER EJECTION FRACTION: 34 %
AV AREA BY CONTINUOUS VTI: 1.4 CM2
AV AREA PEAK VELOCITY: 1.1 CM2
AV LVOT MEAN GRADIENT: 1 MMHG
AV LVOT PEAK GRADIENT: 3 MMHG
AV MEAN GRADIENT: 10 MMHG
AV PEAK GRADIENT: 20 MMHG
AV VALVE AREA: 1.37 CM2
AV VELOCITY RATIO: 0.36
BUN SERPL-MCNC: 27 MG/DL (ref 5–25)
CALCIUM SERPL-MCNC: 9.2 MG/DL (ref 8.3–10.1)
CHLORIDE SERPL-SCNC: 109 MMOL/L (ref 96–108)
CO2 SERPL-SCNC: 28 MMOL/L (ref 21–32)
CREAT SERPL-MCNC: 1.01 MG/DL (ref 0.6–1.3)
DOP CALC AO PEAK VEL: 2.26 M/S
DOP CALC AO VTI: 38.69 CM
DOP CALC LVOT AREA: 3.14 CM2
DOP CALC LVOT DIAMETER: 2 CM
DOP CALC LVOT PEAK VEL VTI: 16.89 CM
DOP CALC LVOT PEAK VEL: 0.81 M/S
DOP CALC LVOT STROKE INDEX: 26 ML/M2
DOP CALC LVOT STROKE VOLUME: 53.03
ERYTHROCYTE [DISTWIDTH] IN BLOOD BY AUTOMATED COUNT: 15.4 % (ref 11.6–15.1)
FRACTIONAL SHORTENING: 14 (ref 28–44)
GFR SERPL CREATININE-BSD FRML MDRD: 72 ML/MIN/1.73SQ M
GLUCOSE SERPL-MCNC: 126 MG/DL (ref 65–140)
GLUCOSE SERPL-MCNC: 76 MG/DL (ref 65–140)
GLUCOSE SERPL-MCNC: 95 MG/DL (ref 65–140)
HCT VFR BLD AUTO: 38.6 % (ref 36.5–49.3)
HGB BLD-MCNC: 11.5 G/DL (ref 12–17)
INTERVENTRICULAR SEPTUM IN DIASTOLE (PARASTERNAL SHORT AXIS VIEW): 1 CM
INTERVENTRICULAR SEPTUM: 1 CM (ref 0.6–1.1)
LEFT INTERNAL DIMENSION IN SYSTOLE: 4.9 CM (ref 2.1–4)
LEFT VENTRICLE DIASTOLIC VOLUME (MOD BIPLANE): 139 ML
LEFT VENTRICLE SYSTOLIC VOLUME (MOD BIPLANE): 93 ML
LEFT VENTRICULAR INTERNAL DIMENSION IN DIASTOLE: 5.7 CM (ref 3.5–6)
LEFT VENTRICULAR POSTERIOR WALL IN END DIASTOLE: 1.2 CM
LEFT VENTRICULAR STROKE VOLUME: 45 ML
LV EF: 33 %
LVSV (TEICH): 45 ML
MCH RBC QN AUTO: 26.5 PG (ref 26.8–34.3)
MCHC RBC AUTO-ENTMCNC: 29.8 G/DL (ref 31.4–37.4)
MCV RBC AUTO: 89 FL (ref 82–98)
MV E'TISSUE VEL-LAT: 8 CM/S
MV E'TISSUE VEL-SEP: 5 CM/S
PLATELET # BLD AUTO: 304 THOUSANDS/UL (ref 149–390)
PMV BLD AUTO: 10.4 FL (ref 8.9–12.7)
POTASSIUM SERPL-SCNC: 3.9 MMOL/L (ref 3.5–5.3)
RBC # BLD AUTO: 4.34 MILLION/UL (ref 3.88–5.62)
SL CV LV EF: 35
SL CV PED ECHO LEFT VENTRICLE DIASTOLIC VOLUME (MOD BIPLANE) 2D: 159 ML
SL CV PED ECHO LEFT VENTRICLE SYSTOLIC VOLUME (MOD BIPLANE) 2D: 114 ML
SODIUM SERPL-SCNC: 138 MMOL/L (ref 135–147)
TR MAX PG: 36 MMHG
TR PEAK VELOCITY: 3 M/S
TRICUSPID ANNULAR PLANE SYSTOLIC EXCURSION: 1.9 CM
TRICUSPID VALVE PEAK REGURGITATION VELOCITY: 3.01 M/S
WBC # BLD AUTO: 5.87 THOUSAND/UL (ref 4.31–10.16)

## 2023-03-21 RX ORDER — FUROSEMIDE 40 MG/1
40 TABLET ORAL DAILY
Qty: 30 TABLET | Refills: 1 | Status: SHIPPED | OUTPATIENT
Start: 2023-03-22 | End: 2023-03-28 | Stop reason: SDUPTHER

## 2023-03-21 RX ORDER — FERROUS SULFATE 325(65) MG
325 TABLET ORAL EVERY OTHER DAY
Qty: 30 TABLET | Refills: 1 | Status: SHIPPED | OUTPATIENT
Start: 2023-03-21 | End: 2023-03-28 | Stop reason: SDUPTHER

## 2023-03-21 RX ORDER — INSULIN GLARGINE 100 [IU]/ML
6 INJECTION, SOLUTION SUBCUTANEOUS
Status: DISCONTINUED | OUTPATIENT
Start: 2023-03-21 | End: 2023-03-21 | Stop reason: HOSPADM

## 2023-03-21 RX ADMIN — PERFLUTREN 0.6 ML/MIN: 6.52 INJECTION, SUSPENSION INTRAVENOUS at 08:20

## 2023-03-21 RX ADMIN — METOPROLOL SUCCINATE 50 MG: 50 TABLET, EXTENDED RELEASE ORAL at 09:17

## 2023-03-21 RX ADMIN — LISINOPRIL 20 MG: 20 TABLET ORAL at 09:17

## 2023-03-21 RX ADMIN — IPRATROPIUM BROMIDE 0.5 MG: 0.5 SOLUTION RESPIRATORY (INHALATION) at 07:56

## 2023-03-21 RX ADMIN — FLUTICASONE PROPIONATE 2 SPRAY: 50 SPRAY, METERED NASAL at 09:17

## 2023-03-21 RX ADMIN — HEPARIN SODIUM 5000 UNITS: 5000 INJECTION INTRAVENOUS; SUBCUTANEOUS at 09:18

## 2023-03-21 RX ADMIN — FERROUS SULFATE TAB 325 MG (65 MG ELEMENTAL FE) 325 MG: 325 (65 FE) TAB at 09:17

## 2023-03-21 RX ADMIN — FUROSEMIDE 40 MG: 40 TABLET ORAL at 09:14

## 2023-03-21 RX ADMIN — HEPARIN SODIUM 5000 UNITS: 5000 INJECTION INTRAVENOUS; SUBCUTANEOUS at 02:39

## 2023-03-21 RX ADMIN — CHOLECALCIFEROL TAB 25 MCG (1000 UNIT) 2000 UNITS: 25 TAB at 09:18

## 2023-03-21 RX ADMIN — MONTELUKAST 10 MG: 10 TABLET, FILM COATED ORAL at 09:14

## 2023-03-21 RX ADMIN — ASPIRIN 81 MG: 81 TABLET, CHEWABLE ORAL at 09:14

## 2023-03-21 RX ADMIN — LEVALBUTEROL HYDROCHLORIDE 1.25 MG: 1.25 SOLUTION, CONCENTRATE RESPIRATORY (INHALATION) at 07:56

## 2023-03-21 RX ADMIN — AMLODIPINE BESYLATE 5 MG: 5 TABLET ORAL at 09:17

## 2023-03-21 RX ADMIN — LEVOTHYROXINE SODIUM 137 MCG: 25 TABLET ORAL at 06:14

## 2023-03-21 RX ADMIN — EMPAGLIFLOZIN 10 MG: 10 TABLET, FILM COATED ORAL at 09:14

## 2023-03-21 RX ADMIN — SPIRONOLACTONE 25 MG: 25 TABLET ORAL at 09:14

## 2023-03-21 RX ADMIN — CLOPIDOGREL BISULFATE 75 MG: 75 TABLET ORAL at 09:14

## 2023-03-21 NOTE — PLAN OF CARE
Problem: RESPIRATORY - ADULT  Goal: Achieves optimal ventilation and oxygenation  Description: INTERVENTIONS:  - Assess for changes in respiratory status  - Assess for changes in mentation and behavior  - Position to facilitate oxygenation and minimize respiratory effort  - Oxygen administered by appropriate delivery if ordered  - Initiate smoking cessation education as indicated  - Encourage broncho-pulmonary hygiene including cough, deep breathe, Incentive Spirometry  - Assess the need for suctioning and aspirate as needed  - Assess and instruct to report SOB or any respiratory difficulty  - Respiratory Therapy support as indicated  Outcome: Progressing     Problem: GENITOURINARY - ADULT  Goal: Maintains or returns to baseline urinary function  Description: INTERVENTIONS:  - Assess urinary function  - Encourage oral fluids to ensure adequate hydration if ordered  - Administer IV fluids as ordered to ensure adequate hydration  - Administer ordered medications as needed  - Offer frequent toileting  - Follow urinary retention protocol if ordered  Outcome: Progressing  Goal: Absence of urinary retention  Description: INTERVENTIONS:  - Assess patient’s ability to void and empty bladder  - Monitor I/O  - Bladder scan as needed  - Discuss with physician/AP medications to alleviate retention as needed  - Discuss catheterization for long term situations as appropriate  Outcome: Progressing  Goal: Urinary catheter remains patent  Description: INTERVENTIONS:  - Assess patency of urinary catheter  - If patient has a chronic randle, consider changing catheter if non-functioning  - Follow guidelines for intermittent irrigation of non-functioning urinary catheter  Outcome: Progressing

## 2023-03-21 NOTE — PLAN OF CARE
Problem: Potential for Falls  Goal: Patient will remain free of falls  Description: INTERVENTIONS:  - Educate patient/family on patient safety including physical limitations  - Instruct patient to call for assistance with activity   - Consult OT/PT to assist with strengthening/mobility   - Keep Call bell within reach  - Keep bed low and locked with side rails adjusted as appropriate  - Keep care items and personal belongings within reach  - Initiate and maintain comfort rounds  - Make Fall Risk Sign visible to staff  - Offer Toileting every  Hours, in advance of need  - Initiate/Maintain alarm  - Obtain necessary fall risk management equipment:   - Apply yellow socks and bracelet for high fall risk patients  - Consider moving patient to room near nurses station  Outcome: Progressing     Problem: CARDIOVASCULAR - ADULT  Goal: Maintains optimal cardiac output and hemodynamic stability  Description: INTERVENTIONS:  - Monitor I/O, vital signs and rhythm  - Monitor for S/S and trends of decreased cardiac output  - Administer and titrate ordered vasoactive medications to optimize hemodynamic stability  - Assess quality of pulses, skin color and temperature  - Assess for signs of decreased coronary artery perfusion  - Instruct patient to report change in severity of symptoms  Outcome: Progressing  Goal: Absence of cardiac dysrhythmias or at baseline rhythm  Description: INTERVENTIONS:  - Continuous cardiac monitoring, vital signs, obtain 12 lead EKG if ordered  - Administer antiarrhythmic and heart rate control medications as ordered  - Monitor electrolytes and administer replacement therapy as ordered  Outcome: Progressing     Problem: RESPIRATORY - ADULT  Goal: Achieves optimal ventilation and oxygenation  Description: INTERVENTIONS:  - Assess for changes in respiratory status  - Assess for changes in mentation and behavior  - Position to facilitate oxygenation and minimize respiratory effort  - Oxygen administered by appropriate delivery if ordered  - Initiate smoking cessation education as indicated  - Encourage broncho-pulmonary hygiene including cough, deep breathe, Incentive Spirometry  - Assess the need for suctioning and aspirate as needed  - Assess and instruct to report SOB or any respiratory difficulty  - Respiratory Therapy support as indicated  Outcome: Progressing     Problem: GENITOURINARY - ADULT  Goal: Maintains or returns to baseline urinary function  Description: INTERVENTIONS:  - Assess urinary function  - Encourage oral fluids to ensure adequate hydration if ordered  - Administer IV fluids as ordered to ensure adequate hydration  - Administer ordered medications as needed  - Offer frequent toileting  - Follow urinary retention protocol if ordered  Outcome: Progressing  Goal: Absence of urinary retention  Description: INTERVENTIONS:  - Assess patient’s ability to void and empty bladder  - Monitor I/O  - Bladder scan as needed  - Discuss with physician/AP medications to alleviate retention as needed  - Discuss catheterization for long term situations as appropriate  Outcome: Progressing  Goal: Urinary catheter remains patent  Description: INTERVENTIONS:  - Assess patency of urinary catheter  - If patient has a chronic randle, consider changing catheter if non-functioning  - Follow guidelines for intermittent irrigation of non-functioning urinary catheter  Outcome: Progressing     Problem: METABOLIC, FLUID AND ELECTROLYTES - ADULT  Goal: Electrolytes maintained within normal limits  Description: INTERVENTIONS:  - Monitor labs and assess patient for signs and symptoms of electrolyte imbalances  - Administer electrolyte replacement as ordered  - Monitor response to electrolyte replacements, including repeat lab results as appropriate  - Instruct patient on fluid and nutrition as appropriate  Outcome: Progressing  Goal: Fluid balance maintained  Description: INTERVENTIONS:  - Monitor labs   - Monitor I/O and WT  - Instruct patient on fluid and nutrition as appropriate  - Assess for signs & symptoms of volume excess or deficit  Outcome: Progressing  Goal: Glucose maintained within target range  Description: INTERVENTIONS:  - Monitor Blood Glucose as ordered  - Assess for signs and symptoms of hyperglycemia and hypoglycemia  - Administer ordered medications to maintain glucose within target range  - Assess nutritional intake and initiate nutrition service referral as needed  Outcome: Progressing

## 2023-03-21 NOTE — NURSING NOTE
All discharge paperwork, medications and follow-up appointments reviewed with patient and family bedside  Both verbalized understanding  MD translated in [address]  Patient will leave after finishing eating

## 2023-03-21 NOTE — PROGRESS NOTES
Progress Note - Cardiology   Jessenia Cohen 78 y o  male MRN: [id number]  Unit/Bed#: -01 Encounter: [id number]    Assessment:  Principal Problem:    Dyspnea  Active Problems:    Mild intermittent asthma without complication    Type 2 diabetes mellitus with circulatory disorder, with long-term current use of insulin (HCC)    Hyperlipidemia    Essential hypertension    Hypothyroidism    S/P TAVR (transcatheter aortic valve replacement)    Coronary artery disease    Presence of cardiac pacemaker    Ischemic heart failure (HCC)    SIRS (systemic inflammatory response syndrome) (HCC)    Anemia    68-year-old man with a history of coronary artery disease, most recent PCI in February 2023 with ELDER to the LAD and RPDA  Aortic stenosis status post TAVR  Moderate to severe mitral regurgitation likely with abnormal geometry from ischemic disease  Combined CHF ejection fraction in the range of 35 to 40% on last echo  Presented with symptoms of shortness of breath, acute on chronic combined CHF  Plan:    Reviewed the echo  The ejection fraction probably is decreased compared to before  However, he has improved with the addition of diuretics  He has remained on 40 mg p o  Lasix and I would continue this dose upon discharge  Otherwise, continue lisinopril, Toprol, Aldactone, and the addition of Jardiance  On dual antiplatelet therapy with aspirin and Plavix given most recent PCI in February  Intensity atorvastatin 80 mg with good control of his last lipid panel  Aside from the above regimen, he is on 5 mg of amlodipine for hypertension  Creatinine remains stable  Okay for discharge from cardiac standpoint  Follow-up with heart failure or general cardiologist     Subjective/Objective     Subjective:    Reports improvement in breathing  Generally feeling well  Not requiring any supplemental oxygen  No chest pain      Objective:    Vitals: /90   Pulse 76   Temp 98 2 °F (36 8 °C)   Resp 17   Ht "5' 8\" (1 727 m)   Wt 81 2 kg (179 lb)   SpO2 94%   BMI 27 22 kg/m²   Vitals:    03/21/23 0600 03/21/23 0740   Weight: 81 3 kg (179 lb 3 2 oz) 81 2 kg (179 lb)     Orthostatic Blood Pressures    Flowsheet Row Most Recent Value   Blood Pressure 117/90 filed at 03/21/2023 6620   Patient Position - Orthostatic VS Lying filed at 03/20/2023 2254          Intake/Output Summary (Last 24 hours) at 3/21/2023 1206  Last data filed at 3/21/2023 0945  Gross per 24 hour   Intake 418 ml   Output 1375 ml   Net -957 ml     Physical Exam:  GEN: Mabel Aid appears well, alert and oriented x 3, pleasant and cooperative   HEENT: pupils equal, round, and reactive to light; extraocular muscles intact  NECK: supple, no carotid bruits   HEART: regular rhythm, 2/6 HSM, XIN    LUNGS: clear to auscultation bilaterally; no wheezes, rales, or rhonchi   ABDOMEN: normal bowel sounds, soft, no tenderness, no distention  EXTREMITIES: peripheral pulses normal; no clubbing, cyanosis, or edema  NEURO: no focal findings   SKIN: normal without suspicious lesions on exposed skin    Medications:    Current Facility-Administered Medications:   •  acetaminophen (TYLENOL) tablet 650 mg, 650 mg, Oral, Q6H PRN, Samra Mandelx, DO, 650 mg at 03/19/23 2138  •  albuterol (PROVENTIL HFA,VENTOLIN HFA) inhaler 2 puff, 2 puff, Inhalation, Q4H PRN, Johnathan Souza MD  •  amLODIPine (NORVASC) tablet 5 mg, 5 mg, Oral, Daily, Stormy Mandelx, DO, 5 mg at 03/21/23 9780  •  aspirin chewable tablet 81 mg, 81 mg, Oral, Daily, Samra Jaime DO, 81 mg at 03/21/23 4960  •  atorvastatin (LIPITOR) tablet 80 mg, 80 mg, Oral, Daily With Brandon Mandelx, DO, 80 mg at 03/20/23 1732  •  cholecalciferol (VITAMIN D3) tablet 2,000 Units, 2,000 Units, Oral, Daily, Samra Jaime DO, 2,000 Units at 03/21/23 0480  •  clopidogrel (PLAVIX) tablet 75 mg, 75 mg, Oral, Daily, Samra Jaime DO, 75 mg at 03/21/23 2242  •  Empagliflozin (JARDIANCE) tablet 10 mg, 10 mg, Oral, QAM, " Blas Alvarezi Minnix, DO, 10 mg at 03/21/23 9905  •  ferrous sulfate tablet 325 mg, 325 mg, Oral, Daily With Breakfast, Deloris Bangura MD, 325 mg at 03/21/23 7170  •  fluticasone (FLONASE) 50 mcg/act nasal spray 2 spray, 2 spray, Nasal, Daily, Blas Pinzonnix, DO, 2 spray at 03/21/23 4052  •  furosemide (LASIX) tablet 40 mg, 40 mg, Oral, Daily, Jose Antonio Crutch, DO, 40 mg at 03/21/23 9821  •  heparin (porcine) subcutaneous injection 5,000 Units, 5,000 Units, Subcutaneous, Q8H [facility], Blas Mandelx, DO, 5,000 Units at 03/21/23 9416  •  insulin glargine (LANTUS) subcutaneous injection 6 Units 0 06 mL, 6 Units, Subcutaneous, HS, Deloris Bangura MD  •  insulin lispro (HumaLOG) 100 units/mL subcutaneous injection 1-6 Units, 1-6 Units, Subcutaneous, TID AC **AND** Fingerstick Glucose (POCT), , , 4x Daily AC and at bedtime, Stormy Jaime DO  •  insulin lispro (HumaLOG) 100 units/mL subcutaneous injection 1-6 Units, 1-6 Units, Subcutaneous, HS, Blas Pinzonnix, DO, 4 Units at 03/19/23 0301  •  ipratropium (ATROVENT) 0 02 % inhalation solution 0 5 mg, 0 5 mg, Nebulization, TID, Yolanda Condon MD, 0 5 mg at 03/21/23 0756  •  levalbuterol (XOPENEX) inhalation solution 1 25 mg, 1 25 mg, Nebulization, TID, Yolanda Condon MD, 1 25 mg at 03/21/23 0756  •  levothyroxine tablet 137 mcg, 137 mcg, Oral, Early Morning, Blas Pinzonnix, DO, 137 mcg at 03/21/23 3066  •  lisinopril (ZESTRIL) tablet 20 mg, 20 mg, Oral, Daily, Blas Pinzonnix, DO, 20 mg at 03/21/23 5690  •  metoprolol succinate (TOPROL-XL) 24 hr tablet 50 mg, 50 mg, Oral, BID, Stormy DAVID Minkalax, DO, 50 mg at 03/21/23 5617  •  montelukast (SINGULAIR) tablet 10 mg, 10 mg, Oral, Daily, Blas Alvarezi Minnix, DO, 10 mg at 03/21/23 7000  •  spironolactone (ALDACTONE) tablet 25 mg, 25 mg, Oral, Daily, Blenda Pagsonali Minnix, DO, 25 mg at 03/21/23 4082    Lab Results:      Results from last 7 days   Lab Units 03/21/23  0453 03/20/23  0443 03/19/23  0604   WBC Thousand/uL 5 87 6 76 5 84   HEMOGLOBIN g/dL 11 5* 11 0* 11 2*   HEMATOCRIT % 38 6 36 0* 35 7*   PLATELETS Thousands/uL 304 296 286         Results from last 7 days   Lab Units 03/21/23  0453 03/20/23 0446 03/19/23  0604 03/18/23  1923   SODIUM mmol/L 138 139 141 137   POTASSIUM mmol/L 3 9 3 5 4 0 4 0   CHLORIDE mmol/L 109* 105 109* 109*   CO2 mmol/L 28 30 28 22   BUN mg/dL 27* 22 20 20   CREATININE mg/dL 1 01 0 90 1 06 0 94   CALCIUM mg/dL 9 2 9 5 9 6 9 5   ALK PHOS U/L  --   --   --  47   ALT U/L  --   --   --  18   AST U/L  --   --   --  16         Results from last 7 days   Lab Units 03/20/23 0446 03/19/23  0605 03/18/23 1923   MAGNESIUM mg/dL 2 4 2 2 2 1     Echo 2/10/23  Left Ventricle: Left ventricular cavity size is normal  Wall thickness is mildly increased  The left ventricular ejection fraction is 35-40%  Systolic function is moderately reduced  Severe hypokinesis of the inferoseptal, inferior and anteroseptal walls including the apical septal and apical inferior segments  •  IVS: There i abnormal septal motion consistent with left bundle branch block  There is sigmoid appearance of the septum  •  Mitral Valve: There is moderately reduced mobility  There is moderate to severe regurgitation with a posteriorly directed jet  •  Tricuspid Valve: There is mild to moderate regurgitation  The tricuspid valve regurgitation jet is central  The right ventricular systolic pressure is elevated  The estimated right ventricular systolic pressure is 07 98 mmHg  •  Left Atrium: The atrium is moderately dilated  •  Right Atrium: The atrium is mildly dilated  •  Aortic Valve: There is an Emery RONNI 3 Ultra 26 mm TAVR bioprosthetic valve  There is trace paravalvular regurgitation  The gradient recorded across the prosthetic aortic valve is within the expected range  The saortic valve peak velocity is 1 89 m/s  The aortic valve mean gradient is 7 mmHg  •  Pericardium: There is a trivial pericardial effusion along the right atrial free wall

## 2023-03-22 ENCOUNTER — PATIENT OUTREACH (OUTPATIENT)
Dept: INTERNAL MEDICINE CLINIC | Facility: CLINIC | Age: 78
End: 2023-03-22

## 2023-03-22 DIAGNOSIS — Z95.5 STATUS POST INSERTION OF DRUG ELUTING CORONARY ARTERY STENT: ICD-10-CM

## 2023-03-22 DIAGNOSIS — I21.4 NSTEMI (NON-ST ELEVATED MYOCARDIAL INFARCTION) (HCC): ICD-10-CM

## 2023-03-22 DIAGNOSIS — I25.10 CORONARY ARTERY DISEASE INVOLVING NATIVE CORONARY ARTERY OF NATIVE HEART WITHOUT ANGINA PECTORIS: ICD-10-CM

## 2023-03-22 DIAGNOSIS — Z71.89 COMPLEX CARE COORDINATION: Primary | ICD-10-CM

## 2023-03-22 RX ORDER — METOPROLOL SUCCINATE 50 MG/1
TABLET, EXTENDED RELEASE ORAL
Qty: 180 TABLET | Refills: 2 | Status: SHIPPED | OUTPATIENT
Start: 2023-03-22

## 2023-03-23 ENCOUNTER — TRANSITIONAL CARE MANAGEMENT (OUTPATIENT)
Dept: INTERNAL MEDICINE CLINIC | Facility: CLINIC | Age: 78
End: 2023-03-23

## 2023-03-24 ENCOUNTER — REMOTE DEVICE CLINIC VISIT (OUTPATIENT)
Dept: CARDIOLOGY CLINIC | Facility: CLINIC | Age: 78
End: 2023-03-24

## 2023-03-24 DIAGNOSIS — Z95.0 PRESENCE OF CARDIAC PACEMAKER: Primary | ICD-10-CM

## 2023-03-24 NOTE — PROGRESS NOTES
Results for orders placed or performed in visit on 03/24/23   Cardiac EP device report    Narrative    MDT DC PM/MRI CONDITIONAL  CARELINK TRANSMISSION: BATTERY VOLTAGE ADEQUATE (14 8 YRS)  AP 7 5%  <0 1% ALL AVAILABLE LEAD PARAMETERS WITHIN NORMAL LIMITS  5 VT-NS EPISODES PRIOR TO HOSPITAL INTERROGATION ON 3/18/23 WITH 4 AVAILABLE EGMS ON PDF#2 SHOWING 2 NSVT EPISODES(#25 6 BEATS  BPM, #24 9 BEATS  BPM); OTHER EGMS SHOWING PAT WITH QRS SIMILAR TO INTRINSIC (17 BEATS  BPM, 11 BEATS  BPM)  TAKES METOPROLOL SUCC  EF 35% (ECHO 3/21/23)  NORMAL DEVICE FUNCTION   AM/RG

## 2023-03-27 ENCOUNTER — PATIENT OUTREACH (OUTPATIENT)
Dept: INTERNAL MEDICINE CLINIC | Facility: CLINIC | Age: 78
End: 2023-03-27

## 2023-03-27 NOTE — PROGRESS NOTES
Outpatient Care Management Note:    Patient referred to outpatient nurse care management as he was identified as a high risk for readmission  Patient was hospitalized at [facility] from 3/18-3/21/23 for dyspnea  Patient has history of CAD, s/p transcatheter aortic valve replacement, pacemake, ischemic heart failure, HLD, HTN, hypothyroidism, DM2, and asthma  Patient was discharged home on Lasix 40 mg daily and to get BMP on 3/28  Patient scheduled to follow up with Cardiology on 3/28 and PCP on 3/30  I called and spoke with patient using [employer]  # [id number]  I explained my role and reason for call  Patient reports he has been feeling well since being home from the hospital and denies any symptoms or complaints at this time  Patient aware of cardiology appointment tomorrow 3/28 @ 11:20 am and PCP appointment on 3/30 @ 1:30 pm  Patient reports he drives himself to appointments  He reports he is independent with ADL's  He reports his wife helps him manage his medication  He confirms he is taking Lasix 40 mg daily and reports he is voiding more often  I did explain to him how this medication works and encouraged him to continue to take it  Reviewed need to get bloodwork (BMP) completed tomorrow  Patient confirms he is taking Jardiance, Lisinopril, Metoprolol, Amlodipine and Spironolactone  Encouraged to watch his fluid and salt intake and to keep a log of his daily weights  Patient confirms he is taking Metformin, Lantus 18 units at bedtime and Novolog 6 units with breakfast and dinner  Per d/c med list patient to be taking 5 units of Novolog  Per patient he has been taking 6 units even before hospital stay and instructed to continue with 6 units and will make PCP aware  Patient has working glucometer at home  Patient does not have any further questions, concerns, or other needs at this time   Patient declining need for further outreach and feels he is managing ok at this time and feels he has enough family support at this time

## 2023-03-27 NOTE — PROGRESS NOTES
Heart Failure Outpatient Consult Note - Olive Escoto 78 y o  male MRN: [id number]    @ Encounter: [id number]      Assessment/Plan:    Patient Active Problem List    Diagnosis Date Noted   • SIRS (systemic inflammatory response syndrome) ([facility] ) 03/19/2023   • Dyspnea 03/19/2023   • Anemia 03/19/2023   • Ischemic heart failure ([facility] ) 03/18/2023   • Status post insertion of drug eluting coronary artery stent 02/10/2023   • Presence of cardiac pacemaker 01/30/2023   • NSTEMI (non-ST elevated myocardial infarction) ([facility] ) 01/30/2023   • Bruit of left carotid artery 01/30/2023   • Dyspnea on exertion 01/30/2023   • Peripheral artery disease ([facility] ) 01/18/2023   • Need for influenza vaccination 11/10/2022   • Leg cramping 11/10/2022   • Tachycardia-bradycardia ([facility] ) 09/01/2022   • S/P TAVR (transcatheter aortic valve replacement) 06/21/2022   • Coronary artery disease 06/21/2022   • Contrast media allergy 05/31/2022   • Aortic stenosis 01/19/2022   • Diverticulosis of large intestine 09/20/2018   • Internal hemorrhoids 09/20/2018   • Nicotine dependence 04/13/2017   • Osteoarthritis of knee 04/13/2017   • Bilateral hearing loss 01/30/2017   • Allergic rhinitis 02/24/2016   • Type 2 diabetes mellitus with circulatory disorder, with long-term current use of insulin ([facility] ) 11/24/2015   • Adenomatous colon polyp 06/11/2014   • Hyperlipidemia 09/13/2013   • Vitamin B12 deficiency 07/24/2012   • Mild intermittent asthma without complication 12/80/2329   • Essential hypertension 06/08/2012   • Hypothyroidism 06/07/2012       Chronic HFrEF LVEF 30-35% NYHA class II stage C  Etiology:iCM with possible valvular component given severe MR  Warm and compensated on exam  Weight stable  Wants to be fitted for LifeVest  Interested in [place]  2g Na and 2 L fluid restriction reviewed  Needs post hospital BMP  Weight at discharge 183 lbs, today 186 lbs         Lab Results   Component Value Date    NTBNP 3,136 (H) 03/18/2023      TTE 3/21/23: LVEF 30-35%,LVIDd 5 7 cm,  RV normal, no AI, no AS, severe MR, mild TR, triavial pericardial effusion, IVC normal   The following segments are akinetic: basal inferoseptal, basal inferior, mid inferoseptal and mid inferolateral   The following segments are hypokinetic: mid inferior and apical inferior  TTE 2/10/23: LVEF 35-40%  TTE 7/28/22: LVEF 55%, moderate MR with posteriorly directed jet, mild TR    Neurohormonal Blockade:   --Beta-Blocker:Metoprolol succinate 50 mg BID  --ACEi, ARB or ARNi: Lisinopril 20 mg daily  --SGLT2i- jardiance 10 mg daily  --Aldosterone Receptor Blocker: Spironolactone 25 mg daily  --Diuretic: Lasix 40 mg daily    Sudden Cardiac Death Risk Reduction:  --ICD: EF 30-35%, MDT DC PPM in situ, candidate for LifeVest    Electrical Resynchronization:  --Candidacy for BiV device: LBBB    Advanced Therapies (if appropriate): --Inotrope:  --LVAD/Transplant Candidacy:    Severe MR  Severe AS KYRA 0 70cm2, sp TF TAVR #26mm Emery Hernán S3 Ultra valve via left femoral approach by Dr Candace Rodriguez  6/21/22   Post op LBBB  TBS with LBBB, pauses post TAVR  S/P MDT PPM   Interrogation 3/24/23: AP 7 5%  <0 1% ALL AVAILABLE LEAD PARAMETERS WITHIN NORMAL LIMITS  5 VT-NS EPISODES PRIOR TO HOSPITAL INTERROGATION ON 3/18/23 WITH 4 AVAILABLE EGMS ON PDF#2 SHOWING 2 NSVT EPISODES(#25 6 BEATS  BPM, #24 9 BEATS  BPM); OTHER EGMS SHOWING PAT WITH QRS SIMILAR TO INTRINSIC (17 BEATS  BPM, 11 BEATS  BPM)  CAD  --2/10/23 [facility]: 3-vessel CAD with two identifiable culprits for NSTEMI in ost RPDA & mid LAD  Successful PCI w/ ELDER x2 to mid LAD & ELDER x1 ost RPDA; residual moderate disease unchanged from 6-2022 study    --6/03/22 [facility], pre TAVR: First marginal lesion 50% stenosis, mid LAD 50% stenosis, RPDA 50% stenosis, RPDA to 50% stenosis, 1st diagonal 50% stenosis      Hypertension  Hyperlipidemia 11/10/22 TC 142, TG 75, HDL 65, LDL 62   DM2 HgbA1C 7 6  Asthma  Hepatitis C   Untreated per chart notes  Hep C Ab high reactive 1/2022  Management per PCP  Will inquire further at next OV  [place] Record Lymphadenopathy  Tobacco abuse denies smoking for the past 5 months       HPI:   78year old presents with his wife for hospital follow up  Video  present for the entirety of visit  Recently admitted with acute decompensated heart failure  Was diuresed and optimized on HF GDMT  Today he reports feeling well  Taking all of his prescribed medications  Understands importance of weighing himself daily and has been adherent with this  His wife is inquiring about [employer]  Past Medical History:   Diagnosis Date   • Arthritis    • Asthma    • Colon polyps    • Community acquired pneumonia     last assessed: 5/1/2014   • Diabetes mellitus ([facility] )    • Hemorrhagic prepatellar bursitis, left 10/21/2019   • Hepatitis C    • High cholesterol    • History of colonoscopy 2017   • Hypertension    • Lymphadenopathy, anterior cervical 04/17/2018   • Nephritis and nephropathy, not specified as acute or chronic, with other specified pathological lesion in kidney, in diseases classified elsewhere 3/26/2013   • Screening for colon cancer 05/01/2019   • Thoracic vertebral fracture ([facility] ) 06/11/2014       12 point ROS negative other than that stated in HPI    Allergies   Allergen Reactions   • Iv Contrast [Iodinated Contrast Media] Rash     Patient states he had a severe reaction approximately 10 years ago , states he had a rash, itchy and he remembers a bunch of people pounding on chest and being surrounded by multiple doctors            Current Outpatient Medications:   •  Advair -21 MCG/ACT inhaler, TAKE 2 PUFFS BY MOUTH TWICE A DAY, Disp: 36 g, Rfl: 6  •  albuterol (PROVENTIL HFA,VENTOLIN HFA) 90 mcg/act inhaler, Inhale 2 puffs every 4 hours as needed for wheezing or shortness of breath , Disp: 18 g, Rfl: 2  •  Alcohol Swabs (ALCOHOL PADS) 70 % PADS, , Disp: , Rfl:   •  amLODIPine (NORVASC) 5 mg tablet, Take 1 tablet (5 mg total) by mouth daily, Disp: 90 tablet, Rfl: 1  •  Aspirin Low Dose 81 MG chewable tablet, CHEW 1 TABLET BY MOUTH DAILY, Disp: 90 tablet, Rfl: 3  •  Blood Glucose Monitoring Suppl (OneTouch Verio) w/Device KIT, Check blood glucose three times daily before each meal, Disp: 1 kit, Rfl: 0  •  cholecalciferol (VITAMIN D3) 1,000 units tablet, Take 2 tablets (2,000 Units total) by mouth daily, Disp: 180 tablet, Rfl: 5  •  clopidogrel (PLAVIX) 75 mg tablet, Take 1 tablet (75 mg total) by mouth daily, Disp: 90 tablet, Rfl: 3  •  Continuous Blood Gluc  (FreeStyle Cristofer 2 Hudson) PWOER, Use 1 each continuous (Patient not taking: Reported on 1/30/2023), Disp: 1 each, Rfl: 0  •  Continuous Blood Gluc Sensor (FreeStyle Cristofer 2 Sensor) MISC, Use 1 each every 14 (fourteen) days (Patient not taking: Reported on 1/30/2023), Disp: 6 each, Rfl: 3  •  Empagliflozin (JARDIANCE) 10 MG TABS tablet, Take 1 tablet (10 mg total) by mouth every morning, Disp: 90 tablet, Rfl: 3  •  ferrous sulfate 325 (65 Fe) mg tablet, Take 1 tablet (325 mg total) by mouth every other day, Disp: 30 tablet, Rfl: 1  •  fluticasone (FLONASE) 50 mcg/act nasal spray, 2 sprays into each nostril daily, Disp: 2 Bottle, Rfl: 2  •  furosemide (LASIX) 40 mg tablet, Take 1 tablet (40 mg total) by mouth daily Do not start before March 22, 2023 , Disp: 30 tablet, Rfl: 1  •  Insulin Pen Needle (Pen Needles) 31G X 8 MM MISC, Use daily, Disp: 300 each, Rfl: 3  •  Lancets (FREESTYLE) lancets, by Other route as needed (As needed), Disp: 100 each, Rfl: 3  •  Lantus SoloStar 100 units/mL SOPN, INJECT 0 18 ML (18 UNITS TOTAL) UNDER THE SKIN DAILY AT BEDTIME, Disp: 15 mL, Rfl: 3  •  levothyroxine 137 mcg tablet, TAKE 1 TABLET BY MOUTH EVERY DAY, Disp: 90 tablet, Rfl: 3  •  lisinopril (ZESTRIL) 20 mg tablet, Take 1 tablet (20 mg total) by mouth daily, Disp: 90 tablet, Rfl: 3  •  metFORMIN (GLUCOPHAGE) 850 mg tablet, TAKE 1 TABLET (850 MG TOTAL) BY MOUTH 2 (TWO) TIMES A DAY WITH MEALS, Disp: 180 tablet, Rfl: 3  •  methocarbamol (Robaxin-750) 750 mg tablet, Take 1 tablet (750 mg total) by mouth every 6 (six) hours as needed for muscle spasms, Disp: 60 tablet, Rfl: 0  •  metoprolol succinate (TOPROL-XL) 50 mg 24 hr tablet, TAKE 1 TABLET BY MOUTH TWICE A DAY, Disp: 180 tablet, Rfl: 2  •  montelukast (SINGULAIR) 10 mg tablet, TAKE 1 TABLET BY MOUTH EVERY DAY, Disp: 90 tablet, Rfl: 3  •  nitroglycerin (NITROSTAT) 0 4 mg SL tablet, Place 1 tablet (0 4 mg total) under the tongue every 5 (five) minutes as needed for chest pain, Disp: 30 tablet, Rfl: 1  •  NovoLOG FlexPen 100 units/mL injection pen, Inject 5 units with breakfast and with dinner, Disp: 12 mL, Rfl: 3  •  rosuvastatin (CRESTOR) 40 MG tablet, TAKE 1 TABLET BY MOUTH EVERY DAY, Disp: 90 tablet, Rfl: 3  •  spironolactone (ALDACTONE) 25 mg tablet, Take 1 tablet (25 mg total) by mouth daily Do not start before 2023 , Disp: 90 tablet, Rfl: 3    Social History     Socioeconomic History   • Marital status: /Civil Union     Spouse name: Not on file   • Number of children: Not on file   • Years of education: Not on file   • Highest education level: Not on file   Occupational History   • Occupation: retired    Tobacco Use   • Smoking status: Former     Packs/day: 0 50     Types: Cigarettes     Quit date: 2022     Years since quittin 8   • Smokeless tobacco: Never   • Tobacco comments:     started when he was about 24 yrs old; stopped smoking 3 wks ago   Vaping Use   • Vaping Use: Never used   Substance and Sexual Activity   • Alcohol use: No   • Drug use: No   • Sexual activity: Not Currently   Other Topics Concern   • Not on file   Social History Narrative   • Not on file     Social Determinants of Health     Financial Resource Strain: Low Risk    • Difficulty of Paying Living Expenses: Not hard at all   Food Insecurity: No Food Insecurity   • Worried About Running Out of Food in the Last Year: Never "true   • Ran Out of Food in the Last Year: Never true   Transportation Needs: No Transportation Needs   • Lack of Transportation (Medical): No   • Lack of Transportation (Non-Medical):  No   Physical Activity: Not on file   Stress: Not on file   Social Connections: Not on file   Intimate Partner Violence: Not on file   Housing Stability: Low Risk    • Unable to Pay for Housing in the Last Year: No   • Number of Places Lived in the Last Year: 1   • Unstable Housing in the Last Year: No       Family History   Problem Relation Age of Onset   • Heart attack Family         at age 80   • No Known Problems Mother    • No Known Problems Father    • Diabetes Sister    • Diabetes Brother        Physical Exam:    Vitals: /50 (BP Location: Left arm, Patient Position: Sitting, Cuff Size: Standard)   Pulse 85   Ht 5' 8\" (1 727 m)   Wt 84 4 kg (186 lb)   SpO2 98%   BMI 28 28 kg/m²     Wt Readings from Last 3 Encounters:   03/21/23 81 2 kg (179 lb)   02/21/23 86 6 kg (191 lb)   02/21/23 87 1 kg (192 lb)       Physical Exam:    GEN: Hughie Boxer appears well, alert and oriented x 3, pleasant and cooperative   HEENT: pupils equal, round, and reactive to light; extraocular muscles intact  NECK: supple, no carotid bruits   HEART: regular rhythm, normal S1 and S2, no murmurs, clicks, gallops or rubs, JVP is flat  LUNGS: clear to auscultation bilaterally; no wheezes, rales, or rhonchi   ABDOMEN: normal bowel sounds, soft, no tenderness, no distention  EXTREMITIES: peripheral pulses normal; no clubbing, cyanosis, or edema  NEURO: no focal findings   SKIN: normal without suspicious lesions on exposed skin    Labs & Results:    Lab Results   Component Value Date    WBC 5 87 03/21/2023    HGB 11 5 (L) 03/21/2023    HCT 38 6 03/21/2023    MCV 89 03/21/2023     03/21/2023     Lab Results   Component Value Date    SODIUM 138 03/21/2023    K 3 9 03/21/2023     (H) 03/21/2023    CO2 28 03/21/2023    BUN 27 (H) " 03/21/2023    CREATININE 1 01 03/21/2023    GLUC 95 03/21/2023    CALCIUM 9 2 03/21/2023     Lab Results   Component Value Date    NTBNP 3,136 (H) 03/18/2023      Lab Results   Component Value Date    CHOLESTEROL 137 02/10/2023    CHOLESTEROL 142 11/10/2022    CHOLESTEROL 191 01/25/2022     Lab Results   Component Value Date    HDL 53 02/10/2023    HDL 65 11/10/2022    HDL 49 01/25/2022     Lab Results   Component Value Date    TRIG 61 02/10/2023    TRIG 75 11/10/2022    TRIG 119 01/25/2022     Lab Results   Component Value Date    NONHDLC 84 02/10/2023       EKG personally reviewed by KAREN Augustin  No results found for this visit on 03/28/23  Counseling / Coordination of Care  Total floor / unit time spent today 40 minutes  Greater than 50% of total time was spent with the patient and / or family counseling and / or coordination of care  A description of the counseling / coordination of care: 25 min  Thank you for the opportunity to participate in the care of this patient  Lanie Soriano

## 2023-03-28 ENCOUNTER — OFFICE VISIT (OUTPATIENT)
Dept: CARDIOLOGY CLINIC | Facility: CLINIC | Age: 78
End: 2023-03-28

## 2023-03-28 VITALS
BODY MASS INDEX: 28.19 KG/M2 | HEART RATE: 85 BPM | SYSTOLIC BLOOD PRESSURE: 120 MMHG | OXYGEN SATURATION: 98 % | HEIGHT: 68 IN | WEIGHT: 186 LBS | DIASTOLIC BLOOD PRESSURE: 50 MMHG

## 2023-03-28 DIAGNOSIS — I50.9 CHF (CONGESTIVE HEART FAILURE) (HCC): ICD-10-CM

## 2023-03-28 DIAGNOSIS — Z95.2 S/P TAVR (TRANSCATHETER AORTIC VALVE REPLACEMENT): ICD-10-CM

## 2023-03-28 DIAGNOSIS — I10 HYPERTENSION, UNSPECIFIED TYPE: ICD-10-CM

## 2023-03-28 DIAGNOSIS — D50.9 IRON DEFICIENCY ANEMIA, UNSPECIFIED IRON DEFICIENCY ANEMIA TYPE: ICD-10-CM

## 2023-03-28 RX ORDER — FUROSEMIDE 40 MG/1
40 TABLET ORAL DAILY
Qty: 30 TABLET | Refills: 3 | Status: SHIPPED | OUTPATIENT
Start: 2023-03-28

## 2023-03-28 RX ORDER — LISINOPRIL 20 MG/1
20 TABLET ORAL DAILY
Qty: 90 TABLET | Refills: 3 | Status: SHIPPED | OUTPATIENT
Start: 2023-03-28

## 2023-03-28 RX ORDER — LISINOPRIL 20 MG/1
30 TABLET ORAL DAILY
Qty: 90 TABLET | Refills: 3 | Status: SHIPPED | OUTPATIENT
Start: 2023-03-28 | End: 2023-03-28

## 2023-03-28 RX ORDER — FERROUS SULFATE 325(65) MG
325 TABLET ORAL EVERY OTHER DAY
Qty: 30 TABLET | Refills: 3 | Status: SHIPPED | OUTPATIENT
Start: 2023-03-28

## 2023-03-28 RX ORDER — CLOPIDOGREL BISULFATE 75 MG/1
75 TABLET ORAL DAILY
Qty: 90 TABLET | Refills: 3 | Status: SHIPPED | OUTPATIENT
Start: 2023-03-28 | End: 2023-06-26

## 2023-03-28 NOTE — PATIENT INSTRUCTIONS
Weight yourself daily  If you gain 3 lbs in one day or 5 lbs in one week, please call the office at [phone] and ask for a nurse or the heart failure nurse  Keep your sodium intake to <2 grams, (2000 mg) per day, and fluids <2 Liters (2000 ml) per day  This is around 6-7, 8 oz glasses of fluid per day    We will look in to [employer] for you  These are specially prepared meals for people with chronic conditions, specifically congestive heart failure  Get follow up lab work tomorrow     We will contact [employer], this is the company who will fit you at home for your LifeVest

## 2023-03-29 ENCOUNTER — TELEPHONE (OUTPATIENT)
Dept: CARDIOLOGY CLINIC | Facility: CLINIC | Age: 78
End: 2023-03-29

## 2023-03-29 NOTE — TELEPHONE ENCOUNTER
Outpatient Advanced Heart Failure LCSW received referral from [address] [place] [address] QUINN Murray that pt wants [employer] contact info  LCSW utilized [employer] #[id number]  Patient answered phone; introduced self and reason for call  Provided [employer] # [phone]  Encouraged patient to inform [employer] that he resides in [place] and provide his insurance info to see if reduced rate can be applied, otherwise the meals cost approx $7 99/ meal      Patient asked if he had to eat them everyday  LCSW replied that everyone is different and some people like to cook at home using low salt items but may use [employer] when they feel tired  Cautioned patient about using [employer] seasoning because of sodium content  Encouraged pt to read nutrition labels for sodium  Asked patient if he had any other questions at this time and he said, no  Patient expressed appreciation for this phone call  He does not use the internet so he will f/u with [employer] via phone

## 2023-03-30 ENCOUNTER — OFFICE VISIT (OUTPATIENT)
Dept: INTERNAL MEDICINE CLINIC | Facility: CLINIC | Age: 78
End: 2023-03-30

## 2023-03-30 ENCOUNTER — APPOINTMENT (OUTPATIENT)
Dept: LAB | Facility: CLINIC | Age: 78
End: 2023-03-30

## 2023-03-30 VITALS
OXYGEN SATURATION: 98 % | SYSTOLIC BLOOD PRESSURE: 112 MMHG | DIASTOLIC BLOOD PRESSURE: 69 MMHG | TEMPERATURE: 98.2 F | HEART RATE: 78 BPM | WEIGHT: 188 LBS | BODY MASS INDEX: 28.49 KG/M2 | HEIGHT: 68 IN

## 2023-03-30 DIAGNOSIS — Z95.5 STATUS POST INSERTION OF DRUG ELUTING CORONARY ARTERY STENT: ICD-10-CM

## 2023-03-30 DIAGNOSIS — I21.4 NSTEMI (NON-ST ELEVATED MYOCARDIAL INFARCTION) (HCC): ICD-10-CM

## 2023-03-30 DIAGNOSIS — Z76.89 ENCOUNTER FOR SUPPORT AND COORDINATION OF TRANSITION OF CARE: Primary | ICD-10-CM

## 2023-03-30 DIAGNOSIS — Z95.2 S/P TAVR (TRANSCATHETER AORTIC VALVE REPLACEMENT): ICD-10-CM

## 2023-03-30 DIAGNOSIS — J45.20 MILD INTERMITTENT ASTHMA WITHOUT COMPLICATION: ICD-10-CM

## 2023-03-30 DIAGNOSIS — I35.0 NONRHEUMATIC AORTIC VALVE STENOSIS: ICD-10-CM

## 2023-03-30 DIAGNOSIS — I25.10 CORONARY ARTERY DISEASE INVOLVING NATIVE CORONARY ARTERY OF NATIVE HEART WITHOUT ANGINA PECTORIS: ICD-10-CM

## 2023-03-30 DIAGNOSIS — I50.9 CHF (CONGESTIVE HEART FAILURE) (HCC): ICD-10-CM

## 2023-03-30 DIAGNOSIS — J45.20 MILD INTERMITTENT ASTHMA, UNSPECIFIED WHETHER COMPLICATED: ICD-10-CM

## 2023-03-30 LAB
ALBUMIN SERPL BCP-MCNC: 3.2 G/DL (ref 3.5–5)
ALP SERPL-CCNC: 52 U/L (ref 46–116)
ALT SERPL W P-5'-P-CCNC: 27 U/L (ref 12–78)
ANION GAP SERPL CALCULATED.3IONS-SCNC: 2 MMOL/L (ref 4–13)
AST SERPL W P-5'-P-CCNC: 16 U/L (ref 5–45)
BILIRUB SERPL-MCNC: 0.64 MG/DL (ref 0.2–1)
BUN SERPL-MCNC: 16 MG/DL (ref 5–25)
CALCIUM ALBUM COR SERPL-MCNC: 9.9 MG/DL (ref 8.3–10.1)
CALCIUM SERPL-MCNC: 9.3 MG/DL (ref 8.3–10.1)
CHLORIDE SERPL-SCNC: 104 MMOL/L (ref 96–108)
CO2 SERPL-SCNC: 30 MMOL/L (ref 21–32)
CREAT SERPL-MCNC: 1.1 MG/DL (ref 0.6–1.3)
GFR SERPL CREATININE-BSD FRML MDRD: 65 ML/MIN/1.73SQ M
GLUCOSE SERPL-MCNC: 109 MG/DL (ref 65–140)
POTASSIUM SERPL-SCNC: 3.6 MMOL/L (ref 3.5–5.3)
PROT SERPL-MCNC: 6.9 G/DL (ref 6.4–8.4)
SODIUM SERPL-SCNC: 136 MMOL/L (ref 135–147)

## 2023-03-30 RX ORDER — ALBUTEROL SULFATE 90 UG/1
AEROSOL, METERED RESPIRATORY (INHALATION)
Qty: 18 G | Refills: 2 | Status: SHIPPED | OUTPATIENT
Start: 2023-03-30

## 2023-03-30 RX ORDER — FLUTICASONE PROPIONATE AND SALMETEROL XINAFOATE 230; 21 UG/1; UG/1
2 AEROSOL, METERED RESPIRATORY (INHALATION) 2 TIMES DAILY
Qty: 36 G | Refills: 6 | Status: SHIPPED | OUTPATIENT
Start: 2023-03-30

## 2023-03-30 RX ORDER — NITROGLYCERIN 0.4 MG/1
0.4 TABLET SUBLINGUAL
Qty: 30 TABLET | Refills: 1 | Status: SHIPPED | OUTPATIENT
Start: 2023-03-30

## 2023-03-30 RX ORDER — ASPIRIN 81 MG/1
81 TABLET, CHEWABLE ORAL DAILY
Qty: 90 TABLET | Refills: 3 | Status: SHIPPED | OUTPATIENT
Start: 2023-03-30

## 2023-03-30 NOTE — PATIENT INSTRUCTIONS
Thank you for visiting our clinic! It was nice seeing you!     Today, we discussed-  - Diet and lifestyle modifications and strategies for weight loss  - Please follow up with us in 2 months to review CHF and DM2  - Please weigh yourself daily and reach out to HF clinic if you gain more than 3-4 lbs over 1-2 days and more than 5 lbs in a week

## 2023-03-30 NOTE — PROGRESS NOTES
[facility]  INTERNAL MEDICINE RESIDENCY    Transitions of Care Management - Clinic Note  Kathy Vasuqez 78 y o  male   MRN: [id number]    Assessment and Plan      TCM Call     Date and time call was made  3/24/2023 11:18 AM    Hospital care reviewed  Records reviewed    Patient was hospitialized at  [facility]    Date of Admission  03/18/23    Date of discharge  03/21/23    Diagnosis  Dyspnea R06 00    Disposition  Home    Were the patients medications reviewed and updated  No    Current Symptoms  None      TCM Call     Post hospital issues  Reduced activity; Poor ADL (Activities of Daily Living) performance    Should patient be enrolled in anticoag monitoring? Yes    Scheduled for follow up? Yes    Patients specialists  Other (comment)    Other specialists names  Salvatore Randhawa    Other specialists contcat #  [phone]    Did you obtain your prescribed medications  Yes    Do you need help managing your prescriptions or medications  No    Is transportation to your appointment needed  No    I have advised the patient to call PCP with any new or worsening symptoms  Sara Pace MA    Living Arrangements  Spouse or Significiant other    Are you recieving any outpatient services  No    What type of services  CARDIAC REHAB    Are you recieving home care services  No    Are you using any community resources  No    Current waiver services  No    Have you fallen in the last 12 months  No    Interperter language line needed  No    Counseling  Patient    Counseling topics  instructions for management; Importance of RX compliance            Diagnoses and all orders for this visit:  Encounter for support and coordination of transition of care  Mild intermittent asthma, unspecified whether complicated  -     Advair -21 MCG/ACT inhaler; Inhale 2 puffs 2 (two) times a day Rinse mouth after use    Mild intermittent asthma without complication  Comments:  Patient has quit smoking recently and complains of a lot of phlegm and slight breathing difficulty  Orders:  -     albuterol (PROVENTIL HFA,VENTOLIN HFA) 90 mcg/act inhaler; Inhale 2 puffs every 4 hours as needed for wheezing or shortness of breath  NSTEMI (non-ST elevated myocardial infarction) ([facility])  -     nitroglycerin (NITROSTAT) 0 4 mg SL tablet; Place 1 tablet (0 4 mg total) under the tongue every 5 (five) minutes as needed for chest pain  Status post insertion of drug eluting coronary artery stent  -     nitroglycerin (NITROSTAT) 0 4 mg SL tablet; Place 1 tablet (0 4 mg total) under the tongue every 5 (five) minutes as needed for chest pain  Coronary artery disease involving native coronary artery of native heart without angina pectoris  -     nitroglycerin (NITROSTAT) 0 4 mg SL tablet; Place 1 tablet (0 4 mg total) under the tongue every 5 (five) minutes as needed for chest pain  S/P TAVR (transcatheter aortic valve replacement)  -     aspirin (Aspirin Low Dose) 81 mg chewable tablet; Chew 1 tablet (81 mg total) daily        HFrEF/HFpEF: H/o CHF (NYHA class II, ACC/AHA stage C)  Prior echocardiogram showing LVEF 30-35% in the setting of iCM with severe MR  Patient is compliant with medications and low-salt diet  At today's visit, patient is euvolemic on exam  Weight at baseline, 188lbs (baseline 185-186lbs)  On appropriate GDMT      - On lasix 40 mg daily  - On metoprolol succinate 50 mg Q12H  - On lisinopril 20 mg QD  - On spironolactone 25 mg QD  - On jardiance 10 mg QD  - Discussed importance of low salt diet and measuring fluid intake  - Daily weights  - Following with HF outpatient  - Likely a candidate for LifeVest based on his EF    Coronary artery disease: Patient with history of hypertension and diabetes mellitus type 2 with three-vessel coronary artery disease is identified on prior cardiac catheterization  He has also had successful PCI with ELDER x2 to mid LAD, ELDER to ostial RPDA   On DAPT due to recent ELDER placement    -On metoprolol succinate 50 mg Q12H  -On statin  -On plavix 75 mg daily  -On ASA 81 mg daily    LBBB and history of AV block post-TAVR:  - Now s/p MDT PPM  -Cardiology following    S/p TAVR:  Echo revealing trace paravalvular regurgitation with mean gradient within expected range   -On ASA and plavix    Hypertension  -Amlodipine discontinued at time of recent hospitalization, will hold it based on current BP reading  -On lisinopril 20 mg daily    Diabetes Mellitus type 2: Patient with history of diabetes mellitus type 2  Previous HbA1c was 7 6    - Continue on metformin 850 mg BID with meals  - On Jardiance 10 mg daily  - Continue lantus 18 qhs, novolog 5  units with breakfast and with dinner  - On rosuvastatin 40 mg daily  - Discussed diet and lifestyle modifications with patient  - Consider ambulatory referral to diabetic education  - Diabetic foot exam performed today    Hypothyroidism  -Last TSH within normal range  -On levothyroxine 137 mcg daily    Health maintenance  -Last colonoscopy in 5/2018 which showed single hyperplastic polyp  Due for repeat colonoscopy in 2023  Discussed this with patient today    BMI Counseling: Body mass index is 28 59 kg/m²  The BMI is above normal  Nutrition recommendations include reducing portion sizes, decreasing overall calorie intake, consuming healthier snacks and moderation in carbohydrate intake  Exercise recommendations include exercising 3-5 times per week  Plan-  - Follow up visit with our clinic in 2 month to review DM2 and other chronic medical conditions      Subjective     History of Present Illness:  Patient is a 72-year-old male with history of HFrEF with LVEF 30-35% NYHA class II, stage C possibly in the setting of iCM with severe MR, hypertension, hyperlipidemia, diabetes mellitus type 2, asthma, hepatitis C, history of smoking previously    Patient was recently admitted with acute decompensated heart failure after his PPM was noted to have episodes of NSVT  He was seen and evaluated by HF team inpatient and placed on lasix therapy with improvement in his symptoms  He was subsequently discharged with outpatient HF follow up and is currently being sized for lifevest therapy with possibly eventual plans for ICD placement  Patient reporting that he overall feels well  He has been compliant with his Lasix therapy and is monitoring his salt and diet intake  He also reports that he was seen by LifeVest team earlier today for fitting  We spoke about his medication and medication compliance as well as diabetes  His blood sugars have been well controlled ranging between 100 to 140  He is currently denying any chest pain  He is to follow-up with our clinic in 2 months for review of chronic health conditions  I advised the patient to reach out to HF clinic if he gains 2-3 pounds over the course of 1-2 days or if he gains more than 5 pounds over the course of a week  Also advised him to do daily standing weights  Review of Systems   Constitutional: Negative for chills, fatigue and fever  Respiratory: Negative for cough, chest tightness and shortness of breath  Cardiovascular: Negative for chest pain and palpitations  Gastrointestinal: Negative for abdominal pain  Endocrine: Negative for polydipsia and polyuria  Neurological: Negative for dizziness and headaches           Current Outpatient Medications:   •  Advair -21 MCG/ACT inhaler, Inhale 2 puffs 2 (two) times a day Rinse mouth after use , Disp: 36 g, Rfl: 6  •  albuterol (PROVENTIL HFA,VENTOLIN HFA) 90 mcg/act inhaler, Inhale 2 puffs every 4 hours as needed for wheezing or shortness of breath , Disp: 18 g, Rfl: 2  •  Alcohol Swabs (ALCOHOL PADS) 70 % PADS, , Disp: , Rfl:   •  aspirin (Aspirin Low Dose) 81 mg chewable tablet, Chew 1 tablet (81 mg total) daily, Disp: 90 tablet, Rfl: 3  •  cholecalciferol (VITAMIN D3) 1,000 units tablet, Take 2 tablets (2,000 Units total) by mouth daily, Disp: 180 tablet, Rfl: 5  •  clopidogrel (PLAVIX) 75 mg tablet, Take 1 tablet (75 mg total) by mouth daily, Disp: 90 tablet, Rfl: 3  •  Empagliflozin (JARDIANCE) 10 MG TABS tablet, Take 1 tablet (10 mg total) by mouth every morning, Disp: 90 tablet, Rfl: 3  •  ferrous sulfate 325 (65 Fe) mg tablet, Take 1 tablet (325 mg total) by mouth every other day, Disp: 30 tablet, Rfl: 3  •  fluticasone (FLONASE) 50 mcg/act nasal spray, 2 sprays into each nostril daily, Disp: 2 Bottle, Rfl: 2  •  furosemide (LASIX) 40 mg tablet, Take 1 tablet (40 mg total) by mouth daily, Disp: 30 tablet, Rfl: 3  •  Lantus SoloStar 100 units/mL SOPN, INJECT 0 18 ML (18 UNITS TOTAL) UNDER THE SKIN DAILY AT BEDTIME, Disp: 15 mL, Rfl: 3  •  levothyroxine 137 mcg tablet, TAKE 1 TABLET BY MOUTH EVERY DAY, Disp: 90 tablet, Rfl: 3  •  lisinopril (ZESTRIL) 20 mg tablet, Take 1 tablet (20 mg total) by mouth daily, Disp: 90 tablet, Rfl: 3  •  metFORMIN (GLUCOPHAGE) 850 mg tablet, TAKE 1 TABLET (850 MG TOTAL) BY MOUTH 2 (TWO) TIMES A DAY WITH MEALS, Disp: 180 tablet, Rfl: 3  •  methocarbamol (Robaxin-750) 750 mg tablet, Take 1 tablet (750 mg total) by mouth every 6 (six) hours as needed for muscle spasms, Disp: 60 tablet, Rfl: 0  •  metoprolol succinate (TOPROL-XL) 50 mg 24 hr tablet, TAKE 1 TABLET BY MOUTH TWICE A DAY, Disp: 180 tablet, Rfl: 2  •  montelukast (SINGULAIR) 10 mg tablet, TAKE 1 TABLET BY MOUTH EVERY DAY, Disp: 90 tablet, Rfl: 3  •  nitroglycerin (NITROSTAT) 0 4 mg SL tablet, Place 1 tablet (0 4 mg total) under the tongue every 5 (five) minutes as needed for chest pain, Disp: 30 tablet, Rfl: 1  •  NovoLOG FlexPen 100 units/mL injection pen, Inject 5 units with breakfast and with dinner, Disp: 12 mL, Rfl: 3  •  rosuvastatin (CRESTOR) 40 MG tablet, TAKE 1 TABLET BY MOUTH EVERY DAY, Disp: 90 tablet, Rfl: 3  •  spironolactone (ALDACTONE) 25 mg tablet, Take 1 tablet (25 mg total) by mouth daily Do not start before February 13, 2023 , Disp: 90 tablet, Rfl: 3  •  Blood Glucose Monitoring Suppl (OneTouch Verio) w/Device KIT, Check blood glucose three times daily before each meal (Patient not taking: Reported on 3/28/2023), Disp: 1 kit, Rfl: 0  •  Continuous Blood Gluc  (FreeStyle Robles 2 Vernon) POWER, Use 1 each continuous (Patient not taking: Reported on 1/30/2023), Disp: 1 each, Rfl: 0  •  Continuous Blood Gluc Sensor (FreeStyle Cristofer 2 Sensor) MISC, Use 1 each every 14 (fourteen) days (Patient not taking: Reported on 1/30/2023), Disp: 6 each, Rfl: 3  •  Insulin Pen Needle (Pen Needles) 31G X 8 MM MISC, Use daily (Patient not taking: Reported on 3/28/2023), Disp: 300 each, Rfl: 3  •  Lancets (FREESTYLE) lancets, by Other route as needed (As needed) (Patient not taking: Reported on 3/28/2023), Disp: 100 each, Rfl: 3  Allergies   Allergen Reactions   • Iv Contrast [Iodinated Contrast Media] Rash     Patient states he had a severe reaction approximately 10 years ago , states he had a rash, itchy and he remembers a bunch of people pounding on chest and being surrounded by multiple doctors  Past Medical History:   Diagnosis Date   • Arthritis    • Asthma    • Colon polyps    • Community acquired pneumonia     last assessed: 5/1/2014   • Diabetes mellitus ([facility] [address] )    • Hemorrhagic prepatellar bursitis, left 10/21/2019   • Hepatitis C    • High cholesterol    • History of colonoscopy 2017   • Hypertension    • Lymphadenopathy, anterior cervical 04/17/2018   • Nephritis and nephropathy, not specified as acute or chronic, with other specified pathological lesion in kidney, in diseases classified elsewhere 3/26/2013   • Screening for colon cancer 05/01/2019   • Thoracic vertebral fracture ([address] ) 06/11/2014     Past Surgical History:   Procedure Laterality Date   • CARDIAC CATHETERIZATION N/A 6/3/2022    Procedure: Cardiac RHC/LHC; Surgeon: Mena Caraballo MD;  [facility]: [facility]ARD[facility];   Service: Cardiology   • CARDIAC CATHETERIZATION N/A 6/21/2022 Procedure: CARDIAC TAVR;  Surgeon: Sailaja William MD;  Location: [facility] MAIN OR;  Service: Cardiology   • CARDIAC CATHETERIZATION N/A 2/10/2023    Procedure: Cardiac Coronary Angiogram;  Surgeon: Roni Nava MD;  [facility]: [facility]ARD[facility]; Service: Cardiology   • CARDIAC CATHETERIZATION N/A 2/10/2023    Procedure: Cardiac pci;  Surgeon: Roni Nava MD;  Location: [facility] CARD[facility]AC [facility]; Service: Cardiology   • CARDIAC ELECTROPHYSIOLOGY PROCEDURE N/A 2022    Procedure: Cardiac pacer implant ; DC PPM;  Surgeon: Miya Sahni DO;  [facility]: [facility] CARD[facility]; Service: Cardiology   • COLONOSCOPY     • COLONOSCOPY W/ POLYPECTOMY     • LITHOTRIPSY     • MULTIPLE TOOTH EXTRACTIONS     • CO COLONOSCOPY FLX DX W/COLLJ SPEC WHEN PFRMD N/A 2018    Procedure: COLONOSCOPY;  Surgeon: Rohini Loomis MD;  Location: [facility] GI LAB;   Service: Gastroenterology   • CO REPLACE AORTIC VALVE OPENFEMORAL ARTERY APPROACH N/A 2022    Procedure: REPLACEMENT AORTIC VALVE TRANSCATHETER (TAVR) TRANSFEMORAL W/ 26MM QUINN RONNI S3 ULTRA VALVE(ACCESS ON LEFT) SAULO;  Surgeon: Gordon Eid MD;  Location: [facility] MAIN [facility];  Service: Cardiac Surgery     Family History   Problem Relation Age of Onset   • Heart attack Family         at age 80   • No Known Problems Mother    • No Known Problems Father    • Diabetes Sister    • Diabetes Brother      Social History     Substance and Sexual Activity   Alcohol Use No     Social History     Substance and Sexual Activity   Drug Use No     Social History     Tobacco Use   Smoking Status Former   • Packs/day: 0 50   • Types: Cigarettes   • Quit date: 2022   • Years since quittin 8   Smokeless Tobacco Never   Tobacco Comments    started when he was about 24 yrs old; stopped smoking 3 wks ago       Objective     Vitals:    23 1301   BP: 112/69   BP Location: Right arm   Patient Position: Sitting   Cuff Size: Large   Pulse: 78   Temp: 98 2 °F (36 8 °C)   TempSrc: Temporal   SpO2: 98% "  Weight: 85 3 kg (188 lb)   Height: 5' 8\" (1 727 m)       Physical Exam  Vitals reviewed  Constitutional:       Comments: Patient's wife present during the encounter, patient is Colombian-speaking a  device was used to speak with the patient  HENT:      Head: Normocephalic  Cardiovascular:      Rate and Rhythm: Normal rate and regular rhythm  Pulses: Normal pulses  Dorsalis pedis pulses are 2+ on the right side and 2+ on the left side  Posterior tibial pulses are 2+ on the right side and 2+ on the left side  Heart sounds: Murmur heard  Pulmonary:      Effort: Pulmonary effort is normal       Breath sounds: Normal breath sounds  Abdominal:      General: Bowel sounds are normal       Palpations: Abdomen is soft  Feet:      Right foot:      Skin integrity: No ulcer, skin breakdown, erythema, warmth, callus or dry skin  Left foot:      Skin integrity: No ulcer, skin breakdown, erythema, warmth, callus or dry skin  Skin:     General: Skin is warm and dry  Capillary Refill: Capillary refill takes less than 2 seconds  Neurological:      Mental Status: He is alert and oriented to person, place, and time  Patient's shoes and socks removed  Right Foot/Ankle   Right Foot Inspection  Skin Exam: skin normal and skin intact  No dry skin, no warmth, no callus, no erythema, no maceration, no abnormal color, no pre-ulcer, no ulcer and no callus  Toe Exam: No swelling    Sensory   Vibration: intact  Proprioception: intact      Vascular  The right DP pulse is 2+  The right PT pulse is 2+  Left Foot/Ankle  Left Foot Inspection  Skin Exam: skin normal and skin intact  No dry skin, no warmth, no erythema, no maceration, normal color, no pre-ulcer, no ulcer and no callus  Toe Exam: No swelling  Sensory   Vibration: intact  Proprioception: intact      Vascular  The left DP pulse is 2+  The left PT pulse is 2+             Care Time Delivered:   I have spent " 40 minutes with patient today in which greater than 50% of this time was spent in counseling/coordination of care  Shane Howell MD  Internal Medicine Residency PGY-2  [place]    ==  PLEASE NOTE:  This encounter was completed utilizing the [employer] Voice Recognition Software  Grammatical errors, random word insertions, pronoun errors and incomplete sentences are occasional consequences of the system due to software limitations, ambient noise and hardware issues  These may be missed by proof reading prior to affixing electronic signature  Any questions or concerns about the content, text or information contained within the body of this dictation should be directly addressed to the physician for clarification  Please do not hesitate to call me directly if you have any any questions or concerns

## 2023-04-28 ENCOUNTER — OFFICE VISIT (OUTPATIENT)
Dept: CARDIOLOGY CLINIC | Facility: CLINIC | Age: 78
End: 2023-04-28

## 2023-04-28 VITALS
HEIGHT: 68 IN | WEIGHT: 189 LBS | OXYGEN SATURATION: 98 % | BODY MASS INDEX: 28.64 KG/M2 | SYSTOLIC BLOOD PRESSURE: 130 MMHG | HEART RATE: 84 BPM | DIASTOLIC BLOOD PRESSURE: 60 MMHG

## 2023-04-28 DIAGNOSIS — I34.0 NONRHEUMATIC MITRAL (VALVE) INSUFFICIENCY: ICD-10-CM

## 2023-04-28 DIAGNOSIS — Z95.5 STATUS POST INSERTION OF DRUG ELUTING CORONARY ARTERY STENT: ICD-10-CM

## 2023-04-28 DIAGNOSIS — Z95.2 S/P TAVR (TRANSCATHETER AORTIC VALVE REPLACEMENT): ICD-10-CM

## 2023-04-28 DIAGNOSIS — I50.20 HEART FAILURE WITH REDUCED EJECTION FRACTION (HCC): Primary | ICD-10-CM

## 2023-04-28 DIAGNOSIS — I25.10 CORONARY ARTERY DISEASE INVOLVING NATIVE CORONARY ARTERY OF NATIVE HEART WITHOUT ANGINA PECTORIS: ICD-10-CM

## 2023-04-28 DIAGNOSIS — I10 HYPERTENSION, UNSPECIFIED TYPE: ICD-10-CM

## 2023-04-28 RX ORDER — LISINOPRIL 20 MG/1
20 TABLET ORAL 2 TIMES DAILY
Qty: 180 TABLET | Refills: 2 | Status: SHIPPED | OUTPATIENT
Start: 2023-04-28

## 2023-04-28 RX ORDER — METOPROLOL SUCCINATE 50 MG/1
75 TABLET, EXTENDED RELEASE ORAL 2 TIMES DAILY
Qty: 270 TABLET | Refills: 1 | Status: SHIPPED | OUTPATIENT
Start: 2023-04-28 | End: 2023-07-27

## 2023-04-28 NOTE — PROGRESS NOTES
Walking 1 5 blocks no dyspnea alittle of pain of knee  11 stairs - no chest pain or shortness of breath, used to be tired, not now  No swelling or bloating  Weight: 177 lbs   Here with the vest  Taking meds

## 2023-04-28 NOTE — PATIENT INSTRUCTIONS
Increase metoprolol to 75mg two times a day  Increase lisinopril to 20mg two times a day  Obtain BMP in 1 week  Repeat echo in 1 month  2g sodium diet  2L fluid restriction diet  Daily weights

## 2023-04-28 NOTE — PROGRESS NOTES
Advanced Heart Failure Outpatient Progress Note - Latanya Fairchild 70 y o  male MRN: [id number]    Encounter: [id number]      Assessment/Plan:    Patient Active Problem List    Diagnosis Date Noted   • SIRS (systemic inflammatory response syndrome) ([place] [address] ) 03/19/2023   • Dyspnea 03/19/2023   • Anemia 03/19/2023   • Ischemic heart failure ([place] [address] ) 03/18/2023   • Status post insertion of drug eluting coronary artery stent 02/10/2023   • Presence of cardiac pacemaker 01/30/2023   • NSTEMI (non-ST elevated myocardial infarction) ([place] [address] ) 01/30/2023   • Bruit of left carotid artery 01/30/2023   • Dyspnea on exertion 01/30/2023   • Peripheral artery disease ([place] [address] ) 01/18/2023   • Need for influenza vaccination 11/10/2022   • Leg cramping 11/10/2022   • Tachycardia-bradycardia ([place] [address] ) 09/01/2022   • S/P TAVR (transcatheter aortic valve replacement) 06/21/2022   • Coronary artery disease 06/21/2022   • Contrast media allergy 05/31/2022   • Aortic stenosis 01/19/2022   • Diverticulosis of large intestine 09/20/2018   • Internal hemorrhoids 09/20/2018   • Nicotine dependence 04/13/2017   • Osteoarthritis of knee 04/13/2017   • Bilateral hearing loss 01/30/2017   • Allergic rhinitis 02/24/2016   • Type 2 diabetes mellitus with circulatory disorder, with long-term current use of insulin ([place] [address] ) 11/24/2015   • Adenomatous colon polyp 06/11/2014   • Hyperlipidemia 09/13/2013   • Vitamin B12 deficiency 07/24/2012   • Mild intermittent asthma without complication 16/91/9266   • Essential hypertension 06/08/2012   • Hypothyroidism 06/07/2012       Chronic heart failure with reduced EF, LVEF 35%, nondilated, NYHA class II Stage C    Etiology: ischemic, new reduction in setting of NSTEMI with 80% mid LAD and 90% RPDA stenoses s/p stenting     Weight at discharge 179 lbs, 186 lbs 3/28/23; 189 lbs 4/28/23  NTproBNP 3136 3/18/23    Studies personally reviewed by me:    Echo 4/21/23:  LVEF:  35%  LVIDd: 5 4 cm, normal wall thickness  RV: Normal size and systolic function  AV: TAVR bioprosthetic valve appears well-seated and appears to be functioning normally, no perivalvular or transvalvular regurgitation  MR: Moderate with posteriorly directed jet  PASP: Unable to estimate  RVOT: no notching  Other: IVC normal, normal biatrial size    TTE 3/21/23: LVEF 30-35%,LVIDd 5 7 cm,  RV normal, no AI, no AS, severe MR, mild TR, trivial pericardial effusion, IVC normal  RVSP 36mmHg + RAP  The following segments are akinetic: basal inferoseptal, basal inferior, mid inferoseptal and mid inferolateral  The following segments are hypokinetic: mid inferior and apical inferior    TTE 2/10/23: LVEF 35-40%  TTE 7/28/22: LVEF 55%, moderate MR with posteriorly directed jet, mild TR    2/10/23 LH: 3-vessel CAD with two identifiable culprits for NSTEMI in ost RPDA & mid LAD  Successful PCI w/ ELDER x2 to mid LAD & ELDER x1 ost RPDA; residual moderate disease unchanged from 6-2022 study   6/03/22 [facility], pre TAVR: First marginal lesion 50% stenosis, mid LAD 50% stenosis, RPDA 50% stenosis, RPDA to 50% stenosis, 1st diagonal 50% stenosis      Neurohormonal Blockade:   --Beta-Blocker:Metoprolol succinate 50 mg BID  --ACEi, ARB or ARNi: Lisinopril 20 mg daily  --SGLT2i- jardiance 10 mg daily  --Aldosterone Receptor Blocker: Spironolactone 25 mg daily  --Diuretic: Lasix 40 mg daily     Sudden Cardiac Death Risk Reduction:  --ICD: EF 30-35%, MDT DC PPM in situ, wearing LifeVest     Electrical Resynchronization:  --Candidacy for BiV device: LBBB     Advanced Therapies (if appropriate): --Inotrope:  --LVAD/Transplant Candidacy:     # Mitral regurgitation, severe on echo 3/21/23, moderate on 4/21/23  # Severe AS s/p TAVR, #26mm Emery Hernán S3 Ultra valve via left femoral approach by Dr Randolph Kerr  6/21/22; normally functioning on echo 4/21/23  # Post op LBBB  # Tachy-lino syndrome with LBBB, pauses post TAVR   S/P MDT PPM   Interrogation 3/24/23: AP 7 5%  <0 1% NSVTs  # CAD s/p NSTEMI with "PCI to the mid LAD and RPDA 3/21/23  Rx: aspirin, plavix, statin  # Hypertension  # Hyperlipidemia 11/10/22 TC 142, [address], YVC 16, QCK 71   # DM2 HgbA1C 7 6  # Asthma  # Hepatitis C  Untreated per chart notes  Hep C Ab high reactive 1/2022  Management per PCP  # Lymphadenopathy  # Tobacco abuse denies smoking for the past 5 months       TODAY'S PLAN:  Increase metoprolol to 75mg two times a day  Increase lisinopril to 20mg two times a day  Obtain BMP in 1 week  Repeat echo in 1 month  Continue LifeVest  2g sodium diet  2L fluid restriction diet  Daily weights      HPI:   Patient admitted February 9/20/2023 when he presented with progressive shortness of breath  Found to have elevated troponin  Admitted for type I NSTEMI with new wall motion abnormalities on echocardiogram along with newly reduced EF of 40%  He was urgently taken to the Cath Lab and left heart catheterization showed three-vessel CAD with 2 vessels \"culprit lesions: Ostial RPDA and mid LAD stenosis status post stents  Patient readmitted March 18, 2023 when he presented with progressive shortness of breath and intermittent bilateral lower extremity edema  Also with weight gain and orthopnea  Interval History:  4/28/23: here for follow up  Started on LifeVest since last office visit  Repeat echo 4/21/23 showed EF of 35%  Reports walking 1 5 blocks no dyspnea or chest pain  Also able to walk up a flight of stairs  Mainly limited by knee pains  No leg swelling or bloating  No PND, orthopnea  No dizziness or lightheadedness  Taking medications  Weight stable on home scale at  177 lbs     Review of Systems   Constitutional: Negative for chills and fever  HENT: Negative for ear pain and sore throat  Eyes: Negative for pain and visual disturbance  Respiratory: Negative for cough, chest tightness and shortness of breath  Cardiovascular: Negative for chest pain, palpitations and leg swelling     Gastrointestinal: Negative for abdominal " distention, abdominal pain and vomiting  Genitourinary: Negative for dysuria and hematuria  Musculoskeletal: Negative for arthralgias and back pain  Skin: Negative for color change and rash  Neurological: Negative for dizziness, seizures, syncope and light-headedness  All other systems reviewed and are negative  Past Medical History:   Diagnosis Date   • Arthritis    • Asthma    • Colon polyps    • Community acquired pneumonia     last assessed: 5/1/2014   • Diabetes mellitus ([facility] )    • Hemorrhagic prepatellar bursitis, left 10/21/2019   • Hepatitis C    • High cholesterol    • History of colonoscopy 2017   • Hypertension    • Lymphadenopathy, anterior cervical 04/17/2018   • Nephritis and nephropathy, not specified as acute or chronic, with other specified pathological lesion in kidney, in diseases classified elsewhere 3/26/2013   • Screening for colon cancer 05/01/2019   • Thoracic vertebral fracture ([facility] ) 06/11/2014         Allergies   Allergen Reactions   • Iv Contrast [Iodinated Contrast Media] Rash     Patient states he had a severe reaction approximately 10 years ago , states he had a rash, itchy and he remembers a bunch of people pounding on chest and being surrounded by multiple doctors            Current Outpatient Medications:   •  Advair -21 MCG/ACT inhaler, Inhale 2 puffs 2 (two) times a day Rinse mouth after use , Disp: 36 g, Rfl: 6  •  albuterol (PROVENTIL HFA,VENTOLIN HFA) 90 mcg/act inhaler, Inhale 2 puffs every 4 hours as needed for wheezing or shortness of breath , Disp: 18 g, Rfl: 2  •  Alcohol Swabs (ALCOHOL PADS) 70 % PADS, , Disp: , Rfl:   •  aspirin (Aspirin Low Dose) 81 mg chewable tablet, Chew 1 tablet (81 mg total) daily, Disp: 90 tablet, Rfl: 3  •  Blood Glucose Monitoring Suppl (OneTouch Verio) w/Device KIT, Check blood glucose three times daily before each meal, Disp: 1 kit, Rfl: 0  •  cholecalciferol (VITAMIN D3) 1,000 units tablet, Take 2 tablets (2,000 Units total) by mouth daily, Disp: 180 tablet, Rfl: 5  •  clopidogrel (PLAVIX) 75 mg tablet, Take 1 tablet (75 mg total) by mouth daily, Disp: 90 tablet, Rfl: 3  •  Empagliflozin (JARDIANCE) 10 MG TABS tablet, Take 1 tablet (10 mg total) by mouth every morning, Disp: 90 tablet, Rfl: 3  •  ferrous sulfate 325 (65 Fe) mg tablet, Take 1 tablet (325 mg total) by mouth every other day, Disp: 30 tablet, Rfl: 3  •  fluticasone (FLONASE) 50 mcg/act nasal spray, 2 sprays into each nostril daily, Disp: 2 Bottle, Rfl: 2  •  furosemide (LASIX) 40 mg tablet, Take 1 tablet (40 mg total) by mouth daily, Disp: 30 tablet, Rfl: 3  •  levothyroxine 137 mcg tablet, TAKE 1 TABLET BY MOUTH EVERY DAY, Disp: 90 tablet, Rfl: 3  •  lisinopril (ZESTRIL) 20 mg tablet, Take 1 tablet (20 mg total) by mouth 2 (two) times a day, Disp: 180 tablet, Rfl: 2  •  metFORMIN (GLUCOPHAGE) 850 mg tablet, TAKE 1 TABLET (850 MG TOTAL) BY MOUTH 2 (TWO) TIMES A DAY WITH MEALS, Disp: 180 tablet, Rfl: 3  •  methocarbamol (Robaxin-750) 750 mg tablet, Take 1 tablet (750 mg total) by mouth every 6 (six) hours as needed for muscle spasms, Disp: 60 tablet, Rfl: 0  •  metoprolol succinate (TOPROL-XL) 50 mg 24 hr tablet, Take 1 5 tablets (75 mg total) by mouth 2 (two) times a day, Disp: 270 tablet, Rfl: 1  •  montelukast (SINGULAIR) 10 mg tablet, TAKE 1 TABLET BY MOUTH EVERY DAY, Disp: 90 tablet, Rfl: 3  •  NovoLOG FlexPen 100 units/mL injection pen, Inject 5 units with breakfast and with dinner, Disp: 12 mL, Rfl: 3  •  rosuvastatin (CRESTOR) 40 MG tablet, TAKE 1 TABLET BY MOUTH EVERY DAY, Disp: 90 tablet, Rfl: 3  •  spironolactone (ALDACTONE) 25 mg tablet, Take 1 tablet (25 mg total) by mouth daily Do not start before February 13, 2023 , Disp: 90 tablet, Rfl: 3  •  Continuous Blood Gluc  (FreeStyle Robles 2 [place]) POWER, Use 1 each continuous (Patient not taking: Reported on 1/30/2023), Disp: 1 each, Rfl: 0  •  Continuous Blood Gluc Sensor (FreeStyle Cristofer 2 Sensor) MISC, Use 1 each every 14 (fourteen) days (Patient not taking: Reported on 2023), Disp: 6 each, Rfl: 3  •  Insulin Pen Needle (Pen Needles) 31G X 8 MM MISC, Use daily (Patient not taking: Reported on 3/28/2023), Disp: 300 each, Rfl: 3  •  Lancets (FREESTYLE) lancets, by Other route as needed (As needed) (Patient not taking: Reported on 3/28/2023), Disp: 100 each, Rfl: 3  •  Lantus SoloStar 100 units/mL SOPN, INJECT 0 18 ML (18 UNITS TOTAL) UNDER THE SKIN DAILY AT BEDTIME, Disp: 15 mL, Rfl: 3  •  nitroglycerin (NITROSTAT) 0 4 mg SL tablet, Place 1 tablet (0 4 mg total) under the tongue every 5 (five) minutes as needed for chest pain, Disp: 30 tablet, Rfl: 1    Social History     Socioeconomic History   • Marital status: /Civil Union     Spouse name: Not on file   • Number of children: Not on file   • Years of education: Not on file   • Highest education level: Not on file   Occupational History   • Occupation: retired    Tobacco Use   • Smoking status: Former     Packs/day: 0 50     Types: Cigarettes     Quit date: 2022     Years since quittin 9   • Smokeless tobacco: Never   • Tobacco comments:     started when he was about 24 yrs old; stopped smoking 3 wks ago   Vaping Use   • Vaping Use: Never used   Substance and Sexual Activity   • Alcohol use: No   • Drug use: No   • Sexual activity: Not Currently   Other Topics Concern   • Not on file   Social History Narrative   • Not on file     Social Determinants of Health     Financial Resource Strain: Low Risk    • Difficulty of Paying Living Expenses: Not hard at all   Food Insecurity: No Food Insecurity   • Worried About Running Out of Food in the Last Year: Never true   • Ran Out of Food in the Last Year: Never true   Transportation Needs: No Transportation Needs   • Lack of Transportation (Medical): No   • Lack of Transportation (Non-Medical):  No   Physical Activity: Not on file   Stress: Not on file   Social Connections: Not on file   Intimate Partner Violence: Not on file   Housing Stability: Low Risk    • Unable to Pay for Housing in the Last Year: No   • Number of Places Lived in the Last Year: 1   • Unstable Housing in the Last Year: No       Family History   Problem Relation Age of Onset   • Heart attack Family         at age 80   • No Known Problems Mother    • No Known Problems Father    • Diabetes Sister    • Diabetes Brother        Physical Exam:    Vitals:   Vitals:    04/28/23 1324   BP: 130/60   Pulse: 84   SpO2:        Physical Exam  Constitutional:       General: He is not in acute distress  Appearance: Normal appearance  HENT:      Head: Normocephalic and atraumatic  Mouth/Throat:      Mouth: Mucous membranes are moist    Eyes:      General: No scleral icterus  Extraocular Movements: Extraocular movements intact  Cardiovascular:      Rate and Rhythm: Normal rate and regular rhythm  Pulses: Normal pulses  Heart sounds: S1 normal and S2 normal  No murmur heard  No friction rub  No gallop  Comments: Nonelevated JVP  Pulmonary:      Effort: Pulmonary effort is normal       Breath sounds: Normal breath sounds  Abdominal:      General: There is no distension  Palpations: Abdomen is soft  Tenderness: There is no abdominal tenderness  There is no guarding or rebound  Musculoskeletal:         General: Normal range of motion  Cervical back: Neck supple  Right lower leg: No edema  Left lower leg: No edema  Skin:     General: Skin is warm and dry  Capillary Refill: Capillary refill takes less than 2 seconds  Neurological:      General: No focal deficit present  Mental Status: He is alert and oriented to person, place, and time     Psychiatric:         Mood and Affect: Mood normal          Labs & Results:    Lab Results   Component Value Date    SODIUM 136 03/30/2023    K 3 6 03/30/2023     03/30/2023    CO2 30 03/30/2023    BUN 16 03/30/2023    CREATININE 1 10 03/30/2023 GLUC 109 03/30/2023    CALCIUM 9 3 03/30/2023     Lab Results   Component Value Date    WBC 5 87 03/21/2023    HGB 11 5 (L) 03/21/2023    HCT 38 6 03/21/2023    MCV 89 03/21/2023     03/21/2023     Lab Results   Component Value Date    NTBNP 3,136 (H) 03/18/2023      Lab Results   Component Value Date    CHOLESTEROL 137 02/10/2023    CHOLESTEROL 142 11/10/2022    CHOLESTEROL 191 01/25/2022     Lab Results   Component Value Date    HDL 53 02/10/2023    HDL 65 11/10/2022    HDL 49 01/25/2022     Lab Results   Component Value Date    TRIG 61 02/10/2023    TRIG 75 11/10/2022    TRIG 119 01/25/2022     Lab Results   Component Value Date    NONHDLC 84 02/10/2023       EKG personally reviewed by Fabiola Kelly MD      Counseling / Coordination of Care  Time spent today 25 minutes  Greater than 50% of total time was spent with the patient and / or family counseling and / or coordination of care  We went over current diagnosis, most recent studies and any changes in treatment  Thank you for the opportunity to participate in the care of this patient      Carole Sloan MD  ADVANCED HEART FAILURE AND MECHANICAL CIRCULATORY SUPPORT  [facility]

## 2023-05-05 ENCOUNTER — APPOINTMENT (OUTPATIENT)
Dept: LAB | Facility: CLINIC | Age: 78
End: 2023-05-05

## 2023-05-05 DIAGNOSIS — I50.20 HEART FAILURE WITH REDUCED EJECTION FRACTION (HCC): ICD-10-CM

## 2023-05-05 DIAGNOSIS — E11.9 DIABETES MELLITUS (HCC): ICD-10-CM

## 2023-05-05 LAB
ANION GAP SERPL CALCULATED.3IONS-SCNC: 1 MMOL/L (ref 4–13)
BASOPHILS # BLD AUTO: 0.03 THOUSANDS/ΜL (ref 0–0.1)
BASOPHILS NFR BLD AUTO: 0 % (ref 0–1)
BUN SERPL-MCNC: 15 MG/DL (ref 5–25)
CALCIUM SERPL-MCNC: 9.3 MG/DL (ref 8.3–10.1)
CHLORIDE SERPL-SCNC: 109 MMOL/L (ref 96–108)
CO2 SERPL-SCNC: 30 MMOL/L (ref 21–32)
CREAT SERPL-MCNC: 0.93 MG/DL (ref 0.6–1.3)
EOSINOPHIL # BLD AUTO: 0.13 THOUSAND/ΜL (ref 0–0.61)
EOSINOPHIL NFR BLD AUTO: 2 % (ref 0–6)
ERYTHROCYTE [DISTWIDTH] IN BLOOD BY AUTOMATED COUNT: 16.5 % (ref 11.6–15.1)
GFR SERPL CREATININE-BSD FRML MDRD: 79 ML/MIN/1.73SQ M
GLUCOSE P FAST SERPL-MCNC: 89 MG/DL (ref 65–99)
HCT VFR BLD AUTO: 40.6 % (ref 36.5–49.3)
HGB BLD-MCNC: 11.9 G/DL (ref 12–17)
IMM GRANULOCYTES # BLD AUTO: 0.02 THOUSAND/UL (ref 0–0.2)
IMM GRANULOCYTES NFR BLD AUTO: 0 % (ref 0–2)
LYMPHOCYTES # BLD AUTO: 1.27 THOUSANDS/ΜL (ref 0.6–4.47)
LYMPHOCYTES NFR BLD AUTO: 19 % (ref 14–44)
MAGNESIUM SERPL-MCNC: 2 MG/DL (ref 1.6–2.6)
MCH RBC QN AUTO: 26.3 PG (ref 26.8–34.3)
MCHC RBC AUTO-ENTMCNC: 29.3 G/DL (ref 31.4–37.4)
MCV RBC AUTO: 90 FL (ref 82–98)
MONOCYTES # BLD AUTO: 0.59 THOUSAND/ΜL (ref 0.17–1.22)
MONOCYTES NFR BLD AUTO: 9 % (ref 4–12)
NEUTROPHILS # BLD AUTO: 4.68 THOUSANDS/ΜL (ref 1.85–7.62)
NEUTS SEG NFR BLD AUTO: 70 % (ref 43–75)
NRBC BLD AUTO-RTO: 0 /100 WBCS
PLATELET # BLD AUTO: 262 THOUSANDS/UL (ref 149–390)
PMV BLD AUTO: 10.8 FL (ref 8.9–12.7)
POTASSIUM SERPL-SCNC: 3.8 MMOL/L (ref 3.5–5.3)
RBC # BLD AUTO: 4.53 MILLION/UL (ref 3.88–5.62)
SODIUM SERPL-SCNC: 140 MMOL/L (ref 135–147)
WBC # BLD AUTO: 6.72 THOUSAND/UL (ref 4.31–10.16)

## 2023-05-06 ENCOUNTER — HOSPITAL ENCOUNTER (EMERGENCY)
Facility: HOSPITAL | Age: 78
Discharge: HOME | End: 2023-05-07
Attending: EMERGENCY MEDICINE

## 2023-05-06 ENCOUNTER — APPOINTMENT (EMERGENCY)
Dept: RADIOLOGY | Facility: HOSPITAL | Age: 78
End: 2023-05-06

## 2023-05-06 VITALS
TEMPERATURE: 98.5 F | BODY MASS INDEX: 27.83 KG/M2 | RESPIRATION RATE: 20 BRPM | SYSTOLIC BLOOD PRESSURE: 144 MMHG | DIASTOLIC BLOOD PRESSURE: 80 MMHG | WEIGHT: 183 LBS | OXYGEN SATURATION: 94 % | HEART RATE: 92 BPM

## 2023-05-06 DIAGNOSIS — I50.9 CHF (CONGESTIVE HEART FAILURE) (HCC): Primary | ICD-10-CM

## 2023-05-06 DIAGNOSIS — R06.02 SOB (SHORTNESS OF BREATH): ICD-10-CM

## 2023-05-06 LAB
2HR DELTA HS TROPONIN: 8 NG/L
4HR DELTA HS TROPONIN: 7 NG/L
ALBUMIN SERPL BCP-MCNC: 3.2 G/DL (ref 3.5–5)
ALP SERPL-CCNC: 54 U/L (ref 46–116)
ALT SERPL W P-5'-P-CCNC: 21 U/L (ref 12–78)
ANION GAP SERPL CALCULATED.3IONS-SCNC: 4 MMOL/L (ref 4–13)
AST SERPL W P-5'-P-CCNC: 18 U/L (ref 5–45)
BASOPHILS # BLD AUTO: 0.02 THOUSANDS/ÂΜL (ref 0–0.1)
BASOPHILS NFR BLD AUTO: 0 % (ref 0–1)
BILIRUB SERPL-MCNC: 0.64 MG/DL (ref 0.2–1)
BUN SERPL-MCNC: 21 MG/DL (ref 5–25)
CALCIUM ALBUM COR SERPL-MCNC: 9.7 MG/DL (ref 8.3–10.1)
CALCIUM SERPL-MCNC: 9.1 MG/DL (ref 8.3–10.1)
CARDIAC TROPONIN I PNL SERPL HS: 31 NG/L
CARDIAC TROPONIN I PNL SERPL HS: 38 NG/L
CARDIAC TROPONIN I PNL SERPL HS: 39 NG/L
CHLORIDE SERPL-SCNC: 109 MMOL/L (ref 96–108)
CO2 SERPL-SCNC: 26 MMOL/L (ref 21–32)
CREAT SERPL-MCNC: 1.12 MG/DL (ref 0.6–1.3)
EOSINOPHIL # BLD AUTO: 0.11 THOUSAND/ÂΜL (ref 0–0.61)
EOSINOPHIL NFR BLD AUTO: 1 % (ref 0–6)
ERYTHROCYTE [DISTWIDTH] IN BLOOD BY AUTOMATED COUNT: 16.9 % (ref 11.6–15.1)
GFR SERPL CREATININE-BSD FRML MDRD: 63 ML/MIN/1.73SQ M
GLUCOSE SERPL-MCNC: 121 MG/DL (ref 65–140)
HCT VFR BLD AUTO: 38.7 % (ref 36.5–49.3)
HGB BLD-MCNC: 11.7 G/DL (ref 12–17)
IMM GRANULOCYTES # BLD AUTO: 0.03 THOUSAND/UL (ref 0–0.2)
IMM GRANULOCYTES NFR BLD AUTO: 0 % (ref 0–2)
LYMPHOCYTES # BLD AUTO: 1.21 THOUSANDS/ÂΜL (ref 0.6–4.47)
LYMPHOCYTES NFR BLD AUTO: 11 % (ref 14–44)
MCH RBC QN AUTO: 26.2 PG (ref 26.8–34.3)
MCHC RBC AUTO-ENTMCNC: 30.2 G/DL (ref 31.4–37.4)
MCV RBC AUTO: 87 FL (ref 82–98)
MONOCYTES # BLD AUTO: 0.7 THOUSAND/ÂΜL (ref 0.17–1.22)
MONOCYTES NFR BLD AUTO: 7 % (ref 4–12)
NEUTROPHILS # BLD AUTO: 8.73 THOUSANDS/ÂΜL (ref 1.85–7.62)
NEUTS SEG NFR BLD AUTO: 81 % (ref 43–75)
NRBC BLD AUTO-RTO: 0 /100 WBCS
NT-PROBNP SERPL-MCNC: 2624 PG/ML
PLATELET # BLD AUTO: 262 THOUSANDS/UL (ref 149–390)
PMV BLD AUTO: 10.6 FL (ref 8.9–12.7)
POTASSIUM SERPL-SCNC: 3.7 MMOL/L (ref 3.5–5.3)
PROT SERPL-MCNC: 6.4 G/DL (ref 6.4–8.4)
RBC # BLD AUTO: 4.46 MILLION/UL (ref 3.88–5.62)
SODIUM SERPL-SCNC: 139 MMOL/L (ref 135–147)
WBC # BLD AUTO: 10.8 THOUSAND/UL (ref 4.31–10.16)

## 2023-05-06 NOTE — ED ATTENDING ATTESTATION
5/6/2023  I, Siria Jasso MD, saw and evaluated the patient  I have discussed the patient with the resident/non-physician practitioner and agree with the resident's/non-physician practitioner's findings, Plan of Care, and MDM as documented in the resident's/non-physician practitioner's note, except where noted  All available labs and Radiology studies were reviewed  I was present for key portions of any procedure(s) performed by the resident/non-physician practitioner and I was immediately available to provide assistance  At this point I agree with the current assessment done in the Emergency Department  I have conducted an independent evaluation of this patient a history and physical is as follows:    ED Course         Critical Care Time  Procedures    69 yo male with hx of cad, stents, chf, life vest, here for sob after an argument with son  Pt per ems was hypoxic and placed on 4 liters  No fever, no cp, no abdominal pain  No increased leg swelling  Vss, afebrile, tachy, lungs wheezes bilaterally, rrr, abdomen soft nontender, trace pedal edema  Pt given nitro and asa en route by ems    Cardiac workup, bnp, delta trop

## 2023-05-07 NOTE — ED CARE HANDOFF
Emergency Department Sign Out Note        Sign out and transfer of care from Dr Rita Augustin  See Separate Emergency Department note  The patient, Kerry Jensen, was evaluated by the previous provider for chest pain and shortness of breath  Workup Completed:  Cardiac work-up    ED Course / Workup Pending (followup):  Delta troponin                                  ED Course as of 05/06/23 2359   Sat May 06, 2023   2055 SO: CHF w/ lifevest, CAD w/ stents, f/u 4hr trop   2359 hs TnI 0hr: 31   2359 hs TnI 2hr: 39   2359 hs TnI 4hr: 38     Procedures  Medical Decision Making  Case was signed out by Dr Rita Augustin to myself  Instructions were to follow-up in a delta troponin and to reevaluate patient  Low threshold for admission to hospital given patient's lengthy cardiac history  Patient states his chest pain and shortness of breath has resolved  He is delta troponin at 4 hours was -1  Patient requested to be discharged home  Patient was offered admission but politely refused  Patient was given contact information for [facility] cardiology and to follow-up with the outpatient department  Was given strict return precautions to the emergency department that include chest pain, palpitations, shortness of breath, jaw pain, diaphoresis or anything that he might find concerning  Patient verbalized understanding and was subsequently discharged  Patient was Cuban-speaking only and a  was used for this encounter  Amount and/or Complexity of Data Reviewed  Labs: ordered  Radiology: ordered                Disposition  Final diagnoses:   CHF (congestive heart failure) (HCC)   SOB (shortness of breath)     Time reflects when diagnosis was documented in both MDM as applicable and the Disposition within this note     Time User Action Codes Description Comment    5/6/2023 11:39 PM Karyle Silvas Add [I50 9] CH[facility]congest[facility] heart failure[facility] ([facility] )     5/6/2023 11:39 PM Karyle Silvas Add [R06 02] SOB (shortness of breath)       ED Disposition     ED Disposition   Discharge    Condition   Stable    Date/Time   Sat May 6, 2023 11:39 PM    Comment   Sherita Dupont discharge to home/self care  Follow-up Information     Follow up With Specialties Details Why Contact Info Additional [address] Emergency Department Emergency Medicine Go to  If symptoms worsen [place] [address] [phone]  [address] Emergency Department, [address], [place], [place], [place] [address] [facility] [address] [facility] Call in 3 days  [address] [place] [phone]  [address] [place] [address]  [facility], [address] [place] [address], [place], [place], [address] [phone]        Patient's Medications   Discharge Prescriptions    No medications on file     No discharge procedures on file         ED Provider  Electronically Signed by     Elaina Ordaz DO  05/06/23 9697

## 2023-05-07 NOTE — ED PROVIDER NOTES
"History  Chief Complaint   Patient presents with   • Shortness of Breath     Pt states that he was feeling shortness of breath after having his son place Irvin cabrera to his head\" pt is an LV[facility] pt     HPI  Colletta Mass is a 70 y o  male with PMH CAD s/p stent, CHF who presents to the emergency department with SOB  Patient reportedly had an argument with his son and began feeling shortness of breath and called EMS  He denies ever having chest pain  He was reportedly mildly hypoxic for EMS and placed on 4L NC with improvement in saturations and symptoms  He denies fevers, cough, abdominal pain, nausea, or vomiting  He reports feeling improved after arrival to the ER  Prior to Admission Medications   Prescriptions Last Dose Informant Patient Reported? Taking? Advair -21 MCG/ACT inhaler   No No   Sig: Inhale 2 puffs 2 (two) times a day Rinse mouth after use     Alcohol Swabs (ALCOHOL PADS) 70 % PADS   Yes No   Blood Glucose Monitoring Suppl (OneTouch Verio) w/Device KIT   No No   Sig: Check blood glucose three times daily before each meal   Continuous Blood Gluc  (FreeStyle Cristofer 2 [place]) POWER   No No   Sig: Use 1 each continuous   Patient not taking: Reported on 1/30/2023   Continuous Blood Gluc Sensor ([place]Style [place] 2 [place] [facility] [place]   No No   Sig: Use 1 each every 14 (fourteen) days   Patient not taking: Reported on 1/30/2023   Empagliflozin (JARDIANCE) 10 MG TABS tablet   No No   Sig: Take 1 tablet (10 mg total) by mouth every morning   Insulin Pen Needle (Pen Needles) 31G X 8 MM MISC   No No   Sig: Use daily   Patient not taking: Reported on 3/28/2023   Lancets (FREESTYLE) lancets   No No   Sig: by Other route as needed (As needed)   Patient not taking: Reported on 3/28/2023   Lantus SoloStar 100 units/mL SOPN   No No   Sig: INJECT 0 18 ML (18 UNITS TOTAL) UNDER THE SKIN DAILY AT BEDTIME   NovoLOG FlexPen 100 units/mL injection pen   No No   Sig: Inject 5 units with breakfast and with dinner " albuterol (PROVENTIL HFA,VENTOLIN HFA) 90 mcg/act inhaler   No No   Sig: Inhale 2 puffs every 4 hours as needed for wheezing or shortness of breath  aspirin (Aspirin Low Dose) 81 mg chewable tablet   No No   Sig: Chew 1 tablet (81 mg total) daily   cholecalciferol (VITAMIN D3) 1,000 units tablet   No No   Sig: Take 2 tablets (2,000 Units total) by mouth daily   clopidogrel (PLAVIX) 75 mg tablet   No No   Sig: Take 1 tablet (75 mg total) by mouth daily   ferrous sulfate 325 (65 Fe) mg tablet   No No   Sig: Take 1 tablet (325 mg total) by mouth every other day   fluticasone (FLONASE) 50 mcg/act nasal spray   No No   Si sprays into each nostril daily   furosemide (LASIX) 40 mg tablet   No No   Sig: Take 1 tablet (40 mg total) by mouth daily   levothyroxine 137 mcg tablet   No No   Sig: TAKE 1 TABLET BY MOUTH EVERY DAY   lisinopril (ZESTRIL) 20 mg tablet   No No   Sig: Take 1 tablet (20 mg total) by mouth 2 (two) times a day   metFORMIN (GLUCOPHAGE) 850 mg tablet   No No   Sig: TAKE 1 TABLET BY MOUTH 2 TIMES A DAY WITH MEALS  methocarbamol (Robaxin-750) 750 mg tablet   No No   Sig: Take 1 tablet (750 mg total) by mouth every 6 (six) hours as needed for muscle spasms   metoprolol succinate (TOPROL-XL) 50 mg 24 hr tablet   No No   Sig: Take 1 5 tablets (75 mg total) by mouth 2 (two) times a day   montelukast (SINGULAIR) 10 mg tablet   No No   Sig: TAKE 1 TABLET BY MOUTH EVERY DAY   nitroglycerin (NITROSTAT) 0 4 mg SL tablet   No No   Sig: Place 1 tablet (0 4 mg total) under the tongue every 5 (five) minutes as needed for chest pain   rosuvastatin (CRESTOR) 40 MG tablet   No No   Sig: TAKE 1 TABLET BY MOUTH EVERY DAY   spironolactone (ALDACTONE) 25 mg tablet   No No   Sig: Take 1 tablet (25 mg total) by mouth daily Do not start before 2023        Facility-Administered Medications: None       Past Medical History:   Diagnosis Date   • Arthritis    • Asthma    • Colon polyps    • Community acquired pneumonia     last assessed: 5/1/2014   • Diabetes mellitus ([facility] [address] )    • Hemorrhagic prepatellar bursitis, left 10/21/2019   • Hepatitis C    • High cholesterol    • History of colonoscopy 2017   • Hypertension    • Lymphadenopathy, anterior cervical 04/17/2018   • Nephritis and nephropathy, not specified as acute or chronic, with other specified pathological lesion in kidney, in diseases classified elsewhere 3/26/2013   • Screening for colon cancer 05/01/2019   • Thoracic vertebral fracture ([facility] [address] ) 06/11/2014       Past Surgical History:   Procedure Laterality Date   • CARDIAC CATHETERIZATION N/A 6/3/2022    Procedure: Cardiac RHC/LHC; Surgeon: Christiana Goldberg MD;  [facility] CARD[facility]; Service: Cardiology   • CARDIAC CATHETERIZATION N/A 6/21/2022    Procedure: CARDIAC TAVR;  Surgeon: Deondre Farley MD;  Location: [facility] MAIN OR;  Service: Cardiology   • CARDIAC CATHETERIZATION N/A 2/10/2023    Procedure: Cardiac Coronary Angiogram;  Surgeon: Christiana Goldberg MD;  [facility]; Service: Cardiology   • CARDIAC CATHETERIZATION N/A 2/10/2023    Procedure: Cardiac pci;  Surgeon: Christiana Goldberg MD;  [facility] CARD[facility]; Service: Cardiology   • CARDIAC ELECTROPHYSIOLOGY PROCEDURE N/A 9/1/2022    Procedure: Cardiac pacer implant ; DC PPM;  Surgeon: Leonarda Lewis DO;  [facility] CARD[facility]; Service: Cardiology   • COLONOSCOPY     • COLONOSCOPY W/ POLYPECTOMY     • LITHOTRIPSY     • MULTIPLE TOOTH EXTRACTIONS     • PA COLONOSCOPY FLX DX W/COLLJ SPEC WHEN PFRMD N/A 5/17/2018    Procedure: COLONOSCOPY;  Surgeon: Zenia Membreno MD;  Location: [facility] GI LAB;   Service: Gastroenterology   • PA REPLACE AORTIC VALVE OPENFEMORAL ARTERY APPROACH N/A 6/21/2022    Procedure: REPLACEMENT AORTIC VALVE TRANSCATHETER (TAVR) TRANSFEMORAL W/ 26MM QUINN RONNI S3 ULTRA VALVE(ACCESS ON LEFT) SAULO;  Surgeon: Matti Pak MD;  Location: [facility] MAIN OR;  Service: Cardiac Surgery       Family History   Problem Relation Age of Onset   • Heart attack Family         at age 80   • No Known Problems Mother    • No Known Problems Father    • Diabetes Sister    • Diabetes Brother      I have reviewed and agree with the history as documented  E-Cigarette/Vaping   • E-Cigarette Use Never User      E-Cigarette/Vaping Substances   • Nicotine No    • THC No    • CBD No    • Flavoring No    • Other No    • Unknown No      Social History     Tobacco Use   • Smoking status: Former     Packs/day: 0 50     Types: Cigarettes     Quit date: 2022     Years since quittin 9   • Smokeless tobacco: Never   • Tobacco comments:     started when he was about 24 yrs old; stopped smoking 3 wks ago   Vaping Use   • Vaping Use: Never used   Substance Use Topics   • Alcohol use: No   • Drug use: No       Home medications:  Prior to Admission Medications   Prescriptions Last Dose Informant Patient Reported? Taking? Advair -21 MCG/ACT inhaler   No No   Sig: Inhale 2 puffs 2 (two) times a day Rinse mouth after use     Alcohol Swabs (ALCOHOL PADS) 70 % PADS   Yes No   Blood Glucose Monitoring Suppl (OneTouch Verio) w/Device KIT   No No   Sig: Check blood glucose three times daily before each meal   Continuous Blood Gluc  (FreeStyle Cristofer 2 [place]) POWER   No No   Sig: Use 1 each continuous   Patient not taking: Reported on 2023   Continuous Blood Gluc Sensor (FreeStyle Cristofer 2 Sensor) [facility] [place]   No No   Sig: Use 1 each every 14 (fourteen) days   Patient not taking: Reported on 2023   Empagliflozin (JARDIANCE) 10 MG TABS tablet   No No   Sig: Take 1 tablet (10 mg total) by mouth every morning   Insulin Pen Needle (Pen Needles) 31G X 8 MM MISC   No No   Sig: Use daily   Patient not taking: Reported on 3/28/2023   Lancets (FREESTYLE) lancets   No No   Sig: by Other route as needed (As needed)   Patient not taking: Reported on 3/28/2023   Lantus SoloStar 100 units/mL SOPN   No No   Sig: INJECT 0 18 ML (18 UNITS TOTAL) UNDER THE SKIN DAILY AT BEDTIME   NovoLOG FlexPen 100 units/mL injection pen   No No   Sig: Inject 5 units with breakfast and with dinner   albuterol (PROVENTIL HFA,VENTOLIN HFA) 90 mcg/act inhaler   No No   Sig: Inhale 2 puffs every 4 hours as needed for wheezing or shortness of breath  aspirin (Aspirin Low Dose) 81 mg chewable tablet   No No   Sig: Chew 1 tablet (81 mg total) daily   cholecalciferol (VITAMIN D3) 1,000 units tablet   No No   Sig: Take 2 tablets (2,000 Units total) by mouth daily   clopidogrel (PLAVIX) 75 mg tablet   No No   Sig: Take 1 tablet (75 mg total) by mouth daily   ferrous sulfate 325 (65 Fe) mg tablet   No No   Sig: Take 1 tablet (325 mg total) by mouth every other day   fluticasone (FLONASE) 50 mcg/act nasal spray   No No   Si sprays into each nostril daily   furosemide (LASIX) 40 mg tablet   No No   Sig: Take 1 tablet (40 mg total) by mouth daily   levothyroxine 137 mcg tablet   No No   Sig: TAKE 1 TABLET BY MOUTH EVERY DAY   lisinopril (ZESTRIL) 20 mg tablet   No No   Sig: Take 1 tablet (20 mg total) by mouth 2 (two) times a day   metFORMIN (GLUCOPHAGE) 850 mg tablet   No No   Sig: TAKE 1 TABLET BY MOUTH 2 TIMES A DAY WITH MEALS  methocarbamol (Robaxin-750) 750 mg tablet   No No   Sig: Take 1 tablet (750 mg total) by mouth every 6 (six) hours as needed for muscle spasms   metoprolol succinate (TOPROL-XL) 50 mg 24 hr tablet   No No   Sig: Take 1 5 tablets (75 mg total) by mouth 2 (two) times a day   montelukast (SINGULAIR) 10 mg tablet   No No   Sig: TAKE 1 TABLET BY MOUTH EVERY DAY   nitroglycerin (NITROSTAT) 0 4 mg SL tablet   No No   Sig: Place 1 tablet (0 4 mg total) under the tongue every 5 (five) minutes as needed for chest pain   rosuvastatin (CRESTOR) 40 MG tablet   No No   Sig: TAKE 1 TABLET BY MOUTH EVERY DAY   spironolactone (ALDACTONE) 25 mg tablet   No No   Sig: Take 1 tablet (25 mg total) by mouth daily Do not start before 2023        Facility-Administered Medications: None     Allergies: Allergies   Allergen Reactions   • Iv Contrast [Iodinated Contrast Media] Rash     Patient states he had a severe reaction approximately 10 years ago , states he had a rash, itchy and he remembers a bunch of people pounding on chest and being surrounded by multiple doctors  Review of Systems   Constitutional: Negative for fever  Respiratory: Positive for shortness of breath  Negative for cough  Cardiovascular: Negative for chest pain and leg swelling  Gastrointestinal: Negative for abdominal pain, diarrhea, nausea and vomiting  All other systems reviewed and are negative  Physical Exam  ED Triage Vitals   Temperature Pulse Respirations Blood Pressure SpO2   05/06/23 1739 05/06/23 1739 05/06/23 1739 05/06/23 1800 05/06/23 1735   98 5 °F (36 9 °C) (!) 120 16 148/81 98 %      Temp Source Heart Rate Source Patient Position - Orthostatic VS BP Location FiO2 (%)   05/06/23 1739 05/06/23 1739 05/06/23 1900 05/06/23 1900 --   Oral Monitor Lying Left arm       Pain Score       05/06/23 1739       No Pain             Orthostatic Vital Signs  Vitals:    05/06/23 1900 05/06/23 1915 05/06/23 2100 05/06/23 2200   BP: 158/96  136/75 144/80   Pulse: (!) 106 98 90 92   Patient Position - Orthostatic VS: Lying          Physical Exam  Vitals and nursing note reviewed  Constitutional:       General: He is not in acute distress  Appearance: He is not toxic-appearing or diaphoretic  HENT:      Head: Normocephalic  Mouth/Throat:      Mouth: Mucous membranes are moist       Pharynx: Oropharynx is clear  Eyes:      Pupils: Pupils are equal, round, and reactive to light  Cardiovascular:      Rate and Rhythm: Normal rate and regular rhythm  Heart sounds: No murmur heard  Pulmonary:      Effort: No respiratory distress  Breath sounds: Wheezing (mild) present  No rhonchi or rales  Abdominal:      General: Abdomen is flat  There is no distension  Palpations: Abdomen is soft  Tenderness: There is no abdominal tenderness  There is no guarding or rebound  Musculoskeletal:      Comments: Trace bilateral lower extremity edema   Skin:     General: Skin is warm and dry  Neurological:      Mental Status: He is alert           ED Medications  Medications - No data to display    Diagnostic Studies  Results Reviewed     Procedure Component Value Units Date/Time    HS Troponin I 4hr [[id number]]  (Normal) Collected: 05/06/23 2215    Lab Status: Final result Specimen: Blood from Arm, Left Updated: 05/06/23 2332     hs TnI 4hr 38 ng/L      Delta 4hr hsTnI 7 ng/L     HS Troponin I 2hr [[id number]]  (Normal) Collected: 05/06/23 1953    Lab Status: Final result Specimen: Blood from Arm, Left Updated: 05/06/23 2028     hs TnI 2hr 39 ng/L      Delta 2hr hsTnI 8 ng/L     HS Troponin 0hr (reflex protocol) [[id number]]  (Normal) Collected: 05/06/23 1806    Lab Status: Final result Specimen: Blood from Arm, Left Updated: 05/06/23 1911     hs TnI 0hr 31 ng/L     Comprehensive metabolic panel [[id number]]  (Abnormal) Collected: 05/06/23 1806    Lab Status: Final result Specimen: Blood from Arm, Left Updated: 05/06/23 1901     Sodium 139 mmol/L      Potassium 3 7 mmol/L      Chloride 109 mmol/L      CO2 26 mmol/L      ANION GAP 4 mmol/L      BUN 21 mg/dL      Creatinine 1 12 mg/dL      Glucose 121 mg/dL      Calcium 9 1 mg/dL      Corrected Calcium 9 7 mg/dL      AST 18 U/L      ALT 21 U/L      Alkaline Phosphatase 54 U/L      Total Protein 6 4 g/dL      Albumin 3 2 g/dL      Total Bilirubin 0 64 mg/dL      eGFR 63 ml/min/1 73sq m     Narrative:      [place] guidelines for Chronic Kidney Disease (CKD):   •  Stage 1 with normal or high GFR (GFR > 90 mL/min/1 73 square meters)  •  Stage 2 Mild CKD (GFR = 60-89 mL/min/1 73 square meters)  •  Stage 3A Moderate CKD (GFR = 45-59 mL/min/1 73 square meters)  •  Stage 3B Moderate CKD (GFR = 30-44 mL/min/1 73 square meters)  •  Stage 4 Severe CKD (GFR = 15-29 mL/min/1 73 square meters)  •  Stage 5 End Stage CKD (GFR <15 mL/min/1 73 square meters)  Note: GFR calculation is accurate only with a steady state creatinine    NT-BNP [facility] only [[id number]]  (Abnormal) Collected: 05/06/23 1806    Lab Status: Final result Specimen: Blood from Arm, Left Updated: 05/06/23 1901     NT-proBNP 2,624 pg/mL     CBC and differential [[id number]]  (Abnormal) Collected: 05/06/23 1806    Lab Status: Final result Specimen: Blood from Arm, Left Updated: 05/06/23 1828     WBC 10 80 Thousand/uL      RBC 4 46 Million/uL      Hemoglobin 11 7 g/dL      Hematocrit 38 7 %      MCV 87 fL      MCH 26 2 pg      MCHC 30 2 g/dL      RDW 16 9 %      MPV 10 6 fL      Platelets 546 Thousands/uL      nRBC 0 /100 WBCs      Neutrophils Relative 81 %      Immat GRANS % 0 %      Lymphocytes Relative 11 %      Monocytes Relative 7 %      Eosinophils Relative 1 %      Basophils Relative 0 %      Neutrophils Absolute 8 73 Thousands/µL      Immature Grans Absolute 0 03 Thousand/uL      Lymphocytes Absolute 1 21 Thousands/µL      Monocytes Absolute 0 70 Thousand/µL      Eosinophils Absolute 0 11 Thousand/µL      Basophils Absolute 0 02 Thousands/µL                  XR chest 1 view portable   Final Result by Radha Chapa MD (05/07 9592)      Moderate pulmonary edema with trace effusions  Workstation performed: [id number]               Procedures  Procedures      ED Course                             SBIRT 20yo+    Flowsheet Row Most Recent Value   Initial Alcohol Screen: US AUDIT-C     1  How often do you have a drink containing alcohol? 0 Filed at: 05/06/2023 1957   2  How many drinks containing alcohol do you have on a typical day you are drinking? 0 Filed at: 05/06/2023 1957   3a  Male UNDER 65: How often do you have five or more drinks on one occasion? 0 Filed at: 05/06/2023 1957   3b  FEMALE Any Age, or MALE 65+:  How often do you have 4 or more drinks on one occassion? 0 Filed at: 05/06/2023 1957   Audit-C Score 0 Filed at: 05/06/2023 1957   DIEUDONNE: How many times in the past year have you    Used an illegal drug or used a prescription medication for non-medical reasons? Never Filed at: 05/06/2023 3536                [facility]  Medical Decision Making  Amount and/or Complexity of Data Reviewed  Labs: ordered  Radiology: ordered  Natalie Turner is a 70 y o  male with PMH CAD s/p stent, CHF who presents to the emergency department with SOB  Workup including vital signs, physical exam, EKG, CXR and labs  EKG with LBBB similar to prior, no new acute ischemic changes, CXR mildly improved compared to prior, BNP lower than prior level  On reassessment patient states his breathing has completely resolved back to baseline, still has not had any chest pain  Patient taken off supplemental oxygen and saturating >90% on room air  had discussion with patient regarding admission to the hospital if he had concerns about the symptoms, however patient is feeling back to his baseline and requesting discharge home  Patient agreeable to stay for 4-hour troponin  Patient and wife state that the son who caused the argument is in [facility] and they would feel safe at home  patient signed out at end of shift, per chart review four hour delta troponin negative, patient back to baseline and requesting discharge home  Patient discharged with plan for cardiology follow-up      Disposition  Final diagnoses:   CHF (congestive heart failure) ([facility])   SOB (shortness of breath)     Time reflects when diagnosis was documented in both MDM as applicable and the Disposition within this note     Time User Action Codes Description Comment    5/6/2023 11:39 PM Tanisha Whatley Add [I50 9] CHF (congestive heart failure) ([facility] [address] )     5/6/2023 11:39 PM Tanisha Whatley Add [R06 02] SOB (shortness of breath)       ED Disposition     ED Disposition   Discharge    Condition   Stable Date/Time   Sat May 6, 2023 11:39 PM    Comment   Arthuro Chance discharge to home/self care                 Follow-up Information     Follow up With Specialties Details Why Contact Info Additional [address] Emergency Department Emergency Medicine Go to  If symptoms worsen [address] Emergency Department, [address], Critical access hospital [address] [facility] Cardiology Call in 3 days  [address] [facility] [address] [id number]  [address] [place] [address]  [address] [facility] [facility], [address], [address] [phone]          Discharge Medication List as of 5/6/2023 11:42 PM      CONTINUE these medications which have NOT CHANGED    Details   Advair -21 MCG/ACT inhaler Inhale 2 puffs 2 (two) times a day Rinse mouth after use , Starting Thu 3/30/2023, Normal      albuterol (PROVENTIL HFA,VENTOLIN HFA) 90 mcg/act inhaler Inhale 2 puffs every 4 hours as needed for wheezing or shortness of breath , Normal      Alcohol Swabs (ALCOHOL PADS) 70 % PADS Starting Thu 5/9/2019, Historical Med      aspirin (Aspirin Low Dose) 81 mg chewable tablet Chew 1 tablet (81 mg total) daily, Starting Thu 3/30/2023, Normal      Blood Glucose Monitoring Suppl (OneTouch Verio) w/Device KIT Check blood glucose three times daily before each meal, Normal      cholecalciferol (VITAMIN D3) 1,000 units tablet Take 2 tablets (2,000 Units total) by mouth daily, Starting Mon 11/21/2022, Normal      clopidogrel (PLAVIX) 75 mg tablet Take 1 tablet (75 mg total) by mouth daily, Starting Tue 3/28/2023, Until Mon 6/26/2023, Normal      Continuous Blood Gluc  (FreeStyle [place] 2 [place]) POWER Use 1 each continuous, Starting Tue 5/10/2022, Normal      Continuous Blood Gluc Sensor (FreeStyle Cristofer 2 Sensor) MISC Use 1 each every 14 (fourteen) days, Starting Tue 5/10/2022, Normal      Empagliflozin (JARDIANCE) 10 MG TABS tablet Take 1 tablet (10 mg total) by mouth every morning, Starting Sun 2/12/2023, Normal      ferrous sulfate 325 (65 Fe) mg tablet Take 1 tablet (325 mg total) by mouth every other day, Starting Tue 3/28/2023, Normal      fluticasone (FLONASE) 50 mcg/act nasal spray 2 sprays into each nostril daily, Starting Tue 8/11/2020, Normal      furosemide (LASIX) 40 mg tablet Take 1 tablet (40 mg total) by mouth daily, Starting Tue 3/28/2023, Normal      Insulin Pen Needle (Pen Needles) 31G X 8 MM MISC Use daily, Starting Wed 3/3/2021, Normal      Lancets (FREESTYLE) lancets by Other route as needed (As needed), Starting Thu 3/21/2019, Normal      Lantus SoloStar 100 units/mL SOPN INJECT 0 18 ML (18 UNITS TOTAL) UNDER THE SKIN DAILY AT BEDTIME, Normal      levothyroxine 137 mcg tablet TAKE 1 TABLET BY MOUTH EVERY DAY, Normal      lisinopril (ZESTRIL) 20 mg tablet Take 1 tablet (20 mg total) by mouth 2 (two) times a day, Starting Fri 4/28/2023, Normal      metFORMIN (GLUCOPHAGE) 850 mg tablet TAKE 1 TABLET BY MOUTH 2 TIMES A DAY WITH MEALS , Normal      methocarbamol (Robaxin-750) 750 mg tablet Take 1 tablet (750 mg total) by mouth every 6 (six) hours as needed for muscle spasms, Starting Tue 11/8/2022, Normal      metoprolol succinate (TOPROL-XL) 50 mg 24 hr tablet Take 1 5 tablets (75 mg total) by mouth 2 (two) times a day, Starting Fri 4/28/2023, Until Thu 7/27/2023, Normal      montelukast (SINGULAIR) 10 mg tablet TAKE 1 TABLET BY MOUTH EVERY DAY, Normal      nitroglycerin (NITROSTAT) 0 4 mg SL tablet Place 1 tablet (0 4 mg total) under the tongue every 5 (five) minutes as needed for chest pain, Starting Thu 3/30/2023, Normal      NovoLOG FlexPen 100 units/mL injection pen Inject 5 units with breakfast and with dinner, Normal      rosuvastatin (CRESTOR) 40 MG tablet TAKE 1 TABLET BY MOUTH EVERY DAY, Normal "  spironolactone (ALDACTONE) 25 mg tablet Take 1 tablet (25 mg total) by mouth daily Do not start before February 13, 2023 , Starting Mon 2/13/2023, Normal             No discharge procedures on file  PDMP Review       Value Time User    PDMP Reviewed  Yes 9/1/2022  1:26 PM Karri Fritz PA-C           ED Provider  Attending physically available and evaluated Iman Jimenes  I managed the patient along with the ED Attending  Electronically Signed by    Portions of the record may have been created with voice recognition software  Occasional wrong word or \"sound a like\" substitutions may have occurred due to the inherent limitations of voice recognition software    Read the chart carefully and recognize, using context, where substitutions have occurred     Lonny Jacob MD  05/08/23 9666    "

## 2023-05-07 NOTE — DISCHARGE INSTRUCTIONS
Attila Babb was seen and evaluated today in the emergency department over your concern of chest pain and shortness of breath  The workup that we performed showed no acute cause  Please return to the emergency department if you experience pain, shortness of breath, loss of consciousness, dizziness or any other signs and symptoms that may be concerning to you  Please follow-up with your primary care doctor within 1 day  All questions were answered prior to discharge  Thank you for choosing [facility] for your care

## 2023-05-08 LAB
ATRIAL RATE: 110 BPM
P AXIS: 70 DEGREES
PR INTERVAL: 180 MS
QRS AXIS: 106 DEGREES
QRSD INTERVAL: 132 MS
QT INTERVAL: 342 MS
QTC INTERVAL: 462 MS
T WAVE AXIS: -1 DEGREES
VENTRICULAR RATE: 110 BPM

## 2023-05-25 ENCOUNTER — TELEPHONE (OUTPATIENT)
Dept: INTERNAL MEDICINE CLINIC | Facility: CLINIC | Age: 78
End: 2023-05-25

## 2023-05-25 ENCOUNTER — HOSPITAL ENCOUNTER (OUTPATIENT)
Dept: NON INVASIVE DIAGNOSTICS | Facility: CLINIC | Age: 78
Discharge: HOME | End: 2023-05-25

## 2023-05-25 VITALS
SYSTOLIC BLOOD PRESSURE: 144 MMHG | WEIGHT: 183 LBS | BODY MASS INDEX: 27.74 KG/M2 | HEIGHT: 68 IN | HEART RATE: 70 BPM | DIASTOLIC BLOOD PRESSURE: 80 MMHG

## 2023-05-25 DIAGNOSIS — I50.20 HEART FAILURE WITH REDUCED EJECTION FRACTION (HCC): ICD-10-CM

## 2023-05-25 LAB
AORTIC ROOT: 2.3 CM
AORTIC VALVE MEAN VELOCITY: 17 M/S
APICAL FOUR CHAMBER EJECTION FRACTION: 35 %
AV AREA BY CONTINUOUS VTI: 1.5 CM2
AV AREA PEAK VELOCITY: 1.6 CM2
AV LVOT MEAN GRADIENT: 4 MMHG
AV LVOT PEAK GRADIENT: 6 MMHG
AV MEAN GRADIENT: 13 MMHG
AV PEAK GRADIENT: 23 MMHG
AV VALVE AREA: 1.52 CM2
AV VELOCITY RATIO: 0.5
DOP CALC AO PEAK VEL: 2.42 M/S
DOP CALC AO VTI: 47.82 CM
DOP CALC LVOT AREA: 3.14 CM2
DOP CALC LVOT DIAMETER: 2 CM
DOP CALC LVOT PEAK VEL VTI: 23.14 CM
DOP CALC LVOT PEAK VEL: 1.22 M/S
DOP CALC LVOT STROKE INDEX: 37.1 ML/M2
DOP CALC LVOT STROKE VOLUME: 72.66 CM3
FRACTIONAL SHORTENING: 22 % (ref 28–44)
INTERVENTRICULAR SEPTUM IN DIASTOLE (PARASTERNAL SHORT AXIS VIEW): 1.1 CM
INTERVENTRICULAR SEPTUM: 1.1 CM (ref 0.6–1.1)
LAAS-AP2: 22.5 CM2
LAAS-AP4: 26.1 CM2
LEFT ATRIUM AREA SYSTOLE SINGLE PLANE A4C: 27.5 CM2
LEFT ATRIUM SIZE: 4.6 CM
LEFT INTERNAL DIMENSION IN SYSTOLE: 4.2 CM (ref 2.1–4)
LEFT VENTRICULAR INTERNAL DIMENSION IN DIASTOLE: 5.4 CM (ref 3.5–6)
LEFT VENTRICULAR POSTERIOR WALL IN END DIASTOLE: 1.1 CM
LEFT VENTRICULAR STROKE VOLUME: 63 ML
LVSV (TEICH): 63 ML
MITRAL REGURGITATION PEAK VELOCITY: 5.21 M/S
MITRAL VALVE MEAN INFLOW VELOCITY: 3.98 M/S
MITRAL VALVE REGURGITANT PEAK GRADIENT: 109 MMHG
MV E'TISSUE VEL-SEP: 6 CM/S
PA SYSTOLIC PRESSURE: 36 MMHG
RIGHT ATRIUM AREA SYSTOLE A4C: 19.3 CM2
RIGHT VENTRICLE ID DIMENSION: 4.1 CM
SL CV DOP CALC MV VTI RETROGRADE: 170.5 CM
SL CV LEFT ATRIUM LENGTH A2C: 5.5 CM
SL CV LV EF: 35
SL CV MV MEAN GRADIENT RETROGRADE: 69 MMHG
SL CV PED ECHO LEFT VENTRICLE DIASTOLIC VOLUME (MOD BIPLANE) 2D: 141 ML
SL CV PED ECHO LEFT VENTRICLE SYSTOLIC VOLUME (MOD BIPLANE) 2D: 78 ML
TR MAX PG: 36 MMHG
TR PEAK VELOCITY: 3 M/S
TRICUSPID ANNULAR PLANE SYSTOLIC EXCURSION: 1.9 CM
TRICUSPID VALVE PEAK REGURGITATION VELOCITY: 3 M/S

## 2023-06-01 ENCOUNTER — OFFICE VISIT (OUTPATIENT)
Dept: CARDIOLOGY CLINIC | Facility: CLINIC | Age: 78
End: 2023-06-01

## 2023-06-01 VITALS
DIASTOLIC BLOOD PRESSURE: 70 MMHG | WEIGHT: 187.5 LBS | HEIGHT: 68 IN | SYSTOLIC BLOOD PRESSURE: 110 MMHG | BODY MASS INDEX: 28.42 KG/M2 | OXYGEN SATURATION: 99 % | HEART RATE: 73 BPM

## 2023-06-01 DIAGNOSIS — I50.20 HEART FAILURE WITH REDUCED EJECTION FRACTION (HCC): Primary | ICD-10-CM

## 2023-06-01 DIAGNOSIS — Z95.2 S/P TAVR (TRANSCATHETER AORTIC VALVE REPLACEMENT): ICD-10-CM

## 2023-06-01 DIAGNOSIS — Z95.5 STATUS POST INSERTION OF DRUG ELUTING CORONARY ARTERY STENT: ICD-10-CM

## 2023-06-01 DIAGNOSIS — I25.10 CORONARY ARTERY DISEASE INVOLVING NATIVE CORONARY ARTERY OF NATIVE HEART WITHOUT ANGINA PECTORIS: ICD-10-CM

## 2023-06-01 NOTE — PATIENT INSTRUCTIONS
Electrophysiology referral for ICD evaluation  Continue current medications  Continue LifeVest  2g sodium diet  2L fluid restriction diet  Daily weights

## 2023-06-01 NOTE — PROGRESS NOTES
Advanced Heart Failure Outpatient Progress Note - Ramon Nicholas 70 y o  male MRN: [id number]    Encounter: [id number]      Assessment/Plan:    Patient Active Problem List    Diagnosis Date Noted   • SIRS (systemic inflammatory response syndrome) ([place] [address] ) 03/19/2023   • Dyspnea 03/19/2023   • Anemia 03/19/2023   • Ischemic heart failure ([place] [address] ) 03/18/2023   • Status post insertion of drug eluting coronary artery stent 02/10/2023   • Presence of cardiac pacemaker 01/30/2023   • NSTEMI (non-ST elevated myocardial infarction) ([place] [address] ) 01/30/2023   • Bruit of left carotid artery 01/30/2023   • Dyspnea on exertion 01/30/2023   • Peripheral artery disease ([place] [address] ) 01/18/2023   • Need for influenza vaccination 11/10/2022   • Leg cramping 11/10/2022   • Tachycardia-bradycardia ([place] [address] ) 09/01/2022   • S/P TAVR (transcatheter aortic valve replacement) 06/21/2022   • Coronary artery disease 06/21/2022   • Contrast media allergy 05/31/2022   • Aortic stenosis 01/19/2022   • Diverticulosis of large intestine 09/20/2018   • Internal hemorrhoids 09/20/2018   • Nicotine dependence 04/13/2017   • Osteoarthritis of knee 04/13/2017   • Bilateral hearing loss 01/30/2017   • Allergic rhinitis 02/24/2016   • Type 2 diabetes mellitus with circulatory disorder, with long-term current use of insulin ([place] [address] ) 11/24/2015   • Adenomatous colon polyp 06/11/2014   • Hyperlipidemia 09/13/2013   • Vitamin B12 deficiency 07/24/2012   • Mild intermittent asthma without complication 11/46/0663   • Essential hypertension 06/08/2012   • Hypothyroidism 06/07/2012       # Chronic heart failure with reduced EF, LVEF 30-35%, nondilated, NYHA class II Stage C    Etiology: ischemic, new reduction in setting of NSTEMI with 80% mid LAD and 90% RPDA stenoses s/p stenting     Weight at discharge 179 lbs, 186 lbs 3/28/23; 189 lbs 4/28/23  NTproBNP   2624 5/6/23  3136 3/18/23    Studies personally reviewed by me:    Echo 5/25/23:  LVEF 35%, 30-35% on my review  LVIDD 5 4cm, increased wall thickness  TAVR mean gradient 13mmHg  Moderate to severe MR, eccentric  Normal RV size and systolic function  Estimated RVSP 36mmHg    Echo 4/21/23:  LVEF:  35%  LVIDd: 5 4 cm, normal wall thickness  RV: Normal size and systolic function  AV: TAVR bioprosthetic valve appears well-seated and appears to be functioning normally, no perivalvular or transvalvular regurgitation  MR: Moderate with posteriorly directed jet  PASP: Unable to estimate  RVOT: no notching  Other: IVC normal, normal biatrial size    TTE 3/21/23: LVEF 30-35%,LVIDd 5 7 cm,  RV normal, no AI, no AS, severe MR, mild TR, trivial pericardial effusion, IVC normal  RVSP 36mmHg + RAP  The following segments are akinetic: basal inferoseptal, basal inferior, mid inferoseptal and mid inferolateral  The following segments are hypokinetic: mid inferior and apical inferior    TTE 2/10/23: LVEF 35-40%  TTE 7/28/22: LVEF 55%, moderate MR with posteriorly directed jet, mild TR    2/10/23 [facility]: 3-vessel CAD with two identifiable culprits for NSTEMI in ost RPDA & mid LAD  Successful PCI w/ ELDER x2 to mid LAD & ELDER x1 ost RPDA; residual moderate disease unchanged from 6-2022 study   6/03/22 [facility], pre TAVR: First marginal lesion 50% stenosis, mid LAD 50% stenosis, RPDA 50% stenosis, RPDA to 50% stenosis, 1st diagonal 50% stenosis      Neurohormonal Blockade:   --Beta-Blocker:Metoprolol succinate 75 mg BID  --ACEi, ARB or ARNi: Lisinopril 20 mg BID  --SGLT2i- jardiance 10 mg daily  --Aldosterone Receptor Blocker: Spironolactone 25 mg daily  --Diuretic: Lasix 40 mg daily     Sudden Cardiac Death Risk Reduction:  --ICD: EF 30-35%, MDT DC PPM in situ, wearing LifeVest     Electrical Resynchronization:  --Candidacy for BiV device: LBBB, QRSd 132-140 msec     Advanced Therapies (if appropriate):   --Inotrope:  --LVAD/Transplant Candidacy:     # Mitral regurgitation, severe on echo 3/21/23, moderate to severe on echo 5/25/23  # Severe AS s/p TAVR, #26mm Montrell Thacker "Hernán S3 Ultra valve via left femoral approach by Dr Minh Lawson  6/21/22; normally functioning on echo 5/25/23  # Post op LBBB  # Tachy-lino syndrome with LBBB, pauses post TAVR  S/P MDT PPM   Interrogation 3/24/23: AP 7 5%  <0 1% NSVTs  # CAD s/p NSTEMI with PCI to the mid LAD and RPDA 3/21/23  Rx: aspirin, plavix, statin  # Hypertension  # Hyperlipidemia 11/10/22 [facility] 142, [address], IKN 65, BCJ 41   # DM2 HgbA1C 7 6  # Asthma  # Hepatitis C  Untreated per chart notes  Hep C Ab high reactive 1/2022  Management per PCP  # Lymphadenopathy  # Tobacco abuse denies smoking for the past 5 months       TODAY'S PLAN:  Electrophysiology referral for ICD evaluation  Continue current medications  Continue LifeVest  2g sodium diet  2L fluid restriction diet  Daily weights  We will price check entresto      HPI:   Patient admitted February 9/20/2023 when he presented with progressive shortness of breath  Found to have elevated troponin  Admitted for type I NSTEMI with new wall motion abnormalities on echocardiogram along with newly reduced EF of 40%  He was urgently taken to the Cath Lab and left heart catheterization showed three-vessel CAD with 2 vessels \"culprit lesions: Ostial RPDA and mid LAD stenosis status post stents  Patient readmitted March 18, 2023 when he presented with progressive shortness of breath and intermittent bilateral lower extremity edema  Also with weight gain and orthopnea  4/28/23: here for follow up  Started on LifeVest since last office visit  Repeat echo 4/21/23 showed EF of 35%  Reports walking 1 5 blocks no dyspnea or chest pain  Also able to walk up a flight of stairs  Mainly limited by knee pains  No leg swelling or bloating  No PND, orthopnea  No dizziness or lightheadedness  Taking medications  Weight stable on home scale at  177 lbs     Interval History:  6/1/23: here for follow up  Repeat echo showed EF 35%, 30-35% on my review   Denies shortness of breath or chest pain on current " level of exertion  Metoprolol and entresto increased on last visit  Review of Systems   Constitutional: Negative for chills and fever  HENT: Negative for ear pain and sore throat  Eyes: Negative for pain and visual disturbance  Respiratory: Negative for cough, chest tightness and shortness of breath  Cardiovascular: Negative for chest pain, palpitations and leg swelling  Gastrointestinal: Negative for abdominal distention, abdominal pain and vomiting  Genitourinary: Negative for dysuria and hematuria  Musculoskeletal: Negative for arthralgias and back pain  Skin: Negative for color change and rash  Neurological: Negative for dizziness, seizures, syncope and light-headedness  All other systems reviewed and are negative  Past Medical History:   Diagnosis Date   • Arthritis    • Asthma    • Colon polyps    • Community acquired pneumonia     last assessed: 5/1/2014   • Diabetes mellitus ([facility] )    • Hemorrhagic prepatellar bursitis, left 10/21/2019   • Hepatitis C    • High cholesterol    • History of colonoscopy 2017   • Hypertension    • Lymphadenopathy, anterior cervical 04/17/2018   • Nephritis and nephropathy, not specified as acute or chronic, with other specified pathological lesion in kidney, in diseases classified elsewhere 3/26/2013   • Screening for colon cancer 05/01/2019   • Thoracic vertebral fracture ([facility] ) 06/11/2014         Allergies   Allergen Reactions   • Iv Contrast [Iodinated Contrast Media] Rash     Patient states he had a severe reaction approximately 10 years ago , states he had a rash, itchy and he remembers a bunch of people pounding on chest and being surrounded by multiple doctors            Current Outpatient Medications:   •  Advair -21 MCG/ACT inhaler, Inhale 2 puffs 2 (two) times a day Rinse mouth after use , Disp: 36 g, Rfl: 6  •  albuterol (PROVENTIL HFA,VENTOLIN HFA) 90 mcg/act inhaler, Inhale 2 puffs every 4 hours as needed for wheezing or shortness of breath , Disp: 18 g, Rfl: 2  •  Alcohol Swabs (ALCOHOL PADS) 70 % PADS, , Disp: , Rfl:   •  aspirin (Aspirin Low Dose) 81 mg chewable tablet, Chew 1 tablet (81 mg total) daily, Disp: 90 tablet, Rfl: 3  •  Blood Glucose Monitoring Suppl (OneTouch Verio) w/Device KIT, Check blood glucose three times daily before each meal, Disp: 1 kit, Rfl: 0  •  cholecalciferol (VITAMIN D3) 1,000 units tablet, Take 2 tablets (2,000 Units total) by mouth daily, Disp: 180 tablet, Rfl: 5  •  clopidogrel (PLAVIX) 75 mg tablet, Take 1 tablet (75 mg total) by mouth daily, Disp: 90 tablet, Rfl: 3  •  Empagliflozin (JARDIANCE) 10 MG TABS tablet, Take 1 tablet (10 mg total) by mouth every morning, Disp: 90 tablet, Rfl: 3  •  ferrous sulfate 325 (65 Fe) mg tablet, TAKE 1 TABLET BY MOUTH EVERY OTHER DAY, Disp: 45 tablet, Rfl: 2  •  fluticasone (FLONASE) 50 mcg/act nasal spray, 2 sprays into each nostril daily, Disp: 2 Bottle, Rfl: 2  •  furosemide (LASIX) 40 mg tablet, TAKE 1 TABLET BY MOUTH EVERY DAY, Disp: 90 tablet, Rfl: 2  •  Lantus SoloStar 100 units/mL SOPN, INJECT 0 18 ML (18 UNITS TOTAL) UNDER THE SKIN DAILY AT BEDTIME, Disp: 15 mL, Rfl: 3  •  levothyroxine 137 mcg tablet, TAKE 1 TABLET BY MOUTH EVERY DAY, Disp: 90 tablet, Rfl: 3  •  lisinopril (ZESTRIL) 20 mg tablet, Take 1 tablet (20 mg total) by mouth 2 (two) times a day, Disp: 180 tablet, Rfl: 2  •  metFORMIN (GLUCOPHAGE) 850 mg tablet, TAKE 1 TABLET BY MOUTH 2 TIMES A DAY WITH MEALS , Disp: 180 tablet, Rfl: 3  •  methocarbamol (Robaxin-750) 750 mg tablet, Take 1 tablet (750 mg total) by mouth every 6 (six) hours as needed for muscle spasms, Disp: 60 tablet, Rfl: 0  •  metoprolol succinate (TOPROL-XL) 50 mg 24 hr tablet, Take 1 5 tablets (75 mg total) by mouth 2 (two) times a day, Disp: 270 tablet, Rfl: 1  •  montelukast (SINGULAIR) 10 mg tablet, TAKE 1 TABLET BY MOUTH EVERY DAY, Disp: 90 tablet, Rfl: 3  •  nitroglycerin (NITROSTAT) 0 4 mg SL tablet, Place 1 tablet (0 4 mg total) under the tongue every 5 (five) minutes as needed for chest pain, Disp: 30 tablet, Rfl: 1  •  NovoLOG FlexPen 100 units/mL injection pen, Inject 5 units with breakfast and with dinner, Disp: 12 mL, Rfl: 3  •  rosuvastatin (CRESTOR) 40 MG tablet, TAKE 1 TABLET BY MOUTH EVERY DAY, Disp: 90 tablet, Rfl: 3  •  spironolactone (ALDACTONE) 25 mg tablet, Take 1 tablet (25 mg total) by mouth daily Do not start before 2023 , Disp: 90 tablet, Rfl: 3  •  Continuous Blood Gluc  (FreeStyle Robles 2 Luke) POWER, Use 1 each continuous (Patient not taking: Reported on 2023), Disp: 1 each, Rfl: 0  •  Continuous Blood Gluc Sensor (FreeStyle Cristofer 2 Sensor) MISC, Use 1 each every 14 (fourteen) days (Patient not taking: Reported on 2023), Disp: 6 each, Rfl: 3  •  Insulin Pen Needle (Pen Needles) 31G X 8 MM MISC, Use daily (Patient not taking: Reported on 3/28/2023), Disp: 300 each, Rfl: 3  •  Lancets (FREESTYLE) lancets, by Other route as needed (As needed) (Patient not taking: Reported on 3/28/2023), Disp: 100 each, Rfl: 3    Social History     Socioeconomic History   • Marital status: /Civil Union     Spouse name: Not on file   • Number of children: Not on file   • Years of education: Not on file   • Highest education level: Not on file   Occupational History   • Occupation: retired    Tobacco Use   • Smoking status: Former     Packs/day: 0 50     Types: Cigarettes     Quit date: 2022     Years since quittin 0   • Smokeless tobacco: Never   • Tobacco comments:     started when he was about 24 yrs old; stopped smoking 3 wks ago   Vaping Use   • Vaping Use: Never used   Substance and Sexual Activity   • Alcohol use: No   • Drug use: No   • Sexual activity: Not Currently   Other Topics Concern   • Not on file   Social History Narrative   • Not on file     Social Determinants of Health     Financial Resource Strain: Low Risk  (2023)    Overall Financial Resource Strain (CARDIA) • Difficulty of Paying Living Expenses: Not hard at all   Food Insecurity: No Food Insecurity (3/20/2023)    Hunger Vital Sign    • Worried About Running Out of Food in the Last Year: Never true    • Ran Out of Food in the Last Year: Never true   Transportation Needs: No Transportation Needs (3/20/2023)    PRAPARE - Transportation    • Lack of Transportation (Medical): No    • Lack of Transportation (Non-Medical): No   Physical Activity: Unknown (1/19/2022)    Exercise Vital Sign    • Days of Exercise per Week: Not on file    • Minutes of Exercise per Session: 60 min   Stress: No Stress Concern Present (1/19/2022)    [address]    • Feeling of Stress : Not at all   Social Connections: Moderately Isolated (1/19/2022)    Social Connection and Isolation Panel [NHANES]    • Frequency of Communication with Friends and Family: More than three times a week    • Frequency of Social Gatherings with Friends and Family: More than three times a week    • Attends Yarsani Services: Never    • Active Member of Clubs or Organizations: No    • Attends Club or Organization Meetings: Never    • Marital Status:    Intimate Partner Violence: Not on file   Housing Stability: Low Risk  (3/20/2023)    Housing Stability Vital Sign    • Unable to Pay for Housing in the Last Year: No    • Number of Places Lived in the Last Year: 1    • Unstable Housing in the Last Year: No       Family History   Problem Relation Age of Onset   • Heart attack Family         at age 80   • No Known Problems Mother    • No Known Problems Father    • Diabetes Sister    • Diabetes Brother        Physical Exam:    Vitals:   Vitals:    06/01/23 1334   BP: 110/70   Pulse:    SpO2:        Physical Exam  Constitutional:       General: He is not in acute distress  Appearance: Normal appearance  HENT:      Head: Normocephalic and atraumatic        Mouth/Throat:      Mouth: Mucous membranes are moist    Eyes:      General: No scleral icterus  Extraocular Movements: Extraocular movements intact  Cardiovascular:      Rate and Rhythm: Normal rate and regular rhythm  Pulses: Normal pulses  Heart sounds: S1 normal and S2 normal  No murmur heard  No friction rub  No gallop  Comments: Nonelevated JVP  Pulmonary:      Effort: Pulmonary effort is normal       Breath sounds: Normal breath sounds  Abdominal:      General: There is no distension  Palpations: Abdomen is soft  Tenderness: There is no abdominal tenderness  There is no guarding or rebound  Musculoskeletal:         General: Normal range of motion  Cervical back: Neck supple  Right lower leg: No edema  Left lower leg: No edema  Skin:     General: Skin is warm and dry  Capillary Refill: Capillary refill takes less than 2 seconds  Neurological:      General: No focal deficit present  Mental Status: He is alert and oriented to person, place, and time     Psychiatric:         Mood and Affect: Mood normal          Labs & Results:    Lab Results   Component Value Date    BUN 21 05/06/2023    CALCIUM 9 1 05/06/2023     (H) 05/06/2023    CO2 26 05/06/2023    CREATININE 1 12 05/06/2023    GLUC 121 05/06/2023    K 3 7 05/06/2023    SODIUM 139 05/06/2023     Lab Results   Component Value Date    HCT 38 7 05/06/2023    HGB 11 7 (L) 05/06/2023    MCV 87 05/06/2023     05/06/2023    WBC 10 80 (H) 05/06/2023     Lab Results   Component Value Date    NTBNP 2,624 (H) 05/06/2023      Lab Results   Component Value Date    CHOLESTEROL 137 02/10/2023    CHOLESTEROL 142 11/10/2022    CHOLESTEROL 191 01/25/2022     Lab Results   Component Value Date    HDL 53 02/10/2023    HDL 65 11/10/2022    HDL 49 01/25/2022     Lab Results   Component Value Date    TRIG 61 02/10/2023    TRIG 75 11/10/2022    TRIG 119 01/25/2022     Lab Results   Component Value Date    NONHDLC 84 02/10/2023       EKG personally reviewed by Manuela Bui MD      Counseling / Coordination of Care  Time spent today 25 minutes  Greater than 50% of total time was spent with the patient and / or family counseling and / or coordination of care  We went over current diagnosis, most recent studies and any changes in treatment  Thank you for the opportunity to participate in the care of this patient      Paulino Branham MD  ADVANCED HEART FAILURE AND MECHANICAL CIRCULATORY SUPPORT  [facility]

## 2023-06-05 ENCOUNTER — OFFICE VISIT (OUTPATIENT)
Dept: CARDIOLOGY CLINIC | Facility: CLINIC | Age: 78
End: 2023-06-05
Payer: MEDICARE

## 2023-06-05 VITALS
HEART RATE: 72 BPM | SYSTOLIC BLOOD PRESSURE: 120 MMHG | DIASTOLIC BLOOD PRESSURE: 58 MMHG | OXYGEN SATURATION: 98 % | BODY MASS INDEX: 27.55 KG/M2 | WEIGHT: 181.2 LBS

## 2023-06-05 DIAGNOSIS — Z95.5 STATUS POST INSERTION OF DRUG ELUTING CORONARY ARTERY STENT: ICD-10-CM

## 2023-06-05 DIAGNOSIS — Z95.2 S/P TAVR (TRANSCATHETER AORTIC VALVE REPLACEMENT): ICD-10-CM

## 2023-06-05 DIAGNOSIS — I49.5 TACHYCARDIA-BRADYCARDIA (HCC): ICD-10-CM

## 2023-06-05 DIAGNOSIS — F17.210 CIGARETTE NICOTINE DEPENDENCE WITHOUT COMPLICATION: ICD-10-CM

## 2023-06-05 DIAGNOSIS — R06.09 DYSPNEA ON EXERTION: ICD-10-CM

## 2023-06-05 DIAGNOSIS — Z95.0 PRESENCE OF CARDIAC PACEMAKER: ICD-10-CM

## 2023-06-05 DIAGNOSIS — I10 ESSENTIAL HYPERTENSION: Primary | ICD-10-CM

## 2023-06-05 DIAGNOSIS — I47.29 NSVT (NONSUSTAINED VENTRICULAR TACHYCARDIA) (HCC): ICD-10-CM

## 2023-06-05 DIAGNOSIS — I35.0 NONRHEUMATIC AORTIC VALVE STENOSIS: ICD-10-CM

## 2023-06-05 DIAGNOSIS — E78.2 MIXED HYPERLIPIDEMIA: ICD-10-CM

## 2023-06-05 DIAGNOSIS — I25.10 CORONARY ARTERY DISEASE INVOLVING NATIVE CORONARY ARTERY OF NATIVE HEART WITHOUT ANGINA PECTORIS: ICD-10-CM

## 2023-06-05 DIAGNOSIS — I50.9: ICD-10-CM

## 2023-06-05 DIAGNOSIS — I42.9 CARDIOMYOPATHY, UNSPECIFIED TYPE (HCC): ICD-10-CM

## 2023-06-05 PROCEDURE — 99214 OFFICE O/P EST MOD 30 MIN: CPT | Performed by: INTERNAL MEDICINE

## 2023-06-05 NOTE — PROGRESS NOTES
Cardiology Follow Up    Jenny Briceño  1945  [id number]  [facility],[place] [address] CATH LAB  [place]ni [place] [address] [place] [address]  [phone]    1  Essential hypertension        2  Nonrheumatic aortic valve stenosis        3  Coronary artery disease involving native coronary artery of native heart without angina pectoris        4  Tachycardia-bradycardia ([address] )        5  Ischemic heart failure ([address] )        6  Mixed hyperlipidemia        7  Cigarette nicotine dependence without complication        8  S/P TAVR (transcatheter aortic valve replacement)        9  Status post insertion of drug eluting coronary artery stent        10  Presence of cardiac pacemaker        11  Dyspnea on exertion        12  Cardiomyopathy, unspecified type ([address] )        13  NSVT (nonsustained ventricular tachycardia) ([facility])            Interval History: Cardiology follow-up  Patient current denies any significant chest pain or dyspnea no orthopnea no PND  No significant bleeding issues on dual antiplatelet therapy  He is currently on low-dose diuretic regimen  Compliant with low-cholesterol diet, lipids last checked a cholesterol 142, HDL 65, LDL of 62  Adequate control on high intensity statin therapy  Pacemaker is working properly, last interrogation revealed about 7 percent atrially paced, minimal ventricular pacing  He does have nonsustained ventricular tachycardia    Patient is currently wearing a LifeVest   Denies any syncope or presyncope    Patient Active Problem List   Diagnosis   • Mild intermittent asthma without complication   • Allergic rhinitis   • Bilateral hearing loss   • Type 2 diabetes mellitus with circulatory disorder, with long-term current use of insulin ([facility])   • Hyperlipidemia   • Essential hypertension   • Hypothyroidism   • Nicotine dependence   • Osteoarthritis of knee   • Vitamin B12 deficiency   • Adenomatous colon polyp   • Diverticulosis of large intestine   • Internal hemorrhoids   • Aortic stenosis   • Contrast media allergy   • S/P TAVR (transcatheter aortic valve replacement)   • Coronary artery disease   • Tachycardia-bradycardia [facility])   • Need for influenza vaccination   • Leg cramping   • Peripheral artery disease (HCC)   • Presence of cardiac pacemaker   • NSTEMI (non-ST elevated myocardial infarction) (HCC)   • Bruit of left carotid artery   • Dyspnea on exertion   • Status post insertion of drug eluting coronary artery stent   • Ischemic heart failure (HCC)   • SIRS (systemic inflammatory response syndrome) (HCC)   • Dyspnea   • Anemia   • Cardiomyopathy (HCC)   • NSVT (nonsustained ventricular tachycardia) (HCC)     Past Medical History:   Diagnosis Date   • Arthritis    • Asthma    • Colon polyps    • Community acquired pneumonia     last assessed: 2014   • Diabetes mellitus ([facility] )    • Hemorrhagic prepatellar bursitis, left 10/21/2019   • Hepatitis C    • High cholesterol    • History of colonoscopy    • Hypertension    • Lymphadenopathy, anterior cervical 2018   • Nephritis and nephropathy, not specified as acute or chronic, with other specified pathological lesion in kidney, in diseases classified elsewhere 3/26/2013   • Screening for colon cancer 2019   • Thoracic vertebral fracture ([facility] ) 2014     Social History     Socioeconomic History   • Marital status: /Civil Union     Spouse name: Not on file   • Number of children: Not on file   • Years of education: Not on file   • Highest education level: Not on file   Occupational History   • Occupation: retired    Tobacco Use   • Smoking status: Former     Packs/day: 0 50     Types: Cigarettes     Quit date: 2022     Years since quittin 0   • Smokeless tobacco: Never   • Tobacco comments:     started when he was about 24 yrs old; stopped smoking 3 wks ago   Vaping Use   • Vaping Use: Never used   Substance and Sexual Activity   • Alcohol use: No   • Drug use: No   • Sexual activity: Not Currently   Other Topics Concern   • Not on file   Social History Narrative   • Not on file     Social Determinants of Health     Financial Resource Strain: Low Risk  (2/21/2023)    Overall Financial Resource Strain (CARDIA)    • Difficulty of Paying Living Expenses: Not hard at all   Food Insecurity: No Food Insecurity (3/20/2023)    Hunger Vital Sign    • Worried About Running Out of Food in the Last Year: Never true    • Ran Out of Food in the Last Year: Never true   Transportation Needs: No Transportation Needs (3/20/2023)    PRAPARE - Transportation    • Lack of Transportation (Medical): No    • Lack of Transportation (Non-Medical): No   Physical Activity: Unknown (1/19/2022)    Exercise Vital Sign    • Days of Exercise per Week: Not on file    • Minutes of Exercise per Session: 60 min   Stress: No Stress Concern Present (1/19/2022)    [address]    • Feeling of Stress : Not at all   Social Connections:  Moderately Isolated (1/19/2022)    Social Connection and Isolation Panel [NHANES]    • Frequency of Communication with Friends and Family: More than three times a week    • Frequency of Social Gatherings with Friends and Family: More than three times a week    • Attends [employer] Services: Never    • Active Member of Clubs or Organizations: No    • Attends Club or Organization Meetings: Never    • Marital Status:    Intimate Partner Violence: Not on file   Housing Stability: Low Risk  (3/20/2023)    Housing Stability Vital Sign    • Unable to Pay for Housing in the Last Year: No    • Number of Places Lived in the Last Year: 1    • Unstable Housing in the Last Year: No      Family History   Problem Relation Age of Onset   • Heart attack Family         at age 80   • No Known Problems Mother    • No Known Problems Father    • Diabetes Sister    • Diabetes Brother      Past Surgical History:   Procedure Laterality Date   • CARDIAC CATHETERIZATION N/A 6/3/2022    Procedure: Cardiac RHC/LHC; Surgeon: Axel Haque MD;  [facility] CARDI[facility]; Service: Cardiology   • CARDIAC CATHETERIZATION N/A 6/21/2022    Procedure: CARDIAC TAVR;  Surgeon: Christine Cowan MD;  Location: [facility] MAIN OR;  Service: Cardiology   • CARDIAC CATHETERIZATION N/A 2/10/2023    Procedure: Cardiac Coronary Angiogram;  Surgeon: Axel Haque MD;  [facility] CARD[facility]; Service: Cardiology   • CARDIAC CATHETERIZATION N/A 2/10/2023    Procedure: Cardiac pci;  Surgeon: Axel Haque MD;  [facility] CARDI[facility]; Service: Cardiology   • CARDIAC ELECTROPHYSIOLOGY PROCEDURE N/A 9/1/2022    Procedure: Cardiac pacer implant ; DC PPM;  Surgeon: Deangelo Ferguson DO;  [facility] CARDI[facility] C[facility]; Service: Cardiology   • COLONOSCOPY     • COLONOSCOPY W/ POLYPECTOMY     • LITHOTRIPSY     • MULTIPLE TOOTH EXTRACTIONS     • MI COLONOSCOPY FLX DX W/COLLJ SPEC WHEN PFRMD N/A 5/17/2018    Procedure: COLONOSCOPY;  Surgeon: Rob Vela MD;  Location: [facility] GI LAB;   Service: Gastroenterology   • MI REPLACE AORTIC VALVE OPENFEMORAL ARTERY APPROACH N/A 6/21/2022    Procedure: REPLACEMENT AORTIC VALVE TRANSCATHETER (TAVR) TRANSFEMORAL W/ 26MM QUINN RONNI S3 ULTRA VALVE(ACCESS ON LEFT) SAULO;  Surgeon: Mary Palumbo MD;  Location: [facility] MAIN OR;  Service: Cardiac Surgery       Current Outpatient Medications:   •  Advair -21 MCG/ACT inhaler, Inhale 2 puffs 2 (two) times a day Rinse mouth after use , Disp: 36 g, Rfl: 6  •  albuterol (PROVENTIL HFA,VENTOLIN HFA) 90 mcg/act inhaler, Inhale 2 puffs every 4 hours as needed for wheezing or shortness of breath , Disp: 18 g, Rfl: 2  •  Alcohol Swabs (ALCOHOL PADS) 70 % PADS, , Disp: , Rfl:   •  aspirin (Aspirin Low Dose) 81 mg chewable tablet, Chew 1 tablet (81 mg total) daily, Disp: 90 tablet, Rfl: 3  •  Blood Glucose Monitoring Suppl (OneTouch Verio) w/Device KIT, Check blood glucose three times daily before each meal, Disp: 1 kit, Rfl: 0  •  cholecalciferol (VITAMIN D3) 1,000 units tablet, Take 2 tablets (2,000 Units total) by mouth daily, Disp: 180 tablet, Rfl: 5  •  clopidogrel (PLAVIX) 75 mg tablet, Take 1 tablet (75 mg total) by mouth daily, Disp: 90 tablet, Rfl: 3  •  Empagliflozin (JARDIANCE) 10 MG TABS tablet, Take 1 tablet (10 mg total) by mouth every morning, Disp: 90 tablet, Rfl: 3  •  ferrous sulfate 325 (65 Fe) mg tablet, TAKE 1 TABLET BY MOUTH EVERY OTHER DAY, Disp: 45 tablet, Rfl: 2  •  fluticasone (FLONASE) 50 mcg/act nasal spray, 2 sprays into each nostril daily, Disp: 2 Bottle, Rfl: 2  •  furosemide (LASIX) 40 mg tablet, TAKE 1 TABLET BY MOUTH EVERY DAY, Disp: 90 tablet, Rfl: 2  •  Lantus SoloStar 100 units/mL SOPN, INJECT 0 18 ML (18 UNITS TOTAL) UNDER THE SKIN DAILY AT BEDTIME, Disp: 15 mL, Rfl: 3  •  levothyroxine 137 mcg tablet, TAKE 1 TABLET BY MOUTH EVERY DAY, Disp: 90 tablet, Rfl: 3  •  lisinopril (ZESTRIL) 20 mg tablet, Take 1 tablet (20 mg total) by mouth 2 (two) times a day, Disp: 180 tablet, Rfl: 2  •  metFORMIN (GLUCOPHAGE) 850 mg tablet, TAKE 1 TABLET BY MOUTH 2 TIMES A DAY WITH MEALS , Disp: 180 tablet, Rfl: 3  •  methocarbamol (Robaxin-750) 750 mg tablet, Take 1 tablet (750 mg total) by mouth every 6 (six) hours as needed for muscle spasms, Disp: 60 tablet, Rfl: 0  •  metoprolol succinate (TOPROL-XL) 50 mg 24 hr tablet, Take 1 5 tablets (75 mg total) by mouth 2 (two) times a day, Disp: 270 tablet, Rfl: 1  •  montelukast (SINGULAIR) 10 mg tablet, TAKE 1 TABLET BY MOUTH EVERY DAY, Disp: 90 tablet, Rfl: 3  •  nitroglycerin (NITROSTAT) 0 4 mg SL tablet, Place 1 tablet (0 4 mg total) under the tongue every 5 (five) minutes as needed for chest pain, Disp: 30 tablet, Rfl: 1  •  NovoLOG FlexPen 100 units/mL injection pen, Inject 5 units with breakfast and with dinner, Disp: 12 mL, Rfl: 3  •  rosuvastatin (CRESTOR) 40 MG tablet, TAKE 1 TABLET BY MOUTH EVERY DAY, Disp: 90 tablet, Rfl: 3  •  spironolactone (ALDACTONE) 25 mg tablet, Take 1 tablet (25 mg total) by mouth daily Do not start before February 13, 2023 , Disp: 90 tablet, Rfl: 3  •  Continuous Blood Gluc  (FreeStyle [place] 2 [place]) POWER, Use 1 each continuous (Patient not taking: Reported on 1/30/2023), Disp: 1 each, Rfl: 0  •  Continuous Blood Gluc Sensor (FreeStyle Cristofer 2 Sensor) MISC, Use 1 each every 14 (fourteen) days (Patient not taking: Reported on 6/5/2023), Disp: 6 each, Rfl: 3  •  Insulin Pen Needle (Pen Needles) 31G X 8 MM MISC, Use daily (Patient not taking: Reported on 3/28/2023), Disp: 300 each, Rfl: 3  •  Lancets (FREESTYLE) lancets, by Other route as needed (As needed) (Patient not taking: Reported on 3/28/2023), Disp: 100 each, Rfl: 3  Allergies   Allergen Reactions   • Iv Contrast [Iodinated Contrast Media] Rash     Patient states he had a severe reaction approximately 10 years ago , states he had a rash, itchy and he remembers a bunch of people pounding on chest and being surrounded by multiple doctors         Labs:  Hospital Outpatient Visit on 05/25/2023   Component Date Value   • AV area peak adriano 05/25/2023 1 6    • MV mean gradient retrogr* 05/25/2023 69    • LA size 05/25/2023 4 6    • Aortic valve mean veloci* 05/25/2023 17 00    • Mitral regurgitation pea* 05/25/2023 5 21    • Mitral valve mean inflow* 05/25/2023 3 98    • Triscuspid Valve Regurgi* 05/25/2023 36 0    • Tricuspid valve peak reg* 05/25/2023 3 00    • LVPWd 05/25/2023 1 10    • Left Atrium Area-systoli* 05/25/2023 22 5    • Left Atrium Area-systoli* 05/25/2023 26 1    • MV E' Tissue Velocity Se* 05/25/2023 6    • Tricuspid annular plane * 05/25/2023 1 90    • TR Peak Adriano 05/25/2023 3 0    • IVSd 05/25/2023 5 86    • LV DIASTOLIC VOLUME (MOD* 20/06/7926 141    • LEFT VENTRICLE SYSTOLIC * 07/14/6407 78    • Left ventricular stroke * 05/25/2023 63 00    • MV VTI RETROGRADE 05/25/2023 170 5    • A4C EF 05/25/2023 35    • LA length (A2C) 05/25/2023 5 50    • LVIDd 05/25/2023 5 40    • IVS 05/25/2023 1 1    • LVIDS 05/25/2023 4 20    • FS 05/25/2023 22    • Ao root 05/25/2023 2 30    • RVID d 05/25/2023 4 1    • LVOT mn grad 05/25/2023 4 0    • AV area by cont VTI 05/25/2023 1 5    • AV mean gradient 05/25/2023 13    • AV LVOT peak gradient 05/25/2023 6    • LVOT diameter 05/25/2023 2 0    • LVOT peak nichol 05/25/2023 1 22    • LVOT peak VTI 05/25/2023 23 14    • Aortic valve peak veloci* 05/25/2023 2 42    • Ao VTI 05/25/2023 47 82    • LVOT stroke volume 05/25/2023 72 66    • AV peak gradient 05/25/2023 23    • SWATI A4C 05/25/2023 27 5    • MR PG 05/25/2023 109    • RAA A4C 05/25/2023 19 3    • LVOT stroke volume index 05/25/2023 37 10    • LVSV, 2D 05/25/2023 63    • LVOT area 05/25/2023 3 14    • DVI 05/25/2023 0 50    • AV valve area 05/25/2023 1 52    • LV EF 05/25/2023 35    • PASP 05/25/2023 36 0    Admission on 05/06/2023, Discharged on 05/07/2023   Component Date Value   • WBC 05/06/2023 10 80 (H)    • RBC 05/06/2023 4 46    • Hemoglobin 05/06/2023 11 7 (L)    • Hematocrit 05/06/2023 38 7    • MCV 05/06/2023 87    • MCH 05/06/2023 26 2 (L)    • MCHC 05/06/2023 30 2 (L)    • RDW 05/06/2023 16 9 (H)    • MPV 05/06/2023 10 6    • Platelets 27/32/1706 262    • nRBC 05/06/2023 0    • Neutrophils Relative 05/06/2023 81 (H)    • Immat GRANS % 05/06/2023 0    • Lymphocytes Relative 05/06/2023 11 (L)    • Monocytes Relative 05/06/2023 7    • Eosinophils Relative 05/06/2023 1    • Basophils Relative 05/06/2023 0    • Neutrophils Absolute 05/06/2023 8 73 (H)    • Immature Grans Absolute 05/06/2023 0 03    • Lymphocytes Absolute 05/06/2023 1 21    • Monocytes Absolute 05/06/2023 0 70    • Eosinophils Absolute 05/06/2023 0 11    • Basophils Absolute 05/06/2023 0 02    • Sodium 05/06/2023 139    • Potassium 05/06/2023 3 7    • Chloride 05/06/2023 109 (H)    • CO2 05/06/2023 26    • ANION GAP 05/06/2023 4    • BUN 05/06/2023 21    • Creatinine 05/06/2023 1 12    • Glucose 05/06/2023 121    • Calcium 05/06/2023 9 1    • Corrected Calcium 05/06/2023 9 7    • AST 05/06/2023 18    • ALT 05/06/2023 21    • Alkaline Phosphatase 05/06/2023 54    • Total Protein 05/06/2023 6 4    • Albumin 05/06/2023 3 2 (L)    • Total Bilirubin 05/06/2023 0 64    • eGFR 05/06/2023 63    • hs TnI 0hr 05/06/2023 31    • NT-proBNP 05/06/2023 2,624 (H)    • hs TnI 2hr 05/06/2023 39    • Delta 2hr hsTnI 05/06/2023 8    • hs TnI 4hr 05/06/2023 38    • Delta 4hr hsTnI 05/06/2023 7    • Ventricular Rate 05/06/2023 110    • Atrial Rate 05/06/2023 110    • MO Interval 05/06/2023 180    • QRSD Interval 05/06/2023 132    • QT Interval 05/06/2023 342    • QTC Interval 05/06/2023 462    • P Axis 05/06/2023 70    • QRS Axis 05/06/2023 106    • T Wave [place] 05/06/2023 -1    Hospital Outpatient Visit on 04/21/2023   Component Date Value   • AV area peak nichol 04/21/2023 1 3    • Aortic valve mean veloci* 04/21/2023 11 50    • LVPWd 04/21/2023 1 10    • MV E' Tissue Velocity Se* 04/21/2023 5    • IVSd 04/21/2023 9 72    • LV DIASTOLIC VOLUME (MOD* 64/30/7913 144    • LEFT VENTRICLE SYSTOLIC * 70/92/3171 98    • Left ventricular stroke * 04/21/2023 46 00    • EF 04/21/2023 34    • A4C EF 04/21/2023 37    • LVIDd 04/21/2023 5 40    • IVS 04/21/2023 1 1    • LVIDS 04/21/2023 4 60    • FS 04/21/2023 15    • LVOT mn grad 04/21/2023 1 0    • AV area by cont VTI 04/21/2023 1 3    • AV mean gradient 04/21/2023 6    • AV LVOT peak gradient 04/21/2023 2    • LVOT diameter 04/21/2023 2 0    • LVOT peak nichol 04/21/2023 0 62    • LVOT peak VTI 04/21/2023 12 35    • Aortic valve peak veloci* 04/21/2023 1 52    • Ao VTI 04/21/2023 28 99    • LVOT stroke volume 04/21/2023 38 78    • AV peak gradient 63/75/8982 9    • LV Systolic Volume (BP) 54/30/1061 85    • LV Diastolic Volume (BP) 30/62/7546 128    • LVOT stroke volume index 04/21/2023 19 10    • LVSV, 2D 04/21/2023 46    • LVOT area 04/21/2023 3 14    • DVI 04/21/2023 0 41    • AV valve area 04/21/2023 1 34    • LV EF 04/21/2023 35    Office Visit on 03/28/2023   Component Date Value   • Sodium 03/30/2023 136    • Potassium 03/30/2023 3 6    • Chloride 03/30/2023 104    • CO2 03/30/2023 30    • ANION GAP 03/30/2023 2 (L)    • BUN 03/30/2023 16    • Creatinine 03/30/2023 1 10    • Glucose 03/30/2023 109    • Calcium 03/30/2023 9 3    • Corrected Calcium 03/30/2023 9 9    • AST 03/30/2023 16    • ALT 03/30/2023 27    • Alkaline Phosphatase 03/30/2023 52    • Total Protein 03/30/2023 6 9    • Albumin 03/30/2023 3 2 (L)    • Total Bilirubin 03/30/2023 0 64    • eGFR 03/30/2023 65    No results displayed because visit has over 200 results  Appointment on 02/21/2023   Component Date Value   • Sodium 02/21/2023 140    • Potassium 02/21/2023 4 5    • Chloride 02/21/2023 108    • CO2 02/21/2023 28    • ANION GAP 02/21/2023 4    • BUN 02/21/2023 16    • Creatinine 02/21/2023 1 10    • Glucose, Fasting 02/21/2023 164 (H)    • Calcium 02/21/2023 9 6    • eGFR 02/21/2023 65    No results displayed because visit has over 200 results        Office Visit on 12/29/2022   Component Date Value   • WBC 05/05/2023 6 72    • RBC 05/05/2023 4 53    • Hemoglobin 05/05/2023 11 9 (L)    • Hematocrit 05/05/2023 40 6    • MCV 05/05/2023 90    • MCH 05/05/2023 26 3 (L)    • MCHC 05/05/2023 29 3 (L)    • RDW 05/05/2023 16 5 (H)    • MPV 05/05/2023 10 8    • Platelets 55/99/9195 262    • nRBC 05/05/2023 0    • Neutrophils Relative 05/05/2023 70    • Immat GRANS % 05/05/2023 0    • Lymphocytes Relative 05/05/2023 19    • Monocytes Relative 05/05/2023 9    • Eosinophils Relative 05/05/2023 2    • Basophils Relative 05/05/2023 0    • Neutrophils Absolute 05/05/2023 4 68    • Immature Grans Absolute 05/05/2023 0 02    • Lymphocytes Absolute 05/05/2023 1 27    • Monocytes Absolute 05/05/2023 0 59    • Eosinophils Absolute 05/05/2023 0 13    • Basophils Absolute 05/05/2023 0 03    • Sodium 05/05/2023 140    • Potassium 05/05/2023 3 8    • Chloride 05/05/2023 109 (H)    • CO2 05/05/2023 30    • ANION GAP 05/05/2023 1 (L)    • BUN 05/05/2023 15    • Creatinine 05/05/2023 0 93    • Glucose, Fasting 05/05/2023 89    • Calcium 05/05/2023 9 3    • eGFR 05/05/2023 79    • Magnesium 05/05/2023 2 0      Imaging: Echo follow up/limited w/ contrast if indicated    Result Date: 5/25/2023  Narrative: •  Left Ventricle: Left ventricular cavity size is normal  Wall thickness is increased  There is borderline concentric hypertrophy  The left ventricular ejection fraction is 35%  Systolic function is severely reduced  There is global hypokinesis with regional variation  •  IVS: There is abnormal septal motion consistent with left bundle branch block  •  Left Atrium: The atrium is dilated  •  Aortic Valve: There is a TAVR bioprosthetic valve  Peak gradient 23 mmHg, mean gradient 13 mmHg  There is no evidence of regurgitation  •  Mitral Valve: There is moderate to severe regurgitation with an eccentrically directed jet  •  Tricuspid Valve: There is mild regurgitation  •  Pulmonic Valve: There is mild regurgitation  •  Pulmonary Artery: The estimated pulmonary artery systolic pressure is 10 1 mmHg  XR chest 1 view portable    Result Date: 5/7/2023  Narrative: CHEST INDICATION:   SOB  COMPARISON: CXR 3/18/2023, chest CT 6/9/2022  EXAM PERFORMED/VIEWS:  XR CHEST PORTABLE  FINDINGS: Mild cardiomegaly  TAVR  Left subclavian pacemaker leads in right atrium and right ventricle  Moderate pulmonary edema  Trace effusions  No pneumothorax  Upper abdomen normal  Bones normal for age  Impression: Moderate pulmonary edema with trace effusions  Workstation performed: [id number]       Review of Systems:  Review of Systems   Constitutional: Negative for activity change, fatigue and unexpected weight change  HENT: Negative for nosebleeds  Eyes: Negative for visual disturbance     Respiratory: Negative for apnea, shortness of breath, wheezing and stridor  Cardiovascular: Negative for chest pain and leg swelling  Gastrointestinal: Negative for abdominal pain, anal bleeding and blood in stool  Endocrine: Negative for cold intolerance  Genitourinary: Negative for hematuria  Musculoskeletal: Negative for arthralgias, gait problem and myalgias  Skin: Negative for pallor and rash  Allergic/Immunologic: Negative for immunocompromised state  Neurological: Negative for dizziness, syncope, speech difficulty and weakness  Hematological: Does not bruise/bleed easily  Psychiatric/Behavioral: Positive for sleep disturbance  The patient is not nervous/anxious  Physical Exam:  Physical Exam  Vitals reviewed  Constitutional:       General: He is not in acute distress  Appearance: Normal appearance  He is not ill-appearing, toxic-appearing or diaphoretic  Eyes:      General: No scleral icterus  Neck:      Vascular: No carotid bruit  Cardiovascular:      Rate and Rhythm: Normal rate  Heart sounds: Murmur (soft systolic ejection murmur at the base, soft pansystolic murmur at the apex) heard  No friction rub  No gallop  Pulmonary:      Effort: Pulmonary effort is normal  No respiratory distress  Breath sounds: Normal breath sounds  No stridor  No wheezing, rhonchi or rales  Musculoskeletal:      Right lower leg: No edema  Left lower leg: No edema  Skin:     General: Skin is warm and dry  Capillary Refill: Capillary refill takes 2 to 3 seconds  Coloration: Skin is not jaundiced or pale  Findings: No bruising  Neurological:      Mental Status: He is alert and oriented to person, place, and time  Psychiatric:         Mood and Affect: Mood normal          Discussion/Summary: Ischemic cardiomyopathy  Non-STEMI 2023, PCI of the LAD progression of his disease from a catheterization a year prior  Moderate LV dysfunction ejection fraction of 30 to 35%    Follow-up echocardiogram reveals unchanged ejection fraction, he is now scheduled for upgrade to a biventricular ICD  He received a dual-chamber pacemaker a year prior for left bundle branch block AV block after a TAVR  At that time LV function was normal   He appears euvolemic on examination today  Echocardiogram revealed associated mild severe mitral insufficiency aortic valve prosthesis working properly  I expect some improvement of MR after BiV ICD  This note was completed in part utilizing [employer] direct voice recognition software  Grammatical errors, random word insertion, spelling mistakes, and incomplete sentences may be an occasional consequence of the system secondary to software limitations, ambient noise and hardware issues  At the time of dictation, efforts were made to edit, clarify and /or correct errors  Please read the chart carefully and recognize, using context, where substitutions have occurred  If you have any questions or concerns about the context, text or information contained within the body of this dictation, please contact myself, the provider, for further clarification

## 2023-06-06 ENCOUNTER — TELEPHONE (OUTPATIENT)
Dept: CARDIOLOGY CLINIC | Facility: CLINIC | Age: 78
End: 2023-06-06

## 2023-06-06 DIAGNOSIS — E78.2 MIXED HYPERLIPIDEMIA: ICD-10-CM

## 2023-06-06 RX ORDER — ROSUVASTATIN CALCIUM 40 MG/1
TABLET, COATED ORAL
Qty: 90 TABLET | Refills: 3 | Status: SHIPPED | OUTPATIENT
Start: 2023-06-06

## 2023-06-07 NOTE — TELEPHONE ENCOUNTER
Please let him know  Stop lisinopril and will start entresto at 49-51mg BID instead, 36 hours after last dose of lisinopril  BMP in 1 week after starting entresto   Thanks

## 2023-06-26 ENCOUNTER — REMOTE DEVICE CLINIC VISIT (OUTPATIENT)
Dept: CARDIOLOGY CLINIC | Facility: CLINIC | Age: 78
End: 2023-06-26
Payer: MEDICARE

## 2023-06-26 DIAGNOSIS — Z95.0 PRESENCE OF PERMANENT CARDIAC PACEMAKER: Primary | ICD-10-CM

## 2023-06-26 PROCEDURE — 93296 REM INTERROG EVL PM/IDS: CPT | Performed by: INTERNAL MEDICINE

## 2023-06-26 PROCEDURE — 93294 REM INTERROG EVL PM/LDLS PM: CPT | Performed by: INTERNAL MEDICINE

## 2023-06-26 NOTE — PROGRESS NOTES
Results for orders placed or performed in visit on 06/26/23   Cardiac EP device report    Narrative    MDT DC PM/MRI CONDITIONAL  CARELINK TRANSMISSION: BATTERY VOLTAGE ADEQUATE  (14 5 YRS) AP7%  <1%  ALL AVAILABLE LEAD PARAMETERS WITHIN NORMAL LIMITS  8 VHR EPISODES DETECTED MOST RECENT 9 BEATS @ 360ms (9 BEATS @ 330ms, 6 BEATS @ 340ms) PATIENT IS ON METOPROLOL SUCC; EF 35% (5/2023)  NORMAL DEVICE FUNCTION  ---BAHENA

## 2023-07-04 DIAGNOSIS — D50.9 IRON DEFICIENCY ANEMIA, UNSPECIFIED IRON DEFICIENCY ANEMIA TYPE: ICD-10-CM

## 2023-07-05 RX ORDER — FERROUS SULFATE 325(65) MG
TABLET ORAL
Qty: 45 TABLET | Refills: 3 | Status: SHIPPED | OUTPATIENT
Start: 2023-07-05

## 2023-07-09 DIAGNOSIS — J45.20 MILD INTERMITTENT ASTHMA WITHOUT COMPLICATION: ICD-10-CM

## 2023-07-10 RX ORDER — ALBUTEROL SULFATE 90 UG/1
AEROSOL, METERED RESPIRATORY (INHALATION)
Qty: 18 G | Refills: 2 | Status: SHIPPED | OUTPATIENT
Start: 2023-07-10

## 2023-07-14 ENCOUNTER — APPOINTMENT (EMERGENCY)
Dept: RADIOLOGY | Facility: HOSPITAL | Age: 78
End: 2023-07-14
Payer: MEDICARE

## 2023-07-14 ENCOUNTER — HOSPITAL ENCOUNTER (EMERGENCY)
Facility: HOSPITAL | Age: 78
Discharge: HOME | End: 2023-07-14
Attending: EMERGENCY MEDICINE
Payer: MEDICARE

## 2023-07-14 ENCOUNTER — DOCUMENTATION (OUTPATIENT)
Dept: CARDIOLOGY CLINIC | Facility: CLINIC | Age: 78
End: 2023-07-14

## 2023-07-14 VITALS
OXYGEN SATURATION: 96 % | HEART RATE: 96 BPM | TEMPERATURE: 97.9 F | SYSTOLIC BLOOD PRESSURE: 154 MMHG | DIASTOLIC BLOOD PRESSURE: 80 MMHG | RESPIRATION RATE: 20 BRPM

## 2023-07-14 DIAGNOSIS — E78.5 HLD (HYPERLIPIDEMIA): ICD-10-CM

## 2023-07-14 DIAGNOSIS — I50.9 CHF (CONGESTIVE HEART FAILURE) (HCC): ICD-10-CM

## 2023-07-14 DIAGNOSIS — Z95.0 PACEMAKER: ICD-10-CM

## 2023-07-14 DIAGNOSIS — J44.9 COPD (CHRONIC OBSTRUCTIVE PULMONARY DISEASE) (HCC): ICD-10-CM

## 2023-07-14 DIAGNOSIS — R06.00 DYSPNEA: Primary | ICD-10-CM

## 2023-07-14 DIAGNOSIS — I10 HTN (HYPERTENSION): ICD-10-CM

## 2023-07-14 DIAGNOSIS — I25.10 CAD (CORONARY ARTERY DISEASE): ICD-10-CM

## 2023-07-14 LAB
2HR DELTA HS TROPONIN: -1 NG/L
ALBUMIN SERPL BCP-MCNC: 3.4 G/DL (ref 3.5–5)
ALP SERPL-CCNC: 56 U/L (ref 46–116)
ALT SERPL W P-5'-P-CCNC: 23 U/L (ref 12–78)
ANION GAP SERPL CALCULATED.3IONS-SCNC: 4 MMOL/L
AST SERPL W P-5'-P-CCNC: 29 U/L (ref 5–45)
ATRIAL RATE: 106 BPM
BASOPHILS # BLD AUTO: 0.03 THOUSANDS/ÂΜL (ref 0–0.1)
BASOPHILS NFR BLD AUTO: 0 % (ref 0–1)
BILIRUB SERPL-MCNC: 1.07 MG/DL (ref 0.2–1)
BNP SERPL-MCNC: 460 PG/ML (ref 0–100)
BUN SERPL-MCNC: 17 MG/DL (ref 5–25)
CALCIUM ALBUM COR SERPL-MCNC: 9.9 MG/DL (ref 8.3–10.1)
CALCIUM SERPL-MCNC: 9.4 MG/DL (ref 8.3–10.1)
CARDIAC TROPONIN I PNL SERPL HS: 23 NG/L
CARDIAC TROPONIN I PNL SERPL HS: 24 NG/L
CHLORIDE SERPL-SCNC: 113 MMOL/L (ref 96–108)
CO2 SERPL-SCNC: 24 MMOL/L (ref 21–32)
CREAT SERPL-MCNC: 1.05 MG/DL (ref 0.6–1.3)
EOSINOPHIL # BLD AUTO: 0.07 THOUSAND/ÂΜL (ref 0–0.61)
EOSINOPHIL NFR BLD AUTO: 1 % (ref 0–6)
ERYTHROCYTE [DISTWIDTH] IN BLOOD BY AUTOMATED COUNT: 19.2 % (ref 11.6–15.1)
GFR SERPL CREATININE-BSD FRML MDRD: 68 ML/MIN/1.73SQ M
GLUCOSE SERPL-MCNC: 63 MG/DL (ref 65–140)
HCT VFR BLD AUTO: 39.8 % (ref 36.5–49.3)
HGB BLD-MCNC: 12.2 G/DL (ref 12–17)
IMM GRANULOCYTES # BLD AUTO: 0.03 THOUSAND/UL (ref 0–0.2)
IMM GRANULOCYTES NFR BLD AUTO: 0 % (ref 0–2)
LYMPHOCYTES # BLD AUTO: 1.12 THOUSANDS/ÂΜL (ref 0.6–4.47)
LYMPHOCYTES NFR BLD AUTO: 15 % (ref 14–44)
MCH RBC QN AUTO: 27.1 PG (ref 26.8–34.3)
MCHC RBC AUTO-ENTMCNC: 30.7 G/DL (ref 31.4–37.4)
MCV RBC AUTO: 88 FL (ref 82–98)
MONOCYTES # BLD AUTO: 0.57 THOUSAND/ÂΜL (ref 0.17–1.22)
MONOCYTES NFR BLD AUTO: 7 % (ref 4–12)
NEUTROPHILS # BLD AUTO: 5.84 THOUSANDS/ÂΜL (ref 1.85–7.62)
NEUTS SEG NFR BLD AUTO: 77 % (ref 43–75)
NRBC BLD AUTO-RTO: 0 /100 WBCS
P AXIS: 74 DEGREES
PLATELET # BLD AUTO: 294 THOUSANDS/UL (ref 149–390)
PMV BLD AUTO: 10.3 FL (ref 8.9–12.7)
POTASSIUM SERPL-SCNC: 4.7 MMOL/L (ref 3.5–5.3)
PR INTERVAL: 174 MS
PROT SERPL-MCNC: 7.2 G/DL (ref 6.4–8.4)
QRS AXIS: -32 DEGREES
QRSD INTERVAL: 136 MS
QT INTERVAL: 368 MS
QTC INTERVAL: 488 MS
RBC # BLD AUTO: 4.51 MILLION/UL (ref 3.88–5.62)
SODIUM SERPL-SCNC: 141 MMOL/L (ref 135–147)
T WAVE AXIS: 92 DEGREES
VENTRICULAR RATE: 106 BPM
WBC # BLD AUTO: 7.66 THOUSAND/UL (ref 4.31–10.16)

## 2023-07-14 PROCEDURE — 93005 ELECTROCARDIOGRAM TRACING: CPT

## 2023-07-14 PROCEDURE — 71045 X-RAY EXAM CHEST 1 VIEW: CPT

## 2023-07-14 PROCEDURE — 84484 ASSAY OF TROPONIN QUANT: CPT

## 2023-07-14 PROCEDURE — 99285 EMERGENCY DEPT VISIT HI MDM: CPT

## 2023-07-14 PROCEDURE — 36415 COLL VENOUS BLD VENIPUNCTURE: CPT

## 2023-07-14 PROCEDURE — 80053 COMPREHEN METABOLIC PANEL: CPT

## 2023-07-14 PROCEDURE — 93010 ELECTROCARDIOGRAM REPORT: CPT | Performed by: INTERNAL MEDICINE

## 2023-07-14 PROCEDURE — 94640 AIRWAY INHALATION TREATMENT: CPT

## 2023-07-14 PROCEDURE — 83880 ASSAY OF NATRIURETIC PEPTIDE: CPT

## 2023-07-14 PROCEDURE — 85025 COMPLETE CBC W/AUTO DIFF WBC: CPT

## 2023-07-14 RX ORDER — IPRATROPIUM BROMIDE AND ALBUTEROL SULFATE 2.5; .5 MG/3ML; MG/3ML
3 SOLUTION RESPIRATORY (INHALATION)
Status: DISCONTINUED | OUTPATIENT
Start: 2023-07-14 | End: 2023-07-14 | Stop reason: HOSPADM

## 2023-07-14 RX ADMIN — IPRATROPIUM BROMIDE AND ALBUTEROL SULFATE 3 ML: .5; 3 SOLUTION RESPIRATORY (INHALATION) at 13:00

## 2023-07-14 NOTE — DISCHARGE INSTRUCTIONS
Please follow up with your family doctor. Please return to the emergency department if you develop any new or concerning symptoms such as worsening shortness of breath, chest pain, or new leg swelling.

## 2023-07-14 NOTE — ED PROVIDER NOTES
History  Chief Complaint   Patient presents with   • Shortness of Breath     Pt c/o SOB x2 days unrelieved by albuterol. Patient is a 81-year-old male with history of CHF, ischemic cardiomyopathy, NSTEMI, CAD, hypertension, hyperlipidemia, COPD, and LifeVest who presents with shortness of breath. Patient states that he has had 2 days of difficulty breathing at rest and with exertion. He states that he has been using his albuterol inhaler multiple times throughout the day with minimal relief. He denies any fevers, sore throat, cough, chest pain, palpitations, abdominal pain, nausea, vomiting, or leg swelling. Patient denies any history of blood clots, denies any recent trauma or hospitalizations, denies any recent travel. Prior to Admission Medications   Prescriptions Last Dose Informant Patient Reported? Taking? Advair -21 MCG/ACT inhaler  Spouse/Significant Other No No   Sig: Inhale 2 puffs 2 (two) times a day Rinse mouth after use.    Alcohol Swabs (ALCOHOL PADS) 70 % PADS  Spouse/Significant Other Yes No   Blood Glucose Monitoring Suppl (OneTouch Verio) w/Device KIT  Spouse/Significant Other No No   Sig: Check blood glucose three times daily before each meal   Continuous Blood Gluc  (FreeStyle [place] 2 [place]) POWER  Spouse/Significant Other No No   Sig: Use 1 each continuous   Patient not taking: Reported on 1/30/2023   Continuous Blood Gluc Sensor (FreeStyle Cristofer 2 Sensor) MISC  Spouse/Significant Other No No   Sig: Use 1 each every 14 (fourteen) days   Patient not taking: Reported on 6/5/2023   Empagliflozin (JARDIANCE) 10 MG TABS tablet  Spouse/Significant Other No No   Sig: Take 1 tablet (10 mg total) by mouth every morning   Insulin Pen Needle (Pen Needles) 31G X 8 MM MISC  Spouse/Significant Other No No   Sig: Use daily   Patient not taking: Reported on 3/28/2023   Lancets (FREESTYLE) lancets  Spouse/Significant Other No No   Sig: by Other route as needed (As needed) Patient not taking: Reported on 3/28/2023   Lantus SoloStar 100 units/mL SOPN  Spouse/Significant Other No No   Sig: INJECT 0.18 ML (18 UNITS TOTAL) UNDER THE SKIN DAILY AT BEDTIME   NovoLOG FlexPen 100 units/mL injection pen  Spouse/Significant Other No No   Sig: Inject 5 units with breakfast and with dinner   albuterol (PROVENTIL HFA,VENTOLIN HFA) 90 mcg/act inhaler   No No   Sig: INHALE 2 PUFFS BY MOUTH EVERY 4 HOURS AS NEEDED FOR WHEEZING OR SHORTNESS OF BREATH   aspirin (Aspirin Low Dose) 81 mg chewable tablet  Spouse/Significant Other No No   Sig: Chew 1 tablet (81 mg total) daily   cholecalciferol (VITAMIN D3) 1,000 units tablet  Spouse/Significant Other No No   Sig: Take 2 tablets (2,000 Units total) by mouth daily   clopidogrel (PLAVIX) 75 mg tablet  Spouse/Significant Other No No   Sig: Take 1 tablet (75 mg total) by mouth daily   ferrous sulfate 325 (65 Fe) mg tablet   No No   Sig: TAKE 1 TABLET BY MOUTH EVERY OTHER DAY   fluticasone (FLONASE) 50 mcg/act nasal spray  Spouse/Significant Other No No   Si sprays into each nostril daily   furosemide (LASIX) 40 mg tablet  Spouse/Significant Other No No   Sig: TAKE 1 TABLET BY MOUTH EVERY DAY   levothyroxine 137 mcg tablet  Spouse/Significant Other No No   Sig: TAKE 1 TABLET BY MOUTH EVERY DAY   lisinopril (ZESTRIL) 20 mg tablet  Spouse/Significant Other No No   Sig: Take 1 tablet (20 mg total) by mouth 2 (two) times a day   metFORMIN (GLUCOPHAGE) 850 mg tablet  Spouse/Significant Other No No   Sig: TAKE 1 TABLET BY MOUTH 2 TIMES A DAY WITH MEALS.    methocarbamol (Robaxin-750) 750 mg tablet  Spouse/Significant Other No No   Sig: Take 1 tablet (750 mg total) by mouth every 6 (six) hours as needed for muscle spasms   metoprolol succinate (TOPROL-XL) 50 mg 24 hr tablet  Spouse/Significant Other No No   Sig: Take 1.5 tablets (75 mg total) by mouth 2 (two) times a day   montelukast (SINGULAIR) 10 mg tablet  Spouse/Significant Other No No   Sig: TAKE 1 TABLET BY MOUTH EVERY DAY   nitroglycerin (NITROSTAT) 0.4 mg SL tablet  Spouse/Significant Other No No   Sig: Place 1 tablet (0.4 mg total) under the tongue every 5 (five) minutes as needed for chest pain   rosuvastatin (CRESTOR) 40 MG tablet   No No   Sig: TAKE 1 TABLET BY MOUTH EVERY DAY   spironolactone (ALDACTONE) 25 mg tablet  Spouse/Significant Other No No   Sig: Take 1 tablet (25 mg total) by mouth daily Do not start before February 13, 2023. Facility-Administered Medications: None       Past Medical History:   Diagnosis Date   • Arthritis    • Asthma    • Colon polyps    • Community acquired pneumonia     last assessed: 5/1/2014   • Diabetes mellitus ([address])    • Hemorrhagic prepatellar bursitis, left 10/21/2019   • Hepatitis C    • High cholesterol    • History of colonoscopy 2017   • Hypertension    • Lymphadenopathy, anterior cervical 04/17/2018   • Nephritis and nephropathy, not specified as acute or chronic, with other specified pathological lesion in kidney, in diseases classified elsewhere 3/26/2013   • Screening for colon cancer 05/01/2019   • Thoracic vertebral fracture ([address]) 06/11/2014       Past Surgical History:   Procedure Laterality Date   • CARDIAC CATHETERIZATION N/A 6/3/2022    Procedure: Cardiac RHC/LHC; Surgeon: Mely Strong MD;  [facility]; Service: Cardiology   • CARDIAC CATHETERIZATION N/A 6/21/2022    Procedure: CARDIAC TAVR;  Surgeon: Roberth Mayberry MD;  Location: [facility];  Service: Cardiology   • CARDIAC CATHETERIZATION N/A 2/10/2023    Procedure: Cardiac Coronary Angiogram;  Surgeon: Mely Strong MD;  [facility]; Service: Cardiology   • CARDIAC CATHETERIZATION N/A 2/10/2023    Procedure: Cardiac pci;  Surgeon: Mely Strong MD;  [facility];   Service: Cardiology   • CARDIAC ELECTROPHYSIOLOGY PROCEDURE N/A 9/1/2022    Procedure: Cardiac pacer implant ; DC PPM;  Surgeon: Brown Bernard DO;  [facility]; Service: Cardiology   • COLONOSCOPY     • COLONOSCOPY W/ POLYPECTOMY     • LITHOTRIPSY     • MULTIPLE TOOTH EXTRACTIONS     • ND COLONOSCOPY FLX DX W/COLLJ SPEC WHEN PFRMD N/A 2018    Procedure: COLONOSCOPY;  Surgeon: Darcy Vilchis MD;  Location: [facility]; Service: Gastroenterology   • ND REPLACE AORTIC VALVE OPENFEMORAL ARTERY APPROACH N/A 2022    Procedure: REPLACEMENT AORTIC VALVE TRANSCATHETER (TAVR) TRANSFEMORAL W/ 26MM QUINN RONNI S3 ULTRA VALVE(ACCESS ON LEFT) SAULO;  Surgeon: Geronimo Nunes MD;  Location: [facility];  Service: Cardiac Surgery       Family History   Problem Relation Age of Onset   • Heart attack Family         at age 80   • No Known Problems Mother    • No Known Problems Father    • Diabetes Sister    • Diabetes Brother      I have reviewed and agree with the history as documented. E-Cigarette/Vaping   • E-Cigarette Use Never User      E-Cigarette/Vaping Substances   • Nicotine No    • THC No    • CBD No    • Flavoring No    • Other No    • Unknown No      Social History     Tobacco Use   • Smoking status: Former     Packs/day: 0.50     Types: Cigarettes     Quit date: 2022     Years since quittin.1   • Smokeless tobacco: Never   • Tobacco comments:     started when he was about 24 yrs old; stopped smoking 3 wks ago   Vaping Use   • Vaping Use: Never used   Substance Use Topics   • Alcohol use: No   • Drug use: No        Review of Systems   Constitutional: Negative for chills and fever. HENT: Negative for congestion, rhinorrhea and sore throat. Eyes: Negative for pain and redness. Respiratory: Positive for shortness of breath. Negative for cough. Cardiovascular: Negative for chest pain and palpitations. Gastrointestinal: Negative for abdominal pain, constipation, diarrhea, nausea and vomiting. Genitourinary: Negative for dysuria and hematuria. Musculoskeletal: Negative for back pain and neck pain. Skin: Negative for pallor and rash.    Neurological: Negative for weakness and numbness. All other systems reviewed and are negative. Physical Exam  ED Triage Vitals [07/14/23 1205]   Temperature Pulse Respirations Blood Pressure SpO2   97.9 °F (36.6 °C) 85 16 (!) 178/96 96 %      Temp Source Heart Rate Source Patient Position - Orthostatic VS BP Location FiO2 (%)   Oral Monitor Lying Left arm --      Pain Score       --             Orthostatic Vital Signs  Vitals:    07/14/23 1205 07/14/23 1308 07/14/23 1430 07/14/23 1500   BP: (!) 178/96 150/83 154/89 154/80   Pulse: 85 98 98 96   Patient Position - Orthostatic VS: Lying          Physical Exam  Vitals and nursing note reviewed. Constitutional:       General: He is not in acute distress. Appearance: Normal appearance. He is well-developed and normal weight. He is not ill-appearing, toxic-appearing or diaphoretic. HENT:      Head: Normocephalic and atraumatic. Right Ear: External ear normal.      Left Ear: External ear normal.      Nose: Nose normal. No congestion or rhinorrhea. Mouth/Throat:      Mouth: Mucous membranes are moist.      Pharynx: Oropharynx is clear. No pharyngeal swelling, oropharyngeal exudate or posterior oropharyngeal erythema. Eyes:      General: No scleral icterus. Right eye: No discharge. Left eye: No discharge. Extraocular Movements: Extraocular movements intact. Conjunctiva/sclera: Conjunctivae normal.      Pupils: Pupils are equal, round, and reactive to light. Cardiovascular:      Rate and Rhythm: Normal rate and regular rhythm. Pulses: Normal pulses. Heart sounds: Murmur heard. No friction rub. No gallop. Pulmonary:      Effort: Pulmonary effort is normal. Tachypnea present. No accessory muscle usage or respiratory distress. Breath sounds: No stridor. Decreased breath sounds (Slight and diffuse) present. No wheezing, rhonchi or rales.    Chest:      Comments: LifeVest in place  Abdominal:      General: Abdomen is flat. Bowel sounds are normal. There is no distension. Palpations: Abdomen is soft. Tenderness: There is no abdominal tenderness. There is no right CVA tenderness, left CVA tenderness or guarding. Musculoskeletal:         General: No tenderness. Cervical back: Normal range of motion and neck supple. No rigidity or tenderness. Right lower leg: No tenderness. No edema. Left lower leg: No tenderness. No edema. Skin:     General: Skin is warm and dry. Capillary Refill: Capillary refill takes less than 2 seconds. Coloration: Skin is not jaundiced or pale. Neurological:      General: No focal deficit present. Mental Status: He is alert and oriented to person, place, and time. Cranial Nerves: No cranial nerve deficit. Sensory: No sensory deficit. Motor: No weakness.    Psychiatric:         Mood and Affect: Mood normal.         Behavior: Behavior normal.         ED Medications  Medications - No data to display    Diagnostic Studies  Results Reviewed     Procedure Component Value Units Date/Time    HS Troponin I 2hr [[id number]]  (Normal) Collected: 07/14/23 1457    Lab Status: Final result Specimen: Blood from Arm, Right Updated: 07/14/23 1530     hs TnI 2hr 23 ng/L      Delta 2hr hsTnI -1 ng/L     B-Type Natriuretic Peptide(BNP) [[id number]]  (Abnormal) Collected: 07/14/23 1259    Lab Status: Final result Specimen: Blood from Arm, Right Updated: 07/14/23 1450      pg/mL     HS Troponin 0hr (reflex protocol) [[id number]]  (Normal) Collected: 07/14/23 1259    Lab Status: Final result Specimen: Blood from Arm, Right Updated: 07/14/23 1341     hs TnI 0hr 24 ng/L     Comprehensive metabolic panel [[id number]]  (Abnormal) Collected: 07/14/23 1259    Lab Status: Final result Specimen: Blood from Arm, Right Updated: 07/14/23 1334     Sodium 141 mmol/L      Potassium 4.7 mmol/L      Chloride 113 mmol/L      CO2 24 mmol/L      ANION GAP 4 mmol/L      BUN 17 mg/dL Creatinine 1.05 mg/dL      Glucose 63 mg/dL      Calcium 9.4 mg/dL      Corrected Calcium 9.9 mg/dL      AST 29 U/L      ALT 23 U/L      Alkaline Phosphatase 56 U/L      Total Protein 7.2 g/dL      Albumin 3.4 g/dL      Total Bilirubin 1.07 mg/dL      eGFR 68 ml/min/1.73sq m     Narrative:      [facility] guidelines for Chronic Kidney Disease (CKD):   •  Stage 1 with normal or high GFR (GFR > 90 mL/min/1.73 square meters)  •  Stage 2 Mild CKD (GFR = 60-89 mL/min/1.73 square meters)  •  Stage 3A Moderate CKD (GFR = 45-59 mL/min/1.73 square meters)  •  Stage 3B Moderate CKD (GFR = 30-44 mL/min/1.73 square meters)  •  Stage 4 Severe CKD (GFR = 15-29 mL/min/1.73 square meters)  •  Stage 5 End Stage CKD (GFR <15 mL/min/1.73 square meters)  Note: GFR calculation is accurate only with a steady state creatinine    CBC and differential [[id number]]  (Abnormal) Collected: 07/14/23 1259    Lab Status: Final result Specimen: Blood from Arm, Right Updated: 07/14/23 1324     WBC 7.66 Thousand/uL      RBC 4.51 Million/uL      Hemoglobin 12.2 g/dL      Hematocrit 39.8 %      MCV 88 fL      MCH 27.1 pg      MCHC 30.7 g/dL      RDW 19.2 %      MPV 10.3 fL      Platelets 518 Thousands/uL      nRBC 0 /100 WBCs      Neutrophils Relative 77 %      Immat GRANS % 0 %      Lymphocytes Relative 15 %      Monocytes Relative 7 %      Eosinophils Relative 1 %      Basophils Relative 0 %      Neutrophils Absolute 5.84 Thousands/µL      Immature Grans Absolute 0.03 Thousand/uL      Lymphocytes Absolute 1.12 Thousands/µL      Monocytes Absolute 0.57 Thousand/µL      Eosinophils Absolute 0.07 Thousand/µL      Basophils Absolute 0.03 Thousands/µL                  XR chest portable   ED Interpretation by Raymon Diallo MD (07/14 [id number])   No acute cardiopulmonary abnormalities      Final Result by Aliya Whitaker MD (07/14 [id number])      Mild pulmonary edema with small right effusion.                Workstation performed: [id number]               Procedures  ECG 12 Lead Documentation Only    Date/Time: 7/14/2023 1:40 PM    Performed by: Tito Biggs MD  Authorized by: Tito Biggs MD    Indications / Diagnosis:  Sob  Patient location:  ED  Previous ECG:     Previous ECG:  Compared to current    Similarity:  No change  Interpretation:     Interpretation: normal    Rate:     ECG rate:  100    ECG rate assessment: tachycardic    Rhythm:     Rhythm: paced    Pacing:     Capture:  Complete    Type of pacing:  Ventricular  Ectopy:     Ectopy: none    QRS:     QRS axis:  Left    QRS intervals: Wide  Conduction:     Conduction: normal    ST segments:     ST segments:  Normal  T waves:     T waves: normal            ED Course  ED Course as of 07/14/23 2154   Fri Jul 14, 2023   1344 hs TnI 0hr: 24   1507 Patient reports improvement of symptoms, will perform ambulatory pulse ox and pending delta trop                             SBIRT 22yo+    Flowsheet Row Most Recent Value   Initial Alcohol Screen: US AUDIT-C     1. How often do you have a drink containing alcohol? 0 Filed at: 07/14/2023 1206   2. How many drinks containing alcohol do you have on a typical day you are drinking? 0 Filed at: 07/14/2023 1206   3a. Male UNDER 65: How often do you have five or more drinks on one occasion? 0 Filed at: 07/14/2023 1206   3b. FEMALE Any Age, or MALE 65+: How often do you have 4 or more drinks on one occassion? 0 Filed at: 07/14/2023 1206   Audit-C Score 0 Filed at: 07/14/2023 1206   DIEUDONNE: How many times in the past year have you. .. Used an illegal drug or used a prescription medication for non-medical reasons? Never Filed at: 07/14/2023 1206                Medical Decision Making  Patient is a 79-year-old male with history of CHF, ischemic cardiomyopathy, NSTEMI, CAD, hypertension, hyperlipidemia, COPD, life sounds with shortness of breath. DDx includes not limited to ACS, pneumothorax, CHF exacerbation, COPD, pneumonia.   Patient is satting well on room air and is otherwise dynamically stable. Will obtain CBC, CMP, troponin, BNP, EKG, chest x-ray. Will give DuoNeb for empiric treatment of shortness of breath. Patient's labs are remarkable for elevated troponin of 24 but a delta of -1, nonconcerning in this case with no chest pain. BNP is slightly elevated but not concerning for CHF especially in the context of no clinical signs of volume overload. His EKG shows sinus tachycardia. His chest x-ray on my wet read shows no acute cardiopulmonary abnormalities. Patient reports feeling improved after DuoNeb. Confirmed with patient that his LifeVest did not go off. Performed amatory pulse ox which patient was able to perform satting 92% at the lowest.  Patient's pacemaker was interrogated which showed 3 episodes of VT but with no episodes of LifeVest shocking, not concerning in this context. Likely secondary to his ischemic cardiomyopathy. Advised patient to follow-up with his primary care doctor in the next couple days to be reassessed. Return precautions given, all questions answered. Amount and/or Complexity of Data Reviewed  Labs: ordered. Decision-making details documented in ED Course. Radiology: ordered and independent interpretation performed.             Disposition  Final diagnoses:   Dyspnea   CHF (congestive heart failure) (HCC)   COPD (chronic obstructive pulmonary disease) (HCC)   CAD (coronary artery disease)   HTN (hypertension)   HLD (hyperlipidemia)   Pacemaker     Time reflects when diagnosis was documented in both MDM as applicable and the Disposition within this note     Time User Action Codes Description Comment    7/14/2023  3:31 PM Edwinna Peg Add [R06.00] Dyspnea     7/14/2023  3:31 PM Edwinna Peg Add [I50.9] CHF (congestive heart failure) ([address])     7/14/2023  3:31 PM Edwinna Peg Add [J44.9] COPD (chronic obstructive pulmonary disease) ([address])     7/14/2023  3:31 PM Edwinna Peg Add [I25.10] CAD (coronary artery disease)     7/14/2023  3:31 PM Narvis Laser Add [I10] HTN (hypertension)     7/14/2023  3:31 PM Narvis Laser Add [E78.5] HLD (hyperlipidemia)     7/14/2023  3:32 PM Narvis Laser Add [Z95.0] Pacemaker       ED Disposition     ED Disposition   Discharge    Condition   Stable    Date/Time   Fri Jul 14, 2023  3:31 PM    Comment   Kevin Mitts discharge to home/self care.                Follow-up Information     Follow up With Specialties Details Why Contact Info Additional [address], DO Gastroenterology Schedule an appointment as soon as possible for a visit   [address] [facility] [address]       [address] [facility] Emergency Medicine Go to  If symptoms worsen or if you have any other specific concerns [address] [id number]  Formerly [facility] Emergency Department, [address] [place] [address], [facility]          Discharge Medication List as of 7/14/2023  3:33 PM      CONTINUE these medications which have NOT CHANGED    Details   Advair -21 MCG/ACT inhaler Inhale 2 puffs 2 (two) times a day Rinse mouth after use., Starting Thu 3/30/2023, Normal      albuterol (PROVENTIL HFA,VENTOLIN HFA) 90 mcg/act inhaler INHALE 2 PUFFS BY MOUTH EVERY 4 HOURS AS NEEDED FOR WHEEZING OR SHORTNESS OF BREATH, Normal      Alcohol Swabs (ALCOHOL PADS) 70 % PADS Starting Thu 5/9/2019, Historical Med      aspirin (Aspirin Low Dose) 81 mg chewable tablet Chew 1 tablet (81 mg total) daily, Starting Thu 3/30/2023, Normal      Blood Glucose Monitoring Suppl (OneTouch Verio) w/Device KIT Check blood glucose three times daily before each meal, Normal      cholecalciferol (VITAMIN D3) 1,000 units tablet Take 2 tablets (2,000 Units total) by mouth daily, Starting Mon 11/21/2022, Normal      clopidogrel (PLAVIX) 75 mg tablet Take 1 tablet (75 mg total) by mouth daily, Starting Tue 3/28/2023, Until Mon 6/26/2023, Normal      Continuous Blood Gluc  (FreeStyle [place] 2 [place]) POWER Use 1 each continuous, Starting Tue 5/10/2022, Normal      Continuous Blood Gluc Sensor (FreeStyle Cristofer 2 Sensor) MISC Use 1 each every 14 (fourteen) days, Starting Tue 5/10/2022, Normal      Empagliflozin (JARDIANCE) 10 MG TABS tablet Take 1 tablet (10 mg total) by mouth every morning, Starting Sun 2/12/2023, Normal      ferrous sulfate 325 (65 Fe) mg tablet TAKE 1 TABLET BY MOUTH EVERY OTHER DAY, Normal      fluticasone (FLONASE) 50 mcg/act nasal spray 2 sprays into each nostril daily, Starting Tue 8/11/2020, Normal      furosemide (LASIX) 40 mg tablet TAKE 1 TABLET BY MOUTH EVERY DAY, Normal      Insulin Pen Needle (Pen Needles) 31G X 8 MM MISC Use daily, Starting Wed 3/3/2021, Normal      Lancets (FREESTYLE) lancets by Other route as needed (As needed), Starting Thu 3/21/2019, Normal      Lantus SoloStar 100 units/mL SOPN INJECT 0.18 ML (18 UNITS TOTAL) UNDER THE SKIN DAILY AT BEDTIME, Normal      levothyroxine 137 mcg tablet TAKE 1 TABLET BY MOUTH EVERY DAY, Normal      lisinopril (ZESTRIL) 20 mg tablet Take 1 tablet (20 mg total) by mouth 2 (two) times a day, Starting Fri 4/28/2023, Normal      metFORMIN (GLUCOPHAGE) 850 mg tablet TAKE 1 TABLET BY MOUTH 2 TIMES A DAY WITH MEALS., Normal      methocarbamol (Robaxin-750) 750 mg tablet Take 1 tablet (750 mg total) by mouth every 6 (six) hours as needed for muscle spasms, Starting Tue 11/8/2022, Normal      metoprolol succinate (TOPROL-XL) 50 mg 24 hr tablet Take 1.5 tablets (75 mg total) by mouth 2 (two) times a day, Starting Fri 4/28/2023, Until Thu 7/27/2023, Normal      montelukast (SINGULAIR) 10 mg tablet TAKE 1 TABLET BY MOUTH EVERY DAY, Normal      nitroglycerin (NITROSTAT) 0.4 mg SL tablet Place 1 tablet (0.4 mg total) under the tongue every 5 (five) minutes as needed for chest pain, Starting u 3/30/2023, Normal      NovoLOG FlexPen 100 units/mL injection pen Inject 5 units with breakfast and with dinner, Normal      rosuvastatin (CRESTOR) 40 MG tablet TAKE 1 TABLET BY MOUTH EVERY DAY, Normal      spironolactone (ALDACTONE) 25 mg tablet Take 1 tablet (25 mg total) by mouth daily Do not start before February 13, 2023., Starting Mon 2/13/2023, Normal           No discharge procedures on file. PDMP Review       Value Time User    PDMP Reviewed  Yes 9/1/2022  1:26 PM Trudi Macdonald PA-C           ED Provider  Attending physically available and evaluated Liam To. I managed the patient along with the ED Attending.     Electronically Signed by         Stephan Page MD  07/14/23 8626

## 2023-07-14 NOTE — ED ATTENDING ATTESTATION
7/14/2023   I, Reginald Whatley MD, saw and evaluated the patient. I have discussed the patient with the resident/non-physician practitioner and agree with the resident's/non-physician practitioner's findings, Plan of Care, and MDM as documented in the resident's/non-physician practitioner's note, except where noted. All available labs and Radiology studies were reviewed. I was present for key portions of any procedure(s) performed by the resident/non-physician practitioner and I was immediately available to provide assistance. At this point I agree with the current assessment done in the Emergency Department. I have conducted an independent evaluation of this patient a history and physical is as follows:    Unit/Bed#: ED 05 Encounter: [id number]    Chief Complaint   Patient presents with   • Shortness of Breath     Pt c/o SOB x2 days unrelieved by albuterol. 2 day shortness of breath  Has been using albuterol inhaler without relief. No other complaints: no fevers, no URI symptoms, no chest pain, no leg swelling. Physical Exam  BP (!) 178/96 (BP Location: Left arm)   Pulse 85   Temp 97.9 °F (36.6 °C) (Oral)   Resp 16   SpO2 96%      Vital signs and nursing notes reviewed    ** IF YOU ARE READING THIS, THE EXAM TEMPLATE BELOW HAS NOT BEEN UPDATED**    CONSTITUTIONAL: male appearing stated age resting in bed, in no acute distress  HEENT: atraumatic, normocephalic. Sclera anicteric, conjunctiva are not injected. Moist oral mucosa  CARDIOVASCULAR/CHEST: RRR, no M/R/G. 2+ radial pulses  PULMONARY: Breathing comfortably on RA. Breath sounds are equal and clear to auscultation  ABDOMEN: non-distended. BS present, normoactive. Non-tender  MSK: moves all extremities, no deformities, no peripheral edema, no calf asymmetry  NEURO: Awake, alert, and oriented x 3. Face symmetric. Moves all extremities spontaneously.  No focal neurologic deficits  SKIN: Warm, appears well-perfused  MENTAL STATUS: Normal affect      Labs and Imaging  Labs Reviewed - No data to display    No orders to display         Procedures  Procedures        ED Course  Medications - No data to display 24  21 - 32 mmol/L Final    ANION GAP 4  mmol/L Final    BUN 17  5 - 25 mg/dL Final    Creatinine 1.05  0.60 - 1.30 mg/dL Final    Comment: Standardized to IDMS reference method    Glucose 63 (*) 65 - 140 mg/dL Final    Comment: If the patient is fasting, the ADA then defines impaired fasting glucose as > 100 mg/dL and diabetes as > or equal to 123 mg/dL. Specimen collection should occur prior to Sulfasalazine administration due to the potential for falsely depressed results. Specimen collection should occur prior to Sulfapyridine administration due to the potential for falsely elevated results. Calcium 9.4  8.3 - 10.1 mg/dL Final    Corrected Calcium 9.9  8.3 - 10.1 mg/dL Final    AST 29  5 - 45 U/L Final    Comment: Slightly Hemolyzed; Results May be Affected  Specimen collection should occur prior to Sulfasalazine administration due to the potential for falsely depressed results. ALT 23  12 - 78 U/L Final    Comment: Specimen collection should occur prior to Sulfasalazine and/or Sulfapyridine administration due to the potential for falsely depressed results. Alkaline Phosphatase 56  46 - 116 U/L Final    Total Protein 7.2  6.4 - 8.4 g/dL Final    Albumin 3.4 (*) 3.5 - 5.0 g/dL Final    Total Bilirubin 1.07 (*) 0.20 - 1.00 mg/dL Final    Comment: Use of this assay is not recommended for patients undergoing treatment with eltrombopag due to the potential for falsely elevated results.     eGFR 68  ml/min/1.73sq m Final    Narrative:     [place] guidelines for Chronic Kidney Disease (CKD):   •  Stage 1 with normal or high GFR (GFR > 90 mL/min/1.73 square meters)  •  Stage 2 Mild CKD (GFR = 60-89 mL/min/1.73 square meters)  •  Stage 3A Moderate CKD (GFR = 45-59 mL/min/1.73 square meters)  •  Stage 3B Moderate CKD (GFR = 30-44 mL/min/1.73 square meters)  •  Stage 4 Severe CKD (GFR = 15-29 mL/min/1.73 square meters)  •  Stage 5 End Stage CKD (GFR <15 mL/min/1.73 square meters)  Note: GFR calculation is accurate only with a steady state creatinine   B-TYPE NATRIURETIC PEPTIDE (BNP) - Abnormal     (*) 0 - 100 pg/mL Final   HS TROPONIN I 0HR - Normal    hs TnI 0hr 24  "Refer to ACS Flowchart"- see link ng/L Final    Comment:                                              Initial (time 0) result  If >=50 ng/L, Myocardial injury suggested ;  Type of myocardial injury and treatment strategy  to be determined. If 5-49 ng/L, a delta result at 2 hours and or 4 hours will be needed to further evaluate. If <4 ng/L, and chest pain has been >3 hours since onset, patient may qualify for discharge based on the HEART score in the ED. If <5 ng/L and <3hours since onset of chest pain, a delta result at 2 hours will be needed to further evaluate. HS Troponin 99th Percentile URL of a Health Population=12 ng/L with a 95% Confidence Interval of 8-18 ng/L. Second Troponin (time 2 hours)  If calculated delta >= 20 ng/L,  Myocardial injury suggested ; Type of myocardial injury and treatment strategy to be determined. If 5-49 ng/L and the calculated delta is 5-19 ng/L, consult medical service for evaluation. Continue evaluation for ischemia on ecg and other possible etiology and repeat hs troponin at 4 hours. If delta is <5 ng/L at 2 hours, consider discharge based on risk stratification via the HEART score (if in ED), or NICA risk score in IP/Observation. HS Troponin 99th Percentile URL of a Health Population=12 ng/L with a 95% Confidence Interval of 8-18 ng/L.   HS TROPONIN I 2HR - Normal    hs TnI 2hr 23  "Refer to ACS Flowchart"- see link ng/L Final    Comment:                                              Initial (time 0) result  If >=50 ng/L, Myocardial injury suggested ;  Type of myocardial injury and treatment strategy  to be determined. If 5-49 ng/L, a delta result at 2 hours and or 4 hours will be needed to further evaluate.   If <4 ng/L, and chest pain has been >3 hours since onset, patient may qualify for discharge based on the HEART score in the ED. If <5 ng/L and <3hours since onset of chest pain, a delta result at 2 hours will be needed to further evaluate. HS Troponin 99th Percentile URL of a Health Population=12 ng/L with a 95% Confidence Interval of 8-18 ng/L. Second Troponin (time 2 hours)  If calculated delta >= 20 ng/L,  Myocardial injury suggested ; Type of myocardial injury and treatment strategy to be determined. If 5-49 ng/L and the calculated delta is 5-19 ng/L, consult medical service for evaluation. Continue evaluation for ischemia on ecg and other possible etiology and repeat hs troponin at 4 hours. If delta is <5 ng/L at 2 hours, consider discharge based on risk stratification via the HEART score (if in ED), or NICA risk score in IP/Observation. HS Troponin 99th Percentile URL of a Health Population=12 ng/L with a 95% Confidence Interval of 8-18 ng/L. Delta 2hr hsTnI -1  <20 ng/L Final       XR chest portable   ED Interpretation   No acute cardiopulmonary abnormalities      Final Result      Mild pulmonary edema with small right effusion. Workstation performed: [id number]               Procedures  ECG 12 Lead Documentation Only    Date/Time: 7/14/2023 12:38 PM    Performed by: Mandy Zayas MD  Authorized by: Mandy Zayas MD    Comments:      Sinus tachycardia, ventricular rate 106, CO to 174, , QTc 488, left axis deviation, left bundle branch block, no excessive ST segment discordance or concordance to suggest STEMI, overall, no significant change from prior EKG dated 5/6/2023. ED Course     37-year-old male with complex past medical history presenting with shortness of breath. Differential diagnosis includes COPD exacerbation, volume overload, acute coronary syndrome, PE, pneumothorax, pneumonia, versus another etiology of symptoms. Patient is saturating well on room air.   He is mildly tachycardic on triage but this is resolved on my exam.  He is afebrile. DuoNeb breathing treatment started. Patient's labs reveal unremarkable CBC without leukocytosis or anemia, patient does have some neutrophil predominance but no bandemia. His CMP is with baseline renal function. High-sensitivity troponin is 24, repeat high-sensitivity troponin is 23, making acute coronary syndrome less likely. BNP is elevated, 460, however, compared to proBNP in May which was 2624, this appears improved. X-ray to my interpretation is without significant abnormalities, no infiltrates, no obvious vascular congestion. Patient is compliant with all medications. His symptoms are improved following breathing treatment. Given continuing with home medications as prescribed and recommend close follow-up with the primary care physician.

## 2023-07-14 NOTE — ED ATTENDING ATTESTATION
7/14/2023   I, Ag Alejo MD, saw and evaluated the patient. I have discussed the patient with the resident/non-physician practitioner and agree with the resident's/non-physician practitioner's findings, Plan of Care, and MDM as documented in the resident's/non-physician practitioner's note, except where noted. All available labs and Radiology studies were reviewed. I was present for key portions of any procedure(s) performed by the resident/non-physician practitioner and I was immediately available to provide assistance. At this point I agree with the current assessment done in the Emergency Department. I have conducted an independent evaluation of this patient a history and physical is as follows:    Unit/Bed#: ED 05 Encounter: [id number]    Chief Complaint   Patient presents with   • Shortness of Breath     Pt c/o SOB x2 days unrelieved by albuterol. 2 day shortness of breath  Has been using albuterol inhaler without relief. No other complaints: no fevers, no URI symptoms, no chest pain, no leg swelling. Physical Exam  BP (!) 178/96 (BP Location: Left arm)   Pulse 85   Temp 97.9 °F (36.6 °C) (Oral)   Resp 16   SpO2 96%      Vital signs and nursing notes reviewed    ** IF YOU ARE READING THIS, THE EXAM TEMPLATE BELOW HAS NOT BEEN UPDATED**    CONSTITUTIONAL: male appearing stated age resting in bed, in no acute distress  HEENT: atraumatic, normocephalic. Sclera anicteric, conjunctiva are not injected. Moist oral mucosa  CARDIOVASCULAR/CHEST: RRR, no M/R/G. 2+ radial pulses  PULMONARY: Breathing comfortably on RA. Breath sounds are equal and clear to auscultation  ABDOMEN: non-distended. BS present, normoactive. Non-tender  MSK: moves all extremities, no deformities, no peripheral edema, no calf asymmetry  NEURO: Awake, alert, and oriented x 3. Face symmetric. Moves all extremities spontaneously.  No focal neurologic deficits  SKIN: Warm, appears well-perfused  MENTAL STATUS: Normal affect      Labs and Imaging  Labs Reviewed - No data to display    No orders to display         Procedures  Procedures        ED Course  Medications - No data to display

## 2023-07-18 ENCOUNTER — TELEPHONE (OUTPATIENT)
Dept: CARDIAC SURGERY | Facility: CLINIC | Age: 78
End: 2023-07-18

## 2023-07-18 NOTE — TELEPHONE ENCOUNTER
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 She stated he is having occasional shortness of breath or fatigue, but is able to complete daily activities. Deemed [address]. Patient continues with ASA 81 mg and Plavix 75 mg per cardiology note from 6/1/2023. Patient has scheduled follow up with cardiology for next week.

## 2023-07-24 DIAGNOSIS — E11.40 TYPE 2 DIABETES MELLITUS WITH DIABETIC NEUROPATHY, WITHOUT LONG-TERM CURRENT USE OF INSULIN (HCC): ICD-10-CM

## 2023-07-24 DIAGNOSIS — E03.9 HYPOTHYROIDISM: ICD-10-CM

## 2023-07-24 RX ORDER — LEVOTHYROXINE SODIUM 137 UG/1
TABLET ORAL
Qty: 90 TABLET | Refills: 3 | Status: SHIPPED | OUTPATIENT
Start: 2023-07-24

## 2023-07-25 RX ORDER — INSULIN ASPART 100 [IU]/ML
INJECTION, SOLUTION INTRAVENOUS; SUBCUTANEOUS
Qty: 15 ML | Refills: 3 | Status: SHIPPED | OUTPATIENT
Start: 2023-07-25

## 2023-07-25 NOTE — PROGRESS NOTES
Consultation - Electrophysiology-Cardiology (EP)   [facility] 70 y.o. male MRN: [id number]  Unit/Bed#:  Encounter: [id number]      7. Heart failure with reduced ejection fraction [facility])  Ambulatory referral to Cardiac Electrophysiology    POCT ECG            Consults  Physician Requesting Consult: Joe Doe MD     No att. providers found  Reason for Consult / Principal Problem:   Dual chamber upgrade    Assessment/Plan   Summary of recommendations: The patient comes in today for consideration of ICD vs. CRT therapy with biv ICD. Patient has a history of ischemic cardiomyopathy with EF 35% NYHA class II Symptoms and LBBB.  pacing less than 1% so most likely not from RV pacing but drop in EF likely from MI. Patient is wearing a life vest. He has been on GDMT greater than 3 months. He was paced in the office and his QRS was approximately 120-130 ms as he has a LPF lead. He will be scheduled for ICD and he will require up titration of his beta blocker to induce higher burden of pacing. He is in agreement. Risks and benefits were discussed with patient and he wishes to proceed. He will require prep with bendaryl pepcid and steroids due to history of IV contrast allergy. He will have a venogram performed prior to procedure. QRS with pacing:      CHF -history of ischemic cardiomyopathy  LVEF-EF 30 to 35%. NYHA class-class II stage C  QRS morphology and weight-left bundle branch block,   My recommendation for this patient - continue follow up with heart failure. Plan for ICD upgrade - increase beta blocker post upgrade. CAD three-vessel coronary artery disease, most recent cardiac catheterization performed February 2023 with successful PCI to mid LAD and ostial RPDA. Symptoms-none currently. Medications-patient is on Crestor 40 mg, lisinopril, metoprolol succinate 75 mg BID, aspirin, plavix, Jardiance, spironolactone.        Mixed hyperlipidemia   Medication-Crestor    Tobacco abuse former smoker  This does increase the risk of atrial fibrillation   Cont. complete cessatio      History of Present Illness   HPI: Kevin Winston is a 70y.o. year old male has been referred to me by Dr Radha Amin      The patient has significant medical illnesses which include coronary artery disease, aortic valve replacement cardiomyopathy, heart failure, chronic left bundle branch, most recent cardiac catheterization 2/10/2023 three-vessel CAD with identifying 2 culprit for NSTEMI including ostial RPDA and mid LAD. 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 We are being consulted for consideration of BiV. Patient had a Medtronic dual-chamber pacemaker implanted 9/1/2022. Of note, the patient's EF prior to implantation was 55%. His next echocardiogram was performed February 2020 with a EF of 35 to 40%. RVSP was 70. Mitral regurgitation was moderate to severe at that time. Patient had a repeat cardiac catheterization where he received drug-eluting stent to his EF has remained low since that time. Most recent echocardiogram 35%, improved pulmonary artery systolic pressure of 36 mmHg. Most recent device function report shows V pacing less than 1%. On GDMT greater than 3 months.         As far as cardiac symptoms are concerned  Angina - none  Orthopnea - none  Paroxysmal nocturnal dyspnea -none  Leg swelling-none  Palpitations -none  Presyncope-none  Syncope -none  Orthostatic lightheadedness -none      Snoring-none      Social history  Tobacco use-none  Alcohol use-none  Energy drink use-none  Recreational drug use-none      Historical Information   Past Medical History:   Diagnosis Date   • Arthritis    • Asthma    • Colon polyps    • Community acquired pneumonia     last assessed: 5/1/2014   • Diabetes mellitus ([address])    • Hemorrhagic prepatellar bursitis, left 10/21/2019   • Hepatitis C    • High cholesterol    • History of colonoscopy 2017   • Hypertension    • Lymphadenopathy, anterior cervical 04/17/2018   • Nephritis and nephropathy, not specified as acute or chronic, with other specified pathological lesion in kidney, in diseases classified elsewhere 3/26/2013   • Screening for colon cancer 05/01/2019   • Thoracic vertebral fracture ([address]) 06/11/2014     Past Surgical History:   Procedure Laterality Date   • CARDIAC CATHETERIZATION N/A 6/3/2022    Procedure: Cardiac RHC/LHC; Surgeon: Kenna Hall MD;  [facility]; Service: Cardiology   • CARDIAC CATHETERIZATION N/A 6/21/2022    Procedure: CARDIAC TAVR;  Surgeon: Ivone Galvan MD;  Location: [facility] MAIN OR;  Service: Cardiology   • CARDIAC CATHETERIZATION N/A 2/10/2023    Procedure: Cardiac Coronary Angiogram;  Surgeon: Kenna Hall MD;  [facility] C[facility]; Service: Cardiology   • CARDIAC CATHETERIZATION N/A 2/10/2023    Procedure: Cardiac pci;  Surgeon: Kenna Hall MD;  [facility]: [facility] CARD[facility]; Service: Cardiology   • CARDIAC ELECTROPHYSIOLOGY PROCEDURE N/A 9/1/2022    Procedure: Cardiac pacer implant ; DC PPM;  Surgeon: Cody Wing DO;  [facility] C[facility]; Service: Cardiology   • COLONOSCOPY     • COLONOSCOPY W/ POLYPECTOMY     • LITHOTRIPSY     • MULTIPLE TOOTH EXTRACTIONS     • SC COLONOSCOPY FLX DX W/COLLJ SPEC WHEN PFRMD N/A 5/17/2018    Procedure: COLONOSCOPY;  Surgeon: Danielle Alba MD;  Location: [facility] GI LAB;   Service: Gastroenterology   • SC REPLACE AORTIC VALVE OPENFEMORAL ARTERY APPROACH N/A 6/21/2022    Procedure: REPLACEMENT AORTIC VALVE TRANSCATHETER (TAVR) TRANSFEMORAL W/ 26MM QUINN RONNI S3 ULTRA VALVE(ACCESS ON LEFT) SAULO;  Surgeon: Lorrie Hernandez MD;  Location: [facility];  Service: Cardiac Surgery     Social History     Substance and Sexual Activity   Alcohol Use No     Social History     Substance and Sexual Activity   Drug Use No     Social History     Tobacco Use   Smoking Status Former   • Packs/day: 0.50   • Types: Cigarettes   • Quit date: 2022   • Years since quittin.1   Smokeless Tobacco Never   Tobacco Comments    started when he was about 24 yrs old; stopped smoking 3 wks ago     Social History     Socioeconomic History   • Marital status: /Civil Union     Spouse name: Not on file   • Number of children: Not on file   • Years of education: Not on file   • Highest education level: Not on file   Occupational History   • Occupation: retired    Tobacco Use   • Smoking status: Former     Packs/day: 0.50     Types: Cigarettes     Quit date: 2022     Years since quittin.1   • Smokeless tobacco: Never   • Tobacco comments:     started when he was about 24 yrs old; stopped smoking 3 wks ago   Vaping Use   • Vaping Use: Never used   Substance and Sexual Activity   • Alcohol use: No   • Drug use: No   • Sexual activity: Not Currently   Other Topics Concern   • Not on file   Social History Narrative   • Not on file     Social Determinants of Health     Financial Resource Strain: Low Risk  (2023)    Overall Financial Resource Strain (CARDIA)    • Difficulty of Paying Living Expenses: Not hard at all   Food Insecurity: No Food Insecurity (3/20/2023)    Hunger Vital Sign    • Worried About Running Out of Food in the Last Year: Never true    • Ran Out of Food in the Last Year: Never true   Transportation Needs: No Transportation Needs (3/20/2023)    PRAPARE - Transportation    • Lack of Transportation (Medical): No    • Lack of Transportation (Non-Medical):  No   Physical Activity: Unknown (2022)    Exercise Vital Sign    • Days of Exercise per Week: Not on file    • Minutes of Exercise per Session: 60 min   Stress: No Stress Concern Present (2022)    [facility]    • Feeling of Stress : Not at all   Social Connections: Moderately Isolated (1/19/2022)    Social Connection and Isolation Panel [NHANES]    • Frequency of Communication with Friends and Family: More than three times a week    • Frequency of Social Gatherings with Friends and Family: More than three times a week    • Attends [employer] Services: Never    • Active Member of Clubs or Organizations: No    • Attends Club or Organization Meetings: Never    • Marital Status:    Intimate Partner Violence: Not on file   Housing Stability: Low Risk  (3/20/2023)    Housing Stability Vital Sign    • Unable to Pay for Housing in the Last Year: No    • Number of Places Lived in the Last Year: 1    • Unstable Housing in the Last Year: No     .  Family History:  Family History   Problem Relation Age of Onset   • Heart attack Family         at age 80   • No Known Problems Mother    • No Known Problems Father    • Diabetes Sister    • Diabetes Brother          Meds/Allergies      No current facility-administered medications for this visit. (Not in a hospital admission)      Allergies   Allergen Reactions   • Iv Contrast [Iodinated Contrast Media] Rash     Patient states he had a severe reaction approximately 10 years ago , states he had a rash, itchy and he remembers a bunch of people pounding on chest and being surrounded by multiple doctors. Objective   Vitals:   Visit Vitals  /56 (BP Location: Left arm, Patient Position: Sitting, Cuff Size: Standard)   Pulse 72   Wt 82.3 kg (181 lb 6.4 oz)   BMI 27.58 kg/m²   Smoking Status Former   BSA 1.96 m²      Vitals:    07/28/23 1438   Weight: 82.3 kg (181 lb 6.4 oz)   [unfilled]    Invasive Devices     None                   ROS  Review of Systems   All other systems reviewed and are negative. PHYSICAL EXAM  Physical Exam  Cardiovascular:      Rate and Rhythm: Normal rate and regular rhythm. Pulmonary:      Effort: No respiratory distress.       Breath sounds: Normal breath sounds. No stridor. No rhonchi. Abdominal:      General: Abdomen is flat. There is no distension. Musculoskeletal:      Right lower leg: No edema. Left lower leg: No edema. Skin:     General: Skin is warm. Neurological:      Mental Status: He is alert. LAB RESULTS:      CBC:  Results from Last 12 Months   Lab Units 07/14/23  1259 05/06/23  1806 05/05/23  0930 03/21/23  0453 03/20/23  0443 03/19/23  0604   WBC Thousand/uL 7.66 10.80* 6.72 5.87 6.76 5.84   HEMOGLOBIN g/dL 12.2 11.7* 11.9* 11.5* 11.0* 11.2*   HEMATOCRIT % 39.8 38.7 40.6 38.6 36.0* 35.7*   MCV fL 88 87 90 89 87 88   PLATELETS Thousands/uL 294 262 262 304 296 286   RBC Million/uL 4.51 4.46 4.53 4.34 4.13 4.04   MCH pg 27.1 26.2* 26.3* 26.5* 26.6* 27.7   MCHC g/dL 30.7* 30.2* 29.3* 29.8* 30.6* 31.4   RDW % 19.2* 16.9* 16.5* 15.4* 15.5* 15.7*   MPV fL 10.3 10.6 10.8 10.4 10.5 10.3   NRBC AUTO /100 WBCs 0 0 0  --  0 0        CMP:  Results from Last 12 Months   Lab Units 07/14/23  1259 05/06/23  1806 05/05/23  0930 03/30/23  1225 03/21/23  0453 03/19/23  0604 03/18/23  1923 02/10/23  1156 02/10/23  0025   POTASSIUM mmol/L 4.7 3.7 3.8 3.6 3.9   < > 4.0   < > 4.1   CHLORIDE mmol/L 113* 109* 109* 104 109*   < > 109*   < > 107   CO2 mmol/L 24 26 30 30 28   < > 22   < > 30   BUN mg/dL 17 21 15 16 27*   < > 20   < > 22   CREATININE mg/dL 1.05 1.12 0.93 1.10 1.01   < > 0.94   < > 1.15   CALCIUM mg/dL 9.4 9.1 9.3 9.3 9.2   < > 9.5   < > 9.9   AST U/L 29 18  --  16  --   --  16  --  27   ALT U/L 23 21  --  27  --   --  18  --  20   ALK PHOS U/L 56 54  --  52  --   --  47  --  47   EGFR ml/min/1.73sq m 68 63 79 65 72   < > 78   < > 61    < > = values in this interval not displayed.         Magnesium:   Results from Last 12 Months   Lab Units 05/05/23  0930 03/20/23  0446 03/19/23  0605 03/18/23  1923 11/10/22  1009   MAGNESIUM mg/dL 2.0 2.4 2.2 2.1 2.0       A1C:  Results from Last 12 Months   Lab Units 02/10/23  1156 11/08/22  1602   HEMOGLOBIN A1C % 7.6* 7.6*        TSH 3rd Gen:  Results from Last 12 Months   Lab Units 02/10/23  1156 11/10/22  1009   TSH 3RD GENERATON uIU/mL 0.861 4.760*        PT/INR:  Results from Last 12 Months   Lab Units 02/10/23  0315 09/01/22  0711   PROTIME seconds 14.6* 12.6   INR  1.11 0.93       Lipid Panel:  Results from Last 12 Months   Lab Units 02/10/23  1156 11/10/22  1009   CHOLESTEROL mg/dL 137 142   TRIGLYCERIDES mg/dL 61 75   HDL mg/dL 53 65   NON-HDL-CHOL (CHOL-HDL) mg/dl 84  --         Troponin:  No results for input(s): "TROPONINI" in the last 8784 hours. Imaging:    EKG  7/14/23        SAULO  6/21/22    Procedure     Diagnostic Indications for SAULO:  hemodynamic monitoring. Type of SAULO: interventional SAULO with real time guidance of intracardiac procedure. Images Saved: ultrasound permanent image saved. Physician Requesting Echo: Lorrie Hernandez MD.  Location performed: OR. Intubated. Bite block not placed. Heart visualized. Insertion of SAULO Probe:  Atraumatic. Probe Type:  Multiplane. Modalities:  2D only, color flow mapping, continuous wave Doppler, pulse wave Doppler and 3D.     Echocardiographic and Doppler Measurements     PREPROCEDURE     LEFT VENTRICLE:  Systolic Function: normal. Ejection Fraction: 55%. Cavity size: normal.        RIGHT VENTRICLE:  Systolic Function: normal.   Hypertrophy present.      AORTIC VALVE:  Leaflets: trileaflet. Leaflet motions restricted. Stenosis: moderate and Under GA mean of 8mmHg. Discussion with surgeon regarding necessity for TAVR. Surgeon and cardiologist decided to proceed. . Mean Gradient: 8 mmHg. Area: 1.1 cm². Regurgitation: trace.       MITRAL VALVE:  Leaflets: normal. Leaflet Motions: normal. Regurgitation: mild.    Stenosis: none.        TRICUSPID VALVE:  Leaflets: normal. Leaflet Motions: normal.  Regurgitation: trace.     PULMONIC VALVE:  Leaflets: normal.       ASCENDING AORTA:  Size:  normal.  Dissection not present.       AORTIC ARCH:  Size:  normal.  dissection not present. Grade 3: atheroma protruding < 0.5 cm into lumen.     DESCENDING AORTA:  Size: normal.  Dissection not present. Grade 3: atheroma protruding < 0.5 cm into lumen.     RIGHT ATRIUM:  Size:  normal. No spontaneous echo contrast.     LEFT ATRIUM:  Size: normal. No spontaneous echo contrast.     LEFT ATRIAL APPENDAGE:  Size: normal. No spontaneous echo contrast      ATRIAL SEPTUM:  Intra-atrial septal morphology: normal.       VENTRICULAR SEPTUM:  Intra-ventricular septum morphology: normal.      OTHER FINDINGS:  Pericardium:  normal. Pleural Effusion:  none.     POSTPROCEDURE     LEFT VENTRICLE: Unchanged . Ejection Fraction: 55 %.       RIGHT VENTRICLE: Unchanged . AORTIC VALVE:   Leaflets: bioprosthetic. Stenosis: none. Mean Gradient: 2 mmHg. Regurgitation: none. Valve Size: 26 mm.     MITRAL VALVE: Unchanged . TRICUSPID VALVE: Unchanged . PULMONIC VALVE: Unchanged       ATRIA: Unchanged . AORTA: Unchanged . Dissection: Dissection not present.     REMOVAL:  Probe Removal: atraumatic.       ECHOCARDIOGRAM COMMENTS:  Post-TAVR: KYRA (VTI) 2.5 cm^2, mean gradient 2 mmHg, no PVL. Cardiac tavr   6/21/22    FINDINGS:     1. Intraoperative transesophageal echocardiogram revealed severe aortic stenosis. 2. Final transesophageal echocardiogram demonstrated prosthetic valve with normal function and no paravalvular leak.     Operative Technique     The patient was taken to the operating room and placed supine on the operating table. Following the satisfactory induction of general anesthesia and placement of monitoring lines, the patient was prepped and draped in the usual sterile fashion. A time-out procedure was performed.      The left common femoral artery and left common femoral vein were accessed percutaneously by the modified Seldinger technique and fluoroscopy.  Through the left common femoral vein sheath, a balloon-tipped temporary pacing catheter was inserted and advanced into the right ventricle. Capture was confirmed. Two Perclose sutures were deployed in the left femoral artery. A 7 Fr sheath was placed in the left common femoral artery. A pigtail catheter was inserted and advanced to the right coronary cusp. An aortogram was performed to determine proper angle and orientation for valve deployment. A HandInScanerSTACK Media extra-stiff wire was positioned in the ascending aorta over an exchange catheter, and the sheath for the delivery system was inserted through the left common femoral artery and advanced into the aorta. The patient was systemically heparinized. A 5 Fr JR4 coronary catheter was advanced through the delivery sheath to the aortic valve. The aortic valve was crossed with a 0.035 straight-tip wire, the JR4 catheter was advanced into the left ventricular cavity and was exchanged for a curved tip Amplatzer extra stiff wire.       A 26 mm RONNI 3 Ultra valve delivery system was advanced through the delivery sheath into the aorta, the delivery system was configured for deployment. The valve on the delivery system was then advanced and the aortic valve was crossed. The catheter was desheathed in the standard fashion. The valve was positioned using a combination of fluoroscopy and transesophageal echocardiogram guidance. During an episode of rapid pacing, balloon deployment of the valve was performed. Following deployment, the position of the valve was confirmed by fluoroscopy and echocardiography and its position appeared appropriate with no paravalvular leak.      The valve delivery system was removed, followed by removal of the sheath from the left common femoral artery. The Perclose sutures were secured and direct pressure was held. Protamine was administered with normalization of the ACT. Pressure was released, without evidence of active bleeding.  The left common femoral vein sheath was removed and pressure was held.      I was present and scrubbed for all critical portions of this procedure. Sponge, needle and instrument counts were reported as correct by the nursing staff. Echocardiogram  Results for orders placed during the hospital encounter of 03/18/23    Echo complete w/ contrast if indicated    Interpretation Summary  •  Left Ventricle: Left ventricular cavity size is mildly dilated. Wall thickness is normal. There is eccentric hypertrophy. The left ventricular ejection fraction is 30-35%. Systolic function is moderately reduced. •  The following segments are akinetic: basal inferoseptal, basal inferior, mid inferoseptal and mid inferolateral.  •  The following segments are hypokinetic: mid inferior and apical inferior. •  All other segments are normal.  •  IVS: There is abnormal septal motion consistent with left bundle branch block. •  Right Ventricle: Right ventricular cavity size is normal. Systolic function is normal.  •  Left Atrium: The atrium is moderately dilated. •  Right Atrium: The atrium is mildly dilated. •  Aortic Valve: There is an Emery RONNI 3 Ultra 26 mm TAVR bioprosthetic valve. There is no evidence of paravalvular regurgitation. There is no evidence of transvalvular regurgitation. The aortic valve has no significant stenosis. The gradient recorded across the prosthetic aortic valve is within the expected range. The aortic valve peak velocity is 2.26 m/s. The aortic valve mean gradient is 10 mmHg. The dimensionless velocity index is 0.36. The aortic valve area is 1.37 cm2. •  Mitral Valve: The posterior leaflet is tethered. There is mild annular calcification. There is severe regurgitation with a posteriorly directed jet. •  Tricuspid Valve: There is mild regurgitation. •  Pericardium: There is a trivial pericardial effusion along the right atrial free wall. The fluid exhibits no internal echoes. There is no echocardiographic evidence of tamponade.     Results for orders placed during the hospital encounter of 05/25/23    Echo follow up/limited w/ contrast if indicated    Interpretation Summary  •  Left Ventricle: Left ventricular cavity size is normal. Wall thickness is increased. There is borderline concentric hypertrophy. The left ventricular ejection fraction is 35%. Systolic function is severely reduced. There is global hypokinesis with regional variation. •  IVS: There is abnormal septal motion consistent with left bundle branch block. •  Left Atrium: The atrium is dilated. •  Aortic Valve: There is a TAVR bioprosthetic valve. Peak gradient 23 mmHg, mean gradient 13 mmHg. There is no evidence of regurgitation. •  Mitral Valve: There is moderate to severe regurgitation with an eccentrically directed jet. •  Tricuspid Valve: There is mild regurgitation. •  Pulmonic Valve: There is mild regurgitation. •  Pulmonary Artery: The estimated pulmonary artery systolic pressure is 00.8 mmHg. Stress Test:   No results found for this or any previous visit. Results for orders placed during the hospital encounter of 04/13/22    Echo stress test, exercise    Interpretation Summary  •  Left Ventricle: Left ventricular cavity size is normal. Wall thickness is mildly increased. There is mild concentric hypertrophy. The left ventricular ejection fraction is 65%. Systolic function is normal. Wall motion is normal.  •  Aortic Valve: The aortic valve is trileaflet. The leaflets are moderately thickened. The leaflets are moderately calcified. There is severely reduced mobility. There is severe stenosis. The aortic valve peak velocity is 3.02 m/s. The aortic valve peak gradient is 36 mmHg. The aortic valve mean gradient is 22 mmHg. The aortic valve area is 0.62 cm2. The aortic valve velocity is increased due to stenosis. •  Stress ECG: No ST deviation is noted. The ECG was negative for ischemia. The stress ECG is negative for ischemia after maximal exercise, without reproduction of symptoms.   •  Peak Stress Echo: Left ventricle cavity has normal reduction in size at peak stress. The left ventricle systolic function is hyperdynamic at peak stress. The peak stress echo showed normal wall motion. No aortic valve regurgitation during peak stress. Severe aortic valve stenosis during peak stress. Systolic flow velocity across the Aortic valve is increased with exercise. No evidence of ischemia by ECG or Echocardiographic criteria. Systolic flow velocity across the Aortic valve is increased with exercise. Cardiac catheterization:  2/10/23        HOLTER MONITOR: 24 HOUR/48 HOUR MONITORS  No results found for this or any previous visit. AMB extended holter monitor  Results for orders placed in visit on 08/04/22    AMB extended holter monitor          DEVICE CHECK:       Results for orders placed or performed in visit on 06/26/23   Cardiac EP device report    Narrative    MDT DC PM/MRI CONDITIONAL  CARELINK TRANSMISSION: BATTERY VOLTAGE ADEQUATE. (14.5 YRS) AP7%  <1%. ALL AVAILABLE LEAD PARAMETERS WITHIN NORMAL LIMITS. 8 VHR EPISODES DETECTED MOST RECENT 9 BEATS @ 360ms (9 BEATS @ 330ms, 6 BEATS @ 340ms) PATIENT IS ON METOPROLOL SUCC; EF 35% (5/2023). NORMAL DEVICE FUNCTION. ---[facility]             Code Status: [unfilled]  Advance Directive and Living Will:      Power of :    POLST:      Counseling / Coordination of [address], DO

## 2023-07-26 ENCOUNTER — OFFICE VISIT (OUTPATIENT)
Dept: DENTISTRY | Facility: CLINIC | Age: 78
End: 2023-07-26

## 2023-07-26 VITALS — SYSTOLIC BLOOD PRESSURE: 124 MMHG | DIASTOLIC BLOOD PRESSURE: 74 MMHG | HEART RATE: 71 BPM

## 2023-07-26 DIAGNOSIS — Z01.21 ENCOUNTER FOR DENTAL EXAMINATION AND CLEANING WITH ABNORMAL FINDINGS: Primary | ICD-10-CM

## 2023-07-26 PROCEDURE — D0150 COMPREHENSIVE ORAL EVALUATION - NEW OR ESTABLISHED PATIENT: HCPCS

## 2023-07-26 PROCEDURE — D0330 PANORAMIC RADIOGRAPHIC IMAGE: HCPCS

## 2023-07-26 NOTE — DENTAL PROCEDURE DETAILS
Radha Curtis presents for a Comprehensive exam. Verbal consent for treatment given in addition to the forms. Reviewed health history - Patient is ASA II  Consents signed: Yes     Perio: Normal  Pain Scale: 0  Caries Assessment: Low  Radiographs: Panorex     Oral Hygiene instruction reviewed and given. Recommended Hygiene recall visits with the [facility]. COMP EXAM, QUIROGA     REVIEWED MED HX: meds, allergies, health changes reviewed in EPIC  CHIEF CONCERN:  Patient is currently wearing a complete Max denture that was fabricated apx. 7 yrs. Ago. and is interested in having new Max and Johanne dentures made. PAIN SCALE:  0  ASA CLASS:  III Had pace maker and 3 valves placed 7 mths ago. Visual and Tactile Intraoral/ Extraoral evaluation: Oral and Oropharyngeal cancer evaluation. No findings     Dr. Ssuhma Dean exam=   Reviewed with patient clinical and radiographic findings and patient verbalized understanding. All questions and concerns addressed. Informed patient that we will Pre D new Max and Johanne complete dentures and once we hear back, he should return for impressions.     REFERRALS: no referrals needed       NEXT VISIT:   1)Impressions to start Max and Johanne CUD    Last QUIROGA:  7/26/2023

## 2023-07-28 ENCOUNTER — PREP FOR PROCEDURE (OUTPATIENT)
Dept: CARDIOLOGY CLINIC | Facility: CLINIC | Age: 78
End: 2023-07-28

## 2023-07-28 ENCOUNTER — PREP FOR PROCEDURE (OUTPATIENT)
Dept: INTERVENTIONAL RADIOLOGY/VASCULAR | Facility: CLINIC | Age: 78
End: 2023-07-28

## 2023-07-28 ENCOUNTER — CONSULT (OUTPATIENT)
Dept: CARDIOLOGY CLINIC | Facility: CLINIC | Age: 78
End: 2023-07-28
Payer: MEDICARE

## 2023-07-28 ENCOUNTER — TELEPHONE (OUTPATIENT)
Dept: CARDIOLOGY CLINIC | Facility: CLINIC | Age: 78
End: 2023-07-28

## 2023-07-28 VITALS
HEART RATE: 72 BPM | WEIGHT: 181.4 LBS | BODY MASS INDEX: 27.58 KG/M2 | DIASTOLIC BLOOD PRESSURE: 56 MMHG | SYSTOLIC BLOOD PRESSURE: 118 MMHG

## 2023-07-28 DIAGNOSIS — I50.9 HEART FAILURE, UNSPECIFIED HF CHRONICITY, UNSPECIFIED HEART FAILURE TYPE (HCC): Primary | ICD-10-CM

## 2023-07-28 DIAGNOSIS — I50.20 HEART FAILURE WITH REDUCED EJECTION FRACTION (HCC): Primary | ICD-10-CM

## 2023-07-28 PROCEDURE — 93000 ELECTROCARDIOGRAM COMPLETE: CPT | Performed by: INTERNAL MEDICINE

## 2023-07-28 PROCEDURE — 99204 OFFICE O/P NEW MOD 45 MIN: CPT | Performed by: INTERNAL MEDICINE

## 2023-07-28 NOTE — TELEPHONE ENCOUNTER
Patient scheduled for DEVICE UPGRADE TO ICD on 8/28/23 at [facility] with Dr. Martin Camarena. (Rosie Morton to assist)      Instructions printed in person in office. Patient aware of all general instructions. Medication holds:   NovoLog - Do not take morning of procedure. Furosemide (Lasix) - Do not take morning of procedure. Metformin - Do not take night before and morning of procedure. Lisinopril - Do not take day prior and morning of procedure. Spironolactone - Do not take morning of procedure. Jardiance - Do not take morning of procedure. Lantus - Take 1/2 dose night before and do not take morning of procedure. Labs to be done on 8/18/23:  CMP / CBC (FASTING 8 HOURS)    Insurance: [facility]     Please obtain auth. VENOGRAM order placed. VENOGRAM Contrast Dye Allergy Prep   Called into [address]. Ph:[phone]      · Medrol 32mg - Take ONE 12 hours prior to test  · Medrol 32mg - Take ONE 2 hours prior to test  · Benadryl 50mg - Take ONE 1 hour prior to test      DEVICE UPGRADE TO ICD Contrast Dye Prep called into [address]. Ph:[phone]    ? Prednisone 20mg take THREE tablets the night prior to the procedure and THREE tablets the morning of the procedure. (Sent to your pharmacy: [facility] [address])    ? Pepcid 20mg take ONE tablet the night prior to the procedure and ONE tablet the morning of the procedure. (Sent to your pharmacy: [address])    ? Benadryl 25mg take ONE tablet the night prior and ONE tablet the morning of the procedure.   (Sent to your pharmacy: [address])        Thank you,  Nick Deal

## 2023-07-28 NOTE — TELEPHONE ENCOUNTER
----- Message from Selam August DO sent at 7/28/2023  3:09 PM EDT -----  Hello,     Can we please schedule for upgrade to ICD with Dr. Jade Mccurdy. Venogram. Prep for IV contrast with prednisone, pepcid, and benadryl both for venogram and ICD. [employer].      Thank you,     Racheal Cunningham

## 2023-07-28 NOTE — LETTER
DEVICE PROCEDURE INSTRUCTIONS    Berenice Patel   : 1945  MRN: [id number]  [address] [facility] [place] [id number]    Procedure: UPGRADE TO ICD     Procedure Date: 23    Location: [address] [facility]  Address: [address] [facility], [address] [place] [address]        Labs to be done on 23: CMP /  CBC (FASTING 8 HOURS)    BLOOD THINNER INSTRUCTIONS (Coumadin / Warfarin / Pradaxa / Eliquis / Xarelto):     N/A    Medication Hold:     NovoLog - Do not take morning of procedure. Furosemide (Lasix) - Do not take morning of procedure. Metformin - Do not take night before and morning of procedure. Lisinopril - Do not take day prior and morning of procedure. Spironolactone - Do not take morning of procedure. Jardiance - Do not take morning of procedure. Lantus - Take 1/2 dose night before and do not take morning of procedure. Arrival Time: The Hospital will contact you the day prior to your procedure, usually between 4PM - 6PM to instruct you on the time and place to report. If you do not hear from a [facility]  by 6PM the evening prior to your procedure, please contact the campus you are scheduled at. DO NOT drink or eat anything after midnight the night prior to your procedure. You may have a minimal amount of water with your AM medications. If your procedure is scheduled after 12:00 noon, you may have clear liquids until 8:00AM the morning of your procedure. (Clear liquids: 7UP, ginger ale, jello, or broth.)    Arrange for a responsible adult to drive you to and from the hospital.    You should continue to take your morning medications with a sip of water UNLESS ADVISED OTHERWISE.       DO NOT stop taking Plavix or Aspirin unless advised otherwise. If you are currently taking Fish Oil, Krill oil and/or Vitamin E please DO NOT TAKE FOR A WEEK PRIOR TO PROCEDURE. If you are diabetic, DO NOT take any of your diabetic pills the morning of your procedure. Oral diabetic medications may include Glucophage, Prandin, Glyburide, Micronase, Avandia, Glucovance, Precose, Glynase, and Starlix. Bring a list of daily medications, vitamins, minerals, herbals and nutritional supplements you take. Please include dosages and the times you take them each day. If you are packing an overnight bag, pack minimal clothing, you will be given hospital sleepwear. DO NOT bring money, valuables or jewelry. The wedding band is ok. If you use CPAP machine, bring it to the hospital.     Bring your Photo ID and Insurance cards with you. Please notify us if you have been admitted to the hospital within 30 days. Pre-Operative Showering Instructions. ONLY FOR DEVICE PROCEDURE. The two nights prior to your procedure, take a shower each night using the special antiseptic body scrub (Chlorhexidine Scrub Brush) you received from the office or hospital. This scrub will replace your soap at home, the sponge is pre-filled with soap. The scrub brushes are single use only and should be discarded after use. Shampoo your hair with regular shampoo and rinse completely before using the antiseptic scrub brush. Using the sponge side of the scrub brush to wash your entire body from your neck down, with special attention to your armpits, chest and groin area. DO NOT USE the scrub on your face and avoid any open wounds. Do not use any other soap or wash your skin. DO NOT USE any lotion, powder, deodorant or perfume of any kind on your skin after you shower. AFTER THE FIRST NIGHT of using the scrub, change your bed linen sheets to a fresh clean set and clean pajamas for bedtime. AFTER THE SECOND NIGHT of using the scrub, use clean pajamas for bedtime. DO NOT SHOWER THE MORNING OF SURGERY, you will be prepped and cleansed at the hospital prior to your procedure. PLEASE  THE [facility] AT YOUR MOST CONVENIENT [facility] CARDIOLOGY OFFICE.           FAILURE TO FOLLOW ANY OF THESE INSTRUCTIONS COULD RESULT IN THE CANCELLATION OF YOUR PROCEDURE      Please call  [phone] [facility] or [phone] [facility]) if you do not hear from the  by 6:00PM the night before your procedure. All patients enter through [address] is available free of charge or park on [address] PREP:  Contrast Dye (Omnipaque, Lohexol) Allergy Preparation Instructions       Prednisone 20mg take THREE tablets the night prior to the procedure and THREE tablets the morning of the procedure. (Sent to your pharmacy: [address])    Pepcid 20mg take ONE tablet the night prior to the procedure and ONE tablet the morning of the procedure. (Sent to your pharmacy: [address])    Benadryl 25mg take ONE tablet the night prior and ONE tablet the morning of the procedure. (Sent to your pharmacy: [address])              VENOGRAM - You will receive call to schedule this test within 2 weeks.  Ph: [phone]    CONTRAST DYE PREP sent to [address] [place]:   Medrol 32 mg - take 12 hours prior to procedure  Medrol 32 mg - Take 2 hours prior to procedure  Benadryl 50 mg - Take 1 hour prior to procedure      Thank you,   [facility]   [facility]  Laura REDDY  Ph: [phone]

## 2023-08-17 ENCOUNTER — TELEPHONE (OUTPATIENT)
Dept: CARDIOLOGY CLINIC | Facility: CLINIC | Age: 78
End: 2023-08-17

## 2023-08-18 ENCOUNTER — APPOINTMENT (OUTPATIENT)
Dept: LAB | Facility: CLINIC | Age: 78
DRG: 227 | End: 2023-08-18
Payer: MEDICARE

## 2023-08-18 DIAGNOSIS — I50.9 CHF (CONGESTIVE HEART FAILURE) (HCC): ICD-10-CM

## 2023-08-18 LAB
ALBUMIN SERPL BCP-MCNC: 3.2 G/DL (ref 3.5–5)
ALP SERPL-CCNC: 55 U/L (ref 46–116)
ALT SERPL W P-5'-P-CCNC: 25 U/L (ref 12–78)
ANION GAP SERPL CALCULATED.3IONS-SCNC: 2 MMOL/L
AST SERPL W P-5'-P-CCNC: 17 U/L (ref 5–45)
BASOPHILS # BLD AUTO: 0.02 THOUSANDS/ÂΜL (ref 0–0.1)
BASOPHILS NFR BLD AUTO: 0 % (ref 0–1)
BILIRUB SERPL-MCNC: 0.81 MG/DL (ref 0.2–1)
BUN SERPL-MCNC: 18 MG/DL (ref 5–25)
CALCIUM ALBUM COR SERPL-MCNC: 9.8 MG/DL (ref 8.3–10.1)
CALCIUM SERPL-MCNC: 9.2 MG/DL (ref 8.3–10.1)
CHLORIDE SERPL-SCNC: 112 MMOL/L (ref 96–108)
CO2 SERPL-SCNC: 29 MMOL/L (ref 21–32)
CREAT SERPL-MCNC: 1.05 MG/DL (ref 0.6–1.3)
EOSINOPHIL # BLD AUTO: 0.19 THOUSAND/ÂΜL (ref 0–0.61)
EOSINOPHIL NFR BLD AUTO: 3 % (ref 0–6)
ERYTHROCYTE [DISTWIDTH] IN BLOOD BY AUTOMATED COUNT: 18.3 % (ref 11.6–15.1)
GFR SERPL CREATININE-BSD FRML MDRD: 68 ML/MIN/1.73SQ M
GLUCOSE P FAST SERPL-MCNC: 83 MG/DL (ref 65–99)
HCT VFR BLD AUTO: 39.1 % (ref 36.5–49.3)
HGB BLD-MCNC: 11.9 G/DL (ref 12–17)
IMM GRANULOCYTES # BLD AUTO: 0.02 THOUSAND/UL (ref 0–0.2)
IMM GRANULOCYTES NFR BLD AUTO: 0 % (ref 0–2)
LYMPHOCYTES # BLD AUTO: 1.26 THOUSANDS/ÂΜL (ref 0.6–4.47)
LYMPHOCYTES NFR BLD AUTO: 18 % (ref 14–44)
MCH RBC QN AUTO: 27.1 PG (ref 26.8–34.3)
MCHC RBC AUTO-ENTMCNC: 30.4 G/DL (ref 31.4–37.4)
MCV RBC AUTO: 89 FL (ref 82–98)
MONOCYTES # BLD AUTO: 0.64 THOUSAND/ÂΜL (ref 0.17–1.22)
MONOCYTES NFR BLD AUTO: 9 % (ref 4–12)
NEUTROPHILS # BLD AUTO: 4.91 THOUSANDS/ÂΜL (ref 1.85–7.62)
NEUTS SEG NFR BLD AUTO: 70 % (ref 43–75)
NRBC BLD AUTO-RTO: 0 /100 WBCS
PLATELET # BLD AUTO: 255 THOUSANDS/UL (ref 149–390)
PMV BLD AUTO: 10.6 FL (ref 8.9–12.7)
POTASSIUM SERPL-SCNC: 4 MMOL/L (ref 3.5–5.3)
PROT SERPL-MCNC: 6.5 G/DL (ref 6.4–8.4)
RBC # BLD AUTO: 4.39 MILLION/UL (ref 3.88–5.62)
SODIUM SERPL-SCNC: 143 MMOL/L (ref 135–147)
WBC # BLD AUTO: 7.04 THOUSAND/UL (ref 4.31–10.16)

## 2023-08-18 NOTE — TELEPHONE ENCOUNTER
routed LOV note and consult appt done by Dr. Erna Manning. Sent to [id number] [email or web address] [employer] # [phone].

## 2023-08-23 ENCOUNTER — HOSPITAL ENCOUNTER (OUTPATIENT)
Dept: RADIOLOGY | Facility: HOSPITAL | Age: 78
Discharge: HOME | End: 2023-08-23
Attending: RADIOLOGY
Payer: MEDICARE

## 2023-08-23 DIAGNOSIS — I50.9 HEART FAILURE, UNSPECIFIED HF CHRONICITY, UNSPECIFIED HEART FAILURE TYPE (HCC): ICD-10-CM

## 2023-08-23 PROCEDURE — NC001 PR NO CHARGE: Performed by: RADIOLOGY

## 2023-08-23 PROCEDURE — 36005 INJECTION EXT VENOGRAPHY: CPT

## 2023-08-23 PROCEDURE — 75822 VEIN X-RAY ARMS/LEGS: CPT

## 2023-08-23 RX ORDER — IODIXANOL 320 MG/ML
50 INJECTION, SOLUTION INTRAVASCULAR
Status: COMPLETED | OUTPATIENT
Start: 2023-08-23 | End: 2023-08-23

## 2023-08-23 RX ADMIN — IODIXANOL 40 ML: 320 INJECTION, SOLUTION INTRAVASCULAR at 14:11

## 2023-08-23 NOTE — SEDATION DOCUMENTATION
Bilateral upper extremity and central vein venogram completed. No S/S of contrast Reaction. Patient took Medrol and benadryl as ordered for prep. Denies pain, SOB, and no rash. Discharged home ambulatory.

## 2023-08-27 DIAGNOSIS — D50.9 IRON DEFICIENCY ANEMIA, UNSPECIFIED IRON DEFICIENCY ANEMIA TYPE: ICD-10-CM

## 2023-08-27 DIAGNOSIS — Z95.2 S/P TAVR (TRANSCATHETER AORTIC VALVE REPLACEMENT): ICD-10-CM

## 2023-08-28 RX ORDER — FERROUS SULFATE 325(65) MG
TABLET ORAL
Qty: 45 TABLET | Refills: 4 | Status: SHIPPED | OUTPATIENT
Start: 2023-08-28

## 2023-08-28 RX ORDER — ASPIRIN 81 MG
81 TABLET,CHEWABLE ORAL DAILY
Qty: 90 TABLET | Refills: 4 | Status: SHIPPED | OUTPATIENT
Start: 2023-08-28

## 2023-08-30 ENCOUNTER — TELEPHONE (OUTPATIENT)
Dept: CARDIOLOGY CLINIC | Facility: CLINIC | Age: 78
End: 2023-08-30

## 2023-09-07 ENCOUNTER — OFFICE VISIT (OUTPATIENT)
Dept: CARDIOLOGY CLINIC | Facility: CLINIC | Age: 78
End: 2023-09-07
Payer: MEDICARE

## 2023-09-07 VITALS
SYSTOLIC BLOOD PRESSURE: 94 MMHG | WEIGHT: 184 LBS | HEART RATE: 78 BPM | BODY MASS INDEX: 27.98 KG/M2 | OXYGEN SATURATION: 98 % | DIASTOLIC BLOOD PRESSURE: 60 MMHG

## 2023-09-07 DIAGNOSIS — I25.10 CORONARY ARTERY DISEASE INVOLVING NATIVE CORONARY ARTERY OF NATIVE HEART WITHOUT ANGINA PECTORIS: ICD-10-CM

## 2023-09-07 DIAGNOSIS — I50.22 CHRONIC HFREF (HEART FAILURE WITH REDUCED EJECTION FRACTION) (HCC): Primary | ICD-10-CM

## 2023-09-07 DIAGNOSIS — I34.0 NONRHEUMATIC MITRAL (VALVE) INSUFFICIENCY: ICD-10-CM

## 2023-09-07 DIAGNOSIS — Z95.2 S/P TAVR (TRANSCATHETER AORTIC VALVE REPLACEMENT): ICD-10-CM

## 2023-09-07 PROCEDURE — 99214 OFFICE O/P EST MOD 30 MIN: CPT | Performed by: INTERNAL MEDICINE

## 2023-09-07 RX ORDER — DIPHENHYDRAMINE HYDROCHLORIDE 25 MG/1
CAPSULE ORAL
COMMUNITY
Start: 2023-08-24

## 2023-09-07 RX ORDER — FAMOTIDINE 20 MG/1
TABLET, FILM COATED ORAL
COMMUNITY
Start: 2023-08-23

## 2023-09-07 RX ORDER — PREDNISONE 20 MG/1
TABLET ORAL
COMMUNITY
Start: 2023-08-23 | End: 2023-09-17

## 2023-09-07 RX ORDER — DIPHENHYDRAMINE HCL 50 MG/1
CAPSULE ORAL
COMMUNITY
Start: 2023-07-28

## 2023-09-07 RX ORDER — METHYLPREDNISOLONE 32 MG/1
TABLET ORAL
COMMUNITY
Start: 2023-07-28 | End: 2023-09-17

## 2023-09-07 NOTE — PROGRESS NOTES
Advanced Heart Failure Outpatient Progress Note - Raj Villalobos 70 y.o. male MRN: [id number]    Encounter: [id number]      Assessment/Plan:    Patient Active Problem List    Diagnosis Date Noted   • Cardiomyopathy [facility]) 06/05/2023   • NSVT (nonsustained ventricular tachycardia) ([address]) 06/05/2023   • SIRS (systemic inflammatory response syndrome) ([address]) 03/19/2023   • Dyspnea 03/19/2023   • Anemia 03/19/2023   • Ischemic heart failure ([address]) 03/18/2023   • Status post insertion of drug eluting coronary artery stent 02/10/2023   • Presence of cardiac pacemaker 01/30/2023   • NSTEMI (non-ST elevated myocardial infarction) ([address]) 01/30/2023   • Bruit of left carotid artery 01/30/2023   • Dyspnea on exertion 01/30/2023   • Peripheral artery disease ([address]) 01/18/2023   • Need for influenza vaccination 11/10/2022   • Leg cramping 11/10/2022   • Tachycardia-bradycardia ([address]) 09/01/2022   • S/P TAVR (transcatheter aortic valve replacement) 06/21/2022   • Coronary artery disease 06/21/2022   • Contrast media allergy 05/31/2022   • Aortic stenosis 01/19/2022   • Diverticulosis of large intestine 09/20/2018   • Internal hemorrhoids 09/20/2018   • Nicotine dependence 04/13/2017   • Osteoarthritis of knee 04/13/2017   • Bilateral hearing loss 01/30/2017   • Allergic rhinitis 02/24/2016   • Type 2 diabetes mellitus with circulatory disorder, with long-term current use of insulin ([address]) 11/24/2015   • Adenomatous colon polyp 06/11/2014   • Hyperlipidemia 09/13/2013   • Vitamin B12 deficiency 07/24/2012   • Mild intermittent asthma without complication 68/80/7069   • Essential hypertension 06/08/2012   • Hypothyroidism 06/07/2012       # Chronic heart failure with reduced EF, LVEF 30-35%, nondilated, NYHA class II Stage C.   Etiology: ischemic, new reduction in setting of NSTEMI with 80% mid LAD and 90% RPDA stenoses s/p stenting     Weight at discharge 179 lbs, 186 lbs 3/28/23; 189 lbs 4/28/23; 184 lbs 9/7/23  NTproBNP   2627 5/6/23  3136 3/18/23    Studies personally reviewed by me:    Echo 5/25/23:  LVEF 35%, 30-35% on my review  LVIDD 5.4cm, increased wall thickness  TAVR mean gradient 13mmHg  Moderate to severe MR, eccentric  Normal RV size and systolic function  Estimated RVSP 36mmHg, mild TR    Echo 4/21/23:  LVEF:  35%  LVIDd: 5.4 cm, normal wall thickness  RV: Normal size and systolic function  AV: TAVR bioprosthetic valve appears well-seated and appears to be functioning normally, no perivalvular or transvalvular regurgitation  MR: Moderate with posteriorly directed jet  PASP: Unable to estimate  RVOT: no notching  Other: IVC normal, normal biatrial size    TTE 3/21/23: LVEF 30-35%,LVIDd 5.7 cm,  RV normal, no AI, no AS, severe MR, mild TR, trivial pericardial effusion, IVC normal. RVSP 36mmHg + RAP. The following segments are akinetic: basal inferoseptal, basal inferior, mid inferoseptal and mid inferolateral. The following segments are hypokinetic: mid inferior and apical inferior.   TTE 2/10/23: LVEF 35-40%  TTE 7/28/22: LVEF 55%, moderate MR with posteriorly directed jet, mild TR    2/10/23 [facility]: 3-vessel CAD with two identifiable culprits for NSTEMI in ost RPDA & mid LAD. Successful PCI w/ ELDER x2 to mid LAD & ELDER x1 ost RPDA; residual moderate disease unchanged from 6-2022 study   6/03/22 [facility], pre TAVR: First marginal lesion 50% stenosis, mid LAD 50% stenosis, RPDA 50% stenosis, RPDA to 50% stenosis, 1st diagonal 50% stenosis.     Neurohormonal Blockade:   --Beta-Blocker:Metoprolol succinate 75 mg BID  --ACEi, ARB or ARNi: Lisinopril 20 mg BID  --SGLT2i- jardiance 10 mg daily  --Aldosterone Receptor Blocker: Spironolactone 25 mg daily  --Diuretic: Lasix 40 mg daily     Sudden Cardiac Death Risk Reduction:  --ICD: EF 30-35%, MDT DC PPM in situ, returned LifeVest     Electrical Resynchronization:  --Candidacy for BiV device: LBBB, QRSd 132-140 msec     Advanced Therapies (if appropriate):   --Inotrope:  --LVAD/Transplant Candidacy:     # Mitral regurgitation, severe on echo 3/21/23, moderate to severe on echo 5/25/23. To consider MitraClip if still with significant MR after CRT  # Severe AS s/p TAVR, #26mm Emery Hernán S3 Ultra valve via left femoral approach by Dr Juanita Metcalf. 6/21/22; normally functioning on echo 5/25/23  # Post op LBBB. # Tachy-lino syndrome with LBBB, pauses post TAVR. S/P MDT PPM.  Interrogation 3/24/23: AP 7.5%  <0.1% NSVTs  # CAD s/p NSTEMI with PCI to the mid LAD and RPDA 3/21/23  Rx: aspirin, plavix, statin  # Hypertension  # Hyperlipidemia 11/10/22 TC 142, [place], DXK 13, OEJ 89   # DM2 HgbA1C 7.6  # Asthma  # Hepatitis C. Untreated per chart notes. Hep C Ab high reactive 1/2022. Management per PCP. # Lymphadenopathy  # Tobacco abuse, last smoked  10/2022  PFT 2/9/23:  Interpretation:  Results are unreliable due to patient being unable to perform maneuvers as per ATS standards. If clinical concern exists would recommend repeating PFTs. Best interpretation made based on available data. • Severe obstructive airflow defect on spirometry  • No significant improvement in airflow or forced vital capacity in response to the administration to bronchodilator per ATS standards. • Lung volumes unable to be obtained due to patient inability to perform maneuvers  • Normal diffusion capacity  • Flow-volume loop consistent with obstruction       TODAY'S PLAN:  Increase lasix to 40mg two times a day for 3 days then go back to 40mg once a day  2g sodium diet  2L fluid restriction diet  Daily weights  BIV ICD upgrade as scheduled  Reassess EF and MR 3-4 months post CRT  Switch lisinopril to entresto on next visit. HPI:   Patient admitted February 9/20/2023 when he presented with progressive shortness of breath. Found to have elevated troponin. Admitted for type I NSTEMI with new wall motion abnormalities on echocardiogram along with newly reduced EF of 40%.   He was urgently taken to the Cath Lab and left heart catheterization showed three-vessel CAD with 2 vessels "culprit lesions: Ostial RPDA and mid LAD stenosis status post stents. Patient readmitted March 18, 2023 when he presented with progressive shortness of breath and intermittent bilateral lower extremity edema. Also with weight gain and orthopnea. 4/28/23: here for follow up. Started on LifeVest since last office visit. Repeat echo 4/21/23 showed EF of 35%. Reports walking 1.5 blocks no dyspnea or chest pain. Also able to walk up a flight of stairs. Mainly limited by knee pains. No leg swelling or bloating. No PND, orthopnea. No dizziness or lightheadedness. Taking medications. Weight stable on home scale at  177 lbs     6/1/23: here for follow up. Repeat echo showed EF 35%, 30-35% on my review. Denies shortness of breath or chest pain on current level of exertion. Metoprolol and entresto increased on last visit. Interval History:  9/7/23: here for follow up. Seen at the ED 7/14/23 for dyspnea. CHF. Seen by Dr. Kemar Maynard and scheduled for BIV ICD upgrade. Reports getting tired, more fatigued over a couple of weeks. Has to rest after walking up a flight of stairs due to shortness of breath. Ankle pains with walking. 1 week ago, had 2 episodes of dizziness, transient. Reports adherence to medications and diet      Review of Systems   Constitutional: Positive for fatigue. Negative for chills and fever. HENT: Negative for ear pain and sore throat. Eyes: Negative for pain and visual disturbance. Respiratory: Positive for shortness of breath. Negative for cough and chest tightness. Cardiovascular: Negative for chest pain, palpitations and leg swelling. Gastrointestinal: Negative for abdominal distention, abdominal pain and vomiting. Genitourinary: Negative for dysuria and hematuria. Musculoskeletal: Negative for arthralgias and back pain. Skin: Negative for color change and rash.    Neurological: Negative for dizziness, seizures, syncope and light-headedness. All other systems reviewed and are negative. Past Medical History:   Diagnosis Date   • Arthritis    • Asthma    • Colon polyps    • Community acquired pneumonia     last assessed: 5/1/2014   • Diabetes mellitus ([facility])    • Hemorrhagic prepatellar bursitis, left 10/21/2019   • Hepatitis C    • High cholesterol    • History of colonoscopy 2017   • Hypertension    • Lymphadenopathy, anterior cervical 04/17/2018   • Nephritis and nephropathy, not specified as acute or chronic, with other specified pathological lesion in kidney, in diseases classified elsewhere 3/26/2013   • Screening for colon cancer 05/01/2019   • Thoracic vertebral fracture ([facility]) 06/11/2014         Allergies   Allergen Reactions   • Iv Contrast [Iodinated Contrast Media] Rash     Patient states he had a severe reaction approximately 10 years ago , states he had a rash, itchy and he remembers a bunch of people pounding on chest and being surrounded by multiple doctors.      .    Current Outpatient Medications:   •  Advair -21 MCG/ACT inhaler, Inhale 2 puffs 2 (two) times a day Rinse mouth after use., Disp: 36 g, Rfl: 6  •  albuterol (PROVENTIL HFA,VENTOLIN HFA) 90 mcg/act inhaler, INHALE 2 PUFFS BY MOUTH EVERY 4 HOURS AS NEEDED FOR WHEEZING OR SHORTNESS OF BREATH, Disp: 18 g, Rfl: 2  •  Alcohol Swabs (ALCOHOL PADS) 70 % PADS, , Disp: , Rfl:   •  Aspirin Low Dose 81 MG chewable tablet, CHEW 1 TABLET BY MOUTH DAILY, Disp: 90 tablet, Rfl: 4  •  Blood Glucose Monitoring Suppl (OneTouch Verio) w/Device KIT, Check blood glucose three times daily before each meal, Disp: 1 kit, Rfl: 0  •  cholecalciferol (VITAMIN D3) 1,000 units tablet, Take 2 tablets (2,000 Units total) by mouth daily, Disp: 180 tablet, Rfl: 5  •  clopidogrel (PLAVIX) 75 mg tablet, Take 1 tablet (75 mg total) by mouth daily, Disp: 90 tablet, Rfl: 3  •  Empagliflozin (JARDIANCE) 10 MG TABS tablet, Take 1 tablet (10 mg total) by mouth every morning, Disp: 90 tablet, Rfl: 3  •  ferrous sulfate 325 (65 Fe) mg tablet, TAKE 1 TABLET BY MOUTH EVERY OTHER DAY, Disp: 45 tablet, Rfl: 4  •  fluticasone (FLONASE) 50 mcg/act nasal spray, 2 sprays into each nostril daily, Disp: 2 Bottle, Rfl: 2  •  furosemide (LASIX) 40 mg tablet, TAKE 1 TABLET BY MOUTH EVERY DAY, Disp: 90 tablet, Rfl: 2  •  Insulin Pen Needle (Pen Needles) 31G X 8 MM MISC, Use daily, Disp: 300 each, Rfl: 3  •  Lancets (FREESTYLE) lancets, by Other route as needed (As needed), Disp: 100 each, Rfl: 3  •  Lantus SoloStar 100 units/mL SOPN, INJECT 0.18 ML (18 UNITS TOTAL) UNDER THE SKIN DAILY AT BEDTIME, Disp: 15 mL, Rfl: 3  •  levothyroxine 137 mcg tablet, TAKE 1 TABLET BY MOUTH EVERY DAY, Disp: 90 tablet, Rfl: 3  •  lisinopril (ZESTRIL) 20 mg tablet, Take 1 tablet (20 mg total) by mouth 2 (two) times a day, Disp: 180 tablet, Rfl: 2  •  metFORMIN (GLUCOPHAGE) 850 mg tablet, TAKE 1 TABLET BY MOUTH 2 TIMES A DAY WITH MEALS., Disp: 180 tablet, Rfl: 3  •  methocarbamol (Robaxin-750) 750 mg tablet, Take 1 tablet (750 mg total) by mouth every 6 (six) hours as needed for muscle spasms, Disp: 60 tablet, Rfl: 0  •  metoprolol succinate (TOPROL-XL) 50 mg 24 hr tablet, Take 1.5 tablets (75 mg total) by mouth 2 (two) times a day, Disp: 270 tablet, Rfl: 1  •  montelukast (SINGULAIR) 10 mg tablet, TAKE 1 TABLET BY MOUTH EVERY DAY, Disp: 90 tablet, Rfl: 3  •  NovoLOG FlexPen 100 units/mL injection pen, INJECT 5 UNITS WITH BREAKFAST AND WITH DINNER, Disp: 15 mL, Rfl: 3  •  rosuvastatin (CRESTOR) 40 MG tablet, TAKE 1 TABLET BY MOUTH EVERY DAY, Disp: 90 tablet, Rfl: 3  •  spironolactone (ALDACTONE) 25 mg tablet, Take 1 tablet (25 mg total) by mouth daily Do not start before February 13, 2023., Disp: 90 tablet, Rfl: 3  •  Banophen 25 MG capsule, TAKE 1 CAPSULE NIGHT BEFORE PROCEDURE AND TAKE 1 CAPSULE MORNING OF PROCEDURE (Patient not taking: Reported on 9/7/2023), Disp: , Rfl:   •  Banophen 50 MG capsule, TAKE 1 CAPSULE 1 HOUR PRIOR TO TEST (Patient not taking: Reported on 2023), Disp: , Rfl:   •  Continuous Blood Gluc  (FreeStyle [place] 2 [place]) POWER, Use 1 each continuous (Patient not taking: Reported on 2023), Disp: 1 each, Rfl: 0  •  Continuous Blood Gluc Sensor (FreeStyle [place] 2 Sensor) MISC, Use 1 each every 14 (fourteen) days (Patient not taking: Reported on 2023), Disp: 6 each, Rfl: 3  •  famotidine (PEPCID) 20 mg tablet, TAKE 1 TABLET AT NIGHT PRIOR TO PROCEDURE AND 1 TABLET THE MORNING OF PROCEDURE (Patient not taking: Reported on 2023), Disp: , Rfl:   •  methylPREDNISolone (MEDROL) 32 MG tablet, TAKE 1 TABLET 12 HOURS PRIOR TO TEST AND 1 TABLET 2 HOURS PRIOR TO TEST (Patient not taking: Reported on 2023), Disp: , Rfl:   •  nitroglycerin (NITROSTAT) 0.4 mg SL tablet, Place 1 tablet (0.4 mg total) under the tongue every 5 (five) minutes as needed for chest pain, Disp: 30 tablet, Rfl: 1  •  predniSONE 20 mg tablet, TAKE 3 TABLETS THE NIGHT PRIOR TO PROCEDURE AND 3 TABLETS THE MORNING OF PROCEDURE (Patient not taking: Reported on 2023), Disp: , Rfl:     Social History     Socioeconomic History   • Marital status: /Civil Union     Spouse name: Not on file   • Number of children: Not on file   • Years of education: Not on file   • Highest education level: Not on file   Occupational History   • Occupation: retired    Tobacco Use   • Smoking status: Former     Packs/day: 0.50     Types: Cigarettes     Quit date: 2022     Years since quittin.2   • Smokeless tobacco: Never   • Tobacco comments:     started when he was about 24 yrs old; stopped smoking 3 wks ago   Vaping Use   • Vaping Use: Never used   Substance and Sexual Activity   • Alcohol use: No   • Drug use: No   • Sexual activity: Not Currently   Other Topics Concern   • Not on file   Social History Narrative   • Not on file     Social Determinants of Health     Financial Resource Strain: Low Risk  (2023)    Overall Financial Resource Strain (CARDIA)    • Difficulty of Paying Living Expenses: Not hard at all   Food Insecurity: No Food Insecurity (3/20/2023)    Hunger Vital Sign    • Worried About Running Out of Food in the Last Year: Never true    • Ran Out of Food in the Last Year: Never true   Transportation Needs: No Transportation Needs (3/20/2023)    PRAPARE - Transportation    • Lack of Transportation (Medical): No    • Lack of Transportation (Non-Medical): No   Physical Activity: Unknown (1/19/2022)    Exercise Vital Sign    • Days of Exercise per Week: Not on file    • Minutes of Exercise per Session: 60 min   Stress: No Stress Concern Present (1/19/2022)    [facility]    • Feeling of Stress : Not at all   Social Connections: Moderately Isolated (1/19/2022)    Social Connection and Isolation Panel [NHANES]    • Frequency of Communication with Friends and Family: More than three times a week    • Frequency of Social Gatherings with Friends and Family: More than three times a week    • Attends [employer] Services: Never    • Active Member of Clubs or Organizations: No    • Attends Club or Organization Meetings: Never    • Marital Status:    Intimate Partner Violence: Not on file   Housing Stability: Low Risk  (3/20/2023)    Housing Stability Vital Sign    • Unable to Pay for Housing in the Last Year: No    • Number of Places Lived in the Last Year: 1    • Unstable Housing in the Last Year: No       Family History   Problem Relation Age of Onset   • Heart attack Family         at age 80   • No Known Problems Mother    • No Known Problems Father    • Diabetes Sister    • Diabetes Brother        Physical Exam:    Vitals:   Vitals:    09/07/23 1317   BP: 94/60   Pulse: 78   SpO2: 98%       Physical Exam  Constitutional:       General: He is not in acute distress. Appearance: Normal appearance. HENT:      Head: Normocephalic and atraumatic.       Mouth/Throat: Mouth: Mucous membranes are moist.   Eyes:      General: No scleral icterus. Extraocular Movements: Extraocular movements intact. Cardiovascular:      Rate and Rhythm: Normal rate and regular rhythm. Pulses: Normal pulses. Heart sounds: S1 normal and S2 normal. No murmur heard. No friction rub. No gallop. Comments: Elevated JVP. Trace pitting edema bilaterally. Pulmonary:      Effort: Pulmonary effort is normal.      Breath sounds: Examination of the right-lower field reveals decreased breath sounds. Decreased breath sounds present. No wheezing, rhonchi or rales. Abdominal:      General: There is no distension. Palpations: Abdomen is soft. Tenderness: There is no abdominal tenderness. There is no guarding or rebound. Musculoskeletal:         General: Normal range of motion. Cervical back: Neck supple. Skin:     General: Skin is warm and dry. Capillary Refill: Capillary refill takes less than 2 seconds. Neurological:      General: No focal deficit present. Mental Status: He is alert and oriented to person, place, and time.    Psychiatric:         Mood and Affect: Mood normal.         Labs & Results:    Lab Results   Component Value Date    SODIUM 143 08/18/2023    K 4.0 08/18/2023     (H) 08/18/2023    CO2 29 08/18/2023    BUN 18 08/18/2023    CREATININE 1.05 08/18/2023    GLUC 63 (L) 07/14/2023    CALCIUM 9.2 08/18/2023     Lab Results   Component Value Date    WBC 7.04 08/18/2023    HGB 11.9 (L) 08/18/2023    HCT 39.1 08/18/2023    MCV 89 08/18/2023     08/18/2023     Lab Results   Component Value Date    NTBNP 2,624 (H) 05/06/2023      Lab Results   Component Value Date    CHOLESTEROL 137 02/10/2023    CHOLESTEROL 142 11/10/2022    CHOLESTEROL 191 01/25/2022     Lab Results   Component Value Date    HDL 53 02/10/2023    HDL 65 11/10/2022    HDL 49 01/25/2022     Lab Results   Component Value Date    TRIG 61 02/10/2023    TRIG 75 11/10/2022    TRIG 119 01/25/2022     Lab Results   Component Value Date    NONHDLC 84 02/10/2023       EKG personally reviewed by Taylor Hodges MD.     Counseling / Coordination of Care  Time spent today 25 minutes. Greater than 50% of total time was spent with the patient and / or family counseling and / or coordination of care. We went over current diagnosis, most recent studies and any changes in treatment. Thank you for the opportunity to participate in the care of this patient.     Taylor Hodges MD  ADVANCED HEART FAILURE AND MECHANICAL CIRCULATORY SUPPORT  [address]

## 2023-09-07 NOTE — H&P (VIEW-ONLY)
Advanced Heart Failure Outpatient Progress Note - Jose Sweet 70 y.o. male MRN: [id number]    Encounter: [id number]      Assessment/Plan:    Patient Active Problem List    Diagnosis Date Noted   • Cardiomyopathy [facility]) 06/05/2023   • NSVT (nonsustained ventricular tachycardia) ([address]) 06/05/2023   • SIRS (systemic inflammatory response syndrome) ([address]) 03/19/2023   • Dyspnea 03/19/2023   • Anemia 03/19/2023   • Ischemic heart failure ([address]) 03/18/2023   • Status post insertion of drug eluting coronary artery stent 02/10/2023   • Presence of cardiac pacemaker 01/30/2023   • NSTEMI (non-ST elevated myocardial infarction) ([address]) 01/30/2023   • Bruit of left carotid artery 01/30/2023   • Dyspnea on exertion 01/30/2023   • Peripheral artery disease ([address]) 01/18/2023   • Need for influenza vaccination 11/10/2022   • Leg cramping 11/10/2022   • Tachycardia-bradycardia ([address]) 09/01/2022   • S/P TAVR (transcatheter aortic valve replacement) 06/21/2022   • Coronary artery disease 06/21/2022   • Contrast media allergy 05/31/2022   • Aortic stenosis 01/19/2022   • Diverticulosis of large intestine 09/20/2018   • Internal hemorrhoids 09/20/2018   • Nicotine dependence 04/13/2017   • Osteoarthritis of knee 04/13/2017   • Bilateral hearing loss 01/30/2017   • Allergic rhinitis 02/24/2016   • Type 2 diabetes mellitus with circulatory disorder, with long-term current use of insulin ([address]) 11/24/2015   • Adenomatous colon polyp 06/11/2014   • Hyperlipidemia 09/13/2013   • Vitamin B12 deficiency 07/24/2012   • Mild intermittent asthma without complication 54/58/7599   • Essential hypertension 06/08/2012   • Hypothyroidism 06/07/2012       # Chronic heart failure with reduced EF, LVEF 30-35%, nondilated, NYHA class II Stage C.   Etiology: ischemic, new reduction in setting of NSTEMI with 80% mid LAD and 90% RPDA stenoses s/p stenting     Weight at discharge 179 lbs, 186 lbs 3/28/23; 189 lbs 4/28/23; 184 lbs 9/7/23  NTproBNP   2625 5/6/23  3136 3/18/23    Studies personally reviewed by me:    Echo 5/25/23:  LVEF 35%, 30-35% on my review  LVIDD 5.4cm, increased wall thickness  TAVR mean gradient 13mmHg  Moderate to severe MR, eccentric  Normal RV size and systolic function  Estimated RVSP 36mmHg, mild TR    Echo 4/21/23:  LVEF:  35%  LVIDd: 5.4 cm, normal wall thickness  RV: Normal size and systolic function  AV: TAVR bioprosthetic valve appears well-seated and appears to be functioning normally, no perivalvular or transvalvular regurgitation  MR: Moderate with posteriorly directed jet  PASP: Unable to estimate  RVOT: no notching  Other: IVC normal, normal biatrial size    TTE 3/21/23: LVEF 30-35%,LVIDd 5.7 cm,  RV normal, no AI, no AS, severe MR, mild TR, trivial pericardial effusion, IVC normal. RVSP 36mmHg + RAP. The following segments are akinetic: basal inferoseptal, basal inferior, mid inferoseptal and mid inferolateral. The following segments are hypokinetic: mid inferior and apical inferior.   TTE 2/10/23: LVEF 35-40%  TTE 7/28/22: LVEF 55%, moderate MR with posteriorly directed jet, mild TR    2/10/23 [facility]: 3-vessel CAD with two identifiable culprits for NSTEMI in ost RPDA & mid LAD. Successful PCI w/ ELDER x2 to mid LAD & ELDER x1 ost RPDA; residual moderate disease unchanged from 6-2022 study   6/03/22 [facility], pre TAVR: First marginal lesion 50% stenosis, mid LAD 50% stenosis, RPDA 50% stenosis, RPDA to 50% stenosis, 1st diagonal 50% stenosis.     Neurohormonal Blockade:   --Beta-Blocker:Metoprolol succinate 75 mg BID  --ACEi, ARB or ARNi: Lisinopril 20 mg BID  --SGLT2i- jardiance 10 mg daily  --Aldosterone Receptor Blocker: Spironolactone 25 mg daily  --Diuretic: Lasix 40 mg daily     Sudden Cardiac Death Risk Reduction:  --ICD: EF 30-35%, MDT DC PPM in situ, returned LifeVest     Electrical Resynchronization:  --Candidacy for BiV device: LBBB, QRSd 132-140 msec     Advanced Therapies (if appropriate):   --Inotrope:  --LVAD/Transplant Candidacy:     # Mitral regurgitation, severe on echo 3/21/23, moderate to severe on echo 5/25/23. To consider MitraClip if still with significant MR after CRT  # Severe AS s/p TAVR, #26mm Emery Hernán S3 Ultra valve via left femoral approach by Dr Dorie Mckeon. 6/21/22; normally functioning on echo 5/25/23  # Post op LBBB. # Tachy-lino syndrome with LBBB, pauses post TAVR. S/P MDT PPM.  Interrogation 3/24/23: AP 7.5%  <0.1% NSVTs  # CAD s/p NSTEMI with PCI to the mid LAD and RPDA 3/21/23  Rx: aspirin, plavix, statin  # Hypertension  # Hyperlipidemia 11/10/22 TC 142, [place], HQY 97, JGY 15   # DM2 HgbA1C 7.6  # Asthma  # Hepatitis C. Untreated per chart notes. Hep C Ab high reactive 1/2022. Management per PCP. # Lymphadenopathy  # Tobacco abuse, last smoked  10/2022  PFT 2/9/23:  Interpretation:  Results are unreliable due to patient being unable to perform maneuvers as per ATS standards. If clinical concern exists would recommend repeating PFTs. Best interpretation made based on available data. • Severe obstructive airflow defect on spirometry  • No significant improvement in airflow or forced vital capacity in response to the administration to bronchodilator per ATS standards. • Lung volumes unable to be obtained due to patient inability to perform maneuvers  • Normal diffusion capacity  • Flow-volume loop consistent with obstruction       TODAY'S PLAN:  Increase lasix to 40mg two times a day for 3 days then go back to 40mg once a day  2g sodium diet  2L fluid restriction diet  Daily weights  BIV ICD upgrade as scheduled  Reassess EF and MR 3-4 months post CRT  Switch lisinopril to entresto on next visit. HPI:   Patient admitted February 9/20/2023 when he presented with progressive shortness of breath. Found to have elevated troponin. Admitted for type I NSTEMI with new wall motion abnormalities on echocardiogram along with newly reduced EF of 40%.   He was urgently taken to the Cath Lab and left heart catheterization showed three-vessel CAD with 2 vessels "culprit lesions: Ostial RPDA and mid LAD stenosis status post stents. Patient readmitted March 18, 2023 when he presented with progressive shortness of breath and intermittent bilateral lower extremity edema. Also with weight gain and orthopnea. 4/28/23: here for follow up. Started on LifeVest since last office visit. Repeat echo 4/21/23 showed EF of 35%. Reports walking 1.5 blocks no dyspnea or chest pain. Also able to walk up a flight of stairs. Mainly limited by knee pains. No leg swelling or bloating. No PND, orthopnea. No dizziness or lightheadedness. Taking medications. Weight stable on home scale at  177 lbs     6/1/23: here for follow up. Repeat echo showed EF 35%, 30-35% on my review. Denies shortness of breath or chest pain on current level of exertion. Metoprolol and entresto increased on last visit. Interval History:  9/7/23: here for follow up. Seen at the ED 7/14/23 for dyspnea. CHF. Seen by Dr. Benny Wyatt and scheduled for BIV ICD upgrade. Reports getting tired, more fatigued over a couple of weeks. Has to rest after walking up a flight of stairs due to shortness of breath. Ankle pains with walking. 1 week ago, had 2 episodes of dizziness, transient. Reports adherence to medications and diet      Review of Systems   Constitutional: Positive for fatigue. Negative for chills and fever. HENT: Negative for ear pain and sore throat. Eyes: Negative for pain and visual disturbance. Respiratory: Positive for shortness of breath. Negative for cough and chest tightness. Cardiovascular: Negative for chest pain, palpitations and leg swelling. Gastrointestinal: Negative for abdominal distention, abdominal pain and vomiting. Genitourinary: Negative for dysuria and hematuria. Musculoskeletal: Negative for arthralgias and back pain. Skin: Negative for color change and rash.    Neurological: Negative for dizziness, seizures, syncope and light-headedness. All other systems reviewed and are negative. Past Medical History:   Diagnosis Date   • Arthritis    • Asthma    • Colon polyps    • Community acquired pneumonia     last assessed: 5/1/2014   • Diabetes mellitus ([facility])    • Hemorrhagic prepatellar bursitis, left 10/21/2019   • Hepatitis C    • High cholesterol    • History of colonoscopy 2017   • Hypertension    • Lymphadenopathy, anterior cervical 04/17/2018   • Nephritis and nephropathy, not specified as acute or chronic, with other specified pathological lesion in kidney, in diseases classified elsewhere 3/26/2013   • Screening for colon cancer 05/01/2019   • Thoracic vertebral fracture ([facility]) 06/11/2014         Allergies   Allergen Reactions   • Iv Contrast [Iodinated Contrast Media] Rash     Patient states he had a severe reaction approximately 10 years ago , states he had a rash, itchy and he remembers a bunch of people pounding on chest and being surrounded by multiple doctors.      .    Current Outpatient Medications:   •  Advair -21 MCG/ACT inhaler, Inhale 2 puffs 2 (two) times a day Rinse mouth after use., Disp: 36 g, Rfl: 6  •  albuterol (PROVENTIL HFA,VENTOLIN HFA) 90 mcg/act inhaler, INHALE 2 PUFFS BY MOUTH EVERY 4 HOURS AS NEEDED FOR WHEEZING OR SHORTNESS OF BREATH, Disp: 18 g, Rfl: 2  •  Alcohol Swabs (ALCOHOL PADS) 70 % PADS, , Disp: , Rfl:   •  Aspirin Low Dose 81 MG chewable tablet, CHEW 1 TABLET BY MOUTH DAILY, Disp: 90 tablet, Rfl: 4  •  Blood Glucose Monitoring Suppl (OneTouch Verio) w/Device KIT, Check blood glucose three times daily before each meal, Disp: 1 kit, Rfl: 0  •  cholecalciferol (VITAMIN D3) 1,000 units tablet, Take 2 tablets (2,000 Units total) by mouth daily, Disp: 180 tablet, Rfl: 5  •  clopidogrel (PLAVIX) 75 mg tablet, Take 1 tablet (75 mg total) by mouth daily, Disp: 90 tablet, Rfl: 3  •  Empagliflozin (JARDIANCE) 10 MG TABS tablet, Take 1 tablet (10 mg total) by mouth every morning, Disp: 90 tablet, Rfl: 3  •  ferrous sulfate 325 (65 Fe) mg tablet, TAKE 1 TABLET BY MOUTH EVERY OTHER DAY, Disp: 45 tablet, Rfl: 4  •  fluticasone (FLONASE) 50 mcg/act nasal spray, 2 sprays into each nostril daily, Disp: 2 Bottle, Rfl: 2  •  furosemide (LASIX) 40 mg tablet, TAKE 1 TABLET BY MOUTH EVERY DAY, Disp: 90 tablet, Rfl: 2  •  Insulin Pen Needle (Pen Needles) 31G X 8 MM MISC, Use daily, Disp: 300 each, Rfl: 3  •  Lancets (FREESTYLE) lancets, by Other route as needed (As needed), Disp: 100 each, Rfl: 3  •  Lantus SoloStar 100 units/mL SOPN, INJECT 0.18 ML (18 UNITS TOTAL) UNDER THE SKIN DAILY AT BEDTIME, Disp: 15 mL, Rfl: 3  •  levothyroxine 137 mcg tablet, TAKE 1 TABLET BY MOUTH EVERY DAY, Disp: 90 tablet, Rfl: 3  •  lisinopril (ZESTRIL) 20 mg tablet, Take 1 tablet (20 mg total) by mouth 2 (two) times a day, Disp: 180 tablet, Rfl: 2  •  metFORMIN (GLUCOPHAGE) 850 mg tablet, TAKE 1 TABLET BY MOUTH 2 TIMES A DAY WITH MEALS., Disp: 180 tablet, Rfl: 3  •  methocarbamol (Robaxin-750) 750 mg tablet, Take 1 tablet (750 mg total) by mouth every 6 (six) hours as needed for muscle spasms, Disp: 60 tablet, Rfl: 0  •  metoprolol succinate (TOPROL-XL) 50 mg 24 hr tablet, Take 1.5 tablets (75 mg total) by mouth 2 (two) times a day, Disp: 270 tablet, Rfl: 1  •  montelukast (SINGULAIR) 10 mg tablet, TAKE 1 TABLET BY MOUTH EVERY DAY, Disp: 90 tablet, Rfl: 3  •  NovoLOG FlexPen 100 units/mL injection pen, INJECT 5 UNITS WITH BREAKFAST AND WITH DINNER, Disp: 15 mL, Rfl: 3  •  rosuvastatin (CRESTOR) 40 MG tablet, TAKE 1 TABLET BY MOUTH EVERY DAY, Disp: 90 tablet, Rfl: 3  •  spironolactone (ALDACTONE) 25 mg tablet, Take 1 tablet (25 mg total) by mouth daily Do not start before February 13, 2023., Disp: 90 tablet, Rfl: 3  •  Banophen 25 MG capsule, TAKE 1 CAPSULE NIGHT BEFORE PROCEDURE AND TAKE 1 CAPSULE MORNING OF PROCEDURE (Patient not taking: Reported on 9/7/2023), Disp: , Rfl:   •  Banophen 50 MG capsule, TAKE 1 CAPSULE 1 HOUR PRIOR TO TEST (Patient not taking: Reported on 2023), Disp: , Rfl:   •  Continuous Blood Gluc  (FreeStyle [place] 2 [place]) POWER, Use 1 each continuous (Patient not taking: Reported on 2023), Disp: 1 each, Rfl: 0  •  Continuous Blood Gluc Sensor (FreeStyle Cristofer 2 Sensor) MISC, Use 1 each every 14 (fourteen) days (Patient not taking: Reported on 2023), Disp: 6 each, Rfl: 3  •  famotidine (PEPCID) 20 mg tablet, TAKE 1 TABLET AT NIGHT PRIOR TO PROCEDURE AND 1 TABLET THE MORNING OF PROCEDURE (Patient not taking: Reported on 2023), Disp: , Rfl:   •  methylPREDNISolone (MEDROL) 32 MG tablet, TAKE 1 TABLET 12 HOURS PRIOR TO TEST AND 1 TABLET 2 HOURS PRIOR TO TEST (Patient not taking: Reported on 2023), Disp: , Rfl:   •  nitroglycerin (NITROSTAT) 0.4 mg SL tablet, Place 1 tablet (0.4 mg total) under the tongue every 5 (five) minutes as needed for chest pain, Disp: 30 tablet, Rfl: 1  •  predniSONE 20 mg tablet, TAKE 3 TABLETS THE NIGHT PRIOR TO PROCEDURE AND 3 TABLETS THE MORNING OF PROCEDURE (Patient not taking: Reported on 2023), Disp: , Rfl:     Social History     Socioeconomic History   • Marital status: /Civil Union     Spouse name: Not on file   • Number of children: Not on file   • Years of education: Not on file   • Highest education level: Not on file   Occupational History   • Occupation: retired    Tobacco Use   • Smoking status: Former     Packs/day: 0.50     Types: Cigarettes     Quit date: 2022     Years since quittin.2   • Smokeless tobacco: Never   • Tobacco comments:     started when he was about 24 yrs old; stopped smoking 3 wks ago   Vaping Use   • Vaping Use: Never used   Substance and Sexual Activity   • Alcohol use: No   • Drug use: No   • Sexual activity: Not Currently   Other Topics Concern   • Not on file   Social History Narrative   • Not on file     Social Determinants of Health     Financial Resource Strain: Low Risk  (2023)    Overall Financial Resource Strain (CARDIA)    • Difficulty of Paying Living Expenses: Not hard at all   Food Insecurity: No Food Insecurity (3/20/2023)    Hunger Vital Sign    • Worried About Running Out of Food in the Last Year: Never true    • Ran Out of Food in the Last Year: Never true   Transportation Needs: No Transportation Needs (3/20/2023)    PRAPARE - Transportation    • Lack of Transportation (Medical): No    • Lack of Transportation (Non-Medical): No   Physical Activity: Unknown (1/19/2022)    Exercise Vital Sign    • Days of Exercise per Week: Not on file    • Minutes of Exercise per Session: 60 min   Stress: No Stress Concern Present (1/19/2022)    [facility]    • Feeling of Stress : Not at all   Social Connections: Moderately Isolated (1/19/2022)    Social Connection and Isolation Panel [NHANES]    • Frequency of Communication with Friends and Family: More than three times a week    • Frequency of Social Gatherings with Friends and Family: More than three times a week    • Attends [employer]ology Services: Never    • Active Member of Clubs or Organizations: No    • Attends Club or Organization Meetings: Never    • Marital Status:    Intimate Partner Violence: Not on file   Housing Stability: Low Risk  (3/20/2023)    Housing Stability Vital Sign    • Unable to Pay for Housing in the Last Year: No    • Number of Places Lived in the Last Year: 1    • Unstable Housing in the Last Year: No       Family History   Problem Relation Age of Onset   • Heart attack Family         at age 80   • No Known Problems Mother    • No Known Problems Father    • Diabetes Sister    • Diabetes Brother        Physical Exam:    Vitals:   Vitals:    09/07/23 1317   BP: 94/60   Pulse: 78   SpO2: 98%       Physical Exam  Constitutional:       General: He is not in acute distress. Appearance: Normal appearance. HENT:      Head: Normocephalic and atraumatic.       Mouth/Throat: Mouth: Mucous membranes are moist.   Eyes:      General: No scleral icterus. Extraocular Movements: Extraocular movements intact. Cardiovascular:      Rate and Rhythm: Normal rate and regular rhythm. Pulses: Normal pulses. Heart sounds: S1 normal and S2 normal. No murmur heard. No friction rub. No gallop. Comments: Elevated JVP. Trace pitting edema bilaterally. Pulmonary:      Effort: Pulmonary effort is normal.      Breath sounds: Examination of the right-lower field reveals decreased breath sounds. Decreased breath sounds present. No wheezing, rhonchi or rales. Abdominal:      General: There is no distension. Palpations: Abdomen is soft. Tenderness: There is no abdominal tenderness. There is no guarding or rebound. Musculoskeletal:         General: Normal range of motion. Cervical back: Neck supple. Skin:     General: Skin is warm and dry. Capillary Refill: Capillary refill takes less than 2 seconds. Neurological:      General: No focal deficit present. Mental Status: He is alert and oriented to person, place, and time.    Psychiatric:         Mood and Affect: Mood normal.         Labs & Results:    Lab Results   Component Value Date    SODIUM 143 08/18/2023    K 4.0 08/18/2023     (H) 08/18/2023    CO2 29 08/18/2023    BUN 18 08/18/2023    CREATININE 1.05 08/18/2023    GLUC 63 (L) 07/14/2023    CALCIUM 9.2 08/18/2023     Lab Results   Component Value Date    WBC 7.04 08/18/2023    HGB 11.9 (L) 08/18/2023    HCT 39.1 08/18/2023    MCV 89 08/18/2023     08/18/2023     Lab Results   Component Value Date    NTBNP 2,624 (H) 05/06/2023      Lab Results   Component Value Date    CHOLESTEROL 137 02/10/2023    CHOLESTEROL 142 11/10/2022    CHOLESTEROL 191 01/25/2022     Lab Results   Component Value Date    HDL 53 02/10/2023    HDL 65 11/10/2022    HDL 49 01/25/2022     Lab Results   Component Value Date    TRIG 61 02/10/2023    TRIG 75 11/10/2022    TRIG 119 01/25/2022     Lab Results   Component Value Date    NONHDLC 84 02/10/2023       EKG personally reviewed by Angie Saravia MD.     Counseling / Coordination of Care  Time spent today 25 minutes. Greater than 50% of total time was spent with the patient and / or family counseling and / or coordination of care. We went over current diagnosis, most recent studies and any changes in treatment. Thank you for the opportunity to participate in the care of this patient.     Angie Saravia MD  ADVANCED HEART FAILURE AND MECHANICAL CIRCULATORY SUPPORT  [address]

## 2023-09-07 NOTE — PATIENT INSTRUCTIONS
Increase lasix to 40mg two times a day for 3 days then go back to 40mg once a day  2g sodium diet  2L fluid restriction diet  Daily weights  ICD implant as scheduled

## 2023-09-08 NOTE — TELEPHONE ENCOUNTER
Name of medication, dose, quantity and frequency    Requested Prescriptions     Pending Prescriptions Disp Refills    montelukast (SINGULAIR) 10 mg tablet 90 tablet 1     Sig: Take 1 tablet (10 mg total) by mouth daily    simvastatin (ZOCOR) 80 mg tablet 90 tablet 3     Sig: Take 1 tablet (80 mg total) by mouth daily at bedtime       Number of refills left:    Amount of medication left:    Pharmacy verified and updated-YES    Additional information: Stable

## 2023-09-12 ENCOUNTER — ANESTHESIA EVENT (OUTPATIENT)
Dept: NON INVASIVE DIAGNOSTICS | Facility: HOSPITAL | Age: 78
DRG: 227 | End: 2023-09-12
Payer: MEDICARE

## 2023-09-13 ENCOUNTER — APPOINTMENT (OUTPATIENT)
Dept: RADIOLOGY | Facility: HOSPITAL | Age: 78
DRG: 227 | End: 2023-09-13
Payer: MEDICARE

## 2023-09-13 ENCOUNTER — ANESTHESIA (OUTPATIENT)
Dept: NON INVASIVE DIAGNOSTICS | Facility: HOSPITAL | Age: 78
DRG: 227 | End: 2023-09-13
Payer: MEDICARE

## 2023-09-13 ENCOUNTER — HOSPITAL ENCOUNTER (INPATIENT)
Facility: HOSPITAL | Age: 78
LOS: 3 days | Discharge: HOME | DRG: 227 | End: 2023-09-17
Attending: INTERNAL MEDICINE | Admitting: INTERNAL MEDICINE
Payer: MEDICARE

## 2023-09-13 DIAGNOSIS — I50.20 HEART FAILURE WITH REDUCED EJECTION FRACTION (HCC): ICD-10-CM

## 2023-09-13 DIAGNOSIS — Z95.810 ICD (IMPLANTABLE CARDIOVERTER-DEFIBRILLATOR) IN PLACE: ICD-10-CM

## 2023-09-13 DIAGNOSIS — I42.9 CARDIOMYOPATHY, UNSPECIFIED TYPE (HCC): Primary | ICD-10-CM

## 2023-09-13 DIAGNOSIS — I48.92 ATRIAL FLUTTER, UNSPECIFIED TYPE (HCC): ICD-10-CM

## 2023-09-13 DIAGNOSIS — I50.9 CHF (CONGESTIVE HEART FAILURE) (HCC): ICD-10-CM

## 2023-09-13 DIAGNOSIS — I47.29 NSVT (NONSUSTAINED VENTRICULAR TACHYCARDIA) (HCC): ICD-10-CM

## 2023-09-13 DIAGNOSIS — I48.92 ATRIAL FLUTTER (HCC): ICD-10-CM

## 2023-09-13 PROBLEM — R41.82 ALTERED MENTAL STATUS: Status: RESOLVED | Noted: 2023-09-13 | Resolved: 2023-09-13

## 2023-09-13 PROBLEM — R41.82 ALTERED MENTAL STATUS: Status: ACTIVE | Noted: 2023-09-13

## 2023-09-13 LAB
ANION GAP SERPL CALCULATED.3IONS-SCNC: 11 MMOL/L
ATRIAL RATE: 104 BPM
BUN SERPL-MCNC: 25 MG/DL (ref 5–25)
CALCIUM SERPL-MCNC: 9.4 MG/DL (ref 8.4–10.2)
CHLORIDE SERPL-SCNC: 101 MMOL/L (ref 96–108)
CO2 SERPL-SCNC: 30 MMOL/L (ref 21–32)
CREAT SERPL-MCNC: 1.74 MG/DL (ref 0.6–1.3)
ERYTHROCYTE [DISTWIDTH] IN BLOOD BY AUTOMATED COUNT: 18.1 % (ref 11.6–15.1)
GFR SERPL CREATININE-BSD FRML MDRD: 37 ML/MIN/1.73SQ M
GLUCOSE P FAST SERPL-MCNC: 223 MG/DL (ref 65–99)
GLUCOSE SERPL-MCNC: 179 MG/DL (ref 65–140)
GLUCOSE SERPL-MCNC: 221 MG/DL (ref 65–140)
GLUCOSE SERPL-MCNC: 223 MG/DL (ref 65–140)
GLUCOSE SERPL-MCNC: 227 MG/DL (ref 65–140)
GLUCOSE SERPL-MCNC: 231 MG/DL (ref 65–140)
HCT VFR BLD AUTO: 41.8 % (ref 36.5–49.3)
HGB BLD-MCNC: 12.8 G/DL (ref 12–17)
MCH RBC QN AUTO: 28.1 PG (ref 26.8–34.3)
MCHC RBC AUTO-ENTMCNC: 30.6 G/DL (ref 31.4–37.4)
MCV RBC AUTO: 92 FL (ref 82–98)
PLATELET # BLD AUTO: 206 THOUSANDS/UL (ref 149–390)
PMV BLD AUTO: 10.6 FL (ref 8.9–12.7)
POTASSIUM SERPL-SCNC: 3.9 MMOL/L (ref 3.5–5.3)
QRS AXIS: -2 DEGREES
QRSD INTERVAL: 136 MS
QT INTERVAL: 366 MS
QTC INTERVAL: 492 MS
RBC # BLD AUTO: 4.56 MILLION/UL (ref 3.88–5.62)
SODIUM SERPL-SCNC: 142 MMOL/L (ref 135–147)
T WAVE AXIS: 91 DEGREES
VENTRICULAR RATE: 109 BPM
WBC # BLD AUTO: 6.23 THOUSAND/UL (ref 4.31–10.16)

## 2023-09-13 PROCEDURE — 0JPT0PZ REMOVAL OF CARDIAC RHYTHM RELATED DEVICE FROM TRUNK SUBCUTANEOUS TISSUE AND FASCIA, OPEN APPROACH: ICD-10-PCS | Performed by: INTERNAL MEDICINE

## 2023-09-13 PROCEDURE — C1892 INTRO/SHEATH,FIXED,PEEL-AWAY: HCPCS | Performed by: INTERNAL MEDICINE

## 2023-09-13 PROCEDURE — 93005 ELECTROCARDIOGRAM TRACING: CPT

## 2023-09-13 PROCEDURE — C1894 INTRO/SHEATH, NON-LASER: HCPCS | Performed by: INTERNAL MEDICINE

## 2023-09-13 PROCEDURE — 33235 REMOVAL PACEMAKER ELECTRODE: CPT | Performed by: INTERNAL MEDICINE

## 2023-09-13 PROCEDURE — 93010 ELECTROCARDIOGRAM REPORT: CPT | Performed by: INTERNAL MEDICINE

## 2023-09-13 PROCEDURE — 33249 INSJ/RPLCMT DEFIB W/LEAD(S): CPT | Performed by: INTERNAL MEDICINE

## 2023-09-13 PROCEDURE — 3E0132A INTRODUCTION OF ANTI-INFECTIVE ENVELOPE INTO SUBCUTANEOUS TISSUE, PERCUTANEOUS APPROACH: ICD-10-PCS | Performed by: INTERNAL MEDICINE

## 2023-09-13 PROCEDURE — 02HK3KZ INSERTION OF DEFIBRILLATOR LEAD INTO RIGHT VENTRICLE, PERCUTANEOUS APPROACH: ICD-10-PCS | Performed by: INTERNAL MEDICINE

## 2023-09-13 PROCEDURE — C1882 AICD, OTHER THAN SING/DUAL: HCPCS | Performed by: INTERNAL MEDICINE

## 2023-09-13 PROCEDURE — 33233 REMOVAL OF PM GENERATOR: CPT | Performed by: INTERNAL MEDICINE

## 2023-09-13 PROCEDURE — 0JH609Z INSERTION OF CARDIAC RESYNCHRONIZATION DEFIBRILLATOR PULSE GENERATOR INTO CHEST SUBCUTANEOUS TISSUE AND FASCIA, OPEN APPROACH: ICD-10-PCS | Performed by: INTERNAL MEDICINE

## 2023-09-13 PROCEDURE — C1777 LEAD, AICD, ENDO SINGLE COIL: HCPCS | Performed by: INTERNAL MEDICINE

## 2023-09-13 PROCEDURE — 82948 REAGENT STRIP/BLOOD GLUCOSE: CPT

## 2023-09-13 PROCEDURE — 71045 X-RAY EXAM CHEST 1 VIEW: CPT

## 2023-09-13 PROCEDURE — 33231 INSRT PULSE GEN W/MULT LEADS: CPT | Performed by: INTERNAL MEDICINE

## 2023-09-13 PROCEDURE — 85027 COMPLETE CBC AUTOMATED: CPT | Performed by: PHYSICIAN ASSISTANT

## 2023-09-13 PROCEDURE — 80048 BASIC METABOLIC PNL TOTAL CA: CPT | Performed by: PHYSICIAN ASSISTANT

## 2023-09-13 PROCEDURE — 99291 CRITICAL CARE FIRST HOUR: CPT | Performed by: STUDENT IN AN ORGANIZED HEALTH CARE EDUCATION/TRAINING PROGRAM

## 2023-09-13 DEVICE — LEAD 6935M62 QUATTRO SECURE S MRI US
Type: IMPLANTABLE DEVICE | Site: HEART | Status: FUNCTIONAL
Brand: SPRINT QUATTRO SECURE S MRI™ SURESCAN™

## 2023-09-13 DEVICE — CRTD DTPA2D4 COBALT XT HF MRI DF4 USA
Type: IMPLANTABLE DEVICE | Site: CHEST  WALL | Status: FUNCTIONAL
Brand: COBALT™ XT HF CRT-D MRI SURESCAN™

## 2023-09-13 DEVICE — ENVELOPE CMRM6133 ABSORB LRG MR
Type: IMPLANTABLE DEVICE | Site: CHEST  WALL | Status: FUNCTIONAL
Brand: TYRX™

## 2023-09-13 RX ORDER — AMIODARONE HYDROCHLORIDE 50 MG/ML
150 INJECTION, SOLUTION INTRAVENOUS ONCE
Status: COMPLETED | OUTPATIENT
Start: 2023-09-13 | End: 2023-09-13

## 2023-09-13 RX ORDER — ATORVASTATIN CALCIUM 80 MG/1
80 TABLET, FILM COATED ORAL
Status: DISCONTINUED | OUTPATIENT
Start: 2023-09-13 | End: 2023-09-17 | Stop reason: HOSPADM

## 2023-09-13 RX ORDER — CEFAZOLIN SODIUM 1 G/3ML
INJECTION, POWDER, FOR SOLUTION INTRAMUSCULAR; INTRAVENOUS AS NEEDED
Status: DISCONTINUED | OUTPATIENT
Start: 2023-09-13 | End: 2023-09-13

## 2023-09-13 RX ORDER — INSULIN LISPRO 100 [IU]/ML
1-6 INJECTION, SOLUTION INTRAVENOUS; SUBCUTANEOUS
Status: DISCONTINUED | OUTPATIENT
Start: 2023-09-13 | End: 2023-09-17 | Stop reason: HOSPADM

## 2023-09-13 RX ORDER — METOPROLOL TARTRATE 5 MG/5ML
INJECTION INTRAVENOUS AS NEEDED
Status: DISCONTINUED | OUTPATIENT
Start: 2023-09-13 | End: 2023-09-13

## 2023-09-13 RX ORDER — INSULIN LISPRO 100 [IU]/ML
1-5 INJECTION, SOLUTION INTRAVENOUS; SUBCUTANEOUS
Status: DISCONTINUED | OUTPATIENT
Start: 2023-09-13 | End: 2023-09-17 | Stop reason: HOSPADM

## 2023-09-13 RX ORDER — CLOPIDOGREL BISULFATE 75 MG/1
75 TABLET ORAL DAILY
Status: DISCONTINUED | OUTPATIENT
Start: 2023-09-13 | End: 2023-09-17 | Stop reason: HOSPADM

## 2023-09-13 RX ORDER — SODIUM CHLORIDE 9 MG/ML
INJECTION, SOLUTION INTRAVENOUS CONTINUOUS PRN
Status: DISCONTINUED | OUTPATIENT
Start: 2023-09-13 | End: 2023-09-13

## 2023-09-13 RX ORDER — METHOCARBAMOL 750 MG/1
750 TABLET, FILM COATED ORAL EVERY 6 HOURS PRN
Status: DISCONTINUED | OUTPATIENT
Start: 2023-09-13 | End: 2023-09-17 | Stop reason: HOSPADM

## 2023-09-13 RX ORDER — CEFAZOLIN SODIUM 2 G/50ML
2000 SOLUTION INTRAVENOUS ONCE
Status: COMPLETED | OUTPATIENT
Start: 2023-09-13 | End: 2023-09-13

## 2023-09-13 RX ORDER — KETAMINE HYDROCHLORIDE 50 MG/ML
INJECTION, SOLUTION, CONCENTRATE INTRAMUSCULAR; INTRAVENOUS AS NEEDED
Status: DISCONTINUED | OUTPATIENT
Start: 2023-09-13 | End: 2023-09-13

## 2023-09-13 RX ORDER — LIDOCAINE HYDROCHLORIDE 10 MG/ML
INJECTION, SOLUTION EPIDURAL; INFILTRATION; INTRACAUDAL; PERINEURAL CODE/TRAUMA/SEDATION MEDICATION
Status: DISCONTINUED | OUTPATIENT
Start: 2023-09-13 | End: 2023-09-13 | Stop reason: HOSPADM

## 2023-09-13 RX ORDER — FLUTICASONE FUROATE AND VILANTEROL 100; 25 UG/1; UG/1
1 POWDER RESPIRATORY (INHALATION) DAILY
Status: DISCONTINUED | OUTPATIENT
Start: 2023-09-14 | End: 2023-09-17 | Stop reason: HOSPADM

## 2023-09-13 RX ORDER — FLUTICASONE PROPIONATE 50 MCG
2 SPRAY, SUSPENSION (ML) NASAL DAILY
Status: DISCONTINUED | OUTPATIENT
Start: 2023-09-13 | End: 2023-09-17 | Stop reason: HOSPADM

## 2023-09-13 RX ORDER — SPIRONOLACTONE 25 MG/1
25 TABLET ORAL DAILY
Status: DISCONTINUED | OUTPATIENT
Start: 2023-09-13 | End: 2023-09-14

## 2023-09-13 RX ORDER — FENTANYL CITRATE 50 UG/ML
INJECTION, SOLUTION INTRAMUSCULAR; INTRAVENOUS AS NEEDED
Status: DISCONTINUED | OUTPATIENT
Start: 2023-09-13 | End: 2023-09-13

## 2023-09-13 RX ORDER — INSULIN LISPRO 100 [IU]/ML
5 INJECTION, SOLUTION INTRAVENOUS; SUBCUTANEOUS 2 TIMES DAILY WITH MEALS
Status: DISCONTINUED | OUTPATIENT
Start: 2023-09-13 | End: 2023-09-17 | Stop reason: HOSPADM

## 2023-09-13 RX ORDER — FERROUS SULFATE 325(65) MG
325 TABLET ORAL EVERY OTHER DAY
Status: DISCONTINUED | OUTPATIENT
Start: 2023-09-14 | End: 2023-09-17 | Stop reason: HOSPADM

## 2023-09-13 RX ORDER — GENTAMICIN SULFATE 40 MG/ML
INJECTION, SOLUTION INTRAMUSCULAR; INTRAVENOUS CODE/TRAUMA/SEDATION MEDICATION
Status: DISCONTINUED | OUTPATIENT
Start: 2023-09-13 | End: 2023-09-13 | Stop reason: HOSPADM

## 2023-09-13 RX ORDER — LISINOPRIL 20 MG/1
20 TABLET ORAL 2 TIMES DAILY
Status: DISCONTINUED | OUTPATIENT
Start: 2023-09-13 | End: 2023-09-14

## 2023-09-13 RX ORDER — FUROSEMIDE 40 MG/1
40 TABLET ORAL DAILY
Status: DISCONTINUED | OUTPATIENT
Start: 2023-09-13 | End: 2023-09-14

## 2023-09-13 RX ORDER — ACETAMINOPHEN 325 MG/1
650 TABLET ORAL EVERY 4 HOURS PRN
Status: DISCONTINUED | OUTPATIENT
Start: 2023-09-13 | End: 2023-09-17 | Stop reason: HOSPADM

## 2023-09-13 RX ORDER — INSULIN GLARGINE 100 [IU]/ML
18 INJECTION, SOLUTION SUBCUTANEOUS
Status: DISCONTINUED | OUTPATIENT
Start: 2023-09-13 | End: 2023-09-16

## 2023-09-13 RX ORDER — MONTELUKAST SODIUM 10 MG/1
10 TABLET ORAL DAILY
Status: DISCONTINUED | OUTPATIENT
Start: 2023-09-13 | End: 2023-09-17 | Stop reason: HOSPADM

## 2023-09-13 RX ORDER — ONDANSETRON 2 MG/ML
INJECTION INTRAMUSCULAR; INTRAVENOUS AS NEEDED
Status: DISCONTINUED | OUTPATIENT
Start: 2023-09-13 | End: 2023-09-13

## 2023-09-13 RX ORDER — PROPOFOL 10 MG/ML
INJECTION, EMULSION INTRAVENOUS CONTINUOUS PRN
Status: DISCONTINUED | OUTPATIENT
Start: 2023-09-13 | End: 2023-09-13

## 2023-09-13 RX ORDER — ALBUTEROL SULFATE 90 UG/1
1 AEROSOL, METERED RESPIRATORY (INHALATION) EVERY 4 HOURS PRN
Status: DISCONTINUED | OUTPATIENT
Start: 2023-09-13 | End: 2023-09-17 | Stop reason: HOSPADM

## 2023-09-13 RX ORDER — ASPIRIN 81 MG/1
81 TABLET, CHEWABLE ORAL DAILY
Status: DISCONTINUED | OUTPATIENT
Start: 2023-09-13 | End: 2023-09-14

## 2023-09-13 RX ORDER — METOPROLOL TARTRATE 5 MG/5ML
5 INJECTION INTRAVENOUS ONCE
Status: COMPLETED | OUTPATIENT
Start: 2023-09-13 | End: 2023-09-13

## 2023-09-13 RX ORDER — NITROGLYCERIN 0.4 MG/1
0.4 TABLET SUBLINGUAL
Status: DISCONTINUED | OUTPATIENT
Start: 2023-09-13 | End: 2023-09-17 | Stop reason: HOSPADM

## 2023-09-13 RX ADMIN — CEFAZOLIN 2000 MG: 1 INJECTION, POWDER, FOR SOLUTION INTRAMUSCULAR; INTRAVENOUS at 12:30

## 2023-09-13 RX ADMIN — SODIUM CHLORIDE: 0.9 INJECTION, SOLUTION INTRAVENOUS at 12:05

## 2023-09-13 RX ADMIN — FENTANYL CITRATE 25 MCG: 50 INJECTION, SOLUTION INTRAMUSCULAR; INTRAVENOUS at 12:21

## 2023-09-13 RX ADMIN — METOROPROLOL TARTRATE 5 MG: 5 INJECTION, SOLUTION INTRAVENOUS at 09:54

## 2023-09-13 RX ADMIN — Medication 10 MCG: at 12:45

## 2023-09-13 RX ADMIN — FENTANYL CITRATE 25 MCG: 50 INJECTION, SOLUTION INTRAMUSCULAR; INTRAVENOUS at 12:27

## 2023-09-13 RX ADMIN — INSULIN LISPRO 1 UNITS: 100 INJECTION, SOLUTION INTRAVENOUS; SUBCUTANEOUS at 21:27

## 2023-09-13 RX ADMIN — ONDANSETRON 4 MG: 2 INJECTION INTRAMUSCULAR; INTRAVENOUS at 08:50

## 2023-09-13 RX ADMIN — CEFAZOLIN SODIUM 2000 MG: 2 SOLUTION INTRAVENOUS at 12:18

## 2023-09-13 RX ADMIN — AMIODARONE HYDROCHLORIDE 150 MG: 50 INJECTION, SOLUTION INTRAVENOUS at 10:10

## 2023-09-13 RX ADMIN — METOPROLOL SUCCINATE 75 MG: 50 TABLET, EXTENDED RELEASE ORAL at 21:27

## 2023-09-13 RX ADMIN — PROPOFOL 50 MCG/KG/MIN: 10 INJECTION, EMULSION INTRAVENOUS at 12:18

## 2023-09-13 RX ADMIN — NOREPINEPHRINE BITARTRATE 16 MCG: 1 INJECTION INTRAVENOUS at 12:36

## 2023-09-13 RX ADMIN — NOREPINEPHRINE BITARTRATE 4 MCG: 1 INJECTION INTRAVENOUS at 12:29

## 2023-09-13 RX ADMIN — LISINOPRIL 20 MG: 20 TABLET ORAL at 21:27

## 2023-09-13 RX ADMIN — INSULIN LISPRO 2 UNITS: 100 INJECTION, SOLUTION INTRAVENOUS; SUBCUTANEOUS at 16:08

## 2023-09-13 RX ADMIN — FENTANYL CITRATE 25 MCG: 50 INJECTION, SOLUTION INTRAMUSCULAR; INTRAVENOUS at 08:50

## 2023-09-13 RX ADMIN — FENTANYL CITRATE 25 MCG: 50 INJECTION, SOLUTION INTRAMUSCULAR; INTRAVENOUS at 12:18

## 2023-09-13 RX ADMIN — FENTANYL CITRATE 25 MCG: 50 INJECTION, SOLUTION INTRAMUSCULAR; INTRAVENOUS at 08:55

## 2023-09-13 RX ADMIN — NOREPINEPHRINE BITARTRATE 5 MCG/MIN: 1 INJECTION INTRAVENOUS at 12:31

## 2023-09-13 RX ADMIN — KETAMINE HYDROCHLORIDE 20 MG: 50 INJECTION INTRAMUSCULAR; INTRAVENOUS at 08:50

## 2023-09-13 RX ADMIN — NOREPINEPHRINE BITARTRATE 4 MCG: 1 INJECTION INTRAVENOUS at 12:27

## 2023-09-13 RX ADMIN — FENTANYL CITRATE 25 MCG: 50 INJECTION, SOLUTION INTRAMUSCULAR; INTRAVENOUS at 13:07

## 2023-09-13 RX ADMIN — ATORVASTATIN CALCIUM 80 MG: 80 TABLET, FILM COATED ORAL at 16:08

## 2023-09-13 RX ADMIN — INSULIN GLARGINE 18 UNITS: 100 INJECTION, SOLUTION SUBCUTANEOUS at 21:27

## 2023-09-13 RX ADMIN — PROPOFOL 60 MCG/KG/MIN: 10 INJECTION, EMULSION INTRAVENOUS at 08:50

## 2023-09-13 RX ADMIN — HYDROCORTISONE SODIUM SUCCINATE 100 MG: 100 INJECTION, POWDER, FOR SOLUTION INTRAMUSCULAR; INTRAVENOUS at 12:29

## 2023-09-13 RX ADMIN — METOROPROLOL TARTRATE 5 MG: 5 INJECTION, SOLUTION INTRAVENOUS at 08:55

## 2023-09-13 RX ADMIN — SODIUM CHLORIDE: 0.9 INJECTION, SOLUTION INTRAVENOUS at 08:21

## 2023-09-13 RX ADMIN — INSULIN LISPRO 5 UNITS: 100 INJECTION, SOLUTION INTRAVENOUS; SUBCUTANEOUS at 16:08

## 2023-09-13 NOTE — CONSULTS
Consultation - Stroke   Macy Mota 70 y.o. male MRN: [id number]  Unit/Bed#: [facility] CATH LAB ROOM Encounter: [id number]      Assessment/Plan     Altered mental status-resolved as of 9/13/2023  Assessment & Plan  70 male with hypertension, hyperlipidemia, diabetes mellitus, ischemic cardiomyopathy, CAD, pacemaker placement due to tachybradycardia syndrome, hepatitis C, and prior tobacco use, presented to the hospital for ICD placement. Patient was being prepped for procedure and after receiving doses of ketamine, fentanyl, propofol, patient was significantly altered and unable to follow commands. BP 120s-140s. Stroke alert was activated. NIHSS 0. As patient did not demonstrate any focal deficits and history was not indicative of vascular event, stroke alert was canceled. No further neurodiagnostic imaging necessary per discussion with attending. Okay to proceed with procedure at this time. Please contact neurology immediately with any additional questions or concerns. Thrombolytic Decision: Patient not a candidate. Symptoms resolved/clearly non disabling. Macy Mota will not need outpatient follow up with Neurology. He will not require outpatient neurological testing. History of Present Illness     Reason for Consult / Principal Problem: Stroke alert  Hx and PE limited by: Language barrier. Staff assisted with history. Patient last known well: Around 0800  Stroke alert called: [facility]  Neurology time of arrival: 0915  HPI: Macy Mota is a 70 y.o. male with diabetes mellitus, hyperlipidemia, hypertension, ischemic cardiomyopathy, with recent EF 30%, CAD with prior NSTEMI, tachybradycardia syndrome with pacemaker placement, hepatitis C, prior tobacco use, who presented for elective ICD placement. Patient was being prepped for procedure and he received doses of ketamine, fentanyl, and propofol.   Patient was reportedly altered following administration, which raised concern for the team.  Blood pressure normotensive at the time. He was tachycardic. The team activated a stroke alert. At time of neurology arrival, patient was awake and able to state his name. He was naming objects appropriately and following commands. NIHSS 0. As no deficits were noted and history unlikely to be indicative of stroke, stroke alert was canceled and further neurodiagnostic testing was not pursued. Inpatient consult to Neurology  Consult performed by: Latoya Dominguez PA-C  Consult ordered by: Jose Espinosa PA-C          Review of Systems   Unable to perform ROS: Acuity of condition       Historical Information   Past Medical History:   Diagnosis Date   • Arthritis    • Asthma    • Colon polyps    • Community acquired pneumonia     last assessed: 5/1/2014   • Diabetes mellitus ([address])    • Hemorrhagic prepatellar bursitis, left 10/21/2019   • Hepatitis C    • High cholesterol    • History of colonoscopy 2017   • Hypertension    • Lymphadenopathy, anterior cervical 04/17/2018   • Nephritis and nephropathy, not specified as acute or chronic, with other specified pathological lesion in kidney, in diseases classified elsewhere 3/26/2013   • Screening for colon cancer 05/01/2019   • Thoracic vertebral fracture ([address]) 06/11/2014     Past Surgical History:   Procedure Laterality Date   • CARDIAC CATHETERIZATION N/A 6/3/2022    Procedure: Cardiac RHC/LHC; Surgeon: Shannon Welch MD;  Location: [facility] CARD[facility] C[facility] LAB; Service: Cardiology   • CARDIAC CATHETERIZATION N/A 6/21/2022    Procedure: CARDIAC TAVR;  Surgeon: Matt Whatley MD;  Location: [facility] MAIN OR;  Service: Cardiology   • CARDIAC CATHETERIZATION N/A 2/10/2023    Procedure: Cardiac Coronary Angiogram;  Surgeon: Shannon Welch MD;  Location: [facility] CARDI[facility] C[facility] LAB; Service: Cardiology   • CARDIAC CATHETERIZATION N/A 2/10/2023    Procedure: Cardiac pci;  Surgeon: Shannon Welch MD;  Location: [facility] CARDIAC CATH LAB;   Service: Cardiology   • CARDIAC ELECTROPHYSIOLOGY PROCEDURE N/A 2022    Procedure: Cardiac pacer implant ; DC PPM;  Surgeon: Duong Jones DO;  Location: [facility] CARDI[facility] C[facility] LAB; Service: Cardiology   • COLONOSCOPY     • COLONOSCOPY W/ POLYPECTOMY     • IR UPPER EXTREMITY VENOGRAM- DIAGNOSTIC  2023   • LITHOTRIPSY     • MULTIPLE TOOTH EXTRACTIONS     • UT COLONOSCOPY FLX DX W/COLLJ SPEC WHEN PFRMD N/A 2018    Procedure: COLONOSCOPY;  Surgeon: Radha Ralph MD;  Location: [facility] GI LAB; Service: Gastroenterology   • UT REPLACE AORTIC VALVE OPENFEMORAL ARTERY APPROACH N/A 2022    Procedure: REPLACEMENT AORTIC VALVE TRANSCATHETER (TAVR) TRANSFEMORAL W/ 26MM QUINN RONNI S3 ULTRA VALVE(ACCESS ON LEFT) SAULO;  Surgeon: Aleksandra Portillo MD;  Location: [facility] MAIN OR;  Service: Cardiac Surgery     Social History   Social History     Substance and Sexual Activity   Alcohol Use No     Social History     Substance and Sexual Activity   Drug Use No     E-Cigarette/Vaping   • E-Cigarette Use Never User      E-Cigarette/Vaping Substances   • Nicotine No    • THC No    • CBD No    • Flavoring No    • Other No    • Unknown No      Social History     Tobacco Use   Smoking Status Former   • Packs/day: 0.50   • Types: Cigarettes   • Quit date: 2022   • Years since quittin.2   Smokeless Tobacco Never   Tobacco Comments    started when he was about 24 yrs old; stopped smoking 3 wks ago     Family History:   Family History   Problem Relation Age of Onset   • Heart attack Family         at age 80   • No Known Problems Mother    • No Known Problems Father    • Diabetes Sister    • Diabetes Brother        Review of previous medical records was completed.      Meds/Allergies   all current active meds have been reviewed, current meds:   Current Facility-Administered Medications   Medication Dose Route Frequency   • amiodarone 150 mg/3 mL injection 150 mg  150 mg Intravenous Once   • ceFAZolin (ANCEF) IVPB (premix in dextrose) 2,000 mg 50 mL  2,000 mg Intravenous Once    and PTA meds:   Prior to Admission Medications   Prescriptions Last Dose Informant Patient Reported? Taking? Advair -21 MCG/ACT inhaler 9/13/2023 Spouse/Significant Other No Yes   Sig: Inhale 2 puffs 2 (two) times a day Rinse mouth after use.    Alcohol Swabs (ALCOHOL PADS) 70 % PADS 9/12/2023 Spouse/Significant Other Yes Yes   Aspirin Low Dose 81 MG chewable tablet 9/12/2023  No Yes   Sig: CHEW 1 TABLET BY MOUTH DAILY   Banophen 25 MG capsule Unknown  Yes No   Sig: TAKE 1 CAPSULE NIGHT BEFORE PROCEDURE AND TAKE 1 CAPSULE MORNING OF PROCEDURE   Patient not taking: Reported on 9/7/2023   Banophen 50 MG capsule Unknown  Yes No   Sig: TAKE 1 CAPSULE 1 HOUR PRIOR TO TEST   Patient not taking: Reported on 9/7/2023   Blood Glucose Monitoring Suppl (OneTouch Verio) w/Device KIT 9/12/2023 Spouse/Significant Other No Yes   Sig: Check blood glucose three times daily before each meal   Continuous Blood Gluc  (FreeStyle Model 2 Josephine) POWER Unknown Spouse/Significant Other No No   Sig: Use 1 each continuous   Patient not taking: Reported on 9/7/2023   Continuous Blood Gluc Sensor (FreeStyle Cristofer 2 Sensor) MISC Unknown Spouse/Significant Other No No   Sig: Use 1 each every 14 (fourteen) days   Patient not taking: Reported on 9/7/2023   Empagliflozin (JARDIANCE) 10 MG TABS tablet 9/12/2023 Spouse/Significant Other No Yes   Sig: Take 1 tablet (10 mg total) by mouth every morning   Insulin Pen Needle (Pen Needles) 31G X 8 MM MISC 9/12/2023 Spouse/Significant Other No Yes   Sig: Use daily   Lancets (FREESTYLE) lancets 9/12/2023 Spouse/Significant Other No Yes   Sig: by Other route as needed (As needed)   Lantus SoloStar 100 units/mL SOPN 9/12/2023 Spouse/Significant Other No Yes   Sig: INJECT 0.18 ML (18 UNITS TOTAL) UNDER THE SKIN DAILY AT BEDTIME   NovoLOG FlexPen 100 units/mL injection pen 9/12/2023 Spouse/Significant Other No Yes   Sig: INJECT 5 UNITS WITH BREAKFAST AND WITH DINNER albuterol (PROVENTIL HFA,VENTOLIN HFA) 90 mcg/act inhaler 2023 Spouse/Significant Other No Yes   Sig: INHALE 2 PUFFS BY MOUTH EVERY 4 HOURS AS NEEDED FOR WHEEZING OR SHORTNESS OF BREATH   cholecalciferol (VITAMIN D3) 1,000 units tablet 2023 Spouse/Significant Other No Yes   Sig: Take 2 tablets (2,000 Units total) by mouth daily   clopidogrel (PLAVIX) 75 mg tablet  Spouse/Significant Other No No   Sig: Take 1 tablet (75 mg total) by mouth daily   famotidine (PEPCID) 20 mg tablet Unknown  Yes No   Sig: TAKE 1 TABLET AT NIGHT PRIOR TO PROCEDURE AND 1 TABLET THE MORNING OF PROCEDURE   Patient not taking: Reported on 2023   ferrous sulfate 325 (65 Fe) mg tablet 2023  No Yes   Sig: TAKE 1 TABLET BY MOUTH EVERY OTHER DAY   fluticasone (FLONASE) 50 mcg/act nasal spray 2023 Spouse/Significant Other No Yes   Si sprays into each nostril daily   furosemide (LASIX) 40 mg tablet 2023 Spouse/Significant Other No Yes   Sig: TAKE 1 TABLET BY MOUTH EVERY DAY   levothyroxine 137 mcg tablet 2023 Spouse/Significant Other No Yes   Sig: TAKE 1 TABLET BY MOUTH EVERY DAY   lisinopril (ZESTRIL) 20 mg tablet 2023 Spouse/Significant Other No Yes   Sig: Take 1 tablet (20 mg total) by mouth 2 (two) times a day   metFORMIN (GLUCOPHAGE) 850 mg tablet 2023 Spouse/Significant Other No Yes   Sig: TAKE 1 TABLET BY MOUTH 2 TIMES A DAY WITH MEALS.    methocarbamol (Robaxin-750) 750 mg tablet 2023 Spouse/Significant Other No Yes   Sig: Take 1 tablet (750 mg total) by mouth every 6 (six) hours as needed for muscle spasms   methylPREDNISolone (MEDROL) 32 MG tablet 2023  Yes Yes   metoprolol succinate (TOPROL-XL) 50 mg 24 hr tablet  Spouse/Significant Other No No   Sig: Take 1.5 tablets (75 mg total) by mouth 2 (two) times a day   montelukast (SINGULAIR) 10 mg tablet 2023 Spouse/Significant Other No Yes   Sig: TAKE 1 TABLET BY MOUTH EVERY DAY   nitroglycerin (NITROSTAT) 0.4 mg SL tablet Past Month Spouse/Significant Other No Yes   Sig: Place 1 tablet (0.4 mg total) under the tongue every 5 (five) minutes as needed for chest pain   predniSONE 20 mg tablet 9/13/2023  Yes Yes   rosuvastatin (CRESTOR) 40 MG tablet 9/12/2023 Spouse/Significant Other No Yes   Sig: TAKE 1 TABLET BY MOUTH EVERY DAY   spironolactone (ALDACTONE) 25 mg tablet 9/12/2023 Spouse/Significant Other No Yes   Sig: Take 1 tablet (25 mg total) by mouth daily Do not start before February 13, 2023. Facility-Administered Medications: None       Allergies   Allergen Reactions   • Iv Contrast [Iodinated Contrast Media] Rash     Patient states he had a severe reaction approximately 10 years ago , states he had a rash, itchy and he remembers a bunch of people pounding on chest and being surrounded by multiple doctors. Objective   Vitals:Blood pressure 108/71, pulse 100, temperature 97.8 °F (36.6 °C), temperature source Tympanic, resp. rate 16, height 5' 8" (1.727 m), weight 83.5 kg (184 lb 1.4 oz), SpO2 94 %. ,Body mass index is 27.99 kg/m². Intake/Output Summary (Last 24 hours) at 9/13/2023 1008  Last data filed at 9/13/2023 0903  Gross per 24 hour   Intake 100 ml   Output --   Net 100 ml       Invasive Devices: Invasive Devices     Peripheral Intravenous Line  Duration           Peripheral IV 09/13/23 Left Antecubital <1 day                  Physical Exam:   Vital signs and nursing notes reviewed. Constitutional: No acute distress. No diaphoresis. HENT: Normocephalic, atraumatic. Eyes: EOMs intact. Blinks to threat. No scleral icterus or injection. No discharge. .   Cardiovascular: Tachycardic  Pulmonary: No respiratory distress. Effort normal.   Musculoskeletal: Moves all extremities spontaneously and anti-gravity. Neurological: Alert. Follows all commands. Detailed below. Skin: Warm and dry. Neurologic Exam:  Mental Status: Patient is awake and alert. Oriented to person and place.  Follows commands without difficulty. No dysarthria. No aphasia. Commands performed in [place]. Cranial Nerves: Conjugate gaze present. Blinks to threat B/L. EOMs intact. No obvious facial asymmetry. Motor: Moves all extremities spontaneously and anti-gravity. No focal weakness. Sensation: Sensation to light touch intact. Coordination: No evidence of ataxia. Gait: Deferred       NIHSS:  1a.Level of Consciousness: 0 = Alert   1b. LOC Questions: 0 = Answers both correctly   1c. LOC Commands: 0 = Obeys both correctly   2. Best Gaze: 0 = Normal   3. Visual: 0 = No visual field loss   4. Facial Palsy: 0=Normal symmetric movement   5a. Motor Right Arm: 0=No drift, limb holds 90 (or 45) degrees for full 10 seconds   5b. Motor Left Arm: 0=No drift, limb holds 90 (or 45) degrees for full 10 seconds   6a. Motor Right Le=No drift, limb holds 90 (or 45) degrees for full 10 seconds   6b. Motor Left Le=No drift, limb holds 90 (or 45) degrees for full 10 seconds   7. Limb Ataxia:  0=Absent   8. Sensory: 0=Normal; no sensory loss   9. Best Language:  0=No aphasia, normal   10. Dysarthria: 0=Normal articulation   11. Extinction and Inattention (formerly Neglect): 0=No abnormality   Total Score: 0     Time NIHSS was completed: 0930    Modified [place] Score:  Unable to determine currently, will gather additional data    Lab Results: I have personally reviewed pertinent reports. Imaging Studies: I have personally reviewed pertinent reports. EKG, Pathology, and Other Studies: I have personally reviewed pertinent reports. VTE Prophylaxis: None currently. Code Status: Level 1 - Full Code    Counseling / Coordination of Care  Total Critical Care time spent 30 minutes excluding procedures, teaching and family updates. No

## 2023-09-13 NOTE — NURSING NOTE
Stroke Alert was called on patient after AMS of patient during start of case possibly due to medications given. Neurology was consulted & told Dr. Wm Chaudhary to call a stroke alert for the patient & stop with the procedure. Neurology was at bedside for evaluation & stated that the stroke alert could be canceled because there is no focal deficit / immediate concern for stroke. Patient currently resting in the holding area of cath lab with family at bedside. Patient is stable & neurologically intact at this time.

## 2023-09-13 NOTE — ANESTHESIA POSTPROCEDURE EVALUATION
Post-Op Assessment Note    CV Status:  Stable    Pain management: adequate     Mental Status:  Alert and awake   Hydration Status:  Euvolemic   PONV Controlled:  Controlled   Airway Patency:  Patent      Post Op Vitals Reviewed: Yes      Staff: CRNA, Anesthesiologist         There were no known notable events for this encounter.     BP   1377/88   Temp 97.6 °F (36.4 °C) (09/13/23 1400)    Pulse  100   Resp   16   SpO2   100

## 2023-09-13 NOTE — Clinical Note
Prepped: left chest. Prepped with: ChloraPrep. The site was clipped. The patient was draped.  Bilateral wrist restraints applied for patient safety

## 2023-09-13 NOTE — ANESTHESIA PREPROCEDURE EVALUATION
Procedure:  Cardiac upgrade pacer to BIV ICD (Chest)    Relevant Problems   CARDIO   (+) Aortic stenosis   (+) Coronary artery disease   (+) Dyspnea on exertion   (+) Essential hypertension   (+) Hyperlipidemia   (+) NSTEMI (non-ST elevated myocardial infarction) (HCC)   (+) Tachycardia-bradycardia (HCC)      ENDO   (+) Hypothyroidism   (+) Type 2 diabetes mellitus with circulatory disorder, with long-term current use of insulin (HCC)      HEMATOLOGY   (+) Anemia      MUSCULOSKELETAL   (+) Osteoarthritis of knee      PULMONARY   (+) Dyspnea   (+) Dyspnea on exertion   (+) Mild intermittent asthma without complication      Echo 23/24/3563:  •  Left Ventricle: Left ventricular cavity size is normal. Wall thickness is increased. There is borderline concentric hypertrophy. The left ventricular ejection fraction is 35%. Systolic function is severely reduced. There is global hypokinesis with regional variation. •  IVS: There is abnormal septal motion consistent with left bundle branch block. •  Left Atrium: The atrium is dilated. •  Aortic Valve: There is a TAVR bioprosthetic valve. Peak gradient 23 mmHg, mean gradient 13 mmHg. There is no evidence of regurgitation. •  Mitral Valve: There is moderate to severe regurgitation with an eccentrically directed jet. •  Tricuspid Valve: There is mild regurgitation. •  Pulmonic Valve: There is mild regurgitation. •  Pulmonary Artery: The estimated pulmonary artery systolic pressure is 49.5 mmHg. Physical Exam    Airway    Mallampati score: II  TM Distance: >3 FB  Neck ROM: full     Dental   lower dentures and upper dentures,     Cardiovascular      Pulmonary      Other Findings        Anesthesia Plan  ASA Score- 3     Anesthesia Type- IV sedation with anesthesia with ASA Monitors. Additional Monitors:   Airway Plan:           Plan Factors-Exercise tolerance (METS): >4 METS. Chart reviewed. Existing labs reviewed. Patient summary reviewed.             Induction- intravenous. Postoperative Plan-     Informed Consent- Anesthetic plan and risks discussed with patient. I personally reviewed this patient with the CRNA. Discussed and agreed on the Anesthesia Plan with the CRNA. Garret Maxwell

## 2023-09-13 NOTE — INTERVAL H&P NOTE
Please see recent office visit with Dr. Pat Kwok for full details. Brief this patient is a pleasant 46-year-old male with ischemic cardiomyopathy with LVEF 30% per most recent echo 5/2023 (unchanged from prior echo 2/2023 despite revascularization), CAD with prior NSTEMI with PCI to mid LAD and RPDA 2/2023, chronic HFrE tachybradycardia syndrome with MedtronicF, dual-chamber pacemaker in situ, baseline the bundle branch block, severe aortic stenosis status post prior TAVR, hypertension, hyperlipidemia, moderate to severe mitral regurgitation, diabetes, hepatitis C, and history of tobacco use. While historically he had a preserved EF, who presented to the hospital 2/2023 with worsening shortness of breath and intermittent chest pain. Echocardiogram showed newly reduced LVEF 35-40%, and he underwent more urgent cardiac catheterization and received ELDER to the mid LAD and RPDA. He was continued on optimal medical therapy with lisinopril and Toprol-XL, however despite these medications and despite intervention repeat imaging 3/2023, 4/2023, and 5/2023 all showed LVEF 35% or less. Given this and his baseline left bundle branch block, he was referred for upgrade to BiV ICD. He was seen in consultation by Dr. Alexandria Hinds, and underwent an outpatient venogram which showed left-sided patency. He thus presents today to undergo upgrade to BiV ICD (likely just adding an ICD lead given the fact that he has an existent LPF lead). He denies significant changes since he was recently seen in the office, physical exam unchanged.     Vitals:    09/13/23 0729   BP: 127/79   Pulse: (!) 154   Resp: 18   Temp: 97.7 °F (36.5 °C)   SpO2: 98%

## 2023-09-13 NOTE — ANESTHESIA POSTPROCEDURE EVALUATION
Post-Op Assessment Note    CV Status:  Stable    Pain management: adequate     Mental Status:  Alert and awake   Hydration Status:  Euvolemic   PONV Controlled:  Controlled   Airway Patency:  Patent      Post Op Vitals Reviewed: Yes      Staff: Anesthesiologist, CRNA         No notable events documented.     BP   129/87   Temp      Pulse 140   Resp 18   SpO2   99

## 2023-09-13 NOTE — ASSESSMENT & PLAN NOTE
70 male with hypertension, hyperlipidemia, diabetes mellitus, ischemic cardiomyopathy, CAD, pacemaker placement due to tachybradycardia syndrome, hepatitis C, and prior tobacco use, presented to the hospital for ICD placement. Patient was being prepped for procedure and after receiving doses of ketamine, fentanyl, propofol, patient was significantly altered and unable to follow commands. BP 120s-140s. Stroke alert was activated. NIHSS 0. As patient did not demonstrate any focal deficits and history was not indicative of vascular event, stroke alert was canceled. No further neurodiagnostic imaging necessary per discussion with attending. Okay to proceed with procedure at this time. Please contact neurology immediately with any additional questions or concerns.

## 2023-09-13 NOTE — Clinical Note
The ICD COBALT XT HF  CRT-D MRI DF4 - [id number] device was inserted. The leads were placed into the connector and visually verified to be in correct position. Injury current obtained.

## 2023-09-13 NOTE — ANESTHESIA PREPROCEDURE EVALUATION
Procedure:  Cardiac upgrade pacer to biv icd (Chest)    Relevant Problems   CARDIO   (+) Aortic stenosis   (+) Coronary artery disease   (+) Dyspnea on exertion   (+) Essential hypertension   (+) Hyperlipidemia   (+) NSTEMI (non-ST elevated myocardial infarction) (HCC)   (+) Tachycardia-bradycardia (HCC)      ENDO   (+) Hypothyroidism   (+) Type 2 diabetes mellitus with circulatory disorder, with long-term current use of insulin (HCC)      HEMATOLOGY   (+) Anemia      MUSCULOSKELETAL   (+) Osteoarthritis of knee      PULMONARY   (+) Dyspnea   (+) Dyspnea on exertion   (+) Mild intermittent asthma without complication      Echo 80/53/8891:  •  Left Ventricle: Left ventricular cavity size is normal. Wall thickness is increased. There is borderline concentric hypertrophy. The left ventricular ejection fraction is 35%. Systolic function is severely reduced. There is global hypokinesis with regional variation. •  IVS: There is abnormal septal motion consistent with left bundle branch block. •  Left Atrium: The atrium is dilated. •  Aortic Valve: There is a TAVR bioprosthetic valve. Peak gradient 23 mmHg, mean gradient 13 mmHg. There is no evidence of regurgitation. •  Mitral Valve: There is moderate to severe regurgitation with an eccentrically directed jet. •  Tricuspid Valve: There is mild regurgitation. •  Pulmonic Valve: There is mild regurgitation. •  Pulmonary Artery: The estimated pulmonary artery systolic pressure is 38.7 mmHg. Physical Exam    Airway    Mallampati score: II  TM Distance: >3 FB  Neck ROM: full     Dental   lower dentures and upper dentures,     Cardiovascular      Pulmonary      Other Findings        Anesthesia Plan  ASA Score- 3     Anesthesia Type- IV sedation with anesthesia with ASA Monitors. Additional Monitors:   Airway Plan:           Plan Factors-Exercise tolerance (METS): >4 METS. Chart reviewed. Existing labs reviewed. Patient summary reviewed.             Induction- intravenous. Postoperative Plan-     Informed Consent- Anesthetic plan and risks discussed with patient. I personally reviewed this patient with the CRNA. Discussed and agreed on the Anesthesia Plan with the CRNA. Jose Manuel Reed

## 2023-09-14 ENCOUNTER — PATIENT OUTREACH (OUTPATIENT)
Dept: CARDIOLOGY CLINIC | Facility: CLINIC | Age: 78
End: 2023-09-14

## 2023-09-14 ENCOUNTER — APPOINTMENT (OUTPATIENT)
Dept: RADIOLOGY | Facility: HOSPITAL | Age: 78
DRG: 227 | End: 2023-09-14
Payer: MEDICARE

## 2023-09-14 ENCOUNTER — APPOINTMENT (OUTPATIENT)
Dept: NON INVASIVE DIAGNOSTICS | Facility: HOSPITAL | Age: 78
DRG: 227 | End: 2023-09-14
Payer: MEDICARE

## 2023-09-14 PROBLEM — I48.92 ATRIAL FLUTTER (HCC): Status: ACTIVE | Noted: 2023-09-14

## 2023-09-14 LAB
ANION GAP SERPL CALCULATED.3IONS-SCNC: 8 MMOL/L
ANION GAP SERPL CALCULATED.3IONS-SCNC: 8 MMOL/L
AORTIC VALVE MEAN VELOCITY: 12.1 M/S
APICAL FOUR CHAMBER EJECTION FRACTION: 36 %
ATRIAL RATE: 105 BPM
AV AREA BY CONTINUOUS VTI: 1.4 CM2
AV AREA PEAK VELOCITY: 1.4 CM2
AV LVOT MEAN GRADIENT: 1 MMHG
AV LVOT PEAK GRADIENT: 2 MMHG
AV MEAN GRADIENT: 7 MMHG
AV PEAK GRADIENT: 12 MMHG
AV VALVE AREA: 1.37 CM2
AV VELOCITY RATIO: 0.44
BUN SERPL-MCNC: 40 MG/DL (ref 5–25)
BUN SERPL-MCNC: 46 MG/DL (ref 5–25)
CALCIUM SERPL-MCNC: 8.7 MG/DL (ref 8.4–10.2)
CALCIUM SERPL-MCNC: 9 MG/DL (ref 8.4–10.2)
CHLORIDE SERPL-SCNC: 102 MMOL/L (ref 96–108)
CHLORIDE SERPL-SCNC: 102 MMOL/L (ref 96–108)
CO2 SERPL-SCNC: 29 MMOL/L (ref 21–32)
CO2 SERPL-SCNC: 30 MMOL/L (ref 21–32)
CREAT SERPL-MCNC: 1.57 MG/DL (ref 0.6–1.3)
CREAT SERPL-MCNC: 1.69 MG/DL (ref 0.6–1.3)
DOP CALC AO PEAK VEL: 1.7 M/S
DOP CALC AO VTI: 27.34 CM
DOP CALC LVOT AREA: 3.14 CM2
DOP CALC LVOT CARDIAC INDEX: 1.75 L/MIN/M2
DOP CALC LVOT CARDIAC OUTPUT: 3.45 L/MIN
DOP CALC LVOT DIAMETER: 2 CM
DOP CALC LVOT PEAK VEL VTI: 11.94 CM
DOP CALC LVOT PEAK VEL: 0.75 M/S
DOP CALC LVOT STROKE INDEX: 19.3 ML/M2
DOP CALC LVOT STROKE VOLUME: 37.49 CM3
ERYTHROCYTE [DISTWIDTH] IN BLOOD BY AUTOMATED COUNT: 18.1 % (ref 11.6–15.1)
GFR SERPL CREATININE-BSD FRML MDRD: 38 ML/MIN/1.73SQ M
GFR SERPL CREATININE-BSD FRML MDRD: 42 ML/MIN/1.73SQ M
GLUCOSE SERPL-MCNC: 115 MG/DL (ref 65–140)
GLUCOSE SERPL-MCNC: 119 MG/DL (ref 65–140)
GLUCOSE SERPL-MCNC: 123 MG/DL (ref 65–140)
GLUCOSE SERPL-MCNC: 125 MG/DL (ref 65–140)
GLUCOSE SERPL-MCNC: 139 MG/DL (ref 65–140)
GLUCOSE SERPL-MCNC: 160 MG/DL (ref 65–140)
HCT VFR BLD AUTO: 39.3 % (ref 36.5–49.3)
HGB BLD-MCNC: 12.2 G/DL (ref 12–17)
MAGNESIUM SERPL-MCNC: 2.3 MG/DL (ref 1.9–2.7)
MCH RBC QN AUTO: 27.9 PG (ref 26.8–34.3)
MCHC RBC AUTO-ENTMCNC: 31 G/DL (ref 31.4–37.4)
MCV RBC AUTO: 90 FL (ref 82–98)
PLATELET # BLD AUTO: 202 THOUSANDS/UL (ref 149–390)
PMV BLD AUTO: 11.2 FL (ref 8.9–12.7)
POTASSIUM SERPL-SCNC: 4 MMOL/L (ref 3.5–5.3)
POTASSIUM SERPL-SCNC: 4.3 MMOL/L (ref 3.5–5.3)
PR INTERVAL: 0 MS
QRS AXIS: -79 DEGREES
QRSD INTERVAL: 133 MS
QT INTERVAL: 371 MS
QTC INTERVAL: 491 MS
RA PRESSURE ESTIMATED: 15 MMHG
RBC # BLD AUTO: 4.37 MILLION/UL (ref 3.88–5.62)
RV PSP: 54 MMHG
SL CV LV EF: 32
SODIUM SERPL-SCNC: 139 MMOL/L (ref 135–147)
SODIUM SERPL-SCNC: 140 MMOL/L (ref 135–147)
T WAVE AXIS: 88 DEGREES
TR MAX PG: 39 MMHG
TR PEAK VELOCITY: 3.1 M/S
TRICUSPID ANNULAR PLANE SYSTOLIC EXCURSION: 1.6 CM
TRICUSPID VALVE PEAK REGURGITATION VELOCITY: 3.1 M/S
VENTRICULAR RATE: 105 BPM
WBC # BLD AUTO: 10.25 THOUSAND/UL (ref 4.31–10.16)

## 2023-09-14 PROCEDURE — 93321 DOPPLER ECHO F-UP/LMTD STD: CPT

## 2023-09-14 PROCEDURE — NC001 PR NO CHARGE: Performed by: STUDENT IN AN ORGANIZED HEALTH CARE EDUCATION/TRAINING PROGRAM

## 2023-09-14 PROCEDURE — 83735 ASSAY OF MAGNESIUM: CPT | Performed by: PHYSICIAN ASSISTANT

## 2023-09-14 PROCEDURE — 93010 ELECTROCARDIOGRAM REPORT: CPT | Performed by: INTERNAL MEDICINE

## 2023-09-14 PROCEDURE — 93325 DOPPLER ECHO COLOR FLOW MAPG: CPT

## 2023-09-14 PROCEDURE — 93325 DOPPLER ECHO COLOR FLOW MAPG: CPT | Performed by: INTERNAL MEDICINE

## 2023-09-14 PROCEDURE — 85027 COMPLETE CBC AUTOMATED: CPT | Performed by: PHYSICIAN ASSISTANT

## 2023-09-14 PROCEDURE — 93321 DOPPLER ECHO F-UP/LMTD STD: CPT | Performed by: INTERNAL MEDICINE

## 2023-09-14 PROCEDURE — 82948 REAGENT STRIP/BLOOD GLUCOSE: CPT

## 2023-09-14 PROCEDURE — 93308 TTE F-UP OR LMTD: CPT

## 2023-09-14 PROCEDURE — 71046 X-RAY EXAM CHEST 2 VIEWS: CPT

## 2023-09-14 PROCEDURE — 80048 BASIC METABOLIC PNL TOTAL CA: CPT | Performed by: PHYSICIAN ASSISTANT

## 2023-09-14 PROCEDURE — 80048 BASIC METABOLIC PNL TOTAL CA: CPT | Performed by: INTERNAL MEDICINE

## 2023-09-14 PROCEDURE — 93308 TTE F-UP OR LMTD: CPT | Performed by: INTERNAL MEDICINE

## 2023-09-14 PROCEDURE — 99024 POSTOP FOLLOW-UP VISIT: CPT | Performed by: INTERNAL MEDICINE

## 2023-09-14 PROCEDURE — 99024 POSTOP FOLLOW-UP VISIT: CPT | Performed by: STUDENT IN AN ORGANIZED HEALTH CARE EDUCATION/TRAINING PROGRAM

## 2023-09-14 RX ORDER — FUROSEMIDE 40 MG/1
40 TABLET ORAL DAILY
Status: DISCONTINUED | OUTPATIENT
Start: 2023-09-14 | End: 2023-09-14

## 2023-09-14 RX ORDER — SPIRONOLACTONE 25 MG/1
25 TABLET ORAL DAILY
Status: DISCONTINUED | OUTPATIENT
Start: 2023-09-14 | End: 2023-09-14

## 2023-09-14 RX ORDER — FUROSEMIDE 10 MG/ML
60 INJECTION INTRAMUSCULAR; INTRAVENOUS ONCE
Status: COMPLETED | OUTPATIENT
Start: 2023-09-14 | End: 2023-09-14

## 2023-09-14 RX ORDER — POTASSIUM CHLORIDE 20 MEQ/1
20 TABLET, EXTENDED RELEASE ORAL ONCE
Status: COMPLETED | OUTPATIENT
Start: 2023-09-14 | End: 2023-09-14

## 2023-09-14 RX ORDER — FUROSEMIDE 10 MG/ML
60 INJECTION INTRAMUSCULAR; INTRAVENOUS
Status: DISCONTINUED | OUTPATIENT
Start: 2023-09-14 | End: 2023-09-15

## 2023-09-14 RX ADMIN — FLUTICASONE PROPIONATE 2 SPRAY: 50 SPRAY, METERED NASAL at 10:42

## 2023-09-14 RX ADMIN — FUROSEMIDE 60 MG: 10 INJECTION, SOLUTION INTRAMUSCULAR; INTRAVENOUS at 10:54

## 2023-09-14 RX ADMIN — FUROSEMIDE 60 MG: 10 INJECTION, SOLUTION INTRAMUSCULAR; INTRAVENOUS at 17:44

## 2023-09-14 RX ADMIN — ATORVASTATIN CALCIUM 80 MG: 80 TABLET, FILM COATED ORAL at 17:43

## 2023-09-14 RX ADMIN — INSULIN LISPRO 5 UNITS: 100 INJECTION, SOLUTION INTRAVENOUS; SUBCUTANEOUS at 17:44

## 2023-09-14 RX ADMIN — CLOPIDOGREL BISULFATE 75 MG: 75 TABLET, FILM COATED ORAL at 09:00

## 2023-09-14 RX ADMIN — INSULIN LISPRO 5 UNITS: 100 INJECTION, SOLUTION INTRAVENOUS; SUBCUTANEOUS at 09:01

## 2023-09-14 RX ADMIN — METOPROLOL SUCCINATE 75 MG: 50 TABLET, EXTENDED RELEASE ORAL at 21:37

## 2023-09-14 RX ADMIN — METHOCARBAMOL 750 MG: 750 TABLET ORAL at 17:45

## 2023-09-14 RX ADMIN — APIXABAN 5 MG: 5 TABLET, FILM COATED ORAL at 13:39

## 2023-09-14 RX ADMIN — LEVOTHYROXINE SODIUM 137 MCG: 25 TABLET ORAL at 05:53

## 2023-09-14 RX ADMIN — FERROUS SULFATE TAB 325 MG (65 MG ELEMENTAL FE) 325 MG: 325 (65 FE) TAB at 09:00

## 2023-09-14 RX ADMIN — ACETAMINOPHEN 650 MG: 325 TABLET, FILM COATED ORAL at 21:41

## 2023-09-14 RX ADMIN — MONTELUKAST 10 MG: 10 TABLET, FILM COATED ORAL at 09:00

## 2023-09-14 RX ADMIN — ASPIRIN 81 MG CHEWABLE TABLET 81 MG: 81 TABLET CHEWABLE at 09:00

## 2023-09-14 RX ADMIN — POTASSIUM CHLORIDE 20 MEQ: 1500 TABLET, EXTENDED RELEASE ORAL at 17:43

## 2023-09-14 RX ADMIN — INSULIN GLARGINE 18 UNITS: 100 INJECTION, SOLUTION SUBCUTANEOUS at 21:38

## 2023-09-14 RX ADMIN — FLUTICASONE FUROATE AND VILANTEROL TRIFENATATE 1 PUFF: 100; 25 POWDER RESPIRATORY (INHALATION) at 10:42

## 2023-09-14 RX ADMIN — INSULIN LISPRO 1 UNITS: 100 INJECTION, SOLUTION INTRAVENOUS; SUBCUTANEOUS at 17:44

## 2023-09-14 RX ADMIN — METOPROLOL SUCCINATE 75 MG: 50 TABLET, EXTENDED RELEASE ORAL at 09:00

## 2023-09-14 NOTE — UTILIZATION REVIEW
Initial Clinical Review    Elective outpatient procedure, converted to inpatient admission d/t ongoing management of his new atrial flutter, CHF management, and monitoring of renal function. Admission: Date/Time/Statement:   Admission Orders (From admission, onward)     Ordered        09/14/23 1417  Inpatient Admission  Once                      Orders Placed This Encounter   Procedures   • Inpatient Admission     Standing Status:   Standing     Number of Occurrences:   1     Order Specific Question:   Level of Care     Answer:   Med Surg [16]     Order Specific Question:   Bed request comments     Answer:   telemetry     Order Specific Question:   Estimated length of stay     Answer:   More than 2 Midnights     Order Specific Question:   Certification     Answer:   I certify that inpatient services are medically necessary for this patient for a duration of greater than two midnights. See H&P and MD Progress Notes for additional information about the patient's course of treatment. ED Arrival Information     Patient not seen in ED                     No chief complaint on file. Initial Presentation: 70 y.o. male w/ PMH of DM, HLD, HTN ischemic cardiomyopathy with recent EF 30%, CAD with prior NSTEMI, tachybradycardia syndrome with pacemaker placement, hepatitis C, prior tobacco use presented to [facility] from home today initially for elective OP ICD placement. He was prepped for the procedure and was received doses of ketamine, fentanyl, and propofol. He was altered following administration, unable to follow commands. BP normotensive at the time. He was tachycardic. Stroke alert activated. On exam, alert, follows command, tachycardic, no focal weakness. NIHSS 0. Afib and AFL on ECG. Admitted as Inpatient for altered mental status, adverse reaction to ketamine, aflutter w/ rvr. Plan: Neurology consulted- okay to proceed w/ procedure at this time. Heart failure consult. Mon on tele.  Given amiodarone  mg x 1, metoprolol IV 5 mg x 1.     09/13 Neurology Consult: AMS: Nonfocal on exam in the afternoon. Strongly suspect short-lived encephalopathy was simply due to to his anesthesia medications. No further inpatient neurologic work-up or treatment indicated at this time. Date: 09/14  Day 2:   Heart Failure Consult: P/w elective biv upgrade; had episode of lack of responsiveness after anesthesia during procedure concerning for CVA, neuro evaluated and thinks unlikely CVA rather sedation induced episode. On exam, warm, volume up, note contribution to his JVP from significant TR, reports +increased phlegm. Weight may be few pounds up from his BL. He was in Afib and AFL on 9/13 ECGs. Give lasix 60 IV x1, assess response for further Tx. Hold nephrotoxic Rx including some GDMT. Restart upon HE resolution and diuresis. mon on tele. EP Notes: Pt s/p upgrade to Medtronic BiV ICD on 9/13/2023. Device interrogation today shows appropriate device function, including lead sensing, thresholds, and impedances. Incision is clean, dry, intact without swelling, hematoma, redness, bleeding, drainage, or signs of infection. Chest xray this morning shows appropriate lead placement without obvious pneumothorax, official read still pending. He has newly diagnosed atrial flutter, with an elevated CHADS2 Vasc score. EKG and tele:  a flutter with 2: 1 block. Will give him a dose of Eliquis 5 mg now. DC ASA, cont Plavix. Date: 09/15   Day 3: Has surpassed a 2nd midnight with active treatments and services, which include: cont to mon on tele- remain in aflutter. Cont Toprol-XL 75 mg twice daily. continue Eliquis and Plavix. Increase IV diuretics tonight, and reassess him tomorrow. Continue to trend renal function and electrolytes. Strict I's/O, check daily weight, 1800 cc fluid restriction in place.       ED Triage Vitals   Temperature Pulse Respirations Blood Pressure SpO2   09/13/23 0729 09/13/23 0729 09/13/23 0729 09/13/23 0729 09/13/23 0729   97.7 °F (36.5 °C) (!) 154 18 127/79 98 %      Temp Source Heart Rate Source Patient Position - Orthostatic VS BP Location FiO2 (%)   09/13/23 0729 09/13/23 0729 09/13/23 0920 09/13/23 1926 --   Temporal Monitor Lying Right arm       Pain Score       09/13/23 0930       No Pain          Wt Readings from Last 1 Encounters:   09/14/23 83.5 kg (184 lb)     Additional Vital Signs:   Date/Time Temp Pulse Resp BP MAP (mmHg) SpO2 Calculated FIO2 (%) - Nasal Cannula O2 Flow Rate (L/min) Nasal Cannula O2 Flow Rate (L/min) O2 Device Cardiac (WDL) Patient Position - Orthostatic VS   09/14/23 10:13:25 97.4 °F (36.3 °C) Abnormal  105 20 99/68 78 96 % -- -- -- -- -- --   09/14/23 0910 -- -- -- -- -- -- -- -- -- None (Room air) -- --   09/14/23 0845 -- 105 -- 99/68 -- -- -- -- -- -- -- --   09/14/23 07:23:29 98.1 °F (36.7 °C) 90 19 110/75 87 91 % -- -- -- -- -- Sitting   09/14/23 03:04:09 97.4 °F (36.3 °C) Abnormal  57 -- 102/68 79 94 % -- -- -- -- -- Lying   09/13/23 23:08:24 97.7 °F (36.5 °C) 89 -- 98/62 74 95 % -- -- -- -- -- Lying   09/13/23 21:26:16 -- 87 -- 111/76 88 96 % -- -- -- -- -- --   09/13/23 19:26:54 98.1 °F (36.7 °C) 85 -- 111/75 87 100 % -- -- -- None (Room air) -- Lying   09/13/23 1500 -- 90 -- 100/64 76 97 % -- -- -- -- -- --   09/13/23 1445 98 °F (36.7 °C) 90 16 119/81 97 96 % -- -- -- None (Room air) X --   09/13/23 1430 -- 84 16 118/74 89 92 % -- -- -- None (Room air) -- --   09/13/23 1415 98 °F (36.7 °C) 92 18 137/88 104 95 % -- -- -- None (Room air) X --   09/13/23 1400 97.6 °F (36.4 °C) 114 Abnormal  16 137/88 104 100 % -- 6 L/min -- Simple mask X --   09/13/23 1205 -- 91 -- 105/67 78 100 % 28 -- 2 L/min -- -- --   09/13/23 1200 -- 93 -- 111/71 85 99 % 28 -- 2 L/min -- -- --   09/13/23 1155 -- 94 -- 104/74 84 100 % 28 -- 2 L/min -- -- --   09/13/23 1150 -- 88 -- 104/71 83 100 % 28 -- 2 L/min -- -- --   09/13/23 1145 -- 94 -- 95/67 78 99 % 28 -- 2 L/min -- -- -- 09/13/23 1140 -- 76 -- 102/69 82 98 % 28 -- 2 L/min -- -- --   09/13/23 1135 -- 87 -- 103/67 82 97 % 28 -- 2 L/min -- -- --   09/13/23 1130 -- 80 -- 103/67 80 97 % 28 -- 2 L/min -- -- --   09/13/23 1125 -- 90 -- 94/67 76 95 % 28 -- 2 L/min -- -- --   09/13/23 1120 -- 94 -- 97/68 78 94 % 28 -- 2 L/min -- -- --   09/13/23 1115 -- 92 -- 96/60 74 95 % 28 -- 2 L/min -- -- --   09/13/23 1110 -- 82 -- 96/52 69 96 % 28 -- 2 L/min -- -- --   09/13/23 1105 -- 84 -- 103/72 83 95 % 28 -- 2 L/min -- -- --   09/13/23 1100 -- 89 -- 108/62 79 97 % 28 -- 2 L/min -- -- --   09/13/23 1055 -- 96 -- 110/66 82 96 % 28 -- 2 L/min -- -- --   09/13/23 1050 -- 98 -- 105/70 82 97 % 28 -- 2 L/min Nasal cannula -- --   09/13/23 1045 -- 99 -- 110/63 79 98 % 28 -- 2 L/min Nasal cannula -- --   09/13/23 1040 -- 95 -- 109/79 91 94 % 28 -- 2 L/min Nasal cannula -- --   09/13/23 1035 -- 94 -- 109/70 85 97 % 28 -- 2 L/min Nasal cannula -- --   09/13/23 1030 -- 101 -- 113/71 86 97 % 28 -- 2 L/min Nasal cannula -- --   09/13/23 1025 -- 94 -- 107/73 83 97 % 28 -- 2 L/min Nasal cannula -- --   09/13/23 1020 -- 101 -- 101/73 84 95 % 28 -- 2 L/min Nasal cannula -- --   09/13/23 1015 -- 91 -- 110/67 84 92 % 28 -- 2 L/min Nasal cannula -- --   09/13/23 1010 -- 97 -- 113/65 82 98 % 28 -- 2 L/min Nasal cannula -- --   09/13/23 1005 -- 96 -- 107/68 79 98 % 28 -- 2 L/min Nasal cannula -- --   09/13/23 1000 -- 100 -- 108/71 86 94 % 28 -- 2 L/min Nasal cannula -- --   09/13/23 0955 -- 99 -- 127/90 104 96 % -- -- -- None (Room air) -- --   09/13/23 0950 -- 126 Abnormal  -- 103/75 85 95 % -- -- -- None (Room air) -- --   09/13/23 0945 -- 135 Abnormal  -- 106/85 90 100 % -- -- -- None (Room air) -- --   09/13/23 0940 -- 146 Abnormal  -- 112/78 89 100 % -- -- -- None (Room air) -- --   09/13/23 0935 -- 150 Abnormal  -- 120/84 96 100 % -- -- -- None (Room air) -- --   09/13/23 0930 97.8 °F (36.6 °C) 150 Abnormal  16 123/95 -- 100 % -- -- -- None (Room air) -- -- 09/13/23 0925 -- 150 Abnormal  -- 129/87 103 100 % -- -- -- -- -- --   09/13/23 0920 97.9 °F (36.6 °C) 150 Abnormal  16 129/87 -- 100 % -- -- -- Non-rebreather mask -- Lying       Pertinent Labs/Diagnostic Test Results:   XR chest 2 views   Final Result by Hal Giraldo MD (09/14 1454)      Stable left AICD. Workstation performed: [id number]         XR chest portable   Final Result by Juliane Thacker MD (09/13 5844)      New ICD lead in right ventricle with no pneumothorax. Mild pulmonary venous congestion with trace effusions. Workstation performed: [id number]           09/13  EKG result: Atrial fibrillation  Left bundle branch block    EKG 2: Electronic ventricular pacemaker    09/14 ECHO:  •  Left Ventricle: Left ventricular cavity size is normal. Wall thickness is normal. The left ventricular ejection fraction is 32%. Systolic function is severely reduced. There is severe global hypokinesis with regional variation. •  IVS: There is abnormal septal motion consistent with left bundle branch block or right ventricular pacing. •  Right Ventricle: Right ventricular cavity size is dilated. Systolic function is reduced. •  Left Atrium: The atrium is dilated. •  Right Atrium: The atrium is dilated. •  Mitral Valve: There is moderate to severe regurgitation. •  Tricuspid Valve: There is moderate regurgitation. The right ventricular systolic pressure is moderately elevated. The estimated right ventricular systolic pressure is 68.29 mmHg.         Results from last 7 days   Lab Units 09/14/23  0536 09/13/23  0715   WBC Thousand/uL 10.25* 6.23   HEMOGLOBIN g/dL 12.2 12.8   HEMATOCRIT % 39.3 41.8   PLATELETS Thousands/uL 202 206         Results from last 7 days   Lab Units 09/14/23  0536 09/13/23  0715   SODIUM mmol/L 140 142   POTASSIUM mmol/L 4.3 3.9   CHLORIDE mmol/L 102 101   CO2 mmol/L 30 30   ANION GAP mmol/L 8 11   BUN mg/dL 40* 25   CREATININE mg/dL 1.69* 1.74* EGFR ml/min/1.73sq m 38 37   CALCIUM mg/dL 9.0 9.4   MAGNESIUM mg/dL 2.3  --          Results from last 7 days   Lab Units 09/14/23  1602 09/14/23  1055 09/14/23  0553 09/13/23  2111 09/13/23  1540 09/13/23  1410 09/13/23  0919   POC GLUCOSE mg/dl 160* 139 115 179* 221* 227* 231*     Results from last 7 days   Lab Units 09/14/23  0536 09/13/23  0715   GLUCOSE RANDOM mg/dL 123 223*         ED Treatment:   Medication Administration - No Administrations Displayed (No Start Event Found)     None        Past Medical History:   Diagnosis Date   • Arthritis    • Asthma    • Colon polyps    • Community acquired pneumonia     last assessed: 5/1/2014   • Diabetes mellitus ([address])    • Hemorrhagic prepatellar bursitis, left 10/21/2019   • Hepatitis C    • High cholesterol    • History of colonoscopy 2017   • Hypertension    • Lymphadenopathy, anterior cervical 04/17/2018   • Nephritis and nephropathy, not specified as acute or chronic, with other specified pathological lesion in kidney, in diseases classified elsewhere 3/26/2013   • Screening for colon cancer 05/01/2019   • Thoracic vertebral fracture ([address]) 06/11/2014     Present on Admission:  • Cardiomyopathy [facility])      Admitting Diagnosis: Heart failure with reduced ejection fraction ([address]) [I50.20]  Age/Sex: 70 y.o. male  Admission Orders:  SCD  Telemetry monitoring    Scheduled Medications:  amiodarone 150 mg/3 mL injection 150 mg IV once  apixaban, 5 mg, Oral, BID  atorvastatin, 80 mg, Oral, Daily With Dinner  clopidogrel, 75 mg, Oral, Daily  ferrous sulfate, 325 mg, Oral, Every Other Day  fluticasone, 2 spray, Nasal, Daily  Fluticasone Furoate-Vilanterol, 1 puff, Inhalation, Daily  furosemide (LASIX) injection 20 mg IV once  furosemide (LASIX) injection 60 mg IV bid dc'd 09/15  furosemide (LASIX) injection 60 mg IV once  furosemide, 60 mg, Intravenous, BID (diuretic)  insulin glargine, 18 Units, Subcutaneous, HS  insulin lispro, 1-5 Units, Subcutaneous, HS  insulin lispro, 1-6 Units, Subcutaneous, TID AC  insulin lispro, 5 Units, Subcutaneous, BID With Meals  levothyroxine, 137 mcg, Oral, Early Morning  metoprolol (LOPRESSOR) injection 5 mg IV once  metoprolol succinate, 75 mg, Oral, BID  montelukast, 10 mg, Oral, Daily  potassium chloride, 20 mEq, Oral, Once      Continuous IV Infusions: none     PRN Meds:  acetaminophen, 650 mg, Oral, Q4H PRN 09/14 x 1  albuterol, 1 puff, Inhalation, Q4H PRN  methocarbamol, 750 mg, Oral, Q6H PRN 09/14 x 1  nitroglycerin, 0.4 mg, Sublingual, Q5 Min PRN        IP CONSULT TO NEUROLOGY  IP CONSULT TO HEART FAILURE SERVICE    Network Utilization Review Department  ATTENTION: Please call with any questions or concerns to [phone] and carefully listen to the prompts so that you are directed to the right person. All voicemails are confidential.  Isaura Iveth all requests for admission clinical reviews, approved or denied determinations and any other requests to dedicated fax number below belonging to the campus where the patient is receiving treatment.  List of dedicated fax numbers for the Facilities:  [place] DENIALS (Administrative/Medical Necessity) [phone] [address] (Maternity/NICU/Pediatrics) [phone]   [address] [place] [address] [phone]   [facility] [address] [facility] [phone]   [address] [facility] [address] [facility] [address] [address] [facility] [phone]   [address] [address] [place] [address] [phone]

## 2023-09-14 NOTE — PROGRESS NOTES
Patient is admitted to [address] Outpatient Advanced Heart Failure LCSW completed chart review and present during HF Team Huddle. Per chart review- pt has [employer] [facility] Rep; [facility]. Lives with family. [employer] info was previously provided by this LCSW in March 2023. Referral for outpatient social work not entered at this time. Please enter referral if outpatient resources are needed in the future.

## 2023-09-14 NOTE — PROGRESS NOTES
Progress Note - Electrophysiology-Cardiology (EP)   Page Southern 70 y.o. male MRN: [id number]  Unit/Bed#: CW2 218-02 Encounter: [id number]      Assessment:  1. Ischemic cardiomyopathy with LVEF 30% per most recent echo 5/2023, status post upgrade to Medtronic BiV ICD 9/13/2023   A.)  LVEF unchanged from prior echo 2/2023 despite revascularization at that time   B.)  Maintained on lisinopril and Toprol-XL as an outpatient  2. CAD with prior NSTEMI with PCI to mid LAD and RPDA 2/2023, maintained on dual antiplatelet therapy with aspirin and Plavix  3. Chronic HFrEF  4. Tachybrady syndrome, previously with Medtronic dual-chamber pacemaker in situ status post upgrade to BiV ICD  5. Baseline left bundle branch block  6. Severe aortic stenosis status post prior TAVR  7. Hypertension  8. Hyperlipidemia  9. Moderate to severe mitral regurgitation  10. Diabetes  11. Hepatitis C  12. History of tobacco use  13. Newly diagnosed atrial flutter with elevated ventricular response   A.)  CHADS2 Vasc = 6, will need to be started on anticoagulation      Plan:  Device interrogation today shows appropriate device function, including lead sensing, thresholds, and impedances. Incision is clean, dry, intact without swelling, hematoma, redness, bleeding, drainage, or signs of infection. Chest xray this morning shows appropriate lead placement without obvious pneumothorax, official read still pending. All post implantation discharge instructions and restrictions were reviewed in detail with the patient and his family (who helped translate) yesterday. He has newly diagnosed atrial flutter, with an elevated CHADS2 Vasc score. Will give him a dose of Eliquis 5 mg now while I check the cost of anticoagulation options. He is currently on aspirin and Plavix, but given that his PCI was performed 2/2023 will discontinue aspirin and continue Plavix along with his new anticoagulant. Appreciate heart failure input.   He has received IV lasix 60 mg x 1 dose, will await response. His lisinopril and spironolactone have been help given HE, which is slightly improved today. He will need to remain in the hospital at this time for ongoing management of his new atrial flutter, CHF management, and monitoring of renal function. Subjective/Objective   Chief Complaint: no acute complaints    Subjective: He reports mild pain at the device site, but it seems to be well tolerated. He denies chest pain, dyspnea, palpitations, dizziness or lightheadedness. He reports that his lower extremity seems to be improving.        Objective:     Vitals: BP 99/68   Pulse 105   Temp (!) 97.4 °F (36.3 °C)   Resp 20   Ht 5' 8" (1.727 m)   Wt 83.5 kg (184 lb)   SpO2 96%   BMI 27.98 kg/m²   Vitals:    09/13/23 0920 09/14/23 0845   Weight: 83.5 kg (184 lb 1.4 oz) 83.5 kg (184 lb)     Orthostatic Blood Pressures    Flowsheet Row Most Recent Value   Blood Pressure 99/68 filed at 09/14/2023 1013   Patient Position - Orthostatic VS Sitting filed at 09/14/2023 2862            Intake/Output Summary (Last 24 hours) at 9/14/2023 1317  Last data filed at 9/14/2023 1214  Gross per 24 hour   Intake 500 ml   Output 480 ml   Net 20 ml       Invasive Devices     Peripheral Intravenous Line  Duration           Peripheral IV 09/13/23 Left Antecubital 1 day                            Scheduled Meds:  Current Facility-Administered Medications   Medication Dose Route Frequency Provider Last Rate   • acetaminophen  650 mg Oral Q4H PRN Arlen Terrazas PA-C     • albuterol  1 puff Inhalation Q4H PRN Arlen Terrazas PA-C     • apixaban  5 mg Oral BID Arlen Terrazas PA-C     • atorvastatin  80 mg Oral Daily With AUGUST Pendleton     • clopidogrel  75 mg Oral Daily Arlen Terrazas PA-C     • ferrous sulfate  325 mg Oral Every Other Day Arlen Terrazas PA-C     • fluticasone  2 spray Nasal Daily Arlen Terrazas PA-C     • Fluticasone Furoate-Vilanterol  1 puff Inhalation Daily Dennys Almonte PA-C     • insulin glargine  18 Units Subcutaneous HS Dennys Almonte PA-C     • insulin lispro  1-5 Units Subcutaneous HS Dennys Almonte PA-C     • insulin lispro  1-6 Units Subcutaneous TID [facility] Dennys Almonte PA-C     • insulin lispro  5 Units Subcutaneous BID With Meals Dennys Almonte PA-C     • levothyroxine  137 mcg Oral Early Morning Dennys Almonte PA-C     • methocarbamol  750 mg Oral Q6H PRN Dennys Almonte PA-C     • metoprolol succinate  75 mg Oral BID Dennys Almonte PA-C     • montelukast  10 mg Oral Daily Dennys Almonte PA-C     • nitroglycerin  0.4 mg Sublingual Q5 Min PRN Dennys Almonte PA-C       Continuous Infusions:   PRN Meds:.•  acetaminophen  •  albuterol  •  methocarbamol  •  nitroglycerin    Review of Systems   Constitutional: Negative for fever and malaise/fatigue. Cardiovascular: Positive for irregular heartbeat. Negative for chest pain, dyspnea on exertion, leg swelling, near-syncope, orthopnea, palpitations, paroxysmal nocturnal dyspnea and syncope. All other systems reviewed and are negative. Physical Exam:   GEN: NAD, alert and oriented x 3, well appearing  SKIN: dry without significant lesions or rashes  HEENT: NCAT, PERRL, EOMs intact  NECK: + JVD   CARDIOVASCULAR: RRR, normal S1, S2 without murmurs, rubs, or gallops appreciated  LUNGS: Clear to auscultation bilaterally without wheezes, rhonchi, or rales  ABDOMEN: Soft, nontender, nondistended  EXTREMITIES/VASCULAR: perfused without clubbing, cyanosis, or LE edema b/l  PSYCH: Normal mood and affect  NEURO: CN ll-Xll grossly intact                Lab Results: I have personally reviewed pertinent lab results.     Results from last 7 days   Lab Units 09/14/23  0536 09/13/23  0715   WBC Thousand/uL 10.25* 6.23   HEMOGLOBIN g/dL 12.2 12.8   HEMATOCRIT % 39.3 41.8   PLATELETS Thousands/uL 202 206     Results from last 7 days   Lab Units 09/14/23  0536 09/13/23  0715   POTASSIUM mmol/L 4.3 3.9   CHLORIDE mmol/L 102 101   CO2 mmol/L 30 30   BUN mg/dL 40* 25   CREATININE mg/dL 1.69* 1.74*   CALCIUM mg/dL 9.0 9.4         Results from last 7 days   Lab Units 09/14/23  0536   MAGNESIUM mg/dL 2.3         Imaging: I have personally reviewed pertinent reports. ECHO: No results found for this or any previous visit. Results for orders placed during the hospital encounter of 03/18/23    Echo complete w/ contrast if indicated    Interpretation Summary  •  Left Ventricle: Left ventricular cavity size is mildly dilated. Wall thickness is normal. There is eccentric hypertrophy. The left ventricular ejection fraction is 30-35%. Systolic function is moderately reduced. •  The following segments are akinetic: basal inferoseptal, basal inferior, mid inferoseptal and mid inferolateral.  •  The following segments are hypokinetic: mid inferior and apical inferior. •  All other segments are normal.  •  IVS: There is abnormal septal motion consistent with left bundle branch block. •  Right Ventricle: Right ventricular cavity size is normal. Systolic function is normal.  •  Left Atrium: The atrium is moderately dilated. •  Right Atrium: The atrium is mildly dilated. •  Aortic Valve: There is an Emery RONNI 3 Ultra 26 mm TAVR bioprosthetic valve. There is no evidence of paravalvular regurgitation. There is no evidence of transvalvular regurgitation. The aortic valve has no significant stenosis. The gradient recorded across the prosthetic aortic valve is within the expected range. The aortic valve peak velocity is 2.26 m/s. The aortic valve mean gradient is 10 mmHg. The dimensionless velocity index is 0.36. The aortic valve area is 1.37 cm2. •  Mitral Valve: The posterior leaflet is tethered. There is mild annular calcification. There is severe regurgitation with a posteriorly directed jet. •  Tricuspid Valve: There is mild regurgitation.   •  Pericardium: There is a trivial pericardial effusion along the right atrial free wall. The fluid exhibits no internal echoes. There is no echocardiographic evidence of tamponade.         EKG/TELEMETRY: ongoing atrial flutter with periods of rapid ventricular response      VTE Pharmacologic Prophylaxis: starting on AC (will be given stat dose of Eliquis)

## 2023-09-14 NOTE — PROGRESS NOTES
Advanced Heart Failure/Pulmonary Hypertension - Attending Note - Kaushal Calvillo 70 y.o. male MRN: [id number]    Please see the most recent comprehensive heart failure note by the AP/resident/fellow for complete documentation; this is the attending attestation only. Assessment:  70 y.o. male Hx per chart p/w elective biv upgrade; had episode of lack of responsiveness after anesthesia during procedure concerning for CVA, neuro evaluated and thinks unlikely CVA rather sedation induced episode. ICM, Chronic heart failure with reduced EF, LVEF 30-35%, nondilated,   NYHA class II Stage C.   Etiology: ischemic, EF reduction in setting of NSTEMI with 80% mid LAD and 90% RPDA stenoses s/p stenting  CAD. 2/10/23 [facility]: 3-vessel CAD with two identifiable culprits for NSTEMI in ost RPDA & mid LAD. Successful PCI w/ ELDER x2 to mid LAD & ELDER x1 ost RPDA; residual moderate disease unchanged from 6-2022 study   6/03/22 [facility], pre TAVR: First marginal lesion 50% stenosis, mid LAD 50% stenosis, RPDA 50% stenosis, RPDA to 50% stenosis, 1st diagonal 50% stenosis. # Mitral regurgitation, severe on echo 3/21/23, moderate to severe on echo 5/25/23. To consider MitraClip if still with significant MR after CRT upgrade  # Severe AS s/p TAVR, #26mm Emery Hernán S3 Ultra valve via left femoral approach by Dr Juan David Frost.  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 S/P MDT PPM.  # Hypertension  # Hyperlipidemia  # DM2 HgbA1C  # Asthma  # Hepatitis C. Untreated per chart notes. Hep C Ab high reactive 1/2022. Management per PCP. # Lymphadenopathy  # Tobacco abuse, last smoked  10/2022  PFT 2/9/23:  Interpretation:  Results are unreliable due to patient being unable to perform maneuvers as per ATS standards. If clinical concern exists would recommend repeating PFTs. Best interpretation made based on available data.    • Severe obstructive airflow defect on spirometry  • No significant improvement in airflow or forced vital capacity in response to the administration to bronchodilator per ATS standards.    • Lung volumes unable to be obtained due to patient inability to perform maneuvers  • Normal diffusion capacity  • Flow-volume loop consistent with obstruction      I reviewed all pertinent labs/imaging/data including but not limited to:    Temp:  [97.4 °F (36.3 °C)-98.1 °F (36.7 °C)] 97.4 °F (36.3 °C)  HR:  [] 105  Resp:  [16-20] 20  BP: ()/(62-88) 99/68     Weight (last 2 days)     Date/Time Weight    09/14/23 0845 83.5 (184)    09/13/23 0920 83.5 (184.08)    09/13/23 0729 83.5 (184.08)           Intake/Output Summary (Last 24 hours) at 9/14/2023 1244  Last data filed at 9/14/2023 1214  Gross per 24 hour   Intake 1500 ml   Output 480 ml   Net 1020 ml       Results from last 7 days   Lab Units 09/14/23  0536 09/13/23  0715   CREATININE mg/dL 1.69* 1.74*     Lab Results   Component Value Date    K 4.3 09/14/2023     Lab Results   Component Value Date    HGBA1C 7.6 (H) 02/10/2023     Lab Results   Component Value Date    TXH6HRMSBRAZ 0.861 02/10/2023     Lab Results   Component Value Date    LDLCALC 72 02/10/2023     Lab Results   Component Value Date     (H) 07/14/2023      Lab Results   Component Value Date    NTBNP 2,624 (H) 05/06/2023                 Drips:        Plan:  I am meeting patient for the first time today  Warm, volume up though not florid, note contribution to his JVP from significant TR (he has significant MR and TR; I believe the TR is not new and is underappreciated on prior echos)  Note his contrast allergy and premedication  He feels ok, +increased phlegm he says  Recently planned for escalated lasix burst in clinic, which he took  HE now  Weight may be few pounds up from his BL  He was in Afib and AFL on 9/13 ECGs - Tx per EP team    Neuro recs appreciated regarding etiology of his intraprocedural event    Would Give lasix 60 IV x1, assess response for further Tx    Pause nephrotoxic Rx including some GDMT  Restart upon HE resolution and diuresis    Rest of care per EP and primary teams    Neurohormonal Blockade:   --Beta-Blocker:Metoprolol succinate 75 mg BID  --ACEi, ARB or ARNi: Lisinopril 20 mg BID - hold  --SGLT2i- jardiance 10 mg daily - hold  --Aldosterone Receptor Blocker: Spironolactone 25 mg daily - hold    --prior home Diuretic: Lasix 40 mg daily     Sudden Cardiac Death Risk Reduction:  --ICD: EF 30-35%, MDT DC PPM in situ, returned LifeVest  9/13/23 CRT-D upgrade (note he already had prior LPF RV lead)     Electrical Resynchronization:  --Candidacy for BiV device: LBBB, QRSd 132-140 msec  Advanced Therapies (if appropriate): --Inotrope:  --LVAD/Transplant Candidacy:    Studies:  I have reviewed all pertinent patient data/labs/imaging where available, including but not limited to the below studies. Selected results may be displayed here but comprehensive listing is omitted for note clarity and can be found in the epic chart. ECG. Echo. Stress. Cath. Thank you for the opportunity to participate in the care of this patient.     Dina Rico MD  Attending Physician  Advanced Heart Failure and Transplant Cardiology  [address] [facility]

## 2023-09-14 NOTE — CONSULTS
Consultation - Heart Failure Service  Maria Burdick 70 y.o. male MRN: [id number]  Unit/Bed#: CW2 218-02 Encounter: [id number]      Inpatient consult to Heart Failure Service  Consult performed by: Ange Rizo MD  Consult ordered by: Lilia Umanzor PA-C        PCP: Carmella Mejia DO   Outpatient Cardiologist: Amy Reed MD    History of Present Illness   Physician Requesting Consult: Roro Jarrett MD  Reason for Consult / Principal Problem: HFrEF          Assessment and Plan      Assessment:  Ischemic cardiomyopathy, HFrEF LVEF 35%  CAD w/PCI to mLAD and RPDA from 02/2023  Baseline LBBB with lead in LBB now s/p upgrade to BiV ICD  Tachybrady syndrome s/p upgrade to BiV ICD  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 Baseline diuresis regimen: Was on lasix 40 mg daily with  Diuresis plan for today: Furosemide IV 60 one time dose today  -GDMT regimen: At baseline on lisinopril 20 mg daily, jardiance 10 mg daily, on metoprolol succinate 75 mg twice daily, on spironolactone 25 mg daily.  Will hold lisinopril HE, GFR around 37-38, will hold spironolactone and jardiance due to GFRs on lower end due to HE  -Devices: Has a BiV ICD after upgrade from dual chamber pacemaker yesterday  -Daily standing weights  -Strict I&Os  -Monitor on telemetry    CAD w/PCI to mLAD and RPDA from 02/2023:  -On ASA and plavix  -On atorvastatin 80 mg daily  -On metoprolol succinate 75 mg twice daily    Baseline LBBB with lead in LBB now s/p upgrade to BiV ICD:  -Now has a RV ICD lead, lead in LPF position, and an RA lead    Tachybrady syndrome s/p upgrade to BiV ICD  -Monitor on telemetry  -Now s/p BiV ICD upgrade    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:  -Baseline serum creatinine around 1-1.2 with sCr over past 24 hours around 1.6  -sCr today at 1.69 and sCr was elevated on presentation as well which is likely concerning for volume overload  -Continue to monitor with daily BMPs    Severe aortic stenosis s/p TAVR   -Echo from 05/2023 showing peak gradient of 23 mmHg, mean gradient of 13 mmHg with no evidence of regurgitation    Moderate mitral regurgitation  -Based on echo from 05/2023    Hypertension  -Lisinopril on hold due to HE    Diabetes mellitus type 2  -Management as per primary team    Hypothyroidism  -On levothyroxine 137 mcg daily    Case discussed and reviewed with Dr. Sandeep Collazo. Please wait for final recommendations from Dr. Sandeep Collazo. Thank you for involving us in the care of your patient. Subjective     HPI: Dana Sloan is a 70y.o. year old male with ischemic cardiomyopathy with LVEF 30% from echo (05/2023), CAD with history of a prior NSTEMI w/ PCI to mLAD and RPDA in 02/2023, tachybrady syndrome w/ medtronic dual chamber pacemaker previously, severe aortic stenosis s/p TAVR, hypertension, hyperlipidemia, diabetes mellitus type 2, moderate to severe mitral regurgitation, hepatitis C, history of tobacco use. He was admitted to hospital in 02/2023 for shortness of breath and intermittent chest pain, he underwent cardiac catheterization and received ELDER to mLAD and RPDA at the time and placed on optimal medical therapy but despite this, his LVEF has remained at around 35% as seen on follow up echos. He has also had a baseline left bundle branch block because of which he was referred for BiV ICD upgrade. He had undergone an outpatient venogram which showed left sided patency.  He was subsequently scheduled for elective upgrade to BiV ICD (has an existent LPV lead). During the procedure, shortly after administration of anesthesia that included ketamine, fentanyl, and propofol, patient became altered and was unable to follow commands as a result of which neurology was informed and a stroke alert was called. Patient was unable to follow commands but able to move all extremities spontaneously. Eventually, patients mentation improved during the afternoon and due to low suspicion for a possible stroke, it was thought to be short term encephalopathy due to anesthesia medications. Heart failure service was consulted for additional recommendations and managements in this patient with ischemic cardiomyopathy.        Review of Systems  CONSTITUTIONAL: Denies any fever, chills, rigors, and weight loss  HEENT: No earache or tinnitus, denies hearing loss or visual disturbances  CARDIOVASCULAR: As noted in HPI  RESPIRATORY: Denies any cough, hemoptysis  GASTROINTESTINAL: As noted in the History of Present Illness   GENITOURINARY: No problems with urination, denies any hematuria or dysuria  NEUROLOGIC: No dizziness or vertigo, denies headaches   MUSCULOSKELETAL: Denies any muscle or joint pain   SKIN: Denies skin rashes or itching   ENDOCRINE: Denies excessive thirst, denies intolerance to heat or cold      Historical Information   Past Medical History:   Diagnosis Date   • Arthritis    • Asthma    • Colon polyps    • Community acquired pneumonia     last assessed: 5/1/2014   • Diabetes mellitus ([address])    • Hemorrhagic prepatellar bursitis, left 10/21/2019   • Hepatitis C    • High cholesterol    • History of colonoscopy 2017   • Hypertension    • Lymphadenopathy, anterior cervical 04/17/2018   • Nephritis and nephropathy, not specified as acute or chronic, with other specified pathological lesion in kidney, in diseases classified elsewhere 3/26/2013   • Screening for colon cancer 05/01/2019   • Thoracic vertebral fracture ([address]) 06/11/2014     Past Surgical History:   Procedure Laterality Date   • CARDIAC CATHETERIZATION N/A 6/3/2022    Procedure: Cardiac RHC/LHC; Surgeon: Rafat Chase MD;  [facility]; Service: Cardiology   • CARDIAC CATHETERIZATION N/A 2022    Procedure: CARDIAC TAVR;  Surgeon: Adrian Obrien MD;  Location: [facility];  Service: Cardiology   • CARDIAC CATHETERIZATION N/A 2/10/2023    Procedure: Cardiac Coronary Angiogram;  Surgeon: Rafat Chase MD;  [facility]; Service: Cardiology   • CARDIAC CATHETERIZATION N/A 2/10/2023    Procedure: Cardiac pci;  Surgeon: Rafat Chase MD;  [facility]; Service: Cardiology   • CARDIAC ELECTROPHYSIOLOGY PROCEDURE N/A 2022    Procedure: Cardiac pacer implant ; DC PPM;  Surgeon: Yehuda Betts DO;  [facility]; Service: Cardiology   • CARDIAC ELECTROPHYSIOLOGY PROCEDURE N/A 2023    Procedure: Cardiac upgrade pacer to biv icd;  Surgeon: Amairani Saenz MD;  [facility]; Service: Cardiology   • COLONOSCOPY     • COLONOSCOPY W/ POLYPECTOMY     • IR UPPER EXTREMITY VENOGRAM- DIAGNOSTIC  2023   • LITHOTRIPSY     • MULTIPLE TOOTH EXTRACTIONS     • VT COLONOSCOPY FLX DX W/COLLJ SPEC WHEN PFRMD N/A 2018    Procedure: COLONOSCOPY;  Surgeon: Deion Britt MD;  Location: [facility] GI [facility];   Service: Gastroenterology   • VT REPLACE AORTIC VALVE OPENFEMORAL ARTERY APPROACH N/A 2022    Procedure: REPLACEMENT AORTIC VALVE TRANSCATHETER (TAVR) TRANSFEMORAL W/ 26MM QUINN RONNI S3 ULTRA VALVE(ACCESS ON LEFT) SAULO;  Surgeon: Marcel Douglas MD;  Location: [facility];  Service: Cardiac Surgery     Social History     Substance and Sexual Activity   Alcohol Use No     Social History     Substance and Sexual Activity   Drug Use No     Social History     Tobacco Use   Smoking Status Former   • Packs/day: 0.50   • Types: Cigarettes   • Quit date: 2022   • Years since quittin.2   Smokeless Tobacco Never Tobacco Comments    started when he was about 24 yrs old; stopped smoking 3 wks ago       Meds/Allergies   Hospital Medications:   Current Facility-Administered Medications   Medication Dose Route Frequency   • acetaminophen (TYLENOL) tablet 650 mg  650 mg Oral Q4H PRN   • albuterol (PROVENTIL HFA,VENTOLIN HFA) inhaler 1 puff  1 puff Inhalation Q4H PRN   • aspirin chewable tablet 81 mg  81 mg Oral Daily   • atorvastatin (LIPITOR) tablet 80 mg  80 mg Oral Daily With Dinner   • clopidogrel (PLAVIX) tablet 75 mg  75 mg Oral Daily   • Empagliflozin (JARDIANCE) tablet 10 mg  10 mg Oral QAM   • ferrous sulfate tablet 325 mg  325 mg Oral Every Other Day   • fluticasone (FLONASE) 50 mcg/act nasal spray 2 spray  2 spray Nasal Daily   • Fluticasone Furoate-Vilanterol 100-25 mcg/actuation 1 puff  1 puff Inhalation Daily   • furosemide (LASIX) tablet 40 mg  40 mg Oral Daily   • insulin glargine (LANTUS) subcutaneous injection 18 Units 0.18 mL  18 Units Subcutaneous HS   • insulin lispro (HumaLOG) 100 units/mL subcutaneous injection 1-5 Units  1-5 Units Subcutaneous HS   • insulin lispro (HumaLOG) 100 units/mL subcutaneous injection 1-6 Units  1-6 Units Subcutaneous TID AC   • insulin lispro (HumaLOG) 100 units/mL subcutaneous injection 5 Units  5 Units Subcutaneous BID With Meals   • levothyroxine tablet 137 mcg  137 mcg Oral Early Morning   • lisinopril (ZESTRIL) tablet 20 mg  20 mg Oral BID   • methocarbamol (ROBAXIN) tablet 750 mg  750 mg Oral Q6H PRN   • metoprolol succinate (TOPROL-XL) 24 hr tablet 75 mg  75 mg Oral BID   • montelukast (SINGULAIR) tablet 10 mg  10 mg Oral Daily   • nitroglycerin (NITROSTAT) SL tablet 0.4 mg  0.4 mg Sublingual Q5 Min PRN   • spironolactone (ALDACTONE) tablet 25 mg  25 mg Oral Daily     Home Medications:   Medications Prior to Admission   Medication   • Advair -21 MCG/ACT inhaler   • albuterol (PROVENTIL HFA,VENTOLIN HFA) 90 mcg/act inhaler   • Alcohol Swabs (ALCOHOL PADS) 70 % PADS • Aspirin Low Dose 81 MG chewable tablet   • Blood Glucose Monitoring Suppl (OneTouch Verio) w/Device KIT   • cholecalciferol (VITAMIN D3) 1,000 units tablet   • Empagliflozin (JARDIANCE) 10 MG TABS tablet   • ferrous sulfate 325 (65 Fe) mg tablet   • fluticasone (FLONASE) 50 mcg/act nasal spray   • furosemide (LASIX) 40 mg tablet   • Insulin Pen Needle (Pen Needles) 31G X 8 MM MISC   • Lancets (FREESTYLE) lancets   • Lantus SoloStar 100 units/mL SOPN   • levothyroxine 137 mcg tablet   • lisinopril (ZESTRIL) 20 mg tablet   • metFORMIN (GLUCOPHAGE) 850 mg tablet   • methocarbamol (Robaxin-750) 750 mg tablet   • methylPREDNISolone (MEDROL) 32 MG tablet   • montelukast (SINGULAIR) 10 mg tablet   • nitroglycerin (NITROSTAT) 0.4 mg SL tablet   • NovoLOG FlexPen 100 units/mL injection pen   • predniSONE 20 mg tablet   • rosuvastatin (CRESTOR) 40 MG tablet   • spironolactone (ALDACTONE) 25 mg tablet   • Banophen 25 MG capsule   • Banophen 50 MG capsule   • clopidogrel (PLAVIX) 75 mg tablet   • Continuous Blood Gluc  (FreeStyle Cristofer 2 [place]) POWER   • Continuous Blood Gluc Sensor (FreeStyle Cristofer 2 Sensor) MISC   • famotidine (PEPCID) 20 mg tablet   • metoprolol succinate (TOPROL-XL) 50 mg 24 hr tablet       Allergies   Allergen Reactions   • Iv Contrast [Iodinated Contrast Media] Rash     Patient states he had a severe reaction approximately 10 years ago , states he had a rash, itchy and he remembers a bunch of people pounding on chest and being surrounded by multiple doctors. Objective     Vitals: Blood pressure 110/75, pulse 90, temperature 98.1 °F (36.7 °C), temperature source Oral, resp. rate 19, height 5' 8" (1.727 m), weight 83.5 kg (184 lb 1.4 oz), SpO2 91 %.   Orthostatic Blood Pressures    Flowsheet Row Most Recent Value   Blood Pressure 110/75 filed at 09/14/2023 4795   Patient Position - Orthostatic VS Sitting filed at 09/14/2023 2396          Invasive Devices     Peripheral Intravenous Line Duration           Peripheral IV 09/13/23 Left Antecubital 1 day                  Physical Exam  Vitals reviewed. Constitutional:       Comments: Patient seen sitting up in bed. HENT:      Head: Normocephalic and atraumatic. Cardiovascular:      Rate and Rhythm: Normal rate and regular rhythm. Pulses: Normal pulses. Heart sounds: Normal heart sounds. Comments: JVP elevated upto mid neck  Pulmonary:      Effort: Pulmonary effort is normal.      Breath sounds: Normal breath sounds. Abdominal:      General: Bowel sounds are normal.      Palpations: Abdomen is soft. Skin:     General: Skin is warm and dry. Capillary Refill: Capillary refill takes less than 2 seconds. Neurological:      Mental Status: He is alert and oriented to person, place, and time. Mental status is at baseline. Lab Results: I have personally reviewed pertinent lab results. Results from last 7 days   Lab Units 09/14/23  0536 09/13/23  0715   POTASSIUM mmol/L 4.3 3.9   CO2 mmol/L 30 30   CHLORIDE mmol/L 102 101   BUN mg/dL 40* 25   CREATININE mg/dL 1.69* 1.74*     Results from last 7 days   Lab Units 09/14/23  0536 09/13/23  0715   HEMOGLOBIN g/dL 12.2 12.8   HEMATOCRIT % 39.3 41.8   PLATELETS Thousands/uL 202 206                 Cardiac Imaging/Monitoring       Imaging: I have personally reviewed pertinent reports. Holter/Telemetry: Reviewed. Rates of around 100, appears to be sinus rhythm with intermittent atrial sensed ventricularly paced rhythm. ECHO:   Last echo from 05/2023:      Left Ventricle Left ventricular cavity size is normal. Wall thickness is increased. There is borderline concentric hypertrophy. The left ventricular ejection fraction is 35%. Systolic function is severely reduced. There is global hypokinesis with regional variation. Interventricular Septum There is abnormal septal motion consistent with left bundle branch block.       Right Ventricle Right ventricular cavity size is normal. Systolic function is normal. Wall thickness is normal.      Left Atrium The atrium is dilated. Right Atrium The atrium is normal in size. Aortic Valve There is a TAVR bioprosthetic valve. Peak gradient 23 mmHg, mean gradient 13 mmHg. There is no evidence of regurgitation. Mitral Valve There is moderate to severe regurgitation with an eccentrically directed jet. There is no evidence of stenosis. Tricuspid Valve Tricuspid valve structure is normal. There is mild regurgitation. There is no evidence of stenosis. Pulmonic Valve Pulmonic valve structure is normal. There is mild regurgitation. There is no evidence of stenosis. Ascending Aorta The aortic root is normal in size. IVC/SVC The inferior vena cava is normal in size. Pericardium There is no pericardial effusion. The pericardium is normal in appearance. Pulmonary Artery The estimated pulmonary artery systolic pressure is 25.1 mmHg. Encounter Date: 09/13/23   ECG 12 lead   Result Value    Ventricular Rate 109    Atrial Rate 104    UT Interval     QRSD Interval 136    QT Interval 366    QTC Interval 492    P Axis     QRS Axis -2    T Wave Axis 91    Narrative    Atrial fibrillation  Left bundle branch block  Abnormal ECG  When compared with ECG of 14-JUL-2023 12:04,  Atrial fibrillation has replaced Sinus rhythm  QRS axis Shifted right  T wave inversion no longer evident in Lateral leads  Confirmed by Contreras Antunez ([id number]) on 9/13/2023 1:40:56 PM         Brody Roman MD  Internal Medicine Residency PGY-3  [facility]    ==========================================================================================    Counseling / Coordination of Care  Total floor / unit time spent today 40 minutes. Greater than 50% of total time was spent with the patient and / or family counseling and / or coordination of care.      ** Please Note: Voice dictation software may have been used in the creation of this document.  **

## 2023-09-15 LAB
ANION GAP SERPL CALCULATED.3IONS-SCNC: 4 MMOL/L
BASOPHILS # BLD AUTO: 0.03 THOUSANDS/ÂΜL (ref 0–0.1)
BASOPHILS NFR BLD AUTO: 0 % (ref 0–1)
BUN SERPL-MCNC: 48 MG/DL (ref 5–25)
CALCIUM SERPL-MCNC: 8.5 MG/DL (ref 8.4–10.2)
CHLORIDE SERPL-SCNC: 104 MMOL/L (ref 96–108)
CO2 SERPL-SCNC: 33 MMOL/L (ref 21–32)
CREAT SERPL-MCNC: 1.63 MG/DL (ref 0.6–1.3)
EOSINOPHIL # BLD AUTO: 0.03 THOUSAND/ÂΜL (ref 0–0.61)
EOSINOPHIL NFR BLD AUTO: 0 % (ref 0–6)
ERYTHROCYTE [DISTWIDTH] IN BLOOD BY AUTOMATED COUNT: 17.9 % (ref 11.6–15.1)
GFR SERPL CREATININE-BSD FRML MDRD: 40 ML/MIN/1.73SQ M
GLUCOSE SERPL-MCNC: 122 MG/DL (ref 65–140)
GLUCOSE SERPL-MCNC: 132 MG/DL (ref 65–140)
GLUCOSE SERPL-MCNC: 205 MG/DL (ref 65–140)
GLUCOSE SERPL-MCNC: 74 MG/DL (ref 65–140)
GLUCOSE SERPL-MCNC: 76 MG/DL (ref 65–140)
HCT VFR BLD AUTO: 40.6 % (ref 36.5–49.3)
HGB BLD-MCNC: 12.3 G/DL (ref 12–17)
IMM GRANULOCYTES # BLD AUTO: 0.02 THOUSAND/UL (ref 0–0.2)
IMM GRANULOCYTES NFR BLD AUTO: 0 % (ref 0–2)
LYMPHOCYTES # BLD AUTO: 1.81 THOUSANDS/ÂΜL (ref 0.6–4.47)
LYMPHOCYTES NFR BLD AUTO: 22 % (ref 14–44)
MCH RBC QN AUTO: 27.4 PG (ref 26.8–34.3)
MCHC RBC AUTO-ENTMCNC: 30.3 G/DL (ref 31.4–37.4)
MCV RBC AUTO: 90 FL (ref 82–98)
MONOCYTES # BLD AUTO: 0.83 THOUSAND/ÂΜL (ref 0.17–1.22)
MONOCYTES NFR BLD AUTO: 10 % (ref 4–12)
NEUTROPHILS # BLD AUTO: 5.59 THOUSANDS/ÂΜL (ref 1.85–7.62)
NEUTS SEG NFR BLD AUTO: 68 % (ref 43–75)
NRBC BLD AUTO-RTO: 0 /100 WBCS
PLATELET # BLD AUTO: 180 THOUSANDS/UL (ref 149–390)
PMV BLD AUTO: 10.6 FL (ref 8.9–12.7)
POTASSIUM SERPL-SCNC: 4 MMOL/L (ref 3.5–5.3)
RBC # BLD AUTO: 4.49 MILLION/UL (ref 3.88–5.62)
SODIUM SERPL-SCNC: 141 MMOL/L (ref 135–147)
WBC # BLD AUTO: 8.31 THOUSAND/UL (ref 4.31–10.16)

## 2023-09-15 PROCEDURE — NC001 PR NO CHARGE: Performed by: STUDENT IN AN ORGANIZED HEALTH CARE EDUCATION/TRAINING PROGRAM

## 2023-09-15 PROCEDURE — 85025 COMPLETE CBC W/AUTO DIFF WBC: CPT | Performed by: PHYSICIAN ASSISTANT

## 2023-09-15 PROCEDURE — 80048 BASIC METABOLIC PNL TOTAL CA: CPT | Performed by: PHYSICIAN ASSISTANT

## 2023-09-15 PROCEDURE — 82948 REAGENT STRIP/BLOOD GLUCOSE: CPT

## 2023-09-15 PROCEDURE — 99024 POSTOP FOLLOW-UP VISIT: CPT | Performed by: STUDENT IN AN ORGANIZED HEALTH CARE EDUCATION/TRAINING PROGRAM

## 2023-09-15 PROCEDURE — 99024 POSTOP FOLLOW-UP VISIT: CPT | Performed by: INTERNAL MEDICINE

## 2023-09-15 RX ORDER — FENTANYL CITRATE 50 UG/ML
INJECTION, SOLUTION INTRAMUSCULAR; INTRAVENOUS
Status: COMPLETED
Start: 2023-09-15 | End: 2023-09-15

## 2023-09-15 RX ORDER — FUROSEMIDE 10 MG/ML
20 INJECTION INTRAMUSCULAR; INTRAVENOUS ONCE
Status: COMPLETED | OUTPATIENT
Start: 2023-09-15 | End: 2023-09-15

## 2023-09-15 RX ORDER — FUROSEMIDE 10 MG/ML
80 INJECTION INTRAMUSCULAR; INTRAVENOUS
Status: DISCONTINUED | OUTPATIENT
Start: 2023-09-15 | End: 2023-09-17 | Stop reason: HOSPADM

## 2023-09-15 RX ORDER — LIDOCAINE HYDROCHLORIDE 10 MG/ML
INJECTION, SOLUTION EPIDURAL; INFILTRATION; INTRACAUDAL; PERINEURAL
Status: COMPLETED
Start: 2023-09-15 | End: 2023-09-15

## 2023-09-15 RX ADMIN — LEVOTHYROXINE SODIUM 137 MCG: 25 TABLET ORAL at 05:25

## 2023-09-15 RX ADMIN — FUROSEMIDE 60 MG: 10 INJECTION, SOLUTION INTRAMUSCULAR; INTRAVENOUS at 09:08

## 2023-09-15 RX ADMIN — MONTELUKAST 10 MG: 10 TABLET, FILM COATED ORAL at 09:10

## 2023-09-15 RX ADMIN — FENTANYL CITRATE: 50 INJECTION, SOLUTION INTRAMUSCULAR; INTRAVENOUS at 12:58

## 2023-09-15 RX ADMIN — APIXABAN 5 MG: 5 TABLET, FILM COATED ORAL at 17:30

## 2023-09-15 RX ADMIN — INSULIN LISPRO 5 UNITS: 100 INJECTION, SOLUTION INTRAVENOUS; SUBCUTANEOUS at 09:11

## 2023-09-15 RX ADMIN — FLUTICASONE PROPIONATE 2 SPRAY: 50 SPRAY, METERED NASAL at 09:10

## 2023-09-15 RX ADMIN — METOPROLOL SUCCINATE 75 MG: 50 TABLET, EXTENDED RELEASE ORAL at 09:10

## 2023-09-15 RX ADMIN — LIDOCAINE HYDROCHLORIDE: 10 INJECTION, SOLUTION EPIDURAL; INFILTRATION; INTRACAUDAL; PERINEURAL at 12:59

## 2023-09-15 RX ADMIN — FUROSEMIDE 80 MG: 10 INJECTION, SOLUTION INTRAMUSCULAR; INTRAVENOUS at 17:30

## 2023-09-15 RX ADMIN — CLOPIDOGREL BISULFATE 75 MG: 75 TABLET, FILM COATED ORAL at 09:10

## 2023-09-15 RX ADMIN — APIXABAN 5 MG: 5 TABLET, FILM COATED ORAL at 09:10

## 2023-09-15 RX ADMIN — EMPAGLIFLOZIN 10 MG: 10 TABLET, FILM COATED ORAL at 11:40

## 2023-09-15 RX ADMIN — INSULIN LISPRO 5 UNITS: 100 INJECTION, SOLUTION INTRAVENOUS; SUBCUTANEOUS at 17:30

## 2023-09-15 RX ADMIN — INSULIN GLARGINE 18 UNITS: 100 INJECTION, SOLUTION SUBCUTANEOUS at 21:09

## 2023-09-15 RX ADMIN — FUROSEMIDE 20 MG: 10 INJECTION, SOLUTION INTRAMUSCULAR; INTRAVENOUS at 10:37

## 2023-09-15 RX ADMIN — ATORVASTATIN CALCIUM 80 MG: 80 TABLET, FILM COATED ORAL at 17:30

## 2023-09-15 RX ADMIN — METOPROLOL SUCCINATE 75 MG: 50 TABLET, EXTENDED RELEASE ORAL at 21:08

## 2023-09-15 RX ADMIN — INSULIN LISPRO 2 UNITS: 100 INJECTION, SOLUTION INTRAVENOUS; SUBCUTANEOUS at 11:41

## 2023-09-15 RX ADMIN — FLUTICASONE FUROATE AND VILANTEROL TRIFENATATE 1 PUFF: 100; 25 POWDER RESPIRATORY (INHALATION) at 09:08

## 2023-09-15 NOTE — DISCHARGE INSTR - AVS FIRST PAGE
PLEASE NOTE THE FOLLOWING MEDICATION RECOMMENDATIONS;  - start Eliquis 5 mg twice daily (* [facility] [address] [facility]  - increase Lasix to 60 mg once daily (new prescription)  - lisinopril decreased to 10 mg once daily (new prescription)  - STOP spironolactone   - STOP aspirin (you are now on Eliquis + Plavix instead of Plavix + aspirin)  - continue Plavix  - continue Jardiance  - continue your other medications as taken previously   - check your blood sugars at home  - get blood work in 1 week to check kidney function and electrolytes    Please refer to post device implantation discharge instructions and restrictions below and your device booklet/temporary card. Keep incision dry for one week. Do not use lotions/powders/creams on incision. Leave outer bandage in place for 1 week - it is water proof, and as long as it is fully adhered to your skin you may shower with it. If it appears as though the bandage is coming off and/or there is any communication to the area of device incision, please then keep the whole area dry for the remaining week. After 1 week, please remove by pulling all edges away from the center of the bandage. No overhead reaching/pushing/pulling/lifting greater than 5-10lbs with left arm for six weeks. Please call the office if you notice redness, swelling, bleeding, or drainage from incision or if you develop fevers    Cardiac Resynchronization Therapy (CRT)    If you have any questions, please call [phone] to speak with a nurse (8:30am-4pm, or [phone] after hours). For appointments, please call [phone]. WHAT YOU SHOULD KNOW:    Your device is a biventricular ICD, which delivers resynchronization therapy and is also a defibrillator (see below). Cardiac resynchronization therapy (CRT) is a procedure used to treat problems with how your heart contracts. CRT is also called biventricular pacing.  Your heart has 2 upper chambers, called atria, and 2 lower chambers, called ventricles. Your heartbeat is synchronized when all areas of your heart contract together properly. When the areas of your heart do not contract as they should, your heart cannot pump enough blood and oxygen to your body. You may have trouble breathing, tire easily, and have swelling in your legs and feet. With a biventricular device, there is one wire in the right atria (in most cases), one wire in the right ventricle, and a third wire that goes through a vein and wraps around to your left ventricle. The two ventricular wires work together to help your heart contract more synchronously and efficiently. AFTER YOU LEAVE:      Medicines:   Heart medicine may be given to help your heart beat strongly and regularly. Ask your healthcare provider for more information about heart medicines. Take your medicine as directed. Contact your healthcare provider if you think your medicine is not helping or if you have side effects. Tell him if you are allergic to any medicine. Keep a list of the medicines, vitamins, and herbs you take. Include the amounts, and when and why you take them. Bring the list or the pill bottles to follow-up visits. Carry your medicine list with you in case of an emergency. Driving: you are ok to drive 48 hours after device is implanted     Follow up with your healthcare provider as directed: You will need to return to have your pacemaker checked. You may also need an EKG, echocardiogram, or chest x-ray to check the leads in your heart, and your doctor will order these tests if necessary. Write down your questions so you remember to ask them during your visits. Device safety: Some electrical devices may interfere with how your pacemaker works. Some examples are cell phones, security systems, power cables, and electric monitors. Ask your healthcare provider how long you can be near these devices, and which devices to avoid.  Tell caregivers you have a pacemaker before you have a procedure or surgery. Wound care: Care for your wound as directed. Keep incision dry for one week. Do not use lotions/powders/creams on incision. Leave outer bandage in place for 1 week - it is water proof, and as long as it is fully adhered to your skin you may shower with it. If it appears as though the bandage is coming off and/or there is any communication to the area of device incision, please then keep the whole area dry for the remaining week. After 1 week, please remove by pulling all edges away from the center of the bandage. No overhead reaching/pushing/pulling/lifting greater than 5-10lbs with left arm for one month. Please call the office if you notice redness, swelling, bleeding, or drainage from incision or if you develop fevers      Contact your healthcare provider if:   You have new or increased swelling in your legs or feet. You feel more tired than usual.   You have trouble breathing during activity. Your wound is red, warm, swollen, or draining pus. You have a fever. You have questions or concerns about your condition or care. Seek care immediately or call 911 if: You feel like your heart is fluttering or jumping. You have any of the following signs of a heart attack:    Squeezing, pressure, fullness, or pain in your chest that lasts longer than a few minutes or returns   Discomfort or pain in your back, neck, jaw, stomach, or arm   Shortness of breath or breathing problems   A sudden cold sweat, lightheadedness, dizziness, or nausea, especially with chest pain or trouble breathing      Implantable Cardioverter Defibrillator (ICD)    If you have any questions, please call [phone] to speak with a nurse (8:30am-4pm, or [phone] after hours). For appointments, please call [phone]. WHAT YOU SHOULD KNOW:    Your device is a biventricular ICD, which delivers resynchronization therapy (see above) and is also a defibrillator.  An implantable cardioverter defibrillator (ICD) is a small device that monitors your heart rate and rhythm. It is commonly placed inside your upper chest region. It may be used if you have a ventricular arrhythmia, which is an irregular, dangerous rhythm from the bottom chamber of your heart. Some arrhythmias may cause your heart to suddenly stop beating. An ICD can give a shock to your heart to make it start beating again, or it can give pacing therapy (also known as pain-free therapy) to return your heart to normal rhythm. It is also a pacemaker, so it will pace your heart if needed to prevent it from beating too slowly. AFTER YOU LEAVE:      Follow up with your primary healthcare provider or cardiologist as directed: You will need to follow-up to have your ICD checked and make sure you are not having problems. Write down your questions so you remember to ask them during your visits. Self-care:   Ask about activity: Ask if you need to avoid moving your shoulder or arm, and for how long. Ask if you should avoid lifting heavy objects. Do not play any contact sports, such as football or wrestling, until your primary healthcare provider [facility] or cardiologist tells you it is okay. You may only be able to drive for a certain amount of time per day, or during certain hours. Ask when you can return to work. Care for your skin over the ICD: see answer in instructions above     When you get a shock from your ICD: A shock may feel like someone has hit you, or you may feel a thump in the chest. If someone is touching you when you get a shock, they may feel a tingling feeling. The first time you feel a shock, it may scare you. Sit or lie down and stay calm. Ask someone to stay with you if possible. Please either call your cardiologist or report to an emergency room. Safety instructions when you have an ICD:   Carry an ID card for the ICD: This card has important information about your ICD.     Stay away from magnets or machines with electric fields: This includes MRI machines. Avoid leaning into a car engine or doing welding. These things can interfere with how your ICD works. Tell airport security you have an ICD: You may need to be searched by hand when you go through a security gate. The security gate or handheld wand could harm your ICD. Keep an ICD diary: Record when you get a shock and what you were doing before you got the shock. Keep track of how you felt before and after the shock, as well as how many shocks you received. Write down the day and time of each shock. Bring the diary with you when you see your PHP or cardiologist.   [place] [place] medical alert identification: Wear medical alert jewelry or carry a card that says you have an ICD. Ask your PHP or cardiologist where to get these items. Contact your primary healthcare provider or cardiologist if:   You have a fever. You feel 1 or more shocks from your ICD and feel fine afterwards. Your feet or ankles swell. The area around your ICD is painful or tender after surgery. The skin around your stitches or staples is red, swollen, or draining pus or fluid. You have chills, a cough, and feel weak or achy. You are sad or anxious and find it hard to do your usual activities. You have questions or concerns about your condition or care. Seek care immediately or call 911 if:   Your stitches or staples come apart. Blood soaks through your bandage. You feel more than 3 shocks in a row from your ICD. You become weak, dizzy, or faint. You feel your heart skip beats or beat very fast or slow, but you do not feel a shock from your ICD. You have chest pain that does not go away with rest or medicine. © 2014 [address] is for End User's use only and may not be sold, redistributed or otherwise used for commercial purposes.  All illustrations and images included in CareNotes® are the copyrighted property of [employer] or [facility]. The above information is an  only. It is not intended as medical advice for individual conditions or treatments. Talk to your doctor, nurse or pharmacist before following any medical regimen to see if it is safe and effective for you.

## 2023-09-15 NOTE — PROGRESS NOTES
Advanced Heart Failure/Pulmonary Hypertension - Attending Note - Raj Villalobos 70 y.o. male MRN: [id number]    Please see the most recent comprehensive heart failure note by the AP/resident/fellow for complete documentation; this is the attending attestation only. Assessment:  70 y.o. male Hx per chart p/w elective biv upgrade; had episode of lack of responsiveness after anesthesia during procedure concerning for CVA, neuro evaluated and thinks unlikely CVA rather sedation induced episode. ICM, Chronic heart failure with reduced EF, LVEF 30-35%, nondilated,   NYHA class II Stage C.   Etiology: ischemic, EF reduction in setting of NSTEMI with 80% mid LAD and 90% RPDA stenoses s/p stenting  CAD. 2/10/23 [facility]: 3-vessel CAD with two identifiable culprits for NSTEMI in ost RPDA & mid LAD. Successful PCI w/ ELDER x2 to mid LAD & ELDER x1 ost RPDA; residual moderate disease unchanged from 6-2022 study   6/03/22 LH, pre TAVR: First marginal lesion 50% stenosis, mid LAD 50% stenosis, RPDA 50% stenosis, RPDA to 50% stenosis, 1st diagonal 50% stenosis. # Mitral regurgitation, severe on echo 3/21/23, moderate to severe on echo 5/25/23. To consider MitraClip if still with significant MR after CRT upgrade  # Severe AS s/p TAVR, #26mm Emery Hernán S3 Ultra valve via left femoral approach by Dr Danielle Forrest.  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 S/P MDT PPM.  # Hypertension  # Hyperlipidemia  # DM2 HgbA1C  # Asthma  # Hepatitis C. Untreated per chart notes. Hep C Ab high reactive 1/2022. Management per PCP. # Lymphadenopathy  # Tobacco abuse, last smoked  10/2022  PFT 2/9/23:  Interpretation:  Results are unreliable due to patient being unable to perform maneuvers as per ATS standards. If clinical concern exists would recommend repeating PFTs. Best interpretation made based on available data.    • Severe obstructive airflow defect on spirometry  • No significant improvement in airflow or forced vital capacity in response to the administration to bronchodilator per ATS standards.    • Lung volumes unable to be obtained due to patient inability to perform maneuvers  • Normal diffusion capacity  • Flow-volume loop consistent with obstruction      I reviewed all pertinent labs/imaging/data including but not limited to:    Temp:  [97.6 °F (36.4 °C)-98.6 °F (37 °C)] 97.9 °F (36.6 °C)  HR:  [80-96] 84  Resp:  [16-18] 17  BP: (110-127)/(62-79) 127/66     Weight (last 2 days)     Date/Time Weight    09/15/23 1141 71.1 (156.75)    09/14/23 0845 83.5 (184)    09/13/23 0920 83.5 (184.08)    09/13/23 0729 83.5 (184.08)           Intake/Output Summary (Last 24 hours) at 9/15/2023 1259  Last data filed at 9/15/2023 1143  Gross per 24 hour   Intake 824 ml   Output 2675 ml   Net -1851 ml       Results from last 7 days   Lab Units 09/15/23  0447 09/14/23  1557 09/14/23  0536   CREATININE mg/dL 1.63* 1.57* 1.69*     Lab Results   Component Value Date    K 4.0 09/15/2023     Lab Results   Component Value Date    HGBA1C 7.6 (H) 02/10/2023     Lab Results   Component Value Date    QZG3RRAHQNYJ 0.861 02/10/2023     Lab Results   Component Value Date    LDLCALC 72 02/10/2023     Lab Results   Component Value Date     (H) 07/14/2023      Lab Results   Component Value Date    NTBNP 2,624 (H) 05/06/2023                 Drips:        Plan:  Warm, volume up though not florid, note contribution to his JVP from significant TR (he has significant MR and TR; I believe this level of TR is not new and is underappreciated on prior echos)  Recently planned for escalated lasix burst in clinic, which he took  HE now  Neuro recs appreciated regarding etiology of his 9/13/23 elective biv upgrade  intraprocedural event    He was in Afib and AFL on 9/13 ECGs - Tx per EP team    cw IV lasix now 9/15  hopefully deescalate 9/16 pending course  increase bblocker once more euvolemic    gdmt below    Pause nephrotoxic Rx including some GDMT  Restart upon HE resolution and diuresis    Rest of care per EP and primary teams    Neurohormonal Blockade:   --Beta-Blocker:Metoprolol succinate 75 mg BID  --ACEi, ARB or ARNi: Lisinopril 20 mg BID - hold  --SGLT2i- jardiance 10 mg daily  --Aldosterone Receptor Blocker: Spironolactone 25 mg daily - hold    --prior home Diuretic: Lasix 40 mg daily     Sudden Cardiac Death Risk Reduction:  --ICD: EF 30-35%, MDT DC PPM in situ, returned LifeVest  9/13/23 CRT-D upgrade (note he already had prior LPF RV lead)     Electrical Resynchronization:  --Candidacy for BiV device: LBBB, QRSd 132-140 msec  Advanced Therapies (if appropriate): --Inotrope:  --LVAD/Transplant Candidacy:    Studies:  I have reviewed all pertinent patient data/labs/imaging where available, including but not limited to the below studies. Selected results may be displayed here but comprehensive listing is omitted for note clarity and can be found in the epic chart. ECG. Echo. Stress. Cath. Thank you for the opportunity to participate in the care of this patient.     Kelley Wyatt MD  Attending Physician  Advanced Heart Failure and Transplant Cardiology  [address] [facility]

## 2023-09-15 NOTE — PLAN OF CARE
Problem: SAFETY ADULT  Goal: Patient will remain free of falls  Description: INTERVENTIONS:  - Educate patient/family on patient safety including physical limitations  - Instruct patient to call for assistance with activity   - Consult OT/PT to assist with strengthening/mobility   - Keep Call bell within reach  - Keep bed low and locked with side rails adjusted as appropriate  - Keep care items and personal belongings within reach  - Initiate and maintain comfort rounds  - Make Fall Risk Sign visible to staff    - Apply yellow socks and bracelet for high fall risk patients  - Consider moving patient to room near nurses station  Outcome: Progressing     Problem: Knowledge Deficit  Goal: Patient/family/caregiver demonstrates understanding of disease process, treatment plan, medications, and discharge instructions  Description: Complete learning assessment and assess knowledge base.   Interventions:  - Provide teaching at level of understanding  - Provide teaching via preferred learning methods  Outcome: Progressing     Problem: PAIN - ADULT  Goal: Verbalizes/displays adequate comfort level or baseline comfort level  Description: Interventions:  - Encourage patient to monitor pain and request assistance  - Assess pain using appropriate pain scale  - Administer analgesics based on type and severity of pain and evaluate response  - Implement non-pharmacological measures as appropriate and evaluate response  - Consider cultural and social influences on pain and pain management  - Notify physician/advanced practitioner if interventions unsuccessful or patient reports new pain  Outcome: Progressing     Problem: INFECTION - ADULT  Goal: Absence or prevention of progression during hospitalization  Description: INTERVENTIONS:  - Assess and monitor for signs and symptoms of infection  - Monitor lab/diagnostic results  - Monitor all insertion sites, i.e. indwelling lines, tubes, and drains  - Monitor endotracheal if appropriate and nasal secretions for changes in amount and color  - Farnam appropriate cooling/warming therapies per order  - Administer medications as ordered  - Instruct and encourage patient and family to use good hand hygiene technique  - Identify and instruct in appropriate isolation precautions for identified infection/condition  Outcome: Progressing

## 2023-09-15 NOTE — DISCHARGE SUMMARY
Discharge Summary - Connie Davis 70 y.o. male MRN: [id number]    [facility] Encounter: [id number]      Admission Date: 9/13/2023   Discharge Date: 9/17/2023    Discharge Diagnosis:   1.  Ischemic cardiomyopathy with LVEF 30% per most recent echo 5/2023, status post upgrade to Medtronic BiV ICD 9/13/2023 (pre-existing left bundle lead with new RV shock lead)              A.)  LVEF unchanged from prior echo 2/2023 despite revascularization at that time              B.)  Maintained on lisinopril and Toprol-XL as an outpatient  2.  CAD with prior NSTEMI with PCI to mid LAD and RPDA 2/2023  -- DOAC (Plavix and asa) changed to Plavix and Eliquis in setting of new AFib  3.  Chronic HFrEF  -- received IV Lasix in hospital  -- increase home Lasix dose to 60 mg once daily  -- lisinopril decreased to 10 mg once daily  -- spironolactone D/C'd  -- continue Jardiance  4.  Tachybrady syndrome, previously with Medtronic dual-chamber pacemaker in situ status post upgrade to BiV ICD  5.  LBBB  6.  Severe aortic stenosis status post prior TAVR  7.  Hypertension  8.  Hyperlipidemia  9.  Moderate to severe mitral regurgitation  10.  Diabetes  11.  Hepatitis C  12.  History of tobacco use  13.  Newly diagnosed atrial flutter with elevated ventricular response              A.)  CHADS2 Vasc = 6, started on Eliquis 5 mg bid    Procedures Performed: RV ICD lead and upgrade to Biv ICD 9/13/2023 by Dr. Denisa Strickland This Encounter   Procedures   • Cardiac ep lab eps/ablations   • Cardiac ep lab eps/ablations       Consultants: Dr. Bienvenido Epstein - heart failure    HPI: Please refer to the initial history and physical as well as procedure notes for full details.  Briefly, Connie Davis is a 70y.o. year old male with ischemic cardiomyopathy with LVEF 30% per most recent echo 5/2023 (unchanged from prior echo 2/2023 despite revascularization), CAD with prior NSTEMI with PCI to mid LAD and RPDA 2/2023, chronic HFrE tachybradycardia syndrome with MedtronicF, dual-chamber pacemaker in situ, baseline the bundle branch block, severe aortic stenosis status post prior TAVR, hypertension, hyperlipidemia, moderate to severe mitral regurgitation, diabetes, hepatitis C, and history of tobacco use. While historically he had a preserved EF, who presented to the hospital 2/2023 with worsening shortness of breath and intermittent chest pain. Echocardiogram showed newly reduced LVEF 35-40%, and he underwent more urgent cardiac catheterization and received ELDER to the mid LAD and RPDA. He was continued on optimal medical therapy with lisinopril and Toprol-XL, however despite these medications and despite intervention repeat imaging 3/2023, 4/2023, and 5/2023 all showed LVEF 35% or less. Given this and his baseline left bundle branch block, he was referred for upgrade to BiV ICD. He was seen in consultation by Dr. Blaise Prasad, and underwent an outpatient venogram which showed left-sided patency. He thus presented this admission to undergo upgrade to BiV ICD (likely just adding an ICD lead given the fact that he has an existent LPF lead). Hospital Course: Connie Davis presented at his baseline state of health. After the procedure was explained in detail and consent was obtained, he was administered anesthesia at the start of the procedure. He did not seem to tolerate this well, and there was question of neurologic changes. The procedure was aborted and neurology was consulted, but it was felt to be secondary to anesthesia without any acute neurologic events. He was monitored for several hours, and ultimately underwent upgrade of his dual-chamber pacemaker to a BiV ICD. Please see operative notes by Dr. Blaise Prasad for full details. He tolerated the procedure well. CXR immediately following the procedure showed appropriate lead placement without pneumothorax.     He was found to be in new onset atrial flutter with periods of rapid ventricular response upon admission. He had decompensated heart failure and was evaluated by the heart failure team. He was started on Lasix 80 mg IV bid. He remained in the hospital for several days for ongoing IV diuresis. The morning following his device implantation he denied all cardiac complaints, including chest pain/pressure, dyspnea, palpitations, dizziness, lightheadedness, or syncope. His vital signs were reviewed and stable, but he was found to have an HE on admission. His lisinopril and spironolactone were held in the setting. Chest xray this morning again showed appropriate device placement without pneumothorax. Device interrogation showed appropriate device function, including lead sensing, threshold, and impedance. His incision was clean, dry, and intact without swelling, hematoma, redness, bleeding, drainage, or signs of infection. Telemetry showed ongoing atrial flutter, however his heart rate seemed to improve with diuresis. He was started on Eliquis anticoagulation. He recently underwent PCI 2/2023 and had been maintained on Plavix and aspirin, thus his aspirin was discontinued and he was continued on Eliquis and Plavix. A prescription for Eliquis was sent to [facility], and it was found that he had a $0 co-pay pharmacy. He responded to diuresis with Lasix IV bid. On 9/17/2023, he was transitioned to oral Lasix. His home dose was increased to 60 mg once daily (previously 40 mg once daily). Lisinopril was held and then restarted at a lower dose of 10 mg once daily (preivously 20 mg bid). Spironolactone was discontinued and may be restarted as an outpatient if deemed stable. He was continued on Jardiance. Review of Systems   Constitutional: Negative for malaise/fatigue. HENT: Negative. Eyes: Negative. Cardiovascular: Negative for chest pain and syncope. Respiratory: Negative for cough and wheezing. Endocrine: Negative. Hematologic/Lymphatic: Negative. Skin: Negative. Musculoskeletal: Negative. Gastrointestinal: Negative for abdominal pain, nausea and vomiting. Genitourinary: Negative. Neurological: Negative for dizziness and seizures. Psychiatric/Behavioral: Negative. Allergic/Immunologic: Negative. Physical exam:  /56   Pulse 81   Temp 97.7 °F (36.5 °C) (Oral)   Resp 16   Ht 5' 8" (1.727 m)   Wt 75.1 kg (165 lb 9.1 oz)   SpO2 100%   BMI 25.17 kg/m²   GEN: NAD, alert and oriented x 3, well appearing  SKIN: dry without significant lesions or rashes. Left chest implant site C/D/I, no erythema or hematoma, no signs of infection  HEENT: NCAT, PERRL, EOMs intact  NECK: No JVD appreciated  CARDIOVASCULAR: RRR, normal S1, S2 without murmurs, rubs, or gallops appreciated  LUNGS: Clear to auscultation bilaterally without wheezes, rhonchi, or rales  ABDOMEN: Soft, nontender, nondistended  EXTREMITIES/VASCULAR: perfused without clubbing, cyanosis, or LE edema b/l  PSYCH: Normal mood and affect  NEURO: CN ll-Xll grossly intact    He was given routine post implantation discharge instructions and restrictions, including wound care, and these were explained in detail. He was instructed to keep the incision dry for one week. He was given a two week follow up appointment with our device clinic for device interrogation and site check, and he was instructed to follow up with his primary cardiologist as previously instructed. He will also be seen next month by Dr. Dennis Palencia to discuss his atrial flutter as well as ongoing treatment options.     Medication Changes/Follow up Instructions:  -- Start Eliquis 5 mg bid for stroke prevention  -- continue Plavix given h/o PCI  -- aspirin D/C'd given that we are starting Eliquis  -- Home Lasix increased to 60 mg once daily  -- lisinopril decreased to 10 mg once daily  -- D/C spironolactone (can restart as outpatient if deemed stable)  -- check labs in 1 week  -- F/U HF in one week  -- F/U in 2 weeks for wound check with device clinic  -- F/U with Dr. Katelin Rebollar in 4-6 weeks    He is stable for discharge at this time with all questions answered. He was discussed in detail with Dr. Aiden Dodd who is in agreement with this discharge summary. Discharge Medications:  See after visit summary for reconciled discharge medications provided to patient and family.     Medications Prior to Admission   Medication   • Advair -21 MCG/ACT inhaler   • albuterol (PROVENTIL HFA,VENTOLIN HFA) 90 mcg/act inhaler   • Alcohol Swabs (ALCOHOL PADS) 70 % PADS   • Aspirin Low Dose 81 MG chewable tablet   • Blood Glucose Monitoring Suppl (OneTouch Verio) w/Device KIT   • cholecalciferol (VITAMIN D3) 1,000 units tablet   • Empagliflozin (JARDIANCE) 10 MG TABS tablet   • ferrous sulfate 325 (65 Fe) mg tablet   • fluticasone (FLONASE) 50 mcg/act nasal spray   • Insulin Pen Needle (Pen Needles) 31G X 8 MM MISC   • Lancets (FREESTYLE) lancets   • Lantus SoloStar 100 units/mL SOPN   • levothyroxine 137 mcg tablet   • metFORMIN (GLUCOPHAGE) 850 mg tablet   • methocarbamol (Robaxin-750) 750 mg tablet   • methylPREDNISolone (MEDROL) 32 MG tablet   • montelukast (SINGULAIR) 10 mg tablet   • nitroglycerin (NITROSTAT) 0.4 mg SL tablet   • NovoLOG FlexPen 100 units/mL injection pen   • predniSONE 20 mg tablet   • rosuvastatin (CRESTOR) 40 MG tablet   • spironolactone (ALDACTONE) 25 mg tablet   • [DISCONTINUED] furosemide (LASIX) 40 mg tablet   • [DISCONTINUED] lisinopril (ZESTRIL) 20 mg tablet   • Banophen 25 MG capsule   • Banophen 50 MG capsule   • clopidogrel (PLAVIX) 75 mg tablet   • Continuous Blood Gluc  (FreeStyle Cristofer 2 [place]) POWER   • Continuous Blood Gluc Sensor (FreeStyle Cristofer 2 Sensor) MISC   • famotidine (PEPCID) 20 mg tablet   • metoprolol succinate (TOPROL-XL) 50 mg 24 hr tablet         Current Facility-Administered Medications   Medication Dose Route Frequency   • acetaminophen (TYLENOL) tablet 650 mg  650 mg Oral Q4H PRN • albuterol (PROVENTIL HFA,VENTOLIN HFA) inhaler 1 puff  1 puff Inhalation Q4H PRN   • apixaban (ELIQUIS) tablet 5 mg  5 mg Oral BID   • atorvastatin (LIPITOR) tablet 80 mg  80 mg Oral Daily With Dinner   • clopidogrel (PLAVIX) tablet 75 mg  75 mg Oral Daily   • Empagliflozin (JARDIANCE) tablet 10 mg  10 mg Oral Daily   • ferrous sulfate tablet 325 mg  325 mg Oral Every Other Day   • fluticasone (FLONASE) 50 mcg/act nasal spray 2 spray  2 spray Nasal Daily   • Fluticasone Furoate-Vilanterol 100-25 mcg/actuation 1 puff  1 puff Inhalation Daily   • furosemide (LASIX) injection 80 mg  80 mg Intravenous BID (diuretic)   • insulin glargine (LANTUS) subcutaneous injection 14 Units 0.14 mL  14 Units Subcutaneous HS   • insulin lispro (HumaLOG) 100 units/mL subcutaneous injection 1-5 Units  1-5 Units Subcutaneous HS   • insulin lispro (HumaLOG) 100 units/mL subcutaneous injection 1-6 Units  1-6 Units Subcutaneous TID AC   • insulin lispro (HumaLOG) 100 units/mL subcutaneous injection 5 Units  5 Units Subcutaneous BID With Meals   • levothyroxine tablet 137 mcg  137 mcg Oral Early Morning   • lisinopril (ZESTRIL) tablet 10 mg  10 mg Oral Daily   • methocarbamol (ROBAXIN) tablet 750 mg  750 mg Oral Q6H PRN   • metoprolol succinate (TOPROL-XL) 24 hr tablet 75 mg  75 mg Oral BID   • montelukast (SINGULAIR) tablet 10 mg  10 mg Oral Daily   • nitroglycerin (NITROSTAT) SL tablet 0.4 mg  0.4 mg Sublingual Q5 Min PRN       Pertininet Labs/diagnostics:  CBC with diff:   Results from last 7 days   Lab Units 09/17/23  0601 09/15/23  0447 09/14/23  0536 09/13/23  0715   WBC Thousand/uL 7.72 8.31 10.25* 6.23   HEMOGLOBIN g/dL 13.9 12.3 12.2 12.8   HEMATOCRIT % 44.4 40.6 39.3 41.8   MCV fL 88 90 90 92   PLATELETS Thousands/uL 203 180 202 206   RBC Million/uL 5.03 4.49 4.37 4.56   MCH pg 27.6 27.4 27.9 28.1   MCHC g/dL 31.3* 30.3* 31.0* 30.6*   RDW % 17.5* 17.9* 18.1* 18.1*   MPV fL 10.7 10.6 11.2 10.6   NRBC AUTO /100 WBCs 0 0  --   -- BMP:  Results from last 7 days   Lab Units 09/17/23  0601 09/16/23  0456 09/15/23  0447 09/14/23  1557 09/14/23  0536 09/13/23  0715   POTASSIUM mmol/L 3.5 3.5 4.0 4.0 4.3 3.9   CHLORIDE mmol/L 101 103 104 102 102 101   CO2 mmol/L 35* 32 33* 29 30 30   BUN mg/dL 37* 44* 48* 46* 40* 25   CREATININE mg/dL 1.41* 1.46* 1.63* 1.57* 1.69* 1.74*   CALCIUM mg/dL 9.5 9.2 8.5 8.7 9.0 9.4       Magnesium:   Results from last 7 days   Lab Units 09/17/23  0601 09/14/23  0536   MAGNESIUM mg/dL 2.2 2.3       Coags:       Complications: none    Condition at Discharge: good     Discharge instructions/Information to patient and family:   See after visit summary for information provided to patient and family. Provisions for Follow-Up Care:  See after visit summary for information related to follow-up care and any pertinent home health orders. Disposition: Home    Planned Readmission: No    Discharge Statement   I spent 45 minutes minutes discharging the patient. This time was spent on the day of discharge. I had direct contact with the patient on the day of discharge. Additional documentation is required if more than 30 minutes were spent on discharge. Evaluating the incision, discussing discharge instructions and restrictions, arranging follow up appointments, discussing medications    Discharge Medications:  See after visit summary for reconciled discharge medications provided to patient and family.

## 2023-09-15 NOTE — UTILIZATION REVIEW
NOTIFICATION OF INPATIENT ADMISSION   AUTHORIZATION REQUEST   SERVICING FACILITY:   [address] [place] [address]  Address: [address] [facility], [address] [place] [address] [address] [address]  Tax ID: [id number]  NPI: [id number] ATTENDING PROVIDER:  Attending Name and NPI#: Sharon Leon Md [[id number]]  Address: [address]  Phone: [phone]   ADMISSION INFORMATION:  Place of Service: [address] [place] [address]  Place of Service Code: 21  Inpatient Admission Date/Time: 9/14/23  2:17 PM  Discharge Date/Time: No discharge date for patient encounter. Admitting Diagnosis Code/Description:  Heart failure with reduced ejection fraction ([facility]) [I50.20]     UTILIZATION REVIEW CONTACT:  Sujit Landers, Utilization   Network Utilization Review Department  Phone: [phone]  Fax: [phone]  Email: [email or web address]  Contact for approvals/pending authorizations, clinical reviews, and discharge. PHYSICIAN ADVISORY SERVICES:  Medical Necessity Denial & [id number] Review  Phone: [phone]  Fax: [phone]  Email: [email or web address]

## 2023-09-15 NOTE — PROGRESS NOTES
Progress Note- Heart Failure Service  Sirena Quiñones 70 y.o. 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 Baseline diuresis regimen: Was on lasix 40 mg daily with  Diuresis plan for today: Furosemide IV 80 BID  -GDMT regimen: At baseline on lisinopril 20 mg daily, jardiance 10 mg daily, on metoprolol succinate 75 mg twice daily, on spironolactone 25 mg daily. Will hold lisinopril HE, GFR around 37-38, will hold spironolactone and jardiance due to GFRs on lower end due to HE. Jardiance restarted today.   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:  -Baseline serum creatinine around 1-1.2 with sCr over past 24 hours around 1.6  -sCr today at 1.63 and sCr was elevated on presentation as well which is likely concerning for volume overload  -Continue to monitor with daily BMPs    Severe aortic stenosis s/p TAVR   -Echo from 05/2023 showing peak gradient of 23 mmHg, mean gradient of 13 mmHg with no evidence of regurgitation    Moderate mitral regurgitation  -Based on echo from 05/2023    Hypertension  -Lisinopril on hold due to HE    Diabetes mellitus type 2  -Management as per primary team    Hypothyroidism  -On levothyroxine 137 mcg daily    Case discussed and reviewed with Dr. Jameson Stallings. Please wait for final recommendations from Dr. Jameson Stallings. Thank you for involving us in the care of your patient. Subjective     HPI: Harshal Martinez is a 70y.o. year old male with ischemic cardiomyopathy with LVEF 30% from echo (05/2023), CAD with history of a prior NSTEMI w/ PCI to mLAD and RPDA in 02/2023, tachybrady syndrome w/ medtronic dual chamber pacemaker previously, severe aortic stenosis s/p TAVR, hypertension, hyperlipidemia, diabetes mellitus type 2, moderate to severe mitral regurgitation, hepatitis C, history of tobacco use. He was admitted to hospital in 02/2023 for shortness of breath and intermittent chest pain, he underwent cardiac catheterization and received ELDER to mLAD and RPDA at the time and placed on optimal medical therapy but despite this, his LVEF has remained at around 35% as seen on follow up echos. He has also had a baseline left bundle branch block because of which he was referred for BiV ICD upgrade. He had undergone an outpatient venogram which showed left sided patency. He was subsequently scheduled for elective upgrade to BiV ICD (has an existent LPV lead).      During the procedure, shortly after administration of anesthesia that included ketamine, fentanyl, and propofol, patient became altered and was unable to follow commands as a result of which neurology was informed and a stroke alert was called. Patient was unable to follow commands but able to move all extremities spontaneously. Eventually, patients mentation improved during the afternoon and due to low suspicion for a possible stroke, it was thought to be short term encephalopathy due to anesthesia medications. Heart failure service was consulted for additional recommendations and managements in this patient with ischemic cardiomyopathy. Historical Information   Past Medical History:   Diagnosis Date   • Arthritis    • Asthma    • Colon polyps    • Community acquired pneumonia     last assessed: 5/1/2014   • Diabetes mellitus ([address])    • Hemorrhagic prepatellar bursitis, left 10/21/2019   • Hepatitis C    • High cholesterol    • History of colonoscopy 2017   • Hypertension    • Lymphadenopathy, anterior cervical 04/17/2018   • Nephritis and nephropathy, not specified as acute or chronic, with other specified pathological lesion in kidney, in diseases classified elsewhere 3/26/2013   • Screening for colon cancer 05/01/2019   • Thoracic vertebral fracture ([address]) 06/11/2014     Past Surgical History:   Procedure Laterality Date   • CARDIAC CATHETERIZATION N/A 6/3/2022    Procedure: Cardiac RHC/LHC; Surgeon: Chidi Ocampo MD;  [facility]: [facility]; Service: Cardiology   • CARDIAC CATHETERIZATION N/A 6/21/2022    Procedure: CARDIAC TAVR;  Surgeon: Yasmin De MD;  Location: [facility];  Service: Cardiology   • CARDIAC CATHETERIZATION N/A 2/10/2023    Procedure: Cardiac Coronary Angiogram;  Surgeon: Chidi Ocampo MD;  [facility]; Service: Cardiology   • CARDIAC CATHETERIZATION N/A 2/10/2023    Procedure: Cardiac pci;  Surgeon: Chidi Ocampo MD;  [facility]; Service: Cardiology   • CARDIAC ELECTROPHYSIOLOGY PROCEDURE N/A 9/1/2022    Procedure: Cardiac pacer implant ; DC PPM;  Surgeon: Marilin Hancock DO;  [facility];   Service: Cardiology   • CARDIAC ELECTROPHYSIOLOGY PROCEDURE N/A 2023    Procedure: Cardiac upgrade pacer to biv icd;  Surgeon: Angel Longo MD;  [facility]; Service: Cardiology   • COLONOSCOPY     • COLONOSCOPY W/ POLYPECTOMY     • IR UPPER EXTREMITY VENOGRAM- DIAGNOSTIC  2023   • LITHOTRIPSY     • MULTIPLE TOOTH EXTRACTIONS     • HI COLONOSCOPY FLX DX W/COLLJ SPEC WHEN PFRMD N/A 2018    Procedure: COLONOSCOPY;  Surgeon: Pati Ryder MD;  Location: [facility];   Service: Gastroenterology   • HI REPLACE AORTIC VALVE OPENFEMORAL ARTERY APPROACH N/A 2022    Procedure: REPLACEMENT AORTIC VALVE TRANSCATHETER (TAVR) TRANSFEMORAL W/ 26MM QUINN RONNI S3 ULTRA VALVE(ACCESS ON LEFT) SAULO;  Surgeon: Ela Barron MD;  Location: [facility];  Service: Cardiac Surgery     Social History     Substance and Sexual Activity   Alcohol Use No     Social History     Substance and Sexual Activity   Drug Use No     Social History     Tobacco Use   Smoking Status Former   • Packs/day: 0.50   • Types: Cigarettes   • Quit date: 2022   • Years since quittin.2   Smokeless Tobacco Never   Tobacco Comments    started when he was about 24 yrs old; stopped smoking 3 wks ago       Meds/Allergies   Hospital Medications:   Current Facility-Administered Medications   Medication Dose Route Frequency   • acetaminophen (TYLENOL) tablet 650 mg  650 mg Oral Q4H PRN   • albuterol (PROVENTIL HFA,VENTOLIN HFA) inhaler 1 puff  1 puff Inhalation Q4H PRN   • apixaban (ELIQUIS) tablet 5 mg  5 mg Oral BID   • atorvastatin (LIPITOR) tablet 80 mg  80 mg Oral Daily With Dinner   • clopidogrel (PLAVIX) tablet 75 mg  75 mg Oral Daily   • Empagliflozin (JARDIANCE) tablet 10 mg  10 mg Oral Daily   • fentanyl citrate (PF) 100 MCG/2ML **ADS Override Pull**       • fentanyl citrate (PF) 100 MCG/2ML **ADS Override Pull**       • ferrous sulfate tablet 325 mg  325 mg Oral Every Other Day   • fluticasone (FLONASE) 50 mcg/act nasal spray 2 spray  2 spray Nasal Daily   • Fluticasone Furoate-Vilanterol 100-25 mcg/actuation 1 puff  1 puff Inhalation Daily   • furosemide (LASIX) injection 80 mg  80 mg Intravenous BID (diuretic)   • insulin glargine (LANTUS) subcutaneous injection 18 Units 0.18 mL  18 Units Subcutaneous HS   • insulin lispro (HumaLOG) 100 units/mL subcutaneous injection 1-5 Units  1-5 Units Subcutaneous HS   • insulin lispro (HumaLOG) 100 units/mL subcutaneous injection 1-6 Units  1-6 Units Subcutaneous TID AC   • insulin lispro (HumaLOG) 100 units/mL subcutaneous injection 5 Units  5 Units Subcutaneous BID With Meals   • levothyroxine tablet 137 mcg  137 mcg Oral Early Morning   • lidocaine (PF) (XYLOCAINE-MPF) 1 % injection **ADS Override Pull**       • methocarbamol (ROBAXIN) tablet 750 mg  750 mg Oral Q6H PRN   • metoprolol succinate (TOPROL-XL) 24 hr tablet 75 mg  75 mg Oral BID   • montelukast (SINGULAIR) tablet 10 mg  10 mg Oral Daily   • nitroglycerin (NITROSTAT) SL tablet 0.4 mg  0.4 mg Sublingual Q5 Min PRN     Home Medications:   Medications Prior to Admission   Medication   • Advair -21 MCG/ACT inhaler   • albuterol (PROVENTIL HFA,VENTOLIN HFA) 90 mcg/act inhaler   • Alcohol Swabs (ALCOHOL PADS) 70 % PADS   • Aspirin Low Dose 81 MG chewable tablet   • Blood Glucose Monitoring Suppl (OneTouch Verio) w/Device KIT   • cholecalciferol (VITAMIN D3) 1,000 units tablet   • Empagliflozin (JARDIANCE) 10 MG TABS tablet   • ferrous sulfate 325 (65 Fe) mg tablet   • fluticasone (FLONASE) 50 mcg/act nasal spray   • furosemide (LASIX) 40 mg tablet   • Insulin Pen Needle (Pen Needles) 31G X 8 MM MISC   • Lancets (FREESTYLE) lancets   • Lantus SoloStar 100 units/mL SOPN   • levothyroxine 137 mcg tablet   • lisinopril (ZESTRIL) 20 mg tablet   • metFORMIN (GLUCOPHAGE) 850 mg tablet   • methocarbamol (Robaxin-750) 750 mg tablet   • methylPREDNISolone (MEDROL) 32 MG tablet   • montelukast (SINGULAIR) 10 mg tablet   • nitroglycerin (NITROSTAT) 0.4 mg SL tablet   • NovoLOG FlexPen 100 units/mL injection pen   • predniSONE 20 mg tablet   • rosuvastatin (CRESTOR) 40 MG tablet   • spironolactone (ALDACTONE) 25 mg tablet   • Banophen 25 MG capsule   • Banophen 50 MG capsule   • clopidogrel (PLAVIX) 75 mg tablet   • Continuous Blood Gluc  (FreeStyle Cristofer 2 Runnells) POWER   • Continuous Blood Gluc Sensor (FreeStyle Cristofer 2 Sensor) MISC   • famotidine (PEPCID) 20 mg tablet   • metoprolol succinate (TOPROL-XL) 50 mg 24 hr tablet       Allergies   Allergen Reactions   • Iv Contrast [Iodinated Contrast Media] Rash     Patient states he had a severe reaction approximately 10 years ago , states he had a rash, itchy and he remembers a bunch of people pounding on chest and being surrounded by multiple doctors. Objective     Vitals: Blood pressure 127/66, pulse 84, temperature 97.9 °F (36.6 °C), temperature source Oral, resp. rate 17, height 5' 8" (1.727 m), weight 83.5 kg (184 lb), SpO2 99 %. Orthostatic Blood Pressures    Flowsheet Row Most Recent Value   Blood Pressure 127/66 filed at 09/15/2023 1053   Patient Position - Orthostatic VS Sitting filed at 09/15/2023 1053          Invasive Devices     Peripheral Intravenous Line  Duration           Peripheral IV 09/13/23 Left Antecubital 2 days                Physical Exam  Vitals reviewed. Constitutional:       Comments: Patient seen sitting up in bed. HENT:      Head: Normocephalic and atraumatic. Cardiovascular:      Rate and Rhythm: Normal rate and regular rhythm. Pulses: Normal pulses. Heart sounds: Normal heart sounds. Comments: JVP elevated  Pulmonary:      Effort: Pulmonary effort is normal.      Breath sounds: Normal breath sounds. Abdominal:      General: Bowel sounds are normal.      Palpations: Abdomen is soft. Skin:     General: Skin is warm and dry. Capillary Refill: Capillary refill takes less than 2 seconds. Neurological:      Mental Status: He is alert and oriented to person, place, and time. Mental status is at baseline. Lab Results: I have personally reviewed pertinent lab results. Results from last 7 days   Lab Units 09/15/23  0447 09/14/23  1557 09/14/23  0536   POTASSIUM mmol/L 4.0 4.0 4.3   CO2 mmol/L 33* 29 30   CHLORIDE mmol/L 104 102 102   BUN mg/dL 48* 46* 40*   CREATININE mg/dL 1.63* 1.57* 1.69*     Results from last 7 days   Lab Units 09/15/23  0447 09/14/23  0536 09/13/23  0715   HEMOGLOBIN g/dL 12.3 12.2 12.8   HEMATOCRIT % 40.6 39.3 41.8   PLATELETS Thousands/uL 180 202 206                 Cardiac Imaging/Monitoring       Imaging: I have personally reviewed pertinent reports. Holter/Telemetry: Reviewed. Rates of around 100, appears to be sinus rhythm with intermittent atrial sensed ventricularly paced rhythm. ECHO:   Echo from 09/14:    Left Ventricle Left ventricular cavity size is normal. Wall thickness is normal. The left ventricular ejection fraction is 32%. Systolic function is severely reduced. There is severe global hypokinesis with regional variation. Unable to assess diastolic function due to the absense of normal sinus rhythm. Interventricular Septum There is abnormal septal motion consistent with left bundle branch block or right ventricular pacing. Right Ventricle Right ventricular cavity size is dilated. Systolic function is reduced. A pacer wire is present. Left Atrium The atrium is dilated. Right Atrium The atrium is dilated. Aortic Valve The aortic valve is trileaflet. The leaflets are not thickened. The leaflets are not calcified. The leaflets exhibit normal mobility. There is trace regurgitation. The aortic valve has no significant stenosis. Mitral Valve Mitral valve structure is normal. There is moderate to severe regurgitation. There is no evidence of stenosis. Tricuspid Valve Tricuspid valve structure is normal. There is moderate regurgitation. There is no evidence of stenosis.  The right ventricular systolic pressure is moderately elevated. The estimated right ventricular systolic pressure is 45.69 mmHg. Pulmonic Valve Pulmonic valve structure is normal. There is trace regurgitation. There is no evidence of stenosis. Ascending Aorta The aortic root is normal in size. IVC/SVC The right atrial pressure is estimated at 15.0 mmHg. Pericardium There is no pericardial effusion. The pericardium is normal in appearance. Last echo from 05/2023:      Left Ventricle Left ventricular cavity size is normal. Wall thickness is increased. There is borderline concentric hypertrophy. The left ventricular ejection fraction is 35%. Systolic function is severely reduced. There is global hypokinesis with regional variation. Interventricular Septum There is abnormal septal motion consistent with left bundle branch block. Right Ventricle Right ventricular cavity size is normal. Systolic function is normal. Wall thickness is normal.      Left Atrium The atrium is dilated. Right Atrium The atrium is normal in size. Aortic Valve There is a TAVR bioprosthetic valve. Peak gradient 23 mmHg, mean gradient 13 mmHg. There is no evidence of regurgitation. Mitral Valve There is moderate to severe regurgitation with an eccentrically directed jet. There is no evidence of stenosis. Tricuspid Valve Tricuspid valve structure is normal. There is mild regurgitation. There is no evidence of stenosis. Pulmonic Valve Pulmonic valve structure is normal. There is mild regurgitation. There is no evidence of stenosis. Ascending Aorta The aortic root is normal in size. IVC/SVC The inferior vena cava is normal in size. Pericardium There is no pericardial effusion. The pericardium is normal in appearance. Pulmonary Artery The estimated pulmonary artery systolic pressure is 55.3 mmHg.               Encounter Date: 09/13/23   ECG 12 lead   Result Value    Ventricular Rate 105 Atrial Rate 105    MI Interval 0    QRSD Interval 133    QT Interval 371    QTC Interval 491    P Axis     QRS Axis -78    T Wave Axis 88    Narrative    Electronic ventricular pacemaker  When compared with ECG of 13-SEP-2023 07:38,  No significant change was found  Confirmed by Sherryle Go [id number]) on 9/14/2023 11:41:26 AM         Mamie Hernandes MD  Internal Medicine Residency PGY-3  [facility]    ==========================================================================================    Counseling / Coordination of Care  Total floor / unit time spent today 45 minutes. Greater than 50% of total time was spent with the patient and / or family counseling and / or coordination of care. ** Please Note: Voice dictation software may have been used in the creation of this document.  **

## 2023-09-15 NOTE — PROGRESS NOTES
Progress Note - Electrophysiology-Cardiology (EP)   Johana Payment 70 y.o. male MRN: [id number]  Unit/Bed#: [facility] 412-01 Encounter: [id number]      Assessment:  1. Ischemic cardiomyopathy with LVEF 30% per most recent echo 5/2023, status post upgrade to Medtronic BiV ICD 9/13/2023              A.)  LVEF unchanged from prior echo 2/2023 despite revascularization at that time              B.)  Maintained on lisinopril and Toprol-XL as an outpatient  2. CAD with prior NSTEMI with PCI to mid LAD and RPDA 2/2023, maintained on dual antiplatelet therapy with aspirin and Plavix  3. Chronic HFrEF  4. Tachybrady syndrome, previously with Medtronic dual-chamber pacemaker in situ status post upgrade to BiV ICD  5. Baseline left bundle branch block  6. Severe aortic stenosis status post prior TAVR  7. Hypertension  8. Hyperlipidemia  9. Moderate to severe mitral regurgitation  10. Diabetes  11. Hepatitis C  12. History of tobacco use  13. Newly diagnosed atrial flutter with elevated ventricular response              A.)  CHADS2 Vasc = 6, will need to be started on anticoagulation      Plan:  He remains in atrial flutter, per telemetry review his rates have largely been less than 100 bpm.  He is currently maintained on Toprol-XL 75 mg twice daily, will continue this for now in the setting of diuresis and uptitrate if needed. He has been started on Eliquis anticoagulation. I spoke with our pharmacy team and was informed that this has a $0 co-pay, thus I have asked them to fill it and I will have him pick it up at our pharmacy prior to discharge. As per prior discussions, he will continue Eliquis and Plavix in the post PCI setting, will discontinue aspirin. I discussed this patient with our heart failure team.  They are increasing his IV diuretics tonight, and we will reassess him tomorrow. Potential discharge over the weekend once euvolemic.     He will need to follow-up with EP as an outpatient regarding his new onset atrial flutter to determine whether he would be a candidate for SAULO/cardioversion versus ablation versus antiarrhythmic therapy. Continue to trend renal function and electrolytes. Strict I's/O, check daily weight, 1800 cc fluid restriction in place. Subjective/Objective   Chief Complaint: No acute complaints    Subjective: Patient feeling well today. He was assessed with the help of an . He denies shortness of breath, dizziness, lightheadedness, or palpitations. He reports being unaware of his atrial flutter. He has mild pain at the device site, but he is tolerating it well. He reports increased urination and improvement in his lower extremity edema.       Objective:     Vitals: /66 (BP Location: Right arm)   Pulse 84   Temp 97.9 °F (36.6 °C) (Oral)   Resp 17   Ht 5' 8" (1.727 m)   Wt 83.5 kg (184 lb)   SpO2 99%   BMI 27.98 kg/m²   Vitals:    09/13/23 0920 09/14/23 0845   Weight: 83.5 kg (184 lb 1.4 oz) 83.5 kg (184 lb)     Orthostatic Blood Pressures    Flowsheet Row Most Recent Value   Blood Pressure 127/66 filed at 09/15/2023 1053   Patient Position - Orthostatic VS Sitting filed at 09/15/2023 1053            Intake/Output Summary (Last 24 hours) at 9/15/2023 1139  Last data filed at 9/15/2023 1114  Gross per 24 hour   Intake 704 ml   Output 2675 ml   Net -1971 ml       Invasive Devices     Peripheral Intravenous Line  Duration           Peripheral IV 09/13/23 Left Antecubital 2 days                            Scheduled Meds:  Current Facility-Administered Medications   Medication Dose Route Frequency Provider Last Rate   • acetaminophen  650 mg Oral Q4H PRN Ghanshyam Echevreria PA-C     • albuterol  1 puff Inhalation Q4H PRN Ghanshyam Echeverria PA-C     • apixaban  5 mg Oral BID Ghanshyam Echeverria PA-C     • atorvastatin  80 mg Oral Daily With AUGUST Pendleton     • clopidogrel  75 mg Oral Daily Ghanshyam Echeverria PA-C     • Empagliflozin  10 mg Oral Daily Chyna Edmonds MD     • fentanyl citrate (PF)          • fentanyl citrate (PF)          • ferrous sulfate  325 mg Oral Every Other Day Dennys Almonte PA-C     • fluticasone  2 spray Nasal Daily Dennys Almonte PA-C     • Fluticasone Furoate-Vilanterol  1 puff Inhalation Daily Dennys Almonte PA-C     • furosemide  80 mg Intravenous BID (diuretic) Hessmer Daniele, DO     • insulin glargine  18 Units Subcutaneous HS Dennys Almonte PA-C     • insulin lispro  1-5 Units Subcutaneous HS Dennys Almonte PA-C     • insulin lispro  1-6 Units Subcutaneous TID [facility] Dennys Almonte PA-C     • insulin lispro  5 Units Subcutaneous BID With Meals Dennys Almonte PA-C     • levothyroxine  137 mcg Oral Early Morning Dennys Almonte PA-C     • lidocaine (PF)          • methocarbamol  750 mg Oral Q6H PRN Dennys Almonte PA-C     • metoprolol succinate  75 mg Oral BID Dennys Almonte PA-C     • montelukast  10 mg Oral Daily Dennys Almonte PA-C     • nitroglycerin  0.4 mg Sublingual Q5 Min PRN Dennys Almonte PA-C       Continuous Infusions:   PRN Meds:.•  acetaminophen  •  albuterol  •  fentanyl citrate (PF)  •  fentanyl citrate (PF)  •  lidocaine (PF)  •  methocarbamol  •  nitroglycerin    Review of Systems   Constitutional: Negative for fever and malaise/fatigue. Cardiovascular: Negative for chest pain, dyspnea on exertion, irregular heartbeat, leg swelling, near-syncope, orthopnea, palpitations, paroxysmal nocturnal dyspnea and syncope.          Physical Exam:   GEN: NAD, alert and oriented x 3, well appearing  SKIN: dry without significant lesions or rashes  HEENT: NCAT, PERRL, EOMs intact  NECK: + JVD   CARDIOVASCULAR: irregular, normal S1, S2 without murmurs, rubs, or gallops appreciated  LUNGS: Clear to auscultation bilaterally without wheezes, rhonchi, or rales  ABDOMEN: Soft, nontender, nondistended  EXTREMITIES/VASCULAR: perfused without clubbing, cyanosis, or LE edema b/l  PSYCH: Normal mood and affect  NEURO: CN ll-Xll grossly intact                Lab Results: I have personally reviewed pertinent lab results. Results from last 7 days   Lab Units 09/15/23  0447 09/14/23  0536 09/13/23  0715   WBC Thousand/uL 8.31 10.25* 6.23   HEMOGLOBIN g/dL 12.3 12.2 12.8   HEMATOCRIT % 40.6 39.3 41.8   PLATELETS Thousands/uL 180 202 206     Results from last 7 days   Lab Units 09/15/23  0447 09/14/23  1557 09/14/23  0536   POTASSIUM mmol/L 4.0 4.0 4.3   CHLORIDE mmol/L 104 102 102   CO2 mmol/L 33* 29 30   BUN mg/dL 48* 46* 40*   CREATININE mg/dL 1.63* 1.57* 1.69*   CALCIUM mg/dL 8.5 8.7 9.0         Results from last 7 days   Lab Units 09/14/23  0536   MAGNESIUM mg/dL 2.3         Imaging: I have personally reviewed pertinent reports. ECHO: No results found for this or any previous visit. Results for orders placed during the hospital encounter of 03/18/23    Echo complete w/ contrast if indicated    Interpretation Summary  •  Left Ventricle: Left ventricular cavity size is mildly dilated. Wall thickness is normal. There is eccentric hypertrophy. The left ventricular ejection fraction is 30-35%. Systolic function is moderately reduced. •  The following segments are akinetic: basal inferoseptal, basal inferior, mid inferoseptal and mid inferolateral.  •  The following segments are hypokinetic: mid inferior and apical inferior. •  All other segments are normal.  •  IVS: There is abnormal septal motion consistent with left bundle branch block. •  Right Ventricle: Right ventricular cavity size is normal. Systolic function is normal.  •  Left Atrium: The atrium is moderately dilated. •  Right Atrium: The atrium is mildly dilated. •  Aortic Valve: There is an Emery RONNI 3 Ultra 26 mm TAVR bioprosthetic valve. There is no evidence of paravalvular regurgitation. There is no evidence of transvalvular regurgitation. The aortic valve has no significant stenosis.  The gradient recorded across the prosthetic aortic valve is within the expected range. The aortic valve peak velocity is 2.26 m/s. The aortic valve mean gradient is 10 mmHg. The dimensionless velocity index is 0.36. The aortic valve area is 1.37 cm2. •  Mitral Valve: The posterior leaflet is tethered. There is mild annular calcification. There is severe regurgitation with a posteriorly directed jet. •  Tricuspid Valve: There is mild regurgitation. •  Pericardium: There is a trivial pericardial effusion along the right atrial free wall. The fluid exhibits no internal echoes. There is no echocardiographic evidence of tamponade.         EKG/TELEMETRY: atrial flutter with rates largely < 100 bpm, pacing at 80 bpm noted      VTE Pharmacologic Prophylaxis: Eliquis

## 2023-09-16 PROBLEM — Z95.810 ICD (IMPLANTABLE CARDIOVERTER-DEFIBRILLATOR) IN PLACE: Status: ACTIVE | Noted: 2023-09-16

## 2023-09-16 LAB
ANION GAP SERPL CALCULATED.3IONS-SCNC: 7 MMOL/L
ATRIAL RATE: 312 BPM
BUN SERPL-MCNC: 44 MG/DL (ref 5–25)
CALCIUM SERPL-MCNC: 9.2 MG/DL (ref 8.4–10.2)
CHLORIDE SERPL-SCNC: 103 MMOL/L (ref 96–108)
CO2 SERPL-SCNC: 32 MMOL/L (ref 21–32)
CREAT SERPL-MCNC: 1.46 MG/DL (ref 0.6–1.3)
GFR SERPL CREATININE-BSD FRML MDRD: 46 ML/MIN/1.73SQ M
GLUCOSE SERPL-MCNC: 113 MG/DL (ref 65–140)
GLUCOSE SERPL-MCNC: 212 MG/DL (ref 65–140)
GLUCOSE SERPL-MCNC: 72 MG/DL (ref 65–140)
GLUCOSE SERPL-MCNC: 74 MG/DL (ref 65–140)
GLUCOSE SERPL-MCNC: 90 MG/DL (ref 65–140)
P AXIS: 67 DEGREES
POTASSIUM SERPL-SCNC: 3.5 MMOL/L (ref 3.5–5.3)
QRS AXIS: 141 DEGREES
QRSD INTERVAL: 138 MS
QT INTERVAL: 412 MS
QTC INTERVAL: 466 MS
SODIUM SERPL-SCNC: 142 MMOL/L (ref 135–147)
T WAVE AXIS: -42 DEGREES
VENTRICULAR RATE: 77 BPM

## 2023-09-16 PROCEDURE — 82948 REAGENT STRIP/BLOOD GLUCOSE: CPT

## 2023-09-16 PROCEDURE — 99024 POSTOP FOLLOW-UP VISIT: CPT | Performed by: STUDENT IN AN ORGANIZED HEALTH CARE EDUCATION/TRAINING PROGRAM

## 2023-09-16 PROCEDURE — 93005 ELECTROCARDIOGRAM TRACING: CPT

## 2023-09-16 PROCEDURE — 80048 BASIC METABOLIC PNL TOTAL CA: CPT | Performed by: PHYSICIAN ASSISTANT

## 2023-09-16 PROCEDURE — 93010 ELECTROCARDIOGRAM REPORT: CPT | Performed by: INTERNAL MEDICINE

## 2023-09-16 RX ORDER — LISINOPRIL 10 MG/1
10 TABLET ORAL DAILY
Status: DISCONTINUED | OUTPATIENT
Start: 2023-09-16 | End: 2023-09-17 | Stop reason: HOSPADM

## 2023-09-16 RX ORDER — INSULIN GLARGINE 100 [IU]/ML
14 INJECTION, SOLUTION SUBCUTANEOUS
Status: DISCONTINUED | OUTPATIENT
Start: 2023-09-16 | End: 2023-09-17 | Stop reason: HOSPADM

## 2023-09-16 RX ORDER — POTASSIUM CHLORIDE 20 MEQ/1
40 TABLET, EXTENDED RELEASE ORAL ONCE
Status: COMPLETED | OUTPATIENT
Start: 2023-09-16 | End: 2023-09-16

## 2023-09-16 RX ADMIN — MONTELUKAST 10 MG: 10 TABLET, FILM COATED ORAL at 08:29

## 2023-09-16 RX ADMIN — FUROSEMIDE 80 MG: 10 INJECTION, SOLUTION INTRAMUSCULAR; INTRAVENOUS at 17:13

## 2023-09-16 RX ADMIN — INSULIN LISPRO 5 UNITS: 100 INJECTION, SOLUTION INTRAVENOUS; SUBCUTANEOUS at 17:13

## 2023-09-16 RX ADMIN — INSULIN LISPRO 2 UNITS: 100 INJECTION, SOLUTION INTRAVENOUS; SUBCUTANEOUS at 17:14

## 2023-09-16 RX ADMIN — APIXABAN 5 MG: 5 TABLET, FILM COATED ORAL at 08:29

## 2023-09-16 RX ADMIN — FUROSEMIDE 80 MG: 10 INJECTION, SOLUTION INTRAMUSCULAR; INTRAVENOUS at 08:30

## 2023-09-16 RX ADMIN — FLUTICASONE PROPIONATE 2 SPRAY: 50 SPRAY, METERED NASAL at 08:30

## 2023-09-16 RX ADMIN — METHOCARBAMOL 750 MG: 750 TABLET ORAL at 11:21

## 2023-09-16 RX ADMIN — FERROUS SULFATE TAB 325 MG (65 MG ELEMENTAL FE) 325 MG: 325 (65 FE) TAB at 08:29

## 2023-09-16 RX ADMIN — METOPROLOL SUCCINATE 75 MG: 50 TABLET, EXTENDED RELEASE ORAL at 08:30

## 2023-09-16 RX ADMIN — POTASSIUM CHLORIDE 40 MEQ: 1500 TABLET, EXTENDED RELEASE ORAL at 11:21

## 2023-09-16 RX ADMIN — FLUTICASONE FUROATE AND VILANTEROL TRIFENATATE 1 PUFF: 100; 25 POWDER RESPIRATORY (INHALATION) at 08:30

## 2023-09-16 RX ADMIN — INSULIN LISPRO 5 UNITS: 100 INJECTION, SOLUTION INTRAVENOUS; SUBCUTANEOUS at 08:29

## 2023-09-16 RX ADMIN — ACETAMINOPHEN 650 MG: 325 TABLET, FILM COATED ORAL at 11:21

## 2023-09-16 RX ADMIN — LEVOTHYROXINE SODIUM 137 MCG: 25 TABLET ORAL at 05:03

## 2023-09-16 RX ADMIN — CLOPIDOGREL BISULFATE 75 MG: 75 TABLET, FILM COATED ORAL at 08:29

## 2023-09-16 RX ADMIN — INSULIN GLARGINE 14 UNITS: 100 INJECTION, SOLUTION SUBCUTANEOUS at 23:02

## 2023-09-16 RX ADMIN — APIXABAN 5 MG: 5 TABLET, FILM COATED ORAL at 17:13

## 2023-09-16 RX ADMIN — ATORVASTATIN CALCIUM 80 MG: 80 TABLET, FILM COATED ORAL at 17:13

## 2023-09-16 RX ADMIN — METOPROLOL SUCCINATE 75 MG: 50 TABLET, EXTENDED RELEASE ORAL at 23:02

## 2023-09-16 RX ADMIN — LISINOPRIL 10 MG: 10 TABLET ORAL at 08:29

## 2023-09-16 RX ADMIN — EMPAGLIFLOZIN 10 MG: 10 TABLET, FILM COATED ORAL at 08:29

## 2023-09-16 NOTE — UTILIZATION REVIEW
NOTIFICATION OF INPATIENT ADMISSION   AUTHORIZATION REQUEST   SERVICING FACILITY:   [address] [place] [address]  Address: [address] [facility], [address] [place] [address] [address] [id number]  Tax ID: [id number]  NPI: [id number] ATTENDING PROVIDER:  Attending Name and NPI#: Sheila Thomas Md [[id number]]  Address: [address] [place] [address]  Phone: [phone]   ADMISSION INFORMATION:  Place of Service: [address] [place] [address]  Place of Service Code: 21  Inpatient Admission Date/Time: 9/14/23  2:17 PM  Discharge Date/Time: No discharge date for patient encounter. Admitting Diagnosis Code/Description:  Heart failure with reduced ejection fraction ([address] [facility]) [I50.20]     UTILIZATION REVIEW CONTACT:  Gael Garrett Utilization   Network Utilization Review Department  Phone: [phone]  Fax: [phone]  Email: [email or web address]  Contact for approvals/pending authorizations, clinical reviews, and discharge. PHYSICIAN ADVISORY SERVICES:  Medical Necessity Denial & [id number]-[id number] Review  Phone: [phone]  Fax: [phone]  Email: [email or web address]

## 2023-09-16 NOTE — PROGRESS NOTES
Advanced Heart Failure/Pulmonary Hypertension - Attending Note - Nancy Monson 70 y.o. male MRN: [id number]      Assessment:  70 y.o. male Hx per chart p/w elective biv upgrade; had episode of lack of responsiveness after anesthesia during procedure concerning for CVA, neuro evaluated and thinks unlikely CVA rather sedation induced episode. ICM, Chronic heart failure with reduced EF, LVEF 30-35%, nondilated,   NYHA class II Stage C.   Etiology: ischemic, EF reduction in setting of NSTEMI with 80% mid LAD and 90% RPDA stenoses s/p stenting  CAD. 2/10/23 [facility]: 3-vessel CAD with two identifiable culprits for NSTEMI in ost RPDA & mid LAD. Successful PCI w/ ELDER x2 to mid LAD & ELDER x1 ost RPDA; residual moderate disease unchanged from 6-2022 study   6/03/22 [facility], pre TAVR: First marginal lesion 50% stenosis, mid LAD 50% stenosis, RPDA 50% stenosis, RPDA to 50% stenosis, 1st diagonal 50% stenosis. # Mitral regurgitation, severe on echo 3/21/23, moderate to severe on echo 5/25/23. To consider MitraClip if still with significant MR after CRT upgrade  # Severe AS s/p TAVR, #26mm Emery Hernán S3 Ultra valve via left femoral approach by Dr Shama Garcia.  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 S/P MDT PPM.  # Hypertension  # Hyperlipidemia  # DM2 HgbA1C  # Asthma  # Hepatitis C. Untreated per chart notes. Hep C Ab high reactive 1/2022. Management per PCP. # Lymphadenopathy  # Tobacco abuse, last smoked  10/2022  PFT 2/9/23:  Interpretation:  Results are unreliable due to patient being unable to perform maneuvers as per ATS standards. If clinical concern exists would recommend repeating PFTs. Best interpretation made based on available data. • Severe obstructive airflow defect on spirometry  • No significant improvement in airflow or forced vital capacity in response to the administration to bronchodilator per ATS standards.    • Lung volumes unable to be obtained due to patient inability to perform maneuvers  • Normal diffusion capacity  • Flow-volume loop consistent with obstruction      I reviewed all pertinent labs/imaging/data including but not limited to:    Temp:  [97.5 °F (36.4 °C)-98.6 °F (37 °C)] 98.2 °F (36.8 °C)  HR:  [77-96] 77  Resp:  [16-19] 18  BP: (115-140)/(66-80) 140/73     Weight (last 2 days)     Date/Time Weight    09/16/23 0528 75.7 (166.89)    09/15/23 1141 71.1 (156.75)    09/14/23 0845 83.5 (184)           Intake/Output Summary (Last 24 hours) at 9/16/2023 0744  Last data filed at 9/16/2023 0548  Gross per 24 hour   Intake 1484 ml   Output 3325 ml   Net -1841 ml       Results from last 7 days   Lab Units 09/16/23  0456 09/15/23  0447 09/14/23  1557   CREATININE mg/dL 1.46* 1.63* 1.57*     Lab Results   Component Value Date    K 3.5 09/16/2023     Lab Results   Component Value Date    HGBA1C 7.6 (H) 02/10/2023     Lab Results   Component Value Date    ILS7NAIXNWLF 0.861 02/10/2023     Lab Results   Component Value Date    LDLCALC 72 02/10/2023     Lab Results   Component Value Date     (H) 07/14/2023      Lab Results   Component Value Date    NTBNP 2,624 (H) 05/06/2023                 Drips:        Plan:  Warm, volume up though not florid initially; JVP improving with diuresis, Cr slightly better  note contribution to his JVP from significant TR (he has significant MR and TR; I believe this level of TR is not new and is underappreciated on prior echos)  Note he was Recently planned for escalated lasix burst in clinic, which he took    Neuro recs appreciated regarding etiology of his 9/13/23 elective biv upgrade  intraprocedural event    He was in Afib and AFL on 9/13 ECGs - Tx per EP team    cw IV lasix now 9/16  hopefully deescalate 9/17 pending course  increase bblocker per EP, vol status improving and could undertake 9/16    gdmt below    Paused nephrotoxic Rx including some GDMT 2/2 HE  Restart home gdmt pending progress    Rest of care per EP and primary teams    Neurohormonal Blockade:   --Beta-Blocker:Metoprolol succinate 75 mg BID  --ACEi, ARB or ARNi: Lisinopril 20 mg BID - held 2/2 HE>restart 10mg qd 9/16  --SGLT2i- jardiance 10 mg daily  --Aldosterone Receptor Blocker: Spironolactone 25 mg daily - held    --prior home Diuretic: Lasix 40 mg daily     Sudden Cardiac Death Risk Reduction:  --ICD: EF 30-35%, MDT DC PPM in situ, returned LifeVest  9/13/23 CRT-D upgrade (note he already had prior LPF RV lead)     Electrical Resynchronization:  --Candidacy for BiV device: LBBB, QRSd 132-140 msec  Advanced Therapies (if appropriate): --Inotrope:  --LVAD/Transplant Candidacy:    Studies:  I have reviewed all pertinent patient data/labs/imaging where available, including but not limited to the below studies. Selected results may be displayed here but comprehensive listing is omitted for note clarity and can be found in the epic chart. ECG. Echo. Stress. Cath. Subjective and Interval Events:   Patient seen and examined. No new complaints. No overt BUSTILLOS, feels generally well, still some white colored phlegm production that he feels is more than usual for him    ROS:  10 point ROS negative except as specified above.     Tele: reviewed    Objective:     Physical Exam:  /73 (BP Location: Left arm)   Pulse 77   Temp 98.2 °F (36.8 °C) (Oral)   Resp 18   Ht 5' 8" (1.727 m)   Wt 75.7 kg (166 lb 14.2 oz)   SpO2 100%   BMI 25.38 kg/m²   Ranges:  Temp:  [97.5 °F (36.4 °C)-98.6 °F (37 °C)] 98.2 °F (36.8 °C)  HR:  [77-96] 77  Resp:  [16-19] 18  BP: (115-140)/(66-80) 140/73    Weight (last 2 days)     Date/Time Weight    09/16/23 0528 75.7 (166.89)    09/15/23 1141 71.1 (156.75)    09/14/23 0845 83.5 (184)           Intake/Output Summary (Last 24 hours) at 9/16/2023 0744  Last data filed at 9/16/2023 0548  Gross per 24 hour   Intake 1484 ml   Output 3325 ml   Net -1841 ml     Constitutional: NAD, non toxic  Ears/nose/mouth/throat: atraumatic  CV: RRR, +JVD  Resp: ctabl  GI: Soft, NTND  MSK: no swollen joints in exposed areas  Extr: No edema, warm LE  Pysche: Normal affect  Neuro: appropriate in conversation  Skin: dry and intact in exposed areas    Labs/Imaging/Data:   Results from last 7 days   Lab Units 09/15/23  0447 09/14/23  0536 09/13/23  0715   WBC Thousand/uL 8.31 10.25* 6.23   HEMOGLOBIN g/dL 12.3 12.2 12.8   HEMATOCRIT % 40.6 39.3 41.8   PLATELETS Thousands/uL 180 202 206   NEUTROS PCT % 68  --   --    MONOS PCT % 10  --   --    EOS PCT % 0  --   --       Results from last 7 days   Lab Units 09/16/23  0456 09/15/23  0447 09/14/23  1557 09/14/23  0536 09/13/23  0715   SODIUM mmol/L 142 141 139 140 142   POTASSIUM mmol/L 3.5 4.0 4.0 4.3 3.9   CHLORIDE mmol/L 103 104 102 102 101   CO2 mmol/L 32 33* 29 30 30   ANION GAP mmol/L 7 4 8 8 11   BUN mg/dL 44* 48* 46* 40* 25   CREATININE mg/dL 1.46* 1.63* 1.57* 1.69* 1.74*   CALCIUM mg/dL 9.2 8.5 8.7 9.0 9.4   GLUCOSE RANDOM mg/dL 72 76 119 123 223*      Results from last 7 days   Lab Units 09/14/23  0536   MAGNESIUM mg/dL 2.3                       ABG:     VBG:    No results found for: "LDH"       Current Facility-Administered Medications   Medication Dose Route Frequency Provider Last Rate   • acetaminophen  650 mg Oral Q4H PRN Raymundo Torrez PA-C     • albuterol  1 puff Inhalation Q4H PRN Raymundo Torrez PA-C     • apixaban  5 mg Oral BID Raymundo Torrez PA-C     • atorvastatin  80 mg Oral Daily With AUGUST Pendleton     • clopidogrel  75 mg Oral Daily Raymundo Torrez PA-C     • Empagliflozin  10 mg Oral Daily Salina Lundberg MD     • ferrous sulfate  325 mg Oral Every Other Day Raymundo Torrez PA-C     • fluticasone  2 spray Nasal Daily Raymundo Torrez PA-C     • Fluticasone Furoate-Vilanterol  1 puff Inhalation Daily Raymundo Torrez PA-C     • furosemide  80 mg Intravenous BID (diuretic) Jil Hughes DO     • insulin glargine 18 Units Subcutaneous HS Vivek Hathaway PA-C     • insulin lispro  1-5 Units Subcutaneous HS Vivek Hathaway PA-C     • insulin lispro  1-6 Units Subcutaneous TID [facility] Vivek Hathaway PA-C     • insulin lispro  5 Units Subcutaneous BID With Meals Vivek Hathaway PA-C     • levothyroxine  137 mcg Oral Early Morning Vivek Hathaway PA-C     • methocarbamol  750 mg Oral Q6H PRN Vivek Hathaway PA-C     • metoprolol succinate  75 mg Oral BID Vivek Hathaway PA-C     • montelukast  10 mg Oral Daily Vivek Hathaway PA-C     • nitroglycerin  0.4 mg Sublingual Q5 Min PRN Vivek Hathaway PA-C         Counseling / Coordination of Care: Total floor / unit time spent today 30 minutes. Greater than 50% of total time was spent with the patient and / or family counseling and / or coordination of care. A description of the counseling / coordination of care: we discussed diagnoses, recent as well as older studies, labs, and all changes in cardiac treatment plan. All patient questions were answered. Thank you for the opportunity to participate in the care of this patient.     Yris Jessica MD  Attending Physician  Advanced Heart Failure and Transplant Cardiology  [address] [place] [address]

## 2023-09-16 NOTE — PROGRESS NOTES
Progress Note - Electrophysiology - Cardiology  Maged Castle 70 y.o. male MRN: [id number]  Unit/Bed#: [facility] 412-01 Encounter: [id number]      Assessment/Plan:    1.  Ischemic cardiomyopathy with LVEF 30% per most recent echo 5/2023, status post upgrade to Medtronic BiV ICD 9/13/2023 (LB lead and new RV ICD lead)  -- LVEF unchanged from prior echo 2/2023 despite revascularization at that time  -- Maintained on lisinopril and Toprol-XL as an outpatient  -- Post op CXR 9/13 and 9/14 show proper lead placement, no pneumothorax  -- device interrogation showed normal device function and stable lead parameters  -- arm restrictions x 6 weeks  -- keep implant site dry x 6 weeks  -- F/U device clinic in 2 weeks for wound check    2.  HFrEF, ICM EF 30-35%, NYHA class II stage C  -- appreciate HF team input  -- continue IV diuresis with Lasix 80 mg IV bid  -- low sodium diet, check I/Os  -- possible deescalation of diuresis tomorrow pending status  -- continue GDMT (Toprol XL, lisinopril, Jardiance)  -- replace potassium    3. New Aflutter with RVR  -- started on Eliquis 5 mg every 12 hours, CHADS2 Vasc = 6 (copay $0)  -- metoprolol uptitrated to 75 mg bid, rates improved  -- F/U with EP as outpatient to discuss possible DCCV and/or ablation/AAD therapy    3. CAD with prior NSTEMI with PCI to mid LAD and RPDA 2/2023  -- DAPT changed to Eliquis and Plavix in setting of new atrial flutter  -- aspirin D/C'd    4.  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 LBBB  6.  Severe aortic stenosis s/p TAVR  7.  Hypertension  8.  Hyperlipidemia  9.  Moderate to severe mitral regurgitation  10.  Diabetes - on insulin  11.  Hepatitis C  12.  History of tobacco use      Subjective/Objective   Subjective:  Maged Castle is a 70year old male with a history of ICM EF 30-35%, HFrEF, AS s/p TAVR, Medtronic dual chamber PPM now s/p updgrade to BiV ICD, LBBB, HTN, HLD, moderate to severe MR, DM2, hepatitis C, and new atrial flutter. He underwent upgrade to a BiV ICD with new RV ICD lead and pre-existing left bundle pacing lead. He was found to be in decompensated heart failure and has been followed by the heart failure team.  He is on Lasix 80 mg IV twice daily. Today, he is feeling well. He denies chest pain, shortness of breath, lightheadedness, dizziness, orthopnea, or PND. He denies significant pain at the implant site.     TELE: atrial flutter with controlled rates    EKG: pending    Objective:  Vitals: /86 (BP Location: Right arm)   Pulse 76   Temp 97.9 °F (36.6 °C) (Oral)   Resp 22   Ht 5' 8" (1.727 m)   Wt 77.9 kg (171 lb 11.8 oz)   SpO2 97%   BMI 26.11 kg/m²     Vitals:    09/16/23 0528 09/16/23 0912   Weight: 75.7 kg (166 lb 14.2 oz) 77.9 kg (171 lb 11.8 oz)     Orthostatic Blood Pressures    Flowsheet Row Most Recent Value   Blood Pressure 126/86 filed at 09/16/2023 0700   Patient Position - Orthostatic VS Lying filed at 09/16/2023 0700            Intake/Output Summary (Last 24 hours) at 9/16/2023 1008  Last data filed at 9/16/2023 0700  Gross per 24 hour   Intake 1196 ml   Output 2700 ml   Net -1504 ml       Invasive Devices     Peripheral Intravenous Line  Duration           Peripheral IV 09/13/23 Left Antecubital 3 days                 Scheduled Meds:  Current Facility-Administered Medications   Medication Dose Route Frequency Provider Last Rate   • acetaminophen  650 mg Oral Q4H PRN Lisa Banegas PA-C     • albuterol  1 puff Inhalation Q4H PRN Lisa Banegas PA-C     • apixaban  5 mg Oral BID Lisa Banegas PA-C     • atorvastatin  80 mg Oral Daily With AUGUST Pendleton     • clopidogrel  75 mg Oral Daily Lisa Banegas PA-C     • Empagliflozin  10 mg Oral Daily Mode Gibson MD     • ferrous sulfate  325 mg Oral Every Other Day Lisa Banegas PA-C     • fluticasone  2 spray Nasal Daily Lisa Banegas PA-C • Fluticasone Furoate-Vilanterol  1 puff Inhalation Daily Nora Martinez PA-C     • furosemide  80 mg Intravenous BID (diuretic) Mamie , DO     • insulin glargine  18 Units Subcutaneous HS Nora Martinez PA-C     • insulin lispro  1-5 Units Subcutaneous HS Nora Martinez PA-C     • insulin lispro  1-6 Units Subcutaneous TID [facility] [place] Nora Martinez PA-C     • insulin lispro  5 Units Subcutaneous BID With Meals Nora Martinez PA-C     • levothyroxine  137 mcg Oral Early Morning Nora Martinez PA-C     • lisinopril  10 mg Oral Daily Jay Dalal MD     • methocarbamol  750 mg Oral Q6H PRN Nora Martinez PA-C     • metoprolol succinate  75 mg Oral BID Nora Martinez PA-C     • montelukast  10 mg Oral Daily Nora Martinez PA-C     • nitroglycerin  0.4 mg Sublingual Q5 Min PRN Nora Martinez PA-C     • potassium chloride  40 mEq Oral Once Eunice Renetta Rodrigues PA-C       Continuous Infusions:   PRN Meds:.•  acetaminophen  •  albuterol  •  methocarbamol  •  nitroglycerin    Review of Systems:  Review of Systems   Constitutional: Negative. HENT: Negative. Eyes: Negative. Cardiovascular: Negative for chest pain, palpitations and syncope. Respiratory: Negative for cough and wheezing. Endocrine: Negative. Hematologic/Lymphatic: Negative. Skin: Negative for rash. Musculoskeletal: Negative for back pain and neck pain. Gastrointestinal: Negative for abdominal pain, nausea and vomiting. Genitourinary: Negative. Neurological: Negative for dizziness and tremors. Psychiatric/Behavioral: Negative. ROS as noted above, otherwise 12 point review of systems was performed and is negative. Physical Exam:   GEN: NAD, alert and oriented x 3, well appearing  SKIN: warm, dry without significant lesions or rashes. Left chest implant site C/D/I, no erythema or hematoma, no signs of infection.    HEENT: NCAT, PERRL, EOMs intact  NECK: supple, + JVD  CARDIOVASCULAR: RRR  LUNGS: CTA bilaterally without wheezes, rhonchi, or rales  ABDOMEN: Soft, nontender, nondistended. EXTREMITIES/VASCULAR: perfused without clubbing, cyanosis, or LE edema b/l  PSYCH: Normal mood and affect  NEURO: CN ll-Xll grossly intact      Lab Results: I have personally reviewed pertinent lab results. Results from last 7 days   Lab Units 09/15/23  0447 09/14/23  0536 09/13/23  0715   WBC Thousand/uL 8.31 10.25* 6.23   HEMOGLOBIN g/dL 12.3 12.2 12.8   HEMATOCRIT % 40.6 39.3 41.8   PLATELETS Thousands/uL 180 202 206     Results from last 7 days   Lab Units 09/16/23  0456 09/15/23  0447 09/14/23  1557   POTASSIUM mmol/L 3.5 4.0 4.0   CHLORIDE mmol/L 103 104 102   CO2 mmol/L 32 33* 29   BUN mg/dL 44* 48* 46*   CREATININE mg/dL 1.46* 1.63* 1.57*   CALCIUM mg/dL 9.2 8.5 8.7         Results from last 7 days   Lab Units 09/14/23  0536   MAGNESIUM mg/dL 2.3       Imaging: I have personally reviewed pertinent reports. No results found for this or any previous visit.       VTE Pharmacologic Prophylaxis: Reason for no pharmacologic prophylaxis therapeutic on Eliquis  VTE Mechanical Prophylaxis: sequential compression device

## 2023-09-17 VITALS
RESPIRATION RATE: 16 BRPM | HEART RATE: 77 BPM | WEIGHT: 165.57 LBS | HEIGHT: 68 IN | SYSTOLIC BLOOD PRESSURE: 102 MMHG | TEMPERATURE: 97.9 F | BODY MASS INDEX: 25.09 KG/M2 | DIASTOLIC BLOOD PRESSURE: 56 MMHG | OXYGEN SATURATION: 98 %

## 2023-09-17 LAB
ANION GAP SERPL CALCULATED.3IONS-SCNC: 6 MMOL/L
BASOPHILS # BLD AUTO: 0.02 THOUSANDS/ÂΜL (ref 0–0.1)
BASOPHILS NFR BLD AUTO: 0 % (ref 0–1)
BUN SERPL-MCNC: 37 MG/DL (ref 5–25)
CALCIUM SERPL-MCNC: 9.5 MG/DL (ref 8.4–10.2)
CHLORIDE SERPL-SCNC: 101 MMOL/L (ref 96–108)
CO2 SERPL-SCNC: 35 MMOL/L (ref 21–32)
CREAT SERPL-MCNC: 1.41 MG/DL (ref 0.6–1.3)
EOSINOPHIL # BLD AUTO: 0.1 THOUSAND/ÂΜL (ref 0–0.61)
EOSINOPHIL NFR BLD AUTO: 1 % (ref 0–6)
ERYTHROCYTE [DISTWIDTH] IN BLOOD BY AUTOMATED COUNT: 17.5 % (ref 11.6–15.1)
GFR SERPL CREATININE-BSD FRML MDRD: 48 ML/MIN/1.73SQ M
GLUCOSE SERPL-MCNC: 205 MG/DL (ref 65–140)
GLUCOSE SERPL-MCNC: 78 MG/DL (ref 65–140)
GLUCOSE SERPL-MCNC: 86 MG/DL (ref 65–140)
HCT VFR BLD AUTO: 44.4 % (ref 36.5–49.3)
HGB BLD-MCNC: 13.9 G/DL (ref 12–17)
IMM GRANULOCYTES # BLD AUTO: 0.02 THOUSAND/UL (ref 0–0.2)
IMM GRANULOCYTES NFR BLD AUTO: 0 % (ref 0–2)
LYMPHOCYTES # BLD AUTO: 1.78 THOUSANDS/ÂΜL (ref 0.6–4.47)
LYMPHOCYTES NFR BLD AUTO: 23 % (ref 14–44)
MAGNESIUM SERPL-MCNC: 2.2 MG/DL (ref 1.9–2.7)
MCH RBC QN AUTO: 27.6 PG (ref 26.8–34.3)
MCHC RBC AUTO-ENTMCNC: 31.3 G/DL (ref 31.4–37.4)
MCV RBC AUTO: 88 FL (ref 82–98)
MONOCYTES # BLD AUTO: 0.75 THOUSAND/ÂΜL (ref 0.17–1.22)
MONOCYTES NFR BLD AUTO: 10 % (ref 4–12)
NEUTROPHILS # BLD AUTO: 5.05 THOUSANDS/ÂΜL (ref 1.85–7.62)
NEUTS SEG NFR BLD AUTO: 66 % (ref 43–75)
NRBC BLD AUTO-RTO: 0 /100 WBCS
PLATELET # BLD AUTO: 203 THOUSANDS/UL (ref 149–390)
PMV BLD AUTO: 10.7 FL (ref 8.9–12.7)
POTASSIUM SERPL-SCNC: 3.5 MMOL/L (ref 3.5–5.3)
RBC # BLD AUTO: 5.03 MILLION/UL (ref 3.88–5.62)
SODIUM SERPL-SCNC: 142 MMOL/L (ref 135–147)
WBC # BLD AUTO: 7.72 THOUSAND/UL (ref 4.31–10.16)

## 2023-09-17 PROCEDURE — 82948 REAGENT STRIP/BLOOD GLUCOSE: CPT

## 2023-09-17 PROCEDURE — 85025 COMPLETE CBC W/AUTO DIFF WBC: CPT | Performed by: PHYSICIAN ASSISTANT

## 2023-09-17 PROCEDURE — 80048 BASIC METABOLIC PNL TOTAL CA: CPT | Performed by: PHYSICIAN ASSISTANT

## 2023-09-17 PROCEDURE — 99024 POSTOP FOLLOW-UP VISIT: CPT | Performed by: STUDENT IN AN ORGANIZED HEALTH CARE EDUCATION/TRAINING PROGRAM

## 2023-09-17 PROCEDURE — 99024 POSTOP FOLLOW-UP VISIT: CPT | Performed by: PHYSICIAN ASSISTANT

## 2023-09-17 PROCEDURE — 83735 ASSAY OF MAGNESIUM: CPT | Performed by: PHYSICIAN ASSISTANT

## 2023-09-17 RX ORDER — LISINOPRIL 10 MG/1
10 TABLET ORAL DAILY
Qty: 90 TABLET | Refills: 3 | Status: SHIPPED | OUTPATIENT
Start: 2023-09-17

## 2023-09-17 RX ORDER — FUROSEMIDE 40 MG/1
60 TABLET ORAL DAILY
Qty: 135 TABLET | Refills: 3 | Status: SHIPPED | OUTPATIENT
Start: 2023-09-17

## 2023-09-17 RX ADMIN — LEVOTHYROXINE SODIUM 137 MCG: 25 TABLET ORAL at 05:55

## 2023-09-17 RX ADMIN — LISINOPRIL 10 MG: 10 TABLET ORAL at 08:33

## 2023-09-17 RX ADMIN — APIXABAN 5 MG: 5 TABLET, FILM COATED ORAL at 08:30

## 2023-09-17 RX ADMIN — FLUTICASONE PROPIONATE 2 SPRAY: 50 SPRAY, METERED NASAL at 08:36

## 2023-09-17 RX ADMIN — CLOPIDOGREL BISULFATE 75 MG: 75 TABLET, FILM COATED ORAL at 08:32

## 2023-09-17 RX ADMIN — MONTELUKAST 10 MG: 10 TABLET, FILM COATED ORAL at 08:33

## 2023-09-17 RX ADMIN — FUROSEMIDE 80 MG: 10 INJECTION, SOLUTION INTRAMUSCULAR; INTRAVENOUS at 08:34

## 2023-09-17 RX ADMIN — INSULIN LISPRO 2 UNITS: 100 INJECTION, SOLUTION INTRAVENOUS; SUBCUTANEOUS at 11:34

## 2023-09-17 RX ADMIN — FLUTICASONE FUROATE AND VILANTEROL TRIFENATATE 1 PUFF: 100; 25 POWDER RESPIRATORY (INHALATION) at 08:38

## 2023-09-17 RX ADMIN — EMPAGLIFLOZIN 10 MG: 10 TABLET, FILM COATED ORAL at 08:34

## 2023-09-17 RX ADMIN — METOPROLOL SUCCINATE 75 MG: 50 TABLET, EXTENDED RELEASE ORAL at 08:30

## 2023-09-17 NOTE — CASE MANAGEMENT
Case Management Assessment & Discharge Planning Note    Patient name Melo Vasquez  Location [address] [place] [address]/[facility] 341-03 MRN [id number]  : 1945 Date 2023       Current Admission Date: 2023  Current Admission Diagnosis:Cardiomyopathy [facility])   Patient Active Problem List    Diagnosis Date Noted   • s/p Medtronic dual chamber PPM 22 s/p upgrade to BiV ICD 2023   • Atrial flutter ([address]) 2023   • Cardiomyopathy ([address]) 2023   • NSVT (nonsustained ventricular tachycardia) ([address]) 2023   • SIRS (systemic inflammatory response syndrome) ([address]) 2023   • Dyspnea 2023   • Anemia 2023   • Ischemic heart failure ([address]) 2023   • Status post insertion of drug eluting coronary artery stent 02/10/2023   • Presence of cardiac pacemaker 2023   • NSTEMI (non-ST elevated myocardial infarction) ([address]) 2023   • Bruit of left carotid artery 2023   • Dyspnea on exertion 2023   • Peripheral artery disease ([address]) 2023   • Need for influenza vaccination 11/10/2022   • Leg cramping 11/10/2022   • Tachycardia-bradycardia ([address]) 2022   • S/P TAVR (transcatheter aortic valve replacement) 2022   • Coronary artery disease 2022   • Contrast media allergy 2022   • Aortic stenosis 2022   • Diverticulosis of large intestine 2018   • Internal hemorrhoids 2018   • Nicotine dependence 2017   • Osteoarthritis of knee 2017   • Bilateral hearing loss 2017   • Allergic rhinitis 2016   • Type 2 diabetes mellitus with circulatory disorder, with long-term current use of insulin ([address]) 2015   • Adenomatous colon polyp 2014   • Hyperlipidemia 2013   • Vitamin B12 deficiency 2012   • Mild intermittent asthma without complication    • Essential hypertension 2012   • Hypothyroidism 2012      LOS (days): 3  Geometric Mean LOS (GMLOS) (days):   Days to GMLOS: OBJECTIVE:    Risk of Unplanned Readmission Score: 22.31         Current admission status: Inpatient       Preferred Pharmacy:   [address], [address] [facility] [address]  Phone: [phone] Fax: [address], [address] [place] [address] [facility] [place] [address] [facility] [address]  Phone: [phone] Fax: [phone]    Primary Care Provider: Lenka Kellogg DO    Primary Insurance: Migel Tran MEDICARE [facility] REP  Secondary Insurance: [id number]:  [place] Proxies    There are no active Health Care Proxies on file. Readmission Root Cause  30 Day Readmission: No    Patient Information  Admitted from[de-identified] Home  Mental Status: Alert  During Assessment patient was accompanied by: Not accompanied during assessment  Assessment information provided by[de-identified] Patient  Primary Caregiver: Self  Support Systems: Daughter, Son, Spouse/significant other  [place] of Residence: [address] do you live in?: [employer] entry access options.  Select all that apply.: Stairs  Number of steps to enter home.: 2  Type of Current Residence: 2 story home  Upon entering residence, is there a bedroom on the main floor (no further steps)?: No  A bedroom is located on the following floor levels of residence (select all that apply):: 2nd Floor  Upon entering residence, is there a bathroom on the main floor (no further steps)?: No  Indicate which floors of current residence have a bathroom (select all the apply):: 2nd Floor  Number of steps to 2nd floor from main floor: One Flight  In the last 12 months, was there a time when you were not able to pay the mortgage or rent on time?: No  In the last 12 months, how many places have you lived?: 1  In the last 12 months, was there a time when you did not have a steady place to sleep or slept in a shelter (including now)?: No  Homeless/housing insecurity resource given?: N/A  Living Arrangements: Lives w/ Spouse/significant other, Lives w/ Son  Is patient a ?: No    Activities of Daily Living Prior to Admission  Functional Status: Independent  Completes ADLs independently?: Yes  Ambulates independently?: Yes  Does patient use assisted devices?: No  Does patient currently own DME?: Yes  What DME does the patient currently own?: Michae Show  Does patient have a history of Outpatient Therapy (PT/OT)?: Yes  Does the patient have a history of Short-Term Rehab?: No  Does patient have a history of HHC?: No  Does patient currently have [address]?: No         Patient Information Continued  Income Source: SSI/SSD  Does patient have prescription coverage?: Yes  Within the past 12 months, you worried that your food would run out before you got the money to buy more.: Never true  Within the past 12 months, the food you bought just didn't last and you didn't have money to get more.: Never true  Food insecurity resource given?: N/A  Does patient receive dialysis treatments?: No  Does patient have a history of substance abuse?: No  Does patient have a history of Mental Health Diagnosis?: No         Means of Transportation  Means of Transport to Appts[de-identified] Drives Self  In the past 12 months, has lack of transportation kept you from medical appointments or from getting medications?: No  In the past 12 months, has lack of transportation kept you from meetings, work, or from getting things needed for daily living?: No  Was application for public transport provided?: N/A        DISCHARGE DETAILS:    Discharge planning discussed with[de-identified] pt at bedside  [place] of Choice: Yes  Comments - Freedom of Choice: No discharge recommendations were disvussed with pt at this time                Contacts  Patient Contacts: porfirio Quezada  Relationship to Patient[de-identified] Family  Contact Method: Phone  Phone Number: [phone]  Reason/Outcome: Continuity of Care, Emergency Contact, Discharge [address]         Is the patient interested in [facility] at discharge?: No    DME Referral Provided  Referral made for DME?: No         Would you like to participate in our [address] service program?  : No - Declined    Treatment Team Recommendation: Home  Discharge Destination Plan[de-identified] Home  Transport at Discharge : Family                                      Additional Comments: CM introduced herself and role to pt at bedside. Pt stated he is independent at baseline. Pt stated he lives with his wife and son. Pt stated once medically cleared his wife will be able to provide transport. CM will continue to follow as needed.

## 2023-09-17 NOTE — PROGRESS NOTES
Advanced Heart Failure/Pulmonary Hypertension - Attending Note - Connie Davis 70 y.o. male MRN: [id number]      Assessment:  70 y.o. male Hx per chart p/w elective biv upgrade; had episode decreased responsiveness after anesthesia during procedure concerning for CVA, neuro evaluated and thinks unlikely CVA but rather sedation induced episode. AF and AFL, during current admit, managed per EP team  ICM, Chronic heart failure with reduced EF, LVEF 30-35%, nondilated,  NYHA class II Stage C.   Etiology: ischemic, EF reduction in setting of NSTEMI with 80% mid LAD and 90% RPDA stenoses s/p stenting  S/p 9/13/23 CRT-D upgrade (note he already had prior LPF RV lead)  CAD.  2/10/23 [facility]: 3-vessel CAD with two identifiable culprits for NSTEMI in ost RPDA & mid LAD. Successful PCI w/ ELDER x2 to mid LAD & ELDER x1 ost RPDA; residual moderate disease unchanged from 6-2022 study  6/03/22 [facility], pre TAVR: First marginal lesion 50% stenosis, mid LAD 50% stenosis, RPDA 50% stenosis, RPDA to 50% stenosis, 1st diagonal 50% stenosis. # mod-severe Mitral regurgitation, severe on echo 3/21/23, moderate to severe on echo 5/25/23. To consider MitraClip if still with significant MR after CRT upgrade  # Severe AS s/p TAVR, #26mm Emery Hernán S3 Ultra valve via left femoral approach by Dr Mary Beth Cedeno.  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 S/P MDT PPM.  # Hypertension  # Hyperlipidemia  # DM2 HgbA1C  # Asthma  # Hepatitis C. Untreated per chart notes. Hep C Ab high reactive 1/2022. Management per PCP. # Lymphadenopathy  # Tobacco abuse, last smoked  10/2022  PFT 2/9/23:  Interpretation:  Results are unreliable due to patient being unable to perform maneuvers as per ATS standards. If clinical concern exists would recommend repeating PFTs. Best interpretation made based on available data.    • Severe obstructive airflow defect on spirometry  • No significant improvement in airflow or forced vital capacity in response to the administration to bronchodilator per ATS standards.    • Lung volumes unable to be obtained due to patient inability to perform maneuvers  • Normal diffusion capacity  • Flow-volume loop consistent with obstruction  IV contrast allergy, has required premedication for procedures      I reviewed all pertinent labs/imaging/data including but not limited to:    Temp:  [97.5 °F (36.4 °C)-98.3 °F (36.8 °C)] 97.7 °F (36.5 °C)  HR:  [76-88] 81  Resp:  [12-16] 16  BP: (101-132)/(56-73) 121/56     Weight (last 2 days)     Date/Time Weight    09/17/23 0600 75.1 (165.57)    09/16/23 0912 77.9 (171.74)    09/16/23 0528 75.7 (166.89)    09/15/23 1141 71.1 (156.75)           Intake/Output Summary (Last 24 hours) at 9/17/2023 0834  Last data filed at 9/17/2023 3405  Gross per 24 hour   Intake 236 ml   Output 2300 ml   Net -2064 ml       Results from last 7 days   Lab Units 09/17/23  0601 09/16/23  0456 09/15/23  0447   CREATININE mg/dL 1.41* 1.46* 1.63*     Lab Results   Component Value Date    K 3.5 09/17/2023     Lab Results   Component Value Date    HGBA1C 7.6 (H) 02/10/2023     Lab Results   Component Value Date    GBX3PXUFTIXT 0.861 02/10/2023     Lab Results   Component Value Date    LDLCALC 72 02/10/2023     Lab Results   Component Value Date     (H) 07/14/2023      Lab Results   Component Value Date    NTBNP 2,624 (H) 05/06/2023                 Drips:        Plan:  Warm, near euvolemic, JVP improved with diuresis, Cr slightly better though remains above BL (note it seems he arrived with Cr 1.7 - above his nl baseline - seems Cr was elevated before any contrast or procedures)  note contribution to his JVP from significant TR (he has significant MR and TR; I believe this level of TR is not new and is underappreciated on prior echos)  Note he was Recently planned for escalated lasix burst in clinic, which he took he says    Neuro recs appreciated regarding etiology of his 9/13/23 elective biv upgrade  intraprocedural event    He was in Afib and AFL on 9/13 ECGs - Tx per EP team    Switch IV lasix 80 bid>lasix po 60 qd  Prior home lasix dose 40 po qd    increase bblocker per EP, vol status improving and could undertake 9/16    gdmt below  Paused nephrotoxic Rx including some GDMT 2/2 HE  Restart home gdmt pending progress  9/13/23 CRT-D upgrade (note he already had prior LPF RV lead)  Planned for mod-severe MR reassessment in future    Rest of care per EP and primary teams  From HF perspective, DC planning with outpt follow up needed for next week  I have messaged clinic clerical staff    Neurohormonal Blockade:   --Beta-Blocker:Metoprolol succinate 75 mg BID  --ACEi, ARB or ARNi: on home Lisinopril 20 mg BID - held 2/2 HE>restarted 10mg qd 9/16  --SGLT2i- jardiance 10 mg daily  --Aldosterone Receptor Blocker: Spironolactone 25 mg daily - held, consider restart as outpt    --prior home Diuretic: Lasix 40 mg daily     Sudden Cardiac Death Risk Reduction:  --ICD: EF 30-35%, MDT DC PPM in situ, returned LifeVest  9/13/23 CRT-D upgrade (note he already had prior LPF RV lead)     Electrical Resynchronization:  --Candidacy for BiV device: LBBB, QRSd 132-140 msec  Advanced Therapies (if appropriate): --Inotrope:  --LVAD/Transplant Candidacy:    Studies:  I have reviewed all pertinent patient data/labs/imaging where available, including but not limited to the below studies. Selected results may be displayed here but comprehensive listing is omitted for note clarity and can be found in the epic chart. ECG. Echo. Stress. Cath. Subjective and Interval Events:   Patient seen and examined. No new complaints. Feels well    ROS:  10 point ROS negative except as specified above.     Tele: reviewed    Objective:     Physical Exam:  /56   Pulse 81   Temp 97.7 °F (36.5 °C) (Oral)   Resp 16   Ht 5' 8" (1.727 m)   Wt 75.1 kg (165 lb 9.1 oz)   SpO2 100%   BMI 25.17 kg/m²   Ranges:  Temp:  [97.5 °F (36.4 °C)-98.3 °F (36.8 °C)] 97.7 °F (36.5 °C)  HR:  [76-88] 81  Resp:  [12-16] 16  BP: (101-132)/(56-73) 121/56    Weight (last 2 days)     Date/Time Weight    09/17/23 0600 75.1 (165.57)    09/16/23 0912 77.9 (171.74)    09/16/23 0528 75.7 (166.89)    09/15/23 1141 71.1 (156.75)           Intake/Output Summary (Last 24 hours) at 9/17/2023 0834  Last data filed at 9/17/2023 5264  Gross per 24 hour   Intake 236 ml   Output 2300 ml   Net -2064 ml     Constitutional: NAD, non toxic  Ears/nose/mouth/throat: atraumatic  CV: RRR, no JVD  Resp: ctabl  GI: Soft, NTND  MSK: no swollen joints in exposed areas  Extr: No edema, warm LE  Pysche: Normal affect  Neuro: appropriate in conversation  Skin: dry and intact in exposed areas    Labs/Imaging/Data:   Results from last 7 days   Lab Units 09/17/23  0601 09/15/23  0447 09/14/23  0536 09/13/23  0715   WBC Thousand/uL 7.72 8.31 10.25* 6.23   HEMOGLOBIN g/dL 13.9 12.3 12.2 12.8   HEMATOCRIT % 44.4 40.6 39.3 41.8   PLATELETS Thousands/uL 203 180 202 206   NEUTROS PCT % 66 68  --   --    MONOS PCT % 10 10  --   --    EOS PCT % 1 0  --   --       Results from last 7 days   Lab Units 09/17/23  0601 09/16/23  0456 09/15/23  0447 09/14/23  1557 09/14/23  0536 09/13/23  0715   SODIUM mmol/L 142 142 141 139 140 142   POTASSIUM mmol/L 3.5 3.5 4.0 4.0 4.3 3.9   CHLORIDE mmol/L 101 103 104 102 102 101   CO2 mmol/L 35* 32 33* 29 30 30   ANION GAP mmol/L 6 7 4 8 8 11   BUN mg/dL 37* 44* 48* 46* 40* 25   CREATININE mg/dL 1.41* 1.46* 1.63* 1.57* 1.69* 1.74*   CALCIUM mg/dL 9.5 9.2 8.5 8.7 9.0 9.4   GLUCOSE RANDOM mg/dL 78 72 76 119 123 223*      Results from last 7 days   Lab Units 09/17/23  0601 09/14/23  0536   MAGNESIUM mg/dL 2.2 2.3                       ABG:     VBG:    No results found for: "LDH"       Current Facility-Administered Medications   Medication Dose Route Frequency Provider Last Rate   • acetaminophen  650 mg Oral Q4H PRN Shreyas Graham PA-C     • albuterol  1 puff Inhalation Q4H PRN Shreyas Graham PA-C     • apixaban  5 mg Oral BID Shreyas Graham PA-C     • atorvastatin  80 mg Oral Daily With Melanie Pendleton     • clopidogrel  75 mg Oral Daily Shreyas Graham PA-C     • Empagliflozin  10 mg Oral Daily Garcia Randhawa MD     • ferrous sulfate  325 mg Oral Every Other Day Shreyas Graham PA-C     • fluticasone  2 spray Nasal Daily Shreyas Graham PA-C     • Fluticasone Furoate-Vilanterol  1 puff Inhalation Daily Shreyas Graham PA-C     • furosemide  80 mg Intravenous BID (diuretic) Angela Ovalles DO     • insulin glargine  14 Units Subcutaneous HS Giorgio Bland DO     • insulin lispro  1-5 Units Subcutaneous HS Shreyas Graham PA-C     • insulin lispro  1-6 Units Subcutaneous TID [facility] Shreyas Graham PA-C     • insulin lispro  5 Units Subcutaneous BID With Meals Shreyas Graham PA-C     • levothyroxine  137 mcg Oral Early Morning Shreyas Graham PA-C     • lisinopril  10 mg Oral Daily Shilpa Alvares MD     • methocarbamol  750 mg Oral Q6H PRN Shreyas Graham PA-C     • metoprolol succinate  75 mg Oral BID Shreyas Graham PA-C     • montelukast  10 mg Oral Daily Shreyas Graham PA-C     • nitroglycerin  0.4 mg Sublingual Q5 Min PRN Shreyas Graham PA-C         Counseling / Coordination of Care: Total floor / unit time spent today 31 minutes. Greater than 50% of total time was spent with the patient and / or family counseling and / or coordination of care. A description of the counseling / coordination of care: we discussed diagnoses, recent as well as older studies, labs, and all changes in cardiac treatment plan. All patient questions were answered. Thank you for the opportunity to participate in the care of this patient.     Shilpa Alvares MD  Attending Physician  Advanced Heart Failure and Transplant Cardiology  [facility]

## 2023-09-18 ENCOUNTER — TRANSITIONAL CARE MANAGEMENT (OUTPATIENT)
Dept: INTERNAL MEDICINE CLINIC | Facility: CLINIC | Age: 78
End: 2023-09-18

## 2023-09-18 DIAGNOSIS — Z71.89 COMPLEX CARE COORDINATION: Primary | ICD-10-CM

## 2023-09-20 ENCOUNTER — PATIENT OUTREACH (OUTPATIENT)
Dept: CARDIOLOGY CLINIC | Facility: CLINIC | Age: 78
End: 2023-09-20

## 2023-09-20 NOTE — PROGRESS NOTES
Received call from Adriana Alves at the [address] [place] [address]. Patient is at the [address] [place] [address] and saying that the [facility] # previously provided does not have information on patient. LCSW provided [employer] Ph# [phone] from recent menu on-line. Encouraged pt to ask for general information about the meals and provide his insurance info to see if [facility] contracts with his insurance company. Otherwise it's private pay for the meals. Alternative option is to contact the [facility] and ask for MOW however the menu is not medically tailored for low sodium but rather they just don't add salt.

## 2023-09-20 NOTE — PROGRESS NOTES
Advanced Heart Failure / Pulmonary Hypertension Outpatient Visit    Genesis Harris 70 y.o. male   MRN: [id number]  Encounter: [id number]    Assessment:  Patient Active Problem List    Diagnosis Date Noted   • s/p Medtronic dual chamber PPM 8/31/22 s/p upgrade to BiV ICD 9/13/2023 09/16/2023   • Atrial flutter ([address]) 09/14/2023   • Cardiomyopathy ([address]) 06/05/2023   • NSVT (nonsustained ventricular tachycardia) ([address]) 06/05/2023   • SIRS (systemic inflammatory response syndrome) ([address]) 03/19/2023   • Dyspnea 03/19/2023   • Anemia 03/19/2023   • Ischemic heart failure ([address]) 03/18/2023   • Status post insertion of drug eluting coronary artery stent 02/10/2023   • Presence of cardiac pacemaker 01/30/2023   • NSTEMI (non-ST elevated myocardial infarction) ([address]) 01/30/2023   • Bruit of left carotid artery 01/30/2023   • Dyspnea on exertion 01/30/2023   • Peripheral artery disease ([address]) 01/18/2023   • Need for influenza vaccination 11/10/2022   • Leg cramping 11/10/2022   • Tachycardia-bradycardia ([address]) 09/01/2022   • S/P TAVR (transcatheter aortic valve replacement) 06/21/2022   • Coronary artery disease 06/21/2022   • Contrast media allergy 05/31/2022   • Aortic stenosis 01/19/2022   • Diverticulosis of large intestine 09/20/2018   • Internal hemorrhoids 09/20/2018   • Nicotine dependence 04/13/2017   • Osteoarthritis of knee 04/13/2017   • Bilateral hearing loss 01/30/2017   • Allergic rhinitis 02/24/2016   • Type 2 diabetes mellitus with circulatory disorder, with long-term current use of insulin ([address]) 11/24/2015   • Adenomatous colon polyp 06/11/2014   • Hyperlipidemia 09/13/2013   • Vitamin B12 deficiency 07/24/2012   • Mild intermittent asthma without complication 96/88/8633   • Essential hypertension 06/08/2012   • Hypothyroidism 06/07/2012       Today's Plan:  • Reasonable volume status on exam. Continue current regimen. • Reassess LVEF and MR 3-4 months post CRT. • Repeat BMP before next visit.    • Consider switching lisinopril to [facility] near future when kidney function closer to baseline. • Has been taking spironolactone since discharge; added back to med list today. • 2g sodium restriction, 2L fluid restriction, daily weights. Plan:  Chronic heart failure with reduced EF, LVEF 30-35%, nondilated, NYHA class II Stage C.   Etiology: ischemic, new reduction in setting of NSTEMI with 80% mid LAD and 90% RPDA stenoses s/p stenting. Newly diagnosed with atrial flutter with RVR on most recent admission.     Weighed 179 lbs, 186 lbs 3/28/23; 189 lbs 4/28/23; 184 lbs 9/7/23, 178 lbs today. Most recent BMP from 9/17/2023: Sodium 142, potassium 3.5, creatinine 1.41, BUN 37, eGFR 48.     Studies:  Echo 9/14/23:   LVEF 32%. Severe global hypokinesis with regional variation. RV cavity size dilated, systolic function reduced  GLENN, moderate to severe MR, moderate TR. Echo 5/25/23:  LVEF 35%, 30-35% on my review  LVIDD 5.4cm, increased wall thickness  TAVR mean gradient 13mmHg  Moderate to severe MR, eccentric  Normal RV size and systolic function  Estimated RVSP 36mmHg, mild TR     Echo 4/21/23:  LVEF:  35%  LVIDd: 5.4 cm, normal wall thickness  RV: Normal size and systolic function  AV: TAVR bioprosthetic valve appears well-seated and appears to be functioning normally, no perivalvular or transvalvular regurgitation  MR: Moderate with posteriorly directed jet  PASP: Unable to estimate  RVOT: no notching  Other: IVC normal, normal biatrial size     TTE 3/21/23: LVEF 30-35%,LVIDd 5.7 cm,  RV normal, no AI, no AS, severe MR, mild TR, trivial pericardial effusion, IVC normal. RVSP 36mmHg + RAP.  The following segments are akinetic: basal inferoseptal, basal inferior, mid inferoseptal and mid inferolateral. The following segments are hypokinetic: mid inferior and apical inferior.   TTE 2/10/23: LVEF 35-40%  TTE 7/28/22: LVEF 55%, moderate MR with posteriorly directed jet, mild TR     2/10/23 [facility]: 3-vessel CAD with two identifiable culprits for NSTEMI in ost RPDA & mid LAD. Successful PCI w/ ELDER x2 to mid LAD & ELDER x1 ost RPDA; residual moderate disease unchanged from 6-2022 study   6/03/22 [facility], pre TAVR: First marginal lesion 50% stenosis, mid LAD 50% stenosis, RPDA 50% stenosis, RPDA to 50% stenosis, 1st diagonal 50% stenosis.     Neurohormonal Blockade:   --Beta-Blocker: Metoprolol succinate 75 mg BID  --ACEi, ARB or ARNi: Lisinopril 20 mg BID > 10 mg QD  --SGLT2i- jardiance 10 mg daily  --Aldosterone Receptor Blocker: Spironolactone 25 mg daily  --Diuretic: Lasix 60 mg daily     Sudden Cardiac Death Risk Reduction:  --ICD: EF 30-35%, recent upgrade to BiV ICD     Electrical Resynchronization:  --Candidacy for BiV device:  recent upgrade to BiV ICD     Advanced Therapies (if appropriate): --Inotrope:  --LVAD/Transplant Candidacy:     Mitral regurgitation, severe on echo 3/21/23, moderate to severe on echo 5/25/23. To consider MitraClip if still with significant MR after CRT  Severe AS s/p TAVR, #26mm Emery Hernán S3 Ultra valve via left femoral approach by Dr Marcos Negron.  6/21/22; normally functioning on echo 5/25/23  Post op LBBB. Tachy-lino syndrome with LBBB, pauses post TAVR. S/P MDT PPM.  Interrogation 3/24/23: AP 7.5%  <0.1% NSVTs  CAD s/p NSTEMI with PCI to the mid LAD and RPDA 3/21/23  Rx: plavix, statin. ASA stopped when started on Eliquis. Hypertension  Hyperlipidemia 11/10/22 TC 142, TG 75, HDL 65, LDL 62   DM2 HgbA1C 7.6  Asthma  Hepatitis C. Untreated per chart notes. Hep C Ab high reactive 1/2022. Management per PCP. Lymphadenopathy  Tobacco abuse, last smoked  10/2022  PFT 2/9/23:  Interpretation:  Results are unreliable due to patient being unable to perform maneuvers as per ATS standards. If clinical concern exists would recommend repeating PFTs. Best interpretation made based on available data.    • Severe obstructive airflow defect on spirometry  • No significant improvement in airflow or forced vital capacity in response to the administration to bronchodilator per ATS standards. • Lung volumes unable to be obtained due to patient inability to perform maneuvers  • Normal diffusion capacity  • Flow-volume loop consistent with obstruction      HPI:   Patient is a 70year old male who presents for follow up. Follows with Dr. Elma Lefort. Per [facility] Patient admitted February 9/20/2023 when he presented with progressive shortness of breath. Found to have elevated troponin. Admitted for type I NSTEMI with new wall motion abnormalities on echocardiogram along with newly reduced EF of 40%. He was urgently taken to the Cath Lab and left heart catheterization showed three-vessel CAD with 2 vessels "culprit lesions: Ostial RPDA and mid LAD stenosis status post stents. Patient readmitted March 18, 2023 when he presented with progressive shortness of breath and intermittent bilateral lower extremity edema. Also with weight gain and orthopnea.      4/28/23: here for follow up. Started on LifeVest since last office visit. Repeat echo 4/21/23 showed EF of 35%. Reports walking 1.5 blocks no dyspnea or chest pain. Also able to walk up a flight of stairs. Mainly limited by knee pains. No leg swelling or bloating. No PND, orthopnea. No dizziness or lightheadedness. Taking medications. Weight stable on home scale at  177 lbs      6/1/23: here for follow up. Repeat echo showed EF 35%, 30-35% on my review. Denies shortness of breath or chest pain on current level of exertion. Metoprolol and entresto increased on last visit. 9/7/23: here for follow up. Seen at the ED 7/14/23 for dyspnea. CHF. Seen by Dr. Angelita Chase and scheduled for BIV ICD upgrade. Reports getting tired, more fatigued over a couple of weeks. Has to rest after walking up a flight of stairs due to shortness of breath. Ankle pains with walking. 1 week ago, had 2 episodes of dizziness, transient.    Reports adherence to medications and diet    Admitted to B from 9/13 to 9/17 after planned admission for upgrade to [place] ICD with EP Dr. Yunior Mckeon. When he was initially administered anesthesia at the start of the procedure, had neurologic changes and was evaluated by neurology. [place] to be secondary to anesthesia with no acute neurologic event and ultimately underwent upgrade of dual-chamber pacemaker to BiV ICD. Found to be in new onset atrial flutter with RVR on admission and was started on IV Lasix for decompensated heart failure. Lisinopril and spironolactone held for HE. Started on Eliquis for ongoing atrial flutter, rate controlled. Transitioned to oral Lasix 60 mg daily, PTA 40 mg daily. Lisinopril restarted at lower dose of 10 mg daily, spironolactone discontinued, may be restarted as outpatient. Continued on Jardiance. 9/26/2023: Presents today for follow-up. Feeling well, reports he's noticing alarms from his device since leaving the hospital. Denies shocks or pain. Has a device check upstairs immediately after this appointment. Breathing well, denies SOB/BUSTILLOS, even with stairs. Denies PND, orthopnea, LE swelling. Has been taking higher dose of Lasix as prescribed, urinates well with this. Believes he's been taking his spironolactone since discharge. Picked up his Eliquis - has been compliant with this. Unsure of all of his medications; advised to bring his medications to his next appointment to check against his list. Weighing himself daily at home, weights have been stable.        Past Medical History:   Diagnosis Date   • Arthritis    • Asthma    • Colon polyps    • Community acquired pneumonia     last assessed: 5/1/2014   • Diabetes mellitus ([address])    • Hemorrhagic prepatellar bursitis, left 10/21/2019   • Hepatitis C    • High cholesterol    • History of colonoscopy 2017   • Hypertension    • Lymphadenopathy, anterior cervical 04/17/2018   • Nephritis and nephropathy, not specified as acute or chronic, with other specified pathological lesion in kidney, in diseases classified elsewhere 3/26/2013   • s/p Medtronic dual chamber PPM 8/31/22 s/p upgrade to BiV ICD 9/13/2023 9/16/2023   • Screening for colon cancer 05/01/2019   • Thoracic vertebral fracture ([address]) 06/11/2014     Review of Systems   Constitutional: Negative for chills, fever and unexpected weight change. HENT: Negative for ear pain and sore throat. Eyes: Negative for pain and visual disturbance. Respiratory: Negative for cough and shortness of breath. Cardiovascular: Negative for chest pain, palpitations and leg swelling. Gastrointestinal: Negative for abdominal pain and vomiting. Genitourinary: Negative for dysuria and hematuria. Musculoskeletal: Negative for arthralgias and back pain. Skin: Negative for color change and rash. Neurological: Negative for seizures and syncope. All other systems reviewed and are negative. 14-point ROS completed and negative except as stated above and/or in the HPI. Allergies   Allergen Reactions   • Iv Contrast [Iodinated Contrast Media] Rash     Patient states he had a severe reaction approximately 10 years ago , states he had a rash, itchy and he remembers a bunch of people pounding on chest and being surrounded by multiple doctors.        Current Outpatient Medications:   •  acetaminophen (TYLENOL) 500 mg tablet, Take 2 tablets (1,000 mg total) by mouth 3 (three) times a day as needed for mild pain for up to 7 days, Disp: 42 tablet, Rfl: 1  •  Advair -21 MCG/ACT inhaler, Inhale 2 puffs 2 (two) times a day Rinse mouth after use., Disp: 36 g, Rfl: 6  •  albuterol (PROVENTIL HFA,VENTOLIN HFA) 90 mcg/act inhaler, INHALE 2 PUFFS BY MOUTH EVERY 4 HOURS AS NEEDED FOR WHEEZING OR SHORTNESS OF BREATH, Disp: 18 g, Rfl: 2  •  apixaban (Eliquis) 5 mg, Take 1 tablet (5 mg total) by mouth 2 (two) times a day, Disp: 60 tablet, Rfl: 3  •  cholecalciferol (VITAMIN D3) 1,000 units tablet, Take 2 tablets (2,000 Units total) by mouth daily, Disp: 180 tablet, Rfl: 5  • clopidogrel (PLAVIX) 75 mg tablet, Take 1 tablet (75 mg total) by mouth daily, Disp: 90 tablet, Rfl: 3  •  Empagliflozin (JARDIANCE) 10 MG TABS tablet, Take 1 tablet (10 mg total) by mouth every morning, Disp: 90 tablet, Rfl: 3  •  fluticasone (FLONASE) 50 mcg/act nasal spray, 2 sprays into each nostril daily, Disp: 2 Bottle, Rfl: 2  •  furosemide (LASIX) 40 mg tablet, Take 1.5 tablets (60 mg total) by mouth daily, Disp: 135 tablet, Rfl: 3  •  Lantus SoloStar 100 units/mL SOPN, INJECT 0.18 ML (18 UNITS TOTAL) UNDER THE SKIN DAILY AT BEDTIME, Disp: 15 mL, Rfl: 3  •  levothyroxine 137 mcg tablet, TAKE 1 TABLET BY MOUTH EVERY DAY, Disp: 90 tablet, Rfl: 3  •  lisinopril (ZESTRIL) 10 mg tablet, Take 1 tablet (10 mg total) by mouth daily, Disp: 90 tablet, Rfl: 3  •  metFORMIN (GLUCOPHAGE) 850 mg tablet, TAKE 1 TABLET BY MOUTH 2 TIMES A DAY WITH MEALS., Disp: 180 tablet, Rfl: 3  •  metoprolol succinate (TOPROL-XL) 50 mg 24 hr tablet, Take 1.5 tablets (75 mg total) by mouth 2 (two) times a day, Disp: 270 tablet, Rfl: 1  •  montelukast (SINGULAIR) 10 mg tablet, TAKE 1 TABLET BY MOUTH EVERY DAY, Disp: 90 tablet, Rfl: 3  •  nitroglycerin (NITROSTAT) 0.4 mg SL tablet, Place 1 tablet (0.4 mg total) under the tongue every 5 (five) minutes as needed for chest pain, Disp: 30 tablet, Rfl: 1  •  NovoLOG FlexPen 100 units/mL injection pen, INJECT 5 UNITS WITH BREAKFAST AND WITH DINNER, Disp: 15 mL, Rfl: 3  •  rosuvastatin (CRESTOR) 40 MG tablet, TAKE 1 TABLET BY MOUTH EVERY DAY, Disp: 90 tablet, Rfl: 3  •  spironolactone (ALDACTONE) 25 mg tablet, Take 1 tablet (25 mg total) by mouth daily, Disp: 30 tablet, Rfl: 2  •  Alcohol Swabs (ALCOHOL PADS) 70 % PADS, , Disp: , Rfl:   •  Banophen 25 MG capsule, TAKE 1 CAPSULE NIGHT BEFORE PROCEDURE AND TAKE 1 CAPSULE MORNING OF PROCEDURE (Patient not taking: Reported on 9/7/2023), Disp: , Rfl:   •  Banophen 50 MG capsule, TAKE 1 CAPSULE 1 HOUR PRIOR TO TEST (Patient not taking: Reported on 2023), Disp: , Rfl:   •  Blood Glucose Monitoring Suppl (OneTouch Verio) w/Device KIT, Check blood glucose three times daily before each meal, Disp: 1 kit, Rfl: 0  •  Continuous Blood Gluc  (FreeStyle [place] 2 [place]) PWOER, Use 1 each continuous (Patient not taking: Reported on 2023), Disp: 1 each, Rfl: 0  •  Continuous Blood Gluc Sensor (FreeStyle Crist[place]er 2 Sensor) MISC, Use 1 each every 14 (fourteen) days (Patient not taking: Reported on 2023), Disp: 6 each, Rfl: 3  •  famotidine (PEPCID) 20 mg tablet, TAKE 1 TABLET AT NIGHT PRIOR TO PROCEDURE AND 1 TABLET THE MORNING OF PROCEDURE (Patient not taking: Reported on 2023), Disp: , Rfl:   •  ferrous sulfate 325 (65 Fe) mg tablet, TAKE 1 TABLET BY MOUTH EVERY OTHER DAY (Patient not taking: Reported on 2023), Disp: 45 tablet, Rfl: 4  •  Insulin Pen Needle (Pen Needles) 31G X 8 MM MISC, Use daily, Disp: 300 each, Rfl: 3  •  Lancets (FREESTYLE) lancets, by Other route as needed (As needed), Disp: 100 each, Rfl: 3  •  methocarbamol (Robaxin-750) 750 mg tablet, Take 1 tablet (750 mg total) by mouth every 6 (six) hours as needed for muscle spasms, Disp: 60 tablet, Rfl: 0    Social History     Socioeconomic History   • Marital status: /Civil Union     Spouse name: Not on file   • Number of children: Not on file   • Years of education: Not on file   • Highest education level: Not on file   Occupational History   • Occupation: retired    Tobacco Use   • Smoking status: Former     Packs/day: 0.50     Types: Cigarettes     Quit date: 2022     Years since quittin.3   • Smokeless tobacco: Never   • Tobacco comments:     started when he was about 24 yrs old; stopped smoking 3 wks ago   Vaping Use   • Vaping Use: Never used   Substance and Sexual Activity   • Alcohol use: No   • Drug use: No   • Sexual activity: Not Currently   Other Topics Concern   • Not on file   Social History Narrative   • Not on file     Social Determinants of Health Financial Resource Strain: Low Risk  (2/21/2023)    Overall Financial Resource Strain (CARDIA)    • Difficulty of Paying Living Expenses: Not hard at all   Food Insecurity: No Food Insecurity (9/17/2023)    Hunger Vital Sign    • Worried About Running Out of Food in the Last Year: Never true    • Ran Out of Food in the Last Year: Never true   Transportation Needs: No Transportation Needs (9/17/2023)    PRAPARE - Transportation    • Lack of Transportation (Medical): No    • Lack of Transportation (Non-Medical): No   Physical Activity: Unknown (1/19/2022)    Exercise Vital Sign    • Days of Exercise per Week: Not on file    • Minutes of Exercise per Session: 60 min   Stress: No Stress Concern Present (1/19/2022)    [facility]    • Feeling of Stress : Not at all   Social Connections: Moderately Isolated (1/19/2022)    Social Connection and Isolation Panel [NHANES]    • Frequency of Communication with Friends and Family: More than three times a week    • Frequency of Social Gatherings with Friends and Family: More than three times a week    • Attends Denominational Services: Never    • Active Member of Clubs or Organizations: No    • Attends Club or Organization Meetings: Never    • Marital Status:    Intimate Partner Violence: Not on file   Housing Stability: Low Risk  (9/17/2023)    Housing Stability Vital Sign    • Unable to Pay for Housing in the Last Year: No    • Number of Places Lived in the Last Year: 1    • Unstable Housing in the Last Year: No     Family History   Problem Relation Age of Onset   • Heart attack Family         at age 80   • No Known Problems Mother    • No Known Problems Father    • Diabetes Sister    • Diabetes Brother        Vitals:  Blood pressure 114/68, pulse 100, height 5' 8" (1.727 m), weight 81.1 kg (178 lb 14.4 oz), SpO2 96 %. Body mass index is 27.2 kg/m².   Wt Readings from Last 10 Encounters:   09/26/23 81.1 kg (178 lb 14.4 oz)   09/21/23 78.9 kg (174 lb)   09/17/23 75.1 kg (165 lb 9.1 oz)   09/07/23 83.5 kg (184 lb)   07/28/23 82.3 kg (181 lb 6.4 oz)   06/05/23 82.2 kg (181 lb 3.2 oz)   06/01/23 85 kg (187 lb 8 oz)   05/25/23 83 kg (183 lb)   05/06/23 83 kg (183 lb)   04/28/23 85.7 kg (189 lb)     Vitals:    09/26/23 0941   BP: 114/68   BP Location: Right arm   Patient Position: Sitting   Cuff Size: Standard   Pulse: 100   SpO2: 96%   Weight: 81.1 kg (178 lb 14.4 oz)   Height: 5' 8" (1.727 m)       Physical Exam  Constitutional:       Appearance: Normal appearance. HENT:      Head: Normocephalic. Nose: Nose normal.      Mouth/Throat:      Mouth: Mucous membranes are moist.   Eyes:      Conjunctiva/sclera: Conjunctivae normal.   Neck:      Comments: JVP up, known TR  Cardiovascular:      Rate and Rhythm: Normal rate and regular rhythm. Pulmonary:      Effort: Pulmonary effort is normal.      Breath sounds: Normal breath sounds. Musculoskeletal:      Cervical back: Neck supple. Right lower leg: No edema. Left lower leg: No edema. Skin:     General: Skin is dry. Neurological:      General: No focal deficit present. Mental Status: He is alert and oriented to person, place, and time.    Psychiatric:         Mood and Affect: Mood normal.         Behavior: Behavior normal.         Labs & Results:  Lab Results   Component Value Date    WBC 6.42 09/25/2023    HGB 12.1 09/25/2023    HCT 41.5 09/25/2023    MCV 91 09/25/2023     09/25/2023     Lab Results   Component Value Date    SODIUM 142 09/17/2023    K 3.5 09/17/2023     09/17/2023    CO2 35 (H) 09/17/2023    BUN 37 (H) 09/17/2023    CREATININE 1.41 (H) 09/17/2023    GLUC 78 09/17/2023    CALCIUM 9.5 09/17/2023     Lab Results   Component Value Date    INR 1.11 02/10/2023    INR 0.93 09/01/2022    INR 0.91 05/08/2019    PROTIME 14.6 (H) 02/10/2023    PROTIME 12.6 09/01/2022    PROTIME 12.4 05/08/2019     Lab Results   Component Value Date     (H) 07/14/2023      Lab Results   Component Value Date    NTBNP 2,624 (H) 05/06/2023         Thank you for the opportunity to participate in the care of this patient.     Leslie Hylton PA-C

## 2023-09-21 ENCOUNTER — OFFICE VISIT (OUTPATIENT)
Dept: INTERNAL MEDICINE CLINIC | Facility: CLINIC | Age: 78
End: 2023-09-21

## 2023-09-21 VITALS
WEIGHT: 174 LBS | SYSTOLIC BLOOD PRESSURE: 112 MMHG | HEART RATE: 56 BPM | DIASTOLIC BLOOD PRESSURE: 69 MMHG | BODY MASS INDEX: 26.37 KG/M2 | TEMPERATURE: 97.6 F | HEIGHT: 68 IN

## 2023-09-21 DIAGNOSIS — E11.40 TYPE 2 DIABETES MELLITUS WITH DIABETIC NEUROPATHY, WITH LONG-TERM CURRENT USE OF INSULIN (HCC): ICD-10-CM

## 2023-09-21 DIAGNOSIS — I25.5 ISCHEMIC CARDIOMYOPATHY: Primary | ICD-10-CM

## 2023-09-21 DIAGNOSIS — Z95.5 STATUS POST INSERTION OF DRUG ELUTING CORONARY ARTERY STENT: ICD-10-CM

## 2023-09-21 DIAGNOSIS — Z95.0 STATUS POST BIVENTRICULAR PACEMAKER: ICD-10-CM

## 2023-09-21 DIAGNOSIS — I21.4 NSTEMI (NON-ST ELEVATED MYOCARDIAL INFARCTION) (HCC): ICD-10-CM

## 2023-09-21 DIAGNOSIS — I48.92 ATRIAL FLUTTER, UNSPECIFIED TYPE (HCC): ICD-10-CM

## 2023-09-21 DIAGNOSIS — Z79.4 TYPE 2 DIABETES MELLITUS WITH DIABETIC NEUROPATHY, WITH LONG-TERM CURRENT USE OF INSULIN (HCC): ICD-10-CM

## 2023-09-21 DIAGNOSIS — R10.32 ACUTE POSTOPERATIVE PAIN OF LEFT GROIN: ICD-10-CM

## 2023-09-21 DIAGNOSIS — N17.9 AKI (ACUTE KIDNEY INJURY) (HCC): ICD-10-CM

## 2023-09-21 DIAGNOSIS — G89.18 ACUTE POSTOPERATIVE PAIN OF LEFT GROIN: ICD-10-CM

## 2023-09-21 PROCEDURE — 99496 TRANSJ CARE MGMT HIGH F2F 7D: CPT | Performed by: STUDENT IN AN ORGANIZED HEALTH CARE EDUCATION/TRAINING PROGRAM

## 2023-09-21 RX ORDER — ACETAMINOPHEN 500 MG
975 TABLET ORAL 3 TIMES DAILY PRN
Qty: 42 TABLET | Refills: 1 | Status: SHIPPED | OUTPATIENT
Start: 2023-09-21 | End: 2023-09-28

## 2023-09-21 NOTE — PROGRESS NOTES
Assessment & Plan     This is a 71 y/o M with significant cardiac hx including ICM, HFrEF, and recent ELDER x 2 who comes here for TCM. He was admitted earlier this week for dual-ventricle ICD placement, however hospital course was c/b new-onset AF/AFlutter and decompensated HF. He now appears to be doing very well. His only complaint is L groin pain, which was initially concerning for pseudoaneurysm however pt did not receive femoral cath during admission. There is additionally no evidence of hernia. This may represent an underlying kidney stone given pt's history and his recent HE/increased creatinine. We will f/u with BMP and U/A to ensure resolution of HE and monitor for potential stone. The remainder of his care is to be handled by Cards, which he will be seeing early next week. He is to RTC in 3-6 mo for an AWV. 1. Ischemic cardiomyopathy    2. Status post biventricular pacemaker    3. Atrial flutter, unspecified type ([address])    4. NSTEMI (non-ST elevated myocardial infarction) ([address])    5. Status post insertion of drug eluting coronary artery stent    6. Type 2 diabetes mellitus with diabetic neuropathy, with long-term current use of insulin ([address])    7. Acute postoperative pain of left groin  -     acetaminophen (TYLENOL) 500 mg tablet; Take 2 tablets (1,000 mg total) by mouth 3 (three) times a day as needed for mild pain for up to 7 days    8. HE (acute kidney injury) ([address])  -     UA (URINE) with reflex to Scope         Subjective     HPI:    Mr Tania Vann is a 71 y/o man with h/o ICM c/b HFrEF (LVEF 30%) on Lisinopril/Lasix/Toprol/Aldactone, CAD with NSTEMI now s/p PCI x 2 to LAD + PDA (02/’23), tachybrady syndrome s/p dual-chamber PM, severe AS s/p TAVR, HTN, HLD, mod-severe MR, IDDM on Novolog/MFN/Jardiance, HCV, tobacco abuse, and new-onset AF/Aflutter on [facility] who comes here for TCM. He was recently admitted to the Cardiology service from 09/13-09/17 in order to undergo BiV ICD.  It appears that he initially had a long h/o HFpEF, however in early 2023 began having worsening SOB. Cards cath was done to investigate this in February, with results requiring ELDER x2. However despite revascularization his EF remained restricted. For this reason it was decided that he should undergo BiV ICD. His hospital course was remarkable for new-onset AF w/ RVR + decompensated HF. His procedure was delayed for about 4/5 days while he was diuresed with IV Lasix 80 bid. Eventually he returned to baseline and was able to undergo procedure, which he tolerated well and was without any immediate complications. He did have on-going Aflutter however this improved with diuresis. It was then decided to begin Eliquis IP which he is to continue indefinitely. After D/C his Lisinopril was restarted at lower dose (from 20 to 10), Jardiance continued, home Lasix increased to 60 qd, and Aldactone was  to continue being held pending HF eval OP. Of note his Cr had been elevated throughout hospitalization, likely in regards to his decompensated HF + contrast use with procedure. Today he is seen in clinic accompanied by his wife, with  via telephone (#[id number]). He says that he did have some L leg pain that kept him from walking after D/C, however it has since resolved. He otherwise denies any CP, palpitations, SOB, Mcgarry, orthopnea/PND. He did have some tachycardia during a device check yesterday however he did not feel any different. Later on in the exam he did admit that he is still having Leg pain but only while walking. This has kept him from walking long distances. It is not present at rest, however when he gets up from seated position he begins to feel a sharp pain in his L groin. This pain is however getting better. He endorses compliance with all medications including Eliquis. He is unsure as to whether he has been holding the Aldactone. Transitional Care Management Review:   Ida Bernal is a 70 y.o. male here for TCM follow up. During the TCM phone call patient stated:  TCM Call     Date and time call was made  9/19/2023  8:53 AM    Hospital care reviewed  Records reviewed    Patient was hospitialized at  [address] [place] [address]    Date of Admission  09/13/23    Date of discharge  09/17/23    Diagnosis  Cardiomyopathy    Disposition  Home    Were the patients medications reviewed and updated  Yes    Current Symptoms  Incisional pain    Incisional pain severity  Mild    Incisional pain onset  Gradual      TCM Call     Post hospital issues  None    Should patient be enrolled in anticoag monitoring? Yes    Scheduled for follow up? Yes    Patients specialists  Cardiologist    Cardiologist name  Noreen Osorio    Cardiologist contact #  [phone]    Other specialists names  Rhianna Robles    Other specialists contcat #  [phone]    Referrals needed  No    Did you obtain your prescribed medications  No    Why were you unable to obtain your medications  Patient wasn;t sure if they were ready. I advised him to [facility] pharmacy to see if they are ready    Do you need help managing your prescriptions or medications  No    Is transportation to your appointment needed  No    I have advised the patient to call PCP with any new or worsening symptoms  Marcela Bunch MA    Living Arrangements  Spouse or Significiant other    Are you recieving any outpatient services  No    What type of services  CARDIAC REHAB    Are you recieving home care services  No    Are you using any community resources  No    Current waiver services  No    Have you fallen in the last 12 months  No    Interperter language line needed  Yes   ID #[id number]    Counseling  Patient    Counseling topics  instructions for management; Importance of RX compliance        Review of Systems   Constitutional: Negative for chills and fever. HENT: Negative for ear pain and sore throat.     Eyes: Negative for pain and visual disturbance. Respiratory: Negative for cough and shortness of breath. Cardiovascular: Negative for chest pain and palpitations. Gastrointestinal: Negative for abdominal pain and vomiting. Genitourinary: Negative for dysuria and hematuria. Musculoskeletal: Negative for arthralgias and back pain. L groin pain on ambulation   Skin: Negative for color change and rash. Neurological: Negative for seizures and syncope. All other systems reviewed and are negative. Objective     /69 (BP Location: Right arm, Patient Position: Sitting, Cuff Size: Large)   Pulse 56   Temp 97.6 °F (36.4 °C) (Temporal)   Ht 5' 8" (1.727 m)   Wt 78.9 kg (174 lb)   BMI 26.46 kg/m²      Physical Exam  Vitals and nursing note reviewed. Constitutional:       General: He is not in acute distress. Appearance: He is well-developed. HENT:      Head: Normocephalic and atraumatic. Eyes:      Conjunctiva/sclera: Conjunctivae normal.   Cardiovascular:      Rate and Rhythm: Normal rate and regular rhythm. Heart sounds: No murmur heard. Pulmonary:      Effort: Pulmonary effort is normal. No respiratory distress. Breath sounds: Normal breath sounds. Abdominal:      Palpations: Abdomen is soft. Tenderness: There is no abdominal tenderness. Musculoskeletal:         General: No swelling or tenderness. Cervical back: Neck supple. Right lower leg: No edema. Left lower leg: No edema. Comments: No reducible mass in L groin  No pulsatile mass in L groin  L groin non-TTP   Skin:     General: Skin is warm and dry. Capillary Refill: Capillary refill takes less than 2 seconds. Neurological:      Mental Status: He is alert.    Psychiatric:         Mood and Affect: Mood normal.       Medications have been reviewed by provider in current encounter    Lexi Gleason MD

## 2023-09-25 ENCOUNTER — APPOINTMENT (OUTPATIENT)
Dept: LAB | Facility: CLINIC | Age: 78
End: 2023-09-25
Payer: MEDICARE

## 2023-09-25 DIAGNOSIS — I48.92 ATRIAL FLUTTER (HCC): ICD-10-CM

## 2023-09-25 LAB
BACTERIA UR QL AUTO: ABNORMAL /HPF
BASOPHILS # BLD AUTO: 0.02 THOUSANDS/ÂΜL (ref 0–0.1)
BASOPHILS NFR BLD AUTO: 0 % (ref 0–1)
BILIRUB UR QL STRIP: NEGATIVE
CLARITY UR: CLEAR
COLOR UR: YELLOW
EOSINOPHIL # BLD AUTO: 0.1 THOUSAND/ÂΜL (ref 0–0.61)
EOSINOPHIL NFR BLD AUTO: 2 % (ref 0–6)
ERYTHROCYTE [DISTWIDTH] IN BLOOD BY AUTOMATED COUNT: 16.6 % (ref 11.6–15.1)
GLUCOSE UR STRIP-MCNC: ABNORMAL MG/DL
HCT VFR BLD AUTO: 41.5 % (ref 36.5–49.3)
HGB BLD-MCNC: 12.1 G/DL (ref 12–17)
HGB UR QL STRIP.AUTO: ABNORMAL
IMM GRANULOCYTES # BLD AUTO: 0.02 THOUSAND/UL (ref 0–0.2)
IMM GRANULOCYTES NFR BLD AUTO: 0 % (ref 0–2)
KETONES UR STRIP-MCNC: NEGATIVE MG/DL
LEUKOCYTE ESTERASE UR QL STRIP: ABNORMAL
LYMPHOCYTES # BLD AUTO: 1.2 THOUSANDS/ÂΜL (ref 0.6–4.47)
LYMPHOCYTES NFR BLD AUTO: 19 % (ref 14–44)
MAGNESIUM SERPL-MCNC: 1.9 MG/DL (ref 1.9–2.7)
MCH RBC QN AUTO: 26.7 PG (ref 26.8–34.3)
MCHC RBC AUTO-ENTMCNC: 29.2 G/DL (ref 31.4–37.4)
MCV RBC AUTO: 91 FL (ref 82–98)
MONOCYTES # BLD AUTO: 0.56 THOUSAND/ÂΜL (ref 0.17–1.22)
MONOCYTES NFR BLD AUTO: 9 % (ref 4–12)
MUCOUS THREADS UR QL AUTO: ABNORMAL
NEUTROPHILS # BLD AUTO: 4.52 THOUSANDS/ÂΜL (ref 1.85–7.62)
NEUTS SEG NFR BLD AUTO: 70 % (ref 43–75)
NITRITE UR QL STRIP: NEGATIVE
NON-SQ EPI CELLS URNS QL MICRO: ABNORMAL /HPF
NRBC BLD AUTO-RTO: 0 /100 WBCS
PH UR STRIP.AUTO: 6 [PH]
PLATELET # BLD AUTO: 224 THOUSANDS/UL (ref 149–390)
PMV BLD AUTO: 11.4 FL (ref 8.9–12.7)
PROT UR STRIP-MCNC: ABNORMAL MG/DL
RBC # BLD AUTO: 4.54 MILLION/UL (ref 3.88–5.62)
RBC #/AREA URNS AUTO: ABNORMAL /HPF
SP GR UR STRIP.AUTO: 1.02 (ref 1–1.03)
UROBILINOGEN UR STRIP-ACNC: <2 MG/DL
WBC # BLD AUTO: 6.42 THOUSAND/UL (ref 4.31–10.16)
WBC #/AREA URNS AUTO: ABNORMAL /HPF

## 2023-09-25 PROCEDURE — 81001 URINALYSIS AUTO W/SCOPE: CPT

## 2023-09-25 PROCEDURE — 83735 ASSAY OF MAGNESIUM: CPT

## 2023-09-25 PROCEDURE — 85025 COMPLETE CBC W/AUTO DIFF WBC: CPT

## 2023-09-25 PROCEDURE — 36415 COLL VENOUS BLD VENIPUNCTURE: CPT

## 2023-09-26 ENCOUNTER — IN-CLINIC DEVICE VISIT (OUTPATIENT)
Dept: CARDIOLOGY CLINIC | Facility: CLINIC | Age: 78
End: 2023-09-26

## 2023-09-26 ENCOUNTER — OFFICE VISIT (OUTPATIENT)
Dept: CARDIOLOGY CLINIC | Facility: CLINIC | Age: 78
End: 2023-09-26

## 2023-09-26 ENCOUNTER — PATIENT OUTREACH (OUTPATIENT)
Dept: INTERNAL MEDICINE CLINIC | Facility: CLINIC | Age: 78
End: 2023-09-26

## 2023-09-26 VITALS
HEIGHT: 68 IN | SYSTOLIC BLOOD PRESSURE: 114 MMHG | DIASTOLIC BLOOD PRESSURE: 68 MMHG | BODY MASS INDEX: 27.11 KG/M2 | OXYGEN SATURATION: 96 % | WEIGHT: 178.9 LBS | HEART RATE: 100 BPM

## 2023-09-26 DIAGNOSIS — I50.22 CHRONIC HFREF (HEART FAILURE WITH REDUCED EJECTION FRACTION) (HCC): Primary | ICD-10-CM

## 2023-09-26 DIAGNOSIS — Z95.810 AICD (AUTOMATIC CARDIOVERTER/DEFIBRILLATOR) PRESENT: Primary | ICD-10-CM

## 2023-09-26 DIAGNOSIS — Z09 HOSPITAL DISCHARGE FOLLOW-UP: ICD-10-CM

## 2023-09-26 PROCEDURE — 99024 POSTOP FOLLOW-UP VISIT: CPT | Performed by: PHYSICIAN ASSISTANT

## 2023-09-26 PROCEDURE — 99024 POSTOP FOLLOW-UP VISIT: CPT | Performed by: INTERNAL MEDICINE

## 2023-09-26 RX ORDER — SPIRONOLACTONE 25 MG/1
25 TABLET ORAL DAILY
Qty: 30 TABLET | Refills: 2 | Status: SHIPPED | OUTPATIENT
Start: 2023-09-26

## 2023-09-26 NOTE — PATIENT INSTRUCTIONS
Continue current medications  Get labs as ordered in the next 1-2 weeks. Go to your device check right after this appointment. Please weigh yourself every day (after emptying your bladder) and keep a detailed log of weights. Contact the Heart Failure program at [phone] if you gain 3+ lbs overnight or 5+ lbs in 5-7 days. Limit daily sodium/salt intake to 2000 mg daily to prevent fluid retention. Avoid canned foods, fast food/Chinese food, and processed meats (hot dogs, lunch meat, and sausage etc.). Caution with condiments. Limit fluid intake to 2000 mL or 2 liters (about 60-65 ounces) daily. Avoid electrolyte replacement drinks (such as Gatorade, Pedialyte, Propel, Liquid IV, etc.). Bring complete list of medications and log of daily weights to your follow-up appointment. Continuar con los medicamentos actuales  [employer] laboratorios según lo ordenado en las próximas 1-2 semanas. Vaya a la verificación de talley dispositivo shady después de esta cosme. Por favor, pésese todos los días (después de vaciar talley vejiga) y Kaelyn Gabriel un registro detallado de los pesos. Comuníquese con el programa de insuficiencia cardíaca al [phone] si aumenta 3+ libras daksha la noche o 5+ libras en 5-7 días. Limite la ingesta diaria de sodio/sal a 2000 mg diarios para prevenir la retención de líquidos. Evite los PolyPid, la comida rápida / comida [place] y las shonna procesadas (perros calientes, carne de almuerzo y salchichas, etc.). Precaución con los condimentos. Limite la ingesta de líquidos a 2000 ml o 2 litros (aproximadamente 60-65 onzas) al día. Evite las bebidas de reemplazo de electrolitos (judith Gatorade, Pedialyte, Propel, Liquid IV, etc.). Lleve marli lista completa de medicamentos y un registro de los pesos diarios a talley cosme de seguimiento.

## 2023-09-26 NOTE — PROGRESS NOTES
Results for orders placed or performed in visit on 09/26/23   Cardiac EP device report    Narrative    MDT BI-V ICD/ACTIVE SYSTEM IS MRI CONDITIONAL  DEVICE INTERROGATED IN THE [facility] OFFICE. BATTERY VOLTAGE ADEQUATE (7.7 YRS). AP-0%, BVP-93% (AF/TOTAL -77.4%+VSR PACE-15.5%). ALL LEAD PARAMETERS WITHIN NORMAL LIMITS. 3 DEVICE CLASSIFIED VT-MON EPISODES ON SAME DAY @ 128-171 BPM MAX DURATION 3 MINS- RVR ON EGM'S. PT IN % OF TIME & ON ELIQUIS, CLOPIDOGREL & METOPROLOL. OPTIVOL INITIALIZING. NORMAL DEVICE FUNCTION. WOUND CHECK (PICTURES ATTACHED): INCISION CLEAN AND DRY WITH EDGES APPROXIMATED; SWELLING @ SITE WHICH WIFE STATED IS UNCHANGED FROM LAST WEEK; SOFT TO PALPATION; WOUND CARE AND RESTRICTIONS REVIEWED WITH PATIENT. PT SEEN BY AUGUST LEONARDO TODAY.  GV

## 2023-09-26 NOTE — PROGRESS NOTES
Outpatient Care Management Note:    Patient referred to outpatient nurse care management as he was identified as a high risk for readmission. Patient was hospitalized at [address] [place] [address] from 9/13-9/17/23 for cardiomyopathy and is s/p upgreade to [employer] BiV ICD on 9/13/23. Patient had new onset of atrial flutter. Patient seen at PCP office on 9/21 and seen by Cardiology office today 9/26. I called patient using [employer]  # [phone]. I spoke with patient's wife Zach Santillan and patient briefly who was in the background and also answering some questions. I explained my role and reason for outreach. Patient's wife helps to manage medication. We did go over medication and what each medication is for and how patient is taking each medication. Patient has all necessary medication at this time and taking as prescribed. Patient had [employer] system to check blood sugars and back up glucometer for fingersticks if needed. Blood sugar this morning was 127. Patient taking Lantus 18 units at bedtime and Novolog 5 units with breakfast and dinner. Patient taking Metformin 850 mg twice a day. Confirms taking the following:     Eliquis 5 mg twice a day   Plavix 75 mg daily   Jardiance 10 mg daily   Lasix 40 mg tablets (taking 1.5 tablets = 60 mg daily)   Metoprolol succinate 50 mg tablets (taking 1.5 tablets =75 mg twice a day)   Lisinopril 10 mg daily   Levothyroxine 137 mcg daily   Ferrous Sulface 325 mg every other day   Rosuvastatin 40 mg every day     Per wife patient does NOT have spironolactone in the home and has not been taking this since hospital stay and confirms did stop aspirin. Reviewed upcoming Cardiology appointment on 10/10 @ 10:40 am and to take all medication bottles with him to that appointment in a bag. Encouraged wife to go with him to appointment. Encouraged to complete bloodwork (BMP) before visit.      Patient eligible for chronic care management program and made aware of this but declining further outreach at this time. Please re-consult as needed. Wife has PCP office number and Cardiology office number for further questions, concerns, or needs.

## 2023-09-28 ENCOUNTER — PATIENT OUTREACH (OUTPATIENT)
Dept: INTERNAL MEDICINE CLINIC | Facility: CLINIC | Age: 78
End: 2023-09-28

## 2023-09-28 ENCOUNTER — TELEPHONE (OUTPATIENT)
Dept: CARDIOLOGY CLINIC | Facility: CLINIC | Age: 78
End: 2023-09-28

## 2023-09-28 NOTE — PROGRESS NOTES
Outpatient Care Management Note:    Re: Chronic Care Management Outreach/HRR    Received inbasket message from Cardiologist, Abelardo Auguste PA-C that patient is ok to restart spironolactone 25 mg daily. Script was sent to pharmacy on 9/26/23. I called and spoke with patient using [employer]  # [phone]. Patient's wife Vee Fabian picked up and then put patient on the phone. I explained I called the other day to review medication and that he did not have spironolactone at that time and made heart doctor aware and that the heart doctor is ok with him restarting this medication at this time. I made him aware a prescription was sent to [facility] and he is to take 1 tablet which is 25 mg every day. Patient will follow up with pharmacy tomorrow Friday 9/29. I reviewed with him next Cardiology appointment is 10/10 and to take all medication bottles with him in a bag to the appointment. He expressed understanding and did not have any other questions or concerns at this time. He reports feeling well at this time. He has PCP office number and Cardiology office number if needed. Declining need for further outreach for nurse care manager. Please re-consult as needed.

## 2023-09-28 NOTE — TELEPHONE ENCOUNTER
Returned call to patient using [employer] .  asked patient if he had  questions for cardiology. Many people were talking in the background, what sounded like in a car. The  could not understand. No one was asking any questions.  instructed the patient to call cardiology tomorrow with questions.

## 2023-10-02 ENCOUNTER — TELEPHONE (OUTPATIENT)
Dept: CARDIOLOGY CLINIC | Facility: CLINIC | Age: 78
End: 2023-10-02

## 2023-10-02 NOTE — TELEPHONE ENCOUNTER
Pt was transferred to EP but we do not follow pt. Just did recent device upgrade on 9/13. Pt is followed by Dr. Román Chacon and Dr. Yakelin Moon. Pt and pt's wife were triaged in Mauritian. He has been experiencing leg swelling the past 2 days and they feel heavy. His left knee hurts him as well. He hasn't been taking sprinolactone 25mg as they were told to stop the med but pharmacy said to start it.

## 2023-10-02 NOTE — TELEPHONE ENCOUNTER
Symptoms:  -leg swelling [x]  -abdominal bloating, distension, pants fitting tighter []    -loss of appetite, feeling full sooner than usual []  -worsening shortness of breath/BUSTILLOS, activity intolerance[]  -difficulty lying flat, waking up a night feeling breathless, using more pillows, sleeping in a recliner []  -palpitations []  -chest pain/pressure []  -new or worsening cough []  -unusual fatigue []    Comments:  Pt states it started on Saturday. Denies any other CHF symptoms. Weight:   -weighs self daily [x]  -dry/target weight _____  -today's weight _178____  -understands what to do for rapid weight gain, ie 3lbs in 24 hours or 5 lbs in one week[x]    Comments:        Diet:   -consuming any high sodium foods (canned soups, lunch meats, fast food/take out, snack food, prepared foods, sport drinks) yes[]no[x]  - fluid consumed per day-       50    oz   -2g (2000 mg) Sodium, 2L (2000 ml, around 60-64 oz) fluid restriction] reinforced [x]      Comments: They do not use salt     Medications:  -med list reviewed []   -missed doses or taking a different dose than prescribed?  yes[]no[]  -still urinates well on current diuretic ? yes[]no[x]  -using O2 as directed? yes[]no[]    Comments:  States he is urinating enough, but not as much as he did in the hospital.  Home vital signs: (if available)  BP ____  HR____  Pulse ox____    Recent labs: needs before next OV  BMP/CMP -   BNP, NT Pro BNP-  CBC-    Device check:   Arrhythmia burden ? YES VT  Ep aware    Optivol/Corvue crossed ? None      Other possible triggers ?:  Recent viral symptoms/infection []  NSAID use []  Steroids []  Anemia []  COPD/hypoxia []  Not using CPAP/BiPAP []    Comments:  Self-management/education and teach back:  Primary learner: self/ wife  Following low sodium diet: y  Following fluid restriction:y  Hospital discharge weight: 165lbs 9 oz  Weighing daily:     y       Recording:y  1st home weight       Weight today:178  Monitoring symptoms: y  Any current symptoms: blle edema  Knows when to call provider:   Medication reviewed and taking all as prescribed:  Knows name of diuretic:  Escalation plan:     Care Coordination:  Aware of cardiology follow up appointment: y  Aware of PCP follow up appointment:  Transportation:y  Social Support:lives with wife  Insurance/financial concerns:N  Home health care: N  Health literacy:fair  Engagement:good  Personal Goal:  Additional comments:

## 2023-10-03 NOTE — TELEPHONE ENCOUNTER
I have called them again today to talk with wife about medication he is taking. When taking to pt yesterday it did not sound like he was taking the correct dose of diuertic. Still working on it.     Thank you

## 2023-10-04 NOTE — TELEPHONE ENCOUNTER
Resume spironolactone and take extra dose of lasix 60mg x 2 days. Obtain BMP prior to office visit on 10/10.

## 2023-10-04 NOTE — TELEPHONE ENCOUNTER
Pt's wife is at the EP office and Rosalind Luciano called me  Because they told her I needed more info. Pt has not been taking his Aldactone 25 mg  Since 9/18/25. He does have BLLE edema , but no pain or redness. He has no other symptoms of CHF. Advised he he needs to take the Aldactone 25 mg qd. They verbally understood. I will call him back on Friday to get update on edema. Is taking Lasix 60 mg qd. Do you want him to take an extra dose of lasix for 2 days, or just start the aldactone?     Please advise

## 2023-10-05 NOTE — TELEPHONE ENCOUNTER
LMOM to take the extra lasix and do BMP before next visit, through language line, interpretor [phone].

## 2023-10-06 ENCOUNTER — TELEPHONE (OUTPATIENT)
Dept: CARDIOLOGY CLINIC | Facility: CLINIC | Age: 78
End: 2023-10-06

## 2023-10-06 NOTE — TELEPHONE ENCOUNTER
Called pt through language line , interpertor [phone]. Pt did not answer. Interpretor [facility] asking how he was doing and did he get the message about extra medication to take.

## 2023-10-09 PROBLEM — I25.10 CORONARY ARTERY DISEASE: Chronic | Status: ACTIVE | Noted: 2022-06-21

## 2023-10-09 PROBLEM — I35.0 AORTIC STENOSIS: Chronic | Status: ACTIVE | Noted: 2022-01-19

## 2023-10-09 PROBLEM — R65.10 SIRS (SYSTEMIC INFLAMMATORY RESPONSE SYNDROME) (HCC): Status: RESOLVED | Noted: 2023-03-19 | Resolved: 2023-10-09

## 2023-10-09 PROBLEM — Z95.810 ICD (IMPLANTABLE CARDIOVERTER-DEFIBRILLATOR) IN PLACE: Chronic | Status: ACTIVE | Noted: 2023-09-16

## 2023-10-09 PROBLEM — I50.22 CHRONIC HFREF (HEART FAILURE WITH REDUCED EJECTION FRACTION) (HCC): Status: ACTIVE | Noted: 2023-06-05

## 2023-10-09 PROBLEM — I73.9 PERIPHERAL ARTERY DISEASE (HCC): Chronic | Status: ACTIVE | Noted: 2023-01-18

## 2023-10-09 PROBLEM — I21.4 NSTEMI (NON-ST ELEVATED MYOCARDIAL INFARCTION) (HCC): Status: RESOLVED | Noted: 2023-01-30 | Resolved: 2023-10-09

## 2023-10-09 PROBLEM — Z95.5 STATUS POST INSERTION OF DRUG ELUTING CORONARY ARTERY STENT: Chronic | Status: ACTIVE | Noted: 2023-02-10

## 2023-10-09 PROBLEM — I50.22 CHRONIC HFREF (HEART FAILURE WITH REDUCED EJECTION FRACTION) (HCC): Chronic | Status: ACTIVE | Noted: 2023-06-05

## 2023-10-09 PROBLEM — R06.00 DYSPNEA: Status: RESOLVED | Noted: 2023-03-19 | Resolved: 2023-10-09

## 2023-10-09 PROBLEM — R06.09 DYSPNEA ON EXERTION: Status: RESOLVED | Noted: 2023-01-30 | Resolved: 2023-10-09

## 2023-10-09 PROBLEM — Z95.2 S/P TAVR (TRANSCATHETER AORTIC VALVE REPLACEMENT): Chronic | Status: ACTIVE | Noted: 2022-06-21

## 2023-10-09 PROBLEM — I49.5 TACHY-BRADY SYNDROME (HCC): Chronic | Status: ACTIVE | Noted: 2022-09-01

## 2023-10-10 ENCOUNTER — LAB (OUTPATIENT)
Dept: LAB | Facility: CLINIC | Age: 78
End: 2023-10-10
Payer: MEDICARE

## 2023-10-10 DIAGNOSIS — I50.22 CHRONIC HFREF (HEART FAILURE WITH REDUCED EJECTION FRACTION) (HCC): ICD-10-CM

## 2023-10-10 LAB
ANION GAP SERPL CALCULATED.3IONS-SCNC: 5 MMOL/L
BNP SERPL-MCNC: 397 PG/ML (ref 0–100)
BUN SERPL-MCNC: 32 MG/DL (ref 5–25)
CALCIUM SERPL-MCNC: 10.1 MG/DL (ref 8.4–10.2)
CHLORIDE SERPL-SCNC: 104 MMOL/L (ref 96–108)
CO2 SERPL-SCNC: 31 MMOL/L (ref 21–32)
CREAT SERPL-MCNC: 1.29 MG/DL (ref 0.6–1.3)
GFR SERPL CREATININE-BSD FRML MDRD: 53 ML/MIN/1.73SQ M
GLUCOSE SERPL-MCNC: 133 MG/DL (ref 65–140)
POTASSIUM SERPL-SCNC: 4.5 MMOL/L (ref 3.5–5.3)
SODIUM SERPL-SCNC: 140 MMOL/L (ref 135–147)

## 2023-10-10 PROCEDURE — 36415 COLL VENOUS BLD VENIPUNCTURE: CPT

## 2023-10-10 PROCEDURE — 83880 ASSAY OF NATRIURETIC PEPTIDE: CPT

## 2023-10-10 PROCEDURE — 80048 BASIC METABOLIC PNL TOTAL CA: CPT

## 2023-10-10 NOTE — PROGRESS NOTES
Advanced Heart Failure / Pulmonary Hypertension Outpatient Progress Note    Alondra Johns 70 y.o. male   MRN: [id number]  Encounter: [id number]    Assessment:  Patient Active Problem List    Diagnosis Date Noted    s/p Medtronic dual chamber PPM 8/31/22 s/p upgrade to BiV ICD 9/13/2023 09/16/2023    Atrial flutter ([address]) 09/14/2023    Chronic HFrEF (heart failure with reduced ejection fraction) ([address]) 06/05/2023    NSVT (nonsustained ventricular tachycardia) ([address]) 06/05/2023    Anemia 03/19/2023    Status post insertion of drug eluting coronary artery stent 02/10/2023    Bruit of left carotid artery 01/30/2023    Peripheral artery disease ([address]) 01/18/2023    Need for influenza vaccination 11/10/2022    Leg cramping 11/10/2022    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    S/P TAVR (transcatheter aortic valve replacement) 06/21/2022    Coronary artery disease 06/21/2022    Contrast media allergy 05/31/2022    History of aortic stenosis s/p TAVR 01/19/2022    Diverticulosis of large intestine 09/20/2018    Internal hemorrhoids 09/20/2018    Nicotine dependence 04/13/2017    Osteoarthritis of knee 04/13/2017    Bilateral hearing loss 01/30/2017    Allergic rhinitis 02/24/2016    Type 2 diabetes mellitus with circulatory disorder, with long-term current use of insulin ([address]) 11/24/2015    Adenomatous colon polyp 06/11/2014    Hyperlipidemia 09/13/2013    Vitamin B12 deficiency 07/24/2012    Mild intermittent asthma without complication 66/70/3366    Essential hypertension 06/08/2012    Hypothyroidism 06/07/2012       Today's Plan:  Up 8 lbs since last visit, and volume up on exam today. Advised to take an additional 40 mg qPM Lasix for next 2 days. Then continue on Lasix 60 mg daily. Will plan to have office RN contact patient next week for clinical update. Patient brought in pill bottles from home; detailed med reconciliation completed by MA in office today. EKG in office today: 78 bpm. Occasional PVCs. Possible underlying flutter. Plan:  Chronic HFrEF; LVEF 30-35%; LVIDd 5.4 cm; NYHA II; ACC/AHA Stage C   Etiology: ischemic. TTE (limited) 09/14/2023: LVEF 32%. Severe global hypokinesis. Dilated RV with reduced RVSF. GLENN. Moderate to severe MR. Moderate TR. Weight of 178 lbs on 09/26. Today, weighs 186 lbs. Most recent BMP from 09/17/2023: sodium 142; potassium 3.5; BUN 37; creatinine 1.41; eGFR 48. Pharmacotherapies / Neurohormonal Blockade:  --Beta Blocker: metoprolol succinate 75 mg q12 hours. --ARNi / ACEi / ARB: lisinopril 10 mg daily. --Aldosterone Antagonist: spironolactone 25 mg daily. --SGLT2 Inhibitor: empagliflozin 10 mg daily. --Diuretic: Lasix 60 mg daily. Sudden Cardiac Death Risk Reduction:  --Medtronic BiV ICD upgraded in 09/2023. ICD in situ since 09/2022. --Interrogation from 10/02/2023: AP 0%. %. 3 VHRS. E-grams with RVR in 140-170s. 100% AF burden. Lead parameters WNL. OptiVol initializing. Normal device function. Electrical Resynchronization:  --Response to BiV device: TBD. Advanced Therapies: Will continue to monitor. MitraClip if no improvement s/p BiV ICD? Coronary artery disease   Without active chest pain. S/p PCI with ELDER x2 to mid LAD and ELDER x1 to ostial rPDA in 02/2023. Continue on Plavix, statin, and BB as above. PRN SL nitro prescribed. Atrial fibrillation / flutter   [id number] = 6 (age, HF, HTN, DM, CAD/PAD). Anticoagulation on Eliquis. Rate control: BB as above. Rhythm control: None. Follows with Dr. Saturnino Solorzano as outpatient. Hypertension   BP of 106/60 mmHg in office today. Continue on medications as above. History of aortic stenosis: s/p TAVR (26 mm Emery Hernán) in 2022. Hyperlipidemia   Diabetes mellitus, type II  Hepatitis C  History of tachy-lino syndrome  History of tobacco abuse    HPI:   Robert Pettit is a 61-year-old man with a PMH as above who presents to the office for follow-up.  Follows with Alcira Jonas, Segun Degree, and [facility].     09/26/2023 with GD: "Presents today for follow-up. Feeling well, reports he's noticing alarms from his device since leaving the hospital. Denies shocks or pain. Has a device check upstairs immediately after this appointment. Breathing well, denies SOB/BUSTILLOS, even with stairs. Denies PND, orthopnea, LE swelling. Has been taking higher dose of Lasix as prescribed, urinates well with this. Believes he's been taking his spironolactone since discharge. Picked up his Eliquis - has been compliant with this. Unsure of all of his medications; advised to bring his medications to his next appointment to check against his list. Weighing himself daily at home, weights have been stable." Started on spironolactone. 10/10/2023: Patient presents for follow-up. Interpretor Sal ([place] #[id number]) assisted during encounter with patient's consent. Feeling well today. Denies BUSTILLOS, PND, and orthopnea. Reports having some LE swelling last week that improved with compression stockings. Feels that his new ICD is shifting in pocket; no pain or discharge surrounding incision. Is completing daily weights; averaging mid 180s on home scale. Drinking 60-70 oz fluid daily. Reports good appetite; denies recent dietary indiscretion.      Past Medical History:   Diagnosis Date    Arthritis     Asthma     Colon polyps     Community acquired pneumonia     last assessed: 5/1/2014    Diabetes mellitus ([address])     Hemorrhagic prepatellar bursitis, left 10/21/2019    Hepatitis C     High cholesterol     History of colonoscopy 2017    Hypertension     Lymphadenopathy, anterior cervical 04/17/2018    Nephritis and nephropathy, not specified as acute or chronic, with other specified pathological lesion in kidney, in diseases classified elsewhere 3/26/2013    NSTEMI (non-ST elevated myocardial infarction) ([address]) 1/30/2023    s/p Medtronic dual chamber PPM 8/31/22 s/p upgrade to BiV ICD 9/13/2023 9/16/2023    Screening for colon cancer 05/01/2019    SIRS (systemic inflammatory response syndrome) ([address]) 3/19/2023    Thoracic vertebral fracture ([address] 06/11/2014       Review of Systems   Constitutional:  Negative for activity change, appetite change, fatigue and unexpected weight change. Respiratory:  Negative for cough, chest tightness and shortness of breath. Cardiovascular:  Negative for chest pain, palpitations and leg swelling. Gastrointestinal:  Negative for abdominal distention, abdominal pain, diarrhea and nausea. Genitourinary:  Negative for decreased urine volume, dysuria and urgency. Musculoskeletal: Negative. Skin: Negative. Neurological:  Negative for dizziness, syncope, weakness and light-headedness. Psychiatric/Behavioral:  Negative for confusion and sleep disturbance. The patient is not nervous/anxious. Allergies   Allergen Reactions    Iv Contrast [Iodinated Contrast Media] Rash     Patient states he had a severe reaction approximately 10 years ago , states he had a rash, itchy and he remembers a bunch of people pounding on chest and being surrounded by multiple doctors.          Current Outpatient Medications:     Advair -21 MCG/ACT inhaler, Inhale 2 puffs 2 (two) times a day Rinse mouth after use., Disp: 36 g, Rfl: 6    albuterol (PROVENTIL HFA,VENTOLIN HFA) 90 mcg/act inhaler, INHALE 2 PUFFS BY MOUTH EVERY 4 HOURS AS NEEDED FOR WHEEZING OR SHORTNESS OF BREATH, Disp: 18 g, Rfl: 2    Alcohol Swabs (ALCOHOL PADS) 70 % PADS, , Disp: , Rfl:     apixaban (Eliquis) 5 mg, Take 1 tablet (5 mg total) by mouth 2 (two) times a day, Disp: 60 tablet, Rfl: 3    Blood Glucose Monitoring Suppl (OneTouch Verio) w/Device KIT, Check blood glucose three times daily before each meal, Disp: 1 kit, Rfl: 0    cholecalciferol (VITAMIN D3) 1,000 units tablet, Take 2 tablets (2,000 Units total) by mouth daily, Disp: 180 tablet, Rfl: 5    clopidogrel (PLAVIX) 75 mg tablet, Take 1 tablet (75 mg total) by mouth daily, Disp: 90 tablet, Rfl: 3    Empagliflozin (JARDIANCE) 10 MG TABS tablet, Take 1 tablet (10 mg total) by mouth every morning, Disp: 90 tablet, Rfl: 3    ferrous sulfate 325 (65 Fe) mg tablet, TAKE 1 TABLET BY MOUTH EVERY OTHER DAY, Disp: 45 tablet, Rfl: 4    furosemide (LASIX) 40 mg tablet, Take 1.5 tablets (60 mg total) by mouth daily, Disp: 135 tablet, Rfl: 3    Insulin Pen Needle (Pen Needles) 31G X 8 MM MISC, Use daily, Disp: 300 each, Rfl: 3    Lancets (FREESTYLE) lancets, by Other route as needed (As needed), Disp: 100 each, Rfl: 3    Lantus SoloStar 100 units/mL SOPN, INJECT 0.18 ML (18 UNITS TOTAL) UNDER THE SKIN DAILY AT BEDTIME, Disp: 15 mL, Rfl: 3    levothyroxine 137 mcg tablet, TAKE 1 TABLET BY MOUTH EVERY DAY, Disp: 90 tablet, Rfl: 3    lisinopril (ZESTRIL) 10 mg tablet, Take 1 tablet (10 mg total) by mouth daily, Disp: 90 tablet, Rfl: 3    metFORMIN (GLUCOPHAGE) 850 mg tablet, TAKE 1 TABLET BY MOUTH 2 TIMES A DAY WITH MEALS., Disp: 180 tablet, Rfl: 3    metoprolol succinate (TOPROL-XL) 50 mg 24 hr tablet, Take 1.5 tablets (75 mg total) by mouth 2 (two) times a day, Disp: 270 tablet, Rfl: 1    montelukast (SINGULAIR) 10 mg tablet, TAKE 1 TABLET BY MOUTH EVERY DAY, Disp: 90 tablet, Rfl: 3    NovoLOG FlexPen 100 units/mL injection pen, INJECT 5 UNITS WITH BREAKFAST AND WITH DINNER, Disp: 15 mL, Rfl: 3    rosuvastatin (CRESTOR) 40 MG tablet, TAKE 1 TABLET BY MOUTH EVERY DAY, Disp: 90 tablet, Rfl: 3    spironolactone (ALDACTONE) 25 mg tablet, Take 1 tablet (25 mg total) by mouth daily, Disp: 30 tablet, Rfl: 2    Banophen 25 MG capsule, TAKE 1 CAPSULE NIGHT BEFORE PROCEDURE AND TAKE 1 CAPSULE MORNING OF PROCEDURE (Patient not taking: Reported on 9/7/2023), Disp: , Rfl:     Banophen 50 MG capsule, TAKE 1 CAPSULE 1 HOUR PRIOR TO TEST (Patient not taking: Reported on 9/7/2023), Disp: , Rfl:     Continuous Blood Gluc  (FreeStyle Cristofer 2 [place]) POWER, Use 1 each continuous (Patient not taking: Reported on 2023), Disp: 1 each, Rfl: 0    Continuous Blood Gluc Sensor (FreeStyle Cristofer 2 Sensor) MISC, Use 1 each every 14 (fourteen) days (Patient not taking: Reported on 2023), Disp: 6 each, Rfl: 3    famotidine (PEPCID) 20 mg tablet, TAKE 1 TABLET AT NIGHT PRIOR TO PROCEDURE AND 1 TABLET THE MORNING OF PROCEDURE (Patient not taking: Reported on 2023), Disp: , Rfl:     fluticasone (FLONASE) 50 mcg/act nasal spray, 2 sprays into each nostril daily (Patient not taking: Reported on 10/11/2023), Disp: 2 Bottle, Rfl: 2    methocarbamol (Robaxin-750) 750 mg tablet, Take 1 tablet (750 mg total) by mouth every 6 (six) hours as needed for muscle spasms (Patient not taking: Reported on 10/11/2023), Disp: 60 tablet, Rfl: 0    nitroglycerin (NITROSTAT) 0.4 mg SL tablet, Place 1 tablet (0.4 mg total) under the tongue every 5 (five) minutes as needed for chest pain (Patient not taking: Reported on 10/11/2023), Disp: 30 tablet, Rfl: 1    Social History     Socioeconomic History    Marital status: /Civil Union     Spouse name: Not on file    Number of children: Not on file    Years of education: Not on file    Highest education level: Not on file   Occupational History    Occupation: retired    Tobacco Use    Smoking status: Former     Packs/day: 0.50     Types: Cigarettes     Quit date: 2022     Years since quittin.3    Smokeless tobacco: Never    Tobacco comments:     started when he was about 24 yrs old; stopped smoking 3 wks ago   Vaping Use    Vaping Use: Never used   Substance and Sexual Activity    Alcohol use: No    Drug use: No    Sexual activity: Not Currently   Other Topics Concern    Not on file   Social History Narrative    Not on file     Social Determinants of Health     Financial Resource Strain: Low Risk  (2023)    Overall Financial Resource Strain (CARDIA)     Difficulty of Paying Living Expenses: Not hard at all   Food Insecurity: No Food Insecurity (2023)    Hunger Vital Sign Worried About [place] in the Last Year: Never true     [address] in the Last Year: Never true   Transportation Needs: No Transportation Needs (9/17/2023)    PRAPARE - Transportation     Lack of Transportation (Medical): No     Lack of Transportation (Non-Medical): No   Physical Activity: Unknown (1/19/2022)    Exercise Vital Sign     Days of Exercise per Week: Not on file     Minutes of Exercise per Session: 60 min   Stress: No Stress Concern Present (1/19/2022)    [facility]     Feeling of Stress : Not at all   Social Connections: Moderately Isolated (1/19/2022)    Social Connection and Isolation Panel [NHANES]     Frequency of Communication with Friends and Family: More than three times a week     Frequency of Social Gatherings with Friends and Family: More than three times a week     Attends Temple Services: Never     Active Member of Clubs or Organizations: No     Attends Club or Organization Meetings: Never     Marital Status:    Intimate Partner Violence: Not on file   Housing Stability: Low Risk  (9/17/2023)    Housing Stability Vital Sign     Unable to Pay for Housing in the Last Year: No     Number of Places Lived in the Last Year: 1     Unstable Housing in the Last Year: No     Family History   Problem Relation Age of Onset    Heart attack Family         at age 80    No Known Problems Mother     No Known Problems Father     Diabetes Sister     Diabetes Brother        Vitals:   Blood pressure 106/60, pulse 103, weight 84.4 kg (186 lb), SpO2 96 %.     Wt Readings from Last 10 Encounters:   10/11/23 84.4 kg (186 lb)   09/26/23 81.1 kg (178 lb 14.4 oz)   09/21/23 78.9 kg (174 lb)   09/17/23 75.1 kg (165 lb 9.1 oz)   09/07/23 83.5 kg (184 lb)   07/28/23 82.3 kg (181 lb 6.4 oz)   06/05/23 82.2 kg (181 lb 3.2 oz)   06/01/23 85 kg (187 lb 8 oz)   05/25/23 83 kg (183 lb)   05/06/23 83 kg (183 lb)     Vitals:    10/11/23 1355   BP: 106/60   BP Location: Right arm   Patient Position: Sitting   Cuff Size: Standard   Pulse: 103   SpO2: 96%   Weight: 84.4 kg (186 lb)       Physical Exam  Vitals reviewed. Constitutional:       General: He is awake. He is not in acute distress. Appearance: Normal appearance. He is well-developed and normal weight. He is not toxic-appearing or diaphoretic. HENT:      Head: Normocephalic. Nose: Nose normal.      Mouth/Throat:      Mouth: Mucous membranes are moist.   Eyes:      General: No scleral icterus. Conjunctiva/sclera: Conjunctivae normal.   Neck:      Trachea: No tracheal deviation. Cardiovascular:      Rate and Rhythm: Normal rate and regular rhythm. No extrasystoles are present. Heart sounds: Murmur heard. Pulmonary:      Effort: Pulmonary effort is normal. No bradypnea or respiratory distress. Breath sounds: Normal air entry. Abdominal:      General: Bowel sounds are normal. There is no distension. Palpations: Abdomen is soft. Tenderness: There is no abdominal tenderness. Musculoskeletal:      Cervical back: Neck supple. Right lower leg: No edema. Left lower leg: No edema. Skin:     General: Skin is warm and dry. Coloration: Skin is not jaundiced or pale. Neurological:      General: No focal deficit present. Mental Status: He is alert and oriented to person, place, and time. Psychiatric:         Attention and Perception: Attention normal.         Mood and Affect: Mood and affect normal.         Speech: Speech normal.         Behavior: Behavior normal. Behavior is cooperative. Thought Content:  Thought content normal.       Labs & Results:  Lab Results   Component Value Date    WBC 6.42 09/25/2023    HGB 12.1 09/25/2023    HCT 41.5 09/25/2023    MCV 91 09/25/2023     09/25/2023     Lab Results   Component Value Date    SODIUM 140 10/10/2023    K 4.5 10/10/2023     10/10/2023    CO2 31 10/10/2023    BUN 32 (H) 10/10/2023    CREATININE 1.29 10/10/2023    GLUC 133 10/10/2023    CALCIUM 10.1 10/10/2023     Lab Results   Component Value Date    INR 1.11 02/10/2023    INR 0.93 09/01/2022    INR 0.91 05/08/2019    PROTIME 14.6 (H) 02/10/2023    PROTIME 12.6 09/01/2022    PROTIME 12.4 05/08/2019     Lab Results   Component Value Date    NTBNP 2,624 (H) 05/06/2023      Lab Results   Component Value Date     (H) 10/10/2023      Reginald Luna PA-C

## 2023-10-11 ENCOUNTER — OFFICE VISIT (OUTPATIENT)
Dept: CARDIOLOGY CLINIC | Facility: CLINIC | Age: 78
End: 2023-10-11
Payer: MEDICARE

## 2023-10-11 VITALS
SYSTOLIC BLOOD PRESSURE: 106 MMHG | OXYGEN SATURATION: 96 % | WEIGHT: 186 LBS | DIASTOLIC BLOOD PRESSURE: 60 MMHG | HEART RATE: 103 BPM | BODY MASS INDEX: 28.28 KG/M2

## 2023-10-11 DIAGNOSIS — I25.5 ISCHEMIC CARDIOMYOPATHY: ICD-10-CM

## 2023-10-11 DIAGNOSIS — I25.10 CORONARY ARTERY DISEASE INVOLVING NATIVE CORONARY ARTERY OF NATIVE HEART WITHOUT ANGINA PECTORIS: ICD-10-CM

## 2023-10-11 DIAGNOSIS — I10 PRIMARY HYPERTENSION: ICD-10-CM

## 2023-10-11 DIAGNOSIS — I50.22 CHRONIC HFREF (HEART FAILURE WITH REDUCED EJECTION FRACTION) (HCC): Primary | ICD-10-CM

## 2023-10-11 DIAGNOSIS — I48.0 PAROXYSMAL ATRIAL FIBRILLATION (HCC): ICD-10-CM

## 2023-10-11 PROCEDURE — 93000 ELECTROCARDIOGRAM COMPLETE: CPT | Performed by: PHYSICIAN ASSISTANT

## 2023-10-11 PROCEDURE — 99024 POSTOP FOLLOW-UP VISIT: CPT | Performed by: PHYSICIAN ASSISTANT

## 2023-10-11 NOTE — PATIENT INSTRUCTIONS
Cobre 40 mg adicionales (1 tableta) de Lasix a la hora del [employer] próximos 2 días para ayudar a eliminar algo de líquido. Lydia enfermera del consultorio lo llamará la próxima semana para greta cómo le va después de ajith Lasix adicional.    Pésese todos los días (después de vaciar la vejiga) y mantenga un registro detallado de los pesos. Comuníquese con el programa de insuficiencia cardíaca al [phone] si aumenta 3 libras daksha la noche o 5 libras en 5 a 7 días. Limite la ingesta diaria de sodio/sal a 2000 mg al día para evitar la retención de líquidos. Evite los alimentos enlatados, la comida rápida/comida [place] y las shonna procesadas (perritos calientes, embutidos, salchichas, etc.). Precaución con los condimentos. Limite la ingesta de líquidos a 2000 ml o 2 litros (alrededor de 60 a 65 oz) al día. Evite las bebidas de reemplazo de electrolitos (judith Gatorade, Pedialyte, Propel, Liquid IV, etc.). Lleve la lista completa de medicamentos y el registro de pesos diarios a talley cosme de seguimiento.    --------------------------------------------------  Take an extra 40 mg (1 tablet) of Lasix at lunch time for next 2 days to help get some fluid off. Will have an office nurse call you next week to see how you're doing after taking extra Lasix. Please weigh yourself every day (after emptying your bladder) and keep a detailed log of weights. Contact the Heart Failure program at [phone] if you gain 3+ lbs overnight or 5+ lbs in 5-7 days. Limit daily sodium/salt intake to 2000 mg daily to prevent fluid retention. Avoid canned foods, fast food/Chinese food, and processed meats (hot dogs, lunch meat, and sausage etc.). Caution with condiments. Limit fluid intake to 2000 mL or 2 liters (about 60-65 ounces) daily. Avoid electrolyte replacement drinks (such as Gatorade, Pedialyte, Propel, Liquid IV, etc.).   Bring complete list of medications and log of daily weights to your follow-up appointment.

## 2023-10-16 ENCOUNTER — TELEPHONE (OUTPATIENT)
Dept: CARDIOLOGY CLINIC | Facility: CLINIC | Age: 78
End: 2023-10-16

## 2023-10-16 NOTE — TELEPHONE ENCOUNTER
Called pt and asked how he's been doing. Pt stated he feels good, stated he's been taking 1 pill and a half of Lasix's as he's been doing for a while. He stated his legs were swollen but with the help of the Meds and his compression stockings, they are back down. Today his weight was 180lbs, he has been following a low sodium diet and drinks about a bottle of water a day. Pt stated he used to drink about 2-3.     Pt stated as of today he hasn't had any chest pain or shortness of breath. Pt was advised if he does start to see a change in weight, legs get swollen and/or has trouble breathing, he needs to contact our office as soon as possible. Pt verbalized understanding.

## 2023-10-16 NOTE — TELEPHONE ENCOUNTER
----- Message from Davina Love MA sent at 10/16/2023  9:35 AM EDT -----    ----- Message -----  From: Crystal Ross PA-C  Sent: 10/16/2023  12:00 AM EDT  To: [facility] [place] [facility]    Please contact patient for update on symptoms and weights. Advised to take an additional 40 mg qPM Lasix for 2 days last week. Will need Persian interpretor.     Can we do a deep dive into in their daily fluid and sodium intake, and provide education on this as appropriate?     Thanks!  Crystal

## 2023-10-23 NOTE — PROGRESS NOTES
Progress Note - Electrophysiology-Cardiology (EP)   Manuela Acuna 70 y.o. male MRN: [id number]  Unit/Bed#:  Encounter: [id number]           3. Atrial flutter, unspecified type ([address])  POCT ECG      2. Type 2 diabetes mellitus with other circulatory complication, with long-term current use of insulin ([facility])        3. Other specified hypothyroidism        4. Essential hypertension        5. Nonrheumatic aortic valve stenosis        6. Coronary artery disease involving native coronary artery of native heart without angina pectoris        7. History of tachy-lino syndrome        8. Peripheral artery disease ([address])        9. Chronic HFrEF (heart failure with reduced ejection fraction) ([facility])        10. NSVT (nonsustained ventricular tachycardia) ([address])        11. Mixed hyperlipidemia        12. S/P TAVR (transcatheter aortic valve replacement)        13. Status post insertion of drug eluting coronary artery stent        14. s/p Medtronic dual chamber PPM 8/31/22 s/p upgrade to BiV ICD 9/13/2023        15.  Cigarette nicotine dependence without complication                   Summary of my recommendations   As far as cardiac condition patient doing well, without any significant symptoms  His activity level is slowly increasing    Atrial flutter-continue with Eliquis  CAD-post PCI-continue with Plavix    Aggressive management of heart failure  Follow-up with primary cardiology  Follow-up with me in 6 months       Clinical conditions   Chronic systolic heart failure  History of ischemic cardiomyopathy  NYHA class II  LVEF 35%  Underlying LBBB-from CAD - it is QS complex  Patient has dual-chamber pacemaker  - His lead in LBB region  -  <1% V paced -force pacing His lead, QRS is between 120 to 130 ms  On goal-directed medical therapy greater than 3 months  Shared decision making done with patient and primary cardiologist    Atrial flutter with RVR  Hypertension  Hyperlipidemia  Post TAVR  Mitral regurgitation                Assessment/Plan   Chronic heart failure with reduced EF, LVEF 30-35%, nondilated, NYHA class II Stage C. Upgraded to BiV ICD     Neurohormonal Blockade:   --Beta-Blocker:Metoprolol succinate 75 mg BID  --ACEi, ARB or ARNi: Lisinopril 20 mg BID  --SGLT2i- jardiance 10 mg daily  --Aldosterone Receptor Blocker: Spironolactone 25 mg daily  --Diuretic: Lasix 40 mg daily     Sudden Cardiac Death Risk Reduction:  --ICD: EF 30-35%, MDT DC PPM in situ, upgraded to Biv          PAF  On anticoagulation , CHADS VASc -6   On eliquis         Mitral regurgitation  severe on echo 3/21/23, moderate to severe on echo 5/25/23. To consider MitraClip if still with significant MR after CRT      Severe AS s/p TAVR,   #26mm Emery Hernán S3 Ultra valve via left femoral approach by Dr Mary Beth Cedeno. 6/21/22; normally functioning on echo 5/25/23      Post op LBBB. Tachy-lino syndrome with LBBB, pauses post TAVR. BiV ICD in place S/P MDT PPM.      CAD s/p NSTEMI with PCI to the mid LAD and RPDA 3/21/23  Rx: aspirin, plavix, statin      Hypertension  On medical therapy      Hyperlipidemia  11/10/22 , TG 75, HDL 65, LDL 62       DM2 HgbA1C 7.6      Asthma      Hepatitis C. Untreated per chart notes. Hep C Ab high reactive 1/2022. Management per PCP.     Lymphadenopathy    Tobacco abuse, last smoked  10/2022                History of Present Illness   HPI: Connie Davis is a 70y.o. year old male has been referred to me by PCP for device management     The patient has significant medical illnesses which include  Chronic systolic heart failure  History of ischemic cardiomyopathy  NYHA class II  LVEF 35%  Underlying LBBB-from CAD - it is QS complex  Patient has dual-chamber pacemaker  - His lead in LBB region  -  <1% V paced -force pacing His lead, QRS is between 120 to 130 ms  On goal-directed medical therapy greater than 3 months  Shared decision making done with patient and primary cardiologist    Atrial flutter with RVR  Hypertension  Hyperlipidemia  Post TAVR  Mitral regurgitation      He has recently undergone addition of ICD lead and BiV ICD upgrade  He does have cardiac symptoms but these are slowly resolving  His functional ability is increasing    He is not complaining of anginal-like chest pain  His orthopnea and PND has improved  He does have intermittent leg swelling  He is not complaining of palpitation presyncope or syncope        Historical Information   Past Medical History:   Diagnosis Date    Arthritis     Asthma     Colon polyps     Community acquired pneumonia     last assessed: 5/1/2014    Diabetes mellitus ([address])     Hemorrhagic prepatellar bursitis, left 10/21/2019    Hepatitis C     High cholesterol     History of colonoscopy 2017    Hypertension     Lymphadenopathy, anterior cervical 04/17/2018    Nephritis and nephropathy, not specified as acute or chronic, with other specified pathological lesion in kidney, in diseases classified elsewhere 3/26/2013    NSTEMI (non-ST elevated myocardial infarction) ([address]) 1/30/2023    s/p Medtronic dual chamber PPM 8/31/22 s/p upgrade to BiV ICD 9/13/2023 9/16/2023    Screening for colon cancer 05/01/2019    SIRS (systemic inflammatory response syndrome) ([address]) 3/19/2023    Thoracic vertebral fracture ([address]) 06/11/2014     Past Surgical History:   Procedure Laterality Date    CARDIAC CATHETERIZATION N/A 6/3/2022    Procedure: Cardiac RHC/LHC; Surgeon: Yolette Calles MD;  [facility]: [facility]; Service: Cardiology    CARDIAC CATHETERIZATION N/A 6/21/2022    Procedure: CARDIAC TAVR;  Surgeon: Osorio Nicole MD;  Location: [facility];  Service: Cardiology    CARDIAC CATHETERIZATION N/A 2/10/2023    Procedure: Cardiac Coronary Angiogram;  Surgeon: Yolette Calles MD;  [facility];   Service: Cardiology    CARDIAC CATHETERIZATION N/A 2/10/2023    Procedure: Cardiac pci;  Surgeon: Yolette Calles MD;  [facility]; Service: Cardiology    CARDIAC ELECTROPHYSIOLOGY PROCEDURE N/A 2022    Procedure: Cardiac pacer implant ; DC PPM;  Surgeon: Lars Ko DO;  [facility]; Service: Cardiology    CARDIAC ELECTROPHYSIOLOGY PROCEDURE N/A 2023    Procedure: Cardiac upgrade pacer to biv icd;  Surgeon: Angel Longo MD;  [facility]; Service: Cardiology    COLONOSCOPY      COLONOSCOPY W/ POLYPECTOMY      IR UPPER EXTREMITY VENOGRAM- DIAGNOSTIC  2023    LITHOTRIPSY      MULTIPLE TOOTH EXTRACTIONS      KY COLONOSCOPY FLX DX W/COLLJ SPEC WHEN PFRMD N/A 2018    Procedure: COLONOSCOPY;  Surgeon: Pati Ryder MD;  Location: [facility];   Service: Gastroenterology    KY REPLACE AORTIC VALVE OPENFEMORAL ARTERY APPROACH N/A 2022    Procedure: REPLACEMENT AORTIC VALVE TRANSCATHETER (TAVR) TRANSFEMORAL W/ 26MM QUINN RONNI S3 ULTRA VALVE(ACCESS ON LEFT) SAULO;  Surgeon: Ela Barron MD;  Location: [facility];  Service: Cardiac Surgery     Social History     Substance and Sexual Activity   Alcohol Use No     Social History     Substance and Sexual Activity   Drug Use No     Social History     Tobacco Use   Smoking Status Former    Packs/day: 0.50    Types: Cigarettes    Quit date: 2022    Years since quittin.4   Smokeless Tobacco Never   Tobacco Comments    started when he was about 24 yrs old; stopped smoking 3 wks ago     Social History     Socioeconomic History    Marital status: /Civil Union     Spouse name: Not on file    Number of children: Not on file    Years of education: Not on file    Highest education level: Not on file   Occupational History    Occupation: retired    Tobacco Use    Smoking status: Former     Packs/day: 0.50     Types: Cigarettes     Quit date: 2022     Years since quittin.4    Smokeless tobacco: Never    Tobacco comments:     started when he was about 24 yrs old; stopped smoking 3 wks ago   Vaping Use    Vaping Use: Never used   Substance and Sexual Activity    Alcohol use: No    Drug use: No    Sexual activity: Not Currently   Other Topics Concern    Not on file   Social History Narrative    Not on file     Social Determinants of Health     Financial Resource Strain: Low Risk  (2/21/2023)    Overall Financial Resource Strain (CARDIA)     Difficulty of Paying Living Expenses: Not hard at all   Food Insecurity: No Food Insecurity (9/17/2023)    Hunger Vital Sign     Worried About Running Out of Food in the Last Year: Never true     Ran Out of Food in the Last Year: Never true   Transportation Needs: No Transportation Needs (9/17/2023)    PRAPARE - Transportation     Lack of Transportation (Medical): No     Lack of Transportation (Non-Medical): No   Physical Activity: Unknown (1/19/2022)    Exercise Vital Sign     Days of Exercise per Week: Not on file     Minutes of Exercise per Session: 60 min   Stress: No Stress Concern Present (1/19/2022)    [facility]     Feeling of Stress : Not at all   Social Connections:  Moderately Isolated (1/19/2022)    Social Connection and Isolation Panel [NHANES]     Frequency of Communication with Friends and Family: More than three times a week     Frequency of Social Gatherings with Friends and Family: More than three times a week     Attends [employer] Services: Never     Active Member of Clubs or Organizations: No     Attends Club or Organization Meetings: Never     Marital Status:    Intimate Partner Violence: Not on file   Housing Stability: Low Risk  (9/17/2023)    Housing Stability Vital Sign     Unable to Pay for Housing in the Last Year: No     Number of Places Lived in the Last Year: 1     Unstable Housing in the Last Year: No     .  Family History:  Family History   Problem Relation Age of Onset    Heart attack Family         at age 80    No Known Problems Mother     No Known Problems Father     Diabetes Sister Diabetes Brother          Meds/Allergies      No current facility-administered medications for this visit. (Not in a hospital admission)      Allergies   Allergen Reactions    Iv Contrast [Iodinated Contrast Media] Rash     Patient states he had a severe reaction approximately 10 years ago , states he had a rash, itchy and he remembers a bunch of people pounding on chest and being surrounded by multiple doctors. Objective   Vitals: Visit Vitals  Ht 5' 8" (1.727 m)   Wt 80.8 kg (178 lb 1.6 oz)   BMI 27.08 kg/m²   Smoking Status Former   BSA 1.95 m²        Invasive Devices       None                     ROS  Review of Systems   All other systems reviewed and are negative. As described in my history of present illness        PHYSICAL EXAM  Physical Exam  Vitals reviewed. Constitutional:       General: He is not in acute distress. Appearance: Normal appearance. He is normal weight. He is ill-appearing. HENT:      Head: Normocephalic and atraumatic. Right Ear: External ear normal.      Left Ear: External ear normal.      Nose: Nose normal.      Mouth/Throat:      Comments: Posterior pharynx is crowded  Eyes:      General: No scleral icterus. Extraocular Movements: Extraocular movements intact. Conjunctiva/sclera: Conjunctivae normal.      Pupils: Pupils are equal, round, and reactive to light. Cardiovascular:      Rate and Rhythm: Normal rate and regular rhythm. Heart sounds: Murmur heard. Pulmonary:      Effort: No respiratory distress. Breath sounds: Examination of the right-middle field reveals decreased breath sounds. Examination of the left-middle field reveals decreased breath sounds. Examination of the right-lower field reveals decreased breath sounds. Examination of the left-lower field reveals decreased breath sounds. Decreased breath sounds and wheezing present. Abdominal:      General: Bowel sounds are normal.      Palpations: Abdomen is soft. Musculoskeletal:         General: No swelling or deformity. Cervical back: Neck supple. No rigidity. Skin:     Coloration: Skin is not jaundiced. Findings: Bruising present. No lesion. Neurological:      Mental Status: He is oriented to person, place, and time. Mental status is at baseline. Motor: No weakness. Psychiatric:         Mood and Affect: Mood normal.         Behavior: Behavior normal.         Thought Content:  Thought content normal.         Judgment: Judgment normal.               LAB RESULTS:    CBC:  Results from Last 12 Months   Lab Units 09/25/23  0804 09/17/23  0601 09/15/23  0447 09/14/23  0536 09/13/23  0715 08/18/23  0916 07/14/23  1259   WBC Thousand/uL 6.42 7.72 8.31 10.25* 6.23 7.04 7.66   HEMOGLOBIN g/dL 12.1 13.9 12.3 12.2 12.8 11.9* 12.2   HEMATOCRIT % 41.5 44.4 40.6 39.3 41.8 39.1 39.8   MCV fL 91 88 90 90 92 89 88   PLATELETS Thousands/uL 224 203 180 202 206 255 294   RBC Million/uL 4.54 5.03 4.49 4.37 4.56 4.39 4.51   MCH pg 26.7* 27.6 27.4 27.9 28.1 27.1 27.1   MCHC g/dL 29.2* 31.3* 30.3* 31.0* 30.6* 30.4* 30.7*   RDW % 16.6* 17.5* 17.9* 18.1* 18.1* 18.3* 19.2*   MPV fL 11.4 10.7 10.6 11.2 10.6 10.6 10.3   NRBC AUTO /100 WBCs 0 0 0  --   --  0 0        CMP:  Results from Last 12 Months   Lab Units 10/10/23  1148 09/17/23  0601 09/16/23  0456 09/15/23  0447 09/14/23  1557 09/13/23  0715 08/18/23  0916 07/14/23  1259 05/06/23  1806 05/05/23  0930 03/30/23  1225 03/19/23  0604 03/18/23  1923   POTASSIUM mmol/L 4.5 3.5 3.5 4.0 4.0   < > 4.0 4.7 3.7   < > 3.6   < > 4.0   CHLORIDE mmol/L 104 101 103 104 102   < > 112* 113* 109*   < > 104   < > 109*   CO2 mmol/L 31 35* 32 33* 29   < > 29 24 26   < > 30   < > 22   BUN mg/dL 32* 37* 44* 48* 46*   < > 18 17 21   < > 16   < > 20   CREATININE mg/dL 1.29 1.41* 1.46* 1.63* 1.57*   < > 1.05 1.05 1.12   < > 1.10   < > 0.94   CALCIUM mg/dL 10.1 9.5 9.2 8.5 8.7   < > 9.2 9.4 9.1   < > 9.3   < > 9.5   AST U/L  --   --   --   --   -- --  17 29 18  --  16  --  16   ALT U/L  --   --   --   --   --   --  25 23 21  --  27  --  18   ALK PHOS U/L  --   --   --   --   --   --  55 56 54  --  52  --  47   EGFR ml/min/1.73sq m 53 48 46 40 42   < > 68 68 63   < > 65   < > 78    < > = values in this interval not displayed. Magnesium:   Results from Last 12 Months   Lab Units 09/25/23  0804 09/17/23  0601 09/14/23  0536 05/05/23  0930 03/20/23  0446   MAGNESIUM mg/dL 1.9 2.2 2.3 2.0 2.4       A1C:  Results from Last 12 Months   Lab Units 02/10/23  1156 11/08/22  1602   HEMOGLOBIN A1C % 7.6* 7.6*        TSH:  Component Ref Range & Units 2/10/23 11:56 AM 11/10/22 10:09 AM 1/25/22 10:27 AM 11/7/19 11:10 AM 9/20/18  9:40 AM 12/20/16 11:29 AM 2/23/16 11:31 AM   TSH 3RD GENERATON 0.450 - 4.500 uIU/mL 0.861 4.760 High  CM 2.410 R 2.090 R, CM 2.330 R, CM 3.550 R 5.470 High  R, CM          PT/INR:  Results from Last 12 Months   Lab Units 02/10/23  0315   PROTIME seconds 14.6*   INR  1.11       Lipid Panel:  Results from Last 12 Months   Lab Units 02/10/23  1156 11/10/22  1009   CHOLESTEROL mg/dL 137 142   TRIGLYCERIDES mg/dL 61 75   HDL mg/dL 53 65   NON-HDL-CHOL (CHOL-HDL) mg/dl 84  --           Imaging:    EKG  10/11/2023        Echocardiogram:    Echo Follow Up/Limited w/ Color and Doppler  09/14/2023    Interpretation Summary      Left Ventricle: Left ventricular cavity size is normal. Wall thickness is normal. The left ventricular ejection fraction is 32%. Systolic function is severely reduced. There is severe global hypokinesis with regional variation. IVS: There is abnormal septal motion consistent with left bundle branch block or right ventricular pacing. Right Ventricle: Right ventricular cavity size is dilated. Systolic function is reduced. Left Atrium: The atrium is dilated. Right Atrium: The atrium is dilated. Mitral Valve: There is moderate to severe regurgitation. Tricuspid Valve: There is moderate regurgitation.  The right ventricular systolic pressure is moderately elevated. The estimated right ventricular systolic pressure is 91.27 mmHg. Stress Test:   No results found for this or any previous visit. Cardiac catheterization :  No results found for this or any previous visit. Cardiac Coronary Angiogram, Cardiac pci  02/10/2023    Coronary Findings    Diagnostic  Dominance: Right    Left Anterior Descending   The vessel is moderate in size. There is mild diffuse disease throughout the vessel. Mid LAD lesion is 80% stenosed. Culprit for recent MI.NICA flow is 3. The lesion is diffuse. First Diagonal Branch   1st Diag lesion is 50% stenosed. NICA flow is 3. Left Circumflex   The vessel is moderate in size. There is mild diffuse disease throughout the vessel. First Obtuse Marginal Branch   The vessel is large. The vessel exhibits minimal luminal irregularities. 1st Mrg lesion is 50% stenosed. NICA flow is 3. Right Coronary Artery   The vessel is large. There is mild diffuse disease throughout the vessel. Right Posterior Descending Artery   The vessel is large. The vessel exhibits minimal luminal irregularities. RPDA-1 lesion is 90% stenosed. Culprit for recent MI.NICA flow is 3. The lesion is located at the bifurcation and tubular. The [facility] P[facility] classification is 1,0,1 - main branch proximal with side branch. RPDA-2 lesion is 50% stenosed. NICA flow is 3. The lesion is tubular. Intervention        Mid LAD lesion   Angioplasty   Angioplasty using a compliant balloon was performed prior to stent deployment. The balloon used was a BALLOON EUPHORA RX 2.5 X 15MM. Maximum pressure: 8 brenda. Inflation time: 10 sec. Time obtained: 14:35 EST. Supplies used: GUIDEWIRE RUNTHROUGH 180CM; CATH GUIDE LAUNCHER 6FR EBU 3.75; BALLOON EUPHORA RX 2.5 X 15MM   Stent   Drug-eluting stent was successfully placed. The stent used was a STENT ELDER SYNERGY XD MR US [id number].  Stent was deployed by way of balloon expansion. Maximum pressure: 11 brenda. Inflation time: 10 sec. Supplies used: GUIDEWIRE RUNTHROUGH 180CM; CATH GUIDE LAUNCHER 6FR EBU 3.75; STENT ELDER SYNERGY XD MR US [id number]   Stent   Drug-eluting stent was successfully placed. The stent used was a STENT ELDER SYNERGY XD MR US [id number]. Stent was deployed by way of balloon expansion. Maximum pressure: 14 brenda. Inflation time: 10 sec. Additional inflation 20 brenda for 10 sec   Supplies used: GUIDEWIRE RUNTHROUGH 180CM; CATH GUIDE LAUNCHER 6FR EBU 3.75; STENT ELDER SYNERGY XD MR US [id number]   Post-Intervention Lesion Assessment   The intervention was successful. Post-intervention NICA flow is 3. There were no complications. There is a 0% residual stenosis post intervention. RPDA-1 lesion   Angioplasty   Angioplasty using a compliant balloon was performed prior to stent deployment. The balloon used was a BALLOON EUPHORA RX 2.5 X 15MM. Maximum pressure: 8 brenda. Inflation time: 10 sec. Time obtained: 14:22 EST. First reinflation; Max pressure: 8 brenda. Inflation time 10 sec. Time obtained: 14:22 EST. Supplies used: GUIDEWIRE RUNTHROUGH 180CM; CATH GUIDE LAUNCHER 6FR JR4 110CM; BALLOON EUPHORA RX 2.5 X 15MM   Stent   Drug-eluting stent was successfully placed. The stent used was a STENT ELDER SYNERGY XD MR US [id number]. Stent was deployed by way of balloon expansion. Maximum pressure: 12 brenda. Inflation time: 10 sec. Supplies used: GUIDEWIRE RUNTHROUGH 180CM; CATH GUIDE LAUNCHER 6FR JR4 110CM; STENT ELDER SYNERGY XD MR US [id number]   Post-Intervention Lesion Assessment   The intervention was successful. Post-intervention NICA flow is 3. There were no complications. There is a 0% residual stenosis post intervention. AMB Extended Holter Monitor:    Zio AT  07/07/2022 - 07/21/2022            PFT:    Complete PFT with Post Bronchodilator  02/09/2023    Interpretation:     Results are unreliable due to patient being unable to perform maneuvers as per ATS standards. If clinical concern exists would recommend repeating PFTs. Best interpretation made based on available data. Severe obstructive airflow defect on spirometry     No significant improvement in airflow or forced vital capacity in response to the administration to bronchodilator per ATS standards. Lung volumes unable to be obtained due to patient inability to perform maneuvers     Normal diffusion capacity     Flow-volume loop consistent with obstruction         DEVICE CHECK:    Results for orders placed or performed in visit on 10/02/23   Cardiac EP device report    Narrative    MDT BI-V ICD/ACTIVE SYSTEM IS MRI CONDITIONAL  NB/ALERT-CARELINK TRANSMISSION: OPTI-VOL FLUID THRESHOLDS INITIALIZING. 3 VHRS NOTED; NO THERAPIES NEED. AVAIL EGRAMS PRESENT AS RVR WITH RATES BTWN 140-170 BPM. NIGHTLY HEART RATE OVER 85 BPM FOR 7 DAYS. 100% AF BURDEN. 2,097 VENT SENSING NOTED; AVAIL MARKERS PRESENT AS FUSION & RVR. PT ON ELIQUIS & METO SUCC. EF 32% (ECHO 2023). BATTERY STATUS "8 YRS." AP 0% CRT PACING (BIV) 100% (LV) 0%. ALL AVAILABLE LEAD PARAMETERS WITHIN NORMAL LIMITS.   NC            Code Status: [unfilled]  Advance Directive and Living Will:      Power of :    POLST:      Counseling / Coordination of Care  Detailed discussion done with regards to management of heart failure, atrial flutter, device upgrade      Olman Narvaez MD

## 2023-10-25 ENCOUNTER — OFFICE VISIT (OUTPATIENT)
Dept: CARDIOLOGY CLINIC | Facility: CLINIC | Age: 78
End: 2023-10-25
Payer: MEDICARE

## 2023-10-25 VITALS
DIASTOLIC BLOOD PRESSURE: 70 MMHG | SYSTOLIC BLOOD PRESSURE: 124 MMHG | HEART RATE: 94 BPM | BODY MASS INDEX: 26.99 KG/M2 | WEIGHT: 178.1 LBS | HEIGHT: 68 IN

## 2023-10-25 DIAGNOSIS — Z79.4 TYPE 2 DIABETES MELLITUS WITH OTHER CIRCULATORY COMPLICATION, WITH LONG-TERM CURRENT USE OF INSULIN (HCC): Chronic | ICD-10-CM

## 2023-10-25 DIAGNOSIS — Z95.5 STATUS POST INSERTION OF DRUG ELUTING CORONARY ARTERY STENT: Chronic | ICD-10-CM

## 2023-10-25 DIAGNOSIS — I35.0 NONRHEUMATIC AORTIC VALVE STENOSIS: Chronic | ICD-10-CM

## 2023-10-25 DIAGNOSIS — F17.210 CIGARETTE NICOTINE DEPENDENCE WITHOUT COMPLICATION: ICD-10-CM

## 2023-10-25 DIAGNOSIS — I10 ESSENTIAL HYPERTENSION: Chronic | ICD-10-CM

## 2023-10-25 DIAGNOSIS — Z95.2 S/P TAVR (TRANSCATHETER AORTIC VALVE REPLACEMENT): Chronic | ICD-10-CM

## 2023-10-25 DIAGNOSIS — E11.59 TYPE 2 DIABETES MELLITUS WITH OTHER CIRCULATORY COMPLICATION, WITH LONG-TERM CURRENT USE OF INSULIN (HCC): Chronic | ICD-10-CM

## 2023-10-25 DIAGNOSIS — I50.22 CHRONIC HFREF (HEART FAILURE WITH REDUCED EJECTION FRACTION) (HCC): Chronic | ICD-10-CM

## 2023-10-25 DIAGNOSIS — Z95.810 ICD (IMPLANTABLE CARDIOVERTER-DEFIBRILLATOR) IN PLACE: Chronic | ICD-10-CM

## 2023-10-25 DIAGNOSIS — E78.2 MIXED HYPERLIPIDEMIA: Chronic | ICD-10-CM

## 2023-10-25 DIAGNOSIS — E03.8 OTHER SPECIFIED HYPOTHYROIDISM: Chronic | ICD-10-CM

## 2023-10-25 DIAGNOSIS — I73.9 PERIPHERAL ARTERY DISEASE (HCC): Chronic | ICD-10-CM

## 2023-10-25 DIAGNOSIS — I48.92 ATRIAL FLUTTER, UNSPECIFIED TYPE (HCC): ICD-10-CM

## 2023-10-25 DIAGNOSIS — I25.10 CORONARY ARTERY DISEASE INVOLVING NATIVE CORONARY ARTERY OF NATIVE HEART WITHOUT ANGINA PECTORIS: Chronic | ICD-10-CM

## 2023-10-25 DIAGNOSIS — J45.20 MILD INTERMITTENT ASTHMA WITHOUT COMPLICATION: ICD-10-CM

## 2023-10-25 DIAGNOSIS — I49.5 TACHY-BRADY SYNDROME (HCC): Chronic | ICD-10-CM

## 2023-10-25 DIAGNOSIS — I47.29 NSVT (NONSUSTAINED VENTRICULAR TACHYCARDIA) (HCC): ICD-10-CM

## 2023-10-25 PROCEDURE — 93000 ELECTROCARDIOGRAM COMPLETE: CPT | Performed by: INTERNAL MEDICINE

## 2023-10-25 PROCEDURE — 99215 OFFICE O/P EST HI 40 MIN: CPT | Performed by: INTERNAL MEDICINE

## 2023-10-25 RX ORDER — ALBUTEROL SULFATE 90 UG/1
AEROSOL, METERED RESPIRATORY (INHALATION)
Qty: 18 G | Refills: 2 | Status: SHIPPED | OUTPATIENT
Start: 2023-10-25

## 2023-10-25 NOTE — LETTER
October 25, 2023     Lauren Hensley, [address] [facility] [address]    Patient: Phuc Gongora   YOB: 1945   Date of Visit: 10/25/2023       Dear Dr. Ion Espitia: Thank you for referring [place] to me for evaluation. Below are my notes for this consultation. If you have questions, please do not hesitate to call me. I look forward to following your patient along with you. Sincerely,        Collins Fernandez MD        CC: MD Collins Garland MD  10/25/2023  1:51 PM  Sign when Signing Visit  Progress Note - Electrophysiology-Cardiology (EP)   Phuc Gongora 70 y.o. male MRN: [id number]  Unit/Bed#:  Encounter: [id number]           0. Atrial flutter, unspecified type ([address])  POCT ECG      2. Type 2 diabetes mellitus with other circulatory complication, with long-term current use of insulin ([facility])        3. Other specified hypothyroidism        4. Essential hypertension        5. Nonrheumatic aortic valve stenosis        6. Coronary artery disease involving native coronary artery of native heart without angina pectoris        7. History of tachy-lino syndrome        8. Peripheral artery disease ([address])        9. Chronic HFrEF (heart failure with reduced ejection fraction) ([facility])        10. NSVT (nonsustained ventricular tachycardia) ([address])        11. Mixed hyperlipidemia        12. S/P TAVR (transcatheter aortic valve replacement)        13. Status post insertion of drug eluting coronary artery stent        14. s/p Medtronic dual chamber PPM 8/31/22 s/p upgrade to BiV ICD 9/13/2023        15.  Cigarette nicotine dependence without complication                   Summary of my recommendations   As far as cardiac condition patient doing well, without any significant symptoms  His activity level is slowly increasing    Atrial flutter-continue with Eliquis  CAD-post PCI-continue with Plavix    Aggressive management of heart failure  Follow-up with primary cardiology  Follow-up with me in 6 months the patient was very happy with the operative quickly took the records everything that is ago  Yes and remained in the room 20 times nonstop talking another EMS reviewing it with him respect the like you very good thank you so much will let Marie Ceja also know of any symptoms see okay no complaints all right        Clinical conditions   Chronic systolic heart failure  History of ischemic cardiomyopathy  NYHA class II  LVEF 35%  Underlying LBBB-from CAD - it is QS complex  Patient has dual-chamber pacemaker  - His lead in LBB region  -  <1% V paced -force pacing His lead, QRS is between 120 to 130 ms  On goal-directed medical therapy greater than 3 months  Shared decision making done with patient and primary cardiologist    Atrial flutter with RVR  Hypertension  Hyperlipidemia  Post TAVR  Mitral regurgitation                Assessment/Plan   Chronic heart failure with reduced EF, LVEF 30-35%, nondilated, NYHA class II Stage C. Upgraded to BiV ICD     Neurohormonal Blockade:   --Beta-Blocker:Metoprolol succinate 75 mg BID  --ACEi, ARB or ARNi: Lisinopril 20 mg BID  --SGLT2i- jardiance 10 mg daily  --Aldosterone Receptor Blocker: Spironolactone 25 mg daily  --Diuretic: Lasix 40 mg daily     Sudden Cardiac Death Risk Reduction:  --ICD: EF 30-35%, MDT DC PPM in situ, upgraded to Biv          PAF  On anticoagulation , CHADS VASc -6   On eliquis         Mitral regurgitation  severe on echo 3/21/23, moderate to severe on echo 5/25/23. To consider MitraClip if still with significant MR after CRT      Severe AS s/p TAVR,   #26mm Emery Hernán S3 Ultra valve via left femoral approach by Dr Jerman Willams. 6/21/22; normally functioning on echo 5/25/23      Post op LBBB. Tachy-lino syndrome with LBBB, pauses post TAVR.    BiV ICD in place S/P MDT PPM.      CAD s/p NSTEMI with PCI to the mid LAD and RPDA 3/21/23  Rx: aspirin, plavix, statin      Hypertension  On medical therapy      Hyperlipidemia  11/10/22 , TG 75, HDL 65, LDL 62       DM2 HgbA1C 7.6      Asthma      Hepatitis C. Untreated per chart notes. Hep C Ab high reactive 1/2022. Management per PCP.     Lymphadenopathy    Tobacco abuse, last smoked  10/2022                History of Present Illness   HPI: Connie Davis is a 70y.o. year old male has been referred to me by PCP for device management     The patient has significant medical illnesses which include  Chronic systolic heart failure  History of ischemic cardiomyopathy  NYHA class II  LVEF 35%  Underlying LBBB-from CAD - it is QS complex  Patient has dual-chamber pacemaker  - His lead in LBB region  -  <1% V paced -force pacing His lead, QRS is between 120 to 130 ms  On goal-directed medical therapy greater than 3 months  Shared decision making done with patient and primary cardiologist    Atrial flutter with RVR  Hypertension  Hyperlipidemia  Post TAVR  Mitral regurgitation      He has recently undergone addition of ICD lead and BiV ICD upgrade  He does have cardiac symptoms but these are slowly resolving  His functional ability is increasing    He is not complaining of anginal-like chest pain  His orthopnea and PND has improved  He does have intermittent leg swelling  He is not complaining of palpitation presyncope or syncope        Historical Information   Past Medical History:   Diagnosis Date   • Arthritis    • Asthma    • Colon polyps    • Community acquired pneumonia     last assessed: 5/1/2014   • Diabetes mellitus ([address])    • Hemorrhagic prepatellar bursitis, left 10/21/2019   • Hepatitis C    • High cholesterol    • History of colonoscopy 2017   • Hypertension    • Lymphadenopathy, anterior cervical 04/17/2018   • Nephritis and nephropathy, not specified as acute or chronic, with other specified pathological lesion in kidney, in diseases classified elsewhere 3/26/2013   • NSTEMI (non-ST elevated myocardial infarction) ([address]) 1/30/2023   • s/p Medtronic dual chamber PPM 8/31/22 s/p upgrade to BiV ICD 9/13/2023 9/16/2023   • Screening for colon cancer 05/01/2019   • SIRS (systemic inflammatory response syndrome) ([address]) 3/19/2023   • Thoracic vertebral fracture ([address]) 06/11/2014     Past Surgical History:   Procedure Laterality Date   • CARDIAC CATHETERIZATION N/A 6/3/2022    Procedure: Cardiac RHC/LHC; Surgeon: Armida Halsted, MD;  [facility]; Service: Cardiology   • CARDIAC CATHETERIZATION N/A 6/21/2022    Procedure: CARDIAC TAVR;  Surgeon: Negin Torres MD;  Location: [facility];  Service: Cardiology   • CARDIAC CATHETERIZATION N/A 2/10/2023    Procedure: Cardiac Coronary Angiogram;  Surgeon: Armida Halsted, MD;  [facility]; Service: Cardiology   • CARDIAC CATHETERIZATION N/A 2/10/2023    Procedure: Cardiac pci;  Surgeon: Armida Halsted, MD;  [facility]; Service: Cardiology   • CARDIAC ELECTROPHYSIOLOGY PROCEDURE N/A 9/1/2022    Procedure: Cardiac pacer implant ; DC PPM;  Surgeon: Ashley Maldonado DO;  [facility]; Service: Cardiology   • CARDIAC ELECTROPHYSIOLOGY PROCEDURE N/A 9/13/2023    Procedure: Cardiac upgrade pacer to biv icd;  Surgeon: Willow Gonzales MD;  [facility]; Service: Cardiology   • COLONOSCOPY     • COLONOSCOPY W/ POLYPECTOMY     • IR UPPER EXTREMITY VENOGRAM- DIAGNOSTIC  8/23/2023   • LITHOTRIPSY     • MULTIPLE TOOTH EXTRACTIONS     • KS COLONOSCOPY FLX DX W/COLLJ SPEC WHEN PFRMD N/A 5/17/2018    Procedure: COLONOSCOPY;  Surgeon: Rebecca Marquez MD;  Location: [facility];   Service: Gastroenterology   • KS REPLACE AORTIC VALVE OPENFEMORAL ARTERY APPROACH N/A 6/21/2022    Procedure: REPLACEMENT AORTIC VALVE TRANSCATHETER (TAVR) TRANSFEMORAL W/ 26MM QUINN RONNI S3 ULTRA VALVE(ACCESS ON LEFT) SAULO;  Surgeon: Esha Cool MD;  Location: [facility];  Service: Cardiac Surgery     Social History     Substance and Sexual Activity   Alcohol Use No Social History     Substance and Sexual Activity   Drug Use No     Social History     Tobacco Use   Smoking Status Former   • Packs/day: 0.50   • Types: Cigarettes   • Quit date: 2022   • Years since quittin.4   Smokeless Tobacco Never   Tobacco Comments    started when he was about 24 yrs old; stopped smoking 3 wks ago     Social History     Socioeconomic History   • Marital status: /Civil Union     Spouse name: Not on file   • Number of children: Not on file   • Years of education: Not on file   • Highest education level: Not on file   Occupational History   • Occupation: retired    Tobacco Use   • Smoking status: Former     Packs/day: 0.50     Types: Cigarettes     Quit date: 2022     Years since quittin.4   • Smokeless tobacco: Never   • Tobacco comments:     started when he was about 24 yrs old; stopped smoking 3 wks ago   Vaping Use   • Vaping Use: Never used   Substance and Sexual Activity   • Alcohol use: No   • Drug use: No   • Sexual activity: Not Currently   Other Topics Concern   • Not on file   Social History Narrative   • Not on file     Social Determinants of Health     Financial Resource Strain: Low Risk  (2023)    Overall Financial Resource Strain (CARDIA)    • Difficulty of Paying Living Expenses: Not hard at all   Food Insecurity: No Food Insecurity (2023)    Hunger Vital Sign    • Worried About Running Out of Food in the Last Year: Never true    • Ran Out of Food in the Last Year: Never true   Transportation Needs: No Transportation Needs (2023)    PRAPARE - Transportation    • Lack of Transportation (Medical): No    • Lack of Transportation (Non-Medical):  No   Physical Activity: Unknown (2022)    Exercise Vital Sign    • Days of Exercise per Week: Not on file    • Minutes of Exercise per Session: 60 min   Stress: No Stress Concern Present (2022)    [facility]    • Feeling of Stress : Not at all   Social Connections: Moderately Isolated (1/19/2022)    Social Connection and Isolation Panel [NHANES]    • Frequency of Communication with Friends and Family: More than three times a week    • Frequency of Social Gatherings with Friends and Family: More than three times a week    • Attends Islam Services: Never    • Active Member of Clubs or Organizations: No    • Attends Club or Organization Meetings: Never    • Marital Status:    Intimate Partner Violence: Not on file   Housing Stability: Low Risk  (9/17/2023)    Housing Stability Vital Sign    • Unable to Pay for Housing in the Last Year: No    • Number of Places Lived in the Last Year: 1    • Unstable Housing in the Last Year: No     .  Family History:  Family History   Problem Relation Age of Onset   • Heart attack Family         at age 80   • No Known Problems Mother    • No Known Problems Father    • Diabetes Sister    • Diabetes Brother          Meds/Allergies      No current facility-administered medications for this visit. (Not in a hospital admission)      Allergies   Allergen Reactions   • Iv Contrast [Iodinated Contrast Media] Rash     Patient states he had a severe reaction approximately 10 years ago , states he had a rash, itchy and he remembers a bunch of people pounding on chest and being surrounded by multiple doctors. Objective   Vitals: Visit Vitals  Ht 5' 8" (1.727 m)   Wt 80.8 kg (178 lb 1.6 oz)   BMI 27.08 kg/m²   Smoking Status Former   BSA 1.95 m²        Invasive Devices       None                     ROS  Review of Systems   All other systems reviewed and are negative. As described in my history of present illness        PHYSICAL EXAM  Physical Exam  Vitals reviewed. Constitutional:       General: He is not in acute distress. Appearance: Normal appearance. He is normal weight. He is ill-appearing. HENT:      Head: Normocephalic and atraumatic.       Right Ear: External ear normal. Left Ear: External ear normal.      Nose: Nose normal.      Mouth/Throat:      Comments: Posterior pharynx is crowded  Eyes:      General: No scleral icterus. Extraocular Movements: Extraocular movements intact. Conjunctiva/sclera: Conjunctivae normal.      Pupils: Pupils are equal, round, and reactive to light. Cardiovascular:      Rate and Rhythm: Normal rate and regular rhythm. Heart sounds: Murmur heard. Pulmonary:      Effort: No respiratory distress. Breath sounds: Examination of the right-middle field reveals decreased breath sounds. Examination of the left-middle field reveals decreased breath sounds. Examination of the right-lower field reveals decreased breath sounds. Examination of the left-lower field reveals decreased breath sounds. Decreased breath sounds and wheezing present. Abdominal:      General: Bowel sounds are normal.      Palpations: Abdomen is soft. Musculoskeletal:         General: No swelling or deformity. Cervical back: Neck supple. No rigidity. Skin:     Coloration: Skin is not jaundiced. Findings: Bruising present. No lesion. Neurological:      Mental Status: He is oriented to person, place, and time. Mental status is at baseline. Motor: No weakness. Psychiatric:         Mood and Affect: Mood normal.         Behavior: Behavior normal.         Thought Content:  Thought content normal.         Judgment: Judgment normal.               LAB RESULTS:    CBC:  Results from Last 12 Months   Lab Units 09/25/23  0804 09/17/23  0601 09/15/23  0447 09/14/23  0536 09/13/23  0715 08/18/23  0916 07/14/23  1259   WBC Thousand/uL 6.42 7.72 8.31 10.25* 6.23 7.04 7.66   HEMOGLOBIN g/dL 12.1 13.9 12.3 12.2 12.8 11.9* 12.2   HEMATOCRIT % 41.5 44.4 40.6 39.3 41.8 39.1 39.8   MCV fL 91 88 90 90 92 89 88   PLATELETS Thousands/uL 224 203 180 202 206 255 294   RBC Million/uL 4.54 5.03 4.49 4.37 4.56 4.39 4.51   MCH pg 26.7* 27.6 27.4 27.9 28.1 27.1 27.1   MCHC g/dL 29.2* 31.3* 30.3* 31.0* 30.6* 30.4* 30.7*   RDW % 16.6* 17.5* 17.9* 18.1* 18.1* 18.3* 19.2*   MPV fL 11.4 10.7 10.6 11.2 10.6 10.6 10.3   NRBC AUTO /100 WBCs 0 0 0  --   --  0 0        CMP:  Results from Last 12 Months   Lab Units 10/10/23  1148 09/17/23  0601 09/16/23  0456 09/15/23  0447 09/14/23  1557 09/13/23  0715 08/18/23  0916 07/14/23  1259 05/06/23  1806 05/05/23  0930 03/30/23  1225 03/19/23  0604 03/18/23  1923   POTASSIUM mmol/L 4.5 3.5 3.5 4.0 4.0   < > 4.0 4.7 3.7   < > 3.6   < > 4.0   CHLORIDE mmol/L 104 101 103 104 102   < > 112* 113* 109*   < > 104   < > 109*   CO2 mmol/L 31 35* 32 33* 29   < > 29 24 26   < > 30   < > 22   BUN mg/dL 32* 37* 44* 48* 46*   < > 18 17 21   < > 16   < > 20   CREATININE mg/dL 1.29 1.41* 1.46* 1.63* 1.57*   < > 1.05 1.05 1.12   < > 1.10   < > 0.94   CALCIUM mg/dL 10.1 9.5 9.2 8.5 8.7   < > 9.2 9.4 9.1   < > 9.3   < > 9.5   AST U/L  --   --   --   --   --   --  17 29 18  --  16  --  16   ALT U/L  --   --   --   --   --   --  25 23 21  --  27  --  18   ALK PHOS U/L  --   --   --   --   --   --  55 56 54  --  52  --  47   EGFR ml/min/1.73sq m 53 48 46 40 42   < > 68 68 63   < > 65   < > 78    < > = values in this interval not displayed.         Magnesium:   Results from Last 12 Months   Lab Units 09/25/23  0804 09/17/23  0601 09/14/23  0536 05/05/23  0930 03/20/23  0446   MAGNESIUM mg/dL 1.9 2.2 2.3 2.0 2.4       A1C:  Results from Last 12 Months   Lab Units 02/10/23  1156 11/08/22  1602   HEMOGLOBIN A1C % 7.6* 7.6*        TSH:  Component Ref Range & Units 2/10/23 11:56 AM 11/10/22 10:09 AM 1/25/22 10:27 AM 11/7/19 11:10 AM 9/20/18  9:40 AM 12/20/16 11:29 AM 2/23/16 11:31 AM   TSH 3RD GENERATON 0.450 - 4.500 uIU/mL 0.861 4.760 High  CM 2.410 R 2.090 R, CM 2.330 R, CM 3.550 R 5.470 High  R, CM          PT/INR:  Results from Last 12 Months   Lab Units 02/10/23  0315   PROTIME seconds 14.6*   INR  1.11       Lipid Panel:  Results from Last 12 Months   Lab Units 02/10/23  1156 11/10/22  1009   CHOLESTEROL mg/dL 137 142   TRIGLYCERIDES mg/dL 61 75   HDL mg/dL 53 65   NON-HDL-CHOL (CHOL-HDL) mg/dl 84  --           Imaging:    EKG  10/11/2023        Echocardiogram:    Echo Follow Up/Limited w/ Color and Doppler  09/14/2023    Interpretation Summary    •  Left Ventricle: Left ventricular cavity size is normal. Wall thickness is normal. The left ventricular ejection fraction is 32%. Systolic function is severely reduced. There is severe global hypokinesis with regional variation. •  IVS: There is abnormal septal motion consistent with left bundle branch block or right ventricular pacing. •  Right Ventricle: Right ventricular cavity size is dilated. Systolic function is reduced. •  Left Atrium: The atrium is dilated. •  Right Atrium: The atrium is dilated. •  Mitral Valve: There is moderate to severe regurgitation. •  Tricuspid Valve: There is moderate regurgitation. The right ventricular systolic pressure is moderately elevated. The estimated right ventricular systolic pressure is 61.12 mmHg. Stress Test:   No results found for this or any previous visit. Cardiac catheterization :  No results found for this or any previous visit. Cardiac Coronary Angiogram, Cardiac pci  02/10/2023    Coronary Findings    Diagnostic  Dominance: Right    Left Anterior Descending   The vessel is moderate in size. There is mild diffuse disease throughout the vessel. Mid LAD lesion is 80% stenosed. Culprit for recent MI.NICA flow is 3. The lesion is diffuse. First Diagonal Branch   1st Diag lesion is 50% stenosed. NICA flow is 3. Left Circumflex   The vessel is moderate in size. There is mild diffuse disease throughout the vessel. First Obtuse Marginal Branch   The vessel is large. The vessel exhibits minimal luminal irregularities. 1st Mrg lesion is 50% stenosed. NICA flow is 3. Right Coronary Artery   The vessel is large.  There is mild diffuse disease throughout the vessel. Right Posterior Descending Artery   The vessel is large. The vessel exhibits minimal luminal irregularities. RPDA-1 lesion is 90% stenosed. Culprit for recent MI.NICA flow is 3. The lesion is located at the bifurcation and tubular. The Everett Endow classification is 1,0,1 - main branch proximal with side branch. RPDA-2 lesion is 50% stenosed. NICA flow is 3. The lesion is tubular. Intervention        Mid LAD lesion   Angioplasty   Angioplasty using a compliant balloon was performed prior to stent deployment. The balloon used was a BALLOON EUPHORA RX 2.5 X 15MM. Maximum pressure: 8 brenda. Inflation time: 10 sec. Time obtained: 14:35 EST. Supplies used: GUIDEWIRE RUNTHROUGH 180CM; CATH GUIDE LAUNCHER 6FR EBU 3.75; BALLOON EUPHORA RX 2.5 X 15MM   Stent   Drug-eluting stent was successfully placed. The stent used was a STENT ELDER SYNERGY XD MR US [id number]. Stent was deployed by way of balloon expansion. Maximum pressure: 11 brenda. Inflation time: 10 sec. Supplies used: GUIDEWIRE RUNTHROUGH 180CM; CATH GUIDE LAUNCHER 6FR EBU 3.75; STENT ELDER SYNERGY XD MR US [id number]   Stent   Drug-eluting stent was successfully placed. The stent used was a STENT ELDER SYNERGY XD MR US [id number]. Stent was deployed by way of balloon expansion. Maximum pressure: 14 brenda. Inflation time: 10 sec. Additional inflation 20 brenda for 10 sec   Supplies used: GUIDEWIRE RUNTHROUGH 180CM; CATH GUIDE LAUNCHER 6FR EBU 3.75; STENT ELDER SYNERGY XD MR US [id number]   Post-Intervention Lesion Assessment   The intervention was successful. Post-intervention NICA flow is 3. There were no complications. There is a 0% residual stenosis post intervention. RPDA-1 lesion   Angioplasty   Angioplasty using a compliant balloon was performed prior to stent deployment. The balloon used was a BALLOON EUPHORA RX 2.5 X 15MM. Maximum pressure: 8 brenda. Inflation time: 10 sec. Time obtained: 14:22 EST. First reinflation; Max pressure: 8 brenda.  Inflation time 10 sec. Time obtained: 14:22 EST. Supplies used: GUIDEWIRE RUNTHROUGH 180CM; CATH GUIDE LAUNCHER 6FR JR4 110CM; BALLOON EUPHORA RX 2.5 X 15MM   Stent   Drug-eluting stent was successfully placed. The stent used was a STENT ELDER SYNERGY XD MR US [id number]. Stent was deployed by way of balloon expansion. Maximum pressure: 12 brenda. Inflation time: 10 sec. Supplies used: GUIDEWIRE RUNTHROUGH 180CM; CATH GUIDE LAUNCHER 6FR JR4 110CM; STENT ELDER SYNERGY XD MR US [id number]   Post-Intervention Lesion Assessment   The intervention was successful. Post-intervention NICA flow is 3. There were no complications. There is a 0% residual stenosis post intervention. AMB Extended Holter Monitor:    Zio AT  07/07/2022 - 07/21/2022            PFT:    Complete PFT with Post Bronchodilator  02/09/2023    Interpretation:     Results are unreliable due to patient being unable to perform maneuvers as per ATS standards. If clinical concern exists would recommend repeating PFTs. Best interpretation made based on available data. Severe obstructive airflow defect on spirometry     No significant improvement in airflow or forced vital capacity in response to the administration to bronchodilator per ATS standards. Lung volumes unable to be obtained due to patient inability to perform maneuvers     Normal diffusion capacity     Flow-volume loop consistent with obstruction         DEVICE CHECK:    Results for orders placed or performed in visit on 10/02/23   Cardiac EP device report    Narrative    MDT BI-V ICD/ACTIVE SYSTEM IS MRI CONDITIONAL  NB/ALERT-CARELINK TRANSMISSION: OPTI-VOL FLUID THRESHOLDS INITIALIZING. 3 VHRS NOTED; NO THERAPIES NEED. AVAIL EGRAMS PRESENT AS RVR WITH RATES BTWN 140-170 BPM. NIGHTLY HEART RATE OVER 85 BPM FOR 7 DAYS. 100% AF BURDEN. 2,097 VENT SENSING NOTED; AVAIL MARKERS PRESENT AS FUSION & RVR. PT ON ELIQUIS & METO SUCC. EF 32% (ECHO 2023).  BATTERY STATUS "8 YRS." AP 0% CRT PACING (BIV) 100% (LV) 0%. ALL AVAILABLE LEAD PARAMETERS WITHIN NORMAL LIMITS.   NC            Code Status: [unfilled]  Advance Directive and Living Will:      Power of :    POLST:      Counseling / Coordination of Care  Detailed discussion done with regards to management of heart failure, atrial flutter, device upgrade      Amairani Saenz MD

## 2023-10-27 DIAGNOSIS — J45.20 MILD INTERMITTENT ASTHMA, UNSPECIFIED WHETHER COMPLICATED: ICD-10-CM

## 2023-10-27 RX ORDER — MONTELUKAST SODIUM 10 MG/1
TABLET ORAL
Qty: 90 TABLET | Refills: 3 | Status: SHIPPED | OUTPATIENT
Start: 2023-10-27

## 2023-11-15 ENCOUNTER — IMMUNIZATIONS (OUTPATIENT)
Dept: INTERNAL MEDICINE CLINIC | Facility: CLINIC | Age: 78
End: 2023-11-15

## 2023-11-15 DIAGNOSIS — Z23 ENCOUNTER FOR IMMUNIZATION: Primary | ICD-10-CM

## 2023-11-15 PROCEDURE — 90662 IIV NO PRSV INCREASED AG IM: CPT

## 2023-11-15 PROCEDURE — G0008 ADMIN INFLUENZA VIRUS VAC: HCPCS

## 2023-11-15 NOTE — PROGRESS NOTES
Patient seen on nurse schedule today for influenza vaccine. Administered in patients left deltoid, patient tolerated well.

## 2023-11-21 ENCOUNTER — TELEPHONE (OUTPATIENT)
Dept: CARDIOLOGY CLINIC | Facility: CLINIC | Age: 78
End: 2023-11-21

## 2023-11-21 NOTE — TELEPHONE ENCOUNTER
Pt's spouse, Darren De Souza, stopped by the office and requested a Medical Certification form to be completed for [facility] [employer] to extend their electricity for 30 days as they are having financial difficulties. The form has been completed and faxed to [facility] [employer].

## 2023-11-24 DIAGNOSIS — E08.40 DIABETES MELLITUS DUE TO UNDERLYING CONDITION WITH DIABETIC NEUROPATHY, WITHOUT LONG-TERM CURRENT USE OF INSULIN (HCC): ICD-10-CM

## 2023-11-24 RX ORDER — INSULIN GLARGINE 100 [IU]/ML
INJECTION, SOLUTION SUBCUTANEOUS
Qty: 15 ML | Refills: 3 | Status: SHIPPED | OUTPATIENT
Start: 2023-11-24

## 2023-11-27 ENCOUNTER — OFFICE VISIT (OUTPATIENT)
Dept: CARDIOLOGY CLINIC | Facility: CLINIC | Age: 78
End: 2023-11-27

## 2023-11-27 VITALS
WEIGHT: 191.6 LBS | BODY MASS INDEX: 29.04 KG/M2 | HEIGHT: 68 IN | DIASTOLIC BLOOD PRESSURE: 72 MMHG | SYSTOLIC BLOOD PRESSURE: 112 MMHG | OXYGEN SATURATION: 96 % | HEART RATE: 105 BPM

## 2023-11-27 DIAGNOSIS — I25.10 CORONARY ARTERY DISEASE INVOLVING NATIVE CORONARY ARTERY OF NATIVE HEART WITHOUT ANGINA PECTORIS: ICD-10-CM

## 2023-11-27 DIAGNOSIS — Z95.2 S/P TAVR (TRANSCATHETER AORTIC VALVE REPLACEMENT): ICD-10-CM

## 2023-11-27 DIAGNOSIS — I34.0 NONRHEUMATIC MITRAL (VALVE) INSUFFICIENCY: ICD-10-CM

## 2023-11-27 DIAGNOSIS — I50.22 CHRONIC HFREF (HEART FAILURE WITH REDUCED EJECTION FRACTION) (HCC): Primary | ICD-10-CM

## 2023-11-27 PROCEDURE — 99024 POSTOP FOLLOW-UP VISIT: CPT | Performed by: INTERNAL MEDICINE

## 2023-11-27 RX ORDER — FUROSEMIDE 40 MG/1
TABLET ORAL
Qty: 135 TABLET | Refills: 3
Start: 2023-11-27

## 2023-11-27 NOTE — PATIENT INSTRUCTIONS
Increase lasix to 40mg two times a day for 3 days then go back to 40mg in AM and 20mg in PM  2g sodium diet  2L fluid restriction diet  Daily weights

## 2023-11-27 NOTE — PROGRESS NOTES
Advanced Heart Failure Outpatient Progress Note - Genesis Harris 70 y.o. male MRN: [id number]    Encounter: [id number]      Assessment/Plan:    Patient Active Problem List    Diagnosis Date Noted    s/p Medtronic dual chamber PPM 8/31/22 s/p upgrade to BiV ICD 9/13/2023 09/16/2023    Atrial flutter ([address]) 09/14/2023    Chronic HFrEF (heart failure with reduced ejection fraction) ([address]) 06/05/2023    NSVT (nonsustained ventricular tachycardia) ([address]) 06/05/2023    Anemia 03/19/2023    Status post insertion of drug eluting coronary artery stent 02/10/2023    Bruit of left carotid artery 01/30/2023    Peripheral artery disease ([address]) 01/18/2023    Need for influenza vaccination 11/10/2022    Leg cramping 11/10/2022    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    S/P TAVR (transcatheter aortic valve replacement) 06/21/2022    Coronary artery disease 06/21/2022    Contrast media allergy 05/31/2022    History of aortic stenosis s/p TAVR 01/19/2022    Diverticulosis of large intestine 09/20/2018    Internal hemorrhoids 09/20/2018    Nicotine dependence 04/13/2017    Osteoarthritis of knee 04/13/2017    Bilateral hearing loss 01/30/2017    Allergic rhinitis 02/24/2016    Type 2 diabetes mellitus with circulatory disorder, with long-term current use of insulin ([address]) 11/24/2015    Adenomatous colon polyp 06/11/2014    Hyperlipidemia 09/13/2013    Vitamin B12 deficiency 07/24/2012    Mild intermittent asthma without complication 23/34/2690    Essential hypertension 06/08/2012    Hypothyroidism 06/07/2012       # Chronic heart failure with reduced EF, LVEF 30-35%, nondilated, NYHA class II Stage C.   Etiology: ischemic, new reduction in setting of NSTEMI with 80% mid LAD and 90% RPDA stenoses s/p stenting     Weight at discharge 179 lbs, 186 lbs 3/28/23; 189 lbs 4/28/23; 184 lbs 9/7/23; 186 lbs 10/11/23; 191 lbs 11/27/23  NTproBNP   2624 5/6/23  3136 3/18/23    Studies personally reviewed by me:    Echo 5/25/23:  LVEF 35%, 30-35% on my review  LVIDD 5.4cm, increased wall thickness  TAVR mean gradient 13mmHg  Moderate to severe MR, eccentric  Normal RV size and systolic function  Estimated RVSP 36mmHg, mild TR    Echo 4/21/23:  LVEF:  35%  LVIDd: 5.4 cm, normal wall thickness  RV: Normal size and systolic function  AV: TAVR bioprosthetic valve appears well-seated and appears to be functioning normally, no perivalvular or transvalvular regurgitation  MR: Moderate with posteriorly directed jet  PASP: Unable to estimate  RVOT: no notching  Other: IVC normal, normal biatrial size    TTE 3/21/23: LVEF 30-35%,LVIDd 5.7 cm,  RV normal, no AI, no AS, severe MR, mild TR, trivial pericardial effusion, IVC normal. RVSP 36mmHg + RAP. The following segments are akinetic: basal inferoseptal, basal inferior, mid inferoseptal and mid inferolateral. The following segments are hypokinetic: mid inferior and apical inferior. TTE 2/10/23: LVEF 35-40%  TTE 7/28/22: LVEF 55%, moderate MR with posteriorly directed jet, mild TR    2/10/23 [facility]: 3-vessel CAD with two identifiable culprits for NSTEMI in ost RPDA & mid LAD. Successful PCI w/ ELDER x2 to mid LAD & ELDER x1 ost RPDA; residual moderate disease unchanged from 6-2022 study   6/03/22 [facility], pre TAVR: First marginal lesion 50% stenosis, mid LAD 50% stenosis, RPDA 50% stenosis, RPDA to 50% stenosis, 1st diagonal 50% stenosis. Neurohormonal Blockade:   --Beta-Blocker:Metoprolol succinate 75 mg BID  --ACEi, ARB or ARNi: Lisinopril 10mg daily  --SGLT2i- jardiance 10 mg daily  --Aldosterone Receptor Blocker: Spironolactone 25 mg daily  --Diuretic: Lasix 60 mg daily - taking 40mg in AM and 20mg in PM     Sudden Cardiac Death Risk Reduction:  --ICD: EF 30-35%, s/p BIV ICD upgrade 9/13/23  Interrogation 10/2/23: 100% AF burden, AP 0% %.  OptiVol initializing     Electrical Resynchronization:  --Candidacy for BiV device: LBBB, QRSd 132-140 msec; QRSd 132 msec on EKG 10/25/23 post CRT     Advanced Therapies (if appropriate): We will continue to monitor  --Inotrope:  --LVAD/Transplant Candidacy:     # Atrial fibrillation  Rate: metoprolol as above  Rhythm: none  AC: apixaban   # Mitral regurgitation, severe on echo 3/21/23, moderate to severe on echo 5/25/23. To consider MitraClip if still with significant MR after CRT  # Severe AS s/p TAVR, #26mm Emery Hernán S3 Ultra valve via left femoral approach by Dr Juan David Frost. 6/21/22; normally functioning on echo 5/25/23  # Post op LBBB. # Tachy-lino syndrome with LBBB, pauses post TAVR. S/P MDT PPM then upgraded to BIV ICD  # CAD s/p NSTEMI with PCI to the mid LAD and RPDA 3/21/23  Rx: plavix, on apixaban for afib, statin  # Hypertension  # Hyperlipidemia 11/10/22 , TG 75, HDL 65, LDL 62   2/10/23 TG 61 HDL 53 LDL 72  # DM2 HgbA1C 7.6  # Asthma  # Hepatitis C. Untreated per chart notes. Hep C Ab high reactive 1/2022. Management per PCP. # Lymphadenopathy  # Tobacco abuse, last smoked  10/2022  PFT 2/9/23:  Interpretation:  Results are unreliable due to patient being unable to perform maneuvers as per ATS standards. If clinical concern exists would recommend repeating PFTs. Best interpretation made based on available data. Severe obstructive airflow defect on spirometry  No significant improvement in airflow or forced vital capacity in response to the administration to bronchodilator per ATS standards. Lung volumes unable to be obtained due to patient inability to perform maneuvers  Normal diffusion capacity  Flow-volume loop consistent with obstruction       TODAY'S PLAN:  Increase lasix to 40mg two times a day for 3 days then go back to 40mg in AM and 20mg in PM  2g sodium diet  2L fluid restriction diet  Daily weights  Echo to reassess EF and MR on 1/2024 to assess response to CRT  Switch lisinopril to entresto on next visit. HPI:   Patient admitted February 9/20/2023 when he presented with progressive shortness of breath.   Found to have elevated troponin. Admitted for type I NSTEMI with new wall motion abnormalities on echocardiogram along with newly reduced EF of 40%. He was urgently taken to the Cath Lab and left heart catheterization showed three-vessel CAD with 2 vessels "culprit lesions: Ostial RPDA and mid LAD stenosis status post stents. Patient readmitted March 18, 2023 when he presented with progressive shortness of breath and intermittent bilateral lower extremity edema. Also with weight gain and orthopnea. 4/28/23: here for follow up. Started on LifeVest since last office visit. Repeat echo 4/21/23 showed EF of 35%. Reports walking 1.5 blocks no dyspnea or chest pain. Also able to walk up a flight of stairs. Mainly limited by knee pains. No leg swelling or bloating. No PND, orthopnea. No dizziness or lightheadedness. Taking medications. Weight stable on home scale at  177 lbs     6/1/23: here for follow up. Repeat echo showed EF 35%, 30-35% on my review. Denies shortness of breath or chest pain on current level of exertion. Metoprolol and entresto increased on last visit. 9/7/23: here for follow up. Seen at the ED 7/14/23 for dyspnea. CHF. Seen by Dr. Asmita Irene and scheduled for BIV ICD upgrade. Reports getting tired, more fatigued over a couple of weeks. Has to rest after walking up a flight of stairs due to shortness of breath. Ankle pains with walking. 1 week ago, had 2 episodes of dizziness, transient. Reports adherence to medications and diet    09/26/2023 with GD: "Presents today for follow-up. Feeling well, reports he's noticing alarms from his device since leaving the hospital. Denies shocks or pain. Has a device check upstairs immediately after this appointment. Breathing well, denies SOB/BUSTILLOS, even with stairs. Denies PND, orthopnea, LE swelling. Has been taking higher dose of Lasix as prescribed, urinates well with this. Believes he's been taking his spironolactone since discharge.  Picked up his Eliquis - has been compliant with this. Unsure of all of his medications; advised to bring his medications to his next appointment to check against his list. Weighing himself daily at home, weights have been stable." Started on spironolactone. Per MJ: 10/10/2023: Patient presents for follow-up. Interpretor Sal Shipman #[id number]) assisted during encounter with patient's consent. Feeling well today. Denies BUSTILLOS, PND, and orthopnea. Reports having some LE swelling last week that improved with compression stockings. Feels that his new ICD is shifting in pocket; no pain or discharge surrounding incision. Is completing daily weights; averaging mid 180s on home scale. Drinking 60-70 oz fluid daily. Reports good appetite; denies recent dietary indiscretion. 11/2/23: here for follow up. Shortness of breath x 2 days. Increased dietary salt intake over the holidays. Also with orthopnea and leg swelling. Weight up on office visit today. Unable to obtain Prolonged skin bleeding, tends to scratched. Advised to avoid scratching and holding pressure for longer period of time due to blood thinner. Review of Systems   Constitutional:  Negative for chills, fatigue and fever. HENT:  Negative for ear pain and sore throat. Eyes:  Negative for pain and visual disturbance. Respiratory:  Positive for shortness of breath. Negative for cough and chest tightness. Cardiovascular:  Positive for leg swelling. Negative for chest pain and palpitations. Gastrointestinal:  Negative for abdominal distention, abdominal pain and vomiting. Genitourinary:  Negative for dysuria and hematuria. Musculoskeletal:  Negative for arthralgias and back pain. Skin:  Negative for color change and rash. Neurological:  Negative for dizziness, seizures, syncope and light-headedness. All other systems reviewed and are negative.       Past Medical History:   Diagnosis Date    Arthritis     Asthma     Colon polyps     Community acquired pneumonia     last assessed: 5/1/2014    Diabetes mellitus ([address])     Hemorrhagic prepatellar bursitis, left 10/21/2019    Hepatitis C     High cholesterol     History of colonoscopy 2017    Hypertension     Lymphadenopathy, anterior cervical 04/17/2018    Nephritis and nephropathy, not specified as acute or chronic, with other specified pathological lesion in kidney, in diseases classified elsewhere 3/26/2013    NSTEMI (non-ST elevated myocardial infarction) ([address]) 1/30/2023    s/p Medtronic dual chamber PPM 8/31/22 s/p upgrade to BiV ICD 9/13/2023 9/16/2023    Screening for colon cancer 05/01/2019    SIRS (systemic inflammatory response syndrome) ([address]) 3/19/2023    Thoracic vertebral fracture ([address]) 06/11/2014         Allergies   Allergen Reactions    Iv Contrast [Iodinated Contrast Media] Rash     Patient states he had a severe reaction approximately 10 years ago , states he had a rash, itchy and he remembers a bunch of people pounding on chest and being surrounded by multiple doctors.      .    Current Outpatient Medications:     Advair -21 MCG/ACT inhaler, Inhale 2 puffs 2 (two) times a day Rinse mouth after use., Disp: 36 g, Rfl: 6    albuterol (PROVENTIL HFA,VENTOLIN HFA) 90 mcg/act inhaler, INHALE 2 PUFFS BY MOUTH EVERY 4 HOURS AS NEEDED FOR WHEEZING OR SHORTNESS OF BREATH., Disp: 18 g, Rfl: 2    Alcohol Swabs (ALCOHOL PADS) 70 % PADS, , Disp: , Rfl:     apixaban (Eliquis) 5 mg, Take 1 tablet (5 mg total) by mouth 2 (two) times a day, Disp: 60 tablet, Rfl: 3    Blood Glucose Monitoring Suppl (OneTouch Verio) w/Device KIT, Check blood glucose three times daily before each meal, Disp: 1 kit, Rfl: 0    cholecalciferol (VITAMIN D3) 1,000 units tablet, Take 2 tablets (2,000 Units total) by mouth daily, Disp: 180 tablet, Rfl: 5    clopidogrel (PLAVIX) 75 mg tablet, Take 1 tablet (75 mg total) by mouth daily, Disp: 90 tablet, Rfl: 3    Continuous Blood Gluc  (FreeStyle Cristofer 2 Jenkinjones) POWER, Use 1 each continuous, Disp: 1 each, Rfl: 0    Continuous Blood Gluc Sensor (FreeStyle Cristofer 2 Sensor) MISC, Use 1 each every 14 (fourteen) days, Disp: 6 each, Rfl: 3    Empagliflozin (JARDIANCE) 10 MG TABS tablet, Take 1 tablet (10 mg total) by mouth every morning, Disp: 90 tablet, Rfl: 3    ferrous sulfate 325 (65 Fe) mg tablet, TAKE 1 TABLET BY MOUTH EVERY OTHER DAY (Patient not taking: Reported on 10/25/2023), Disp: 45 tablet, Rfl: 4    fluticasone (FLONASE) 50 mcg/act nasal spray, 2 sprays into each nostril daily (Patient not taking: Reported on 10/11/2023), Disp: 2 Bottle, Rfl: 2    furosemide (LASIX) 40 mg tablet, Take 1.5 tablets (60 mg total) by mouth daily, Disp: 135 tablet, Rfl: 3    Insulin Glargine Solostar (Lantus SoloStar) 100 UNIT/ML SOPN, INJECT 0.18 ML (18 UNITS TOTAL) UNDER THE SKIN DAILY AT BEDTIME, Disp: 15 mL, Rfl: 3    Insulin Pen Needle (Pen Needles) 31G X 8 MM MISC, Use daily, Disp: 300 each, Rfl: 3    Lancets (FREESTYLE) lancets, by Other route as needed (As needed), Disp: 100 each, Rfl: 3    levothyroxine 137 mcg tablet, TAKE 1 TABLET BY MOUTH EVERY DAY, Disp: 90 tablet, Rfl: 3    lisinopril (ZESTRIL) 10 mg tablet, Take 1 tablet (10 mg total) by mouth daily, Disp: 90 tablet, Rfl: 3    metFORMIN (GLUCOPHAGE) 850 mg tablet, TAKE 1 TABLET BY MOUTH 2 TIMES A DAY WITH MEALS., Disp: 180 tablet, Rfl: 3    methocarbamol (Robaxin-750) 750 mg tablet, Take 1 tablet (750 mg total) by mouth every 6 (six) hours as needed for muscle spasms (Patient not taking: Reported on 10/11/2023), Disp: 60 tablet, Rfl: 0    metoprolol succinate (TOPROL-XL) 50 mg 24 hr tablet, Take 1.5 tablets (75 mg total) by mouth 2 (two) times a day, Disp: 270 tablet, Rfl: 1    montelukast (SINGULAIR) 10 mg tablet, TAKE 1 TABLET BY MOUTH EVERY DAY, Disp: 90 tablet, Rfl: 3    nitroglycerin (NITROSTAT) 0.4 mg SL tablet, Place 1 tablet (0.4 mg total) under the tongue every 5 (five) minutes as needed for chest pain, Disp: 30 tablet, Rfl: 1    NovoLOG FlexPen 100 units/mL injection pen, INJECT 5 UNITS WITH BREAKFAST AND WITH DINNER, Disp: 15 mL, Rfl: 3    rosuvastatin (CRESTOR) 40 MG tablet, TAKE 1 TABLET BY MOUTH EVERY DAY, Disp: 90 tablet, Rfl: 3    spironolactone (ALDACTONE) 25 mg tablet, Take 1 tablet (25 mg total) by mouth daily, Disp: 30 tablet, Rfl: 2    Social History     Socioeconomic History    Marital status: /Civil Union     Spouse name: Not on file    Number of children: Not on file    Years of education: Not on file    Highest education level: Not on file   Occupational History    Occupation: retired    Tobacco Use    Smoking status: Former     Packs/day: 0.50     Types: Cigarettes     Quit date: 2022     Years since quittin.4    Smokeless tobacco: Never    Tobacco comments:     started when he was about 24 yrs old; stopped smoking 3 wks ago   Vaping Use    Vaping Use: Never used   Substance and Sexual Activity    Alcohol use: No    Drug use: No    Sexual activity: Not Currently   Other Topics Concern    Not on file   Social History Narrative    Not on file     Social Determinants of Health     Financial Resource Strain: Low Risk  (2023)    Overall Financial Resource Strain (CARDIA)     Difficulty of Paying Living Expenses: Not hard at all   Food Insecurity: No Food Insecurity (2023)    Hunger Vital Sign     Worried About Running Out of Food in the Last Year: Never true     Ran Out of Food in the Last Year: Never true   Transportation Needs: No Transportation Needs (2023)    PRAPARE - Transportation     Lack of Transportation (Medical): No     Lack of Transportation (Non-Medical): No   Physical Activity: Unknown (2022)    Exercise Vital Sign     Days of Exercise per Week: Not on file     Minutes of Exercise per Session: 60 min   Stress: No Stress Concern Present (2022)    [facility]     Feeling of Stress : Not at all   Social Connections:  Moderately Isolated (1/19/2022)    Social Connection and Isolation Panel [NHANES]     Frequency of Communication with Friends and Family: More than three times a week     Frequency of Social Gatherings with Friends and Family: More than three times a week     Attends Scientology Services: Never     Active Member of Clubs or Organizations: No     Attends Club or Organization Meetings: Never     Marital Status:    Intimate Partner Violence: Not on file   Housing Stability: Low Risk  (9/17/2023)    Housing Stability Vital Sign     Unable to Pay for Housing in the Last Year: No     Number of Places Lived in the Last Year: 1     Unstable Housing in the Last Year: No       Family History   Problem Relation Age of Onset    Heart attack Family         at age 80    No Known Problems Mother     No Known Problems Father     Diabetes Sister     Diabetes Brother        Physical Exam:    Vitals: There were no vitals filed for this visit. Physical Exam  Constitutional:       General: He is not in acute distress. Appearance: Normal appearance. HENT:      Head: Normocephalic and atraumatic. Mouth/Throat:      Mouth: Mucous membranes are moist.   Eyes:      General: No scleral icterus. Extraocular Movements: Extraocular movements intact. Cardiovascular:      Rate and Rhythm: Normal rate and regular rhythm. Pulses: Normal pulses. Heart sounds: S1 normal and S2 normal. No murmur heard. No friction rub. No gallop. Comments: Elevated JVP. Trace pitting edema bilaterally. Pulmonary:      Effort: Pulmonary effort is normal.      Breath sounds: Examination of the right-lower field reveals decreased breath sounds. Decreased breath sounds present. No wheezing, rhonchi or rales. Abdominal:      General: There is no distension. Palpations: Abdomen is soft. Tenderness: There is no abdominal tenderness. There is no guarding or rebound. Musculoskeletal:         General: Normal range of motion. Cervical back: Neck supple. Skin:     General: Skin is warm and dry. Capillary Refill: Capillary refill takes less than 2 seconds. Neurological:      General: No focal deficit present. Mental Status: He is alert and oriented to person, place, and time. Psychiatric:         Mood and Affect: Mood normal.       Labs & Results:    Lab Results   Component Value Date    SODIUM 140 10/10/2023    K 4.5 10/10/2023     10/10/2023    CO2 31 10/10/2023    BUN 32 (H) 10/10/2023    CREATININE 1.29 10/10/2023    GLUC 133 10/10/2023    CALCIUM 10.1 10/10/2023     Lab Results   Component Value Date    WBC 6.42 09/25/2023    HGB 12.1 09/25/2023    HCT 41.5 09/25/2023    MCV 91 09/25/2023     09/25/2023     Lab Results   Component Value Date    NTBNP 2,624 (H) 05/06/2023      Lab Results   Component Value Date    CHOLESTEROL 137 02/10/2023    CHOLESTEROL 142 11/10/2022    CHOLESTEROL 191 01/25/2022     Lab Results   Component Value Date    HDL 53 02/10/2023    HDL 65 11/10/2022    HDL 49 01/25/2022     Lab Results   Component Value Date    TRIG 61 02/10/2023    TRIG 75 11/10/2022    TRIG 119 01/25/2022     Lab Results   Component Value Date    [facility] 84 02/10/2023       EKG personally reviewed by Aries Monk MD.     Counseling / Coordination of Care  Time spent today 25 minutes. Greater than 50% of total time was spent with the patient and / or family counseling and / or coordination of care. We went over current diagnosis, most recent studies and any changes in treatment. Thank you for the opportunity to participate in the care of this patient.     Aries Monk MD  ADVANCED HEART FAILURE AND MECHANICAL CIRCULATORY SUPPORT  [address]

## 2023-12-12 DIAGNOSIS — I50.22 CHRONIC HFREF (HEART FAILURE WITH REDUCED EJECTION FRACTION) (HCC): ICD-10-CM

## 2023-12-12 RX ORDER — SPIRONOLACTONE 25 MG/1
25 TABLET ORAL DAILY
Qty: 90 TABLET | Refills: 1 | Status: ON HOLD | OUTPATIENT
Start: 2023-12-12

## 2023-12-22 ENCOUNTER — HOSPITAL ENCOUNTER (INPATIENT)
Facility: HOSPITAL | Age: 78
LOS: 3 days | Discharge: HOME | DRG: 291 | End: 2023-12-25
Attending: EMERGENCY MEDICINE | Admitting: INTERNAL MEDICINE
Payer: MEDICARE

## 2023-12-22 ENCOUNTER — APPOINTMENT (EMERGENCY)
Dept: RADIOLOGY | Facility: HOSPITAL | Age: 78
DRG: 291 | End: 2023-12-22
Payer: MEDICARE

## 2023-12-22 DIAGNOSIS — R06.00 DYSPNEA: ICD-10-CM

## 2023-12-22 DIAGNOSIS — R06.01 ORTHOPNEA: ICD-10-CM

## 2023-12-22 DIAGNOSIS — I50.23 ACUTE ON CHRONIC SYSTOLIC CONGESTIVE HEART FAILURE (HCC): Primary | ICD-10-CM

## 2023-12-22 PROBLEM — B19.20 HEPATITIS C: Status: ACTIVE | Noted: 2023-12-22

## 2023-12-22 PROBLEM — I34.0 MITRAL REGURGITATION: Status: ACTIVE | Noted: 2023-12-22

## 2023-12-22 PROBLEM — I50.9 ACUTE ON CHRONIC HEART FAILURE (HCC): Status: ACTIVE | Noted: 2023-12-22

## 2023-12-22 PROBLEM — E87.6 HYPOKALEMIA: Status: ACTIVE | Noted: 2023-12-22

## 2023-12-22 PROBLEM — I34.0 SEVERE MITRAL REGURGITATION: Status: ACTIVE | Noted: 2023-12-22

## 2023-12-22 PROBLEM — N17.9 AKI (ACUTE KIDNEY INJURY) (HCC): Status: ACTIVE | Noted: 2023-12-22

## 2023-12-22 PROBLEM — I48.91 ATRIAL FIBRILLATION (HCC): Status: ACTIVE | Noted: 2023-12-22

## 2023-12-22 LAB
2HR DELTA HS TROPONIN: -2 NG/L
4HR DELTA HS TROPONIN: -2 NG/L
ALBUMIN SERPL BCP-MCNC: 3.9 G/DL (ref 3.5–5)
ALP SERPL-CCNC: 105 U/L (ref 34–104)
ALT SERPL W P-5'-P-CCNC: 34 U/L (ref 7–52)
ANION GAP SERPL CALCULATED.3IONS-SCNC: 5 MMOL/L
APTT PPP: 39 SECONDS (ref 23–37)
AST SERPL W P-5'-P-CCNC: 25 U/L (ref 13–39)
ATRIAL RATE: 312 BPM
BASOPHILS # BLD AUTO: 0.03 THOUSANDS/ÂΜL (ref 0–0.1)
BASOPHILS NFR BLD AUTO: 1 % (ref 0–1)
BILIRUB SERPL-MCNC: 1.14 MG/DL (ref 0.2–1)
BNP SERPL-MCNC: 553 PG/ML (ref 0–100)
BUN SERPL-MCNC: 27 MG/DL (ref 5–25)
CALCIUM SERPL-MCNC: 9.7 MG/DL (ref 8.4–10.2)
CARDIAC TROPONIN I PNL SERPL HS: 28 NG/L
CARDIAC TROPONIN I PNL SERPL HS: 28 NG/L
CARDIAC TROPONIN I PNL SERPL HS: 30 NG/L
CHLORIDE SERPL-SCNC: 105 MMOL/L (ref 96–108)
CO2 SERPL-SCNC: 33 MMOL/L (ref 21–32)
CREAT SERPL-MCNC: 1.34 MG/DL (ref 0.6–1.3)
EOSINOPHIL # BLD AUTO: 0.09 THOUSAND/ÂΜL (ref 0–0.61)
EOSINOPHIL NFR BLD AUTO: 1 % (ref 0–6)
ERYTHROCYTE [DISTWIDTH] IN BLOOD BY AUTOMATED COUNT: 18.7 % (ref 11.6–15.1)
GFR SERPL CREATININE-BSD FRML MDRD: 51 ML/MIN/1.73SQ M
GLUCOSE FLD-MCNC: 94 MG/DL
GLUCOSE SERPL-MCNC: 149 MG/DL (ref 65–140)
GLUCOSE SERPL-MCNC: 58 MG/DL (ref 65–140)
HCT VFR BLD AUTO: 43.7 % (ref 36.5–49.3)
HGB BLD-MCNC: 12.9 G/DL (ref 12–17)
IMM GRANULOCYTES # BLD AUTO: 0.02 THOUSAND/UL (ref 0–0.2)
IMM GRANULOCYTES NFR BLD AUTO: 0 % (ref 0–2)
INR PPP: 1.52 (ref 0.84–1.19)
LDH FLD L TO P-CCNC: 47 U/L
LYMPHOCYTES # BLD AUTO: 1.28 THOUSANDS/ÂΜL (ref 0.6–4.47)
LYMPHOCYTES NFR BLD AUTO: 20 % (ref 14–44)
MCH RBC QN AUTO: 26.2 PG (ref 26.8–34.3)
MCHC RBC AUTO-ENTMCNC: 29.5 G/DL (ref 31.4–37.4)
MCV RBC AUTO: 89 FL (ref 82–98)
MONOCYTES # BLD AUTO: 0.63 THOUSAND/ÂΜL (ref 0.17–1.22)
MONOCYTES NFR BLD AUTO: 10 % (ref 4–12)
NEUTROPHILS # BLD AUTO: 4.41 THOUSANDS/ÂΜL (ref 1.85–7.62)
NEUTS SEG NFR BLD AUTO: 68 % (ref 43–75)
NRBC BLD AUTO-RTO: 0 /100 WBCS
PH BODY FLUID: 7.6
PLATELET # BLD AUTO: 223 THOUSANDS/UL (ref 149–390)
PMV BLD AUTO: 10.9 FL (ref 8.9–12.7)
POTASSIUM SERPL-SCNC: 3.1 MMOL/L (ref 3.5–5.3)
PROT FLD-MCNC: <3 G/DL
PROT SERPL-MCNC: 6.8 G/DL (ref 6.4–8.4)
PROTHROMBIN TIME: 18.1 SECONDS (ref 11.6–14.5)
QRS AXIS: -80 DEGREES
QRSD INTERVAL: 146 MS
QT INTERVAL: 426 MS
QTC INTERVAL: 515 MS
RBC # BLD AUTO: 4.93 MILLION/UL (ref 3.88–5.62)
SODIUM SERPL-SCNC: 143 MMOL/L (ref 135–147)
T WAVE AXIS: 84 DEGREES
VENTRICULAR RATE: 88 BPM
WBC # BLD AUTO: 6.46 THOUSAND/UL (ref 4.31–10.16)

## 2023-12-22 PROCEDURE — 99285 EMERGENCY DEPT VISIT HI MDM: CPT

## 2023-12-22 PROCEDURE — 80053 COMPREHEN METABOLIC PANEL: CPT

## 2023-12-22 PROCEDURE — 87070 CULTURE OTHR SPECIMN AEROBIC: CPT

## 2023-12-22 PROCEDURE — 88112 CYTOPATH CELL ENHANCE TECH: CPT | Performed by: PATHOLOGY

## 2023-12-22 PROCEDURE — 85610 PROTHROMBIN TIME: CPT

## 2023-12-22 PROCEDURE — 32555 ASPIRATE PLEURA W/ IMAGING: CPT | Performed by: EMERGENCY MEDICINE

## 2023-12-22 PROCEDURE — 83615 LACTATE (LD) (LDH) ENZYME: CPT

## 2023-12-22 PROCEDURE — 36415 COLL VENOUS BLD VENIPUNCTURE: CPT

## 2023-12-22 PROCEDURE — 1123F ACP DISCUSS/DSCN MKR DOCD: CPT | Performed by: PATHOLOGY

## 2023-12-22 PROCEDURE — 87205 SMEAR GRAM STAIN: CPT

## 2023-12-22 PROCEDURE — 82948 REAGENT STRIP/BLOOD GLUCOSE: CPT

## 2023-12-22 PROCEDURE — 99285 EMERGENCY DEPT VISIT HI MDM: CPT | Performed by: EMERGENCY MEDICINE

## 2023-12-22 PROCEDURE — 84157 ASSAY OF PROTEIN OTHER: CPT

## 2023-12-22 PROCEDURE — 82945 GLUCOSE OTHER FLUID: CPT

## 2023-12-22 PROCEDURE — 71046 X-RAY EXAM CHEST 2 VIEWS: CPT

## 2023-12-22 PROCEDURE — 89051 BODY FLUID CELL COUNT: CPT

## 2023-12-22 PROCEDURE — 71045 X-RAY EXAM CHEST 1 VIEW: CPT

## 2023-12-22 PROCEDURE — 84443 ASSAY THYROID STIM HORMONE: CPT

## 2023-12-22 PROCEDURE — 84484 ASSAY OF TROPONIN QUANT: CPT

## 2023-12-22 PROCEDURE — 83880 ASSAY OF NATRIURETIC PEPTIDE: CPT

## 2023-12-22 PROCEDURE — 93005 ELECTROCARDIOGRAM TRACING: CPT

## 2023-12-22 PROCEDURE — 85025 COMPLETE CBC W/AUTO DIFF WBC: CPT

## 2023-12-22 PROCEDURE — 96374 THER/PROPH/DIAG INJ IV PUSH: CPT

## 2023-12-22 PROCEDURE — 85730 THROMBOPLASTIN TIME PARTIAL: CPT

## 2023-12-22 PROCEDURE — 83986 ASSAY PH BODY FLUID NOS: CPT

## 2023-12-22 RX ORDER — INSULIN LISPRO 100 [IU]/ML
1-6 INJECTION, SOLUTION INTRAVENOUS; SUBCUTANEOUS
Status: DISCONTINUED | OUTPATIENT
Start: 2023-12-23 | End: 2023-12-25 | Stop reason: HOSPADM

## 2023-12-22 RX ORDER — CLOPIDOGREL BISULFATE 75 MG/1
75 TABLET ORAL DAILY
Status: DISCONTINUED | OUTPATIENT
Start: 2023-12-23 | End: 2023-12-25 | Stop reason: HOSPADM

## 2023-12-22 RX ORDER — FUROSEMIDE 10 MG/ML
40 INJECTION INTRAMUSCULAR; INTRAVENOUS ONCE
Status: COMPLETED | OUTPATIENT
Start: 2023-12-22 | End: 2023-12-22

## 2023-12-22 RX ORDER — FERROUS SULFATE 325(65) MG
325 TABLET ORAL EVERY OTHER DAY
Status: DISCONTINUED | OUTPATIENT
Start: 2023-12-23 | End: 2023-12-25 | Stop reason: HOSPADM

## 2023-12-22 RX ORDER — FUROSEMIDE 10 MG/ML
60 INJECTION INTRAMUSCULAR; INTRAVENOUS 2 TIMES DAILY
Status: DISCONTINUED | OUTPATIENT
Start: 2023-12-23 | End: 2023-12-23

## 2023-12-22 RX ORDER — NITROGLYCERIN 0.4 MG/1
0.4 TABLET SUBLINGUAL
Status: DISCONTINUED | OUTPATIENT
Start: 2023-12-22 | End: 2023-12-25 | Stop reason: HOSPADM

## 2023-12-22 RX ORDER — INSULIN LISPRO 100 [IU]/ML
1-6 INJECTION, SOLUTION INTRAVENOUS; SUBCUTANEOUS
Status: DISCONTINUED | OUTPATIENT
Start: 2023-12-22 | End: 2023-12-25 | Stop reason: HOSPADM

## 2023-12-22 RX ORDER — ALBUTEROL SULFATE 90 UG/1
2 AEROSOL, METERED RESPIRATORY (INHALATION) EVERY 4 HOURS PRN
Status: DISCONTINUED | OUTPATIENT
Start: 2023-12-22 | End: 2023-12-25 | Stop reason: HOSPADM

## 2023-12-22 RX ORDER — INSULIN GLARGINE 100 [IU]/ML
10 INJECTION, SOLUTION SUBCUTANEOUS
Status: DISCONTINUED | OUTPATIENT
Start: 2023-12-23 | End: 2023-12-25 | Stop reason: HOSPADM

## 2023-12-22 RX ORDER — INSULIN LISPRO 100 [IU]/ML
5 INJECTION, SOLUTION INTRAVENOUS; SUBCUTANEOUS
Status: DISCONTINUED | OUTPATIENT
Start: 2023-12-23 | End: 2023-12-22

## 2023-12-22 RX ORDER — FLUTICASONE FUROATE AND VILANTEROL 200; 25 UG/1; UG/1
1 POWDER RESPIRATORY (INHALATION) DAILY
Status: DISCONTINUED | OUTPATIENT
Start: 2023-12-22 | End: 2023-12-25 | Stop reason: HOSPADM

## 2023-12-22 RX ORDER — FUROSEMIDE 10 MG/ML
20 INJECTION INTRAMUSCULAR; INTRAVENOUS ONCE
Status: DISCONTINUED | OUTPATIENT
Start: 2023-12-22 | End: 2023-12-22

## 2023-12-22 RX ORDER — POTASSIUM CHLORIDE 20 MEQ/1
40 TABLET, EXTENDED RELEASE ORAL ONCE
Status: COMPLETED | OUTPATIENT
Start: 2023-12-22 | End: 2023-12-22

## 2023-12-22 RX ORDER — FUROSEMIDE 10 MG/ML
40 INJECTION INTRAMUSCULAR; INTRAVENOUS 2 TIMES DAILY
Status: DISCONTINUED | OUTPATIENT
Start: 2023-12-23 | End: 2023-12-22

## 2023-12-22 RX ORDER — INSULIN GLARGINE 100 [IU]/ML
18 INJECTION, SOLUTION SUBCUTANEOUS
Status: DISCONTINUED | OUTPATIENT
Start: 2023-12-22 | End: 2023-12-22

## 2023-12-22 RX ORDER — LISINOPRIL 10 MG/1
10 TABLET ORAL DAILY
Status: DISCONTINUED | OUTPATIENT
Start: 2023-12-23 | End: 2023-12-22

## 2023-12-22 RX ORDER — MELATONIN
2000 DAILY
Status: DISCONTINUED | OUTPATIENT
Start: 2023-12-23 | End: 2023-12-25 | Stop reason: HOSPADM

## 2023-12-22 RX ORDER — SPIRONOLACTONE 25 MG/1
25 TABLET ORAL DAILY
Status: DISCONTINUED | OUTPATIENT
Start: 2023-12-23 | End: 2023-12-22

## 2023-12-22 RX ORDER — FLUTICASONE PROPIONATE AND SALMETEROL 250; 50 UG/1; UG/1
2 POWDER RESPIRATORY (INHALATION) EVERY 12 HOURS SCHEDULED
Status: DISCONTINUED | OUTPATIENT
Start: 2023-12-22 | End: 2023-12-22

## 2023-12-22 RX ORDER — ATORVASTATIN CALCIUM 80 MG/1
80 TABLET, FILM COATED ORAL
Status: DISCONTINUED | OUTPATIENT
Start: 2023-12-23 | End: 2023-12-25 | Stop reason: HOSPADM

## 2023-12-22 RX ORDER — MONTELUKAST SODIUM 10 MG/1
10 TABLET ORAL DAILY
Status: DISCONTINUED | OUTPATIENT
Start: 2023-12-23 | End: 2023-12-25 | Stop reason: HOSPADM

## 2023-12-22 RX ADMIN — POTASSIUM CHLORIDE 40 MEQ: 1500 TABLET, EXTENDED RELEASE ORAL at 18:56

## 2023-12-22 RX ADMIN — FLUTICASONE FUROATE AND VILANTEROL TRIFENATATE 1 PUFF: 200; 25 POWDER RESPIRATORY (INHALATION) at 23:17

## 2023-12-22 RX ADMIN — POTASSIUM CHLORIDE 40 MEQ: 1500 TABLET, EXTENDED RELEASE ORAL at 22:34

## 2023-12-22 RX ADMIN — APIXABAN 5 MG: 5 TABLET, FILM COATED ORAL at 22:35

## 2023-12-22 RX ADMIN — METOPROLOL SUCCINATE 75 MG: 50 TABLET, EXTENDED RELEASE ORAL at 22:34

## 2023-12-22 RX ADMIN — FUROSEMIDE 40 MG: 10 INJECTION, SOLUTION INTRAMUSCULAR; INTRAVENOUS at 18:56

## 2023-12-22 NOTE — ED PROVIDER NOTES
History  Chief Complaint   Patient presents with    Medical Problem     Pt wife reports pt has b/l leg swelling, sob and nose bleeds..       HPI  Patient is a 78 y.o. male with PMHx biventricular pacemaker, HTN, HFrEF (last echo LVEF 30-35%), T2DM, s/p TAVR, on eliquis who presents to the ED with wife for evaluation of three days of BUSTILLOS, orthopnea, bilateral lower extremity swelling, and occasional nosebleeds. Patient states the shortness of breath has significantly worsened over the past three days. States he is not taking a water pill or medication for CHF. Reports occasional, infrequent and self resolving nose bleeds, no other bruising or bleeding noted. He denies any fevers, chills, known sick contacts, chest pain, abdominal pain, nausea, vomiting, diarrhea, melena, hematochezia, dysuria, hematuria, or any other complaints or concerns at this time.     Prior to Admission Medications   Prescriptions Last Dose Informant Patient Reported? Taking?   Advair -21 MCG/ACT inhaler 12/22/2023 Spouse/Significant Other, Self No Yes   Sig: Inhale 2 puffs 2 (two) times a day Rinse mouth after use.   Alcohol Swabs (ALCOHOL PADS) 70 % PADS Not Taking Spouse/Significant Other, Self Yes No   Patient not taking: Reported on 11/27/2023   Blood Glucose Monitoring Suppl (OneTouch Verio) w/Device KIT 12/22/2023 Spouse/Significant Other, Self No Yes   Sig: Check blood glucose three times daily before each meal   Continuous Blood Gluc  (FreeStyle Cristofer 2 [place]) POWER Not Taking Spouse/Significant Other, Self No No   Sig: Use 1 each continuous   Patient not taking: Reported on 11/27/2023   Continuous Blood Gluc Sensor (FreeStyle Cristofer 2 Sensor) MISC Not Taking Spouse/Significant Other, Self No No   Sig: Use 1 each every 14 (fourteen) days   Patient not taking: Reported on 11/27/2023   Empagliflozin (JARDIANCE) 10 MG TABS tablet 12/22/2023 Spouse/Significant Other, Self No Yes   Sig: Take 1 tablet (10 mg total) by mouth  every morning   Insulin Glargine Solostar (Lantus SoloStar) 100 UNIT/ML SOPN 2023  No Yes   Sig: INJECT 0.18 ML (18 UNITS TOTAL) UNDER THE SKIN DAILY AT BEDTIME   Insulin Pen Needle (Pen Needles) 31G X 8 MM MISC 2023 Spouse/Significant Other, Self No Yes   Sig: Use daily   Lancets (FREESTYLE) lancets 2023 Spouse/Significant Other, Self No Yes   Sig: by Other route as needed (As needed)   NovoLOG FlexPen 100 units/mL injection pen 2023 Spouse/Significant Other, Self No Yes   Sig: INJECT 5 UNITS WITH BREAKFAST AND WITH DINNER   albuterol (PROVENTIL HFA,VENTOLIN HFA) 90 mcg/act inhaler 2023  No Yes   Sig: INHALE 2 PUFFS BY MOUTH EVERY 4 HOURS AS NEEDED FOR WHEEZING OR SHORTNESS OF BREATH.   apixaban (Eliquis) 5 mg 2023 Spouse/Significant Other, Self No Yes   Sig: Take 1 tablet (5 mg total) by mouth 2 (two) times a day   cholecalciferol (VITAMIN D3) 1,000 units tablet 2023 Spouse/Significant Other, Self No Yes   Sig: Take 2 tablets (2,000 Units total) by mouth daily   clopidogrel (PLAVIX) 75 mg tablet 2023 Spouse/Significant Other, Self No Yes   Sig: Take 1 tablet (75 mg total) by mouth daily   ferrous sulfate 325 (65 Fe) mg tablet 2023 Spouse/Significant Other, Self No Yes   Sig: TAKE 1 TABLET BY MOUTH EVERY OTHER DAY   fluticasone (FLONASE) 50 mcg/act nasal spray Not Taking Spouse/Significant Other, Self No No   Si sprays into each nostril daily   Patient not taking: Reported on 10/11/2023   furosemide (LASIX) 40 mg tablet 2023  No Yes   Sig: Take 40mg in AM and 20mg in PM   levothyroxine 137 mcg tablet 2023 Spouse/Significant Other, Self No Yes   Sig: TAKE 1 TABLET BY MOUTH EVERY DAY   lisinopril (ZESTRIL) 10 mg tablet 2023 Spouse/Significant Other, Self No Yes   Sig: Take 1 tablet (10 mg total) by mouth daily   metFORMIN (GLUCOPHAGE) 850 mg tablet 2023 Spouse/Significant Other, Self No Yes   Sig: TAKE 1 TABLET BY MOUTH 2 TIMES A DAY  WITH MEALS.   methocarbamol (Robaxin-750) 750 mg tablet Not Taking Spouse/Significant Other, Self No No   Sig: Take 1 tablet (750 mg total) by mouth every 6 (six) hours as needed for muscle spasms   Patient not taking: Reported on 10/11/2023   metoprolol succinate (TOPROL-XL) 50 mg 24 hr tablet 12/22/2023 Spouse/Significant Other, Self No Yes   Sig: Take 1.5 tablets (75 mg total) by mouth 2 (two) times a day   montelukast (SINGULAIR) 10 mg tablet 12/22/2023  No Yes   Sig: TAKE 1 TABLET BY MOUTH EVERY DAY   nitroglycerin (NITROSTAT) 0.4 mg SL tablet More than a month Spouse/Significant Other, Self No No   Sig: Place 1 tablet (0.4 mg total) under the tongue every 5 (five) minutes as needed for chest pain   rosuvastatin (CRESTOR) 40 MG tablet 12/22/2023 Spouse/Significant Other, Self No Yes   Sig: TAKE 1 TABLET BY MOUTH EVERY DAY   spironolactone (ALDACTONE) 25 mg tablet 12/22/2023  No Yes   Sig: TAKE 1 TABLET (25 MG TOTAL) BY MOUTH DAILY.      Facility-Administered Medications: None       Past Medical History:   Diagnosis Date    Arthritis     Asthma     Colon polyps     Community acquired pneumonia     last assessed: 5/1/2014    Diabetes mellitus (HCC)     Hemorrhagic prepatellar bursitis, left 10/21/2019    Hepatitis C     High cholesterol     History of colonoscopy 2017    Hypertension     Lymphadenopathy, anterior cervical 04/17/2018    Nephritis and nephropathy, not specified as acute or chronic, with other specified pathological lesion in kidney, in diseases classified elsewhere 3/26/2013    NSTEMI (non-ST elevated myocardial infarction) (HCC) 1/30/2023    s/p Medtronic dual chamber PPM 8/31/22 s/p upgrade to BiV ICD 9/13/2023 9/16/2023    Screening for colon cancer 05/01/2019    SIRS (systemic inflammatory response syndrome) (HCC) 3/19/2023    Thoracic vertebral fracture (HCC) 06/11/2014       Past Surgical History:   Procedure Laterality Date    CARDIAC CATHETERIZATION N/A 6/3/2022    Procedure: Cardiac  RHC/LHC;  Surgeon: Yemi Molina MD;  [facility]: [facility];  Service: Cardiology    CARDIAC CATHETERIZATION N/A 6/21/2022    Procedure: CARDIAC TAVR;  Surgeon: David Craft MD;  Location: [facility];  Service: Cardiology    CARDIAC CATHETERIZATION N/A 2/10/2023    Procedure: Cardiac Coronary Angiogram;  Surgeon: Yemi Molina MD;  [facility];  Service: Cardiology    CARDIAC CATHETERIZATION N/A 2/10/2023    Procedure: Cardiac pci;  Surgeon: Yemi Molina MD;  [facility];  Service: Cardiology    CARDIAC ELECTROPHYSIOLOGY PROCEDURE N/A 9/1/2022    Procedure: Cardiac pacer implant ; DC PPM;  Surgeon: Francis Sun DO;  [facility];  Service: Cardiology    CARDIAC ELECTROPHYSIOLOGY PROCEDURE N/A 9/13/2023    Procedure: Cardiac upgrade pacer to biv icd;  Surgeon: Mario Yuan MD;  [facility];  Service: Cardiology    COLONOSCOPY      COLONOSCOPY W/ POLYPECTOMY      IR UPPER EXTREMITY VENOGRAM- DIAGNOSTIC  8/23/2023    LITHOTRIPSY      MULTIPLE TOOTH EXTRACTIONS      GA COLONOSCOPY FLX DX W/COLLJ SPEC WHEN PFRMD N/A 5/17/2018    Procedure: COLONOSCOPY;  Surgeon: Raad Sandoval MD;  Location: [facility];  Service: Gastroenterology    GA REPLACE AORTIC VALVE OPENFEMORAL ARTERY APPROACH N/A 6/21/2022    Procedure: REPLACEMENT AORTIC VALVE TRANSCATHETER (TAVR) TRANSFEMORAL W/ 26MM QUINN RONNI S3 ULTRA VALVE(ACCESS ON LEFT) SAULO;  Surgeon: Sal Walker MD;  Location: [facility];  Service: Cardiac Surgery       Family History   Problem Relation Age of Onset    Heart attack Family         at age 86    No Known Problems Mother     No Known Problems Father     Diabetes Sister     Diabetes Brother      I have reviewed and agree with the history as documented.    E-Cigarette/Vaping    E-Cigarette Use Never User      E-Cigarette/Vaping Substances    Nicotine No     THC No     CBD No     Flavoring No     Other No     Unknown No      Social History      Tobacco Use    Smoking status: Former     Current packs/day: 0.00     Types: Cigarettes     Quit date: 2022     Years since quittin.5    Smokeless tobacco: Never    Tobacco comments:     started when he was about 27 yrs old; stopped smoking 3 wks ago   Vaping Use    Vaping status: Never Used   Substance Use Topics    Alcohol use: No    Drug use: No        Review of Systems   HENT:  Positive for nosebleeds.    Respiratory:  Positive for shortness of breath.    Cardiovascular:  Positive for leg swelling.       Physical Exam  ED Triage Vitals   Temperature Pulse Respirations Blood Pressure SpO2   23 1454 23 1454 23 1454 23 1454 23 1454   (!) 97 °F (36.1 °C) 103 20 113/94 96 %      Temp Source Heart Rate Source Patient Position - Orthostatic VS BP Location FiO2 (%)   23 2151 23 1454 23 1530 23 1530 --   Oral Monitor Lying Left arm       Pain Score       23 1530       No Pain             Orthostatic Vital Signs  Vitals:    23 1911 23 2047 23 0738 23 1135   BP: 125/73 120/73 120/75 117/76   Pulse: 105 105 97 (!) 107   Patient Position - Orthostatic VS:   Lying        Physical Exam  Vitals and nursing note reviewed.   Constitutional:       General: He is not in acute distress.  HENT:      Head: Normocephalic and atraumatic.      Nose: Nose normal. No signs of injury.      Right Nostril: No epistaxis or septal hematoma.      Left Nostril: No epistaxis or septal hematoma.      Mouth/Throat:      Mouth: Mucous membranes are moist.   Eyes:      General: No scleral icterus.     Conjunctiva/sclera: Conjunctivae normal.   Cardiovascular:      Rate and Rhythm: Normal rate and regular rhythm.      Heart sounds: Normal heart sounds.   Pulmonary:      Effort: Pulmonary effort is normal. Tachypnea present. No respiratory distress.      Breath sounds: Rales present.   Abdominal:      Palpations: Abdomen is soft.      Tenderness: There is no  abdominal tenderness. There is no guarding or rebound.   Musculoskeletal:         General: No deformity. Normal range of motion.      Cervical back: Normal range of motion and neck supple.      Right lower le+ Pitting Edema present.      Left lower le+ Pitting Edema present.   Skin:     General: Skin is warm.      Capillary Refill: Capillary refill takes less than 2 seconds.      Findings: No rash.   Neurological:      Mental Status: He is alert. Mental status is at baseline.   Psychiatric:         Mood and Affect: Mood normal.         Behavior: Behavior normal.         ED Medications  Medications   furosemide (LASIX) injection 40 mg (40 mg Intravenous Given 23)   potassium chloride (K-DUR,KLOR-CON) CR tablet 40 mEq (40 mEq Oral Given 23)   potassium chloride (K-DUR,KLOR-CON) CR tablet 40 mEq (40 mEq Oral Given 23)   potassium chloride (K-DUR,KLOR-CON) CR tablet 40 mEq (40 mEq Oral Given 23 1733)   potassium chloride (K-DUR,KLOR-CON) CR tablet 40 mEq (40 mEq Oral Given 23 0852)       Diagnostic Studies  Results Reviewed       Procedure Component Value Units Date/Time    Body Fluid Diff [[id number]]  (Abnormal) Collected: 23    Lab Status: Final result Specimen: Body Fluid from Pleural, Right Updated: 23 1342     Total Counted 100     Neutrophils % (Fluid) 21 %      Lymphs % (Fluid) 36 %      Mesothelial % (Fluid) 3 %      Histiocyte % (Fluid) 22 %      Unclassified % (Fluid) 1 %      Monocytes % (Fluid) 17 %      Pathology Review Yes    Path Slide Review [[id number]] Collected: 23    Lab Status: Final result Specimen: Body Fluid from Pleural, Right Updated: 23 1251     Path Review --     Mixed inflammation, histiocytes, and mesothelial cells, see comment.    Comment: The patient acute on chronic congestive heart failure is noted. In the current sample there is mixed inflammation with histiocytes and mesothelial cells. Of note a  subset of cells exhibit large vacuolated cells of clear significance. Recommend correlation with forthcoming cytology [id number] is advised.     Electronically Signed by Ed Collins MD        Body fluid culture (Pleural Fluid Culture) and Gram stain [[id number]] Collected: 12/22/23 1839    Lab Status: Final result Specimen: Body Fluid from Pleural, Right Updated: 12/25/23 0851     Body Fluid Culture, Sterile No growth     Gram Stain Result 2+ Polys      No bacteria seen    Body fluid white cell count with differential [[id number]] Collected: 12/22/23 1839    Lab Status: Final result Specimen: Body Fluid from Pleural, Right Updated: 12/23/23 0810     Site PLEURAL     WBC, Fluid 482 /ul     Basic metabolic panel [[id number]]  (Abnormal) Collected: 12/23/23 0525    Lab Status: Final result Specimen: Blood from Arm, Right Updated: 12/23/23 0620     Sodium 146 mmol/L      Potassium 3.4 mmol/L      Chloride 107 mmol/L      CO2 30 mmol/L      ANION GAP 9 mmol/L      BUN 28 mg/dL      Creatinine 1.40 mg/dL      Glucose 94 mg/dL      Calcium 9.5 mg/dL      eGFR 48 ml/min/1.73sq m     Narrative:      National Kidney [employer] Foundation guidelines for Chronic Kidney Disease (CKD):     Stage 1 with normal or high GFR (GFR > 90 mL/min/1.73 square meters)    Stage 2 Mild CKD (GFR = 60-89 mL/min/1.73 square meters)    Stage 3A Moderate CKD (GFR = 45-59 mL/min/1.73 square meters)    Stage 3B Moderate CKD (GFR = 30-44 mL/min/1.73 square meters)    Stage 4 Severe CKD (GFR = 15-29 mL/min/1.73 square meters)    Stage 5 End Stage CKD (GFR <15 mL/min/1.73 square meters)  Note: GFR calculation is accurate only with a steady state creatinine    TSH, 3rd generation with Free T4 reflex [[id number]]  (Normal) Collected: 12/22/23 1540    Lab Status: Final result Specimen: Blood from Arm, Right Updated: 12/23/23 0556     TSH 3RD GENERATON 3.862 uIU/mL     LD,Blood [[id number]]  (Normal) Collected: 12/22/23 1540    Lab Status: Final result  Specimen: Blood from Arm, Right Updated: 12/23/23 0357      U/L     Glucose, body fluid [[id number]] Collected: 12/22/23 1839    Lab Status: Final result Specimen: Body Fluid from Other Updated: 12/22/23 2021     Glucose, Fluid 94 mg/dL     LD (LDH), Body Fluid [[id number]] Collected: 12/22/23 1839    Lab Status: Final result Specimen: Body Fluid from Other Updated: 12/22/23 2021     LD, Fluid 47 U/L     Total Protein, body fluid [[id number]] Collected: 12/22/23 1839    Lab Status: Final result Specimen: Body Fluid from Other Updated: 12/22/23 2021     Protein, Fluid <3.0 g/dL     HS Troponin I 4hr [[id number]]  (Normal) Collected: 12/22/23 1943    Lab Status: Final result Specimen: Blood from Arm, Right Updated: 12/22/23 2020     hs TnI 4hr 28 ng/L      Delta 4hr hsTnI -2 ng/L     pH, body fluid [[id number]] Collected: 12/22/23 1839    Lab Status: Final result Specimen: Body Fluid from Other Updated: 12/22/23 1904     PH BODY FLUID 7.6    HS Troponin I 2hr [[id number]]  (Normal) Collected: 12/22/23 1731    Lab Status: Final result Specimen: Blood from Arm, Right Updated: 12/22/23 1812     hs TnI 2hr 28 ng/L      Delta 2hr hsTnI -2 ng/L     B-Type Natriuretic Peptide(BNP) [[id number]]  (Abnormal) Collected: 12/22/23 1540    Lab Status: Final result Specimen: Blood from Arm, Right Updated: 12/22/23 1658      pg/mL     HS Troponin 0hr (reflex protocol) [[id number]]  (Normal) Collected: 12/22/23 1540    Lab Status: Final result Specimen: Blood from Arm, Right Updated: 12/22/23 1631     hs TnI 0hr 30 ng/L     CBC and differential [[id number]]  (Abnormal) Collected: 12/22/23 1540    Lab Status: Final result Specimen: Blood from Arm, Right Updated: 12/22/23 1621     WBC 6.46 Thousand/uL      RBC 4.93 Million/uL      Hemoglobin 12.9 g/dL      Hematocrit 43.7 %      MCV 89 fL      MCH 26.2 pg      MCHC 29.5 g/dL      RDW 18.7 %      MPV 10.9 fL      Platelets 223 Thousands/uL      nRBC 0 /100 WBCs      Neutrophils  Relative 68 %      Immat GRANS % 0 %      Lymphocytes Relative 20 %      Monocytes Relative 10 %      Eosinophils Relative 1 %      Basophils Relative 1 %      Neutrophils Absolute 4.41 Thousands/µL      Immature Grans Absolute 0.02 Thousand/uL      Lymphocytes Absolute 1.28 Thousands/µL      Monocytes Absolute 0.63 Thousand/µL      Eosinophils Absolute 0.09 Thousand/µL      Basophils Absolute 0.03 Thousands/µL     Comprehensive metabolic panel [[id number]]  (Abnormal) Collected: 12/22/23 1540    Lab Status: Final result Specimen: Blood from Arm, Right Updated: 12/22/23 1620     Sodium 143 mmol/L      Potassium 3.1 mmol/L      Chloride 105 mmol/L      CO2 33 mmol/L      ANION GAP 5 mmol/L      BUN 27 mg/dL      Creatinine 1.34 mg/dL      Glucose 58 mg/dL      Calcium 9.7 mg/dL      AST 25 U/L      ALT 34 U/L      Alkaline Phosphatase 105 U/L      Total Protein 6.8 g/dL      Albumin 3.9 g/dL      Total Bilirubin 1.14 mg/dL      eGFR 51 ml/min/1.73sq m     Narrative:      National Kidney Disease Foundation guidelines for Chronic Kidney Disease (CKD):     Stage 1 with normal or high GFR (GFR > 90 mL/min/1.73 square meters)    Stage 2 Mild CKD (GFR = 60-89 mL/min/1.73 square meters)    Stage 3A Moderate CKD (GFR = 45-59 mL/min/1.73 square meters)    Stage 3B Moderate CKD (GFR = 30-44 mL/min/1.73 square meters)    Stage 4 Severe CKD (GFR = 15-29 mL/min/1.73 square meters)    Stage 5 End Stage CKD (GFR <15 mL/min/1.73 square meters)  Note: GFR calculation is accurate only with a steady state creatinine    Protime-INR [[id number]]  (Abnormal) Collected: 12/22/23 1540    Lab Status: Final result Specimen: Blood from Arm, Right Updated: 12/22/23 1603     Protime 18.1 seconds      INR 1.52    APTT [[id number]]  (Abnormal) Collected: 12/22/23 1540    Lab Status: Final result Specimen: Blood from Arm, Right Updated: 12/22/23 1603     PTT 39 seconds                    XR chest portable   ED Interpretation by Francisco Mohan MD  (12/22 1901)   Pleural effusion, right resolved.   There's no obvious pneumothorax ex vacuo      Final Result by Boaz Jane MD (12/23 2000)      Decreased size in now small right pleural effusion following thoracentesis.                  Workstation performed: [id number]         XR chest 2 views   ED Interpretation by Francisco Mohan MD (12/22 1900)   Pleural effusion, RIGHT  Cardiomegaly.      Final Result by Shola Fraser MD (12/23 0735)      Moderate right-sided pleural effusion. Cardiomegaly.            Workstation performed: [id number]               Procedures  Thoracentesis    Date/Time: 12/22/2023 3:30 PM    Performed by: Sally Blackwell DO  Authorized by: Sally Blackwell DO    Patient location:  ED  Other Assisting Provider: Yes (comment) (Dr. KI Mohan, Dr. AVILA Flowers)    Consent:     Consent obtained:  Written    Consent given by:  Patient    Risks discussed:  Bleeding, incomplete drainage, infection, nerve damage, pain and pneumothorax    Alternatives discussed:  No treatment, delayed treatment, alternative treatment and observation  Procedure details:     Laterality:  Right    Fluid removed amount:  1560  Comments:      Procedure note: Thoracentesis  - Indication: Dyspnea secondary to pleural effusion  - Procedure: A suitable location for puncture was identified using ultrasound guidance over the right lateral lung field at 4-5th intercostal space.   - I localized first using ultrasound; images saved   - The site was cleansed and draped in sterile fashion using chlorhexidine.   - 1% lidocaine was used for local anesthetic. A 5 Kazakh multisidehole catheter was advanced into the peritoneal space. The fluid appeared typical for transudative pleural fluid  - A sonographic images were saved.   - Fluid was drained by gravity bag   - Following drainage, the puncture site was cleansed and a dressing was applied. The patient tolerated this procedure well without immediate  complication.   - 1560 mL of fluid was removed.   - First Time Thoracentesis labs panel was sent   - Post procedure CXR ordered   - Successful ultrasound-guided/assisted thoracentesis.   - The patient tolerated the procedure well without immediate complication.   POC Cardiac US    Date/Time: 12/22/2023 3:41 PM    Performed by: Sally Blackwell DO  Authorized by: Sally Blackwell DO    Patient location:  ED  Procedure details:     Exam Type:  Diagnostic    Indications: suspected volume depletion and dyspnea      Assessment / Evaluation for: cardiac function, pericardial effusion and intravascular volume status      Exam Type: initial exam      Image quality: diagnostic      Image availability:  Images available in PACS  Patient Details:     Cardiac Rhythm:  Regular  Cardiac findings:     Views obtained: parasternal long axis, parasternal short axis, subcostal and apical      Pericardial effusion: absent      Tamponade physiology: absent      Wall motion: hypodynamic      LV systolic function: depressed      RV dilation: none    Pulmonary findings:     Left Lung Findings: left lung sliding      Right lung findings: right pleural effusion present and right lung sliding      B-lines: 1 to 3    IVC findings:     IVC Size: dilated    Interpretation:     Fluid Status:  Hypervolemic  POC Lung US    Date/Time: 12/22/2023 5:45 PM    Performed by: Sally Blackwell DO  Authorized by: Sally Blackwell DO    Patient location:  ED  Procedure details:     Exam Type:  Diagnostic    Indications: dyspnea      Assessment / Evaluation for:  Pneumothorax and pleural effusion    Structures Visualized: pleural line, rib, diaphragm, left hemithorax and right hemithorax      Exam Type: initial exam      Image quality: diagnostic      Image availability:  Images available in PACS  Left Hemithorax Findings:     Left pleura visualized:  Visualized    Left Hemithorax Findings: pleural effusion    Right Lung Findings:  "    Right pleural visualized:  Visualized    Right hemithorax findings: pleural effusion    Interpretation:     Findings: abnormal thoracic ultrasound          ED Course  ED Course as of 12/30/23 0110   Fri Dec 22, 2023   1630 Sodium: 143   1630 Potassium(!): 3.1   1630 Creatinine(!): 1.34   1630 Glucose, Random(!): 58   1631 PTT(!): 39   1631 POCT INR(!): 1.52   1631 WBC: 6.46   1631 Hemoglobin: 12.9   1631 Platelet Count: 223   1641 hs TnI 0hr: 30  Will trend delta   1700 BNP(!): 553  397 two months ago   1710 XR chest 2 views  Right sided pleural effusion as read by me, confirmed on US   1835 hs TnI 2hr: 28  Delta -2   1844 Thoracentesis performed, 1560mL fluid drained   1846 Prior records reviewed: Echo 5/25/23 LVEF 35%           CRAFFT      Flowsheet Row Most Recent Value   CRAFFT Initial Screen: During the past 12 months, did you:    1. Drink any alcohol (more than a few sips)?  No Filed at: 12/22/2023 1509   2. Smoke any marijuana or hashish No Filed at: 12/22/2023 1509   3. Use anything else to get high? (\"anything else\" includes illegal drugs, over the counter and prescription drugs, and things that you sniff or 'curry')? No Filed at: 12/22/2023 1509                            SBIRT 22yo+      Flowsheet Row Most Recent Value   Initial Alcohol Screen: US AUDIT-C     1. How often do you have a drink containing alcohol? 0 Filed at: 12/22/2023 1509   2. How many drinks containing alcohol do you have on a typical day you are drinking?  0 Filed at: 12/22/2023 1509   3a. Male UNDER 65: How often do you have five or more drinks on one occasion? 0 Filed at: 12/22/2023 1509   3b. FEMALE Any Age, or MALE 65+: How often do you have 4 or more drinks on one occassion? 0 Filed at: 12/22/2023 1509   Audit-C Score 0 Filed at: 12/22/2023 1509   DIEUDONNE: How many times in the past year have you...    Used an illegal drug or used a prescription medication for non-medical reasons? Never Filed at: 12/22/2023 1509      "             Medical Decision Making  Problems Addressed:  Acute on chronic systolic congestive heart failure (HCC): chronic illness or injury with exacerbation, progression, or side effects of treatment  Dyspnea: acute illness or injury  Orthopnea: acute illness or injury    Amount and/or Complexity of Data Reviewed  Independent Historian: heber  External Data Reviewed: ECG and notes.     Details: Echo (see ED course)  Labs: ordered. Decision-making details documented in ED Course.  Radiology: ordered and independent interpretation performed. Decision-making details documented in ED Course.    Risk  Prescription drug management.  Decision regarding hospitalization.        ASSESSMENT: Patient is a 78 y.o. male who presents with dyspnea on exertion with orthopnea and lower extremity swelling.   DDX includes but not limited to: CHF exacerbation, cirrhosis with ascites, ACS, coagulopathy.   PLAN: CBC, CMP, BNP, trop, CXR, EKG, POC bedside US. Treated with lasix 40mg IV and oral potassium. See ED course for additional details.    Discussed results and plan with patient. Advised on need for admission at this time. All questions answered. Patient expressed verbal understanding and is agreeable with plan for admission. Case discussed with SOD who will admit patient for CHF exacerbation.    Disposition  Final diagnoses:   Acute on chronic systolic congestive heart failure (HCC)   Dyspnea   Orthopnea     Time reflects when diagnosis was documented in both MDM as applicable and the Disposition within this note       Time User Action Codes Description Comment    12/22/2023  8:08 PM Francisco Mohan Add [I50.23] Acute on chronic systolic congestive heart failure (HCC)     12/22/2023  8:08 PM Francisco Mohan Add [R06.00] Dyspnea     12/22/2023  8:08 PM Francisco Mohan Add [R06.01] Orthopnea           ED Disposition       ED Disposition   Admit    Condition   Stable    Date/Time   Fri Dec 22, 2023 2008    Comment    Case was discussed with SOD Resident and the patient's admission status was agreed to be Admission Status: inpatient status to the service of Dr. JET Skinner .               Follow-up Information       Follow up With Specialties Details Why Contact Info Additional Information    [facility] Internal Medicine Schedule an appointment as soon as possible for a visit in 2 week(s)  [address] [facility] [address]  [facility] [address]  [phone] [facility], [address] [facility] [address], [place], [place], [address]   [phone]            Discharge Medication List as of 12/25/2023 11:56 AM        CONTINUE these medications which have CHANGED    Details   furosemide (LASIX) 40 mg tablet Take 1 tablet (40 mg total) by mouth 2 (two) times a day, Starting Mon 12/25/2023, Until Wed 1/24/2024, Normal           CONTINUE these medications which have NOT CHANGED    Details   Advair -21 MCG/ACT inhaler Inhale 2 puffs 2 (two) times a day Rinse mouth after use., Starting Thu 3/30/2023, Normal      albuterol (PROVENTIL HFA,VENTOLIN HFA) 90 mcg/act inhaler INHALE 2 PUFFS BY MOUTH EVERY 4 HOURS AS NEEDED FOR WHEEZING OR SHORTNESS OF BREATH., Normal      apixaban (Eliquis) 5 mg Take 1 tablet (5 mg total) by mouth 2 (two) times a day, Starting Thu 9/14/2023, Normal      Blood Glucose Monitoring Suppl (OneTouch Verio) w/Device KIT Check blood glucose three times daily before each meal, Normal      cholecalciferol (VITAMIN D3) 1,000 units tablet Take 2 tablets (2,000 Units total) by mouth daily, Starting Mon 11/21/2022, Normal      clopidogrel (PLAVIX) 75 mg tablet Take 1 tablet (75 mg total) by mouth daily, Starting Tue 3/28/2023, Until Fri 12/22/2023, Normal      Empagliflozin (JARDIANCE) 10 MG TABS tablet Take 1 tablet (10 mg total) by mouth every morning, Starting Sun 2/12/2023, Normal      ferrous sulfate 325 (65 Fe) mg tablet TAKE 1 TABLET BY MOUTH EVERY  OTHER DAY, Normal      Insulin Glargine Solostar (Lantus SoloStar) 100 UNIT/ML SOPN INJECT 0.18 ML (18 UNITS TOTAL) UNDER THE SKIN DAILY AT BEDTIME, Normal      Insulin Pen Needle (Pen Needles) 31G X 8 MM MISC Use daily, Starting Wed 3/3/2021, Normal      Lancets (FREESTYLE) lancets by Other route as needed (As needed), Starting Thu 3/21/2019, Normal      levothyroxine 137 mcg tablet TAKE 1 TABLET BY MOUTH EVERY DAY, Normal      lisinopril (ZESTRIL) 10 mg tablet Take 1 tablet (10 mg total) by mouth daily, Starting Sun 9/17/2023, Normal      metFORMIN (GLUCOPHAGE) 850 mg tablet TAKE 1 TABLET BY MOUTH 2 TIMES A DAY WITH MEALS., Normal      metoprolol succinate (TOPROL-XL) 50 mg 24 hr tablet Take 1.5 tablets (75 mg total) by mouth 2 (two) times a day, Starting Fri 4/28/2023, Until Fri 12/22/2023, Normal      montelukast (SINGULAIR) 10 mg tablet TAKE 1 TABLET BY MOUTH EVERY DAY, Normal      NovoLOG FlexPen 100 units/mL injection pen INJECT 5 UNITS WITH BREAKFAST AND WITH DINNER, Normal      rosuvastatin (CRESTOR) 40 MG tablet TAKE 1 TABLET BY MOUTH EVERY DAY, Normal      spironolactone (ALDACTONE) 25 mg tablet TAKE 1 TABLET (25 MG TOTAL) BY MOUTH DAILY., Starting Tue 12/12/2023, Normal      Alcohol Swabs (ALCOHOL PADS) 70 % PADS Starting Thu 5/9/2019, Historical Med      Continuous Blood Gluc  (FreeStyle Cristofer 2 Rogers) POWER Use 1 each continuous, Starting Tue 5/10/2022, Normal      Continuous Blood Gluc Sensor (FreeStyle Cristofer 2 Sensor) MISC Use 1 each every 14 (fourteen) days, Starting Tue 5/10/2022, Normal      nitroglycerin (NITROSTAT) 0.4 mg SL tablet Place 1 tablet (0.4 mg total) under the tongue every 5 (five) minutes as needed for chest pain, Starting Thu 3/30/2023, Normal           STOP taking these medications       fluticasone (FLONASE) 50 mcg/act nasal spray Comments:   Reason for Stopping:         methocarbamol (Robaxin-750) 750 mg tablet Comments:   Reason for Stopping:             No discharge  procedures on file.    PDMP Review         Value Time User    PDMP Reviewed  Yes 9/1/2022  1:26 PM Ava Varela PA-C             ED Provider  Attending physically available and evaluated Juan David Lora. I managed the patient along with the ED Attending.    Electronically Signed by           Sally Blackwell DO  12/30/23 0117

## 2023-12-22 NOTE — ED ATTENDING ATTESTATION
Final Diagnoses:     1. Acute on chronic systolic congestive heart failure (HCC)    2. Dyspnea    3. Orthopnea      ED Course as of 12/24/23 1620   Fri Dec 22, 2023   1621 POCUS Cardiac/IVC + POCUS Lung:  - transthoracic echocardiogram was performed at bedside by myself and resident.   - Images collected were adequate quality.   - This was a technically difficult study due to lung interference.   - Apical, parasternal, subcostal views were obtained/attempted.   - The main purpose of this study was to r/o obvious pathology requiring emergent intervention as in summary.   - Many views/images obtained for educational reasons.   - Findings:    There was no obvious pericardial effusion:   LV function reduced.    EPSS abnormal (abnormal; >7mm sensitivity 100%, specificity 52% for EF < 30%)   RV function grossly normal appearing.     IVC was IVC > 2.1cm; <50% compression with sniff likely RAP 15 mmHg     IVC Max: 2.4   Lungs:    There was R (moderate), L (small) pleural effusion.     B-lines were not present anteriorly bilaterally at upper BLUE-point (3+ B-lines in 2+ fields w/ concern for HF/Cardiogenic pulmonary edema sensitivity 85-94%, specificity 92% LR+ 7.4-12; LR- 0.06-0.16)    Bilateral anterior lung sliding bilaterally present at upper BLUE-point (no pneumothorax, sen 91%; spec 98%)   Valves:    There was moderate/severe MR regurgitation (jet hit the free wall, the wrapped around, and went to the septum)    There was TR regurgitation (has a pacer)    There was AR regurgitation    There was a very dilated LA     There was small RA dilation.   Summary:   - Would support fluid overload + CHF.   - Pleural effusion present, ammenable to drainage.   - There was no obvious anterior pneumothorax  - There was no large pericardial effusion.      1644 Consent for Thoracentesis from patient, wife, and family.   2002 Admit to SOD.        I, Francisco Mohan MD, saw and evaluated the patient. All available labs and X-rays  were ordered by me or the resident / non-physician and have been reviewed by myself. I discussed the patient with the resident / non-physician and agree with the resident's / non-physician practitioner's findings and plan as documented in the resident's / non-physician practicitioner's note, except where noted.   At this point, I agree with the current assessment done in the ED.   I was present during key portions of all procedures performed unless otherwise stated.     HPI:  NURSING TRIAGE:    This is a 78 y.o. male presenting for evaluation of dyspnea. The patient for 3 days has had acute worsening of dyspnea, new LE edema.   No falls/trauma  Hx of TAVR.   Mentions nose bleeds for months, not new.   No f/ch/n/v  N ospecific trigger  Not on water pill. Chief Complaint   Patient presents with    Medical Problem     Pt wife reports pt has b/l leg swelling, sob and nose bleeds..        PHYSICAL: ASSESSMENT + PLAN:   Pertinent: appears dyspneic  +orthopnea on exam    General: VS reviewed  Appears in NAD  awake, alert.   Well-nourished, well-developed. Appears stated age.   Speaking normally in full sentences.   Head: Normocephalic, atraumatic  Eyes: EOM-I. No diplopia.   No hyphema.   No subconjunctival hemorrhages.  Symmetrical lids.   ENT: Atraumatic external nose and ears.    MMM  No malocclusion. No stridor. Normal phonation. No drooling. Normal swallowing.   Neck: No JVD.  CV: No pallor noted  Lungs:   No tachypnea  No respiratory distress  Abd: soft nt nd no rebound/guarding  MSK:   FROM spontaneously  2+ pitting LE edema  Skin: Dry, intact.   Neuro: Awake, alert, GCS15, CN II-XII grossly intact.   Motor grossly intact.  Psychiatric/Behavioral: interacting normally; appropriate mood/affect.    Exam: deferred    Vitals:    12/24/23 0910 12/24/23 1030 12/24/23 1051 12/24/23 1521   BP:  (!) 89/50 126/70 125/72   BP Location:   Left arm Left arm   Pulse:  (!) 107 105 104   Resp:  18 16    Temp:  (!) 97.4 °F (36.3  °C) (!) 97.4 °F (36.3 °C) 97.5 °F (36.4 °C)   TempSrc:   Oral Oral   SpO2: 99% 92% 97% 99%   Weight:       Height:        CHF  - Given patient's concerns, will do a cardiac workup.   - Will do an EKG for arrythmia, strain; troponin for same as per protocol for evaluation of ACS.   - CBC for anemia; CMP for kidney function and electrolytes.   - Will check CXR for pneumonia, PTX, fluid overload  - Will do POCUS Cardiac to evaluate for pericardial effusion.   HEART score:  History 2=Highly suspicious   ECG 1=Nonspecific repolarization disturbance   Age 2= > 65 years   Risk Factors 2= > 3 risk factors, or history of atherosclerotic disease   Troponin 1= Between 13-35 ng/L   Total 8   - Disposition per workup.     Past Medical History:   Diagnosis Date    Arthritis     Asthma     Colon polyps     Community acquired pneumonia     last assessed: 5/1/2014    Diabetes mellitus (HCC)     Hemorrhagic prepatellar bursitis, left 10/21/2019    Hepatitis C     High cholesterol     History of colonoscopy 2017    Hypertension     Lymphadenopathy, anterior cervical 04/17/2018    Nephritis and nephropathy, not specified as acute or chronic, with other specified pathological lesion in kidney, in diseases classified elsewhere 3/26/2013    NSTEMI (non-ST elevated myocardial infarction) (HCC) 1/30/2023    s/p Medtronic dual chamber PPM 8/31/22 s/p upgrade to BiV ICD 9/13/2023 9/16/2023    Screening for colon cancer 05/01/2019    SIRS (systemic inflammatory response syndrome) (HCC) 3/19/2023    Thoracic vertebral fracture (HCC) 06/11/2014        There are no obvious limitations to social determinants of care.   Nursing note reviewed.   Vitals reviewed.   Orders placed by myself and/or advanced practitioner / resident.    Previous chart was reviewed  No language barrier.   History obtained from patient.    There are no limitations to the history obtained:     Past Medical: Past Surgical:    has a past medical history of Arthritis, Asthma,  Colon polyps, Community acquired pneumonia, Diabetes mellitus (HCC), Hemorrhagic prepatellar bursitis, left (10/21/2019), Hepatitis C, High cholesterol, History of colonoscopy (), Hypertension, Lymphadenopathy, anterior cervical (2018), Nephritis and nephropathy, not specified as acute or chronic, with other specified pathological lesion in kidney, in diseases classified elsewhere (3/26/2013), NSTEMI (non-ST elevated myocardial infarction) ([facility]) (2023), s/p Medtronic dual chamber PPM 22 s/p upgrade to BiV ICD 2023 (2023), Screening for colon cancer (2019), SIRS (systemic inflammatory response syndrome) ([facility]) (3/19/2023), and Thoracic vertebral fracture ([facility]) (2014).  has a past surgical history that includes Colonoscopy w/ polypectomy; Lithotripsy; Multiple tooth extractions; pr colonoscopy flx dx w/collj spec when pfrmd (N/A, 2018); Colonoscopy; Cardiac catheterization (N/A, 6/3/2022); pr replace aortic valve openfemoral artery approach (N/A, 2022); Cardiac catheterization (N/A, 2022); Cardiac electrophysiology procedure (N/A, 2022); Cardiac catheterization (N/A, 2/10/2023); Cardiac catheterization (N/A, 2/10/2023); IR upper extremity venogram- Diagnostic (2023); and Cardiac electrophysiology procedure (N/A, 2023).   Social: Cardiac (Echo/Cath)   Social History     Substance and Sexual Activity   Alcohol Use No     Social History     Tobacco Use   Smoking Status Former    Current packs/day: 0.00    Types: Cigarettes    Quit date: 2022    Years since quittin.5   Smokeless Tobacco Never   Tobacco Comments    started when he was about 27 yrs old; stopped smoking 3 wks ago     Social History     Substance and Sexual Activity   Drug Use No    No results found for this or any previous visit.    No results found for this or any previous visit.    No results found for this or any previous visit.     Labs: Imaging:   Labs Reviewed   CBC AND  DIFFERENTIAL - Abnormal       Result Value Ref Range Status    WBC 6.46  4.31 - 10.16 Thousand/uL Final    RBC 4.93  3.88 - 5.62 Million/uL Final    Hemoglobin 12.9  12.0 - 17.0 g/dL Final    Hematocrit 43.7  36.5 - 49.3 % Final    MCV 89  82 - 98 fL Final    MCH 26.2 (*) 26.8 - 34.3 pg Final    MCHC 29.5 (*) 31.4 - 37.4 g/dL Final    RDW 18.7 (*) 11.6 - 15.1 % Final    MPV 10.9  8.9 - 12.7 fL Final    Platelets 223  149 - 390 Thousands/uL Final    nRBC 0  /100 WBCs Final    Neutrophils Relative 68  43 - 75 % Final    Immat GRANS % 0  0 - 2 % Final    Lymphocytes Relative 20  14 - 44 % Final    Monocytes Relative 10  4 - 12 % Final    Eosinophils Relative 1  0 - 6 % Final    Basophils Relative 1  0 - 1 % Final    Neutrophils Absolute 4.41  1.85 - 7.62 Thousands/µL Final    Immature Grans Absolute 0.02  0.00 - 0.20 Thousand/uL Final    Lymphocytes Absolute 1.28  0.60 - 4.47 Thousands/µL Final    Monocytes Absolute 0.63  0.17 - 1.22 Thousand/µL Final    Eosinophils Absolute 0.09  0.00 - 0.61 Thousand/µL Final    Basophils Absolute 0.03  0.00 - 0.10 Thousands/µL Final   COMPREHENSIVE METABOLIC PANEL - Abnormal    Sodium 143  135 - 147 mmol/L Final    Potassium 3.1 (*) 3.5 - 5.3 mmol/L Final    Chloride 105  96 - 108 mmol/L Final    CO2 33 (*) 21 - 32 mmol/L Final    ANION GAP 5  mmol/L Final    BUN 27 (*) 5 - 25 mg/dL Final    Creatinine 1.34 (*) 0.60 - 1.30 mg/dL Final    Comment: Standardized to IDMS reference method    Glucose 58 (*) 65 - 140 mg/dL Final    Comment: If the patient is fasting, the ADA then defines impaired fasting glucose as > 100 mg/dL and diabetes as > or equal to 123 mg/dL.    Calcium 9.7  8.4 - 10.2 mg/dL Final    AST 25  13 - 39 U/L Final    ALT 34  7 - 52 U/L Final    Comment: Specimen collection should occur prior to Sulfasalazine administration due to the potential for falsely depressed results.     Alkaline Phosphatase 105 (*) 34 - 104 U/L Final    Total Protein 6.8  6.4 - 8.4 g/dL Final     "Albumin 3.9  3.5 - 5.0 g/dL Final    Total Bilirubin 1.14 (*) 0.20 - 1.00 mg/dL Final    Comment: Use of this assay is not recommended for patients undergoing treatment with eltrombopag due to the potential for falsely elevated results.  N-acetyl-p-benzoquinone imine (metabolite of Acetaminophen) will generate erroneously low results in samples for patients that have taken an overdose of Acetaminophen.    eGFR 51  ml/min/1.73sq m Final    Narrative:     National Kid[employer] guidelines for Chronic Kidney Disease (CKD):     Stage 1 with normal or high GFR (GFR > 90 mL/min/1.73 square meters)    Stage 2 Mild CKD (GFR = 60-89 mL/min/1.73 square meters)    Stage 3A Moderate CKD (GFR = 45-59 mL/min/1.73 square meters)    Stage 3B Moderate CKD (GFR = 30-44 mL/min/1.73 square meters)    Stage 4 Severe CKD (GFR = 15-29 mL/min/1.73 square meters)    Stage 5 End Stage CKD (GFR <15 mL/min/1.73 square meters)  Note: GFR calculation is accurate only with a steady state creatinine   B-TYPE NATRIURETIC PEPTIDE (BNP) - Abnormal     (*) 0 - 100 pg/mL Final   PROTIME-INR - Abnormal    Protime 18.1 (*) 11.6 - 14.5 seconds Final    INR 1.52 (*) 0.84 - 1.19 Final   APTT - Abnormal    PTT 39 (*) 23 - 37 seconds Final    Comment: Therapeutic Heparin Range =  60-90 seconds   HS TROPONIN I 0HR - Normal    hs TnI 0hr 30  \"Refer to ACS Flowchart\"- see link ng/L Final    Comment:                                              Initial (time 0) result  If >=50 ng/L, Myocardial injury suggested ;  Type of myocardial injury and treatment strategy  to be determined.  If 5-49 ng/L, a delta result at 2 hours and or 4 hours will be needed to further evaluate.  If <4 ng/L, and chest pain has been >3 hours since onset, patient may qualify for discharge based on the HEART score in the ED.  If <5 ng/L and <3hours since onset of chest pain, a delta result at 2 hours will be needed to further evaluate.    HS Troponin 99th Percentile URL of " "a Health Population=12 ng/L with a 95% Confidence Interval of 8-18 ng/L.    Second Troponin (time 2 hours)  If calculated delta >= 20 ng/L,  Myocardial injury suggested ; Type of myocardial injury and treatment strategy to be determined.  If 5-49 ng/L and the calculated delta is 5-19 ng/L, consult medical service for evaluation.  Continue evaluation for ischemia on ecg and other possible etiology and repeat hs troponin at 4 hours.  If delta is <5 ng/L at 2 hours, consider discharge based on risk stratification via the HEART score (if in ED), or NICA risk score in IP/Observation.    HS Troponin 99th Percentile URL of a Health Population=12 ng/L with a 95% Confidence Interval of 8-18 ng/L.   HS TROPONIN I 2HR - Normal    hs TnI 2hr 28  \"Refer to ACS Flowchart\"- see link ng/L Final    Comment:                                              Initial (time 0) result  If >=50 ng/L, Myocardial injury suggested ;  Type of myocardial injury and treatment strategy  to be determined.  If 5-49 ng/L, a delta result at 2 hours and or 4 hours will be needed to further evaluate.  If <4 ng/L, and chest pain has been >3 hours since onset, patient may qualify for discharge based on the HEART score in the ED.  If <5 ng/L and <3hours since onset of chest pain, a delta result at 2 hours will be needed to further evaluate.    HS Troponin 99th Percentile URL of a Health Population=12 ng/L with a 95% Confidence Interval of 8-18 ng/L.    Second Troponin (time 2 hours)  If calculated delta >= 20 ng/L,  Myocardial injury suggested ; Type of myocardial injury and treatment strategy to be determined.  If 5-49 ng/L and the calculated delta is 5-19 ng/L, consult medical service for evaluation.  Continue evaluation for ischemia on ecg and other possible etiology and repeat hs troponin at 4 hours.  If delta is <5 ng/L at 2 hours, consider discharge based on risk stratification via the HEART score (if in ED), or NICA risk score in IP/Observation.    HS " "Troponin 99th Percentile URL of a Health Population=12 ng/L with a 95% Confidence Interval of 8-18 ng/L.    Delta 2hr hsTnI -2  <20 ng/L Final   HS TROPONIN I 4HR - Normal    hs TnI 4hr 28  \"Refer to ACS Flowchart\"- see link ng/L Final    Comment:                                              Initial (time 0) result  If >=50 ng/L, Myocardial injury suggested ;  Type of myocardial injury and treatment strategy  to be determined.  If 5-49 ng/L, a delta result at 2 hours and or 4 hours will be needed to further evaluate.  If <4 ng/L, and chest pain has been >3 hours since onset, patient may qualify for discharge based on the HEART score in the ED.  If <5 ng/L and <3hours since onset of chest pain, a delta result at 2 hours will be needed to further evaluate.    HS Troponin 99th Percentile URL of a Health Population=12 ng/L with a 95% Confidence Interval of 8-18 ng/L.    Second Troponin (time 2 hours)  If calculated delta >= 20 ng/L,  Myocardial injury suggested ; Type of myocardial injury and treatment strategy to be determined.  If 5-49 ng/L and the calculated delta is 5-19 ng/L, consult medical service for evaluation.  Continue evaluation for ischemia on ecg and other possible etiology and repeat hs troponin at 4 hours.  If delta is <5 ng/L at 2 hours, consider discharge based on risk stratification via the HEART score (if in ED), or NICA risk score in IP/Observation.    HS Troponin 99th Percentile URL of a Health Population=12 ng/L with a 95% Confidence Interval of 8-18 ng/L.    Delta 4hr hsTnI -2  <20 ng/L Final   LD(LDH), BODY FLUID    LD, Fluid 47  U/L Final    Comment: The reference range is not defined for this body fluid test. The test result must be integrated into the clinical context for interpretation.   TOTAL PROTEIN,  FLUID    Protein, Fluid <3.0  g/dL Final    Comment: The reference range is not defined for this body fluid test. The test result must be integrated into the clinical context for " interpretation.   GLUCOSE, BODY FLUID    Glucose, Fluid 94  mg/dL Final    Comment: The reference range is not defined for this body fluid test. The test result must be integrated into the clinical context for interpretation.   PH, BODY FLUID    PH BODY FLUID 7.6   Final    Comment: The reference range and other method performance specifications have not been established for this body fluid. The test result must be integrated into the clinical context for interpretation.   NON-GYNECOLOGIC CYTOLOGY    XR chest portable   ED Interpretation   Pleural effusion, right resolved.   There's no obvious pneumothorax ex vacuo      Final Result      Decreased size in now small right pleural effusion following thoracentesis.                  Workstation performed: [id number]         XR chest 2 views   ED Interpretation   Pleural effusion, RIGHT  Cardiomegaly.      Final Result      Moderate right-sided pleural effusion. Cardiomegaly.            Workstation performed: [id number]            Medications: Code Status:   Medications   cholecalciferol (VITAMIN D3) tablet 2,000 Units (2,000 Units Oral Given 12/24/23 0825)   clopidogrel (PLAVIX) tablet 75 mg (75 mg Oral Given 12/24/23 0825)   Empagliflozin (JARDIANCE) tablet 10 mg (10 mg Oral Given 12/24/23 0826)   ferrous sulfate tablet 325 mg (325 mg Oral Given 12/23/23 0841)   levothyroxine tablet 137 mcg (137 mcg Oral Given 12/24/23 0825)   metoprolol succinate (TOPROL-XL) 24 hr tablet 75 mg (75 mg Oral Given 12/24/23 0825)   montelukast (SINGULAIR) tablet 10 mg (10 mg Oral Given 12/24/23 0825)   nitroglycerin (NITROSTAT) SL tablet 0.4 mg (has no administration in time range)   atorvastatin (LIPITOR) tablet 80 mg (80 mg Oral Given 12/23/23 1733)   insulin lispro (HumaLOG) 100 units/mL subcutaneous injection 1-6 Units (4 Units Subcutaneous Given 12/24/23 1412)   insulin lispro (HumaLOG) 100 units/mL subcutaneous injection 1-6 Units (1 Units Subcutaneous Given 12/23/23 2158)    albuterol (PROVENTIL HFA,VENTOLIN HFA) inhaler 2 puff (has no administration in time range)   apixaban (ELIQUIS) tablet 5 mg (5 mg Oral Given 12/24/23 0825)   fluticasone-vilanterol 200-25 mcg/actuation 1 puff (1 puff Inhalation Given 12/24/23 0826)   insulin glargine (LANTUS) subcutaneous injection 10 Units 0.1 mL (10 Units Subcutaneous Given 12/23/23 2159)   furosemide (LASIX) tablet 60 mg (has no administration in time range)   furosemide (LASIX) injection 40 mg (40 mg Intravenous Given 12/22/23 1856)   potassium chloride (K-DUR,KLOR-CON) CR tablet 40 mEq (40 mEq Oral Given 12/22/23 1856)   potassium chloride (K-DUR,KLOR-CON) CR tablet 40 mEq (40 mEq Oral Given 12/22/23 2234)   potassium chloride (K-DUR,KLOR-CON) CR tablet 40 mEq (40 mEq Oral Given 12/23/23 1733)    Code Status: Level 1 - Full Code  Advance Directive and Living Will:      Power of :    POLST:       Orders Placed This Encounter   Procedures    POC Cardiac US    POC Lung US    Body fluid culture (Pleural Fluid Culture) and Gram stain    LD (LDH), Body Fluid    Total Protein, body fluid    XR chest 2 views    XR chest portable    CBC and differential    Comprehensive metabolic panel    HS Troponin 0hr (reflex protocol)    B-Type Natriuretic Peptide(BNP)    Protime-INR    APTT    HS Troponin I 2hr    HS Troponin I 4hr    Body fluid white cell count with differential    Glucose, body fluid    pH, body fluid    Basic metabolic panel    Body Fluid Diff    LD,Blood    Path Slide Review    Hemoglobin A1C    TSH, 3rd generation with Free T4 reflex    Lipid Panel with Direct LDL reflex    Magnesium    CBC and differential    Comprehensive metabolic panel    Magnesium    Phosphorus    CBC and Platelet    Basic metabolic panel    Diet Cardiovascular; Cardiac; Consistent Carbohydrate Diet Level 2 (5 carb servings/75 grams CHO/meal), Sodium 2 GM, Fluid Restriction 2000 ML    24 Hour Telemetry Monitoring    Vital Signs per unit routine    Up and OOB as  tolerated    Daily weights    I/O    Insert peripheral IV    Maintain IV access    Apply SCD or Foot pumps    Insulin Subcutaneous Notify Physician    Insulin Subcutaneous Instruction    Hypoglycemia Protocol    Fingerstick Glucose (POCT)    Level 1-Full Code: all life saving measures are indicated    ECG 12 lead    Echo follow up/limited w/ contrast if indicated    Thoracentesis    INPATIENT ADMISSION     Time reflects when diagnosis was documented in both MDM as applicable and the Disposition within this note       Time User Action Codes Description Comment    12/22/2023  8:08 PM Francisco Mohan Add [I50.23] Acute on chronic systolic congestive heart failure (HCC)     12/22/2023  8:08 PM Francisco Mohan Add [R06.00] Dyspnea     12/22/2023  8:08 PM Francisco Mohan Add [R06.01] Orthopnea           ED Disposition       ED Disposition   Admit    Condition   Stable    Date/Time   Fri Dec 22, 2023  8:08 PM    Comment   Case was discussed with SOD Resident and the patient's admission status was agreed to be Admission Status: inpatient status to the service of Dr. JET Skinner .               Follow-up Information    None       Current Discharge Medication List        CONTINUE these medications which have NOT CHANGED    Details   Advair -21 MCG/ACT inhaler Inhale 2 puffs 2 (two) times a day Rinse mouth after use.  Qty: 36 g, Refills: 6    Comments: Substitution to a formulary equivalent within the same pharmaceutical class is authorized.  Associated Diagnoses: Mild intermittent asthma, unspecified whether complicated      albuterol (PROVENTIL HFA,VENTOLIN HFA) 90 mcg/act inhaler INHALE 2 PUFFS BY MOUTH EVERY 4 HOURS AS NEEDED FOR WHEEZING OR SHORTNESS OF BREATH.  Qty: 18 g, Refills: 2    Comments: Substitution to a formulary equivalent within the same pharmaceutical class is authorized.  Associated Diagnoses: Mild intermittent asthma without complication      apixaban (Eliquis) 5 mg Take 1 tablet (5 mg  total) by mouth 2 (two) times a day  Qty: 60 tablet, Refills: 3    Comments: PRICE CHECK ONLY  Associated Diagnoses: Atrial flutter, unspecified type ([facility])      Blood Glucose Monitoring Suppl (OneTouch Verio) w/Device KIT Check blood glucose three times daily before each meal  Qty: 1 kit, Refills: 0    Associated Diagnoses: Type 2 diabetes mellitus with diabetic neuropathy, without long-term current use of insulin ([facility])      cholecalciferol (VITAMIN D3) 1,000 units tablet Take 2 tablets (2,000 Units total) by mouth daily  Qty: 180 tablet, Refills: 5    Associated Diagnoses: Vitamin D deficiency      clopidogrel (PLAVIX) 75 mg tablet Take 1 tablet (75 mg total) by mouth daily  Qty: 90 tablet, Refills: 3    Associated Diagnoses: S/P TAVR (transcatheter aortic valve replacement)      Empagliflozin (JARDIANCE) 10 MG TABS tablet Take 1 tablet (10 mg total) by mouth every morning  Qty: 90 tablet, Refills: 3    Associated Diagnoses: NSTEMI (non-ST elevated myocardial infarction) ([facility]); Status post insertion of drug eluting coronary artery stent; Coronary artery disease involving native coronary artery of native heart without angina pectoris      ferrous sulfate 325 (65 Fe) mg tablet TAKE 1 TABLET BY MOUTH EVERY OTHER DAY  Qty: 45 tablet, Refills: 4    Associated Diagnoses: Iron deficiency anemia, unspecified iron deficiency anemia type      furosemide (LASIX) 40 mg tablet Take 40mg in AM and 20mg in PM  Qty: 135 tablet, Refills: 3    Associated Diagnoses: Chronic HFrEF (heart failure with reduced ejection fraction) ([facility])      Insulin Glargine Solostar (Lantus SoloStar) 100 UNIT/ML SOPN INJECT 0.18 ML (18 UNITS TOTAL) UNDER THE SKIN DAILY AT BEDTIME  Qty: 15 mL, Refills: 3    Comments: DX Code Needed  NEEDS A NEW SCRIPT NO MORE REFILLS AVAILABLE.  Associated Diagnoses: Diabetes mellitus due to underlying condition with diabetic neuropathy, without long-term current use of insulin ([facility])      Insulin Pen Needle (Pen Needles)  31G X 8 MM MISC Use daily  Qty: 300 each, Refills: 3    Associated Diagnoses: Diabetes mellitus due to underlying condition with diabetic neuropathy, without long-term current use of insulin ([facility])      Lancets (FREESTYLE) lancets by Other route as needed (As needed)  Qty: 100 each, Refills: 3    Associated Diagnoses: Diabetes mellitus due to underlying condition with diabetic neuropathy, without long-term current use of insulin ([facility])      levothyroxine 137 mcg tablet TAKE 1 TABLET BY MOUTH EVERY DAY  Qty: 90 tablet, Refills: 3    Associated Diagnoses: Hypothyroidism      lisinopril (ZESTRIL) 10 mg tablet Take 1 tablet (10 mg total) by mouth daily  Qty: 90 tablet, Refills: 3    Associated Diagnoses: Heart failure with reduced ejection fraction ([facility])      metFORMIN (GLUCOPHAGE) 850 mg tablet TAKE 1 TABLET BY MOUTH 2 TIMES A DAY WITH MEALS.  Qty: 180 tablet, Refills: 3    Associated Diagnoses: Diabetes mellitus ([facility])      metoprolol succinate (TOPROL-XL) 50 mg 24 hr tablet Take 1.5 tablets (75 mg total) by mouth 2 (two) times a day  Qty: 270 tablet, Refills: 1    Associated Diagnoses: Heart failure with reduced ejection fraction ([facility])      montelukast (SINGULAIR) 10 mg tablet TAKE 1 TABLET BY MOUTH EVERY DAY  Qty: 90 tablet, Refills: 3    Associated Diagnoses: Mild intermittent asthma, unspecified whether complicated      NovoLOG FlexPen 100 units/mL injection pen INJECT 5 UNITS WITH BREAKFAST AND WITH DINNER  Qty: 15 mL, Refills: 3    Comments: DX Code Needed  NEEDS A NEW SCRIPT.  Associated Diagnoses: Type 2 diabetes mellitus with diabetic neuropathy, without long-term current use of insulin ([facility])      rosuvastatin (CRESTOR) 40 MG tablet TAKE 1 TABLET BY MOUTH EVERY DAY  Qty: 90 tablet, Refills: 3    Comments: DX Code Needed  .  Associated Diagnoses: Mixed hyperlipidemia      spironolactone (ALDACTONE) 25 mg tablet TAKE 1 TABLET (25 MG TOTAL) BY MOUTH DAILY.  Qty: 90 tablet, Refills: 1    Associated Diagnoses:  Chronic HFrEF (heart failure with reduced ejection fraction) ([facility])      Alcohol Swabs (ALCOHOL PADS) 70 % PADS       Continuous Blood Gluc  (FreeStyle Cristofer 2 [place]) POWER Use 1 each continuous  Qty: 1 each, Refills: 0    Associated Diagnoses: Type 2 diabetes mellitus with diabetic neuropathy, with long-term current use of insulin ([facility])      Continuous Blood Gluc Sensor (FreeStyle Cristofer 2 Sensor) MISC Use 1 each every 14 (fourteen) days  Qty: 6 each, Refills: 3    Associated Diagnoses: Type 2 diabetes mellitus with diabetic neuropathy, with long-term current use of insulin ([facility])      fluticasone (FLONASE) 50 mcg/act nasal spray 2 sprays into each nostril daily  Qty: 2 Bottle, Refills: 2    Associated Diagnoses: Allergic rhinitis, unspecified seasonality, unspecified trigger      methocarbamol (Robaxin-750) 750 mg tablet Take 1 tablet (750 mg total) by mouth every 6 (six) hours as needed for muscle spasms  Qty: 60 tablet, Refills: 0    Associated Diagnoses: Leg cramping      nitroglycerin (NITROSTAT) 0.4 mg SL tablet Place 1 tablet (0.4 mg total) under the tongue every 5 (five) minutes as needed for chest pain  Qty: 30 tablet, Refills: 1    Associated Diagnoses: NSTEMI (non-ST elevated myocardial infarction) ([facility]); Status post insertion of drug eluting coronary artery stent; Coronary artery disease involving native coronary artery of native heart without angina pectoris           No discharge procedures on file.  Prior to Admission Medications   Prescriptions Last Dose Informant Patient Reported? Taking?   Advair -21 MCG/ACT inhaler 12/22/2023 Spouse/Significant Other, Self No Yes   Sig: Inhale 2 puffs 2 (two) times a day Rinse mouth after use.   Alcohol Swabs (ALCOHOL PADS) 70 % PADS Not Taking Spouse/Significant Other, Self Yes No   Patient not taking: Reported on 11/27/2023   Blood Glucose Monitoring Suppl (OneTouch Verio) w/Device KIT 12/22/2023 Spouse/Significant Other, Self No Yes   Sig: Check  blood glucose three times daily before each meal   Continuous Blood Gluc  (FreeStyle Cristofer 2 [place]) POWER Not Taking Spouse/Significant Other, Self No No   Sig: Use 1 each continuous   Patient not taking: Reported on 11/27/2023   Continuous Blood Gluc Sensor (FreeStyle Cristofer 2 Sensor) MISC Not Taking Spouse/Significant Other, Self No No   Sig: Use 1 each every 14 (fourteen) days   Patient not taking: Reported on 11/27/2023   Empagliflozin (JARDIANCE) 10 MG TABS tablet 12/22/2023 Spouse/Significant Other, Self No Yes   Sig: Take 1 tablet (10 mg total) by mouth every morning   Insulin Glargine Solostar (Lantus SoloStar) 100 UNIT/ML SOPN 12/22/2023  No Yes   Sig: INJECT 0.18 ML (18 UNITS TOTAL) UNDER THE SKIN DAILY AT BEDTIME   Insulin Pen Needle (Pen Needles) 31G X 8 MM MISC 12/22/2023 Spouse/Significant Other, Self No Yes   Sig: Use daily   Lancets (FREESTYLE) lancets 12/22/2023 Spouse/Significant Other, Self No Yes   Sig: by Other route as needed (As needed)   NovoLOG FlexPen 100 units/mL injection pen 12/22/2023 Spouse/Significant Other, Self No Yes   Sig: INJECT 5 UNITS WITH BREAKFAST AND WITH DINNER   albuterol (PROVENTIL HFA,VENTOLIN HFA) 90 mcg/act inhaler 12/22/2023  No Yes   Sig: INHALE 2 PUFFS BY MOUTH EVERY 4 HOURS AS NEEDED FOR WHEEZING OR SHORTNESS OF BREATH.   apixaban (Eliquis) 5 mg 12/22/2023 Spouse/Significant Other, Self No Yes   Sig: Take 1 tablet (5 mg total) by mouth 2 (two) times a day   cholecalciferol (VITAMIN D3) 1,000 units tablet 12/22/2023 Spouse/Significant Other, Self No Yes   Sig: Take 2 tablets (2,000 Units total) by mouth daily   clopidogrel (PLAVIX) 75 mg tablet 12/22/2023 Spouse/Significant Other, Self No Yes   Sig: Take 1 tablet (75 mg total) by mouth daily   ferrous sulfate 325 (65 Fe) mg tablet 12/22/2023 Spouse/Significant Other, Self No Yes   Sig: TAKE 1 TABLET BY MOUTH EVERY OTHER DAY   fluticasone (FLONASE) 50 mcg/act nasal spray Not Taking Spouse/Significant Other,  "Self No No   Si sprays into each nostril daily   Patient not taking: Reported on 10/11/2023   furosemide (LASIX) 40 mg tablet 2023  No Yes   Sig: Take 40mg in AM and 20mg in PM   levothyroxine 137 mcg tablet 2023 Spouse/Significant Other, Self No Yes   Sig: TAKE 1 TABLET BY MOUTH EVERY DAY   lisinopril (ZESTRIL) 10 mg tablet 2023 Spouse/Significant Other, Self No Yes   Sig: Take 1 tablet (10 mg total) by mouth daily   metFORMIN (GLUCOPHAGE) 850 mg tablet 2023 Spouse/Significant Other, Self No Yes   Sig: TAKE 1 TABLET BY MOUTH 2 TIMES A DAY WITH MEALS.   methocarbamol (Robaxin-750) 750 mg tablet Not Taking Spouse/Significant Other, Self No No   Sig: Take 1 tablet (750 mg total) by mouth every 6 (six) hours as needed for muscle spasms   Patient not taking: Reported on 10/11/2023   metoprolol succinate (TOPROL-XL) 50 mg 24 hr tablet 2023 Spouse/Significant Other, Self No Yes   Sig: Take 1.5 tablets (75 mg total) by mouth 2 (two) times a day   montelukast (SINGULAIR) 10 mg tablet 2023  No Yes   Sig: TAKE 1 TABLET BY MOUTH EVERY DAY   nitroglycerin (NITROSTAT) 0.4 mg SL tablet More than a month Spouse/Significant Other, Self No No   Sig: Place 1 tablet (0.4 mg total) under the tongue every 5 (five) minutes as needed for chest pain   rosuvastatin (CRESTOR) 40 MG tablet 2023 Spouse/Significant Other, Self No Yes   Sig: TAKE 1 TABLET BY MOUTH EVERY DAY   spironolactone (ALDACTONE) 25 mg tablet 2023  No Yes   Sig: TAKE 1 TABLET (25 MG TOTAL) BY MOUTH DAILY.      Facility-Administered Medications: None                        Portions of the record may have been created with voice recognition software. Occasional wrong word or \"sound a like\" substitutions may have occurred due to the inherent limitations of voice recognition software. Read the chart carefully and recognize, using context, where substitutions have occurred.    Electronically signed by:  Francisco Mohan  "

## 2023-12-23 ENCOUNTER — APPOINTMENT (INPATIENT)
Dept: NON INVASIVE DIAGNOSTICS | Facility: HOSPITAL | Age: 78
DRG: 291 | End: 2023-12-23
Payer: MEDICARE

## 2023-12-23 PROBLEM — Z87.891 SMOKING HISTORY: Status: ACTIVE | Noted: 2023-12-23

## 2023-12-23 LAB
ANION GAP SERPL CALCULATED.3IONS-SCNC: 9 MMOL/L
APICAL FOUR CHAMBER EJECTION FRACTION: 32 %
BUN SERPL-MCNC: 28 MG/DL (ref 5–25)
CALCIUM SERPL-MCNC: 9.5 MG/DL (ref 8.4–10.2)
CHLORIDE SERPL-SCNC: 107 MMOL/L (ref 96–108)
CHOLEST SERPL-MCNC: 113 MG/DL
CO2 SERPL-SCNC: 30 MMOL/L (ref 21–32)
CREAT SERPL-MCNC: 1.4 MG/DL (ref 0.6–1.3)
EST. AVERAGE GLUCOSE BLD GHB EST-MCNC: 151 MG/DL
FRACTIONAL SHORTENING: 9 (ref 28–44)
GFR SERPL CREATININE-BSD FRML MDRD: 48 ML/MIN/1.73SQ M
GLUCOSE SERPL-MCNC: 107 MG/DL (ref 65–140)
GLUCOSE SERPL-MCNC: 137 MG/DL (ref 65–140)
GLUCOSE SERPL-MCNC: 153 MG/DL (ref 65–140)
GLUCOSE SERPL-MCNC: 183 MG/DL (ref 65–140)
GLUCOSE SERPL-MCNC: 231 MG/DL (ref 65–140)
GLUCOSE SERPL-MCNC: 94 MG/DL (ref 65–140)
HBA1C MFR BLD: 6.9 %
HDLC SERPL-MCNC: 35 MG/DL
INTERVENTRICULAR SEPTUM IN DIASTOLE (PARASTERNAL SHORT AXIS VIEW): 1 CM
INTERVENTRICULAR SEPTUM: 1 CM (ref 0.6–1.1)
LAAS-AP4: 33.9 CM2
LDH SERPL-CCNC: 197 U/L (ref 140–271)
LDLC SERPL CALC-MCNC: 63 MG/DL (ref 0–100)
LEFT ATRIUM SIZE: 4.7 CM
LEFT INTERNAL DIMENSION IN SYSTOLE: 5.1 CM (ref 2.1–4)
LEFT VENTRICULAR INTERNAL DIMENSION IN DIASTOLE: 5.6 CM (ref 3.5–6)
LEFT VENTRICULAR POSTERIOR WALL IN END DIASTOLE: 1.1 CM
LEFT VENTRICULAR STROKE VOLUME: 35 ML
LVSV (TEICH): 35 ML
MAGNESIUM SERPL-MCNC: 2.1 MG/DL (ref 1.9–2.7)
POTASSIUM SERPL-SCNC: 3.4 MMOL/L (ref 3.5–5.3)
RIGHT ATRIAL 2D VOLUME: 96 ML
RIGHT ATRIUM AREA SYSTOLE A4C: 26.5 CM2
RIGHT VENTRICLE ID DIMENSION: 4.1 CM
SITE: NORMAL
SL CV LV EF: 25
SL CV PED ECHO LEFT VENTRICLE DIASTOLIC VOLUME (MOD BIPLANE) 2D: 156 ML
SL CV PED ECHO LEFT VENTRICLE SYSTOLIC VOLUME (MOD BIPLANE) 2D: 121 ML
SODIUM SERPL-SCNC: 146 MMOL/L (ref 135–147)
TR MAX PG: 46 MMHG
TR PEAK VELOCITY: 3.4 M/S
TRICUSPID VALVE PEAK REGURGITATION VELOCITY: 3.39 M/S
TRIGL SERPL-MCNC: 74 MG/DL
TSH SERPL DL<=0.05 MIU/L-ACNC: 3.86 UIU/ML (ref 0.45–4.5)
WBC # FLD MANUAL: 482 /UL

## 2023-12-23 PROCEDURE — 93321 DOPPLER ECHO F-UP/LMTD STD: CPT

## 2023-12-23 PROCEDURE — 93308 TTE F-UP OR LMTD: CPT

## 2023-12-23 PROCEDURE — 80061 LIPID PANEL: CPT

## 2023-12-23 PROCEDURE — 93325 DOPPLER ECHO COLOR FLOW MAPG: CPT

## 2023-12-23 PROCEDURE — 93325 DOPPLER ECHO COLOR FLOW MAPG: CPT | Performed by: INTERNAL MEDICINE

## 2023-12-23 PROCEDURE — 93308 TTE F-UP OR LMTD: CPT | Performed by: INTERNAL MEDICINE

## 2023-12-23 PROCEDURE — 83735 ASSAY OF MAGNESIUM: CPT

## 2023-12-23 PROCEDURE — 99223 1ST HOSP IP/OBS HIGH 75: CPT | Performed by: INTERNAL MEDICINE

## 2023-12-23 PROCEDURE — NC001 PR NO CHARGE: Performed by: INTERNAL MEDICINE

## 2023-12-23 PROCEDURE — 83036 HEMOGLOBIN GLYCOSYLATED A1C: CPT

## 2023-12-23 PROCEDURE — 93321 DOPPLER ECHO F-UP/LMTD STD: CPT | Performed by: INTERNAL MEDICINE

## 2023-12-23 PROCEDURE — 80048 BASIC METABOLIC PNL TOTAL CA: CPT

## 2023-12-23 PROCEDURE — 82948 REAGENT STRIP/BLOOD GLUCOSE: CPT

## 2023-12-23 RX ORDER — FUROSEMIDE 10 MG/ML
40 INJECTION INTRAMUSCULAR; INTRAVENOUS 2 TIMES DAILY
Status: DISCONTINUED | OUTPATIENT
Start: 2023-12-23 | End: 2023-12-24

## 2023-12-23 RX ORDER — POTASSIUM CHLORIDE 20 MEQ/1
40 TABLET, EXTENDED RELEASE ORAL 2 TIMES DAILY
Status: COMPLETED | OUTPATIENT
Start: 2023-12-23 | End: 2023-12-23

## 2023-12-23 RX ADMIN — FERROUS SULFATE TAB 325 MG (65 MG ELEMENTAL FE) 325 MG: 325 (65 FE) TAB at 08:41

## 2023-12-23 RX ADMIN — POTASSIUM CHLORIDE 40 MEQ: 1500 TABLET, EXTENDED RELEASE ORAL at 08:41

## 2023-12-23 RX ADMIN — APIXABAN 5 MG: 5 TABLET, FILM COATED ORAL at 08:41

## 2023-12-23 RX ADMIN — MONTELUKAST 10 MG: 10 TABLET, FILM COATED ORAL at 08:41

## 2023-12-23 RX ADMIN — FUROSEMIDE 40 MG: 10 INJECTION, SOLUTION INTRAMUSCULAR; INTRAVENOUS at 17:33

## 2023-12-23 RX ADMIN — FUROSEMIDE 40 MG: 10 INJECTION, SOLUTION INTRAMUSCULAR; INTRAVENOUS at 08:41

## 2023-12-23 RX ADMIN — INSULIN LISPRO 1 UNITS: 100 INJECTION, SOLUTION INTRAVENOUS; SUBCUTANEOUS at 21:58

## 2023-12-23 RX ADMIN — METOPROLOL SUCCINATE 75 MG: 50 TABLET, EXTENDED RELEASE ORAL at 17:33

## 2023-12-23 RX ADMIN — INSULIN LISPRO 3 UNITS: 100 INJECTION, SOLUTION INTRAVENOUS; SUBCUTANEOUS at 13:00

## 2023-12-23 RX ADMIN — METOPROLOL SUCCINATE 75 MG: 50 TABLET, EXTENDED RELEASE ORAL at 08:41

## 2023-12-23 RX ADMIN — INSULIN GLARGINE 10 UNITS: 100 INJECTION, SOLUTION SUBCUTANEOUS at 21:59

## 2023-12-23 RX ADMIN — APIXABAN 5 MG: 5 TABLET, FILM COATED ORAL at 17:33

## 2023-12-23 RX ADMIN — Medication 2000 UNITS: at 08:41

## 2023-12-23 RX ADMIN — EMPAGLIFLOZIN 10 MG: 10 TABLET, FILM COATED ORAL at 08:42

## 2023-12-23 RX ADMIN — LEVOTHYROXINE SODIUM 137 MCG: 25 TABLET ORAL at 08:41

## 2023-12-23 RX ADMIN — CLOPIDOGREL BISULFATE 75 MG: 75 TABLET ORAL at 08:41

## 2023-12-23 RX ADMIN — POTASSIUM CHLORIDE 40 MEQ: 1500 TABLET, EXTENDED RELEASE ORAL at 17:33

## 2023-12-23 RX ADMIN — FLUTICASONE FUROATE AND VILANTEROL TRIFENATATE 1 PUFF: 200; 25 POWDER RESPIRATORY (INHALATION) at 08:42

## 2023-12-23 RX ADMIN — ATORVASTATIN CALCIUM 80 MG: 80 TABLET, FILM COATED ORAL at 17:33

## 2023-12-23 RX ADMIN — INSULIN LISPRO 1 UNITS: 100 INJECTION, SOLUTION INTRAVENOUS; SUBCUTANEOUS at 10:45

## 2023-12-23 NOTE — ASSESSMENT & PLAN NOTE
CAD s/p NSTEMI with PCI to the mid LAD and RPDA 3/21/23   Continue home plavix, apixaban, and statin

## 2023-12-23 NOTE — PLAN OF CARE
Problem: SAFETY ADULT  Goal: Patient will remain free of falls  Description: INTERVENTIONS:  - Educate patient/family on patient safety including physical limitations  - Instruct patient to call for assistance with activity   - Consult OT/PT to assist with strengthening/mobility   - Keep Call bell within reach  - Keep bed low and locked with side rails adjusted as appropriate  - Keep care items and personal belongings within reach  - Initiate and maintain comfort rounds  - Make Fall Risk Sign visible to staff  - Offer Toileting every  Hours, in advance of need  - Initiate/Maintain alarm  - Obtain necessary fall risk management equipment:   - Apply yellow socks and bracelet for high fall risk patients  - Consider moving patient to room near nurses station  Outcome: Progressing     Problem: SAFETY ADULT  Goal: Maintain or return to baseline ADL function  Description: INTERVENTIONS:  -  Assess patient's ability to carry out ADLs; assess patient's baseline for ADL function and identify physical deficits which impact ability to perform ADLs (bathing, care of mouth/teeth, toileting, grooming, dressing, etc.)  - Assess/evaluate cause of self-care deficits   - Assess range of motion  - Assess patient's mobility; develop plan if impaired  - Assess patient's need for assistive devices and provide as appropriate  - Encourage maximum independence but intervene and supervise when necessary  - Involve family in performance of ADLs  - Assess for home care needs following discharge   - Consider OT consult to assist with ADL evaluation and planning for discharge  - Provide patient education as appropriate  Outcome: Progressing     Problem: SAFETY ADULT  Goal: Maintains/Returns to pre admission functional level  Description: INTERVENTIONS:  - Perform AM-PAC 6 Click Basic Mobility/ Daily Activity assessment daily.  - Set and communicate daily mobility goal to care team and patient/family/caregiver.   - Collaborate with  rehabilitation services on mobility goals if consulted  - Perform Range of Motion  times a day.  - Reposition patient every  hours.  - Dangle patient  times a day  - Stand patient  times a day  - Ambulate patient  times a day  - Out of bed to chair  times a day   - Out of bed for meals  times a day  - Out of bed for toileting  - Record patient progress and toleration of activity level   Outcome: Progressing     Problem: CARDIOVASCULAR - ADULT  Goal: Maintains optimal cardiac output and hemodynamic stability  Description: INTERVENTIONS:  - Monitor I/O, vital signs and rhythm  - Monitor for S/S and trends of decreased cardiac output  - Administer and titrate ordered vasoactive medications to optimize hemodynamic stability  - Assess quality of pulses, skin color and temperature  - Assess for signs of decreased coronary artery perfusion  - Instruct patient to report change in severity of symptoms  Outcome: Progressing     Problem: CARDIOVASCULAR - ADULT  Goal: Absence of cardiac dysrhythmias or at baseline rhythm  Description: INTERVENTIONS:  - Continuous cardiac monitoring, vital signs, obtain 12 lead EKG if ordered  - Administer antiarrhythmic and heart rate control medications as ordered  - Monitor electrolytes and administer replacement therapy as ordered  Outcome: Progressing     Problem: SKIN/TISSUE INTEGRITY - ADULT  Goal: Skin Integrity remains intact(Skin Breakdown Prevention)  Description: Assess:  -Perform Romario assessment every   -Clean and moisturize skin every   -Inspect skin when repositioning, toileting, and assisting with ADLS  -Assess under medical devices such as  every   -Assess extremities for adequate circulation and sensation     Bed Management:  -Have minimal linens on bed & keep smooth, unwrinkled  -Change linens as needed when moist or perspiring  -Avoid sitting or lying in one position for more than  hours while in bed  -Keep HOB at degrees     Toileting:  -Offer bedside commode  -Assess for  incontinence every   -Use incontinent care products after each incontinent episode such as     Activity:  -Mobilize patient  times a day  -Encourage activity and walks on unit  -Encourage or provide ROM exercises   -Turn and reposition patient every  Hours  -Use appropriate equipment to lift or move patient in bed  -Instruct/ Assist with weight shifting every  when out of bed in chair  -Consider limitation of chair time  hour intervals    Skin Care:  -Avoid use of baby powder, tape, friction and shearing, hot water or constrictive clothing  -Relieve pressure over bony prominences using   -Do not massage red bony areas    Next Steps:  -Teach patient strategies to minimize risks such as    -Consider consults to  interdisciplinary teams such as   Outcome: Progressing     Problem: SKIN/TISSUE INTEGRITY - ADULT  Goal: Incision(s), wounds(s) or drain site(s) healing without S/S of infection  Description: INTERVENTIONS  - Assess and document dressing, incision, wound bed, drain sites and surrounding tissue  - Provide patient and family education  - Perform skin care/dressing changes every   Outcome: Progressing     Problem: SKIN/TISSUE INTEGRITY - ADULT  Goal: Pressure injury heals and does not worsen  Description: Interventions:  - Implement low air loss mattress or specialty surface (Criteria met)  - Apply silicone foam dressing  - Instruct/assist with weight shifting every  minutes when in chair   - Limit chair time to  hour intervals  - Use special pressure reducing interventions such as  when in chair   - Apply fecal or urinary incontinence containment device   - Perform passive or active ROM every   - Turn and reposition patient & offload bony prominences every  hours   - Utilize friction reducing device or surface for transfers   - Consider consults to  interdisciplinary teams such as   - Use incontinent care products after each incontinent episode such as   - Consider nutrition services referral as needed  Outcome:  Progressing

## 2023-12-23 NOTE — ASSESSMENT & PLAN NOTE
Controlled at this time.  Continue home Metoprolol 75 mg BID  Holding home Lisinopril 10 mg daily in setting of HE  Holding home Spironolactone 25 mg daily in setting of HE  Continue to monitor blood pressures

## 2023-12-23 NOTE — H&P
INTERNAL MEDICINE RESIDENCY ADMISSION H&P     Name: Juan David Lora   Age & Sex: 78 y.o. male   MRN: [id number]  Unit/Bed#: -01   Encounter: [id number]  Primary Care Provider: Umesh Millard MD    Code Status: Level 1 - Full Code  Admission Status: INPATIENT   Disposition: Patient requires Med/Surg with Telemetry    Admit to team: SOD Team A    ASSESSMENT/PLAN     Principal Problem:    Acute on chronic heart failure (HCC)  Active Problems:    HE (acute kidney injury) (HCC)    Hypokalemia    Mild intermittent asthma without complication    Type 2 diabetes mellitus with circulatory disorder, with long-term current use of insulin (HCC)    Hyperlipidemia    Essential hypertension    Hypothyroidism    S/P TAVR (transcatheter aortic valve replacement)    CAD (coronary artery disease)    History of tachy-lino syndrome    s/p Medtronic dual chamber PPM 8/31/22 s/p upgrade to BiV ICD 9/13/2023    Atrial fibrillation (HCC)    Mitral regurgitation    Hepatitis C    Smoking history      * Acute on chronic heart failure (HCC)  Assessment & Plan  Echo 5/25/23: LVEF 30-35%. LVIDD 5.4cm, increased wall thickness. TAVR mean gradient 13mmHg. Moderate to severe MR, eccentric. Normal RV size and systolic function. Estimated RVSP 36mmHg, mild TR    LHC, pre TAVR 6/03/22: First marginal lesion 50% stenosis, mid LAD 50% stenosis, RPDA 50% stenosis, RPDA to 50% stenosis, 1st diagonal 50% stenosis.     Home meds: Metoprolol succinate 75 mg BID, Lisinopril 10mg daily, Jardiance 10 mg daily, Spironolactone 25 mg daily, Lasix 60 mg daily - taking 40mg in AM and 20mg in PM    HPI: Presenting with c/o BUSTILLOS, orthopnea and b/l leg swelling x 1 week.  on arrival. Bilateral pleural effusions and ascities seen on US. Right-sided thoracentesis was performed in the ED, 1560 mL of fluid removed. Cultures revealed 2+ polys, no bacteria seen.    Wt Readings from Last 3 Encounters:   11/27/23 86.9 kg (191 lb 9.6 oz)   10/25/23  80.8 kg (178 lb 1.6 oz)   10/11/23 84.4 kg (186 lb)     S/P IV lasix 40 mg in the ED on 12/22/23  Ordered additional IV lasix 20 mg this evening  Will start IV lasix 60 mg BID tomorrow  S/P thoracentesis, >1500 ccs in the ED on 12/22/23  Echo ordered  Continue home Metoprolol 75 mg BID  Holding home lisinopril in setting of HE  Continue home Jardiance 10 mg daily  Holding home Spironolactone in setting  Tele monitoring  Cardiovascular diet  Daily weights  I/Os    HE (acute kidney injury) (HCC)  Assessment & Plan  Baseline Cr 1.0-1.1. Cr 1.34 on admission.  Continue to monitor kidney function  Avoid nephrotoxic agents    Hypokalemia  Assessment & Plan  K 3.1 on admission.  S/P kdurr 40 meq x 1 dose in the ED on 12/22/23  Ordered an addition dose of kdurr 40 meq  Goal K > 4.0  Monitor electrolytes and replete as needed    Smoking history  Assessment & Plan  Hx of tobacco use (1/2 PPD for ~50 years). Quit 05/30/2022.  Outpatient follow up with PCP    Hepatitis C  Assessment & Plan  Untreated per chart review  Follow up with your outpatient primary care physician    Mitral regurgitation  Assessment & Plan  Severe on echo 3/21/23, moderate to severe on echo 5/25/23.  Follow up with your outpatient cardiologist    Atrial fibrillation (HCC)  Assessment & Plan  Home meds: rate control with Metoprolol 75 mg BID, anticoagulation with Plavix 75 mg daily and Eliquis 5 mg BID  Continue home metoprolol, plavix and eliquis    s/p Medtronic dual chamber PPM 8/31/22 s/p upgrade to BiV ICD 9/13/2023  Assessment & Plan  Refer to a&p above    History of tachy-lino syndrome  Assessment & Plan  Refer to a&p above    CAD (coronary artery disease)  Assessment & Plan  CAD s/p NSTEMI with PCI to the mid LAD and RPDA 3/21/23   Continue home plavix, apixaban, and statin  Follow up on TSH, A1c, Lipid panel    S/P TAVR (transcatheter aortic valve replacement)  Assessment & Plan  #26mm Emery Hernán S3 Ultra valve via left femoral approach by  Dr Walker.  6/21/22; normally functioning on echo 5/25/23  Continue Eliquis 5 mg BID and Plavix 75 mg daily    Hypothyroidism  Assessment & Plan  Continue home Levothyroxine 137 mcg daily  Follow up on TSH    Essential hypertension  Assessment & Plan  Controlled at this time.  Continue home Metoprolol 75 mg BID  Holding home Lisinopril 10 mg daily in setting of HE  Holding home Spironolactone 25 mg daily in setting of HE  Continue to monitor blood pressures    Hyperlipidemia  Assessment & Plan  Lipid panel 2/10/23 TG 61 HDL 53 LDL 72   Continue home Lipitor 80 mg daily  Follow up on lipid panel    Type 2 diabetes mellitus with circulatory disorder, with long-term current use of insulin (HCC)  Assessment & Plan  Lab Results   Component Value Date    HGBA1C 7.6 (H) 02/10/2023       Recent Labs     12/22/23 2156   POCGLU 149*       Blood Sugar Average: Last 72 hrs:  (P) 149    SSI Algorithm 3  Continue to monitor blood glucose  Adjust insulin regimen as needed  Follow up on hgb A1c    Mild intermittent asthma without complication  Assessment & Plan  PRN albuterol inhaler q4h        VTE Pharmacologic Prophylaxis: Eliquis  VTE Mechanical Prophylaxis: sequential compression device    CHIEF COMPLAINT     Chief Complaint   Patient presents with    Medical Problem     Pt wife reports pt has b/l leg swelling, sob and nose bleeds..        HISTORY OF PRESENT ILLNESS     Juan David Lora is a Azeri-speaking 78-year-old male with a PMHx of HFrEF (EF 30-35%), multi-vessel CAD s/p STEMI with PCI to the mid LAD and RPDA 3/21/23, severe AS s/p TAVR, mitral regurgitation, Afib, Tachy-lino syndrome with LBBB S/P MDT PPM then upgraded to biventricular ICD, HTN, HLD,T2DM, hypothyroidism, asthma, hep C (untreated per chart review), and a hx of tobacco use (1/2 PPD for ~50 years, quit 05/30/2022) presenting to [facility] ED on 12/22/23 with c/o BUSTILLOS, orthopnea and b/l leg swelling x 1 week. Patient states his SOB is worse when lying down  and climbing stairs. After climbing to the top of a flight of stairs, ~11 steps, he had to stop and rest for four minutes because he was so short of breath. He tried using his albuterol inhaler with little relief. He weighs himself around every other day, and states that he gained 2 lb in the past day. His wife encouraged him to come to the hospital today because he appeared more short of breath. He states he has been compliant with his medications, including his lasix and eliquis. He drinks around 3 bottles of water a day, and reports he limits his salt intake at home. He denies any chest pain, palpitations, worsening abdominal distension, fevers or chills. Of note, he recently saw his outpatient cardiologist, Virginia Gan MD, on 11/27/2023 who increased his lasix to 40 mg twice a day for three days and then instructed him to return back to 40 mg in the AM and 20 mg in the PM. Prior to arrival, patient took his morning 40 mg dose of lasix, but did not take his evening 20 mg dose.    On arrival to the ED, VSS: afebrile, HR 80s-100s, -130s/60-70s, SpO2 % on RA. Bilateral pleural effusions and ascities were seen on US. A right-sided thoracentesis was performed in the emergency department, and 1560 mL of fluid were removed. Cultures revealed 2+ polys, no bacteria seen. . Troponins 30>28>28. Other notable labs include K 3.1. (repleted), Cr 1.34 (baseline 1.0-1.1), and T bili of 1.14. He was given IV lasix 40 mg and [facility] for acute heart failure exacerbation.    REVIEW OF SYSTEMS     Review of Systems   Constitutional:  Negative for chills and fever.   Respiratory:  Positive for cough and shortness of breath.         See HPI   Cardiovascular:  Positive for leg swelling. Negative for chest pain and palpitations.   Gastrointestinal:  Negative for abdominal distention, abdominal pain, diarrhea, nausea and vomiting.   Genitourinary:  Negative for dysuria and hematuria.    Neurological:  Negative for dizziness, weakness and light-headedness.     OBJECTIVE     Vitals:    23 2234 23 2236 23 0258 23 0500   BP: 111/74 111/74 117/93    BP Location:       Pulse: 82 75 82    Resp:   18    Temp:  (!) 97.4 °F (36.3 °C)     TempSrc:       SpO2:  (!) 84% 96%    Weight:    80.8 kg (178 lb 3.2 oz)   Height:          Temperature:   Temp (24hrs), Av.3 °F (36.3 °C), Min:97 °F (36.1 °C), Max:97.4 °F (36.3 °C)    Temperature: (!) 97.4 °F (36.3 °C)  Intake & Output:  I/O       None          Weights:   IBW (Ideal Body Weight): 66.1 kg    Body mass index is 27.91 kg/m².  Weight (last 2 days)       Date/Time Weight    23 0500 80.8 (178.2)    23 2151 81.6 (179.8)          Physical Exam  Vitals and nursing note reviewed.   Constitutional:       General: He is not in acute distress.     Appearance: He is well-developed. He is not ill-appearing, toxic-appearing or diaphoretic.   HENT:      Head: Normocephalic and atraumatic.   Eyes:      Conjunctiva/sclera: Conjunctivae normal.   Neck:      Vascular: No JVD.   Cardiovascular:      Rate and Rhythm: Normal rate and regular rhythm.      Heart sounds: No murmur heard.  Pulmonary:      Effort: Pulmonary effort is normal. No respiratory distress.      Breath sounds: Decreased breath sounds present. No wheezing, rhonchi or rales.   Abdominal:      Palpations: Abdomen is soft.      Tenderness: There is no abdominal tenderness.   Musculoskeletal:         General: No tenderness.      Cervical back: Neck supple.      Right lower leg: Edema (2+ pitting) present.      Left lower leg: Edema (2+ pitting) present.   Skin:     General: Skin is warm and dry.      Capillary Refill: Capillary refill takes less than 2 seconds.   Neurological:      General: No focal deficit present.      Mental Status: He is alert and oriented to person, place, and time. Mental status is at baseline.   Psychiatric:         Mood and Affect: Mood normal.        PAST MEDICAL HISTORY     Past Medical History:   Diagnosis Date    Arthritis     Asthma     Colon polyps     Community acquired pneumonia     last assessed: 5/1/2014    Diabetes mellitus (HCC)     Hemorrhagic prepatellar bursitis, left 10/21/2019    Hepatitis C     High cholesterol     History of colonoscopy 2017    Hypertension     Lymphadenopathy, anterior cervical 04/17/2018    Nephritis and nephropathy, not specified as acute or chronic, with other specified pathological lesion in kidney, in diseases classified elsewhere 3/26/2013    NSTEMI (non-ST elevated myocardial infarction) (HCC) 1/30/2023    s/p Medtronic dual chamber PPM 8/31/22 s/p upgrade to BiV ICD 9/13/2023 9/16/2023    Screening for colon cancer 05/01/2019    SIRS (systemic inflammatory response syndrome) ([facility]) 3/19/2023    Thoracic vertebral fracture ([facility]) 06/11/2014     PAST SURGICAL HISTORY     Past Surgical History:   Procedure Laterality Date    CARDIAC CATHETERIZATION N/A 6/3/2022    Procedure: Cardiac RHC/LHC;  Surgeon: Yemi Molina MD;  [facility]: [facility];  Service: Cardiology    CARDIAC CATHETERIZATION N/A 6/21/2022    Procedure: CARDIAC TAVR;  Surgeon: David Craft MD;  Location: [facility];  Service: Cardiology    CARDIAC CATHETERIZATION N/A 2/10/2023    Procedure: Cardiac Coronary Angiogram;  Surgeon: Yemi Molina MD;  [facility]: [facility];  Service: Cardiology    CARDIAC CATHETERIZATION N/A 2/10/2023    Procedure: Cardiac pci;  Surgeon: Yemi Molina MD;  [facility]: [facility];  Service: Cardiology    CARDIAC ELECTROPHYSIOLOGY PROCEDURE N/A 9/1/2022    Procedure: Cardiac pacer implant ; DC PPM;  Surgeon: Francis Sun DO;  [facility]: [facility];  Service: Cardiology    CARDIAC ELECTROPHYSIOLOGY PROCEDURE N/A 9/13/2023    Procedure: Cardiac upgrade pacer to biv icd;  Surgeon: Mario Yuan MD;  [facility];  Service: Cardiology    COLONOSCOPY      COLONOSCOPY W/ POLYPECTOMY       IR UPPER EXTREMITY VENOGRAM- DIAGNOSTIC  2023    LITHOTRIPSY      MULTIPLE TOOTH EXTRACTIONS      MS COLONOSCOPY FLX DX W/COLLJ SPEC WHEN PFRMD N/A 2018    Procedure: COLONOSCOPY;  Surgeon: Raad Sandoval MD;  Location: [facility];  Service: Gastroenterology    MS REPLACE AORTIC VALVE OPENFEMORAL ARTERY APPROACH N/A 2022    Procedure: REPLACEMENT AORTIC VALVE TRANSCATHETER (TAVR) TRANSFEMORAL W/ 26MM QUINN RONNI S3 ULTRA VALVE(ACCESS ON LEFT) SAULO;  Surgeon: Sal Walker MD;  Location: [facility];  Service: Cardiac Surgery     SOCIAL & FAMILY HISTORY     Social History     Substance and Sexual Activity   Alcohol Use No       Social History     Substance and Sexual Activity   Drug Use No     Social History     Tobacco Use   Smoking Status Former    Current packs/day: 0.00    Types: Cigarettes    Quit date: 2022    Years since quittin.5   Smokeless Tobacco Never   Tobacco Comments    started when he was about 27 yrs old; stopped smoking 3 wks ago     Family History   Problem Relation Age of Onset    Heart attack Family         at age 86    No Known Problems Mother     No Known Problems Father     Diabetes Sister     Diabetes Brother      LABORATORY DATA     Labs: I have personally reviewed pertinent reports.    Results from last 7 days   Lab Units 23  1540   WBC Thousand/uL 6.46   HEMOGLOBIN g/dL 12.9   HEMATOCRIT % 43.7   PLATELETS Thousands/uL 223   NEUTROS PCT % 68   MONOS PCT % 10   EOS PCT % 1      Results from last 7 days   Lab Units 23  1540   POTASSIUM mmol/L 3.1*   CHLORIDE mmol/L 105   CO2 mmol/L 33*   BUN mg/dL 27*   CREATININE mg/dL 1.34*   CALCIUM mg/dL 9.7   ALK PHOS U/L 105*   ALT U/L 34   AST U/L 25              Results from last 7 days   Lab Units 23  1540   INR  1.52*   PTT seconds 39*             Micro:  Lab Results   Component Value Date    URINECX <10,000 cfu/ml 2019     IMAGING & DIAGNOSTIC TESTS     Imaging: I have personally reviewed  pertinent reports.    No results found.  EKG, Pathology, and Other Studies: I have personally reviewed pertinent reports.     ALLERGIES     Allergies   Allergen Reactions    Iv Contrast [Iodinated Contrast Media] Rash     Patient states he had a severe reaction approximately 10 years ago , states he had a rash, itchy and he remembers a bunch of people pounding on chest and being surrounded by multiple doctors.     MEDICATIONS PRIOR TO ARRIVAL     Prior to Admission medications    Medication Sig Start Date End Date Taking? Authorizing Provider   Advair -21 MCG/ACT inhaler Inhale 2 puffs 2 (two) times a day Rinse mouth after use. 3/30/23  Yes Nick Knight MD   albuterol (PROVENTIL HFA,VENTOLIN HFA) 90 mcg/act inhaler INHALE 2 PUFFS BY MOUTH EVERY 4 HOURS AS NEEDED FOR WHEEZING OR SHORTNESS OF BREATH. 10/25/23  Yes Nick Knight MD   apixaban (Eliquis) 5 mg Take 1 tablet (5 mg total) by mouth 2 (two) times a day 9/14/23  Yes Ava Varela PA-C   cholecalciferol (VITAMIN D3) 1,000 units tablet Take 2 tablets (2,000 Units total) by mouth daily 11/21/22  Yes Mely Cuevas,    clopidogrel (PLAVIX) 75 mg tablet Take 1 tablet (75 mg total) by mouth daily 3/28/23 12/22/23 Yes KAREN Palacio   Empagliflozin (JARDIANCE) 10 MG TABS tablet Take 1 tablet (10 mg total) by mouth every morning 2/12/23  Yes Helena Lott,    ferrous sulfate 325 (65 Fe) mg tablet TAKE 1 TABLET BY MOUTH EVERY OTHER DAY 8/28/23  Yes Mario Yuan MD   furosemide (LASIX) 40 mg tablet Take 40mg in AM and 20mg in PM 11/27/23  Yes Virginia Gan MD   Insulin Glargine Solostar (Lantus SoloStar) 100 UNIT/ML SOPN INJECT 0.18 ML (18 UNITS TOTAL) UNDER THE SKIN DAILY AT BEDTIME 11/24/23  Yes Umesh Millard MD   Insulin Pen Needle (Pen Needles) 31G X 8 MM MISC Use daily 3/3/21  Yes Laurie Blackburn,    levothyroxine 137 mcg tablet TAKE 1 TABLET BY MOUTH EVERY DAY 7/24/23  Yes Luciana Skinner MD   lisinopril (ZESTRIL) 10 mg  tablet Take 1 tablet (10 mg total) by mouth daily 9/17/23  Yes Stormy Rodrigues PA-C   metFORMIN (GLUCOPHAGE) 850 mg tablet TAKE 1 TABLET BY MOUTH 2 TIMES A DAY WITH MEALS. 5/5/23  Yes Mely Cuevas DO   metoprolol succinate (TOPROL-XL) 50 mg 24 hr tablet Take 1.5 tablets (75 mg total) by mouth 2 (two) times a day 4/28/23 12/22/23 Yes Virginia Gan MD   montelukast (SINGULAIR) 10 mg tablet TAKE 1 TABLET BY MOUTH EVERY DAY 10/27/23  Yes Umesh Millard MD   nitroglycerin (NITROSTAT) 0.4 mg SL tablet Place 1 tablet (0.4 mg total) under the tongue every 5 (five) minutes as needed for chest pain 3/30/23  Yes Nick Knight MD   NovoLOG FlexPen 100 units/mL injection pen INJECT 5 UNITS WITH BREAKFAST AND WITH DINNER 7/25/23  Yes Mikki Kaur MD   rosuvastatin (CRESTOR) 40 MG tablet TAKE 1 TABLET BY MOUTH EVERY DAY 6/6/23  Yes Yemi Molina MD   spironolactone (ALDACTONE) 25 mg tablet TAKE 1 TABLET (25 MG TOTAL) BY MOUTH DAILY. 12/12/23  Yes Virginia Gan MD   Alcohol Swabs (ALCOHOL PADS) 70 % PADS  5/9/19   Historical Provider, MD   Blood Glucose Monitoring Suppl (OneTouch Verio) w/Device KIT Check blood glucose three times daily before each meal 8/20/20   Marquez Berry DO   Continuous Blood Gluc  (FreeStyle Cristofer 2 [place]) POWER Use 1 each continuous  Patient not taking: Reported on 11/27/2023 5/10/22   Mely Cuevas DO   Continuous Blood Gluc Sensor (FreeStyle Cristofer 2 Sensor) MISC Use 1 each every 14 (fourteen) days  Patient not taking: Reported on 11/27/2023 5/10/22   Mely Cuevas DO   fluticasone (FLONASE) 50 mcg/act nasal spray 2 sprays into each nostril daily  Patient not taking: Reported on 10/11/2023 8/11/20   Laurie Blackburn DO   Lancets (FREESTYLE) lancets by Other route as needed (As needed) 3/21/19   Hira Pierce MD   methocarbamol (Robaxin-750) 750 mg tablet Take 1 tablet (750 mg total) by mouth every 6 (six) hours as needed for muscle  "spasms  Patient not taking: Reported on 10/11/2023 11/8/22   Eri Marie PA-C     MEDICATIONS ADMINISTERED IN LAST 24 HOURS     Medication Administration - last 24 hours from 12/22/2023 0537 to 12/23/2023 0537         Date/Time Order Dose Route Action Action by     12/22/2023 1856 EST furosemide (LASIX) injection 40 mg 40 mg Intravenous Given Chaya Patel RN     12/22/2023 1856 EST potassium chloride (K-DUR,KLOR-CON) CR tablet 40 mEq 40 mEq Oral Given Chaya Patel, TRIP     12/22/2023 2234 EST potassium chloride (K-DUR,KLOR-CON) CR tablet 40 mEq 40 mEq Oral Given Okofoh Edil, TRIP     12/22/2023 2224 EST insulin glargine (LANTUS) subcutaneous injection 18 Units 0.18 mL 18 Units Subcutaneous Not Given Okofoh Edil, TRIP     12/22/2023 2234 EST metoprolol succinate (TOPROL-XL) 24 hr tablet 75 mg 75 mg Oral Given Ok[place]oh Edil, TRIP     12/22/2023 2224 EST insulin lispro (HumaLOG) 100 units/mL subcutaneous injection 1-6 Units -- Subcutaneous [facility]     12/22/2023 2313 EST furosemide (LASIX) injection 20 mg 20 mg Intravenous Not Given [place] Edil, TRIP     12/22/2023 2235 EST apixaban (ELIQUIS) tablet 5 mg 5 mg Oral Given Brigitteoh Edil, TRIP     12/22/2023 2317 EST fluticasone-vilanterol 200-25 mcg/actuation 1 puff 1 puff [facility]halation [facility]          CURRENT MEDICATIONS              Admission Time  I spent 45 minutes admitting the patient.  This involved direct patient contact where I performed a full history and physical, reviewing previous records, and reviewing laboratory and other diagnostic studies.    Portions of the record may have been created with voice recognition software.  Occasional wrong word or \"sound a like\" substitutions may have occurred due to the inherent limitations of voice recognition software.  Read the chart carefully and recognize, using context, where substitutions have occurred.    ==  Janet Ball MD  [facility]  Internal Medicine " Residency PGY-1

## 2023-12-23 NOTE — ASSESSMENT & PLAN NOTE
Home meds: rate control with Metoprolol 75 mg BID, anticoagulation with Plavix 75 mg daily and Eliquis 5 mg BID  Continue home metoprolol, plavix and eliquis

## 2023-12-23 NOTE — ASSESSMENT & PLAN NOTE
Baseline Cr 1.0-1.1. Cr 1.34 on admission. Peaked 1.43, 1.41 on discharge.     Plan:  Likely in setting of diuresis  Recommend repeat BMP in 1 week   Avoid nephrotoxic agents

## 2023-12-23 NOTE — PROGRESS NOTES
INTERNAL MEDICINE RESIDENCY PROGRESS NOTE     Name: Juan David Lora   Age & Sex: 78 y.o. male   MRN: [id number]  Unit/Bed#: -01   Encounter: [id number]  Team: SOD Team A    PATIENT INFORMATION     Name: Juan David Lora   Age & Sex: 78 y.o. male   MRN: [id number]  Hospital Stay Days: 1    ASSESSMENT/PLAN     Principal Problem:    Acute on chronic heart failure (HCC)  Active Problems:    CAD (coronary artery disease)    Mild intermittent asthma without complication    Type 2 diabetes mellitus with circulatory disorder, with long-term current use of insulin (HCC)    Hyperlipidemia    Essential hypertension    Hypothyroidism    S/P TAVR (transcatheter aortic valve replacement)    History of tachy-lino syndrome    s/p Medtronic dual chamber PPM 8/31/22 s/p upgrade to BiV ICD 9/13/2023    HE (acute kidney injury) ([facility])    Hypokalemia    Atrial fibrillation ([facility])    Mitral regurgitation    Hepatitis C    Smoking history      * Acute on chronic heart failure (HCC)  Assessment & Plan  Echo 5/25/23: LVEF 30-35%. LVIDD 5.4cm, increased wall thickness. TAVR mean gradient 13mmHg. Moderate to severe MR, eccentric. Normal RV size and systolic function. Estimated RVSP 36mmHg, mild TR    [facility], pre TAVR 6/03/22: First marginal lesion 50% stenosis, mid LAD 50% stenosis, RPDA 50% stenosis, RPDA to 50% stenosis, 1st diagonal 50% stenosis.     Home meds: Metoprolol succinate 75 mg BID, Lisinopril 10mg daily, Jardiance 10 mg daily, Spironolactone 25 mg daily, Lasix 60 mg daily - taking 40mg in AM and 20mg in PM    HPI: Presenting with c/o BUSTILLOS, orthopnea and b/l leg swelling x 1 week.  on arrival. Bilateral pleural effusions and ascities seen on US. Right-sided thoracentesis was performed in the ED, 1560 mL of fluid removed. Cultures revealed 2+ polys, no bacteria seen.    Wt Readings from Last 3 Encounters:   11/27/23 86.9 kg (191 lb 9.6 oz)   10/25/23 80.8 kg (178 lb 1.6 oz)   10/11/23 84.4 kg (186 lb)     S/P IV lasix  40 mg in the ED on 12/22/23  Ordered additional IV lasix 20 mg this evening  Will start IV lasix 40 mg twice daily  S/P thoracentesis, >1500 ccs in the ED on 12/22/23  Plan  Echo ordered  Continue home Metoprolol 75 mg BID  Holding home lisinopril in setting of HE  Continue home Jardiance 10 mg daily  Holding home Spironolactone in setting  Tele monitoring  Cardiovascular diet  Daily weights  I/Os    CAD (coronary artery disease)  Assessment & Plan  CAD s/p NSTEMI with PCI to the mid LAD and RPDA 3/21/23   Continue home plavix, apixaban, and statin  Follow up on A1c, Lipid panel    Smoking history  Assessment & Plan  Hx of tobacco use (1/2 PPD for ~50 years). Quit 05/30/2022.  Outpatient follow up with PCP    Hepatitis C  Assessment & Plan  Untreated per chart review  Follow up with your outpatient primary care physician    Mitral regurgitation  Assessment & Plan  Severe on echo 3/21/23, moderate to severe on echo 5/25/23.  Follow up with your outpatient cardiologist    Atrial fibrillation (HCC)  Assessment & Plan  Home meds: rate control with Metoprolol 75 mg BID, anticoagulation with Plavix 75 mg daily and Eliquis 5 mg BID  Continue home metoprolol, plavix and eliquis    Hypokalemia  Assessment & Plan  K 3.1 on admission.  S/P kdurr 40 meq x 1 dose in the ED on 12/22/23  Ordered an addition dose of kdurr 40 meq  Goal K > 4.0  Monitor electrolytes and replete as needed    HE (acute kidney injury) ([facility])  Assessment & Plan  Baseline Cr 1.0-1.1. Cr 1.34 on admission.  Continue to monitor kidney function  Avoid nephrotoxic agents    s/p Medtronic dual chamber PPM 8/31/22 s/p upgrade to BiV ICD 9/13/2023  Assessment & Plan  Refer to a&p above    History of tachy-lino syndrome  Assessment & Plan  Refer to a&p above    S/P TAVR (transcatheter aortic valve replacement)  Assessment & Plan  #26mm Emery Hernán S3 Ultra valve via left femoral approach by Dr Walker.  6/21/22; normally functioning on echo  "23  Continue Eliquis 5 mg BID and Plavix 75 mg daily    Hypothyroidism  Assessment & Plan  Continue home Levothyroxine 137 mcg daily  TSH is within normal limits    Essential hypertension  Assessment & Plan  Controlled at this time.  Continue home Metoprolol 75 mg BID  Holding home Lisinopril 10 mg daily in setting of HE  Holding home Spironolactone 25 mg daily in setting of EH  Continue to monitor blood pressures    Hyperlipidemia  Assessment & Plan  Lipid panel 2/10/23 TG 61 HDL 53 LDL 72   Continue home Lipitor 80 mg daily  Follow up on lipid panel    Type 2 diabetes mellitus with circulatory disorder, with long-term current use of insulin ([facility])  Assessment & Plan  Lab Results   Component Value Date    HGBA1C 7.6 (H) 02/10/2023       Recent Labs     23   POCGLU 149*       Blood Sugar Average: Last 72 hrs:  (P) 149    SSI Algorithm 3  Continue to monitor blood glucose  Adjust insulin regimen as needed  Follow up on hgb A1c    Mild intermittent asthma without complication  Assessment & Plan  PRN albuterol inhaler q4h        Disposition: Patient is currently under active treatment for heart failure.    SUBJECTIVE     Patient seen and examined. No acute events overnight.  Patient was seen lying in bed comfortable pleasant.  Patient reported improvement of his shortness of breath.  Patient denies chest pain, abdominal pain, nausea, vomiting.    OBJECTIVE     Vitals:    23 0258 23 0500 23 0708 23 0954   BP: 117/93  125/86 125/86   BP Location:   Left arm    Pulse: 82  (!) 112 92   Resp: 18  20    Temp:   (!) 97.4 °F (36.3 °C)    TempSrc:   Oral    SpO2: 96%  98%    Weight:  80.8 kg (178 lb 3.2 oz)  80.7 kg (178 lb)   Height:    5' 7\" (1.702 m)      Temperature:   Temp (24hrs), Av.3 °F (36.3 °C), Min:97 °F (36.1 °C), Max:97.4 °F (36.3 °C)    Temperature: (!) 97.4 °F (36.3 °C)  Intake & Output:  I/O          0701   0700  0701   07 07 07 "    P.O.   690    Total Intake(mL/kg)   690 (8.6)    Urine (mL/kg/hr)  700 125 (0.4)    Total Output  700 125    Net  -700 +565                 Weights:   IBW (Ideal Body Weight): 66.1 kg    Body mass index is 27.88 kg/m².  Weight (last 2 days)       Date/Time Weight    12/23/23 0954 80.7 (178)    12/23/23 0500 80.8 (178.2)    12/22/23 2151 81.6 (179.8)          Physical Exam  Constitutional:       General: He is not in acute distress.     Appearance: Normal appearance. He is not ill-appearing, toxic-appearing or diaphoretic.   HENT:      Head: Normocephalic and atraumatic.      Nose: Nose normal. No congestion or rhinorrhea.      Mouth/Throat:      Mouth: Mucous membranes are moist.      Pharynx: No oropharyngeal exudate or posterior oropharyngeal erythema.   Eyes:      General: No scleral icterus.        Right eye: No discharge.         Left eye: No discharge.      Pupils: Pupils are equal, round, and reactive to light.   Neck:      Vascular: No carotid bruit.   Cardiovascular:      Rate and Rhythm: Normal rate and regular rhythm.      Pulses: Normal pulses.      Heart sounds: Normal heart sounds. No murmur heard.     No friction rub. No gallop.      Comments: JVD positive  Pulmonary:      Effort: Pulmonary effort is normal. No respiratory distress.      Breath sounds: No stridor. Rales present. No wheezing or rhonchi.   Chest:      Chest wall: No tenderness.   Abdominal:      General: Abdomen is flat. There is no distension.      Palpations: There is no mass.      Tenderness: There is no abdominal tenderness. There is no right CVA tenderness, left CVA tenderness, guarding or rebound.      Hernia: No hernia is present.   Musculoskeletal:      Cervical back: Normal range of motion. No rigidity or tenderness.      Right lower leg: Edema present.      Left lower leg: Edema present.   Lymphadenopathy:      Cervical: No cervical adenopathy.   Skin:     General: Skin is warm.      Coloration: Skin is not jaundiced or  pale.      Findings: No bruising, erythema, lesion or rash.   Neurological:      General: No focal deficit present.      Mental Status: He is alert and oriented to person, place, and time. Mental status is at baseline.      Cranial Nerves: No cranial nerve deficit.      Sensory: No sensory deficit.      Motor: No weakness.   Psychiatric:         Mood and Affect: Mood normal.         Behavior: Behavior normal.         Thought Content: Thought content normal.         Judgment: Judgment normal.       LABORATORY DATA     Labs: I have personally reviewed pertinent reports.  Results from last 7 days   Lab Units 12/22/23  1540   WBC Thousand/uL 6.46   HEMOGLOBIN g/dL 12.9   HEMATOCRIT % 43.7   PLATELETS Thousands/uL 223   NEUTROS PCT % 68   MONOS PCT % 10   EOS PCT % 1      Results from last 7 days   Lab Units 12/23/23  0525 12/22/23  1540   POTASSIUM mmol/L 3.4* 3.1*   CHLORIDE mmol/L 107 105   CO2 mmol/L 30 33*   BUN mg/dL 28* 27*   CREATININE mg/dL 1.40* 1.34*   CALCIUM mg/dL 9.5 9.7   ALK PHOS U/L  --  105*   ALT U/L  --  34   AST U/L  --  25     Results from last 7 days   Lab Units 12/23/23  0525   MAGNESIUM mg/dL 2.1          Results from last 7 days   Lab Units 12/22/23  1540   INR  1.52*   PTT seconds 39*               IMAGING & DIAGNOSTIC TESTING     Radiology Results: I have personally reviewed pertinent reports.  XR chest 2 views    Result Date: 12/23/2023  Impression: Moderate right-sided pleural effusion. Cardiomegaly. Workstation performed: [id number]     Other Diagnostic Testing: I have personally reviewed pertinent reports.    ACTIVE MEDICATIONS     Current Facility-Administered Medications   Medication Dose Route Frequency    albuterol (PROVENTIL HFA,VENTOLIN HFA) inhaler 2 puff  2 puff Inhalation Q4H PRN    apixaban (ELIQUIS) tablet 5 mg  5 mg Oral BID    atorvastatin (LIPITOR) tablet 80 mg  80 mg Oral Daily With Dinner    cholecalciferol (VITAMIN D3) tablet 2,000 Units  2,000 Units Oral Daily     "clopidogrel (PLAVIX) tablet 75 mg  75 mg Oral Daily    Empagliflozin (JARDIANCE) tablet 10 mg  10 mg Oral QAM    ferrous sulfate tablet 325 mg  325 mg Oral Every Other Day    fluticasone-vilanterol 200-25 mcg/actuation 1 puff  1 puff Inhalation Daily    furosemide (LASIX) injection 40 mg  40 mg Intravenous BID    insulin glargine (LANTUS) subcutaneous injection 10 Units 0.1 mL  10 Units Subcutaneous HS    insulin lispro (HumaLOG) 100 units/mL subcutaneous injection 1-6 Units  1-6 Units Subcutaneous 4 times day    insulin lispro (HumaLOG) 100 units/mL subcutaneous injection 1-6 Units  1-6 Units Subcutaneous HS    levothyroxine tablet 137 mcg  137 mcg Oral Daily    metoprolol succinate (TOPROL-XL) 24 hr tablet 75 mg  75 mg Oral BID    montelukast (SINGULAIR) tablet 10 mg  10 mg Oral Daily    nitroglycerin (NITROSTAT) SL tablet 0.4 mg  0.4 mg Sublingual Q5 Min PRN    potassium chloride (K-DUR,KLOR-CON) CR tablet 40 mEq  40 mEq Oral BID       VTE Pharmacologic Prophylaxis: Reason for no pharmacologic prophylaxis patient is anticoagulated  VTE Mechanical Prophylaxis: sequential compression device    Portions of the record may have been created with voice recognition software.  Occasional wrong word or \"sound a like\" substitutions may have occurred due to the inherent limitations of voice recognition software.  Read the chart carefully and recognize, using context, where substitutions have occurred.  ==  Carmelo Pavon,   [facility]  Internal Medicine Residency PGY-2      "

## 2023-12-23 NOTE — ASSESSMENT & PLAN NOTE
Echo 5/25/23: LVEF 30-35%. LVIDD 5.4cm, increased wall thickness. TAVR mean gradient 13mmHg. Moderate to severe MR, eccentric. Normal RV size and systolic function. Estimated RVSP 36mmHg, mild TR    [facility], pre TAVR 6/03/22: First marginal lesion 50% stenosis, mid LAD 50% stenosis, RPDA 50% stenosis, RPDA to 50% stenosis, 1st diagonal 50% stenosis.     Home meds: Metoprolol succinate 75 mg BID, Lisinopril 10mg daily, Jardiance 10 mg daily, Spironolactone 25 mg daily, Lasix 60 mg daily - taking 40mg in AM and 20mg in PM    HPI: Presenting with c/o BUSTILLOS, orthopnea and b/l leg swelling x 1 week.  on arrival. Bilateral pleural effusions and ascities seen on US. Right-sided thoracentesis was performed in the ED, 1560 mL of fluid removed. Cultures revealed 2+ polys, no bacteria seen.    Wt Readings from Last 3 Encounters:   11/27/23 86.9 kg (191 lb 9.6 oz)   10/25/23 80.8 kg (178 lb 1.6 oz)   10/11/23 84.4 kg (186 lb)     S/P IV lasix 40 mg in the ED on 12/22/23  S/P thoracentesis, >1500 ccs in the ED on 12/22/23  Plan  Transition from IV Lasix 40 mg twice daily to p.o. Lasix 40 twice daily  Continue regimen on discharge  Echo: EF 25 to 30%, global hypokinesis, mild RV dilation, severe MR, moderate to severe TR, mild NE  Continue home Metoprolol 75 mg BID  Holding home lisinopril in setting of HE  Continue home Jardiance 10 mg daily  Holding home Spironolactone in setting  Tele monitoring  Cardiovascular diet  Daily weights  I/Os

## 2023-12-23 NOTE — ASSESSMENT & PLAN NOTE
#26mm Emery Hernán S3 Ultra valve via left femoral approach by Dr Walker.  6/21/22; normally functioning on echo 5/25/23  Continue Eliquis 5 mg BID and Plavix 75 mg daily

## 2023-12-23 NOTE — ASSESSMENT & PLAN NOTE
Severe on echo 3/21/23, moderate to severe on echo 5/25/23.  Follow up with your outpatient cardiologist

## 2023-12-23 NOTE — ASSESSMENT & PLAN NOTE
K 3.1 on admission.  S/P kdurr 40 meq x 1 dose in the ED on 12/22/23  Ordered an addition dose of kdurr 40 meq  Goal K > 4.0  Monitor electrolytes and replete as needed

## 2023-12-23 NOTE — ASSESSMENT & PLAN NOTE
Lab Results   Component Value Date    HGBA1C 7.6 (H) 02/10/2023       Recent Labs     12/22/23  2156   POCGLU 149*       Blood Sugar Average: Last 72 hrs:  (P) 149  A1c 6.9     SSI Algorithm 3  Continue to monitor blood glucose  Adjust insulin regimen as needed

## 2023-12-24 LAB
ALBUMIN SERPL BCP-MCNC: 3.7 G/DL (ref 3.5–5)
ALP SERPL-CCNC: 107 U/L (ref 34–104)
ALT SERPL W P-5'-P-CCNC: 31 U/L (ref 7–52)
ANION GAP SERPL CALCULATED.3IONS-SCNC: 7 MMOL/L
AST SERPL W P-5'-P-CCNC: 30 U/L (ref 13–39)
BASOPHILS # BLD AUTO: 0.03 THOUSANDS/ÂΜL (ref 0–0.1)
BASOPHILS NFR BLD AUTO: 0 % (ref 0–1)
BILIRUB SERPL-MCNC: 1.09 MG/DL (ref 0.2–1)
BUN SERPL-MCNC: 28 MG/DL (ref 5–25)
CALCIUM SERPL-MCNC: 10 MG/DL (ref 8.4–10.2)
CHLORIDE SERPL-SCNC: 106 MMOL/L (ref 96–108)
CO2 SERPL-SCNC: 34 MMOL/L (ref 21–32)
CREAT SERPL-MCNC: 1.43 MG/DL (ref 0.6–1.3)
EOSINOPHIL # BLD AUTO: 0.08 THOUSAND/ÂΜL (ref 0–0.61)
EOSINOPHIL NFR BLD AUTO: 1 % (ref 0–6)
ERYTHROCYTE [DISTWIDTH] IN BLOOD BY AUTOMATED COUNT: 18.6 % (ref 11.6–15.1)
GFR SERPL CREATININE-BSD FRML MDRD: 47 ML/MIN/1.73SQ M
GLUCOSE SERPL-MCNC: 104 MG/DL (ref 65–140)
GLUCOSE SERPL-MCNC: 116 MG/DL (ref 65–140)
GLUCOSE SERPL-MCNC: 129 MG/DL (ref 65–140)
GLUCOSE SERPL-MCNC: 140 MG/DL (ref 65–140)
GLUCOSE SERPL-MCNC: 183 MG/DL (ref 65–140)
GLUCOSE SERPL-MCNC: 309 MG/DL (ref 65–140)
HCT VFR BLD AUTO: 41.6 % (ref 36.5–49.3)
HGB BLD-MCNC: 12.7 G/DL (ref 12–17)
IMM GRANULOCYTES # BLD AUTO: 0.01 THOUSAND/UL (ref 0–0.2)
IMM GRANULOCYTES NFR BLD AUTO: 0 % (ref 0–2)
LYMPHOCYTES # BLD AUTO: 1.33 THOUSANDS/ÂΜL (ref 0.6–4.47)
LYMPHOCYTES NFR BLD AUTO: 18 % (ref 14–44)
MAGNESIUM SERPL-MCNC: 2.3 MG/DL (ref 1.9–2.7)
MCH RBC QN AUTO: 26.8 PG (ref 26.8–34.3)
MCHC RBC AUTO-ENTMCNC: 30.5 G/DL (ref 31.4–37.4)
MCV RBC AUTO: 88 FL (ref 82–98)
MONOCYTES # BLD AUTO: 0.71 THOUSAND/ÂΜL (ref 0.17–1.22)
MONOCYTES NFR BLD AUTO: 10 % (ref 4–12)
NEUTROPHILS # BLD AUTO: 5.21 THOUSANDS/ÂΜL (ref 1.85–7.62)
NEUTS SEG NFR BLD AUTO: 71 % (ref 43–75)
NRBC BLD AUTO-RTO: 0 /100 WBCS
PHOSPHATE SERPL-MCNC: 3 MG/DL (ref 2.3–4.1)
PLATELET # BLD AUTO: 200 THOUSANDS/UL (ref 149–390)
PMV BLD AUTO: 10.7 FL (ref 8.9–12.7)
POTASSIUM SERPL-SCNC: 4.1 MMOL/L (ref 3.5–5.3)
PROT SERPL-MCNC: 6.5 G/DL (ref 6.4–8.4)
RBC # BLD AUTO: 4.73 MILLION/UL (ref 3.88–5.62)
SODIUM SERPL-SCNC: 147 MMOL/L (ref 135–147)
WBC # BLD AUTO: 7.37 THOUSAND/UL (ref 4.31–10.16)

## 2023-12-24 PROCEDURE — 80053 COMPREHEN METABOLIC PANEL: CPT

## 2023-12-24 PROCEDURE — 84100 ASSAY OF PHOSPHORUS: CPT

## 2023-12-24 PROCEDURE — 85025 COMPLETE CBC W/AUTO DIFF WBC: CPT

## 2023-12-24 PROCEDURE — 83735 ASSAY OF MAGNESIUM: CPT

## 2023-12-24 PROCEDURE — 82948 REAGENT STRIP/BLOOD GLUCOSE: CPT

## 2023-12-24 PROCEDURE — NC001 PR NO CHARGE: Performed by: INTERNAL MEDICINE

## 2023-12-24 RX ADMIN — APIXABAN 5 MG: 5 TABLET, FILM COATED ORAL at 08:25

## 2023-12-24 RX ADMIN — LEVOTHYROXINE SODIUM 137 MCG: 25 TABLET ORAL at 08:25

## 2023-12-24 RX ADMIN — METOPROLOL SUCCINATE 75 MG: 50 TABLET, EXTENDED RELEASE ORAL at 17:45

## 2023-12-24 RX ADMIN — FUROSEMIDE 40 MG: 10 INJECTION, SOLUTION INTRAMUSCULAR; INTRAVENOUS at 08:25

## 2023-12-24 RX ADMIN — APIXABAN 5 MG: 5 TABLET, FILM COATED ORAL at 17:45

## 2023-12-24 RX ADMIN — INSULIN LISPRO 1 UNITS: 100 INJECTION, SOLUTION INTRAVENOUS; SUBCUTANEOUS at 11:00

## 2023-12-24 RX ADMIN — MONTELUKAST 10 MG: 10 TABLET, FILM COATED ORAL at 08:25

## 2023-12-24 RX ADMIN — INSULIN GLARGINE 10 UNITS: 100 INJECTION, SOLUTION SUBCUTANEOUS at 21:12

## 2023-12-24 RX ADMIN — FUROSEMIDE 60 MG: 20 TABLET ORAL at 16:49

## 2023-12-24 RX ADMIN — ATORVASTATIN CALCIUM 80 MG: 80 TABLET, FILM COATED ORAL at 16:49

## 2023-12-24 RX ADMIN — Medication 2000 UNITS: at 08:25

## 2023-12-24 RX ADMIN — EMPAGLIFLOZIN 10 MG: 10 TABLET, FILM COATED ORAL at 08:26

## 2023-12-24 RX ADMIN — CLOPIDOGREL BISULFATE 75 MG: 75 TABLET ORAL at 08:25

## 2023-12-24 RX ADMIN — FLUTICASONE FUROATE AND VILANTEROL TRIFENATATE 1 PUFF: 200; 25 POWDER RESPIRATORY (INHALATION) at 08:26

## 2023-12-24 RX ADMIN — INSULIN LISPRO 4 UNITS: 100 INJECTION, SOLUTION INTRAVENOUS; SUBCUTANEOUS at 14:12

## 2023-12-24 RX ADMIN — METOPROLOL SUCCINATE 75 MG: 50 TABLET, EXTENDED RELEASE ORAL at 08:25

## 2023-12-24 NOTE — PROGRESS NOTES
INTERNAL MEDICINE RESIDENCY PROGRESS NOTE     Name: Juan David Lora   Age & Sex: 78 y.o. male   MRN: [id number]  Unit/Bed#: -01   Encounter: [id number]  Team: SOD Team A    PATIENT INFORMATION     Name: Juan David Lora   Age & Sex: 78 y.o. male   MRN: [id number]  Hospital Stay Days: 2    ASSESSMENT/PLAN     Principal Problem:    Acute on chronic heart failure (HCC)  Active Problems:    Mild intermittent asthma without complication    CAD (coronary artery disease)    Type 2 diabetes mellitus with circulatory disorder, with long-term current use of insulin (HCC)    Hyperlipidemia    Essential hypertension    Hypothyroidism    S/P TAVR (transcatheter aortic valve replacement)    History of tachy-lino syndrome    s/p Medtronic dual chamber PPM 8/31/22 s/p upgrade to BiV ICD 9/13/2023    HE (acute kidney injury) ([facility])    Hypokalemia    Atrial fibrillation ([facility])    Mitral regurgitation    Hepatitis C    Smoking history      * Acute on chronic heart failure (HCC)  Assessment & Plan  Echo 5/25/23: LVEF 30-35%. LVIDD 5.4cm, increased wall thickness. TAVR mean gradient 13mmHg. Moderate to severe MR, eccentric. Normal RV size and systolic function. Estimated RVSP 36mmHg, mild TR    LHC, pre TAVR 6/03/22: First marginal lesion 50% stenosis, mid LAD 50% stenosis, RPDA 50% stenosis, RPDA to 50% stenosis, 1st diagonal 50% stenosis.     Home meds: Metoprolol succinate 75 mg BID, Lisinopril 10mg daily, Jardiance 10 mg daily, Spironolactone 25 mg daily, Lasix 60 mg daily - taking 40mg in AM and 20mg in PM    HPI: Presenting with c/o BUSTILLOS, orthopnea and b/l leg swelling x 1 week.  on arrival. Bilateral pleural effusions and ascities seen on US. Right-sided thoracentesis was performed in the ED, 1560 mL of fluid removed. Cultures revealed 2+ polys, no bacteria seen.    Wt Readings from Last 3 Encounters:   11/27/23 86.9 kg (191 lb 9.6 oz)   10/25/23 80.8 kg (178 lb 1.6 oz)   10/11/23 84.4 kg (186 lb)     S/P IV lasix  40 mg in the ED on 12/22/23  S/P thoracentesis, >1500 ccs in the ED on 12/22/23  Plan  Transition from IV Lasix 40 mg twice daily to p.o. Lasix 40 twice daily  Patient already received morning dose of IV Lasix 40 this morning and will start p.o. 40 mg this evening  Echo: EF 25 to 30%, global hypokinesis, mild RV dilation, severe MR, moderate to severe TR, mild SC  Continue home Metoprolol 75 mg BID  Holding home lisinopril in setting of HE  Continue home Jardiance 10 mg daily  Holding home Spironolactone in setting  Tele monitoring  Cardiovascular diet  Daily weights  I/Os    Mild intermittent asthma without complication  Assessment & Plan  PRN albuterol inhaler q4h    CAD (coronary artery disease)  Assessment & Plan  CAD s/p NSTEMI with PCI to the mid LAD and RPDA 3/21/23   Continue home plavix, apixaban, and statin    Smoking history  Assessment & Plan  Hx of tobacco use (1/2 PPD for ~50 years). Quit 05/30/2022.  Outpatient follow up with PCP    Hepatitis C  Assessment & Plan  Untreated per chart review  Follow up with your outpatient primary care physician    Mitral regurgitation  Assessment & Plan  Severe on echo 3/21/23, moderate to severe on echo 5/25/23.  Follow up with your outpatient cardiologist    Atrial fibrillation (HCC)  Assessment & Plan  Home meds: rate control with Metoprolol 75 mg BID, anticoagulation with Plavix 75 mg daily and Eliquis 5 mg BID  Continue home metoprolol, plavix and eliquis    Hypokalemia  Assessment & Plan  K 3.1 on admission.  S/P kdurr 40 meq x 1 dose in the ED on 12/22/23  Ordered an addition dose of kdurr 40 meq  Goal K > 4.0  Monitor electrolytes and replete as needed    HE (acute kidney injury) (HCC)  Assessment & Plan  Baseline Cr 1.0-1.1. Cr 1.34 on admission.  Continue to monitor kidney function  Avoid nephrotoxic agents    s/p Medtronic dual chamber PPM 8/31/22 s/p upgrade to BiV ICD 9/13/2023  Assessment & Plan  Refer to a&p above    History of tachy-lino  syndrome  Assessment & Plan  Refer to a&p above    S/P TAVR (transcatheter aortic valve replacement)  Assessment & Plan  #26mm Emery Hernán S3 Ultra valve via left femoral approach by Dr Walker.  22; normally functioning on echo 23  Continue Eliquis 5 mg BID and Plavix 75 mg daily    Hypothyroidism  Assessment & Plan  Continue home Levothyroxine 137 mcg daily  TSH is within normal limits    Essential hypertension  Assessment & Plan  Controlled at this time.  Continue home Metoprolol 75 mg BID  Holding home Lisinopril 10 mg daily in setting of HE  Holding home Spironolactone 25 mg daily in setting of HE  Continue to monitor blood pressures    Hyperlipidemia  Assessment & Plan  Lipid panel: , TG 74, HDL 35, LDL 63.  Continue home Lipitor 80 mg daily    Type 2 diabetes mellitus with circulatory disorder, with long-term current use of insulin ([facility])  Assessment & Plan  Lab Results   Component Value Date    HGBA1C 7.6 (H) 02/10/2023       Recent Labs     23   POCGLU 149*       Blood Sugar Average: Last 72 hrs:  (P) 149  A1c 6.9     SSI Algorithm 3  Continue to monitor blood glucose  Adjust insulin regimen as needed        Disposition: Pending clinical stability with transition to p.o. diuretic    SUBJECTIVE     Patient seen and examined. No acute events overnight.  No acute complaints.  Ambulated to the bathroom independently.  Denied chest pain, shortness of breath, abdominal pain, nausea, vomiting.  Utilized Amharic translation for encounter.    OBJECTIVE     Vitals:    23 0234 23 0559 23 0719 23 0910   BP: 109/74  114/77    BP Location:   Left arm    Pulse: 105  104    Resp:   20    Temp:   (!) 97.3 °F (36.3 °C)    TempSrc:   Oral    SpO2: 94%  96% 99%   Weight:  79.4 kg (175 lb)     Height:          Temperature:   Temp (24hrs), Av.6 °F (36.4 °C), Min:97.3 °F (36.3 °C), Max:97.8 °F (36.6 °C)    Temperature: (!) 97.3 °F (36.3 °C)  Intake & Output:  I/O          12/22 0701  12/23 0700 12/23 0701  12/24 0700 12/24 0701  12/25 0700    P.O.  1452 298    Total Intake(mL/kg)  1452 (18.3) 298 (3.8)    Urine (mL/kg/hr) 700 1725 (0.9) 800 (3)    Total Output 700 1725 800    Net -700 -273 -502                 Weights:   IBW (Ideal Body Weight): 66.1 kg    Body mass index is 27.41 kg/m².  Weight (last 2 days)       Date/Time Weight    12/24/23 0559 79.4 (175)    12/23/23 0954 80.7 (178)    12/23/23 0500 80.8 (178.2)    12/22/23 2151 81.6 (179.8)          Physical Exam  Vitals reviewed.   Constitutional:       General: He is not in acute distress.  HENT:      Head: Normocephalic and atraumatic.      Nose: No rhinorrhea.   Eyes:      General: No scleral icterus.  Cardiovascular:      Rate and Rhythm: Normal rate and regular rhythm.      Pulses: Normal pulses.      Heart sounds: Normal heart sounds. No murmur heard.  Pulmonary:      Effort: Pulmonary effort is normal. No respiratory distress.      Breath sounds: Normal breath sounds. No wheezing, rhonchi or rales.   Abdominal:      General: Bowel sounds are normal. There is no distension.      Palpations: Abdomen is soft.      Tenderness: There is no abdominal tenderness. There is no guarding or rebound.   Musculoskeletal:      Right lower leg: No edema.      Left lower leg: No edema.   Skin:     General: Skin is warm and dry.      Capillary Refill: Capillary refill takes less than 2 seconds.      Coloration: Skin is not jaundiced.   Neurological:      Mental Status: He is alert.      Cranial Nerves: No dysarthria.      Comments: CN 2-12 grossly intact, moves all 4 extremities   Psychiatric:         Mood and Affect: Mood normal.         Behavior: Behavior normal.       LABORATORY DATA     Labs: I have personally reviewed pertinent reports.  Results from last 7 days   Lab Units 12/24/23  0530 12/22/23  1540   WBC Thousand/uL 7.37 6.46   HEMOGLOBIN g/dL 12.7 12.9   HEMATOCRIT % 41.6 43.7   PLATELETS Thousands/uL 200 223   NEUTROS PCT %  71 68   MONOS PCT % 10 10   EOS PCT % 1 1      Results from last 7 days   Lab Units 12/24/23  0530 12/23/23  0525 12/22/23  1540   POTASSIUM mmol/L 4.1 3.4* 3.1*   CHLORIDE mmol/L 106 107 105   CO2 mmol/L 34* 30 33*   BUN mg/dL 28* 28* 27*   CREATININE mg/dL 1.43* 1.40* 1.34*   CALCIUM mg/dL 10.0 9.5 9.7   ALK PHOS U/L 107*  --  105*   ALT U/L 31  --  34   AST U/L 30  --  25     Results from last 7 days   Lab Units 12/24/23  0530 12/23/23  0525   MAGNESIUM mg/dL 2.3 2.1     Results from last 7 days   Lab Units 12/24/23  0530   PHOSPHORUS mg/dL 3.0      Results from last 7 days   Lab Units 12/22/23  1540   INR  1.52*   PTT seconds 39*               IMAGING & DIAGNOSTIC TESTING     Radiology Results: I have personally reviewed pertinent reports.  XR chest portable    Result Date: 12/23/2023  Impression: Decreased size in now small right pleural effusion following thoracentesis. Workstation performed: [id number]     XR chest 2 views    Result Date: 12/23/2023  Impression: Moderate right-sided pleural effusion. Cardiomegaly. Workstation performed: [id number]     Other Diagnostic Testing: I have personally reviewed pertinent reports.    ACTIVE MEDICATIONS     Current Facility-Administered Medications   Medication Dose Route Frequency    albuterol (PROVENTIL HFA,VENTOLIN HFA) inhaler 2 puff  2 puff Inhalation Q4H PRN    apixaban (ELIQUIS) tablet 5 mg  5 mg Oral BID    atorvastatin (LIPITOR) tablet 80 mg  80 mg Oral Daily With Dinner    cholecalciferol (VITAMIN D3) tablet 2,000 Units  2,000 Units Oral Daily    clopidogrel (PLAVIX) tablet 75 mg  75 mg Oral Daily    Empagliflozin (JARDIANCE) tablet 10 mg  10 mg Oral QAM    ferrous sulfate tablet 325 mg  325 mg Oral Every Other Day    fluticasone-vilanterol 200-25 mcg/actuation 1 puff  1 puff Inhalation Daily    furosemide (LASIX) tablet 60 mg  60 mg Oral BID (diuretic)    insulin glargine (LANTUS) subcutaneous injection 10 Units 0.1 mL  10 Units Subcutaneous HS    insulin  "lispro (HumaLOG) 100 units/mL subcutaneous injection 1-6 Units  1-6 Units Subcutaneous 4 times day    insulin lispro (HumaLOG) 100 units/mL subcutaneous injection 1-6 Units  1-6 Units Subcutaneous HS    levothyroxine tablet 137 mcg  137 mcg Oral Daily    metoprolol succinate (TOPROL-XL) 24 hr tablet 75 mg  75 mg Oral BID    montelukast (SINGULAIR) tablet 10 mg  10 mg Oral Daily    nitroglycerin (NITROSTAT) SL tablet 0.4 mg  0.4 mg Sublingual Q5 Min PRN       VTE Pharmacologic Prophylaxis: Eliquis  VTE Mechanical Prophylaxis: sequential compression device    Portions of the record may have been created with voice recognition software.  Occasional wrong word or \"sound a like\" substitutions may have occurred due to the inherent limitations of voice recognition software.  Read the chart carefully and recognize, using context, where substitutions have occurred.  ==  Stormy Jaime DO  [facility]  Internal Medicine Residency PGY-3     "

## 2023-12-24 NOTE — UTILIZATION REVIEW
Date: 12/24    Day 3: Has surpassed a 2nd midnight with active treatments and services, which include acute on chronic HF on IV Lasix BID, thoracentesis done, no bacteria on culture, BLE edema, improvement in breathing on day 2, still with rales.     Initial Clinical Review    Admission: Date/Time/Statement:   Admission Orders (From admission, onward)       Ordered        12/22/23 2009  INPATIENT ADMISSION  Once                          Orders Placed This Encounter   Procedures    INPATIENT ADMISSION     Standing Status:   Standing     Number of Occurrences:   1     Order Specific Question:   Level of Care     Answer:   Med Surg [16]     Order Specific Question:   Estimated length of stay     Answer:   More than 2 Midnights     Order Specific Question:   Certification     Answer:   I certify that inpatient services are medically necessary for this patient for a duration of greater than two midnights. See H&P and MD Progress Notes for additional information about the patient's course of treatment.     ED Arrival Information       Expected   -    Arrival   12/22/2023 14:49    Acuity   Urgent              Means of arrival   Walk-In    Escorted by   Family Member    Service   [facility] Medicine    Admission type   Emergency              Arrival complaint   Nose bleed leg swollen             Chief Complaint   Patient presents with    Medical Problem     Pt wife reports pt has b/l leg swelling, sob and nose bleeds..         Initial Presentation: 78 y.o. male presents to the ED from home with c/o BUSTILLOS, orthopnea and b/l leg swelling x 1 week, SOB worse with lying, inhalers provide little relief, 2 lb weight gain in 1 day. Notes med compliance.  PMH:  HFrEF (EF 30-35%), multi-vessel CAD s/p STEMI with PCI to the mid LAD and RPDA 3/21/23, severe AS s/p TAVR, mitral regurgitation, Afib, Tachy-lino syndrome with LBBB S/P MDT PPM then upgraded to biventricular ICD, HTN, HLD,T2DM, hypothyroidism, asthma, hep C (untreated per chart  review), and a hx of tobacco use.  Labs - low K 3.1, elevated BUN/Cr, BNP, alk phos, T bili.  Imaging - R pleural effusion, cardiomegaly.  Treated with IV Lasix, PO KCL. On exam decreased breath sounds, BLE 2+ pitting edema.  Pt has a thoracentesis done in ED with 1560 ml fluid removed.  He is admitted to INPATIENT status with Acute on chronic HF - IV Lasix 60 mg BID, Echo, hold Lisinopril, Aldactone, Tele, daily wt.  HE - trend labs.  Hypokalemia - replete, trend.     Date: 12/23   Day 2:   K up to 3.4.  BUN/Cr increasing slightly.  Remains on IV Lasix. No bacteria on thoracentesis culture. Continue Tele. On exam pt notes improvement in breathings, no c/o CP. Still with rales, BLE edema.     ED Triage Vitals   Temperature Pulse Respirations Blood Pressure SpO2   12/22/23 1454 12/22/23 1454 12/22/23 1454 12/22/23 1454 12/22/23 1454   (!) 97 °F (36.1 °C) 103 20 113/94 96 %      Temp Source Heart Rate Source Patient Position - Orthostatic VS BP Location FiO2 (%)   12/22/23 2151 12/22/23 1454 12/22/23 1530 12/22/23 1530 --   Oral Monitor Lying Left arm       Pain Score       12/22/23 1530       No Pain          Wt Readings from Last 1 Encounters:   12/24/23 79.4 kg (175 lb)     Additional Vital Signs:   Date/Time Temp Pulse Resp BP MAP (mmHg) SpO2 O2 Device Patient Position - Orthostatic VS   12/24/23 0910 -- -- -- -- -- 99 % -- --   12/24/23 07:19:06 97.3 °F (36.3 °C) Abnormal  104 20 114/77 89 96 % None (Room air) Lying   12/24/23 02:34:32 -- 105 -- 109/74 86 94 % -- --   12/24/23 02:34:23 -- 104 -- 109/74 86 72 % Abnormal  -- --   12/23/23 22:02:41 97.8 °F (36.6 °C) 85 -- 109/73 85 96 % -- --   12/23/23 22:00:10 97.8 °F (36.6 °C) 86 -- 135/100 112 97 % -- --   12/23/23 1502 97.5 °F (36.4 °C) 107 Abnormal  18 126/76 93 96 % None (Room air) Lying   12/23/23 11:15:05 97.5 °F (36.4 °C) 106 Abnormal  -- 125/74 91 93 % -- --   12/23/23 0954 -- 92 -- 125/86 -- -- -- --   12/23/23 0900 -- -- -- -- -- -- None (Room air) --    12/23/23 07:08:10 97.4 °F (36.3 °C) Abnormal  112 Abnormal  20 125/86 99 98 % None (Room air) Lying   12/23/23 02:58:52 -- 82 18 117/93 101 96 % -- --   12/22/23 22:36:57 97.4 °F (36.3 °C) Abnormal  75 -- 111/74 86 84 % Abnormal  -- --   12/22/23 2234 -- 82 -- 111/74 -- -- -- --   12/22/23 2151 -- -- 20 -- -- -- -- Lying   12/22/23 21:48:58 97.4 °F (36.3 °C) Abnormal  106 Abnormal  -- 102/67 79 93 % -- --   12/22/23 2000 -- 103 20 131/65 88 99 % None (Room air) Sitting   12/22/23 1900 -- 101 20 125/66 90 100 % None (Room air) Sitting   12/22/23 1830 -- 88 19 114/63 83 98 % None (Room air) Sitting   12/22/23 1730 -- 80 20 116/59 80 96 % None (Room air) Sitting   12/22/23 1700 -- 80 21 129/69 -- 99 % None (Room air) Sitting   12/22/23 1600 -- 79 20 118/70 89 99 % None (Room air) Sitting   12/22/23 1530 -- 81 18 119/74 93 96 % None (Room air) Lying       Pertinent Labs/Diagnostic Test Results:     12/22 ECG - Ventricular paced rhythm.      12/23 Echo -   Left Ventricle: Left ventricular cavity size is mildly dilated. Wall thickness is upper normal. The left ventricular ejection fraction is 25-30%. Systolic function is severely reduced. There is severe global hypokinesis with regional variation. Diastolic function is normal.    IVS: There is abnormal septal motion consistent with left bundle branch block or right ventricular pacing.    Right Ventricle: Right ventricular cavity size is mildly dilated. Systolic function is moderately reduced. A pacer wire is present.    Left Atrium: The atrium is moderate to severely dilated.    Right Atrium: The atrium is moderately dilated.    Aortic Valve: There is a TAVR bioprosthetic valve. The prosthetic valve appears to be functioning normally.    Mitral Valve: There is severe regurgitation with an eccentrically directed jet.    Tricuspid Valve: There is moderate to severe regurgitation. The right ventricular systolic pressure is moderately to severely elevated.    Pulmonic  Valve: There is mild regurgitation.    IVC/SVC: The inferior vena cava is dilated. Respirophasic changes were blunted (less than 50% variation).       XR chest portable   ED Interpretation by Francisco Mohan MD (12/22 1901)   Pleural effusion, right resolved.   There's no obvious pneumothorax ex vacuo      Final Result by Boaz Jane MD (12/23 2000)      Decreased size in now small right pleural effusion following thoracentesis.                  Workstation performed: [id number]         XR chest 2 views   ED Interpretation by Francisco Mohan MD (12/22 1900)   Pleural effusion, RIGHT  Cardiomegaly.      Final Result by Shola Fraser MD (12/23 0735)      Moderate right-sided pleural effusion. Cardiomegaly.            Workstation performed: [id number]               Results from last 7 days   Lab Units 12/24/23  0530 12/22/23  1540   WBC Thousand/uL 7.37 6.46   HEMOGLOBIN g/dL 12.7 12.9   HEMATOCRIT % 41.6 43.7   PLATELETS Thousands/uL 200 223   NEUTROS ABS Thousands/µL 5.21 4.41         Results from last 7 days   Lab Units 12/24/23  0530 12/23/23  0525 12/22/23  1540   SODIUM mmol/L 147 146 143   POTASSIUM mmol/L 4.1 3.4* 3.1*   CHLORIDE mmol/L 106 107 105   CO2 mmol/L 34* 30 33*   ANION GAP mmol/L 7 9 5   BUN mg/dL 28* 28* 27*   CREATININE mg/dL 1.43* 1.40* 1.34*   EGFR ml/min/1.73sq m 47 48 51   CALCIUM mg/dL 10.0 9.5 9.7   MAGNESIUM mg/dL 2.3 2.1  --    PHOSPHORUS mg/dL 3.0  --   --      Results from last 7 days   Lab Units 12/24/23  0530 12/22/23  1540   AST U/L 30 25   ALT U/L 31 34   ALK PHOS U/L 107* 105*   TOTAL PROTEIN g/dL 6.5 6.8   ALBUMIN g/dL 3.7 3.9   TOTAL BILIRUBIN mg/dL 1.09* 1.14*     Results from last 7 days   Lab Units 12/24/23  0804 12/23/23  2039 12/23/23  1720 12/23/23  1159 12/23/23  1018 12/23/23  0749 12/22/23  2156   POC GLUCOSE mg/dl 104 183* 137 231* 153* 107 149*     Results from last 7 days   Lab Units 12/24/23  0530 12/23/23  0525 12/22/23  1540   GLUCOSE RANDOM mg/dL  116 94 58*         Results from last 7 days   Lab Units 12/23/23  0525   HEMOGLOBIN A1C % 6.9*   EAG mg/dl 151     Results from last 7 days   Lab Units 12/22/23  1943 12/22/23  1731 12/22/23  1540   HS TNI 0HR ng/L  --   --  30   HS TNI 2HR ng/L  --  28  --    HSTNI D2 ng/L  --  -2  --    HS TNI 4HR ng/L 28  --   --    HSTNI D4 ng/L -2  --   --          Results from last 7 days   Lab Units 12/22/23  1540   PROTIME seconds 18.1*   INR  1.52*   PTT seconds 39*     Results from last 7 days   Lab Units 12/22/23  1540   TSH 3RD GENERATON uIU/mL 3.862                     Results from last 7 days   Lab Units 12/22/23  1540   BNP pg/mL 553*     Results from last 7 days   Lab Units 12/22/23  1839   GRAM STAIN RESULT  2+ Polys  No bacteria seen   BODY FLUID CULTURE, STERILE  No growth     Results from last 7 days   Lab Units 12/22/23  1839   WBC FLUID /ul 482               ED Treatment:   Medication Administration from 12/22/2023 1449 to 12/22/2023 2141         Date/Time Order Dose Route Action     12/22/2023 1856 EST furosemide (LASIX) injection 40 mg 40 mg Intravenous Given     12/22/2023 1856 EST potassium chloride (K-DUR,KLOR-CON) CR tablet 40 mEq 40 mEq Oral Given          Past Medical History:   Diagnosis Date    Arthritis     Asthma     Colon polyps     Community acquired pneumonia     last assessed: 5/1/2014    Diabetes mellitus (HCC)     Hemorrhagic prepatellar bursitis, left 10/21/2019    Hepatitis C     High cholesterol     History of colonoscopy 2017    Hypertension     Lymphadenopathy, anterior cervical 04/17/2018    Nephritis and nephropathy, not specified as acute or chronic, with other specified pathological lesion in kidney, in diseases classified elsewhere 3/26/2013    NSTEMI (non-ST elevated myocardial infarction) (HCC) 1/30/2023    s/p Medtronic dual chamber PPM 8/31/22 s/p upgrade to BiV ICD 9/13/2023 9/16/2023    Screening for colon cancer 05/01/2019    SIRS (systemic inflammatory response syndrome)  ([facility]) 3/19/2023    Thoracic vertebral fracture ([facility]) 06/11/2014     Present on Admission:   CAD (coronary artery disease)   Essential hypertension   Hyperlipidemia   Mild intermittent asthma without complication   Hypothyroidism   History of tachy-lino syndrome   s/p Medtronic dual chamber PPM 8/31/22 s/p upgrade to BiV ICD 9/13/2023      Admitting Diagnosis: Orthopnea [R06.01]  Dyspnea [R06.00]  Leg swelling [M79.89]  Bleeding from the nose [R04.0]  Acute on chronic systolic congestive heart failure (HCC) [I50.23]  Age/Sex: 78 y.o. male  Admission Orders:  Scheduled Medications:  apixaban, 5 mg, Oral, BID  atorvastatin, 80 mg, Oral, Daily With Dinner  cholecalciferol, 2,000 Units, Oral, Daily  clopidogrel, 75 mg, Oral, Daily  Empagliflozin, 10 mg, Oral, QAM  ferrous sulfate, 325 mg, Oral, Every Other Day  fluticasone-vilanterol, 1 puff, Inhalation, Daily  furosemide, 40 mg, Intravenous, BID  insulin glargine, 10 Units, Subcutaneous, HS  insulin lispro, 1-6 Units, Subcutaneous, 4 times day  insulin lispro, 1-6 Units, Subcutaneous, HS  levothyroxine, 137 mcg, Oral, Daily  metoprolol succinate, 75 mg, Oral, BID  montelukast, 10 mg, Oral, Daily      Continuous IV Infusions:     PRN Meds:  albuterol, 2 puff, Inhalation, Q4H PRN  nitroglycerin, 0.4 mg, Sublingual, Q5 Min PRN    Daily wt  POC GLUCOSE AC/HS WITH [employer] COVERAGE   Fluid restriction  Thoracentesis - fluid for studies    Network Utilization Review Department  ATTENTION: Please call with any questions or concerns to [phone] and carefully listen to the prompts so that you are directed to the right person. All voicemails are confidential.   For Discharge needs, contact Care Management DC Support Team at [phone] opt. 2  Send all requests for admission clinical reviews, approved or denied determinations and any other requests to dedicated fax number below belonging to the campus where the patient is receiving treatment. List of dedicated fax numbers for  the Facilities:  FACILITY NAME UR FAX NUMBER   ADMISSION DENIALS (Administrative/Medical Necessity) [phone]   DISCHARGE SUPPORT TEAM (NETWORK) [phone]   PARENT CHILD HEALTH (Maternity/NICU/Pediatrics) [phone]   [facility] [phone]   [facility] [phone]   [facility] [phone]   [facility] [phone]   [facility] [phone]   [facility] [phone]   [facility] [phone]   [facility] [phone]   [facility] [phone]   [facility] [phone]   [facility] [phone]

## 2023-12-24 NOTE — QUICK NOTE
Patient declined a phone call to update family today.    Stormy Jaime DO  [facility]  Internal Medicine, PGY-3

## 2023-12-25 VITALS
RESPIRATION RATE: 17 BRPM | TEMPERATURE: 97.8 F | HEART RATE: 107 BPM | BODY MASS INDEX: 27.15 KG/M2 | DIASTOLIC BLOOD PRESSURE: 76 MMHG | SYSTOLIC BLOOD PRESSURE: 117 MMHG | OXYGEN SATURATION: 95 % | HEIGHT: 67 IN | WEIGHT: 173 LBS

## 2023-12-25 LAB
ANION GAP SERPL CALCULATED.3IONS-SCNC: 6 MMOL/L
BACTERIA SPEC BFLD CULT: NO GROWTH
BUN SERPL-MCNC: 30 MG/DL (ref 5–25)
CALCIUM SERPL-MCNC: 10.2 MG/DL (ref 8.4–10.2)
CHLORIDE SERPL-SCNC: 99 MMOL/L (ref 96–108)
CO2 SERPL-SCNC: 36 MMOL/L (ref 21–32)
CREAT SERPL-MCNC: 1.41 MG/DL (ref 0.6–1.3)
ERYTHROCYTE [DISTWIDTH] IN BLOOD BY AUTOMATED COUNT: 18.5 % (ref 11.6–15.1)
GFR SERPL CREATININE-BSD FRML MDRD: 48 ML/MIN/1.73SQ M
GLUCOSE SERPL-MCNC: 167 MG/DL (ref 65–140)
GLUCOSE SERPL-MCNC: 82 MG/DL (ref 65–140)
GLUCOSE SERPL-MCNC: 90 MG/DL (ref 65–140)
GRAM STN SPEC: NORMAL
GRAM STN SPEC: NORMAL
HCT VFR BLD AUTO: 41.1 % (ref 36.5–49.3)
HGB BLD-MCNC: 13 G/DL (ref 12–17)
MCH RBC QN AUTO: 27.4 PG (ref 26.8–34.3)
MCHC RBC AUTO-ENTMCNC: 31.6 G/DL (ref 31.4–37.4)
MCV RBC AUTO: 87 FL (ref 82–98)
PLATELET # BLD AUTO: 211 THOUSANDS/UL (ref 149–390)
PMV BLD AUTO: 11.3 FL (ref 8.9–12.7)
POTASSIUM SERPL-SCNC: 3.6 MMOL/L (ref 3.5–5.3)
RBC # BLD AUTO: 4.75 MILLION/UL (ref 3.88–5.62)
SODIUM SERPL-SCNC: 141 MMOL/L (ref 135–147)
WBC # BLD AUTO: 7.53 THOUSAND/UL (ref 4.31–10.16)

## 2023-12-25 PROCEDURE — 82948 REAGENT STRIP/BLOOD GLUCOSE: CPT

## 2023-12-25 PROCEDURE — 80048 BASIC METABOLIC PNL TOTAL CA: CPT | Performed by: STUDENT IN AN ORGANIZED HEALTH CARE EDUCATION/TRAINING PROGRAM

## 2023-12-25 PROCEDURE — 85027 COMPLETE CBC AUTOMATED: CPT | Performed by: STUDENT IN AN ORGANIZED HEALTH CARE EDUCATION/TRAINING PROGRAM

## 2023-12-25 RX ORDER — POTASSIUM CHLORIDE 20 MEQ/1
40 TABLET, EXTENDED RELEASE ORAL ONCE
Qty: 2 TABLET | Refills: 0 | Status: COMPLETED | OUTPATIENT
Start: 2023-12-25 | End: 2023-12-25

## 2023-12-25 RX ORDER — FUROSEMIDE 40 MG/1
40 TABLET ORAL 2 TIMES DAILY
Qty: 60 TABLET | Refills: 0 | Status: SHIPPED | OUTPATIENT
Start: 2023-12-25 | End: 2024-01-24

## 2023-12-25 RX ORDER — FUROSEMIDE 40 MG/1
40 TABLET ORAL
Status: DISCONTINUED | OUTPATIENT
Start: 2023-12-25 | End: 2023-12-25 | Stop reason: HOSPADM

## 2023-12-25 RX ADMIN — LEVOTHYROXINE SODIUM 137 MCG: 25 TABLET ORAL at 05:07

## 2023-12-25 RX ADMIN — EMPAGLIFLOZIN 10 MG: 10 TABLET, FILM COATED ORAL at 08:52

## 2023-12-25 RX ADMIN — CLOPIDOGREL BISULFATE 75 MG: 75 TABLET ORAL at 08:52

## 2023-12-25 RX ADMIN — METOPROLOL SUCCINATE 75 MG: 50 TABLET, EXTENDED RELEASE ORAL at 08:52

## 2023-12-25 RX ADMIN — FERROUS SULFATE TAB 325 MG (65 MG ELEMENTAL FE) 325 MG: 325 (65 FE) TAB at 08:52

## 2023-12-25 RX ADMIN — APIXABAN 5 MG: 5 TABLET, FILM COATED ORAL at 08:52

## 2023-12-25 RX ADMIN — Medication 2000 UNITS: at 08:52

## 2023-12-25 RX ADMIN — MONTELUKAST 10 MG: 10 TABLET, FILM COATED ORAL at 08:52

## 2023-12-25 RX ADMIN — POTASSIUM CHLORIDE 40 MEQ: 1500 TABLET, EXTENDED RELEASE ORAL at 08:52

## 2023-12-25 RX ADMIN — FLUTICASONE FUROATE AND VILANTEROL TRIFENATATE 1 PUFF: 200; 25 POWDER RESPIRATORY (INHALATION) at 08:52

## 2023-12-25 RX ADMIN — INSULIN LISPRO 1 UNITS: 100 INJECTION, SOLUTION INTRAVENOUS; SUBCUTANEOUS at 11:08

## 2023-12-25 RX ADMIN — FUROSEMIDE 40 MG: 40 TABLET ORAL at 08:52

## 2023-12-25 NOTE — DISCHARGE INSTR - AVS FIRST PAGE
Continue taking Lasix (furosemide) 40 mg twice a day.  Follow up with PCP in 1-2 weeks.  Follow-up with your heart doctor on January 16 at 11 AM.

## 2023-12-25 NOTE — DISCHARGE SUMMARY
INTERNAL MEDICINE RESIDENCY DISCHARGE SUMMARY     Juan David Lora   78 y.o. male  MRN: [id number]  Room/Bed: /-01     [facility] BE MED SURG 5   Encounter: [id number]    Principal Problem:    Acute on chronic heart failure (HCC)  Active Problems:    Mild intermittent asthma without complication    Type 2 diabetes mellitus with circulatory disorder, with long-term current use of insulin (HCC)    Hyperlipidemia    Essential hypertension    Hypothyroidism    S/P TAVR (transcatheter aortic valve replacement)    CAD (coronary artery disease)    History of tachy-lino syndrome    s/p Medtronic dual chamber PPM 8/31/22 s/p upgrade to BiV ICD 9/13/2023    HE (acute kidney injury) (HCC)    Hypokalemia    Atrial fibrillation (HCC)    Mitral regurgitation    Hepatitis C    Smoking history      Smoking history  Assessment & Plan  Hx of tobacco use (1/2 PPD for ~50 years). Quit 05/30/2022.  Outpatient follow up with PCP    Hepatitis C  Assessment & Plan  Untreated per chart review  Follow up with your outpatient primary care physician    Mitral regurgitation  Assessment & Plan  Severe on echo 3/21/23, moderate to severe on echo 5/25/23.  Follow up with your outpatient cardiologist    Atrial fibrillation (HCC)  Assessment & Plan  Home meds: rate control with Metoprolol 75 mg BID, anticoagulation with Plavix 75 mg daily and Eliquis 5 mg BID  Continue home metoprolol, plavix and eliquis    Hypokalemia  Assessment & Plan  K 3.1 on admission.  S/P kdurr 40 meq x 1 dose in the ED on 12/22/23  Ordered an addition dose of kdurr 40 meq  Goal K > 4.0  Monitor electrolytes and replete as needed    HE (acute kidney injury) (HCC)  Assessment & Plan  Baseline Cr 1.0-1.1. Cr 1.34 on admission. Peaked 1.43, 1.41 on discharge.     Plan:  Likely in setting of diuresis  Recommend repeat BMP in 1 week   Avoid nephrotoxic agents    s/p Medtronic dual chamber PPM 8/31/22 s/p upgrade to BiV  ICD 9/13/2023  Assessment & Plan  Refer to a&p above    History of tachy-lino syndrome  Assessment & Plan  Refer to a&p above    CAD (coronary artery disease)  Assessment & Plan  CAD s/p NSTEMI with PCI to the mid LAD and RPDA 3/21/23   Continue home plavix, apixaban, and statin    S/P TAVR (transcatheter aortic valve replacement)  Assessment & Plan  #26mm Emery Hernán S3 Ultra valve via left femoral approach by Dr Walker.  6/21/22; normally functioning on echo 5/25/23  Continue Eliquis 5 mg BID and Plavix 75 mg daily    Hypothyroidism  Assessment & Plan  Continue home Levothyroxine 137 mcg daily  TSH is within normal limits    Essential hypertension  Assessment & Plan  Controlled at this time.  Continue home Metoprolol 75 mg BID  Holding home Lisinopril 10 mg daily in setting of HE  Holding home Spironolactone 25 mg daily in setting of HE  Continue to monitor blood pressures    Hyperlipidemia  Assessment & Plan  Lipid panel: , TG 74, HDL 35, LDL 63.  Continue home Lipitor 80 mg daily    Type 2 diabetes mellitus with circulatory disorder, with long-term current use of insulin (HCC)  Assessment & Plan  Lab Results   Component Value Date    HGBA1C 7.6 (H) 02/10/2023       Recent Labs     12/22/23  2156   POCGLU 149*       Blood Sugar Average: Last 72 hrs:  (P) 149  A1c 6.9     SSI Algorithm 3  Continue to monitor blood glucose  Adjust insulin regimen as needed    Mild intermittent asthma without complication  Assessment & Plan  PRN albuterol inhaler q4h    * Acute on chronic heart failure (HCC)  Assessment & Plan  Echo 5/25/23: LVEF 30-35%. LVIDD 5.4cm, increased wall thickness. TAVR mean gradient 13mmHg. Moderate to severe MR, eccentric. Normal RV size and systolic function. Estimated RVSP 36mmHg, mild TR    LHC, pre TAVR 6/03/22: First marginal lesion 50% stenosis, mid LAD 50% stenosis, RPDA 50% stenosis, RPDA to 50% stenosis, 1st diagonal 50% stenosis.     Home meds: Metoprolol succinate 75 mg BID,  "Lisinopril 10mg daily, Jardiance 10 mg daily, Spironolactone 25 mg daily, Lasix 60 mg daily - taking 40mg in AM and 20mg in PM    HPI: Presenting with c/o BUSTILLOS, orthopnea and b/l leg swelling x 1 week.  on arrival. Bilateral pleural effusions and ascities seen on US. Right-sided thoracentesis was performed in the ED, 1560 mL of fluid removed. Cultures revealed 2+ polys, no bacteria seen.    Wt Readings from Last 3 Encounters:   11/27/23 86.9 kg (191 lb 9.6 oz)   10/25/23 80.8 kg (178 lb 1.6 oz)   10/11/23 84.4 kg (186 lb)     S/P IV lasix 40 mg in the ED on 12/22/23  S/P thoracentesis, >1500 ccs in the ED on 12/22/23  Plan  Transition from IV Lasix 40 mg twice daily to p.o. Lasix 40 twice daily  Continue regimen on discharge  Echo: EF 25 to 30%, global hypokinesis, mild RV dilation, severe MR, moderate to severe TR, mild AL  Continue home Metoprolol 75 mg BID  Holding home lisinopril in setting of HE  Continue home Jardiance 10 mg daily  Holding home Spironolactone in setting  Tele monitoring  Cardiovascular diet  Daily weights  I/Os        DETAILS OF HOSPITAL COURSE     H&P per Dr. Janet Ball 12/22: \"Juan David Lora is a Romansh-speaking 78-year-old male with a PMHx of HFrEF (EF 30-35%), multi-vessel CAD s/p STEMI with PCI to the mid LAD and RPDA 3/21/23, severe AS s/p TAVR, mitral regurgitation, Afib, Tachy-lino syndrome with LBBB S/P MDT PPM then upgraded to biventricular ICD, HTN, HLD,T2DM, hypothyroidism, asthma, hep C (untreated per chart review), and a hx of tobacco use (1/2 PPD for ~50 years, quit 05/30/2022) presenting to [facility] ED on 12/22/23 with c/o BUSTILLOS, orthopnea and b/l leg swelling x 1 week. Patient states his SOB is worse when lying down and climbing stairs. After climbing to the top of a flight of stairs, ~11 steps, he had to stop and rest for four minutes because he was so short of breath. He tried using his albuterol inhaler with little relief. He weighs himself around every other day, and " "states that he gained 2 lb in the past day. His wife encouraged him to come to the hospital today because he appeared more short of breath. He states he has been compliant with his medications, including his lasix and eliquis. He drinks around 3 bottles of water a day, and reports he limits his salt intake at home. He denies any chest pain, palpitations, worsening abdominal distension, fevers or chills. Of note, he recently saw his outpatient cardiologist, Virginia Gan MD, on 11/27/2023 who increased his lasix to 40 mg twice a day for three days and then instructed him to return back to 40 mg in the AM and 20 mg in the PM. Prior to arrival, patient took his morning 40 mg dose of lasix, but did not take his evening 20 mg dose.     On arrival to the ED, VSS: afebrile, HR 80s-100s, -130s/60-70s, SpO2 % on RA. Bilateral pleural effusions and ascities were seen on US. A right-sided thoracentesis was performed in the emergency department, and 1560 mL of fluid were removed. Cultures revealed 2+ polys, no bacteria seen. . Troponins 30>28>28. Other notable labs include K 3.1. (repleted), Cr 1.34 (baseline 1.0-1.1), and T bili of 1.14. He was given IV lasix 40 mg and admitted to [place] for acute heart failure exacerbation.\"    While admitted, he was transitioned to 40mg lasix BID PO. He diuresed well, LE edema decreased, lungs clear, and no appreciable JVD. We would like him to increase his diuretic regimen to 40mg lasix BID on discharge with close follow-up. His creatinine peaked at 1.43 during diureses. Repeat BMP should be done in 1 week to make sure it resolves.    DISCHARGE INFORMATION     Physical Exam on Day of Discharge:  General: No apparent distress, resting comfortably   Head: Normocephalic, atraumatic  Eyes: Anicteric, no conjunctival erythema  ENT: External ear normal, no nasal discharge  Neck: Trachea midline, no visible lymphadenopathy or goiter  Respiratory: CTA bilaterally, " Non-labored respirations, symmetric thorax expansion  Cardiovascular: RRR, appreciable systolic murmur at left sternal border, Extremities appear well-perfused  Abdomen: Non-distended  Extremities: Moves extremities spontaneously, minimal peripheral edema  Skin: No visible rashes, wounds, or jaundice  Neuro: A&O x 3, no gross focal deficits, no aphasia    PCP at Discharge: Umesh Millard MD    Admitting Provider: Luciana Skinner MD  Admission Date: 12/22/2023    Discharge Provider: Luann Lira MD   Discharge Date: 12/25/23    Discharge Disposition: Home/Self Care  Discharge Condition: stable  Discharge with Lines: no    Discharge Diet: regular diet  Activity Restrictions: none  Test Results Pending at Discharge: none    Discharge Diagnoses:  Principal Problem:    Acute on chronic heart failure (HCC)  Active Problems:    Mild intermittent asthma without complication    Type 2 diabetes mellitus with circulatory disorder, with long-term current use of insulin (HCC)    Hyperlipidemia    Essential hypertension    Hypothyroidism    S/P TAVR (transcatheter aortic valve replacement)    CAD (coronary artery disease)    History of tachy-lino syndrome    s/p Medtronic dual chamber PPM 8/31/22 s/p upgrade to BiV ICD 9/13/2023    HE (acute kidney injury) (HCC)    Hypokalemia    Atrial fibrillation (HCC)    Mitral regurgitation    Hepatitis C    Smoking history  Resolved Problems:    * No resolved hospital problems. *      Consulting Providers:      Diagnostic & Therapeutic Procedures Performed:  XR chest portable    Result Date: 12/23/2023  Impression: Decreased size in now small right pleural effusion following thoracentesis. Workstation performed: [id number]     XR chest 2 views    Result Date: 12/23/2023  Impression: Moderate right-sided pleural effusion. Cardiomegaly. Workstation performed: [id number]       Code Status: Level 1 - Full Code  Advance Directive & Living Will: <no information>  Power of  :    POLST:      Medications:  Current Discharge Medication List        STOP taking these medications       fluticasone (FLONASE) 50 mcg/act nasal spray Comments:   Reason for Stopping:         methocarbamol (Robaxin-750) 750 mg tablet Comments:   Reason for Stopping:             Current Discharge Medication List        Current Discharge Medication List        CONTINUE these medications which have NOT CHANGED    Details   Advair -21 MCG/ACT inhaler Inhale 2 puffs 2 (two) times a day Rinse mouth after use.  Qty: 36 g, Refills: 6    Comments: Substitution to a formulary equivalent within the same pharmaceutical class is authorized.  Associated Diagnoses: Mild intermittent asthma, unspecified whether complicated      albuterol (PROVENTIL HFA,VENTOLIN HFA) 90 mcg/act inhaler INHALE 2 PUFFS BY MOUTH EVERY 4 HOURS AS NEEDED FOR WHEEZING OR SHORTNESS OF BREATH.  Qty: 18 g, Refills: 2    Comments: Substitution to a formulary equivalent within the same pharmaceutical class is authorized.  Associated Diagnoses: Mild intermittent asthma without complication      apixaban (Eliquis) 5 mg Take 1 tablet (5 mg total) by mouth 2 (two) times a day  Qty: 60 tablet, Refills: 3    Comments: PRICE CHECK ONLY  Associated Diagnoses: Atrial flutter, unspecified type ([facility])      Blood Glucose Monitoring Suppl (OneTouch Verio) w/Device KIT Check blood glucose three times daily before each meal  Qty: 1 kit, Refills: 0    Associated Diagnoses: Type 2 diabetes mellitus with diabetic neuropathy, without long-term current use of insulin ([facility])      cholecalciferol (VITAMIN D3) 1,000 units tablet Take 2 tablets (2,000 Units total) by mouth daily  Qty: 180 tablet, Refills: 5    Associated Diagnoses: Vitamin D deficiency      clopidogrel (PLAVIX) 75 mg tablet Take 1 tablet (75 mg total) by mouth daily  Qty: 90 tablet, Refills: 3    Associated Diagnoses: S/P TAVR (transcatheter aortic valve replacement)      Empagliflozin (JARDIANCE) 10  MG TABS tablet Take 1 tablet (10 mg total) by mouth every morning  Qty: 90 tablet, Refills: 3    Associated Diagnoses: NSTEMI (non-ST elevated myocardial infarction) ([facility]); Status post insertion of drug eluting coronary artery stent; Coronary artery disease involving native coronary artery of native heart without angina pectoris      ferrous sulfate 325 (65 Fe) mg tablet TAKE 1 TABLET BY MOUTH EVERY OTHER DAY  Qty: 45 tablet, Refills: 4    Associated Diagnoses: Iron deficiency anemia, unspecified iron deficiency anemia type      Insulin Glargine Solostar (Lantus SoloStar) 100 UNIT/ML SOPN INJECT 0.18 ML (18 UNITS TOTAL) UNDER THE SKIN DAILY AT BEDTIME  Qty: 15 mL, Refills: 3    Comments: DX Code Needed  NEEDS A NEW SCRIPT NO MORE REFILLS AVAILABLE.  Associated Diagnoses: Diabetes mellitus due to underlying condition with diabetic neuropathy, without long-term current use of insulin ([facility])      Insulin Pen Needle (Pen Needles) 31G X 8 MM MISC Use daily  Qty: 300 each, Refills: 3    Associated Diagnoses: Diabetes mellitus due to underlying condition with diabetic neuropathy, without long-term current use of insulin ([facility])      Lancets (FREESTYLE) lancets by Other route as needed (As needed)  Qty: 100 each, Refills: 3    Associated Diagnoses: Diabetes mellitus due to underlying condition with diabetic neuropathy, without long-term current use of insulin ([facility])      levothyroxine 137 mcg tablet TAKE 1 TABLET BY MOUTH EVERY DAY  Qty: 90 tablet, Refills: 3    Associated Diagnoses: Hypothyroidism      lisinopril (ZESTRIL) 10 mg tablet Take 1 tablet (10 mg total) by mouth daily  Qty: 90 tablet, Refills: 3    Associated Diagnoses: Heart failure with reduced ejection fraction ([facility])      metFORMIN (GLUCOPHAGE) 850 mg tablet TAKE 1 TABLET BY MOUTH 2 TIMES A DAY WITH MEALS.  Qty: 180 tablet, Refills: 3    Associated Diagnoses: Diabetes mellitus ([facility])      metoprolol succinate (TOPROL-XL) 50 mg 24 hr tablet Take 1.5 tablets (75 mg  total) by mouth 2 (two) times a day  Qty: 270 tablet, Refills: 1    Associated Diagnoses: Heart failure with reduced ejection fraction ([facility])      montelukast (SINGULAIR) 10 mg tablet TAKE 1 TABLET BY MOUTH EVERY DAY  Qty: 90 tablet, Refills: 3    Associated Diagnoses: Mild intermittent asthma, unspecified whether complicated      NovoLOG FlexPen 100 units/mL injection pen INJECT 5 UNITS WITH BREAKFAST AND WITH DINNER  Qty: 15 mL, Refills: 3    Comments: DX Code Needed  NEEDS A NEW SCRIPT.  Associated Diagnoses: Type 2 diabetes mellitus with diabetic neuropathy, without long-term current use of insulin ([facility])      rosuvastatin (CRESTOR) 40 MG tablet TAKE 1 TABLET BY MOUTH EVERY DAY  Qty: 90 tablet, Refills: 3    Comments: DX Code Needed  .  Associated Diagnoses: Mixed hyperlipidemia      spironolactone (ALDACTONE) 25 mg tablet TAKE 1 TABLET (25 MG TOTAL) BY MOUTH DAILY.  Qty: 90 tablet, Refills: 1    Associated Diagnoses: Chronic HFrEF (heart failure with reduced ejection fraction) ([facility])      Alcohol Swabs (ALCOHOL PADS) 70 % PADS       Continuous Blood Gluc  (FreeStyle Cristofer 2 [place]) POWER Use 1 each continuous  Qty: 1 each, Refills: 0    Associated Diagnoses: Type 2 diabetes mellitus with diabetic neuropathy, with long-term current use of insulin ([facility])      Continuous Blood Gluc Sensor (FreeStyle Cristofer 2 Sensor) MISC Use 1 each every 14 (fourteen) days  Qty: 6 each, Refills: 3    Associated Diagnoses: Type 2 diabetes mellitus with diabetic neuropathy, with long-term current use of insulin ([facility])      nitroglycerin (NITROSTAT) 0.4 mg SL tablet Place 1 tablet (0.4 mg total) under the tongue every 5 (five) minutes as needed for chest pain  Qty: 30 tablet, Refills: 1    Associated Diagnoses: NSTEMI (non-ST elevated myocardial infarction) ([facility]); Status post insertion of drug eluting coronary artery stent; Coronary artery disease involving native coronary artery of native heart without angina pectoris        "      Allergies:  Allergies   Allergen Reactions   • Iv Contrast [Iodinated Contrast Media] Rash     Patient states he had a severe reaction approximately 10 years ago , states he had a rash, itchy and he remembers a bunch of people pounding on chest and being surrounded by multiple doctors.       FOLLOW-UP     PCP Outpatient Follow-up:  Umesh Millard MD    Consulting Providers Follow-up:  Cardiology    Active Issues Requiring Follow-up:   Please follow-up with PCP and cardiologist     Discharge Statement:   I spent 30 minutes minutes discharging the patient. This time was spent on the day of discharge. I had direct contact with the patient on the day of discharge. Additional documentation is required if more than 30 minutes were spent on discharge.    Portions of the record may have been created with voice recognition software.  Occasional wrong word or \"sound a like\" substitutions may have occurred due to the inherent limitations of voice recognition software.  Read the chart carefully and recognize, using context, where substitutions have occurred.    ==  Esthela Jones MD  [facility]  Internal Medicine Resident PGY-1      "

## 2023-12-25 NOTE — QUICK NOTE
Attempted to call patient's wife to let her know that patient will be discharged.  She did not answer and I was not able to leave a message.  Will discuss with patient and see if he is able to reach her.    Stormy Jaime DO  [facility]  Internal Medicine, PGY-3

## 2023-12-26 ENCOUNTER — TRANSITIONAL CARE MANAGEMENT (OUTPATIENT)
Dept: INTERNAL MEDICINE CLINIC | Facility: CLINIC | Age: 78
End: 2023-12-26

## 2023-12-26 LAB
HISTIOCYTES NFR FLD: 22 %
LYMPHOCYTES NFR BLD AUTO: 36 %
MONO+MESO NFR FLD MANUAL: 3 %
MONOCYTES NFR BLD AUTO: 17 %
NEUTS SEG NFR BLD AUTO: 21 %
PATHOLOGY REVIEW: YES
TOTAL CELLS COUNTED SPEC: 100
WBC OTHER NFR FLD MANUAL: 1 %

## 2023-12-26 NOTE — UTILIZATION REVIEW
"NOTIFICATION OF INPATIENT ADMISSION   AUTHORIZATION REQUEST   SERVICING FACILITY:   [facility]  Address: [address] [place] [address]  Tax ID: [id number]  NPI: [id number] ATTENDING PROVIDER:  Attending Name and NPI#: Luann Lira Md [[id number]]  Address: [address] [place] [address]  Phone: [phone]   ADMISSION INFORMATION:  Place of Service: Inpatient [facility]  Place of Service Code: 21  Inpatient Admission Date/Time: 12/22/23  8:09 PM  Discharge Date/Time: 12/25/2023  2:39 PM  Admitting Diagnosis Code/Description:  Orthopnea [R06.01]  Dyspnea [R06.00]  Leg swelling [M79.89]  Bleeding from the nose [R04.0]  Acute on chronic systolic congestive heart failure (HCC) [I50.23]     UTILIZATION REVIEW CONTACT:  Brenda Lazo"Venecia\" Malik Utilization   Network Utilization Review Department  Phone: [phone]  Fax: [phone]  Email: [email or web address]  Contact for approvals/pending authorizations, clinical reviews, and discharge.     PHYSICIAN ADVISORY SERVICES:  Medical Necessity Denial & Gtdz-nv-Rwor Review  Phone: [phone]  Fax: [phone]  Email: [email or web address]     DISCHARGE SUPPORT TEAM:  For Patients Discharge Needs & Updates  Phone: [phone] opt. 2 Fax: [phone]  Email: [email or web address]     "

## 2023-12-27 ENCOUNTER — IN-CLINIC DEVICE VISIT (OUTPATIENT)
Dept: CARDIOLOGY CLINIC | Facility: CLINIC | Age: 78
End: 2023-12-27
Payer: MEDICARE

## 2023-12-27 DIAGNOSIS — Z95.810 PRESENCE OF IMPLANTABLE CARDIOVERTER-DEFIBRILLATOR (ICD): Primary | ICD-10-CM

## 2023-12-27 PROCEDURE — 93283 PRGRMG EVAL IMPLANTABLE DFB: CPT | Performed by: INTERNAL MEDICINE

## 2023-12-27 PROCEDURE — 88112 CYTOPATH CELL ENHANCE TECH: CPT | Performed by: PATHOLOGY

## 2023-12-27 NOTE — PROGRESS NOTES
Results for orders placed or performed in visit on 12/27/23   Cardiac EP device report    Narrative    MDT BI-V ICD/ACTIVE SYSTEM IS MRI CONDITIONAL  DEVICE INTERROGATED IN THE [facility] OFFICE. BATTERY VOLTAGE ADEQUATE (11.2 YRS). AP <0.1%  66%, VSR 31.4%. 3 NEW MONITORED VT EVENTS ALL SHOWING RVR, MAX  BPM.  AF EPISODE IN PROGRESS AT/AF BURDEN 100%.  PT TAKING ELIQUIS, PLAVIX, METOPROLOL SUCC, LASIX.  DECREASE MADE TO BOTH RV AND LV  AMPLITUDES TO PROMOTE DEVICE LONGEVITY WHILE MAINTAINING AN APPROPRIATE SAFETY MARGIN. OPTI-VOL WITHIN NORMAL LIMITS. NORMAL DEVICE FUNCTION.  PAS/LM

## 2024-01-04 ENCOUNTER — TELEPHONE (OUTPATIENT)
Dept: CARDIOLOGY CLINIC | Facility: CLINIC | Age: 79
End: 2024-01-04

## 2024-01-04 NOTE — TELEPHONE ENCOUNTER
MD Heavenly Mccann, RN  Please check how he is doing. Confirm if taking metoprolol and increase to 100mg BID. Thanks

## 2024-01-10 NOTE — TELEPHONE ENCOUNTER
Called daughter again. She did not get the message on Friday. Will call her mother on the language line.    Spoke with wife through interpretor # [id number] and she  said he has only been taking 50 mg bid since discharge.      Do you want him to do 75 mg bid.    He is doing well otherwise except cold.  Wt is 165 lbs today.    Please advise

## 2024-01-10 NOTE — TELEPHONE ENCOUNTER
Called pt back and advised to increase the toprol-xl to 75 mg bid. Interpretor [id number] gave instructions to wife. Instructed to cll the office back if any

## 2024-01-18 ENCOUNTER — OFFICE VISIT (OUTPATIENT)
Dept: CARDIOLOGY CLINIC | Facility: CLINIC | Age: 79
End: 2024-01-18
Payer: MEDICARE

## 2024-01-18 VITALS
OXYGEN SATURATION: 99 % | HEART RATE: 99 BPM | DIASTOLIC BLOOD PRESSURE: 68 MMHG | BODY MASS INDEX: 27.8 KG/M2 | HEIGHT: 67 IN | WEIGHT: 177.1 LBS | SYSTOLIC BLOOD PRESSURE: 102 MMHG

## 2024-01-18 DIAGNOSIS — I73.9 PERIPHERAL ARTERY DISEASE (HCC): ICD-10-CM

## 2024-01-18 DIAGNOSIS — I48.19 PERSISTENT ATRIAL FIBRILLATION (HCC): ICD-10-CM

## 2024-01-18 DIAGNOSIS — E11.59 TYPE 2 DIABETES MELLITUS WITH OTHER CIRCULATORY COMPLICATIONS (HCC): ICD-10-CM

## 2024-01-18 DIAGNOSIS — I25.10 ATHEROSCLEROSIS OF NATIVE CORONARY ARTERY OF NATIVE HEART WITHOUT ANGINA PECTORIS: ICD-10-CM

## 2024-01-18 DIAGNOSIS — N18.30 STAGE 3 CHRONIC KIDNEY DISEASE, UNSPECIFIED WHETHER STAGE 3A OR 3B CKD (HCC): ICD-10-CM

## 2024-01-18 DIAGNOSIS — I50.22 CHRONIC HFREF (HEART FAILURE WITH REDUCED EJECTION FRACTION) (HCC): Primary | ICD-10-CM

## 2024-01-18 PROBLEM — I25.119 ATHEROSCLEROSIS OF NATIVE CORONARY ARTERY OF NATIVE HEART WITH ANGINA PECTORIS (HCC): Status: ACTIVE | Noted: 2022-06-21

## 2024-01-18 PROCEDURE — 99214 OFFICE O/P EST MOD 30 MIN: CPT | Performed by: INTERNAL MEDICINE

## 2024-01-18 NOTE — PROGRESS NOTES
Advanced Heart Failure Outpatient Progress Note - Juan David Lora 78 y.o. male MRN: [id number]    Encounter: [id number]      Assessment/Plan:    Patient Active Problem List    Diagnosis Date Noted    Stage 3 chronic kidney disease, unspecified whether stage 3a or 3b CKD ([facility]) 01/18/2024    Smoking history 12/23/2023    Acute on chronic heart failure ([facility]) 12/22/2023    HE (acute kidney injury) ([facility]) 12/22/2023    Hypokalemia 12/22/2023    Atrial fibrillation ([facility]) 12/22/2023    Mitral regurgitation 12/22/2023    Hepatitis C 12/22/2023    s/p Medtronic dual chamber PPM 8/31/22 s/p upgrade to BiV ICD 9/13/2023 09/16/2023    Atrial flutter ([facility]) 09/14/2023    Chronic HFrEF (heart failure with reduced ejection fraction) ([facility]) 06/05/2023    NSVT (nonsustained ventricular tachycardia) ([facility]) 06/05/2023    Anemia 03/19/2023    Status post insertion of drug eluting coronary artery stent 02/10/2023    Bruit of left carotid artery 01/30/2023    Peripheral artery disease ([facility]) 01/18/2023    Need for influenza vaccination 11/10/2022    Leg cramping 11/10/2022    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    S/P TAVR (transcatheter aortic valve replacement) 06/21/2022    Atherosclerosis of native coronary artery of native heart with angina pectoris ([facility]) 06/21/2022    Contrast media allergy 05/31/2022    History of aortic stenosis s/p TAVR 01/19/2022    Diverticulosis of large intestine 09/20/2018    Internal hemorrhoids 09/20/2018    Nicotine dependence 04/13/2017    Osteoarthritis of knee 04/13/2017    Bilateral hearing loss 01/30/2017    Allergic rhinitis 02/24/2016    Type 2 diabetes mellitus with circulatory disorder, with long-term current use of insulin ([facility]) 11/24/2015    Adenomatous colon polyp 06/11/2014    Hyperlipidemia 09/13/2013    Vitamin B12 deficiency 07/24/2012    Mild intermittent asthma without complication 06/08/2012    Essential hypertension 06/08/2012    Hypothyroidism 06/07/2012       # Chronic heart  failure with reduced EF, NYHA class II Stage C.   Etiology: ischemic, reduction in EF in setting of NSTEMI with 80% mid LAD and 90% RPDA stenoses s/p stenting     Weight at discharge 184 lbs 9/7/23; 186 lbs 10/11/23; 191 lbs 11/27/23; 173 lbs 12/25/23; 177 lbs 1/18/24   12/22/23  397 10/10/23  460 7/14/23    Studies personally reviewed by me:    Echo 12/23/23:  LVEF 25-30%  LVIDD 5.6cm  RV mildly dilated with moderately reduced systolic function  TAVR bioprosthetic valve, appears to be functioning normally no evidence regurgitation  Severe MR  Moderate to severe TR, RVSP 61 mmHg, estimated RA pressure 15 mmHg    Echo 5/25/23:  LVEF 35%, 30-35% on my review  LVIDD 5.4cm, increased wall thickness  TAVR mean gradient 13mmHg  Moderate to severe MR, eccentric  Normal RV size and systolic function  Estimated RVSP 36mmHg, mild TR    Echo 4/21/23:  LVEF:  35%  LVIDd: 5.4 cm, normal wall thickness  RV: Normal size and systolic function  AV: TAVR bioprosthetic valve appears well-seated and appears to be functioning normally, no perivalvular or transvalvular regurgitation  MR: Moderate with posteriorly directed jet  PASP: Unable to estimate  RVOT: no notching  Other: IVC normal, normal biatrial size    TTE 3/21/23: LVEF 30-35%,LVIDd 5.7 cm,  RV normal, no AI, no AS, severe MR, mild TR, trivial pericardial effusion, IVC normal. RVSP 36mmHg + RAP. The following segments are akinetic: basal inferoseptal, basal inferior, mid inferoseptal and mid inferolateral. The following segments are hypokinetic: mid inferior and apical inferior.   TTE 2/10/23: LVEF 35-40%  TTE 7/28/22: LVEF 55%, moderate MR with posteriorly directed jet, mild TR    2/10/23 LHC: 3-vessel CAD with two identifiable culprits for NSTEMI in ost RPDA & mid LAD. Successful PCI w/ ELDER x2 to mid LAD & ELDER x1 ost RPDA; residual moderate disease unchanged from 6-2022 study   6/03/22 [facility], pre TAVR: First marginal lesion 50% stenosis, mid LAD 50% stenosis, RPDA 50%  stenosis, RPDA to 50% stenosis, 1st diagonal 50% stenosis.     Neurohormonal Blockade:   --Beta-Blocker:Metoprolol succinate 75 mg BID  --ACEi, ARB or ARNi: Lisinopril 10mg daily  --SGLT2i- jardiance 10 mg daily  --Aldosterone Receptor Blocker: Spironolactone 25 mg daily - held due to HE  --Diuretic: Lasix 40mg BID     Sudden Cardiac Death Risk Reduction:  --ICD: EF 30-35%, s/p BIV ICD upgrade 9/13/23  Interrogation 1/3/2024: A paced 0% V paced 57%.  Episodes of A-fib with RVR.  OptiVol normal.  A-fib 100% burden     Electrical Resynchronization:  --Candidacy for BiV device: LBBB, QRSd 132-140 msec; QRSd 132 msec on EKG 10/25/23 post CRT     Advanced Therapies (if appropriate): We will continue to monitor  --Inotrope:  --LVAD/Transplant Candidacy:     # Atrial fibrillation  Rate: metoprolol as above  Rhythm: none  AC: apixaban   # Mitral regurgitation, severe on echo 3/21/23, moderate to severe on echo 5/25/23.  Severe on echo 12/23/2023 in setting of volume overload.  To consider MitraClip if still with significant MR after CRT volume optimization  # Severe AS s/p TAVR, #26mm Emery Hernán S3 Ultra valve via left femoral approach by Dr Walker.  6/21/22; normally functioning on echo 12/23/23  # Post op LBBB.   # Tachy-lino syndrome with LBBB, pauses post TAVR. S/P MDT PPM then upgraded to BIV ICD  # CAD s/p NSTEMI with PCI to the mid LAD and RPDA 3/21/23  Rx: plavix, on apixaban for afib, statin  # Hypertension  # Hyperlipidemia 11/10/22 , TG 75, HDL 65, LDL 62   2/10/23 TG 61 HDL 53 LDL 72  # DM2   # CKD, creatinine 1.41 12/25/23  # Asthma  # Hepatitis C. Untreated per chart notes.Hep C Ab high reactive 1/2022. Management per PCP.  # Lymphadenopathy  # h/o tobacco abuse  PFT 2/9/23:  Interpretation:  Results are unreliable due to patient being unable to perform maneuvers as per ATS standards. If clinical concern exists would recommend repeating PFTs. Best interpretation made based on available data.  "  Severe obstructive airflow defect on spirometry  No significant improvement in airflow or forced vital capacity in response to the administration to bronchodilator per ATS standards.   Lung volumes unable to be obtained due to patient inability to perform maneuvers  Normal diffusion capacity  Flow-volume loop consistent with obstruction       TODAY'S PLAN:  Euvolemic on exam, continue current medications  2g sodium diet  2L fluid restriction diet  Daily weights  To consider repeat echo to reassess MR with optimized volume status and CRT  To consider initiation of amiodarone for afib since lower BIV pacing rate, will reassess on next device check  Will reassess for switching lisinopril to entresto pending repeat BMP       HPI:   Patient admitted February 9/20/2023 when he presented with progressive shortness of breath.  Found to have elevated troponin.  Admitted for type I NSTEMI with new wall motion abnormalities on echocardiogram along with newly reduced EF of 40%.  He was urgently taken to the Cath Lab and left heart catheterization showed three-vessel CAD with 2 vessels \"culprit lesions: Ostial RPDA and mid LAD stenosis status post stents.  Patient readmitted March 18, 2023 when he presented with progressive shortness of breath and intermittent bilateral lower extremity edema.  Also with weight gain and orthopnea.     4/28/23: here for follow up. Started on LifeVest since last office visit. Repeat echo 4/21/23 showed EF of 35%. Reports walking 1.5 blocks no dyspnea or chest pain. Also able to walk up a flight of stairs. Mainly limited by knee pains. No leg swelling or bloating. No PND, orthopnea. No dizziness or lightheadedness. Taking medications. Weight stable on home scale at  177 lbs     6/1/23: here for follow up. Repeat echo showed EF 35%, 30-35% on my review. Denies shortness of breath or chest pain on current level of exertion. Metoprolol and entresto increased on last visit.     9/7/23: here for follow " "up. Seen at the ED 7/14/23 for dyspnea. CHF. Seen by Dr. Yuan and scheduled for BIV ICD upgrade. Reports getting tired, more fatigued over a couple of weeks. Has to rest after walking up a flight of stairs due to shortness of breath. Ankle pains with walking.  1 week ago, had 2 episodes of dizziness, transient.   Reports adherence to medications and diet    09/26/2023 with GD: \"Presents today for follow-up. Feeling well, reports he's noticing alarms from his device since leaving the hospital. Denies shocks or pain. Has a device check upstairs immediately after this appointment. Breathing well, denies SOB/BUSTILLOS, even with stairs. Denies PND, orthopnea, LE swelling. Has been taking higher dose of Lasix as prescribed, urinates well with this. Believes he's been taking his spironolactone since discharge. Picked up his Eliquis - has been compliant with this. Unsure of all of his medications; advised to bring his medications to his next appointment to check against his list. Weighing himself daily at home, weights have been stable.\" Started on spironolactone.      Per MJ: 10/10/2023: Patient presents for follow-up. Interpretor Sal (ID #[id number]) assisted during encounter with patient's consent. Feeling well today. Denies BUSTILLOS, PND, and orthopnea. Reports having some LE swelling last week that improved with compression stockings. Feels that his new ICD is shifting in pocket; no pain or discharge surrounding incision. Is completing daily weights; averaging mid 180s on home scale. Drinking 60-70 oz fluid daily. Reports good appetite; denies recent dietary indiscretion.      11/2/23: here for follow up. Shortness of breath x 2 days. Increased dietary salt intake over the holidays. Also with orthopnea and leg swelling.  Weight up on office visit today. Unable to obtain Prolonged skin bleeding, tends to scratched. Advised to avoid scratching and holding pressure for longer period of time due to blood thinner.      1/18/24:  Here " for follow up. Admitted 12/22-25 c/o BUSTILLOS, orthopnea and b/l leg swelling x 1 week.  on arrival. Bilateral pleural effusions and ascities seen on US. Right-sided thoracentesis was performed in the ED, 1560 mL of fluid removed. Cultures revealed 2+ polys, no bacteria seen.   Given IV lasix and transitioned to 40mg BID. He reports now breathing is better. He is able to walk 2 blccks or less before getting short of breath. No orthopnea or PND, no leg swelling. No palpitations, dizziness or lightheadedness.       Review of Systems   Constitutional:  Negative for chills, fatigue and fever.   HENT:  Negative for ear pain and sore throat.    Eyes:  Negative for pain and visual disturbance.   Respiratory:  Positive for shortness of breath. Negative for cough and chest tightness.    Cardiovascular:  Positive for leg swelling. Negative for chest pain and palpitations.   Gastrointestinal:  Negative for abdominal distention, abdominal pain and vomiting.   Genitourinary:  Negative for dysuria and hematuria.   Musculoskeletal:  Negative for arthralgias and back pain.   Skin:  Negative for color change and rash.   Neurological:  Negative for dizziness, seizures, syncope and light-headedness.   All other systems reviewed and are negative.      Past Medical History:   Diagnosis Date    Arthritis     Asthma     Colon polyps     Community acquired pneumonia     last assessed: 5/1/2014    Diabetes mellitus (HCC)     Hemorrhagic prepatellar bursitis, left 10/21/2019    Hepatitis C     High cholesterol     History of colonoscopy 2017    Hypertension     Lymphadenopathy, anterior cervical 04/17/2018    Nephritis and nephropathy, not specified as acute or chronic, with other specified pathological lesion in kidney, in diseases classified elsewhere 3/26/2013    NSTEMI (non-ST elevated myocardial infarction) ([facility]CC) 1/30/2023    s/p Medtronic dual chamber PPM 8/31/22 s/p upgrade to BiV ICD 9/13/2023 9/16/2023    Screening for colon  cancer 05/01/2019    SIRS (systemic inflammatory response syndrome) ([facility]) 3/19/2023    Thoracic vertebral fracture ([facility]) 06/11/2014         Allergies   Allergen Reactions    Iv Contrast [Iodinated Contrast Media] Rash     Patient states he had a severe reaction approximately 10 years ago , states he had a rash, itchy and he remembers a bunch of people pounding on chest and being surrounded by multiple doctors.     .    Current Outpatient Medications:     Advair -21 MCG/ACT inhaler, Inhale 2 puffs 2 (two) times a day Rinse mouth after use., Disp: 36 g, Rfl: 6    albuterol (PROVENTIL HFA,VENTOLIN HFA) 90 mcg/act inhaler, INHALE 2 PUFFS BY MOUTH EVERY 4 HOURS AS NEEDED FOR WHEEZING OR SHORTNESS OF BREATH., Disp: 18 g, Rfl: 2    Alcohol Swabs (ALCOHOL PADS) 70 % PADS, , Disp: , Rfl:     apixaban (Eliquis) 5 mg, Take 1 tablet (5 mg total) by mouth 2 (two) times a day, Disp: 60 tablet, Rfl: 3    Blood Glucose Monitoring Suppl (OneTouch Verio) w/Device KIT, Check blood glucose three times daily before each meal, Disp: 1 kit, Rfl: 0    cholecalciferol (VITAMIN D3) 1,000 units tablet, Take 2 tablets (2,000 Units total) by mouth daily, Disp: 180 tablet, Rfl: 5    clopidogrel (PLAVIX) 75 mg tablet, Take 1 tablet (75 mg total) by mouth daily, Disp: 90 tablet, Rfl: 3    Continuous Blood Gluc  (FreeStyle Cristofer 2 [place]) POWER, Use 1 each continuous, Disp: 1 each, Rfl: 0    Continuous Blood Gluc Sensor (FreeStyle Cristofer 2 Sensor) MISC, Use 1 each every 14 (fourteen) days, Disp: 6 each, Rfl: 3    Empagliflozin (JARDIANCE) 10 MG TABS tablet, Take 1 tablet (10 mg total) by mouth every morning, Disp: 90 tablet, Rfl: 3    ferrous sulfate 325 (65 Fe) mg tablet, TAKE 1 TABLET BY MOUTH EVERY OTHER DAY, Disp: 45 tablet, Rfl: 4    furosemide (LASIX) 40 mg tablet, Take 1 tablet (40 mg total) by mouth 2 (two) times a day, Disp: 60 tablet, Rfl: 0    Insulin Glargine Solostar (Lantus SoloStar) 100 UNIT/ML SOPN, INJECT 0.18 ML (18  UNITS TOTAL) UNDER THE SKIN DAILY AT BEDTIME, Disp: 15 mL, Rfl: 3    Insulin Pen Needle (Pen Needles) 31G X 8 MM MISC, Use daily, Disp: 300 each, Rfl: 3    Lancets (FREESTYLE) lancets, by Other route as needed (As needed), Disp: 100 each, Rfl: 3    levothyroxine 137 mcg tablet, TAKE 1 TABLET BY MOUTH EVERY DAY, Disp: 90 tablet, Rfl: 3    lisinopril (ZESTRIL) 10 mg tablet, Take 1 tablet (10 mg total) by mouth daily, Disp: 90 tablet, Rfl: 3    metFORMIN (GLUCOPHAGE) 850 mg tablet, TAKE 1 TABLET BY MOUTH 2 TIMES A DAY WITH MEALS., Disp: 180 tablet, Rfl: 3    metoprolol succinate (TOPROL-XL) 50 mg 24 hr tablet, Take 1.5 tablets (75 mg total) by mouth 2 (two) times a day, Disp: 270 tablet, Rfl: 1    montelukast (SINGULAIR) 10 mg tablet, TAKE 1 TABLET BY MOUTH EVERY DAY, Disp: 90 tablet, Rfl: 3    nitroglycerin (NITROSTAT) 0.4 mg SL tablet, Place 1 tablet (0.4 mg total) under the tongue every 5 (five) minutes as needed for chest pain, Disp: 30 tablet, Rfl: 1    NovoLOG FlexPen 100 units/mL injection pen, INJECT 5 UNITS WITH BREAKFAST AND WITH DINNER, Disp: 15 mL, Rfl: 3    rosuvastatin (CRESTOR) 40 MG tablet, TAKE 1 TABLET BY MOUTH EVERY DAY, Disp: 90 tablet, Rfl: 3    spironolactone (ALDACTONE) 25 mg tablet, TAKE 1 TABLET (25 MG TOTAL) BY MOUTH DAILY. (Patient not taking: Reported on 2024), Disp: 90 tablet, Rfl: 1    Social History     Socioeconomic History    Marital status: /Civil Union     Spouse name: Not on file    Number of children: Not on file    Years of education: Not on file    Highest education level: Not on file   Occupational History    Occupation: retired    Tobacco Use    Smoking status: Former     Current packs/day: 0.00     Types: Cigarettes     Quit date: 2022     Years since quittin.6    Smokeless tobacco: Never    Tobacco comments:     started when he was about 27 yrs old; stopped smoking 3 wks ago   Vaping Use    Vaping status: Never Used   Substance and Sexual Activity     Alcohol use: No    Drug use: No    Sexual activity: Not Currently   Other Topics Concern    Not on file   Social History Narrative    Not on file     Social Determinants of Health     Financial Resource Strain: Low Risk  (2/21/2023)    Overall Financial Resource Strain (CARDIA)     Difficulty of Paying Living Expenses: Not hard at all   Food Insecurity: No Food Insecurity (9/17/2023)    Hunger Vital Sign     Worried About Running Out of Food in the Last Year: Never true     Ran Out of Food in the Last Year: Never true   Transportation Needs: No Transportation Needs (9/17/2023)    PRAPARE - Transportation     Lack of Transportation (Medical): No     Lack of Transportation (Non-Medical): No   Physical Activity: Unknown (1/19/2022)    Exercise Vital Sign     Days of Exercise per Week: Not on file     Minutes of Exercise per Session: 60 min   Stress: No Stress Concern Present (1/19/2022)    [employer] - Occupational Stress Questionnaire     Feeling of Stress : Not at all   Social Connections: Moderately Isolated (1/19/2022)    Social Connection and Isolation Panel [NHANES]     Frequency of Communication with Friends and Family: More than three times a week     Frequency of Social Gatherings with Friends and Family: More than three times a week     Attends Druze Services: Never     Active Member of Clubs or Organizations: No     Attends Club or Organization Meetings: Never     Marital Status:    Intimate Partner Violence: Not on file   Housing Stability: Low Risk  (9/17/2023)    Housing Stability Vital Sign     Unable to Pay for Housing in the Last Year: No     Number of Places Lived in the Last Year: 1     Unstable Housing in the Last Year: No       Family History   Problem Relation Age of Onset    Heart attack Family         at age 86    No Known Problems Mother     No Known Problems Father     Diabetes Sister     Diabetes Brother        Physical Exam:    Vitals:   Vitals:     01/18/24 1140   BP: 102/68   Pulse: 99   SpO2: 99%         Physical Exam  Constitutional:       General: He is not in acute distress.     Appearance: Normal appearance.   HENT:      Head: Normocephalic and atraumatic.      Mouth/Throat:      Mouth: Mucous membranes are moist.   Eyes:      General: No scleral icterus.     Extraocular Movements: Extraocular movements intact.   Cardiovascular:      Rate and Rhythm: Normal rate and regular rhythm.      Pulses: Normal pulses.      Heart sounds: S1 normal and S2 normal. No murmur heard.     No friction rub. No gallop.      Comments: Elevated JVP. Trace pitting edema bilaterally.   Pulmonary:      Effort: Pulmonary effort is normal.      Breath sounds: Examination of the right-lower field reveals decreased breath sounds. Decreased breath sounds present. No wheezing, rhonchi or rales.   Abdominal:      General: There is no distension.      Palpations: Abdomen is soft.      Tenderness: There is no abdominal tenderness. There is no guarding or rebound.   Musculoskeletal:         General: Normal range of motion.      Cervical back: Neck supple.   Skin:     General: Skin is warm and dry.      Capillary Refill: Capillary refill takes less than 2 seconds.   Neurological:      General: No focal deficit present.      Mental Status: He is alert and oriented to person, place, and time.   Psychiatric:         Mood and Affect: Mood normal.         Labs & Results:    Lab Results   Component Value Date    SODIUM 141 12/25/2023    K 3.6 12/25/2023    CL 99 12/25/2023    CO2 36 (H) 12/25/2023    BUN 30 (H) 12/25/2023    CREATININE 1.41 (H) 12/25/2023    GLUC 82 12/25/2023    CALCIUM 10.2 12/25/2023     Lab Results   Component Value Date    WBC 7.53 12/25/2023    HGB 13.0 12/25/2023    HCT 41.1 12/25/2023    MCV 87 12/25/2023     12/25/2023     Lab Results   Component Value Date    NTBNP 2,624 (H) 05/06/2023      Lab Results   Component Value Date    CHOLESTEROL 113 12/23/2023     CHOLESTEROL 137 02/10/2023    CHOLESTEROL 142 11/10/2022     Lab Results   Component Value Date    HDL 35 (L) 12/23/2023    HDL 53 02/10/2023    HDL 65 11/10/2022     Lab Results   Component Value Date    TRIG 74 12/23/2023    TRIG 61 02/10/2023    TRIG 75 11/10/2022     Lab Results   Component Value Date    NONHDLC 84 02/10/2023       EKG personally reviewed by Michael Gan MD.     Counseling / Coordination of Care  Time spent today 25 minutes.  Greater than 50% of total time was spent with the patient and / or family counseling and / or coordination of care. We went over current diagnosis, most recent studies and any changes in treatment.    Thank you for the opportunity to participate in the care of this patient.    MICHAEL GAN MD  ADVANCED HEART FAILURE AND MECHANICAL CIRCULATORY SUPPORT  [facility]

## 2024-01-18 NOTE — PATIENT INSTRUCTIONS
Continue current medications  Obtain labs as ordered  2g sodium diet  2L fluid restriction  Daily weights   Follow up in 2 weeks

## 2024-01-22 DIAGNOSIS — I50.23 ACUTE ON CHRONIC SYSTOLIC CONGESTIVE HEART FAILURE (HCC): ICD-10-CM

## 2024-01-23 RX ORDER — FUROSEMIDE 40 MG/1
40 TABLET ORAL 2 TIMES DAILY
Qty: 180 TABLET | Refills: 1 | Status: SHIPPED | OUTPATIENT
Start: 2024-01-23

## 2024-01-30 DIAGNOSIS — E08.40 DIABETES MELLITUS DUE TO UNDERLYING CONDITION WITH DIABETIC NEUROPATHY, WITHOUT LONG-TERM CURRENT USE OF INSULIN (HCC): ICD-10-CM

## 2024-01-30 NOTE — TELEPHONE ENCOUNTER
Brenda Downey came in person requesting a refill for the Novolog Flex Pen needles    Please prepare script and send electronically to [facility] and call the patient to advise.    thanks

## 2024-01-31 NOTE — TELEPHONE ENCOUNTER
Returned Brenda's call who stated that they never received the pen needles.  Patient has been reusing the old needles.   Please send the script to the pharmacy as soon as possible.    thanks

## 2024-02-01 DIAGNOSIS — I50.20 HEART FAILURE WITH REDUCED EJECTION FRACTION (HCC): ICD-10-CM

## 2024-02-01 RX ORDER — PEN NEEDLE, DIABETIC 30 GX3/16"
NEEDLE, DISPOSABLE MISCELLANEOUS DAILY
Qty: 300 EACH | Refills: 3 | Status: SHIPPED | OUTPATIENT
Start: 2024-02-01

## 2024-02-02 RX ORDER — METOPROLOL SUCCINATE 50 MG/1
TABLET, EXTENDED RELEASE ORAL
Qty: 270 TABLET | Refills: 1 | Status: SHIPPED | OUTPATIENT
Start: 2024-02-02

## 2024-02-08 ENCOUNTER — TELEPHONE (OUTPATIENT)
Dept: INTERNAL MEDICINE CLINIC | Facility: CLINIC | Age: 79
End: 2024-02-08

## 2024-02-09 ENCOUNTER — APPOINTMENT (OUTPATIENT)
Dept: LAB | Facility: CLINIC | Age: 79
End: 2024-02-09
Payer: MEDICARE

## 2024-02-09 DIAGNOSIS — I50.22 CHRONIC HFREF (HEART FAILURE WITH REDUCED EJECTION FRACTION) (HCC): ICD-10-CM

## 2024-02-09 LAB
ANION GAP SERPL CALCULATED.3IONS-SCNC: 7 MMOL/L
BNP SERPL-MCNC: 407 PG/ML (ref 0–100)
BUN SERPL-MCNC: 26 MG/DL (ref 5–25)
CALCIUM SERPL-MCNC: 9.7 MG/DL (ref 8.4–10.2)
CHLORIDE SERPL-SCNC: 101 MMOL/L (ref 96–108)
CO2 SERPL-SCNC: 33 MMOL/L (ref 21–32)
CREAT SERPL-MCNC: 1.26 MG/DL (ref 0.6–1.3)
GFR SERPL CREATININE-BSD FRML MDRD: 55 ML/MIN/1.73SQ M
GLUCOSE P FAST SERPL-MCNC: 72 MG/DL (ref 65–99)
POTASSIUM SERPL-SCNC: 3.9 MMOL/L (ref 3.5–5.3)
SODIUM SERPL-SCNC: 141 MMOL/L (ref 135–147)

## 2024-02-09 PROCEDURE — 83880 ASSAY OF NATRIURETIC PEPTIDE: CPT

## 2024-02-09 PROCEDURE — 80048 BASIC METABOLIC PNL TOTAL CA: CPT

## 2024-02-09 PROCEDURE — 36415 COLL VENOUS BLD VENIPUNCTURE: CPT

## 2024-02-13 ENCOUNTER — TELEMEDICINE (OUTPATIENT)
Dept: CARDIOLOGY CLINIC | Facility: CLINIC | Age: 79
End: 2024-02-13
Payer: MEDICARE

## 2024-02-13 ENCOUNTER — TELEPHONE (OUTPATIENT)
Dept: CARDIOLOGY CLINIC | Facility: CLINIC | Age: 79
End: 2024-02-13

## 2024-02-13 VITALS — WEIGHT: 166 LBS | BODY MASS INDEX: 26 KG/M2

## 2024-02-13 DIAGNOSIS — I25.10 ATHEROSCLEROSIS OF NATIVE CORONARY ARTERY OF NATIVE HEART WITHOUT ANGINA PECTORIS: ICD-10-CM

## 2024-02-13 DIAGNOSIS — I50.22 CHRONIC HFREF (HEART FAILURE WITH REDUCED EJECTION FRACTION) (HCC): Primary | ICD-10-CM

## 2024-02-13 DIAGNOSIS — N18.30 STAGE 3 CHRONIC KIDNEY DISEASE, UNSPECIFIED WHETHER STAGE 3A OR 3B CKD (HCC): ICD-10-CM

## 2024-02-13 DIAGNOSIS — I48.19 PERSISTENT ATRIAL FIBRILLATION (HCC): ICD-10-CM

## 2024-02-13 PROCEDURE — 99214 OFFICE O/P EST MOD 30 MIN: CPT | Performed by: INTERNAL MEDICINE

## 2024-02-13 RX ORDER — SACUBITRIL AND VALSARTAN 24; 26 MG/1; MG/1
1 TABLET, FILM COATED ORAL 2 TIMES DAILY
Qty: 60 TABLET | Refills: 3 | Status: SHIPPED | OUTPATIENT
Start: 2024-02-13 | End: 2024-02-23

## 2024-02-13 NOTE — PROGRESS NOTES
Advanced Heart Failure Outpatient Progress Note - Juan David Lora 78 y.o. male MRN: [id number]    Encounter: [id number]    Telemedicine consent    Patient: Juan David Lora  Provider: Virginia Gan MD  Provider located at [facility] CARDIOLOGY [address] [address]    The patient was identified by name and date of birth. Juan David Lora was informed that this is a telemedicine visit and that the visit is being conducted through Telephone.  My office door was closed. No one else was in the room.  He acknowledged consent and understanding of privacy and security of the video platform. The patient has agreed to participate and understands they can discontinue the visit at any time.    Patient is aware this is a billable service.     I spent 15 minutes with the patient during this visit.     Assessment/Plan:    Patient Active Problem List    Diagnosis Date Noted    Stage 3 chronic kidney disease, unspecified whether stage 3a or 3b CKD ([facility]) 01/18/2024    Smoking history 12/23/2023    Acute on chronic heart failure (HCC) 12/22/2023    HE (acute kidney injury) ([facility]) 12/22/2023    Hypokalemia 12/22/2023    Atrial fibrillation ([facility]) 12/22/2023    Mitral regurgitation 12/22/2023    Hepatitis C 12/22/2023    s/p Medtronic dual chamber PPM 8/31/22 s/p upgrade to BiV ICD 9/13/2023 09/16/2023    Atrial flutter ([facility]) 09/14/2023    Chronic HFrEF (heart failure with reduced ejection fraction) ([facility]) 06/05/2023    NSVT (nonsustained ventricular tachycardia) ([facility]) 06/05/2023    Anemia 03/19/2023    Status post insertion of drug eluting coronary artery stent 02/10/2023    Bruit of left carotid artery 01/30/2023    Peripheral artery disease ([facility]) 01/18/2023    Need for influenza vaccination 11/10/2022    Leg cramping 11/10/2022    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    S/P TAVR (transcatheter aortic valve replacement) 06/21/2022    Atherosclerosis of native  coronary artery of native heart with angina pectoris (HCC) 06/21/2022    Contrast media allergy 05/31/2022    History of aortic stenosis s/p TAVR 01/19/2022    Diverticulosis of large intestine 09/20/2018    Internal hemorrhoids 09/20/2018    Nicotine dependence 04/13/2017    Osteoarthritis of knee 04/13/2017    Bilateral hearing loss 01/30/2017    Allergic rhinitis 02/24/2016    Type 2 diabetes mellitus with circulatory disorder, with long-term current use of insulin ([facility]) 11/24/2015    Adenomatous colon polyp 06/11/2014    Hyperlipidemia 09/13/2013    Vitamin B12 deficiency 07/24/2012    Mild intermittent asthma without complication 06/08/2012    Essential hypertension 06/08/2012    Hypothyroidism 06/07/2012       # Chronic heart failure with reduced EF, NYHA class II Stage C.   Etiology: ischemic, reduction in EF in setting of NSTEMI with 80% mid LAD and 90% RPDA stenoses s/p stenting     Weight at discharge 184 lbs 9/7/23; 186 lbs 10/11/23; 191 lbs 11/27/23; 173 lbs 12/25/23; 177 lbs 1/18/24; 166 lbs 2/13/24 - home scale     12/22/23  397 10/10/23  460 7/14/23    Studies personally reviewed by me:    Echo 12/23/23:  LVEF 25-30%  LVIDD 5.6cm  RV mildly dilated with moderately reduced systolic function  TAVR bioprosthetic valve, appears to be functioning normally no evidence regurgitation  Severe MR  Moderate to severe TR, RVSP 61 mmHg, estimated RA pressure 15 mmHg    Echo 5/25/23:  LVEF 35%, 30-35% on my review  LVIDD 5.4cm, increased wall thickness  TAVR mean gradient 13mmHg  Moderate to severe MR, eccentric  Normal RV size and systolic function  Estimated RVSP 36mmHg, mild TR    Echo 4/21/23:  LVEF:  35%  LVIDd: 5.4 cm, normal wall thickness  RV: Normal size and systolic function  AV: TAVR bioprosthetic valve appears well-seated and appears to be functioning normally, no perivalvular or transvalvular regurgitation  MR: Moderate with posteriorly directed jet  PASP: Unable to estimate  RVOT: no  notching  Other: IVC normal, normal biatrial size    TTE 3/21/23: LVEF 30-35%,LVIDd 5.7 cm,  RV normal, no AI, no AS, severe MR, mild TR, trivial pericardial effusion, IVC normal. RVSP 36mmHg + RAP. The following segments are akinetic: basal inferoseptal, basal inferior, mid inferoseptal and mid inferolateral. The following segments are hypokinetic: mid inferior and apical inferior.   TTE 2/10/23: LVEF 35-40%  TTE 7/28/22: LVEF 55%, moderate MR with posteriorly directed jet, mild TR    2/10/23 [facility]: 3-vessel CAD with two identifiable culprits for NSTEMI in ost RPDA & mid LAD. Successful PCI w/ ELDER x2 to mid LAD & ELDER x1 ost RPDA; residual moderate disease unchanged from 6-2022 study   6/03/22 [facility], pre TAVR: First marginal lesion 50% stenosis, mid LAD 50% stenosis, RPDA 50% stenosis, RPDA to 50% stenosis, 1st diagonal 50% stenosis.     Neurohormonal Blockade:   --Beta-Blocker:Metoprolol succinate 75 mg BID  --ACEi, ARB or ARNi: Lisinopril 10mg daily  --SGLT2i- jardiance 10 mg daily  --Aldosterone Receptor Blocker: Spironolactone 25 mg daily - held due to HE  --Diuretic: Lasix 40mg BID     Sudden Cardiac Death Risk Reduction:  --ICD: EF 30-35%, s/p BIV ICD upgrade 9/13/23  Interrogation 1/3/2024: A paced 0% V paced 57%.  Episodes of A-fib with RVR.  OptiVol normal.  A-fib 100% burden     Electrical Resynchronization:  --Candidacy for BiV device: LBBB, QRSd 132-140 msec; QRSd 132 msec on EKG 10/25/23 post CRT     Advanced Therapies (if appropriate): We will continue to monitor  --Inotrope:  --LVAD/Transplant Candidacy:     # Atrial fibrillation  Rate: metoprolol as above  Rhythm: none  AC: apixaban   # Mitral regurgitation, severe on echo 3/21/23, moderate to severe on echo 5/25/23.  Severe on echo 12/23/2023 in setting of volume overload.  To consider MitraClip if still with significant MR after CRT volume optimization  # Severe AS s/p TAVR, #26mm Emery Hernán S3 Ultra valve via left femoral approach by   Amortegui.  6/21/22; normally functioning on echo 12/23/23  # Post op LBBB.   # Tachy-lino syndrome with LBBB, pauses post TAVR. S/P MDT PPM then upgraded to BIV ICD  # CAD s/p NSTEMI with PCI to the mid LAD and RPDA 3/21/23  Rx: plavix, on apixaban for afib, statin  # Hypertension  # Hyperlipidemia 11/10/22 , TG 75, HDL 65, LDL 62   2/10/23 TG 61 HDL 53 LDL 72  # DM2   # CKD, creatinine 1.41 12/25/23; 1.26 2/9/24  # Asthma  # Hepatitis C. Untreated per chart notes.Hep C Ab high reactive 1/2022. Management per PCP.  # Lymphadenopathy  # h/o tobacco abuse  # COPD  PFT 2/9/23:  Interpretation:  Results are unreliable due to patient being unable to perform maneuvers as per ATS standards. If clinical concern exists would recommend repeating PFTs. Best interpretation made based on available data.   Severe obstructive airflow defect on spirometry  No significant improvement in airflow or forced vital capacity in response to the administration to bronchodilator per ATS standards.   Lung volumes unable to be obtained due to patient inability to perform maneuvers  Normal diffusion capacity  Flow-volume loop consistent with obstruction       TODAY'S PLAN:  Denies congestive symptoms and reports stable weight on home scale  Stop lisinopril (last dose 9AM today)    Start entresto 24-26mg two times a day (first dose tomorrow at 9PM)  Obtain BMP in 1 week  2g sodium diet  2L fluid restriction diet  Daily weights  To consider repeat echo to reassess MR with optimized volume status and CRT  To consider initiation of amiodarone for afib since lower BIV pacing rate, will reassess on next device check       HPI:   Patient admitted February 9/20/2023 when he presented with progressive shortness of breath.  Found to have elevated troponin.  Admitted for type I NSTEMI with new wall motion abnormalities on echocardiogram along with newly reduced EF of 40%.  He was urgently taken to the Cath Lab and left heart catheterization showed  "three-vessel CAD with 2 vessels \"culprit lesions: Ostial RPDA and mid LAD stenosis status post stents.  Patient readmitted March 18, 2023 when he presented with progressive shortness of breath and intermittent bilateral lower extremity edema.  Also with weight gain and orthopnea.     4/28/23: here for follow up. Started on LifeVest since last office visit. Repeat echo 4/21/23 showed EF of 35%. Reports walking 1.5 blocks no dyspnea or chest pain. Also able to walk up a flight of stairs. Mainly limited by knee pains. No leg swelling or bloating. No PND, orthopnea. No dizziness or lightheadedness. Taking medications. Weight stable on home scale at  177 lbs     6/1/23: here for follow up. Repeat echo showed EF 35%, 30-35% on my review. Denies shortness of breath or chest pain on current level of exertion. Metoprolol and entresto increased on last visit.     9/7/23: here for follow up. Seen at the ED 7/14/23 for dyspnea. CHF. Seen by Dr. Yuan and scheduled for BIV ICD upgrade. Reports getting tired, more fatigued over a couple of weeks. Has to rest after walking up a flight of stairs due to shortness of breath. Ankle pains with walking.  1 week ago, had 2 episodes of dizziness, transient.   Reports adherence to medications and diet    09/26/2023 with GD: \"Presents today for follow-up. Feeling well, reports he's noticing alarms from his device since leaving the hospital. Denies shocks or pain. Has a device check upstairs immediately after this appointment. Breathing well, denies SOB/BUSTILLOS, even with stairs. Denies PND, orthopnea, LE swelling. Has been taking higher dose of Lasix as prescribed, urinates well with this. Believes he's been taking his spironolactone since discharge. Picked up his Eliquis - has been compliant with this. Unsure of all of his medications; advised to bring his medications to his next appointment to check against his list. Weighing himself daily at home, weights have been stable.\" Started on " spironolactone.      Per MJ: 10/10/2023: Patient presents for follow-up. Interpretor Sal (ID #[id number]) assisted during encounter with patient's consent. Feeling well today. Denies BUSTILLOS, PND, and orthopnea. Reports having some LE swelling last week that improved with compression stockings. Feels that his new ICD is shifting in pocket; no pain or discharge surrounding incision. Is completing daily weights; averaging mid 180s on home scale. Drinking 60-70 oz fluid daily. Reports good appetite; denies recent dietary indiscretion.      11/2/23: here for follow up. Shortness of breath x 2 days. Increased dietary salt intake over the holidays. Also with orthopnea and leg swelling.  Weight up on office visit today. Unable to obtain Prolonged skin bleeding, tends to scratched. Advised to avoid scratching and holding pressure for longer period of time due to blood thinner.      1/18/24:  Here for follow up. Admitted 12/22-25 c/o BUSTILLOS, orthopnea and b/l leg swelling x 1 week.  on arrival. Bilateral pleural effusions and ascities seen on US. Right-sided thoracentesis was performed in the ED, 1560 mL of fluid removed. Cultures revealed 2+ polys, no bacteria seen.   Given IV lasix and transitioned to 40mg BID. He reports now breathing is better. He is able to walk 2 blccks or less before getting short of breath. No orthopnea or PND, no leg swelling. No palpitations, dizziness or lightheadedness.     2/13/24: This is a telemedicine visit. Son Raul purcell. Mainly reporting knee pain. Home weight stable at 165 lbs. Reports not leg swelling or shortness of breath. No palpitations, dizziness or lightheadedness. Last dose of lisinopril at 9am today. 2/9/24 Na 141 K 3.9 and creatinine 1.26.    Review of Systems   Constitutional:  Negative for chills, fatigue and fever.   HENT:  Negative for ear pain and sore throat.    Eyes:  Negative for pain and visual disturbance.   Respiratory:  Negative for cough, chest tightness and  shortness of breath.    Cardiovascular:  Negative for chest pain, palpitations and leg swelling.   Gastrointestinal:  Negative for abdominal distention, abdominal pain and vomiting.   Genitourinary:  Negative for dysuria and hematuria.   Musculoskeletal:  Negative for arthralgias and back pain.   Skin:  Negative for color change and rash.   Neurological:  Negative for dizziness, seizures, syncope and light-headedness.   All other systems reviewed and are negative.      Past Medical History:   Diagnosis Date    Arthritis     Asthma     Colon polyps     Community acquired pneumonia     last assessed: 5/1/2014    Diabetes mellitus (HCC)     Hemorrhagic prepatellar bursitis, left 10/21/2019    Hepatitis C     High cholesterol     History of colonoscopy 2017    Hypertension     Lymphadenopathy, anterior cervical 04/17/2018    Nephritis and nephropathy, not specified as acute or chronic, with other specified pathological lesion in kidney, in diseases classified elsewhere 3/26/2013    NSTEMI (non-ST elevated myocardial infarction) ([facility]) 1/30/2023    s/p Medtronic dual chamber PPM 8/31/22 s/p upgrade to BiV ICD 9/13/2023 9/16/2023    Screening for colon cancer 05/01/2019    SIRS (systemic inflammatory response syndrome) ([facility]) 3/19/2023    Thoracic vertebral fracture ([facility]) 06/11/2014         Allergies   Allergen Reactions    Iv Contrast [Iodinated Contrast Media] Rash     Patient states he had a severe reaction approximately 10 years ago , states he had a rash, itchy and he remembers a bunch of people pounding on chest and being surrounded by multiple doctors.     .    Current Outpatient Medications:     Advair -21 MCG/ACT inhaler, Inhale 2 puffs 2 (two) times a day Rinse mouth after use., Disp: 36 g, Rfl: 6    albuterol (PROVENTIL HFA,VENTOLIN HFA) 90 mcg/act inhaler, INHALE 2 PUFFS BY MOUTH EVERY 4 HOURS AS NEEDED FOR WHEEZING OR SHORTNESS OF BREATH., Disp: 18 g, Rfl: 2    Alcohol Swabs (ALCOHOL PADS) 70 % PADS, ,  Disp: , Rfl:     apixaban (Eliquis) 5 mg, Take 1 tablet (5 mg total) by mouth 2 (two) times a day, Disp: 60 tablet, Rfl: 3    Blood Glucose Monitoring Suppl (OneTouch Verio) w/Device KIT, Check blood glucose three times daily before each meal, Disp: 1 kit, Rfl: 0    cholecalciferol (VITAMIN D3) 1,000 units tablet, Take 2 tablets (2,000 Units total) by mouth daily, Disp: 180 tablet, Rfl: 5    clopidogrel (PLAVIX) 75 mg tablet, Take 1 tablet (75 mg total) by mouth daily, Disp: 90 tablet, Rfl: 3    Empagliflozin (JARDIANCE) 10 MG TABS tablet, Take 1 tablet (10 mg total) by mouth every morning, Disp: 90 tablet, Rfl: 3    ferrous sulfate 325 (65 Fe) mg tablet, TAKE 1 TABLET BY MOUTH EVERY OTHER DAY, Disp: 45 tablet, Rfl: 4    furosemide (LASIX) 40 mg tablet, TAKE 1 TABLET BY MOUTH TWICE A DAY, Disp: 180 tablet, Rfl: 1    Insulin Glargine Solostar (Lantus SoloStar) 100 UNIT/ML SOPN, INJECT 0.18 ML (18 UNITS TOTAL) UNDER THE SKIN DAILY AT BEDTIME, Disp: 15 mL, Rfl: 3    Insulin Pen Needle (Pen Needles) 31G X 8 MM MISC, Use daily, Disp: 300 each, Rfl: 3    Lancets (FREESTYLE) lancets, by Other route as needed (As needed), Disp: 100 each, Rfl: 3    levothyroxine 137 mcg tablet, TAKE 1 TABLET BY MOUTH EVERY DAY, Disp: 90 tablet, Rfl: 3    metFORMIN (GLUCOPHAGE) 850 mg tablet, TAKE 1 TABLET BY MOUTH 2 TIMES A DAY WITH MEALS., Disp: 180 tablet, Rfl: 3    metoprolol succinate (TOPROL-XL) 50 mg 24 hr tablet, TAKE 1.5 TABLETS BY MOUTH 2 TIMES A DAY., Disp: 270 tablet, Rfl: 1    montelukast (SINGULAIR) 10 mg tablet, TAKE 1 TABLET BY MOUTH EVERY DAY, Disp: 90 tablet, Rfl: 3    NovoLOG FlexPen 100 units/mL injection pen, INJECT 5 UNITS WITH BREAKFAST AND WITH DINNER, Disp: 15 mL, Rfl: 3    rosuvastatin (CRESTOR) 40 MG tablet, TAKE 1 TABLET BY MOUTH EVERY DAY, Disp: 90 tablet, Rfl: 3    sacubitril-valsartan (Entresto) 24-26 MG TABS, Take 1 tablet by mouth 2 (two) times a day Start 36 hours after last dose of lisinopril, Disp: 60  tablet, Rfl: 3    Continuous Blood Gluc  (FreeStyle Cristofer 2 Clifton) POWER, Use 1 each continuous (Patient not taking: Reported on 2024), Disp: 1 each, Rfl: 0    Continuous Blood Gluc Sensor (FreeStyle Cristofer 2 Sensor) MISC, Use 1 each every 14 (fourteen) days (Patient not taking: Reported on 2024), Disp: 6 each, Rfl: 3    nitroglycerin (NITROSTAT) 0.4 mg SL tablet, Place 1 tablet (0.4 mg total) under the tongue every 5 (five) minutes as needed for chest pain, Disp: 30 tablet, Rfl: 1    spironolactone (ALDACTONE) 25 mg tablet, TAKE 1 TABLET (25 MG TOTAL) BY MOUTH DAILY. (Patient not taking: Reported on 2024), Disp: 90 tablet, Rfl: 1    Social History     Socioeconomic History    Marital status: /Civil Union     Spouse name: Not on file    Number of children: Not on file    Years of education: Not on file    Highest education level: Not on file   Occupational History    Occupation: retired    Tobacco Use    Smoking status: Former     Current packs/day: 0.00     Types: Cigarettes     Quit date: 2022     Years since quittin.7    Smokeless tobacco: Never    Tobacco comments:     started when he was about 27 yrs old; stopped smoking 3 wks ago   Vaping Use    Vaping status: Never Used   Substance and Sexual Activity    Alcohol use: No    Drug use: No    Sexual activity: Not Currently   Other Topics Concern    Not on file   Social History Narrative    Not on file     Social Determinants of Health     Financial Resource Strain: Low Risk  (2023)    Overall Financial Resource Strain (CARDIA)     Difficulty of Paying Living Expenses: Not hard at all   Food Insecurity: No Food Insecurity (2023)    Hunger Vital Sign     Worried About Running Out of Food in the Last Year: Never true     Ran Out of Food in the Last Year: Never true   Transportation Needs: No Transportation Needs (2023)    PRAPARE - Transportation     Lack of Transportation (Medical): No     Lack of Transportation  (Non-Medical): No   Physical Activity: Unknown (1/19/2022)    Exercise Vital Sign     Days of Exercise per Week: Not on file     Minutes of Exercise per Session: 60 min   Stress: No Stress Concern Present (1/19/2022)    Citizen of [place] [employer] - Occupational Stress Questionnaire     Feeling of Stress : Not at all   Social Connections: Moderately Isolated (1/19/2022)    Social Connection and Isolation Panel [NHANES]     Frequency of Communication with Friends and Family: More than three times a week     Frequency of Social Gatherings with Friends and Family: More than three times a week     Attends Mandaeism Services: Never     Active Member of Clubs or Organizations: No     Attends Club or Organization Meetings: Never     Marital Status:    Intimate Partner Violence: Not on file   Housing Stability: Low Risk  (9/17/2023)    Housing Stability Vital Sign     Unable to Pay for Housing in the Last Year: No     Number of Places Lived in the Last Year: 1     Unstable Housing in the Last Year: No       Family History   Problem Relation Age of Onset    Heart attack Family         at age 86    No Known Problems Mother     No Known Problems Father     Diabetes Sister     Diabetes Brother        Physical Exam:    Vitals:   There were no vitals filed for this visit.        Physical Exam    Labs & Results:    Lab Results   Component Value Date    SODIUM 141 02/09/2024    K 3.9 02/09/2024     02/09/2024    CO2 33 (H) 02/09/2024    BUN 26 (H) 02/09/2024    CREATININE 1.26 02/09/2024    GLUC 82 12/25/2023    CALCIUM 9.7 02/09/2024     Lab Results   Component Value Date    WBC 7.53 12/25/2023    HGB 13.0 12/25/2023    HCT 41.1 12/25/2023    MCV 87 12/25/2023     12/25/2023     Lab Results   Component Value Date    NTBNP 2,624 (H) 05/06/2023      Lab Results   Component Value Date    CHOLESTEROL 113 12/23/2023    CHOLESTEROL 137 02/10/2023    CHOLESTEROL 142 11/10/2022     Lab Results   Component  Value Date    HDL 35 (L) 12/23/2023    HDL 53 02/10/2023    HDL 65 11/10/2022     Lab Results   Component Value Date    TRIG 74 12/23/2023    TRIG 61 02/10/2023    TRIG 75 11/10/2022     Lab Results   Component Value Date    NONHDLC 84 02/10/2023       EKG personally reviewed by Michael Gan MD.     Counseling / Coordination of Care  Time spent today 25 minutes.  Greater than 50% of total time was spent with the patient and / or family counseling and / or coordination of care. We went over current diagnosis, most recent studies and any changes in treatment.    Thank you for the opportunity to participate in the care of this patient.    MICHAEL GAN MD  ADVANCED HEART FAILURE AND MECHANICAL CIRCULATORY SUPPORT  [facility]

## 2024-02-13 NOTE — TELEPHONE ENCOUNTER
----- Message from Virginia Gan MD sent at 2/13/2024  9:03 AM EST -----  Please let him know renal function stable. We will stop lisinopril and start entresto 24-26mg two times a day 36 hours after last dose of lisinopril. BMP 1 week after starting entresto, Orders placed.

## 2024-02-13 NOTE — PATIENT INSTRUCTIONS
Stop lisinopril (last dose 9AM today)    Start entresto 24-26mg two times a day (first dose tomorrow at 9PM)  Obtain BMP in 1 week  2g sodium diet  2L fluid restriction diet  Daily weights

## 2024-02-17 ENCOUNTER — APPOINTMENT (EMERGENCY)
Dept: RADIOLOGY | Facility: HOSPITAL | Age: 79
DRG: 309 | End: 2024-02-17
Payer: MEDICARE

## 2024-02-17 ENCOUNTER — APPOINTMENT (INPATIENT)
Dept: RADIOLOGY | Facility: HOSPITAL | Age: 79
DRG: 309 | End: 2024-02-17
Payer: MEDICARE

## 2024-02-17 ENCOUNTER — HOSPITAL ENCOUNTER (INPATIENT)
Facility: HOSPITAL | Age: 79
LOS: 6 days | Discharge: HOME HEALTH | DRG: 309 | End: 2024-02-23
Attending: EMERGENCY MEDICINE | Admitting: INTERNAL MEDICINE
Payer: MEDICARE

## 2024-02-17 DIAGNOSIS — M17.9 OSTEOARTHRITIS OF KNEE: ICD-10-CM

## 2024-02-17 DIAGNOSIS — I48.91 A-FIB (HCC): Primary | ICD-10-CM

## 2024-02-17 DIAGNOSIS — Z95.810 ICD (IMPLANTABLE CARDIOVERTER-DEFIBRILLATOR) IN PLACE: ICD-10-CM

## 2024-02-17 DIAGNOSIS — I48.92 ATRIAL FLUTTER, UNSPECIFIED TYPE (HCC): ICD-10-CM

## 2024-02-17 DIAGNOSIS — I48.91 ATRIAL FIBRILLATION (HCC): ICD-10-CM

## 2024-02-17 DIAGNOSIS — R00.0 WIDE-COMPLEX TACHYCARDIA: ICD-10-CM

## 2024-02-17 DIAGNOSIS — M25.561 RIGHT KNEE PAIN: ICD-10-CM

## 2024-02-17 DIAGNOSIS — I50.20 HEART FAILURE WITH REDUCED EJECTION FRACTION (HCC): ICD-10-CM

## 2024-02-17 DIAGNOSIS — M11.20 PSEUDOGOUT: ICD-10-CM

## 2024-02-17 LAB
2HR DELTA HS TROPONIN: 3 NG/L
4HR DELTA HS TROPONIN: 2 NG/L
ALBUMIN SERPL BCP-MCNC: 3.9 G/DL (ref 3.5–5)
ALP SERPL-CCNC: 118 U/L (ref 34–104)
ALT SERPL W P-5'-P-CCNC: 24 U/L (ref 7–52)
ANION GAP SERPL CALCULATED.3IONS-SCNC: 9 MMOL/L
APPEARANCE FLD: ABNORMAL
AST SERPL W P-5'-P-CCNC: 21 U/L (ref 13–39)
BASOPHILS # BLD AUTO: 0.03 THOUSANDS/ÂΜL (ref 0–0.1)
BASOPHILS NFR BLD AUTO: 0 % (ref 0–1)
BILIRUB SERPL-MCNC: 1.12 MG/DL (ref 0.2–1)
BUN SERPL-MCNC: 23 MG/DL (ref 5–25)
CALCIUM SERPL-MCNC: 9.9 MG/DL (ref 8.4–10.2)
CARDIAC TROPONIN I PNL SERPL HS: 21 NG/L
CARDIAC TROPONIN I PNL SERPL HS: 23 NG/L
CARDIAC TROPONIN I PNL SERPL HS: 24 NG/L
CHLORIDE SERPL-SCNC: 100 MMOL/L (ref 96–108)
CO2 SERPL-SCNC: 28 MMOL/L (ref 21–32)
COLOR FLD: ABNORMAL
CREAT SERPL-MCNC: 1.23 MG/DL (ref 0.6–1.3)
CRP SERPL QL: 16.2 MG/L
CRYSTALS SNV QL MICRO: NORMAL
EOSINOPHIL # BLD AUTO: 0.15 THOUSAND/ÂΜL (ref 0–0.61)
EOSINOPHIL NFR BLD AUTO: 2 % (ref 0–6)
ERYTHROCYTE [DISTWIDTH] IN BLOOD BY AUTOMATED COUNT: 18.7 % (ref 11.6–15.1)
ERYTHROCYTE [SEDIMENTATION RATE] IN BLOOD: 54 MM/HOUR (ref 0–19)
GFR SERPL CREATININE-BSD FRML MDRD: 56 ML/MIN/1.73SQ M
GLUCOSE SERPL-MCNC: 109 MG/DL (ref 65–140)
GLUCOSE SERPL-MCNC: 125 MG/DL (ref 65–140)
HCT VFR BLD AUTO: 45.3 % (ref 36.5–49.3)
HGB BLD-MCNC: 14.1 G/DL (ref 12–17)
IMM GRANULOCYTES # BLD AUTO: 0.02 THOUSAND/UL (ref 0–0.2)
IMM GRANULOCYTES NFR BLD AUTO: 0 % (ref 0–2)
LYMPHOCYTES # BLD AUTO: 2.02 THOUSANDS/ÂΜL (ref 0.6–4.47)
LYMPHOCYTES NFR BLD AUTO: 20 % (ref 14–44)
MAGNESIUM SERPL-MCNC: 2 MG/DL (ref 1.9–2.7)
MCH RBC QN AUTO: 26.3 PG (ref 26.8–34.3)
MCHC RBC AUTO-ENTMCNC: 31.1 G/DL (ref 31.4–37.4)
MCV RBC AUTO: 84 FL (ref 82–98)
MONOCYTES # BLD AUTO: 1.07 THOUSAND/ÂΜL (ref 0.17–1.22)
MONOCYTES NFR BLD AUTO: 11 % (ref 4–12)
MONOCYTES NFR SNV MANUAL: 6 %
NEUTROPHILS # BLD AUTO: 6.78 THOUSANDS/ÂΜL (ref 1.85–7.62)
NEUTROPHILS NFR SNV MANUAL: 94 %
NEUTS SEG NFR BLD AUTO: 67 % (ref 43–75)
NRBC BLD AUTO-RTO: 0 /100 WBCS
PHOSPHATE SERPL-MCNC: 3.5 MG/DL (ref 2.3–4.1)
PLATELET # BLD AUTO: 244 THOUSANDS/UL (ref 149–390)
PMV BLD AUTO: 10.7 FL (ref 8.9–12.7)
POTASSIUM SERPL-SCNC: 3.8 MMOL/L (ref 3.5–5.3)
PROT SERPL-MCNC: 7.1 G/DL (ref 6.4–8.4)
RBC # BLD AUTO: 5.37 MILLION/UL (ref 3.88–5.62)
RBC # SNV MANUAL: ABNORMAL /UL (ref 0–10)
SITE: ABNORMAL
SODIUM SERPL-SCNC: 137 MMOL/L (ref 135–147)
T4 FREE SERPL-MCNC: 1.36 NG/DL (ref 0.61–1.12)
TOTAL CELLS COUNTED SPEC: 100
TSH SERPL DL<=0.05 MIU/L-ACNC: 0.35 UIU/ML (ref 0.45–4.5)
URATE SERPL-MCNC: 5.1 MG/DL (ref 3.5–8.5)
WBC # BLD AUTO: 10.07 THOUSAND/UL (ref 4.31–10.16)
WBC # FLD MANUAL: ABNORMAL /UL (ref 0–200)

## 2024-02-17 PROCEDURE — 93005 ELECTROCARDIOGRAM TRACING: CPT

## 2024-02-17 PROCEDURE — 89050 BODY FLUID CELL COUNT: CPT

## 2024-02-17 PROCEDURE — 89051 BODY FLUID CELL COUNT: CPT

## 2024-02-17 PROCEDURE — 36415 COLL VENOUS BLD VENIPUNCTURE: CPT

## 2024-02-17 PROCEDURE — 83735 ASSAY OF MAGNESIUM: CPT | Performed by: EMERGENCY MEDICINE

## 2024-02-17 PROCEDURE — 96365 THER/PROPH/DIAG IV INF INIT: CPT

## 2024-02-17 PROCEDURE — 96366 THER/PROPH/DIAG IV INF ADDON: CPT

## 2024-02-17 PROCEDURE — 85652 RBC SED RATE AUTOMATED: CPT

## 2024-02-17 PROCEDURE — 87070 CULTURE OTHR SPECIMN AEROBIC: CPT

## 2024-02-17 PROCEDURE — 99291 CRITICAL CARE FIRST HOUR: CPT | Performed by: EMERGENCY MEDICINE

## 2024-02-17 PROCEDURE — 82948 REAGENT STRIP/BLOOD GLUCOSE: CPT

## 2024-02-17 PROCEDURE — 84550 ASSAY OF BLOOD/URIC ACID: CPT | Performed by: STUDENT IN AN ORGANIZED HEALTH CARE EDUCATION/TRAINING PROGRAM

## 2024-02-17 PROCEDURE — 0S9C3ZZ DRAINAGE OF RIGHT KNEE JOINT, PERCUTANEOUS APPROACH: ICD-10-PCS

## 2024-02-17 PROCEDURE — 84484 ASSAY OF TROPONIN QUANT: CPT | Performed by: EMERGENCY MEDICINE

## 2024-02-17 PROCEDURE — 84100 ASSAY OF PHOSPHORUS: CPT

## 2024-02-17 PROCEDURE — 73560 X-RAY EXAM OF KNEE 1 OR 2: CPT

## 2024-02-17 PROCEDURE — 84439 ASSAY OF FREE THYROXINE: CPT

## 2024-02-17 PROCEDURE — 86140 C-REACTIVE PROTEIN: CPT

## 2024-02-17 PROCEDURE — 89060 EXAM SYNOVIAL FLUID CRYSTALS: CPT

## 2024-02-17 PROCEDURE — 99284 EMERGENCY DEPT VISIT MOD MDM: CPT

## 2024-02-17 PROCEDURE — 80053 COMPREHEN METABOLIC PANEL: CPT | Performed by: EMERGENCY MEDICINE

## 2024-02-17 PROCEDURE — 76604 US EXAM CHEST: CPT | Performed by: EMERGENCY MEDICINE

## 2024-02-17 PROCEDURE — 96375 TX/PRO/DX INJ NEW DRUG ADDON: CPT

## 2024-02-17 PROCEDURE — 20610 DRAIN/INJ JOINT/BURSA W/O US: CPT | Performed by: EMERGENCY MEDICINE

## 2024-02-17 PROCEDURE — 85025 COMPLETE CBC W/AUTO DIFF WBC: CPT | Performed by: EMERGENCY MEDICINE

## 2024-02-17 PROCEDURE — NC001 PR NO CHARGE: Performed by: INTERNAL MEDICINE

## 2024-02-17 PROCEDURE — 84443 ASSAY THYROID STIM HORMONE: CPT

## 2024-02-17 PROCEDURE — 87205 SMEAR GRAM STAIN: CPT

## 2024-02-17 PROCEDURE — 71045 X-RAY EXAM CHEST 1 VIEW: CPT

## 2024-02-17 PROCEDURE — 96367 TX/PROPH/DG ADDL SEQ IV INF: CPT

## 2024-02-17 RX ORDER — FERROUS SULFATE 325(65) MG
325 TABLET ORAL EVERY OTHER DAY
Status: DISCONTINUED | OUTPATIENT
Start: 2024-02-18 | End: 2024-02-23 | Stop reason: HOSPADM

## 2024-02-17 RX ORDER — INSULIN LISPRO 100 [IU]/ML
1-6 INJECTION, SOLUTION INTRAVENOUS; SUBCUTANEOUS
Status: DISCONTINUED | OUTPATIENT
Start: 2024-02-18 | End: 2024-02-23 | Stop reason: HOSPADM

## 2024-02-17 RX ORDER — FUROSEMIDE 40 MG/1
40 TABLET ORAL 2 TIMES DAILY
Status: DISCONTINUED | OUTPATIENT
Start: 2024-02-17 | End: 2024-02-20

## 2024-02-17 RX ORDER — INSULIN GLARGINE 100 [IU]/ML
15 INJECTION, SOLUTION SUBCUTANEOUS
Status: DISCONTINUED | OUTPATIENT
Start: 2024-02-17 | End: 2024-02-20

## 2024-02-17 RX ORDER — ACETAMINOPHEN 325 MG/1
650 TABLET ORAL EVERY 6 HOURS PRN
Status: DISCONTINUED | OUTPATIENT
Start: 2024-02-17 | End: 2024-02-23 | Stop reason: HOSPADM

## 2024-02-17 RX ORDER — METOPROLOL TARTRATE 1 MG/ML
5 INJECTION, SOLUTION INTRAVENOUS ONCE
Status: COMPLETED | OUTPATIENT
Start: 2024-02-17 | End: 2024-02-17

## 2024-02-17 RX ORDER — CLOPIDOGREL BISULFATE 75 MG/1
75 TABLET ORAL DAILY
Status: DISCONTINUED | OUTPATIENT
Start: 2024-02-18 | End: 2024-02-17

## 2024-02-17 RX ORDER — ATORVASTATIN CALCIUM 80 MG/1
80 TABLET, FILM COATED ORAL
Status: DISCONTINUED | OUTPATIENT
Start: 2024-02-18 | End: 2024-02-23 | Stop reason: HOSPADM

## 2024-02-17 RX ORDER — POTASSIUM CHLORIDE 20 MEQ/1
40 TABLET, EXTENDED RELEASE ORAL ONCE
Status: COMPLETED | OUTPATIENT
Start: 2024-02-17 | End: 2024-02-17

## 2024-02-17 RX ORDER — METOPROLOL SUCCINATE 50 MG/1
50 TABLET, EXTENDED RELEASE ORAL DAILY
Status: DISCONTINUED | OUTPATIENT
Start: 2024-02-18 | End: 2024-02-17

## 2024-02-17 RX ORDER — LIDOCAINE HYDROCHLORIDE 20 MG/ML
10 INJECTION, SOLUTION EPIDURAL; INFILTRATION; INTRACAUDAL; PERINEURAL ONCE
Status: COMPLETED | OUTPATIENT
Start: 2024-02-17 | End: 2024-02-17

## 2024-02-17 RX ORDER — CLOPIDOGREL BISULFATE 75 MG/1
75 TABLET ORAL DAILY
Status: DISCONTINUED | OUTPATIENT
Start: 2024-02-18 | End: 2024-02-23 | Stop reason: HOSPADM

## 2024-02-17 RX ORDER — MAGNESIUM SULFATE HEPTAHYDRATE 40 MG/ML
2 INJECTION, SOLUTION INTRAVENOUS ONCE
Status: COMPLETED | OUTPATIENT
Start: 2024-02-17 | End: 2024-02-17

## 2024-02-17 RX ORDER — PREDNISONE 20 MG/1
40 TABLET ORAL DAILY
Status: DISCONTINUED | OUTPATIENT
Start: 2024-02-17 | End: 2024-02-18

## 2024-02-17 RX ORDER — MONTELUKAST SODIUM 10 MG/1
10 TABLET ORAL DAILY
Status: DISCONTINUED | OUTPATIENT
Start: 2024-02-18 | End: 2024-02-23 | Stop reason: HOSPADM

## 2024-02-17 RX ORDER — ACETAMINOPHEN 325 MG/1
650 TABLET ORAL EVERY 6 HOURS PRN
Status: DISCONTINUED | OUTPATIENT
Start: 2024-02-17 | End: 2024-02-17

## 2024-02-17 RX ORDER — MELATONIN
2000 DAILY
Status: DISCONTINUED | OUTPATIENT
Start: 2024-02-18 | End: 2024-02-23 | Stop reason: HOSPADM

## 2024-02-17 RX ADMIN — LIDOCAINE HYDROCHLORIDE 10 ML: 20 INJECTION, SOLUTION EPIDURAL; INFILTRATION; INTRACAUDAL at 19:23

## 2024-02-17 RX ADMIN — AMIODARONE HYDROCHLORIDE 1 MG/MIN: 50 INJECTION, SOLUTION INTRAVENOUS at 18:51

## 2024-02-17 RX ADMIN — CEFTRIAXONE SODIUM 1000 MG: 10 INJECTION, POWDER, FOR SOLUTION INTRAVENOUS at 20:17

## 2024-02-17 RX ADMIN — INSULIN GLARGINE 15 UNITS: 100 INJECTION, SOLUTION SUBCUTANEOUS at 22:04

## 2024-02-17 RX ADMIN — METOROPROLOL TARTRATE 5 MG: 5 INJECTION, SOLUTION INTRAVENOUS at 17:39

## 2024-02-17 RX ADMIN — VANCOMYCIN HYDROCHLORIDE 1500 MG: 1 INJECTION, POWDER, LYOPHILIZED, FOR SOLUTION INTRAVENOUS at 21:11

## 2024-02-17 RX ADMIN — APIXABAN 5 MG: 5 TABLET, FILM COATED ORAL at 23:39

## 2024-02-17 RX ADMIN — DEXTROSE 150 MG: 50 INJECTION, SOLUTION INTRAVENOUS at 18:33

## 2024-02-17 RX ADMIN — PREDNISONE 40 MG: 20 TABLET ORAL at 23:39

## 2024-02-17 RX ADMIN — POTASSIUM CHLORIDE 40 MEQ: 1500 TABLET, EXTENDED RELEASE ORAL at 21:58

## 2024-02-17 RX ADMIN — MAGNESIUM SULFATE HEPTAHYDRATE 2 G: 40 INJECTION, SOLUTION INTRAVENOUS at 18:01

## 2024-02-17 NOTE — ED PROVIDER NOTES
History  Chief Complaint   Patient presents with    Leg Pain     3 days of rt knee pain, and swelling      79 yo M PMHx of CAD, CHF, DM, tachybrady syndrome (s/p AICD placement) presents to the ED complaining of 3 days of worsening R-sided knee pain. Patient denies any trauma to incite the pain, came on gradually and has been worsening. Located to the medial inferior aspect of the R patella.     In triage, patient was found to be in what looked like a wide complex tachycardia at a rate of 160 BMP.  Patient has been entirely asymptomatic denies any chest pain, palpitations or shortness of breath.  Said from his knee pain which she has had over the last 3 days, he is had absolutely no symptoms related to his current dysrhythmia.  He has not been ill recently with no fevers chills nausea vomiting no diarrhea or constipation.  Patient was recently started on Entresto 3 days ago however no other changes to his beta-blocker medication.  He denies feeling any shocks given by his AICD.        Prior to Admission Medications   Prescriptions Last Dose Informant Patient Reported? Taking?   Advair -21 MCG/ACT inhaler Not Taking Spouse/Significant Other, Self No No   Sig: Inhale 2 puffs 2 (two) times a day Rinse mouth after use.   Patient not taking: Reported on 2/17/2024   Alcohol Swabs (ALCOHOL PADS) 70 % PADS Unknown Spouse/Significant Other, Self Yes No   Blood Glucose Monitoring Suppl (OneTouch Verio) w/Device KIT Unknown Spouse/Significant Other, Self No No   Sig: Check blood glucose three times daily before each meal   Continuous Blood Gluc  (FreeStyle Cristofer 2 [place]) POWER Unknown Spouse/Significant Other, Self No No   Sig: Use 1 each continuous   Patient not taking: Reported on 2/13/2024   Continuous Blood Gluc Sensor (FreeStyle Cristofer 2 Sensor) MISC Unknown Spouse/Significant Other, Self No No   Sig: Use 1 each every 14 (fourteen) days   Patient not taking: Reported on 2/13/2024   Empagliflozin (JARDIANCE)  10 MG TABS tablet 2/17/2024 Spouse/Significant Other, Self No Yes   Sig: Take 1 tablet (10 mg total) by mouth every morning   Insulin Glargine Solostar (Lantus SoloStar) 100 UNIT/ML SOPN 2/16/2024  No Yes   Sig: INJECT 0.18 ML (18 UNITS TOTAL) UNDER THE SKIN DAILY AT BEDTIME   Insulin Pen Needle (Pen Needles) 31G X 8 MM MISC Unknown  No No   Sig: Use daily   Lancets (FREESTYLE) lancets Unknown Spouse/Significant Other, Self No No   Sig: by Other route as needed (As needed)   NovoLOG FlexPen 100 units/mL injection pen 2/17/2024 Spouse/Significant Other, Self No Yes   Sig: INJECT 5 UNITS WITH BREAKFAST AND WITH DINNER   albuterol (PROVENTIL HFA,VENTOLIN HFA) 90 mcg/act inhaler Past Month  No Yes   Sig: INHALE 2 PUFFS BY MOUTH EVERY 4 HOURS AS NEEDED FOR WHEEZING OR SHORTNESS OF BREATH.   apixaban (Eliquis) 5 mg 2/17/2024 Spouse/Significant Other, Self No Yes   Sig: Take 1 tablet (5 mg total) by mouth 2 (two) times a day   cholecalciferol (VITAMIN D3) 1,000 units tablet 2/17/2024 Spouse/Significant Other, Self No Yes   Sig: Take 2 tablets (2,000 Units total) by mouth daily   clopidogrel (PLAVIX) 75 mg tablet  Spouse/Significant Other, Self No No   Sig: Take 1 tablet (75 mg total) by mouth daily   ferrous sulfate 325 (65 Fe) mg tablet 2/17/2024 Spouse/Significant Other, Self No Yes   Sig: TAKE 1 TABLET BY MOUTH EVERY OTHER DAY   furosemide (LASIX) 40 mg tablet 2/17/2024  No Yes   Sig: TAKE 1 TABLET BY MOUTH TWICE A DAY   levothyroxine 137 mcg tablet 2/17/2024 Spouse/Significant Other, Self No Yes   Sig: TAKE 1 TABLET BY MOUTH EVERY DAY   metFORMIN (GLUCOPHAGE) 850 mg tablet 2/17/2024 Spouse/Significant Other, Self No Yes   Sig: TAKE 1 TABLET BY MOUTH 2 TIMES A DAY WITH MEALS.   metoprolol succinate (TOPROL-XL) 50 mg 24 hr tablet 2/17/2024  No Yes   Sig: TAKE 1.5 TABLETS BY MOUTH 2 TIMES A DAY.   montelukast (SINGULAIR) 10 mg tablet 2/17/2024  No Yes   Sig: TAKE 1 TABLET BY MOUTH EVERY DAY   nitroglycerin (NITROSTAT) 0.4  mg SL tablet Unknown Spouse/Significant Other, Self No No   Sig: Place 1 tablet (0.4 mg total) under the tongue every 5 (five) minutes as needed for chest pain   rosuvastatin (CRESTOR) 40 MG tablet 2/17/2024 Spouse/Significant Other, Self No Yes   Sig: TAKE 1 TABLET BY MOUTH EVERY DAY   sacubitril-valsartan (Entresto) 24-26 MG TABS 2/17/2024  No Yes   Sig: Take 1 tablet by mouth 2 (two) times a day Start 36 hours after last dose of lisinopril   spironolactone (ALDACTONE) 25 mg tablet Not Taking  No No   Sig: TAKE 1 TABLET (25 MG TOTAL) BY MOUTH DAILY.   Patient not taking: Reported on 1/18/2024      Facility-Administered Medications: None       Past Medical History:   Diagnosis Date    Arthritis     Asthma     Colon polyps     Community acquired pneumonia     last assessed: 5/1/2014    Diabetes mellitus (HCC)     Hemorrhagic prepatellar bursitis, left 10/21/2019    Hepatitis C     High cholesterol     History of colonoscopy 2017    Hypertension     Lymphadenopathy, anterior cervical 04/17/2018    Nephritis and nephropathy, not specified as acute or chronic, with other specified pathological lesion in kidney, in diseases classified elsewhere 3/26/2013    NSTEMI (non-ST elevated myocardial infarction) (HCC) 1/30/2023    s/p Medtronic dual chamber PPM 8/31/22 s/p upgrade to BiV ICD 9/13/2023 9/16/2023    Screening for colon cancer 05/01/2019    SIRS (systemic inflammatory response syndrome) (HCC) 3/19/2023    Thoracic vertebral fracture (HCC) 06/11/2014       Past Surgical History:   Procedure Laterality Date    CARDIAC CATHETERIZATION N/A 6/3/2022    Procedure: Cardiac RHC/LHC;  Surgeon: Yemi Molina MD;  [facility]: [facility];  Service: Cardiology    CARDIAC CATHETERIZATION N/A 6/21/2022    Procedure: CARDIAC TAVR;  Surgeon: David Craft MD;  Location: [facility];  Service: Cardiology    CARDIAC CATHETERIZATION N/A 2/10/2023    Procedure: Cardiac Coronary Angiogram;  Surgeon: Yemi Molina MD;  Location:  [facility];  Service: Cardiology    CARDIAC CATHETERIZATION N/A 2/10/2023    Procedure: Cardiac pci;  Surgeon: Yemi Molina MD;  [facility];  Service: Cardiology    CARDIAC ELECTROPHYSIOLOGY PROCEDURE N/A 2022    Procedure: Cardiac pacer implant ; DC PPM;  Surgeon: Francis uSn DO;  [facility];  Service: Cardiology    CARDIAC ELECTROPHYSIOLOGY PROCEDURE N/A 2023    Procedure: Cardiac upgrade pacer to biv icd;  Surgeon: Mario Yuan MD;  [facility];  Service: Cardiology    COLONOSCOPY      COLONOSCOPY W/ POLYPECTOMY      IR UPPER EXTREMITY VENOGRAM- DIAGNOSTIC  2023    LITHOTRIPSY      MULTIPLE TOOTH EXTRACTIONS      NY COLONOSCOPY FLX DX W/COLLJ SPEC WHEN PFRMD N/A 2018    Procedure: COLONOSCOPY;  Surgeon: Raad Sandoval MD;  Location: [facility];  Service: Gastroenterology    NY REPLACE AORTIC VALVE OPENFEMORAL ARTERY APPROACH N/A 2022    Procedure: REPLACEMENT AORTIC VALVE TRANSCATHETER (TAVR) TRANSFEMORAL W/ 26MM QUINN RONNI S3 ULTRA VALVE(ACCESS ON LEFT) SAULO;  Surgeon: Sal Walker MD;  Location: [facility];  Service: Cardiac Surgery       Family History   Problem Relation Age of Onset    Heart attack Family         at age 86    No Known Problems Mother     No Known Problems Father     Diabetes Sister     Diabetes Brother      I have reviewed and agree with the history as documented.    E-Cigarette/Vaping    E-Cigarette Use Never User      E-Cigarette/Vaping Substances    Nicotine No     THC No     CBD No     Flavoring No     Other No     Unknown No      Social History     Tobacco Use    Smoking status: Former     Current packs/day: 0.00     Types: Cigarettes     Quit date: 2022     Years since quittin.7    Smokeless tobacco: Never    Tobacco comments:     started when he was about 27 yrs old; stopped smoking 3 wks ago   Vaping Use    Vaping status: Never Used   Substance Use Topics    Alcohol use: No     Drug use: No        Review of Systems   Constitutional:  Negative for chills and fever.   HENT:  Negative for ear pain and sore throat.    Eyes:  Negative for pain and visual disturbance.   Respiratory:  Negative for cough and shortness of breath.    Cardiovascular:  Negative for chest pain and palpitations.   Gastrointestinal:  Negative for abdominal pain and vomiting.   Genitourinary:  Negative for dysuria and hematuria.   Musculoskeletal:  Positive for joint swelling. Negative for arthralgias and back pain.   Skin:  Negative for color change and rash.   Neurological:  Negative for seizures and syncope.   All other systems reviewed and are negative.      Physical Exam  ED Triage Vitals   Temperature Pulse Respirations Blood Pressure SpO2   02/17/24 1711 02/17/24 1711 02/17/24 1711 02/17/24 1711 02/17/24 1711   97.5 °F (36.4 °C) (!) 163 22 97/74 98 %      Temp Source Heart Rate Source Patient Position - Orthostatic VS BP Location FiO2 (%)   02/17/24 1711 02/17/24 1711 02/17/24 1745 02/17/24 1745 --   Oral Monitor Lying Right arm       Pain Score       02/17/24 1745       8             Orthostatic Vital Signs  Vitals:    02/18/24 0736 02/18/24 0738 02/18/24 1021 02/18/24 1043   BP: 110/69   111/70   Pulse:  93 89    Patient Position - Orthostatic VS:           Physical Exam  Vitals and nursing note reviewed.   Constitutional:       General: He is not in acute distress.     Appearance: He is well-developed. He is ill-appearing (Chronically).   HENT:      Head: Normocephalic and atraumatic.   Eyes:      Conjunctiva/sclera: Conjunctivae normal.   Cardiovascular:      Rate and Rhythm: Regular rhythm. Tachycardia present.      Pulses: Decreased pulses.      Heart sounds: No murmur heard.  Pulmonary:      Effort: Pulmonary effort is normal. No respiratory distress.      Breath sounds: Normal breath sounds.   Abdominal:      Palpations: Abdomen is soft.      Tenderness: There is no abdominal tenderness.   Musculoskeletal:          General: No swelling.      Cervical back: Neck supple.        Legs:       Comments: No calf pain, swelling or tenderness bilaterally.   Skin:     General: Skin is warm and dry.      Capillary Refill: Capillary refill takes less than 2 seconds.      Coloration: Skin is jaundiced (Mild).   Neurological:      General: No focal deficit present.      Mental Status: He is alert.   Psychiatric:         Mood and Affect: Mood normal.         ED Medications  Medications   amiodarone (CORDARONE) 900 mg in dextrose 5 % 500 mL infusion (0 mg/min Intravenous Stopped 2/18/24 0138)     Followed by   amiodarone (CORDARONE) 900 mg in dextrose 5 % 500 mL infusion (0.5 mg/min Intravenous New Bag 2/18/24 0137)   cholecalciferol (VITAMIN D3) tablet 2,000 Units (2,000 Units Oral Given 2/18/24 1022)   Empagliflozin (JARDIANCE) tablet 10 mg (10 mg Oral Given 2/18/24 1023)   ferrous sulfate tablet 325 mg (325 mg Oral Given 2/18/24 1022)   furosemide (LASIX) tablet 40 mg (40 mg Oral Given 2/18/24 1022)   insulin glargine (LANTUS) subcutaneous injection 15 Units 0.15 mL (15 Units Subcutaneous Given 2/17/24 2204)   montelukast (SINGULAIR) tablet 10 mg (10 mg Oral Given 2/18/24 1021)   atorvastatin (LIPITOR) tablet 80 mg (has no administration in time range)   insulin lispro (HumaLOG) 100 units/mL subcutaneous injection 1-6 Units (2 Units Subcutaneous Given 2/18/24 1207)   apixaban (ELIQUIS) tablet 5 mg (5 mg Oral Given 2/18/24 1022)   clopidogrel (PLAVIX) tablet 75 mg (75 mg Oral Given 2/18/24 1022)   acetaminophen (TYLENOL) tablet 650 mg (has no administration in time range)   Diclofenac Sodium (VOLTAREN) 1 % topical gel 2 g (2 g Topical Not Given 2/18/24 1206)   metoprolol succinate (TOPROL-XL) 24 hr tablet 75 mg (75 mg Oral Given 2/18/24 1021)   predniSONE tablet 40 mg (40 mg Oral Given 2/18/24 1208)   levothyroxine tablet 125 mcg (125 mcg Oral Given 2/18/24 1021)   metoprolol (LOPRESSOR) injection 5 mg (5 mg Intravenous Given 2/17/24  1739)   magnesium sulfate 2 g/50 mL IVPB (premix) 2 g (0 g Intravenous Stopped 2/17/24 1833)   amiodarone 150 mg in dextrose 5 % 100 mL IV bolus (0 mg Intravenous Stopped 2/17/24 1850)   lidocaine (PF) (XYLOCAINE-MPF) 2 % injection 10 mL (10 mL Infiltration Given by Other 2/17/24 1923)   vancomycin (VANCOCIN) 1500 mg in sodium chloride 0.9% 250 mL IVPB (0 mg Intravenous Stopped 2/17/24 2241)   ceftriaxone (ROCEPHIN) 1 g/50 mL in dextrose IVPB (0 mg Intravenous Stopped 2/17/24 2047)   potassium chloride (Klor-Con M20) CR tablet 40 mEq (40 mEq Oral Given 2/17/24 2158)       Diagnostic Studies  Results Reviewed       Procedure Component Value Units Date/Time    Comprehensive metabolic panel [[id number]]  (Abnormal) Collected: 02/18/24 0716    Lab Status: Final result Specimen: Blood from Hand, Left Updated: 02/18/24 1045     Sodium 138 mmol/L      Potassium 5.0 mmol/L      Chloride 102 mmol/L      CO2 28 mmol/L      ANION GAP 8 mmol/L      BUN 30 mg/dL      Creatinine 1.17 mg/dL      Glucose 146 mg/dL      Calcium 10.0 mg/dL      AST 18 U/L      ALT 22 U/L      Alkaline Phosphatase 119 U/L      Total Protein 7.6 g/dL      Albumin 4.0 g/dL      Total Bilirubin 1.27 mg/dL      eGFR 60 ml/min/1.73sq m     Narrative:      National Kidney Disease Foundation guidelines for Chronic Kidney Disease (CKD):     Stage 1 with normal or high GFR (GFR > 90 mL/min/1.73 square meters)    Stage 2 Mild CKD (GFR = 60-89 mL/min/1.73 square meters)    Stage 3A Moderate CKD (GFR = 45-59 mL/min/1.73 square meters)    Stage 3B Moderate CKD (GFR = 30-44 mL/min/1.73 square meters)    Stage 4 Severe CKD (GFR = 15-29 mL/min/1.73 square meters)    Stage 5 End Stage CKD (GFR <15 mL/min/1.73 square meters)  Note: GFR calculation is accurate only with a steady state creatinine    Magnesium [[id number]]  (Normal) Collected: 02/18/24 0716    Lab Status: Final result Specimen: Blood from Hand, Left Updated: 02/18/24 1045     Magnesium 2.7 mg/dL      "Phosphorus [[id number]]  (Abnormal) Collected: 02/18/24 0716    Lab Status: Final result Specimen: Blood from Hand, Left Updated: 02/18/24 1045     Phosphorus 4.3 mg/dL     Synovial fluid, Culture and Gram stain [[id number]] Collected: 02/17/24 2000    Lab Status: Preliminary result Specimen: Body Fluid from Joint, Right Knee Updated: 02/18/24 0914     Gram Stain Result 3+ Polys      No organisms seen    CBC and differential [[id number]]  (Abnormal) Collected: 02/18/24 0433    Lab Status: Final result Specimen: Blood from Arm, Right Updated: 02/18/24 0501     WBC 8.47 Thousand/uL      RBC 4.76 Million/uL      Hemoglobin 12.7 g/dL      Hematocrit 40.6 %      MCV 85 fL      MCH 26.7 pg      MCHC 31.3 g/dL      RDW 20.6 %      Platelets 226 Thousands/uL      nRBC 0 /100 WBCs      Neutrophils Relative 87 %      Immat GRANS % 0 %      Lymphocytes Relative 10 %      Monocytes Relative 3 %      Eosinophils Relative 0 %      Basophils Relative 0 %      Neutrophils Absolute 7.28 Thousands/µL      Immature Grans Absolute 0.03 Thousand/uL      Lymphocytes Absolute 0.84 Thousands/µL      Monocytes Absolute 0.27 Thousand/µL      Eosinophils Absolute 0.03 Thousand/µL      Basophils Absolute 0.02 Thousands/µL     Synovial fluid, white cell count w/ diff [[id number]]  (Abnormal) Collected: 02/17/24 2000    Lab Status: Final result Specimen: Synovial Fluid from Joint, Right Knee Updated: 02/17/24 2332     Site Right Knee     Color, Fluid Leticia     Clarity, Fluid Cloudy     WBC, Fluid 28,810 /ul     Synovial Fluid Diff [[id number]] Collected: 02/17/24 2000    Lab Status: Final result Specimen: Synovial Fluid from Joint, Right Knee Updated: 02/17/24 2332     Total Counted 100     Neutrophil % Synovial 94 %      Monocyte % Synovial 6 %     T4, free [[id number]]  (Abnormal) Collected: 02/17/24 2000    Lab Status: Final result Specimen: Blood from Arm, Left Updated: 02/17/24 2230     Free T4 1.36 ng/dL     Narrative:        \"Therapeutic range " "for patients medicated with thyroid disorders: 0.61-1.24 ng/dL.\"    Synovial fluid, RBC count [[id number]]  (Abnormal) Collected: 02/17/24 2000    Lab Status: Final result Specimen: Synovial Fluid from Joint, Right Knee Updated: 02/17/24 2212     RBC, SYNOVIAL 11,000    Synovial fluid, crystal [[id number]] Collected: 02/17/24 2000    Lab Status: Final result Specimen: Synovial Fluid from Joint, Right Knee Updated: 02/17/24 2212     Crystals, Synovial Fluid Positive for Calcium Pyrophosphate Crystals    Fingerstick Glucose (POCT) [[id number]]  (Normal) Collected: 02/17/24 2202    Lab Status: Final result Updated: 02/17/24 2203     POC Glucose 109 mg/dl     TSH, 3rd generation with Free T4 reflex [[id number]]  (Abnormal) Collected: 02/17/24 2000    Lab Status: Final result Specimen: Blood from Arm, Left Updated: 02/17/24 2155     TSH 3RD GENERATON 0.352 uIU/mL     Phosphorus [[id number]]  (Normal) Collected: 02/17/24 2000    Lab Status: Final result Specimen: Blood from Arm, Left Updated: 02/17/24 2155     Phosphorus 3.5 mg/dL     Uric acid [[id number]]  (Normal) Collected: 02/17/24 2000    Lab Status: Final result Specimen: Blood from Arm, Left Updated: 02/17/24 2155     Uric Acid 5.1 mg/dL     Narrative:      N-acetyl-p-benzoquinone imine (metabolite of Acetaminophen) will generate erroneously low results in samples for patients that have taken an overdose of Acetaminophen.    HS Troponin I 4hr [[id number]]  (Normal) Collected: 02/17/24 2118    Lab Status: Final result Specimen: Blood from Arm, Left Updated: 02/17/24 2152     hs TnI 4hr 23 ng/L      Delta 4hr hsTnI 2 ng/L     C-reactive protein [[id number]]  (Abnormal) Collected: 02/17/24 2000    Lab Status: Final result Specimen: Blood from Arm, Left Updated: 02/17/24 2045     CRP 16.2 mg/L     Sedimentation rate, automated [[id number]]  (Abnormal) Collected: 02/17/24 2000    Lab Status: Final result Specimen: Blood from Arm, Left Updated: 02/17/24 2025     Sed Rate 54 " mm/hour     HS Troponin I 2hr [[id number]]  (Normal) Collected: 02/17/24 1927    Lab Status: Final result Specimen: Blood from Arm, Left Updated: 02/17/24 2000     hs TnI 2hr 24 ng/L      Delta 2hr hsTnI 3 ng/L     HS Troponin 0hr (reflex protocol) [[id number]]  (Normal) Collected: 02/17/24 1725    Lab Status: Final result Specimen: Blood from Arm, Left Updated: 02/17/24 1800     hs TnI 0hr 21 ng/L     Comprehensive metabolic panel [[id number]]  (Abnormal) Collected: 02/17/24 1725    Lab Status: Final result Specimen: Blood from Arm, Left Updated: 02/17/24 1756     Sodium 137 mmol/L      Potassium 3.8 mmol/L      Chloride 100 mmol/L      CO2 28 mmol/L      ANION GAP 9 mmol/L      BUN 23 mg/dL      Creatinine 1.23 mg/dL      Glucose 125 mg/dL      Calcium 9.9 mg/dL      AST 21 U/L      ALT 24 U/L      Alkaline Phosphatase 118 U/L      Total Protein 7.1 g/dL      Albumin 3.9 g/dL      Total Bilirubin 1.12 mg/dL      eGFR 56 ml/min/1.73sq m     Narrative:      National Kidney Disease Foundation guidelines for Chronic Kidney Disease (CKD):     Stage 1 with normal or high GFR (GFR > 90 mL/min/1.73 square meters)    Stage 2 Mild CKD (GFR = 60-89 mL/min/1.73 square meters)    Stage 3A Moderate CKD (GFR = 45-59 mL/min/1.73 square meters)    Stage 3B Moderate CKD (GFR = 30-44 mL/min/1.73 square meters)    Stage 4 Severe CKD (GFR = 15-29 mL/min/1.73 square meters)    Stage 5 End Stage CKD (GFR <15 mL/min/1.73 square meters)  Note: GFR calculation is accurate only with a steady state creatinine    Magnesium [[id number]]  (Normal) Collected: 02/17/24 1725    Lab Status: Final result Specimen: Blood from Arm, Left Updated: 02/17/24 1756     Magnesium 2.0 mg/dL     CBC and differential [[id number]]  (Abnormal) Collected: 02/17/24 1725    Lab Status: Final result Specimen: Blood from Arm, Left Updated: 02/17/24 1733     WBC 10.07 Thousand/uL      RBC 5.37 Million/uL      Hemoglobin 14.1 g/dL      Hematocrit 45.3 %      MCV 84 fL       MCH 26.3 pg      MCHC 31.1 g/dL      RDW 18.7 %      MPV 10.7 fL      Platelets 244 Thousands/uL      nRBC 0 /100 WBCs      Neutrophils Relative 67 %      Immat GRANS % 0 %      Lymphocytes Relative 20 %      Monocytes Relative 11 %      Eosinophils Relative 2 %      Basophils Relative 0 %      Neutrophils Absolute 6.78 Thousands/µL      Immature Grans Absolute 0.02 Thousand/uL      Lymphocytes Absolute 2.02 Thousands/µL      Monocytes Absolute 1.07 Thousand/µL      Eosinophils Absolute 0.15 Thousand/µL      Basophils Absolute 0.03 Thousands/µL                    XR chest 1 view portable   ED Interpretation by Francisco Mohan MD (02/17 1756)   No fluid overload  No Lead fx.  AICD in place.  No pneumonia.      Final Result by Kayli Rodrigues MD (02/18 0834)      Trace right effusion.      Lungs clear            Workstation performed: [id number]         XR knee 1 or 2 vw right    (Results Pending)         Procedures  Procedures      ED Course  ED Course as of 02/18/24 1506   Sat Feb 17, 2024   1743 Dr. Haja saavedra messaged regarding patient's tachycardia and AICD interrogation showing 12 shocks received for VT. Fellow to come down and eval.    1946 Contacted SOD for admission                             SBIRT 20yo+      Flowsheet Row Most Recent Value   Initial Alcohol Screen: US AUDIT-C     1. How often do you have a drink containing alcohol? 0 Filed at: 02/17/2024 1714   2. How many drinks containing alcohol do you have on a typical day you are drinking?  0 Filed at: 02/17/2024 1714   3a. Male UNDER 65: How often do you have five or more drinks on one occasion? 0 Filed at: 02/17/2024 1714   3b. FEMALE Any Age, or MALE 65+: How often do you have 4 or more drinks on one occassion? 0 Filed at: 02/17/2024 1714   Audit-C Score 0 Filed at: 02/17/2024 1714   DIEUDONNE: How many times in the past year have you...    Used an illegal drug or used a prescription medication for non-medical reasons? Never Filed at:  02/17/2024 1714                  Medical Decision Making  77 yo M PMHx of CAD, CHF, DM, tachybrady syndrome (s/p AICD placement) presents to the ED complaining of 3 days of worsening R-sided knee pain.    Ddx: A-fib with RVR versus V. tach, nonfunctioning AICD, septic versus inflammatory arthritis.    Due to patient's asymptomatic and hemodynamic status, will attempt to pharmacologically rate control patient.  Single dose of metoprolol 5 mg attempted without relief.  Amiodarone bolus and drip started with good rate control.  Patient remained asymptomatic from his tachydysrhythmia throughout his ED stay.    Once stabilized from cardiac perspective, arthrocentesis of the right knee performed with return of dark yellow and blood-tinged synovial fluid with visible sediment.  While awaiting Gram stain, culture and WBC, will treat patient empirically for possible septic arthritis.    Case discussed with medicine, patient mated for further management and evaluation of his right knee pain and tachydysrhythmia.    Amount and/or Complexity of Data Reviewed  Labs: ordered.  Radiology: ordered and independent interpretation performed.    Risk  Prescription drug management.  Decision regarding hospitalization.          Disposition  Final diagnoses:   A-fib (HCC)   Right knee pain     Time reflects when diagnosis was documented in both MDM as applicable and the Disposition within this note       Time User Action Codes Description Comment    2/17/2024  8:09 PM Shellie Banuelos [I48.91] A-fib (HCC)     2/17/2024  8:09 PM Shellie Banuelos [M25.561] Right knee pain     2/17/2024  9:01 PM Esthela Jones [I48.91] Atrial fibrillation (HCC)     2/17/2024  9:01 PM Esthela Jones [Z95.810] s/p Medtronic dual chamber PPM 8/31/22 s/p upgrade to BiV ICD 9/13/2023 2/17/2024  9:02 PM Esthela Jones [R00.0] Wide-complex tachycardia           ED Disposition       ED Disposition   Admit    Condition   Stable    Date/Time    Sat Feb 17, 2024 2011    Comment   Case was discussed with Dr. Skinner and the patient's admission status was agreed to be Admission Status: inpatient status to the service of Dr. Skinner.               Follow-up Information    None         Current Discharge Medication List        CONTINUE these medications which have NOT CHANGED    Details   albuterol (PROVENTIL HFA,VENTOLIN HFA) 90 mcg/act inhaler INHALE 2 PUFFS BY MOUTH EVERY 4 HOURS AS NEEDED FOR WHEEZING OR SHORTNESS OF BREATH.  Qty: 18 g, Refills: 2    Comments: Substitution to a formulary equivalent within the same pharmaceutical class is authorized.  Associated Diagnoses: Mild intermittent asthma without complication      apixaban (Eliquis) 5 mg Take 1 tablet (5 mg total) by mouth 2 (two) times a day  Qty: 60 tablet, Refills: 3    Comments: PRICE CHECK ONLY  Associated Diagnoses: Atrial flutter, unspecified type ([facility])      cholecalciferol (VITAMIN D3) 1,000 units tablet Take 2 tablets (2,000 Units total) by mouth daily  Qty: 180 tablet, Refills: 5    Associated Diagnoses: Vitamin D deficiency      Empagliflozin (JARDIANCE) 10 MG TABS tablet Take 1 tablet (10 mg total) by mouth every morning  Qty: 90 tablet, Refills: 3    Associated Diagnoses: NSTEMI (non-ST elevated myocardial infarction) ([facility]); Status post insertion of drug eluting coronary artery stent; Coronary artery disease involving native coronary artery of native heart without angina pectoris      ferrous sulfate 325 (65 Fe) mg tablet TAKE 1 TABLET BY MOUTH EVERY OTHER DAY  Qty: 45 tablet, Refills: 4    Associated Diagnoses: Iron deficiency anemia, unspecified iron deficiency anemia type      furosemide (LASIX) 40 mg tablet TAKE 1 TABLET BY MOUTH TWICE A DAY  Qty: 180 tablet, Refills: 1    Associated Diagnoses: Acute on chronic systolic congestive heart failure ([facility])      Insulin Glargine Solostar (Lantus SoloStar) 100 UNIT/ML SOPN INJECT 0.18 ML (18 UNITS TOTAL) UNDER THE SKIN DAILY AT BEDTIME  Qty:  15 mL, Refills: 3    Comments: DX Code Needed  NEEDS A NEW SCRIPT NO MORE REFILLS AVAILABLE.  Associated Diagnoses: Diabetes mellitus due to underlying condition with diabetic neuropathy, without long-term current use of insulin ([facility])      levothyroxine 137 mcg tablet TAKE 1 TABLET BY MOUTH EVERY DAY  Qty: 90 tablet, Refills: 3    Associated Diagnoses: Hypothyroidism      metFORMIN (GLUCOPHAGE) 850 mg tablet TAKE 1 TABLET BY MOUTH 2 TIMES A DAY WITH MEALS.  Qty: 180 tablet, Refills: 3    Associated Diagnoses: Diabetes mellitus ([facility])      metoprolol succinate (TOPROL-XL) 50 mg 24 hr tablet TAKE 1.5 TABLETS BY MOUTH 2 TIMES A DAY.  Qty: 270 tablet, Refills: 1    Associated Diagnoses: Heart failure with reduced ejection fraction ([facility])      montelukast (SINGULAIR) 10 mg tablet TAKE 1 TABLET BY MOUTH EVERY DAY  Qty: 90 tablet, Refills: 3    Associated Diagnoses: Mild intermittent asthma, unspecified whether complicated      NovoLOG FlexPen 100 units/mL injection pen INJECT 5 UNITS WITH BREAKFAST AND WITH DINNER  Qty: 15 mL, Refills: 3    Comments: DX Code Needed  NEEDS A NEW SCRIPT.  Associated Diagnoses: Type 2 diabetes mellitus with diabetic neuropathy, without long-term current use of insulin ([facility])      rosuvastatin (CRESTOR) 40 MG tablet TAKE 1 TABLET BY MOUTH EVERY DAY  Qty: 90 tablet, Refills: 3    Comments: DX Code Needed  .  Associated Diagnoses: Mixed hyperlipidemia      sacubitril-valsartan (Entresto) 24-26 MG TABS Take 1 tablet by mouth 2 (two) times a day Start 36 hours after last dose of lisinopril  Qty: 60 tablet, Refills: 3    Associated Diagnoses: Chronic HFrEF (heart failure with reduced ejection fraction) ([facility])      Advair -21 MCG/ACT inhaler Inhale 2 puffs 2 (two) times a day Rinse mouth after use.  Qty: 36 g, Refills: 6    Comments: Substitution to a formulary equivalent within the same pharmaceutical class is authorized.  Associated Diagnoses: Mild intermittent asthma, unspecified whether  complicated      Alcohol Swabs (ALCOHOL PADS) 70 % PADS       Blood Glucose Monitoring Suppl (OneTouch Verio) w/Device KIT Check blood glucose three times daily before each meal  Qty: 1 kit, Refills: 0    Associated Diagnoses: Type 2 diabetes mellitus with diabetic neuropathy, without long-term current use of insulin ([facility])      clopidogrel (PLAVIX) 75 mg tablet Take 1 tablet (75 mg total) by mouth daily  Qty: 90 tablet, Refills: 3    Associated Diagnoses: S/P TAVR (transcatheter aortic valve replacement)      Continuous Blood Gluc  (FreeStyle Cristofer 2 [place]) POWER Use 1 each continuous  Qty: 1 each, Refills: 0    Associated Diagnoses: Type 2 diabetes mellitus with diabetic neuropathy, with long-term current use of insulin ([facility])      Continuous Blood Gluc Sensor (FreeStyle Cristofer 2 Sensor) MISC Use 1 each every 14 (fourteen) days  Qty: 6 each, Refills: 3    Associated Diagnoses: Type 2 diabetes mellitus with diabetic neuropathy, with long-term current use of insulin ([facility])      Insulin Pen Needle (Pen Needles) 31G X 8 MM MISC Use daily  Qty: 300 each, Refills: 3    Associated Diagnoses: Diabetes mellitus due to underlying condition with diabetic neuropathy, without long-term current use of insulin ([facility])      Lancets (FREESTYLE) lancets by Other route as needed (As needed)  Qty: 100 each, Refills: 3    Associated Diagnoses: Diabetes mellitus due to underlying condition with diabetic neuropathy, without long-term current use of insulin ([facility])      nitroglycerin (NITROSTAT) 0.4 mg SL tablet Place 1 tablet (0.4 mg total) under the tongue every 5 (five) minutes as needed for chest pain  Qty: 30 tablet, Refills: 1    Associated Diagnoses: NSTEMI (non-ST elevated myocardial infarction) ([facility]); Status post insertion of drug eluting coronary artery stent; Coronary artery disease involving native coronary artery of native heart without angina pectoris      spironolactone (ALDACTONE) 25 mg tablet TAKE 1 TABLET (25 MG TOTAL)  BY MOUTH DAILY.  Qty: 90 tablet, Refills: 1    Associated Diagnoses: Chronic HFrEF (heart failure with reduced ejection fraction) ([facility])           No discharge procedures on file.    PDMP Review         Value Time User    PDMP Reviewed  Yes 9/1/2022  1:26 PM Ava Varela PA-C             ED Provider  Attending physically available and evaluated Juan David Lora. I managed the patient along with the ED Attending.    Electronically Signed by           Shellie Banuelos MD  02/18/24 [id number]

## 2024-02-17 NOTE — Clinical Note
Case was discussed with Dr. Skinner and the patient's admission status was agreed to be Admission Status: inpatient status to the service of Dr. Skinner.

## 2024-02-17 NOTE — ED ATTENDING ATTESTATION
Final Diagnoses:     1. A-fib (HCC)    2. Right knee pain    3. Atrial fibrillation (HCC)    4. s/p Medtronic dual chamber PPM 8/31/22 s/p upgrade to BiV ICD 9/13/2023    5. Wide-complex tachycardia      ED Course as of 02/18/24 1612   Sat Feb 17, 2024   1746 EKG reviewed  Appears 2:1 conduction, AF RVR with underlying LBBB.  New since previous.   1746 POCUS Cardiac/IVC + POCUS Lung:  - transthoracic echocardiogram was performed at bedside by myself and resident.   - Images collected were adequate quality.   - This was a technically difficult study due to lung interference.   - Apical, parasternal, subcostal views were obtained/attempted.   - The main purpose of this study was to r/o obvious pathology requiring emergent intervention as in summary.   - Many views/images obtained for educational reasons.   - Findings:    There was no obvious pericardial effusion:   LV function grossly reduced.    RV function grossly normal appearing.     IVC was IVC > 2.1cm; <50% compression with sniff likely RAP 15 mmHg   Lungs:    There was no obvious pleural effusion.     B-lines were not present anteriorly bilaterally at upper BLUE-point (3+ B-lines in 2+ fields w/ concern for HF/Cardiogenic pulmonary edema sensitivity 85-94%, specificity 92% LR+ 7.4-12; LR- 0.06-0.16)    Bilateral anterior lung sliding bilaterally present at upper BLUE-point (no pneumothorax, sen 91%; spec 98%)   Valves:    There was MR regurgitation.    There was TR regurgitation.    There was no obvious AR regurgitation    There was LA dilation.    There was RA dilation.     Summary:   - Wouldn't support acute heart failure but has decreased function.   - Wouldn't support fluid overload.   - There was no obvious anterior pneumothorax  - There was no large pericardial effusion.   - I pushed saline flush, there was reflux into the HV.      1749 Discuss with cards.  Will do BB, re-evaluate.   1749 His RIGHT knee is swollen with effusion. Will aspirate given that  is the ONLY complaint the patient has.   1756 Potassium: 3.8   1839 hs TnI 0hr: 21   1909 Blood Pressure(!): 89/54   1910 Pulse: 86   2329 SYNOVIAL CRYSTALS: Positive for Calcium Pyrophosphate Crystals       I, Francisco Mohan MD, saw and evaluated the patient. All available labs and X-rays were ordered by me or the resident / non-physician and have been reviewed by myself. I discussed the patient with the resident / non-physician and agree with the resident's / non-physician practitioner's findings and plan as documented in the resident's / non-physician practicitioner's note, except where noted.   At this point, I agree with the current assessment done in the ED.   I was present during key portions of all procedures performed unless otherwise stated.     HPI:  NURSING TRIAGE:    This is a 78 y.o. male presenting for evaluation of knee pain primarily.  The patient started a new medication 3 days ago. Since starting Entresto, he's had increased RIGHT knee pain and swelling  Due to this he came in for evaluation.  Denies f/ch/n/v/cp/sob  No falls/trauma  Of note, his RIGHT knee has an effusion for maybe the three days    Seperately, the patient's heart rate is incredibly elevated, at 160. He has NO symptoms related to his heart and keeps redirecting me to his knee.    Chief Complaint   Patient presents with    Leg Pain     3 days of rt knee pain, and swelling       PHYSICAL: ASSESSMENT + PLAN:   Pertinent: .     General: VS reviewed  Appears in NAD  awake, alert.   Well-nourished, well-developed. Appears stated age.   Speaking normally in full sentences.   Head: Normocephalic, atraumatic  Eyes: EOM-I. No diplopia.   No hyphema.   No subconjunctival hemorrhages.  Symmetrical lids.   ENT: Atraumatic external nose and ears.    MMM  No malocclusion. No stridor. Normal phonation. No drooling. Normal swallowing.   Neck: No JVD.  CV: No pallor noted  Lungs:   No tachypnea  No respiratory distress  Abd: soft nt nd  no rebound/guarding  MSK:   FROM spontaneously  Skin: Dry, intact.   Neuro: Awake, alert, GCS15, CN II-XII grossly intact.   Motor grossly intact.  Psychiatric/Behavioral: interacting normally; appropriate mood/affect.    Exam: deferred    Vitals:    02/18/24 0738 02/18/24 1021 02/18/24 1043 02/18/24 1529   BP:   111/70 108/68   BP Location:       Pulse: 93 89  100   Resp:   16    Temp:   97.7 °F (36.5 °C) 98.2 °F (36.8 °C)   TempSrc:    Oral   SpO2: 97%  98% 98%   Weight:        - Given patient's concerns, will do a cardiac workup.   - Will do an EKG for arrythmia, strain; troponin for same as per protocol for evaluation of ACS.   - CBC for anemia; CMP for kidney function and electrolytes.   - Will check CXR for pneumonia, PTX, fluid overload  - Will do POCUS Cardiac to evaluate for pericardial effusion.   HEART score:  History 0=Slightly or non-suspicious   ECG 2=Significant ST-depression   Age 2= > 65 years   Risk Factors 2= > 3 risk factors, or history of atherosclerotic disease   Troponin 1= Between 13-35 ng/L   Total 7   - Disposition per workup.     Past Medical History:   Diagnosis Date    Arthritis     Asthma     Colon polyps     Community acquired pneumonia     last assessed: 5/1/2014    Diabetes mellitus (HCC)     Hemorrhagic prepatellar bursitis, left 10/21/2019    Hepatitis C     High cholesterol     History of colonoscopy 2017    Hypertension     Lymphadenopathy, anterior cervical 04/17/2018    Nephritis and nephropathy, not specified as acute or chronic, with other specified pathological lesion in kidney, in diseases classified elsewhere 3/26/2013    NSTEMI (non-ST elevated myocardial infarction) (HCC) 1/30/2023    s/p Medtronic dual chamber PPM 8/31/22 s/p upgrade to BiV ICD 9/13/2023 9/16/2023    Screening for colon cancer 05/01/2019    SIRS (systemic inflammatory response syndrome) (HCC) 3/19/2023    Thoracic vertebral fracture (HCC) 06/11/2014        There are no obvious limitations to social  determinants of care.   Nursing note reviewed.   Vitals reviewed.   Orders placed by myself and/or advanced practitioner / resident.    Previous chart was reviewed  No language barrier.   History obtained from patient.    There are no limitations to the history obtained:  Critical Care Time:   - Critical care time: 33 minutes  - Critical care time was exclusive of seperately bilable procedures and treating other patients as well as teaching time.   - Critical care was necessary to treat or prevent imminent or life-threatening deterioration of the following condition: VT vs AF RVR with aberrant conduction  - Critical care time was spent personally by me on the following activities as well as the above as per the ED course and rest of chart: blood draw for specimens, obtaining history from patient / surrogate, developement of a treatment plan, discussions with consultants, evaluation of patient's response to the treatment, examination of the patient, ordering/performing treatements and interventions, re-evaluation of the patient's condition, review of old charts, ordering/reviewing laboratory studies, and ordering/reviewing of radiographic studies   Past Medical: Past Surgical:    has a past medical history of Arthritis, Asthma, Colon polyps, Community acquired pneumonia, Diabetes mellitus ([facility]), Hemorrhagic prepatellar bursitis, left (10/21/2019), Hepatitis C, High cholesterol, History of colonoscopy (2017), Hypertension, Lymphadenopathy, anterior cervical (04/17/2018), Nephritis and nephropathy, not specified as acute or chronic, with other specified pathological lesion in kidney, in diseases classified elsewhere (3/26/2013), NSTEMI (non-ST elevated myocardial infarction) ([facility]) (1/30/2023), s/p Medtronic dual chamber PPM 8/31/22 s/p upgrade to BiV ICD 9/13/2023 (9/16/2023), Screening for colon cancer (05/01/2019), SIRS (systemic inflammatory response syndrome) ([facility]) (3/19/2023), and Thoracic vertebral fracture ([facility])  (2014).  has a past surgical history that includes Colonoscopy w/ polypectomy; Lithotripsy; Multiple tooth extractions; pr colonoscopy flx dx w/collj spec when pfrmd (N/A, 2018); Colonoscopy; Cardiac catheterization (N/A, 6/3/2022); pr replace aortic valve openfemoral artery approach (N/A, 2022); Cardiac catheterization (N/A, 2022); Cardiac electrophysiology procedure (N/A, 2022); Cardiac catheterization (N/A, 2/10/2023); Cardiac catheterization (N/A, 2/10/2023); IR upper extremity venogram- Diagnostic (2023); and Cardiac electrophysiology procedure (N/A, 2023).   Social: Cardiac (Echo/Cath)   Social History     Substance and Sexual Activity   Alcohol Use No     Social History     Tobacco Use   Smoking Status Former    Current packs/day: 0.00    Types: Cigarettes    Quit date: 2022    Years since quittin.7   Smokeless Tobacco Never   Tobacco Comments    started when he was about 27 yrs old; stopped smoking 3 wks ago     Social History     Substance and Sexual Activity   Drug Use No    No results found for this or any previous visit.    No results found for this or any previous visit.    No results found for this or any previous visit.     Labs: Imaging:   Labs Reviewed   CBC AND DIFFERENTIAL - Abnormal       Result Value Ref Range Status    WBC 10.07  4.31 - 10.16 Thousand/uL Final    RBC 5.37  3.88 - 5.62 Million/uL Final    Hemoglobin 14.1  12.0 - 17.0 g/dL Final    Hematocrit 45.3  36.5 - 49.3 % Final    MCV 84  82 - 98 fL Final    MCH 26.3 (*) 26.8 - 34.3 pg Final    MCHC 31.1 (*) 31.4 - 37.4 g/dL Final    RDW 18.7 (*) 11.6 - 15.1 % Final    MPV 10.7  8.9 - 12.7 fL Final    Platelets 244  149 - 390 Thousands/uL Final    nRBC 0  /100 WBCs Final    Neutrophils Relative 67  43 - 75 % Final    Immat GRANS % 0  0 - 2 % Final    Lymphocytes Relative 20  14 - 44 % Final    Monocytes Relative 11  4 - 12 % Final    Eosinophils Relative 2  0 - 6 % Final    Basophils Relative 0   0 - 1 % Final    Neutrophils Absolute 6.78  1.85 - 7.62 Thousands/µL Final    Immature Grans Absolute 0.02  0.00 - 0.20 Thousand/uL Final    Lymphocytes Absolute 2.02  0.60 - 4.47 Thousands/µL Final    Monocytes Absolute 1.07  0.17 - 1.22 Thousand/µL Final    Eosinophils Absolute 0.15  0.00 - 0.61 Thousand/µL Final    Basophils Absolute 0.03  0.00 - 0.10 Thousands/µL Final   COMPREHENSIVE METABOLIC PANEL - Abnormal    Sodium 137  135 - 147 mmol/L Final    Potassium 3.8  3.5 - 5.3 mmol/L Final    Chloride 100  96 - 108 mmol/L Final    CO2 28  21 - 32 mmol/L Final    ANION GAP 9  mmol/L Final    BUN 23  5 - 25 mg/dL Final    Creatinine 1.23  0.60 - 1.30 mg/dL Final    Comment: Standardized to IDMS reference method    Glucose 125  65 - 140 mg/dL Final    Comment: If the patient is fasting, the ADA then defines impaired fasting glucose as > 100 mg/dL and diabetes as > or equal to 123 mg/dL.    Calcium 9.9  8.4 - 10.2 mg/dL Final    AST 21  13 - 39 U/L Final    ALT 24  7 - 52 U/L Final    Comment: Specimen collection should occur prior to Sulfasalazine administration due to the potential for falsely depressed results.     Alkaline Phosphatase 118 (*) 34 - 104 U/L Final    Total Protein 7.1  6.4 - 8.4 g/dL Final    Albumin 3.9  3.5 - 5.0 g/dL Final    Total Bilirubin 1.12 (*) 0.20 - 1.00 mg/dL Final    Comment: Use of this assay is not recommended for patients undergoing treatment with eltrombopag due to the potential for falsely elevated results.  N-acetyl-p-benzoquinone imine (metabolite of Acetaminophen) will generate erroneously low results in samples for patients that have taken an overdose of Acetaminophen.    eGFR 56  ml/min/1.73sq m Final    Narrative:     National Kidney Disease Foundation guidelines for Chronic Kidney Disease (CKD):     Stage 1 with normal or high GFR (GFR > 90 mL/min/1.73 square meters)    Stage 2 Mild CKD (GFR = 60-89 mL/min/1.73 square meters)    Stage 3A Moderate CKD (GFR = 45-59 mL/min/1.73  "square meters)    Stage 3B Moderate CKD (GFR = 30-44 mL/min/1.73 square meters)    Stage 4 Severe CKD (GFR = 15-29 mL/min/1.73 square meters)    Stage 5 End Stage CKD (GFR <15 mL/min/1.73 square meters)  Note: GFR calculation is accurate only with a steady state creatinine   SYNOVIAL FLUID WHITE CELL COUNT W/ DIFF - Abnormal    Site Right Knee   Final    Color, Fluid Leticia  Clear, Colorless,Yellow Final    Clarity, Fluid Cloudy (*) Clear Final    WBC, Fluid 28,810 (*) 0 - 200 /ul Final   RED CELL COUNT,SYNOVIAL FLUID - Abnormal    RBC, SYNOVIAL 11,000 (*) 0 - 10 Final   SEDIMENTATION RATE - Abnormal    Sed Rate 54 (*) 0 - 19 mm/hour Final   C-REACTIVE PROTEIN - Abnormal    CRP 16.2 (*) <3.0 mg/L Final   TSH, 3RD GENERATION WITH FREE T4 REFLEX - Abnormal    TSH 3RD GENERATON 0.352 (*) 0.450 - 4.500 uIU/mL Final    Comment: Adult TSH (3rd generation) reference range follows the recommended guidelines of the American Thyroid Association, January, 2020.   T4, FREE - Abnormal    Free T4 1.36 (*) 0.61 - 1.12 ng/dL Final    Comment: Specimens with biotin concentrations > 10 ng/mL can lead to significant (> 10%) positive bias in result.    Narrative:     \"Therapeutic range for patients medicated with thyroid disorders: 0.61-1.24 ng/dL.\"   HS TROPONIN I 0HR - Normal    hs TnI 0hr 21  \"Refer to ACS Flowchart\"- see link ng/L Final    Comment:                                              Initial (time 0) result  If >=50 ng/L, Myocardial injury suggested ;  Type of myocardial injury and treatment strategy  to be determined.  If 5-49 ng/L, a delta result at 2 hours and or 4 hours will be needed to further evaluate.  If <4 ng/L, and chest pain has been >3 hours since onset, patient may qualify for discharge based on the HEART score in the ED.  If <5 ng/L and <3hours since onset of chest pain, a delta result at 2 hours will be needed to further evaluate.    HS Troponin 99th Percentile URL of a Health Population=12 ng/L with a 95% " "Confidence Interval of 8-18 ng/L.    Second Troponin (time 2 hours)  If calculated delta >= 20 ng/L,  Myocardial injury suggested ; Type of myocardial injury and treatment strategy to be determined.  If 5-49 ng/L and the calculated delta is 5-19 ng/L, consult medical service for evaluation.  Continue evaluation for ischemia on ecg and other possible etiology and repeat hs troponin at 4 hours.  If delta is <5 ng/L at 2 hours, consider discharge based on risk stratification via the HEART score (if in ED), or NICA risk score in IP/Observation.    HS Troponin 99th Percentile URL of a Health Population=12 ng/L with a 95% Confidence Interval of 8-18 ng/L.   MAGNESIUM - Normal    Magnesium 2.0  1.9 - 2.7 mg/dL Final   HS TROPONIN I 2HR - Normal    hs TnI 2hr 24  \"Refer to ACS Flowchart\"- see link ng/L Final    Comment:                                              Initial (time 0) result  If >=50 ng/L, Myocardial injury suggested ;  Type of myocardial injury and treatment strategy  to be determined.  If 5-49 ng/L, a delta result at 2 hours and or 4 hours will be needed to further evaluate.  If <4 ng/L, and chest pain has been >3 hours since onset, patient may qualify for discharge based on the HEART score in the ED.  If <5 ng/L and <3hours since onset of chest pain, a delta result at 2 hours will be needed to further evaluate.    HS Troponin 99th Percentile URL of a Health Population=12 ng/L with a 95% Confidence Interval of 8-18 ng/L.    Second Troponin (time 2 hours)  If calculated delta >= 20 ng/L,  Myocardial injury suggested ; Type of myocardial injury and treatment strategy to be determined.  If 5-49 ng/L and the calculated delta is 5-19 ng/L, consult medical service for evaluation.  Continue evaluation for ischemia on ecg and other possible etiology and repeat hs troponin at 4 hours.  If delta is <5 ng/L at 2 hours, consider discharge based on risk stratification via the HEART score (if in ED), or NICA risk score in " "IP/Observation.    HS Troponin 99th Percentile URL of a Health Population=12 ng/L with a 95% Confidence Interval of 8-18 ng/L.    Delta 2hr hsTnI 3  <20 ng/L Final   HS TROPONIN I 4HR - Normal    hs TnI 4hr 23  \"Refer to ACS Flowchart\"- see link ng/L Final    Comment:                                              Initial (time 0) result  If >=50 ng/L, Myocardial injury suggested ;  Type of myocardial injury and treatment strategy  to be determined.  If 5-49 ng/L, a delta result at 2 hours and or 4 hours will be needed to further evaluate.  If <4 ng/L, and chest pain has been >3 hours since onset, patient may qualify for discharge based on the HEART score in the ED.  If <5 ng/L and <3hours since onset of chest pain, a delta result at 2 hours will be needed to further evaluate.    HS Troponin 99th Percentile URL of a Health Population=12 ng/L with a 95% Confidence Interval of 8-18 ng/L.    Second Troponin (time 2 hours)  If calculated delta >= 20 ng/L,  Myocardial injury suggested ; Type of myocardial injury and treatment strategy to be determined.  If 5-49 ng/L and the calculated delta is 5-19 ng/L, consult medical service for evaluation.  Continue evaluation for ischemia on ecg and other possible etiology and repeat hs troponin at 4 hours.  If delta is <5 ng/L at 2 hours, consider discharge based on risk stratification via the HEART score (if in ED), or NICA risk score in IP/Observation.    HS Troponin 99th Percentile URL of a Health Population=12 ng/L with a 95% Confidence Interval of 8-18 ng/L.    Delta 4hr hsTnI 2  <20 ng/L Final   PHOSPHORUS - Normal    Phosphorus 3.5  2.3 - 4.1 mg/dL Final   URIC ACID - Normal    Uric Acid 5.1  3.5 - 8.5 mg/dL Final    Comment: Specimen collection should occur prior to Metamizole administration due to the potential for falsely depressed results.    Narrative:     N-acetyl-p-benzoquinone imine (metabolite of Acetaminophen) will generate erroneously low results in samples for " patients that have taken an overdose of Acetaminophen.   POCT GLUCOSE - Normal    POC Glucose 109  65 - 140 mg/dl Final   SYNOVIAL FLUID, CRYSTAL    Crystals, Synovial Fluid Positive for Calcium Pyrophosphate Crystals  No Crystals Seen Final   SYNOVIAL FLUID DIFF    Total Counted 100   Final    Neutrophil % Synovial 94  % Final    Monocyte % Synovial 6  % Final   LIGHT BLUE TOP    XR chest 1 view portable   ED Interpretation   No fluid overload  No Lead fx.  AICD in place.  No pneumonia.      Final Result      Trace right effusion.      Lungs clear            Workstation performed: [id number]         XR knee 1 or 2 vw right    (Results Pending)      Medications: Code Status:   Medications   amiodarone (CORDARONE) 900 mg in dextrose 5 % 500 mL infusion (0 mg/min Intravenous Stopped 2/18/24 0138)     Followed by   amiodarone (CORDARONE) 900 mg in dextrose 5 % 500 mL infusion (0.5 mg/min Intravenous Rate/Dose Verify 2/18/24 0800)   cholecalciferol (VITAMIN D3) tablet 2,000 Units (2,000 Units Oral Given 2/18/24 1022)   Empagliflozin (JARDIANCE) tablet 10 mg (10 mg Oral Given 2/18/24 1023)   ferrous sulfate tablet 325 mg (325 mg Oral Given 2/18/24 1022)   furosemide (LASIX) tablet 40 mg (40 mg Oral Given 2/18/24 1022)   insulin glargine (LANTUS) subcutaneous injection 15 Units 0.15 mL (15 Units Subcutaneous Given 2/17/24 2204)   montelukast (SINGULAIR) tablet 10 mg (10 mg Oral Given 2/18/24 1021)   atorvastatin (LIPITOR) tablet 80 mg (has no administration in time range)   insulin lispro (HumaLOG) 100 units/mL subcutaneous injection 1-6 Units (2 Units Subcutaneous Given 2/18/24 1207)   apixaban (ELIQUIS) tablet 5 mg (5 mg Oral Given 2/18/24 1022)   clopidogrel (PLAVIX) tablet 75 mg (75 mg Oral Given 2/18/24 1022)   acetaminophen (TYLENOL) tablet 650 mg (has no administration in time range)   Diclofenac Sodium (VOLTAREN) 1 % topical gel 2 g (2 g Topical Not Given 2/18/24 1206)   metoprolol succinate (TOPROL-XL) 24 hr  tablet 75 mg (75 mg Oral Given 2/18/24 1021)   predniSONE tablet 40 mg (40 mg Oral Given 2/18/24 1208)   levothyroxine tablet 125 mcg (125 mcg Oral Given 2/18/24 1021)   metoprolol (LOPRESSOR) injection 5 mg (5 mg Intravenous Given 2/17/24 1739)   magnesium sulfate 2 g/50 mL IVPB (premix) 2 g (0 g Intravenous Stopped 2/17/24 1833)   amiodarone 150 mg in dextrose 5 % 100 mL IV bolus (0 mg Intravenous Stopped 2/17/24 1850)   lidocaine (PF) (XYLOCAINE-MPF) 2 % injection 10 mL (10 mL Infiltration Given by Other 2/17/24 1923)   vancomycin (VANCOCIN) 1500 mg in sodium chloride 0.9% 250 mL IVPB (0 mg Intravenous Stopped 2/17/24 2241)   ceftriaxone (ROCEPHIN) 1 g/50 mL in dextrose IVPB (0 mg Intravenous Stopped 2/17/24 2047)   potassium chloride (Klor-Con M20) CR tablet 40 mEq (40 mEq Oral Given 2/17/24 2158)    Code Status: Level 1 - Full Code  Advance Directive and Living Will:      Power of :    POLST:       Orders Placed This Encounter   Procedures    Arthrocentesis    Synovial fluid, Culture and Gram stain    XR chest 1 view portable    XR knee 1 or 2 vw right    CBC and differential    Comprehensive metabolic panel    HS Troponin 0hr (reflex protocol)    Magnesium    HS Troponin I 2hr    HS Troponin I 4hr    Synovial fluid, white cell count w/ diff    Synovial fluid, crystal    Synovial fluid, RBC count    Sedimentation rate, automated    C-reactive protein    TSH, 3rd generation with Free T4 reflex    CBC and differential    Comprehensive metabolic panel    Phosphorus    Magnesium    Phosphorus    Uric acid    T4, free    Synovial Fluid Diff    CBC    Comprehensive metabolic panel    Diet Amari/CHO Controlled; Consistent Carbohydrate Diet Level 3 (6 carb servings/90 grams CHO/meal); Cardiac    Notify physician of an increase in chest pain, symptomatic hypotension, a change in cardiac rhythm, or an O2 saturation of less than 90%.    Notify physician immediately if patient has persistent Chest Pain.    Insert  peripheral IV    Continuous cardiac monitoring    Continuous pulse oximetry    Nursing communication Continue IV as ordered.    Notify admitting physician    Notify admitting physician on arrival    Vital Signs per unit routine    Up and OOB as tolerated    Cardio-Pulmonary Monitoring (Critical Care & Step Down Only)    Daily weights    Apply SCD or Foot pumps    Insulin Subcutaneous Notify Physician    Insulin Subcutaneous Instruction    Hypoglycemia Protocol    Fingerstick Glucose (POCT)    Level 1-Full Code: all life saving measures are indicated    Inpatient consult to Cardiology    Inpatient consult to Case Management    OT eval and treat    PT eval and treat    ECG 12 lead    ECG 12 lead    ECG 12 lead    ECG 12 lead    INPATIENT ADMISSION     Time reflects when diagnosis was documented in both MDM as applicable and the Disposition within this note       Time User Action Codes Description Comment    2/17/2024  8:09 PM Shellie Banuelos [I48.91] A-fib ([facility])     2/17/2024  8:09 PM Shellie Banuelos [M25.561] Right knee pain     2/17/2024  9:01 PM Esthela Jones [I48.91] Atrial fibrillation ([facility])     2/17/2024  9:01 PM Esthela Jones [Z95.810] s/p Medtronic dual chamber PPM 8/31/22 s/p upgrade to BiV ICD 9/13/2023 2/17/2024  9:02 PM Esthela Jones [R00.0] Wide-complex tachycardia           ED Disposition       ED Disposition   Admit    Condition   Stable    Date/Time   Sat Feb 17, 2024  8:11 PM    Comment   Case was discussed with Dr. Skinner and the patient's admission status was agreed to be Admission Status: inpatient status to the service of Dr. Skinner.               Follow-up Information    None       Current Discharge Medication List        CONTINUE these medications which have NOT CHANGED    Details   albuterol (PROVENTIL HFA,VENTOLIN HFA) 90 mcg/act inhaler INHALE 2 PUFFS BY MOUTH EVERY 4 HOURS AS NEEDED FOR WHEEZING OR SHORTNESS OF BREATH.  Qty: 18 g, Refills: 2    Comments:  Substitution to a formulary equivalent within the same pharmaceutical class is authorized.  Associated Diagnoses: Mild intermittent asthma without complication      apixaban (Eliquis) 5 mg Take 1 tablet (5 mg total) by mouth 2 (two) times a day  Qty: 60 tablet, Refills: 3    Comments: PRICE CHECK ONLY  Associated Diagnoses: Atrial flutter, unspecified type ([facility])      cholecalciferol (VITAMIN D3) 1,000 units tablet Take 2 tablets (2,000 Units total) by mouth daily  Qty: 180 tablet, Refills: 5    Associated Diagnoses: Vitamin D deficiency      Empagliflozin (JARDIANCE) 10 MG TABS tablet Take 1 tablet (10 mg total) by mouth every morning  Qty: 90 tablet, Refills: 3    Associated Diagnoses: NSTEMI (non-ST elevated myocardial infarction) ([facility]); Status post insertion of drug eluting coronary artery stent; Coronary artery disease involving native coronary artery of native heart without angina pectoris      ferrous sulfate 325 (65 Fe) mg tablet TAKE 1 TABLET BY MOUTH EVERY OTHER DAY  Qty: 45 tablet, Refills: 4    Associated Diagnoses: Iron deficiency anemia, unspecified iron deficiency anemia type      furosemide (LASIX) 40 mg tablet TAKE 1 TABLET BY MOUTH TWICE A DAY  Qty: 180 tablet, Refills: 1    Associated Diagnoses: Acute on chronic systolic congestive heart failure ([facility])      Insulin Glargine Solostar (Lantus SoloStar) 100 UNIT/ML SOPN INJECT 0.18 ML (18 UNITS TOTAL) UNDER THE SKIN DAILY AT BEDTIME  Qty: 15 mL, Refills: 3    Comments: DX Code Needed  NEEDS A NEW SCRIPT NO MORE REFILLS AVAILABLE.  Associated Diagnoses: Diabetes mellitus due to underlying condition with diabetic neuropathy, without long-term current use of insulin ([facility])      levothyroxine 137 mcg tablet TAKE 1 TABLET BY MOUTH EVERY DAY  Qty: 90 tablet, Refills: 3    Associated Diagnoses: Hypothyroidism      metFORMIN (GLUCOPHAGE) 850 mg tablet TAKE 1 TABLET BY MOUTH 2 TIMES A DAY WITH MEALS.  Qty: 180 tablet, Refills: 3    Associated Diagnoses: Diabetes  mellitus ([facility])      metoprolol succinate (TOPROL-XL) 50 mg 24 hr tablet TAKE 1.5 TABLETS BY MOUTH 2 TIMES A DAY.  Qty: 270 tablet, Refills: 1    Associated Diagnoses: Heart failure with reduced ejection fraction ([facility])      montelukast (SINGULAIR) 10 mg tablet TAKE 1 TABLET BY MOUTH EVERY DAY  Qty: 90 tablet, Refills: 3    Associated Diagnoses: Mild intermittent asthma, unspecified whether complicated      NovoLOG FlexPen 100 units/mL injection pen INJECT 5 UNITS WITH BREAKFAST AND WITH DINNER  Qty: 15 mL, Refills: 3    Comments: DX Code Needed  NEEDS A NEW SCRIPT.  Associated Diagnoses: Type 2 diabetes mellitus with diabetic neuropathy, without long-term current use of insulin ([facility])      rosuvastatin (CRESTOR) 40 MG tablet TAKE 1 TABLET BY MOUTH EVERY DAY  Qty: 90 tablet, Refills: 3    Comments: DX Code Needed  .  Associated Diagnoses: Mixed hyperlipidemia      sacubitril-valsartan (Entresto) 24-26 MG TABS Take 1 tablet by mouth 2 (two) times a day Start 36 hours after last dose of lisinopril  Qty: 60 tablet, Refills: 3    Associated Diagnoses: Chronic HFrEF (heart failure with reduced ejection fraction) ([facility])      Advair -21 MCG/ACT inhaler Inhale 2 puffs 2 (two) times a day Rinse mouth after use.  Qty: 36 g, Refills: 6    Comments: Substitution to a formulary equivalent within the same pharmaceutical class is authorized.  Associated Diagnoses: Mild intermittent asthma, unspecified whether complicated      Alcohol Swabs (ALCOHOL PADS) 70 % PADS       Blood Glucose Monitoring Suppl (OneTouch Verio) w/Device KIT Check blood glucose three times daily before each meal  Qty: 1 kit, Refills: 0    Associated Diagnoses: Type 2 diabetes mellitus with diabetic neuropathy, without long-term current use of insulin ([facility])      clopidogrel (PLAVIX) 75 mg tablet Take 1 tablet (75 mg total) by mouth daily  Qty: 90 tablet, Refills: 3    Associated Diagnoses: S/P TAVR (transcatheter aortic valve replacement)      Continuous  Blood Gluc  (FreeStyle Cristofer 2 [place]) POWER Use 1 each continuous  Qty: 1 each, Refills: 0    Associated Diagnoses: Type 2 diabetes mellitus with diabetic neuropathy, with long-term current use of insulin ([facility])      Continuous Blood Gluc Sensor (FreeStyle Cristofer 2 Sensor) MISC Use 1 each every 14 (fourteen) days  Qty: 6 each, Refills: 3    Associated Diagnoses: Type 2 diabetes mellitus with diabetic neuropathy, with long-term current use of insulin ([facility])      Insulin Pen Needle (Pen Needles) 31G X 8 MM MISC Use daily  Qty: 300 each, Refills: 3    Associated Diagnoses: Diabetes mellitus due to underlying condition with diabetic neuropathy, without long-term current use of insulin ([facility])      Lancets (FREESTYLE) lancets by Other route as needed (As needed)  Qty: 100 each, Refills: 3    Associated Diagnoses: Diabetes mellitus due to underlying condition with diabetic neuropathy, without long-term current use of insulin ([facility])      nitroglycerin (NITROSTAT) 0.4 mg SL tablet Place 1 tablet (0.4 mg total) under the tongue every 5 (five) minutes as needed for chest pain  Qty: 30 tablet, Refills: 1    Associated Diagnoses: NSTEMI (non-ST elevated myocardial infarction) ([facility]); Status post insertion of drug eluting coronary artery stent; Coronary artery disease involving native coronary artery of native heart without angina pectoris      spironolactone (ALDACTONE) 25 mg tablet TAKE 1 TABLET (25 MG TOTAL) BY MOUTH DAILY.  Qty: 90 tablet, Refills: 1    Associated Diagnoses: Chronic HFrEF (heart failure with reduced ejection fraction) ([facility])           No discharge procedures on file.  Prior to Admission Medications   Prescriptions Last Dose Informant Patient Reported? Taking?   Advair -21 MCG/ACT inhaler Not Taking Spouse/Significant Other, Self No No   Sig: Inhale 2 puffs 2 (two) times a day Rinse mouth after use.   Patient not taking: Reported on 2/17/2024   Alcohol Swabs (ALCOHOL PADS) 70 % PADS Unknown  Spouse/Significant Other, Self Yes No   Blood Glucose Monitoring Suppl (OneTouch Verio) w/Device KIT Unknown Spouse/Significant Other, Self No No   Sig: Check blood glucose three times daily before each meal   Continuous Blood Gluc  (FreeStyle Cristofer 2 [place]) POWER Unknown Spouse/Significant Other, Self No No   Sig: Use 1 each continuous   Patient not taking: Reported on 2/13/2024   Continuous Blood Gluc Sensor (FreeStyle Cristofer 2 Sensor) MISC Unknown Spouse/Significant Other, Self No No   Sig: Use 1 each every 14 (fourteen) days   Patient not taking: Reported on 2/13/2024   Empagliflozin (JARDIANCE) 10 MG TABS tablet 2/17/2024 Spouse/Significant Other, Self No Yes   Sig: Take 1 tablet (10 mg total) by mouth every morning   Insulin Glargine Solostar (Lantus SoloStar) 100 UNIT/ML SOPN 2/16/2024  No Yes   Sig: INJECT 0.18 ML (18 UNITS TOTAL) UNDER THE SKIN DAILY AT BEDTIME   Insulin Pen Needle (Pen Needles) 31G X 8 MM MISC Unknown  No No   Sig: Use daily   Lancets (FREESTYLE) lancets Unknown Spouse/Significant Other, Self No No   Sig: by Other route as needed (As needed)   NovoLOG FlexPen 100 units/mL injection pen 2/17/2024 Spouse/Significant Other, Self No Yes   Sig: INJECT 5 UNITS WITH BREAKFAST AND WITH DINNER   albuterol (PROVENTIL HFA,VENTOLIN HFA) 90 mcg/act inhaler Past Month  No Yes   Sig: INHALE 2 PUFFS BY MOUTH EVERY 4 HOURS AS NEEDED FOR WHEEZING OR SHORTNESS OF BREATH.   apixaban (Eliquis) 5 mg 2/17/2024 Spouse/Significant Other, Self No Yes   Sig: Take 1 tablet (5 mg total) by mouth 2 (two) times a day   cholecalciferol (VITAMIN D3) 1,000 units tablet 2/17/2024 Spouse/Significant Other, Self No Yes   Sig: Take 2 tablets (2,000 Units total) by mouth daily   clopidogrel (PLAVIX) 75 mg tablet  Spouse/Significant Other, Self No No   Sig: Take 1 tablet (75 mg total) by mouth daily   ferrous sulfate 325 (65 Fe) mg tablet 2/17/2024 Spouse/Significant Other, Self No Yes   Sig: TAKE 1 TABLET BY MOUTH EVERY  "OTHER DAY   furosemide (LASIX) 40 mg tablet 2/17/2024  No Yes   Sig: TAKE 1 TABLET BY MOUTH TWICE A DAY   levothyroxine 137 mcg tablet 2/17/2024 Spouse/Significant Other, Self No Yes   Sig: TAKE 1 TABLET BY MOUTH EVERY DAY   metFORMIN (GLUCOPHAGE) 850 mg tablet 2/17/2024 Spouse/Significant Other, Self No Yes   Sig: TAKE 1 TABLET BY MOUTH 2 TIMES A DAY WITH MEALS.   metoprolol succinate (TOPROL-XL) 50 mg 24 hr tablet 2/17/2024  No Yes   Sig: TAKE 1.5 TABLETS BY MOUTH 2 TIMES A DAY.   montelukast (SINGULAIR) 10 mg tablet 2/17/2024  No Yes   Sig: TAKE 1 TABLET BY MOUTH EVERY DAY   nitroglycerin (NITROSTAT) 0.4 mg SL tablet Unknown Spouse/Significant Other, Self No No   Sig: Place 1 tablet (0.4 mg total) under the tongue every 5 (five) minutes as needed for chest pain   rosuvastatin (CRESTOR) 40 MG tablet 2/17/2024 Spouse/Significant Other, Self No Yes   Sig: TAKE 1 TABLET BY MOUTH EVERY DAY   sacubitril-valsartan (Entresto) 24-26 MG TABS 2/17/2024  No Yes   Sig: Take 1 tablet by mouth 2 (two) times a day Start 36 hours after last dose of lisinopril   spironolactone (ALDACTONE) 25 mg tablet Not Taking  No No   Sig: TAKE 1 TABLET (25 MG TOTAL) BY MOUTH DAILY.   Patient not taking: Reported on 1/18/2024      Facility-Administered Medications: None                        Portions of the record may have been created with voice recognition software. Occasional wrong word or \"sound a like\" substitutions may have occurred due to the inherent limitations of voice recognition software. Read the chart carefully and recognize, using context, where substitutions have occurred.    Electronically signed by:  Francisco Mohan  "

## 2024-02-18 LAB
ALBUMIN SERPL BCP-MCNC: 4 G/DL (ref 3.5–5)
ALP SERPL-CCNC: 119 U/L (ref 34–104)
ALT SERPL W P-5'-P-CCNC: 22 U/L (ref 7–52)
ANION GAP SERPL CALCULATED.3IONS-SCNC: 8 MMOL/L
AST SERPL W P-5'-P-CCNC: 18 U/L (ref 13–39)
ATRIAL RATE: 138 BPM
ATRIAL RATE: 144 BPM
BASOPHILS # BLD AUTO: 0.02 THOUSANDS/ÂΜL (ref 0–0.1)
BASOPHILS NFR BLD AUTO: 0 % (ref 0–1)
BILIRUB SERPL-MCNC: 1.27 MG/DL (ref 0.2–1)
BUN SERPL-MCNC: 30 MG/DL (ref 5–25)
CALCIUM SERPL-MCNC: 10 MG/DL (ref 8.4–10.2)
CHLORIDE SERPL-SCNC: 102 MMOL/L (ref 96–108)
CO2 SERPL-SCNC: 28 MMOL/L (ref 21–32)
CREAT SERPL-MCNC: 1.17 MG/DL (ref 0.6–1.3)
EOSINOPHIL # BLD AUTO: 0.03 THOUSAND/ÂΜL (ref 0–0.61)
EOSINOPHIL NFR BLD AUTO: 0 % (ref 0–6)
ERYTHROCYTE [DISTWIDTH] IN BLOOD BY AUTOMATED COUNT: 20.6 % (ref 11.6–15.1)
GFR SERPL CREATININE-BSD FRML MDRD: 60 ML/MIN/1.73SQ M
GLUCOSE SERPL-MCNC: 146 MG/DL (ref 65–140)
GLUCOSE SERPL-MCNC: 150 MG/DL (ref 65–140)
GLUCOSE SERPL-MCNC: 205 MG/DL (ref 65–140)
GLUCOSE SERPL-MCNC: 216 MG/DL (ref 65–140)
GLUCOSE SERPL-MCNC: 227 MG/DL (ref 65–140)
HCT VFR BLD AUTO: 40.6 % (ref 36.5–49.3)
HGB BLD-MCNC: 12.7 G/DL (ref 12–17)
IMM GRANULOCYTES # BLD AUTO: 0.03 THOUSAND/UL (ref 0–0.2)
IMM GRANULOCYTES NFR BLD AUTO: 0 % (ref 0–2)
LYMPHOCYTES # BLD AUTO: 0.84 THOUSANDS/ÂΜL (ref 0.6–4.47)
LYMPHOCYTES NFR BLD AUTO: 10 % (ref 14–44)
MAGNESIUM SERPL-MCNC: 2.7 MG/DL (ref 1.9–2.7)
MCH RBC QN AUTO: 26.7 PG (ref 26.8–34.3)
MCHC RBC AUTO-ENTMCNC: 31.3 G/DL (ref 31.4–37.4)
MCV RBC AUTO: 85 FL (ref 82–98)
MONOCYTES # BLD AUTO: 0.27 THOUSAND/ÂΜL (ref 0.17–1.22)
MONOCYTES NFR BLD AUTO: 3 % (ref 4–12)
NEUTROPHILS # BLD AUTO: 7.28 THOUSANDS/ÂΜL (ref 1.85–7.62)
NEUTS SEG NFR BLD AUTO: 87 % (ref 43–75)
NRBC BLD AUTO-RTO: 0 /100 WBCS
P AXIS: 78 DEGREES
PHOSPHATE SERPL-MCNC: 4.3 MG/DL (ref 2.3–4.1)
PLATELET # BLD AUTO: 226 THOUSANDS/UL (ref 149–390)
POTASSIUM SERPL-SCNC: 5 MMOL/L (ref 3.5–5.3)
PR INTERVAL: 184 MS
PROT SERPL-MCNC: 7.6 G/DL (ref 6.4–8.4)
QRS AXIS: -86 DEGREES
QRS AXIS: 269 DEGREES
QRSD INTERVAL: 126 MS
QRSD INTERVAL: 148 MS
QT INTERVAL: 316 MS
QT INTERVAL: 428 MS
QTC INTERVAL: 497 MS
QTC INTERVAL: 520 MS
RBC # BLD AUTO: 4.76 MILLION/UL (ref 3.88–5.62)
SODIUM SERPL-SCNC: 138 MMOL/L (ref 135–147)
T WAVE AXIS: 74 DEGREES
T WAVE AXIS: 96 DEGREES
VENTRICULAR RATE: 163 BPM
VENTRICULAR RATE: 81 BPM
WBC # BLD AUTO: 8.47 THOUSAND/UL (ref 4.31–10.16)

## 2024-02-18 PROCEDURE — 84100 ASSAY OF PHOSPHORUS: CPT

## 2024-02-18 PROCEDURE — 83735 ASSAY OF MAGNESIUM: CPT

## 2024-02-18 PROCEDURE — NC001 PR NO CHARGE: Performed by: INTERNAL MEDICINE

## 2024-02-18 PROCEDURE — 99223 1ST HOSP IP/OBS HIGH 75: CPT | Performed by: INTERNAL MEDICINE

## 2024-02-18 PROCEDURE — 99222 1ST HOSP IP/OBS MODERATE 55: CPT | Performed by: INTERNAL MEDICINE

## 2024-02-18 PROCEDURE — 99222 1ST HOSP IP/OBS MODERATE 55: CPT | Performed by: ORTHOPAEDIC SURGERY

## 2024-02-18 PROCEDURE — 97163 PT EVAL HIGH COMPLEX 45 MIN: CPT

## 2024-02-18 PROCEDURE — 93010 ELECTROCARDIOGRAM REPORT: CPT | Performed by: INTERNAL MEDICINE

## 2024-02-18 PROCEDURE — 85025 COMPLETE CBC W/AUTO DIFF WBC: CPT

## 2024-02-18 PROCEDURE — 80053 COMPREHEN METABOLIC PANEL: CPT

## 2024-02-18 PROCEDURE — 82948 REAGENT STRIP/BLOOD GLUCOSE: CPT

## 2024-02-18 PROCEDURE — NC001 PR NO CHARGE: Performed by: ORTHOPAEDIC SURGERY

## 2024-02-18 PROCEDURE — 97167 OT EVAL HIGH COMPLEX 60 MIN: CPT

## 2024-02-18 RX ORDER — LEVOTHYROXINE SODIUM 0.12 MG/1
125 TABLET ORAL DAILY
Status: DISCONTINUED | OUTPATIENT
Start: 2024-02-18 | End: 2024-02-23 | Stop reason: HOSPADM

## 2024-02-18 RX ORDER — PREDNISONE 20 MG/1
40 TABLET ORAL DAILY
Status: DISCONTINUED | OUTPATIENT
Start: 2024-02-18 | End: 2024-02-21

## 2024-02-18 RX ADMIN — METOPROLOL SUCCINATE 75 MG: 50 TABLET, EXTENDED RELEASE ORAL at 18:05

## 2024-02-18 RX ADMIN — LEVOTHYROXINE SODIUM 125 MCG: 125 TABLET ORAL at 10:21

## 2024-02-18 RX ADMIN — INSULIN LISPRO 2 UNITS: 100 INJECTION, SOLUTION INTRAVENOUS; SUBCUTANEOUS at 18:06

## 2024-02-18 RX ADMIN — AMIODARONE HYDROCHLORIDE 0.5 MG/MIN: 50 INJECTION, SOLUTION INTRAVENOUS at 01:37

## 2024-02-18 RX ADMIN — METOPROLOL SUCCINATE 75 MG: 50 TABLET, EXTENDED RELEASE ORAL at 10:21

## 2024-02-18 RX ADMIN — APIXABAN 5 MG: 5 TABLET, FILM COATED ORAL at 18:05

## 2024-02-18 RX ADMIN — FUROSEMIDE 40 MG: 40 TABLET ORAL at 10:22

## 2024-02-18 RX ADMIN — Medication 2000 UNITS: at 10:22

## 2024-02-18 RX ADMIN — CLOPIDOGREL BISULFATE 75 MG: 75 TABLET ORAL at 10:22

## 2024-02-18 RX ADMIN — AMIODARONE HYDROCHLORIDE 0.5 MG/MIN: 50 INJECTION, SOLUTION INTRAVENOUS at 18:03

## 2024-02-18 RX ADMIN — ATORVASTATIN CALCIUM 80 MG: 80 TABLET, FILM COATED ORAL at 18:05

## 2024-02-18 RX ADMIN — FERROUS SULFATE TAB 325 MG (65 MG ELEMENTAL FE) 325 MG: 325 (65 FE) TAB at 10:22

## 2024-02-18 RX ADMIN — APIXABAN 5 MG: 5 TABLET, FILM COATED ORAL at 10:22

## 2024-02-18 RX ADMIN — INSULIN LISPRO 2 UNITS: 100 INJECTION, SOLUTION INTRAVENOUS; SUBCUTANEOUS at 12:07

## 2024-02-18 RX ADMIN — MONTELUKAST 10 MG: 10 TABLET, FILM COATED ORAL at 10:21

## 2024-02-18 RX ADMIN — EMPAGLIFLOZIN 10 MG: 10 TABLET, FILM COATED ORAL at 10:23

## 2024-02-18 RX ADMIN — FUROSEMIDE 40 MG: 40 TABLET ORAL at 18:05

## 2024-02-18 RX ADMIN — PREDNISONE 40 MG: 20 TABLET ORAL at 12:08

## 2024-02-18 RX ADMIN — Medication 2 G: at 21:08

## 2024-02-18 RX ADMIN — INSULIN GLARGINE 15 UNITS: 100 INJECTION, SOLUTION SUBCUTANEOUS at 21:12

## 2024-02-18 NOTE — ASSESSMENT & PLAN NOTE
On home advair and albuterol. Denies any symptoms.      Plan:  Albuterol inhaler PRN for wheezing

## 2024-02-18 NOTE — CASE MANAGEMENT
Case Management Assessment & Discharge Planning Note    Patient name Juan David Lora  [id number]  : 1945 Date 2024       Current Admission Date: 2024  Current Admission Diagnosis:Wide-complex tachycardia   Patient Active Problem List    Diagnosis Date Noted    Wide-complex tachycardia 2024    Pseudogout of right knee 2024    Stage 3 chronic kidney disease, unspecified whether stage 3a or 3b CKD ([facility]) 2024    Smoking history 2023    Acute on chronic heart failure ([facility]) 2023    HE (acute kidney injury) ([facility]) 2023    Hypokalemia 2023    Atrial fibrillation ([facility]) 2023    Mitral regurgitation 2023    Hepatitis C 2023    s/p Medtronic dual chamber PPM 22 s/p upgrade to BiV ICD 2023    Atrial flutter ([facility]) 2023    Chronic HFrEF (heart failure with reduced ejection fraction) ([facility]) 2023    NSVT (nonsustained ventricular tachycardia) ([facility]) 2023    Anemia 2023    Status post insertion of drug eluting coronary artery stent 02/10/2023    Bruit of left carotid artery 2023    Peripheral artery disease ([facility]) 2023    Need for influenza vaccination 11/10/2022    Leg cramping 11/10/2022    History of tachy-lino syndrome 2022    S/P TAVR (transcatheter aortic valve replacement) 2022    Atherosclerosis of native coronary artery of native heart with angina pectoris ([facility]) 2022    Contrast media allergy 2022    History of aortic stenosis s/p TAVR 2022    Diverticulosis of large intestine 2018    Internal hemorrhoids 2018    Nicotine dependence 2017    Osteoarthritis of knee 2017    Bilateral hearing loss 2017    Allergic rhinitis 2016    Type 2 diabetes mellitus with circulatory disorder, with long-term current use of insulin ([facility]) 2015    Adenomatous colon polyp 2014    Hyperlipidemia  09/13/2013    Vitamin B12 deficiency 07/24/2012    Mild intermittent asthma without complication 06/08/2012    Essential hypertension 06/08/2012    Hypothyroidism 06/07/2012      LOS (days): 1  Geometric Mean LOS (GMLOS) (days):   Days to GMLOS:     OBJECTIVE:    Risk of Unplanned Readmission Score: 25.34         Current admission status: Inpatient       Preferred Pharmacy:   [id number] - [place], [place] - [address]  [address]  [place] [address]  Phone: [phone] Fax: [phone]    Primary Care Provider: Umesh Millard MD    Primary Insurance: [employer] [facility]  Secondary Insurance: [employer] [facility]    ASSESSMENT:  Active Health Care Proxies       [facility] Care Representative - Daughter   Primary Phone: [phone] (Mobile)                    Readmission Root Cause  30 Day Readmission: No    Patient Information  Admitted from:: Home  Mental Status: Alert  During Assessment patient was accompanied by: Spouse, Daughter  Assessment information provided by:: Spouse, Daughter, Patient  Primary Caregiver: Self  Support Systems: Daughter, Son, Spouse/significant other  County of Residence: [place]  [place]  Home entry access options. Select all that apply.: Stairs  Number of steps to enter home.: 2  Type of Current Residence: 2 story home  Upon entering residence, is there a bedroom on the main floor (no further steps)?: No  A bedroom is located on the following floor levels of residence (select all that apply):: 2nd Floor  Upon entering residence, is there a bathroom on the main floor (no further steps)?: No  Indicate which floors of current residence have a bathroom (select all the apply):: 2nd Floor  Number of steps to 2nd floor from main floor: One Flight  Living Arrangements: Lives w/ Spouse/significant other, Lives w/ Son  Is patient a ?: No    Activities of Daily Living Prior  to Admission  Functional Status: Independent  Ambulates independently?: Yes  Does patient use assisted devices?: No  Does patient currently own DME?: Yes  What DME does the patient currently own?: Walker, Straight Cane  Does patient have a history of Outpatient Therapy (PT/OT)?: Yes  Does the patient have a history of Short-Term Rehab?: No  Does patient have a history of HHC?: No  Does patient currently have HHC?: No         Patient Information Continued  Income Source: SSI/SSD  Does patient have prescription coverage?: Yes  Does patient receive dialysis treatments?: No  Does patient have a history of substance abuse?: No  Does patient have a history of Mental Health Diagnosis?: No         Means of Transportation  Means of Transport to Appts:: Family transport      DISCHARGE DETAILS:    Discharge planning discussed with:: Patient, wife Brenda, and dtr Damien  [place] of Choice: Yes  Comments - Freedom of Choice: FOC discussed        Contacts  Patient Contacts: porfirio Quezada  Relationship to Patient:: Family  Contact Method: In Person  Reason/Outcome: Continuity of Care, Emergency Contact, Discharge Planning    Requested Home Health Care         Is the patient interested in HHC at discharge?: Yes  Home Health Discipline requested:: Occupational Therapy, Physical Therapy         Other Referral/Resources/Interventions Provided:  Referral Comments: Recs for C PT/OT.  Pt and family in agreement.  Referral sent in [facility] for serviced.  Pending availability.     CM met with pt and family at bedside.  Introduced myself and explained my role.  Pt lives with wife and son in 2 story home, bed and bath on second floor. Pt is IADL, does not use DME but has a walker and a cane.  The are in agreement with [facility] PT/OT.  Referral sent in [facility]idin, pending availability.  Family to transport home.

## 2024-02-18 NOTE — ASSESSMENT & PLAN NOTE
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 Incidentally found on arrival to ED d/t HR 160s. He was entirely symptomatic. He was given one dose of metoprolol in ED without improvement. Cardiology was consulted in ED and patient was started on amio bolus and then drip with improved HR.     On interrogation of AICD, reported 12 shocks for Vtach (ATP). Patient denies any feelings of shocks. [facility], last seen 1/18/2024, device last interogatted 1/3/24 with 100% afib burden and episodes of a fib w RVR.     On amio drip now.  Monitoring liver and thyroid function.  LFTs show mildly elevated ALK of 107 on 2/19 with normal AST and ALT.  TSH 0.351, free T4 1.36.      HR's have stabilized since being started on maintenance Amio gtt  Currently w/o and complaints including no CP/palpitations/SOB   S/p amnio gtt., recently on maintenance Dofetilide per EP    Plan:  S/p cardioversion on 2/21; follow-up with EP regarding medication changes  EP follow up in 3/22/2024 to discuss AVN ablation

## 2024-02-18 NOTE — ASSESSMENT & PLAN NOTE
Acute RIGHT knee pain for 3 days since starting Entresto. C/f septic arthritis s/p arthrocentesis in ED.    Plan:  Follow-up tap studies

## 2024-02-18 NOTE — ED PROCEDURE NOTE
Procedure  Arthrocentesis    Date/Time: 2/17/2024 7:40 PM    Performed by: Malcolm Araiza DO  Authorized by: Malcolm Araiza DO  Universal Protocol:  Procedure performed by: (Dr. Banuelos)  Consent: Verbal consent obtained.  Risks and benefits: risks, benefits and alternatives were discussed  Consent given by: patient  Patient identity confirmed: arm band and verbally with patient    Location:  Bedside  Indications:  Joint swelling, pain, possible septic joint, diagnostic evaluation and therapeutic  Body area:  Knee  Joint:  Right knee  Local anesthesia used?: Yes    Anesthesia:  Local infiltration  Local anesthetic:  Lidocaine 1% without epinephrine  Anesthetic total (ml):  3  Needle size:  18 G  Ultrasound guidance: No    Approach:  Lateral  Aspirate amount (ml):  20  Aspirate:  Blood-tinged, cloudy, purulent, serous, yellow and bloody  Patient tolerance:  Patient tolerated the procedure well with no immediate complications                   Malcolm Araiza DO  02/17/24 1942

## 2024-02-18 NOTE — ASSESSMENT & PLAN NOTE
On home levothyroxine 137mcg daily. Subsequent labs TSH 0.352 and free T4 1.36. Potential trigger for a fib.     Plan:  Reduce home levothyroxine dose to 125mcg  Follow-up TSH in setting of wide complex tachycardia

## 2024-02-18 NOTE — ASSESSMENT & PLAN NOTE
Pt's Natural Hx of HF:    Date Dx: March 2023 (Ie, shortly after NSTEMI s/p ELDER x2)  Cause? (If known): Ischemic, reduction in EF in setting of NSTEMI with 80% mid LAD and 90% RPDA stenoses s/p stenting    Subtype: HFrEF  Class: NYHA Class II, Stage C  Cardiologist/HF Specialist: Virginia Gan MD of  [facility]    Dry Weight: ~175-180 lbs (79.4-81.2 kg) ... Nb No official record of dry weight, the value shown previously is based on assumptions per weights prior to pt's first admit for HF exacerbation  Current Weight:   Wt Readings from Last 3 Encounters:   02/18/24 76.8 kg (169 lb 5 oz)   02/13/24 75.3 kg (166 lb)   01/18/24 80.3 kg (177 lb 1.6 oz)      Most Recent Echo (Study Date 12/23/23): LVEF 25-30 %, LV Systolic Dysfxn, Severe with severe Global hypokinesis  Structural Heart Changes?  Atria: LA mod/severe dilation, RA moderate dilation  Ventricles: LV severe global hypokinesis w/ regional variation, RV mild dilation with moderately reduced systolic function  Valves: Normal fxn TAVR, Severe MR, Mod to Severe TR with elevated RV systolic pressure, mild TX    Paced?: BiV ACD    GDMT:   Beta-Blocker: Metoprolol succinate 75 mg BID  ACEi, ARB or ARNi: Entresto 24/26 bid  Previously on Lisinopril 10  SGLT2i- jardiance 10 mg daily  Recently D/C'd OP, now on Entresto instead  Diuretic: Lasix 60 mg daily - taking 40mg in AM and 20mg in PM    ICD? Yes   Date Inserted: 9/13/23    - - - - - - - - - - - - - - - - - - - - - - - - - - - - - - - - - - - - - - - - - - - - - - - - - - - - - - - - - - -     IP Evaluation/Interval HF Assessment:    Assessment:    Pt is currently at dry weight and no concerning s/sx of HF exacerbation I/c/o Vfib events  BPs continue to remain soft, previously likely affected by IV Amio however now may ? Reflect poor compliance at home versus current glucocorticoid use  Regardless, cards has been made aware and they have adjusted patient's hold parameters  Previously receiving Lasix and  Toprol sparingly/haphazardly.  Rarely gets them as scheduled.  Normal/scheduled after exchanged  Currently on Amio which will also drop BP, for this reason continuing with statement as above re: diuresis    Plan:    To continue holding Entresto upon discharge  Follow-up with HF cards in order to modify/restart GDMT meds  Continue Toprol outpatient per previously prescribed dose  Continue Lasix 40 daily outpatient  Follow-up BMP 1 week postdischarge  Follow-up with EP cards post discharge

## 2024-02-18 NOTE — CONSULTS
Orthopedics   Juan David Lora 78 y.o. male MRN: [id number]  Unit/Bed#: ED 08      Chief Complaint:   right knee pain    HPI:   78 y.o.male community ambulator complaining of right knee pain x 4 days. Atraumatic. States he has had difficulty walking while in his home. Denies fevers or chills. No recent bug bites. Takes Eliquis. Afebrile on presentation. WBC 10, ESR 54, CRP 16.2. ED performed an aspiration of the right knee and obtained 25 cc of fluid which was sent for analysis showing + calcium pyrophosphate crystals, WBC 28.8k. Pain is achy in character, Located posterior aspect of right knee, acute in onset, constant in duration, severe in intensity. Exacerbating factors weight bearing activities, remitting factors rest. Nonradiating, no numbness, no tingling, no open wounds noted. Has had no prior treatment. No other complaints at this time. PMH significant for asthma, DM last A1c 6.9, hep C, HLD, NSTEMI s/p ICD. Occupation retired.    Review Of Systems:   Skin: See HPI  Neuro: See HPI  Musculoskeletal: See HPI  14 point review of systems negative except as stated above     Past Medical History:   Past Medical History:   Diagnosis Date    Arthritis     Asthma     Colon polyps     Community acquired pneumonia     last assessed: 5/1/2014    Diabetes mellitus (HCC)     Hemorrhagic prepatellar bursitis, left 10/21/2019    Hepatitis C     High cholesterol     History of colonoscopy 2017    Hypertension     Lymphadenopathy, anterior cervical 04/17/2018    Nephritis and nephropathy, not specified as acute or chronic, with other specified pathological lesion in kidney, in diseases classified elsewhere 3/26/2013    NSTEMI (non-ST elevated myocardial infarction) (HCC) 1/30/2023    s/p [employer] dual chamber PPM 8/31/22 s/p upgrade to BiV ICD 9/13/2023 9/16/2023    Screening for colon cancer 05/01/2019    SIRS (systemic inflammatory response syndrome) (HCC) 3/19/2023    Thoracic vertebral fracture (HCC) 06/11/2014        Past Surgical History:   Past Surgical History:   Procedure Laterality Date    CARDIAC CATHETERIZATION N/A 6/3/2022    Procedure: Cardiac RHC/LHC;  Surgeon: Yemi Molina MD;  Location: [facility];  Service: Cardiology    CARDIAC CATHETERIZATION N/A 6/21/2022    Procedure: CARDIAC TAVR;  Surgeon: David Craft MD;  Location: [facility];  Service: Cardiology    CARDIAC CATHETERIZATION N/A 2/10/2023    Procedure: Cardiac Coronary Angiogram;  Surgeon: Yemi Molina MD;  [facility]: [facility];  Service: Cardiology    CARDIAC CATHETERIZATION N/A 2/10/2023    Procedure: Cardiac pci;  Surgeon: Yemi Molina MD;  [facility]: [facility];  Service: Cardiology    CARDIAC ELECTROPHYSIOLOGY PROCEDURE N/A 9/1/2022    Procedure: Cardiac pacer implant ; DC PPM;  Surgeon: Francis Sun DO;  [facility]: [facility];  Service: Cardiology    CARDIAC ELECTROPHYSIOLOGY PROCEDURE N/A 9/13/2023    Procedure: Cardiac upgrade pacer to biv icd;  Surgeon: Mario Yuan MD;  [facility]: [facility];  Service: Cardiology    COLONOSCOPY      COLONOSCOPY W/ POLYPECTOMY      IR UPPER EXTREMITY VENOGRAM- DIAGNOSTIC  8/23/2023    LITHOTRIPSY      MULTIPLE TOOTH EXTRACTIONS      AL COLONOSCOPY FLX DX W/COLLJ SPEC WHEN PFRMD N/A 5/17/2018    Procedure: COLONOSCOPY;  Surgeon: Raad Sandoval MD;  Location: [facility];  Service: Gastroenterology    AL REPLACE AORTIC VALVE OPENFEMORAL ARTERY APPROACH N/A 6/21/2022    Procedure: REPLACEMENT AORTIC VALVE TRANSCATHETER (TAVR) TRANSFEMORAL W/ 26MM QUINN RONNI S3 ULTRA VALVE(ACCESS ON LEFT) SAULO;  Surgeon: Sal Walker MD;  Location: [facility];  Service: Cardiac Surgery       Family History:  Family history reviewed and non-contributory  Family History   Problem Relation Age of Onset    Heart attack Family         at age 86    No Known Problems Mother     No Known Problems Father     Diabetes Sister     Diabetes Brother        Social History:  Social History      Socioeconomic History    Marital status: /Civil Union     Spouse name: None    Number of children: None    Years of education: None    Highest education level: None   Occupational History    Occupation: retired    Tobacco Use    Smoking status: Former     Current packs/day: 0.00     Types: Cigarettes     Quit date: 2022     Years since quittin.7    Smokeless tobacco: Never    Tobacco comments:     started when he was about 27 yrs old; stopped smoking 3 wks ago   Vaping Use    Vaping status: Never Used   Substance and Sexual Activity    Alcohol use: No    Drug use: No    Sexual activity: Not Currently   Other Topics Concern    None   Social History Narrative    None     Social Determinants of Health     Financial Resource Strain: Low Risk  (2023)    Overall Financial Resource Strain (CARDIA)     Difficulty of Paying Living Expenses: Not hard at all   Food Insecurity: No Food Insecurity (2023)    Hunger Vital Sign     Worried About Running Out of Food in the Last Year: Never true     Ran Out of Food in the Last Year: Never true   Transportation Needs: No Transportation Needs (2023)    PRAPARE - Transportation     Lack of Transportation (Medical): No     Lack of Transportation (Non-Medical): No   Physical Activity: Unknown (2022)    Exercise Vital Sign     Days of Exercise per Week: Not on file     Minutes of Exercise per Session: 60 min   Stress: No Stress Concern Present (2022)    [place] [employer] - Occupational Stress Questionnaire     Feeling of Stress : Not at all   Social Connections: Moderately Isolated (2022)    Social Connection and Isolation Panel [NHANES]     Frequency of Communication with Friends and Family: More than three times a week     Frequency of Social Gatherings with Friends and Family: More than three times a week     Attends Yazdanism Services: Never     Active Member of Clubs or Organizations: No     Attends Club or  Organization Meetings: Never     Marital Status:    Intimate Partner Violence: Not on file   Housing Stability: Low Risk  (9/17/2023)    Housing Stability Vital Sign     Unable to Pay for Housing in the Last Year: No     Number of Places Lived in the Last Year: 1     Unstable Housing in the Last Year: No       Allergies:   Allergies   Allergen Reactions    Iv Contrast [Iodinated Contrast Media] Rash     Patient states he had a severe reaction approximately 10 years ago , states he had a rash, itchy and he remembers a bunch of people pounding on chest and being surrounded by multiple doctors.           Labs:  0   Lab Value Date/Time    HCT 45.3 02/17/2024 1725    HCT 41.1 12/25/2023 0431    HCT 41.6 12/24/2023 0530    HCT 42.5 10/14/2014 0000    HGB 14.1 02/17/2024 1725    HGB 13.0 12/25/2023 0431    HGB 12.7 12/24/2023 0530    HGB 14.1 10/14/2014 0000    INR 1.52 (H) 12/22/2023 1540    WBC 10.07 02/17/2024 1725    WBC 7.53 12/25/2023 0431    WBC 7.37 12/24/2023 0530    WBC 7.95 10/14/2014 0000    ESR 54 (H) 02/17/2024 2000    CRP 16.2 (H) 02/17/2024 2000       Meds:    Current Facility-Administered Medications:     amiodarone (CORDARONE) 900 mg in dextrose 5 % 500 mL infusion, 1 mg/min, Intravenous, Continuous, Last Rate: 33.3 mL/hr at 02/17/24 1851, 1 mg/min at 02/17/24 1851 **FOLLOWED BY** [START ON 2/18/2024] amiodarone (CORDARONE) 900 mg in dextrose 5 % 500 mL infusion, 0.5 mg/min, Intravenous, Continuous, Shellie Banuelos MD    [START ON 2/18/2024] atorvastatin (LIPITOR) tablet 80 mg, 80 mg, Oral, Daily With Dinner, Esthela Jones MD    [START ON 2/18/2024] cholecalciferol (VITAMIN D3) tablet 2,000 Units, 2,000 Units, Oral, Daily, Esthela Jones MD    [START ON 2/18/2024] clopidogrel (PLAVIX) tablet 75 mg, 75 mg, Oral, Daily, Esthela Jones MD    [START ON 2/18/2024] Empagliflozin (JARDIANCE) tablet 10 mg, 10 mg, Oral, QAM, Esthela Jones MD    [START ON 2/18/2024] ferrous sulfate tablet 325 mg,  325 mg, Oral, Every Other Day, Esthela Jones MD    furosemide (LASIX) tablet 40 mg, 40 mg, Oral, BID, Esthela Jones MD    insulin glargine (LANTUS) subcutaneous injection 15 Units 0.15 mL, 15 Units, Subcutaneous, HS, Esthela Jones MD    [START ON 2/18/2024] insulin lispro (HumaLOG) 100 units/mL subcutaneous injection 1-6 Units, 1-6 Units, Subcutaneous, TID AC **AND** [START ON 2/18/2024] Fingerstick Glucose (POCT), , , TID AC, Esthela Jones MD    [START ON 2/18/2024] levothyroxine tablet 137 mcg, 137 mcg, Oral, Daily, Esthela Jones MD    [START ON 2/18/2024] metoprolol succinate (TOPROL-XL) 24 hr tablet 50 mg, 50 mg, Oral, Daily, Estehla Jones MD    [START ON 2/18/2024] montelukast (SINGULAIR) tablet 10 mg, 10 mg, Oral, Daily, Esthela Jones MD    potassium chloride (Klor-Con M20) CR tablet 40 mEq, 40 mEq, Oral, Once, Esthela Jones MD    vancomycin (VANCOCIN) 1500 mg in sodium chloride 0.9% 250 mL IVPB, 20 mg/kg, Intravenous, Once, Shellie Banuelos MD, 1,500 mg at 02/17/24 2111    Current Outpatient Medications:     albuterol (PROVENTIL HFA,VENTOLIN HFA) 90 mcg/act inhaler, INHALE 2 PUFFS BY MOUTH EVERY 4 HOURS AS NEEDED FOR WHEEZING OR SHORTNESS OF BREATH., Disp: 18 g, Rfl: 2    apixaban (Eliquis) 5 mg, Take 1 tablet (5 mg total) by mouth 2 (two) times a day, Disp: 60 tablet, Rfl: 3    cholecalciferol (VITAMIN D3) 1,000 units tablet, Take 2 tablets (2,000 Units total) by mouth daily, Disp: 180 tablet, Rfl: 5    Empagliflozin (JARDIANCE) 10 MG TABS tablet, Take 1 tablet (10 mg total) by mouth every morning, Disp: 90 tablet, Rfl: 3    ferrous sulfate 325 (65 Fe) mg tablet, TAKE 1 TABLET BY MOUTH EVERY OTHER DAY, Disp: 45 tablet, Rfl: 4    furosemide (LASIX) 40 mg tablet, TAKE 1 TABLET BY MOUTH TWICE A DAY, Disp: 180 tablet, Rfl: 1    Insulin Glargine Solostar (Lantus SoloStar) 100 UNIT/ML SOPN, INJECT 0.18 ML (18 UNITS TOTAL) UNDER THE SKIN DAILY AT BEDTIME, Disp: 15 mL, Rfl: 3     levothyroxine 137 mcg tablet, TAKE 1 TABLET BY MOUTH EVERY DAY, Disp: 90 tablet, Rfl: 3    metFORMIN (GLUCOPHAGE) 850 mg tablet, TAKE 1 TABLET BY MOUTH 2 TIMES A DAY WITH MEALS., Disp: 180 tablet, Rfl: 3    metoprolol succinate (TOPROL-XL) 50 mg 24 hr tablet, TAKE 1.5 TABLETS BY MOUTH 2 TIMES A DAY., Disp: 270 tablet, Rfl: 1    montelukast (SINGULAIR) 10 mg tablet, TAKE 1 TABLET BY MOUTH EVERY DAY, Disp: 90 tablet, Rfl: 3    NovoLOG FlexPen 100 units/mL injection pen, INJECT 5 UNITS WITH BREAKFAST AND WITH DINNER, Disp: 15 mL, Rfl: 3    rosuvastatin (CRESTOR) 40 MG tablet, TAKE 1 TABLET BY MOUTH EVERY DAY, Disp: 90 tablet, Rfl: 3    sacubitril-valsartan (Entresto) 24-26 MG TABS, Take 1 tablet by mouth 2 (two) times a day Start 36 hours after last dose of lisinopril, Disp: 60 tablet, Rfl: 3    Advair -21 MCG/ACT inhaler, Inhale 2 puffs 2 (two) times a day Rinse mouth after use. (Patient not taking: Reported on 2/17/2024), Disp: 36 g, Rfl: 6    Alcohol Swabs (ALCOHOL PADS) 70 % PADS, , Disp: , Rfl:     Blood Glucose Monitoring Suppl (OneTouch Verio) w/Device KIT, Check blood glucose three times daily before each meal, Disp: 1 kit, Rfl: 0    clopidogrel (PLAVIX) 75 mg tablet, Take 1 tablet (75 mg total) by mouth daily, Disp: 90 tablet, Rfl: 3    Continuous Blood Gluc  (FreeStyle Cristofer 2 [place]) POWER, Use 1 each continuous (Patient not taking: Reported on 2/13/2024), Disp: 1 each, Rfl: 0    Continuous Blood Gluc Sensor (FreeStyle Cristofer 2 Sensor) MISC, Use 1 each every 14 (fourteen) days (Patient not taking: Reported on 2/13/2024), Disp: 6 each, Rfl: 3    Insulin Pen Needle (Pen Needles) 31G X 8 MM MISC, Use daily, Disp: 300 each, Rfl: 3    Lancets (FREESTYLE) lancets, by Other route as needed (As needed), Disp: 100 each, Rfl: 3    nitroglycerin (NITROSTAT) 0.4 mg SL tablet, Place 1 tablet (0.4 mg total) under the tongue every 5 (five) minutes as needed for chest pain, Disp: 30 tablet, Rfl: 1     "spironolactone (ALDACTONE) 25 mg tablet, TAKE 1 TABLET (25 MG TOTAL) BY MOUTH DAILY. (Patient not taking: Reported on 1/18/2024), Disp: 90 tablet, Rfl: 1    Blood Culture:   No results found for: \"BLOODCX\"    Wound Culture:   No results found for: \"WOUNDCULT\"    Ins and Outs:  No intake/output data recorded.          Physical Exam:   /62 (BP Location: Right arm)   Pulse 98   Temp 97.5 °F (36.4 °C) (Oral)   Resp 18   SpO2 97%   Gen: No acute distress, resting comfortably in bed  HEENT: Eyes clear, moist mucus membranes, hearing intact  Respiratory: No audible wheezing or stridor  Cardiovascular: Well Perfused peripherally, 2+ distal pulse  Abdomen: nondistended, no peritoneal signs  Musculoskeletal: right lower extremity  Skin intact with exception of puncture site from prior aspiration, no erythema or warmth  Tender to palpation over posterior knee  Scant effusion  No micromotion tenderness  AROM/PROM to 100 degrees flexion and 0 degrees extension   Can perform straight leg raise  Stable to varus/valgus stress  Sensation intact SPN, DPN, sural, saphenous, tibial distributions  Motor intact to knee flexion/extension, ankle PF/DF, EHL/FHL  2+ DP/PT pulse    Radiology:   I personally reviewed the films.  X-rays AP/Lateral views right knee shows tricompartmental degenerative changes with chondrocalcinosis.      Assessment:  78 y.o. male with right knee pain consistent with flare of pseudogout given examination and + aspiration demonstrating calcium pyrophosphate. 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    Plan:   Weight bearing as tolerated  right lower extremity  Recommend medical management of pseudogout  PT  Pain control  There is no height or weight on file to calculate BMI.   Dispo: Ortho will follow    Mely Baker MD   "

## 2024-02-18 NOTE — CONSULTS
Consultation - General Cardiology Team 2  Juan David Lora 78 y.o. male MRN: [id number]  Unit/Bed#: [facility] 403-01 Encounter: [id number]    History of Present Illness   Physician Requesting Consult: Judith Ovalles MD  Reason for Consult / Principal Problem: A-flutter      Assessment/Plan       Juan David Lora is a 78 y.o. year old male with a history of past medical history listed below presents with right knee pain s/p arthrocentesis.  Patient is currently being treated for pseudogout.  He was incidentally found to be in a flutter with RVR and required starting amiodarone for rate and rhythm control.    Atrial flutter.  Patient's presenting EKG shows 2-1 atrial flutter with RVR.  Patient was started on amiodarone for rhythm control.     Atrial fibrillation, paroxysmal.  [id number] of 6, patient is currently anticoagulated with Eliquis.  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  Patient has 100% A-fib burden, is V paced 57% of the time    HFrEF.  LVEF of 30% likely secondary to ischemic cardiomyopathy  Neurohormonal Blockade:  Beta-Blocker: Toprol-XL 50 mg twice daily  ACEi, ARB or ARNi: Entresto 24-26 twice daily  Aldosterone Receptor Blocker: Spironolactone 25 mg daily  Diuretic: Lasix 40 mg twice daily      CAD s/p PCI to mLAD and RPDA (2023)  AS s/p TAVR  Mitral regurgitation  Pseudogout  Type II DM  Hypertension  Hyperlipidemia      Plan:  Patient responded well to amiodarone, can continue for now  Patient's baseline QTc is 465 with a left bundle, can be considered for initiation of dofetilide  Can consider CTI ablation  Continue rest of HF GDMT          HPI: Juan David Lora is a 78 y.o. year old male who presented with worsening pain in his right knee for the last few days.  On presentation patient was found to be in a flutter with RVR with heart rates in the 150s to 160s.  Trialed Lopressor however patient's blood pressure was not able to  tolerate another dose of beta-blocker and was started on amiodarone.  Patient denies any complaints of chest pain, shortness of breath or palpitations.  States he has been taking Eliquis every single day without any missed doses.  At the time of my evaluation patient states that milligrams father currently is his knee.          Outpatient Cardiologist: Raghav Molina MD. Electrophysiology: Mario Yuan MD  Heart failure: Virginia Gan MD          Review of Systems  Review of system was conducted and was negative except for as stated in the HPI.      Historical Information   Past Medical History:   Diagnosis Date    Arthritis     Asthma     Colon polyps     Community acquired pneumonia     last assessed: 5/1/2014    Diabetes mellitus (HCC)     Hemorrhagic prepatellar bursitis, left 10/21/2019    Hepatitis C     High cholesterol     History of colonoscopy 2017    Hypertension     Lymphadenopathy, anterior cervical 04/17/2018    Nephritis and nephropathy, not specified as acute or chronic, with other specified pathological lesion in kidney, in diseases classified elsewhere 3/26/2013    NSTEMI (non-ST elevated myocardial infarction) (HCC) 1/30/2023    s/p Medtronic dual chamber PPM 8/31/22 s/p upgrade to BiV ICD 9/13/2023 9/16/2023    Screening for colon cancer 05/01/2019    SIRS (systemic inflammatory response syndrome) ([facility]) 3/19/2023    Thoracic vertebral fracture ([facility]) 06/11/2014     Past Surgical History:   Procedure Laterality Date    CARDIAC CATHETERIZATION N/A 6/3/2022    Procedure: Cardiac RHC/LHC;  Surgeon: Yemi Molina MD;  Location: [facility];  Service: Cardiology    CARDIAC CATHETERIZATION N/A 6/21/2022    Procedure: CARDIAC TAVR;  Surgeon: David Craft MD;  Location: [facility] MA[facility];  Service: Cardiology    CARDIAC CATHETERIZATION N/A 2/10/2023    Procedure: Cardiac Coronary Angiogram;  Surgeon: Yemi Molina MD;  Location: [facility];  Service: Cardiology    CARDIAC CATHETERIZATION N/A  2/10/2023    Procedure: Cardiac pci;  Surgeon: Yemi Molina MD;  Location: [facility];  Service: Cardiology    CARDIAC ELECTROPHYSIOLOGY PROCEDURE N/A 2022    Procedure: Cardiac pacer implant ; DC PPM;  Surgeon: Francis Sun DO;  Location: [facility];  Service: Cardiology    CARDIAC ELECTROPHYSIOLOGY PROCEDURE N/A 2023    Procedure: Cardiac upgrade pacer to biv icd;  Surgeon: Mario Yuan MD;  [facility]: [facility];  Service: Cardiology    COLONOSCOPY      COLONOSCOPY W/ POLYPECTOMY      IR UPPER EXTREMITY VENOGRAM- DIAGNOSTIC  2023    LITHOTRIPSY      MULTIPLE TOOTH EXTRACTIONS      NV COLONOSCOPY FLX DX W/COLLJ SPEC WHEN PFRMD N/A 2018    Procedure: COLONOSCOPY;  Surgeon: Raad Sandoval MD;  Location: [facility] GI [facility];  Service: Gastroenterology    NV REPLACE AORTIC VALVE OPENFEMORAL ARTERY APPROACH N/A 2022    Procedure: REPLACEMENT AORTIC VALVE TRANSCATHETER (TAVR) TRANSFEMORAL W/ 26MM QUINN RONNI S3 ULTRA VALVE(ACCESS ON LEFT) SAULO;  Surgeon: Sal Walker MD;  Location: [facility];  Service: Cardiac Surgery     Social History     Substance and Sexual Activity   Alcohol Use No     Social History     Substance and Sexual Activity   Drug Use No     Social History     Tobacco Use   Smoking Status Former    Current packs/day: 0.00    Types: Cigarettes    Quit date: 2022    Years since quittin.7   Smokeless Tobacco Never   Tobacco Comments    started when he was about 27 yrs old; stopped smoking 3 wks ago     Family History: non-contributory    Meds/Allergies   Hospital Medications:   Current Facility-Administered Medications   Medication Dose Route Frequency    acetaminophen (TYLENOL) tablet 650 mg  650 mg Oral Q6H PRN    amiodarone (CORDARONE) 900 mg in dextrose 5 % 500 mL infusion  0.5 mg/min Intravenous Continuous    apixaban (ELIQUIS) tablet 5 mg  5 mg Oral BID    atorvastatin (LIPITOR) tablet 80 mg  80 mg Oral Daily With Dinner    cholecalciferol  (VITAMIN D3) tablet 2,000 Units  2,000 Units Oral Daily    clopidogrel (PLAVIX) tablet 75 mg  75 mg Oral Daily    Diclofenac Sodium (VOLTAREN) 1 % topical gel 2 g  2 g Topical 4x Daily    Empagliflozin (JARDIANCE) tablet 10 mg  10 mg Oral QAM    ferrous sulfate tablet 325 mg  325 mg Oral Every Other Day    furosemide (LASIX) tablet 40 mg  40 mg Oral BID    insulin glargine (LANTUS) subcutaneous injection 15 Units 0.15 mL  15 Units Subcutaneous HS    insulin lispro (HumaLOG) 100 units/mL subcutaneous injection 1-6 Units  1-6 Units Subcutaneous TID AC    levothyroxine tablet 125 mcg  125 mcg Oral Daily    metoprolol succinate (TOPROL-XL) 24 hr tablet 75 mg  75 mg Oral BID    montelukast (SINGULAIR) tablet 10 mg  10 mg Oral Daily    predniSONE tablet 40 mg  40 mg Oral Daily     Home Medications:   Medications Prior to Admission   Medication    albuterol (PROVENTIL HFA,VENTOLIN HFA) 90 mcg/act inhaler    apixaban (Eliquis) 5 mg    cholecalciferol (VITAMIN D3) 1,000 units tablet    Empagliflozin (JARDIANCE) 10 MG TABS tablet    ferrous sulfate 325 (65 Fe) mg tablet    furosemide (LASIX) 40 mg tablet    Insulin Glargine Solostar (Lantus SoloStar) 100 UNIT/ML SOPN    levothyroxine 137 mcg tablet    metFORMIN (GLUCOPHAGE) 850 mg tablet    metoprolol succinate (TOPROL-XL) 50 mg 24 hr tablet    montelukast (SINGULAIR) 10 mg tablet    NovoLOG FlexPen 100 units/mL injection pen    rosuvastatin (CRESTOR) 40 MG tablet    sacubitril-valsartan (Entresto) 24-26 MG TABS    Advair -21 MCG/ACT inhaler    Alcohol Swabs (ALCOHOL PADS) 70 % PADS    Blood Glucose Monitoring Suppl (OneTouch Verio) w/Device KIT    clopidogrel (PLAVIX) 75 mg tablet    Continuous Blood Gluc  (FreeStyle Cristofer 2 Henrietta) POWER    Continuous Blood Gluc Sensor (FreeStyle Cristofer 2 Sensor) MISC    Insulin Pen Needle (Pen Needles) 31G X 8 MM MISC    Lancets (FREESTYLE) lancets    nitroglycerin (NITROSTAT) 0.4 mg SL tablet    spironolactone (ALDACTONE) 25  mg tablet       Allergies   Allergen Reactions    Iv Contrast [Iodinated Contrast Media] Rash     Patient states he had a severe reaction approximately 10 years ago , states he had a rash, itchy and he remembers a bunch of people pounding on chest and being surrounded by multiple doctors.       Objective   Vitals: Blood pressure 127/78, pulse 100, temperature (!) 97.3 °F (36.3 °C), temperature source Oral, resp. rate 17, weight 76.8 kg (169 lb 5 oz), SpO2 97%.  Orthostatic Blood Pressures      Flowsheet Row Most Recent Value   Blood Pressure 127/78 filed at 02/18/2024 0404   Patient Position - Orthostatic VS Lying filed at 02/18/2024 0404              Invasive Devices       Peripheral Intravenous Line  Duration             Peripheral IV 02/17/24 Left Antecubital <1 day    Peripheral IV 02/17/24 Right Antecubital <1 day                    Physical Exam  Vitals reviewed.   Constitutional:       Appearance: Normal appearance.   HENT:      Head: Normocephalic and atraumatic.   Neck:      Vascular: No JVD.   Cardiovascular:      Rate and Rhythm: Tachycardia present. Rhythm irregular.      Pulses: Normal pulses.      Heart sounds: No murmur heard.     No gallop.   Pulmonary:      Effort: Pulmonary effort is normal. No respiratory distress.      Breath sounds: No stridor. No wheezing.   Chest:      Chest wall: No tenderness.   Musculoskeletal:         General: Swelling and tenderness present.      Right lower leg: No edema.      Left lower leg: No edema.   Skin:     General: Skin is warm and dry.   Neurological:      General: No focal deficit present.      Mental Status: He is alert and oriented to person, place, and time.   Psychiatric:         Mood and Affect: Mood normal.         Behavior: Behavior normal.           Lab Results: I have personally reviewed pertinent lab results.    Results from last 7 days   Lab Units 02/17/24 2118 02/17/24 1927 02/17/24  1725   HS TNI 0HR ng/L  --   --  21   HS TNI 2HR ng/L  --  24   --    HS TNI 4HR ng/L 23  --   --          Results from last 7 days   Lab Units 02/17/24  1725   POTASSIUM mmol/L 3.8   CO2 mmol/L 28   CHLORIDE mmol/L 100   BUN mg/dL 23   CREATININE mg/dL 1.23     Results from last 7 days   Lab Units 02/18/24  0433 02/17/24  1725   HEMOGLOBIN g/dL 12.7 14.1   HEMATOCRIT % 40.6 45.3   PLATELETS Thousands/uL 226 244             Imaging: I have personally reviewed pertinent reports.        Telemetry:   Patient was in atrial flutter with RVR, converted shortly after.    EKG:   Date: 2/17/2024  Interpretation: Atrial fibrillation with RVR, left bundle branch block      Previous STRESS TEST:  No results found for this or any previous visit.     No results found for this or any previous visit.    No results found for this or any previous visit.      Previous Cath/PCI:  No results found for this or any previous visit.    No results found for this or any previous visit.    No results found for this or any previous visit.      ECHO:  No results found for this or any previous visit.    Results for orders placed during the hospital encounter of 03/18/23    Echo complete w/ contrast if indicated    Interpretation Summary    Left Ventricle: Left ventricular cavity size is mildly dilated. Wall thickness is normal. There is eccentric hypertrophy. The left ventricular ejection fraction is 30-35%. Systolic function is moderately reduced.    The following segments are akinetic: basal inferoseptal, basal inferior, mid inferoseptal and mid inferolateral.    The following segments are hypokinetic: mid inferior and apical inferior.    All other segments are normal.    IVS: There is abnormal septal motion consistent with left bundle branch block.    Right Ventricle: Right ventricular cavity size is normal. Systolic function is normal.    Left Atrium: The atrium is moderately dilated.    Right Atrium: The atrium is mildly dilated.    Aortic Valve: There is an Emery RONNI 3 Ultra 26 mm TAVR  bioprosthetic valve. There is no evidence of paravalvular regurgitation. There is no evidence of transvalvular regurgitation. The aortic valve has no significant stenosis. The gradient recorded across the prosthetic aortic valve is within the expected range. The aortic valve peak velocity is 2.26 m/s. The aortic valve mean gradient is 10 mmHg. The dimensionless velocity index is 0.36. The aortic valve area is 1.37 cm2.    Mitral Valve: The posterior leaflet is tethered. There is mild annular calcification. There is severe regurgitation with a posteriorly directed jet.    Tricuspid Valve: There is mild regurgitation.    Pericardium: There is a trivial pericardial effusion along the right atrial free wall. The fluid exhibits no internal echoes. There is no echocardiographic evidence of tamponade.              VTE Prophylaxis:  on adina Rodriguez MD  Cardiology Fellow   FY-1    ==========================================================================================    Epic/ Allscripts/Care Everywhere records reviewed: y    ** Please Note: Fluency DirectDictation voice to text software may have been used in the creation of this document. **

## 2024-02-18 NOTE — PROGRESS NOTES
INTERNAL MEDICINE RESIDENCY SENIOR ADMISSION NOTE     Name: Juan David Lora   Age & Sex: 78 y.o. male   MRN: [id number]  Unit/Bed#: ED 08   Encounter: [id number]  Primary Care Provider: Umesh Millard MD    Admit to team: SOD Team B     Patient seen and examined. Reviewed H&P per Dr. Jones . Agree with the assessment and plan with any exception/addition as noted below:    Principal Problem:    Wide-complex tachycardia  Active Problems:    Mild intermittent asthma without complication    Type 2 diabetes mellitus with circulatory disorder, with long-term current use of insulin ([facility])    Hyperlipidemia    Essential hypertension    Hypothyroidism    History of aortic stenosis s/p TAVR    Chronic HFrEF (heart failure with reduced ejection fraction) ([facility])    Stage 3 chronic kidney disease, unspecified whether stage 3a or 3b CKD ([facility])    Edwina Lora is a 77 yo M with PMH of T2DM, HTN, AS s/p TAVR, tachy-lino syndrome s/p biV ICD, CAD s/p ELDER, PAD, asthma, osteoarthritis, NSVT, HFrEF 25-30%, Afib on E, CKD3 who presents for right knee pain.  Patient reports that over the last few days he has had pain while bending his right knee that has affected his ability to walk.  He is having difficulty characterizing the pain or quantifying the pain.  He does have history of osteoarthritis in the knees.  He denies any fevers or chills.  He denies any chest pain, shortness of breath, or palpitations.    VS on presentation 97.5, , RR 22, BP 97/74/SpO2 98% on room air.  Labs significant for WBC 10.07, K3.8, BUN 23, creatinine 1.23 (this is patient's baseline), alk phos 118, T. bili 1.12.  Initial troponin 21-24.  Mag 2.0.  EKG showing wide complex tachycardia.  Interrogation of patient's Medtronic BiV ICD showed 12 episodes of V. tach.  Patient denies feeling any shocks from this.  Chest x-ray pending final read.    For patient's wide-complex tachycardia, he was seen by Cardiology in the ED. He  received Lopressor, amio bolus, and started on amio infusion.  His right knee was aspirated by the ED, and patient was seen by Orthopedics. He was started on antibiotics empirically for septic arthritis.    PE notable for IRR, mild lung crackles bilaterally, right knee mildly warm and swollen but nontender to palpation.    Assessment and plan:    Wide complex tachycardia  Tachy-lino syndrome with biV ICD  Pseudogout  T2DM  HTN  HFrEF 25-30%, not in acute exacerbation  AS s/p TAVR  CKD3  Asthma  Hypothyroidism    Patient originally presented with right knee pain.  Patient was seen by orthopedics in the ED after knee aspiration.  Culture/Gram stain are still pending but so far calcium pyrophosphate crystals were seen on synovial fluid analysis and WBC count less than 30,000.  Will treat patient for a pseudogout flare.  ESR 54, CRP 16.2. The more pressing issue at this time is patient's wide-complex tachycardia with interrogation of his device showing 12 episodes of VT at home.  Patient is completely asymptomatic from this and did not feel any shocks.  Patient was started on amio bolus and drip in the ED, with improvement in his heart rates.  Will admit overnight for cardiac monitoring, cardiology consult, and further orthopedics recommendations after rest of studies come back.    Continue amiodarone gtt.  Continue home Toprol-XL 75 mg BID  Continue home Eliquis, Jardiance, Plavix, statin for Afib, CAD, and CHF  DC Entresto as patient having low BP; this was only started 3 days ago  Hold tonight's dose of Lasix given patient's lower BP  Monitor I/O, daily weights, kidney function, volume status  Monitor on telemetry  Consult Cardiology  Start prednisone 40 mg daily for gout flare - avoid NSAIDs/colchicine given patient's CAD and CKD  Orthopedics consulted, appreciate further recommendations  Tylenol and Voltaren gel as needed  Follow-up rest of knee aspiration studies, knee XR      Code Status: Level 1 - Full  Code  Admission Status: INPATIENT   Disposition: Patient requires Level 2 Step Down   Expected Length of Stay: >2 midnights

## 2024-02-18 NOTE — H&P
INTERNAL MEDICINE RESIDENCY ADMISSION H&P     Name: Juan David Lora   Age & Sex: 78 y.o. male   MRN: [id number]  Unit/Bed#: ED 08   Encounter: [id number]  Primary Care Provider: Umesh Millard MD    Code Status: Level 1 - Full Code  Admission Status: INPATIENT   Disposition: Patient requires Level 2 Step Down     Admit to team: SOD Team B     ASSESSMENT/PLAN     Principal Problem:    Wide-complex tachycardia  Active Problems:    Mild intermittent asthma without complication    Type 2 diabetes mellitus with circulatory disorder, with long-term current use of insulin ([facility])    Hyperlipidemia    Essential hypertension    Hypothyroidism    History of aortic stenosis s/p TAVR    Chronic HFrEF (heart failure with reduced ejection fraction) ([facility])    Stage 3 chronic kidney disease, unspecified whether stage 3a or 3b CKD ([facility])    Pseudogout of right knee      Pseudogout of right knee  Assessment & Plan  Patient presenting with 3 days of right knee swelling, warmth, pain on movement, and decreased range of motion. Interestingly, although potentially unrelated, he started Entresto 3 days prior to admission. The joint was tapped in the ED and was positive for CCP crystals. ESR and CRP elevated.     Plan:  Ortho consulted--recs appreciated  Start on prednisone 40mg x days (SOT 2/18)--adjusted dosing on day 2 of therapy to avoid dosage at nighttime (delirium procautions)   -avoid NSAIDs in CAD    Stage 3 chronic kidney disease, unspecified whether stage 3a or 3b CKD ([facility])  Assessment & Plan  Lab Results   Component Value Date    EGFR 56 02/17/2024    EGFR 55 02/09/2024    EGFR 48 12/25/2023    CREATININE 1.23 02/17/2024    CREATININE 1.26 02/09/2024    CREATININE 1.41 (H) 12/25/2023     Plan:  Continue to monitor  Avoid nephrotoxic agents    Chronic HFrEF (heart failure with reduced ejection fraction) ([facility])  Assessment & Plan  Wt Readings from Last 3 Encounters:   02/13/24 75.3 kg (166 lb)   01/18/24 80.3 kg (177 lb  1.6 oz)   12/25/23 78.5 kg (173 lb)     Etiology: ischemic, reduction in EF in setting of NSTEMI with 80% mid LAD and 90% RPDA stenoses s/p stenting   On GDMT: metoprolol suc 50mg, jardiance 10mg , lasix 40mg BID, and recently started on Entresto.  ECHO 12/2023: EF 25-30% with severe MR, s/p TAVR    Plan:  Continue metoprolol  Continue jardiance  Continue lasix    History of aortic stenosis s/p TAVR  Assessment & Plan  TAVR 6/2022, normal function on ECHO 12/2023. Follows with [facility]. On plavix, eliquis, statin.    Plan:  Continue home medications    Hypothyroidism  Assessment & Plan  On home levothyroxine 137mcg daily. Subsequent labs TSH 0.352 and free T4 1.36. Potential trigger for a fib.     Plan:  Reduce home levothyroxine dose to 125mcg  Follow-up TSH in setting of wide complex tachycardia    Essential hypertension  Assessment & Plan  Controlled, if not low, at this time. Patient on home regimen of metoprolol (rate control) and lasix BID. Was prescribed Entresto 3 days ago. Is no longer taking spironolactone.     Plan:  Continue home metoprolol  Continue home lasix  Discontinue Entresto in setting of low blood pressure      Hyperlipidemia  Assessment & Plan  Lipid panel 12/2023: , TG 74, HDL 35, LDL 63.    Plan:  Continue atorvastatin 80mg    Type 2 diabetes mellitus with circulatory disorder, with long-term current use of insulin ([facility])  Assessment & Plan  Lab Results   Component Value Date    HGBA1C 6.9 (H) 12/23/2023     Patients home regimen is Lantus 18U daily, novolog 5U with breakfast and lunch.     Plan:  15U lantus while inpatient  SSI3  Diabetic and cardiac diet   POCG   Hypoglycemia protocol        Mild intermittent asthma without complication  Assessment & Plan  On home advair and albuterol. Denies any symptoms.     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 Incidentally found  on arrival to ED d/t HR 160s. He was entirely symptomatic. He was given one dose of metoprolol in ED without improvement. Cardiology was consulted in ED and patient was started on amio bolus and then drip with improved HR. On interrogation of AICD, reported 12 shocks for Vtach (ATP). Patient denies any feelings of shocks. Follows with [facility], last seen 1/18/2024, device last interogatted 1/3/24 with 100% afib burden and episodes of a fib w RVR.     Plan:  Continue amiodarone load   Consult cardiology      VTE Pharmacologic Prophylaxis: Eliquis  VTE Mechanical Prophylaxis: sequential compression device    CHIEF COMPLAINT     Chief Complaint   Patient presents with    Leg Pain     3 days of rt knee pain, and swelling       HISTORY OF PRESENT ILLNESS     This is a 78 y.o. Ivorian-speaking male with a past medical history of HFrEF (EF 30-35%), CAD s/p PCI, s/p TAVR, hypothyroidism, hypertension, T2DM, history of tachybradycardia syndrome status post BiV ICD, A-fib on Eliquis, hepatitis C (untreated per chart review) who presents on 2/17 for 3 days of right knee pain found to have heart rates in 160s with subsequent EKG showing A-fib with RVR. Subsequent AICD interrogation shows 12 separte shocks for VT episodes, he denies feeling any of these shocks. Denies shortness of breath, chest pain, palpitations, syncope, lightheadedness, abdominal pain.  Was recently prescribed Entresto 3 days ago when knee pain began.    In ED, his initial vital signs were BP 97/74, , afebrile, 98% on room air.  EKG at that time showed irregularly irregular tachycardia with absence of P waves.  Labs notable for K 3.8, CRP 16.2, ESR 54, TSH 0.352, free T4 1.36.  HS troponins negative, CBC, CMP, mag, phos WNL.  CXR unremarkable.  ED performed arthrocentesis and sent fluid studies.  Synovial WBC 28,000.  Synovial RBC 11,000.  Positive for calcium pyrophosphate crystals.  He was started on amiodarone drip for A-fib and received  a dose of both ceftriaxone and vancomycin.  He will be admitted to [facility] for continued medical management of right knee pseudogout and A-fib.     services were used at the bedside.  Patient's wife was present.  Patient confirms that he is level 1 full code.    REVIEW OF SYSTEMS     Review of Systems   Constitutional: Negative.    Respiratory: Negative.     Cardiovascular: Negative.    Gastrointestinal: Negative.    Genitourinary: Negative.    Musculoskeletal:  Positive for joint swelling.   Neurological: Negative.    Psychiatric/Behavioral: Negative.       OBJECTIVE     Vitals:    24 2230 24 2345 24 0009 24 0030   BP: 105/63 101/61 97/57 102/57   BP Location: Right arm Right arm  Right arm   Pulse: 96 86 96 94   Resp: 18 20  19   Temp:       TempSrc:       SpO2: 99% 97%  97%      Temperature:   Temp (24hrs), Av.5 °F (36.4 °C), Min:97.5 °F (36.4 °C), Max:97.5 °F (36.4 °C)    Temperature: 97.5 °F (36.4 °C)  Intake & Output:  I/O       None          Weights:        There is no height or weight on file to calculate BMI.  Weight (last 2 days)       None          Physical Exam:  General: No apparent distress, resting comfortably   Head: Normocephalic, atraumatic  Eyes: Anicteric, no conjunctival erythema  ENT: External ear normal, no nasal discharge  Neck: Trachea midline, no visible lymphadenopathy or goiter  Respiratory: Crackles appreciated in bilateral lower lung fields, Non-labored respirations, symmetric thorax expansion  Cardiovascular: systolic murmur, regular rate, extremities appear well-perfused, no appreciable JVD, pulses intact   Abdomen: Non-distended, non tender  Extremities: Moves extremities spontaneously, no peripheral edema, right knee warm and more swollen than left with decreased range of motion  Skin: No visible rashes, wounds, or jaundice  Neuro: A&O x 3, no gross focal deficits, no aphasia     PAST MEDICAL HISTORY     Past Medical History:   Diagnosis Date     Arthritis     Asthma     Colon polyps     Community acquired pneumonia     last assessed: 5/1/2014    Diabetes mellitus (HCC)     Hemorrhagic prepatellar bursitis, left 10/21/2019    Hepatitis C     High cholesterol     History of colonoscopy 2017    Hypertension     Lymphadenopathy, anterior cervical 04/17/2018    Nephritis and nephropathy, not specified as acute or chronic, with other specified pathological lesion in kidney, in diseases classified elsewhere 3/26/2013    NSTEMI (non-ST elevated myocardial infarction) ([facility]) 1/30/2023    s/p [employer] dual chamber PPM 8/31/22 s/p upgrade to BiV ICD 9/13/2023 9/16/2023    Screening for colon cancer 05/01/2019    SIRS (systemic inflammatory response syndrome) ([facility]) 3/19/2023    Thoracic vertebral fracture ([facility]) 06/11/2014     PAST SURGICAL HISTORY     Past Surgical History:   Procedure Laterality Date    CARDIAC CATHETERIZATION N/A 6/3/2022    Procedure: Cardiac RHC/LHC;  Surgeon: Yemi Molina MD;  [facility]: [facility];  Service: Cardiology    CARDIAC CATHETERIZATION N/A 6/21/2022    Procedure: CARDIAC TAVR;  Surgeon: David Craft MD;  Location: [facility];  Service: Cardiology    CARDIAC CATHETERIZATION N/A 2/10/2023    Procedure: Cardiac Coronary Angiogram;  Surgeon: Yemi Molina MD;  [facility]: [facility];  Service: Cardiology    CARDIAC CATHETERIZATION N/A 2/10/2023    Procedure: Cardiac pci;  Surgeon: Yemi Molina MD;  [facility]: [facility];  Service: Cardiology    CARDIAC ELECTROPHYSIOLOGY PROCEDURE N/A 9/1/2022    Procedure: Cardiac pacer implant ; DC PPM;  Surgeon: Francis Sun DO;  [facility]: [facility];  Service: Cardiology    CARDIAC ELECTROPHYSIOLOGY PROCEDURE N/A 9/13/2023    Procedure: Cardiac upgrade pacer to biv icd;  Surgeon: Mario Yuan MD;  [facility];  Service: Cardiology    COLONOSCOPY      COLONOSCOPY W/ POLYPECTOMY      IR UPPER EXTREMITY VENOGRAM- DIAGNOSTIC  8/23/2023    LITHOTRIPSY       MULTIPLE TOOTH EXTRACTIONS      PA COLONOSCOPY FLX DX W/COLLJ SPEC WHEN PFRMD N/A 2018    Procedure: COLONOSCOPY;  Surgeon: Raad Sandoval MD;  Location: [facility];  Service: Gastroenterology    PA REPLACE AORTIC VALVE OPENFEMORAL ARTERY APPROACH N/A 2022    Procedure: REPLACEMENT AORTIC VALVE TRANSCATHETER (TAVR) TRANSFEMORAL W/ 26MM QUINN RONNI S3 ULTRA VALVE(ACCESS ON LEFT) SAULO;  Surgeon: Sal Walker MD;  Location: [facility];  Service: Cardiac Surgery     SOCIAL & FAMILY HISTORY     Social History     Substance and Sexual Activity   Alcohol Use No       Social History     Substance and Sexual Activity   Drug Use No     Social History     Tobacco Use   Smoking Status Former    Current packs/day: 0.00    Types: Cigarettes    Quit date: 2022    Years since quittin.7   Smokeless Tobacco Never   Tobacco Comments    started when he was about 27 yrs old; stopped smoking 3 wks ago     Family History   Problem Relation Age of Onset    Heart attack Family         at age 86    No Known Problems Mother     No Known Problems Father     Diabetes Sister     Diabetes Brother      LABORATORY DATA     Labs: I have personally reviewed pertinent reports.    Results from last 7 days   Lab Units 24  1725   WBC Thousand/uL 10.07   HEMOGLOBIN g/dL 14.1   HEMATOCRIT % 45.3   PLATELETS Thousands/uL 244   NEUTROS PCT % 67   MONOS PCT % 11   EOS PCT % 2      Results from last 7 days   Lab Units 24  1725   POTASSIUM mmol/L 3.8   CHLORIDE mmol/L 100   CO2 mmol/L 28   BUN mg/dL 23   CREATININE mg/dL 1.23   CALCIUM mg/dL 9.9   ALK PHOS U/L 118*   ALT U/L 24   AST U/L 21     Results from last 7 days   Lab Units 24  1725   MAGNESIUM mg/dL 2.0     Results from last 7 days   Lab Units 24  2000   PHOSPHORUS mg/dL 3.5                  Micro:  Lab Results   Component Value Date    URINECX <10,000 cfu/ml 2019     IMAGING & DIAGNOSTIC TESTS     Imaging: I have personally reviewed pertinent  reports.    No results found.  EKG, Pathology, and Other Studies: I have personally reviewed pertinent reports.     ALLERGIES     Allergies   Allergen Reactions    Iv Contrast [Iodinated Contrast Media] Rash     Patient states he had a severe reaction approximately 10 years ago , states he had a rash, itchy and he remembers a bunch of people pounding on chest and being surrounded by multiple doctors.     MEDICATIONS PRIOR TO ARRIVAL     Prior to Admission medications    Medication Sig Start Date End Date Taking? Authorizing Provider   albuterol (PROVENTIL HFA,VENTOLIN HFA) 90 mcg/act inhaler INHALE 2 PUFFS BY MOUTH EVERY 4 HOURS AS NEEDED FOR WHEEZING OR SHORTNESS OF BREATH. 10/25/23  Yes Nick Knight MD   apixaban (Eliquis) 5 mg Take 1 tablet (5 mg total) by mouth 2 (two) times a day 9/14/23  Yes Ava Varela PA-C   cholecalciferol (VITAMIN D3) 1,000 units tablet Take 2 tablets (2,000 Units total) by mouth daily 11/21/22  Yes Mely Cuevas,    Empagliflozin (JARDIANCE) 10 MG TABS tablet Take 1 tablet (10 mg total) by mouth every morning 2/12/23  Yes Helena Lott,    ferrous sulfate 325 (65 Fe) mg tablet TAKE 1 TABLET BY MOUTH EVERY OTHER DAY 8/28/23  Yes Mario Yuan MD   furosemide (LASIX) 40 mg tablet TAKE 1 TABLET BY MOUTH TWICE A DAY 1/23/24  Yes Stormy Jaime, DO   Insulin Glargine Solostar (Lantus SoloStar) 100 UNIT/ML SOPN INJECT 0.18 ML (18 UNITS TOTAL) UNDER THE SKIN DAILY AT BEDTIME 11/24/23  Yes Umesh Millard MD   levothyroxine 137 mcg tablet TAKE 1 TABLET BY MOUTH EVERY DAY 7/24/23  Yes Luciana Skinner MD   metFORMIN (GLUCOPHAGE) 850 mg tablet TAKE 1 TABLET BY MOUTH 2 TIMES A DAY WITH MEALS. 5/5/23  Yes Mely Cuevas DO   metoprolol succinate (TOPROL-XL) 50 mg 24 hr tablet TAKE 1.5 TABLETS BY MOUTH 2 TIMES A DAY. 2/2/24  Yes Virginia Gan MD   montelukast (SINGULAIR) 10 mg tablet TAKE 1 TABLET BY MOUTH EVERY DAY 10/27/23  Yes Umesh Millard MD   NovoLOG FlexPen  100 units/mL injection pen INJECT 5 UNITS WITH BREAKFAST AND WITH DINNER 7/25/23  Yes Mikki Kaur MD   rosuvastatin (CRESTOR) 40 MG tablet TAKE 1 TABLET BY MOUTH EVERY DAY 6/6/23  Yes Yemi Molina MD   sacubitril-valsartan (Entresto) 24-26 MG TABS Take 1 tablet by mouth 2 (two) times a day Start 36 hours after last dose of lisinopril 2/13/24  Yes Virginia Gan MD   Advair -21 MCG/ACT inhaler Inhale 2 puffs 2 (two) times a day Rinse mouth after use.  Patient not taking: Reported on 2/17/2024 3/30/23   Nick Knight MD   Alcohol Swabs (ALCOHOL PADS) 70 % PADS  5/9/19   Historical Provider, MD   Blood Glucose Monitoring Suppl (OneTouch Verio) w/Device KIT Check blood glucose three times daily before each meal 8/20/20   Marquez Berry DO   clopidogrel (PLAVIX) 75 mg tablet Take 1 tablet (75 mg total) by mouth daily 3/28/23 2/13/24  KAREN Palacio   Continuous Blood Gluc  (FreeStyle Cristofer 2 Economy) POWER Use 1 each continuous  Patient not taking: Reported on 2/13/2024 5/10/22   Mely Cuevas DO   Continuous Blood Gluc Sensor (FreeStyle Cristofer 2 Sensor) MISC Use 1 each every 14 (fourteen) days  Patient not taking: Reported on 2/13/2024 5/10/22   Mely Cuevas DO   Insulin Pen Needle (Pen Needles) 31G X 8 MM MISC Use daily 2/1/24   Danielle Wick DO   Lancets (FREESTYLE) lancets by Other route as needed (As needed) 3/21/19   Hira Pierce MD   nitroglycerin (NITROSTAT) 0.4 mg SL tablet Place 1 tablet (0.4 mg total) under the tongue every 5 (five) minutes as needed for chest pain 3/30/23   Nick Knight MD   spironolactone (ALDACTONE) 25 mg tablet TAKE 1 TABLET (25 MG TOTAL) BY MOUTH DAILY.  Patient not taking: Reported on 1/18/2024 12/12/23   Virginia Gan MD     MEDICATIONS ADMINISTERED IN LAST 24 HOURS     Medication Administration - last 24 hours from 02/17/2024 0059 to 02/18/2024 0059         Date/Time Order Dose Route Action Action by     02/17/2024 1736  EST metoprolol (LOPRESSOR) injection 5 mg 5 mg Intravenous Given Steffany Colmenares, TRIP     02/17/2024 1833 EST magnesium sulfate 2 g/50 mL IVPB (premix) 2 g 0 g Intravenous Stopped Steffany Colmenares RN     02/17/2024 1801 EST magnesium sulfate 2 g/50 mL IVPB (premix) 2 g 2 g Intravenous New Bag Steffany Colmenares RN     02/17/2024 1850 EST amiodarone 150 mg in dextrose 5 % 100 mL IV bolus 0 mg Intravenous Stopped Steffany Colmenares RN     02/17/2024 1833 EST amiodarone 150 mg in dextrose 5 % 100 mL IV bolus 150 mg Intravenous New Bag Steffany Colmenares RN     02/17/2024 1851 EST amiodarone (CORDARONE) 900 mg in dextrose 5 % 500 mL infusion 1 mg/min Intravenous New Bag Steffany Colmenares RN     02/17/2024 1923 EST lidocaine (PF) (XYLOCAINE-MPF) 2 % injection 10 mL 10 mL Infiltration Given by Other Imelda Del Angel, TRIP     02/17/2024 2241 EST vancomycin (VANCOCIN) 1500 mg in sodium chloride 0.9% 250 mL IVPB 0 mg Intravenous Stopped Imelda Del Angel, TRIP     02/17/2024 2111 EST vancomycin (VANCOCIN) 1500 mg in sodium chloride 0.9% 250 mL IVPB 1,500 mg Intravenous New Amarilys Del Angel, TRIP     02/17/2024 2047 EST ceftriaxone (ROCEPHIN) 1 g/50 mL in dextrose IVPB 0 mg Intravenous Stopped Imelda Del Angel, TRIP     02/17/2024 2017 EST ceftriaxone (ROCEPHIN) 1 g/50 mL in dextrose IVPB 1,000 mg Intravenous New Bag Gabi Delcid, TRIP     02/17/2024 2205 EST furosemide (LASIX) tablet 40 mg 0 mg Oral Hold Imelda Del Angel, TRIP     02/17/2024 2204 EST insulin glargine (LANTUS) subcutaneous injection 15 Units 0.15 mL 15 Units Subcutaneous Given Imelda Del Angel RN     02/17/2024 2158 EST potassium chloride (Klor-Con M20) CR tablet 40 mEq 40 mEq Oral Given Imelda Del Angel, TRIP     02/17/2024 2339 EST apixaban (ELIQUIS) tablet 5 mg 5 mg Oral Given Imelda Del Angel, TRIP     02/17/2024 2339 EST predniSONE tablet 40 mg 40 mg Oral Given Imelda Del Angel, TRIP     02/17/2024 2340 EST Diclofenac  "Sodium (VOLTAREN) 1 % topical gel 2 g 0 g Topical Hold Imelda Del Angel RN     02/18/2024 0009 EST metoprolol succinate (TOPROL-XL) 24 hr tablet 75 mg 0 mg Oral Hold Imelda Del Angel RN          CURRENT MEDICATIONS     Current Facility-Administered Medications   Medication Dose Route Frequency Provider Last Rate    acetaminophen  650 mg Oral Q6H PRN Carmelo Menendez MD      amiodarone (CORDARONE) 900 mg in dextrose 5 % 500 mL infusion  0.5 mg/min Intravenous Continuous Shellie Banuelos MD      apixaban  5 mg Oral BID Carmelo Menendez MD      atorvastatin  80 mg Oral Daily With Dinner Esthela Jones MD      cholecalciferol  2,000 Units Oral Daily Esthela Jones MD      clopidogrel  75 mg Oral Daily Carmelo Menendez MD      Diclofenac Sodium  2 g Topical 4x Daily Carmelo Menendez MD      Empagliflozin  10 mg Oral QAM Esthela Jones MD      ferrous sulfate  325 mg Oral Every Other Day Esthela Jones MD      furosemide  40 mg Oral BID Esthela Jones MD      insulin glargine  15 Units Subcutaneous HS Esthela Jones MD      insulin lispro  1-6 Units Subcutaneous TID AC Esthela Jones MD      levothyroxine  125 mcg Oral Daily Esthela Jones MD      metoprolol succinate  75 mg Oral BID Carmelo Menendez MD      montelukast  10 mg Oral Daily Esthela Jones MD      predniSONE  40 mg Oral Daily Esthela Jones MD       amiodarone (CORDARONE) 900 mg in dextrose 5 % 500 mL infusion, 0.5 mg/min      acetaminophen, 650 mg, Q6H PRN        Admission Time  I spent 30 minutes admitting the patient.  This involved direct patient contact where I performed a full history and physical, reviewing previous records, and reviewing laboratory and other diagnostic studies.    Portions of the record may have been created with voice recognition software.  Occasional wrong word or \"sound a like\" substitutions may have occurred due to the inherent limitations of voice recognition software.  Read the chart " carefully and recognize, using context, where substitutions have occurred.    ==  Esthela Jones MD  [facility]  Internal Medicine Residency PGY-1

## 2024-02-18 NOTE — ASSESSMENT & PLAN NOTE
TAVR 6/2022, normal function on ECHO 12/2023. Follows with [facility]. On plavix, eliquis, statin.    Plan:  Continue home medications

## 2024-02-18 NOTE — ASSESSMENT & PLAN NOTE
Lab Results   Component Value Date    HGBA1C 6.9 (H) 12/23/2023       Recent Labs     02/19/24  1617 02/19/24 2050 02/20/24  0517 02/20/24  1021   POCGLU 128 174* 103 123       Blood Sugar Average: Last 72 hrs:  (P) 162    Patients home regimen is Lantus 18U daily, novolog 5U with breakfast and lunch.   Adjusting home regimen (ie, ~reduction in home dose) for continuation while IP  ?Need for further adjustments  AM Sugars to well-controlled at this time (around 100 this morning, in the 140s yesterday).  For this reason we will decrease his at bedtime Lantus dosing.  He is at increased risk for acute hypoglycemia given the use of oral hypoglycemics during admission  Prandial sugars have since improved.  Are also slightly low, however perhaps this will at bedtime Lantus dose is being dropped     Plan:  Changed to Lantus 10 units at bedtime  Novolog 5 units at breakfast and dinner   Diabetic and cardiac diet   Outpatient PCP follow-up

## 2024-02-18 NOTE — ASSESSMENT & PLAN NOTE
Wt Readings from Last 3 Encounters:   02/13/24 75.3 kg (166 lb)   01/18/24 80.3 kg (177 lb 1.6 oz)   12/25/23 78.5 kg (173 lb)     Etiology: ischemic, reduction in EF in setting of NSTEMI with 80% mid LAD and 90% RPDA stenoses s/p stenting   On GDMT: metoprolol suc 50mg, jardiance 10mg , lasix 40mg BID, and recently started on Entresto.  ECHO 12/2023: EF 25-30% with severe MR, s/p TAVR    Plan:  Continue metoprolol  Continue jardiance  Continue lasix

## 2024-02-18 NOTE — ASSESSMENT & PLAN NOTE
Patient presenting with 3 days of right knee swelling, warmth, pain on movement, and decreased range of motion. Interestingly, although potentially unrelated, he started Entresto 3 days prior to admission.     The joint was tapped in the ED and was positive for CCP crystals. ESR and CRP elevated.     Right knee pain has resolved.  No pain noted at rest or during ambulation.      ?  Likely secondary to use of Lasix     Plan:  Begin prednisone taper today (2/21).  Decrease by 10 mg every 3 day  Starting on 30, to taper as above  Please ensure Prednisone not given close to bedtime as to avoid delirium   Continue to avoid NSAIDs I/c/o CAD, HF  ?  Consider further crystal analysis/etiology workup

## 2024-02-18 NOTE — PROGRESS NOTES
Progress Note - Orthopedics   Juan David Lora 78 y.o. male MRN: [id number]  Unit/Bed#: [facility] 403-01      Subjective:    78 y.o.male with right knee pain in the setting of a pseudogout flare. No acute events, no new complaints. Pain well controlled. Reports his knee feels improved since the aspiration.    Labs:  0   Lab Value Date/Time    HCT 40.6 2024    HCT 45.3 2024 172    HCT 41.1 2023 043    HCT 42.5 10/14/2014 0000    HGB 12.7 2024    HGB 14.1 2024    HGB 13.0 2023    HGB 14.1 10/14/2014 0000    INR 1.52 (H) 2023 1540    WBC 8.47 2024    WBC 10.07 2024 172    WBC 7.53 2023 043    WBC 7.95 10/14/2014 0000    ESR 54 (H) 2024    CRP 16.2 (H) 2024       Meds:    Current Facility-Administered Medications:     acetaminophen (TYLENOL) tablet 650 mg, 650 mg, Oral, Q6H PRN, Carmelo Menendez MD    [] amiodarone (CORDARONE) 900 mg in dextrose 5 % 500 mL infusion, 1 mg/min, Intravenous, Continuous, Stopped at 24 0138 **FOLLOWED BY** amiodarone (CORDARONE) 900 mg in dextrose 5 % 500 mL infusion, 0.5 mg/min, Intravenous, Continuous, Shellie Banuelos MD, Last Rate: 16.7 mL/hr at 24 0137, 0.5 mg/min at 24 0137    apixaban (ELIQUIS) tablet 5 mg, 5 mg, Oral, BID, Carmelo Menendez MD, 5 mg at 24 233    atorvastatin (LIPITOR) tablet 80 mg, 80 mg, Oral, Daily With Dinner, Esthela Jones MD    cholecalciferol (VITAMIN D3) tablet 2,000 Units, 2,000 Units, Oral, Daily, Esthela Jones MD    clopidogrel (PLAVIX) tablet 75 mg, 75 mg, Oral, Daily, Carmelo Menendez MD    Diclofenac Sodium (VOLTAREN) 1 % topical gel 2 g, 2 g, Topical, 4x Daily, Carmelo Menendez MD    Empagliflozin (JARDIANCE) tablet 10 mg, 10 mg, Oral, QAM, Esthela Jones MD    ferrous sulfate tablet 325 mg, 325 mg, Oral, Every Other Day, Esthela Jones MD    furosemide (LASIX) tablet 40 mg, 40 mg, Oral, BID,  "Esthela Jones MD    insulin glargine (LANTUS) subcutaneous injection 15 Units 0.15 mL, 15 Units, Subcutaneous, HS, Esthela Jones MD, 15 Units at 02/17/24 2204    insulin lispro (HumaLOG) 100 units/mL subcutaneous injection 1-6 Units, 1-6 Units, Subcutaneous, TID AC **AND** Fingerstick Glucose (POCT), , , TID AC, Esthela Jones MD    levothyroxine tablet 125 mcg, 125 mcg, Oral, Daily, Esthela Jones MD    metoprolol succinate (TOPROL-XL) 24 hr tablet 75 mg, 75 mg, Oral, BID, Carmelo Menendez MD    montelukast (SINGULAIR) tablet 10 mg, 10 mg, Oral, Daily, Esthela Jones MD    predniSONE tablet 40 mg, 40 mg, Oral, Daily, Esthela Jones MD    Blood Culture:   No results found for: \"BLOODCX\"    Wound Culture:   No results found for: \"WOUNDCULT\"    Ins and Outs:  I/O last 24 hours:  In: 265.4 [I.V.:265.4]  Out: -           Physical:  Vitals:    02/18/24 0404   BP: 127/78   Pulse: 100   Resp: 17   Temp: (!) 97.3 °F (36.3 °C)   SpO2:      Musculoskeletal: right Lower Extremity  Skin intact. No erythema or ecchymosis.  Dressing C/D/I  NTTP  Sensation intact to saphenous, sural, tibial, superficial peroneal nerve, and deep peroneal  Motor intact to +FHL/EHL, +ankle dorsi/plantar flexion  2+ DP pulse  Digits warm and well perfused  Capillary refill < 2 seconds    Assessment:    78 y.o.male with right knee pseudogout. Recommend medical management of crystal arthropathy. No indication for acute surgical intervention at this time. Will continue to monitor cultures peripherally until finalization, however synovial WBC not suspicious for infectious component.     Plan:  WBAT RLE  Medical management of pseudogout  Will monitor for ABLA and administer IVF/prbc as indicated for Greater than 2 gram drop or Hgb < 7   PT/OT  Pain control  DVT ppx   Rest of care per primary team  Dispo: Ortho will follow peripherally    Mely Baker MD      "

## 2024-02-18 NOTE — OCCUPATIONAL THERAPY NOTE
Occupational Therapy Evaluation     Patient Name: Juan David Lora  Today's Date: 2/18/2024  Problem List  Principal Problem:    Wide-complex tachycardia  Active Problems:    Mild intermittent asthma without complication    Type 2 diabetes mellitus with circulatory disorder, with long-term current use of insulin ([facility])    Hyperlipidemia    Essential hypertension    Hypothyroidism    History of aortic stenosis s/p TAVR    Chronic HFrEF (heart failure with reduced ejection fraction) ([facility])    Stage 3 chronic kidney disease, unspecified whether stage 3a or 3b CKD ([facility])    Pseudogout of right knee    Past Medical History  Past Medical History:   Diagnosis Date    Arthritis     Asthma     Colon polyps     Community acquired pneumonia     last assessed: 5/1/2014    Diabetes mellitus (HCC)     Hemorrhagic prepatellar bursitis, left 10/21/2019    Hepatitis C     High cholesterol     History of colonoscopy 2017    Hypertension     Lymphadenopathy, anterior cervical 04/17/2018    Nephritis and nephropathy, not specified as acute or chronic, with other specified pathological lesion in kidney, in diseases classified elsewhere 3/26/2013    NSTEMI (non-ST elevated myocardial infarction) ([facility]) 1/30/2023    s/p Medtronic dual chamber PPM 8/31/22 s/p upgrade to BiV ICD 9/13/2023 9/16/2023    Screening for colon cancer 05/01/2019    SIRS (systemic inflammatory response syndrome) ([facility]) 3/19/2023    Thoracic vertebral fracture ([facility]) 06/11/2014     Past Surgical History  Past Surgical History:   Procedure Laterality Date    CARDIAC CATHETERIZATION N/A 6/3/2022    Procedure: Cardiac RHC/LHC;  Surgeon: Yemi Molina MD;  [facility]: [facility] C[facility];  Service: Cardiology    CARDIAC CATHETERIZATION N/A 6/21/2022    Procedure: CARDIAC TAVR;  Surgeon: David Craft MD;  Location: [facility] MAIN OR;  Service: Cardiology    CARDIAC CATHETERIZATION N/A 2/10/2023    Procedure: Cardiac Coronary Angiogram;  Surgeon: Yemi Molina MD;  [facility]: [facility]  CARDIAC CATH LAB;  Service: Cardiology    CARDIAC CATHETERIZATION N/A 2/10/2023    Procedure: Cardiac pci;  Surgeon: Yemi Molina MD;  [facility]: [facility] CARDIAC C[facility];  Service: Cardiology    CARDIAC ELECTROPHYSIOLOGY PROCEDURE N/A 9/1/2022    Procedure: Cardiac pacer implant ; DC PPM;  Surgeon: Francis Sun DO;  [facility]ARD[facility];  Service: Cardiology    CARDIAC ELECTROPHYSIOLOGY PROCEDURE N/A 9/13/2023    Procedure: Cardiac upgrade pacer to biv icd;  Surgeon: Mario Yuan MD;  [facility]: [facility] CARD[facility];  Service: Cardiology    COLONOSCOPY      COLONOSCOPY W/ POLYPECTOMY      IR UPPER EXTREMITY VENOGRAM- DIAGNOSTIC  8/23/2023    LITHOTRIPSY      MULTIPLE TOOTH EXTRACTIONS      KY COLONOSCOPY FLX DX W/COLLJ SPEC WHEN PFRMD N/A 5/17/2018    Procedure: COLONOSCOPY;  Surgeon: Raad Sandoval MD;  Location: [facility] GI [facility];  Service: Gastroenterology    KY REPLACE AORTIC VALVE OPENFEMORAL ARTERY APPROACH N/A 6/21/2022    Procedure: REPLACEMENT AORTIC VALVE TRANSCATHETER (TAVR) TRANSFEMORAL W/ 26MM QUINN RONNI S3 ULTRA VALVE(ACCESS ON LEFT) SAULO;  Surgeon: Sal Walker MD;  Location:  MAIN OR;  Service: Cardiac Surgery             02/18/24 0827   OT Last Visit   OT Visit Date 02/18/24   Note Type   Note type Evaluation   Pain Assessment   Pain Assessment Tool 0-10   Pain Score 3   Pain Location/Orientation Orientation: Right;Location: Knee   Patient's Stated Pain Goal No pain   Hospital Pain Intervention(s) Repositioned;Ambulation/increased activity   Restrictions/Precautions   Weight Bearing Precautions Per Order Yes   RLE Weight Bearing Per Order WBAT   Other Precautions Bed Alarm;Multiple lines;Telemetry;Fall Risk;Pain;WBS  (English-speaking, WBAT RLE)   Home Living   Type of Home House   Home Layout Two level;Stairs to enter with rails;Bed/bath upstairs  (3 JOSE)   Bathroom Shower/Tub Tub/shower unit   Bathroom Toilet Standard   Bathroom Accessibility Accessible   Additional Comments Pt lives in a 2 SH  with 3 JOSE, FF to 2nd fl bed/bath with tub shower and standard toilet   Prior Function   Level of [place] Independent with ADLs;Independent with functional mobility;Independent with IADLS   Lives With Spouse;Son   Receives Help From Family   IADLs Independent with driving;Independent with meal prep;Independent with medication management   Falls in the last 6 months 0   Lifestyle   Autonomy Pta pt I with ADL, IADL and fxnal mobility, (+)    Reciprocal Relationships supportive wife and son   Intrinsic Gratification enjoys time with family   ADL   Where Assessed Edge of bed   Eating Assistance 5  Supervision/Setup   Grooming Assistance 5  Supervision/Setup   UB Bathing Assistance 5  Supervision/Setup   LB Bathing Assistance 4  Minimal Assistance   UB Dressing Assistance 5  Supervision/Setup   LB Dressing Assistance 4  Minimal Assistance   Toileting Assistance  4  Minimal Assistance   Functional Assistance 4  Minimal Assistance   Bed Mobility   Supine to Sit 5  Supervision   Additional items Increased time required;Verbal cues   Sit to Supine 5  Supervision   Additional items Increased time required;Verbal cues   Additional Comments Pt greeted and left in bed with all needs within reach and alarm on   Transfers   Sit to Stand 3  Moderate assistance   Additional items Assist x 1;Increased time required;Verbal cues   Stand to Sit 3  Moderate assistance   Additional items Assist x 1;Increased time required;Verbal cues   Additional Comments with RW   Functional Mobility   Functional Mobility 4  Minimal assistance   Additional Comments Pt performs short distances through room with MIN A x 1 with RW, HR elevated to 130 BPM although recovers with seated/supine rest   Additional items Rolling walker   Balance   Static Sitting Fair +   Dynamic Sitting Fair   Static Standing Fair -   Dynamic Standing Poor +   Ambulatory Poor +   Activity Tolerance   Activity Tolerance Patient limited by fatigue;Patient limited by  pain;Treatment limited secondary to medical complications (Comment)  (elevated HR into 120s-130 although recovers with rest, RN aware)   Medical Staff Made Aware Co-eval with DPT due to high medical complexity   Nurse Made Aware RN cleared for therapy   RUE Assessment   RUE Assessment WFL   LUE Assessment   LUE Assessment WFL   Hand Function   Gross Motor Coordination Functional   Fine Motor Coordination Functional   Sensation   Light Touch No apparent deficits   Psychosocial   Psychosocial (WDL) WDL   Cognition   Overall Cognitive Status WFL   Arousal/Participation Alert;Cooperative   Attention Within functional limits   Orientation Level Oriented to person;Oriented to place;Oriented to situation   Memory Within functional limits   Following Commands Follows one step commands without difficulty   Comments Pt cooperative to therapy, follows commands without difficulty although slightly limited due to language barrier   Assessment   Limitation Decreased ADL status;Decreased Safe judgement during ADL;Decreased endurance;Decreased self-care trans;Decreased high-level ADLs   Prognosis Good   Assessment Pt is a 78 y.o. male who was admitted to [facility] on 2/17/2024 with Wide-complex tachycardia, also with worsening R knee pain, WBAT RLE. Pt seen for an OT evaluation per active OT orders.  Pt  has a past medical history of Arthritis, Asthma, Colon polyps, Community acquired pneumonia, Diabetes mellitus (HCC), Hemorrhagic prepatellar bursitis, left, Hepatitis C, High cholesterol, History of colonoscopy, Hypertension, Lymphadenopathy, anterior cervical, Nephritis and nephropathy, not specified as acute or chronic, with other specified pathological lesion in kidney, in diseases classified elsewhere, NSTEMI (non-ST elevated myocardial infarction) ([facility]), s/p Medtronic dual chamber PPM 8/31/22 s/p upgrade to BiV ICD 9/13/2023, Screening for colon cancer, SIRS (systemic inflammatory response syndrome) ([facility]), and  Thoracic vertebral fracture (HCC). Pt lives with spouse and son in a 2 SH with 3 JOSE, FF stairs to 2nd fl bed/bath with tub shower and standard toilet. Pta, pt was independent w/ ADL/IADL and functional mobility, was (+) driving and was not using any DME at baseline. Currently, pt is Supervision for UB ADL, Min Ax1 for LB ADL, and completed transfers/FM w Min Ax1-Mod Ax1 with RW. Pt currently presents with impairments in the following categories -steps to enter environment and difficulty performing ADLS activity tolerance and endurance. These impairments, as well as pt's fatigue, BUSTILLOS, pain, risk for falls, and home environment  limit pt's ability to safely engage in all baseline areas of occupation, includinggrooming, bathing, dressing, toileting, functional mobility/transfers, and community mobility.  The patient's raw score on the -PAC Daily Activity Inpatient Short Form is 19. A raw score of greater than or equal to 19 suggests the patient may benefit from discharge to home. Please refer to the recommendation of the Occupational Therapist for safe discharge planning. Pt would benefit from continued acute OT services throughout hospital course and following D/C. Plan for OT interventions 2-3x per week. From OT standpoint, recommend home with increased social support and home OT services upon D/C. Pt was left supine in bed with alarm on and all needs within reach.   Goals   Patient Goals to feel better   Plan   Treatment Interventions ADL retraining;Functional transfer training;Endurance training;Patient/family training;Continued evaluation;Energy conservation;Activityengagement   Goal Expiration Date 03/03/24   OT Frequency 2-3x/wk   Discharge Recommendation   Rehab Resource Intensity Level, OT III (Minimum Resource Intensity)   -PAC Daily Activity Inpatient   Lower Body Dressing 3   Bathing 3   Toileting 3   Upper Body Dressing 3   Grooming 3   Eating 4   Daily Activity Raw Score 19   Daily Activity  Standardized Score (Calc for Raw Score >=11) 40.22   AM-PAC Applied Cognition Inpatient   Following a Speech/Presentation 3   Understanding Ordinary Conversation 4   Taking Medications 4   Remembering Where Things Are Placed or Put Away 4   Remembering List of 4-5 Errands 3   Taking Care of Complicated Tasks 3   Applied Cognition Raw Score 21   Applied Cognition Standardized Score 44.3   Modified Amairani Scale   Modified Amairani Scale 4   End of Consult   Education Provided Yes   Patient Position at End of Consult Supine;Bed/Chair alarm activated;All needs within reach   Nurse Communication Nurse aware of consult       OT Goals    - Pt will be Mod I with LB ADL by end of hospital course.    - Pt will be Mod I with UB ADL by end of hospital course.    - Pt will be Mod I with all functional transfers required for patient safety by end of hospital course.    - Pt will be Mod I with functional mobility to/from bathroom for increased independence with toileting tasks.    - Pt will independently recall and implement safety precautions during OT sessions.     - Pt activity tolerance will increase to 30 minutes in order to safely engage in ADL and transfers.     - Pt standing tolerance will increase to 10 minutes  in order to promote sink side ADLs and IADL activities.    - Pt will consistently follow one-step directions with min to no vc or prompting.     - Pt UE strength will improve by 1 MM grade by end of hospital course in order to promote UB ADL and safe transfers.        LIN August, OTR/L

## 2024-02-18 NOTE — PHYSICAL THERAPY NOTE
Physical Therapy Evaluation     Patient's Name: Juan David Lora    Admitting Diagnosis  Atrial fibrillation (HCC) [I48.91]  Leg pain [M79.606]  A-fib (HCC) [I48.91]  Wide-complex tachycardia [R00.0]  Right knee pain [M25.561]  ICD (implantable cardioverter-defibrillator) in place [Z95.810]    Problem List  Patient Active Problem List   Diagnosis    Mild intermittent asthma without complication    Allergic rhinitis    Bilateral hearing loss    Type 2 diabetes mellitus with circulatory disorder, with long-term current use of insulin (HCC)    Hyperlipidemia    Essential hypertension    Hypothyroidism    Nicotine dependence    Osteoarthritis of knee    Vitamin B12 deficiency    Adenomatous colon polyp    Diverticulosis of large intestine    Internal hemorrhoids    History of aortic stenosis s/p TAVR    Contrast media allergy    S/P TAVR (transcatheter aortic valve replacement)    Atherosclerosis of native coronary artery of native heart with angina pectoris (HCC)    History of tachy-lino syndrome    Need for influenza vaccination    Leg cramping    Peripheral artery disease (HCC)    Bruit of left carotid artery    Status post insertion of drug eluting coronary artery stent    Anemia    Chronic HFrEF (heart failure with reduced ejection fraction) (HCC)    NSVT (nonsustained ventricular tachycardia) (HCC)    Atrial flutter (HCC)    s/p Medtronic dual chamber PPM 8/31/22 s/p upgrade to BiV ICD 9/13/2023    Acute on chronic heart failure (HCC)    HE (acute kidney injury) (HCC)    Hypokalemia    Atrial fibrillation (HCC)    Mitral regurgitation    Hepatitis C    Smoking history    Stage 3 chronic kidney disease, unspecified whether stage 3a or 3b CKD (HCC)    Wide-complex tachycardia    Pseudogout of right knee       Past Medical History  Past Medical History:   Diagnosis Date    Arthritis     Asthma     Colon polyps     Community acquired pneumonia     last assessed: 5/1/2014    Diabetes mellitus (HCC)     Hemorrhagic  prepatellar bursitis, left 10/21/2019    Hepatitis C     High cholesterol     History of colonoscopy 2017    Hypertension     Lymphadenopathy, anterior cervical 04/17/2018    Nephritis and nephropathy, not specified as acute or chronic, with other specified pathological lesion in kidney, in diseases classified elsewhere 3/26/2013    NSTEMI (non-ST elevated myocardial infarction) (HCC) 1/30/2023    s/p Medtronic dual chamber PPM 8/31/22 s/p upgrade to BiV ICD 9/13/2023 9/16/2023    Screening for colon cancer 05/01/2019    SIRS (systemic inflammatory response syndrome) (HCC) 3/19/2023    Thoracic vertebral fracture (HCC) 06/11/2014       Past Surgical History  Past Surgical History:   Procedure Laterality Date    CARDIAC CATHETERIZATION N/A 6/3/2022    Procedure: Cardiac RHC/LHC;  Surgeon: Yemi Molina MD;  [facility];  Service: Cardiology    CARDIAC CATHETERIZATION N/A 6/21/2022    Procedure: CARDIAC TAVR;  Surgeon: David Craft MD;  Location: [facility];  Service: Cardiology    CARDIAC CATHETERIZATION N/A 2/10/2023    Procedure: Cardiac Coronary Angiogram;  Surgeon: Yemi Molina MD;  [facility];  Service: Cardiology    CARDIAC CATHETERIZATION N/A 2/10/2023    Procedure: Cardiac pci;  Surgeon: Yemi Molina MD;  [facility];  Service: Cardiology    CARDIAC ELECTROPHYSIOLOGY PROCEDURE N/A 9/1/2022    Procedure: Cardiac pacer implant ; DC PPM;  Surgeon: Francis Sun DO;  [facility];  Service: Cardiology    CARDIAC ELECTROPHYSIOLOGY PROCEDURE N/A 9/13/2023    Procedure: Cardiac upgrade pacer to biv icd;  Surgeon: Mario Yuan MD;  [facility];  Service: Cardiology    COLONOSCOPY      COLONOSCOPY W/ POLYPECTOMY      IR UPPER EXTREMITY VENOGRAM- DIAGNOSTIC  8/23/2023    LITHOTRIPSY      MULTIPLE TOOTH EXTRACTIONS      MI COLONOSCOPY FLX DX W/COLLJ SPEC WHEN PFRMD N/A 5/17/2018    Procedure: COLONOSCOPY;  Surgeon: Raad  MD Jaime;  Location: [facility];  Service: Gastroenterology    NE REPLACE AORTIC VALVE OPENFEMORAL ARTERY APPROACH N/A 6/21/2022    Procedure: REPLACEMENT AORTIC VALVE TRANSCATHETER (TAVR) TRANSFEMORAL W/ 26MM QUINN RONNI S3 ULTRA VALVE(ACCESS ON LEFT) SAULO;  Surgeon: Sal Walker MD;  Location: [facility];  Service: Cardiac Surgery        02/18/24 0826   PT Last Visit   PT Visit Date 02/18/24   Note Type   Note type Evaluation   Pain Assessment   Pain Assessment Tool 0-10   Pain Score 3   Pain Location/Orientation Orientation: Right;Location: Knee   Pain Onset/Description Onset: Ongoing;Frequency: Intermittent;Descriptor: Aching;Descriptor: Discomfort   Effect of Pain on Daily Activities guarding w// mvt and WB   Patient's Stated Pain Goal No pain   Hospital Pain Intervention(s) Repositioned;Ambulation/increased activity;Emotional support   Restrictions/Precautions   RLE Weight Bearing Per Order WBAT  (per ortho consult)   Other Precautions Multiple lines;Telemetry;Bed Alarm   Home Living   Type of Home House   Home Layout Two level  (3 JOSE)   Prior Function   Level of [place] Independent with functional mobility  (amb w/o AD)   Lives With Son;Spouse   Receives Help From Family   General   Additional Pertinent History cleared for assessment by nsg   Cognition   Overall Cognitive Status WFL   Arousal/Participation Alert   Attention Attends with cues to redirect   Orientation Level Oriented to person;Oriented to situation   Memory Within functional limits   Following Commands Follows one step commands without difficulty   Subjective   Subjective Alert; in bed; Danish speaking mainly; agreeable to mobilize;   RUE Assessment   RUE Assessment WFL  (AROM)   LUE Assessment   LUE Assessment WFL  (AROM)   RLE Assessment   RLE Assessment WFL  (AROM)   Strength RLE   RLE Overall Strength   (fair (grossly))   LLE Assessment   LLE Assessment WFL  (AROM)   Strength LLE   LLE Overall Strength   (good -)   Bed  Mobility   Supine to Sit 5  Supervision   Sit to Supine 5  Supervision   Transfers   Sit to Stand 3  Moderate assistance   Additional items Assist x 1;Increased time required;Verbal cues   Stand to Sit 3  Moderate assistance   Additional items Assist x 1;Increased time required;Verbal cues   Ambulation/Elevation   Gait pattern Excessively slow;Short stride;Decreased R stance   Gait Assistance 4  Minimal assist   Additional items Assist x 1;Verbal cues;Tactile cues   Assistive Device Rolling walker   Distance 20 ft   Balance   Static Sitting Fair +   Dynamic Sitting Fair   Static Standing Fair -   Dynamic Standing Poor +   Ambulatory Poor +   Activity Tolerance   Activity Tolerance Patient limited by fatigue;Patient limited by pain;Treatment limited secondary to medical complications (Comment)  (HR to high 120s bpm during amb; RN informed; subsided once back in  bed)   Medical Staff Made Aware Co-eval performed w/ OTR due to complexity of medical status and multiple comorbidities   Nurse Made Aware spoke to TRIP Moran   Assessment   Prognosis Good   Problem List Decreased strength;Decreased endurance;Impaired balance;Decreased mobility   Assessment Pt is 78 y.o. male admitted with hx of  (R) knee pain and swelling and found to have heart rates in 160s with subsequent EKG showing A-fib with RVR. Dx include: Wide-complex tachycardia, Pseudogout of right knee. Pt 's comorbidities affecting POC include: Arthritis, Asthma, Diabetes mellitus (HCC), Hemorrhagic prepatellar bursitis, left, Hepatitis C, Hypertension, Nephritis and nephropathy, not specified as acute or chronic, s/p Medtronic dual chamber PPM 8/31/22 s/p upgrade to BiV ICD 9/13/2023 and Thoracic vertebral fracture and personal factors of: Tristanian speaking mainly and steps in the house. Pt's clinical presentation is currently unstable/unpredictable which is evident in ongoing telem monitoring w/ elevated/irreg HR noted, ongoing (R) knee pain and guarding and  inability to progress further w/ mobilization at this time. Pt presents w/ min overall weakness, decreased (R) LE strength, decreased functional endurance andactivity tolerance and min impaired balance w/ associated gait deviations requiring use of rw at this time. Will cont to follow pt in PT for progressive mobilization to address above functional deficits and to max level of (I), endurance, and safety. Otherwise, anticipate pt will return home w/ available family support upon D/C provided he cont improving w/ mobility skills, safety, and endurance (incl on the steps) and when medically cleared; home PT follow up may need to be considered.   Barriers to Discharge Inaccessible home environment   Goals   Patient Goals to get better   STG Expiration Date 02/28/24   Short Term Goal #1 7-10 days. Pt will amb 300 ft w/ least restrictive assistive device PRN, mod (I) in order to facilitate safe return to premorbid environment and community amb status. Pt will negotiate 12 steps w/ hand rail and SPC PRN, mod (I) in order to navigate between levels of home environment safely. Pt will achieve (I) level w/ bed mob in order to facilitate safety with OOB and back to bed transitions in own living environment. Pt will perform transfers w/ mod (I) to assure (I) and safety w/ functional mobility/transitions w/ all aspects of mobility/locomotion.  Pt will participate in LE therex and balance activities to max progression w/ mobility skills.   PT Treatment Day 0   Plan   Treatment/Interventions Functional transfer training;LE strengthening/ROM;Elevations;Therapeutic exercise;Endurance training;Equipment eval/education;Bed mobility;Gait training;Spoke to nursing;OT   PT Frequency 3-5x/wk   Discharge Recommendation   Rehab Resource Intensity Level, PT III (Minimum Resource Intensity)   Equipment Recommended Walker   Walker Package Recommended Wheeled walker   AM-PAC Basic Mobility Inpatient   Turning in Flat Bed Without Bedrails 4    Lying on Back to Sitting on Edge of Flat Bed Without Bedrails 3   Moving Bed to Chair 3   Standing Up From Chair Using Arms 3   Walk in Room 3   Climb 3-5 Stairs With Railing 2   Basic Mobility Inpatient Raw Score 18   Basic Mobility Standardized Score 41.05   Highest Level Of Mobility   -HLM Goal 6: Walk 10 steps or more   -HLM Achieved 6: Walk 10 steps or more   Modified [place] Scale   Modified [place] Scale 4   End of Consult   Patient Position at End of Consult Supine;Bed/Chair alarm activated;All needs within reach       Fran Kemp, PT

## 2024-02-18 NOTE — ASSESSMENT & PLAN NOTE
Controlled, if not low, at this time. Patient on home regimen of metoprolol (rate control) and lasix BID. Was prescribed Entresto 3 days ago. Is no longer taking spironolactone.     Plan:  Continue home metoprolol  Continue home lasix  Discontinue Entresto in setting of low blood pressure

## 2024-02-18 NOTE — ASSESSMENT & PLAN NOTE
Controlled, if not low, at this time. Patient on home regimen of metoprolol (rate control) and lasix BID. Was prescribed Entresto 3 days prior to admission. Is no longer taking spironolactone.      Plan:  Continue metoprolol  Lasix discontinued due to elevated creatinine   Entresto discontinued in setting of low blood pressure

## 2024-02-18 NOTE — PLAN OF CARE
Problem: OCCUPATIONAL THERAPY ADULT  Goal: Performs self-care activities at highest level of function for planned discharge setting.  See evaluation for individualized goals.  Description: Treatment Interventions: ADL retraining, Functional transfer training, Endurance training, Patient/family training, Continued evaluation, Energy conservation, Activityengagement          See flowsheet documentation for full assessment, interventions and recommendations.   Outcome: Progressing  Note: Limitation: Decreased ADL status, Decreased Safe judgement during ADL, Decreased endurance, Decreased self-care trans, Decreased high-level ADLs  Prognosis: Good  Assessment: Pt is a 78 y.o. male who was [facility] on [facility]/17/2024 with Wide-complex tachycardia, also with worsening R knee pain, WBAT RLE. Pt seen for an OT evaluation per active OT orders.  Pt  has a past medical history of Arthritis, Asthma, Colon polyps, Community acquired pneumonia, Diabetes mellitus (HCC), Hemorrhagic prepatellar bursitis, left, Hepatitis C, High cholesterol, History of colonoscopy, Hypertension, Lymphadenopathy, anterior cervical, Nephritis and nephropathy, not specified as acute or chronic, with other specified pathological lesion in kidney, in diseases classified elsewhere, NSTEMI (non-ST elevated myocardial infarction) ([facility]), s/p Medtronic dual chamber PPM 8/31/22 s/p upgrade to BiV ICD 9/13/2023, Screening for colon cancer, SIRS (systemic inflammatory response syndrome) ([facility]), and Thoracic vertebral fracture ([facility]). Pt lives with spouse and son in a [facility] with 3 JOSE, FF stairs to 2nd fl bed/bath with tub shower and standard toilet. Pta, pt was independent w/ ADL/IADL and functional mobility, was (+) driving and was not using any DME at baseline. Currently, pt is Supervision for UB ADL, Min Ax1 for LB ADL, and completed transfers/FM w Min Ax1-Mod Ax1 with RW. Pt currently presents with impairments in the following categories  -steps to enter environment and difficulty performing ADLS activity tolerance and endurance. These impairments, as well as pt's fatigue, BUSTILLOS, pain, risk for falls, and home environment  limit pt's ability to safely engage in all baseline areas of occupation, includinggrooming, bathing, dressing, toileting, functional mobility/transfers, and community mobility.  The patient's raw score on the AM-PAC Daily Activity Inpatient Short Form is 19. A raw score of greater than or equal to 19 suggests the patient may benefit from discharge to home. Please refer to the recommendation of the Occupational Therapist for safe discharge planning. Pt would benefit from continued acute OT services throughout hospital course and following D/C. Plan for OT interventions 2-3x per week. From OT standpoint, recommend home with increased social support and home OT services upon D/C. Pt was left supine in bed with alarm on and all needs within reach.     Rehab Resource Intensity Level, OT: III (Minimum Resource Intensity)

## 2024-02-18 NOTE — ASSESSMENT & PLAN NOTE
TAVR 6/2022, normal function on ECHO 12/2023. Follows with [facility]. On plavix, eliquis, statin.     Plan:  Continue ASA/Plavix & Eliquis

## 2024-02-18 NOTE — ASSESSMENT & PLAN NOTE
Lab Results   Component Value Date    EGFR 60 02/18/2024    EGFR 56 02/17/2024    EGFR 55 02/09/2024    CREATININE 1.17 02/18/2024    CREATININE 1.23 02/17/2024    CREATININE 1.26 02/09/2024     Patient's baseline Cr ~ 1.05 - 1.3  Had suffered HE at recent hospitalization in Dec of '23    Creatinine downtrending, 1.47 on 2/22.  Isolyte infusion given overnight.      Plan:  Lasix discontinued  Outpatient BMP 1 week from discharge   Avoid nephrotoxic agents  Continue avoiding NSAIDs I/r/t pseudogout

## 2024-02-18 NOTE — ASSESSMENT & PLAN NOTE
Lab Results   Component Value Date    HGBA1C 6.9 (H) 12/23/2023     Patients home regimen is Lantus 18U daily, novolog 5U with breakfast and lunch.     Plan:  15U lantus while inpatient  SSI3  Diabetic and cardiac diet   POCG   Hypoglycemia protocol

## 2024-02-18 NOTE — PROGRESS NOTES
INTERNAL MEDICINE RESIDENCY PROGRESS NOTE     Name: Juan David Lora   Age & Sex: 78 y.o. male   MRN: [id number]  Unit/Bed#: [facility] 403-01   Encounter: [id number]  Team: SOD Team B     PATIENT INFORMATION     Name: Juan David Lora   Age & Sex: 78 y.o. male   MRN: [id number]  Hospital Stay Days: 1    ASSESSMENT/PLAN     Principal Problem:    Wide-complex tachycardia  Active Problems:    Pseudogout of right knee    Chronic HFrEF (heart failure with reduced ejection fraction) ([facility])    Mild intermittent asthma without complication    Type 2 diabetes mellitus with circulatory disorder, with long-term current use of insulin ([facility])    Hyperlipidemia    Essential hypertension    Hypothyroidism    History of aortic stenosis s/p TAVR    Stage 3 chronic kidney disease, unspecified whether stage 3a or 3b CKD ([facility])      * Wide-complex tachycardia  Assessment & Plan  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 Incidentally found on arrival to ED d/t HR 160s. He was entirely symptomatic. He was given one dose of metoprolol in ED without improvement. Cardiology was consulted in ED and patient was started on amio bolus and then drip with improved HR.     On interrogation of AICD, reported 12 shocks for Vtach (ATP). Patient denies any feelings of shocks. Follows with [facility]'s Cardiology, last seen 1/18/2024, device last interogatted 1/3/24 with 100% afib burden and episodes of a fib w RVR.     HR's have stabilized since being started on maintenance Amio gtt  Currently w/o and complaints including no CP/palpitations/SOB     Plan:  Now s/p Amio load, currently on Amio gtt  Continue on Tele  F/U Cards further rec's       Pseudogout of right knee  Assessment & Plan  Patient presenting with 3 days of right knee swelling, warmth, pain on movement, and decreased range of motion. Interestingly, although potentially unrelated, he started Entresto 3 days prior to admission.     The joint was tapped in the ED and was positive for  CCP crystals. ESR and CRP elevated.      Plan:  Ortho on-board; We do not expect any need for procedure however pending Ortho f/u for today  C/w prednisone 40mg x days (SOT 2/18)  Please ensure Prednisone not given close to bedtime as to avoid delirium   Continue to avoid NSAIDs I/c/o CAD, HF       Chronic HFrEF (heart failure with reduced ejection fraction) (Formerly [facility])  Assessment & Plan    Pt's Natural Hx of HF:    Date Dx: March 2023 (Ie, shortly after NSTEMI s/p ELDER x2)  Cause? (If known): Ischemic, reduction in EF in setting of NSTEMI with 80% mid LAD and 90% RPDA stenoses s/p stenting    Subtype: HFrEF  Class: NYHA Class II, Stage C  Cardiologist/HF Specialist: Virginia Gan MD of  [facility]    Dry Weight: ~175-180 lbs (79.4-81.2 kg) ... Nb No official record of dry weight, the value shown previously is based on assumptions per weights prior to pt's first admit for HF exacerbation  Current Weight:   Wt Readings from Last 3 Encounters:   02/18/24 76.8 kg (169 lb 5 oz)   02/13/24 75.3 kg (166 lb)   01/18/24 80.3 kg (177 lb 1.6 oz)      Most Recent Echo (Study Date 12/23/23): LVEF 25-30 %, LV Systolic Dysfxn, Severe with severe Global hypokinesis  Structural Heart Changes?  Atria: LA mod/severe dilation, RA moderate dilation  Ventricles: LV severe global hypokinesis w/ regional variation, RV mild dilation with moderately reduced systolic function  Valves: Normal fxn TAVR, Severe MR, Mod to Severe TR with elevated RV systolic pressure, mild NY    Paced?: BiV ACD    GDMT:   Beta-Blocker: Metoprolol succinate 75 mg BID  ACEi, ARB or ARNi: Entresto 24/26 bid  Previously on Lisinopril 10  SGLT2i- jardiance 10 mg daily  Recently D/C'd OP, now on Entresto instead  Diuretic: Lasix 60 mg daily - taking 40mg in AM and 20mg in PM    ICD? Yes   Date Inserted: 9/13/23    - - - - - - - - - - - - - - - - - - - - - - - - - - - - - - - - - - - - - - - - - - - - - - - - - - - - - - - - - - -     IP Evaluation/Interval HF  Assessment:    Assessment:    Pt is currently at dry weight and no concerning s/sx of HF exacerbation I/c/o Vfib events  BP's have been soft so far on admission, may be related to ?recent addition of GDMT meds vs VF.  For this reason will hold Entreso and Lasix for this time, may resume as pressures become more stable  Currently on Amio which will also drop BP, for this reason continuing with statement as above re: diuresis    Plan:    Continue to hold Lasix/Entresto I/c/o soft BPs  C/w Toprol  Daily Standing weights  Strict I/O  Fluid restriction to 2000 cc  Salt restricted diet to 2 gm           Stage 3 chronic kidney disease, unspecified whether stage 3a or 3b CKD ([facility])  Assessment & Plan  Lab Results   Component Value Date    EGFR 60 02/18/2024    EGFR 56 02/17/2024    EGFR 55 02/09/2024    CREATININE 1.17 02/18/2024    CREATININE 1.23 02/17/2024    CREATININE 1.26 02/09/2024     Patient's baseline Cr ~ 1.05 - 1.3  Had suffered HE at recent hospitalization in Dec of '23    Plan:  Continue to monitor  Avoid nephrotoxic agents  Continue avoiding NSAIDs I/r/t pseudogout    History of aortic stenosis s/p TAVR  Assessment & Plan  TAVR 6/2022, normal function on ECHO 12/2023. Follows with [facility]. On plavix, eliquis, statin.     Plan:  Continue ASA/Plavix & Eliquis       Hypothyroidism  Assessment & Plan  On home levothyroxine 137mcg daily. Subsequent labs TSH 0.352 and free T4 1.36. Potential trigger for a fib.      Plan:  Reduce home levothyroxine dose to 125mcg  Follow-up TSH in setting of wide complex tachycardia       Essential hypertension  Assessment & Plan  Controlled, if not low, at this time. Patient on home regimen of metoprolol (rate control) and lasix BID. Was prescribed Entresto 3 days ago. Is no longer taking spironolactone.      Plan:  Continue home metoprolol  Hold Lasix as above  Discontinue Entresto in setting of low blood pressure       Hyperlipidemia  Assessment & Plan  Lipid panel  12/2023: , TG 74, HDL 35, LDL 63.     Plan:  Continue atorvastatin 80mg       Type 2 diabetes mellitus with circulatory disorder, with long-term current use of insulin ([facility])  Assessment & Plan  Lab Results   Component Value Date    HGBA1C 6.9 (H) 12/23/2023       Recent Labs     02/17/24  2202 02/18/24  0632 02/18/24  1042   POCGLU 109 150* 227*       Blood Sugar Average: Last 72 hrs:  (P) 162    Patients home regimen is Lantus 18U daily, novolog 5U with breakfast and lunch.   Adjusting home regimen (ie, ~reduction in home dose) for continuation while IP  ?Need for further adjustments  AM Sugars well-controlled at this time (150 today) however do caution if BG drops < 140 --> If that is the case then ?Consider decr Lantus hs dose  Prandial sugars too High so far x1 meal (>>200 after breakfast) --> ?Consider incr SSI algo  This is despite also receiving SGLT2 this AM     Plan:  15U Lantus HS while inpatient; if FBG drops < 140 consider decreasing HS insulin dosing  C/w SSI @ Algo 3; May need to increase Algorithm tomorrow vs add AC Lispro should prandial BG remain >> 200  Diabetic and cardiac diet   POCG HS/AC  Follow BG closely I/c/o also receiving PO hyoglycemic w/ Jardiance  ?Consider holding Jardiance if sugars drop ---> Must reach-out to HF prior to making final decision  Hypoglycemia protocol      Mild intermittent asthma without complication  Assessment & Plan  On home advair and albuterol. Denies any symptoms.      Plan:  Albuterol inhaler PRN for wheezing           Disposition: To remain IP for now to ensure no further events of possible fatal tachyarrhythmia/allow Amio gtt to reach steady-state. Will stay as SD2 for now I/c/o Amio gtt. Will re-evaluate for D/C over next 24-48 hours.     SUBJECTIVE / INTERVAL HISTORY     MALINDA, Mr Lora was seen earlier this morning resting comfortably in bed with wife at bedside. Currently without any new complaints, including no CP/SOB/palpitations nor  orthopnea/PND/wheezing. Knee pain has also greatly improved per pt, no longer bothering him as much.    Currently on Amio gtt, HR's stable in 80s-100s. BP's remain somewhat soft @ hi 90s-110s / 60-70s. No reports of snycope/near-syncope or fatigue/malaise.    Review of Systems   Constitutional:  Negative for chills and fever.   HENT:  Negative for ear pain and sore throat.    Eyes:  Negative for pain and visual disturbance.   Respiratory:  Negative for cough and shortness of breath.    Cardiovascular:  Negative for chest pain and palpitations.   Gastrointestinal:  Negative for abdominal pain and vomiting.   Genitourinary:  Negative for dysuria and hematuria.   Musculoskeletal:  Negative for arthralgias, back pain and joint swelling.   Skin:  Negative for color change and rash.   Neurological:  Negative for seizures and syncope.   All other systems reviewed and are negative.      OBJECTIVE     Vitals:    24 0736 24 0738 24 1021 24 1043   BP: 110/69   111/70   BP Location:       Pulse:  93 89    Resp: 17      Temp: (!) 97.3 °F (36.3 °C)   97.7 °F (36.5 °C)   TempSrc:       SpO2:  97%  98%   Weight:            Temperature:   Temp (24hrs), Av.5 °F (36.4 °C), Min:97.3 °F (36.3 °C), Max:97.7 °F (36.5 °C)    Temperature: 97.7 °F (36.5 °C)    Intake & Output:  I/O          0701   0700  0701   07 07 0700    P.O.   396    I.V. (mL/kg)  265.4 (3.5)     Total Intake(mL/kg)  265.4 (3.5) 396 (5.2)    Urine (mL/kg/hr)  300 350 (0.9)    Total Output  300 350    Net  -34.6 +46                   Weights:        Body mass index is 26.52 kg/m².  Weight (last 2 days)       Date/Time Weight    24 0600 76.8 (169.31)    24 0519 76.8 (169.31)              Physical Exam:     Physical Exam  Vitals and nursing note reviewed.   Constitutional:       General: He is not in acute distress.     Appearance: He is well-developed.   HENT:      Head: Normocephalic and  atraumatic.      Nose: No congestion or rhinorrhea.   Eyes:      Conjunctiva/sclera: Conjunctivae normal.   Neck:      Vascular: No carotid bruit.      Comments: No JVD  Cardiovascular:      Rate and Rhythm: Normal rate and regular rhythm.      Heart sounds: No murmur heard.  Pulmonary:      Effort: Pulmonary effort is normal. No respiratory distress.      Breath sounds: Normal breath sounds. No rhonchi or rales.   Abdominal:      Palpations: Abdomen is soft.      Tenderness: There is no abdominal tenderness.   Musculoskeletal:         General: Tenderness (Slight TTP and erythema of R knee, which is currently wrapped in ACE bandage) present. No swelling or deformity.      Cervical back: Neck supple.   Skin:     General: Skin is warm and dry.      Capillary Refill: Capillary refill takes less than 2 seconds.      Coloration: Skin is not pale.      Findings: No lesion or rash.   Neurological:      General: No focal deficit present.      Mental Status: He is alert and oriented to person, place, and time. Mental status is at baseline.   Psychiatric:         Mood and Affect: Mood normal.         LABORATORY DATA     Labs: I have personally reviewed pertinent reports.  Results from last 7 days   Lab Units 02/18/24  0433 02/17/24  1725   WBC Thousand/uL 8.47 10.07   HEMOGLOBIN g/dL 12.7 14.1   HEMATOCRIT % 40.6 45.3   PLATELETS Thousands/uL 226 244   NEUTROS PCT % 87* 67   MONOS PCT % 3* 11   EOS PCT % 0 2      Results from last 7 days   Lab Units 02/18/24  0716 02/17/24  1725   POTASSIUM mmol/L 5.0 3.8   CHLORIDE mmol/L 102 100   CO2 mmol/L 28 28   BUN mg/dL 30* 23   CREATININE mg/dL 1.17 1.23   CALCIUM mg/dL 10.0 9.9   ALK PHOS U/L 119* 118*   ALT U/L 22 24   AST U/L 18 21     Results from last 7 days   Lab Units 02/18/24  0716 02/17/24  1725   MAGNESIUM mg/dL 2.7 2.0     Results from last 7 days   Lab Units 02/18/24  0716 02/17/24  2000   PHOSPHORUS mg/dL 4.3* 3.5                    IMAGING & DIAGNOSTIC TESTING  "    Radiology Results: I have personally reviewed pertinent reports.  XR chest 1 view portable    Result Date: 2/18/2024  Impression: Trace right effusion. Lungs clear Workstation performed: [id number]     Other Diagnostic Testing: I have personally reviewed pertinent reports.    ACTIVE MEDICATIONS     Current Facility-Administered Medications   Medication Dose Route Frequency    acetaminophen (TYLENOL) tablet 650 mg  650 mg Oral Q6H PRN    amiodarone (CORDARONE) 900 mg in dextrose 5 % 500 mL infusion  0.5 mg/min Intravenous Continuous    apixaban (ELIQUIS) tablet 5 mg  5 mg Oral BID    atorvastatin (LIPITOR) tablet 80 mg  80 mg Oral Daily With Dinner    cholecalciferol (VITAMIN D3) tablet 2,000 Units  2,000 Units Oral Daily    clopidogrel (PLAVIX) tablet 75 mg  75 mg Oral Daily    Diclofenac Sodium (VOLTAREN) 1 % topical gel 2 g  2 g Topical 4x Daily    Empagliflozin (JARDIANCE) tablet 10 mg  10 mg Oral QAM    ferrous sulfate tablet 325 mg  325 mg Oral Every Other Day    furosemide (LASIX) tablet 40 mg  40 mg Oral BID    insulin glargine (LANTUS) subcutaneous injection 15 Units 0.15 mL  15 Units Subcutaneous HS    insulin lispro (HumaLOG) 100 units/mL subcutaneous injection 1-6 Units  1-6 Units Subcutaneous TID AC    levothyroxine tablet 125 mcg  125 mcg Oral Daily    metoprolol succinate (TOPROL-XL) 24 hr tablet 75 mg  75 mg Oral BID    montelukast (SINGULAIR) tablet 10 mg  10 mg Oral Daily    predniSONE tablet 40 mg  40 mg Oral Daily       VTE Pharmacologic Prophylaxis: {Eliquis  VTE Mechanical Prophylaxis: sequential compression device    Portions of the record may have been created with voice recognition software.  Occasional wrong word or \"sound a like\" substitutions may have occurred due to the inherent limitations of voice recognition software.  Read the chart carefully and recognize, using context, where substitutions have occurred.    == == == == == == == == == == == == == == == == == == == == == "     Umesh Millard MD  [facility], [place]  Internal Medicine Resident, PGY-2

## 2024-02-18 NOTE — ASSESSMENT & PLAN NOTE
Patient presenting with 3 days of right knee swelling, warmth, pain on movement, and decreased range of motion. Interestingly, although potentially unrelated, he started Entresto 3 days prior to admission. The joint was tapped in the ED and was positive for CCP crystals. ESR and CRP elevated.     Plan:  Ortho consulted--recs appreciated  Start on prednisone 40mg x days (SOT 2/18)--adjusted dosing on day 2 of therapy to avoid dosage at nighttime (delirium procautions)   -avoid NSAIDs in CAD

## 2024-02-18 NOTE — PLAN OF CARE
Problem: PHYSICAL THERAPY ADULT  Goal: Performs mobility at highest level of function for planned discharge setting.  See evaluation for individualized goals.  Description: Treatment/Interventions: Functional transfer training, LE strengthening/ROM, Elevations, Therapeutic exercise, Endurance training, Equipment eval/education, Bed mobility, Gait training, Spoke to nursing, OT  Equipment Recommended: Walker       See flowsheet documentation for full assessment, interventions and recommendations.  Note: Prognosis: Good  Problem List: Decreased strength, Decreased endurance, Impaired balance, Decreased mobility  Assessment: Pt is 78 y.o. male admitted with hx of  (R) knee pain and swelling and found to have heart rates in 160s with subsequent EKG showing A-fib with RVR. Dx include: Wide-complex tachycardia, Pseudogout of right knee. Pt 's comorbidities affecting POC include: Arthritis, Asthma, Diabetes mellitus (HCC), Hemorrhagic prepatellar bursitis, left, Hepatitis C, Hypertension, Nephritis and nephropathy, not specified as acute or chronic, s/p [employer] dual chamber PPM 8/31/22 s/p upgrade to BiV ICD 9/13/2023 and Thoracic vertebral fracture and personal factors of: Uzbek speaking mainly and steps in the house. Pt's clinical presentation is currently unstable/unpredictable which is evident in ongoing telem monitoring w/ elevated/irreg HR noted, ongoing (R) knee pain and guarding and inability to progress further w/ mobilization at this time. Pt presents w/ min overall weakness, decreased (R) LE strength, decreased functional endurance andactivity tolerance and min impaired balance w/ associated gait deviations requiring use of rw at this time. Will cont to follow pt in PT for progressive mobilization to address above functional deficits and to max level of (I), endurance, and safety. Otherwise, anticipate pt will return home w/ available family support upon D/C provided he cont improving w/ mobility skills,  safety, and endurance (incl on the steps) and when medically cleared; home PT follow up may need to be considered.  Barriers to Discharge: Inaccessible home environment     Rehab Resource Intensity Level, PT: III (Minimum Resource Intensity)    See flowsheet documentation for full assessment.

## 2024-02-18 NOTE — ASSESSMENT & PLAN NOTE
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 Incidentally found on arrival to ED d/t HR 160s. He was entirely symptomatic. He was given one dose of metoprolol in ED without improvement. Cardiology was consulted in ED and patient was started on amio bolus and then drip with improved HR. On interrogation of AICD, reported 12 shocks for Vtach (ATP). Patient denies any feelings of shocks. Follows with [facility], last seen 1/18/2024, device last interogatted 1/3/24 with 100% afib burden and episodes of a fib w RVR.     Plan:  Continue amiodarone load   Consult cardiology

## 2024-02-18 NOTE — ASSESSMENT & PLAN NOTE
Lab Results   Component Value Date    EGFR 56 02/17/2024    EGFR 55 02/09/2024    EGFR 48 12/25/2023    CREATININE 1.23 02/17/2024    CREATININE 1.26 02/09/2024    CREATININE 1.41 (H) 12/25/2023     Plan:  Continue to monitor  Avoid nephrotoxic agents

## 2024-02-18 NOTE — ASSESSMENT & PLAN NOTE
On home levothyroxine 137mcg daily. Subsequent labs TSH 0.352 and free T4 1.36. Potential trigger for a fib.      Plan:  Reduce home levothyroxine dose to 125mcg

## 2024-02-19 LAB
ALBUMIN SERPL BCP-MCNC: 3.5 G/DL (ref 3.5–5)
ALP SERPL-CCNC: 107 U/L (ref 34–104)
ALT SERPL W P-5'-P-CCNC: 20 U/L (ref 7–52)
ANION GAP SERPL CALCULATED.3IONS-SCNC: 10 MMOL/L
AST SERPL W P-5'-P-CCNC: 18 U/L (ref 13–39)
BILIRUB SERPL-MCNC: 0.98 MG/DL (ref 0.2–1)
BUN SERPL-MCNC: 40 MG/DL (ref 5–25)
CALCIUM SERPL-MCNC: 9.3 MG/DL (ref 8.4–10.2)
CHLORIDE SERPL-SCNC: 102 MMOL/L (ref 96–108)
CO2 SERPL-SCNC: 25 MMOL/L (ref 21–32)
CREAT SERPL-MCNC: 1.29 MG/DL (ref 0.6–1.3)
ERYTHROCYTE [DISTWIDTH] IN BLOOD BY AUTOMATED COUNT: 18.3 % (ref 11.6–15.1)
GFR SERPL CREATININE-BSD FRML MDRD: 53 ML/MIN/1.73SQ M
GLUCOSE SERPL-MCNC: 128 MG/DL (ref 65–140)
GLUCOSE SERPL-MCNC: 140 MG/DL (ref 65–140)
GLUCOSE SERPL-MCNC: 142 MG/DL (ref 65–140)
GLUCOSE SERPL-MCNC: 155 MG/DL (ref 65–140)
GLUCOSE SERPL-MCNC: 174 MG/DL (ref 65–140)
HCT VFR BLD AUTO: 40.8 % (ref 36.5–49.3)
HGB BLD-MCNC: 12.7 G/DL (ref 12–17)
MCH RBC QN AUTO: 26.4 PG (ref 26.8–34.3)
MCHC RBC AUTO-ENTMCNC: 31.1 G/DL (ref 31.4–37.4)
MCV RBC AUTO: 85 FL (ref 82–98)
PLATELET # BLD AUTO: 219 THOUSANDS/UL (ref 149–390)
PMV BLD AUTO: 10.8 FL (ref 8.9–12.7)
POTASSIUM SERPL-SCNC: 4.4 MMOL/L (ref 3.5–5.3)
PROT SERPL-MCNC: 6.3 G/DL (ref 6.4–8.4)
RBC # BLD AUTO: 4.81 MILLION/UL (ref 3.88–5.62)
SODIUM SERPL-SCNC: 137 MMOL/L (ref 135–147)
WBC # BLD AUTO: 10.21 THOUSAND/UL (ref 4.31–10.16)

## 2024-02-19 PROCEDURE — 99232 SBSQ HOSP IP/OBS MODERATE 35: CPT | Performed by: INTERNAL MEDICINE

## 2024-02-19 PROCEDURE — 82948 REAGENT STRIP/BLOOD GLUCOSE: CPT

## 2024-02-19 PROCEDURE — 80053 COMPREHEN METABOLIC PANEL: CPT

## 2024-02-19 PROCEDURE — 97116 GAIT TRAINING THERAPY: CPT

## 2024-02-19 PROCEDURE — 97530 THERAPEUTIC ACTIVITIES: CPT

## 2024-02-19 PROCEDURE — 85027 COMPLETE CBC AUTOMATED: CPT

## 2024-02-19 RX ORDER — DOFETILIDE 0.12 MG/1
125 CAPSULE ORAL EVERY 12 HOURS SCHEDULED
Status: DISCONTINUED | OUTPATIENT
Start: 2024-02-19 | End: 2024-02-23 | Stop reason: HOSPADM

## 2024-02-19 RX ORDER — DOFETILIDE 0.12 MG/1
125 CAPSULE ORAL 2 TIMES DAILY
Qty: 60 CAPSULE | Refills: 0 | Status: SHIPPED | OUTPATIENT
Start: 2024-02-19 | End: 2024-02-23

## 2024-02-19 RX ORDER — LANOLIN ALCOHOL/MO/W.PET/CERES
3 CREAM (GRAM) TOPICAL
Status: DISCONTINUED | OUTPATIENT
Start: 2024-02-19 | End: 2024-02-23 | Stop reason: HOSPADM

## 2024-02-19 RX ADMIN — MONTELUKAST 10 MG: 10 TABLET, FILM COATED ORAL at 08:58

## 2024-02-19 RX ADMIN — ATORVASTATIN CALCIUM 80 MG: 80 TABLET, FILM COATED ORAL at 18:10

## 2024-02-19 RX ADMIN — MELATONIN 3 MG: at 22:00

## 2024-02-19 RX ADMIN — TRIMETHOBENZAMIDE HYDROCHLORIDE 200 MG: 100 INJECTION INTRAMUSCULAR at 22:00

## 2024-02-19 RX ADMIN — INSULIN GLARGINE 15 UNITS: 100 INJECTION, SOLUTION SUBCUTANEOUS at 22:00

## 2024-02-19 RX ADMIN — METOPROLOL SUCCINATE 75 MG: 50 TABLET, EXTENDED RELEASE ORAL at 17:29

## 2024-02-19 RX ADMIN — APIXABAN 5 MG: 5 TABLET, FILM COATED ORAL at 08:57

## 2024-02-19 RX ADMIN — APIXABAN 5 MG: 5 TABLET, FILM COATED ORAL at 18:10

## 2024-02-19 RX ADMIN — DOFETILIDE 125 MCG: 0.12 CAPSULE ORAL at 14:45

## 2024-02-19 RX ADMIN — LEVOTHYROXINE SODIUM 125 MCG: 125 TABLET ORAL at 08:58

## 2024-02-19 RX ADMIN — DOFETILIDE 125 MCG: 0.12 CAPSULE ORAL at 22:00

## 2024-02-19 RX ADMIN — CLOPIDOGREL BISULFATE 75 MG: 75 TABLET ORAL at 08:57

## 2024-02-19 RX ADMIN — EMPAGLIFLOZIN 10 MG: 10 TABLET, FILM COATED ORAL at 08:57

## 2024-02-19 RX ADMIN — Medication 2000 UNITS: at 08:58

## 2024-02-19 RX ADMIN — PREDNISONE 40 MG: 20 TABLET ORAL at 12:36

## 2024-02-19 RX ADMIN — MELATONIN 3 MG: at 01:09

## 2024-02-19 NOTE — PHYSICAL THERAPY NOTE
PHYSICAL THERAPY NOTE          Patient Name: Juan David Lora  Today's Date: 2/19/2024 02/19/24 1400   PT Last Visit   PT Visit Date 02/19/24   Note Type   Note Type Treatment   Pain Assessment   Pain Assessment Tool 0-10   Pain Score No Pain   Restrictions/Precautions   Weight Bearing Precautions Per Order Yes   RLE Weight Bearing Per Order WBAT   Other Precautions Bed Alarm;Multiple lines;Telemetry;Fall Risk;Pain;WBS;Chair Alarm  (Italian-speaking)   General   Chart Reviewed Yes   Family/Caregiver Present No   Cognition   Overall Cognitive Status WFL   Arousal/Participation Alert;Cooperative   Attention Within functional limits   Orientation Level Oriented X4   Memory Within functional limits   Following Commands Follows one step commands without difficulty   Comments Patient is pleasant and cooperative   Subjective   Subjective Patient agreeable to PT tx   Bed Mobility   Supine to Sit 5  Supervision   Additional items Increased time required;Verbal cues   Sit to Supine 5  Supervision   Additional items Increased time required;Verbal cues   Transfers   Sit to Stand 4  Minimal assistance   Additional items Assist x 1;Increased time required;Verbal cues   Stand to Sit 4  Minimal assistance   Additional items Assist x 1;Increased time required;Verbal cues   Additional Comments RW   Ambulation/Elevation   Gait pattern Excessively slow;Short stride;Decreased R stance   Gait Assistance 4  Minimal assist   Additional items Assist x 1;Verbal cues   Assistive Device Rolling walker   Distance 100'x3  (standing vs seated rest periods)   Ambulation/Elevation Additional Comments may be able to progress to no AD next session   Balance   Static Sitting Good   Dynamic Sitting Fair +   Static Standing Fair -   Dynamic Standing Fair -   Ambulatory Poor +   Endurance Deficit   Endurance Deficit Yes   Endurance Deficit Description fatigue,  weakness   Activity Tolerance   Activity Tolerance Patient tolerated treatment well   Nurse Made Aware RN cleared   Assessment   Prognosis Good   Problem List Decreased strength;Decreased endurance;Impaired balance;Decreased mobility   Assessment Patient received in bed. Patient agreeable to therapy. Patient performs bed mobility with supervision. Patient performs functional transfers with minimal assistance x1 using rolling walker. Patient performs ambulation with minimal assistance x1 using rolling walker, 100'x3 with standing vs seated rest periods between trials. Patient with fair noted balance. Plan to trial no AD and stairs next session as able.  Patient left in bed with all needs met and call bell/personal items within reach. The patient's AM-[facility] Basic Mobility Inpatient Short Form Raw Score is 17 showing further need for skilled PT services in order to improve functional mobility, decrease need for assistance, and return to OF. PT is recommending Level 3 - Minimum Resource Intensity on d/c from hospital.  Will continue to follow as able.   Barriers to Discharge Inaccessible home environment   Goals   Patient Goals to rest   PT Treatment Day 1   Plan   Treatment/Interventions Functional transfer training;LE strengthening/ROM;Elevations;Therapeutic exercise;Endurance training;Equipment eval/education;Bed mobility;Gait training;Spoke to nursing;OT   Progress Progressing toward goals   PT Frequency 3-5x/wk   Discharge Recommendation   Rehab Resource Intensity Level, PT III (Minimum Resource Intensity)   AM-PAC Basic Mobility Inpatient   Turning in Flat Bed Without Bedrails 3   Lying on Back to Sitting on Edge of Flat Bed Without Bedrails 3   Moving Bed to Chair 3   Standing Up From Chair Using Arms 3   Walk in Room 3   Climb 3-5 Stairs With Railing 2   Basic Mobility Inpatient Raw Score 17   Basic Mobility Standardized Score 39.67   Highest Level Of Mobility   -HL Goal 5: Stand one or more mins   -HLM  Achieved 7: Walk 25 feet or more   End of Consult   Patient Position at End of Consult All needs within reach;Supine;Bed/Chair alarm activated       Imelda Carbajal PT, DPT

## 2024-02-19 NOTE — UTILIZATION REVIEW
Initial Clinical Review    Admission: Date/Time/Statement:   Admission Orders (From admission, onward)       Ordered        02/17/24 2012  INPATIENT ADMISSION  Once                          Orders Placed This Encounter   Procedures    INPATIENT ADMISSION     Standing Status:   Standing     Number of Occurrences:   1     Order Specific Question:   Level of Care     Answer:   Level 2 Stepdown / HOT [14]     Order Specific Question:   Estimated length of stay     Answer:   More than 2 Midnights     Order Specific Question:   Certification     Answer:   I certify that inpatient services are medically necessary for this patient for a duration of greater than two midnights. See H&P and MD Progress Notes for additional information about the patient's course of treatment.     ED Arrival Information       Expected   -    Arrival   2/17/2024 17:07    Acuity   Emergent              Means of arrival   Walk-In    Escorted by   Family Member    Service   [facility]    Admission type   Emergency              Arrival complaint   Leg Problem             Chief Complaint   Patient presents with    Leg Pain     3 days of rt knee pain, and swelling        Initial Presentation: 78 y.o. male to ED presents for Right knee pain x3 days, also found  to have heart rates in 160s with subsequent EKG showing A-fib with RVR. Subsequent AICD interrogation shows 12 separte shocks for VT episodes, denies feeling any of these shocks. Recently prescribed Entresto 3 days ago when knee pain began. In ED, BP 97/74, . EKG at that time showed irregularly irregular tachycardia with absence of P waves.  Labs notable for K 3.8, CRP 16.2, ESR 54, TSH 0.352, free T4 1.36.  In ED,  S/p arthrocentesis and sent fluid studies.  Synovial WBC 28,000.  Synovial RBC 11,000.  Positive for calcium pyrophosphate crystals. He was started on amiodarone drip for A-fib and received a dose of both ceftriaxone and vancomycin. PMH for HFrEF (EF 30-35%), CAD s/p PCI,  s/p TAVR, hypothyroidism, hypertension, T2DM, tachybradycardia syndrome status post BiV ICD, A-fib on Eliquis, hepatitis C (untreated per chart review).   Admit Inpatient level of care for Wide-complex tachycardia and Pseudogout of right knee. Continue amiodarone load on Iv amiodarone infusion. Cardiology and Orthopedic consult. Start prednisone 40 mg x days (SOT 2/18)--adjusted dosing on day 2 of therapy to avoid dosage at nighttime (delirium procautions).     2/17  Orthopedic cons; Right knee consistent with flare of pseudogout given examination and + aspiration demonstrating calcium pyrophosphate. 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 Monitor cultures peripherally until finalization. WBAT RLE. Recommend medical management of pseudogout. Pain control.     Date: 2/18   Day 2:   Per Orthopedic; WBAT RLE. Medical management of pseudogout. Pain improving.    Cardiology cons; Atrial flutter with RVR. Started on amiodarone -Did have initial conversion to sinus. Continue Iv amiodarone infusion. Continue medication as patient is back in atrial flutter. History of Hep C which is active. Need to follow amiodarone use very closely in view of liver dysfunction  Check CMP. Alternative is dofetilide. Creat 1.23. Qtc is 520 ms. At some point may consider ablation.     Progress notes; Now s/p Amio load, currently on Amio drip. Continue on Tele monitoring. Continue to hold Lasix/Entresto I/c/o soft Bps. Continue Toprol. Fluid restriction 2000 ml.     Date: 2/19    Day 3: Has surpassed a 2nd midnight with active treatments and services, which include ongoing Iv amiodarone infusion for Afib with RVR.       ED Triage Vitals   Temperature Pulse Respirations Blood Pressure SpO2   02/17/24 1711 02/17/24 1711 02/17/24 1711 02/17/24 1711 02/17/24 1711   97.5 °F (36.4 °C) (!) 163 22 97/74 98 %      Temp Source Heart Rate Source Patient Position - Orthostatic VS BP Location FiO2 (%)   02/17/24 1711 02/17/24 1711 02/17/24 1745 02/17/24  1745 --   Oral Monitor Lying Right arm       Pain Score       02/17/24 1745       8          Wt Readings from Last 1 Encounters:   02/19/24 76.6 kg (168 lb 14 oz)     Additional Vital Signs:   02/19/24 07:08:50 -- -- -- 101/65 77 -- -- --   02/19/24 0706 97.9 °F (36.6 °C) 92 18 -- -- -- -- --   02/19/24 0400 97.5 °F (36.4 °C) 90 17 105/70 -- 95 % None (Room air) Lying   02/18/24 2355 -- 87 16 -- -- 94 % -- --   02/18/24 23:54:59 97.3 °F (36.3 °C) Abnormal   -- 16 103/68 80 -- -- --   Temp: TRIP Perry at 02/18/24 2354   02/18/24 19:24:04 97.6 °F (36.4 °C) 100 22 106/68 81 98 % None (Room air) Lying   02/18/24 1805 -- 103 -- 115/68 -- -- -- --   02/18/24 15:29:44 98.2 °F (36.8 °C) 100 -- 108/68 81 98 % None (Room air)      2/17/24 2345 -- 86 20 101/61 77 97 % None (Room air) Lying   02/17/24 2230 -- 96 18 105/63 79 99 % None (Room air) Lying   02/17/24 2200 -- 96 16 103/58 75 98 % None (Room air) Lying     Pertinent Labs/Diagnostic Test Results:   XR chest 1 view portable   ED Interpretation by Francisco Mohan MD (02/17 1756)   No fluid overload  No Lead fx.  AICD in place.  No pneumonia.      Final Result by Kayli Rodrigues MD (02/18 0834)      Trace right effusion.      Lungs clear            Workstation performed: [id number]         XR knee 1 or 2 vw right    (Results Pending)         Results from last 7 days   Lab Units 02/19/24  0444 02/18/24  0433 02/17/24  1725   WBC Thousand/uL 10.21* 8.47 10.07   HEMOGLOBIN g/dL 12.7 12.7 14.1   HEMATOCRIT % 40.8 40.6 45.3   PLATELETS Thousands/uL 219 226 244   NEUTROS ABS Thousands/µL  --  7.28 6.78         Results from last 7 days   Lab Units 02/19/24  0444 02/18/24  0716 02/17/24 2000 02/17/24  1725   SODIUM mmol/L 137 138  --  137   POTASSIUM mmol/L 4.4 5.0  --  3.8   CHLORIDE mmol/L 102 102  --  100   CO2 mmol/L 25 28  --  28   ANION GAP mmol/L 10 8  --  9   BUN mg/dL 40* 30*  --  23   CREATININE mg/dL 1.29 1.17  --  1.23   EGFR ml/min/1.73sq m 53 60  --  56    CALCIUM mg/dL 9.3 10.0  --  9.9   MAGNESIUM mg/dL  --  2.7  --  2.0   PHOSPHORUS mg/dL  --  4.3* 3.5  --      Results from last 7 days   Lab Units 02/19/24  0444 02/18/24  0716 02/17/24  1725   AST U/L 18 18 21   ALT U/L 20 22 24   ALK PHOS U/L 107* 119* 118*   TOTAL PROTEIN g/dL 6.3* 7.6 7.1   ALBUMIN g/dL 3.5 4.0 3.9   TOTAL BILIRUBIN mg/dL 0.98 1.27* 1.12*     Results from last 7 days   Lab Units 02/19/24  1118 02/19/24  0635 02/18/24  2047 02/18/24  1541 02/18/24  1042 02/18/24  0632 02/17/24  2202   POC GLUCOSE mg/dl 142* 140 216* 205* 227* 150* 109     Results from last 7 days   Lab Units 02/19/24  0444 02/18/24  0716 02/17/24  1725   GLUCOSE RANDOM mg/dL 155* 146* 125       Results from last 7 days   Lab Units 02/17/24  2118 02/17/24  1927 02/17/24  1725   HS TNI 0HR ng/L  --   --  21   HS TNI 2HR ng/L  --  24  --    HSTNI D2 ng/L  --  3  --    HS TNI 4HR ng/L 23  --   --    HSTNI D4 ng/L 2  --   --              Results from last 7 days   Lab Units 02/17/24 2000   TSH 3RD GENERATON uIU/mL 0.352*       Results from last 7 days   Lab Units 02/17/24 2000   CRP mg/L 16.2*   SED RATE mm/hour 54*       Results from last 7 days   Lab Units 02/17/24 2000   GRAM STAIN RESULT  3+ Polys  No organisms seen     Results from last 7 days   Lab Units 02/17/24 2000   TOTAL COUNTED  100   NEUTROPHIL % (SYNOVIAL) % 94   MONOCYTE % (SYNOVIAL) % 6   WBC FLUID /ul 28,810*   RBC, SYNOVIAL  11,000*   CRYSTALS, SYNOVIAL FLUID  Positive for Calcium Pyrophosphate Crystals       ED Treatment:   Medication Administration from 02/17/2024 1707 to 02/18/2024 0352         Date/Time Order Dose Route Action     02/17/2024 1739 EST metoprolol (LOPRESSOR) injection 5 mg 5 mg Intravenous Given     02/17/2024 1801 EST magnesium sulfate 2 g/50 mL IVPB (premix) 2 g 2 g Intravenous New Bag     02/17/2024 1833 EST amiodarone 150 mg in dextrose 5 % 100 mL IV bolus 150 mg Intravenous New Bag     02/17/2024 1851 EST amiodarone (CORDARONE) 900 mg  in dextrose 5 % 500 mL infusion 1 mg/min Intravenous New Bag     02/18/2024 0137 EST amiodarone (CORDARONE) 900 mg in dextrose 5 % 500 mL infusion 0.5 mg/min Intravenous New Bag     02/17/2024 1923 EST lidocaine (PF) (XYLOCAINE-MPF) 2 % injection 10 mL 10 mL Infiltration Given by Other     02/17/2024 2111 EST vancomycin (VANCOCIN) 1500 mg in sodium chloride 0.9% 250 mL IVPB 1,500 mg Intravenous New Bag     02/17/2024 2017 EST ceftriaxone (ROCEPHIN) 1 g/50 mL in dextrose IVPB 1,000 mg Intravenous New Bag     02/17/2024 2204 EST insulin glargine (LANTUS) subcutaneous injection 15 Units 0.15 mL 15 Units Subcutaneous Given     02/17/2024 2158 EST potassium chloride (Klor-Con M20) CR tablet 40 mEq 40 mEq Oral Given     02/17/2024 2339 EST apixaban (ELIQUIS) tablet 5 mg 5 mg Oral Given     02/17/2024 2339 EST predniSONE tablet 40 mg 40 mg Oral Given          Past Medical History:   Diagnosis Date    Arthritis     Asthma     Colon polyps     Community acquired pneumonia     last assessed: 5/1/2014    Diabetes mellitus (HCC)     Hemorrhagic prepatellar bursitis, left 10/21/2019    Hepatitis C     High cholesterol     History of colonoscopy 2017    Hypertension     Lymphadenopathy, anterior cervical 04/17/2018    Nephritis and nephropathy, not specified as acute or chronic, with other specified pathological lesion in kidney, in diseases classified elsewhere 3/26/2013    NSTEMI (non-ST elevated myocardial infarction) (HCC) 1/30/2023    s/p [employer] dual chamber PPM 8/31/22 s/p upgrade to BiV ICD 9/13/2023 9/16/2023    Screening for colon cancer 05/01/2019    SIRS (systemic inflammatory response syndrome) ([facility]) 3/19/2023    Thoracic vertebral fracture ([facility]) 06/11/2014     Present on Admission:   Hyperlipidemia   Essential hypertension   Hypothyroidism   History of aortic stenosis s/p TAVR   Chronic HFrEF (heart failure with reduced ejection fraction) ([facility])   Mild intermittent asthma without complication   Stage 3 chronic  kidney disease, unspecified whether stage 3a or 3b CKD ([facility])      Admitting Diagnosis: Atrial fibrillation (HCC) [I48.91]  Leg pain [M79.606]  A-fib (HCC) [I48.91]  Wide-complex tachycardia [R00.0]  Right knee pain [M25.561]  ICD (implantable cardioverter-defibrillator) in place [Z95.810]  Age/Sex: 78 y.o. male    Admission Orders:  Scheduled Medications:  apixaban, 5 mg, Oral, BID  atorvastatin, 80 mg, Oral, Daily With Dinner  cholecalciferol, 2,000 Units, Oral, Daily  clopidogrel, 75 mg, Oral, Daily  Diclofenac Sodium, 2 g, Topical, 4x Daily  Empagliflozin, 10 mg, Oral, QAM  ferrous sulfate, 325 mg, Oral, Every Other Day  furosemide, 40 mg, Oral, BID  insulin glargine, 15 Units, Subcutaneous, HS  insulin lispro, 1-6 Units, Subcutaneous, TID AC  levothyroxine, 125 mcg, Oral, Daily  melatonin, 3 mg, Oral, HS  metoprolol succinate, 75 mg, Oral, BID  montelukast, 10 mg, Oral, Daily  predniSONE, 40 mg, Oral, Daily      Continuous IV Infusions:  amiodarone (CORDARONE) 900 mg in dextrose 5 % 500 mL infusion, 0.5 mg/min, Intravenous, Continuous      PRN Meds:  acetaminophen, 650 mg, Oral, Q6H PRN        IP CONSULT TO CARDIOLOGY  IP CONSULT TO CASE MANAGEMENT    Network Utilization Review Department  ATTENTION: Please call with any questions or concerns to [phone] and carefully listen to the prompts so that you are directed to the right person. All voicemails are confidential.   For Discharge needs, contact Care Management DC Support Team at [phone] opt. 2  Send all requests for admission clinical reviews, approved or denied determinations and any other requests to dedicated fax number below belonging to the campus where the patient is receiving treatment. List of dedicated fax numbers for the Facilities:  FACILITY NAME UR FAX NUMBER   ADMISSION DENIALS (Administrative/Medical Necessity) [phone]   DISCHARGE SUPPORT TEAM (NETWORK) [phone]   PARENT CHILD HEALTH (Maternity/NICU/Pediatrics) [phone]    [facility] [phone]   [facility] [phone]   [facility] [phone]   [facility] [phone]   [facility] [phone]   [facility] [phone]   [facility] [phone]   [facility] [phone]   [facility] [phone]   [facility] [phone]   [facility] [phone]   [facility] [phone]

## 2024-02-19 NOTE — UTILIZATION REVIEW
"NOTIFICATION OF INPATIENT ADMISSION   AUTHORIZATION REQUEST   SERVICING FACILITY:   On license of [facility]  Address: [address] [place] [address]  Tax ID: [id number]  NPI: [id number] ATTENDING PROVIDER:  Attending Name and NPI#: Art Cleaning Md [[id number]]  Address: [address] [place] [address]  Phone: [phone]   ADMISSION INFORMATION:  Place of Service: Inpatient [facility]  Place of Service Code: 21  Inpatient Admission Date/Time: 2/17/24  8:10 PM  Discharge Date/Time: No discharge date for patient encounter.  Admitting Diagnosis Code/Description:  Atrial fibrillation (HCC) [I48.91]  Leg pain [M79.606]  A-fib (HCC) [I48.91]  Wide-complex tachycardia [R00.0]  Right knee pain [M25.561]  ICD (implantable cardioverter-defibrillator) in place [Z95.810]     UTILIZATION REVIEW CONTACT:  Brenda Lazo"Danita Gan Utilization   Network Utilization Review Department  Phone: [phone]  Fax: [phone]  Email: [email or web address]  Contact for approvals/pending authorizations, clinical reviews, and discharge.     PHYSICIAN ADVISORY SERVICES:  Medical Necessity Denial & [place] Review  Phone: [phone]  Fax: [phone]  Email: [email or web address]     DISCHARGE SUPPORT TEAM:  For Patients Discharge Needs & Updates  Phone: [phone] opt. 2 Fax: [phone]  Email: [email or web address]     "

## 2024-02-19 NOTE — PROGRESS NOTES
INTERNAL MEDICINE RESIDENCY PROGRESS NOTE     Name: Juan David Lora   Age & Sex: 78 y.o. male   MRN: [id number]  Unit/Bed#: [facility] 403-01   Encounter: [id number]  Team: SOD Team B     PATIENT INFORMATION     Name: Juan David Lora   Age & Sex: 78 y.o. male   MRN: [id number]  Hospital Stay Days: 2    ASSESSMENT/PLAN     Principal Problem:    Wide-complex tachycardia  Active Problems:    Mild intermittent asthma without complication    Type 2 diabetes mellitus with circulatory disorder, with long-term current use of insulin ([facility])    Hyperlipidemia    Essential hypertension    Hypothyroidism    History of aortic stenosis s/p TAVR    Chronic HFrEF (heart failure with reduced ejection fraction) ([facility])    Stage 3 chronic kidney disease, unspecified whether stage 3a or 3b CKD ([facility])    Pseudogout of right knee      Pseudogout of right knee  Assessment & Plan  Patient presenting with 3 days of right knee swelling, warmth, pain on movement, and decreased range of motion. Interestingly, although potentially unrelated, he started Entresto 3 days prior to admission.     The joint was tapped in the ED and was positive for CCP crystals. ESR and CRP elevated.      Plan:  Ortho following peripherally.  Recommended WBAT LE and medical management of pseudogout   C/w prednisone 40mg x days (SOT 2/18)  Please ensure Prednisone not given close to bedtime as to avoid delirium   Continue to avoid NSAIDs I/c/o CAD, HF       Stage 3 chronic kidney disease, unspecified whether stage 3a or 3b CKD ([facility])  Assessment & Plan  Lab Results   Component Value Date    EGFR 60 02/18/2024    EGFR 56 02/17/2024    EGFR 55 02/09/2024    CREATININE 1.17 02/18/2024    CREATININE 1.23 02/17/2024    CREATININE 1.26 02/09/2024     Patient's baseline Cr ~ 1.05 - 1.3  Had suffered HE at recent hospitalization in Dec of '23    Plan:  Continue to monitor  Avoid nephrotoxic agents  Continue avoiding NSAIDs I/r/t pseudogout    Chronic HFrEF (heart failure with  reduced ejection fraction) ([facility])  Assessment & Plan    Pt's Natural Hx of HF:    Date Dx: March 2023 (Ie, shortly after NSTEMI s/p ELDER x2)  Cause? (If known): Ischemic, reduction in EF in setting of NSTEMI with 80% mid LAD and 90% RPDA stenoses s/p stenting    Subtype: HFrEF  Class: NYHA Class II, Stage C  Cardiologist/HF Specialist: Virginia Gan MD of  [facility]    Dry Weight: ~175-180 lbs (79.4-81.2 kg) ... Nb No official record of dry weight, the value shown previously is based on assumptions per weights prior to pt's first admit for HF exacerbation  Current Weight:   Wt Readings from Last 3 Encounters:   02/18/24 76.8 kg (169 lb 5 oz)   02/13/24 75.3 kg (166 lb)   01/18/24 80.3 kg (177 lb 1.6 oz)      Most Recent Echo (Study Date 12/23/23): LVEF 25-30 %, LV Systolic Dysfxn, Severe with severe Global hypokinesis  Structural Heart Changes?  Atria: LA mod/severe dilation, RA moderate dilation  Ventricles: LV severe global hypokinesis w/ regional variation, RV mild dilation with moderately reduced systolic function  Valves: Normal fxn TAVR, Severe MR, Mod to Severe TR with elevated RV systolic pressure, mild WY    Paced?: BiV ACD    GDMT:   Beta-Blocker: Metoprolol succinate 75 mg BID  ACEi, ARB or ARNi: Entresto 24/26 bid  Previously on Lisinopril 10  SGLT2i- jardiance 10 mg daily  Recently D/C'd OP, now on Entresto instead  Diuretic: Lasix 60 mg daily - taking 40mg in AM and 20mg in PM    ICD? Yes   Date Inserted: 9/13/23    - - - - - - - - - - - - - - - - - - - - - - - - - - - - - - - - - - - - - - - - - - - - - - - - - - - - - - - - - - -     IP Evaluation/Interval HF Assessment:    Assessment:    Pt is currently at dry weight and no concerning s/sx of HF exacerbation I/c/o Vfib events  BP's have been soft so far on admission, may be related to ?recent addition of GDMT meds vs VF.  For this reason will hold Entreso and Lasix for this time, may resume as pressures become more stable  Currently on Amio  which will also drop BP, for this reason continuing with statement as above re: diuresis    Plan:    Continue to hold Lasix/Entresto I/c/o soft BPs  C/w Toprol  Daily Standing weights  Strict I/O  Fluid restriction to 2000 cc  Salt restricted diet to 2 gm           History of aortic stenosis s/p TAVR  Assessment & Plan  TAVR 6/2022, normal function on ECHO 12/2023. Follows with [facility]. On plavix, eliquis, statin.     Plan:  Continue ASA/Plavix & Eliquis       Hypothyroidism  Assessment & Plan  On home levothyroxine 137mcg daily. Subsequent labs TSH 0.352 and free T4 1.36. Potential trigger for a fib.      Plan:  Reduce home levothyroxine dose to 125mcg  Follow-up TSH in setting of wide complex tachycardia       Essential hypertension  Assessment & Plan  Controlled, if not low, at this time. Patient on home regimen of metoprolol (rate control) and lasix BID. Was prescribed Entresto 3 days prior to admission. Is no longer taking spironolactone.      Plan:  Continue home metoprolol  Hold Lasix as above  Discontinue Entresto in setting of low blood pressure       Hyperlipidemia  Assessment & Plan  Lipid panel 12/2023: , TG 74, HDL 35, LDL 63.     Plan:  Continue atorvastatin 80mg       Type 2 diabetes mellitus with circulatory disorder, with long-term current use of insulin ([facility])  Assessment & Plan  Lab Results   Component Value Date    HGBA1C 6.9 (H) 12/23/2023       Recent Labs     02/17/24  2202 02/18/24  0632 02/18/24  1042   POCGLU 109 150* 227*       Blood Sugar Average: Last 72 hrs:  (P) 162    Patients home regimen is Lantus 18U daily, novolog 5U with breakfast and lunch.   Adjusting home regimen (ie, ~reduction in home dose) for continuation while IP  ?Need for further adjustments  AM Sugars well-controlled at this time (150 today) however do caution if BG drops < 140 --> If that is the case then ?Consider decr Lantus hs dose  Prandial sugars too High so far x1 meal (>>200 after breakfast)  --> ?Consider incr SSI algo  This is despite also receiving SGLT2 this AM     Plan:  15U Lantus HS while inpatient; if FBG drops < 140 consider decreasing HS insulin dosing  C/w SSI @ Algo 3; May need to increase Algorithm tomorrow vs add AC Lispro should prandial BG remain >> 200  Diabetic and cardiac diet   POCG HS/AC  Follow BG closely I/c/o also receiving PO hyoglycemic w/ Jardiance  ?Consider holding Jardiance if sugars drop ---> Must reach-out to HF prior to making final decision  Hypoglycemia protocol      Mild intermittent asthma without complication  Assessment & Plan  On home advair and albuterol. Denies any symptoms.      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 Incidentally found on arrival to ED d/t HR 160s. He was entirely symptomatic. He was given one dose of metoprolol in ED without improvement. Cardiology was consulted in ED and patient was started on amio bolus and then drip with improved HR.     On interrogation of AICD, reported 12 shocks for Vtach (ATP). Patient denies any feelings of shocks. Follows with [facility], last seen 1/18/2024, device last interogatted 1/3/24 with 100% afib burden and episodes of a fib w RVR.     On amio drip now.  Monitoring liver and thyroid function.  LFTs show mildly elevated ALK of 107 on 2/19 with normal AST and ALT.  TSH 0.351, free T4 1.36.      HR's have stabilized since being started on maintenance Amio gtt  Currently w/o and complaints including no CP/palpitations/SOB     Plan:  Now s/p Amio load, currently on Amio gtt  Continue on Tele  F/U Cards further rec's           Disposition: To remain IP for now to ensure no further events of possible fatal tachyarrhythmia/allow Amio gtt to reach steady-state. Will re-evaluate for D/C over next 24-48 hours.       SUBJECTIVE     Patient seen and examined. No acute events overnight. Patient reports  improvement of the right knee pain and warmth.  He notes some pain with flexion of the right knee.  He reports right lateral neck pain that has been ongoing since before admission.  He denies chest pain, shortness of breath, palpitations, lightheadedness, dizziness and other ROS symptoms as noted below.      Review of Systems   Constitutional:  Negative for appetite change.   HENT:  Negative for ear pain and sore throat.    Eyes:  Negative for pain.   Respiratory:  Negative for cough and shortness of breath.    Cardiovascular:  Negative for chest pain and palpitations.   Gastrointestinal:  Negative for abdominal pain, constipation, diarrhea, nausea and vomiting.   Genitourinary:  Negative for difficulty urinating, dysuria and hematuria.   Musculoskeletal:  Negative for myalgias.        Right knee pain on flexion   Neurological:  Negative for dizziness, light-headedness and headaches.        OBJECTIVE     Vitals:    24 0625 24 0706 24 0708 24 0859   BP:   101/65 95/63   BP Location:       Pulse:  92     Resp:  18     Temp:  97.9 °F (36.6 °C)     TempSrc:  Oral     SpO2:       Weight: 76.6 kg (168 lb 14 oz)         Temperature:   Temp (24hrs), Av.7 °F (36.5 °C), Min:97.3 °F (36.3 °C), Max:98.2 °F (36.8 °C)    Temperature: 97.9 °F (36.6 °C)  Intake & Output:  I/O          07 07 0701   07 07 07    P.O.  1776 400    I.V. (mL/kg) 265.4 (3.5) 233.5 (3)     Total Intake(mL/kg) 265.4 (3.5) 2009.5 (26.2) 400 (5.2)    Urine (mL/kg/hr) 300  (1.1)     Total Output 300      Net -34.6 -15.5 +400           Unmeasured Urine Occurrence   1 x          Weights:        Body mass index is 26.45 kg/m².  Weight (last 2 days)       Date/Time Weight    24 0625 76.6 (168.87)    24 0600 76.8 (169.31)    24 0519 76.8 (169.31)          Physical Exam  Constitutional:       General: He is not in acute distress.  HENT:      Head: Normocephalic and  atraumatic.      Nose: No congestion or rhinorrhea.      Mouth/Throat:      Pharynx: No oropharyngeal exudate or posterior oropharyngeal erythema.   Eyes:      General:         Right eye: No discharge.         Left eye: No discharge.      Extraocular Movements: Extraocular movements intact.      Pupils: Pupils are equal, round, and reactive to light.   Cardiovascular:      Rate and Rhythm: Normal rate and regular rhythm.      Heart sounds: No murmur heard.  Pulmonary:      Effort: Pulmonary effort is normal.      Breath sounds: Normal breath sounds.   Abdominal:      General: Abdomen is flat. There is no distension.      Palpations: Abdomen is soft.      Tenderness: There is no abdominal tenderness.   Musculoskeletal:      Right lower leg: No edema.      Left lower leg: No edema.   Skin:     General: Skin is warm and dry.   Neurological:      Mental Status: He is alert and oriented to person, place, and time.      Cranial Nerves: No cranial nerve deficit.   Psychiatric:         Mood and Affect: Mood normal.         Behavior: Behavior normal.       LABORATORY DATA     Labs: I have personally reviewed pertinent reports.  Results from last 7 days   Lab Units 02/19/24  0444 02/18/24  0433 02/17/24  1725   WBC Thousand/uL 10.21* 8.47 10.07   HEMOGLOBIN g/dL 12.7 12.7 14.1   HEMATOCRIT % 40.8 40.6 45.3   PLATELETS Thousands/uL 219 226 244   NEUTROS PCT %  --  87* 67   MONOS PCT %  --  3* 11   EOS PCT %  --  0 2      Results from last 7 days   Lab Units 02/19/24  0444 02/18/24  0716 02/17/24  1725   POTASSIUM mmol/L 4.4 5.0 3.8   CHLORIDE mmol/L 102 102 100   CO2 mmol/L 25 28 28   BUN mg/dL 40* 30* 23   CREATININE mg/dL 1.29 1.17 1.23   CALCIUM mg/dL 9.3 10.0 9.9   ALK PHOS U/L 107* 119* 118*   ALT U/L 20 22 24   AST U/L 18 18 21     Results from last 7 days   Lab Units 02/18/24  0716 02/17/24  1725   MAGNESIUM mg/dL 2.7 2.0     Results from last 7 days   Lab Units 02/18/24  0716 02/17/24  2000   PHOSPHORUS mg/dL 4.3* 3.5  "                   IMAGING & DIAGNOSTIC TESTING     Radiology Results: I have personally reviewed pertinent reports.  XR chest 1 view portable    Result Date: 2/18/2024  Impression: Trace right effusion. Lungs clear Workstation performed: [id number]     Other Diagnostic Testing: I have personally reviewed pertinent reports.    ACTIVE MEDICATIONS     Current Facility-Administered Medications   Medication Dose Route Frequency    acetaminophen (TYLENOL) tablet 650 mg  650 mg Oral Q6H PRN    amiodarone (CORDARONE) 900 mg in dextrose 5 % 500 mL infusion  0.5 mg/min Intravenous Continuous    apixaban (ELIQUIS) tablet 5 mg  5 mg Oral BID    atorvastatin (LIPITOR) tablet 80 mg  80 mg Oral Daily With Dinner    cholecalciferol (VITAMIN D3) tablet 2,000 Units  2,000 Units Oral Daily    clopidogrel (PLAVIX) tablet 75 mg  75 mg Oral Daily    Diclofenac Sodium (VOLTAREN) 1 % topical gel 2 g  2 g Topical 4x Daily    Empagliflozin (JARDIANCE) tablet 10 mg  10 mg Oral QAM    ferrous sulfate tablet 325 mg  325 mg Oral Every Other Day    furosemide (LASIX) tablet 40 mg  40 mg Oral BID    insulin glargine (LANTUS) subcutaneous injection 15 Units 0.15 mL  15 Units Subcutaneous HS    insulin lispro (HumaLOG) 100 units/mL subcutaneous injection 1-6 Units  1-6 Units Subcutaneous TID AC    levothyroxine tablet 125 mcg  125 mcg Oral Daily    melatonin tablet 3 mg  3 mg Oral HS    metoprolol succinate (TOPROL-XL) 24 hr tablet 75 mg  75 mg Oral BID    montelukast (SINGULAIR) tablet 10 mg  10 mg Oral Daily    predniSONE tablet 40 mg  40 mg Oral Daily       VTE Pharmacologic Prophylaxis: Eliquis  VTE Mechanical Prophylaxis: sequential compression device    Portions of the record may have been created with voice recognition software.  Occasional wrong word or \"sound a like\" substitutions may have occurred due to the inherent limitations of voice recognition software.  Read the chart carefully and recognize, using context, where substitutions have " occurred.  ==  Shante Bowers  MS-4  [place] [place]

## 2024-02-19 NOTE — PROGRESS NOTES
EP Progress Note.   Unit/Bed#: [facility] 403-01 Encounter: [id number]        Juan David Lora 78 y.o. male [id number]  Hospital Stay Days: 2    Assessment and Plan      Current Problem List   Principal Problem:    Wide-complex tachycardia  Active Problems:    Mild intermittent asthma without complication    Type 2 diabetes mellitus with circulatory disorder, with long-term current use of insulin (HCC)    Hyperlipidemia    Essential hypertension    Hypothyroidism    History of aortic stenosis s/p TAVR    Chronic HFrEF (heart failure with reduced ejection fraction) ([facility])    Stage 3 chronic kidney disease, unspecified whether stage 3a or 3b CKD ([facility])    Pseudogout of right knee    Assessment/Plan:    This is 78-year-old male who initially presented with knee pain and was found to have pseudogout.  Patient was found to have episodes of atrial flutter with RVR for which EP has been consulted.    # Pseudogout on presentation  # Episode of a flutter/fib with RVR-reason for EP consult this admission.  # Previous history of A-fib/flutter-seen on EKGs from last year  # Chronic heart failure reduced EF 25-30%  # Ischemic cardiomyopathy  # Severe AS status post TAVR in   # History of tachybradycardia syndrome, LBBB post TAVR status post [employer] pacemaker upgrade to BiV ICD eventually  # LBBB  # History of CKD, DM 2, HTN, HLD  # Hepatitis C    Plan:    Will price check Dofetilide. If can afford then will start Dofetilide 125 mg BID. If can not afford then can do PO amio loading starting today.   C/w Eliquis 5 mg twice daily for A-fib/flutter anticoagulation.  On Lipitor and Plavix for history of CAD.  GDMT: Jardiance 10 mg daily.  Home Entresto on hold.  Euvolemic on exam.  Continue p.o. Lasix 40 mg twice daily.    Subjective     Patient overall admits to feeling well.  No active complaints.   Objective     Vitals: Temp (24hrs), Av.7 °F (36.5 °C), Min:97.3 °F (36.3 °C), Max:98.2 °F (36.8 °C)  Current: Temperature:  97.9 °F (36.6 °C)  Patient Vitals for the past 24 hrs:   BP Temp Temp src Pulse Resp SpO2 Weight   02/19/24 0859 95/63 -- -- -- -- -- --   02/19/24 0708 101/65 -- -- -- -- -- --   02/19/24 0706 -- 97.9 °F (36.6 °C) Oral 92 18 -- --   02/19/24 0625 -- -- -- -- -- -- 76.6 kg (168 lb 14 oz)   02/19/24 0400 105/70 97.5 °F (36.4 °C) Oral 90 17 95 % --   02/18/24 2355 -- -- -- 87 16 94 % --   02/18/24 2354 103/68 (!) 97.3 °F (36.3 °C) -- -- 16 -- --   02/18/24 1924 106/68 97.6 °F (36.4 °C) Oral 100 22 98 % --   02/18/24 1805 115/68 -- -- 103 -- -- --   02/18/24 1529 108/68 98.2 °F (36.8 °C) Oral 100 -- 98 % --    Body mass index is 26.45 kg/m².  Physical Exam:  Physical Exam  Vitals reviewed.   Constitutional:       General: He is not in acute distress.     Appearance: He is well-developed. He is not diaphoretic.   Cardiovascular:      Rate and Rhythm: Tachycardia present. Rhythm irregular.      Heart sounds: Normal heart sounds. No murmur heard.     No friction rub.   Pulmonary:      Effort: Pulmonary effort is normal. No respiratory distress.      Breath sounds: Normal breath sounds. No stridor. No wheezing.   Abdominal:      General: Bowel sounds are normal. There is no distension.      Palpations: Abdomen is soft.      Tenderness: There is no abdominal tenderness. There is no guarding.         Invasive Devices       Peripheral Intravenous Line  Duration             Peripheral IV 02/17/24 Left Antecubital 1 day    Peripheral IV 02/17/24 Right Antecubital 1 day                        Labs:   Results from last 7 days   Lab Units 02/19/24  0444 02/18/24  0433 02/17/24  1725   WBC Thousand/uL 10.21* 8.47 10.07   HEMOGLOBIN g/dL 12.7 12.7 14.1   HEMATOCRIT % 40.8 40.6 45.3   PLATELETS Thousands/uL 219 226 244   NEUTROS PCT %  --  87* 67   MONOS PCT %  --  3* 11   EOS PCT %  --  0 2      Results from last 7 days   Lab Units 02/19/24  0444 02/18/24  0716 02/17/24 2000 02/17/24  1725   SODIUM mmol/L 137 138  --  137  "  POTASSIUM mmol/L 4.4 5.0  --  3.8   CHLORIDE mmol/L 102 102  --  100   CO2 mmol/L 25 28  --  28   BUN mg/dL 40* 30*  --  23   CREATININE mg/dL 1.29 1.17  --  1.23   CALCIUM mg/dL 9.3 10.0  --  9.9   ALK PHOS U/L 107* 119*  --  118*   ALT U/L 20 22  --  24   AST U/L 18 18  --  21   SED RATE mm/hour  --   --  54*  --    CRP mg/L  --   --  16.2*  --    MAGNESIUM mg/dL  --  2.7  --  2.0   PHOSPHORUS mg/dL  --  4.3* 3.5  --    EGFR ml/min/1.73sq m 53 60  --  56                 No results found for: \"PHART\", \"NNR2HAX\", \"PO2ART\", \"DVD2GYE\", \"D5VWQXRC\", \"BEART\", \"SOURCE\"  No components found for: \"HIV1X2\"  Lab Results   Component Value Date    HAV Reactive (A) 05/08/2019    HEPAIGM Non-reactive 05/08/2019    HEPBIGM Non-reactive 01/25/2022    HEPBCAB Reactive (A) 01/25/2022    HEPCAB High Reactive (A) 01/25/2022     No results found for: \"SPEP\", \"UPEP\"   Lab Results   Component Value Date    HGBA1C 6.9 (H) 12/23/2023    HGBA1C 7.6 (H) 02/10/2023    HGBA1C 7.6 (A) 11/08/2022     Lab Results   Component Value Date    CHOL 171 02/12/2014      Lab Results   Component Value Date    HDL 35 (L) 12/23/2023    HDL 53 02/10/2023    HDL 65 11/10/2022      Lab Results   Component Value Date    LDLCALC 63 12/23/2023    LDLCALC 72 02/10/2023    LDLCALC 62 11/10/2022      Lab Results   Component Value Date    TRIG 74 12/23/2023    TRIG 61 02/10/2023    TRIG 75 11/10/2022     No components found for: \"PROCAL\"      Micro:  Results from last 7 days   Lab Units 02/17/24 2000   GRAM STAIN RESULT  3+ Polys  No organisms seen     Urinalysis:  No results found for: \"AMPHETUR\", \"BDZUR\", \"COCAINEUR\", \"OPIATEUR\", \"PCPUR\", \"THCUR\", \"ETOH\", \"ACTMNPHEN\", \"SALICYLATE\"       Invalid input(s): \"URIBILINOGEN\"        Intake and Outputs:  I/O         02/17 0701  02/18 0700 02/18 0701  02/19 0700 02/19 0701 02/20 0700    P.O.  1776 400    I.V. (mL/kg) 265.4 (3.5) 233.5 (3)     Total Intake(mL/kg) 265.4 (3.5) 2009.5 (26.2) 400 (5.2)    Urine (mL/kg/hr) " 300 2025 (1.1)     Total Output 300 2025     Net -34.6 -15.5 +400           Unmeasured Urine Occurrence   1 x          Nutrition:  Diet [place]/CHO Controlled; Consistent Carbohydrate Diet Level 3 (6 carb servings/90 grams CHO/meal); Cardiac  Radiology Results:   XR chest 1 view portable   ED Interpretation by Francisco Mohan MD (02/17 1756)   No fluid overload  No Lead fx.  AICD in place.  No pneumonia.      Final Result by Kayli Rodrigues MD (02/18 0834)      Trace right effusion.      Lungs clear            Workstation performed: [id number]         XR knee 1 or 2 vw right    (Results Pending)     Scheduled Medications:  apixaban, 5 mg, BID  atorvastatin, 80 mg, Daily With Dinner  cholecalciferol, 2,000 Units, Daily  clopidogrel, 75 mg, Daily  Diclofenac Sodium, 2 g, 4x Daily  Empagliflozin, 10 mg, QAM  ferrous sulfate, 325 mg, Every Other Day  furosemide, 40 mg, BID  insulin glargine, 15 Units, HS  insulin lispro, 1-6 Units, TID AC  levothyroxine, 125 mcg, Daily  melatonin, 3 mg, HS  metoprolol succinate, 75 mg, BID  montelukast, 10 mg, Daily  predniSONE, 40 mg, Daily      PRN MEDS:  acetaminophen, 650 mg, Q6H PRN      Last 24 Hour Meds: :   Medication Administration - last 24 hours from 02/18/2024 1109 to 02/19/2024 1109         Date/Time Order Dose Route Action Action by     02/18/2024 1803 EST amiodarone (CORDARONE) 900 mg in dextrose 5 % 500 mL infusion 0.5 mg/min Intravenous New Bag Luisa Perez RN     02/19/2024 0858 EST cholecalciferol (VITAMIN D3) tablet 2,000 Units 2,000 Units Oral Given Catie Dewey RN     02/19/2024 0857 EST Empagliflozin (JARDIANCE) tablet 10 mg 10 mg Oral Given Catie Dewey RN     02/19/2024 0904 EST furosemide (LASIX) tablet 40 mg 40 mg Oral Not Given Catie Dewey RN     02/18/2024 1805 EST furosemide (LASIX) tablet 40 mg 40 mg Oral Given Luisa Perez RN     02/18/2024 2112 EST insulin glargine (LANTUS) subcutaneous injection 15 Units 0.15 mL 15  Units Subcutaneous Given Zainab Bullard, TRIP     02/19/2024 0858 EST montelukast (SINGULAIR) tablet 10 mg 10 mg Oral Given Catie Dewey, RN     02/18/2024 1805 EST atorvastatin (LIPITOR) tablet 80 mg 80 mg Oral Given Luisa Perez, RN     02/19/2024 0636 EST insulin lispro (HumaLOG) 100 units/mL subcutaneous injection 1-6 Units -- Subcutaneous Not Given Zainab Bullard, TRIP     02/18/2024 1806 EST insulin lispro (HumaLOG) 100 units/mL subcutaneous injection 1-6 Units 2 Units Subcutaneous Given Luisa Perez, RN     02/18/2024 1207 EST insulin lispro (HumaLOG) 100 units/mL subcutaneous injection 1-6 Units 2 Units Subcutaneous Given Luisa Perez, RN     02/19/2024 0857 EST apixaban (ELIQUIS) tablet 5 mg 5 mg Oral Given Catie Dewey, RN     02/18/2024 1805 EST apixaban (ELIQUIS) tablet 5 mg 5 mg Oral Given Luisa Perez, RN     02/19/2024 0857 EST clopidogrel (PLAVIX) tablet 75 mg 75 mg Oral Given Catie Dewey, RN     02/19/2024 0853 EST Diclofenac Sodium (VOLTAREN) 1 % topical gel 2 g 2 g Topical Not Given Catie Dewey, RN     02/18/2024 2108 EST Diclofenac Sodium (VOLTAREN) 1 % topical gel 2 g 2 g Topical Given Zainab Bullard, RN     02/18/2024 1805 EST Diclofenac Sodium (VOLTAREN) 1 % topical gel 2 g 2 g Topical Not Given Luisa Perez, RN     02/18/2024 1206 EST Diclofenac Sodium (VOLTAREN) 1 % topical gel 2 g 2 g Topical Not Given Luisa Perez, RN     02/19/2024 0905 EST metoprolol succinate (TOPROL-XL) 24 hr tablet 75 mg 75 mg Oral Not Given Catie Dewey, RN     02/18/2024 1805 EST metoprolol succinate (TOPROL-XL) 24 hr tablet 75 mg 75 mg Oral Given Luisa Perez, RN     02/18/2024 1208 EST predniSONE tablet 40 mg 40 mg Oral Given Luisa Perez, TRIP     02/19/2024 0858 EST levothyroxine tablet 125 mcg 125 mcg Oral Given Catie Dewey, RN     02/19/2024 0109 EST melatonin tablet 3 mg 3 mg Oral Given Zainab Bullard RN            PLEASE NOTE:  This encounter was completed  utilizing the [employer] Direct Speech Voice Recognition Software. Grammatical errors, random word insertions, pronoun errors and incomplete sentences are occasional consequences of the system due to software limitations, ambient noise and hardware issues.These may be missed by proof reading prior to affixing electronic signature. Any questions or concerns about the content, text or information contained within the body of this dictation should be directly addressed to the physician for clarification. Please do not hesitate to call me directly if you have any any questions or concerns.

## 2024-02-19 NOTE — PLAN OF CARE
Problem: PHYSICAL THERAPY ADULT  Goal: Performs mobility at highest level of function for planned discharge setting.  See evaluation for individualized goals.  Description: Treatment/Interventions: Functional transfer training, LE strengthening/ROM, Elevations, Therapeutic exercise, Endurance training, Equipment eval/education, Bed mobility, Gait training, Spoke to nursing, OT  Equipment Recommended: Walker       See flowsheet documentation for full assessment, interventions and recommendations.  Outcome: Progressing  Note: Prognosis: Good  Problem List: Decreased strength, Decreased endurance, Impaired balance, Decreased mobility  Assessment: Patient received in bed. Patient agreeable to therapy. Patient performs bed mobility with supervision. Patient performs functional transfers with minimal assistance x1 using rolling walker. Patient performs ambulation with minimal assistance x1 using rolling walker, 100'x3 with standing vs seated rest periods between trials. Patient with fair noted balance. Plan to trial no AD and stairs next session as able.  Patient left in bed with all needs met and call bell/personal items within reach. The patient's AM-PAC Basic Mobility Inpatient Short Form Raw Score is 17 showing further need for skilled PT services in order to improve functional mobility, decrease need for assistance, and return to PLOF. PT is recommending Level 3 - Minimum Resource Intensity on d/c from hospital.  Will continue to follow as able.  Barriers to Discharge: Inaccessible home environment     Rehab Resource Intensity Level, PT: III (Minimum Resource Intensity)    See flowsheet documentation for full assessment.

## 2024-02-20 ENCOUNTER — HOME HEALTH ADMISSION (OUTPATIENT)
Dept: HOME HEALTH SERVICES | Facility: HOME HEALTHCARE | Age: 79
End: 2024-02-20
Payer: MEDICARE

## 2024-02-20 LAB
ALBUMIN SERPL BCP-MCNC: 3.8 G/DL (ref 3.5–5)
ALP SERPL-CCNC: 110 U/L (ref 34–104)
ALT SERPL W P-5'-P-CCNC: 35 U/L (ref 7–52)
ANION GAP SERPL CALCULATED.3IONS-SCNC: 11 MMOL/L
AST SERPL W P-5'-P-CCNC: 35 U/L (ref 13–39)
BILIRUB SERPL-MCNC: 1.29 MG/DL (ref 0.2–1)
BUN SERPL-MCNC: 57 MG/DL (ref 5–25)
CALCIUM SERPL-MCNC: 9.5 MG/DL (ref 8.4–10.2)
CHLORIDE SERPL-SCNC: 101 MMOL/L (ref 96–108)
CO2 SERPL-SCNC: 24 MMOL/L (ref 21–32)
CREAT SERPL-MCNC: 1.44 MG/DL (ref 0.6–1.3)
ERYTHROCYTE [DISTWIDTH] IN BLOOD BY AUTOMATED COUNT: 18.6 % (ref 11.6–15.1)
GFR SERPL CREATININE-BSD FRML MDRD: 46 ML/MIN/1.73SQ M
GLUCOSE SERPL-MCNC: 103 MG/DL (ref 65–140)
GLUCOSE SERPL-MCNC: 107 MG/DL (ref 65–140)
GLUCOSE SERPL-MCNC: 123 MG/DL (ref 65–140)
GLUCOSE SERPL-MCNC: 152 MG/DL (ref 65–140)
GLUCOSE SERPL-MCNC: 98 MG/DL (ref 65–140)
HCT VFR BLD AUTO: 42.9 % (ref 36.5–49.3)
HGB BLD-MCNC: 13.4 G/DL (ref 12–17)
MAGNESIUM SERPL-MCNC: 2.5 MG/DL (ref 1.9–2.7)
MCH RBC QN AUTO: 26.4 PG (ref 26.8–34.3)
MCHC RBC AUTO-ENTMCNC: 31.2 G/DL (ref 31.4–37.4)
MCV RBC AUTO: 85 FL (ref 82–98)
PHOSPHATE SERPL-MCNC: 4.4 MG/DL (ref 2.3–4.1)
PLATELET # BLD AUTO: 220 THOUSANDS/UL (ref 149–390)
PMV BLD AUTO: 11.5 FL (ref 8.9–12.7)
POTASSIUM SERPL-SCNC: 4.4 MMOL/L (ref 3.5–5.3)
PROT SERPL-MCNC: 7 G/DL (ref 6.4–8.4)
RBC # BLD AUTO: 5.07 MILLION/UL (ref 3.88–5.62)
SODIUM SERPL-SCNC: 136 MMOL/L (ref 135–147)
WBC # BLD AUTO: 8.34 THOUSAND/UL (ref 4.31–10.16)

## 2024-02-20 PROCEDURE — 82948 REAGENT STRIP/BLOOD GLUCOSE: CPT

## 2024-02-20 PROCEDURE — 80053 COMPREHEN METABOLIC PANEL: CPT

## 2024-02-20 PROCEDURE — 97535 SELF CARE MNGMENT TRAINING: CPT

## 2024-02-20 PROCEDURE — 85027 COMPLETE CBC AUTOMATED: CPT

## 2024-02-20 PROCEDURE — NC001 PR NO CHARGE: Performed by: INTERNAL MEDICINE

## 2024-02-20 PROCEDURE — 83735 ASSAY OF MAGNESIUM: CPT

## 2024-02-20 PROCEDURE — 93005 ELECTROCARDIOGRAM TRACING: CPT

## 2024-02-20 PROCEDURE — 93010 ELECTROCARDIOGRAM REPORT: CPT | Performed by: INTERNAL MEDICINE

## 2024-02-20 PROCEDURE — 97530 THERAPEUTIC ACTIVITIES: CPT

## 2024-02-20 PROCEDURE — 84100 ASSAY OF PHOSPHORUS: CPT

## 2024-02-20 PROCEDURE — 99232 SBSQ HOSP IP/OBS MODERATE 35: CPT | Performed by: INTERNAL MEDICINE

## 2024-02-20 PROCEDURE — 97116 GAIT TRAINING THERAPY: CPT

## 2024-02-20 RX ORDER — FUROSEMIDE 40 MG/1
40 TABLET ORAL 2 TIMES DAILY
Status: DISCONTINUED | OUTPATIENT
Start: 2024-02-20 | End: 2024-02-22

## 2024-02-20 RX ORDER — INSULIN GLARGINE 100 [IU]/ML
10 INJECTION, SOLUTION SUBCUTANEOUS
Status: DISCONTINUED | OUTPATIENT
Start: 2024-02-20 | End: 2024-02-23 | Stop reason: HOSPADM

## 2024-02-20 RX ORDER — METOPROLOL TARTRATE 1 MG/ML
5 INJECTION, SOLUTION INTRAVENOUS EVERY 6 HOURS PRN
Status: DISCONTINUED | OUTPATIENT
Start: 2024-02-20 | End: 2024-02-23 | Stop reason: HOSPADM

## 2024-02-20 RX ADMIN — MONTELUKAST 10 MG: 10 TABLET, FILM COATED ORAL at 09:04

## 2024-02-20 RX ADMIN — INSULIN GLARGINE 10 UNITS: 100 INJECTION, SOLUTION SUBCUTANEOUS at 21:12

## 2024-02-20 RX ADMIN — PREDNISONE 40 MG: 20 TABLET ORAL at 13:07

## 2024-02-20 RX ADMIN — METOPROLOL SUCCINATE 75 MG: 50 TABLET, EXTENDED RELEASE ORAL at 19:00

## 2024-02-20 RX ADMIN — FUROSEMIDE 40 MG: 40 TABLET ORAL at 19:00

## 2024-02-20 RX ADMIN — Medication 2000 UNITS: at 09:03

## 2024-02-20 RX ADMIN — DOFETILIDE 125 MCG: 0.12 CAPSULE ORAL at 21:12

## 2024-02-20 RX ADMIN — APIXABAN 5 MG: 5 TABLET, FILM COATED ORAL at 19:00

## 2024-02-20 RX ADMIN — FERROUS SULFATE TAB 325 MG (65 MG ELEMENTAL FE) 325 MG: 325 (65 FE) TAB at 09:04

## 2024-02-20 RX ADMIN — MELATONIN 3 MG: at 21:12

## 2024-02-20 RX ADMIN — ATORVASTATIN CALCIUM 80 MG: 80 TABLET, FILM COATED ORAL at 19:00

## 2024-02-20 RX ADMIN — APIXABAN 5 MG: 5 TABLET, FILM COATED ORAL at 09:04

## 2024-02-20 RX ADMIN — METOPROLOL SUCCINATE 75 MG: 50 TABLET, EXTENDED RELEASE ORAL at 09:04

## 2024-02-20 RX ADMIN — DOFETILIDE 125 MCG: 0.12 CAPSULE ORAL at 09:03

## 2024-02-20 RX ADMIN — CLOPIDOGREL BISULFATE 75 MG: 75 TABLET ORAL at 09:04

## 2024-02-20 RX ADMIN — EMPAGLIFLOZIN 10 MG: 10 TABLET, FILM COATED ORAL at 09:03

## 2024-02-20 RX ADMIN — FUROSEMIDE 40 MG: 40 TABLET ORAL at 09:03

## 2024-02-20 RX ADMIN — LEVOTHYROXINE SODIUM 125 MCG: 125 TABLET ORAL at 09:04

## 2024-02-20 NOTE — PLAN OF CARE
Problem: PHYSICAL THERAPY ADULT  Goal: Performs mobility at highest level of function for planned discharge setting.  See evaluation for individualized goals.  Description: Treatment/Interventions: Functional transfer training, LE strengthening/ROM, Elevations, Therapeutic exercise, Endurance training, Equipment eval/education, Bed mobility, Gait training, Spoke to nursing, OT  Equipment Recommended: Walker       See flowsheet documentation for full assessment, interventions and recommendations.  Outcome: Progressing  Note: Prognosis: Good  Problem List: Decreased strength, Decreased endurance, Impaired balance, Decreased mobility, Pain, Decreased coordination  Assessment: Pt seen for physical therapy session for setup, bed mob, time spentEOB, transfers, gait w/ rest time, repositioning.  Pt cooperative w/ session, willing to participate.  c/o R knee pain, worse w/ mvmt or mobility.  Note continued improved mobility tolerance.  some mild LOB w/ gait, cues for safety w/ RW.  continue to anticipate d/c home when stable w/ Level III (minimal) post acute care therapy needs at d/c  Barriers to Discharge: Inaccessible home environment     Rehab Resource Intensity Level, PT: III (Minimum Resource Intensity)    See flowsheet documentation for full assessment.

## 2024-02-20 NOTE — PROGRESS NOTES
INTERNAL MEDICINE RESIDENCY PROGRESS NOTE     Name: Juan David Lora   Age & Sex: 78 y.o. male   MRN: [id number]  Unit/Bed#: [facility] 403-01   Encounter: [id number]  Team: SOD Team B     PATIENT INFORMATION     Name: Juan David Lora   Age & Sex: 78 y.o. male   MRN: [id number]  Hospital Stay Days: 3    ASSESSMENT/PLAN     Principal Problem:    Wide-complex tachycardia  Active Problems:    Mild intermittent asthma without complication    Type 2 diabetes mellitus with circulatory disorder, with long-term current use of insulin ([facility])    Hyperlipidemia    Essential hypertension    Hypothyroidism    History of aortic stenosis s/p TAVR    Chronic HFrEF (heart failure with reduced ejection fraction) ([facility])    Stage 3 chronic kidney disease, unspecified whether stage 3a or 3b CKD ([facility])    Pseudogout of right knee      Pseudogout of right knee  Assessment & Plan  Patient presenting with 3 days of right knee swelling, warmth, pain on movement, and decreased range of motion. Interestingly, although potentially unrelated, he started Entresto 3 days prior to admission.     The joint was tapped in the ED and was positive for CCP crystals. ESR and CRP elevated.     2/12: Patient reports that he is pain-free this morning and that he was able to ambulate pain free on 2/11 with the physical therapist.       Plan:  Ortho following peripherally.  Recommended WBAT LE and medical management of pseudogout   C/w prednisone 40mg x days (SOT 2/18)  Please ensure Prednisone not given close to bedtime as to avoid delirium   Continue to avoid NSAIDs I/c/o CAD, HF       Stage 3 chronic kidney disease, unspecified whether stage 3a or 3b CKD ([facility])  Assessment & Plan  Lab Results   Component Value Date    EGFR 60 02/18/2024    EGFR 56 02/17/2024    EGFR 55 02/09/2024    CREATININE 1.17 02/18/2024    CREATININE 1.23 02/17/2024    CREATININE 1.26 02/09/2024     Patient's baseline Cr ~ 1.05 - 1.3  Had suffered HE at recent hospitalization in Dec of  '23    Plan:  Continue to monitor  Avoid nephrotoxic agents  Continue avoiding NSAIDs I/r/t pseudogout    Chronic HFrEF (heart failure with reduced ejection fraction) ([facility])  Assessment & Plan    Pt's Natural Hx of HF:    Date Dx: March 2023 (Ie, shortly after NSTEMI s/p ELDER x2)  Cause? (If known): Ischemic, reduction in EF in setting of NSTEMI with 80% mid LAD and 90% RPDA stenoses s/p stenting    Subtype: HFrEF  Class: NYHA Class II, Stage C  Cardiologist/HF Specialist: Virginia Gna MD of  [facility]    Dry Weight: ~175-180 lbs (79.4-81.2 kg) ... Nb No official record of dry weight, the value shown previously is based on assumptions per weights prior to pt's first admit for HF exacerbation  Current Weight:   Wt Readings from Last 3 Encounters:   02/18/24 76.8 kg (169 lb 5 oz)   02/13/24 75.3 kg (166 lb)   01/18/24 80.3 kg (177 lb 1.6 oz)      Most Recent Echo (Study Date 12/23/23): LVEF 25-30 %, LV Systolic Dysfxn, Severe with severe Global hypokinesis  Structural Heart Changes?  Atria: LA mod/severe dilation, RA moderate dilation  Ventricles: LV severe global hypokinesis w/ regional variation, RV mild dilation with moderately reduced systolic function  Valves: Normal fxn TAVR, Severe MR, Mod to Severe TR with elevated RV systolic pressure, mild CO    Paced?: BiV ACD    GDMT:   Beta-Blocker: Metoprolol succinate 75 mg BID  ACEi, ARB or ARNi: Entresto 24/26 bid  Previously on Lisinopril 10  SGLT2i- jardiance 10 mg daily  Recently D/C'd OP, now on Entresto instead  Diuretic: Lasix 60 mg daily - taking 40mg in AM and 20mg in PM    ICD? Yes   Date Inserted: 9/13/23    - - - - - - - - - - - - - - - - - - - - - - - - - - - - - - - - - - - - - - - - - - - - - - - - - - - - - - - - - - -     IP Evaluation/Interval HF Assessment:    Assessment:    Pt is currently at dry weight and no concerning s/sx of HF exacerbation I/c/o Vfib events  BP's have been soft so far on admission, may be related to ?recent addition  of GDMT meds vs VF.  For this reason will hold Entreso and Lasix for this time, may resume as pressures become more stable  Currently on Amio which will also drop BP, for this reason continuing with statement as above re: diuresis    Plan:    Continue to hold Entresto I/c/o soft Bps  Per cardiology, okay to continue metoprolol succinate and lasix   Daily Standing weights  Strict I/O  Fluid restriction to 2000 cc  Salt restricted diet to 2 gm           History of aortic stenosis s/p TAVR  Assessment & Plan  TAVR 6/2022, normal function on ECHO 12/2023. [facility]. On plavix, eliquis, statin.     Plan:  Continue ASA/Plavix & Eliquis       Hypothyroidism  Assessment & Plan  On home levothyroxine 137mcg daily. Subsequent labs TSH 0.352 and free T4 1.36. Potential trigger for a fib.      Plan:  Reduce home levothyroxine dose to 125mcg  Follow-up TSH in setting of wide complex tachycardia       Essential hypertension  Assessment & Plan  Controlled, if not low, at this time. Patient on home regimen of metoprolol (rate control) and lasix BID. Was prescribed Entresto 3 days prior to admission. Is no longer taking spironolactone.      Plan:  Continue home metoprolol  Lasix restarted  Discontinued Entresto in setting of low blood pressure       Hyperlipidemia  Assessment & Plan  Lipid panel 12/2023: , TG 74, HDL 35, LDL 63.     Plan:  Continue atorvastatin 80mg       Type 2 diabetes mellitus with circulatory disorder, with long-term current use of insulin ([facility])  Assessment & Plan  Lab Results   Component Value Date    HGBA1C 6.9 (H) 12/23/2023       Recent Labs     02/17/24  2202 02/18/24  0632 02/18/24  1042   POCGLU 109 150* 227*       Blood Sugar Average: Last 72 hrs:  (P) 162    Patients home regimen is Lantus 18U daily, novolog 5U with breakfast and lunch.   Adjusting home regimen (ie, ~reduction in home dose) for continuation while IP  ?Need for further adjustments  AM Sugars well-controlled  at this time (150 today) however do caution if BG drops < 140 --> If that is the case then ?Consider decr Lantus hs dose  Prandial sugars too High so far x1 meal (>>200 after breakfast) --> ?Consider incr SSI algo  This is despite also receiving SGLT2 this AM     Plan:  15U Lantus HS while inpatient; if FBG drops < 140 consider decreasing HS insulin dosing  C/w SSI @ Algo 3; May need to increase Algorithm tomorrow vs add AC Lispro should prandial BG remain >> 200  Diabetic and cardiac diet   POCG HS/AC  Follow BG closely I/c/o also receiving PO hyoglycemic w/ Jardiance  ?Consider holding Jardiance if sugars drop ---> Must reach-out to HF prior to making final decision  Hypoglycemia protocol      Mild intermittent asthma without complication  Assessment & Plan  On home advair and albuterol. Denies any symptoms.      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 Incidentally found on arrival to ED d/t HR 160s. He was entirely symptomatic. He was given one dose of metoprolol in ED without improvement. Cardiology was consulted in ED and patient was started on amio bolus and then drip with improved HR.     On interrogation of AICD, reported 12 shocks for Vtach (ATP). Patient denies any feelings of shocks. Follows with [facility] Cardiology, last seen 1/18/2024, device last interogatted 1/3/24 with 100% afib burden and episodes of a fib w RVR.     On amio drip now.  Monitoring liver and thyroid function.  LFTs show mildly elevated ALK of 107 on 2/19 with normal AST and ALT.  TSH 0.351, free T4 1.36.      HR's have stabilized since being started on maintenance Amio gtt  Currently w/o and complaints including no CP/palpitations/SOB     Plan:  Now s/p Amio load, currently on Amio gtt  Continue on Tele  F/U Cards further rec's           Disposition: Remain inpatient for Tikosyn load and cardioversion in 2-3 days       SUBJECTIVE     Patient seen and examined. No acute events overnight. Patient reported experiencing nausea abdominal pain after dinner last evening that has since resolved.  He denies right knee pain this morning.  He reports pain-free ambulation with physical therapist yesterday.  Patient denies ROS complaints this morning as below.  Patient declined offer to update family.      Review of Systems   Constitutional:  Negative for appetite change.   HENT:  Negative for ear pain and sore throat.    Eyes:  Negative for pain and visual disturbance.   Respiratory:  Negative for cough and shortness of breath.    Cardiovascular:  Negative for chest pain and palpitations.   Gastrointestinal:  Negative for abdominal pain, constipation, diarrhea, nausea and vomiting.   Genitourinary:  Negative for difficulty urinating, dysuria and hematuria.   Musculoskeletal:  Negative for arthralgias and myalgias.   Neurological:  Negative for dizziness, light-headedness and headaches.        OBJECTIVE     Vitals:    24 0700 24 0701 24 0904 24 1023   BP:   105/69 94/59   Pulse:   86 72   Resp:    16   Temp:    (!) 97.2 °F (36.2 °C)   TempSrc:       SpO2:  99%  97%   Weight: 76.4 kg (168 lb 6.9 oz)         Temperature:   Temp (24hrs), Av.5 °F (36.4 °C), Min:97.2 °F (36.2 °C), Max:98 °F (36.7 °C)    Temperature: (!) 97.2 °F (36.2 °C)  Intake & Output:  I/O          07 07 07 07 07 0700    P.O. 1776 1038 416    I.V. (mL/kg) 233.5 (3) 339.6 (4.4)     Total Intake(mL/kg) 2009.5 (26.2) 1377.6 (18) 416 (5.4)    Urine (mL/kg/hr)  (1.1) 1050 (0.6) 300 (1.1)    Total Output  1050 300    Net -15.5 +327.6 +116           Unmeasured Urine Occurrence  2 x           Weights:        Body mass index is 26.38 kg/m².  Weight (last 2 days)       Date/Time Weight    24 0700 76.4 (168.43)    24 0625 76.6 (168.87)    24 0600 76.8 (169.31)    24 0519 76.8  (169.31)          Physical Exam  Constitutional:       General: He is not in acute distress.  HENT:      Head: Normocephalic and atraumatic.      Right Ear: External ear normal.      Left Ear: External ear normal.      Nose: No congestion or rhinorrhea.      Mouth/Throat:      Pharynx: No oropharyngeal exudate or posterior oropharyngeal erythema.   Eyes:      General:         Right eye: No discharge.         Left eye: No discharge.      Extraocular Movements: Extraocular movements intact.      Pupils: Pupils are equal, round, and reactive to light.   Cardiovascular:      Rate and Rhythm: Normal rate and regular rhythm.      Heart sounds: No murmur heard.  Pulmonary:      Effort: Pulmonary effort is normal.      Breath sounds: Normal breath sounds.   Abdominal:      General: Abdomen is flat. There is no distension.      Palpations: Abdomen is soft.      Tenderness: There is no abdominal tenderness.   Musculoskeletal:      Right lower leg: No edema.      Left lower leg: No edema.   Skin:     General: Skin is warm and dry.   Neurological:      Mental Status: He is alert and oriented to person, place, and time.      Cranial Nerves: No cranial nerve deficit.   Psychiatric:         Mood and Affect: Mood normal.         Behavior: Behavior normal.       LABORATORY DATA     Labs: I have personally reviewed pertinent reports.  Results from last 7 days   Lab Units 02/20/24  0503 02/19/24  0444 02/18/24  0433 02/17/24  1725   WBC Thousand/uL 8.34 10.21* 8.47 10.07   HEMOGLOBIN g/dL 13.4 12.7 12.7 14.1   HEMATOCRIT % 42.9 40.8 40.6 45.3   PLATELETS Thousands/uL 220 219 226 244   NEUTROS PCT %  --   --  87* 67   MONOS PCT %  --   --  3* 11   EOS PCT %  --   --  0 2      Results from last 7 days   Lab Units 02/20/24  0503 02/19/24  0444 02/18/24  0716   POTASSIUM mmol/L 4.4 4.4 5.0   CHLORIDE mmol/L 101 102 102   CO2 mmol/L 24 25 28   BUN mg/dL 57* 40* 30*   CREATININE mg/dL 1.44* 1.29 1.17   CALCIUM mg/dL 9.5 9.3 10.0   ALK PHOS  U/L 110* 107* 119*   ALT U/L 35 20 22   AST U/L 35 18 18     Results from last 7 days   Lab Units 02/20/24  0503 02/18/24  0716 02/17/24  1725   MAGNESIUM mg/dL 2.5 2.7 2.0     Results from last 7 days   Lab Units 02/20/24  0503 02/18/24  0716 02/17/24  2000   PHOSPHORUS mg/dL 4.4* 4.3* 3.5                    IMAGING & DIAGNOSTIC TESTING     Radiology Results: I have personally reviewed pertinent reports.  XR knee 1 or 2 vw right    Result Date: 2/20/2024  Impression: Mild degenerative changes. Joint effusion. Pseudogout. No acute osseous abnormality. Workstation performed: [id number]     XR chest 1 view portable    Result Date: 2/18/2024  Impression: Trace right effusion. Lungs clear Workstation performed: [id number]     Other Diagnostic Testing: I have personally reviewed pertinent reports.    ACTIVE MEDICATIONS     Current Facility-Administered Medications   Medication Dose Route Frequency    acetaminophen (TYLENOL) tablet 650 mg  650 mg Oral Q6H PRN    apixaban (ELIQUIS) tablet 5 mg  5 mg Oral BID    atorvastatin (LIPITOR) tablet 80 mg  80 mg Oral Daily With Dinner    cholecalciferol (VITAMIN D3) tablet 2,000 Units  2,000 Units Oral Daily    clopidogrel (PLAVIX) tablet 75 mg  75 mg Oral Daily    Diclofenac Sodium (VOLTAREN) 1 % topical gel 2 g  2 g Topical 4x Daily    dofetilide (TIKOSYN) capsule 125 mcg  125 mcg Oral Q12H DESTINEE    Empagliflozin (JARDIANCE) tablet 10 mg  10 mg Oral QAM    ferrous sulfate tablet 325 mg  325 mg Oral Every Other Day    furosemide (LASIX) tablet 40 mg  40 mg Oral BID    insulin glargine (LANTUS) subcutaneous injection 15 Units 0.15 mL  15 Units Subcutaneous HS    insulin lispro (HumaLOG) 100 units/mL subcutaneous injection 1-6 Units  1-6 Units Subcutaneous TID AC    levothyroxine tablet 125 mcg  125 mcg Oral Daily    melatonin tablet 3 mg  3 mg Oral HS    metoprolol succinate (TOPROL-XL) 24 hr tablet 75 mg  75 mg Oral BID    montelukast (SINGULAIR) tablet 10 mg  10 mg Oral Daily     "predniSONE tablet 40 mg  40 mg Oral Daily    trimethobenzamide (TIGAN) IM injection 200 mg  200 mg Intramuscular Q6H PRN       VTE Pharmacologic Prophylaxis: Eliquis  VTE Mechanical Prophylaxis: sequential compression device    Portions of the record may have been created with voice recognition software.  Occasional wrong word or \"sound a like\" substitutions may have occurred due to the inherent limitations of voice recognition software.  Read the chart carefully and recognize, using context, where substitutions have occurred.  ==  Shante Bowers  MS-4  [place]        "

## 2024-02-20 NOTE — PHYSICAL THERAPY NOTE
Physical Therapy Treatment Note       02/20/24 0945   PT Last Visit   PT Visit Date 02/20/24   Note Type   Note Type Treatment   Pain Assessment   Pain Assessment Tool 0-10   Pain Score 5   Pain Location/Orientation Orientation: Right;Location: Knee   Patient's Stated Pain Goal No pain   Hospital Pain Intervention(s) Ambulation/increased activity   Restrictions/Precautions   Weight Bearing Precautions Per Order Yes   RLE Weight Bearing Per Order WBAT   Other Precautions Pain;Fall Risk;WBS;Multiple lines;Telemetry   General   Chart Reviewed Yes   Family/Caregiver Present Yes  (dtr)   Cognition   Overall Cognitive Status WFL   Arousal/Participation Responsive   Attention Attends with cues to redirect   Orientation Level Oriented X4   Memory Unable to assess   Following Commands Follows one step commands without difficulty   Subjective   Subjective cooperative.  Speaks Wolof, but dtr present to translate.  c/o R knee pain.   Bed Mobility   Supine to Sit 5  Supervision   Additional items Assist x 1;Increased time required;Verbal cues   Additional Comments sat EOB x multiple minutes prior to gait   Transfers   Sit to Stand 5  Supervision   Additional items Assist x 1;Increased time required;Verbal cues   Stand to Sit 5  Supervision   Additional items Assist x 1;Increased time required   Ambulation/Elevation   Gait pattern Decreased R stance  (slow, antalgic, short step length)   Gait Assistance 4  Minimal assist   Additional items Assist x 1   Assistive Device Rolling walker   Distance 130'x2 w/ standing rest x 2 min w/ several shorter ones   Balance   Static Sitting Normal   Dynamic Sitting Good   Static Standing Fair   Dynamic Standing Fair   Ambulatory Fair -   Endurance Deficit   Endurance Deficit Yes   Endurance Deficit Description fatigue, weakness, pain   Activity Tolerance   Activity Tolerance Patient tolerated treatment well;Patient limited by fatigue;Patient limited by pain;Treatment limited secondary to  medical complications (Comment)   Nurse Made Aware yes   Assessment   Prognosis Good   Problem List Decreased strength;Decreased endurance;Impaired balance;Decreased mobility;Pain;Decreased coordination   Assessment Pt seen for physical therapy session for setup, bed mob, time spentEOB, transfers, gait w/ rest time, repositioning.  Pt cooperative w/ session, willing to participate.  c/o R knee pain, worse w/ mvmt or mobility.  Note continued improved mobility tolerance.  some mild LOB w/ gait, cues for safety w/ RW.  continue to anticipate d/c home when stable w/ Level III (minimal) post acute care therapy needs at d/c   Goals   Patient Goals to have less pain   STG Expiration Date 02/28/24   PT Treatment Day 2   Plan   Treatment/Interventions Functional transfer training;LE strengthening/ROM;Therapeutic exercise;Endurance training;Patient/family training;Equipment eval/education;Bed mobility;Gait training   Progress Progressing toward goals   PT Frequency 3-5x/wk   Discharge Recommendation   Rehab Resource Intensity Level, PT III (Minimum Resource Intensity)   Equipment Recommended Walker   Walker Package Recommended Wheeled walker   AM-PAC Basic Mobility Inpatient   Turning in Flat Bed Without Bedrails 4   Lying on Back to Sitting on Edge of Flat Bed Without Bedrails 4   Moving Bed to Chair 3   Standing Up From Chair Using Arms 3   Walk in Room 3   Climb 3-5 Stairs With Railing 3   Basic Mobility Inpatient Raw Score 20   Basic Mobility Standardized Score 43.99   Highest Level Of Mobility   JH-HLM Goal 6: Walk 10 steps or more   JH-HLM Achieved 8: Walk 250 feet ot more   Ward Hodges PT, DPT CSRS

## 2024-02-20 NOTE — OCCUPATIONAL THERAPY NOTE
Occupational Therapy Progress Note     Patient Name: Juan David Lora  Today's Date: 2/20/2024  Problem List  Principal Problem:    Wide-complex tachycardia  Active Problems:    Mild intermittent asthma without complication    Type 2 diabetes mellitus with circulatory disorder, with long-term current use of insulin ([facility])    Hyperlipidemia    Essential hypertension    Hypothyroidism    History of aortic stenosis s/p TAVR    Chronic HFrEF (heart failure with reduced ejection fraction) ([facility])    Stage 3 chronic kidney disease, unspecified whether stage 3a or 3b CKD ([facility])    Pseudogout of right knee            02/20/24 1137   OT Last Visit   OT Visit Date 02/20/24   Note Type   Note Type Treatment   Pain Assessment   Pain Assessment Tool 0-10   Pain Score No Pain   Restrictions/Precautions   Weight Bearing Precautions Per Order Yes   RLE Weight Bearing Per Order WBAT   Other Precautions Fall Risk;Multiple lines;Telemetry;WBS   ADL   Grooming Assistance 5  Supervision/Setup   Grooming Deficit Supervision/safety;Increased time to complete;Standing with assistive device   LB Dressing Assistance 4  Minimal Assistance   LB Dressing Deficit Steadying;Supervision/safety;Increased time to complete   LB Dressing Comments able to thread pant legs - required VCs to perform seated, steadying assist in standing to pull up over hips   Bed Mobility   Sit to Supine 5  Supervision   Additional items Increased time required   Transfers   Sit to Stand 5  Supervision   Additional items Increased time required   Stand to Sit 5  Supervision   Additional items Increased time required   Additional Comments RW   Functional Mobility   Functional Mobility 4  Minimal assistance   Additional Comments household distances, 1 LOB, 1 standing rest break   Additional items Rolling walker   Cognition   Overall Cognitive Status WFL   Arousal/Participation Alert;Cooperative   Attention Within functional limits   Orientation Level Oriented X4   Memory  Within functional limits   Following Commands Follows one step commands without difficulty   Activity Tolerance   Activity Tolerance Patient tolerated treatment well   Medical Staff Made Aware RN   Assessment   Assessment Patient participated in Skilled OT session this date with interventions consisting of ADL re-training, functional transfers/mobility, home safety education. Patient agreeable to OT treatment session, upon arrival patient was found seated at edge of bed.  In comparison to previous session, patient with improvements in functional transfers, activity tolerance. Pt continues to be limited by R knee pain, balance during LB ADLs and mobility. Patient continues to be functioning below baseline level, occupational performance remains limited secondary to factors listed above and increased risk for falls and injury.   From OT standpoint, anticipate NO REHAB NEEDs.   Patient to benefit from continued Occupational Therapy treatment while in the hospital to address deficits as defined above and maximize level of functional independence with ADLs and functional mobility.   Plan   Treatment Interventions ADL retraining;Functional transfer training;Patient/family training;Equipment evaluation/education;Energy conservation;Activityengagement   Goal Expiration Date 03/03/24   OT Treatment Day 1   OT Frequency 2-3x/wk   Discharge Recommendation   Rehab Resource Intensity Level, OT No post-acute rehabilitation needs   Equipment Recommended Shower/Tub chair with back ($)   AM-PAC Daily Activity Inpatient   Lower Body Dressing 3   Bathing 3   Toileting 3   Upper Body Dressing 4   Grooming 4   Eating 4   Daily Activity Raw Score 21   Daily Activity Standardized Score (Calc for Raw Score >=11) 44.27   AM-PAC Applied Cognition Inpatient   Following a Speech/Presentation 4   Understanding Ordinary Conversation 4   Taking Medications 4   Remembering Where Things Are Placed or Put Away 4   Remembering List of 4-5 Errands 3    Taking Care of Complicated Tasks 3   Applied Cognition Raw Score 22   Applied Cognition Standardized Score 47.83           The patient's raw score on the AM-PAC Daily Activity Inpatient Short Form is 21. A raw score of greater than or equal to 19 suggests the patient may benefit from discharge to home. Please refer to the recommendation of the Occupational Therapist for safe discharge planning.        Mely Weiner, OTR/L

## 2024-02-20 NOTE — PLAN OF CARE
Problem: OCCUPATIONAL THERAPY ADULT  Goal: Performs self-care activities at highest level of function for planned discharge setting.  See evaluation for individualized goals.  Description: Treatment Interventions: ADL retraining, Functional transfer training, Endurance training, Patient/family training, Continued evaluation, Energy conservation, Activityengagement          See flowsheet documentation for full assessment, interventions and recommendations.   Outcome: Progressing  Note: Limitation: Decreased ADL status, Decreased Safe judgement during ADL, Decreased endurance, Decreased self-care trans, Decreased high-level ADLs  Prognosis: Good  Assessment: Patient participated in Skilled OT session this date with interventions consisting of ADL re-training, functional transfers/mobility, home safety education. Patient agreeable to OT treatment session, upon arrival patient was found seated at edge of bed.  In comparison to previous session, patient with improvements in functional transfers, activity tolerance. Pt continues to be limited by L knee pain, balance during LB ADLs and mobility. Patient continues to be functioning below baseline level, occupational performance remains limited secondary to factors listed above and increased risk for falls and injury.   From OT standpoint, anticipate NO REHAB NEEDs.   Patient to benefit from continued Occupational Therapy treatment while in the hospital to address deficits as defined above and maximize level of functional independence with ADLs and functional mobility.     Rehab Resource Intensity Level, OT: No post-acute rehabilitation needs

## 2024-02-20 NOTE — CASE MANAGEMENT
Case Management Discharge Planning Note    Patient name Juan David Lora  [facility] [id number]  : 1945 Date 2024       Current Admission Date: 2024  Current Admission Diagnosis:Wide-complex tachycardia   Patient Active Problem List    Diagnosis Date Noted    Wide-complex tachycardia 2024    Pseudogout of right knee 2024    Stage 3 chronic kidney disease, unspecified whether stage 3a or 3b CKD ([facility]) 2024    Smoking history 2023    Acute on chronic heart failure ([facility]) 2023    HE (acute kidney injury) ([facility]) 2023    Hypokalemia 2023    Atrial fibrillation ([facility]) 2023    Mitral regurgitation 2023    Hepatitis C 2023    s/p Medtronic dual chamber PPM 22 s/p upgrade to BiV ICD 2023    Atrial flutter ([facility]) 2023    Chronic HFrEF (heart failure with reduced ejection fraction) ([facility]) 2023    NSVT (nonsustained ventricular tachycardia) ([facility]) 2023    Anemia 2023    Status post insertion of drug eluting coronary artery stent 02/10/2023    Bruit of left carotid artery 2023    Peripheral artery disease ([facility]) 2023    Need for influenza vaccination 11/10/2022    Leg cramping 11/10/2022    History of tachy-lino syndrome 2022    S/P TAVR (transcatheter aortic valve replacement) 2022    Atherosclerosis of native coronary artery of native heart with angina pectoris ([facility]) 2022    Contrast media allergy 2022    History of aortic stenosis s/p TAVR 2022    Diverticulosis of large intestine 2018    Internal hemorrhoids 2018    Nicotine dependence 2017    Osteoarthritis of knee 2017    Bilateral hearing loss 2017    Allergic rhinitis 2016    Type 2 diabetes mellitus with circulatory disorder, with long-term current use of insulin ([facility]) 2015    Adenomatous colon polyp 2014    Hyperlipidemia 2013     Vitamin B12 deficiency 07/24/2012    Mild intermittent asthma without complication 06/08/2012    Essential hypertension 06/08/2012    Hypothyroidism 06/07/2012      LOS (days): 3  Geometric Mean LOS (GMLOS) (days):   Days to GMLOS:     OBJECTIVE:  Risk of Unplanned Readmission Score: 26.19         Current admission status: Inpatient   Preferred Pharmacy:   [id number] - [place], [place] - [address] [place]  [address] [place]  [place] [address]  Phone: [phone] Fax: [phone]    Primary Care Provider: Umesh Millard MD    Primary Insurance: [employer] [facility]  [facility]: [facility]    DISCHARGE DETAILS:       Freedom of Choice: Yes     CM contacted family/caregiver?: No- see comments (pt.)  Were Treatment Team discharge recommendations reviewed with patient/caregiver?: Yes  Did patient/caregiver verbalize understanding of patient care needs?: Yes  Were patient/caregiver advised of the risks associated with not following Treatment Team discharge recommendations?: Yes         Requested Home Health Care         Is the patient interested in HHC at discharge?: Yes  Home Health Discipline requested:: Nursing, Occupational Therapy, Physical Therapy  Home Health Agency Name:: [facility]  Home Health Follow-Up Provider:: PCP  Home Health Services Needed:: Heart Failure Management, Diabetes Management, Evaluate Functional Status and Safety, Gait/ADL Training, Post-Op Care and Assessment, Strengthening/Theraputic Exercises to Improve Function  Homebound Criteria Met:: Requires the Assistance of Another Person for Safe Ambulation or to Leave the Home, Uses an Assist Device (i.e. cane, walker, etc)  Supporting Clincal Findings:: Fatigues Easliy in Short Distances, Limited Endurance    DME Referral Provided  Referral made for DME?: No    Other Referral/Resources/Interventions Provided:  Interventions: HHC  Referral Comments: Accepted by  [facility]  Programs:: CHF    Would you like to participate in our [facility] service program?  : No - Declined    Treatment Team Recommendation: Home with Home Health Care  Discharge Destination Plan:: Home with Home Health Care  Transport at Discharge : Family                                      Additional Comments: 77yo male was admitted for knee pain and aspiration, found to be in afib RVR and Vtach. Has BivICD, Started on Tikosyn 2/19/24 and plan cardioversion in 3-5 days. Also pt is on Jardiance, Entresto and Eliquis. Accepted by [facility] nsg/PT/OT and reserved. Family supportive and will transport when ready.

## 2024-02-20 NOTE — QUICK NOTE
Interval Updates to care plan:    Decreasing Lantus from 15 to 10 units at bedtime in context of patient's low a.m. blood sugars  Modifying hold parameters for Toprol (now SBP less than 100) and Lasix (now SBP less than 95)    Patient's wife seen at bedside earlier this morning, she has been given updates to patient's hospital course and care plan.    - - - - - - - - - - -- - - - - - - - - - -- - - - - - - - - - -    Umesh Millard MD  Internal Medicine Resident, PGY-2  [facility]  Service: SOD-B   Available via [email or web address]  [email or web address]

## 2024-02-20 NOTE — PROGRESS NOTES
EP Progress Note.   Unit/Bed#: [facility] 403-01 Encounter: [id number]        Juan David Lora 78 y.o. male [id number]  Hospital Stay Days: 3    Assessment and Plan      Current Problem List   Principal Problem:    Wide-complex tachycardia  Active Problems:    Mild intermittent asthma without complication    Type 2 diabetes mellitus with circulatory disorder, with long-term current use of insulin (HCC)    Hyperlipidemia    Essential hypertension    Hypothyroidism    History of aortic stenosis s/p TAVR    Chronic HFrEF (heart failure with reduced ejection fraction) ([facility])    Stage 3 chronic kidney disease, unspecified whether stage 3a or 3b CKD ([facility])    Pseudogout of right knee    Assessment/Plan:    This is 78-year-old male who initially presented with knee pain and was found to have pseudogout.  Patient was found to have episodes of atrial flutter with RVR for which EP has been consulted.    # Pseudogout on presentation  # Episode of a flutter/fib with RVR-reason for EP consult this admission.  # Previous history of A-fib/flutter-seen on EKGs from last year  # Chronic heart failure reduced EF 25-30%  # Ischemic cardiomyopathy  # Severe AS status post TAVR in 2022  # History of tachybradycardia syndrome, LBBB post TAVR status post Medtronic pacemaker upgrade to BiV ICD eventually  # LBBB  # History of CKD, DM 2, HTN, HLD  # Hepatitis C    Dofetilide price check was 0$ co-pay.     Plan:    Started on dofetilide 125 mcg twice daily on 2/19.  Will monitor renal function and QTc periodically.  Cardioversion tomorrow 2/21. NPO after midnight.   C/w Eliquis 5 mg twice daily for A-fib/flutter anticoagulation.  On Lipitor and Plavix for history of CAD.  GDMT: Jardiance 10 mg daily.  Home Entresto on hold given borderline/low BP.  Euvolemic on exam.  Continue p.o. Lasix 40 mg twice daily.  Holding parameters changed given low BP.  Rest of the care per primary team.      Subjective     Patient overall admits to feeling  well.  No active complaints.   Objective     Vitals: Temp (24hrs), Av.5 °F (36.4 °C), Min:97.2 °F (36.2 °C), Max:98 °F (36.7 °C)  Current: Temperature: (!) 97.2 °F (36.2 °C)  Patient Vitals for the past 24 hrs:   BP Temp Pulse Resp SpO2 Weight   24 1023 94/59 (!) 97.2 °F (36.2 °C) 72 16 97 % --   24 0904 105/69 -- 86 -- -- --   24 0701 -- -- -- -- 99 % --   24 0700 -- -- -- -- -- 76.4 kg (168 lb 6.9 oz)   24 0659 102/68 (!) 97.3 °F (36.3 °C) 84 16 (!) 76 % --   24 0302 104/68 97.6 °F (36.4 °C) 72 18 97 % --   24 2236 113/74 98 °F (36.7 °C) 94 18 96 % --   24 1919 122/70 97.7 °F (36.5 °C) 68 18 95 % --   24 1811 120/75 -- (!) 137 -- 94 % --   24 1747 124/75 -- (!) 121 -- 95 % --   24 1528 108/79 (!) 97.3 °F (36.3 °C) 100 20 99 % --    Body mass index is 26.38 kg/m².  Physical Exam:  Physical Exam  Vitals reviewed.   Constitutional:       General: He is not in acute distress.     Appearance: He is well-developed. He is not diaphoretic.   Cardiovascular:      Rate and Rhythm: Normal rate. Rhythm irregular.      Heart sounds: Normal heart sounds. No murmur heard.     No friction rub.   Pulmonary:      Effort: Pulmonary effort is normal. No respiratory distress.      Breath sounds: Normal breath sounds. No stridor. No wheezing.         Invasive Devices       Peripheral Intravenous Line  Duration             Peripheral IV 24 Left Antecubital 2 days    Peripheral IV 24 Right Antecubital 2 days                        Labs:   Results from last 7 days   Lab Units 24  0503 24  0444 24  0433 24  1725   WBC Thousand/uL 8.34 10.21* 8.47 10.07   HEMOGLOBIN g/dL 13.4 12.7 12.7 14.1   HEMATOCRIT % 42.9 40.8 40.6 45.3   PLATELETS Thousands/uL 220 219 226 244   NEUTROS PCT %  --   --  87* 67   MONOS PCT %  --   --  3* 11   EOS PCT %  --   --  0 2      Results from last 7 days   Lab Units 24  0503 24  0444  "02/18/24  0716 02/17/24 2000 02/17/24  1725   SODIUM mmol/L 136 137 138  --  137   POTASSIUM mmol/L 4.4 4.4 5.0  --  3.8   CHLORIDE mmol/L 101 102 102  --  100   CO2 mmol/L 24 25 28  --  28   BUN mg/dL 57* 40* 30*  --  23   CREATININE mg/dL 1.44* 1.29 1.17  --  1.23   CALCIUM mg/dL 9.5 9.3 10.0  --  9.9   ALK PHOS U/L 110* 107* 119*  --  118*   ALT U/L 35 20 22  --  24   AST U/L 35 18 18  --  21   SED RATE mm/hour  --   --   --  54*  --    CRP mg/L  --   --   --  16.2*  --    MAGNESIUM mg/dL 2.5  --  2.7  --  2.0   PHOSPHORUS mg/dL 4.4*  --  4.3* 3.5  --    EGFR ml/min/1.73sq m 46 53 60  --  56                 No results found for: \"PHART\", \"WMR5TNT\", \"PO2ART\", \"WVF9VPA\", \"P7BTWILL\", \"BEART\", \"SOURCE\"  No components found for: \"HIV1X2\"  Lab Results   Component Value Date    HAV Reactive (A) 05/08/2019    HEPAIGM Non-reactive 05/08/2019    HEPBIGM Non-reactive 01/25/2022    HEPBCAB Reactive (A) 01/25/2022    HEPCAB High Reactive (A) 01/25/2022     No results found for: \"SPEP\", \"UPEP\"   Lab Results   Component Value Date    HGBA1C 6.9 (H) 12/23/2023    HGBA1C 7.6 (H) 02/10/2023    HGBA1C 7.6 (A) 11/08/2022     Lab Results   Component Value Date    CHOL 171 02/12/2014      Lab Results   Component Value Date    HDL 35 (L) 12/23/2023    HDL 53 02/10/2023    HDL 65 11/10/2022      Lab Results   Component Value Date    LDLCALC 63 12/23/2023    LDLCALC 72 02/10/2023    LDLCALC 62 11/10/2022      Lab Results   Component Value Date    TRIG 74 12/23/2023    TRIG 61 02/10/2023    TRIG 75 11/10/2022     No components found for: \"PROCAL\"      Micro:  Results from last 7 days   Lab Units 02/17/24 2000   GRAM STAIN RESULT  3+ Polys  No organisms seen   BODY FLUID CULTURE, STERILE  No growth     Urinalysis:  No results found for: \"AMPHETUR\", \"BDZUR\", \"COCAINEUR\", \"OPIATEUR\", \"PCPUR\", \"THCUR\", \"ETOH\", \"ACTMNPHEN\", \"SALICYLATE\"       Invalid input(s): \"URIBILINOGEN\"        Intake and Outputs:  I/O         02/17 0701  02/18 0700 " 02/18 0701 02/19 0700 02/19 0701 02/20 0700    P.O.  1776 400    I.V. (mL/kg) 265.4 (3.5) 233.5 (3)     Total Intake(mL/kg) 265.4 (3.5) 2009.5 (26.2) 400 (5.2)    Urine (mL/kg/hr) 300 2025 (1.1)     Total Output 300 2025     Net -34.6 -15.5 +400           Unmeasured Urine Occurrence   1 x          Nutrition:  Diet Amari/CHO Controlled; Consistent Carbohydrate Diet Level 3 (6 carb servings/90 grams CHO/meal); Cardiac  Radiology Results:   XR knee 1 or 2 vw right   Final Result by Chase Harris MD (02/20 0848)      Mild degenerative changes.      Joint effusion.      Pseudogout.      No acute osseous abnormality.            Workstation performed: [id number]         XR chest 1 view portable   ED Interpretation by Francisco Mohan MD (02/17 1756)   No fluid overload  No Lead fx.  AICD in place.  No pneumonia.      Final Result by Kayli Rodrigues MD (02/18 0834)      Trace right effusion.      Lungs clear            Workstation performed: [id number]           Scheduled Medications:  apixaban, 5 mg, BID  atorvastatin, 80 mg, Daily With Dinner  cholecalciferol, 2,000 Units, Daily  clopidogrel, 75 mg, Daily  Diclofenac Sodium, 2 g, 4x Daily  dofetilide, 125 mcg, Q12H DESTINEE  Empagliflozin, 10 mg, QAM  ferrous sulfate, 325 mg, Every Other Day  furosemide, 40 mg, BID  insulin glargine, 15 Units, HS  insulin lispro, 1-6 Units, TID AC  levothyroxine, 125 mcg, Daily  melatonin, 3 mg, HS  metoprolol succinate, 75 mg, BID  montelukast, 10 mg, Daily  predniSONE, 40 mg, Daily      PRN MEDS:  acetaminophen, 650 mg, Q6H PRN  trimethobenzamide, 200 mg, Q6H PRN      Last 24 Hour Meds: :   Medication Administration - last 24 hours from 02/19/2024 1031 to 02/20/2024 1031         Date/Time Order Dose Route Action Action by     02/19/2024 1420 EST amiodarone (CORDARONE) 900 mg in dextrose 5 % 500 mL infusion 0 mg/min Intravenous Stopped Catie Lomorticello, TRIP     02/20/2024 0903 EST cholecalciferol (VITAMIN D3) tablet 2,000 Units  2,000 Units Oral Given Catie Dewey, RN     02/20/2024 0903 EST Empagliflozin (JARDIANCE) tablet 10 mg 10 mg Oral Given Catie Dewey, RN     02/20/2024 0904 EST ferrous sulfate tablet 325 mg 325 mg Oral Given Catie Dewey, RN     02/19/2024 1729 EST furosemide (LASIX) tablet 40 mg 40 mg Oral Not Given Catie Dewey, RN     02/19/2024 2200 EST insulin glargine (LANTUS) subcutaneous injection 15 Units 0.15 mL 15 Units Subcutaneous Given Leah Tan     02/20/2024 0904 EST montelukast (SINGULAIR) tablet 10 mg 10 mg Oral Given Catie Dewey, RN     02/19/2024 1810 EST atorvastatin (LIPITOR) tablet 80 mg 80 mg Oral Given Catie Dewey, RN     02/20/2024 0548 EST insulin lispro (HumaLOG) 100 units/mL subcutaneous injection 1-6 Units -- Subcutaneous Not Given Leah Tan     02/19/2024 1628 EST insulin lispro (HumaLOG) 100 units/mL subcutaneous injection 1-6 Units -- Subcutaneous Not Given Catie Dewey, RN     02/19/2024 1127 EST insulin lispro (HumaLOG) 100 units/mL subcutaneous injection 1-6 Units -- Subcutaneous Not Given Catie Dewey, RN     02/20/2024 0904 EST apixaban (ELIQUIS) tablet 5 mg 5 mg Oral Given Catie Dewey, RN     02/19/2024 1810 EST apixaban (ELIQUIS) tablet 5 mg 5 mg Oral Given Catie Dewey, RN     02/20/2024 0904 EST clopidogrel (PLAVIX) tablet 75 mg 75 mg Oral Given Catie Maco, RN     02/20/2024 0854 EST Diclofenac Sodium (VOLTAREN) 1 % topical gel 2 g 2 g Topical Not Given Catie Dewey, RN     02/19/2024 2201 EST Diclofenac Sodium (VOLTAREN) 1 % topical gel 2 g 2 g Topical Not Given Leah Tan     02/19/2024 1729 EST Diclofenac Sodium (VOLTAREN) 1 % topical gel 2 g 2 g Topical Not Given Catie Dewey, TRIP     02/19/2024 1127 EST Diclofenac Sodium (VOLTAREN) 1 % topical gel 2 g 2 g Topical Not Given Catie Dewey RN     02/19/2024 1729 EST metoprolol succinate (TOPROL-XL) 24 hr tablet 75 mg  75 mg Oral Given Catie Dewey RN     02/19/2024 1236 EST predniSONE tablet 40 mg 40 mg Oral Given Catie Dewey, TRIP     02/20/2024 0904 EST levothyroxine tablet 125 mcg 125 mcg Oral Given Catie Dewey, TRIP     02/19/2024 2200 EST melatonin tablet 3 mg 3 mg Oral Given Leah Tan     02/20/2024 0903 EST dofetilide (TIKOSYN) capsule 125 mcg 125 mcg Oral Given Catie Dewey, TRIP     02/19/2024 2200 EST dofetilide (TIKOSYN) capsule 125 mcg 125 mcg Oral Given Leah Tan     02/19/2024 1445 EST dofetilide (TIKOSYN) capsule 125 mcg 125 mcg Oral Given Catie Dewey, TRIP     02/19/2024 2200 EST trimethobenzamide (TIGAN) IM injection 200 mg 200 mg Intramuscular Given Leah Tan     02/20/2024 0903 EST furosemide (LASIX) tablet 40 mg 40 mg Oral Given Catie Dewey RN     02/20/2024 0904 EST metoprolol succinate (TOPROL-XL) 24 hr tablet 75 mg 75 mg Oral Given Catie Dewey RN            PLEASE NOTE:  This encounter was completed utilizing the [employer] Direct Speech Voice Recognition Software. Grammatical errors, random word insertions, pronoun errors and incomplete sentences are occasional consequences of the system due to software limitations, ambient noise and hardware issues.These may be missed by proof reading prior to affixing electronic signature. Any questions or concerns about the content, text or information contained within the body of this dictation should be directly addressed to the physician for clarification. Please do not hesitate to call me directly if you have any any questions or concerns.

## 2024-02-21 ENCOUNTER — APPOINTMENT (INPATIENT)
Dept: NON INVASIVE DIAGNOSTICS | Facility: HOSPITAL | Age: 79
DRG: 309 | End: 2024-02-21
Attending: INTERNAL MEDICINE
Payer: MEDICARE

## 2024-02-21 ENCOUNTER — ANESTHESIA (INPATIENT)
Dept: NON INVASIVE DIAGNOSTICS | Facility: HOSPITAL | Age: 79
DRG: 309 | End: 2024-02-21
Payer: MEDICARE

## 2024-02-21 LAB
ALBUMIN SERPL BCP-MCNC: 3.5 G/DL (ref 3.5–5)
ALP SERPL-CCNC: 110 U/L (ref 34–104)
ALT SERPL W P-5'-P-CCNC: 38 U/L (ref 7–52)
ANION GAP SERPL CALCULATED.3IONS-SCNC: 8 MMOL/L
AST SERPL W P-5'-P-CCNC: 30 U/L (ref 13–39)
BACTERIA SPEC BFLD CULT: NO GROWTH
BILIRUB SERPL-MCNC: 1.35 MG/DL (ref 0.2–1)
BUN SERPL-MCNC: 62 MG/DL (ref 5–25)
CALCIUM SERPL-MCNC: 9.3 MG/DL (ref 8.4–10.2)
CHLORIDE SERPL-SCNC: 101 MMOL/L (ref 96–108)
CO2 SERPL-SCNC: 27 MMOL/L (ref 21–32)
CREAT SERPL-MCNC: 1.45 MG/DL (ref 0.6–1.3)
ERYTHROCYTE [DISTWIDTH] IN BLOOD BY AUTOMATED COUNT: 18.3 % (ref 11.6–15.1)
GFR SERPL CREATININE-BSD FRML MDRD: 46 ML/MIN/1.73SQ M
GLUCOSE SERPL-MCNC: 150 MG/DL (ref 65–140)
GLUCOSE SERPL-MCNC: 179 MG/DL (ref 65–140)
GLUCOSE SERPL-MCNC: 91 MG/DL (ref 65–140)
GLUCOSE SERPL-MCNC: 92 MG/DL (ref 65–140)
GLUCOSE SERPL-MCNC: 98 MG/DL (ref 65–140)
GRAM STN SPEC: NORMAL
GRAM STN SPEC: NORMAL
HCT VFR BLD AUTO: 38.4 % (ref 36.5–49.3)
HGB BLD-MCNC: 12.5 G/DL (ref 12–17)
MAGNESIUM SERPL-MCNC: 2.4 MG/DL (ref 1.9–2.7)
MCH RBC QN AUTO: 26.8 PG (ref 26.8–34.3)
MCHC RBC AUTO-ENTMCNC: 32.6 G/DL (ref 31.4–37.4)
MCV RBC AUTO: 82 FL (ref 82–98)
PHOSPHATE SERPL-MCNC: 4 MG/DL (ref 2.3–4.1)
PLATELET # BLD AUTO: 205 THOUSANDS/UL (ref 149–390)
PMV BLD AUTO: 11.8 FL (ref 8.9–12.7)
POTASSIUM SERPL-SCNC: 4.5 MMOL/L (ref 3.5–5.3)
PROT SERPL-MCNC: 6.3 G/DL (ref 6.4–8.4)
RBC # BLD AUTO: 4.67 MILLION/UL (ref 3.88–5.62)
SODIUM SERPL-SCNC: 136 MMOL/L (ref 135–147)
WBC # BLD AUTO: 6.64 THOUSAND/UL (ref 4.31–10.16)

## 2024-02-21 PROCEDURE — 82948 REAGENT STRIP/BLOOD GLUCOSE: CPT

## 2024-02-21 PROCEDURE — 5A2204Z RESTORATION OF CARDIAC RHYTHM, SINGLE: ICD-10-PCS | Performed by: INTERNAL MEDICINE

## 2024-02-21 PROCEDURE — 84100 ASSAY OF PHOSPHORUS: CPT

## 2024-02-21 PROCEDURE — 99232 SBSQ HOSP IP/OBS MODERATE 35: CPT | Performed by: INTERNAL MEDICINE

## 2024-02-21 PROCEDURE — 80053 COMPREHEN METABOLIC PANEL: CPT

## 2024-02-21 PROCEDURE — 85027 COMPLETE CBC AUTOMATED: CPT

## 2024-02-21 PROCEDURE — 92960 CARDIOVERSION ELECTRIC EXT: CPT

## 2024-02-21 PROCEDURE — 83735 ASSAY OF MAGNESIUM: CPT

## 2024-02-21 PROCEDURE — 93005 ELECTROCARDIOGRAM TRACING: CPT

## 2024-02-21 PROCEDURE — 92960 CARDIOVERSION ELECTRIC EXT: CPT | Performed by: INTERNAL MEDICINE

## 2024-02-21 RX ORDER — KETAMINE HYDROCHLORIDE 50 MG/ML
INJECTION, SOLUTION INTRAMUSCULAR; INTRAVENOUS AS NEEDED
Status: DISCONTINUED | OUTPATIENT
Start: 2024-02-21 | End: 2024-02-21

## 2024-02-21 RX ORDER — PREDNISONE 20 MG/1
20 TABLET ORAL DAILY
Status: DISCONTINUED | OUTPATIENT
Start: 2024-02-24 | End: 2024-02-23 | Stop reason: HOSPADM

## 2024-02-21 RX ORDER — MIDAZOLAM HYDROCHLORIDE 2 MG/2ML
INJECTION, SOLUTION INTRAMUSCULAR; INTRAVENOUS AS NEEDED
Status: DISCONTINUED | OUTPATIENT
Start: 2024-02-21 | End: 2024-02-21

## 2024-02-21 RX ORDER — FLUMAZENIL 0.1 MG/ML
INJECTION INTRAVENOUS AS NEEDED
Status: DISCONTINUED | OUTPATIENT
Start: 2024-02-21 | End: 2024-02-21

## 2024-02-21 RX ORDER — PREDNISONE 10 MG/1
10 TABLET ORAL DAILY
Status: DISCONTINUED | OUTPATIENT
Start: 2024-02-27 | End: 2024-02-23 | Stop reason: HOSPADM

## 2024-02-21 RX ORDER — SODIUM CHLORIDE 9 MG/ML
INJECTION, SOLUTION INTRAVENOUS CONTINUOUS PRN
Status: DISCONTINUED | OUTPATIENT
Start: 2024-02-21 | End: 2024-02-21

## 2024-02-21 RX ORDER — FENTANYL CITRATE 50 UG/ML
INJECTION, SOLUTION INTRAMUSCULAR; INTRAVENOUS AS NEEDED
Status: DISCONTINUED | OUTPATIENT
Start: 2024-02-21 | End: 2024-02-21

## 2024-02-21 RX ADMIN — FENTANYL CITRATE 25 MCG: 50 INJECTION INTRAMUSCULAR; INTRAVENOUS at 09:28

## 2024-02-21 RX ADMIN — PREDNISONE 30 MG: 20 TABLET ORAL at 16:34

## 2024-02-21 RX ADMIN — METOPROLOL SUCCINATE 75 MG: 50 TABLET, EXTENDED RELEASE ORAL at 17:53

## 2024-02-21 RX ADMIN — DOFETILIDE 125 MCG: 0.12 CAPSULE ORAL at 21:08

## 2024-02-21 RX ADMIN — INSULIN GLARGINE 10 UNITS: 100 INJECTION, SOLUTION SUBCUTANEOUS at 21:08

## 2024-02-21 RX ADMIN — APIXABAN 5 MG: 5 TABLET, FILM COATED ORAL at 08:11

## 2024-02-21 RX ADMIN — INSULIN LISPRO 1 UNITS: 100 INJECTION, SOLUTION INTRAVENOUS; SUBCUTANEOUS at 16:34

## 2024-02-21 RX ADMIN — KETAMINE HYDROCHLORIDE 20 MG: 50 INJECTION INTRAMUSCULAR; INTRAVENOUS at 09:28

## 2024-02-21 RX ADMIN — FUROSEMIDE 40 MG: 40 TABLET ORAL at 17:53

## 2024-02-21 RX ADMIN — METOPROLOL SUCCINATE 75 MG: 50 TABLET, EXTENDED RELEASE ORAL at 08:11

## 2024-02-21 RX ADMIN — SODIUM CHLORIDE: 9 INJECTION, SOLUTION INTRAVENOUS at 08:55

## 2024-02-21 RX ADMIN — MELATONIN 3 MG: at 21:08

## 2024-02-21 RX ADMIN — ATORVASTATIN CALCIUM 80 MG: 80 TABLET, FILM COATED ORAL at 17:53

## 2024-02-21 RX ADMIN — CLOPIDOGREL BISULFATE 75 MG: 75 TABLET ORAL at 08:11

## 2024-02-21 RX ADMIN — EMPAGLIFLOZIN 10 MG: 10 TABLET, FILM COATED ORAL at 08:12

## 2024-02-21 RX ADMIN — MIDAZOLAM 2 MG: 1 INJECTION INTRAMUSCULAR; INTRAVENOUS at 09:28

## 2024-02-21 RX ADMIN — FUROSEMIDE 40 MG: 40 TABLET ORAL at 08:11

## 2024-02-21 RX ADMIN — LEVOTHYROXINE SODIUM 125 MCG: 125 TABLET ORAL at 08:11

## 2024-02-21 RX ADMIN — Medication 2000 UNITS: at 08:11

## 2024-02-21 RX ADMIN — MONTELUKAST 10 MG: 10 TABLET, FILM COATED ORAL at 08:11

## 2024-02-21 RX ADMIN — DOFETILIDE 125 MCG: 0.12 CAPSULE ORAL at 08:11

## 2024-02-21 RX ADMIN — APIXABAN 5 MG: 5 TABLET, FILM COATED ORAL at 17:53

## 2024-02-21 RX ADMIN — FLUMAZENIL 0.2 MG: 0.1 INJECTION, SOLUTION INTRAVENOUS at 09:46

## 2024-02-21 NOTE — ANESTHESIA POSTPROCEDURE EVALUATION
Post-Op Assessment Note    CV Status:  Stable  Pain Score: 0    Pain management: adequate       Mental Status:  Alert and awake   Hydration Status:  Euvolemic   PONV Controlled:  Controlled   Airway Patency:  Patent     Post Op Vitals Reviewed: Yes    No anethesia notable event occurred.    Staff: CRNA               BP   106/64   Temp   98   Pulse  72   Resp   18   SpO2   98

## 2024-02-21 NOTE — PROGRESS NOTES
INTERNAL MEDICINE RESIDENCY PROGRESS NOTE     Name: Juan David Lora   Age & Sex: 78 y.o. male   MRN: [id number]  Unit/Bed#: [facility] 403-01   Encounter: [id number]  Team: SOD Team B     PATIENT INFORMATION     Name: Juan David Lora   Age & Sex: 78 y.o. male   MRN: [id number]  Hospital Stay Days: 4    ASSESSMENT/PLAN     Principal Problem:    Wide-complex tachycardia  Active Problems:    Mild intermittent asthma without complication    Type 2 diabetes mellitus with circulatory disorder, with long-term current use of insulin ([facility])    Hyperlipidemia    Essential hypertension    Hypothyroidism    History of aortic stenosis s/p TAVR    Chronic HFrEF (heart failure with reduced ejection fraction) ([facility])    Stage 3 chronic kidney disease, unspecified whether stage 3a or 3b CKD ([facility])    Pseudogout of right knee      Pseudogout of right knee  Assessment & Plan  Patient presenting with 3 days of right knee swelling, warmth, pain on movement, and decreased range of motion. Interestingly, although potentially unrelated, he started Entresto 3 days prior to admission.     The joint was tapped in the ED and was positive for CCP crystals. ESR and CRP elevated.     2/12: Patient reports that he is pain-free this morning and that he was able to ambulate pain free on 2/11 with the physical therapist.      ?  Likely secondary to use of Lasix     Plan:  Ortho following peripherally.  Recommended WBAT LE and medical management of pseudogout   Begin prednisone taper today (2/21).  Decrease by 10 mg every 3 day  Starting on 30, to taper as above  Please ensure Prednisone not given close to bedtime as to avoid delirium   Continue to avoid NSAIDs I/c/o CAD, HF  ?  Consider further crystal analysis/etiology workup       Stage 3 chronic kidney disease, unspecified whether stage 3a or 3b CKD ([facility])  Assessment & Plan  Lab Results   Component Value Date    EGFR 60 02/18/2024    EGFR 56 02/17/2024    EGFR 55 02/09/2024    CREATININE 1.17 02/18/2024     CREATININE 1.23 02/17/2024    CREATININE 1.26 02/09/2024     Patient's baseline Cr ~ 1.05 - 1.3  Had suffered HE at recent hospitalization in Dec of '23    Plan:  Continue to monitor  Avoid nephrotoxic agents  Continue avoiding NSAIDs I/r/t pseudogout    Chronic HFrEF (heart failure with reduced ejection fraction) ([facility])  Assessment & Plan    Pt's Natural Hx of HF:    Date Dx: March 2023 (Ie, shortly after NSTEMI s/p ELDER x2)  Cause? (If known): Ischemic, reduction in EF in setting of NSTEMI with 80% mid LAD and 90% RPDA stenoses s/p stenting    Subtype: HFrEF  Class: NYHA Class II, Stage C  Cardiologist/HF Specialist: Virginia Gan MD of  [facility]    Dry Weight: ~175-180 lbs (79.4-81.2 kg) ... Nb No official record of dry weight, the value shown previously is based on assumptions per weights prior to pt's first admit for HF exacerbation  Current Weight:   Wt Readings from Last 3 Encounters:   02/18/24 76.8 kg (169 lb 5 oz)   02/13/24 75.3 kg (166 lb)   01/18/24 80.3 kg (177 lb 1.6 oz)      Most Recent Echo (Study Date 12/23/23): LVEF 25-30 %, LV Systolic Dysfxn, Severe with severe Global hypokinesis  Structural Heart Changes?  Atria: LA mod/severe dilation, RA moderate dilation  Ventricles: LV severe global hypokinesis w/ regional variation, RV mild dilation with moderately reduced systolic function  Valves: Normal fxn TAVR, Severe MR, Mod to Severe TR with elevated RV systolic pressure, mild OH    Paced?: BiV ACD    GDMT:   Beta-Blocker: Metoprolol succinate 75 mg BID  ACEi, ARB or ARNi: Entresto 24/26 bid  Previously on Lisinopril 10  SGLT2i- jardiance 10 mg daily  Recently D/C'd OP, now on Entresto instead  Diuretic: Lasix 60 mg daily - taking 40mg in AM and 20mg in PM    ICD? Yes   Date Inserted: 9/13/23    - - - - - - - - - - - - - - - - - - - - - - - - - - - - - - - - - - - - - - - - - - - - - - - - - - - - - - - - - - -     IP Evaluation/Interval HF Assessment:    Assessment:    Pt is currently  at dry weight and no concerning s/sx of HF exacerbation I/c/o Vfib events  BPs continue to remain soft, previously likely affected by IV Amio however now may ? Reflect poor compliance at home versus current glucocorticoid use  Regardless, cards has been made aware and they have adjusted patient's hold parameters  Been receiving Lasix and Toprol sparingly/haphazardly.  Rarely gets them as scheduled  Currently on Amio which will also drop BP, for this reason continuing with statement as above re: diuresis    Plan:    Continue to hold Entresto I/c/o soft Bps  Per cardiology, okay to continue metoprolol succinate (hold parameter SBP <100) and lasix (hold parameter SBP < 95)  Daily Standing weights  Strict I/O  Fluid restriction to 2000 cc  Salt restricted diet to 2 gm       History of aortic stenosis s/p TAVR  Assessment & Plan  TAVR 6/2022, normal function on ECHO 12/2023. Follows with [facility]. On plavix, eliquis, statin.     Plan:  Continue ASA/Plavix & Eliquis       Hypothyroidism  Assessment & Plan  On home levothyroxine 137mcg daily. Subsequent labs TSH 0.352 and free T4 1.36. Potential trigger for a fib.      Plan:  Reduce home levothyroxine dose to 125mcg  Follow-up TSH in setting of wide complex tachycardia       Essential hypertension  Assessment & Plan  Controlled, if not low, at this time. Patient on home regimen of metoprolol (rate control) and lasix BID. Was prescribed Entresto 3 days prior to admission. Is no longer taking spironolactone.      Plan:  Continue home metoprolol  Continue lasix   Discontinued Entresto in setting of low blood pressure       Hyperlipidemia  Assessment & Plan  Lipid panel 12/2023: , TG 74, HDL 35, LDL 63.     Plan:  Continue atorvastatin 80mg       Type 2 diabetes mellitus with circulatory disorder, with long-term current use of insulin ([facility])  Assessment & Plan  Lab Results   Component Value Date    HGBA1C 6.9 (H) 12/23/2023       Recent Labs      02/19/24  1617 02/19/24  2050 02/20/24  0517 02/20/24  1021   POCGLU 128 174* 103 123       Blood Sugar Average: Last 72 hrs:  (P) 162    Patients home regimen is Lantus 18U daily, novolog 5U with breakfast and lunch.   Adjusting home regimen (ie, ~reduction in home dose) for continuation while IP  ?Need for further adjustments  AM Sugars to well-controlled at this time (around 100 this morning, in the 140s yesterday).  For this reason we will decrease his at bedtime Lantus dosing.  He is at increased risk for acute hypoglycemia given the use of oral hypoglycemics during admission  Prandial sugars have since improved.  Are also slightly low, however perhaps this will at bedtime Lantus dose is being dropped     Plan:  Changed to Lantus 10 units at bedtime  C/w SSI @ Algo 3; algorithm may need to be modified per results of prandial sugars after adjusting basal insulin  Diabetic and cardiac diet   POCG HS/AC  Follow BG closely considering also receiving PO hyoglycemic w/ Jardiance  ?Consider holding Jardiance if sugars drop ---> Must reach-out to HF prior to making final decision  Hypoglycemia protocol      Mild intermittent asthma without complication  Assessment & Plan  On home advair and albuterol. Denies any symptoms.      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 Incidentally found on arrival to ED d/t HR 160s. He was entirely symptomatic. He was given one dose of metoprolol in ED without improvement. Cardiology was consulted in ED and patient was started on amio bolus and then drip with improved HR.     On interrogation of AICD, reported 12 shocks for Vtach (ATP). Patient denies any feelings of shocks. Follows with [facility], last seen 1/18/2024, device last interogatted 1/3/24 with 100% afib burden and episodes of a fib w RVR.     On amio drip now.  Monitoring liver and thyroid function.  LFTs  show mildly elevated ALK of 107 on  with normal AST and ALT.  TSH 0.351, free T4 1.36.      HR's have stabilized since being started on maintenance Amio gtt  Currently w/o and complaints including no CP/palpitations/SOB   S/p amnio gtt., recently on maintenance Dofetilide per EP    Plan:  S/p cardioversion on ; follow-up with EP regarding medication changes  Continue on Tele  EP cards on board, to follow recommendations closely             Disposition: Remain inpatient for Tikosyn load and cardioversion      SUBJECTIVE     Patient seen and examined. No acute events overnight. Patient reports some bleeding from peripheral IV catheters on both arms this morning that has resolved.  He denies headache, lightheadedness, chest pain, palpitations, shortness of breath and other ROS symptoms as noted below. Patient's wife was updated in the early afternoon at bedside.      Review of Systems   HENT:  Negative for ear pain and sore throat.    Eyes:  Negative for pain.   Respiratory:  Negative for cough and shortness of breath.    Cardiovascular:  Negative for chest pain and palpitations.   Gastrointestinal:  Negative for abdominal pain, constipation, diarrhea, nausea and vomiting.   Genitourinary:  Negative for difficulty urinating, dysuria and hematuria.   Musculoskeletal:  Negative for arthralgias and myalgias.   Neurological:  Negative for dizziness, light-headedness and headaches.        OBJECTIVE     Vitals:    24 0515 24 0700 24 0820 24 1123   BP:  121/74  108/66   BP Location:    Left arm   Pulse:  91 101 87   Resp:  16  18   Temp:   (!) 97.2 °F (36.2 °C) (!) 97 °F (36.1 °C)   TempSrc:    Oral   SpO2:  98% 98% 97%   Weight: 75.8 kg (167 lb 1.7 oz)         Temperature:   Temp (24hrs), Av.4 °F (36.3 °C), Min:97 °F (36.1 °C), Max:97.9 °F (36.6 °C)    Temperature: (!) 97 °F (36.1 °C)  Intake & Output:  I/O          07 07 07 07 07 07     P.O. 1038 626     I.V. (mL/kg) 339.6 (4.4)      Total Intake(mL/kg) 1377.6 (18) 626 (8.3)     Urine (mL/kg/hr) 1050 (0.6) 2650 (1.5) 225 (2.4)    Total Output 1050 2650 225    Net +327.6 -2024 -225           Unmeasured Urine Occurrence 2 x            Weights:        Body mass index is 26.17 kg/m².  Weight (last 2 days)       Date/Time Weight    02/21/24 0515 75.8 (167.11)    02/20/24 0700 76.4 (168.43)    02/19/24 0625 76.6 (168.87)          Physical Exam  Constitutional:       General: He is not in acute distress.  HENT:      Head: Normocephalic and atraumatic.      Nose: No congestion or rhinorrhea.      Mouth/Throat:      Pharynx: No oropharyngeal exudate or posterior oropharyngeal erythema.   Eyes:      General:         Right eye: No discharge.         Left eye: No discharge.      Extraocular Movements: Extraocular movements intact.      Pupils: Pupils are equal, round, and reactive to light.   Cardiovascular:      Rate and Rhythm: Normal rate and regular rhythm.      Heart sounds: No murmur heard.  Pulmonary:      Effort: Pulmonary effort is normal.      Breath sounds: Normal breath sounds.   Abdominal:      General: Abdomen is flat. There is no distension.      Palpations: Abdomen is soft.      Tenderness: There is no abdominal tenderness.   Musculoskeletal:      Right lower leg: No edema.      Left lower leg: No edema.   Skin:     General: Skin is warm and dry.   Neurological:      Mental Status: He is alert. Mental status is at baseline.      Cranial Nerves: No cranial nerve deficit.   Psychiatric:         Mood and Affect: Mood normal.         Behavior: Behavior normal.       LABORATORY DATA     Labs: I have personally reviewed pertinent reports.  Results from last 7 days   Lab Units 02/21/24  0504 02/20/24  0503 02/19/24  0444 02/18/24  0433 02/17/24  1725   WBC Thousand/uL 6.64 8.34 10.21* 8.47 10.07   HEMOGLOBIN g/dL 12.5 13.4 12.7 12.7 14.1   HEMATOCRIT % 38.4 42.9 40.8 40.6 45.3   PLATELETS Thousands/uL  205 220 219 226 244   NEUTROS PCT %  --   --   --  87* 67   MONOS PCT %  --   --   --  3* 11   EOS PCT %  --   --   --  0 2      Results from last 7 days   Lab Units 02/21/24  0504 02/20/24  0503 02/19/24  0444   POTASSIUM mmol/L 4.5 4.4 4.4   CHLORIDE mmol/L 101 101 102   CO2 mmol/L 27 24 25   BUN mg/dL 62* 57* 40*   CREATININE mg/dL 1.45* 1.44* 1.29   CALCIUM mg/dL 9.3 9.5 9.3   ALK PHOS U/L 110* 110* 107*   ALT U/L 38 35 20   AST U/L 30 35 18     Results from last 7 days   Lab Units 02/21/24  0504 02/20/24  0503 02/18/24  0716   MAGNESIUM mg/dL 2.4 2.5 2.7     Results from last 7 days   Lab Units 02/21/24  0504 02/20/24  0503 02/18/24  0716   PHOSPHORUS mg/dL 4.0 4.4* 4.3*                    IMAGING & DIAGNOSTIC TESTING     Radiology Results: I have personally reviewed pertinent reports.  XR knee 1 or 2 vw right    Result Date: 2/20/2024  Impression: Mild degenerative changes. Joint effusion. Pseudogout. No acute osseous abnormality. Workstation performed: [id number]     XR chest 1 view portable    Result Date: 2/18/2024  Impression: Trace right effusion. Lungs clear Workstation performed: [id number]     Other Diagnostic Testing: I have personally reviewed pertinent reports.    ACTIVE MEDICATIONS     Current Facility-Administered Medications   Medication Dose Route Frequency    acetaminophen (TYLENOL) tablet 650 mg  650 mg Oral Q6H PRN    apixaban (ELIQUIS) tablet 5 mg  5 mg Oral BID    atorvastatin (LIPITOR) tablet 80 mg  80 mg Oral Daily With Dinner    cholecalciferol (VITAMIN D3) tablet 2,000 Units  2,000 Units Oral Daily    clopidogrel (PLAVIX) tablet 75 mg  75 mg Oral Daily    Diclofenac Sodium (VOLTAREN) 1 % topical gel 2 g  2 g Topical 4x Daily    dofetilide (TIKOSYN) capsule 125 mcg  125 mcg Oral Q12H DESTINEE    Empagliflozin (JARDIANCE) tablet 10 mg  10 mg Oral QAM    ferrous sulfate tablet 325 mg  325 mg Oral Every Other Day    furosemide (LASIX) tablet 40 mg  40 mg Oral BID    insulin glargine (LANTUS)  "subcutaneous injection 10 Units 0.1 mL  10 Units Subcutaneous HS    insulin lispro (HumaLOG) 100 units/mL subcutaneous injection 1-6 Units  1-6 Units Subcutaneous TID AC    levothyroxine tablet 125 mcg  125 mcg Oral Daily    melatonin tablet 3 mg  3 mg Oral HS    metoprolol (LOPRESSOR) injection 5 mg  5 mg Intravenous Q6H PRN    metoprolol succinate (TOPROL-XL) 24 hr tablet 75 mg  75 mg Oral BID    montelukast (SINGULAIR) tablet 10 mg  10 mg Oral Daily    predniSONE tablet 40 mg  40 mg Oral Daily    trimethobenzamide (TIGAN) IM injection 200 mg  200 mg Intramuscular Q6H PRN       VTE Pharmacologic Prophylaxis: Eliquis  VTE Mechanical Prophylaxis: sequential compression device    Portions of the record may have been created with voice recognition software.  Occasional wrong word or \"sound a like\" substitutions may have occurred due to the inherent limitations of voice recognition software.  Read the chart carefully and recognize, using context, where substitutions have occurred.  ==  Shante Bowers  MS-4  [place]       "

## 2024-02-21 NOTE — PHYSICAL THERAPY NOTE
02/21/24 0840   Note Type   Note Type Cancelled Session   Cancel Reasons Patient off floor/test     Pt off floor at cardioversion.  Will follow.  Ward Hodges PT, DPT CSRS

## 2024-02-21 NOTE — PROGRESS NOTES
EP Progress Note.   Unit/Bed#: [facility] 403-01 Encounter: [id number]        Juan David Lora 78 y.o. male [id number]  Hospital Stay Days: 4    Assessment and Plan      Current Problem List   Principal Problem:    Wide-complex tachycardia  Active Problems:    Mild intermittent asthma without complication    Type 2 diabetes mellitus with circulatory disorder, with long-term current use of insulin (HCC)    Hyperlipidemia    Essential hypertension    Hypothyroidism    History of aortic stenosis s/p TAVR    Chronic HFrEF (heart failure with reduced ejection fraction) ([facility])    Stage 3 chronic kidney disease, unspecified whether stage 3a or 3b CKD ([facility])    Pseudogout of right knee    Assessment/Plan:    This is 78-year-old male who initially presented with knee pain and was found to have pseudogout.  Patient was found to have episodes of atrial flutter with RVR for which EP has been consulted.    # Pseudogout on presentation  # Episode of a flutter/fib with RVR-reason for EP consult this admission.  # Previous history of A-fib/flutter-seen on EKGs from last year  # Chronic heart failure reduced EF 25-30%  # Ischemic cardiomyopathy  # Severe AS status post TAVR in 2022  # History of tachybradycardia syndrome, LBBB post TAVR status post Medtronic pacemaker upgrade to BiV ICD eventually  # LBBB  # History of CKD, DM 2, HTN, HLD  # Hepatitis C    Dofetilide price check was 0$ co-pay.     Plan:    Started on dofetilide 125 mcg twice daily on 2/19.  Will monitor renal function and QTc periodically.  C/w Toprol XL 75 mg BID for rate control and GDMT.   S/p cardioversion on 2/21. In NSR now.   C/w Eliquis 5 mg twice daily for A-fib/flutter anticoagulation.  On Lipitor and Plavix for history of CAD.  GDMT: Jardiance 10 mg daily.  Home Entresto on hold given borderline/low BP.  Euvolemic on exam.  Continue p.o. Lasix 40 mg twice daily.  Holding parameters changed given low BP. Monitor renal fxn closely.   Can be dc tomorrow  from EP standpoint. Will check renal fxn again tomorrow. EP follow up in 3-4 weeks scheduled to discuss AVN ablation.   Rest of the care per primary team.            Subjective     Patient overall admits to feeling well.  No active complaints. He had left arm bleeding from IV site which is now stable. He is post DCCV today. Now in NSR.   Objective     Vitals: Temp (24hrs), Av.4 °F (36.3 °C), Min:97 °F (36.1 °C), Max:97.9 °F (36.6 °C)  Current: Temperature: (!) 97 °F (36.1 °C)  Patient Vitals for the past 24 hrs:   BP Temp Temp src Pulse Resp SpO2 Weight   24 1123 108/66 (!) 97 °F (36.1 °C) Oral 87 18 97 % --   24 0820 -- (!) 97.2 °F (36.2 °C) -- 101 -- 98 % --   24 0700 121/74 -- -- 91 16 98 % --   24 0515 -- -- -- -- -- -- 75.8 kg (167 lb 1.7 oz)   24 0245 118/71 97.5 °F (36.4 °C) Oral 88 17 98 % --   24 2306 115/75 97.5 °F (36.4 °C) -- (!) 109 -- 96 % --   24 2000 -- -- -- -- -- 97 % --   24 1855 118/70 (!) 97.4 °F (36.3 °C) -- (!) 115 16 96 % --   24 1447 102/58 97.9 °F (36.6 °C) -- 104 16 95 % --    Body mass index is 26.17 kg/m².  Physical Exam:  Physical Exam  Vitals reviewed.   Constitutional:       General: He is not in acute distress.     Appearance: He is well-developed. He is not diaphoretic.   Cardiovascular:      Rate and Rhythm: Normal rate and regular rhythm.      Heart sounds: Normal heart sounds. No murmur heard.     No friction rub.   Pulmonary:      Effort: Pulmonary effort is normal. No respiratory distress.      Breath sounds: Normal breath sounds. No stridor. No wheezing.         Invasive Devices       Peripheral Intravenous Line  Duration             Peripheral IV 24 Right;Ventral (anterior) Forearm <1 day                        Labs:   Results from last 7 days   Lab Units 24  0504 24  0503 24  0444 24  0433 24  1725   WBC Thousand/uL 6.64 8.34 10.21* 8.47 10.07   HEMOGLOBIN g/dL 12.5 13.4 12.7 12.7  "14.1   HEMATOCRIT % 38.4 42.9 40.8 40.6 45.3   PLATELETS Thousands/uL 205 220 219 226 244   NEUTROS PCT %  --   --   --  87* 67   MONOS PCT %  --   --   --  3* 11   EOS PCT %  --   --   --  0 2      Results from last 7 days   Lab Units 02/21/24  0504 02/20/24  0503 02/19/24  0444 02/18/24  0716 02/17/24 2000 02/17/24  1725   SODIUM mmol/L 136 136 137 138  --  137   POTASSIUM mmol/L 4.5 4.4 4.4 5.0  --  3.8   CHLORIDE mmol/L 101 101 102 102  --  100   CO2 mmol/L 27 24 25 28  --  28   BUN mg/dL 62* 57* 40* 30*  --  23   CREATININE mg/dL 1.45* 1.44* 1.29 1.17  --  1.23   CALCIUM mg/dL 9.3 9.5 9.3 10.0  --  9.9   ALK PHOS U/L 110* 110* 107* 119*  --  118*   ALT U/L 38 35 20 22  --  24   AST U/L 30 35 18 18  --  21   SED RATE mm/hour  --   --   --   --  54*  --    CRP mg/L  --   --   --   --  16.2*  --    MAGNESIUM mg/dL 2.4 2.5  --  2.7  --  2.0   PHOSPHORUS mg/dL 4.0 4.4*  --  4.3* 3.5  --    EGFR ml/min/1.73sq m 46 46 53 60  --  56                 No results found for: \"PHART\", \"AHW7JAK\", \"PO2ART\", \"WVY6ZPA\", \"H4HCTDBV\", \"BEART\", \"SOURCE\"  No components found for: \"HIV1X2\"  Lab Results   Component Value Date    HAV Reactive (A) 05/08/2019    HEPAIGM Non-reactive 05/08/2019    HEPBIGM Non-reactive 01/25/2022    HEPBCAB Reactive (A) 01/25/2022    HEPCAB High Reactive (A) 01/25/2022     No results found for: \"SPEP\", \"UPEP\"   Lab Results   Component Value Date    HGBA1C 6.9 (H) 12/23/2023    HGBA1C 7.6 (H) 02/10/2023    HGBA1C 7.6 (A) 11/08/2022     Lab Results   Component Value Date    CHOL 171 02/12/2014      Lab Results   Component Value Date    HDL 35 (L) 12/23/2023    HDL 53 02/10/2023    HDL 65 11/10/2022      Lab Results   Component Value Date    LDLCALC 63 12/23/2023    LDLCALC 72 02/10/2023    LDLCALC 62 11/10/2022      Lab Results   Component Value Date    TRIG 74 12/23/2023    TRIG 61 02/10/2023    TRIG 75 11/10/2022     No components found for: \"PROCAL\"      Micro:  Results from last 7 days   Lab Units " "02/17/24 2000   GRAM STAIN RESULT  3+ Polys  No organisms seen   BODY FLUID CULTURE, STERILE  No growth     Urinalysis:  No results found for: \"AMPHETUR\", \"BDZUR\", \"COCAINEUR\", \"OPIATEUR\", \"PCPUR\", \"THCUR\", \"ETOH\", \"ACTMNPHEN\", \"SALICYLATE\"       Invalid input(s): \"URIBILINOGEN\"        Intake and Outputs:  I/O         02/17 0701 02/18 0700 02/18 0701 02/19 0700 02/19 0701 02/20 0700    P.O.  1776 400    I.V. (mL/kg) 265.4 (3.5) 233.5 (3)     Total Intake(mL/kg) 265.4 (3.5) 2009.5 (26.2) 400 (5.2)    Urine (mL/kg/hr) 300 2025 (1.1)     Total Output 300 2025     Net -34.6 -15.5 +400           Unmeasured Urine Occurrence   1 x          Nutrition:  Diet Cardiovascular; Sodium 2 GM; Fluid Restriction 2000 ML, Consistent Carbohydrate Diet Level 3 (6 carb servings/90 grams CHO/meal)  Radiology Results:   XR knee 1 or 2 vw right   Final Result by Chase Harris MD (02/20 0848)      Mild degenerative changes.      Joint effusion.      Pseudogout.      No acute osseous abnormality.            Workstation performed: [id number]         XR chest 1 view portable   ED Interpretation by Francisco Mohan MD (02/17 1756)   No fluid overload  No Lead fx.  AICD in place.  No pneumonia.      Final Result by Kayli Rodrigues MD (02/18 0834)      Trace right effusion.      Lungs clear            Workstation performed: [id number]           Scheduled Medications:  apixaban, 5 mg, BID  atorvastatin, 80 mg, Daily With Dinner  cholecalciferol, 2,000 Units, Daily  clopidogrel, 75 mg, Daily  Diclofenac Sodium, 2 g, 4x Daily  dofetilide, 125 mcg, Q12H DESTINEE  Empagliflozin, 10 mg, QAM  ferrous sulfate, 325 mg, Every Other Day  furosemide, 40 mg, BID  insulin glargine, 10 Units, HS  insulin lispro, 1-6 Units, TID AC  levothyroxine, 125 mcg, Daily  melatonin, 3 mg, HS  metoprolol succinate, 75 mg, BID  montelukast, 10 mg, Daily  predniSONE, 30 mg, Daily   Followed by  [START ON 2/24/2024] predniSONE, 20 mg, Daily   Followed by  [START " ON 2/27/2024] predniSONE, 10 mg, Daily      PRN MEDS:  acetaminophen, 650 mg, Q6H PRN  metoprolol, 5 mg, Q6H PRN  trimethobenzamide, 200 mg, Q6H PRN      Last 24 Hour Meds: :   Medication Administration - last 24 hours from 02/20/2024 1154 to 02/21/2024 1154         Date/Time Order Dose Route Action Action by     02/21/2024 0811 EST cholecalciferol (VITAMIN D3) tablet 2,000 Units 2,000 Units Oral Given Phoebe Mayberry, TRIP     02/21/2024 0812 EST Empagliflozin (JARDIANCE) tablet 10 mg 10 mg Oral Given Phoebe Mayberry RN     02/21/2024 0811 EST montelukast (SINGULAIR) tablet 10 mg 10 mg Oral Given Phoebe Mayberry, TRIP     02/20/2024 1900 EST atorvastatin (LIPITOR) tablet 80 mg 80 mg Oral Given Catie Deewy, TRIP     02/21/2024 1140 EST insulin lispro (HumaLOG) 100 units/mL subcutaneous injection 1-6 Units -- Subcutaneous Not Given Hope Franklin, RN     02/21/2024 0618 EST insulin lispro (HumaLOG) 100 units/mL subcutaneous injection 1-6 Units 0 Units Subcutaneous Hold Steffany Arenas, RN     02/20/2024 1546 EST insulin lispro (HumaLOG) 100 units/mL subcutaneous injection 1-6 Units -- Subcutaneous Not Given Catie Dewey, TRIP     02/21/2024 0811 EST apixaban (ELIQUIS) tablet 5 mg 5 mg Oral Given Phobee Mayberry, TRIP     02/20/2024 1900 EST apixaban (ELIQUIS) tablet 5 mg 5 mg Oral Given Catie Dewey, RN     02/21/2024 0811 EST clopidogrel (PLAVIX) tablet 75 mg 75 mg Oral Given Phoebe Mayberry, TRIP     02/21/2024 0813 EST Diclofenac Sodium (VOLTAREN) 1 % topical gel 2 g 2 g Topical Not Given Phoebe Mayberry RN     02/20/2024 2104 EST Diclofenac Sodium (VOLTAREN) 1 % topical gel 2 g 2 g Topical Not Given Alise Stewart     02/20/2024 1700 EST Diclofenac Sodium (VOLTAREN) 1 % topical gel 2 g 2 g Topical Not Given Catie Dewey RN     02/20/2024 1307 EST predniSONE tablet 40 mg 40 mg Oral Given Catie Dewey RN     02/21/2024 0811 EST levothyroxine tablet 125 mcg 125 mcg Oral Given Phoebe Young,  RN     02/20/2024 2112 EST melatonin tablet 3 mg 3 mg Oral Given Alise Stewart     02/21/2024 0811 EST dofetilide (TIKOSYN) capsule 125 mcg 125 mcg Oral Given Phoebe Mayberry, TRIP     02/20/2024 2112 EST dofetilide (TIKOSYN) capsule 125 mcg 125 mcg Oral Given Alise Stewart     02/21/2024 0811 EST furosemide (LASIX) tablet 40 mg 40 mg Oral Given Phoebe Mayberry, TRIP     02/20/2024 1900 EST furosemide (LASIX) tablet 40 mg 40 mg Oral Given Catie Dewey, RN     02/21/2024 0811 EST metoprolol succinate (TOPROL-XL) 24 hr tablet 75 mg 75 mg Oral Given Phoebe Mayberry, TRIP     02/20/2024 1900 EST metoprolol succinate (TOPROL-XL) 24 hr tablet 75 mg 75 mg Oral Given Catie Dewey, TRIP     02/20/2024 2112 EST insulin glargine (LANTUS) subcutaneous injection 10 Units 0.1 mL 10 Units Subcutaneous Given Alise Stewart            PLEASE NOTE:  This encounter was completed utilizing the [employer] Direct Speech Voice Recognition Software. Grammatical errors, random word insertions, pronoun errors and incomplete sentences are occasional consequences of the system due to software limitations, ambient noise and hardware issues.These may be missed by proof reading prior to affixing electronic signature. Any questions or concerns about the content, text or information contained within the body of this dictation should be directly addressed to the physician for clarification. Please do not hesitate to call me directly if you have any any questions or concerns.

## 2024-02-21 NOTE — ANESTHESIA PREPROCEDURE EVALUATION
Procedure:  CARDIOVERSION    Relevant Problems   CARDIO   (+) Atherosclerosis of native coronary artery of native heart with angina pectoris (HCC)   (+) Atrial fibrillation (HCC)   (+) Atrial flutter (HCC)   (+) Essential hypertension   (+) History of aortic stenosis s/p TAVR   (+) History of tachy-lino syndrome   (+) Hyperlipidemia   (+) Mitral regurgitation      ENDO   (+) Hypothyroidism   (+) Type 2 diabetes mellitus with circulatory disorder, with long-term current use of insulin (HCC)      GI/HEPATIC   (+) Hepatitis C      /RENAL   (+) HE (acute kidney injury) (HCC)   (+) Stage 3 chronic kidney disease, unspecified whether stage 3a or 3b CKD (HCC)      HEMATOLOGY   (+) Anemia      MUSCULOSKELETAL   (+) Osteoarthritis of knee   (+) Pseudogout of right knee      PULMONARY   (+) Mild intermittent asthma without complication        Left Ventricle: Left ventricular cavity size is mildly dilated. Wall thickness is upper normal. The left ventricular ejection fraction is 25-30%. Systolic function is severely reduced. There is severe global hypokinesis with regional variation. Diastolic function is normal.    IVS: There is abnormal septal motion consistent with left bundle branch block or right ventricular pacing.    Right Ventricle: Right ventricular cavity size is mildly dilated. Systolic function is moderately reduced. A pacer wire is present.    Left Atrium: The atrium is moderate to severely dilated.    Right Atrium: The atrium is moderately dilated.    Aortic Valve: There is a TAVR bioprosthetic valve. The prosthetic valve appears to be functioning normally.    Mitral Valve: There is severe regurgitation with an eccentrically directed jet.    Tricuspid Valve: There is moderate to severe regurgitation. The right ventricular systolic pressure is moderately to severely elevated.    Pulmonic Valve: There is mild regurgitation.    IVC/SVC: The inferior vena cava is dilated. Respirophasic changes were blunted (less  than 50% variation).       Anesthesia Plan  ASA Score- 4     Anesthesia Type- IV sedation with anesthesia with ASA Monitors.         Additional Monitors:     Airway Plan:            Plan Factors-    Chart reviewed. EKG reviewed.  Existing labs reviewed. Patient summary reviewed.                  Induction- intravenous.    Postoperative Plan-     Informed Consent- Anesthetic plan and risks discussed with patient, spouse and daughter.  I personally reviewed this patient with the CRNA. Discussed and agreed on the Anesthesia Plan with the CRNA..

## 2024-02-22 LAB
ALBUMIN SERPL BCP-MCNC: 3.7 G/DL (ref 3.5–5)
ALP SERPL-CCNC: 125 U/L (ref 34–104)
ALT SERPL W P-5'-P-CCNC: 51 U/L (ref 7–52)
ANION GAP SERPL CALCULATED.3IONS-SCNC: 11 MMOL/L
AST SERPL W P-5'-P-CCNC: 36 U/L (ref 13–39)
ATRIAL RATE: 102 BPM
ATRIAL RATE: 117 BPM
ATRIAL RATE: 119 BPM
ATRIAL RATE: 63 BPM
ATRIAL RATE: 84 BPM
ATRIAL RATE: 86 BPM
BILIRUB SERPL-MCNC: 1.58 MG/DL (ref 0.2–1)
BUN SERPL-MCNC: 64 MG/DL (ref 5–25)
CALCIUM SERPL-MCNC: 9.6 MG/DL (ref 8.4–10.2)
CHLORIDE SERPL-SCNC: 102 MMOL/L (ref 96–108)
CO2 SERPL-SCNC: 27 MMOL/L (ref 21–32)
CREAT SERPL-MCNC: 1.54 MG/DL (ref 0.6–1.3)
ERYTHROCYTE [DISTWIDTH] IN BLOOD BY AUTOMATED COUNT: 18.6 % (ref 11.6–15.1)
GFR SERPL CREATININE-BSD FRML MDRD: 43 ML/MIN/1.73SQ M
GLUCOSE SERPL-MCNC: 128 MG/DL (ref 65–140)
GLUCOSE SERPL-MCNC: 136 MG/DL (ref 65–140)
GLUCOSE SERPL-MCNC: 144 MG/DL (ref 65–140)
GLUCOSE SERPL-MCNC: 203 MG/DL (ref 65–140)
GLUCOSE SERPL-MCNC: 249 MG/DL (ref 65–140)
HCT VFR BLD AUTO: 40 % (ref 36.5–49.3)
HGB BLD-MCNC: 12.9 G/DL (ref 12–17)
MAGNESIUM SERPL-MCNC: 2.3 MG/DL (ref 1.9–2.7)
MCH RBC QN AUTO: 26.8 PG (ref 26.8–34.3)
MCHC RBC AUTO-ENTMCNC: 32.3 G/DL (ref 31.4–37.4)
MCV RBC AUTO: 83 FL (ref 82–98)
P AXIS: 53 DEGREES
P AXIS: 65 DEGREES
P AXIS: 79 DEGREES
P AXIS: 80 DEGREES
PHOSPHATE SERPL-MCNC: 4.7 MG/DL (ref 2.3–4.1)
PLATELET # BLD AUTO: 208 THOUSANDS/UL (ref 149–390)
PMV BLD AUTO: 11.6 FL (ref 8.9–12.7)
POTASSIUM SERPL-SCNC: 4.4 MMOL/L (ref 3.5–5.3)
PR INTERVAL: 144 MS
PR INTERVAL: 154 MS
PR INTERVAL: 154 MS
PROT SERPL-MCNC: 6.4 G/DL (ref 6.4–8.4)
QRS AXIS: -47 DEGREES
QRS AXIS: -64 DEGREES
QRS AXIS: -67 DEGREES
QRS AXIS: -73 DEGREES
QRS AXIS: -78 DEGREES
QRS AXIS: -81 DEGREES
QRS AXIS: -86 DEGREES
QRSD INTERVAL: 130 MS
QRSD INTERVAL: 132 MS
QRSD INTERVAL: 136 MS
QRSD INTERVAL: 138 MS
QRSD INTERVAL: 140 MS
QRSD INTERVAL: 142 MS
QRSD INTERVAL: 146 MS
QT INTERVAL: 362 MS
QT INTERVAL: 368 MS
QT INTERVAL: 374 MS
QT INTERVAL: 408 MS
QT INTERVAL: 450 MS
QT INTERVAL: 464 MS
QT INTERVAL: 520 MS
QTC INTERVAL: 466 MS
QTC INTERVAL: 531 MS
QTC INTERVAL: 532 MS
QTC INTERVAL: 538 MS
QTC INTERVAL: 539 MS
QTC INTERVAL: 548 MS
QTC INTERVAL: 572 MS
RBC # BLD AUTO: 4.81 MILLION/UL (ref 3.88–5.62)
SODIUM SERPL-SCNC: 140 MMOL/L (ref 135–147)
T WAVE AXIS: -12 DEGREES
T WAVE AXIS: -29 DEGREES
T WAVE AXIS: 108 DEGREES
T WAVE AXIS: 43 DEGREES
T WAVE AXIS: 65 DEGREES
T WAVE AXIS: 86 DEGREES
T WAVE AXIS: 89 DEGREES
VENTRICULAR RATE: 100 BPM
VENTRICULAR RATE: 102 BPM
VENTRICULAR RATE: 129 BPM
VENTRICULAR RATE: 141 BPM
VENTRICULAR RATE: 63 BPM
VENTRICULAR RATE: 84 BPM
VENTRICULAR RATE: 86 BPM
WBC # BLD AUTO: 6.01 THOUSAND/UL (ref 4.31–10.16)

## 2024-02-22 PROCEDURE — 82948 REAGENT STRIP/BLOOD GLUCOSE: CPT

## 2024-02-22 PROCEDURE — 83735 ASSAY OF MAGNESIUM: CPT

## 2024-02-22 PROCEDURE — 99232 SBSQ HOSP IP/OBS MODERATE 35: CPT | Performed by: INTERNAL MEDICINE

## 2024-02-22 PROCEDURE — 97116 GAIT TRAINING THERAPY: CPT

## 2024-02-22 PROCEDURE — 93005 ELECTROCARDIOGRAM TRACING: CPT

## 2024-02-22 PROCEDURE — 93010 ELECTROCARDIOGRAM REPORT: CPT | Performed by: INTERNAL MEDICINE

## 2024-02-22 PROCEDURE — 85027 COMPLETE CBC AUTOMATED: CPT

## 2024-02-22 PROCEDURE — 84100 ASSAY OF PHOSPHORUS: CPT

## 2024-02-22 PROCEDURE — 80053 COMPREHEN METABOLIC PANEL: CPT

## 2024-02-22 RX ORDER — SODIUM CHLORIDE, SODIUM GLUCONATE, SODIUM ACETATE, POTASSIUM CHLORIDE, MAGNESIUM CHLORIDE, SODIUM PHOSPHATE, DIBASIC, AND POTASSIUM PHOSPHATE .53; .5; .37; .037; .03; .012; .00082 G/100ML; G/100ML; G/100ML; G/100ML; G/100ML; G/100ML; G/100ML
75 INJECTION, SOLUTION INTRAVENOUS CONTINUOUS
Status: DISCONTINUED | OUTPATIENT
Start: 2024-02-22 | End: 2024-02-23 | Stop reason: HOSPADM

## 2024-02-22 RX ADMIN — MONTELUKAST 10 MG: 10 TABLET, FILM COATED ORAL at 08:07

## 2024-02-22 RX ADMIN — PREDNISONE 30 MG: 20 TABLET ORAL at 08:07

## 2024-02-22 RX ADMIN — METOPROLOL SUCCINATE 75 MG: 50 TABLET, EXTENDED RELEASE ORAL at 08:07

## 2024-02-22 RX ADMIN — FUROSEMIDE 40 MG: 40 TABLET ORAL at 08:07

## 2024-02-22 RX ADMIN — Medication 2000 UNITS: at 08:07

## 2024-02-22 RX ADMIN — APIXABAN 5 MG: 5 TABLET, FILM COATED ORAL at 08:07

## 2024-02-22 RX ADMIN — APIXABAN 5 MG: 5 TABLET, FILM COATED ORAL at 17:23

## 2024-02-22 RX ADMIN — MELATONIN 3 MG: at 20:36

## 2024-02-22 RX ADMIN — METOPROLOL SUCCINATE 75 MG: 50 TABLET, EXTENDED RELEASE ORAL at 17:23

## 2024-02-22 RX ADMIN — EMPAGLIFLOZIN 10 MG: 10 TABLET, FILM COATED ORAL at 08:08

## 2024-02-22 RX ADMIN — ATORVASTATIN CALCIUM 80 MG: 80 TABLET, FILM COATED ORAL at 17:23

## 2024-02-22 RX ADMIN — SODIUM CHLORIDE, SODIUM GLUCONATE, SODIUM ACETATE, POTASSIUM CHLORIDE, MAGNESIUM CHLORIDE, SODIUM PHOSPHATE, DIBASIC, AND POTASSIUM PHOSPHATE 75 ML/HR: .53; .5; .37; .037; .03; .012; .00082 INJECTION, SOLUTION INTRAVENOUS at 13:18

## 2024-02-22 RX ADMIN — DOFETILIDE 125 MCG: 0.12 CAPSULE ORAL at 20:36

## 2024-02-22 RX ADMIN — INSULIN GLARGINE 10 UNITS: 100 INJECTION, SOLUTION SUBCUTANEOUS at 20:37

## 2024-02-22 RX ADMIN — CLOPIDOGREL BISULFATE 75 MG: 75 TABLET ORAL at 08:07

## 2024-02-22 RX ADMIN — FERROUS SULFATE TAB 325 MG (65 MG ELEMENTAL FE) 325 MG: 325 (65 FE) TAB at 08:07

## 2024-02-22 RX ADMIN — INSULIN LISPRO 2 UNITS: 100 INJECTION, SOLUTION INTRAVENOUS; SUBCUTANEOUS at 11:27

## 2024-02-22 RX ADMIN — LEVOTHYROXINE SODIUM 125 MCG: 125 TABLET ORAL at 08:07

## 2024-02-22 RX ADMIN — DOFETILIDE 125 MCG: 0.12 CAPSULE ORAL at 08:07

## 2024-02-22 NOTE — PLAN OF CARE
Problem: PHYSICAL THERAPY ADULT  Goal: Performs mobility at highest level of function for planned discharge setting.  See evaluation for individualized goals.  Description: Treatment/Interventions: Functional transfer training, LE strengthening/ROM, Elevations, Therapeutic exercise, Endurance training, Equipment eval/education, Bed mobility, Gait training, Spoke to nursing, OT  Equipment Recommended: Walker       See flowsheet documentation for full assessment, interventions and recommendations.  Outcome: Progressing  Note: Prognosis: Good  Problem List: Decreased strength, Decreased endurance, Impaired balance, Decreased mobility, Pain, Decreased coordination  Assessment: Patient was received supine in bed . Patient was agreeable to therapy services today. PT session focused on gait today in order to improve functional mobility and independence. Functional mobility was performed as described above.  Pt was eager to participate in therapy services today. Pt was able to ambulate further today without any rest than he has been in previous therapy sessions. Pt reported fatigue but was able to complete gait without any losses of balance or safety concerns. At conclusion of therapy session, pt was seated in bedside recliner and made comfortable.  Patient will benefit from continued PT services while in hospital in order to address remaining limitations. The patients AM-PAC Basic Mobility Inpatient Short From Raw Score is 23 .  Based on AM-PAC scoring and patient presentation, PT currently recommending Level III (Minimum Resource Intensity). Please also refer to the recommendation of the Physical Therapist for safe discharge planning.  Barriers to Discharge: Inaccessible home environment     Rehab Resource Intensity Level, PT: III (Minimum Resource Intensity)    See flowsheet documentation for full assessment.

## 2024-02-22 NOTE — CASE MANAGEMENT
Case Management Discharge Planning Note    Patient name Juan David Lora  [id number]  : 1945 Date 2024       Current Admission Date: 2024  Current Admission Diagnosis:Wide-complex tachycardia   Patient Active Problem List    Diagnosis Date Noted    Wide-complex tachycardia 2024    Pseudogout of right knee 2024    Stage 3 chronic kidney disease, unspecified whether stage 3a or 3b CKD ([facility]) 2024    Smoking history 2023    Acute on chronic heart failure ([facility]) 2023    HE (acute kidney injury) ([facility]) 2023    Hypokalemia 2023    Atrial fibrillation ([facility]) 2023    Mitral regurgitation 2023    Hepatitis C 2023    s/p Medtronic dual chamber PPM 22 s/p upgrade to BiV ICD 2023    Atrial flutter ([facility]) 2023    Chronic HFrEF (heart failure with reduced ejection fraction) ([facility]) 2023    NSVT (nonsustained ventricular tachycardia) ([facility]) 2023    Anemia 2023    Status post insertion of drug eluting coronary artery stent 02/10/2023    Bruit of left carotid artery 2023    Peripheral artery disease ([facility]) 2023    Need for influenza vaccination 11/10/2022    Leg cramping 11/10/2022    History of tachy-lino syndrome 2022    S/P TAVR (transcatheter aortic valve replacement) 2022    Atherosclerosis of native coronary artery of native heart with angina pectoris ([facility]) 2022    Contrast media allergy 2022    History of aortic stenosis s/p TAVR 2022    Diverticulosis of large intestine 2018    Internal hemorrhoids 2018    Nicotine dependence 2017    Osteoarthritis of knee 2017    Bilateral hearing loss 2017    Allergic rhinitis 2016    Type 2 diabetes mellitus with circulatory disorder, with long-term current use of insulin ([facility]) 2015    Adenomatous colon polyp 2014    Hyperlipidemia 2013     Vitamin B12 deficiency 07/24/2012    Mild intermittent asthma without complication 06/08/2012    Essential hypertension 06/08/2012    Hypothyroidism 06/07/2012      LOS (days): 5  Geometric Mean LOS (GMLOS) (days): 2.3  Days to GMLOS:-2.4     OBJECTIVE:  Risk of Unplanned Readmission Score: 27.61         Current admission status: Inpatient   Preferred Pharmacy:   [id number] - [place], [place]  [address] [place] [address]  Phone: [phone] Fax: [phone]    Primary Care Provider: Umesh Millard MD    Primary Insurance: [employer] [facility]  Secondary [facility]: [facility]    DISCHARGE DETAILS:                                                                                        IMM Given (Date):: 02/22/24  IMM Given to:: Family     Additional Comments: Had cardioversion yesterday. Planned home today but creatinine bumped up, keep and recheck tomorrow. On Tikosyn which has no copay. Also plan Jardiance and Entresto. When discharged has [facility] for nsg/PT/OT. Wife with good understanding. IMM signed. Plan home 1-2 days.

## 2024-02-22 NOTE — PHYSICAL THERAPY NOTE
PHYSICAL THERAPY NOTE          Patient Name: Juan David Lora  Today's Date: 2/22/2024 02/22/24 1339   PT Last Visit   PT Visit Date 02/22/24   Note Type   Note Type Treatment   Pain Assessment   Pain Assessment Tool 0-10   Pain Score No Pain   Restrictions/Precautions   Weight Bearing Precautions Per Order Yes   RLE Weight Bearing Per Order WBAT   Other Precautions WBS;Fall Risk;Pain;Multiple lines   General   Chart Reviewed Yes   Response to Previous Treatment Patient with no complaints from previous session.   Family/Caregiver Present Yes  (spouse)   Cognition   Overall Cognitive Status WFL   Arousal/Participation Alert;Cooperative   Attention Within functional limits   Orientation Level Oriented X4   Memory Within functional limits   Following Commands Follows one step commands without difficulty   Subjective   Subjective pt pleasant and cooperative throughout therapy session. pt received supine in bed   Bed Mobility   Supine to Sit 5  Supervision   Additional items Increased time required;Verbal cues   Sit to Supine Unable to assess   Transfers   Sit to Stand 5  Supervision   Additional items Increased time required;Verbal cues   Stand to Sit 5  Supervision   Additional items Increased time required;Verbal cues   Additional Comments transfers w RW   Ambulation/Elevation   Gait pattern Improper Weight shift;Narrow FRANCES;Forward Flexion;Decreased foot clearance;Shuffling;Short stride   Gait Assistance 5  Supervision   Additional items Verbal cues   Assistive Device Rolling walker   Distance 200'   Balance   Static Sitting Good   Dynamic Sitting Good   Static Standing Fair +   Dynamic Standing Fair +   Ambulatory Fair   Endurance Deficit   Endurance Deficit Yes   Endurance Deficit Description generalized weakness, decrased exercise tolerance   Activity Tolerance   Activity Tolerance Patient tolerated treatment well;Patient  limited by fatigue   Nurse Made Aware RN cleared and updated   Assessment   Prognosis Good   Problem List Decreased strength;Decreased endurance;Impaired balance;Decreased mobility;Pain;Decreased coordination   Assessment Patient was received supine in bed . Patient was agreeable to therapy services today. PT session focused on gait today in order to improve functional mobility and independence. Functional mobility was performed as described above.  Pt was eager to participate in therapy services today. Pt was able to ambulate further today without any rest than he has been in previous therapy sessions. Pt reported fatigue but was able to complete gait without any losses of balance or safety concerns. At conclusion of therapy session, pt was seated in bedside recliner and made comfortable.  Patient will benefit from continued PT services while in hospital in order to address remaining limitations. The patients AM-formerly [facility] Basic Mobility Inpatient Short From Raw Score is 23 .  Based on -PAC scoring and patient presentation, PT currently recommending Level III (Minimum Resource Intensity). Please also refer to the recommendation of the Physical Therapist for safe discharge planning.   Barriers to Discharge Inaccessible home environment   Goals   Patient Goals to feel better   [facility] Expiration Date 02/28/24   PT Treatment Day 3   Plan   Treatment/Interventions ADL retraining;Functional transfer training;LE strengthening/ROM;Elevations;Therapeutic exercise;Endurance training;Bed mobility;Gait training;Spoke to nursing;OT   Progress Progressing toward goals   PT Frequency 3-5x/wk   Discharge Recommendation   Rehab Resource Intensity Level, PT III (Minimum Resource Intensity)   AM-PAC Basic Mobility Inpatient   Turning in Flat Bed Without Bedrails 4   Lying on Back to Sitting on Edge of Flat Bed Without Bedrails 4   Moving Bed to Chair 4   Standing Up From Chair Using Arms 4   Walk in Room 4   Climb 3-5 Stairs With Railing 3    Basic Mobility Inpatient Raw Score 23   Basic Mobility Standardized Score 50.88   Highest Level Of Mobility   JH-HLM Goal 7: Walk 25 feet or more   JH-HLM Achieved 7: Walk 25 feet or more   Education   Education Provided Mobility training   Patient Demonstrates acceptance/verbal understanding   End of Consult   Patient Position at End of Consult Bedside chair;All needs within reach       Charles Botello PT, DPT

## 2024-02-22 NOTE — PROGRESS NOTES
INTERNAL MEDICINE RESIDENCY PROGRESS NOTE     Name: Juan David Lora   Age & Sex: 78 y.o. male   MRN: [id number]  Unit/Bed#: [facility] 403-01   Encounter: [id number]  Team: SOD Team B     PATIENT INFORMATION     Name: Juan David Lora   Age & Sex: 78 y.o. male   MRN: [id number]  Hospital Stay Days: 5    ASSESSMENT/PLAN     Principal Problem:    Wide-complex tachycardia  Active Problems:    Mild intermittent asthma without complication    Type 2 diabetes mellitus with circulatory disorder, with long-term current use of insulin ([facility])    Hyperlipidemia    Essential hypertension    Hypothyroidism    History of aortic stenosis s/p TAVR    Chronic HFrEF (heart failure with reduced ejection fraction) ([facility])    Stage 3 chronic kidney disease, unspecified whether stage 3a or 3b CKD ([facility])    Pseudogout of right knee      Pseudogout of right knee  Assessment & Plan  Patient presenting with 3 days of right knee swelling, warmth, pain on movement, and decreased range of motion. Interestingly, although potentially unrelated, he started Entresto 3 days prior to admission.     The joint was tapped in the ED and was positive for CCP crystals. ESR and CRP elevated.     2/12: Patient reports that he is pain-free this morning and that he was able to ambulate pain free on 2/11 with the physical therapist.      ?  Likely secondary to use of Lasix     Plan:  Ortho following peripherally.  Recommended WBAT LE and medical management of pseudogout   Begin prednisone taper today (2/21).  Decrease by 10 mg every 3 day  Starting on 30, to taper as above  Please ensure Prednisone not given close to bedtime as to avoid delirium   Continue to avoid NSAIDs I/c/o CAD, HF  ?  Consider further crystal analysis/etiology workup       Stage 3 chronic kidney disease, unspecified whether stage 3a or 3b CKD ([facility])  Assessment & Plan  Lab Results   Component Value Date    EGFR 60 02/18/2024    EGFR 56 02/17/2024    EGFR 55 02/09/2024    CREATININE 1.17 02/18/2024     CREATININE 1.23 02/17/2024    CREATININE 1.26 02/09/2024     Patient's baseline Cr ~ 1.05 - 1.3  Had suffered HE at recent hospitalization in Dec of '23    Creatinine uptrending, 1.54 on 2/22.  Patient euvolemic to dry on exam.  Possibly 2/2 to hypovolemia.      Plan:  Lasix discontinued  Will administer IVF slow infusion instead of bolus given history of HF  Continue to monitor  Avoid nephrotoxic agents  Continue avoiding NSAIDs I/r/t pseudogout      Chronic HFrEF (heart failure with reduced ejection fraction) ([facility])  Assessment & Plan    Pt's Natural Hx of HF:    Date Dx: March 2023 (Ie, shortly after NSTEMI s/p ELDER x2)  Cause? (If known): Ischemic, reduction in EF in setting of NSTEMI with 80% mid LAD and 90% RPDA stenoses s/p stenting    Subtype: HFrEF  Class: NYHA Class II, Stage C  Cardiologist/HF Specialist: Virginia Gan MD of  [facility]    Dry Weight: ~175-180 lbs (79.4-81.2 kg) ... Nb No official record of dry weight, the value shown previously is based on assumptions per weights prior to pt's first admit for HF exacerbation  Current Weight:   Wt Readings from Last 3 Encounters:   02/18/24 76.8 kg (169 lb 5 oz)   02/13/24 75.3 kg (166 lb)   01/18/24 80.3 kg (177 lb 1.6 oz)      Most Recent Echo (Study Date 12/23/23): LVEF 25-30 %, LV Systolic Dysfxn, Severe with severe Global hypokinesis  Structural Heart Changes?  Atria: LA mod/severe dilation, RA moderate dilation  Ventricles: LV severe global hypokinesis w/ regional variation, RV mild dilation with moderately reduced systolic function  Valves: Normal fxn TAVR, Severe MR, Mod to Severe TR with elevated RV systolic pressure, mild DC    Paced?: BiV ACD    GDMT:   Beta-Blocker: Metoprolol succinate 75 mg BID  ACEi, ARB or ARNi: Entresto 24/26 bid  Previously on Lisinopril 10  SGLT2i- jardiance 10 mg daily  Recently D/C'd OP, now on Entresto instead  Diuretic: Lasix 60 mg daily - taking 40mg in AM and 20mg in PM    ICD? Yes   Date Inserted:  9/13/23    - - - - - - - - - - - - - - - - - - - - - - - - - - - - - - - - - - - - - - - - - - - - - - - - - - - - - - - - - - -     IP Evaluation/Interval HF Assessment:    Assessment:    Pt is currently at dry weight and no concerning s/sx of HF exacerbation I/c/o Vfib events  BPs continue to remain soft, previously likely affected by IV Amio however now may ? Reflect poor compliance at home versus current glucocorticoid use  Regardless, cards has been made aware and they have adjusted patient's hold parameters  Been receiving Lasix and Toprol sparingly/haphazardly.  Rarely gets them as scheduled  Currently on Amio which will also drop BP, for this reason continuing with statement as above re: diuresis    Plan:    Continue to hold Entresto I/c/o soft Bps  Per cardiology, okay to continue metoprolol succinate (hold parameter SBP <100)   Lasix discontinued due to rising creatinine, 1.54 on 2/22    Daily Standing weights  Strict I/O  Fluid restriction to 2000 cc  Salt restricted diet to 2 gm       History of aortic stenosis s/p TAVR  Assessment & Plan  TAVR 6/2022, normal function on ECHO 12/2023. Follows with [facility]. On plavix, eliquis, statin.     Plan:  Continue ASA/Plavix & Eliquis       Hypothyroidism  Assessment & Plan  On home levothyroxine 137mcg daily. Subsequent labs TSH 0.352 and free T4 1.36. Potential trigger for a fib.      Plan:  Reduce home levothyroxine dose to 125mcg  Follow-up TSH in setting of wide complex tachycardia       Essential hypertension  Assessment & Plan  Controlled, if not low, at this time. Patient on home regimen of metoprolol (rate control) and lasix BID. Was prescribed Entresto 3 days prior to admission. Is no longer taking spironolactone.      Plan:  Continue home metoprolol  Lasix discontinued due to rising creatinine   Discontinued Entresto in setting of low blood pressure       Hyperlipidemia  Assessment & Plan  Lipid panel 12/2023: , TG 74, HDL 35, LDL  63.     Plan:  Continue atorvastatin 80mg       Type 2 diabetes mellitus with circulatory disorder, with long-term current use of insulin ([facility])  Assessment & Plan  Lab Results   Component Value Date    HGBA1C 6.9 (H) 12/23/2023       Recent Labs     02/19/24  1617 02/19/24 2050 02/20/24  0517 02/20/24  1021   POCGLU 128 174* 103 123       Blood Sugar Average: Last 72 hrs:  (P) 162    Patients home regimen is Lantus 18U daily, novolog 5U with breakfast and lunch.   Adjusting home regimen (ie, ~reduction in home dose) for continuation while IP  ?Need for further adjustments  AM Sugars to well-controlled at this time (around 100 this morning, in the 140s yesterday).  For this reason we will decrease his at bedtime Lantus dosing.  He is at increased risk for acute hypoglycemia given the use of oral hypoglycemics during admission  Prandial sugars have since improved.  Are also slightly low, however perhaps this will at bedtime Lantus dose is being dropped     Plan:  Changed to Lantus 10 units at bedtime  C/w SSI @ Algo 3; algorithm may need to be modified per results of prandial sugars after adjusting basal insulin  Diabetic and cardiac diet   POCG HS/AC  Follow BG closely considering also receiving PO hyoglycemic w/ Jardiance  ?Consider holding Jardiance if sugars drop ---> Must reach-out to HF prior to making final decision  Hypoglycemia protocol      Mild intermittent asthma without complication  Assessment & Plan  On home advair and albuterol. Denies any symptoms.      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 Incidentally found on arrival to ED d/t HR 160s. He was entirely symptomatic. He was given one dose of metoprolol in ED without improvement. Cardiology was consulted in ED and patient was started on amio bolus and then drip with improved HR.     On interrogation of AICD, reported 12 shocks for Vtach  "(ATP). Patient denies any feelings of shocks. Follows with [facility], last seen 2024, device last interogatted 1/3/24 with 100% afib burden and episodes of a fib w RVR.     On amio drip now.  Monitoring liver and thyroid function.  LFTs show mildly elevated ALK of 107 on  with normal AST and ALT.  TSH 0.351, free T4 1.36.      HR's have stabilized since being started on maintenance Amio gtt  Currently w/o and complaints including no CP/palpitations/SOB   S/p amnio gtt., recently on maintenance Dofetilide per EP    Plan:  S/p cardioversion on ; follow-up with EP regarding medication changes  Continue on Tele  EP cards on board, to follow recommendations closely             Disposition: Remain inpatient for renal function monitoring.  Likely discharge 2024.       SUBJECTIVE     Patient seen and examined. No acute events overnight. No ROS complaints this morning.  Patient denies right knee pain.  Patient's wife updated at bedside.      Review of Systems   Constitutional:  Negative for chills and fever.   HENT:  Negative for ear pain and sore throat.    Eyes:  Negative for visual disturbance.   Respiratory:  Negative for shortness of breath.    Cardiovascular:  Negative for chest pain.   Gastrointestinal:  Negative for abdominal pain, constipation, diarrhea, nausea and vomiting.   Genitourinary:  Negative for difficulty urinating, dysuria and hematuria.   Musculoskeletal:  Negative for myalgias.   Neurological:  Negative for dizziness, light-headedness and headaches.        OBJECTIVE     Vitals:    24 0600 24 0709 24 0810 24 1032   BP:  129/81  116/79   BP Location:       Pulse:  74  64   Resp:       Temp:  (!) 96.7 °F (35.9 °C) 98.1 °F (36.7 °C)    TempSrc:  Oral     SpO2:  98%  100%   Weight: 73.4 kg (161 lb 13.1 oz)      Height:   5' 7\" (1.702 m)       Temperature:   Temp (24hrs), Av.9 °F (36.1 °C), Min:95.8 °F (35.4 °C), Max:98.1 °F (36.7 °C)    Temperature: " 98.1 °F (36.7 °C)  Intake & Output:  I/O         02/20 0701  02/21 0700 02/21 0701  02/22 0700 02/22 0701 02/23 0700    P.O. 626 416 360    I.V. (mL/kg)  150 (2)     Total Intake(mL/kg) 626 (8.3) 566 (7.7) 360 (4.9)    Urine (mL/kg/hr) 2650 (1.5) 2150 (1.2) 425 (1.4)    Total Output 2650 2150 425    Critical access hospital -2024 -1584 -65                 Weights:   IBW (Ideal Body Weight): 66.1 kg    Body mass index is 25.34 kg/m².  Weight (last 2 days)       Date/Time Weight    02/22/24 0600 73.4 (161.82)    02/21/24 0515 75.8 (167.11)    02/20/24 0700 76.4 (168.43)          Physical Exam  Constitutional:       General: He is not in acute distress.  HENT:      Head: Normocephalic and atraumatic.      Nose: No congestion or rhinorrhea.      Mouth/Throat:      Pharynx: No oropharyngeal exudate or posterior oropharyngeal erythema.   Eyes:      General:         Right eye: No discharge.         Left eye: No discharge.      Extraocular Movements: Extraocular movements intact.      Conjunctiva/sclera: Conjunctivae normal.      Pupils: Pupils are equal, round, and reactive to light.   Cardiovascular:      Rate and Rhythm: Normal rate and regular rhythm.      Heart sounds: No murmur heard.  Pulmonary:      Effort: Pulmonary effort is normal.      Breath sounds: Normal breath sounds.   Abdominal:      General: Abdomen is flat. There is no distension.      Palpations: Abdomen is soft.      Tenderness: There is no abdominal tenderness.   Musculoskeletal:      Right lower leg: No edema.      Left lower leg: No edema.   Skin:     General: Skin is warm and dry.   Neurological:      Mental Status: He is alert and oriented to person, place, and time.      Cranial Nerves: No cranial nerve deficit.   Psychiatric:         Mood and Affect: Mood normal.         Behavior: Behavior normal.       LABORATORY DATA     Labs: I have personally reviewed pertinent reports.  Results from last 7 days   Lab Units 02/22/24  0453 02/21/24  0504 02/20/24  050  02/19/24  0444 02/18/24  0433 02/17/24  1725   WBC Thousand/uL 6.01 6.64 8.34   < > 8.47 10.07   HEMOGLOBIN g/dL 12.9 12.5 13.4   < > 12.7 14.1   HEMATOCRIT % 40.0 38.4 42.9   < > 40.6 45.3   PLATELETS Thousands/uL 208 205 220   < > 226 244   NEUTROS PCT %  --   --   --   --  87* 67   MONOS PCT %  --   --   --   --  3* 11   EOS PCT %  --   --   --   --  0 2    < > = values in this interval not displayed.      Results from last 7 days   Lab Units 02/22/24  0453 02/21/24  0504 02/20/24  0503   POTASSIUM mmol/L 4.4 4.5 4.4   CHLORIDE mmol/L 102 101 101   CO2 mmol/L 27 27 24   BUN mg/dL 64* 62* 57*   CREATININE mg/dL 1.54* 1.45* 1.44*   CALCIUM mg/dL 9.6 9.3 9.5   ALK PHOS U/L 125* 110* 110*   ALT U/L 51 38 35   AST U/L 36 30 35     Results from last 7 days   Lab Units 02/22/24  0453 02/21/24  0504 02/20/24  0503   MAGNESIUM mg/dL 2.3 2.4 2.5     Results from last 7 days   Lab Units 02/22/24  0453 02/21/24  0504 02/20/24  0503   PHOSPHORUS mg/dL 4.7* 4.0 4.4*                    IMAGING & DIAGNOSTIC TESTING     Radiology Results: I have personally reviewed pertinent reports.  XR knee 1 or 2 vw right    Result Date: 2/20/2024  Impression: Mild degenerative changes. Joint effusion. Pseudogout. No acute osseous abnormality. Workstation performed: [id number]     XR chest 1 view portable    Result Date: 2/18/2024  Impression: Trace right effusion. Lungs clear Workstation performed: [id number]     Other Diagnostic Testing: I have personally reviewed pertinent reports.    ACTIVE MEDICATIONS     Current Facility-Administered Medications   Medication Dose Route Frequency    acetaminophen (TYLENOL) tablet 650 mg  650 mg Oral Q6H PRN    apixaban (ELIQUIS) tablet 5 mg  5 mg Oral BID    atorvastatin (LIPITOR) tablet 80 mg  80 mg Oral Daily With Dinner    cholecalciferol (VITAMIN D3) tablet 2,000 Units  2,000 Units Oral Daily    clopidogrel (PLAVIX) tablet 75 mg  75 mg Oral Daily    Diclofenac Sodium (VOLTAREN) 1 % topical gel 2 g   "2 g Topical 4x Daily    dofetilide (TIKOSYN) capsule 125 mcg  125 mcg Oral Q12H DESTINEE    Empagliflozin (JARDIANCE) tablet 10 mg  10 mg Oral QAM    ferrous sulfate tablet 325 mg  325 mg Oral Every Other Day    insulin glargine (LANTUS) subcutaneous injection 10 Units 0.1 mL  10 Units Subcutaneous HS    insulin lispro (HumaLOG) 100 units/mL subcutaneous injection 1-6 Units  1-6 Units Subcutaneous TID AC    levothyroxine tablet 125 mcg  125 mcg Oral Daily    melatonin tablet 3 mg  3 mg Oral HS    metoprolol (LOPRESSOR) injection 5 mg  5 mg Intravenous Q6H PRN    metoprolol succinate (TOPROL-XL) 24 hr tablet 75 mg  75 mg Oral BID    montelukast (SINGULAIR) tablet 10 mg  10 mg Oral Daily    predniSONE tablet 30 mg  30 mg Oral Daily    Followed by    [START ON 2/24/2024] predniSONE tablet 20 mg  20 mg Oral Daily    Followed by    [START ON 2/27/2024] predniSONE tablet 10 mg  10 mg Oral Daily    trimethobenzamide (TIGAN) IM injection 200 mg  200 mg Intramuscular Q6H PRN       VTE Pharmacologic Prophylaxis: Eliquis  VTE Mechanical Prophylaxis: sequential compression device    Portions of the record may have been created with voice recognition software.  Occasional wrong word or \"sound a like\" substitutions may have occurred due to the inherent limitations of voice recognition software.  Read the chart carefully and recognize, using context, where substitutions have occurred.  ==  Shante Bowers, MS-4  [place]       "

## 2024-02-23 VITALS
HEIGHT: 67 IN | WEIGHT: 163.58 LBS | DIASTOLIC BLOOD PRESSURE: 79 MMHG | TEMPERATURE: 97.6 F | HEART RATE: 73 BPM | SYSTOLIC BLOOD PRESSURE: 125 MMHG | BODY MASS INDEX: 25.67 KG/M2 | OXYGEN SATURATION: 99 % | RESPIRATION RATE: 14 BRPM

## 2024-02-23 LAB
ALBUMIN SERPL BCP-MCNC: 3.5 G/DL (ref 3.5–5)
ALP SERPL-CCNC: 115 U/L (ref 34–104)
ALT SERPL W P-5'-P-CCNC: 57 U/L (ref 7–52)
ANION GAP SERPL CALCULATED.3IONS-SCNC: 10 MMOL/L
AST SERPL W P-5'-P-CCNC: 43 U/L (ref 13–39)
ATRIAL RATE: 73 BPM
BILIRUB SERPL-MCNC: 1.45 MG/DL (ref 0.2–1)
BUN SERPL-MCNC: 55 MG/DL (ref 5–25)
CALCIUM SERPL-MCNC: 9.3 MG/DL (ref 8.4–10.2)
CHLORIDE SERPL-SCNC: 102 MMOL/L (ref 96–108)
CO2 SERPL-SCNC: 27 MMOL/L (ref 21–32)
CREAT SERPL-MCNC: 1.47 MG/DL (ref 0.6–1.3)
ERYTHROCYTE [DISTWIDTH] IN BLOOD BY AUTOMATED COUNT: 19.2 % (ref 11.6–15.1)
GFR SERPL CREATININE-BSD FRML MDRD: 45 ML/MIN/1.73SQ M
GLUCOSE SERPL-MCNC: 107 MG/DL (ref 65–140)
GLUCOSE SERPL-MCNC: 88 MG/DL (ref 65–140)
GLUCOSE SERPL-MCNC: 94 MG/DL (ref 65–140)
HCT VFR BLD AUTO: 39.7 % (ref 36.5–49.3)
HGB BLD-MCNC: 12.6 G/DL (ref 12–17)
MAGNESIUM SERPL-MCNC: 2.3 MG/DL (ref 1.9–2.7)
MCH RBC QN AUTO: 26.7 PG (ref 26.8–34.3)
MCHC RBC AUTO-ENTMCNC: 31.7 G/DL (ref 31.4–37.4)
MCV RBC AUTO: 84 FL (ref 82–98)
P AXIS: 86 DEGREES
PHOSPHATE SERPL-MCNC: 3.5 MG/DL (ref 2.3–4.1)
PLATELET # BLD AUTO: 201 THOUSANDS/UL (ref 149–390)
PMV BLD AUTO: 11.7 FL (ref 8.9–12.7)
POTASSIUM SERPL-SCNC: 4 MMOL/L (ref 3.5–5.3)
PR INTERVAL: 146 MS
PROT SERPL-MCNC: 5.9 G/DL (ref 6.4–8.4)
QRS AXIS: -72 DEGREES
QRSD INTERVAL: 144 MS
QT INTERVAL: 478 MS
QTC INTERVAL: 526 MS
RBC # BLD AUTO: 4.72 MILLION/UL (ref 3.88–5.62)
SODIUM SERPL-SCNC: 139 MMOL/L (ref 135–147)
T WAVE AXIS: 87 DEGREES
VENTRICULAR RATE: 73 BPM
WBC # BLD AUTO: 8.73 THOUSAND/UL (ref 4.31–10.16)

## 2024-02-23 PROCEDURE — 99238 HOSP IP/OBS DSCHRG MGMT 30/<: CPT | Performed by: INTERNAL MEDICINE

## 2024-02-23 PROCEDURE — 80053 COMPREHEN METABOLIC PANEL: CPT | Performed by: INTERNAL MEDICINE

## 2024-02-23 PROCEDURE — 83735 ASSAY OF MAGNESIUM: CPT | Performed by: INTERNAL MEDICINE

## 2024-02-23 PROCEDURE — 84100 ASSAY OF PHOSPHORUS: CPT | Performed by: INTERNAL MEDICINE

## 2024-02-23 PROCEDURE — 82948 REAGENT STRIP/BLOOD GLUCOSE: CPT

## 2024-02-23 PROCEDURE — 85027 COMPLETE CBC AUTOMATED: CPT | Performed by: INTERNAL MEDICINE

## 2024-02-23 PROCEDURE — 93005 ELECTROCARDIOGRAM TRACING: CPT

## 2024-02-23 PROCEDURE — 93010 ELECTROCARDIOGRAM REPORT: CPT | Performed by: INTERNAL MEDICINE

## 2024-02-23 RX ORDER — ECHINACEA PURPUREA EXTRACT 125 MG
1 TABLET ORAL
Status: DISCONTINUED | OUTPATIENT
Start: 2024-02-23 | End: 2024-02-23 | Stop reason: HOSPADM

## 2024-02-23 RX ORDER — DOFETILIDE 0.12 MG/1
125 CAPSULE ORAL EVERY 12 HOURS SCHEDULED
Qty: 60 CAPSULE | Refills: 0 | Status: SHIPPED | OUTPATIENT
Start: 2024-02-23 | End: 2024-03-24

## 2024-02-23 RX ORDER — PREDNISONE 10 MG/1
TABLET ORAL DAILY
Qty: 9 TABLET | Refills: 0 | Status: SHIPPED | OUTPATIENT
Start: 2024-02-24 | End: 2024-02-29

## 2024-02-23 RX ADMIN — LEVOTHYROXINE SODIUM 125 MCG: 125 TABLET ORAL at 08:53

## 2024-02-23 RX ADMIN — CLOPIDOGREL BISULFATE 75 MG: 75 TABLET ORAL at 08:53

## 2024-02-23 RX ADMIN — Medication 1 SPRAY: at 05:42

## 2024-02-23 RX ADMIN — Medication 2000 UNITS: at 08:53

## 2024-02-23 RX ADMIN — EMPAGLIFLOZIN 10 MG: 10 TABLET, FILM COATED ORAL at 08:56

## 2024-02-23 RX ADMIN — SODIUM CHLORIDE, SODIUM GLUCONATE, SODIUM ACETATE, POTASSIUM CHLORIDE, MAGNESIUM CHLORIDE, SODIUM PHOSPHATE, DIBASIC, AND POTASSIUM PHOSPHATE 75 ML/HR: .53; .5; .37; .037; .03; .012; .00082 INJECTION, SOLUTION INTRAVENOUS at 00:38

## 2024-02-23 RX ADMIN — PREDNISONE 30 MG: 20 TABLET ORAL at 08:52

## 2024-02-23 RX ADMIN — METOPROLOL SUCCINATE 75 MG: 50 TABLET, EXTENDED RELEASE ORAL at 08:52

## 2024-02-23 RX ADMIN — MONTELUKAST 10 MG: 10 TABLET, FILM COATED ORAL at 08:53

## 2024-02-23 RX ADMIN — DOFETILIDE 125 MCG: 0.12 CAPSULE ORAL at 08:53

## 2024-02-23 RX ADMIN — APIXABAN 5 MG: 5 TABLET, FILM COATED ORAL at 08:53

## 2024-02-23 NOTE — DISCHARGE INSTR - AVS FIRST PAGE
Please start taking dofetilide 125 mcg every 12 hours  Please follow-up with electrophysiology 2 weeks after discharge  Get blood work done in 2 weeks to trend renal function  Please STOP taking Entresto for now  Please follow-up with your heart failure cardiologist Dr. Gan prior to restarting this medication.  You are to continue holding this until this visit.  Otherwise continue taking your medications as previously prescribed

## 2024-02-23 NOTE — PLAN OF CARE
Problem: PAIN - ADULT  Goal: Verbalizes/displays adequate comfort level or baseline comfort level  Description: Interventions:  - Encourage patient to monitor pain and request assistance  - Assess pain using appropriate pain scale  - Administer analgesics based on type and severity of pain and evaluate response  - Implement non-pharmacological measures as appropriate and evaluate response  - Consider cultural and social influences on pain and pain management  - Notify physician/advanced practitioner if interventions unsuccessful or patient reports new pain  Outcome: Progressing     Problem: INFECTION - ADULT  Goal: Absence or prevention of progression during hospitalization  Description: INTERVENTIONS:  - Assess and monitor for signs and symptoms of infection  - Monitor lab/diagnostic results  - Monitor all insertion sites, i.e. indwelling lines, tubes, and drains  - Monitor endotracheal if appropriate and nasal secretions for changes in amount and color  - [place] appropriate cooling/warming therapies per order  - Administer medications as ordered  - Instruct and encourage patient and family to use good hand hygiene technique  - Identify and instruct in appropriate isolation precautions for identified infection/condition  Outcome: Progressing  Goal: Absence of fever/infection during neutropenic period  Description: INTERVENTIONS:  - Monitor WBC    Outcome: Progressing     Problem: SAFETY ADULT  Goal: Patient will remain free of falls  Description: INTERVENTIONS:  - Educate patient/family on patient safety including physical limitations  - Instruct patient to call for assistance with activity   - Consult OT/PT to assist with strengthening/mobility   - Keep Call bell within reach  - Keep bed low and locked with side rails adjusted as appropriate  - Keep care items and personal belongings within reach  - Initiate and maintain comfort rounds  - Make Fall Risk Sign visible to staff  - Offer Toileting every 2 Hours,  in advance of need  - Initiate/Maintain bed alarm  - Apply yellow socks and bracelet for high fall risk patients  - Consider moving patient to room near nurses station  Outcome: Progressing  Goal: Maintain or return to baseline ADL function  Description: INTERVENTIONS:  -  Assess patient's ability to carry out ADLs; assess patient's baseline for ADL function and identify physical deficits which impact ability to perform ADLs (bathing, care of mouth/teeth, toileting, grooming, dressing, etc.)  - Assess/evaluate cause of self-care deficits   - Assess range of motion  - Assess patient's mobility; develop plan if impaired  - Assess patient's need for assistive devices and provide as appropriate  - Encourage maximum independence but intervene and supervise when necessary  - Involve family in performance of ADLs  - Assess for home care needs following discharge   - Consider OT consult to assist with ADL evaluation and planning for discharge  - Provide patient education as appropriate  Outcome: Progressing  Goal: Maintains/Returns to pre admission functional level  Description: INTERVENTIONS:  - Perform AM-PAC 6 Click Basic Mobility/ Daily Activity assessment daily.  - Set and communicate daily mobility goal to care team and patient/family/caregiver.   - Collaborate with rehabilitation services on mobility goals if consulted  - Perform Range of Motion 3 times a day.  - Record patient progress and toleration of activity level   Outcome: Progressing     Problem: DISCHARGE PLANNING  Goal: Discharge to home or other facility with appropriate resources  Description: INTERVENTIONS:  - Identify barriers to discharge w/patient and caregiver  - Arrange for needed discharge resources and transportation as appropriate  - Identify discharge learning needs (meds, wound care, etc.)  - Arrange for interpretive services to assist at discharge as needed  - Refer to Case Management Department for coordinating discharge planning if the  patient needs post-hospital services based on physician/advanced practitioner order or complex needs related to functional status, cognitive ability, or social support system  Outcome: Progressing     Problem: Knowledge Deficit  Goal: Patient/family/caregiver demonstrates understanding of disease process, treatment plan, medications, and discharge instructions  Description: Complete learning assessment and assess knowledge base.  Interventions:  - Provide teaching at level of understanding  - Provide teaching via preferred learning methods  Outcome: Progressing     Problem: CARDIOVASCULAR - ADULT  Goal: Maintains optimal cardiac output and hemodynamic stability  Description: INTERVENTIONS:  - Monitor I/O, vital signs and rhythm  - Monitor for S/S and trends of decreased cardiac output  - Administer and titrate ordered vasoactive medications to optimize hemodynamic stability  - Assess quality of pulses, skin color and temperature  - Assess for signs of decreased coronary artery perfusion  - Instruct patient to report change in severity of symptoms  Outcome: Progressing  Goal: Absence of cardiac dysrhythmias or at baseline rhythm  Description: INTERVENTIONS:  - Continuous cardiac monitoring, vital signs, obtain 12 lead EKG if ordered  - Administer antiarrhythmic and heart rate control medications as ordered  - Monitor electrolytes and administer replacement therapy as ordered  Outcome: Progressing

## 2024-02-23 NOTE — DISCHARGE SUMMARY
INTERNAL MEDICINE RESIDENCY DISCHARGE SUMMARY     Juan David Lora   78 y.o. male  MRN: [id number]  Room/Bed: [facility] 510/[facility] 510-01     [facility] BE [facility] 5 MED SURG/SD   Encounter: [id number]    Principal Problem:    Wide-complex tachycardia  Active Problems:    Pseudogout of right knee    Chronic HFrEF (heart failure with reduced ejection fraction) ([facility])    Mild intermittent asthma without complication    Type 2 diabetes mellitus with circulatory disorder, with long-term current use of insulin (HCC)    Hyperlipidemia    Essential hypertension    Hypothyroidism    History of aortic stenosis s/p TAVR    Stage 3 chronic kidney disease, unspecified whether stage 3a or 3b CKD ([facility])      * Wide-complex tachycardia  Assessment & Plan  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 Incidentally found on arrival to ED d/t HR 160s. He was entirely symptomatic. He was given one dose of metoprolol in ED without improvement. Cardiology was consulted in ED and patient was started on amio bolus and then drip with improved HR.     On interrogation of AICD, reported 12 shocks for Vtach (ATP). Patient denies any feelings of shocks. Follows with [facility], last seen 1/18/2024, device last interogatted 1/3/24 with 100% afib burden and episodes of a fib w RVR.     On amio drip now.  Monitoring liver and thyroid function.  LFTs show mildly elevated ALK of 107 on 2/19 with normal AST and ALT.  TSH 0.351, free T4 1.36.      HR's have stabilized since being started on maintenance Amio gtt  Currently w/o and complaints including no CP/palpitations/SOB   S/p amnio gtt., recently on maintenance Dofetilide per EP    Plan:  S/p cardioversion on 2/21; follow-up with EP regarding medication changes  EP follow up in 3/22/2024 to discuss AVN ablation          Pseudogout of right knee  Assessment & Plan  Patient presenting with 3 days of right knee swelling, warmth, pain on movement,  and decreased range of motion. Interestingly, although potentially unrelated, he started Entresto 3 days prior to admission.     The joint was tapped in the ED and was positive for CCP crystals. ESR and CRP elevated.     Right knee pain has resolved.  No pain noted at rest or during ambulation.      ?  Likely secondary to use of Lasix     Plan:  Begin prednisone taper today (2/21).  Decrease by 10 mg every 3 day  Starting on 30, to taper as above  Please ensure Prednisone not given close to bedtime as to avoid delirium   Continue to avoid NSAIDs I/c/o CAD, HF  ?  Consider further crystal analysis/etiology workup       Chronic HFrEF (heart failure with reduced ejection fraction) ([facility])  Assessment & Plan    Pt's Natural Hx of HF:    Date Dx: March 2023 (Ie, shortly after NSTEMI s/p ELDER x2)  Cause? (If known): Ischemic, reduction in EF in setting of NSTEMI with 80% mid LAD and 90% RPDA stenoses s/p stenting    Subtype: HFrEF  Class: NYHA Class II, Stage C  Cardiologist/HF Specialist: Virginia Gan MD of  [facility]    Dry Weight: ~175-180 lbs (79.4-81.2 kg) ... Nb No official record of dry weight, the value shown previously is based on assumptions per weights prior to pt's first admit for HF exacerbation  Current Weight:   Wt Readings from Last 3 Encounters:   02/18/24 76.8 kg (169 lb 5 oz)   02/13/24 75.3 kg (166 lb)   01/18/24 80.3 kg (177 lb 1.6 oz)      Most Recent Echo (Study Date 12/23/23): LVEF 25-30 %, LV Systolic Dysfxn, Severe with severe Global hypokinesis  Structural Heart Changes?  Atria: LA mod/severe dilation, RA moderate dilation  Ventricles: LV severe global hypokinesis w/ regional variation, RV mild dilation with moderately reduced systolic function  Valves: Normal fxn TAVR, Severe MR, Mod to Severe TR with elevated RV systolic pressure, mild SC    Paced?: BiV ACD    GDMT:   Beta-Blocker: Metoprolol succinate 75 mg BID  ACEi, ARB or ARNi: Entresto 24/26 bid  Previously on Lisinopril  10  SGLT2i- jardiance 10 mg daily  Recently D/C'd OP, now on Entresto instead  Diuretic: Lasix 60 mg daily - taking 40mg in AM and 20mg in PM    ICD? Yes   Date Inserted: 9/13/23    - - - - - - - - - - - - - - - - - - - - - - - - - - - - - - - - - - - - - - - - - - - - - - - - - - - - - - - - - - -     IP Evaluation/Interval HF Assessment:    Assessment:    Pt is currently at dry weight and no concerning s/sx of HF exacerbation I/c/o Vfib events  BPs continue to remain soft, previously likely affected by IV Amio however now may ? Reflect poor compliance at home versus current glucocorticoid use  Regardless, cards has been made aware and they have adjusted patient's hold parameters  Previously receiving Lasix and Toprol sparingly/haphazardly.  Rarely gets them as scheduled.  Normal/scheduled after exchanged  Currently on Amio which will also drop BP, for this reason continuing with statement as above re: diuresis    Plan:    To continue holding Entresto upon discharge  Follow-up with HF cards in order to modify/restart GDMT meds  Continue Toprol outpatient per previously prescribed dose  Continue Lasix 40 daily outpatient  Follow-up BMP 1 week postdischarge  Follow-up with EP cards post discharge      Stage 3 chronic kidney disease, unspecified whether stage 3a or 3b CKD ([facility])  Assessment & Plan  Lab Results   Component Value Date    EGFR 60 02/18/2024    EGFR 56 02/17/2024    EGFR 55 02/09/2024    CREATININE 1.17 02/18/2024    CREATININE 1.23 02/17/2024    CREATININE 1.26 02/09/2024     Patient's baseline Cr ~ 1.05 - 1.3  Had suffered HE at recent hospitalization in Dec of '23    Creatinine downtrending, 1.47 on 2/22.  Isolyte infusion given overnight.      Plan:  Lasix discontinued  Outpatient BMP 1 week from discharge   Avoid nephrotoxic agents  Continue avoiding NSAIDs I/r/t pseudogout      History of aortic stenosis s/p TAVR  Assessment & Plan  TAVR 6/2022, normal function on ECHO 12/2023. Follows with [facility]. On plavix, eliquis, statin.     Plan:  Continue ASA/Plavix & Eliquis       Hypothyroidism  Assessment & Plan  On home levothyroxine 137mcg daily. Subsequent labs TSH 0.352 and free T4 1.36. Potential trigger for a fib.      Plan:  Reduce home levothyroxine dose to 125mcg         Essential hypertension  Assessment & Plan  Controlled, if not low, at this time. Patient on home regimen of metoprolol (rate control) and lasix BID. Was prescribed Entresto 3 days prior to admission. Is no longer taking spironolactone.      Plan:  Continue metoprolol  Lasix discontinued due to elevated creatinine   Entresto discontinued in setting of low blood pressure       Hyperlipidemia  Assessment & Plan  Lipid panel 12/2023: , TG 74, HDL 35, LDL 63.     Plan:  Continue atorvastatin 80mg       Type 2 diabetes mellitus with circulatory disorder, with long-term current use of insulin ([facility])  Assessment & Plan  Lab Results   Component Value Date    HGBA1C 6.9 (H) 12/23/2023       Recent Labs     02/19/24  1617 02/19/24  2050 02/20/24  0517 02/20/24  1021   POCGLU 128 174* 103 123       Blood Sugar Average: Last 72 hrs:  (P) 162    Patients home regimen is Lantus 18U daily, novolog 5U with breakfast and lunch.   Adjusting home regimen (ie, ~reduction in home dose) for continuation while IP  ?Need for further adjustments  AM Sugars to well-controlled at this time (around 100 this morning, in the 140s yesterday).  For this reason we will decrease his at bedtime Lantus dosing.  He is at increased risk for acute hypoglycemia given the use of oral hypoglycemics during admission  Prandial sugars have since improved.  Are also slightly low, however perhaps this will at bedtime Lantus dose is being dropped     Plan:  Changed to Lantus 10 units at bedtime  Novolog 5 units at breakfast and dinner   Diabetic and cardiac diet   Outpatient PCP follow-up       Mild intermittent asthma without complication  Assessment & Plan  On  home advair and albuterol. Denies any symptoms.      Plan:  Albuterol inhaler PRN for wheezing           DETAILS OF HOSPITAL COURSE     79 yo M with PMHx of T2DM, HTN, AS s/p TAVR, tachy-lino syndrome s/p biV ICD, CAD s/p ELDER, PAD, asthma, osteoarthritis, NSVT, HFrEF 25-30% (Dec 2023), Afib on Eliquis, CKD3.  Patient presented to [facility] ED on 2/17/2024 with right knee pain.  Joint aspirate was positive for CPP crystals.  Patient was started on prednisone with resolution of the pseudogout-related pain in 3 days.  He was subsequently started on prednisone taper.      Additionally, on presentation to the ED on 2/17/2024, patient's HR was 163.  EKG was done which showed wide-complex tachycardia.  Interrogation of patient's [employer] BiV ICD showed 12 episodes of V. tach, however,  the patient denied feeling any shocks from this.  In the ED, cardiology administered Lopressor, amio bolus, and started amio infusion.  Amio was discontinued and dofetilide was started on 2/19/2024.  Patient underwent successful cardioversion on 2/21/2024.       During the course of the hospital stay, patient's glucose declined to 100s.  His home Lantus does was subsequently decreased from 15 units to 10 units daily.  Patient became hypotensive throughout the hospital stay.  Per cardiology recommendations, his home Entresto was held while Lasix and Toprol were continued with lowered holding parameters.  Hypotension improved.  Patient's creatinine increased from 1.23 on admission to 1.54 on 2/22/2024.  Patient's home Lasix was discontinued and he was administered Isolyte infusion.        On the day of discharge patient denied chest pain, shortness of breath, headache, dizziness, lightheadedness, right knee pain at rest/ during ambulation, and other ROS symptoms as noted below.  Patient's blood pressure was 119/74, creatinine down to 1.47 from 1.54 on prior day.  Patient has follow-up appointments with PCP on 2/29/2024, EKG on  3/1/2024, cardiology on 3/22/2024.  CM arranged [facility].      Medication changes upon discharge include holding Entresto, pending outpatient follow-up with heart failure.  He may continue his Toprol/Lasix.  He will be starting dofetilide 125 mcg every 12 hours.  Otherwise he has a BMP to get outpatient 1 week post discharge.    Notable follow-ups include EP and HF cards within a month or so upon discharge.  He is also to see his PCP within 1 to 2 weeks for TCM visit.    Review of Systems   Constitutional:  Negative for chills and fever.   HENT:  Negative for ear pain and sore throat.    Eyes:  Negative for pain.   Respiratory:  Negative for shortness of breath.    Cardiovascular:  Negative for chest pain.   Gastrointestinal:  Negative for abdominal pain, diarrhea, nausea and vomiting.   Genitourinary:  Negative for difficulty urinating, dysuria and hematuria.   Musculoskeletal:  Negative for myalgias.   Neurological:  Negative for dizziness, light-headedness and headaches.     Physical Exam  Constitutional:       General: He is not in acute distress.  HENT:      Head: Normocephalic and atraumatic.      Nose: No congestion or rhinorrhea.      Mouth/Throat:      Pharynx: No oropharyngeal exudate or posterior oropharyngeal erythema.   Eyes:      General:         Right eye: No discharge.         Left eye: No discharge.      Extraocular Movements: Extraocular movements intact.      Pupils: Pupils are equal, round, and reactive to light.   Cardiovascular:      Rate and Rhythm: Normal rate and regular rhythm.      Heart sounds: Murmur (systolic murmur) heard.   Pulmonary:      Effort: Pulmonary effort is normal.      Breath sounds: Normal breath sounds.   Abdominal:      General: Abdomen is flat. There is no distension.      Palpations: Abdomen is soft.      Tenderness: There is no abdominal tenderness.   Musculoskeletal:      Right lower leg: No edema.      Left lower leg: No edema.   Skin:     General: Skin is warm and  "dry.   Neurological:      Mental Status: He is alert.      Cranial Nerves: No cranial nerve deficit.      Comments: Oriented to person, month, year, state, not city (\"[place]\")    Psychiatric:         Mood and Affect: Mood normal.         Behavior: Behavior normal.          DISCHARGE INFORMATION     PCP at Discharge:  Umesh Millard MD    Admitting Provider: Judith Ovalles MD  Admission Date: 2/17/2024    Discharge Provider: Art Cleaning MD  Discharge Date: 2/23/2024    [facility]  Discharge Condition: stable  Discharge with Lines: no    Discharge Diet: cardiac diet and renal diet  Activity Restrictions: none  Test Results Pending at Discharge: None, for BMP 1 week post discharge    Discharge Diagnoses:  Principal Problem:    Wide-complex tachycardia  Active Problems:    Pseudogout of right knee    Chronic HFrEF (heart failure with reduced ejection fraction) ([facility])    Mild intermittent asthma without complication    Type 2 diabetes mellitus with circulatory disorder, with long-term current use of insulin ([facility])    Hyperlipidemia    Essential hypertension    Hypothyroidism    History of aortic stenosis s/p TAVR    Stage 3 chronic kidney disease, unspecified whether stage 3a or 3b CKD ([facility])  Resolved Problems:    * No resolved hospital problems. *      Consulting Providers:      Diagnostic & Therapeutic Procedures Performed:  XR knee 1 or 2 vw right    Result Date: 2/20/2024  Impression: Mild degenerative changes. Joint effusion. Pseudogout. No acute osseous abnormality. Workstation performed: [id number]     XR chest 1 view portable    Result Date: 2/18/2024  Impression: Trace right effusion. Lungs clear Workstation performed: [id number]       Code Status: Level 1 - Full Code  Advance Directive & Living Will: <no information>  Power of :    POLST:      Medications:  Current Discharge Medication List        Current Discharge Medication List        START taking " these medications    Details   dofetilide (TIKOSYN) 125 mcg capsule Take 1 capsule (125 mcg total) by mouth 2 (two) times a day  Qty: 60 capsule, Refills: 0    Comments: Price check.  Associated Diagnoses: A-fib ([facility]); Atrial flutter, unspecified type ([facility])           Current Discharge Medication List        CONTINUE these medications which have NOT CHANGED    Details   albuterol (PROVENTIL HFA,VENTOLIN HFA) 90 mcg/act inhaler INHALE 2 PUFFS BY MOUTH EVERY 4 HOURS AS NEEDED FOR WHEEZING OR SHORTNESS OF BREATH.  Qty: 18 g, Refills: 2    Comments: Substitution to a formulary equivalent within the same pharmaceutical class is authorized.  Associated Diagnoses: Mild intermittent asthma without complication      apixaban (Eliquis) 5 mg Take 1 tablet (5 mg total) by mouth 2 (two) times a day  Qty: 60 tablet, Refills: 3    Comments: PRICE CHECK ONLY  Associated Diagnoses: Atrial flutter, unspecified type ([facility])      cholecalciferol (VITAMIN D3) 1,000 units tablet Take 2 tablets (2,000 Units total) by mouth daily  Qty: 180 tablet, Refills: 5    Associated Diagnoses: Vitamin D deficiency      Empagliflozin (JARDIANCE) 10 MG TABS tablet Take 1 tablet (10 mg total) by mouth every morning  Qty: 90 tablet, Refills: 3    Associated Diagnoses: NSTEMI (non-ST elevated myocardial infarction) ([facility]); Status post insertion of drug eluting coronary artery stent; Coronary artery disease involving native coronary artery of native heart without angina pectoris      ferrous sulfate 325 (65 Fe) mg tablet TAKE 1 TABLET BY MOUTH EVERY OTHER DAY  Qty: 45 tablet, Refills: 4    Associated Diagnoses: Iron deficiency anemia, unspecified iron deficiency anemia type      furosemide (LASIX) 40 mg tablet TAKE 1 TABLET BY MOUTH TWICE A DAY  Qty: 180 tablet, Refills: 1    Associated Diagnoses: Acute on chronic systolic congestive heart failure ([facility])      Insulin Glargine Solostar (Lantus SoloStar) 100 UNIT/ML SOPN INJECT 0.18 ML (18 UNITS TOTAL) UNDER THE  SKIN DAILY AT BEDTIME  Qty: 15 mL, Refills: 3    Comments: DX Code Needed  NEEDS A NEW SCRIPT NO MORE REFILLS AVAILABLE.  Associated Diagnoses: Diabetes mellitus due to underlying condition with diabetic neuropathy, without long-term current use of insulin ([facility])      levothyroxine 137 mcg tablet TAKE 1 TABLET BY MOUTH EVERY DAY  Qty: 90 tablet, Refills: 3    Associated Diagnoses: Hypothyroidism      metFORMIN (GLUCOPHAGE) 850 mg tablet TAKE 1 TABLET BY MOUTH 2 TIMES A DAY WITH MEALS.  Qty: 180 tablet, Refills: 3    Associated Diagnoses: Diabetes mellitus ([facility])      metoprolol succinate (TOPROL-XL) 50 mg 24 hr tablet TAKE 1.5 TABLETS BY MOUTH 2 TIMES A DAY.  Qty: 270 tablet, Refills: 1    Associated Diagnoses: Heart failure with reduced ejection fraction ([facility])      montelukast (SINGULAIR) 10 mg tablet TAKE 1 TABLET BY MOUTH EVERY DAY  Qty: 90 tablet, Refills: 3    Associated Diagnoses: Mild intermittent asthma, unspecified whether complicated      NovoLOG FlexPen 100 units/mL injection pen INJECT 5 UNITS WITH BREAKFAST AND WITH DINNER  Qty: 15 mL, Refills: 3    Comments: DX Code Needed  NEEDS A NEW SCRIPT.  Associated Diagnoses: Type 2 diabetes mellitus with diabetic neuropathy, without long-term current use of insulin ([facility])      rosuvastatin (CRESTOR) 40 MG tablet TAKE 1 TABLET BY MOUTH EVERY DAY  Qty: 90 tablet, Refills: 3    Comments: DX Code Needed  .  Associated Diagnoses: Mixed hyperlipidemia      sacubitril-valsartan (Entresto) 24-26 MG TABS Take 1 tablet by mouth 2 (two) times a day Start 36 hours after last dose of lisinopril  Qty: 60 tablet, Refills: 3    Associated Diagnoses: Chronic HFrEF (heart failure with reduced ejection fraction) ([facility])      Advair -21 MCG/ACT inhaler Inhale 2 puffs 2 (two) times a day Rinse mouth after use.  Qty: 36 g, Refills: 6    Comments: Substitution to a formulary equivalent within the same pharmaceutical class is authorized.  Associated Diagnoses: Mild intermittent  asthma, unspecified whether complicated      Alcohol Swabs (ALCOHOL PADS) 70 % PADS       Blood Glucose Monitoring Suppl (OneTouch Verio) w/Device KIT Check blood glucose three times daily before each meal  Qty: 1 kit, Refills: 0    Associated Diagnoses: Type 2 diabetes mellitus with diabetic neuropathy, without long-term current use of insulin ([facility])      clopidogrel (PLAVIX) 75 mg tablet Take 1 tablet (75 mg total) by mouth daily  Qty: 90 tablet, Refills: 3    Associated Diagnoses: S/P TAVR (transcatheter aortic valve replacement)      Continuous Blood Gluc  (FreeStyle Cristofer 2 Wenden) POWER Use 1 each continuous  Qty: 1 each, Refills: 0    Associated Diagnoses: Type 2 diabetes mellitus with diabetic neuropathy, with long-term current use of insulin ([facility])      Continuous Blood Gluc Sensor (FreeStyle Cristofer 2 Sensor) MISC Use 1 each every 14 (fourteen) days  Qty: 6 each, Refills: 3    Associated Diagnoses: Type 2 diabetes mellitus with diabetic neuropathy, with long-term current use of insulin ([facility])      Insulin Pen Needle (Pen Needles) 31G X 8 MM MISC Use daily  Qty: 300 each, Refills: 3    Associated Diagnoses: Diabetes mellitus due to underlying condition with diabetic neuropathy, without long-term current use of insulin ([facility])      Lancets (FREESTYLE) lancets by Other route as needed (As needed)  Qty: 100 each, Refills: 3    Associated Diagnoses: Diabetes mellitus due to underlying condition with diabetic neuropathy, without long-term current use of insulin ([facility])      nitroglycerin (NITROSTAT) 0.4 mg SL tablet Place 1 tablet (0.4 mg total) under the tongue every 5 (five) minutes as needed for chest pain  Qty: 30 tablet, Refills: 1    Associated Diagnoses: NSTEMI (non-ST elevated myocardial infarction) ([facility]); Status post insertion of drug eluting coronary artery stent; Coronary artery disease involving native coronary artery of native heart without angina pectoris      spironolactone (ALDACTONE) 25 mg tablet  "TAKE 1 TABLET (25 MG TOTAL) BY MOUTH DAILY.  Qty: 90 tablet, Refills: 1    Associated Diagnoses: Chronic HFrEF (heart failure with reduced ejection fraction) ([facility])             Allergies:  Allergies   Allergen Reactions    Iv Contrast [Iodinated Contrast Media] Rash     Patient states he had a severe reaction approximately 10 years ago , states he had a rash, itchy and he remembers a bunch of people pounding on chest and being surrounded by multiple doctors.       FOLLOW-UP     PCP Outpatient Follow-up:  PCP appointment scheduled for 2/29/2024     Consulting Providers Follow-up:  EKG scheduled for 3/1/2024  EP/Gen Cardiology appointment scheduled for 3/22/2024   To schedule appointment with HF cards within the coming month    Active Issues Requiring Follow-up:   A-flutter with RVR s/p cardioversion on 2/22/2024   Hypotension, current status of Entresto  Heart failure medication management (Entresto, Lasix)  Hypoglycemia   Pseudogout prednisone taper   Rising serum creatinine (not meeting criteria for HE), pending repeat labs outpatient    Discharge Statement:   I spent 30 minutes minutes discharging the patient. This time was spent on the day of discharge. I had direct contact with the patient on the day of discharge. Additional documentation is required if more than 30 minutes were spent on discharge.    Portions of the record may have been created with voice recognition software.  Occasional wrong word or \"sound a like\" substitutions may have occurred due to the inherent limitations of voice recognition software.  Read the chart carefully and recognize, using context, where substitutions have occurred.    ==  Umesh Millard MD,   Internal Medicine Resident, PGY 2  [facility]    Shante Robinson, MS-4  [facility]   "

## 2024-02-25 ENCOUNTER — HOME CARE VISIT (OUTPATIENT)
Dept: HOME HEALTH SERVICES | Facility: HOME HEALTHCARE | Age: 79
End: 2024-02-25

## 2024-02-26 ENCOUNTER — TRANSITIONAL CARE MANAGEMENT (OUTPATIENT)
Dept: INTERNAL MEDICINE CLINIC | Facility: CLINIC | Age: 79
End: 2024-02-26

## 2024-02-26 ENCOUNTER — HOME CARE VISIT (OUTPATIENT)
Dept: HOME HEALTH SERVICES | Facility: HOME HEALTHCARE | Age: 79
End: 2024-02-26
Payer: MEDICARE

## 2024-02-26 VITALS
TEMPERATURE: 96.6 F | DIASTOLIC BLOOD PRESSURE: 62 MMHG | HEART RATE: 74 BPM | OXYGEN SATURATION: 98 % | RESPIRATION RATE: 16 BRPM | SYSTOLIC BLOOD PRESSURE: 148 MMHG

## 2024-02-26 DIAGNOSIS — Z71.89 COMPLEX CARE COORDINATION: Primary | ICD-10-CM

## 2024-02-26 DIAGNOSIS — J45.20 MILD INTERMITTENT ASTHMA WITHOUT COMPLICATION: ICD-10-CM

## 2024-02-26 PROCEDURE — G0299 HHS/HOSPICE OF RN EA 15 MIN: HCPCS

## 2024-02-26 PROCEDURE — 400013 VN SOC

## 2024-02-26 NOTE — CASE COMMUNICATION
[facility] has admitted your patient to Home Health service with the following disciplines:      SN, PT and OT  Response needed, please respond via in basket or tiger connect with verbal order for [facility] MS[facility]  Primary focus of home health care: Cardiac  Patient stated goals of care: To feel stronger and improved mood.   Anticipated visit pattern: 1w1 3w1 2w2 and next visit date: 3/4/24 CHF  See medication list - meds in home differ  from AVS: Patient only has 3 tablets left of Jardiance please call into [address].  Significant clinical findings: Patient would like a [facility] to assist with advanced directives and meals on wheels. Patient reports water heater not operating in home, needs to heat water down stairs then take upstairs to take a bath. Getting water hot water this way for years.   Left eye is blood shot. Ill fitting dentures.   Letitia t reports no allergies but IV contrast listed.   Potential barriers to goal achievement: poor memory    Thank you for allowing us to participate in the care of your patient.      Alexis Ac RN

## 2024-02-27 ENCOUNTER — HOME CARE VISIT (OUTPATIENT)
Dept: HOME HEALTH SERVICES | Facility: HOME HEALTHCARE | Age: 79
End: 2024-02-27
Payer: MEDICARE

## 2024-02-27 PROCEDURE — G0152 HHCP-SERV OF OT,EA 15 MIN: HCPCS

## 2024-02-27 RX ORDER — ALBUTEROL SULFATE 90 UG/1
AEROSOL, METERED RESPIRATORY (INHALATION)
Qty: 8.5 G | Refills: 4 | Status: SHIPPED | OUTPATIENT
Start: 2024-02-27

## 2024-02-27 NOTE — CASE COMMUNICATION
Ok to add [facility] Medical Social Worker?    ----- Message -----  From: Umesh Millard MD  Sent: 2/26/2024   4:06 PM EST  To: Alexis Ac RN      Thank you! Appreciate all of your help.    ----- Message -----  From: Alexis Ac RN  Sent: 2/26/2024  11:07 AM EST  To: Susie Falcon RN; Art Cleaning MD; *    [facility] has admitted your patient to Home Health service with the following disciplines:      SN, PT and O T  Response needed, please respond via in basket or tiger connect with verbal order for [facility] MSW  Primary focus of home health care: Cardiac  Patient stated goals of care: To feel stronger and improved mood.   Anticipated visit pattern: 1w1 3w1 2w2 and next visit date: 3/4/24 CHF  See medication list - meds in home differ from AVS: Patient only has 3 tablets left of Jardiance please call into [address].  Significant  clinical findings: Patient would like a [facility] MSW to assist with advanced directives and meals on wheels. Patient reports water heater not operating in home, needs to heat water down stairs then take upstairs to take a bath. Getting water hot water this way for years.   Left eye is blood shot. Ill fitting dentures.   Patient reports no allergies but IV contrast listed.   Potential barriers to goal achievement: poor memory    Thank you  for allowing us to participate in the care of your patient.      Alexis Ac RN

## 2024-02-28 ENCOUNTER — HOME CARE VISIT (OUTPATIENT)
Dept: HOME HEALTH SERVICES | Facility: HOME HEALTHCARE | Age: 79
End: 2024-02-28
Payer: MEDICARE

## 2024-02-28 ENCOUNTER — PATIENT OUTREACH (OUTPATIENT)
Dept: INTERNAL MEDICINE CLINIC | Facility: CLINIC | Age: 79
End: 2024-02-28

## 2024-02-28 VITALS — OXYGEN SATURATION: 99 % | DIASTOLIC BLOOD PRESSURE: 68 MMHG | HEART RATE: 60 BPM | SYSTOLIC BLOOD PRESSURE: 132 MMHG

## 2024-02-28 PROCEDURE — G0151 HHCP-SERV OF PT,EA 15 MIN: HCPCS

## 2024-02-28 PROCEDURE — G0180 MD CERTIFICATION HHA PATIENT: HCPCS | Performed by: INTERNAL MEDICINE

## 2024-02-28 NOTE — PROGRESS NOTES
Outpatient Care Management Note:    Re:  HRR referral     Patient referred to outpatient nurse care management as he was identified as a high risk for readmission. Patient was at [facility] from 2/17-2/23/24 for wide-complex tachycardia after initially presenting for right knee pain and had joint aspirated and was positive for CPP crystals and started on Prednisone.     Patient's [employer] BiV ICD was interrogated and patient underwent successful cardioversion on 2/21/24.     Patient did experience decline in blood sugars to 100's and home Lantus was decreased from 15 units to 10 units daily. Patient also became hypotensive and Entresto was held and Lasix and Toprol were continued. Patient started on dofetilide 125 mcg every 12 hours. Patient to get BMP in 1 week.      Patient to follow up with EP and Cardiology and PCP.     Patient discharged home with [facility] for nursing and PT/OT.     I called main contact number in chart [phone] and spoke with patient's daughter Damien. I explained my role and reason for outreach. She is aware patient has PCP follow up tomorrow at 10 am and reports his wife Brenda will be with him at appointment and she is the one that helps him with managing his medication. She reports he is feeling well and denies any complaints at this time. Reviewed Cardiology follow up and aware they will get a print out tomorrow of upcoming appointments. Unable to review medication during this outreach but per daughter they have all medication at this time and patient's wife is able to give better information on that tomorrow at visit. She is not aware of any issues or concerns or questions regarding medication. I did review the need for repeat bloodwork. Patient is independent with ADL's. Patient lives in 2 story home and bathroom and bedroom on 2nd floor and patient able to do stairs.     Declining need for further outreach at this time. Will follow up with provider tomorrow.      Per review of [facility] nurse note for start of care there was a request for social work order to be placed so [facility]  could go out to address needs (meals on wheels, advanced directives, and water heater not operating in the home). Will request that PCP respond via in basket or tiger text with verbal order for [facility] MSW order to Alexis Ac RN with [facility].     Patient may need refill on Jardiance. Script sent 1 year ago to pharmacy.

## 2024-02-28 NOTE — CASE COMMUNICATION
1x1 PT visit for evaluation only. Patient demonstrates high level of function sp hospitalization for pseudogout right knee.  Pain and swelling right knee resolved.  during assessed ambulated household surfaces including flight of 14 steps safely with timed walking of 3  minutes.  Po2 upon return to sit 98 NM 68. No significant change from VS taken at rest.  Patient demonstrates ability to transfer off household surfaces indep.  Tinetti  27/28 indicating low fall risk.  No further PT visits indicated. patient reports he walks outside when it isnt raining.  Patient and wife agreeable to plan

## 2024-02-29 ENCOUNTER — APPOINTMENT (OUTPATIENT)
Dept: LAB | Facility: CLINIC | Age: 79
End: 2024-02-29
Payer: MEDICARE

## 2024-02-29 ENCOUNTER — OFFICE VISIT (OUTPATIENT)
Dept: INTERNAL MEDICINE CLINIC | Facility: CLINIC | Age: 79
End: 2024-02-29

## 2024-02-29 ENCOUNTER — PATIENT OUTREACH (OUTPATIENT)
Dept: INTERNAL MEDICINE CLINIC | Facility: CLINIC | Age: 79
End: 2024-02-29

## 2024-02-29 VITALS
TEMPERATURE: 98.1 F | BODY MASS INDEX: 25.43 KG/M2 | HEIGHT: 67 IN | WEIGHT: 162 LBS | HEART RATE: 64 BPM | SYSTOLIC BLOOD PRESSURE: 126 MMHG | DIASTOLIC BLOOD PRESSURE: 63 MMHG

## 2024-02-29 VITALS — SYSTOLIC BLOOD PRESSURE: 130 MMHG | OXYGEN SATURATION: 97 % | DIASTOLIC BLOOD PRESSURE: 68 MMHG | HEART RATE: 60 BPM

## 2024-02-29 DIAGNOSIS — Z95.5 STATUS POST INSERTION OF DRUG ELUTING CORONARY ARTERY STENT: ICD-10-CM

## 2024-02-29 DIAGNOSIS — I25.5 ISCHEMIC CARDIOMYOPATHY: ICD-10-CM

## 2024-02-29 DIAGNOSIS — I50.20 HEART FAILURE WITH REDUCED EJECTION FRACTION (HCC): ICD-10-CM

## 2024-02-29 DIAGNOSIS — I25.10 CORONARY ARTERY DISEASE INVOLVING NATIVE CORONARY ARTERY OF NATIVE HEART WITHOUT ANGINA PECTORIS: ICD-10-CM

## 2024-02-29 DIAGNOSIS — E11.40 TYPE 2 DIABETES MELLITUS WITH DIABETIC NEUROPATHY, WITH LONG-TERM CURRENT USE OF INSULIN (HCC): ICD-10-CM

## 2024-02-29 DIAGNOSIS — I50.22 CHRONIC HFREF (HEART FAILURE WITH REDUCED EJECTION FRACTION) (HCC): ICD-10-CM

## 2024-02-29 DIAGNOSIS — Z79.4 TYPE 2 DIABETES MELLITUS WITH DIABETIC NEUROPATHY, WITH LONG-TERM CURRENT USE OF INSULIN (HCC): ICD-10-CM

## 2024-02-29 DIAGNOSIS — I21.4 NSTEMI (NON-ST ELEVATED MYOCARDIAL INFARCTION) (HCC): ICD-10-CM

## 2024-02-29 DIAGNOSIS — E08.40 DIABETES MELLITUS DUE TO UNDERLYING CONDITION WITH DIABETIC NEUROPATHY, WITHOUT LONG-TERM CURRENT USE OF INSULIN (HCC): ICD-10-CM

## 2024-02-29 DIAGNOSIS — I25.5 ISCHEMIC CARDIOMYOPATHY: Primary | ICD-10-CM

## 2024-02-29 DIAGNOSIS — Z95.2 S/P TAVR (TRANSCATHETER AORTIC VALVE REPLACEMENT): ICD-10-CM

## 2024-02-29 DIAGNOSIS — E78.2 MIXED HYPERLIPIDEMIA: ICD-10-CM

## 2024-02-29 DIAGNOSIS — E11.40 TYPE 2 DIABETES MELLITUS WITH DIABETIC NEUROPATHY, WITHOUT LONG-TERM CURRENT USE OF INSULIN (HCC): ICD-10-CM

## 2024-02-29 LAB
ANION GAP SERPL CALCULATED.3IONS-SCNC: 8 MMOL/L
BUN SERPL-MCNC: 22 MG/DL (ref 5–25)
CALCIUM SERPL-MCNC: 9.3 MG/DL (ref 8.4–10.2)
CHLORIDE SERPL-SCNC: 99 MMOL/L (ref 96–108)
CO2 SERPL-SCNC: 34 MMOL/L (ref 21–32)
CREAT SERPL-MCNC: 1.09 MG/DL (ref 0.6–1.3)
GFR SERPL CREATININE-BSD FRML MDRD: 65 ML/MIN/1.73SQ M
GLUCOSE SERPL-MCNC: 96 MG/DL (ref 65–140)
POTASSIUM SERPL-SCNC: 3.5 MMOL/L (ref 3.5–5.3)
SODIUM SERPL-SCNC: 141 MMOL/L (ref 135–147)

## 2024-02-29 PROCEDURE — 36415 COLL VENOUS BLD VENIPUNCTURE: CPT

## 2024-02-29 PROCEDURE — 80048 BASIC METABOLIC PNL TOTAL CA: CPT

## 2024-02-29 PROCEDURE — 99495 TRANSJ CARE MGMT MOD F2F 14D: CPT | Performed by: INTERNAL MEDICINE

## 2024-02-29 RX ORDER — PEN NEEDLE, DIABETIC 30 GX3/16"
NEEDLE, DISPOSABLE MISCELLANEOUS DAILY
Qty: 300 EACH | Refills: 3 | Status: SHIPPED | OUTPATIENT
Start: 2024-02-29

## 2024-02-29 RX ORDER — LANCETS 28 GAUGE
EACH MISCELLANEOUS AS NEEDED
Qty: 100 EACH | Refills: 3 | Status: SHIPPED | OUTPATIENT
Start: 2024-02-29

## 2024-02-29 RX ORDER — INSULIN ASPART 100 [IU]/ML
INJECTION, SOLUTION INTRAVENOUS; SUBCUTANEOUS
Qty: 15 ML | Refills: 3 | Status: SHIPPED | OUTPATIENT
Start: 2024-02-29

## 2024-02-29 RX ORDER — INSULIN GLARGINE 100 [IU]/ML
18 INJECTION, SOLUTION SUBCUTANEOUS
Qty: 15 ML | Refills: 3 | Status: SHIPPED | OUTPATIENT
Start: 2024-02-29

## 2024-02-29 RX ORDER — ROSUVASTATIN CALCIUM 40 MG/1
40 TABLET, COATED ORAL DAILY
Qty: 30 TABLET | Refills: 0 | Status: SHIPPED | OUTPATIENT
Start: 2024-02-29 | End: 2024-03-25 | Stop reason: SDUPTHER

## 2024-02-29 RX ORDER — CLOPIDOGREL BISULFATE 75 MG/1
75 TABLET ORAL DAILY
Qty: 90 TABLET | Refills: 0 | Status: SHIPPED | OUTPATIENT
Start: 2024-02-29 | End: 2024-05-29

## 2024-02-29 NOTE — PROGRESS NOTES
Outpatient Care Management Note:    Re:  follow up     Patient at PCP office today for hospital follow up appointment. I followed up with PCP Dr. Millard before appointment regarding outreach encounter with daughter yesterday.     Patient in need of bloodwork and provider will have them use lab in office today.     I did suggest to provider patient may benefit from referral to palliative care.     No further questions, concerns, or other needs at this time for nurse care manager after visit today. Will close from nurse care management. Please re-consult as needed.     Please see physician note for additional information.

## 2024-02-29 NOTE — PROGRESS NOTES
Assessment & Plan     77 y/o M with significant CAD/HF hx recently complicated by multiple exacerbations requiring hospitalization who comes here for TCM after admission for arrhythmia refractory to ICD & new dx of pseudogout. Now s/p chemical and electrical cardioversion. Overall pt is doing well today and is without major complaint. His volume and hemodynamic status are stable despite being off Entresto/Lasix, and he is tolerating dofetilide (added during admit) well. He will be seeing HF Cards in the coming days, who will determine his long-term GDMT regimen and goals I/c/o recent bouts of HoTN, along with EP Cards for decision of possible AVN ablation.    Patient's BG control is still a concern, and without consistent BG monitoring it is difficult to determine whether or not he requires changes to current regimen. Recall that hospitalization was c/b multiple episodes of hypoglycemia requiring down-titration of insulin. For this reason we have requested pt Return to Clinic in 4-6 weeks for DM f/u.    Finally, there are concerns that pt's prognosis in the face of HFrEF with frequent exacerbation and now multiple arrhythmic insults. He is to be seen by [facility]  in the near-future who will be addressing GoC.     # HFrEF on GDMT & BiV ACD  Multiple Vfib Events now s/p Cardioversion & On Dofetilide  CAD s/p ELDER x2 c/b ICM  Overall pt is doing well since discharge. Pt's ability to perform ADL/iADL non-perturbed I/c/o recent hospitalization per [facility]  Currently euvolemic, BP WNL despite being off Entresto/Lasix  No epidodes of palpitations or ACD triggering since D/C  ?Declining disease status. Will need GoC discussion in near future    Plan:    F/U Notes from HF and EP Cardiology in the coming weeks for future GDMT/antiarrhythmic plans  To reach-out to A  for GoC discussion  Deferring all changes to cardiac medications to respective specialties    # IDDM  BG while IP persistently below 140, still  today pt states that his recent BG have been in 110-120s range  No hypoglycemic episodes thankfully or symptomatic hypoglycemia  May require down-titration of insulin  ?Addition of Jardiance complicating current BG issues. Would defer any decisions on this medication to HF Cards  Pt is not compliant with CGM, states that it frequently falls off of arm. Would benefit from DM education    Plan:    Return to Clinic in 4-6 weeks for DM F/U  DM Educator c/s at next visit  F/U recent blood glucose logs at upcoming visit  C/w Insulin dose per previously ordered for now    1. Ischemic cardiomyopathy  -     Basic metabolic panel; Future; Expected date: 02/29/2024  2. NSTEMI (non-ST elevated myocardial infarction) ([facility])  -     Empagliflozin (JARDIANCE) 10 MG TABS tablet; Take 1 tablet (10 mg total) by mouth every morning  3. Status post insertion of drug eluting coronary artery stent  -     Empagliflozin (JARDIANCE) 10 MG TABS tablet; Take 1 tablet (10 mg total) by mouth every morning  4. Coronary artery disease involving native coronary artery of native heart without angina pectoris  -     Empagliflozin (JARDIANCE) 10 MG TABS tablet; Take 1 tablet (10 mg total) by mouth every morning  5. S/P TAVR (transcatheter aortic valve replacement)  -     clopidogrel (PLAVIX) 75 mg tablet; Take 1 tablet (75 mg total) by mouth daily  6. Type 2 diabetes mellitus with diabetic neuropathy, with long-term current use of insulin ([facility])  7. Diabetes mellitus due to underlying condition with diabetic neuropathy, without long-term current use of insulin ([facility])  -     Insulin Glargine Solostar (Lantus SoloStar) 100 UNIT/ML SOPN; Inject 0.18 mL (18 Units total) under the skin daily at bedtime  -     Insulin Pen Needle (Pen Needles) 31G X 8 MM MISC; Use daily  -     Lancets (freestyle) lancets; Use as needed (As needed)  8. Type 2 diabetes mellitus with diabetic neuropathy, without long-term current use of insulin ([facility])  -     NovoLOG FlexPen 100  units/mL injection pen; Inject 5 units with breakfast and with dinner  9. Mixed hyperlipidemia     Subjective     Transitional Care Management Review:   Juan David Lora is a 79 y.o. male here for TCM follow up.     During the TCM phone call patient stated:  TCM Call     Date and time call was made  2/27/2024 10:22 AM    Hospital care reviewed  Records reviewed    Patient was hospitialized at  [facility]    Date of Admission  02/17/24    Date of discharge  02/23/24    Diagnosis  Wide-complex tachycardia r00.0    Disposition  Home    Were the patients medications reviewed and updated  Yes    Current Symptoms  Incisional pain    Incisional pain severity  Mild    Incisional pain onset  Gradual      TCM Call     Post hospital issues  None    Should patient be enrolled in anticoag monitoring?  Yes    Scheduled for follow up?  Yes    Patients specialists  Cardiologist    Cardiologist name  Mario Yuan    Cardiologist contact #  [phone]    Other specialists names  NATALYA SCHAFER - CARDIOLOGY    Other specialists contcat #  [phone]    Referrals needed  No    Did you obtain your prescribed medications  Yes    Why were you unable to obtain your medications  Patient wasn;t sure if they were ready. I advised him to [facility] pharmacy to see if they are ready    Do you need help managing your prescriptions or medications  No    Is transportation to your appointment needed  No    I have advised the patient to call PCP with any new or worsening symptoms  Marli Reed MA    Living Arrangements  Spouse or Significiant other    Are you recieving any outpatient services  No    What type of services  CARDIAC REHAB    Are you recieving home care services  No    Are you using any community resources  No    Current waiver services  No    Have you fallen in the last 12 months  No    Interperter language line needed  No    Counseling  Patient    Counseling topics  instructions for management; Importance of RX  "compliance        Mr Lora is a 79 y/o man with h/o HFrEF s/p BiV PPM, CAD s/p Stent x2 c/b ICM, AF on Eliquis, pseudogout, HTN, HLD who comes here today for TCM visit after recent hsopitalization to [facility] for aaryhtmia refractory to BiV ACD and L knee pain 2/2 new dx of pseudogout. Of note his recent hospitalization included cardioversion, and pt was switched to and maintained on Dofetilide. Furthermore Entreso and Lasix were held b/c/o low BP’s and they are to remain held until he sees HF cards OP.  Incidentally his BG were somewhat too well-controlled during hospitalization, which required lower basal insulin doses.     Today he is seen resting comfortably in clinic on exam table accompained by his wife. He states that he has been \"feeling better\" since discharge and \"improving a little bit\". He denies any CP, palpitations, SOB, dizziness, LH. His L knee pain has also been better with no additional painful events since leaving the hospital. Otherwise denies any peripheral edema despite being off Lasix and Entresto, nor any worsening orthopnea/PND or BUSTILLOS.     In regards to his BG, he says he is checking and his most recent values were \"115 this morning\". There have been no changes made to his insulin regimen. He does have a CGM but states that every time he uses it it \"falls off\".     Review of Systems   Constitutional:  Negative for chills and fever.   HENT:  Negative for ear pain and sore throat.    Eyes:  Negative for pain and visual disturbance.   Respiratory:  Negative for cough and shortness of breath.    Cardiovascular:  Negative for chest pain and palpitations.   Gastrointestinal:  Negative for abdominal pain and vomiting.   Genitourinary:  Negative for dysuria and hematuria.   Musculoskeletal:  Negative for arthralgias and back pain.   Skin:  Negative for color change and rash.   Neurological:  Negative for seizures and syncope.   All other systems reviewed and are negative.      Objective     /63 " "(BP Location: Left arm, Patient Position: Sitting, Cuff Size: Adult)   Pulse 64   Temp 98.1 °F (36.7 °C) (Temporal)   Ht 5' 7\" (1.702 m)   Wt 73.5 kg (162 lb)   BMI 25.37 kg/m²      Physical Exam  Vitals and nursing note reviewed.   Constitutional:       General: He is not in acute distress.     Appearance: He is well-developed.   HENT:      Head: Normocephalic and atraumatic.   Eyes:      Conjunctiva/sclera: Conjunctivae normal.   Cardiovascular:      Rate and Rhythm: Normal rate and regular rhythm.      Pulses: Normal pulses.      Heart sounds: No murmur heard.     No gallop.   Pulmonary:      Effort: Pulmonary effort is normal. No respiratory distress.      Breath sounds: Normal breath sounds.   Abdominal:      Palpations: Abdomen is soft.      Tenderness: There is no abdominal tenderness.   Musculoskeletal:         General: No swelling.      Cervical back: Neck supple.      Right lower leg: No edema.      Left lower leg: No edema.   Skin:     General: Skin is warm and dry.      Capillary Refill: Capillary refill takes 2 to 3 seconds.   Neurological:      Mental Status: He is alert.   Psychiatric:         Mood and Affect: Mood normal.       Medications have been reviewed by provider in current encounter    Umesh Millard MD         "

## 2024-03-01 ENCOUNTER — CLINICAL SUPPORT (OUTPATIENT)
Dept: CARDIOLOGY CLINIC | Facility: CLINIC | Age: 79
End: 2024-03-01
Payer: MEDICARE

## 2024-03-01 VITALS
BODY MASS INDEX: 25.11 KG/M2 | WEIGHT: 160 LBS | SYSTOLIC BLOOD PRESSURE: 108 MMHG | DIASTOLIC BLOOD PRESSURE: 54 MMHG | HEIGHT: 67 IN | HEART RATE: 61 BPM

## 2024-03-01 DIAGNOSIS — I50.20 HEART FAILURE WITH REDUCED EJECTION FRACTION (HCC): ICD-10-CM

## 2024-03-01 PROCEDURE — RECHECK: Performed by: PHYSICIAN ASSISTANT

## 2024-03-01 PROCEDURE — 93000 ELECTROCARDIOGRAM COMPLETE: CPT

## 2024-03-01 NOTE — PROGRESS NOTES
Pt presents in the office today for a 1 week EKG post Tikosyn 125 mcg start. Pt states no cardiac complaints      BP: 108/54  HR: 62 bpm    EKG reviewed in office by Leticia Fraga PA-C, EKG appeared normal. Pt will continue with daily regime

## 2024-03-04 ENCOUNTER — HOME CARE VISIT (OUTPATIENT)
Dept: HOME HEALTH SERVICES | Facility: HOME HEALTHCARE | Age: 79
End: 2024-03-04
Payer: MEDICARE

## 2024-03-04 VITALS
HEART RATE: 80 BPM | SYSTOLIC BLOOD PRESSURE: 130 MMHG | OXYGEN SATURATION: 99 % | TEMPERATURE: 97.3 F | RESPIRATION RATE: 20 BRPM | DIASTOLIC BLOOD PRESSURE: 68 MMHG

## 2024-03-04 PROCEDURE — G0299 HHS/HOSPICE OF RN EA 15 MIN: HCPCS

## 2024-03-04 NOTE — PROGRESS NOTES
Advanced Heart Failure / Pulmonary Hypertension Outpatient Progress Note    Juan David Lora 78 y.o. male   MRN: [id number]  Encounter: [id number]    Assessment:  Patient Active Problem List    Diagnosis Date Noted    Wide-complex tachycardia 02/17/2024    Pseudogout of right knee 02/17/2024    Stage 3 chronic kidney disease, unspecified whether stage 3a or 3b CKD ([facility]) 01/18/2024    Smoking history 12/23/2023    Acute on chronic heart failure ([facility]) 12/22/2023    HE (acute kidney injury) ([facility]) 12/22/2023    Hypokalemia 12/22/2023    Atrial fibrillation ([facility]) 12/22/2023    Mitral regurgitation 12/22/2023    Hepatitis C 12/22/2023    s/p Medtronic dual chamber PPM 8/31/22 s/p upgrade to BiV ICD 9/13/2023 09/16/2023    Atrial flutter ([facility]) 09/14/2023    Chronic HFrEF (heart failure with reduced ejection fraction) ([facility]) 06/05/2023    NSVT (nonsustained ventricular tachycardia) ([facility]) 06/05/2023    Anemia 03/19/2023    Status post insertion of drug eluting coronary artery stent 02/10/2023    Bruit of left carotid artery 01/30/2023    Peripheral artery disease ([facility]) 01/18/2023    Need for influenza vaccination 11/10/2022    Leg cramping 11/10/2022    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    S/P TAVR (transcatheter aortic valve replacement) 06/21/2022    Atherosclerosis of native coronary artery of native heart with angina pectoris ([facility]) 06/21/2022    Contrast media allergy 05/31/2022    History of aortic stenosis s/p TAVR 01/19/2022    Diverticulosis of large intestine 09/20/2018    Internal hemorrhoids 09/20/2018    Nicotine dependence 04/13/2017    Osteoarthritis of knee 04/13/2017    Bilateral hearing loss 01/30/2017    Allergic rhinitis 02/24/2016    Type 2 diabetes mellitus with circulatory disorder, with long-term current use of insulin ([facility]) 11/24/2015    Adenomatous colon polyp 06/11/2014    Hyperlipidemia 09/13/2013    Vitamin B12 deficiency 07/24/2012    Mild intermittent asthma without complication  "06/08/2012    Essential hypertension 06/08/2012    Hypothyroidism 06/07/2012       Today's Plan:  Restart spironolactone 12.5 mg daily (for HFrEF and should also assist with borderline hypokalemia on most recent labs).   Continue to hold Entresto for now.   Provided patient with digital scale and battery in office today.   Scheduled to see EP to discuss possible AV feng ablation later this month.   Referral to cardiac rehab active in EMR.     Plan:  Chronic HFrEF; LVEF 25-30%; LVIDd 5.6 cm; NYHA II; ACC/AHA Stage C   Etiology: ischemic.    TTE (limited) 09/14/2023: LVEF 32%. Severe global hypokinesis. Dilated RV with reduced RVSF. GLENN. Moderate to severe MR. Moderate TR.    TTE (limited) 12/23/2023: LVEF 25-30%. LVIDd 5.6 cm. Mildly dilated RV with moderately reduced RVSF. GLENN (L>R). TAVR noted \"appears to be functioning normally.\" Severe MR. Moderate to severe TR.    Weight of 163 lbs on 02/23 (day of discharge). Today, weighs 160 lbs.    Most recent BMP from 02/29/2024: sodium 141; potassium 3.5; BUN 22; creatinine 1.09; eGFR 65.     Pharmacotherapies / Neurohormonal Blockade:  --Beta Blocker: metoprolol succinate 75 mg q12 hours.   --ARNi / ACEi / ARB: No (ARNi d/c during 02/2024 admission).   --Aldosterone Antagonist: spironolactone 12.5 mg daily.   --SGLT2 Inhibitor: empagliflozin 10 mg daily.  --Diuretic: Lasix 40 mg BID.     Sudden Cardiac Death Risk Reduction:  --Medtronic BiV ICD upgraded in 09/2023. ICD in situ since 09/2022.  --Interrogation from 01/03/2024: AP 0%. BVP <90% (due to AF with RVR). 3 VHRS. E-grams with RVR in 150-170s. 100% AF burden. Lead parameters WNL. OptiVol WNL. Normal device function.     Electrical Resynchronization:  --Response to BiV device: No, low BVP percentage due to Afib/flutter.     Advanced Therapies: Will continue to monitor. MitraClip if no improvement s/p BiV ICD?    Coronary artery disease   Without active chest pain.   S/p PCI with ELDER x2 to mid LAD and ELDER x1 to " ostial rPDA in 02/2023.   Continue on Plavix, statin, and BB as above. PRN SL nitro prescribed. Not on ASA due to NOAC use for Afib.     Atrial fibrillation / flutter   [id number] = 6 (age, HF, HTN, DM, CAD/PAD).   Anticoagulation on Eliquis.    Rate control: BB as above.   Rhythm control: Tikosyn 125 mcg q12 hours.    Follows with Dr. Yuan as outpatient.     Hypertension   BP of 112/66 mmHg in office today.   Continue on medications as above.     History of aortic stenosis: s/p TAVR (26 mm Emery Hernán) in 2022.   Hyperlipidemia   Diabetes mellitus, type II  Hepatitis C  History of tachy-lino syndrome  History of tobacco abuse    HPI:   Juan David Lora is a 78-year-old man with a PMH as above who presents to the office for follow-up. Follows with Lissy Chaudhry, and Malik.     Admitted to [facility] from 02/17 to 02/23/2024 after presenting with right knee pain. Joint aspirate consistent with pseudogout; started on steroids. Noted to be atrial flutter with RVR and EP consulted. Lasix held on 02/19 due to parameters. Started on Tikosyn and underwent DCCV on 02/21. Lasix discontinued on 02/22, resumed at time of discharge. Spironolactone and Entresto discontinued at discharge due to soft BPs. Outpatient EP follow-up for possible AVN ablation arranged.     03/05/2024: Patient presents with his wife for hospital follow-up. Interpretor Jarett (ID #[id number]) assisted during encounter with patient's consent. Feeling well today. Denies SOB, CP, palpitations, BUSTILLOS, LE swelling, PND, and orthopnea. Continues to have chronic non-radiating rib discomfort at night when resting in bed, lasting 5-10 minutes; ongoing for years. Has VNA in home now and eager to start cardiac rehab when able.     Past Medical History:   Diagnosis Date    Arthritis     Asthma     Colon polyps     Community acquired pneumonia     last assessed: 5/1/2014    Diabetes mellitus (HCC)     Hemorrhagic prepatellar bursitis, left 10/21/2019     Hepatitis C     High cholesterol     History of colonoscopy 2017    Hypertension     Lymphadenopathy, anterior cervical 04/17/2018    Nephritis and nephropathy, not specified as acute or chronic, with other specified pathological lesion in kidney, in diseases classified elsewhere 3/26/2013    NSTEMI (non-ST elevated myocardial infarction) (HCC) 1/30/2023    s/p Medtronic dual chamber PPM 8/31/22 s/p upgrade to BiV ICD 9/13/2023 9/16/2023    Screening for colon cancer 05/01/2019    SIRS (systemic inflammatory response syndrome) (HCC) 3/19/2023    Thoracic vertebral fracture (HCC) 06/11/2014       Review of Systems   Constitutional:  Negative for activity change, appetite change, fatigue and unexpected weight change.   Respiratory:  Negative for cough, chest tightness and shortness of breath.    Cardiovascular:  Negative for chest pain, palpitations and leg swelling.   Gastrointestinal:  Negative for abdominal distention, abdominal pain, diarrhea and nausea.   Genitourinary:  Negative for decreased urine volume, dysuria and urgency.   Musculoskeletal: Negative.    Skin: Negative.    Neurological:  Negative for dizziness, syncope, weakness and light-headedness.   Psychiatric/Behavioral:  Negative for confusion and sleep disturbance. The patient is not nervous/anxious.      Allergies   Allergen Reactions    Iv Contrast [Iodinated Contrast Media] Rash     Patient states he had a severe reaction approximately 10 years ago , states he had a rash, itchy and he remembers a bunch of people pounding on chest and being surrounded by multiple doctors.         Current Outpatient Medications:     albuterol (PROVENTIL HFA,VENTOLIN HFA) 90 mcg/act inhaler, INHALE 2 PUFFS BY MOUTH EVERY 4 HOURS AS NEEDED FOR WHEEZING OR SHORTNESS OF BREATH., Disp: 8.5 g, Rfl: 4    apixaban (Eliquis) 5 mg, Take 1 tablet (5 mg total) by mouth 2 (two) times a day, Disp: 60 tablet, Rfl: 3    cholecalciferol (VITAMIN D3) 1,000 units tablet, Take 2 tablets  (2,000 Units total) by mouth daily, Disp: 180 tablet, Rfl: 5    clopidogrel (PLAVIX) 75 mg tablet, Take 1 tablet (75 mg total) by mouth daily, Disp: 90 tablet, Rfl: 0    Diclofenac Sodium (VOLTAREN) 1 %, Apply 2 g topically 4 (four) times a day for 14 days, Disp: 100 g, Rfl: 0    dofetilide (TIKOSYN) 125 mcg capsule, Take 1 capsule (125 mcg total) by mouth every 12 (twelve) hours, Disp: 60 capsule, Rfl: 0    Empagliflozin (JARDIANCE) 10 MG TABS tablet, Take 1 tablet (10 mg total) by mouth every morning, Disp: 90 tablet, Rfl: 3    ferrous sulfate 325 (65 Fe) mg tablet, TAKE 1 TABLET BY MOUTH EVERY OTHER DAY, Disp: 45 tablet, Rfl: 4    furosemide (LASIX) 40 mg tablet, TAKE 1 TABLET BY MOUTH TWICE A DAY, Disp: 180 tablet, Rfl: 1    Insulin Glargine Solostar (Lantus SoloStar) 100 UNIT/ML SOPN, Inject 0.18 mL (18 Units total) under the skin daily at bedtime, Disp: 15 mL, Rfl: 3    levothyroxine 137 mcg tablet, TAKE 1 TABLET BY MOUTH EVERY DAY, Disp: 90 tablet, Rfl: 3    metFORMIN (GLUCOPHAGE) 850 mg tablet, TAKE 1 TABLET BY MOUTH 2 TIMES A DAY WITH MEALS., Disp: 180 tablet, Rfl: 3    metoprolol succinate (TOPROL-XL) 50 mg 24 hr tablet, TAKE 1.5 TABLETS BY MOUTH 2 TIMES A DAY., Disp: 270 tablet, Rfl: 1    montelukast (SINGULAIR) 10 mg tablet, TAKE 1 TABLET BY MOUTH EVERY DAY, Disp: 90 tablet, Rfl: 3    NovoLOG FlexPen 100 units/mL injection pen, Inject 5 units with breakfast and with dinner, Disp: 15 mL, Rfl: 3    rosuvastatin (CRESTOR) 40 MG tablet, Take 1 tablet (40 mg total) by mouth daily, Disp: 30 tablet, Rfl: 0    Alcohol Swabs (ALCOHOL PADS) 70 % PADS, , Disp: , Rfl:     Blood Glucose Monitoring Suppl (OneTouch Verio) w/Device KIT, Check blood glucose three times daily before each meal (Patient not taking: Reported on 3/5/2024), Disp: 1 kit, Rfl: 0    Continuous Blood Gluc  (FreeStyle Cristofer 2 Clifton) POWER, Use 1 each continuous (Patient not taking: Reported on 2/13/2024), Disp: 1 each, Rfl: 0    Continuous  Blood Gluc Sensor (FreeStyle Cristofer 2 Sensor) MISC, Use 1 each every 14 (fourteen) days (Patient not taking: Reported on 2024), Disp: 6 each, Rfl: 3    Insulin Pen Needle (Pen Needles) 31G X 8 MM MISC, Use daily (Patient not taking: Reported on 3/5/2024), Disp: 300 each, Rfl: 3    Lancets (freestyle) lancets, Use as needed (As needed) (Patient not taking: Reported on 3/5/2024), Disp: 100 each, Rfl: 3    nitroglycerin (NITROSTAT) 0.4 mg SL tablet, Place 1 tablet (0.4 mg total) under the tongue every 5 (five) minutes as needed for chest pain (Patient not taking: Reported on 3/5/2024), Disp: 30 tablet, Rfl: 1    Social History     Socioeconomic History    Marital status: /Civil Union     Spouse name: Not on file    Number of children: Not on file    Years of education: Not on file    Highest education level: Not on file   Occupational History    Occupation: retired    Tobacco Use    Smoking status: Former     Current packs/day: 0.00     Types: Cigarettes     Quit date: 2022     Years since quittin.7    Smokeless tobacco: Never    Tobacco comments:     started when he was about 27 yrs old; stopped smoking 3 wks ago   Vaping Use    Vaping status: Never Used   Substance and Sexual Activity    Alcohol use: Not Currently    Drug use: No    Sexual activity: Not Currently   Other Topics Concern    Not on file   Social History Narrative    Not on file     Social Determinants of Health     Financial Resource Strain: Low Risk  (2024)    Overall Financial Resource Strain (CARDIA)     Difficulty of Paying Living Expenses: Not hard at all   Food Insecurity: No Food Insecurity (2024)    Hunger Vital Sign     Worried About Running Out of Food in the Last Year: Never true     Ran Out of Food in the Last Year: Never true   Transportation Needs: No Transportation Needs (2024)    PRAPARE - Transportation     Lack of Transportation (Medical): No     Lack of Transportation (Non-Medical): No   Physical  Activity: Unknown (1/19/2022)    Exercise Vital Sign     Days of Exercise per Week: Not on file     Minutes of Exercise per Session: 60 min   Stress: No Stress Concern Present (1/19/2022)    [employer] - Occupational Stress Questionnaire     Feeling of Stress : Not at all   Social Connections: Moderately Isolated (1/19/2022)    Social Connection and Isolation Panel [NHANES]     Frequency of Communication with Friends and Family: More than three times a week     Frequency of Social Gatherings with Friends and Family: More than three times a week     Attends Mosque Services: Never     Active Member of Clubs or Organizations: No     Attends Club or Organization Meetings: Never     Marital Status:    Intimate Partner Violence: Not on file   Housing Stability: Low Risk  (9/17/2023)    Housing Stability Vital Sign     Unable to Pay for Housing in the Last Year: No     Number of Places Lived in the Last Year: 1     Unstable Housing in the Last Year: No     Family History   Problem Relation Age of Onset    Heart attack Family         at age 86    No Known Problems Mother     No Known Problems Father     Diabetes Sister     Diabetes Brother        Vitals:   Blood pressure 112/66, pulse 69, weight 72.6 kg (160 lb), SpO2 96%.    Wt Readings from Last 10 Encounters:   03/05/24 72.6 kg (160 lb)   03/01/24 72.6 kg (160 lb)   02/29/24 73.5 kg (162 lb)   02/23/24 74.2 kg (163 lb 9.3 oz)   02/13/24 75.3 kg (166 lb)   01/18/24 80.3 kg (177 lb 1.6 oz)   12/25/23 78.5 kg (173 lb)   11/27/23 86.9 kg (191 lb 9.6 oz)   10/25/23 80.8 kg (178 lb 1.6 oz)   10/11/23 84.4 kg (186 lb)     Vitals:    03/05/24 1125   BP: 112/66   BP Location: Left arm   Patient Position: Sitting   Cuff Size: Standard   Pulse: 69   SpO2: 96%   Weight: 72.6 kg (160 lb)       Physical Exam  Vitals reviewed.   Constitutional:       General: He is awake. He is not in acute distress.     Appearance: Normal appearance. He is  well-developed and normal weight. He is not toxic-appearing or diaphoretic.   HENT:      Head: Normocephalic.      Nose: Nose normal.      Mouth/Throat:      Mouth: Mucous membranes are moist.   Eyes:      General: No scleral icterus.     Conjunctiva/sclera: Conjunctivae normal.   Neck:      Vascular: No JVD.      Trachea: No tracheal deviation.   Cardiovascular:      Rate and Rhythm: Normal rate and regular rhythm.      Heart sounds: Murmur heard.   Pulmonary:      Effort: Pulmonary effort is normal. No tachypnea, bradypnea or respiratory distress.      Breath sounds: Normal air entry. No decreased air movement. No decreased breath sounds or wheezing.   Abdominal:      Palpations: Abdomen is soft.      Tenderness: There is no abdominal tenderness.   Musculoskeletal:      Cervical back: Neck supple.      Right lower leg: No edema.      Left lower leg: No edema.   Skin:     General: Skin is warm and dry.      Coloration: Skin is not jaundiced or pale.   Neurological:      General: No focal deficit present.      Mental Status: He is alert and oriented to person, place, and time.   Psychiatric:         Attention and Perception: Attention normal.         Mood and Affect: Mood and affect normal.         Speech: Speech normal.         Behavior: Behavior normal. Behavior is cooperative.         Thought Content: Thought content normal.       Labs & Results:  Lab Results   Component Value Date    WBC 8.73 02/23/2024    HGB 12.6 02/23/2024    HCT 39.7 02/23/2024    MCV 84 02/23/2024     02/23/2024     Lab Results   Component Value Date    SODIUM 141 02/29/2024    K 3.5 02/29/2024    CL 99 02/29/2024    CO2 34 (H) 02/29/2024    BUN 22 02/29/2024    CREATININE 1.09 02/29/2024    GLUC 96 02/29/2024    CALCIUM 9.3 02/29/2024     Lab Results   Component Value Date    INR 1.52 (H) 12/22/2023    INR 1.11 02/10/2023    INR 0.93 09/01/2022    PROTIME 18.1 (H) 12/22/2023    PROTIME 14.6 (H) 02/10/2023    PROTIME 12.6 09/01/2022      Lab Results   Component Value Date    NTBNP 2,624 (H) 05/06/2023      Lab Results   Component Value Date     (H) 02/09/2024      Crystal Ross PA-C

## 2024-03-05 ENCOUNTER — OFFICE VISIT (OUTPATIENT)
Dept: CARDIOLOGY CLINIC | Facility: CLINIC | Age: 79
End: 2024-03-05
Payer: MEDICARE

## 2024-03-05 VITALS
OXYGEN SATURATION: 96 % | SYSTOLIC BLOOD PRESSURE: 112 MMHG | BODY MASS INDEX: 25.06 KG/M2 | WEIGHT: 160 LBS | DIASTOLIC BLOOD PRESSURE: 66 MMHG | HEART RATE: 69 BPM

## 2024-03-05 DIAGNOSIS — I25.10 CORONARY ARTERY DISEASE INVOLVING NATIVE CORONARY ARTERY OF NATIVE HEART WITHOUT ANGINA PECTORIS: ICD-10-CM

## 2024-03-05 DIAGNOSIS — Z09 HOSPITAL DISCHARGE FOLLOW-UP: Primary | ICD-10-CM

## 2024-03-05 DIAGNOSIS — I10 ESSENTIAL HYPERTENSION: Chronic | ICD-10-CM

## 2024-03-05 DIAGNOSIS — I48.0 PAROXYSMAL ATRIAL FIBRILLATION (HCC): ICD-10-CM

## 2024-03-05 DIAGNOSIS — I50.22 CHRONIC HFREF (HEART FAILURE WITH REDUCED EJECTION FRACTION) (HCC): ICD-10-CM

## 2024-03-05 PROCEDURE — 99215 OFFICE O/P EST HI 40 MIN: CPT | Performed by: PHYSICIAN ASSISTANT

## 2024-03-05 RX ORDER — SPIRONOLACTONE 25 MG/1
12.5 TABLET ORAL DAILY
Qty: 45 TABLET | Refills: 1 | Status: SHIPPED | OUTPATIENT
Start: 2024-03-05

## 2024-03-05 NOTE — PATIENT INSTRUCTIONS
Reiniciar spironolactone 12.5 mg al día.    Pésese todos los días (después de vaciar la vejiga) y mantenga un registro detallado de los pesos.  Comuníquese con el programa de insuficiencia cardíaca al [phone] si aumenta 3 libras daksha la noche o 5 libras en 5 a 7 días.  Limite la ingesta diaria de sodio/sal a 2000 mg al día para evitar la retención de líquidos.  Evite los alimentos enlatados, la comida rápida/comida china y las shonna procesadas (perritos calientes, embutidos, salchichas, etc.). Precaución con los condimentos.  Limite la ingesta de líquidos a 2000 ml o 2 litros (alrededor de 60 a 65 oz) al día.  Evite las bebidas de reemplazo de electrolitos (judith Gatorade, Pedialyte, Propel, Liquid IV, etc.).  Lleve la lista completa de medicamentos y el registro de pesos diarios a talley cosme de seguimiento.

## 2024-03-06 ENCOUNTER — HOME CARE VISIT (OUTPATIENT)
Dept: HOME HEALTH SERVICES | Facility: HOME HEALTHCARE | Age: 79
End: 2024-03-06
Payer: MEDICARE

## 2024-03-06 ENCOUNTER — TELEPHONE (OUTPATIENT)
Dept: INTERNAL MEDICINE CLINIC | Facility: CLINIC | Age: 79
End: 2024-03-06

## 2024-03-06 VITALS
SYSTOLIC BLOOD PRESSURE: 140 MMHG | OXYGEN SATURATION: 99 % | RESPIRATION RATE: 20 BRPM | TEMPERATURE: 97.5 F | DIASTOLIC BLOOD PRESSURE: 84 MMHG | HEART RATE: 72 BPM

## 2024-03-06 DIAGNOSIS — I48.92 ATRIAL FLUTTER, UNSPECIFIED TYPE (HCC): ICD-10-CM

## 2024-03-06 PROCEDURE — G0299 HHS/HOSPICE OF RN EA 15 MIN: HCPCS

## 2024-03-06 NOTE — TELEPHONE ENCOUNTER
Folder Color- Purple     Name of Form- [facility]     Form to be filled out by- Dr. ferreira    Form to be Faxed [phone]    Patient made aware of 10 business day policy.

## 2024-03-07 ENCOUNTER — HOME CARE VISIT (OUTPATIENT)
Dept: HOME HEALTH SERVICES | Facility: HOME HEALTHCARE | Age: 79
End: 2024-03-07
Payer: MEDICARE

## 2024-03-08 ENCOUNTER — HOME CARE VISIT (OUTPATIENT)
Dept: HOME HEALTH SERVICES | Facility: HOME HEALTHCARE | Age: 79
End: 2024-03-08
Payer: MEDICARE

## 2024-03-08 ENCOUNTER — LAB REQUISITION (OUTPATIENT)
Dept: LAB | Facility: HOSPITAL | Age: 79
End: 2024-03-08
Payer: MEDICARE

## 2024-03-08 VITALS
TEMPERATURE: 98 F | SYSTOLIC BLOOD PRESSURE: 122 MMHG | HEART RATE: 76 BPM | DIASTOLIC BLOOD PRESSURE: 78 MMHG | RESPIRATION RATE: 20 BRPM | OXYGEN SATURATION: 100 %

## 2024-03-08 DIAGNOSIS — I50.20 UNSPECIFIED SYSTOLIC (CONGESTIVE) HEART FAILURE (HCC): ICD-10-CM

## 2024-03-08 LAB
ANION GAP SERPL CALCULATED.3IONS-SCNC: 7 MMOL/L
BUN SERPL-MCNC: 21 MG/DL (ref 5–25)
CALCIUM SERPL-MCNC: 9.4 MG/DL (ref 8.4–10.2)
CHLORIDE SERPL-SCNC: 97 MMOL/L (ref 96–108)
CO2 SERPL-SCNC: 34 MMOL/L (ref 21–32)
CREAT SERPL-MCNC: 1.04 MG/DL (ref 0.6–1.3)
GFR SERPL CREATININE-BSD FRML MDRD: 69 ML/MIN/1.73SQ M
GLUCOSE SERPL-MCNC: 100 MG/DL (ref 65–140)
POTASSIUM SERPL-SCNC: 4.6 MMOL/L (ref 3.5–5.3)
SODIUM SERPL-SCNC: 138 MMOL/L (ref 135–147)

## 2024-03-08 PROCEDURE — 80048 BASIC METABOLIC PNL TOTAL CA: CPT

## 2024-03-08 PROCEDURE — G0299 HHS/HOSPICE OF RN EA 15 MIN: HCPCS

## 2024-03-12 ENCOUNTER — HOME CARE VISIT (OUTPATIENT)
Dept: HOME HEALTH SERVICES | Facility: HOME HEALTHCARE | Age: 79
End: 2024-03-12
Payer: MEDICARE

## 2024-03-12 VITALS
OXYGEN SATURATION: 97 % | HEART RATE: 72 BPM | SYSTOLIC BLOOD PRESSURE: 132 MMHG | TEMPERATURE: 97.8 F | RESPIRATION RATE: 20 BRPM | DIASTOLIC BLOOD PRESSURE: 62 MMHG

## 2024-03-12 PROCEDURE — G0152 HHCP-SERV OF OT,EA 15 MIN: HCPCS

## 2024-03-12 PROCEDURE — G0299 HHS/HOSPICE OF RN EA 15 MIN: HCPCS

## 2024-03-14 ENCOUNTER — HOME CARE VISIT (OUTPATIENT)
Dept: HOME HEALTH SERVICES | Facility: HOME HEALTHCARE | Age: 79
End: 2024-03-14
Payer: MEDICARE

## 2024-03-14 VITALS
OXYGEN SATURATION: 98 % | DIASTOLIC BLOOD PRESSURE: 78 MMHG | TEMPERATURE: 97.8 F | SYSTOLIC BLOOD PRESSURE: 128 MMHG | HEART RATE: 78 BPM | RESPIRATION RATE: 20 BRPM

## 2024-03-14 PROCEDURE — G0299 HHS/HOSPICE OF RN EA 15 MIN: HCPCS

## 2024-03-18 ENCOUNTER — HOME CARE VISIT (OUTPATIENT)
Dept: HOME HEALTH SERVICES | Facility: HOME HEALTHCARE | Age: 79
End: 2024-03-18
Payer: MEDICARE

## 2024-03-18 VITALS
HEART RATE: 80 BPM | TEMPERATURE: 98.4 F | RESPIRATION RATE: 20 BRPM | DIASTOLIC BLOOD PRESSURE: 76 MMHG | OXYGEN SATURATION: 96 % | SYSTOLIC BLOOD PRESSURE: 128 MMHG

## 2024-03-18 PROCEDURE — G0299 HHS/HOSPICE OF RN EA 15 MIN: HCPCS

## 2024-03-18 PROCEDURE — G0152 HHCP-SERV OF OT,EA 15 MIN: HCPCS

## 2024-03-21 ENCOUNTER — HOME CARE VISIT (OUTPATIENT)
Dept: HOME HEALTH SERVICES | Facility: HOME HEALTHCARE | Age: 79
End: 2024-03-21
Payer: MEDICARE

## 2024-03-21 VITALS
OXYGEN SATURATION: 95 % | TEMPERATURE: 96.7 F | HEART RATE: 76 BPM | DIASTOLIC BLOOD PRESSURE: 65 MMHG | RESPIRATION RATE: 20 BRPM | SYSTOLIC BLOOD PRESSURE: 118 MMHG

## 2024-03-21 PROCEDURE — G0299 HHS/HOSPICE OF RN EA 15 MIN: HCPCS

## 2024-03-22 ENCOUNTER — OFFICE VISIT (OUTPATIENT)
Dept: CARDIOLOGY CLINIC | Facility: CLINIC | Age: 79
End: 2024-03-22
Payer: MEDICARE

## 2024-03-22 VITALS
BODY MASS INDEX: 24.96 KG/M2 | HEIGHT: 67 IN | WEIGHT: 159 LBS | HEART RATE: 65 BPM | DIASTOLIC BLOOD PRESSURE: 72 MMHG | SYSTOLIC BLOOD PRESSURE: 114 MMHG

## 2024-03-22 DIAGNOSIS — I48.92 ATRIAL FLUTTER, UNSPECIFIED TYPE (HCC): Primary | ICD-10-CM

## 2024-03-22 DIAGNOSIS — I48.0 PAROXYSMAL ATRIAL FIBRILLATION (HCC): ICD-10-CM

## 2024-03-22 DIAGNOSIS — I48.91 ATRIAL FIBRILLATION (HCC): ICD-10-CM

## 2024-03-22 PROCEDURE — 99214 OFFICE O/P EST MOD 30 MIN: CPT | Performed by: PHYSICIAN ASSISTANT

## 2024-03-22 PROCEDURE — 93000 ELECTROCARDIOGRAM COMPLETE: CPT | Performed by: PHYSICIAN ASSISTANT

## 2024-03-22 RX ORDER — DOFETILIDE 0.12 MG/1
125 CAPSULE ORAL EVERY 12 HOURS SCHEDULED
Qty: 60 CAPSULE | Refills: 3 | Status: SHIPPED | OUTPATIENT
Start: 2024-03-22 | End: 2024-07-20

## 2024-03-22 RX ORDER — LISINOPRIL 10 MG/1
10 TABLET ORAL DAILY
COMMUNITY
Start: 2024-03-15

## 2024-03-22 NOTE — PROGRESS NOTES
Electrophysiology Follow Up  [facility]  [facility] [address], [place] [address]    Name: Juan David Lora  : 1945  MRN: [id number]    ASSESSMENT/PLAN:  Atrial flutter and paroxysmal atrial fibrillation  Diagnosed 2023 when he underwent BiV ICD placement  [id number] score equals 6 patient maintained on Eliquis  2024-initiated on Tikosyn after presenting to the ED with knee pain and being found in atrial flutter with RVR  Currently maintained on Tikosyn and will provide refill today.  Educated on importance of 12-hour timing and what to do if he misses a dose by more than 2 hours.  Discussed importance of checking for interactions if he is started on new medication and making sure he advocates that he is on Tikosyn when being prescribed new medications  Discussed per Dr. Contreras's last recommendation in the hospital that he if he returns to A-fib our next recommendation would not be a different medication but is simply have patient undergo AV feng ablation.  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 He will call our office with any questions or concerns in the meantime.      CC/HPI:   Interim history: Juan David Lora is a 78 y.o. male with above past medical history who presents for follow-up today after Tikosyn initiation.  In February of this year he was diagnosed with atrial flutter with RVR after presenting to the ED with knee pain and being found in to have pseudogout.  He was initiated on Tikosyn and has been taking it since without any concerns of side effects.  Patient reported when he is in A-fib he does not feel symptoms.     Visit was  "conducted with a  on the iPad    EKG: Sinus rhythm nonspecific intraventricular block    Review of Systems      OBJECTIVE:   Vitals:   /72 (BP Location: Left arm, Patient Position: Sitting, Cuff Size: Standard)   Pulse 65   Ht 5' 7\" (1.702 m)   Wt 72.1 kg (159 lb)   BMI 24.90 kg/m²   Body mass index is 24.9 kg/m².        Physical Exam:   Physical Exam  Constitutional:       General: He is not in acute distress.     Appearance: He is not ill-appearing.   HENT:      Head: Normocephalic.      Mouth/Throat:      Mouth: Mucous membranes are moist.   Cardiovascular:      Rate and Rhythm: Normal rate and regular rhythm.      Heart sounds: No murmur heard.     No friction rub. No gallop.   Pulmonary:      Effort: Pulmonary effort is normal. No respiratory distress.      Breath sounds: No wheezing, rhonchi or rales.   Chest:      Chest wall: No tenderness.   Musculoskeletal:      Right lower leg: No edema.      Left lower leg: No edema.   Skin:     General: Skin is warm and dry.   Neurological:      Mental Status: He is alert.            Medications:      Current Outpatient Medications:     albuterol (PROVENTIL HFA,VENTOLIN HFA) 90 mcg/act inhaler, INHALE 2 PUFFS BY MOUTH EVERY 4 HOURS AS NEEDED FOR WHEEZING OR SHORTNESS OF BREATH., Disp: 8.5 g, Rfl: 4    Alcohol Swabs (ALCOHOL PADS) 70 % PADS, , Disp: , Rfl:     apixaban (Eliquis) 5 mg, Take 1 tablet (5 mg total) by mouth 2 (two) times a day, Disp: 180 tablet, Rfl: 3    Blood Glucose Monitoring Suppl (OneTouch Verio) w/Device KIT, Check blood glucose three times daily before each meal, Disp: 1 kit, Rfl: 0    cholecalciferol (VITAMIN D3) 1,000 units tablet, Take 2 tablets (2,000 Units total) by mouth daily, Disp: 180 tablet, Rfl: 5    clopidogrel (PLAVIX) 75 mg tablet, Take 1 tablet (75 mg total) by mouth daily, Disp: 90 tablet, Rfl: 0    Continuous Blood Gluc  (FreeStyle Cristofer 2 Wilmont) POWER, Use 1 each continuous, Disp: 1 each, Rfl: 0   "  Continuous Blood Gluc Sensor (FreeStyle Cristofer 2 Sensor) MISC, Use 1 each every 14 (fourteen) days, Disp: 6 each, Rfl: 3    dofetilide (TIKOSYN) 125 mcg capsule, Take 1 capsule (125 mcg total) by mouth every 12 (twelve) hours, Disp: 60 capsule, Rfl: 3    Empagliflozin (JARDIANCE) 10 MG TABS tablet, Take 1 tablet (10 mg total) by mouth every morning, Disp: 90 tablet, Rfl: 3    ferrous sulfate 325 (65 Fe) mg tablet, TAKE 1 TABLET BY MOUTH EVERY OTHER DAY, Disp: 45 tablet, Rfl: 4    furosemide (LASIX) 40 mg tablet, TAKE 1 TABLET BY MOUTH TWICE A DAY, Disp: 180 tablet, Rfl: 1    Insulin Glargine Solostar (Lantus SoloStar) 100 UNIT/ML SOPN, Inject 0.18 mL (18 Units total) under the skin daily at bedtime, Disp: 15 mL, Rfl: 3    Insulin Pen Needle (Pen Needles) 31G X 8 MM MISC, Use daily, Disp: 300 each, Rfl: 3    Lancets (freestyle) lancets, Use as needed (As needed), Disp: 100 each, Rfl: 3    levothyroxine 137 mcg tablet, TAKE 1 TABLET BY MOUTH EVERY DAY, Disp: 90 tablet, Rfl: 3    metFORMIN (GLUCOPHAGE) 850 mg tablet, TAKE 1 TABLET BY MOUTH 2 TIMES A DAY WITH MEALS., Disp: 180 tablet, Rfl: 3    metoprolol succinate (TOPROL-XL) 50 mg 24 hr tablet, TAKE 1.5 TABLETS BY MOUTH 2 TIMES A DAY., Disp: 270 tablet, Rfl: 1    montelukast (SINGULAIR) 10 mg tablet, TAKE 1 TABLET BY MOUTH EVERY DAY, Disp: 90 tablet, Rfl: 3    nitroglycerin (NITROSTAT) 0.4 mg SL tablet, Place 1 tablet (0.4 mg total) under the tongue every 5 (five) minutes as needed for chest pain, Disp: 30 tablet, Rfl: 1    NovoLOG FlexPen 100 units/mL injection pen, Inject 5 units with breakfast and with dinner, Disp: 15 mL, Rfl: 3    rosuvastatin (CRESTOR) 40 MG tablet, Take 1 tablet (40 mg total) by mouth daily, Disp: 30 tablet, Rfl: 0    spironolactone (ALDACTONE) 25 mg tablet, Take 0.5 tablets (12.5 mg total) by mouth daily, Disp: 45 tablet, Rfl: 1    Diclofenac Sodium (VOLTAREN) 1 %, Apply 2 g topically 4 (four) times a day for 14 days (Patient not taking:  Reported on 3/22/2024), Disp: 100 g, Rfl: 0    lisinopril (ZESTRIL) 10 mg tablet, Take 10 mg by mouth daily (Patient not taking: Reported on 3/22/2024), Disp: , Rfl:        Historical Information   Past Medical History:   Diagnosis Date    Arthritis     Asthma     Colon polyps     Community acquired pneumonia     last assessed: 5/1/2014    Diabetes mellitus (HCC)     Hemorrhagic prepatellar bursitis, left 10/21/2019    Hepatitis C     High cholesterol     History of colonoscopy 2017    Hypertension     Lymphadenopathy, anterior cervical 04/17/2018    Nephritis and nephropathy, not specified as acute or chronic, with other specified pathological lesion in kidney, in diseases classified elsewhere 3/26/2013    NSTEMI (non-ST elevated myocardial infarction) (HCC) 1/30/2023    s/p Medtronic dual chamber PPM 8/31/22 s/p upgrade to BiV ICD 9/13/2023 9/16/2023    Screening for colon cancer 05/01/2019    SIRS (systemic inflammatory response syndrome) (HCC) 3/19/2023    Thoracic vertebral fracture (HCC) 06/11/2014       Past Surgical History:   Procedure Laterality Date    CARDIAC CATHETERIZATION N/A 6/3/2022    Procedure: Cardiac RHC/LHC;  Surgeon: Yemi Molina MD;  Location: [facility];  Service: Cardiology    CARDIAC CATHETERIZATION N/A 6/21/2022    Procedure: CARDIAC TAVR;  Surgeon: David Craft MD;  Location: [facility];  Service: Cardiology    CARDIAC CATHETERIZATION N/A 2/10/2023    Procedure: Cardiac Coronary Angiogram;  Surgeon: Yemi Molina MD;  Location: [facility];  Service: Cardiology    CARDIAC CATHETERIZATION N/A 2/10/2023    Procedure: Cardiac pci;  Surgeon: Yemi Molina MD;  [facility];  Service: Cardiology    CARDIAC ELECTROPHYSIOLOGY PROCEDURE N/A 9/1/2022    Procedure: Cardiac pacer implant ; DC PPM;  Surgeon: Francis Sun DO;  [facility]: [facility];  Service: Cardiology    CARDIAC ELECTROPHYSIOLOGY PROCEDURE N/A 9/13/2023    Procedure: Cardiac upgrade  pacer to biv icd;  Surgeon: Mario Yuan MD;  [facility]: [facility];  Service: Cardiology    COLONOSCOPY      COLONOSCOPY W/ POLYPECTOMY      IR UPPER EXTREMITY VENOGRAM- DIAGNOSTIC  2023    LITHOTRIPSY      MULTIPLE TOOTH EXTRACTIONS      WI COLONOSCOPY FLX DX W/COLLJ SPEC WHEN PFRMD N/A 2018    Procedure: COLONOSCOPY;  Surgeon: Raad Sandoval MD;  Location: [facility];  Service: Gastroenterology    WI REPLACE AORTIC VALVE OPENFEMORAL ARTERY APPROACH N/A 2022    Procedure: REPLACEMENT AORTIC VALVE TRANSCATHETER (TAVR) TRANSFEMORAL W/ 26MM QUINN RONNI S3 ULTRA VALVE(ACCESS ON LEFT) SAULO;  Surgeon: Sal Walker MD;  Location: [facility];  Service: Cardiac Surgery       Social History     Substance and Sexual Activity   Alcohol Use Not Currently     Social History     Substance and Sexual Activity   Drug Use No     Social History     Tobacco Use   Smoking Status Former    Current packs/day: 0.00    Types: Cigarettes    Quit date: 2022    Years since quittin.8   Smokeless Tobacco Never   Tobacco Comments    started when he was about 27 yrs old; stopped smoking 3 wks ago       Family History   Problem Relation Age of Onset    Heart attack Family         at age 86    No Known Problems Mother     No Known Problems Father     Diabetes Sister     Diabetes Brother          Labs & Results:  Below is the patient's most recent value for Albumin, ALT, AST, BUN, Calcium, Chloride, Cholesterol, CO2, Creatinine, GFR, Glucose, HDL, Hematocrit, Hemoglobin, Hemoglobin A1C, LDL, Magnesium, Phosphorus, Platelets, Potassium, PSA, Sodium, Triglycerides, and WBC.   Lab Results   Component Value Date    ALT 57 (H) 2024    AST 43 (H) 2024    BUN 21 2024    CALCIUM 9.4 2024    CL 97 2024    CHOL 171 2014    CO2 34 (H) 2024    CREATININE 1.04 2024    HDL 35 (L) 2023    HCT 39.7 2024    HGB 12.6 2024    HGBA1C 6.9 (H) 2023    MG 2.3  02/23/2024    PHOS 3.5 02/23/2024     02/23/2024    K 4.6 03/08/2024    PSA 0.8 03/20/2017     10/14/2014    TRIG 74 12/23/2023    WBC 8.73 02/23/2024     Note: for a comprehensive list of the patient's lab results, access the Results Review activity.

## 2024-03-25 DIAGNOSIS — E78.2 MIXED HYPERLIPIDEMIA: ICD-10-CM

## 2024-03-26 RX ORDER — ROSUVASTATIN CALCIUM 40 MG/1
40 TABLET, COATED ORAL DAILY
Qty: 30 TABLET | Refills: 3 | Status: SHIPPED | OUTPATIENT
Start: 2024-03-26 | End: 2024-07-24

## 2024-03-28 ENCOUNTER — REMOTE DEVICE CLINIC VISIT (OUTPATIENT)
Dept: CARDIOLOGY CLINIC | Facility: CLINIC | Age: 79
End: 2024-03-28
Payer: MEDICARE

## 2024-03-28 DIAGNOSIS — M17.9 OSTEOARTHRITIS OF KNEE: ICD-10-CM

## 2024-03-28 DIAGNOSIS — Z95.810 AICD (AUTOMATIC CARDIOVERTER/DEFIBRILLATOR) PRESENT: Primary | ICD-10-CM

## 2024-03-28 PROCEDURE — 93295 DEV INTERROG REMOTE 1/2/MLT: CPT | Performed by: INTERNAL MEDICINE

## 2024-03-28 PROCEDURE — 93296 REM INTERROG EVL PM/IDS: CPT | Performed by: INTERNAL MEDICINE

## 2024-03-28 NOTE — PROGRESS NOTES
"Results for orders placed or performed in visit on 03/28/24   Cardiac EP device report    Narrative    MDT BI-V ICD/ACTIVE SYSTEM IS MRI CONDITIONAL  CARELINK TRANSMISSION: BATTERY STATUS \"8 YRS.\" AP 11% CRT PACING (BIV) 100% (LV) 0%. ALL AVAILABLE LEAD PARAMETERS WITHIN NORMAL LIMITS. 1 NSVT NOTED; NO THERAPIES GIVEN. PT ON TIKOSYN & METO SUCC. EF 30% (ECHO 2023). NORMAL DEVICE FUNCTION. NC         "

## 2024-04-01 ENCOUNTER — TRANSCRIBE ORDERS (OUTPATIENT)
Dept: CARDIAC REHAB | Age: 79
End: 2024-04-01

## 2024-04-01 ENCOUNTER — HOME CARE VISIT (OUTPATIENT)
Dept: HOME HEALTH SERVICES | Facility: HOME HEALTHCARE | Age: 79
End: 2024-04-01
Payer: MEDICARE

## 2024-04-01 DIAGNOSIS — I50.22 CHRONIC SYSTOLIC HEART FAILURE (HCC): Primary | ICD-10-CM

## 2024-04-17 ENCOUNTER — RA CDI HCC (OUTPATIENT)
Dept: OTHER | Facility: HOSPITAL | Age: 79
End: 2024-04-17

## 2024-04-17 NOTE — PROGRESS NOTES
HCC coding opportunities          Chart Reviewed number of suggestions sent to Provider: 2     Patients Insurance     Medicare Insurance: [employer]          E11.22: Type 2 diabetes mellitus with diabetic chronic kidney disease (HCC) - please review if this is correct     As per ICD 10 coding guidelines, DM & CKD are presumed to have a causal-effect relationship unless documented as unrelated please review and assess if applicable      I13.0 : Hypertensive heart and chronic kidney disease with heart failure and stage 1 through stage 4 chronic kidney disease, or unspecified chronic kidney disease (HCC)     Per ICD 10 CM coding guidelines the classification presumes a causal relationship between hypertension and heart involvement and between CKD unless the documentation clearly states the conditions are unrelated

## 2024-04-25 ENCOUNTER — APPOINTMENT (OUTPATIENT)
Dept: LAB | Facility: CLINIC | Age: 79
End: 2024-04-25
Payer: MEDICARE

## 2024-04-25 ENCOUNTER — OFFICE VISIT (OUTPATIENT)
Dept: INTERNAL MEDICINE CLINIC | Facility: CLINIC | Age: 79
End: 2024-04-25

## 2024-04-25 ENCOUNTER — PATIENT OUTREACH (OUTPATIENT)
Dept: INTERNAL MEDICINE CLINIC | Facility: CLINIC | Age: 79
End: 2024-04-25

## 2024-04-25 VITALS
BODY MASS INDEX: 25.09 KG/M2 | DIASTOLIC BLOOD PRESSURE: 71 MMHG | TEMPERATURE: 97.6 F | WEIGHT: 169.38 LBS | HEART RATE: 78 BPM | OXYGEN SATURATION: 97 % | HEIGHT: 69 IN | RESPIRATION RATE: 18 BRPM | SYSTOLIC BLOOD PRESSURE: 126 MMHG

## 2024-04-25 DIAGNOSIS — I25.10 CORONARY ARTERY DISEASE INVOLVING NATIVE CORONARY ARTERY OF NATIVE HEART WITHOUT ANGINA PECTORIS: ICD-10-CM

## 2024-04-25 DIAGNOSIS — I48.92 ATRIAL FLUTTER, UNSPECIFIED TYPE (HCC): ICD-10-CM

## 2024-04-25 DIAGNOSIS — Z95.2 S/P TAVR (TRANSCATHETER AORTIC VALVE REPLACEMENT): ICD-10-CM

## 2024-04-25 DIAGNOSIS — I50.23 ACUTE ON CHRONIC SYSTOLIC CONGESTIVE HEART FAILURE (HCC): ICD-10-CM

## 2024-04-25 DIAGNOSIS — Z79.4 TYPE 2 DIABETES MELLITUS WITH DIABETIC NEUROPATHY, WITH LONG-TERM CURRENT USE OF INSULIN (HCC): Primary | ICD-10-CM

## 2024-04-25 DIAGNOSIS — E11.40 TYPE 2 DIABETES MELLITUS WITH DIABETIC NEUROPATHY, WITH LONG-TERM CURRENT USE OF INSULIN (HCC): ICD-10-CM

## 2024-04-25 DIAGNOSIS — E11.40 TYPE 2 DIABETES MELLITUS WITH DIABETIC NEUROPATHY, WITH LONG-TERM CURRENT USE OF INSULIN (HCC): Primary | ICD-10-CM

## 2024-04-25 DIAGNOSIS — Z79.4 TYPE 2 DIABETES MELLITUS WITH DIABETIC NEUROPATHY, WITH LONG-TERM CURRENT USE OF INSULIN (HCC): ICD-10-CM

## 2024-04-25 LAB
CREAT UR-MCNC: 72 MG/DL
MICROALBUMIN UR-MCNC: 25.2 MG/L
MICROALBUMIN/CREAT 24H UR: 35 MG/G CREATININE (ref 0–30)
SL AMB POCT HEMOGLOBIN AIC: 5.9 (ref ?–6.5)

## 2024-04-25 PROCEDURE — 3078F DIAST BP <80 MM HG: CPT | Performed by: STUDENT IN AN ORGANIZED HEALTH CARE EDUCATION/TRAINING PROGRAM

## 2024-04-25 PROCEDURE — 83036 HEMOGLOBIN GLYCOSYLATED A1C: CPT | Performed by: STUDENT IN AN ORGANIZED HEALTH CARE EDUCATION/TRAINING PROGRAM

## 2024-04-25 PROCEDURE — 3074F SYST BP LT 130 MM HG: CPT | Performed by: STUDENT IN AN ORGANIZED HEALTH CARE EDUCATION/TRAINING PROGRAM

## 2024-04-25 PROCEDURE — 99214 OFFICE O/P EST MOD 30 MIN: CPT | Performed by: STUDENT IN AN ORGANIZED HEALTH CARE EDUCATION/TRAINING PROGRAM

## 2024-04-25 PROCEDURE — 82043 UR ALBUMIN QUANTITATIVE: CPT

## 2024-04-25 PROCEDURE — 82570 ASSAY OF URINE CREATININE: CPT

## 2024-04-25 RX ORDER — ACYCLOVIR 400 MG/1
1 TABLET ORAL
Qty: 9 EACH | Refills: 3 | Status: SHIPPED | OUTPATIENT
Start: 2024-04-25

## 2024-04-25 RX ORDER — ACYCLOVIR 400 MG/1
1 TABLET ORAL ONCE
Qty: 1 EACH | Refills: 0 | Status: SHIPPED | OUTPATIENT
Start: 2024-04-25 | End: 2024-04-25

## 2024-04-25 NOTE — PROGRESS NOTES
Outpatient Care Management Note:    Re:  direct provider referral - diabetes     Patient referred to outpatient nurse care manager by PCP, Dr. Millard for further management of his diabetes and help with CGM. Patient in office today with his wife. Per PCP patient has Freestyle Cristofer system but issues with sensor falling off and patient continues to come to appointments with no blood sugar readings. I suggested Dexcom G7 CGM because if sensor is in place it will automatically given blood sugar reading without scanning sensor and would have more information on how blood sugars are running when he comes to next appointment. Provider sent script to pharmacy and I followed up and Dexcom G7 sensor and reader will require prior auth. Will forward to clinical team to assist with prior auth. Patient is scheduled for a nurse visit on 5/2 to review how to place sensor and use reader. They are not interested in diabetic education and has done this in the past. A1C is 5.9. Patient is on Lantus insulin at bedtime and meal time insulin with breakfast and dinner.     Patient on recall list for follow up appointment with PCP.     I introduced myself to patient and his wife and explained my role. Will plan to further follow up next week at nurse appointment.     Please see physician note for additional information.

## 2024-04-25 NOTE — PROGRESS NOTES
[facility]  INTERNAL MEDICINE OFFICE VISIT     PATIENT INFORMATION     Juan David Lora   79 y.o. male   MRN: [id number]    ASSESSMENT/PLAN     This is a 78 y/o M with progressively worsening HF/valvular disx & poorly-controlled/labile IDDM who comes here for DM f/u. Patient appears to have very poor knowledge of his DM, including not being able to maintain CGM/stay complaint with BG checks, understanding s/sx of hypoglycemia, and having a good grasp on his current DM regimen (aside from insulin).    Thankfully his A1c remains under 7%, with a recent drop from 7.9 to 6.9%. On the otherside however this raises concern for an elderly pt, especially one with poor understanding of his DM. We have been attempting to reduce his insulin dosages since a TCM visit in Februrary however pt continues to NOT keep BG logs, and thus complicating this goal.    Patient has been seen by DM educator in the past, over 2 years ago per his recounting. He is adamant in his refusal to be referred to this service again. However to get around this we will have him come to clinic in the next week or two in order to be seen by complex nursing, who will help him set-up CGM and explain appropriate responses to abberant BG readings.    Pt is to return in 6 weeks for MAWV. At this upcoming visit we must explore his GoC considering his worsening HF and seemingly disinterested attitude towards staying complaint with DM tx plan.    # IDDM c/b Hypoglycemic Episodes + Progressively Worsening A1c Decline  Non-compliant with CGM or Daily BG Checks  Deficient Understanding of Disease    We have been continuously trying to adjust pt's insluin/oral hypoglycemic regimen since late Feb '24, however pt has yet to provide us with BG logs or any sensible BG trends from memory  It is clear that we must adjust his insulin regimen considering his age + A1c and frequent episodes of hypoglycemia  A1c Goal may go above 7%, we  cannot treat his DM aggressively at this point  Progressive decline in A1c brings concerns of possible over-correction or pt errors when dosing insulin  Did discuss case with Complex Nursing; they plan to have him come in ASAP in order to demonstrate correct CGM use, and attempt to give some further DM education (as much as pt is willing)  Recommended switch to Dexcom G7 as this device will check BG in perpetuity and does not require pt-directed action in order to record and log BG level  Must closely monitor his DM at this time, may require frequent (e.g., q4-6wk) f/u until this situation can be addressed fully  C/w Current DM regimen as prescribed; CANNOT make modifications at this time without consistent/accurate BG logs  D/w pt + wife Hypo- and hyperglycemia precautions, including when to hold insulin and/or give hi sugar-containing snack.    Plan:    Re-start Lisinopril 10; this was held after recent D/C, however we did have plans to continue after ensuring BP non-HoTNive at last visit in Feb '24  Per pt, was told by Cards to continue holding ACEi  ?Investigate vs Confirm HF cards decision  Switch CGM from Freestlye to Dexcom G7  Schedule visit with Complex Nursing in 1 to 2 weeks in order to discuss proper CGM use  RTC in 6 weeks for DM f/u  To be Completed @ Next MAWV / Check-up:  DM Foot Exam  DM Eye Exam vs. Optho referral  BMI Follow-up Plan  Goals of Care discussion     Diagnoses and all orders for this visit:    Type 2 diabetes mellitus with diabetic neuropathy, with long-term current use of insulin (HCC)  -     POCT hemoglobin A1c  -     Albumin / creatinine urine ratio; Future  -     Continuous Blood Gluc  (Dexcom G7 ) POWER; Use 1 each once for 1 dose  -     Continuous Blood Gluc Sensor (Dexcom G7 Sensor); Use 1 Device every 10 days  -     Ambulatory Referral to Complex Care Management Program; Future  -     Ambulatory Referral to Social Work Care Management Program; Future    Acute on  "chronic systolic congestive heart failure (HCC)    Atrial flutter, unspecified type (HCC)    S/P TAVR (transcatheter aortic valve replacement)    Coronary artery disease involving native coronary artery of native heart without angina pectoris      Schedule a follow-up appointment in 6 weeks for MAWV and DM f/u.  Schedule nursing visit in 1-2 weeks for CGM and DM education.      HEALTH MAINTENANCE     Immunization History   Administered Date(s) Administered   • COVID-19 MODERNA VACC 0.5 ML IM 02/19/2021, 03/19/2021, 01/08/2022   • H1N1, All Formulations 01/20/2010   • Hep B, adult 05/20/2019, 06/20/2019   • INFLUENZA 09/21/2018, 12/16/2021   • Influenza Quadrivalent, 6-35 Months IM 10/27/2016, 10/31/2017   • Influenza, high dose seasonal 0.7 mL 09/21/2018, 10/04/2019, 11/08/2022, 11/15/2023   • Influenza, recombinant, quadrivalent,injectable, preservative free 10/05/2020   • Influenza, seasonal, injectable 11/08/2013, 10/31/2014, 10/27/2015   • Pneumococcal Conjugate 13-Valent 01/30/2017   • Pneumococcal Polysaccharide PPV23 12/05/2014   • Tdap 01/30/2017       CHIEF COMPLAINT     Chief Complaint   Patient presents with   • Follow-up     Follow up diabetes         HISTORY OF PRESENT ILLNESS     HPI:    Today Mr Lora is seen in clinic accompanied by his wife. He comes in for 6 month DM check-up. Patient has not brought BG logs with him, but says that his BG was \"115\" this morning. He says that he does check BG at home, twice per day when he wakes up/fasting and in the afternoon or evening before meals. His typical AM/FBG is '170-180' however it is not 'always this high'. On further questioning it is learned that pt is actually not taking consistent daily blood sugars at home.     Of note pt did have CGM per prior visit, however it appears that he is still non-compliant with this as it was \"getting stuck on my clothes and falling off\". Additionally he states he doesn't have a phone in order to scan the glucometer. " "    He states that last week his BG was at '87', however then he took his ' insulin and pill' and it went 'up to 115'. This question was asked again for clarity, and he again stated that his sugars were low and then increased after insulin/oral hypoglycemic.     I discussed in detail the importance of maintaing and recording daily blood sugars multiple time per day. Discussed doing 4x daily BG checks in the AM, before lunch + dinner, and at bedtime. Asked if this was feasible for the pt as he has been having difficulty even taking twice daily BG consistently, however he states he will be able to check his sugars as discussed above.     Furthermore also brought up referring him to DM educator. Pt states that he did see a DM educator \"2 years ago for a 2 once-weekly classes\". He is not interested in seeing DM education again.     REVIEW OF SYSTEMS     Review of Systems   Constitutional:  Negative for chills and fever.   HENT:  Negative for ear pain and sore throat.    Eyes:  Negative for pain and visual disturbance.   Respiratory:  Negative for cough and shortness of breath.    Cardiovascular:  Negative for chest pain and palpitations.   Gastrointestinal:  Negative for abdominal pain and vomiting.   Genitourinary:  Negative for dysuria and hematuria.   Musculoskeletal:  Negative for arthralgias and back pain.   Skin:  Negative for color change and rash.   Neurological:  Negative for seizures and syncope.   All other systems reviewed and are negative.      OBJECTIVE     Vitals:    04/25/24 1009   BP: 126/71   Pulse: 78   Resp: 18   Temp: 97.6 °F (36.4 °C)   TempSrc: Temporal   SpO2: 97%   Weight: 76.8 kg (169 lb 6 oz)   Height: 5' 9\" (1.753 m)     Physical Exam  Vitals and nursing note reviewed.   Constitutional:       General: He is not in acute distress.     Appearance: He is well-developed.   HENT:      Head: Normocephalic and atraumatic.   Eyes:      Conjunctiva/sclera: Conjunctivae normal.   Cardiovascular:      " Rate and Rhythm: Normal rate and regular rhythm.      Heart sounds: No murmur heard.  Pulmonary:      Effort: Pulmonary effort is normal. No respiratory distress.      Breath sounds: Normal breath sounds.   Abdominal:      Palpations: Abdomen is soft.      Tenderness: There is no abdominal tenderness.   Musculoskeletal:         General: No swelling.      Cervical back: Neck supple.   Skin:     General: Skin is warm and dry.      Capillary Refill: Capillary refill takes less than 2 seconds.   Neurological:      Mental Status: He is alert.   Psychiatric:         Mood and Affect: Mood normal.         CURRENT MEDICATIONS     Current Outpatient Medications:   •  Continuous Blood Gluc Sensor (Dexcom G7 Sensor), Use 1 Device every 10 days, Disp: 9 each, Rfl: 3  •  albuterol (PROVENTIL HFA,VENTOLIN HFA) 90 mcg/act inhaler, INHALE 2 PUFFS BY MOUTH EVERY 4 HOURS AS NEEDED FOR WHEEZING OR SHORTNESS OF BREATH., Disp: 8.5 g, Rfl: 4  •  Alcohol Swabs (ALCOHOL PADS) 70 % PADS, , Disp: , Rfl:   •  apixaban (Eliquis) 5 mg, Take 1 tablet (5 mg total) by mouth 2 (two) times a day, Disp: 180 tablet, Rfl: 3  •  Blood Glucose Monitoring Suppl (OneTouch Verio) w/Device KIT, Check blood glucose three times daily before each meal, Disp: 1 kit, Rfl: 0  •  cholecalciferol (VITAMIN D3) 1,000 units tablet, Take 2 tablets (2,000 Units total) by mouth daily, Disp: 180 tablet, Rfl: 5  •  clopidogrel (PLAVIX) 75 mg tablet, Take 1 tablet (75 mg total) by mouth daily, Disp: 90 tablet, Rfl: 0  •  Diclofenac Sodium (VOLTAREN) 1 %, Apply 2 g topically 4 (four) times a day, Disp: 100 g, Rfl: 3  •  dofetilide (TIKOSYN) 125 mcg capsule, Take 1 capsule (125 mcg total) by mouth every 12 (twelve) hours, Disp: 60 capsule, Rfl: 3  •  Empagliflozin (JARDIANCE) 10 MG TABS tablet, Take 1 tablet (10 mg total) by mouth every morning, Disp: 90 tablet, Rfl: 3  •  ferrous sulfate 325 (65 Fe) mg tablet, TAKE 1 TABLET BY MOUTH EVERY OTHER DAY, Disp: 45 tablet, Rfl: 4  •   furosemide (LASIX) 40 mg tablet, TAKE 1 TABLET BY MOUTH TWICE A DAY, Disp: 180 tablet, Rfl: 1  •  Insulin Glargine Solostar (Lantus SoloStar) 100 UNIT/ML SOPN, Inject 0.18 mL (18 Units total) under the skin daily at bedtime, Disp: 15 mL, Rfl: 3  •  Insulin Pen Needle (Pen Needles) 31G X 8 MM MISC, Use daily, Disp: 300 each, Rfl: 3  •  Lancets (freestyle) lancets, Use as needed (As needed), Disp: 100 each, Rfl: 3  •  levothyroxine 137 mcg tablet, TAKE 1 TABLET BY MOUTH EVERY DAY, Disp: 90 tablet, Rfl: 3  •  lisinopril (ZESTRIL) 10 mg tablet, Take 10 mg by mouth daily (Patient not taking: Reported on 3/22/2024), Disp: , Rfl:   •  metFORMIN (GLUCOPHAGE) 850 mg tablet, TAKE 1 TABLET BY MOUTH 2 TIMES A DAY WITH MEALS., Disp: 180 tablet, Rfl: 3  •  metoprolol succinate (TOPROL-XL) 50 mg 24 hr tablet, TAKE 1.5 TABLETS BY MOUTH 2 TIMES A DAY., Disp: 270 tablet, Rfl: 1  •  montelukast (SINGULAIR) 10 mg tablet, TAKE 1 TABLET BY MOUTH EVERY DAY, Disp: 90 tablet, Rfl: 3  •  nitroglycerin (NITROSTAT) 0.4 mg SL tablet, Place 1 tablet (0.4 mg total) under the tongue every 5 (five) minutes as needed for chest pain, Disp: 30 tablet, Rfl: 1  •  NovoLOG FlexPen 100 units/mL injection pen, Inject 5 units with breakfast and with dinner, Disp: 15 mL, Rfl: 3  •  rosuvastatin (CRESTOR) 40 MG tablet, Take 1 tablet (40 mg total) by mouth daily, Disp: 30 tablet, Rfl: 3  •  spironolactone (ALDACTONE) 25 mg tablet, Take 0.5 tablets (12.5 mg total) by mouth daily, Disp: 45 tablet, Rfl: 1    PAST MEDICAL & SURGICAL HISTORY     Past Medical History:   Diagnosis Date   • Arthritis    • Asthma    • Colon polyps    • Community acquired pneumonia     last assessed: 5/1/2014   • Diabetes mellitus (HCC)    • Hemorrhagic prepatellar bursitis, left 10/21/2019   • Hepatitis C    • High cholesterol    • History of colonoscopy 2017   • Hypertension    • Lymphadenopathy, anterior cervical 04/17/2018   • Nephritis and nephropathy, not specified as acute or  chronic, with other specified pathological lesion in kidney, in diseases classified elsewhere 3/26/2013   • NSTEMI (non-ST elevated myocardial infarction) ([facility]) 1/30/2023   • s/p Medtronic dual chamber PPM 8/31/22 s/p upgrade to BiV ICD 9/13/2023 9/16/2023   • Screening for colon cancer 05/01/2019   • SIRS (systemic inflammatory response syndrome) ([facility]) 3/19/2023   • Thoracic vertebral fracture (HCC) 06/11/2014     Past Surgical History:   Procedure Laterality Date   • CARDIAC CATHETERIZATION N/A 6/3/2022    Procedure: Cardiac RHC/LHC;  Surgeon: Yemi Molina MD;  [facility]: [facility];  Service: Cardiology   • CARDIAC CATHETERIZATION N/A 6/21/2022    Procedure: CARDIAC TAVR;  Surgeon: David Craft MD;  Location: [facility] MAIN [facility];  Service: Cardiology   • CARDIAC CATHETERIZATION N/A 2/10/2023    Procedure: Cardiac Coronary Angiogram;  Surgeon: Yemi Molina MD;  [facility];  Service: Cardiology   • CARDIAC CATHETERIZATION N/A 2/10/2023    Procedure: Cardiac pci;  Surgeon: Yemi Molina MD;  [facility];  Service: Cardiology   • CARDIAC ELECTROPHYSIOLOGY PROCEDURE N/A 9/1/2022    Procedure: Cardiac pacer implant ; DC PPM;  Surgeon: Francis Sun DO;  [facility]: [facility];  Service: Cardiology   • CARDIAC ELECTROPHYSIOLOGY PROCEDURE N/A 9/13/2023    Procedure: Cardiac upgrade pacer to biv icd;  Surgeon: Mario Yuan MD;  [facility];  Service: Cardiology   • COLONOSCOPY     • COLONOSCOPY W/ POLYPECTOMY     • IR UPPER EXTREMITY VENOGRAM- DIAGNOSTIC  8/23/2023   • LITHOTRIPSY     • MULTIPLE TOOTH EXTRACTIONS     • NH COLONOSCOPY FLX DX W/COLLJ SPEC WHEN PFRMD N/A 5/17/2018    Procedure: COLONOSCOPY;  Surgeon: Raad Sandoval MD;  Location: [facility] GI [facility];  Service: Gastroenterology   • NH REPLACE AORTIC VALVE OPENFEMORAL ARTERY APPROACH N/A 6/21/2022    Procedure: REPLACEMENT AORTIC VALVE TRANSCATHETER (TAVR) TRANSFEMORAL W/ 26MM QUINN RONNI S3 ULTRA  VALVE(ACCESS ON LEFT) SAULO;  Surgeon: Sal Walker MD;  Location: [facility];  Service: Cardiac Surgery       SOCIAL & FAMILY HISTORY     Social History     Socioeconomic History   • Marital status: /Civil Union     Spouse name: Not on file   • Number of children: Not on file   • Years of education: Not on file   • Highest education level: Not on file   Occupational History   • Occupation: retired    Tobacco Use   • Smoking status: Former     Current packs/day: 0.00     Types: Cigarettes     Quit date: 2022     Years since quittin.9   • Smokeless tobacco: Never   • Tobacco comments:     started when he was about 27 yrs old; stopped smoking 3 wks ago   Vaping Use   • Vaping status: Never Used   Substance and Sexual Activity   • Alcohol use: Not Currently   • Drug use: No   • Sexual activity: Not Currently   Other Topics Concern   • Not on file   Social History Narrative   • Not on file     Social Determinants of Health     Financial Resource Strain: Low Risk  (2024)    Overall Financial Resource Strain (CARDIA)    • Difficulty of Paying Living Expenses: Not hard at all   Food Insecurity: No Food Insecurity (2024)    Hunger Vital Sign    • Worried About Running Out of Food in the Last Year: Never true    • Ran Out of Food in the Last Year: Never true   Transportation Needs: No Transportation Needs (2024)    PRAPARE - Transportation    • Lack of Transportation (Medical): No    • Lack of Transportation (Non-Medical): No   Physical Activity: Unknown (2022)    Exercise Vital Sign    • Days of Exercise per Week: Not on file    • Minutes of Exercise per Session: 60 min   Stress: No Stress Concern Present (2022)    [employer] - Occupational Stress Questionnaire    • Feeling of Stress : Not at all   Social Connections: Moderately Isolated (2022)    Social Connection and Isolation Panel [NHANES]    • Frequency of Communication with Friends and  Family: More than three times a week    • Frequency of Social Gatherings with Friends and Family: More than three times a week    • Attends Hoahaoism Services: Never    • Active Member of Clubs or Organizations: No    • Attends Club or Organization Meetings: Never    • Marital Status:    Intimate Partner Violence: Not on file   Housing Stability: Low Risk  (2024)    Housing Stability Vital Sign    • Unable to Pay for Housing in the Last Year: No    • Number of Places Lived in the Last Year: 1    • Unstable Housing in the Last Year: No     Social History     Substance and Sexual Activity   Alcohol Use Not Currently       Social History     Substance and Sexual Activity   Drug Use No     Social History     Tobacco Use   Smoking Status Former   • Current packs/day: 0.00   • Types: Cigarettes   • Quit date: 2022   • Years since quittin.9   Smokeless Tobacco Never   Tobacco Comments    started when he was about 27 yrs old; stopped smoking 3 wks ago     Family History   Problem Relation Age of Onset   • Heart attack Family         at age 86   • No Known Problems Mother    • No Known Problems Father    • Diabetes Sister    • Diabetes Brother        = == == == == == == == == == == == == == == == == == == == == == == == == == == == == == ==    Umesh Millard MD  Internal Medicine Resident, PGY-2  [facility] Internal Medicine [facility], [place] [address] [address], [address]  [place] [address]  Office: [phone]  Fax: [phone]

## 2024-05-02 ENCOUNTER — PATIENT OUTREACH (OUTPATIENT)
Dept: INTERNAL MEDICINE CLINIC | Facility: CLINIC | Age: 79
End: 2024-05-02

## 2024-05-02 NOTE — PROGRESS NOTES
Outpatient Care Management Note:    Re:  follow up    Patient and wife at PCP office today for a nurse visit to have Dexcom G7 sensor placed and be taught how to place and using device. Last week I had sent clinical team an inbasket message that prior auth was needed for both sensor and  and prior auth unfortunately was not started and patient came in today without Dexcom G7 but did bring in letter from insurance that prior auth was needed. Clinical team aware and will start prior auth and further follow up with pharmacy and patient and patient will schedule a nurse visit once approved and they obtain Dexcom G7 from pharmacy.     Patient has Medicare card and clerical team to scan into chart. Patient has listed he has [employer] Medicare Assured but does not have card. I wrote down ID # for patient and phone number to call to request a new card be sent to him and once received to bring into office. [employer] card scanned into chart.     Patient does not have any further questions, concerns, or other needs at this time. Patient has my contact number [phone] and PCP office number [phone] if needed. Patient is agreeable for further outreach.

## 2024-05-06 LAB
DME PARACHUTE DELIVERY DATE REQUESTED: NORMAL
DME PARACHUTE ITEM DESCRIPTION: NORMAL
DME PARACHUTE ORDER STATUS: NORMAL
DME PARACHUTE SUPPLIER NAME: NORMAL
DME PARACHUTE SUPPLIER PHONE: NORMAL

## 2024-05-06 NOTE — PROGRESS NOTES
I submitted prior auth thru covermymeds KEY: [id number] it stated preferred is freestyle but I submitted prior auth. Will f/u in 72 hours

## 2024-05-07 NOTE — PROGRESS NOTES
Prior auth for Reciever approved at no cost.     Started and faxed sensors. Was approved but copay is $208 for a 90 day supply. Per pharmacist, patient had secondary insurance that I could potentially attempt to get approved by insurance. Started and faxed thru covermymeds KEY: [id number]    Primary Insurance Info ([employer])  ID: [id number]  Bin: [id number]  PCN: [id number]  Group: [id number]    Medicaid- [employer]  Bin: [id number]  PCN: [id number]  ID: [id number]

## 2024-05-10 NOTE — PROGRESS NOTES
Called and spoke to community health choices and per them they will not  any cost as secondary for this. Patient would have to pay out $208 for a 90 day supply of sensors.    Called pt and lmom.

## 2024-05-22 ENCOUNTER — PATIENT OUTREACH (OUTPATIENT)
Dept: INTERNAL MEDICINE CLINIC | Facility: CLINIC | Age: 79
End: 2024-05-22

## 2024-05-22 NOTE — PROGRESS NOTES
Chart review indicates that an order was placed on 4/25 to follow up with pt regarding possible VNA or waiver as pt has difficulty with BP logs and diabetic medications. Pt denies need for diabetic educator. Pt did meet with complex RN and Dexcom G7 sensor ordered as it will automatically give blood sugar reading. Pt and wife came back for teaching on 5/2, but did not have Dexcom sensor as prior auth was not received. Notes indicate that pt was recently receiving [facility] but appears discharged from  in March as goals were met. No prior OP SWCM involvement found. Per notes pt has Medicare Assured and Medicaid. SWCM outreached pt via [employer]  ID#[id number]. Phone continues to ring and no one picks up and unable to leave VM. 2nd attempt and same, unable to leave VM. SWCM will try again at a later time. SWCM to follow.

## 2024-05-28 ENCOUNTER — PATIENT OUTREACH (OUTPATIENT)
Dept: INTERNAL MEDICINE CLINIC | Facility: CLINIC | Age: 79
End: 2024-05-28

## 2024-05-28 DIAGNOSIS — Z79.4 TYPE 2 DIABETES MELLITUS WITH DIABETIC NEUROPATHY, WITH LONG-TERM CURRENT USE OF INSULIN (HCC): ICD-10-CM

## 2024-05-28 DIAGNOSIS — E11.40 TYPE 2 DIABETES MELLITUS WITH DIABETIC NEUROPATHY, WITH LONG-TERM CURRENT USE OF INSULIN (HCC): ICD-10-CM

## 2024-05-28 RX ORDER — ACYCLOVIR 400 MG/1
1 TABLET ORAL
Qty: 9 EACH | Refills: 3 | Status: SHIPPED | OUTPATIENT
Start: 2024-05-28

## 2024-05-30 ENCOUNTER — PATIENT OUTREACH (OUTPATIENT)
Dept: INTERNAL MEDICINE CLINIC | Facility: CLINIC | Age: 79
End: 2024-05-30

## 2024-05-30 NOTE — PROGRESS NOTES
Chart review indicates that an order was placed on 4/25 to follow up with pt regarding possible VNA or waiver as pt has difficulty with BP logs and diabetic medications. Pt denies need for diabetic educator. Pt did meet with complex RN and Dexcom G7 sensor ordered as it will automatically give blood sugar reading. Pt and wife came back for teaching on 5/2, but did not have Dexcom sensor as prior auth was not received. Pt did return again to meet with OP RN and had Dexcom G7, but no sensors. This was straightened out at the pharmacy and pt can now  sensors 30 day supply with zero copayment. Unclear if sensors were picked up. Notes indicate that pt was recently receiving [facility] but appears discharged from  in March as goals were met. No prior OP [facility] involvement found. Per notes pt has Medicare Assured and Medicaid. [facility] outreached pt on 5/22 via [employer]  ID#[id number]. Phone continues to ring and no one picks up and unable to leave VM. 2nd attempt and same, unable to leave VM. [facility] outreached pt again today via [employer]  ID#[id number] and phone continues to ring again and unable to leave VM. Attempted x2. [facility] also attempted home phone number, and VM left. At this time due to two unsuccessful attempts to reach pt will send UTR letter to the home as pt does not have active MyChart and close case. Will remain available for re-consult in the future as needed.

## 2024-06-05 ENCOUNTER — TELEPHONE (OUTPATIENT)
Dept: INTERNAL MEDICINE CLINIC | Facility: CLINIC | Age: 79
End: 2024-06-05

## 2024-06-06 ENCOUNTER — CLINICAL SUPPORT (OUTPATIENT)
Dept: INTERNAL MEDICINE CLINIC | Facility: CLINIC | Age: 79
End: 2024-06-06

## 2024-06-06 DIAGNOSIS — Z79.4 TYPE 2 DIABETES MELLITUS WITH DIABETIC NEUROPATHY, WITH LONG-TERM CURRENT USE OF INSULIN (HCC): Primary | ICD-10-CM

## 2024-06-06 DIAGNOSIS — E11.40 TYPE 2 DIABETES MELLITUS WITH DIABETIC NEUROPATHY, WITH LONG-TERM CURRENT USE OF INSULIN (HCC): Primary | ICD-10-CM

## 2024-06-06 NOTE — PROGRESS NOTES
Patient came in today 06/06/24 as a NV for DEXCOM placement and education. I educated patient on how to place DEXCOM sensor, when the sensor needs to be replace and what each sign means when checking the blood glucose. Patient understood and had no questions at this time. Placed Dexcom sensor on left deltoid.

## 2024-06-12 ENCOUNTER — PATIENT OUTREACH (OUTPATIENT)
Dept: INTERNAL MEDICINE CLINIC | Facility: CLINIC | Age: 79
End: 2024-06-12

## 2024-06-12 NOTE — PROGRESS NOTES
"SW has returned call to pt and spoke via  ID # [id number].  SW spoke to pt's wife and pt was also present on the line.   Request asked if pt may be able to get [place] waiver to assist with maintaining daily BG logs and medication management.  Pt and Wife shares the pt is independent in his ADls .  His wife helps him set up his medications and meals.    She also drives and brings him to his appointments.    They have taken DM classes and Stanislaw note the the Clinical Team has also \"provided instruction on usage of his DEXCOM placement  and when the sensor needs to be replace and what each sign means when checking the blood glucose. Patient understood and had no questions at this time.\"  Pt would not qualify for the [place] Waiver programs as he is independent in his ADLs.  Pt/ wife are asking for Meals [employer] wheels.  They said they were previously referred but has not head back from [employer].  Stanislaw has called then @ [phone] but had [employer] a message.  STANISLAW has also sent a referral via [employer].    Patient does not have any further questions, concerns, or other needs at this time.  Patient has my contact # and PCP office # if needed.  Social Work to remain available to assist as indicated.    Please re-consult Social Work if needed.       "

## 2024-06-27 ENCOUNTER — REMOTE DEVICE CLINIC VISIT (OUTPATIENT)
Dept: CARDIOLOGY CLINIC | Facility: CLINIC | Age: 79
End: 2024-06-27
Payer: MEDICARE

## 2024-06-27 DIAGNOSIS — Z95.810 PRESENCE OF AUTOMATIC CARDIOVERTER/DEFIBRILLATOR (AICD): Primary | ICD-10-CM

## 2024-06-27 PROCEDURE — 93295 DEV INTERROG REMOTE 1/2/MLT: CPT | Performed by: INTERNAL MEDICINE

## 2024-06-27 PROCEDURE — 93296 REM INTERROG EVL PM/IDS: CPT | Performed by: INTERNAL MEDICINE

## 2024-06-27 NOTE — PROGRESS NOTES
Results for orders placed or performed in visit on 06/27/24   Cardiac EP device report    Narrative    MDT BI-V ICD/ACTIVE SYSTEM IS MRI CONDITIONAL  CARELINK TRANSMISSION: BATTERY VOLTAGE ADEQUATE. (8 YRS) AP 2%  97%. ALL AVAILABLE LEAD PARAMETERS WITHIN NORMAL LIMITS. NO SIGNIFICANT HIGH RATE EPISODES. OPTI-VOL WITHIN NORMAL LIMITS. VENTRICULAR SENSE EPISODES NOTED. NORMAL DEVICE FUNCTION.---BAHENA

## 2024-07-03 ENCOUNTER — PATIENT OUTREACH (OUTPATIENT)
Dept: INTERNAL MEDICINE CLINIC | Facility: CLINIC | Age: 79
End: 2024-07-03

## 2024-07-03 NOTE — PROGRESS NOTES
Outpatient Care Management Note:    Re:  follow up call diabetes    I called patient using [employer]  # [phone] and no answer and message left requesting a call back. Contact number left for me and PCP office.     Calling to follow up regarding blood sugars and using CGM, medication and help schedule follow up appointment with PCP office.

## 2024-07-10 DIAGNOSIS — E11.9 DIABETES MELLITUS (HCC): ICD-10-CM

## 2024-07-18 DIAGNOSIS — I48.91 ATRIAL FIBRILLATION (HCC): ICD-10-CM

## 2024-07-19 RX ORDER — DOFETILIDE 0.12 MG/1
CAPSULE ORAL
Qty: 60 CAPSULE | Refills: 3 | Status: SHIPPED | OUTPATIENT
Start: 2024-07-19

## 2024-07-19 NOTE — TELEPHONE ENCOUNTER
Refill must be reviewed and completed by the office or provider. The refill is unable to be approved or denied by the medication management team.       This refill cannot be delegated   
Requested medication(s) are due for refill today: Yes  Patient has already received a courtesy refill: No  Other reason request has been forwarded to provider:   
Yes...

## 2024-07-22 DIAGNOSIS — Z95.2 S/P TAVR (TRANSCATHETER AORTIC VALVE REPLACEMENT): ICD-10-CM

## 2024-07-25 RX ORDER — CLOPIDOGREL BISULFATE 75 MG/1
75 TABLET ORAL DAILY
Qty: 90 TABLET | Refills: 1 | Status: SHIPPED | OUTPATIENT
Start: 2024-07-25 | End: 2025-01-21

## 2024-07-30 DIAGNOSIS — I50.23 ACUTE ON CHRONIC SYSTOLIC CONGESTIVE HEART FAILURE (HCC): ICD-10-CM

## 2024-07-30 DIAGNOSIS — E03.9 HYPOTHYROIDISM: ICD-10-CM

## 2024-07-30 RX ORDER — FUROSEMIDE 40 MG/1
40 TABLET ORAL 2 TIMES DAILY
Qty: 180 TABLET | Refills: 1 | Status: SHIPPED | OUTPATIENT
Start: 2024-07-30

## 2024-07-30 RX ORDER — LEVOTHYROXINE SODIUM 137 UG/1
TABLET ORAL
Qty: 90 TABLET | Refills: 3 | Status: SHIPPED | OUTPATIENT
Start: 2024-07-30

## 2024-08-02 ENCOUNTER — TELEPHONE (OUTPATIENT)
Dept: INTERNAL MEDICINE CLINIC | Facility: CLINIC | Age: 79
End: 2024-08-02

## 2024-08-05 DIAGNOSIS — Z79.4 TYPE 2 DIABETES MELLITUS WITH DIABETIC NEUROPATHY, WITH LONG-TERM CURRENT USE OF INSULIN (HCC): ICD-10-CM

## 2024-08-05 DIAGNOSIS — E11.40 TYPE 2 DIABETES MELLITUS WITH DIABETIC NEUROPATHY, WITH LONG-TERM CURRENT USE OF INSULIN (HCC): ICD-10-CM

## 2024-08-05 DIAGNOSIS — E78.2 MIXED HYPERLIPIDEMIA: ICD-10-CM

## 2024-08-05 RX ORDER — ROSUVASTATIN CALCIUM 40 MG/1
40 TABLET, COATED ORAL DAILY
Qty: 30 TABLET | Refills: 3 | Status: SHIPPED | OUTPATIENT
Start: 2024-08-05 | End: 2024-12-03

## 2024-08-06 RX ORDER — ACYCLOVIR 400 MG/1
1 TABLET ORAL
Qty: 9 EACH | Refills: 3 | Status: SHIPPED | OUTPATIENT
Start: 2024-08-06

## 2024-08-14 ENCOUNTER — CLINICAL SUPPORT (OUTPATIENT)
Dept: INTERNAL MEDICINE CLINIC | Facility: CLINIC | Age: 79
End: 2024-08-14

## 2024-08-14 DIAGNOSIS — E11.59 TYPE 2 DIABETES MELLITUS WITH OTHER CIRCULATORY COMPLICATION, WITH LONG-TERM CURRENT USE OF INSULIN (HCC): Primary | Chronic | ICD-10-CM

## 2024-08-14 DIAGNOSIS — Z79.4 TYPE 2 DIABETES MELLITUS WITH OTHER CIRCULATORY COMPLICATION, WITH LONG-TERM CURRENT USE OF INSULIN (HCC): Primary | Chronic | ICD-10-CM

## 2024-09-20 RX ORDER — LISINOPRIL 10 MG/1
10 TABLET ORAL DAILY
Qty: 90 TABLET | Refills: 2 | OUTPATIENT
Start: 2024-09-20

## 2024-09-25 DIAGNOSIS — D50.9 IRON DEFICIENCY ANEMIA, UNSPECIFIED IRON DEFICIENCY ANEMIA TYPE: ICD-10-CM

## 2024-09-25 RX ORDER — FERROUS SULFATE 325(65) MG
TABLET ORAL
Qty: 15 TABLET | Refills: 0 | Status: SHIPPED | OUTPATIENT
Start: 2024-09-25

## 2024-09-25 NOTE — TELEPHONE ENCOUNTER
Patient needs updated blood work. Please place orders. A 30D courtesy refill was provided.    Fe (serum) in normal range and within 360 days    Ferritin in normal range and within 360 days

## 2024-09-26 ENCOUNTER — REMOTE DEVICE CLINIC VISIT (OUTPATIENT)
Dept: CARDIOLOGY CLINIC | Facility: CLINIC | Age: 79
End: 2024-09-26
Payer: MEDICARE

## 2024-09-26 DIAGNOSIS — Z95.810 AICD (AUTOMATIC CARDIOVERTER/DEFIBRILLATOR) PRESENT: Primary | ICD-10-CM

## 2024-09-26 PROCEDURE — 93296 REM INTERROG EVL PM/IDS: CPT | Performed by: INTERNAL MEDICINE

## 2024-09-26 PROCEDURE — 93295 DEV INTERROG REMOTE 1/2/MLT: CPT | Performed by: INTERNAL MEDICINE

## 2024-09-26 NOTE — PROGRESS NOTES
"Results for orders placed or performed in visit on 09/26/24   Cardiac EP device report    Narrative    MDT BI-V ICD/ACTIVE SYSTEM IS MRI CONDITIONAL  CARELINK TRANSMISSION: BATTERY STATUS \"8 YRS.\" AP 4% CRT PACING (BIV) 100% (LV) 0%. ALL AVAILABLE LEAD PARAMETERS WITHIN NORMAL LIMITS. NO SIGNIFICANT HIGH RATE EPISODES. VENT SENSING MARKERS NOTED. OPTI-VOL WITHIN NORMAL LIMITS. NORMAL DEVICE FUNCTION. NC         "

## 2024-09-27 ENCOUNTER — OFFICE VISIT (OUTPATIENT)
Dept: CARDIOLOGY CLINIC | Facility: CLINIC | Age: 79
End: 2024-09-27
Payer: MEDICARE

## 2024-09-27 VITALS
WEIGHT: 165.3 LBS | DIASTOLIC BLOOD PRESSURE: 60 MMHG | SYSTOLIC BLOOD PRESSURE: 122 MMHG | BODY MASS INDEX: 24.48 KG/M2 | HEIGHT: 69 IN | HEART RATE: 60 BPM

## 2024-09-27 DIAGNOSIS — I48.92 ATRIAL FLUTTER, UNSPECIFIED TYPE (HCC): Primary | ICD-10-CM

## 2024-09-27 DIAGNOSIS — I10 ESSENTIAL HYPERTENSION: Chronic | ICD-10-CM

## 2024-09-27 DIAGNOSIS — I50.20 HEART FAILURE WITH REDUCED EJECTION FRACTION (HCC): ICD-10-CM

## 2024-09-27 DIAGNOSIS — I47.29 NSVT (NONSUSTAINED VENTRICULAR TACHYCARDIA) (HCC): ICD-10-CM

## 2024-09-27 DIAGNOSIS — Z95.810 ICD (IMPLANTABLE CARDIOVERTER-DEFIBRILLATOR) IN PLACE: Chronic | ICD-10-CM

## 2024-09-27 DIAGNOSIS — E78.2 MIXED HYPERLIPIDEMIA: Chronic | ICD-10-CM

## 2024-09-27 DIAGNOSIS — I48.0 PAROXYSMAL ATRIAL FIBRILLATION (HCC): ICD-10-CM

## 2024-09-27 DIAGNOSIS — I49.5 TACHY-BRADY SYNDROME (HCC): Chronic | ICD-10-CM

## 2024-09-27 PROCEDURE — 99214 OFFICE O/P EST MOD 30 MIN: CPT | Performed by: STUDENT IN AN ORGANIZED HEALTH CARE EDUCATION/TRAINING PROGRAM

## 2024-09-27 PROCEDURE — 93000 ELECTROCARDIOGRAM COMPLETE: CPT | Performed by: STUDENT IN AN ORGANIZED HEALTH CARE EDUCATION/TRAINING PROGRAM

## 2024-09-27 RX ORDER — METOPROLOL SUCCINATE 50 MG/1
75 TABLET, EXTENDED RELEASE ORAL 2 TIMES DAILY
Qty: 270 TABLET | Refills: 2 | Status: SHIPPED | OUTPATIENT
Start: 2024-09-27

## 2024-09-27 NOTE — PROGRESS NOTES
HEART AND VASCULAR  CARDIAC ELECTROPHYSIOLOGY   HEART RHYTHM CENTER  [facility]    Outpatient  Follow-up for paroxysmal atrial fibrillation and atrial flutter, history of BiV ICD implant  Today's Date: 09/27/24        Patient name: Juan David Lora  YOB: 1945  Sex: male         Chief Complaint: As above      ASSESSMENT:  Problem List Items Addressed This Visit       Hyperlipidemia (Chronic)    Essential hypertension (Chronic)    Relevant Medications    metoprolol succinate (TOPROL-XL) 50 mg 24 hr tablet    History of tachy-lino syndrome (Chronic)    Relevant Medications    metoprolol succinate (TOPROL-XL) 50 mg 24 hr tablet    NSVT (nonsustained ventricular tachycardia) (HCC)    Relevant Medications    metoprolol succinate (TOPROL-XL) 50 mg 24 hr tablet    Atrial flutter (HCC) - Primary    Relevant Medications    metoprolol succinate (TOPROL-XL) 50 mg 24 hr tablet    Other Relevant Orders    POCT ECG    s/p Medtronic dual chamber PPM 8/31/22 s/p upgrade to BiV ICD 9/13/2023 (Chronic)    Atrial fibrillation (HCC)    Relevant Medications    metoprolol succinate (TOPROL-XL) 50 mg 24 hr tablet     Other Visit Diagnoses       Heart failure with reduced ejection fraction (HCC)        Relevant Medications    metoprolol succinate (TOPROL-XL) 50 mg 24 hr tablet                  PLAN:  Atrial flutter and paroxysmal atrial fibrillation  -Diagnosed September 2023 when he underwent BiV ICD placement  -[id number] score equals 6 patient maintained on Eliquis  -Continue Tikosyn 125 mcg every 12 hours    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 ICD  -Device functioning appropriately  -V paced 96.9%  -No significant arrhythmias  -Continued routine device checks    NSVT  -Recent device check reveals no significant NSVT episodes  -Continue Tikosyn 125 mcg every 12 hours  -Continue metoprolol succinate    HFrEF/ischemic cardiomyopathy  -Status post BiV ICD  implant  -Continue Jardiance 10 mg daily  -Continue furosemide 40 mg twice daily  -Continue metoprolol succinate 75 mg twice daily  -Continue spironolactone 2012.5 mg daily    CAD status post PCI to M LAD and RPDA (2023)  -Continue clopidogrel 75 mg daily  -Continue rosuvastatin 40 mg daily    Hypertension  -Blood pressure today's visit 122/60  -Continue above therapies    Hyperlipidemia  -Continue rosuvastatin 40 mg daily      Follow up in: 6 months    Orders Placed This Encounter   Procedures    POCT ECG     Medications Discontinued During This Encounter   Medication Reason    metoprolol succinate (TOPROL-XL) 50 mg 24 hr tablet Reorder         .............................................................................................    HPI/Subjective:     Mr. Lora is a 79-year-old female with past medical history of ischemic cardiomyopathy, CAD status post PCI to mid LAD and RP DA 2023, hypertension, hyperlipidemia, and paroxysmal atrial flutter and atrial fibrillation.  He is currently maintained on Tikosyn 125 mcg every 12 hours and apixaban for anticoagulation.  He presents today in follow-up.    Device interrogation performed in office today reveals AT/AF burden of less than 1%, V pacing 96.9%, and no significant nonsustained VT.      Today, patient notes he feels well he feels much better as of late.  He used to get short of breath climbing stairs, but now he is able to do so with ease.  He works around the house as well as takes his dog for a walk without any issues.  He denies chest pain, palpitation, shortness of breath, dizzy, lightheadedness, syncope, near syncope.    He notes that he urinates a lot with furosemide.  It was explained that that is the purpose of the drug to keep him euvolemic.  He understands.    He continues to smoke unfortunately.  He smokes about 4 to 5 cigarettes a day.  He does note he does not smoke the whole cigarette, letting it burn most of the time.  He continues to  attempt to completely stop smoking.      Complete 12 point ROS reviewed and otherwise non pertinent or negative except as per HPI pertinent positives in Cardiovascular and Respiratory emphasized. Please see paper chart for outpatient clinic patients where the patient completed the 12 point ROS survey.           Past Medical History:   Diagnosis Date    Arthritis     Asthma     Colon polyps     Community acquired pneumonia     last assessed: 5/1/2014    Diabetes mellitus (HCC)     Hemorrhagic prepatellar bursitis, left 10/21/2019    Hepatitis C     High cholesterol     History of colonoscopy 2017    Hypertension     Lymphadenopathy, anterior cervical 04/17/2018    Nephritis and nephropathy, not specified as acute or chronic, with other specified pathological lesion in kidney, in diseases classified elsewhere 3/26/2013    NSTEMI (non-ST elevated myocardial infarction) (HCC) 1/30/2023    s/p Medtronic dual chamber PPM 8/31/22 s/p upgrade to BiV ICD 9/13/2023 9/16/2023    Screening for colon cancer 05/01/2019    SIRS (systemic inflammatory response syndrome) ([facility]) 3/19/2023    Thoracic vertebral fracture ([facility]) 06/11/2014       Allergies   Allergen Reactions    Iv Contrast [Iodinated Contrast Media] Rash     Patient states he had a severe reaction approximately 10 years ago , states he had a rash, itchy and he remembers a bunch of people pounding on chest and being surrounded by multiple doctors.     I reviewed the Home Medication list and Allergies in the chart.   Scheduled Meds:  Current Outpatient Medications   Medication Sig Dispense Refill    albuterol (PROVENTIL HFA,VENTOLIN HFA) 90 mcg/act inhaler INHALE 2 PUFFS BY MOUTH EVERY 4 HOURS AS NEEDED FOR WHEEZING OR SHORTNESS OF BREATH. 8.5 g 4    Alcohol Swabs (ALCOHOL PADS) 70 % PADS       apixaban (Eliquis) 5 mg Take 1 tablet (5 mg total) by mouth 2 (two) times a day 180 tablet 3    Blood Glucose Monitoring Suppl (OneTouch Verio) w/Device KIT Check blood glucose  three times daily before each meal 1 kit 0    cholecalciferol (VITAMIN D3) 1,000 units tablet Take 2 tablets (2,000 Units total) by mouth daily 180 tablet 5    clopidogrel (PLAVIX) 75 mg tablet Take 1 tablet (75 mg total) by mouth daily 90 tablet 1    Continuous Glucose Sensor (Dexcom G7 Sensor) Use 1 Device every 10 days 9 each 3    dofetilide (TIKOSYN) 125 mcg capsule TAKE 1 CAPSULE (125 MCG TOTAL) BY MOUTH EVERY 12 HOURS 60 capsule 3    Empagliflozin (JARDIANCE) 10 MG TABS tablet Take 1 tablet (10 mg total) by mouth every morning 90 tablet 3    ferrous sulfate 325 (65 Fe) mg tablet TAKE 1 TABLET BY MOUTH EVERY OTHER DAY 15 tablet 0    furosemide (LASIX) 40 mg tablet TAKE 1 TABLET BY MOUTH TWICE A  tablet 1    Insulin Glargine Solostar (Lantus SoloStar) 100 UNIT/ML SOPN Inject 0.18 mL (18 Units total) under the skin daily at bedtime 15 mL 3    Insulin Pen Needle (Pen Needles) 31G X 8 MM MISC Use daily 300 each 3    Lancets (freestyle) lancets Use as needed (As needed) 100 each 3    levothyroxine 137 mcg tablet TAKE 1 TABLET BY MOUTH EVERY DAY 90 tablet 3    lisinopril (ZESTRIL) 10 mg tablet Take 10 mg by mouth daily      metFORMIN (GLUCOPHAGE) 850 mg tablet TAKE 1 TABLET BY MOUTH TWICE A DAY WITH MEALS 180 tablet 3    metoprolol succinate (TOPROL-XL) 50 mg 24 hr tablet Take 1.5 tablets (75 mg total) by mouth 2 (two) times a day 270 tablet 2    montelukast (SINGULAIR) 10 mg tablet TAKE 1 TABLET BY MOUTH EVERY DAY 90 tablet 3    nitroglycerin (NITROSTAT) 0.4 mg SL tablet Place 1 tablet (0.4 mg total) under the tongue every 5 (five) minutes as needed for chest pain 30 tablet 1    NovoLOG FlexPen 100 units/mL injection pen Inject 5 units with breakfast and with dinner 15 mL 3    rosuvastatin (CRESTOR) 40 MG tablet Take 1 tablet (40 mg total) by mouth daily 30 tablet 3    spironolactone (ALDACTONE) 25 mg tablet Take 0.5 tablets (12.5 mg total) by mouth daily 45 tablet 1    Diclofenac Sodium (VOLTAREN) 1 % Apply  2 g topically 4 (four) times a day 100 g 3     No current facility-administered medications for this visit.     PRN Meds:.        Family History   Problem Relation Age of Onset    Heart attack Family         at age 86    No Known Problems Mother     No Known Problems Father     Diabetes Sister     Diabetes Brother        Social History     Socioeconomic History    Marital status: /Civil Union     Spouse name: Not on file    Number of children: Not on file    Years of education: Not on file    Highest education level: Not on file   Occupational History    Occupation: retired    Tobacco Use    Smoking status: Former     Current packs/day: 0.00     Types: Cigarettes     Quit date: 2022     Years since quittin.3    Smokeless tobacco: Never    Tobacco comments:     started when he was about 27 yrs old; stopped smoking 3 wks ago   Vaping Use    Vaping status: Never Used   Substance and Sexual Activity    Alcohol use: Not Currently    Drug use: No    Sexual activity: Not Currently   Other Topics Concern    Not on file   Social History Narrative    Not on file     Social Determinants of Health     Financial Resource Strain: Low Risk  (2024)    Overall Financial Resource Strain (CARDIA)     Difficulty of Paying Living Expenses: Not hard at all   Food Insecurity: No Food Insecurity (2024)    Hunger Vital Sign     Worried About Running Out of Food in the Last Year: Never true     Ran Out of Food in the Last Year: Never true   Transportation Needs: No Transportation Needs (2024)    PRAPARE - Transportation     Lack of Transportation (Medical): No     Lack of Transportation (Non-Medical): No   Physical Activity: Unknown (2022)    Exercise Vital Sign     Days of Exercise per Week: Not on file     Minutes of Exercise per Session: 60 min   Stress: No Stress Concern Present (2022)    [employer] [employer] - Occupational Stress Questionnaire     Feeling of Stress : Not at  "all   Social Connections: Moderately Isolated (1/19/2022)    Social Connection and Isolation Panel [NHANES]     Frequency of Communication with Friends and Family: More than three times a week     Frequency of Social Gatherings with Friends and Family: More than three times a week     Attends Synagogue Services: Never     Active Member of Clubs or Organizations: No     Attends Club or Organization Meetings: Never     Marital Status:    Intimate Partner Violence: Not on file   Housing Stability: Low Risk  (4/25/2024)    Housing Stability Vital Sign     Unable to Pay for Housing in the Last Year: No     Number of Times Moved in the Last Year: 1     Homeless in the Last Year: No         OBJECTIVE:    /60 (BP Location: Right arm, Patient Position: Sitting, Cuff Size: Standard)   Pulse 60   Ht 5' 9\" (1.753 m)   Wt 75 kg (165 lb 4.8 oz)   BMI 24.41 kg/m²   Vitals:    09/27/24 1313   Weight: 75 kg (165 lb 4.8 oz)     GEN: No acute distress, Alert and oriented, well appearing  HEENT normocephalic, atraumatic  CARDIOVASCULAR:  RRR, No murmur, rub, gallops S1,S2  LUNGS: Clear To auscultation bilaterally, normal effort, no rales, rhonchi, crackles   ABDOMEN:  nondistended,  without obvious organomegaly or ascites  EXTREMITIES/VASCULAR:  No edema. warm an well perfused.  PSYCH: Normal Affect,  linear speech pattern without evidence of psychosis.   NEURO: Grossly intact, moving all extremiteis equal, face symmetric, alert and responsive, no obvious focal defecits   GAIT:  Ambulates normally without difficulty  HEME: No bleeding, bruising, petechia, purpura   SKIN: No significant rashes on visibile skin, warm, no diaphoresis or pallor.     Lab Results:       LABS:      Chemistry        Component Value Date/Time     10/14/2014 0000    K 4.6 03/08/2024 1013    K 3.6 06/18/2019 1612    CL 97 03/08/2024 1013     06/18/2019 1612    CO2 34 (H) 03/08/2024 1013    CO2 29 06/21/2022 1332    CO2 33 (H) " "06/18/2019 1612    BUN 21 03/08/2024 1013    BUN 19 06/18/2019 1612    CREATININE 1.04 03/08/2024 1013    CREATININE 0.76 06/18/2019 1612        Component Value Date/Time    CALCIUM 9.4 03/08/2024 1013    CALCIUM 9.4 06/18/2019 1612    ALKPHOS 115 (H) 02/23/2024 0412    ALKPHOS 63 06/18/2019 1612    AST 43 (H) 02/23/2024 0412    AST <6 06/18/2019 1612    ALT 57 (H) 02/23/2024 0412    ALT 10 06/18/2019 1612    BILITOT 0.36 10/14/2014 0000            Lab Results   Component Value Date    CHOL 171 02/12/2014     Lab Results   Component Value Date    HDL 35 (L) 12/23/2023    HDL 53 02/10/2023    HDL 65 11/10/2022     Lab Results   Component Value Date    LDLCALC 63 12/23/2023    LDLCALC 72 02/10/2023    LDLCALC 62 11/10/2022     Lab Results   Component Value Date    TRIG 74 12/23/2023    TRIG 61 02/10/2023    TRIG 75 11/10/2022     No results found for: \"CHOLHDL\"    IMAGING: Cardiac EP device report    Result Date: 9/26/2024  Narrative: MDT BI-V ICD/ACTIVE SYSTEM IS MRI CONDITIONAL CARELINK TRANSMISSION: BATTERY STATUS \"8 YRS.\" AP 4% CRT PACING (BIV) 100% (LV) 0%. ALL AVAILABLE LEAD PARAMETERS WITHIN NORMAL LIMITS. NO SIGNIFICANT HIGH RATE EPISODES. VENT SENSING MARKERS NOTED. OPTI-VOL WITHIN NORMAL LIMITS. NORMAL DEVICE FUNCTION. NC        Cardiac testing:     I reviewed and interpreted the following LABS/EKG/TELE/IMAGING and below is summary of my interpretation (if data available):      Current EKG performed at today's visit read by me personally reveals sinus rhythm with biventricular pacing. Inferior infarct, age undetermined    ECHO  12/23/23    Left Ventricle: Left ventricular cavity size is mildly dilated. Wall thickness is upper normal. The left ventricular ejection fraction is 25-30%. Systolic function is severely reduced. There is severe global hypokinesis with regional variation. Diastolic function is normal.    IVS: There is abnormal septal motion consistent with left bundle branch block or right " ventricular pacing.    Right Ventricle: Right ventricular cavity size is mildly dilated. Systolic function is moderately reduced. A pacer wire is present.    Left Atrium: The atrium is moderate to severely dilated.    Right Atrium: The atrium is moderately dilated.    Aortic Valve: There is a TAVR bioprosthetic valve. The prosthetic valve appears to be functioning normally.    Mitral Valve: There is severe regurgitation with an eccentrically directed jet.    Tricuspid Valve: There is moderate to severe regurgitation. The right ventricular systolic pressure is moderately to severely elevated.    Pulmonic Valve: There is mild regurgitation.    IVC/SVC: The inferior vena cava is dilated. Respirophasic changes were blunted (less than 50% variation).

## 2024-10-07 ENCOUNTER — RA CDI HCC (OUTPATIENT)
Dept: OTHER | Facility: HOSPITAL | Age: 79
End: 2024-10-07

## 2024-10-07 NOTE — PROGRESS NOTES
HCC coding opportunities          Chart Reviewed number of suggestions sent to Provider: 2     Patients Insurance     Medicare Insurance: [employer]          1) E11.22: Type 2 diabetes mellitus with diabetic chronic kidney disease (HCC) - please review if this is correct      As per ICD 10 coding guidelines, DM & CKD are presumed to have a causal-effect relationship unless documented as unrelated please review and assess if applicable        2) I13.0 : Hypertensive heart and chronic kidney disease with heart failure and stage 1 through stage 4 chronic kidney disease, or unspecified chronic kidney disease (HCC)      Per ICD 10 CM coding guidelines the classification presumes a causal relationship between hypertension and heart involvement and between CKD unless the documentation clearly states the conditions are unrelated

## 2024-10-10 ENCOUNTER — PATIENT OUTREACH (OUTPATIENT)
Dept: INTERNAL MEDICINE CLINIC | Facility: CLINIC | Age: 79
End: 2024-10-10

## 2024-10-10 ENCOUNTER — TELEPHONE (OUTPATIENT)
Dept: INTERNAL MEDICINE CLINIC | Facility: CLINIC | Age: 79
End: 2024-10-10

## 2024-10-10 NOTE — PROGRESS NOTES
Outpatient Care Management Note:    Re:  follow up    Patient did not show for PCP appointment today. Will mail unable to reach letter at this time. Unable to reach by phone.     Patient not signed up for Mychart.

## 2024-10-10 NOTE — LETTER
Fecha: 10/10/24    Estimado Juan David Lora:  Mi nombre es Leah. Soy un enfermero registrado administrador de atención de [facility]. No pude comunicarme con usted y me gustaría programar un horario para que hablemos por teléfono o personalmente.  Me dedico a ayudar a los pacientes que tienen afecciones médicas complejas a obtener la atención que necesitan. [place] comprende a pacientes que pueden estar en el hospital o en marli rogelio de emergencias.  Si tiene preguntas, no dude en llamarme. Espero talley llamado.  Atentamente.  Leah FUENTES                [phone]  Gerente de atención ambulatoria

## 2024-10-18 DIAGNOSIS — D50.9 IRON DEFICIENCY ANEMIA, UNSPECIFIED IRON DEFICIENCY ANEMIA TYPE: ICD-10-CM

## 2024-10-22 RX ORDER — FERROUS SULFATE 325(65) MG
TABLET ORAL
Qty: 45 TABLET | Refills: 1 | Status: SHIPPED | OUTPATIENT
Start: 2024-10-22

## 2024-10-25 ENCOUNTER — PATIENT OUTREACH (OUTPATIENT)
Dept: INTERNAL MEDICINE CLINIC | Facility: CLINIC | Age: 79
End: 2024-10-25

## 2024-10-25 NOTE — PROGRESS NOTES
Outpatient Care Management Note:    Re:  closed    No further response from patient from calls made or letter mailed. Will close from outpatient nurse care management at this time. Please re-consult as needed.

## 2024-10-29 ENCOUNTER — TELEPHONE (OUTPATIENT)
Age: 79
End: 2024-10-29

## 2024-10-29 ENCOUNTER — TELEPHONE (OUTPATIENT)
Dept: INTERNAL MEDICINE CLINIC | Facility: CLINIC | Age: 79
End: 2024-10-29

## 2024-10-29 ENCOUNTER — HOSPITAL ENCOUNTER (EMERGENCY)
Facility: HOSPITAL | Age: 79
Discharge: HOME | End: 2024-10-29
Attending: EMERGENCY MEDICINE
Payer: MEDICARE

## 2024-10-29 VITALS
HEART RATE: 65 BPM | RESPIRATION RATE: 18 BRPM | TEMPERATURE: 97.8 F | DIASTOLIC BLOOD PRESSURE: 61 MMHG | SYSTOLIC BLOOD PRESSURE: 138 MMHG | OXYGEN SATURATION: 100 %

## 2024-10-29 DIAGNOSIS — R31.0 GROSS HEMATURIA: Primary | ICD-10-CM

## 2024-10-29 LAB
ANION GAP SERPL CALCULATED.3IONS-SCNC: 3 MMOL/L (ref 4–13)
BACTERIA UR QL AUTO: ABNORMAL /HPF
BASOPHILS # BLD AUTO: 0.02 THOUSANDS/ÂΜL (ref 0–0.1)
BASOPHILS NFR BLD AUTO: 0 % (ref 0–1)
BILIRUB UR QL STRIP: NEGATIVE
BUN SERPL-MCNC: 21 MG/DL (ref 5–25)
CALCIUM SERPL-MCNC: 9.3 MG/DL (ref 8.4–10.2)
CHLORIDE SERPL-SCNC: 103 MMOL/L (ref 96–108)
CLARITY UR: CLEAR
CO2 SERPL-SCNC: 32 MMOL/L (ref 21–32)
COLOR UR: COLORLESS
CREAT SERPL-MCNC: 0.88 MG/DL (ref 0.6–1.3)
EOSINOPHIL # BLD AUTO: 0.28 THOUSAND/ÂΜL (ref 0–0.61)
EOSINOPHIL NFR BLD AUTO: 4 % (ref 0–6)
ERYTHROCYTE [DISTWIDTH] IN BLOOD BY AUTOMATED COUNT: 14.9 % (ref 11.6–15.1)
GFR SERPL CREATININE-BSD FRML MDRD: 82 ML/MIN/1.73SQ M
GLUCOSE SERPL-MCNC: 87 MG/DL (ref 65–140)
GLUCOSE UR STRIP-MCNC: ABNORMAL MG/DL
HCT VFR BLD AUTO: 42.9 % (ref 36.5–49.3)
HGB BLD-MCNC: 13.7 G/DL (ref 12–17)
HGB UR QL STRIP.AUTO: ABNORMAL
IMM GRANULOCYTES # BLD AUTO: 0.02 THOUSAND/UL (ref 0–0.2)
IMM GRANULOCYTES NFR BLD AUTO: 0 % (ref 0–2)
KETONES UR STRIP-MCNC: NEGATIVE MG/DL
LEUKOCYTE ESTERASE UR QL STRIP: ABNORMAL
LYMPHOCYTES # BLD AUTO: 1.7 THOUSANDS/ÂΜL (ref 0.6–4.47)
LYMPHOCYTES NFR BLD AUTO: 25 % (ref 14–44)
MCH RBC QN AUTO: 30.6 PG (ref 26.8–34.3)
MCHC RBC AUTO-ENTMCNC: 31.9 G/DL (ref 31.4–37.4)
MCV RBC AUTO: 96 FL (ref 82–98)
MONOCYTES # BLD AUTO: 0.74 THOUSAND/ÂΜL (ref 0.17–1.22)
MONOCYTES NFR BLD AUTO: 11 % (ref 4–12)
NEUTROPHILS # BLD AUTO: 4.18 THOUSANDS/ÂΜL (ref 1.85–7.62)
NEUTS SEG NFR BLD AUTO: 60 % (ref 43–75)
NITRITE UR QL STRIP: NEGATIVE
NON-SQ EPI CELLS URNS QL MICRO: ABNORMAL /HPF
NRBC BLD AUTO-RTO: 0 /100 WBCS
PH UR STRIP.AUTO: 6 [PH]
PLATELET # BLD AUTO: 199 THOUSANDS/UL (ref 149–390)
PMV BLD AUTO: 10.9 FL (ref 8.9–12.7)
POTASSIUM SERPL-SCNC: 4 MMOL/L (ref 3.5–5.3)
PROT UR STRIP-MCNC: NEGATIVE MG/DL
RBC # BLD AUTO: 4.48 MILLION/UL (ref 3.88–5.62)
RBC #/AREA URNS AUTO: ABNORMAL /HPF
SODIUM SERPL-SCNC: 138 MMOL/L (ref 135–147)
SP GR UR STRIP.AUTO: 1.01 (ref 1–1.03)
UROBILINOGEN UR STRIP-ACNC: <2 MG/DL
WBC # BLD AUTO: 6.94 THOUSAND/UL (ref 4.31–10.16)
WBC #/AREA URNS AUTO: ABNORMAL /HPF

## 2024-10-29 PROCEDURE — 81001 URINALYSIS AUTO W/SCOPE: CPT

## 2024-10-29 PROCEDURE — 99283 EMERGENCY DEPT VISIT LOW MDM: CPT

## 2024-10-29 PROCEDURE — 99284 EMERGENCY DEPT VISIT MOD MDM: CPT | Performed by: EMERGENCY MEDICINE

## 2024-10-29 PROCEDURE — 85025 COMPLETE CBC W/AUTO DIFF WBC: CPT

## 2024-10-29 PROCEDURE — 36415 COLL VENOUS BLD VENIPUNCTURE: CPT

## 2024-10-29 PROCEDURE — 80048 BASIC METABOLIC PNL TOTAL CA: CPT

## 2024-10-29 NOTE — ED PROVIDER NOTES
Time reflects when diagnosis was documented in both MDM as applicable and the Disposition within this note       Time User Action Codes Description Comment    10/29/2024  3:13 PM Bryon Arnold Add [R31.0] Gross hematuria           ED Disposition       ED Disposition   Discharge    Condition   Stable    Date/Time   Tue Oct 29, 2024  3:12 PM    Comment   Juan David Lora discharge to home/self care.                   Assessment & Plan       Medical Decision Making  78 YO Male   PMH DM on Insulin, paroxysmal atrial fibrillation on apixiban, heart failure, and TAVR   Complaining of red urine beginning Friday of last week, darkened over past few days. Burning with urination for one day last week.     Vital signs WNL on arrival, afebrile. Exam is without suprapubic tenderness.     UA significant for blood without leukocytes or nitrites. CBC did not show anemia. Spoke with patient in detail about the possible etiology of his painless hematuria, including cancer. I have advised him to follow up with urology. Patient instructed to return to ED if he experienced dizziness, light headedness, or increase in hematuria. Instructions provided to patient via interpretor #[id number].     Amount and/or Complexity of Data Reviewed  Labs: ordered.             Medications - No data to display    ED Risk Strat Scores                           SBIRT 22yo+      Flowsheet Row Most Recent Value   Initial Alcohol Screen: US AUDIT-C     1. How often do you have a drink containing alcohol? 0 Filed at: 10/29/2024 1229   2. How many drinks containing alcohol do you have on a typical day you are drinking?  0 Filed at: 10/29/2024 1229   3a. Male UNDER 65: How often do you have five or more drinks on one occasion? 0 Filed at: 10/29/2024 1229   3b. FEMALE Any Age, or MALE 65+: How often do you have 4 or more drinks on one occassion? 0 Filed at: 10/29/2024 1229   Audit-C Score 0 Filed at: 10/29/2024 1229   DIEUDONNE: How many times in the past year have you...     Used an illegal drug or used a prescription medication for non-medical reasons? Never Filed at: 10/29/2024 1229                            History of Present Illness       Chief Complaint   Patient presents with    Possible UTI     C/o dark colored urine x4 days. Pt denies abd pain, denies other urinary symptoms.        Past Medical History:   Diagnosis Date    Arthritis     Asthma     Colon polyps     Community acquired pneumonia     last assessed: 5/1/2014    Diabetes mellitus (HCC)     Hemorrhagic prepatellar bursitis, left 10/21/2019    Hepatitis C     High cholesterol     History of colonoscopy 2017    Hypertension     Lymphadenopathy, anterior cervical 04/17/2018    Nephritis and nephropathy, not specified as acute or chronic, with other specified pathological lesion in kidney, in diseases classified elsewhere 3/26/2013    NSTEMI (non-ST elevated myocardial infarction) (HCC) 1/30/2023    s/p Medtr[employer] dual chamber PPM 8/31/22 s/p upgrade to BiV ICD 9/13/2023 9/16/2023    Screening for colon cancer 05/01/2019    SIRS (systemic inflammatory response syndrome) ([facility], [facility]) 3/19/2023    Thoracic vertebral fracture (HCC) 06/11/2014      Past Surgical History:   Procedure Laterality Date    CARDIAC CATHETERIZATION N/A 6/3/2022    Procedure: Cardiac RHC/LHC;  Surgeon: Yemi Molina MD;  [facility]: [facility];  Service: Cardiology    CARDIAC CATHETERIZATION N/A 6/21/2022    Procedure: CARDIAC TAVR;  Surgeon: David Craft MD;  Location: [facility];  Service: Cardiology    CARDIAC CATHETERIZATION N/A 2/10/2023    Procedure: Cardiac Coronary Angiogram;  Surgeon: Yemi Molina MD;  [facility];  Service: Cardiology    CARDIAC CATHETERIZATION N/A 2/10/2023    Procedure: Cardiac pci;  Surgeon: Yemi Molina MD;  [facility];  Service: Cardiology    CARDIAC ELECTROPHYSIOLOGY PROCEDURE N/A 9/1/2022    Procedure: Cardiac pacer implant ; DC PPM;  Surgeon: Francis Sun DO;  [facility]: [facility]ARD[facility];  Service: Cardiology    CARDIAC ELECTROPHYSIOLOGY PROCEDURE N/A 2023    Procedure: Cardiac upgrade pacer to biv icd;  Surgeon: Mario Yuan MD;  [facility];  Service: Cardiology    COLONOSCOPY      COLONOSCOPY W/ POLYPECTOMY      IR UPPER EXTREMITY VENOGRAM- DIAGNOSTIC  2023    LITHOTRIPSY      MULTIPLE TOOTH EXTRACTIONS      MS COLONOSCOPY FLX DX W/COLLJ SPEC WHEN PFRMD N/A 2018    Procedure: COLONOSCOPY;  Surgeon: Raad Sandoval MD;  Location: [facility];  Service: Gastroenterology    MS REPLACE AORTIC VALVE OPENFEMORAL ARTERY APPROACH N/A 2022    Procedure: REPLACEMENT AORTIC VALVE TRANSCATHETER (TAVR) TRANSFEMORAL W/ 26MM QUINN RONNI S3 ULTRA VALVE(ACCESS ON LEFT) SAULO;  Surgeon: Sal Walker MD;  Location: [facility];  Service: Cardiac Surgery      Family History   Problem Relation Age of Onset    Heart attack Family         at age 86    No Known Problems Mother     No Known Problems Father     Diabetes Sister     Diabetes Brother       Social History     Tobacco Use    Smoking status: Former     Current packs/day: 0.00     Types: Cigarettes     Quit date: 2022     Years since quittin.4    Smokeless tobacco: Never    Tobacco comments:     started when he was about 27 yrs old; stopped smoking 3 wks ago   Vaping Use    Vaping status: Never Used   Substance Use Topics    Alcohol use: Not Currently    Drug use: No      E-Cigarette/Vaping    E-Cigarette Use Never User       E-Cigarette/Vaping Substances    Nicotine No     THC No     CBD No     Flavoring No     Other No     Unknown No       I have reviewed and agree with the history as documented.     Patient is a 80 YO Male with PMH of DM on Insulin, paroxysmal atrial fibrillation on apixiban, heart failure, and TAVR who presents to the ED with changes in urine color. Patient states that he noticed red urine beginning Friday of last week. This has gradually darkened in color since onset. He also  notes burning with urination for one day last week. He states that he has chronic urinary frequency and urgency which he attributes to his medications. Denies fever, chills, low back pain. Denies chest pain and shortness of breath. No nausea/vomiting/diarrhea/constipation.    Spoke with patient via interpretor #[id number].    Vital signs WNL on arrival, afebrile. Exam is without suprapubic tenderness.           Review of Systems        Objective       ED Triage Vitals [10/29/24 1228]   Temperature Pulse Blood Pressure Respirations SpO2 Patient Position - Orthostatic VS   97.8 °F (36.6 °C) 65 138/61 18 100 % --      Temp src Heart Rate Source BP Location FiO2 (%) Pain Score    -- Monitor -- -- --      Vitals      Date and Time Temp Pulse SpO2 Resp BP Pain Score FACES Pain Rating User   10/29/24 1228 97.8 °F (36.6 °C) 65 100 % 18 138/61 -- -- JR            Physical Exam    Results Reviewed       Procedure Component Value Units Date/Time    Urine Microscopic [[id number]]  (Abnormal) Collected: 10/29/24 1333    Lab Status: Final result Specimen: Urine, Clean Catch Updated: 10/29/24 1441     RBC, UA Innumerable /hpf      WBC, UA None Seen /hpf      Epithelial Cells Occasional /hpf      Bacteria, UA None Seen /hpf     UA w Reflex to Microscopic w Reflex to Culture [[id number]]  (Abnormal) Collected: 10/29/24 1333    Lab Status: Final result Specimen: Urine, Clean Catch Updated: 10/29/24 1424     Color, UA Colorless     Clarity, UA Clear     Specific Gravity, UA 1.007     pH, UA 6.0     Leukocytes, UA Elevated glucose may cause decreased leukocyte values. See urine microscopic for UWBC result     Nitrite, UA Negative     Protein, UA Negative mg/dl      Glucose, UA >=1000 (1%) mg/dl      Ketones, UA Negative mg/dl      Urobilinogen, UA <2.0 mg/dl      Bilirubin, UA Negative     Occult Blood, UA Large    Basic metabolic panel [[id number]]  (Abnormal) Collected: 10/29/24 1334    Lab Status: Final result Specimen: Blood from Arm,  Left Updated: 10/29/24 1406     Sodium 138 mmol/L      Potassium 4.0 mmol/L      Chloride 103 mmol/L      CO2 32 mmol/L      ANION GAP 3 mmol/L      BUN 21 mg/dL      Creatinine 0.88 mg/dL      Glucose 87 mg/dL      Calcium 9.3 mg/dL      eGFR 82 ml/min/1.73sq m     Narrative:      National Kid[employer] Foundation guidelines for Chronic Kidney Disease (CKD):     Stage 1 with normal or high GFR (GFR > 90 mL/min/1.73 square meters)    Stage 2 Mild CKD (GFR = 60-89 mL/min/1.73 square meters)    Stage 3A Moderate CKD (GFR = 45-59 mL/min/1.73 square meters)    Stage 3B Moderate CKD (GFR = 30-44 mL/min/1.73 square meters)    Stage 4 Severe CKD (GFR = 15-29 mL/min/1.73 square meters)    Stage 5 End Stage CKD (GFR <15 mL/min/1.73 square meters)  Note: GFR calculation is accurate only with a steady state creatinine    CBC and differential [[id number]] Collected: 10/29/24 1334    Lab Status: Final result Specimen: Blood from Arm, Left Updated: 10/29/24 1346     WBC 6.94 Thousand/uL      RBC 4.48 Million/uL      Hemoglobin 13.7 g/dL      Hematocrit 42.9 %      MCV 96 fL      MCH 30.6 pg      MCHC 31.9 g/dL      RDW 14.9 %      MPV 10.9 fL      Platelets 199 Thousands/uL      nRBC 0 /100 WBCs      Segmented % 60 %      Immature Grans % 0 %      Lymphocytes % 25 %      Monocytes % 11 %      Eosinophils Relative 4 %      Basophils Relative 0 %      Absolute Neutrophils 4.18 Thousands/µL      Absolute Immature Grans 0.02 Thousand/uL      Absolute Lymphocytes 1.70 Thousands/µL      Absolute Monocytes 0.74 Thousand/µL      Eosinophils Absolute 0.28 Thousand/µL      Basophils Absolute 0.02 Thousands/µL             No orders to display       Procedures    ED Medication and Procedure Management   Prior to Admission Medications   Prescriptions Last Dose Informant Patient Reported? Taking?   Alcohol Swabs (ALCOHOL PADS) 70 % PADS  Spouse/Significant Other, Self Yes No   Blood Glucose Monitoring Suppl (OneTouch Verio) w/Device KIT   Spouse/Significant Other, Self No No   Sig: Check blood glucose three times daily before each meal   Continuous Glucose Sensor (Dexcom G7 Sensor)  Self, Spouse/Significant Other No No   Sig: Use 1 Device every 10 days   Diclofenac Sodium (VOLTAREN) 1 %   No No   Sig: Apply 2 g topically 4 (four) times a day   Empagliflozin (JARDIANCE) 10 MG TABS tablet  Self, Spouse/Significant Other No No   Sig: Take 1 tablet (10 mg total) by mouth every morning   Insulin Glargine Solostar (Lantus SoloStar) 100 UNIT/ML SOPN  Self, Spouse/Significant Other No No   Sig: Inject 0.18 mL (18 Units total) under the skin daily at bedtime   Insulin Pen Needle (Pen Needles) 31G X 8 MM MISC  Self, Spouse/Significant Other No No   Sig: Use daily   Lancets (freestyle) lancets  Self, Spouse/Significant Other No No   Sig: Use as needed (As needed)   NovoLOG FlexPen 100 units/mL injection pen  Self, Spouse/Significant Other No No   Sig: Inject 5 units with breakfast and with dinner   albuterol (PROVENTIL HFA,VENTOLIN HFA) 90 mcg/act inhaler  Self, Spouse/Significant Other No No   Sig: INHALE 2 PUFFS BY MOUTH EVERY 4 HOURS AS NEEDED FOR WHEEZING OR SHORTNESS OF BREATH.   apixaban (Eliquis) 5 mg  Self, Spouse/Significant Other No No   Sig: Take 1 tablet (5 mg total) by mouth 2 (two) times a day   cholecalciferol (VITAMIN D3) 1,000 units tablet  Spouse/Significant Other, Self No No   Sig: Take 2 tablets (2,000 Units total) by mouth daily   clopidogrel (PLAVIX) 75 mg tablet  Self, Spouse/Significant Other No No   Sig: Take 1 tablet (75 mg total) by mouth daily   dofetilide (TIKOSYN) 125 mcg capsule  Self, Spouse/Significant Other No No   Sig: TAKE 1 CAPSULE (125 MCG TOTAL) BY MOUTH EVERY 12 HOURS   ferrous sulfate 325 (65 Fe) mg tablet   No No   Sig: TAKE 1 TABLET BY MOUTH EVERY OTHER DAY   furosemide (LASIX) 40 mg tablet  Self, Spouse/Significant Other No No   Sig: TAKE 1 TABLET BY MOUTH TWICE A DAY   levothyroxine 137 mcg tablet  Self,  Spouse/Significant Other No No   Sig: TAKE 1 TABLET BY MOUTH EVERY DAY   lisinopril (ZESTRIL) 10 mg tablet  Self, Spouse/Significant Other Yes No   Sig: Take 10 mg by mouth daily   metFORMIN (GLUCOPHAGE) 850 mg tablet  Self, Spouse/Significant Other No No   Sig: TAKE 1 TABLET BY MOUTH TWICE A DAY WITH MEALS   metoprolol succinate (TOPROL-XL) 50 mg 24 hr tablet   No No   Sig: Take 1.5 tablets (75 mg total) by mouth 2 (two) times a day   montelukast (SINGULAIR) 10 mg tablet  Self, Spouse/Significant Other No No   Sig: TAKE 1 TABLET BY MOUTH EVERY DAY   nitroglycerin (NITROSTAT) 0.4 mg SL tablet  Spouse/Significant Other, Self No No   Sig: Place 1 tablet (0.4 mg total) under the tongue every 5 (five) minutes as needed for chest pain   rosuvastatin (CRESTOR) 40 MG tablet  Self, Spouse/Significant Other No No   Sig: Take 1 tablet (40 mg total) by mouth daily   spironolactone (ALDACTONE) 25 mg tablet  Self, Spouse/Significant Other No No   Sig: Take 0.5 tablets (12.5 mg total) by mouth daily      Facility-Administered Medications: None     Patient's Medications   Discharge Prescriptions    No medications on file       ED SEPSIS DOCUMENTATION   Time reflects when diagnosis was documented in both MDM as applicable and the Disposition within this note       Time User Action Codes Description Comment    10/29/2024  3:13 PM Bryon Arnold Add [R31.0] Gross hematuria                  Bryon Arnold DO  10/29/24 7045

## 2024-10-29 NOTE — ED ATTENDING ATTESTATION
10/29/2024  I, Chidi Keen MD, saw and evaluated the patient. I have discussed the patient with the resident/non-physician practitioner and agree with the resident's/non-physician practitioner's findings, Plan of Care, and MDM as documented in the resident's/non-physician practitioner's note, except where noted. All available labs and Radiology studies were reviewed.  I was present for key portions of any procedure(s) performed by the resident/non-physician practitioner and I was immediately available to provide assistance.       At this point I agree with the current assessment done in the Emergency Department.  I have conducted an independent evaluation of this patient a history and physical is as follows:    ED Course         Critical Care Time  Procedures

## 2024-10-29 NOTE — TELEPHONE ENCOUNTER
New Patient    What is the reason for the patient’s appointment?: NP- ref for gross hematuria     Patient has been having gross blood since Friday and ended up in ER today 10/29/24. Patient denies any blood clots at this time or trouble urinating at this time. He did have dysuria a few days ago. Denies any fever or chills at this time.     What office location does the patient prefer?: [place]     Does patient have Imaging/Lab Results:    Have patient records been requested?:  If No, are the records showing in Epic: Records in epic       HISTORY:   Has the patient had any previous Urologist(s)?: No     Was the patient seen in the ED?: 10/29/24    Scheduled: 10/30/24 with Luis at our [place] office.

## 2024-10-29 NOTE — TELEPHONE ENCOUNTER
MOHAN-Received voice message from insurance regarding his compliance with Rosuvastatin.    Good morning. This message is from [employer] in regards of Juan David Lora date of birth 1945. We have been trying to get ahold of him without any luck. We wanted to have a adherence conversation about the Rosuvastatin 40 milligrams. Last time he filled this medication was on September 2nd. 30 pill for 30 days. If the medication was taken regularly, the medication should have run out on October the 2nd. If the medication has been discontinued, please give me a call back to my direct and secure line [phone]. My name is Mary calling from [employer]. Thank you. Have a great day.

## 2024-10-29 NOTE — DISCHARGE INSTRUCTIONS
Spoke via interpretor #[id number].     Mr. Mata is advised to schedule an appointment with urology. We have discussed the possible causes of his painless hematuria, including cancer. I also advise him to return to the ED if he experiences an increased volume of bloody urine, dizziness, or lightheadedness.

## 2024-10-30 ENCOUNTER — CONSULT (OUTPATIENT)
Dept: UROLOGY | Facility: AMBULATORY SURGERY CENTER | Age: 79
End: 2024-10-30

## 2024-10-30 VITALS
DIASTOLIC BLOOD PRESSURE: 60 MMHG | OXYGEN SATURATION: 98 % | HEIGHT: 69 IN | SYSTOLIC BLOOD PRESSURE: 152 MMHG | HEART RATE: 71 BPM | WEIGHT: 164 LBS | BODY MASS INDEX: 24.29 KG/M2

## 2024-10-30 DIAGNOSIS — J45.20 MILD INTERMITTENT ASTHMA, UNSPECIFIED WHETHER COMPLICATED: ICD-10-CM

## 2024-10-30 DIAGNOSIS — R31.0 GROSS HEMATURIA: Primary | ICD-10-CM

## 2024-10-30 LAB
SL AMB  POCT GLUCOSE, UA: NORMAL
SL AMB LEUKOCYTE ESTERASE,UA: NORMAL
SL AMB POCT BILIRUBIN,UA: NORMAL
SL AMB POCT BLOOD,UA: NORMAL
SL AMB POCT CLARITY,UA: CLEAR
SL AMB POCT COLOR,UA: YELLOW
SL AMB POCT KETONES,UA: NORMAL
SL AMB POCT NITRITE,UA: NORMAL
SL AMB POCT PH,UA: 5
SL AMB POCT SPECIFIC GRAVITY,UA: 1
SL AMB POCT URINE PROTEIN: NORMAL
SL AMB POCT UROBILINOGEN: 0.2

## 2024-10-30 PROCEDURE — 88112 CYTOPATH CELL ENHANCE TECH: CPT | Performed by: STUDENT IN AN ORGANIZED HEALTH CARE EDUCATION/TRAINING PROGRAM

## 2024-10-30 PROCEDURE — 87086 URINE CULTURE/COLONY COUNT: CPT

## 2024-10-30 RX ORDER — MONTELUKAST SODIUM 10 MG/1
10 TABLET ORAL DAILY
Qty: 90 TABLET | Refills: 3 | Status: SHIPPED | OUTPATIENT
Start: 2024-10-30

## 2024-10-30 NOTE — ASSESSMENT & PLAN NOTE
Patient experienced an episode of gross hematuria on 10/25/2024 associated with 1 day of dysuria.  Patient notes that the dysuria resolved on its own, but he persisted to have gross hematuria for the next 4 days  Patient denies a prior history of smoking cigarettes  History of bedside cystoscopy performed over a decade ago that was negative for urothelial carcinoma  Urine microscopy performed 10/29/2024 noted innumerable RBCs, but was on otherwise unremarkable and did not indicate ongoing UTI  Urine dip in the office today was positive for blood.  We discussed that the patient should undergo a CT renal protocol as well as have his urine sent from today's office visit to be evaluated for urothelial carcinoma.  Additionally, we discussed the patient should be scheduled for an in office cystoscopy.  Patient will undergo CT renal protocol as well as have his urine sent for cytology.  Patient will be scheduled for a in office cystoscopy to evaluate for mass or lesion.  Our office will call with CT and cytology results.  The patient was agreeable and amenable to this plan.

## 2024-10-30 NOTE — PROGRESS NOTES
10/30/2024      Assessment and Plan    79 y.o. male new patient to [facility] for urology    Gross hematuria  Patient experienced an episode of gross hematuria on 10/25/2024 associated with 1 day of dysuria.  Patient notes that the dysuria resolved on its own, but he persisted to have gross hematuria for the next 4 days  Patient denies a prior history of smoking cigarettes  History of bedside cystoscopy performed over a decade ago that was negative for urothelial carcinoma  Urine microscopy performed 10/29/2024 noted innumerable RBCs, but was on otherwise unremarkable and did not indicate ongoing UTI  Urine dip in the office today was positive for blood.  We discussed that the patient should undergo a CT renal protocol as well as have his urine sent from today's office visit to be evaluated for urothelial carcinoma.  Additionally, we discussed the patient should be scheduled for an in office cystoscopy.  Patient will undergo CT renal protocol as well as have his urine sent for cytology.  Patient will be scheduled for a in office cystoscopy to evaluate for mass or lesion.  Our office will call with CT and cytology results.  The patient was agreeable and amenable to this plan.        History of Present Illness  Juan David Lora is a 79 y.o. male here for evaluation of gross hematuria.  Patient has a significant past medical history for diabetes on insulin, A-fib on apixaban, heart failure, and TAVR.  Patient was seen in the emergency department on 10/29/2024 with complaints of red urine that began Friday of last week and burning with urination for 1 day.  Urine microscopy performed 10/29/2024 noted innumerable RBCs.  Today, the patient reports that there was 1 day of dysuria prior to the onset of the gross hematuria.  Patient notes that he does have a history of kidney stones and underwent a stone removal procedure more than a decade ago when he was still living in [place] for treatment of a kidney stone.   "Additionally, he notes that several years ago on a ultrasound they noted he had a very small 1.  However, patient states that he did not visualize any passage of kidney stone with this most recent episode, nor did he have any flank pain associated with it.  Furthermore, the patient notes that he did have an episode of gross hematuria over a decade ago and underwent a bedside cystoscopy in the hospital, which the provider at that time stated that there was no concern for a mass or lesion.  Patient denies a prior history of smoking cigarettes.  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  Patient is currently asymptomatic today and denies dysuria, hematuria, or flank pain.  Urine dip in the office today was positive for blood, but was otherwise unremarkable.        Review of Systems   Constitutional:  Negative for chills and fever.   HENT:  Negative for ear pain and sore throat.    Eyes:  Negative for pain and visual disturbance.   Respiratory:  Negative for cough and shortness of breath.    Cardiovascular:  Negative for chest pain and palpitations.   Gastrointestinal:  Negative for abdominal pain and vomiting.   Genitourinary:  Negative for decreased urine volume, difficulty urinating, dysuria, flank pain, frequency, hematuria and urgency.   Musculoskeletal:  Negative for arthralgias and back pain.   Skin:  Negative for color change and rash.   Neurological:  Negative for seizures and syncope.   All other systems reviewed and are negative.               Vitals  Vitals:    10/30/24 1136   BP: 152/60   BP Location: Left arm   Patient Position: Sitting   Cuff Size: Standard   Pulse: 71   SpO2: 98%   Weight: 74.4 kg (164 lb)   Height: 5' 9\" (1.753 m)       Physical Exam  Vitals reviewed.   Constitutional:       General: He is not in acute distress.     Appearance: Normal appearance. He is not ill-appearing.   HENT:      Head: Normocephalic and " atraumatic.      Nose: Nose normal.   Eyes:      General: No scleral icterus.  Pulmonary:      Effort: No respiratory distress.   Abdominal:      General: Abdomen is flat. There is no distension.      Palpations: Abdomen is soft.      Tenderness: There is no abdominal tenderness.   Musculoskeletal:         General: Normal range of motion.      Cervical back: Normal range of motion.   Skin:     General: Skin is warm.      Coloration: Skin is not jaundiced.   Neurological:      Mental Status: He is alert and oriented to person, place, and time.      Gait: Gait normal.   Psychiatric:         Mood and Affect: Mood normal.         Behavior: Behavior normal.           Past History  Past Medical History:   Diagnosis Date    Arthritis     Asthma     Colon polyps     Community acquired pneumonia     last assessed: 2014    Diabetes mellitus (HCC)     Hemorrhagic prepatellar bursitis, left 10/21/2019    Hepatitis C     High cholesterol     History of colonoscopy 2017    Hypertension     Lymphadenopathy, anterior cervical 2018    Nephritis and nephropathy, not specified as acute or chronic, with other specified pathological lesion in kidney, in diseases classified elsewhere 3/26/2013    NSTEMI (non-ST elevated myocardial infarction) (HCC) 2023    s/p Medtronic dual chamber PPM 22 s/p upgrade to BiV ICD 2023    Screening for colon cancer 2019    SIRS (systemic inflammatory response syndrome) ([facility]) 3/19/2023    Thoracic vertebral fracture ([facility]) 2014     Social History     Socioeconomic History    Marital status: /Civil Union     Spouse name: None    Number of children: None    Years of education: None    Highest education level: None   Occupational History    Occupation: retired    Tobacco Use    Smoking status: Former     Current packs/day: 0.00     Types: Cigarettes     Quit date: 2022     Years since quittin.4    Smokeless tobacco: Never    Tobacco comments:      started when he was about 27 yrs old; stopped smoking 3 wks ago   Vaping Use    Vaping status: Never Used   Substance and Sexual Activity    Alcohol use: Not Currently    Drug use: No    Sexual activity: Not Currently   Other Topics Concern    None   Social History Narrative    None     Social Determinants of Health     Financial Resource Strain: Low Risk  (2/29/2024)    Overall Financial Resource Strain (CARDIA)     Difficulty of Paying Living Expenses: Not hard at all   Food Insecurity: No Food Insecurity (2/29/2024)    Nursing - Inadequate Food Risk Classification     Worried About Running Out of Food in the Last Year: Never true     Ran Out of Food in the Last Year: Never true     Ran Out of Food in the Last Year: Not on file   Transportation Needs: No Transportation Needs (3/21/2024)    OASIS : Transportation     Lack of Transportation (Medical): No     Lack of Transportation (Non-Medical): No     Patient Unable or Declines to Respond: No   Physical Activity: Unknown (1/19/2022)    Exercise Vital Sign     Days of Exercise per Week: Not on file     Minutes of Exercise per Session: 60 min   Stress: No Stress Concern Present (1/19/2022)    [employer] - Occupational Stress Questionnaire     Feeling of Stress : Not at all   Social Connections: Moderately Isolated (1/19/2022)    Social Connection and Isolation Panel [NHANES]     Frequency of Communication with Friends and Family: More than three times a week     Frequency of Social Gatherings with Friends and Family: More than three times a week     Attends [employer] Services: Never     Active Member of Clubs or Organizations: No     Attends Club or Organization Meetings: Never     Marital Status:    Intimate Partner Violence: Not on file   Housing Stability: Low Risk  (4/25/2024)    Housing Stability Vital Sign     Unable to Pay for Housing in the Last Year: No     Number of Times Moved in the Last Year: 1     Homeless in  the Last Year: No     Social History     Tobacco Use   Smoking Status Former    Current packs/day: 0.00    Types: Cigarettes    Quit date: 2022    Years since quittin.4   Smokeless Tobacco Never   Tobacco Comments    started when he was about 27 yrs old; stopped smoking 3 wks ago     Family History   Problem Relation Age of Onset    Heart attack Family         at age 86    No Known Problems Mother     No Known Problems Father     Diabetes Sister     Diabetes Brother        The following portions of the patient's history were reviewed and updated as appropriate: allergies, current medications, past medical history, past social history, past surgical history and problem list.    Results  No results found for this or any previous visit (from the past 1 hour(s)).  ]  Lab Results   Component Value Date    PSA 0.8 2017     Lab Results   Component Value Date    GLUCOSE 196 (H) 2022    CALCIUM 9.3 10/29/2024     10/14/2014    K 4.0 10/29/2024    CO2 32 10/29/2024     10/29/2024    BUN 21 10/29/2024    CREATININE 0.88 10/29/2024     Lab Results   Component Value Date    WBC 6.94 10/29/2024    HGB 13.7 10/29/2024    HCT 42.9 10/29/2024    MCV 96 10/29/2024     10/29/2024

## 2024-10-31 LAB — BACTERIA UR CULT: NORMAL

## 2024-11-01 PROCEDURE — 88112 CYTOPATH CELL ENHANCE TECH: CPT | Performed by: STUDENT IN AN ORGANIZED HEALTH CARE EDUCATION/TRAINING PROGRAM

## 2024-11-11 ENCOUNTER — TELEPHONE (OUTPATIENT)
Dept: OTHER | Facility: HOSPITAL | Age: 79
End: 2024-11-11

## 2024-11-11 DIAGNOSIS — Z91.041 CONTRAST MEDIA ALLERGY: Primary | ICD-10-CM

## 2024-11-11 RX ORDER — METHYLPREDNISOLONE 32 MG/1
TABLET ORAL
Qty: 2 TABLET | Refills: 0 | Status: SHIPPED | OUTPATIENT
Start: 2024-11-11

## 2024-11-11 RX ORDER — DIPHENHYDRAMINE HCL 50 MG
CAPSULE ORAL
Qty: 1 CAPSULE | Refills: 0 | Status: SHIPPED | OUTPATIENT
Start: 2024-11-11

## 2024-11-11 NOTE — TELEPHONE ENCOUNTER
Called patient and spoke to patient's daughter she stated that she is already aware she need to  the medication and the allergy prep prior to the CT scan someone had called her earlier this morning and explained everything that was in santos DASH's note.

## 2024-11-11 NOTE — TELEPHONE ENCOUNTER
Please call the patient and advise him that the CT renal protocol that he is scheduled for on 11/13/2024 does use contrast dye, which he was previously noted to have a reaction to following and contrast dye with hives.  After discussion with the radiologist, the patient should undergo allergy prep the day of the procedure to avoid this reaction to the dye.  The allergy prep includes Medrol 32 mg p.o. 12 hours prior to the procedure, Medrol 32 mg p.o. 2 hours prior to the procedure, and Benadryl 50 mg p.o. 1 hour prior to the procedure.  If the patient utilizes this allergy prep as described as above the patient will avoid any allergic reaction to the contrast dye.  Please notify the patient that I sent these medications into the pharmacy.

## 2024-11-11 NOTE — NURSING NOTE
Patient is scheduled on 11/13/24 for a CT with IV contrast. Per patients Epic records it states they have an allergy to the IV contrast. Called patient to verify allergy to contrast, spoke with daughter Damien  who verified that in the past her father did have an episode of rash post contrast administration. Ordering provider was contacted and asked to place order for allergy prep. Daughter verbalized understanding of all instructions and expressed appreciation for call. Also sent information via verified email of daughter [email or web address], see message below.   Upcoming Radiology appointment at [facility]  Good afternoon Ms. Lora and Mr. Lora,               Mr. Lora you have an upcoming appointment at [facility] for a CT renal is scheduled for 11/13/24 @ 10 am. We are located at [address] [place] [address]. You will need to enter Building B, come up to the 1st floor and locate the Radiology department. Registration is in the Radiology department, please arrive 15 minutes prior so you may go through the registration process.     Due to your allergy history of having a reaction of a rash to the IV contrast that is to be used in this study, your ordering provider has been contacted to  order the following allergy prep for you.     Unfortunately I do not know which of these 2 they will order, so I am sending you both, please choose the one your Dr orders.     If you do not hear from your provider's office or your pharmacy please reach out to your doctor.     You will not have your scan unless the FULL prep is taken.    Methylprednisolone (Medrol) 32 mg PO 12 hours prior to procedure (11/12/24 @ 10 pm)  Methylprednisolone (Medrol) 32 mg PO   2 hours prior to procedure  (11/13/24 @ 8 am)  Benadryl 50 mg  PO 1 hour prior to procedure                                          (11/13/24 @ 9 am)     One of the  side effects of Benadryl is drowsiness so you  must come to the appointment with a  for your safety.    OR    Prednisone 50 mg PO 13 hours prior to procedure (11/12/24 @ 9 pm)  Prednisone 50 mg PO  7 hours prior to procedure  (11/13/24 at 3 am)  Prednisone 50 mg PO  1 hours prior to procedure  (11/13/24 @ 9 am)  Benadryl 50 mg  PO 1 hour prior to procedure        (11/13/24 @ 9 am)    One of the  side effects of Benadryl is drowsiness so you must come to the appointment with a  for your safety.    If any questions please feel free to respond or call me. Thank you for your time and attention to this matter.   May you have a pleasant day,  Priscila Sawyer RN  [facility] RN  [address] [place] [id number]  [phone] (Office)  [phone] (Fax)  [email or web address]

## 2024-11-13 ENCOUNTER — HOSPITAL ENCOUNTER (OUTPATIENT)
Dept: RADIOLOGY | Facility: HOSPITAL | Age: 79
Discharge: HOME | End: 2024-11-13
Payer: MEDICARE

## 2024-11-13 DIAGNOSIS — R31.0 GROSS HEMATURIA: ICD-10-CM

## 2024-11-13 PROCEDURE — 74178 CT ABD&PLV WO CNTR FLWD CNTR: CPT

## 2024-11-13 RX ADMIN — IOHEXOL 50 ML: 350 INJECTION, SOLUTION INTRAVENOUS at 10:08

## 2024-11-19 ENCOUNTER — RESULTS FOLLOW-UP (OUTPATIENT)
Dept: UROLOGY | Facility: AMBULATORY SURGERY CENTER | Age: 79
End: 2024-11-19

## 2024-11-20 NOTE — TELEPHONE ENCOUNTER
----- Message from Luis Linares PA-C sent at 11/19/2024  5:34 PM EST -----  I reviewed the patient's most recent CT renal protocol.  CT scan noted no mass or lesion within the kidneys bilaterally.  It did visualize a 1 cm left lower pole calculi, but this likely would not be contributing to his gross hematuria.  Additionally, the urinary bladder did not notice any mass or calculi.  Furthermore, I reviewed the patient's recent urine cytology returned negative for high-grade urothelial carcinoma.  Despite these negative findings, I think the patient should continue to proceed with a in office cystoscopy to complete his evaluation for his gross hematuria workup as some masses and lesions can be missed on CT scan.

## 2024-11-20 NOTE — TELEPHONE ENCOUNTER
Tried calling patient to let him know PA's last note pt did not answer left detailed message stating to give our office a call back. If patient calls back please relay message.

## 2024-11-22 NOTE — TELEPHONE ENCOUNTER
I was able to get a hold of patient daughter explained everything that was in Pa's last note. Daughter stated that she was going to speak with the patient to see which day would be good to schedule a cysto appt due to them having other upcoming appts. She stated that she will give our office a call back to let us know which day would be best to schedule the cysto appt.

## 2024-11-25 DIAGNOSIS — E78.2 MIXED HYPERLIPIDEMIA: ICD-10-CM

## 2024-11-25 RX ORDER — ROSUVASTATIN CALCIUM 40 MG/1
40 TABLET, COATED ORAL DAILY
Qty: 90 TABLET | Refills: 1 | Status: SHIPPED | OUTPATIENT
Start: 2024-11-25 | End: 2025-05-24

## 2024-11-29 DIAGNOSIS — I48.91 ATRIAL FIBRILLATION (HCC): ICD-10-CM

## 2024-11-29 NOTE — TELEPHONE ENCOUNTER
Received call from patient's spouse requesting a refill of patient's Tikosyn.     Please review and advise.

## 2024-12-01 RX ORDER — DOFETILIDE 0.12 MG/1
125 CAPSULE ORAL 2 TIMES DAILY
Qty: 180 CAPSULE | Refills: 3 | Status: SHIPPED | OUTPATIENT
Start: 2024-12-01

## 2024-12-11 ENCOUNTER — OFFICE VISIT (OUTPATIENT)
Dept: INTERNAL MEDICINE CLINIC | Facility: CLINIC | Age: 79
End: 2024-12-11

## 2024-12-11 VITALS
HEIGHT: 69 IN | BODY MASS INDEX: 25.18 KG/M2 | WEIGHT: 170 LBS | SYSTOLIC BLOOD PRESSURE: 131 MMHG | HEART RATE: 74 BPM | TEMPERATURE: 97.3 F | DIASTOLIC BLOOD PRESSURE: 61 MMHG

## 2024-12-11 DIAGNOSIS — J45.20 MILD INTERMITTENT ASTHMA, UNSPECIFIED WHETHER COMPLICATED: ICD-10-CM

## 2024-12-11 DIAGNOSIS — F17.200 CURRENT SMOKER: ICD-10-CM

## 2024-12-11 DIAGNOSIS — Z00.00 MEDICARE ANNUAL WELLNESS VISIT, INITIAL: Primary | ICD-10-CM

## 2024-12-11 DIAGNOSIS — J45.20 MILD INTERMITTENT ASTHMA WITHOUT COMPLICATION: ICD-10-CM

## 2024-12-11 DIAGNOSIS — Z95.2 S/P TAVR (TRANSCATHETER AORTIC VALVE REPLACEMENT): ICD-10-CM

## 2024-12-11 DIAGNOSIS — E03.9 HYPOTHYROIDISM, UNSPECIFIED TYPE: ICD-10-CM

## 2024-12-11 DIAGNOSIS — E11.59 TYPE 2 DIABETES MELLITUS WITH OTHER CIRCULATORY COMPLICATION, WITH LONG-TERM CURRENT USE OF INSULIN (HCC): ICD-10-CM

## 2024-12-11 DIAGNOSIS — I21.4 NSTEMI (NON-ST ELEVATED MYOCARDIAL INFARCTION) (HCC): ICD-10-CM

## 2024-12-11 DIAGNOSIS — I50.23 ACUTE ON CHRONIC SYSTOLIC CONGESTIVE HEART FAILURE (HCC): ICD-10-CM

## 2024-12-11 DIAGNOSIS — I10 PRIMARY HYPERTENSION: ICD-10-CM

## 2024-12-11 DIAGNOSIS — I25.10 CORONARY ARTERY DISEASE INVOLVING NATIVE CORONARY ARTERY OF NATIVE HEART WITHOUT ANGINA PECTORIS: ICD-10-CM

## 2024-12-11 DIAGNOSIS — I50.20 HEART FAILURE WITH REDUCED EJECTION FRACTION (HCC): ICD-10-CM

## 2024-12-11 DIAGNOSIS — I48.92 ATRIAL FLUTTER, UNSPECIFIED TYPE (HCC): ICD-10-CM

## 2024-12-11 DIAGNOSIS — E78.2 MIXED HYPERLIPIDEMIA: ICD-10-CM

## 2024-12-11 DIAGNOSIS — R31.0 GROSS HEMATURIA: ICD-10-CM

## 2024-12-11 DIAGNOSIS — Z12.2 SCREENING FOR LUNG CANCER: ICD-10-CM

## 2024-12-11 DIAGNOSIS — M17.9 OSTEOARTHRITIS OF KNEE: ICD-10-CM

## 2024-12-11 DIAGNOSIS — D50.9 IRON DEFICIENCY ANEMIA, UNSPECIFIED IRON DEFICIENCY ANEMIA TYPE: ICD-10-CM

## 2024-12-11 DIAGNOSIS — E03.9 HYPOTHYROIDISM: ICD-10-CM

## 2024-12-11 DIAGNOSIS — Z95.5 STATUS POST INSERTION OF DRUG ELUTING CORONARY ARTERY STENT: ICD-10-CM

## 2024-12-11 DIAGNOSIS — Z79.4 TYPE 2 DIABETES MELLITUS WITH OTHER CIRCULATORY COMPLICATION, WITH LONG-TERM CURRENT USE OF INSULIN (HCC): ICD-10-CM

## 2024-12-11 DIAGNOSIS — I50.22 CHRONIC HFREF (HEART FAILURE WITH REDUCED EJECTION FRACTION) (HCC): ICD-10-CM

## 2024-12-11 PROCEDURE — 83036 HEMOGLOBIN GLYCOSYLATED A1C: CPT | Performed by: INTERNAL MEDICINE

## 2024-12-11 PROCEDURE — 3078F DIAST BP <80 MM HG: CPT | Performed by: INTERNAL MEDICINE

## 2024-12-11 PROCEDURE — G0439 PPPS, SUBSEQ VISIT: HCPCS | Performed by: INTERNAL MEDICINE

## 2024-12-11 PROCEDURE — 3075F SYST BP GE 130 - 139MM HG: CPT | Performed by: INTERNAL MEDICINE

## 2024-12-11 RX ORDER — ALBUTEROL SULFATE 90 UG/1
INHALANT RESPIRATORY (INHALATION)
Qty: 8.5 G | Refills: 4 | Status: SHIPPED | OUTPATIENT
Start: 2024-12-11

## 2024-12-11 RX ORDER — SPIRONOLACTONE 25 MG/1
12.5 TABLET ORAL DAILY
Qty: 45 TABLET | Refills: 1 | Status: SHIPPED | OUTPATIENT
Start: 2024-12-11

## 2024-12-11 RX ORDER — METOPROLOL SUCCINATE 50 MG/1
75 TABLET, EXTENDED RELEASE ORAL 2 TIMES DAILY
Qty: 270 TABLET | Refills: 2 | Status: SHIPPED | OUTPATIENT
Start: 2024-12-11

## 2024-12-11 RX ORDER — LEVOTHYROXINE SODIUM 137 UG/1
137 TABLET ORAL DAILY
Qty: 90 TABLET | Refills: 3 | Status: SHIPPED | OUTPATIENT
Start: 2024-12-11

## 2024-12-11 RX ORDER — FERROUS SULFATE 325(65) MG
1 TABLET ORAL EVERY OTHER DAY
Qty: 45 TABLET | Refills: 1 | Status: SHIPPED | OUTPATIENT
Start: 2024-12-11

## 2024-12-11 RX ORDER — MONTELUKAST SODIUM 10 MG/1
10 TABLET ORAL DAILY
Qty: 90 TABLET | Refills: 3 | Status: SHIPPED | OUTPATIENT
Start: 2024-12-11

## 2024-12-11 RX ORDER — CLOPIDOGREL BISULFATE 75 MG/1
75 TABLET ORAL DAILY
Qty: 90 TABLET | Refills: 1 | Status: SHIPPED | OUTPATIENT
Start: 2024-12-11 | End: 2025-06-09

## 2024-12-11 RX ORDER — FUROSEMIDE 40 MG/1
40 TABLET ORAL 2 TIMES DAILY
Qty: 180 TABLET | Refills: 1 | Status: SHIPPED | OUTPATIENT
Start: 2024-12-11

## 2024-12-11 RX ORDER — ROSUVASTATIN CALCIUM 40 MG/1
40 TABLET, COATED ORAL DAILY
Qty: 90 TABLET | Refills: 1 | Status: SHIPPED | OUTPATIENT
Start: 2024-12-11 | End: 2025-06-09

## 2024-12-11 RX ORDER — LISINOPRIL 10 MG/1
10 TABLET ORAL DAILY
Qty: 90 TABLET | Refills: 0 | Status: SHIPPED | OUTPATIENT
Start: 2024-12-11 | End: 2025-03-11

## 2024-12-11 NOTE — ASSESSMENT & PLAN NOTE
Wt Readings from Last 3 Encounters:   12/11/24 77.1 kg (170 lb)   10/30/24 74.4 kg (164 lb)   09/27/24 75 kg (165 lb 4.8 oz)               Orders:    spironolactone (ALDACTONE) 25 mg tablet; Take 0.5 tablets (12.5 mg total) by mouth daily

## 2024-12-11 NOTE — ASSESSMENT & PLAN NOTE
Orders:    ferrous sulfate 325 (65 Fe) mg tablet; Take 1 tablet (325 mg total) by mouth every other day

## 2024-12-11 NOTE — PATIENT INSTRUCTIONS
Medicare Preventive Visit Patient Instructions  Thank you for completing your Welcome to Medicare Visit or Medicare Annual Wellness Visit today. Your next wellness visit will be due in one year (12/12/2025).  The screening/preventive services that you may require over the next 5-10 years are detailed below. Some tests may not apply to you based off risk factors and/or age. Screening tests ordered at today's visit but not completed yet may show as past due. Also, please note that scanned in results may not display below.  Preventive Screenings:  Service Recommendations Previous Testing/Comments   Colorectal Cancer Screening  Colonoscopy    Fecal Occult Blood Test (FOBT)/Fecal Immunochemical Test (FIT)  Fecal DNA/Cologuard Test  Flexible Sigmoidoscopy Age: 45-75 years old   Colonoscopy: every 10 years (May be performed more frequently if at higher risk)  OR  FOBT/FIT: every 1 year  OR  Cologuard: every 3 years  OR  Sigmoidoscopy: every 5 years  Screening may be recommended earlier than age 45 if at higher risk for colorectal cancer. Also, an individualized decision between you and your healthcare provider will decide whether screening between the ages of 76-85 would be appropriate. Colonoscopy: 05/17/2018  FOBT/FIT: Not on file  Cologuard: Not on file  Sigmoidoscopy: Not on file          Prostate Cancer Screening Individualized decision between patient and health care provider in men between ages of 55-69   Medicare will cover every 12 months beginning on the day after your 50th birthday PSA: No results in last 5 years           Hepatitis C Screening Once for adults born between 1945 and 1965  More frequently in patients at high risk for Hepatitis C Hep C Antibody: 11/07/2019        Diabetes Screening 1-2 times per year if you're at risk for diabetes or have pre-diabetes Fasting glucose: 72 mg/dL (2/9/2024)  A1C: 5.9 (4/25/2024)      Cholesterol Screening Once every 5 years if you don't have a lipid disorder. May  order more often based on risk factors. Lipid panel: 12/23/2023         Other Preventive Screenings Covered by Medicare:  Abdominal Aortic Aneurysm (AAA) Screening: covered once if your at risk. You're considered to be at risk if you have a family history of AAA or a male between the age of 65-75 who smoking at least 100 cigarettes in your lifetime.  Lung Cancer Screening: covers low dose CT scan once per year if you meet all of the following conditions: (1) Age 55-77; (2) No signs or symptoms of lung cancer; (3) Current smoker or have quit smoking within the last 15 years; (4) You have a tobacco smoking history of at least 20 pack years (packs per day x number of years you smoked); (5) You get a written order from a healthcare provider.  Glaucoma Screening: covered annually if you're considered high risk: (1) You have diabetes OR (2) Family history of glaucoma OR (3)  aged 50 and older OR (4)  American aged 65 and older  Osteoporosis Screening: covered every 2 years if you meet one of the following conditions: (1) Have a vertebral abnormality; (2) On glucocorticoid therapy for more than 3 months; (3) Have primary hyperparathyroidism; (4) On osteoporosis medications and need to assess response to drug therapy.  HIV Screening: covered annually if you're between the age of 15-65. Also covered annually if you are younger than 15 and older than 65 with risk factors for HIV infection. For pregnant patients, it is covered up to 3 times per pregnancy.    Immunizations:  Immunization Recommendations   Influenza Vaccine Annual influenza vaccination during flu season is recommended for all persons aged >= 6 months who do not have contraindications   Pneumococcal Vaccine   * Pneumococcal conjugate vaccine = PCV13 (Prevnar 13), PCV15 (Vaxneuvance), PCV20 (Prevnar 20)  * Pneumococcal polysaccharide vaccine = PPSV23 (Pneumovax) Adults 19-65 yo with certain risk factors or if 65+ yo  If never received any  pneumonia vaccine: recommend Prevnar 20 (PCV20)  Give PCV20 if previously received 1 dose of PCV13 or PPSV23   Hepatitis B Vaccine 3 dose series if at intermediate or high risk (ex: diabetes, end stage renal disease, liver disease)   Respiratory syncytial virus (RSV) Vaccine - COVERED BY MEDICARE PART D  * RSVPreF3 (Arexvy) CDC recommends that adults 60 years of age and older may receive a single dose of RSV vaccine using shared clinical decision-making (SCDM)   Tetanus (Td) Vaccine - COST NOT COVERED BY MEDICARE PART B Following completion of primary series, a booster dose should be given every 10 years to maintain immunity against tetanus. Td may also be given as tetanus wound prophylaxis.   Tdap Vaccine - COST NOT COVERED BY MEDICARE PART B Recommended at least once for all adults. For pregnant patients, recommended with each pregnancy.   Shingles Vaccine (Shingrix) - COST NOT COVERED BY MEDICARE PART B  2 shot series recommended in those 19 years and older who have or will have weakened immune systems or those 50 years and older     Health Maintenance Due:      Topic Date Due   • Hepatitis C Screening  Discontinued   • Colorectal Cancer Screening  Discontinued     Immunizations Due:      Topic Date Due   • Hepatitis A Vaccine (1 of 2 - Risk 2-dose series) Never done   • Hepatitis B Vaccine (3 of 3 - Risk 3-dose series) 11/20/2019   • Influenza Vaccine (1) 09/01/2024   • COVID-19 Vaccine (4 - 2024-25 season) 09/01/2024     Advance Directives   What are advance directives?  Advance directives are legal documents that state your wishes and plans for medical care. These plans are made ahead of time in case you lose your ability to make decisions for yourself. Advance directives can apply to any medical decision, such as the treatments you want, and if you want to donate organs.   What are the types of advance directives?  There are many types of advance directives, and each state has rules about how to use them. You  may choose a combination of any of the following:  Living will:  This is a written record of the treatment you want. You can also choose which treatments you do not want, which to limit, and which to stop at a certain time. This includes surgery, medicine, IV fluid, and tube feedings.   Durable power of  for healthcare (DPAHC):  This is a written record that states who you want to make healthcare choices for you when you are unable to make them for yourself. This person, called a proxy, is usually a family member or a friend. You may choose more than 1 proxy.  Do not resuscitate (DNR) order:  A DNR order is used in case your heart stops beating or you stop breathing. It is a request not to have certain forms of treatment, such as CPR. A DNR order may be included in other types of advance directives.  Medical directive:  This covers the care that you want if you are in a coma, near death, or unable to make decisions for yourself. You can list the treatments you want for each condition. Treatment may include pain medicine, surgery, blood transfusions, dialysis, IV or tube feedings, and a ventilator (breathing machine).  Values history:  This document has questions about your views, beliefs, and how you feel and think about life. This information can help others choose the care that you would choose.  Why are advance directives important?  An advance directive helps you control your care. Although spoken wishes may be used, it is better to have your wishes written down. Spoken wishes can be misunderstood, or not followed. Treatments may be given even if you do not want them. An advance directive may make it easier for your family to make difficult choices about your care.       © Copyright [employer] 2018 Information is for End User's use only and may not be sold, redistributed or otherwise used for commercial purposes. All illustrations and images included in CareNotes® are the copyrighted property of  [employer]. or [employer]

## 2024-12-11 NOTE — ASSESSMENT & PLAN NOTE
Lab Results   Component Value Date    HGBA1C 5.9 04/25/2024       Orders:    POCT hemoglobin A1c

## 2024-12-11 NOTE — ASSESSMENT & PLAN NOTE
Orders:    Empagliflozin (JARDIANCE) 10 MG TABS tablet; Take 1 tablet (10 mg total) by mouth every morning

## 2024-12-11 NOTE — PROGRESS NOTES
Name: Juan David Lora      : 1945      MRN: [id number]  Encounter Provider: Jarrett Chin MD  Encounter Date: 2024   Encounter department: [facility]    1. Medicare annual wellness visit, initial (Primary)    - CBC and differential; Future  - Comprehensive metabolic panel; Future    2. Type 2 diabetes mellitus with other circulatory complication, with long-term current use of insulin ([facility])  Last A1C 5.9    - POCT hemoglobin A1c  -Diabetic eye and foot exam on next visit    3. NSTEMI (non-ST elevated myocardial infarction) ([facility]), CAD  4. Status post insertion of drug eluting coronary artery stent    - Empagliflozin (JARDIANCE) 10 MG TABS tablet; Take 1 tablet (10 mg total) by mouth every morning  Dispense: 90 tablet; Refill: 3  -Continue plavix and statin  - Lipid panel; Future    6. Mild intermittent asthma without complication  Likely has COPD, obstructive defect    - albuterol (PROVENTIL HFA,VENTOLIN HFA) 90 mcg/act inhaler; INHALE 2 PUFFS BY MOUTH EVERY 4 HOURS AS NEEDED FOR WHEEZING OR SHORTNESS OF BREATH  Dispense: 8.5 g; Refill: 4  - montelukast (SINGULAIR) 10 mg tablet; Take 1 tablet (10 mg total) by mouth daily  Dispense: 90 tablet; Refill: 3  -Consider repeat PFT, discussed smoking cessation    7. Atrial flutter, unspecified type ([facility])    - apixaban (Eliquis) 5 mg; Take 1 tablet (5 mg total) by mouth 2 (two) times a day  Dispense: 180 tablet; Refill: 3  -Continue Tikosyn and Metoprolol per EP    8. S/P TAVR (transcatheter aortic valve replacement)    - clopidogrel (PLAVIX) 75 mg tablet; Take 1 tablet (75 mg total) by mouth daily  Dispense: 90 tablet; Refill: 1    9. Iron deficiency anemia, unspecified iron deficiency anemia type    - ferrous sulfate 325 (65 Fe) mg tablet; Take 1 tablet (325 mg total) by mouth every other day  Dispense: 45 tablet; Refill: 1    10. Acute on chronic systolic congestive heart failure (HCC)  HFrEF ischemic cardiomyopathy    -  spironolactone (ALDACTONE) 25 mg tablet; Take 0.5 tablets (12.5 mg total) by mouth daily  Dispense: 45 tablet; Refill: 1  - furosemide (LASIX) 40 mg tablet; Take 1 tablet (40 mg total) by mouth 2 (two) times a day  Dispense: 180 tablet; Refill: 1  - metoprolol succinate (TOPROL-XL) 50 mg 24 hr tablet; Take 1.5 tablets (75 mg total) by mouth 2 (two) times a day  Dispense: 270 tablet; Refill: 2  -Continue lisinopril 10 mg    11. Hypothyroidism    - levothyroxine 137 mcg tablet; Take 1 tablet (137 mcg total) by mouth daily  Dispense: 90 tablet; Refill: 3  -Recheck levels    12. Mixed hyperlipidemia    - rosuvastatin (CRESTOR) 40 MG tablet; Take 1 tablet (40 mg total) by mouth daily  Dispense: 90 tablet; Refill: 1  - Lipid panel; Future    13. Screening for lung cancer  Meets criteria, current smoker    - CT lung screening program; Future    14. Gross hematuria  CT not remarkable, significant smoking history    -High risk for urothelial carcinoma  -Needs cystoscopy, Urology follow up    15. Osteoarthritis of knee    - Diclofenac Sodium (VOLTAREN) 1 %; Apply 2 g topically 4 (four) times a day  Dispense: 100 g; Refill: 3   Assessment & Plan  Medicare annual wellness visit, initial    Orders:    CBC and differential; Future    Comprehensive metabolic panel; Future    Type 2 diabetes mellitus with other circulatory complication, with long-term current use of insulin ([facility])    Lab Results   Component Value Date    HGBA1C 5.9 04/25/2024       Orders:    POCT hemoglobin A1c    NSTEMI (non-ST elevated myocardial infarction) ([facility])    Orders:    Empagliflozin (JARDIANCE) 10 MG TABS tablet; Take 1 tablet (10 mg total) by mouth every morning    Status post insertion of drug eluting coronary artery stent    Orders:    Empagliflozin (JARDIANCE) 10 MG TABS tablet; Take 1 tablet (10 mg total) by mouth every morning    Coronary artery disease involving native coronary artery of native heart without angina pectoris    Orders:     Empagliflozin (JARDIANCE) 10 MG TABS tablet; Take 1 tablet (10 mg total) by mouth every morning    Lipid panel; Future    Mild intermittent asthma without complication    Orders:    albuterol (PROVENTIL HFA,VENTOLIN HFA) 90 mcg/act inhaler; INHALE 2 PUFFS BY MOUTH EVERY 4 HOURS AS NEEDED FOR WHEEZING OR SHORTNESS OF BREATH    Atrial flutter, unspecified type (HCC)    Orders:    apixaban (Eliquis) 5 mg; Take 1 tablet (5 mg total) by mouth 2 (two) times a day    S/P TAVR (transcatheter aortic valve replacement)    Orders:    clopidogrel (PLAVIX) 75 mg tablet; Take 1 tablet (75 mg total) by mouth daily    Iron deficiency anemia, unspecified iron deficiency anemia type    Orders:    ferrous sulfate 325 (65 Fe) mg tablet; Take 1 tablet (325 mg total) by mouth every other day    Acute on chronic systolic congestive heart failure (HCC)  Wt Readings from Last 3 Encounters:   12/11/24 77.1 kg (170 lb)   10/30/24 74.4 kg (164 lb)   09/27/24 75 kg (165 lb 4.8 oz)               Orders:    furosemide (LASIX) 40 mg tablet; Take 1 tablet (40 mg total) by mouth 2 (two) times a day    Hypothyroidism    Orders:    levothyroxine 137 mcg tablet; Take 1 tablet (137 mcg total) by mouth daily    Heart failure with reduced ejection fraction (HCC)  Wt Readings from Last 3 Encounters:   12/11/24 77.1 kg (170 lb)   10/30/24 74.4 kg (164 lb)   09/27/24 75 kg (165 lb 4.8 oz)               Orders:    metoprolol succinate (TOPROL-XL) 50 mg 24 hr tablet; Take 1.5 tablets (75 mg total) by mouth 2 (two) times a day    Mild intermittent asthma, unspecified whether complicated    Orders:    montelukast (SINGULAIR) 10 mg tablet; Take 1 tablet (10 mg total) by mouth daily    Mixed hyperlipidemia    Orders:    rosuvastatin (CRESTOR) 40 MG tablet; Take 1 tablet (40 mg total) by mouth daily    Lipid panel; Future    Chronic HFrEF (heart failure with reduced ejection fraction) (HCC)  Wt Readings from Last 3 Encounters:   12/11/24 77.1 kg (170 lb)   10/30/24  74.4 kg (164 lb)   09/27/24 75 kg (165 lb 4.8 oz)               Orders:    spironolactone (ALDACTONE) 25 mg tablet; Take 0.5 tablets (12.5 mg total) by mouth daily    Primary hypertension    Orders:    lisinopril (ZESTRIL) 10 mg tablet; Take 1 tablet (10 mg total) by mouth daily    Current smoker    Orders:    CT lung screening program; Future    Screening for lung cancer  I discussed with him that he is a candidate for lung cancer CT screening.     The following Shared Decision-Making points were covered:  Benefits of screening were discussed, including the rates of reduction in death from lung cancer and other causes.  Harms of screening were reviewed, including false positive tests, radiation exposure levels, risks of invasive procedures, risks of complications of screening, and risk of overdiagnosis.  I counseled on the importance of adherence to annual lung cancer LDCT screening, impact of co-morbidities, and ability or willingness to undergo diagnosis and treatment.  I counseled on the importance of maintaining abstinence as a former smoker or was counseled on the importance of smoking cessation if a current smoker    Review of Eligibility Criteria: He meets all of the criteria for Lung Cancer Screening.   He is 79 y.o.   He has 20 pack year tobacco history and is a current smoker or has quit within the past 15 years  He presents no signs or symptoms of lung cancer    After discussion, the patient decided to elect lung cancer screening.    Orders:    CT lung screening program; Future    Gross hematuria    Orders:    CBC and differential; Future    Comprehensive metabolic panel; Future    Hypothyroidism, unspecified type    Orders:    TSH + Free T4; Future    Osteoarthritis of knee    Orders:    Diclofenac Sodium (VOLTAREN) 1 %; Apply 2 g topically 4 (four) times a day    BMI Counseling: Body mass index is 25.1 kg/m². The BMI is above normal. Nutrition recommendations include decreasing portion sizes,  encouraging healthy choices of fruits and vegetables and moderation in carbohydrate intake. Exercise recommendations include moderate physical activity 150 minutes/week. Rationale for BMI follow-up plan is due to patient being overweight or obese.     Tobacco Cessation Counseling: Tobacco cessation counseling was provided. The patient is sincerely urged to quit consumption of tobacco. He is not ready to quit tobacco. Medication options discussed.     Lung Cancer Screening Shared Decision Making: I discussed with him that he is a candidate for lung cancer CT screening.     The following Shared Decision-Making points were covered:  Benefits of screening were discussed, including the rates of reduction in death from lung cancer and other causes.  Harms of screening were reviewed, including false positive tests, radiation exposure levels, risks of invasive procedures, risks of complications of screening, and risk of overdiagnosis.  I counseled on the importance of adherence to annual lung cancer LDCT screening, impact of co-morbidities, and ability or willingness to undergo diagnosis and treatment.  I counseled on the importance of maintaining abstinence as a former smoker or was counseled on the importance of smoking cessation if a current smoker    Review of Eligibility Criteria: He meets all of the criteria for Lung Cancer Screening.   - He is 79 y.o.   - He has 20 pack year tobacco history and is a current smoker or has quit within the past 15 years  - He presents no signs or symptoms of lung cancer    After discussion, the patient decided to elect lung cancer screening.      Preventive health issues were discussed with patient, and age appropriate screening tests were ordered as noted in patient's After Visit Summary. Personalized health advice and appropriate referrals for health education or preventive services given if needed, as noted in patient's After Visit Summary.    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    10/29 ER visit for gross hematuria, had urology follow up 10/30- Urology for gross hematuria. Urine dip positive for blood,  CT ordered. Cystoscopy highly recommended.   CT renal-  Prostatic enlargement. 1 cm nonobstructing left lower pole calyceal calculus. No additional findings to explain the patient's hematuria. Patient had declined procedure. States no further urinary symptoms. Initially patient states he stopped smoking after heart attack, but now patient admits to smoking 2 cigarettes per day.   Patient last saw EP in September, recommended continuation of Toprol and Tikosyn 125 mcg BID, plavix and statin.     HTN- lisinopril 10 mg, lasix 40 mg BID, aldactone 12.5 mg, metoprolol succinate 75 mg BID  T2DM- metformin 850 mg BID, jardiance 10 mg,   Glargine 18u bedtime, novolog 5u BID        Patient Care Team:  iLsa Amezcua DO as PCP - General (Internal Medicine)  Raad Sandoval MD as Endoscopist  Yemi Molina MD as Cardiologist (Cardiology)  Virginia Gan MD as Cardiologist (Cardiology)  Mario Yuan MD as Cardiologist (Cardiology)    Review of Systems   Constitutional:  Negative for chills and fever.   Respiratory:  Negative for shortness of breath.    Cardiovascular:  Negative for chest pain and palpitations.   Gastrointestinal:  Negative for abdominal pain, blood in stool, constipation, diarrhea and vomiting.   Endocrine: Negative for polyuria.   Genitourinary:  Negative for dysuria and hematuria.   Neurological:  Negative for dizziness and light-headedness.     Medical History Reviewed by provider this encounter:       Annual Wellness Visit Destiny Fall is here for his Initial Wellness visit.     Health Risk Assessment:    Patient rates overall health as good. Patient feels that their physical health rating is same. Patient is satisfied with their life. Eyesight was rated as same. Hearing was rated as same. Patient feels that their emotional and mental health rating is same. Patients states they are sometimes angry. Patient states they are never, rarely unusually tired/fatigued. Pain experienced in the last 7 days has been none. Patient states that he has experienced no weight loss or gain in last 6 months.     Fall Risk Screening:   In the past year, patient has experienced: no history of falling in past year      Home Safety:  Patient has trouble with stairs inside or outside of their home. Patient has no working smoke alarms and has no working carbon monoxide detector. Home safety hazards include: none.     Nutrition:   Current diet is Regular. Eats a healthy diet    Medications:   Patient is not currently taking any over-the-counter supplements. Patient is able to manage medications.     Activities of Daily Living (ADLs)/Instrumental Activities of Daily Living (IADLs):   Walk and transfer into and out of bed and chair?: Yes  Dress and groom yourself?: Yes    Bathe or shower yourself?: Yes    Feed yourself? Yes  Do your laundry/housekeeping?: Yes  Manage your money, pay your bills and track your expenses?: Yes  Make your own meals?: No    Do your own shopping?: Yes    ADL comments: Pt does not know how to cook, wife does    Previous Hospitalizations:   Any hospitalizations or ED visits within the last 12 months?: Yes    How many hospitalizations have you had in the last year?: 1-2    Advance Care Planning:   Living will: No    Durable POA for healthcare: No    Advanced directive: No      PREVENTIVE SCREENINGS      Cardiovascular Screening:    General: Screening Not Indicated and History Lipid Disorder      Diabetes Screening:     General: Screening Not Indicated and History Diabetes      Prostate Cancer Screening:    General:  "Screening Not Indicated      Abdominal Aortic Aneurysm (AAA) Screening:    Risk factors include: tobacco use        Lung Cancer Screening:     General: Risks and Benefits Discussed    Due for: Low Dose CT (LDCT)      Hepatitis C Screening:    General: Screening Not Indicated and History Hepatitis C    Screening, Brief Intervention, and Referral to Treatment (SBIRT)    Screening  Typical number of drinks in a day: 0  Typical number of drinks in a week: 0  Interpretation: Low risk drinking behavior.    Single Item Drug Screening:  How often have you used an illegal drug (including marijuana) or a prescription medication for non-medical reasons in the past year? never    Single Item Drug Screen Score: 0  Interpretation: Negative screen for possible drug use disorder    Social Drivers of Health     Financial Resource Strain: Low Risk  (2/29/2024)    Overall Financial Resource Strain (CARDIA)     Difficulty of Paying Living Expenses: Not hard at all   Food Insecurity: No Food Insecurity (2/29/2024)    Nursing - Inadequate Food Risk Classification     Worried About Running Out of Food in the Last Year: Never true     Ran Out of Food in the Last Year: Never true   Transportation Needs: No Transportation Needs (3/21/2024)    OASIS : Transportation     Lack of Transportation (Medical): No     Lack of Transportation (Non-Medical): No     Patient Unable or Declines to Respond: No   Housing Stability: Low Risk  (4/25/2024)    Housing Stability Vital Sign     Unable to Pay for Housing in the Last Year: No     Number of Times Moved in the Last Year: 1     Homeless in the Last Year: No   Utilities: Not At Risk (4/25/2024)    [facility] Utilities     Threatened with loss of utilities: No     No results found.    Objective   /61 (BP Location: Left arm, Patient Position: Sitting, Cuff Size: Adult)   Pulse 74   Temp (!) 97.3 °F (36.3 °C) (Temporal)   Ht 5' 9\" (1.753 m)   Wt 77.1 kg (170 lb)   BMI 25.10 kg/m²     Physical " Exam  Vitals reviewed.   Constitutional:       General: He is not in acute distress.  HENT:      Head: Normocephalic and atraumatic.   Eyes:      Conjunctiva/sclera: Conjunctivae normal.   Cardiovascular:      Rate and Rhythm: Normal rate and regular rhythm.      Heart sounds: Murmur heard.   Pulmonary:      Effort: Pulmonary effort is normal.   Abdominal:      Palpations: Abdomen is soft.      Tenderness: There is no abdominal tenderness.   Musculoskeletal:         General: Normal range of motion.      Right lower leg: No edema.      Left lower leg: No edema.   Neurological:      Mental Status: He is alert and oriented to person, place, and time.

## 2024-12-11 NOTE — ASSESSMENT & PLAN NOTE
Orders:    albuterol (PROVENTIL HFA,VENTOLIN HFA) 90 mcg/act inhaler; INHALE 2 PUFFS BY MOUTH EVERY 4 HOURS AS NEEDED FOR WHEEZING OR SHORTNESS OF BREATH

## 2024-12-17 DIAGNOSIS — M17.9 OSTEOARTHRITIS OF KNEE: ICD-10-CM

## 2024-12-17 LAB — SL AMB POCT HEMOGLOBIN AIC: 6.2 (ref ?–6.5)

## 2024-12-18 NOTE — TELEPHONE ENCOUNTER
Please review if wanting to send an alternate medication or patient can buy OTC.  Spoke with pharmacist. Voltaren 1% brand is on  back order. Patient may pay for medication out of pocket over the counter.

## 2024-12-27 ENCOUNTER — IN-CLINIC DEVICE VISIT (OUTPATIENT)
Dept: CARDIOLOGY CLINIC | Facility: CLINIC | Age: 79
End: 2024-12-27
Payer: MEDICARE

## 2024-12-27 ENCOUNTER — RESULTS FOLLOW-UP (OUTPATIENT)
Dept: CARDIOLOGY CLINIC | Facility: CLINIC | Age: 79
End: 2024-12-27

## 2024-12-27 DIAGNOSIS — I50.22 CHRONIC HFREF (HEART FAILURE WITH REDUCED EJECTION FRACTION) (HCC): Chronic | ICD-10-CM

## 2024-12-27 DIAGNOSIS — Z95.810 PRESENCE OF IMPLANTABLE CARDIOVERTER-DEFIBRILLATOR (ICD): ICD-10-CM

## 2024-12-27 DIAGNOSIS — I49.5 TACHY-BRADY SYNDROME (HCC): Primary | Chronic | ICD-10-CM

## 2024-12-27 PROCEDURE — 93284 PRGRMG EVAL IMPLANTABLE DFB: CPT | Performed by: INTERNAL MEDICINE

## 2024-12-27 NOTE — PROGRESS NOTES
Results for orders placed or performed in visit on 12/27/24   Cardiac EP device report    Narrative    MDT BI-V ICD/ACTIVE SYSTEM IS MRI CONDITIONAL  DEVICE INTERROGATED IN THE [facility] OFFICE. BATTERY VOLTAGE ADEQUATE (7.9 YRS).  AP 5.6% BVP 98.1% (VSRP 1.1%).  ALL LEAD PARAMETERS WITHIN NORMAL LIMITS.  NO SIGNIFICANT HIGH RATE EPISODES.  7 V SENSE EPISODES, MARKERS FOR FUSION.  PT TAKIING ELIQUIS, CLOPIDOGREL, DOFETILIDE, FUROSEMIDE, SPIRONOLACTONE.  EF 25-30% (ECHO 12/23/2023).  OPTI-VOL WITHIN NORMAL LIMITS.  AFTER INCR EGM2 RANGE TO +/- 16 MV,  NEW WAVELET TEMPLATE OBTAINED (SEE OBSERVATIONS).  NORMAL DEVICE FUNCTION.  PAS

## 2025-01-13 ENCOUNTER — RA CDI HCC (OUTPATIENT)
Dept: OTHER | Facility: HOSPITAL | Age: 80
End: 2025-01-13

## 2025-01-13 NOTE — PROGRESS NOTES
HCC coding opportunities          Chart Reviewed number of suggestions sent to Provider: 3     Patients Insurance     Medicare Insurance: [employer]          1) E11.22: Type 2 diabetes mellitus with diabetic chronic kidney disease (HCC)     As per ICD 10 coding guidelines, DM & CKD are presumed to have a causal-effect relationship unless documented as unrelated please review and assess. if applicable    2) I13.0 : Hypertensive heart and chronic kidney disease with heart failure and stage 1 through stage 4 chronic kidney disease, or unspecified chronic kidney disease (HCC)    Per ICD 10 CM coding guidelines the classification presumes a causal relationship between hypertension and heart involvement and between CKD unless the documentation clearly states the conditions are unrelated    3) E11.51: Type 2 diabetes mellitus with diabetic peripheral angiopathy without gangrene [E11.51]    As per ICD 10 coding guidelines, DM & PVD are presumed to have a causal-effect relationship unless documented as unrelated please review and assess if applicable.

## 2025-01-22 ENCOUNTER — TELEPHONE (OUTPATIENT)
Dept: INTERNAL MEDICINE CLINIC | Facility: CLINIC | Age: 80
End: 2025-01-22

## 2025-01-28 ENCOUNTER — TELEPHONE (OUTPATIENT)
Dept: INTERNAL MEDICINE CLINIC | Facility: CLINIC | Age: 80
End: 2025-01-28

## 2025-01-28 NOTE — TELEPHONE ENCOUNTER
----- Message from Macy CASTELLANOS sent at 1/27/2025  2:57 PM EST -----  Regarding: RE: No Show  Spoke to daughter Damien and she wanted to confirm with patient before scheduling appt.     1 month to see Dr. Persaud, he would be available every Wednesday except 02/12/25  ----- Message -----  From: Luisa Lawler LPN  Sent: 1/22/2025  12:42 PM EST  To: [facility] Clerical  Subject: FW: No Show                                        ----- Message -----  From: Elier Traore MD  Sent: 1/22/2025  11:03 AM EST  To: Luisa Lawler LPN  Subject: No Show                                          Hi for Juan David looks like he didn't come for his appointment today, he has a lot of chronic issues on-going and we need to try and see him sooner if possible, especially with his new hematuria we are trying to get him to follow up with urology for a cystoscopy. If we're able to reach out with him can we schedule him within 1 month to see Dr. Persaud, he would be available every Wednesday except 02/12/25. Thanks and let me know if there's anything else I can assist with

## 2025-03-05 NOTE — PROGRESS NOTES
HEART AND VASCULAR  CARDIAC ELECTROPHYSIOLOGY   HEART RHYTHM CENTER  [facility]    Outpatient  Follow-up for paroxysmal atrial fibrillation and atrial flutter, history of BiV ICD implant  Today's Date: 03/05/25        Patient name: Juan David Lora  YOB: 1945  Sex: male         Chief Complaint: As above      ASSESSMENT:  Problem List Items Addressed This Visit       Hyperlipidemia (Chronic)    Essential hypertension (Chronic)    History of tachy-lino syndrome (Chronic)    Chronic HFrEF (heart failure with reduced ejection fraction) (HCC) (Chronic)    Atrial flutter (HCC) - Primary    s/p Medtronic dual chamber PPM 8/31/22 s/p upgrade to BiV ICD 9/13/2023 (Chronic)    Atrial fibrillation ([facility])               PLAN:  Atrial flutter and paroxysmal atrial fibrillation  -Diagnosed September 2023 when he underwent BiV ICD placement  -[id number] score equals 6 patient maintained on Eliquis  -Continue Tikosyn 125 mcg every 12 hours--QTc 459 ms patient  -No medication changes  -No atrial fibrillation/flutter on device interrogation performed in office    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 ICD  -Device functioning appropriately  -V paced 96.9%  -No VT/VF/A-fib on device  -Continued routine device checks    NSVT  -Recent device check reveals no significant NSVT episodes  -Continue Tikosyn 125 mcg every 12 hours  -Continue metoprolol succinate    HFrEF/ischemic cardiomyopathy  -Status post BiV ICD implant  -Continue Jardiance 10 mg daily  -Continue furosemide 40 mg twice daily  -Continue metoprolol succinate 75 mg twice daily  -Continue spironolactone 12.5 mg daily    CAD status post PCI to M LAD and RPDA (2023)  -Continue clopidogrel 75 mg daily  -Continue rosuvastatin 40 mg daily    Hypertension  -Blood pressure today's visit 122/60  -Continue above therapies    Hyperlipidemia  -Continue rosuvastatin 40 mg daily      Follow up in: 6  months    No orders of the defined types were placed in this encounter.    There are no discontinued medications.        .............................................................................................    HPI/Subjective:     Mr. Lora is a 79-year-old female with past medical history of ischemic cardiomyopathy, CAD status post PCI to mid LAD and RP DA 2023, hypertension, hyperlipidemia, and paroxysmal atrial flutter and atrial fibrillation.  He is currently maintained on Tikosyn 125 mcg every 12 hours and apixaban for anticoagulation.  He presents today in follow-up.    Device interrogation performed in office today reveals AT/AF burden of less than 1%, V pacing 96.9%, and no significant nonsustained VT.      Today, patient notes he feels well he feels much better as of late.  He denies chest pain, palpitation, shortness of breath, dizziness, lightheadedness, syncope, near syncope.  He does describe what appears to be neuropathic pain in the setting of his diabetes isolated to his lower extremities.    He continues to smoke unfortunately.  He smokes about 4 to 5 cigarettes a day.  He does note he does not smoke the whole cigarette, letting it burn most of the time.  He continues to attempt to completely stop smoking.    In office device interrogation reveals V pacing 90%, no atrial fibrillation/atrial flutter/VT/VF/therapies.      Complete 12 point ROS reviewed and otherwise non pertinent or negative except as per HPI pertinent positives in Cardiovascular and Respiratory emphasized. Please see paper chart for outpatient clinic patients where the patient completed the 12 point ROS survey.           Past Medical History:   Diagnosis Date    Arthritis     Asthma     Colon polyps     Community acquired pneumonia     last assessed: 5/1/2014    Diabetes mellitus (HCC)     Hemorrhagic prepatellar bursitis, left 10/21/2019    Hepatitis C     High cholesterol     History of colonoscopy 2017    Hypertension      Lymphadenopathy, anterior cervical 04/17/2018    Nephritis and nephropathy, not specified as acute or chronic, with other specified pathological lesion in kidney, in diseases classified elsewhere 3/26/2013    NSTEMI (non-ST elevated myocardial infarction) ([facility]) 1/30/2023    s/p Medtronic dual chamber PPM 8/31/22 s/p upgrade to BiV ICD 9/13/2023 9/16/2023    Screening for colon cancer 05/01/2019    SIRS (systemic inflammatory response syndrome) ([facility]) 3/19/2023    Thoracic vertebral fracture ([facility]) 06/11/2014       Allergies   Allergen Reactions    Iv Contrast [Iodinated Contrast Media] Rash     Patient states he had a severe reaction approximately 10 years ago , states he had a rash, itchy and he remembers a bunch of people pounding on chest and being surrounded by multiple doctors.     I reviewed the Home Medication list and Allergies in the chart.   Scheduled Meds:  Current Outpatient Medications   Medication Sig Dispense Refill    albuterol (PROVENTIL HFA,VENTOLIN HFA) 90 mcg/act inhaler INHALE 2 PUFFS BY MOUTH EVERY 4 HOURS AS NEEDED FOR WHEEZING OR SHORTNESS OF BREATH 8.5 g 4    Alcohol Swabs (ALCOHOL PADS) 70 % PADS       apixaban (Eliquis) 5 mg Take 1 tablet (5 mg total) by mouth 2 (two) times a day 180 tablet 3    Blood Glucose Monitoring Suppl (OneTouch Verio) w/Device KIT Check blood glucose three times daily before each meal 1 kit 0    cholecalciferol (VITAMIN D3) 1,000 units tablet Take 2 tablets (2,000 Units total) by mouth daily 180 tablet 5    clopidogrel (PLAVIX) 75 mg tablet Take 1 tablet (75 mg total) by mouth daily 90 tablet 1    Continuous Glucose Sensor (Dexcom G7 Sensor) Use 1 Device every 10 days 9 each 3    Diclofenac Sodium (VOLTAREN) 1 % APPLY 2 GRAMS TO AFFECTED AREA 4 TIMES A  g 3    diphenhydrAMINE (BENADRYL) 50 mg capsule Take 1 capsule (50 mg total) by mouth 1 hour prior to procedure 1 capsule 0    dofetilide (TIKOSYN) 125 mcg capsule Take 1 capsule (125 mcg total) by mouth 2  (two) times a day 180 capsule 3    Empagliflozin (JARDIANCE) 10 MG TABS tablet Take 1 tablet (10 mg total) by mouth every morning 90 tablet 3    ferrous sulfate 325 (65 Fe) mg tablet Take 1 tablet (325 mg total) by mouth every other day 45 tablet 1    furosemide (LASIX) 40 mg tablet Take 1 tablet (40 mg total) by mouth 2 (two) times a day 180 tablet 1    Insulin Glargine Solostar (Lantus SoloStar) 100 UNIT/ML SOPN Inject 0.18 mL (18 Units total) under the skin daily at bedtime 15 mL 3    Insulin Pen Needle (Pen Needles) 31G X 8 MM MISC Use daily 300 each 3    Lancets (freestyle) lancets Use as needed (As needed) 100 each 3    levothyroxine 137 mcg tablet Take 1 tablet (137 mcg total) by mouth daily 90 tablet 3    lisinopril (ZESTRIL) 10 mg tablet Take 1 tablet (10 mg total) by mouth daily 90 tablet 0    metFORMIN (GLUCOPHAGE) 850 mg tablet TAKE 1 TABLET BY MOUTH TWICE A DAY WITH MEALS 180 tablet 3    methylPREDNISolone (MEDROL) 32 MG tablet Take 1 tablet (32 mg total) by mouth 12 hours prior to procedure. Take an additional 1 tablet (32 mg total) by mouth 2 hours prior to procedure. 2 tablet 0    metoprolol succinate (TOPROL-XL) 50 mg 24 hr tablet Take 1.5 tablets (75 mg total) by mouth 2 (two) times a day 270 tablet 2    montelukast (SINGULAIR) 10 mg tablet Take 1 tablet (10 mg total) by mouth daily 90 tablet 3    nitroglycerin (NITROSTAT) 0.4 mg SL tablet Place 1 tablet (0.4 mg total) under the tongue every 5 (five) minutes as needed for chest pain 30 tablet 1    NovoLOG FlexPen 100 units/mL injection pen Inject 5 units with breakfast and with dinner 15 mL 3    rosuvastatin (CRESTOR) 40 MG tablet Take 1 tablet (40 mg total) by mouth daily 90 tablet 1    spironolactone (ALDACTONE) 25 mg tablet Take 0.5 tablets (12.5 mg total) by mouth daily 45 tablet 1     No current facility-administered medications for this visit.     PRN Meds:.        Family History   Problem Relation Age of Onset    Heart attack Family          at age 86    No Known Problems Mother     No Known Problems Father     Diabetes Sister     Diabetes Brother        Social History     Socioeconomic History    Marital status: /Civil Union     Spouse name: Not on file    Number of children: Not on file    Years of education: Not on file    Highest education level: Not on file   Occupational History    Occupation: retired    Tobacco Use    Smoking status: Some Days     Current packs/day: 0.00     Types: Cigarettes     Last attempt to quit: 2022     Years since quittin.7    Smokeless tobacco: Never    Tobacco comments:     started when he was about 27 yrs old; stopped smoking 3 wks ago   Vaping Use    Vaping status: Never Used   Substance and Sexual Activity    Alcohol use: Not Currently    Drug use: No    Sexual activity: Not Currently   Other Topics Concern    Not on file   Social History Narrative    Not on file     Social Drivers of Health     Financial Resource Strain: Low Risk  (2024)    Overall Financial Resource Strain (CARDIA)     Difficulty of Paying Living Expenses: Not hard at all   Food Insecurity: No Food Insecurity (2024)    Nursing - Inadequate Food Risk Classification     Worried About Running Out of Food in the Last Year: Never true     Ran Out of Food in the Last Year: Never true     Ran Out of Food in the Last Year: Not on file   Transportation Needs: No Transportation Needs (3/21/2024)    OASIS : Transportation     Lack of Transportation (Medical): No     Lack of Transportation (Non-Medical): No     Patient Unable or Declines to Respond: No   Physical Activity: Unknown (2022)    Exercise Vital Sign     Days of Exercise per Week: Not on file     Minutes of Exercise per Session: 60 min   Stress: No Stress Concern Present (2022)    [employer] - Occupational Stress Questionnaire     Feeling of Stress : Not at all   Social Connections: Moderately Isolated (2022)    Social  Connection and Isolation Panel [NHANES]     Frequency of Communication with Friends and Family: More than three times a week     Frequency of Social Gatherings with Friends and Family: More than three times a week     Attends Congregational Services: Never     Active Member of Clubs or Organizations: No     Attends Club or Organization Meetings: Never     Marital Status:    Intimate Partner Violence: Not on file   Housing Stability: Low Risk  (4/25/2024)    Housing Stability Vital Sign     Unable to Pay for Housing in the Last Year: No     Number of Times Moved in the Last Year: 1     Homeless in the Last Year: No         OBJECTIVE:    There were no vitals taken for this visit.  There were no vitals filed for this visit.    GEN: No acute distress, Alert and oriented, well appearing  HEENT normocephalic, atraumatic  CARDIOVASCULAR:  RRR, No murmur, rub, gallops S1,S2  LUNGS: Clear To auscultation bilaterally, normal effort, no rales, rhonchi, crackles   ABDOMEN:  nondistended,  without obvious organomegaly or ascites  EXTREMITIES/VASCULAR:  No edema. warm an well perfused.  PSYCH: Normal Affect,  linear speech pattern without evidence of psychosis.   NEURO: Grossly intact, moving all extremiteis equal, face symmetric, alert and responsive, no obvious focal defecits   GAIT:  Ambulates normally without difficulty  HEME: No bleeding, bruising, petechia, purpura   SKIN: No significant rashes on visibile skin, warm, no diaphoresis or pallor.     Lab Results:       LABS:      Chemistry        Component Value Date/Time     10/14/2014 0000    K 4.0 10/29/2024 1334    K 3.6 06/18/2019 1612     10/29/2024 1334     06/18/2019 1612    CO2 32 10/29/2024 1334    CO2 29 06/21/2022 1332    CO2 33 (H) 06/18/2019 1612    BUN 21 10/29/2024 1334    BUN 19 06/18/2019 1612    CREATININE 0.88 10/29/2024 1334    CREATININE 0.76 06/18/2019 1612        Component Value Date/Time    CALCIUM 9.3 10/29/2024 1334    CALCIUM 9.4  "06/18/2019 1612    ALKPHOS 115 (H) 02/23/2024 0412    ALKPHOS 63 06/18/2019 1612    AST 43 (H) 02/23/2024 0412    AST <6 06/18/2019 1612    ALT 57 (H) 02/23/2024 0412    ALT 10 06/18/2019 1612    BILITOT 0.36 10/14/2014 0000            Lab Results   Component Value Date    CHOL 171 02/12/2014     Lab Results   Component Value Date    HDL 35 (L) 12/23/2023    HDL 53 02/10/2023    HDL 65 11/10/2022     Lab Results   Component Value Date    LDLCALC 63 12/23/2023    LDLCALC 72 02/10/2023    LDLCALC 62 11/10/2022     Lab Results   Component Value Date    TRIG 74 12/23/2023    TRIG 61 02/10/2023    TRIG 75 11/10/2022     No results found for: \"CHOLHDL\"    IMAGING: Cardiac EP device report    Result Date: 9/26/2024  Narrative: MDT BI-V ICD/ACTIVE SYSTEM IS MRI CONDITIONAL CARELINK TRANSMISSION: BATTERY STATUS \"8 YRS.\" AP 4% CRT PACING (BIV) 100% (LV) 0%. ALL AVAILABLE LEAD PARAMETERS WITHIN NORMAL LIMITS. NO SIGNIFICANT HIGH RATE EPISODES. VENT SENSING MARKERS NOTED. OPTI-VOL WITHIN NORMAL LIMITS. NORMAL DEVICE FUNCTION. NC        Cardiac testing:     I reviewed and interpreted the following LABS/EKG/TELE/IMAGING and below is summary of my interpretation (if data available):      Current EKG performed at today's visit read by me personally reveals sinus rhythm with biventricular pacing. Inferior infarct, age undetermined    ECHO  12/23/23    Left Ventricle: Left ventricular cavity size is mildly dilated. Wall thickness is upper normal. The left ventricular ejection fraction is 25-30%. Systolic function is severely reduced. There is severe global hypokinesis with regional variation. Diastolic function is normal.    IVS: There is abnormal septal motion consistent with left bundle branch block or right ventricular pacing.    Right Ventricle: Right ventricular cavity size is mildly dilated. Systolic function is moderately reduced. A pacer wire is present.    Left Atrium: The atrium is moderate to severely dilated.    Right " Atrium: The atrium is moderately dilated.    Aortic Valve: There is a TAVR bioprosthetic valve. The prosthetic valve appears to be functioning normally.    Mitral Valve: There is severe regurgitation with an eccentrically directed jet.    Tricuspid Valve: There is moderate to severe regurgitation. The right ventricular systolic pressure is moderately to severely elevated.    Pulmonic Valve: There is mild regurgitation.    IVC/SVC: The inferior vena cava is dilated. Respirophasic changes were blunted (less than 50% variation).

## 2025-03-06 ENCOUNTER — OFFICE VISIT (OUTPATIENT)
Dept: CARDIOLOGY CLINIC | Facility: CLINIC | Age: 80
End: 2025-03-06
Payer: MEDICARE

## 2025-03-06 VITALS
SYSTOLIC BLOOD PRESSURE: 126 MMHG | WEIGHT: 176.1 LBS | BODY MASS INDEX: 26.08 KG/M2 | HEART RATE: 66 BPM | DIASTOLIC BLOOD PRESSURE: 58 MMHG | HEIGHT: 69 IN

## 2025-03-06 DIAGNOSIS — E78.2 MIXED HYPERLIPIDEMIA: Chronic | ICD-10-CM

## 2025-03-06 DIAGNOSIS — I49.5 TACHY-BRADY SYNDROME (HCC): Chronic | ICD-10-CM

## 2025-03-06 DIAGNOSIS — I48.0 PAROXYSMAL ATRIAL FIBRILLATION (HCC): ICD-10-CM

## 2025-03-06 DIAGNOSIS — I50.22 CHRONIC HFREF (HEART FAILURE WITH REDUCED EJECTION FRACTION) (HCC): Chronic | ICD-10-CM

## 2025-03-06 DIAGNOSIS — I48.3 TYPICAL ATRIAL FLUTTER (HCC): Primary | ICD-10-CM

## 2025-03-06 DIAGNOSIS — Z95.810 ICD (IMPLANTABLE CARDIOVERTER-DEFIBRILLATOR) IN PLACE: Chronic | ICD-10-CM

## 2025-03-06 DIAGNOSIS — I10 ESSENTIAL HYPERTENSION: Chronic | ICD-10-CM

## 2025-03-06 PROCEDURE — 93000 ELECTROCARDIOGRAM COMPLETE: CPT | Performed by: STUDENT IN AN ORGANIZED HEALTH CARE EDUCATION/TRAINING PROGRAM

## 2025-03-06 PROCEDURE — 99214 OFFICE O/P EST MOD 30 MIN: CPT | Performed by: STUDENT IN AN ORGANIZED HEALTH CARE EDUCATION/TRAINING PROGRAM

## 2025-03-07 DIAGNOSIS — J45.20 MILD INTERMITTENT ASTHMA WITHOUT COMPLICATION: ICD-10-CM

## 2025-03-10 DIAGNOSIS — E11.40 TYPE 2 DIABETES MELLITUS WITH DIABETIC NEUROPATHY, WITHOUT LONG-TERM CURRENT USE OF INSULIN (HCC): ICD-10-CM

## 2025-03-10 DIAGNOSIS — E08.40 DIABETES MELLITUS DUE TO UNDERLYING CONDITION WITH DIABETIC NEUROPATHY, WITHOUT LONG-TERM CURRENT USE OF INSULIN (HCC): ICD-10-CM

## 2025-03-10 RX ORDER — ALBUTEROL SULFATE 90 UG/1
INHALANT RESPIRATORY (INHALATION)
Qty: 8.5 G | Refills: 4 | Status: SHIPPED | OUTPATIENT
Start: 2025-03-10 | End: 2025-03-12 | Stop reason: SDUPTHER

## 2025-03-11 RX ORDER — INSULIN GLARGINE 100 [IU]/ML
18 INJECTION, SOLUTION SUBCUTANEOUS
Qty: 15 ML | Refills: 3 | Status: SHIPPED | OUTPATIENT
Start: 2025-03-11

## 2025-03-11 RX ORDER — INSULIN ASPART 100 [IU]/ML
INJECTION, SOLUTION INTRAVENOUS; SUBCUTANEOUS
Qty: 15 ML | Refills: 3 | Status: SHIPPED | OUTPATIENT
Start: 2025-03-11

## 2025-03-12 ENCOUNTER — OFFICE VISIT (OUTPATIENT)
Dept: INTERNAL MEDICINE CLINIC | Facility: CLINIC | Age: 80
End: 2025-03-12

## 2025-03-12 VITALS
BODY MASS INDEX: 26.07 KG/M2 | DIASTOLIC BLOOD PRESSURE: 78 MMHG | SYSTOLIC BLOOD PRESSURE: 132 MMHG | WEIGHT: 176 LBS | HEART RATE: 68 BPM | TEMPERATURE: 97.3 F | HEIGHT: 69 IN

## 2025-03-12 DIAGNOSIS — B19.10 HEPATITIS B INFECTION WITHOUT DELTA AGENT WITHOUT HEPATIC COMA, UNSPECIFIED CHRONICITY: ICD-10-CM

## 2025-03-12 DIAGNOSIS — Z72.0 TOBACCO ABUSE: ICD-10-CM

## 2025-03-12 DIAGNOSIS — M19.90 ARTHRITIS: ICD-10-CM

## 2025-03-12 DIAGNOSIS — Z79.4 TYPE 2 DIABETES MELLITUS WITH OTHER CIRCULATORY COMPLICATION, WITH LONG-TERM CURRENT USE OF INSULIN (HCC): ICD-10-CM

## 2025-03-12 DIAGNOSIS — I48.91 ATRIAL FIBRILLATION (HCC): ICD-10-CM

## 2025-03-12 DIAGNOSIS — Z12.11 SCREENING FOR COLORECTAL CANCER: ICD-10-CM

## 2025-03-12 DIAGNOSIS — E11.59 TYPE 2 DIABETES MELLITUS WITH OTHER CIRCULATORY COMPLICATION, WITH LONG-TERM CURRENT USE OF INSULIN (HCC): ICD-10-CM

## 2025-03-12 DIAGNOSIS — E11.9 DIABETES MELLITUS (HCC): ICD-10-CM

## 2025-03-12 DIAGNOSIS — I50.23 ACUTE ON CHRONIC SYSTOLIC CONGESTIVE HEART FAILURE (HCC): ICD-10-CM

## 2025-03-12 DIAGNOSIS — R31.0 GROSS HEMATURIA: Primary | ICD-10-CM

## 2025-03-12 DIAGNOSIS — Z12.12 SCREENING FOR COLORECTAL CANCER: ICD-10-CM

## 2025-03-12 DIAGNOSIS — J45.20 MILD INTERMITTENT ASTHMA WITHOUT COMPLICATION: ICD-10-CM

## 2025-03-12 DIAGNOSIS — T78.40XS ALLERGY, SEQUELA: ICD-10-CM

## 2025-03-12 PROCEDURE — 3078F DIAST BP <80 MM HG: CPT

## 2025-03-12 PROCEDURE — G2211 COMPLEX E/M VISIT ADD ON: HCPCS

## 2025-03-12 PROCEDURE — 99213 OFFICE O/P EST LOW 20 MIN: CPT

## 2025-03-12 PROCEDURE — 3075F SYST BP GE 130 - 139MM HG: CPT

## 2025-03-12 RX ORDER — DOFETILIDE 0.12 MG/1
125 CAPSULE ORAL 2 TIMES DAILY
Qty: 180 CAPSULE | Refills: 3 | Status: SHIPPED | OUTPATIENT
Start: 2025-03-12

## 2025-03-12 RX ORDER — FLUTICASONE PROPIONATE 50 MCG
1 SPRAY, SUSPENSION (ML) NASAL DAILY
Qty: 15.8 ML | Refills: 3 | Status: SHIPPED | OUTPATIENT
Start: 2025-03-12

## 2025-03-12 RX ORDER — ALBUTEROL SULFATE 90 UG/1
INHALANT RESPIRATORY (INHALATION)
Qty: 8.5 G | Refills: 4 | Status: SHIPPED | OUTPATIENT
Start: 2025-03-12

## 2025-03-12 NOTE — PROGRESS NOTES
Assessment/Plan:    1. Gross hematuria  -Previously reported gross hematuria that has now resolved previously had kidney stones possibly cause  -Recommend to follow urology however patient did not want to undergo cystoscopy we discussed  -Benefits of cystoscopy and possible findings of malignancy and patient would like to defer for now  -Concluded with repeat present if hematuria appears again    2. Hepatitis B infection without delta agent without hepatic coma, unspecified chronicity  -Treated hepatitis so in the past/showing reactive hepatitis B core antibody, nonreactive surface antigen  -Based on these findings we will recheck panel to assess treatment  -     Hepatitis B core antibody, total; Future  -     Hepatitis B surface antigen; Future  -     Hepatitis B surface antibody; Future  -     Hepatitis B DNA, Quantitative PCR; Future    3. Arthritis  -Reports knee arthritis improved with Voltaren gel in the past  -Will prescribe Voltaren gel  -     Diclofenac Sodium (VOLTAREN) 1 %; Apply 2 g topically 4 (four) times a day    4. Tobacco abuse  -Currently smokes about 15 cigarettes a day  -Looking to quit with nicotine patch he has used in the past  -Will prescribe nicotine patch discussed goals for cessation  -     nicotine (NICODERM CQ) 7 mg/24hr TD 24 hr patch; Place 1 patch on the skin over 24 hours every 24 hours    5. Screening for colorectal cancer  -Patient requested wanting colonoscopy  - last colonoscopy in 2017 removed hyperplastic polyp  -Discussed risk benefits as patient is greater than 75 years old   - discussed would revisit in 2027 for GI referral          Return in about 3 months (around 6/12/2025) for Next scheduled follow up.    Subjective:      Patient ID: Juan David Lora is a 79 y.o. male.    Chief Complaint   Patient presents with    Follow-up       79 yr old M with PMH of T2DM on Insulin, paroxysmal atrial fibrillation on apixiban, tachybrady syndrome on Tikosyn s/p Medtronic 2022 then  biventricular ICD 2023 PPM, HFrEF ischemic CM,  CAD s/p PCI to M LAD and RPDA 2023, and TAVR, asthma severe obstructive defect presenting for follow up.  He presents today concerning for not having a recent colonoscopy.  He states his last colonoscopy was in 2017 and has not had one since and is concerned.  He states he has not had hematuria since the last episode he had and had a kidney stone.  He states he was recommended to see urology but does not want to get cystoscopy done.  He continues to smoke about 15 cigarettes a day and would like to quit.  He also endorses pain in his knees improved with Voltaren in the past        The following portions of the patient's history were reviewed and updated as appropriate: allergies, current medications, past family history, past medical history, past social history, past surgical history and problem list.    Review of Systems   Constitutional:  Negative for chills, fatigue and fever.   Respiratory:  Negative for cough and shortness of breath.    Cardiovascular:  Negative for chest pain and leg swelling.   Gastrointestinal:  Negative for abdominal distention, abdominal pain, constipation, diarrhea, nausea and vomiting.   Genitourinary:  Negative for difficulty urinating and hematuria.   Neurological:  Negative for dizziness.         Current Outpatient Medications   Medication Sig Dispense Refill    albuterol (PROVENTIL HFA,VENTOLIN HFA) 90 mcg/act inhaler INHALE 2 PUFFS BY MOUTH EVERY 4 HOURS AS NEEDED FOR WHEEZING OR SHORTNESS OF BREATH 8.5 g 4    Diclofenac Sodium (VOLTAREN) 1 % Apply 2 g topically 4 (four) times a day 50 g 3    dofetilide (TIKOSYN) 125 mcg capsule Take 1 capsule (125 mcg total) by mouth 2 (two) times a day 180 capsule 3    fluticasone (FLONASE) 50 mcg/act nasal spray 1 spray into each nostril daily 15.8 mL 3    metFORMIN (GLUCOPHAGE) 850 mg tablet Take 1 tablet (850 mg total) by mouth 2 (two) times a day with meals 180 tablet 3    nicotine (NICODERM CQ)  7 mg/24hr TD 24 hr patch Place 1 patch on the skin over 24 hours every 24 hours 28 patch 0    Alcohol Swabs (ALCOHOL PADS) 70 % PADS       apixaban (Eliquis) 5 mg Take 1 tablet (5 mg total) by mouth 2 (two) times a day 180 tablet 3    Blood Glucose Monitoring Suppl (OneTouch Verio) w/Device KIT Check blood glucose three times daily before each meal 1 kit 0    cholecalciferol (VITAMIN D3) 1,000 units tablet Take 2 tablets (2,000 Units total) by mouth daily 180 tablet 5    clopidogrel (PLAVIX) 75 mg tablet Take 1 tablet (75 mg total) by mouth daily 90 tablet 1    Continuous Glucose Sensor (Dexcom G7 Sensor) Use 1 Device every 10 days 9 each 3    Diclofenac Sodium (VOLTAREN) 1 % APPLY 2 GRAMS TO AFFECTED AREA 4 TIMES A  g 3    diphenhydrAMINE (BENADRYL) 50 mg capsule Take 1 capsule (50 mg total) by mouth 1 hour prior to procedure 1 capsule 0    Empagliflozin (JARDIANCE) 10 MG TABS tablet Take 1 tablet (10 mg total) by mouth every morning 90 tablet 3    ferrous sulfate 325 (65 Fe) mg tablet Take 1 tablet (325 mg total) by mouth every other day 45 tablet 1    furosemide (LASIX) 40 mg tablet Take 1 tablet (40 mg total) by mouth 2 (two) times a day 180 tablet 1    Insulin Glargine Solostar (Lantus SoloStar) 100 UNIT/ML SOPN Inject 0.18 mL (18 Units total) under the skin daily at bedtime 15 mL 3    Insulin Pen Needle (Pen Needles) 31G X 8 MM MISC Use daily 300 each 3    Lancets (freestyle) lancets Use as needed (As needed) 100 each 3    levothyroxine 137 mcg tablet Take 1 tablet (137 mcg total) by mouth daily 90 tablet 3    lisinopril (ZESTRIL) 10 mg tablet Take 1 tablet (10 mg total) by mouth daily 90 tablet 0    methylPREDNISolone (MEDROL) 32 MG tablet Take 1 tablet (32 mg total) by mouth 12 hours prior to procedure. Take an additional 1 tablet (32 mg total) by mouth 2 hours prior to procedure. 2 tablet 0    metoprolol succinate (TOPROL-XL) 50 mg 24 hr tablet Take 1.5 tablets (75 mg total) by mouth 2 (two) times a  "day 270 tablet 2    montelukast (SINGULAIR) 10 mg tablet Take 1 tablet (10 mg total) by mouth daily 90 tablet 3    nitroglycerin (NITROSTAT) 0.4 mg SL tablet Place 1 tablet (0.4 mg total) under the tongue every 5 (five) minutes as needed for chest pain 30 tablet 1    NovoLOG FlexPen 100 units/mL injection pen Inject 5 units with breakfast and with dinner 15 mL 3    rosuvastatin (CRESTOR) 40 MG tablet Take 1 tablet (40 mg total) by mouth daily 90 tablet 1    spironolactone (ALDACTONE) 25 mg tablet Take 0.5 tablets (12.5 mg total) by mouth daily 45 tablet 1     No current facility-administered medications for this visit.       Objective:    /78 (BP Location: Left arm, Patient Position: Sitting, Cuff Size: Large)   Pulse 68   Temp (!) 97.3 °F (36.3 °C) (Temporal)   Ht 5' 9\" (1.753 m)   Wt 79.8 kg (176 lb)   BMI 25.99 kg/m²        Physical Exam  Constitutional:       General: He is not in acute distress.     Appearance: Normal appearance. He is not ill-appearing or toxic-appearing.   HENT:      Head: Normocephalic and atraumatic.   Cardiovascular:      Rate and Rhythm: Normal rate and regular rhythm.      Heart sounds:      No gallop.   Pulmonary:      Effort: No respiratory distress.      Breath sounds: No wheezing or rales.   Abdominal:      General: Abdomen is flat. There is no distension.      Tenderness: There is no abdominal tenderness.   Musculoskeletal:      Right lower leg: No edema.      Left lower leg: No edema.   Neurological:      Mental Status: He is alert and oriented to person, place, and time.   Psychiatric:         Mood and Affect: Mood normal.         Behavior: Behavior normal.                Bria Lira, DO   [facility] PGY3   "

## 2025-04-02 ENCOUNTER — TELEPHONE (OUTPATIENT)
Dept: CARDIOLOGY CLINIC | Facility: CLINIC | Age: 80
End: 2025-04-02

## 2025-04-03 DIAGNOSIS — E08.40 DIABETES MELLITUS DUE TO UNDERLYING CONDITION WITH DIABETIC NEUROPATHY, WITHOUT LONG-TERM CURRENT USE OF INSULIN (HCC): ICD-10-CM

## 2025-04-03 RX ORDER — INSULIN GLARGINE 100 [IU]/ML
18 INJECTION, SOLUTION SUBCUTANEOUS
Qty: 15 ML | Refills: 3 | Status: SHIPPED | OUTPATIENT
Start: 2025-04-03

## 2025-04-15 DIAGNOSIS — E08.40 DIABETES MELLITUS DUE TO UNDERLYING CONDITION WITH DIABETIC NEUROPATHY, WITHOUT LONG-TERM CURRENT USE OF INSULIN (HCC): ICD-10-CM

## 2025-04-16 ENCOUNTER — TELEPHONE (OUTPATIENT)
Dept: INTERNAL MEDICINE CLINIC | Facility: CLINIC | Age: 80
End: 2025-04-16

## 2025-04-16 RX ORDER — LANCETS 28 GAUGE
EACH MISCELLANEOUS AS NEEDED
Qty: 100 EACH | Refills: 3 | Status: SHIPPED | OUTPATIENT
Start: 2025-04-16

## 2025-04-16 NOTE — TELEPHONE ENCOUNTER
Received call from patient's family requesting refill of his Lantus.     Contacted patient's [facility] pharmacy at . Introduced self and role. Pharmacist updated patient's family reaching out for refill of his Lantus. Script sent on 4/3/25. Pharmacist processed script and will work to fill for  tonight.     Pharmacy will alert patient once ready for .

## 2025-05-04 DIAGNOSIS — T78.40XS ALLERGY, SEQUELA: ICD-10-CM

## 2025-05-05 RX ORDER — FLUTICASONE PROPIONATE 50 MCG
SPRAY, SUSPENSION (ML) NASAL
Qty: 24 ML | Refills: 3 | Status: SHIPPED | OUTPATIENT
Start: 2025-05-05

## 2025-06-03 DIAGNOSIS — D50.9 IRON DEFICIENCY ANEMIA, UNSPECIFIED IRON DEFICIENCY ANEMIA TYPE: ICD-10-CM

## 2025-06-04 RX ORDER — FERROUS SULFATE 325(65) MG
1 TABLET ORAL EVERY OTHER DAY
Qty: 45 TABLET | Refills: 2 | Status: SHIPPED | OUTPATIENT
Start: 2025-06-04

## 2025-06-15 DIAGNOSIS — I50.22 CHRONIC HFREF (HEART FAILURE WITH REDUCED EJECTION FRACTION) (HCC): ICD-10-CM

## 2025-06-16 RX ORDER — SPIRONOLACTONE 25 MG/1
12.5 TABLET ORAL DAILY
Qty: 45 TABLET | Refills: 1 | Status: SHIPPED | OUTPATIENT
Start: 2025-06-16

## 2025-07-03 DIAGNOSIS — T78.40XS ALLERGY, SEQUELA: ICD-10-CM

## 2025-07-03 RX ORDER — FLUTICASONE PROPIONATE 50 MCG
SPRAY, SUSPENSION (ML) NASAL
Qty: 24 ML | Refills: 3 | Status: SHIPPED | OUTPATIENT
Start: 2025-07-03

## 2025-07-12 DIAGNOSIS — Z95.2 S/P TAVR (TRANSCATHETER AORTIC VALVE REPLACEMENT): ICD-10-CM

## 2025-07-15 RX ORDER — CLOPIDOGREL BISULFATE 75 MG/1
75 TABLET ORAL DAILY
Qty: 90 TABLET | Refills: 1 | Status: SHIPPED | OUTPATIENT
Start: 2025-07-15

## 2025-08-06 DIAGNOSIS — E78.2 MIXED HYPERLIPIDEMIA: ICD-10-CM

## 2025-08-06 DIAGNOSIS — I50.23 ACUTE ON CHRONIC SYSTOLIC CONGESTIVE HEART FAILURE (HCC): ICD-10-CM

## 2025-08-06 RX ORDER — FUROSEMIDE 40 MG/1
40 TABLET ORAL 2 TIMES DAILY
Qty: 180 TABLET | Refills: 1 | Status: SHIPPED | OUTPATIENT
Start: 2025-08-06

## 2025-08-06 RX ORDER — ROSUVASTATIN CALCIUM 40 MG/1
40 TABLET, COATED ORAL DAILY
Qty: 90 TABLET | Refills: 1 | Status: SHIPPED | OUTPATIENT
Start: 2025-08-06

## (undated) DEVICE — DGW .035 FC J3MM 260CM TEF: Brand: EMERALD

## (undated) DEVICE — BALLOON EUPHORA RX 2.5 X 15MM

## (undated) DEVICE — PLASMABLADE PS200-040 4.0: Brand: PLASMABLADE™

## (undated) DEVICE — CATH GUIDE LAUNCHER 6FR JR4 110CM

## (undated) DEVICE — CATH DIAG 5FR IMPULSE 100CM FR4

## (undated) DEVICE — GLIDESHEATH BASIC HYDROPHILIC COATED INTRODUCER SHEATH: Brand: GLIDESHEATH

## (undated) DEVICE — RADIFOCUS OPTITORQUE ANGIOGRAPHIC CATHETER: Brand: OPTITORQUE

## (undated) DEVICE — DRESSING ALLEVYN LIFE SACRAL 6.75 X 6.5 IN

## (undated) DEVICE — CATH GUIDE LAUNCHER 6FR EBU 3.75

## (undated) DEVICE — PINNACLE INTRODUCER SHEATH: Brand: PINNACLE

## (undated) DEVICE — PANNUS RETENTION SYSTEM

## (undated) DEVICE — AVITENE MICROFIBRILLAR COLLAGEN HEMOSTAT NON-WOVEN WEB: Brand: AVITENE NON-WOVEN WEB

## (undated) DEVICE — GLOVE INDICATOR PI UNDERGLOVE SZ 8 BLUE

## (undated) DEVICE — CATH DIAG 5FR IMPULSE 110CM 6S PIG 145 ANG

## (undated) DEVICE — THERMOFLECT BLANKET, L, 25EA                               TS THERMOFLECT BLANKET, 48" X 84", SILVER, 5/BG, 5 BG/CS NW: Brand: THERMOFLECT

## (undated) DEVICE — TRAY FOLEY 16FR SURESTEP TEMP SENS URIMETER STAT LOK

## (undated) DEVICE — TR BAND RADIAL ARTERY COMPRESSION DEVICE: Brand: TR BAND

## (undated) DEVICE — 3M™ TEGADERM™ TRANSPARENT FILM DRESSING FRAME STYLE, 1626W, 4 IN X 4-3/4 IN (10 CM X 12 CM), 50/CT 4CT/CASE: Brand: 3M™ TEGADERM™

## (undated) DEVICE — CATH GUIDING FIXED SHAPE 43CM -NC

## (undated) DEVICE — RUNTHROUGH NS EXTRA FLOPPY PTCA GUIDEWIRE: Brand: RUNTHROUGH

## (undated) DEVICE — DEFIB ADULT ELECTRODE CARDINAL

## (undated) DEVICE — DGW .035 FC J3MM 150CM TEF: Brand: EMERALD

## (undated) DEVICE — GUIDEWIRE WHOLEY HI TORQUE INTERM MOD J .035 145CM

## (undated) DEVICE — X-RAY DETECTABLE SPONGES,16 PLY: Brand: VISTEC

## (undated) DEVICE — CARDIOVASCULAR SPLIT DRAPE: Brand: CONVERTORS

## (undated) DEVICE — SUT SILK 0 CT-1 30 IN 424H

## (undated) DEVICE — GUIDEWIRE LUNDERQUIST TSCMG-35-300-LESDC

## (undated) DEVICE — MICROPUNCTURE SET 4FR 10CM .018 NITINOL TRANSITIONLESS TIP

## (undated) DEVICE — HEAVY DUTY TABLE COVER: Brand: CONVERTORS

## (undated) DEVICE — SWAN-GANZ BIPOLAR PACING CATHETER: Brand: SWAN-GANZ

## (undated) DEVICE — ADHESIVE SKIN HIGH VISCOSITY EXOFIN 1ML

## (undated) DEVICE — GLIDESHEATH SLENDER STAINLESS STEEL KIT: Brand: GLIDESHEATH SLENDER

## (undated) DEVICE — 3000CC GUARDIAN II: Brand: GUARDIAN

## (undated) DEVICE — GLOVE SRG BIOGEL ECLIPSE 7.5

## (undated) DEVICE — RADIFOCUS GLIDEWIRE: Brand: GLIDEWIRE

## (undated) DEVICE — DGW .035 FC STR 260CM TEF: Brand: EMERALD

## (undated) DEVICE — CARDIO PERI-GROIN: Brand: CONVERTORS

## (undated) DEVICE — PAD GROUNDING ADULT

## (undated) DEVICE — MICROPUNCTURE INTRODUCER SET SILHOUETTE TRANSITIONLESS WITH STAINLESS STEEL WIRE GUIDE: Brand: MICROPUNCTURE

## (undated) DEVICE — AMPLATZ EXTRA STIFF WIRE GUIDE: Brand: AMPLATZ

## (undated) DEVICE — Device

## (undated) DEVICE — INTENDED FOR TISSUE SEPARATION, AND OTHER PROCEDURES THAT REQUIRE A SHARP SURGICAL BLADE TO PUNCTURE OR CUT.: Brand: BARD-PARKER ® CARBON RIB-BACK BLADES

## (undated) DEVICE — INTRO SHEATH PEEL AWAY 9 FR

## (undated) DEVICE — BETHLEHEM MAJOR GENERAL PACK: Brand: CARDINAL HEALTH

## (undated) DEVICE — GAUZE SPONGES,USP TYPE VII GAUZE, 12 PLY: Brand: CURITY

## (undated) DEVICE — SLITTER ADJUSTABLE

## (undated) DEVICE — CATH GUIDE LAUNCHER 5FR EBU 3.75

## (undated) DEVICE — PINNACLE R/O II INTRODUCER SHEATH WITH RADIOPAQUE MARKER: Brand: PINNACLE

## (undated) DEVICE — INTRO SHEATH PEEL AWAY 7FR